# Patient Record
Sex: FEMALE | Race: BLACK OR AFRICAN AMERICAN | Employment: OTHER | URBAN - METROPOLITAN AREA
[De-identification: names, ages, dates, MRNs, and addresses within clinical notes are randomized per-mention and may not be internally consistent; named-entity substitution may affect disease eponyms.]

---

## 2017-07-13 PROBLEM — B00.9 HERPES SIMPLEX: Status: ACTIVE | Noted: 2017-07-13

## 2017-09-20 PROBLEM — M19.031 ARTHRITIS OF RIGHT WRIST: Status: ACTIVE | Noted: 2017-09-20

## 2018-03-21 ENCOUNTER — TELEPHONE (OUTPATIENT)
Dept: FAMILY MEDICINE CLINIC | Age: 68
End: 2018-03-21

## 2018-03-22 ENCOUNTER — HOSPITAL ENCOUNTER (OUTPATIENT)
Dept: OCCUPATIONAL THERAPY | Age: 68
Setting detail: THERAPIES SERIES
Discharge: HOME | End: 2018-03-22
Payer: MEDICARE

## 2018-03-22 PROCEDURE — 97165 OT EVAL LOW COMPLEX 30 MIN: CPT | Performed by: OCCUPATIONAL THERAPIST

## 2018-03-22 PROCEDURE — 97530 THERAPEUTIC ACTIVITIES: CPT | Performed by: OCCUPATIONAL THERAPIST

## 2018-03-22 PROCEDURE — G8984 CARRY CURRENT STATUS: HCPCS | Performed by: OCCUPATIONAL THERAPIST

## 2018-03-22 PROCEDURE — G8985 CARRY GOAL STATUS: HCPCS | Performed by: OCCUPATIONAL THERAPIST

## 2018-03-22 NOTE — PROGRESS NOTES
OCCUPATIONAL THERAPY INITIAL EVALUATION     Phone: [phone]  Fax: [phone]     Date:  3/22/2018  Initial Evaluation Date: 3/22/2018    Patient Name:  Ayla Gonzalez    :  1950    Restrictions/Precautions:  low fall risk  Diagnosis: Degenerative arthritis of R thumb (M19.042), M65.4 (ICD-10-CM) - De Quervain's tenosynovitis, Right (After review of physician's note, pt had R basal joint arthroplasty and De Quervain's decompression)  Treatment Diagnosis:  same  Insurance/Certification information:  [facility] medicare solutions  Referring Physician:  Dr. Leonides Leigh  Date of Surgery/Injury: 2016 pt had R thumb basal joint arthroplasty. Plan of care signed (Y/N):  No  Visit# / total visits:  if needed.     Past Medical History:   Past Medical History:   Diagnosis Date    Arthritis     Asthma     Cancer ([facility] [address]     breast     Cerebral artery occlusion with cerebral infarction ([facility] [address]     denies deficits    Cerebrovascular disease     no residual    CHF (congestive heart failure) (HCC) approx 3 years ago    Claustrophobia     Headache(784.0)     History of blood transfusion     with knee surgery    Hyperlipidemia     Hypertension     LBP radiating to both legs     Lymphedema of both lower extremities 2015    Neuromuscular disorder (HCC)     Obesity     Recurrent genital HSV (herpes simplex virus) infection     last outbreak 2017    S/P breast biopsy, right 2016    pt states breast cancer    Type II or unspecified type diabetes mellitus without mention of complication, not stated as uncontrolled     AA1c8.7 9/10     Past Surgical History:   Past Surgical History:   Procedure Laterality Date    ARTHROPLASTY Left 2016    thumb basil joint with dequervain's release    BREAST LUMPECTOMY  years ago    benign    BREAST LUMPECTOMY Right 2016    COLONOSCOPY  2005    COLONOSCOPY  1/8/15    Dr. Ramila Brunner - Minor Marylin FLOW  2012         there thumb to the tip of her fingers. She is able to touch the base of her SF as well. Comment: Hand Dominance is Right. Pt has a minimal amount of diffuse edema to her R basal joint. Well healed scar. Sensation: WFL. Pt is able to feel light touch to all aspects of her thumb including into forearm. Dynamometer (setting 2): Right: 4#   Left: 21#        Pinch Meter:   Lateral: Right: 7#, Left: 8#   Palmar 3 point: Right: 1#, Left: 6#    Intervention: Pt was created an HEP and reviewed so she can attempt at home. Used medium soft putty to complete gripping, 3 jaw milady pinching, pinch to pinch, and thumb abduction strengthening. She is to complete 10 min sessions per day. We will continue to add as able. Assessment: Decreased high-level IADLs; Increased pain; Increased Edema; Scar formation  Rehab Potential: Good   Recommendations: Outpatient OT  Goal Formulation: Patient  Requires OT Follow Up: Yes  Time In: 1100  Time Out: 1200  Timed Code Treatment Minutes: 60 Minutes    Plan   Plan: Plan of care initiated. Pt will be seen 1-2x a week for 6 weeks or 12 sessions if needed. At this time, pt will be seen 1 more time for a splint check and then will return to her MD. With approval from her surgeon, we will pick occupational therapy back up and work on the goals outlined below. Treatment to include: HEP, Therapeutic Functional Activities & Exercises, Strengthening, Stretching, AROM/AAROM/PROM, GM/FM Coordination Retraining, Desensitization, ADL/IADL re-training, Tendon Gliding, Manual Techniques, Pain Management with/without modalities PRN, Fluidotherapy, Ultra Sound, Electrical Stimulation, Massage, Edema management, Joint Protection/Training, Splinting, Ergonomics, Adaptive Equipment Assessment/Training, Energy Conservation/Work Simplification training, and Safety retraining all with patient education as required by individual diagnosis and goals.     GOALS(Long term same as Short term):  1) Patient will demonstrate good understanding of home program (exercises/activities/diagnosis/prognosis/goals) with good accuracy. 2) Patient to report decreased pain in their affected Right upper extremity from 5/10 to 2/10 or less with resistive functional use. 3) Patient to report 100% compliance with their splint wear, care, and precautions if needed. 4) Patient will report ability to play piano as prior to surgical intervention. 5) Patient will demonstrate increased /pinch strength of at least 10 / 5 pinch pounds of their Right hand to be measured when appropriate. 6) Patient will be knowledgeable of edema control techniques as evident with decreases from minimal to trace to her R hand. OT G-codes  Functional Assessment Tool Used: QuickDASH  Score: 16%  Functional Limitation: Carrying, moving and handling objects  Carrying, Moving and Handling Objects Current Status (): At least 1 percent but less than 20 percent impaired, limited or restricted  Carrying, Moving and Handling Objects Goal Status (): At least 1 percent but less than 20 percent impaired, limited or restricted      Please review Patient's OT evaluation and if you agree sign/date and fax back to us at our [address] [place] [address] fax # [phone].       Physician's Signature:____________________________ Date:__________________       Iron Daniels, [address] [place] [address], OTR/L [id number]

## 2018-03-22 NOTE — TELEPHONE ENCOUNTER
Would discuss with Dr Parth Shaffer next week but per my chart review her last ECHO showed preserved ejection fraction and I see no recent exacerbation. With careful monitoring of BP and clinically symptoms she should be okay to proceed with lymphedema treatment.

## 2018-03-29 ENCOUNTER — HOSPITAL ENCOUNTER (OUTPATIENT)
Dept: OCCUPATIONAL THERAPY | Age: 68
Setting detail: THERAPIES SERIES
Discharge: HOME | End: 2018-03-29
Payer: MEDICARE

## 2018-03-29 PROCEDURE — 97018 PARAFFIN BATH THERAPY: CPT

## 2018-03-29 PROCEDURE — 97110 THERAPEUTIC EXERCISES: CPT

## 2018-03-29 PROCEDURE — 97140 MANUAL THERAPY 1/> REGIONS: CPT

## 2018-03-29 NOTE — PROGRESS NOTES
Occupational Therapy    OCCUPATIONAL THERAPY PROGRESS NOTE  Date:  3/29/2018                                                 Initial Evaluation Date: 3/22/2018       Patient Name:  Ruth Gonzalez                       :  1950  Restrictions/Precautions:  low fall risk  Diagnosis: Degenerative arthritis of R thumb (M19.042), M65.4 (ICD-10-CM) - De Quervain's tenosynovitis, Right (After review of physician's note, pt had R basal joint arthroplasty and De Quervain's decompression)  Treatment Diagnosis:  same  Insurance/Certification information:  [facility] medicare solutions  Referring Physician:  Dr. Candie Love  Date of Surgery/Injury: 2017 pt had R thumb basal joint arthroplasty. Plan of care signed (Y/N):  No  Visit# / total visits: 2 / 12 if needed        Pain Level: 3 on scale of 1-10, aching    Subjective: Pt states, \" This thumb seems to be taking longer to heal up than the other one. \"   \" I've been using that putty once or twice a day. \"   \" My fingers are all sore today. \"     Objective:  Updated POC to be completed by 10th visit    INTERVENTION: COMPLETED: REPS/TIME: SPECIFICS/COMMENTS:   Modality:      Paraffin Bath x 10 mins With massage start of session         AROM:      Thumb/digits/hand/wrist forearm, all planes x 10 mins    Digital tendon gliding ex's x 5 mins    AAROM:     Fine Motor Manipulation tasks x 5 mins          PROM/Stretching:     Gentle thumb/digits/wrist  Forearm, all planes            Scar Mass/Edema Control:      Scar Massage/Desens x 5 mins    Edema Flushing Massage x 5 mins End of session   Strengthening:      Yellow Putty x 10 mins Gripping, added rolling, pulling, lat/ 3 Pt/ added tip to tip pinches. Issued pt yellow putty for home use. (Had mixed resistance but she feels its too hard to squeeze)   Gentle hands on resistive ex's forearm, wrist & thumb/digits x 10 mins    Other:                    Assessment: Pt is making Good progress toward stated plan of care.  Pt tolerated

## 2018-04-05 ENCOUNTER — HOSPITAL ENCOUNTER (OUTPATIENT)
Dept: OCCUPATIONAL THERAPY | Age: 68
Setting detail: THERAPIES SERIES
Discharge: HOME | End: 2018-04-05
Payer: MEDICARE

## 2018-04-05 PROCEDURE — 97022 WHIRLPOOL THERAPY: CPT

## 2018-04-05 PROCEDURE — 97110 THERAPEUTIC EXERCISES: CPT

## 2018-04-05 PROCEDURE — 97140 MANUAL THERAPY 1/> REGIONS: CPT

## 2018-04-13 ENCOUNTER — APPOINTMENT (OUTPATIENT)
Dept: OCCUPATIONAL THERAPY | Age: 68
End: 2018-04-13
Payer: MEDICARE

## 2018-04-13 ASSESSMENT — ENCOUNTER SYMPTOMS
NAUSEA: 0
BLOOD IN STOOL: 0
VOMITING: 0
ABDOMINAL PAIN: 0
ABDOMINAL DISTENTION: 0

## 2018-04-18 ENCOUNTER — OFFICE VISIT (OUTPATIENT)
Dept: BREAST CENTER | Age: 68
End: 2018-04-18
Payer: MEDICARE

## 2018-04-18 ENCOUNTER — HOSPITAL ENCOUNTER (OUTPATIENT)
Dept: GENERAL RADIOLOGY | Age: 68
Discharge: HOME | End: 2018-04-20
Payer: MEDICARE

## 2018-04-18 ENCOUNTER — HOSPITAL ENCOUNTER (OUTPATIENT)
Age: 68
Discharge: HOME | End: 2018-04-20
Payer: MEDICARE

## 2018-04-18 VITALS
TEMPERATURE: 98.6 F | SYSTOLIC BLOOD PRESSURE: 138 MMHG | DIASTOLIC BLOOD PRESSURE: 72 MMHG | RESPIRATION RATE: 16 BRPM | BODY MASS INDEX: 40.8 KG/M2 | HEIGHT: 65 IN | HEART RATE: 64 BPM | WEIGHT: 244.9 LBS

## 2018-04-18 DIAGNOSIS — Z78.0 POSTMENOPAUSAL: ICD-10-CM

## 2018-04-18 DIAGNOSIS — D05.11 DUCTAL CARCINOMA IN SITU (DCIS) OF RIGHT BREAST: ICD-10-CM

## 2018-04-18 DIAGNOSIS — Z78.0 POSTMENOPAUSAL: Primary | ICD-10-CM

## 2018-04-18 LAB
ALBUMIN SERPL-MCNC: 3.5 G/DL (ref 3.5–5.2)
ALP BLD-CCNC: 179 U/L (ref 35–104)
ALT SERPL-CCNC: 7 U/L (ref 0–32)
ANION GAP SERPL CALCULATED.3IONS-SCNC: 11 MMOL/L (ref 7–16)
AST SERPL-CCNC: 11 U/L (ref 0–31)
BASOPHILS ABSOLUTE: 0.04 E9/L (ref 0–0.2)
BASOPHILS RELATIVE PERCENT: 0.6 % (ref 0–2)
BILIRUB SERPL-MCNC: 0.3 MG/DL (ref 0–1.2)
BUN BLDV-MCNC: 8 MG/DL (ref 8–23)
CALCIUM SERPL-MCNC: 9.5 MG/DL (ref 8.6–10.2)
CHLORIDE BLD-SCNC: 100 MMOL/L (ref 98–107)
CO2: 30 MMOL/L (ref 22–29)
CREAT SERPL-MCNC: 0.8 MG/DL (ref 0.5–1)
EOSINOPHILS ABSOLUTE: 0 E9/L (ref 0.05–0.5)
EOSINOPHILS RELATIVE PERCENT: 0 % (ref 0–6)
GFR AFRICAN AMERICAN: >60
GFR NON-AFRICAN AMERICAN: >60 ML/MIN/1.73
GLUCOSE BLD-MCNC: 178 MG/DL (ref 74–109)
HCT VFR BLD CALC: 36.8 % (ref 34–48)
HEMOGLOBIN: 11.9 G/DL (ref 11.5–15.5)
IMMATURE GRANULOCYTES #: 0.04 E9/L
IMMATURE GRANULOCYTES %: 0.6 % (ref 0–5)
LYMPHOCYTES ABSOLUTE: 1.39 E9/L (ref 1.5–4)
LYMPHOCYTES RELATIVE PERCENT: 19.3 % (ref 20–42)
MCH RBC QN AUTO: 28 PG (ref 26–35)
MCHC RBC AUTO-ENTMCNC: 32.3 % (ref 32–34.5)
MCV RBC AUTO: 86.6 FL (ref 80–99.9)
MONOCYTES ABSOLUTE: 0.68 E9/L (ref 0.1–0.95)
MONOCYTES RELATIVE PERCENT: 9.4 % (ref 2–12)
NEUTROPHILS ABSOLUTE: 5.05 E9/L (ref 1.8–7.3)
NEUTROPHILS RELATIVE PERCENT: 70.1 % (ref 43–80)
PDW BLD-RTO: 13.6 FL (ref 11.5–15)
PLATELET # BLD: 399 E9/L (ref 130–450)
PMV BLD AUTO: 10.9 FL (ref 7–12)
POTASSIUM SERPL-SCNC: 4 MMOL/L (ref 3.5–5)
RBC # BLD: 4.25 E12/L (ref 3.5–5.5)
SODIUM BLD-SCNC: 141 MMOL/L (ref 132–146)
TOTAL PROTEIN: 6.9 G/DL (ref 6.4–8.3)
WBC # BLD: 7.2 E9/L (ref 4.5–11.5)

## 2018-04-18 PROCEDURE — G0279 TOMOSYNTHESIS, MAMMO: HCPCS

## 2018-04-18 PROCEDURE — G8599 NO ASA/ANTIPLAT THER USE RNG: HCPCS | Performed by: NURSE PRACTITIONER

## 2018-04-18 PROCEDURE — 85025 COMPLETE CBC W/AUTO DIFF WBC: CPT

## 2018-04-18 PROCEDURE — 4040F PNEUMOC VAC/ADMIN/RCVD: CPT | Performed by: NURSE PRACTITIONER

## 2018-04-18 PROCEDURE — 99213 OFFICE O/P EST LOW 20 MIN: CPT

## 2018-04-18 PROCEDURE — 36415 COLL VENOUS BLD VENIPUNCTURE: CPT

## 2018-04-18 PROCEDURE — G8399 PT W/DXA RESULTS DOCUMENT: HCPCS | Performed by: NURSE PRACTITIONER

## 2018-04-18 PROCEDURE — G8417 CALC BMI ABV UP PARAM F/U: HCPCS | Performed by: NURSE PRACTITIONER

## 2018-04-18 PROCEDURE — 3014F SCREEN MAMMO DOC REV: CPT | Performed by: NURSE PRACTITIONER

## 2018-04-18 PROCEDURE — 80053 COMPREHEN METABOLIC PANEL: CPT

## 2018-04-18 PROCEDURE — 1123F ACP DISCUSS/DSCN MKR DOCD: CPT | Performed by: NURSE PRACTITIONER

## 2018-04-18 PROCEDURE — 3017F COLORECTAL CA SCREEN DOC REV: CPT | Performed by: NURSE PRACTITIONER

## 2018-04-18 PROCEDURE — 76642 ULTRASOUND BREAST LIMITED: CPT

## 2018-04-18 PROCEDURE — 1090F PRES/ABSN URINE INCON ASSESS: CPT | Performed by: NURSE PRACTITIONER

## 2018-04-18 PROCEDURE — 99214 OFFICE O/P EST MOD 30 MIN: CPT | Performed by: NURSE PRACTITIONER

## 2018-04-18 PROCEDURE — G8427 DOCREV CUR MEDS BY ELIG CLIN: HCPCS | Performed by: NURSE PRACTITIONER

## 2018-04-18 PROCEDURE — 1036F TOBACCO NON-USER: CPT | Performed by: NURSE PRACTITIONER

## 2018-04-18 PROCEDURE — 77080 DXA BONE DENSITY AXIAL: CPT

## 2018-04-18 ASSESSMENT — ENCOUNTER SYMPTOMS
CHEST TIGHTNESS: 0
BACK PAIN: 0
TROUBLE SWALLOWING: 0
SORE THROAT: 0
EYE DISCHARGE: 0
SINUS PAIN: 0
EYE ITCHING: 0
RHINORRHEA: 0
COUGH: 0
SINUS PRESSURE: 0
CONSTIPATION: 0
CHOKING: 0
DIARRHEA: 0
SHORTNESS OF BREATH: 0
WHEEZING: 0
VOICE CHANGE: 0

## 2018-04-19 RX ORDER — INSULIN GLARGINE 100 [IU]/ML
INJECTION, SOLUTION SUBCUTANEOUS
Qty: 6 VIAL | Refills: 1 | Status: ON HOLD | OUTPATIENT
Start: 2018-04-19 | End: 2018-07-17 | Stop reason: HOSPADM

## 2018-04-30 ENCOUNTER — OFFICE VISIT (OUTPATIENT)
Dept: ORTHOPEDIC SURGERY | Age: 68
End: 2018-04-30
Payer: MEDICARE

## 2018-04-30 VITALS
RESPIRATION RATE: 20 BRPM | TEMPERATURE: 97.9 F | DIASTOLIC BLOOD PRESSURE: 79 MMHG | HEART RATE: 77 BPM | SYSTOLIC BLOOD PRESSURE: 170 MMHG

## 2018-04-30 DIAGNOSIS — M65.331 TRIGGER FINGER, RIGHT MIDDLE FINGER: ICD-10-CM

## 2018-04-30 DIAGNOSIS — M79.641 RIGHT HAND PAIN: Primary | ICD-10-CM

## 2018-04-30 DIAGNOSIS — M25.641 STIFFNESS OF FINGER JOINT OF RIGHT HAND: ICD-10-CM

## 2018-04-30 DIAGNOSIS — M65.341 TRIGGER FINGER, RIGHT RING FINGER: ICD-10-CM

## 2018-04-30 PROCEDURE — G8427 DOCREV CUR MEDS BY ELIG CLIN: HCPCS | Performed by: ORTHOPAEDIC SURGERY

## 2018-04-30 PROCEDURE — 1036F TOBACCO NON-USER: CPT | Performed by: ORTHOPAEDIC SURGERY

## 2018-04-30 PROCEDURE — 1090F PRES/ABSN URINE INCON ASSESS: CPT | Performed by: ORTHOPAEDIC SURGERY

## 2018-04-30 PROCEDURE — G8599 NO ASA/ANTIPLAT THER USE RNG: HCPCS | Performed by: ORTHOPAEDIC SURGERY

## 2018-04-30 PROCEDURE — 20550 NJX 1 TENDON SHEATH/LIGAMENT: CPT | Performed by: ORTHOPAEDIC SURGERY

## 2018-04-30 PROCEDURE — G8399 PT W/DXA RESULTS DOCUMENT: HCPCS | Performed by: ORTHOPAEDIC SURGERY

## 2018-04-30 PROCEDURE — 4040F PNEUMOC VAC/ADMIN/RCVD: CPT | Performed by: ORTHOPAEDIC SURGERY

## 2018-04-30 PROCEDURE — 1123F ACP DISCUSS/DSCN MKR DOCD: CPT | Performed by: ORTHOPAEDIC SURGERY

## 2018-04-30 PROCEDURE — G8417 CALC BMI ABV UP PARAM F/U: HCPCS | Performed by: ORTHOPAEDIC SURGERY

## 2018-04-30 PROCEDURE — 99213 OFFICE O/P EST LOW 20 MIN: CPT | Performed by: ORTHOPAEDIC SURGERY

## 2018-04-30 PROCEDURE — 3017F COLORECTAL CA SCREEN DOC REV: CPT | Performed by: ORTHOPAEDIC SURGERY

## 2018-04-30 RX ORDER — BETAMETHASONE SODIUM PHOSPHATE AND BETAMETHASONE ACETATE 3; 3 MG/ML; MG/ML
12 INJECTION, SUSPENSION INTRA-ARTICULAR; INTRALESIONAL; INTRAMUSCULAR; SOFT TISSUE ONCE
Status: COMPLETED | OUTPATIENT
Start: 2018-04-30 | End: 2018-04-30

## 2018-04-30 RX ADMIN — BETAMETHASONE SODIUM PHOSPHATE AND BETAMETHASONE ACETATE 12 MG: 3; 3 INJECTION, SUSPENSION INTRA-ARTICULAR; INTRALESIONAL; INTRAMUSCULAR; SOFT TISSUE at 13:11

## 2018-05-02 ENCOUNTER — HOSPITAL ENCOUNTER (OUTPATIENT)
Dept: OCCUPATIONAL THERAPY | Age: 68
Setting detail: THERAPIES SERIES
Discharge: HOME | End: 2018-05-02
Payer: MEDICARE

## 2018-05-04 ENCOUNTER — HOSPITAL ENCOUNTER (OUTPATIENT)
Dept: OCCUPATIONAL THERAPY | Age: 68
Setting detail: THERAPIES SERIES
Discharge: HOME | End: 2018-05-04
Payer: MEDICARE

## 2018-05-04 PROCEDURE — 97530 THERAPEUTIC ACTIVITIES: CPT | Performed by: OCCUPATIONAL THERAPIST

## 2018-05-05 ENCOUNTER — HOSPITAL ENCOUNTER (EMERGENCY)
Age: 68
Discharge: HOME | End: 2018-05-05
Payer: MEDICARE

## 2018-05-05 ENCOUNTER — APPOINTMENT (OUTPATIENT)
Dept: GENERAL RADIOLOGY | Age: 68
End: 2018-05-05
Payer: MEDICARE

## 2018-05-05 VITALS
WEIGHT: 242 LBS | DIASTOLIC BLOOD PRESSURE: 95 MMHG | TEMPERATURE: 97.4 F | OXYGEN SATURATION: 95 % | BODY MASS INDEX: 40.32 KG/M2 | RESPIRATION RATE: 17 BRPM | SYSTOLIC BLOOD PRESSURE: 191 MMHG | HEART RATE: 93 BPM | HEIGHT: 65 IN

## 2018-05-05 DIAGNOSIS — M25.561 ACUTE PAIN OF BOTH KNEES: Primary | ICD-10-CM

## 2018-05-05 DIAGNOSIS — S16.1XXA STRAIN OF NECK MUSCLE, INITIAL ENCOUNTER: ICD-10-CM

## 2018-05-05 DIAGNOSIS — M25.562 ACUTE PAIN OF BOTH KNEES: Primary | ICD-10-CM

## 2018-05-05 PROCEDURE — 73562 X-RAY EXAM OF KNEE 3: CPT

## 2018-05-05 PROCEDURE — 6370000000 HC RX 637 (ALT 250 FOR IP): Performed by: NURSE PRACTITIONER

## 2018-05-05 PROCEDURE — 99283 EMERGENCY DEPT VISIT LOW MDM: CPT

## 2018-05-05 PROCEDURE — 6360000002 HC RX W HCPCS: Performed by: NURSE PRACTITIONER

## 2018-05-05 PROCEDURE — 72050 X-RAY EXAM NECK SPINE 4/5VWS: CPT

## 2018-05-05 RX ORDER — HYDROCODONE BITARTRATE AND ACETAMINOPHEN 5; 325 MG/1; MG/1
1 TABLET ORAL EVERY 6 HOURS PRN
Qty: 8 TABLET | Refills: 0 | Status: SHIPPED | OUTPATIENT
Start: 2018-05-05 | End: 2018-05-07

## 2018-05-05 RX ORDER — DEXAMETHASONE SODIUM PHOSPHATE 10 MG/ML
10 INJECTION INTRAMUSCULAR; INTRAVENOUS ONCE
Status: COMPLETED | OUTPATIENT
Start: 2018-05-05 | End: 2018-05-05

## 2018-05-05 RX ORDER — HYDROCODONE BITARTRATE AND ACETAMINOPHEN 5; 325 MG/1; MG/1
1 TABLET ORAL ONCE
Status: COMPLETED | OUTPATIENT
Start: 2018-05-05 | End: 2018-05-05

## 2018-05-05 RX ADMIN — HYDROCODONE BITARTRATE AND ACETAMINOPHEN 1 TABLET: 5; 325 TABLET ORAL at 18:33

## 2018-05-05 RX ADMIN — DEXAMETHASONE SODIUM PHOSPHATE 10 MG: 10 INJECTION INTRAMUSCULAR; INTRAVENOUS at 19:42

## 2018-05-05 ASSESSMENT — PAIN SCALES - GENERAL: PAINLEVEL_OUTOF10: 10

## 2018-05-09 ENCOUNTER — HOSPITAL ENCOUNTER (OUTPATIENT)
Dept: OCCUPATIONAL THERAPY | Age: 68
Setting detail: THERAPIES SERIES
Discharge: HOME | End: 2018-05-09
Payer: MEDICARE

## 2018-05-18 ENCOUNTER — OFFICE VISIT (OUTPATIENT)
Dept: FAMILY MEDICINE CLINIC | Age: 68
End: 2018-05-18
Payer: MEDICARE

## 2018-05-18 VITALS
OXYGEN SATURATION: 97 % | BODY MASS INDEX: 40.65 KG/M2 | WEIGHT: 244 LBS | HEART RATE: 87 BPM | HEIGHT: 65 IN | SYSTOLIC BLOOD PRESSURE: 142 MMHG | RESPIRATION RATE: 16 BRPM | TEMPERATURE: 99 F | DIASTOLIC BLOOD PRESSURE: 70 MMHG

## 2018-05-18 DIAGNOSIS — S80.00XD CONTUSION OF KNEE, UNSPECIFIED LATERALITY, SUBSEQUENT ENCOUNTER: ICD-10-CM

## 2018-05-18 DIAGNOSIS — N39.46 MIXED STRESS AND URGE URINARY INCONTINENCE: ICD-10-CM
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
## 2018-05-18 DIAGNOSIS — S16.1XXD STRAIN OF NECK MUSCLE, SUBSEQUENT ENCOUNTER: ICD-10-CM

## 2018-05-18 PROCEDURE — 1036F TOBACCO NON-USER: CPT | Performed by: FAMILY MEDICINE

## 2018-05-18 PROCEDURE — G8417 CALC BMI ABV UP PARAM F/U: HCPCS | Performed by: FAMILY MEDICINE

## 2018-05-18 PROCEDURE — G8427 DOCREV CUR MEDS BY ELIG CLIN: HCPCS | Performed by: FAMILY MEDICINE

## 2018-05-18 PROCEDURE — 1090F PRES/ABSN URINE INCON ASSESS: CPT | Performed by: FAMILY MEDICINE

## 2018-05-18 PROCEDURE — G8399 PT W/DXA RESULTS DOCUMENT: HCPCS | Performed by: FAMILY MEDICINE

## 2018-05-18 PROCEDURE — 1123F ACP DISCUSS/DSCN MKR DOCD: CPT | Performed by: FAMILY MEDICINE

## 2018-05-18 PROCEDURE — 3017F COLORECTAL CA SCREEN DOC REV: CPT | Performed by: FAMILY MEDICINE

## 2018-05-18 PROCEDURE — 4040F PNEUMOC VAC/ADMIN/RCVD: CPT | Performed by: FAMILY MEDICINE

## 2018-05-18 PROCEDURE — 0509F URINE INCON PLAN DOCD: CPT | Performed by: FAMILY MEDICINE

## 2018-05-18 PROCEDURE — G8599 NO ASA/ANTIPLAT THER USE RNG: HCPCS | Performed by: FAMILY MEDICINE

## 2018-05-18 PROCEDURE — 99213 OFFICE O/P EST LOW 20 MIN: CPT | Performed by: FAMILY MEDICINE

## 2018-05-23 ENCOUNTER — HOSPITAL ENCOUNTER (OUTPATIENT)
Age: 68
Discharge: HOME | End: 2018-05-25
Payer: MEDICARE

## 2018-05-23 ENCOUNTER — NURSE ONLY (OUTPATIENT)
Dept: FAMILY MEDICINE CLINIC | Age: 68
End: 2018-05-23
Payer: MEDICARE

## 2018-05-23 DIAGNOSIS — E13.42 DIABETIC POLYNEUROPATHY ASSOCIATED WITH OTHER SPECIFIED DIABETES MELLITUS (HCC): ICD-10-CM

## 2018-05-23 DIAGNOSIS — E55.9 VITAMIN D DEFICIENCY: ICD-10-CM

## 2018-05-23 DIAGNOSIS — E53.8 VITAMIN B12 DEFICIENCY: ICD-10-CM

## 2018-05-23 LAB
ALBUMIN SERPL-MCNC: 3.7 G/DL (ref 3.5–5.2)
ALP BLD-CCNC: 148 U/L (ref 35–104)
ALT SERPL-CCNC: <5 U/L (ref 0–32)
ANION GAP SERPL CALCULATED.3IONS-SCNC: 12 MMOL/L (ref 7–16)
AST SERPL-CCNC: 11 U/L (ref 0–31)
BILIRUB SERPL-MCNC: 0.3 MG/DL (ref 0–1.2)
BUN BLDV-MCNC: 9 MG/DL (ref 8–23)
CALCIUM SERPL-MCNC: 9.5 MG/DL (ref 8.6–10.2)
CHLORIDE BLD-SCNC: 102 MMOL/L (ref 98–107)
CO2: 29 MMOL/L (ref 22–29)
CREAT SERPL-MCNC: 0.7 MG/DL (ref 0.5–1)
FOLATE: 9.7 NG/ML (ref 4.8–24.2)
GFR AFRICAN AMERICAN: >60
GFR NON-AFRICAN AMERICAN: >60 ML/MIN/1.73
GLUCOSE BLD-MCNC: 111 MG/DL (ref 74–109)
HBA1C MFR BLD: 10.2 % (ref 4.8–5.9)
POTASSIUM SERPL-SCNC: 4.4 MMOL/L (ref 3.5–5)
SODIUM BLD-SCNC: 143 MMOL/L (ref 132–146)
TOTAL PROTEIN: 7.1 G/DL (ref 6.4–8.3)
VITAMIN B-12: 202 PG/ML (ref 211–946)
VITAMIN D 25-HYDROXY: 22 NG/ML (ref 30–100)

## 2018-05-23 PROCEDURE — 82306 VITAMIN D 25 HYDROXY: CPT

## 2018-05-23 PROCEDURE — 82746 ASSAY OF FOLIC ACID SERUM: CPT

## 2018-05-23 PROCEDURE — 82607 VITAMIN B-12: CPT

## 2018-05-23 PROCEDURE — 80053 COMPREHEN METABOLIC PANEL: CPT

## 2018-05-23 PROCEDURE — 83036 HEMOGLOBIN GLYCOSYLATED A1C: CPT

## 2018-05-23 PROCEDURE — 36415 COLL VENOUS BLD VENIPUNCTURE: CPT | Performed by: FAMILY MEDICINE

## 2018-05-30 ENCOUNTER — APPOINTMENT (OUTPATIENT)
Dept: GENERAL RADIOLOGY | Age: 68
End: 2018-05-30
Payer: MEDICARE

## 2018-05-30 ENCOUNTER — HOSPITAL ENCOUNTER (EMERGENCY)
Age: 68
Discharge: HOME | End: 2018-05-30
Attending: EMERGENCY MEDICINE
Payer: MEDICARE

## 2018-05-30 VITALS
DIASTOLIC BLOOD PRESSURE: 70 MMHG | HEIGHT: 65 IN | WEIGHT: 240 LBS | BODY MASS INDEX: 39.99 KG/M2 | SYSTOLIC BLOOD PRESSURE: 166 MMHG | HEART RATE: 94 BPM | OXYGEN SATURATION: 97 % | RESPIRATION RATE: 18 BRPM | TEMPERATURE: 97.7 F

## 2018-05-30 DIAGNOSIS — J45.40 MODERATE PERSISTENT ASTHMA WITHOUT COMPLICATION: ICD-10-CM

## 2018-05-30 DIAGNOSIS — R05.9 COUGH: ICD-10-CM

## 2018-05-30 DIAGNOSIS — J02.9 ACUTE PHARYNGITIS, UNSPECIFIED ETIOLOGY: Primary | ICD-10-CM

## 2018-05-30 LAB
ALBUMIN SERPL-MCNC: 3.1 G/DL (ref 3.5–5.2)
ALP BLD-CCNC: 145 U/L (ref 35–104)
ALT SERPL-CCNC: 9 U/L (ref 0–32)
ANION GAP SERPL CALCULATED.3IONS-SCNC: 14 MMOL/L (ref 7–16)
AST SERPL-CCNC: 24 U/L (ref 0–31)
BASOPHILS ABSOLUTE: 0.05 E9/L (ref 0–0.2)
BASOPHILS RELATIVE PERCENT: 0.6 % (ref 0–2)
BILIRUB SERPL-MCNC: 0.2 MG/DL (ref 0–1.2)
BUN BLDV-MCNC: 7 MG/DL (ref 8–23)
CALCIUM SERPL-MCNC: 9.1 MG/DL (ref 8.6–10.2)
CHLORIDE BLD-SCNC: 98 MMOL/L (ref 98–107)
CO2: 26 MMOL/L (ref 22–29)
CREAT SERPL-MCNC: 0.6 MG/DL (ref 0.5–1)
EKG ATRIAL RATE: 79 BPM
EKG P AXIS: 48 DEGREES
EKG P-R INTERVAL: 136 MS
EKG Q-T INTERVAL: 420 MS
EKG QRS DURATION: 134 MS
EKG QTC CALCULATION (BAZETT): 481 MS
EKG R AXIS: -24 DEGREES
EKG T AXIS: 118 DEGREES
EKG VENTRICULAR RATE: 79 BPM
EOSINOPHILS ABSOLUTE: 0 E9/L (ref 0.05–0.5)
EOSINOPHILS RELATIVE PERCENT: 0 % (ref 0–6)
GFR AFRICAN AMERICAN: >60
GFR NON-AFRICAN AMERICAN: >60 ML/MIN/1.73
GLUCOSE BLD-MCNC: 234 MG/DL (ref 74–109)
HCT VFR BLD CALC: 34.7 % (ref 34–48)
HEMOGLOBIN: 11.2 G/DL (ref 11.5–15.5)
IMMATURE GRANULOCYTES #: 0.03 E9/L
IMMATURE GRANULOCYTES %: 0.3 % (ref 0–5)
LYMPHOCYTES ABSOLUTE: 1.71 E9/L (ref 1.5–4)
LYMPHOCYTES RELATIVE PERCENT: 19.7 % (ref 20–42)
MCH RBC QN AUTO: 27.8 PG (ref 26–35)
MCHC RBC AUTO-ENTMCNC: 32.3 % (ref 32–34.5)
MCV RBC AUTO: 86.1 FL (ref 80–99.9)
MONOCYTES ABSOLUTE: 1.02 E9/L (ref 0.1–0.95)
MONOCYTES RELATIVE PERCENT: 11.7 % (ref 2–12)
NEUTROPHILS ABSOLUTE: 5.88 E9/L (ref 1.8–7.3)
NEUTROPHILS RELATIVE PERCENT: 67.7 % (ref 43–80)
PDW BLD-RTO: 13.6 FL (ref 11.5–15)
PLATELET # BLD: 435 E9/L (ref 130–450)
PMV BLD AUTO: 10.3 FL (ref 7–12)
POTASSIUM SERPL-SCNC: 3.8 MMOL/L (ref 3.5–5)
POTASSIUM SERPL-SCNC: 5.7 MMOL/L (ref 3.5–5)
PRO-BNP: 105 PG/ML (ref 0–125)
RBC # BLD: 4.03 E12/L (ref 3.5–5.5)
SODIUM BLD-SCNC: 138 MMOL/L (ref 132–146)
TOTAL PROTEIN: 7.1 G/DL (ref 6.4–8.3)
TROPONIN: <0.01 NG/ML (ref 0–0.03)
WBC # BLD: 8.7 E9/L (ref 4.5–11.5)

## 2018-05-30 PROCEDURE — 83880 ASSAY OF NATRIURETIC PEPTIDE: CPT

## 2018-05-30 PROCEDURE — 85025 COMPLETE CBC W/AUTO DIFF WBC: CPT

## 2018-05-30 PROCEDURE — 96374 THER/PROPH/DIAG INJ IV PUSH: CPT

## 2018-05-30 PROCEDURE — 80053 COMPREHEN METABOLIC PANEL: CPT

## 2018-05-30 PROCEDURE — 93005 ELECTROCARDIOGRAM TRACING: CPT | Performed by: EMERGENCY MEDICINE

## 2018-05-30 PROCEDURE — 99285 EMERGENCY DEPT VISIT HI MDM: CPT

## 2018-05-30 PROCEDURE — 84132 ASSAY OF SERUM POTASSIUM: CPT

## 2018-05-30 PROCEDURE — 36415 COLL VENOUS BLD VENIPUNCTURE: CPT

## 2018-05-30 PROCEDURE — 6360000002 HC RX W HCPCS: Performed by: EMERGENCY MEDICINE

## 2018-05-30 PROCEDURE — 84484 ASSAY OF TROPONIN QUANT: CPT

## 2018-05-30 PROCEDURE — 94640 AIRWAY INHALATION TREATMENT: CPT

## 2018-05-30 PROCEDURE — 71045 X-RAY EXAM CHEST 1 VIEW: CPT

## 2018-05-30 PROCEDURE — 94664 DEMO&/EVAL PT USE INHALER: CPT

## 2018-05-30 PROCEDURE — 6370000000 HC RX 637 (ALT 250 FOR IP): Performed by: EMERGENCY MEDICINE

## 2018-05-30 RX ORDER — IPRATROPIUM BROMIDE AND ALBUTEROL SULFATE 2.5; .5 MG/3ML; MG/3ML
1 SOLUTION RESPIRATORY (INHALATION)
Status: COMPLETED | OUTPATIENT
Start: 2018-05-30 | End: 2018-05-30

## 2018-05-30 RX ORDER — BENZONATATE 100 MG/1
100 CAPSULE ORAL 3 TIMES DAILY PRN
Qty: 21 CAPSULE | Refills: 0 | Status: SHIPPED | OUTPATIENT
Start: 2018-05-30 | End: 2018-06-06

## 2018-05-30 RX ORDER — BENZONATATE 100 MG/1
100 CAPSULE ORAL ONCE
Status: COMPLETED | OUTPATIENT
Start: 2018-05-30 | End: 2018-05-30

## 2018-05-30 RX ORDER — DEXAMETHASONE SODIUM PHOSPHATE 10 MG/ML
10 INJECTION INTRAMUSCULAR; INTRAVENOUS ONCE
Status: COMPLETED | OUTPATIENT
Start: 2018-05-30 | End: 2018-05-30

## 2018-05-30 RX ORDER — PREDNISONE 10 MG/1
TABLET ORAL
Qty: 16 TABLET | Refills: 0 | Status: SHIPPED | OUTPATIENT
Start: 2018-05-30 | End: 2018-06-09

## 2018-05-30 RX ADMIN — BENZONATATE 100 MG: 100 CAPSULE ORAL at 09:04

## 2018-05-30 RX ADMIN — IPRATROPIUM BROMIDE AND ALBUTEROL SULFATE 1 AMPULE: 2.5; .5 SOLUTION RESPIRATORY (INHALATION) at 09:20

## 2018-05-30 RX ADMIN — DEXAMETHASONE SODIUM PHOSPHATE 10 MG: 10 INJECTION INTRAMUSCULAR; INTRAVENOUS at 09:41

## 2018-05-30 RX ADMIN — IPRATROPIUM BROMIDE AND ALBUTEROL SULFATE 1 AMPULE: 2.5; .5 SOLUTION RESPIRATORY (INHALATION) at 09:11

## 2018-05-30 RX ADMIN — IPRATROPIUM BROMIDE AND ALBUTEROL SULFATE 1 AMPULE: 2.5; .5 SOLUTION RESPIRATORY (INHALATION) at 09:08

## 2018-05-30 ASSESSMENT — ENCOUNTER SYMPTOMS
VOMITING: 0
SINUS PRESSURE: 0
EYE DISCHARGE: 0
SORE THROAT: 1
ABDOMINAL DISTENTION: 0
EYE REDNESS: 0
BACK PAIN: 0
EYE PAIN: 0
SHORTNESS OF BREATH: 1
DIARRHEA: 0
COUGH: 1
WHEEZING: 0
NAUSEA: 0

## 2018-06-01 ENCOUNTER — HOSPITAL ENCOUNTER (OUTPATIENT)
Dept: OCCUPATIONAL THERAPY | Age: 68
Setting detail: THERAPIES SERIES
Discharge: HOME | End: 2018-06-01
Payer: MEDICARE

## 2018-06-01 PROCEDURE — G8989 SELF CARE D/C STATUS: HCPCS | Performed by: OCCUPATIONAL THERAPIST

## 2018-06-01 PROCEDURE — G8987 SELF CARE CURRENT STATUS: HCPCS | Performed by: OCCUPATIONAL THERAPIST

## 2018-06-01 NOTE — PROGRESS NOTES
Occupational Therapy       OCCUPATIONAL THERAPY DEPARTMENT    Phone: [phone]             QNE: [phone]      To: Dr. Rhea Martinez     Date: 2018  Patient: Daniel Gonzalez       : 1950  Diagnosis: Lymphedema bilateral lower extremities           Treatment Diagnosis:  lymphedema    OccupationalTherapy Progress/Discharge Note    Evaluation Date:  2018 Total Visits to date:   4 Cancels/No-shows to date:  2    Plan of Care/Treatment to date:  Patient educated on lymphedema / precautions, skin care / management, manual lymphatic drainage, self mld          Significant Findings At Last Visit/Comments:    Patient with fall prior to last attended visit with increased edema and pain noted. Patient has not called to reschedule since last missed appointment. Goals Status:      Patient Goal: to control swelling in legs     STG: 3 weeks  1.  Patient will demonstrate knowledge and understanding for lymphedema precautions, skin care and self management to decrease progression of lymphedema and risk of infection. Goal partially met   2.  Patient will present with decreased limb volume in the involved extremity from mild to minimal for improved functional mobility. Goal partially met   3.  Patient will demonstrate compliance with compression therapy for independent management of lymphedema. Goal partially met        LT weeks  1.  Patient will be independent with self management of lymphedema including understanding garment wear schedule, self compression bandaging, HEP and self care. Goal not met  2.  Patient will be fitted for appropriate compression garments for long term management of lymphedema. Goal not met  3.   Patient will present with decreased limb volume in the involved extremity from minimal to trace  for improved functional mobility.    Goal not met    Frequency/Duration:  # Days per week:  2-3 # Weeks: 6     Rehab Potential: [] Excellent [] Good [x] Fair  [] Poor     Goal

## 2018-06-19 ENCOUNTER — TELEPHONE (OUTPATIENT)
Dept: BREAST CENTER | Age: 68
End: 2018-06-19

## 2018-07-02 ENCOUNTER — HOSPITAL ENCOUNTER (OUTPATIENT)
Dept: OCCUPATIONAL THERAPY | Age: 68
Setting detail: THERAPIES SERIES
Discharge: HOME | End: 2018-07-02
Payer: MEDICARE

## 2018-07-03 ENCOUNTER — TELEPHONE (OUTPATIENT)
Dept: FAMILY MEDICINE CLINIC | Age: 68
End: 2018-07-03

## 2018-07-03 ENCOUNTER — HOSPITAL ENCOUNTER (EMERGENCY)
Age: 68
Discharge: HOME | End: 2018-07-03
Attending: EMERGENCY MEDICINE
Payer: MEDICARE

## 2018-07-03 VITALS
RESPIRATION RATE: 16 BRPM | WEIGHT: 240 LBS | HEIGHT: 65 IN | HEART RATE: 83 BPM | OXYGEN SATURATION: 98 % | DIASTOLIC BLOOD PRESSURE: 70 MMHG | TEMPERATURE: 99.5 F | SYSTOLIC BLOOD PRESSURE: 158 MMHG | BODY MASS INDEX: 39.99 KG/M2

## 2018-07-03 DIAGNOSIS — L02.31 CELLULITIS AND ABSCESS OF BUTTOCK: Primary | ICD-10-CM

## 2018-07-03 DIAGNOSIS — L03.317 CELLULITIS AND ABSCESS OF BUTTOCK: Primary | ICD-10-CM

## 2018-07-03 PROCEDURE — 10060 I&D ABSCESS SIMPLE/SINGLE: CPT

## 2018-07-03 PROCEDURE — 2500000003 HC RX 250 WO HCPCS: Performed by: EMERGENCY MEDICINE

## 2018-07-03 PROCEDURE — 99282 EMERGENCY DEPT VISIT SF MDM: CPT

## 2018-07-03 RX ORDER — NAPROXEN 500 MG/1
500 TABLET ORAL 2 TIMES DAILY WITH MEALS
Qty: 14 TABLET | Refills: 0 | Status: SHIPPED | OUTPATIENT
Start: 2018-07-03 | End: 2018-07-20 | Stop reason: ALTCHOICE

## 2018-07-03 RX ORDER — SULFAMETHOXAZOLE AND TRIMETHOPRIM 800; 160 MG/1; MG/1
1 TABLET ORAL 2 TIMES DAILY
Qty: 20 TABLET | Refills: 0 | Status: SHIPPED | OUTPATIENT
Start: 2018-07-03 | End: 2018-07-13

## 2018-07-03 RX ORDER — LIDOCAINE HYDROCHLORIDE AND EPINEPHRINE 10; 10 MG/ML; UG/ML
20 INJECTION, SOLUTION INFILTRATION; PERINEURAL ONCE
Status: COMPLETED | OUTPATIENT
Start: 2018-07-03 | End: 2018-07-03

## 2018-07-03 RX ADMIN — LIDOCAINE HYDROCHLORIDE,EPINEPHRINE BITARTRATE 20 ML: 10; .01 INJECTION, SOLUTION INFILTRATION; PERINEURAL at 16:38

## 2018-07-03 ASSESSMENT — PAIN SCALES - GENERAL
PAINLEVEL_OUTOF10: 3
PAINLEVEL_OUTOF10: 3

## 2018-07-03 ASSESSMENT — PAIN DESCRIPTION - LOCATION: LOCATION: BUTTOCKS

## 2018-07-03 ASSESSMENT — PAIN DESCRIPTION - PAIN TYPE: TYPE: ACUTE PAIN

## 2018-07-03 ASSESSMENT — PAIN DESCRIPTION - ORIENTATION: ORIENTATION: LEFT

## 2018-07-03 ASSESSMENT — ENCOUNTER SYMPTOMS
NAUSEA: 0
VOMITING: 0

## 2018-07-03 NOTE — ED PROVIDER NOTES
Skin: Skin is warm and dry. No rash noted. She is not diaphoretic. There is erythema (Mild erythema surrounding the area of induration). No pallor. Induration of medial left buttock measuring about 2 cm x 2.5 cm; central elevation with some fluctuance; no surrounding erythema   Psychiatric: She has a normal mood and affect. Her behavior is normal. Judgment and thought content normal.               Procedures  Incision and Drainage Procedure Note    Indication: Abscess    Procedure: The patient was positioned appropriately and the skin over the incision site was prepped with betadine and draped in a sterile fashion. Local anesthesia was obtained by infiltration using 1% Lidocaine with epinephrine. An incision was then made over the apex of the lesion and approximately 1 cc of bloody material was expressed. Loculations were not present. The drainage cavity was then dressed with gauze. The patients tetanus status was up to date and did not require a booster dose. The patient tolerated the procedure well. Complications: None          MDM  Number of Diagnoses or Management Options  Cellulitis and abscess of trunk:   Diagnosis management comments: Area of induration was incised and only had minimal amount of blood expelled. It also appears cystic in nature. There was slight surrounding erythema. She was placed on Bactrim and given enteric-coated Naprosyn. She should follow up with her PCP. She was given the contact information for general surgeon if he chooses to go to see them.           --------------------------------------------- PAST HISTORY ---------------------------------------------  Past Medical History:  has a past medical history of Arthritis; Asthma; Cancer ([address] Cerebral artery occlusion with cerebral infarction ([address] Cerebrovascular disease; CHF (congestive heart failure) ([address] Claustrophobia; Headache(784.0); History of blood transfusion; Hyperlipidemia;  Hypertension; LBP radiating to both legs; Lymphedema of both lower extremities; Neuromuscular disorder ([facility] [address] Obesity; Recurrent genital HSV (herpes simplex virus) infection; S/P breast biopsy, right; and Type II or unspecified type diabetes mellitus without mention of complication, not stated as uncontrolled. Past Surgical History:  has a past surgical history that includes Total hip arthroplasty (Bilateral); vin and bso (cervix removed); ECHO Compl W Dop Color Flow (6/7/2012); Total knee arthroplasty (Bilateral, 2013); Colonoscopy (2005); Hysterectomy; Colonoscopy (1/8/15); Breast lumpectomy (years ago); arthroplasty (Left, 07/29/2016); Finger surgery (Left, 07/29/2016); joint replacement; Breast lumpectomy (Right, 09/06/2016); other surgical history (Right, 12/20/2017); and Hand surgery (12/2017). Social History:  reports that she quit smoking about 16 years ago. She has a 8.75 pack-year smoking history. She has never used smokeless tobacco. She reports that she uses drugs, including Marijuana. She reports that she does not drink alcohol. Family History: family history includes Breast Cancer in her sister and sister; Cancer (age of onset: 55) in her sister; Cancer (age of onset: 59) in her sister; Diabetes in her father and mother; High Blood Pressure in her father and mother; Sickle Cell Anemia in an other family member; Stomach Cancer in an other family member; Stroke in her sister. The patients home medications have been reviewed. Allergies: Patient has no known allergies. -------------------------------------------------- RESULTS -------------------------------------------------  Labs:  No results found for this visit on 07/03/18. Radiology:  No orders to display       ------------------------- NURSING NOTES AND VITALS REVIEWED ---------------------------  Date / Time Roomed:  7/3/2018  3:55 PM  ED Bed Assignment:  23/23    The nursing notes within the ED encounter and vital signs as below have been reviewed.    BP (!) 158/70 Comment: manual  Pulse 83   Temp 99.5 °F (37.5 °C) (Oral)   Resp 16   Ht 5' 5\" (1.651 m)   Wt 240 lb (108.9 kg)   SpO2 98%   BMI 39.94 kg/m²   Oxygen Saturation Interpretation: Normal      ------------------------------------------ PROGRESS NOTES ------------------------------------------  I have spoken with the patient and discussed todays results, in addition to providing specific details for the plan of care and counseling regarding the diagnosis and prognosis. Their questions are answered at this time and they are agreeable with the plan. I discussed at length with them reasons for immediate return here for re evaluation. They will followup with primary care by calling their office tomorrow. --------------------------------- ADDITIONAL PROVIDER NOTES ---------------------------------  At this time the patient is without objective evidence of an acute process requiring hospitalization or inpatient management. They have remained hemodynamically stable throughout their entire ED visit and are stable for discharge with outpatient follow-up. The plan has been discussed in detail and they are aware of the specific conditions for emergent return, as well as the importance of follow-up. Discharge Medication List as of 7/3/2018  5:12 PM      START taking these medications    Details   sulfamethoxazole-trimethoprim (BACTRIM DS) 800-160 MG per tablet Take 1 tablet by mouth 2 times daily for 10 days, Disp-20 tablet, R-0Print      naproxen (NAPROXEN) 500 MG EC tablet Take 1 tablet by mouth 2 times daily (with meals) for 7 days, Disp-14 tablet, R-0Print             Diagnosis:  1. Cellulitis and abscess of buttock        Disposition:  Patient's disposition: Discharge to home  Patient's condition is stable.          Manual DO Camryn  Resident  07/03/18 5879

## 2018-07-14 ENCOUNTER — HOSPITAL ENCOUNTER (INPATIENT)
Age: 68
LOS: 3 days | Discharge: HOME HEALTH | DRG: 638 | End: 2018-07-17
Attending: EMERGENCY MEDICINE | Admitting: INTERNAL MEDICINE
Payer: MEDICARE

## 2018-07-14 ENCOUNTER — APPOINTMENT (OUTPATIENT)
Dept: CT IMAGING | Age: 68
DRG: 638 | End: 2018-07-14
Payer: MEDICARE

## 2018-07-14 ENCOUNTER — APPOINTMENT (OUTPATIENT)
Dept: GENERAL RADIOLOGY | Age: 68
DRG: 638 | End: 2018-07-14
Payer: MEDICARE

## 2018-07-14 DIAGNOSIS — R55 SYNCOPE AND COLLAPSE: Primary | ICD-10-CM

## 2018-07-14 LAB
ANION GAP SERPL CALCULATED.3IONS-SCNC: 13 MMOL/L (ref 7–16)
BASOPHILS ABSOLUTE: 0.06 E9/L (ref 0–0.2)
BASOPHILS RELATIVE PERCENT: 0.4 % (ref 0–2)
BUN BLDV-MCNC: 10 MG/DL (ref 8–23)
CALCIUM SERPL-MCNC: 9.5 MG/DL (ref 8.6–10.2)
CHLORIDE BLD-SCNC: 99 MMOL/L (ref 98–107)
CO2: 28 MMOL/L (ref 22–29)
CREAT SERPL-MCNC: 0.7 MG/DL (ref 0.5–1)
EKG ATRIAL RATE: 70 BPM
EKG P AXIS: 31 DEGREES
EKG P-R INTERVAL: 136 MS
EKG Q-T INTERVAL: 468 MS
EKG QRS DURATION: 136 MS
EKG QTC CALCULATION (BAZETT): 505 MS
EKG R AXIS: -29 DEGREES
EKG T AXIS: 117 DEGREES
EKG VENTRICULAR RATE: 70 BPM
EOSINOPHILS ABSOLUTE: 0 E9/L (ref 0.05–0.5)
EOSINOPHILS RELATIVE PERCENT: 0 % (ref 0–6)
GFR AFRICAN AMERICAN: >60
GFR NON-AFRICAN AMERICAN: >60 ML/MIN/1.73
GLUCOSE BLD-MCNC: 152 MG/DL (ref 74–109)
HCT VFR BLD CALC: 37.5 % (ref 34–48)
HEMOGLOBIN: 12 G/DL (ref 11.5–15.5)
IMMATURE GRANULOCYTES #: 0.07 E9/L
IMMATURE GRANULOCYTES %: 0.5 % (ref 0–5)
LACTIC ACID: 1.6 MMOL/L (ref 0.5–2.2)
LYMPHOCYTES ABSOLUTE: 1.26 E9/L (ref 1.5–4)
LYMPHOCYTES RELATIVE PERCENT: 9.1 % (ref 20–42)
MCH RBC QN AUTO: 27.8 PG (ref 26–35)
MCHC RBC AUTO-ENTMCNC: 32 % (ref 32–34.5)
MCV RBC AUTO: 87 FL (ref 80–99.9)
METER GLUCOSE: 146 MG/DL (ref 70–110)
METER GLUCOSE: 217 MG/DL (ref 70–110)
MONOCYTES ABSOLUTE: 0.88 E9/L (ref 0.1–0.95)
MONOCYTES RELATIVE PERCENT: 6.3 % (ref 2–12)
NEUTROPHILS ABSOLUTE: 11.59 E9/L (ref 1.8–7.3)
NEUTROPHILS RELATIVE PERCENT: 83.7 % (ref 43–80)
PDW BLD-RTO: 14.1 FL (ref 11.5–15)
PLATELET # BLD: 435 E9/L (ref 130–450)
PMV BLD AUTO: 10 FL (ref 7–12)
POTASSIUM SERPL-SCNC: 3.8 MMOL/L (ref 3.5–5)
RBC # BLD: 4.31 E12/L (ref 3.5–5.5)
SODIUM BLD-SCNC: 140 MMOL/L (ref 132–146)
TROPONIN: <0.01 NG/ML (ref 0–0.03)
WBC # BLD: 13.9 E9/L (ref 4.5–11.5)

## 2018-07-14 PROCEDURE — 70490 CT SOFT TISSUE NECK W/O DYE: CPT

## 2018-07-14 PROCEDURE — 83605 ASSAY OF LACTIC ACID: CPT

## 2018-07-14 PROCEDURE — 80048 BASIC METABOLIC PNL TOTAL CA: CPT

## 2018-07-14 PROCEDURE — 1200000000 HC SEMI PRIVATE

## 2018-07-14 PROCEDURE — 96374 THER/PROPH/DIAG INJ IV PUSH: CPT

## 2018-07-14 PROCEDURE — 85025 COMPLETE CBC W/AUTO DIFF WBC: CPT

## 2018-07-14 PROCEDURE — 6360000002 HC RX W HCPCS: Performed by: STUDENT IN AN ORGANIZED HEALTH CARE EDUCATION/TRAINING PROGRAM

## 2018-07-14 PROCEDURE — 93005 ELECTROCARDIOGRAM TRACING: CPT | Performed by: STUDENT IN AN ORGANIZED HEALTH CARE EDUCATION/TRAINING PROGRAM

## 2018-07-14 PROCEDURE — 2580000003 HC RX 258: Performed by: INTERNAL MEDICINE

## 2018-07-14 PROCEDURE — 12011 RPR F/E/E/N/L/M 2.5 CM/<: CPT

## 2018-07-14 PROCEDURE — 84484 ASSAY OF TROPONIN QUANT: CPT

## 2018-07-14 PROCEDURE — 36415 COLL VENOUS BLD VENIPUNCTURE: CPT

## 2018-07-14 PROCEDURE — 6370000000 HC RX 637 (ALT 250 FOR IP): Performed by: INTERNAL MEDICINE

## 2018-07-14 PROCEDURE — 6370000000 HC RX 637 (ALT 250 FOR IP): Performed by: STUDENT IN AN ORGANIZED HEALTH CARE EDUCATION/TRAINING PROGRAM

## 2018-07-14 PROCEDURE — 82962 GLUCOSE BLOOD TEST: CPT

## 2018-07-14 PROCEDURE — 71045 X-RAY EXAM CHEST 1 VIEW: CPT

## 2018-07-14 PROCEDURE — 87040 BLOOD CULTURE FOR BACTERIA: CPT

## 2018-07-14 PROCEDURE — 70450 CT HEAD/BRAIN W/O DYE: CPT

## 2018-07-14 PROCEDURE — 6360000002 HC RX W HCPCS: Performed by: INTERNAL MEDICINE

## 2018-07-14 PROCEDURE — 99223 1ST HOSP IP/OBS HIGH 75: CPT | Performed by: INTERNAL MEDICINE

## 2018-07-14 PROCEDURE — 99285 EMERGENCY DEPT VISIT HI MDM: CPT

## 2018-07-14 RX ORDER — GABAPENTIN 300 MG/1
300 CAPSULE ORAL 3 TIMES DAILY
Status: DISCONTINUED | OUTPATIENT
Start: 2018-07-14 | End: 2018-07-17 | Stop reason: HOSPADM

## 2018-07-14 RX ORDER — SODIUM CHLORIDE 0.9 % (FLUSH) 0.9 %
10 SYRINGE (ML) INJECTION PRN
Status: DISCONTINUED | OUTPATIENT
Start: 2018-07-14 | End: 2018-07-17 | Stop reason: HOSPADM

## 2018-07-14 RX ORDER — NICOTINE POLACRILEX 4 MG
15 LOZENGE BUCCAL PRN
Status: DISCONTINUED | OUTPATIENT
Start: 2018-07-14 | End: 2018-07-17 | Stop reason: HOSPADM

## 2018-07-14 RX ORDER — ONDANSETRON 2 MG/ML
4 INJECTION INTRAMUSCULAR; INTRAVENOUS EVERY 6 HOURS PRN
Status: DISCONTINUED | OUTPATIENT
Start: 2018-07-14 | End: 2018-07-14 | Stop reason: SINTOL

## 2018-07-14 RX ORDER — OXYBUTYNIN CHLORIDE 5 MG/1
5 TABLET ORAL NIGHTLY
Status: DISCONTINUED | OUTPATIENT
Start: 2018-07-14 | End: 2018-07-17 | Stop reason: HOSPADM

## 2018-07-14 RX ORDER — LORAZEPAM 2 MG/ML
1 INJECTION INTRAMUSCULAR ONCE
Status: COMPLETED | OUTPATIENT
Start: 2018-07-14 | End: 2018-07-14

## 2018-07-14 RX ORDER — ANASTROZOLE 1 MG/1
1 TABLET ORAL DAILY
Status: DISCONTINUED | OUTPATIENT
Start: 2018-07-14 | End: 2018-07-17 | Stop reason: HOSPADM

## 2018-07-14 RX ORDER — ACYCLOVIR 200 MG/1
400 CAPSULE ORAL DAILY
Status: DISCONTINUED | OUTPATIENT
Start: 2018-07-14 | End: 2018-07-17 | Stop reason: HOSPADM

## 2018-07-14 RX ORDER — DILTIAZEM HYDROCHLORIDE 240 MG/1
240 CAPSULE, COATED, EXTENDED RELEASE ORAL DAILY
Status: DISCONTINUED | OUTPATIENT
Start: 2018-07-14 | End: 2018-07-17 | Stop reason: HOSPADM

## 2018-07-14 RX ORDER — MONTELUKAST SODIUM 10 MG/1
10 TABLET ORAL NIGHTLY
Status: DISCONTINUED | OUTPATIENT
Start: 2018-07-14 | End: 2018-07-17 | Stop reason: HOSPADM

## 2018-07-14 RX ORDER — IPRATROPIUM BROMIDE AND ALBUTEROL SULFATE 2.5; .5 MG/3ML; MG/3ML
1 SOLUTION RESPIRATORY (INHALATION) EVERY 4 HOURS PRN
Status: DISCONTINUED | OUTPATIENT
Start: 2018-07-14 | End: 2018-07-17 | Stop reason: HOSPADM

## 2018-07-14 RX ORDER — CLONIDINE HYDROCHLORIDE 0.2 MG/1
0.2 TABLET ORAL 2 TIMES DAILY
Status: DISCONTINUED | OUTPATIENT
Start: 2018-07-14 | End: 2018-07-17 | Stop reason: HOSPADM

## 2018-07-14 RX ORDER — SODIUM CHLORIDE 0.9 % (FLUSH) 0.9 %
10 SYRINGE (ML) INJECTION EVERY 12 HOURS SCHEDULED
Status: DISCONTINUED | OUTPATIENT
Start: 2018-07-14 | End: 2018-07-17 | Stop reason: HOSPADM

## 2018-07-14 RX ORDER — ACETAMINOPHEN 325 MG/1
650 TABLET ORAL ONCE
Status: COMPLETED | OUTPATIENT
Start: 2018-07-14 | End: 2018-07-14

## 2018-07-14 RX ORDER — SODIUM CHLORIDE 9 MG/ML
INJECTION, SOLUTION INTRAVENOUS CONTINUOUS
Status: DISCONTINUED | OUTPATIENT
Start: 2018-07-14 | End: 2018-07-17 | Stop reason: HOSPADM

## 2018-07-14 RX ORDER — DEXTROSE MONOHYDRATE 50 MG/ML
100 INJECTION, SOLUTION INTRAVENOUS PRN
Status: DISCONTINUED | OUTPATIENT
Start: 2018-07-14 | End: 2018-07-17 | Stop reason: HOSPADM

## 2018-07-14 RX ORDER — DEXTROSE MONOHYDRATE 25 G/50ML
12.5 INJECTION, SOLUTION INTRAVENOUS PRN
Status: DISCONTINUED | OUTPATIENT
Start: 2018-07-14 | End: 2018-07-17 | Stop reason: HOSPADM

## 2018-07-14 RX ADMIN — ANASTROZOLE 1 MG: 1 TABLET ORAL at 22:09

## 2018-07-14 RX ADMIN — LORAZEPAM 1 MG: 2 INJECTION INTRAMUSCULAR; INTRAVENOUS at 12:09

## 2018-07-14 RX ADMIN — METOPROLOL TARTRATE 25 MG: 25 TABLET ORAL at 22:14

## 2018-07-14 RX ADMIN — MONTELUKAST SODIUM 10 MG: 10 TABLET, FILM COATED ORAL at 22:08

## 2018-07-14 RX ADMIN — LORAZEPAM 1 MG: 2 INJECTION INTRAMUSCULAR; INTRAVENOUS at 18:06

## 2018-07-14 RX ADMIN — Medication 10 ML: at 22:07

## 2018-07-14 RX ADMIN — ENOXAPARIN SODIUM 40 MG: 40 INJECTION, SOLUTION INTRAVENOUS; SUBCUTANEOUS at 22:14

## 2018-07-14 RX ADMIN — DILTIAZEM HYDROCHLORIDE 240 MG: 240 CAPSULE, COATED, EXTENDED RELEASE ORAL at 22:09

## 2018-07-14 RX ADMIN — ACYCLOVIR 400 MG: 200 CAPSULE ORAL at 22:08

## 2018-07-14 RX ADMIN — CLONIDINE HYDROCHLORIDE 0.2 MG: 0.2 TABLET ORAL at 22:15

## 2018-07-14 RX ADMIN — OXYBUTYNIN CHLORIDE 5 MG: 5 TABLET ORAL at 22:08

## 2018-07-14 RX ADMIN — GABAPENTIN 300 MG: 300 CAPSULE ORAL at 22:14

## 2018-07-14 RX ADMIN — SODIUM CHLORIDE: 9 INJECTION, SOLUTION INTRAVENOUS at 22:06

## 2018-07-14 RX ADMIN — ACETAMINOPHEN 650 MG: 325 TABLET ORAL at 10:44

## 2018-07-14 RX ADMIN — INSULIN LISPRO 2 UNITS: 100 INJECTION, SOLUTION INTRAVENOUS; SUBCUTANEOUS at 22:28

## 2018-07-14 ASSESSMENT — PAIN DESCRIPTION - ORIENTATION
ORIENTATION: ANTERIOR
ORIENTATION: LEFT;ANTERIOR
ORIENTATION: MID

## 2018-07-14 ASSESSMENT — PAIN DESCRIPTION - LOCATION
LOCATION: HEAD
LOCATION: FACE;NOSE
LOCATION: HEAD

## 2018-07-14 ASSESSMENT — PAIN DESCRIPTION - FREQUENCY
FREQUENCY: CONTINUOUS

## 2018-07-14 ASSESSMENT — ENCOUNTER SYMPTOMS
TROUBLE SWALLOWING: 0
VOMITING: 0
DIARRHEA: 0
NAUSEA: 1
SORE THROAT: 0
SHORTNESS OF BREATH: 0
BACK PAIN: 0
RECTAL BLEEDING: 0
DIFFICULTY BREATHING: 0
EYE REDNESS: 0
VISUAL CHANGE: 0
COUGH: 0
ABDOMINAL PAIN: 0

## 2018-07-14 ASSESSMENT — PAIN SCALES - GENERAL
PAINLEVEL_OUTOF10: 0
PAINLEVEL_OUTOF10: 4
PAINLEVEL_OUTOF10: 10
PAINLEVEL_OUTOF10: 10
PAINLEVEL_OUTOF10: 0
PAINLEVEL_OUTOF10: 4

## 2018-07-14 ASSESSMENT — PAIN DESCRIPTION - DESCRIPTORS
DESCRIPTORS: THROBBING
DESCRIPTORS: DULL

## 2018-07-14 ASSESSMENT — PAIN DESCRIPTION - ONSET: ONSET: SUDDEN

## 2018-07-14 ASSESSMENT — PAIN DESCRIPTION - PROGRESSION: CLINICAL_PROGRESSION: NOT CHANGED

## 2018-07-14 ASSESSMENT — PAIN DESCRIPTION - PAIN TYPE
TYPE: ACUTE PAIN
TYPE: ACUTE PAIN

## 2018-07-14 NOTE — ED NOTES
Increased urination. On/off bedpan multiple times. Linens changed. Able to stand at bedside and sit in chart. Complain of dizziness and weakness. Headache improved. Neuro remains intact. Waiting further orders     [facility].  Malka Shepard RN  07/14/18 5727

## 2018-07-14 NOTE — ED PROVIDER NOTES
The patient is a 60-year-old female with a history of diabetes who presents to the emergency department via EMS complaining of syncope. Patient does report she had a similar incident several months ago, where she thought she fell out of bed and woke up on the floor, and she wasn't sure how she got there. Patient reports that she was sitting in a chair, and then fell out of the chair and woke up on the ground. She doesn't remember what happened. She called her neighbor to come over and call EMS. Patient did not have any prodromal symptoms. She denies any dizziness, lightheadedness, change in vision, blurry vision, chest pain, shortness of breath, palpitations, nausea, vomiting, recent illness, or change in medications. Patient reports that she did not take her medications this morning. EMS checked her blood sugar which was 71. She now complains of a frontal headache. The history is provided by the patient. Loss of Consciousness   Episode history:  Single  Most recent episode: Today  Duration:  5 minutes  Timing:  Unable to specify  Progression:  Resolved  Chronicity:  New  Context: normal activity and sitting down    Context: not blood draw, not bowel movement, not dehydration, not exertion, not inactivity, not medication change, not sight of blood, not standing up and not urination    Witnessed: no    Relieved by:  Nothing  Worsened by:  Nothing  Ineffective treatments:  None tried  Associated symptoms: headaches and nausea    Associated symptoms: no anxiety, no chest pain, no confusion, no diaphoresis, no difficulty breathing, no dizziness, no fever, no focal sensory loss, no focal weakness, no malaise/fatigue, no palpitations, no recent fall, no recent injury, no recent surgery, no rectal bleeding, no shortness of breath, no visual change, no vomiting and no weakness        Review of Systems   Constitutional: Negative for chills, diaphoresis, fatigue, fever and malaise/fatigue.    HENT: Negative for Repair  Date/Time: 7/14/2018 2:09 PM  Performed by: Lu Garcia  Authorized by: Hardy Albarado     Consent:     Consent obtained:  Verbal    Consent given by:  Patient    Risks discussed:  Pain, need for additional repair, poor cosmetic result, poor wound healing, vascular damage and infection    Alternatives discussed:  No treatment  Anesthesia (see MAR for exact dosages): Anesthesia method:  None  Laceration details:     Location:  Face    Face location:  L cheek    Length (cm):  2    Depth (mm):  1  Skin repair:     Repair method:  Tissue adhesive  Approximation:     Vermilion border: well-aligned    Post-procedure details:     Dressing:  Open (no dressing)    Patient tolerance of procedure: Tolerated well, no immediate complications        MDM     Patient is resting comfortably and in no acute distress. Discussed in depth with family whether patient feels safe at home. Due to patient's syncopal episode and fall today, it would be the best interests of the patient to stay in the hospital for observational status. Episodes may be due to seizure episodes, and discussed this with family. Also discussed the risk of driving recommended patient to avoid driving. I spoke with Jewel Cifuentes who agreed to admit the patient for observational status. She also added on orthostatics and CTA. Patient and patient's family agreed to admission. ED Course as of Jul 14 1044   Sat Jul 14, 2018   1041 ATTENDING PROVIDER ATTESTATION:     I have personally performed and/or participated in the history, exam, medical decision making, and procedures and agree with all pertinent clinical information unless otherwise noted. I have also reviewed and agree with the past medical, family and social history unless otherwise noted.     I have discussed this patient in detail with the resident and provided the instruction and education regarding the evidence-based evaluation and treatment of syncope  History: Patient she had fallen out of bed, but awoke on the floor and did not know how she got there. My findings: Nabeel Grove is a 76 y.o. female whom is in no distress. Physical exam reveals heart rate, lungs clear. Pupils equal reactive to light. Slight abrasion over the nasal septum. No other acute findings. My plan: Symptomatic and supportive care. CT, labs. EKG. Discussed possibilities with patient including that of seizure disorder versus other causes. She had actually no prodromal symptoms. Electronically signed by Lenora Matta DO on 7/14/18 at 10:41 AM        [TG]      ED Course User Index  [TG] Lenora Matta DO       --------------------------------------------- PAST HISTORY ---------------------------------------------  Past Medical History:  has a past medical history of Arthritis; Asthma; Cancer ([facility] [address] Cerebral artery occlusion with cerebral infarction ([facility] [address] Cerebrovascular disease; CHF (congestive heart failure) ([address] Claustrophobia; Headache(784.0); History of blood transfusion; Hyperlipidemia; Hypertension; LBP radiating to both legs; Lymphedema of both lower extremities; Neuromuscular disorder ([address] Obesity; Recurrent genital HSV (herpes simplex virus) infection; S/P breast biopsy, right; and Type II or unspecified type diabetes mellitus without mention of complication, not stated as uncontrolled. Past Surgical History:  has a past surgical history that includes Total hip arthroplasty (Bilateral); vin and bso (cervix removed); ECHO Compl W Dop Color Flow (6/7/2012); Total knee arthroplasty (Bilateral, 2013); Colonoscopy (2005); Hysterectomy; Colonoscopy (1/8/15); Breast lumpectomy (years ago); arthroplasty (Left, 07/29/2016); Finger surgery (Left, 07/29/2016); joint replacement; Breast lumpectomy (Right, 09/06/2016); other surgical history (Right, 12/20/2017); and Hand surgery (12/2017). Social History:  reports that she quit smoking about 16 years ago.  She has a 8.75 pack-year smoking with the patient and sister and discussed todays results, in addition to providing specific details for the plan of care and counseling regarding the diagnosis and prognosis. Their questions are answered at this time and they are agreeable with the plan of admission.    --------------------------------- ADDITIONAL PROVIDER NOTES ---------------------------------  Consultations:  Time: 1400. Spoke with Dr. Rosa Palma, hospitalist.  Discussed case. They will admit the patient. This patient's ED course included: a personal history and physicial examination, re-evaluation prior to disposition, multiple bedside re-evaluations, cardiac monitoring, continuous pulse oximetry and complex medical decision making and emergency management    This patient has remained hemodynamically stable during their ED course. Diagnosis:  1. Syncope and collapse        Disposition:  Patient's disposition: Admit to med/surg floor  Patient's condition is stable.          Hattie Yanes DO  Resident  07/14/18 5570

## 2018-07-15 LAB
ALBUMIN SERPL-MCNC: 3.3 G/DL (ref 3.5–5.2)
ALP BLD-CCNC: 129 U/L (ref 35–104)
ALT SERPL-CCNC: 9 U/L (ref 0–32)
ANION GAP SERPL CALCULATED.3IONS-SCNC: 11 MMOL/L (ref 7–16)
AST SERPL-CCNC: 15 U/L (ref 0–31)
BASOPHILS ABSOLUTE: 0.04 E9/L (ref 0–0.2)
BASOPHILS RELATIVE PERCENT: 0.4 % (ref 0–2)
BILIRUB SERPL-MCNC: 0.4 MG/DL (ref 0–1.2)
BUN BLDV-MCNC: 8 MG/DL (ref 8–23)
CALCIUM SERPL-MCNC: 9.2 MG/DL (ref 8.6–10.2)
CHLORIDE BLD-SCNC: 102 MMOL/L (ref 98–107)
CO2: 28 MMOL/L (ref 22–29)
CREAT SERPL-MCNC: 0.7 MG/DL (ref 0.5–1)
EOSINOPHILS ABSOLUTE: 0 E9/L (ref 0.05–0.5)
EOSINOPHILS RELATIVE PERCENT: 0 % (ref 0–6)
GFR AFRICAN AMERICAN: >60
GFR NON-AFRICAN AMERICAN: >60 ML/MIN/1.73
GLUCOSE BLD-MCNC: 135 MG/DL (ref 74–109)
HCT VFR BLD CALC: 34.6 % (ref 34–48)
HEMOGLOBIN: 11.1 G/DL (ref 11.5–15.5)
IMMATURE GRANULOCYTES #: 0.04 E9/L
IMMATURE GRANULOCYTES %: 0.4 % (ref 0–5)
LACTIC ACID: 1.2 MMOL/L (ref 0.5–2.2)
LYMPHOCYTES ABSOLUTE: 1.74 E9/L (ref 1.5–4)
LYMPHOCYTES RELATIVE PERCENT: 19.3 % (ref 20–42)
MAGNESIUM: 1.7 MG/DL (ref 1.6–2.6)
MCH RBC QN AUTO: 27.5 PG (ref 26–35)
MCHC RBC AUTO-ENTMCNC: 32.1 % (ref 32–34.5)
MCV RBC AUTO: 85.9 FL (ref 80–99.9)
METER GLUCOSE: 145 MG/DL (ref 70–110)
METER GLUCOSE: 194 MG/DL (ref 70–110)
METER GLUCOSE: 213 MG/DL (ref 70–110)
METER GLUCOSE: 252 MG/DL (ref 70–110)
MONOCYTES ABSOLUTE: 0.91 E9/L (ref 0.1–0.95)
MONOCYTES RELATIVE PERCENT: 10.1 % (ref 2–12)
NEUTROPHILS ABSOLUTE: 6.27 E9/L (ref 1.8–7.3)
NEUTROPHILS RELATIVE PERCENT: 69.8 % (ref 43–80)
PDW BLD-RTO: 14.1 FL (ref 11.5–15)
PLATELET # BLD: 406 E9/L (ref 130–450)
PMV BLD AUTO: 10.2 FL (ref 7–12)
POTASSIUM REFLEX MAGNESIUM: 3.7 MMOL/L (ref 3.5–5)
RBC # BLD: 4.03 E12/L (ref 3.5–5.5)
SODIUM BLD-SCNC: 141 MMOL/L (ref 132–146)
TOTAL PROTEIN: 6.3 G/DL (ref 6.4–8.3)
WBC # BLD: 9 E9/L (ref 4.5–11.5)

## 2018-07-15 PROCEDURE — 99221 1ST HOSP IP/OBS SF/LOW 40: CPT | Performed by: SURGERY

## 2018-07-15 PROCEDURE — 82962 GLUCOSE BLOOD TEST: CPT

## 2018-07-15 PROCEDURE — 6360000002 HC RX W HCPCS: Performed by: INTERNAL MEDICINE

## 2018-07-15 PROCEDURE — 83605 ASSAY OF LACTIC ACID: CPT

## 2018-07-15 PROCEDURE — 99232 SBSQ HOSP IP/OBS MODERATE 35: CPT | Performed by: INTERNAL MEDICINE

## 2018-07-15 PROCEDURE — 1200000000 HC SEMI PRIVATE

## 2018-07-15 PROCEDURE — 2580000003 HC RX 258: Performed by: INTERNAL MEDICINE

## 2018-07-15 PROCEDURE — 36415 COLL VENOUS BLD VENIPUNCTURE: CPT

## 2018-07-15 PROCEDURE — 6370000000 HC RX 637 (ALT 250 FOR IP): Performed by: INTERNAL MEDICINE

## 2018-07-15 PROCEDURE — 85025 COMPLETE CBC W/AUTO DIFF WBC: CPT

## 2018-07-15 PROCEDURE — 83735 ASSAY OF MAGNESIUM: CPT

## 2018-07-15 PROCEDURE — 80053 COMPREHEN METABOLIC PANEL: CPT

## 2018-07-15 RX ORDER — INSULIN GLARGINE 100 [IU]/ML
10 INJECTION, SOLUTION SUBCUTANEOUS NIGHTLY
Status: DISCONTINUED | OUTPATIENT
Start: 2018-07-15 | End: 2018-07-17 | Stop reason: HOSPADM

## 2018-07-15 RX ORDER — CYCLOBENZAPRINE HCL 10 MG
10 TABLET ORAL EVERY 8 HOURS PRN
Status: DISCONTINUED | OUTPATIENT
Start: 2018-07-15 | End: 2018-07-17 | Stop reason: HOSPADM

## 2018-07-15 RX ORDER — LORAZEPAM 2 MG/ML
1 INJECTION INTRAMUSCULAR SEE ADMIN INSTRUCTIONS
Status: DISCONTINUED | OUTPATIENT
Start: 2018-07-15 | End: 2018-07-17 | Stop reason: HOSPADM

## 2018-07-15 RX ADMIN — INSULIN LISPRO 1 UNITS: 100 INJECTION, SOLUTION INTRAVENOUS; SUBCUTANEOUS at 09:18

## 2018-07-15 RX ADMIN — DILTIAZEM HYDROCHLORIDE 240 MG: 240 CAPSULE, COATED, EXTENDED RELEASE ORAL at 09:54

## 2018-07-15 RX ADMIN — MONTELUKAST SODIUM 10 MG: 10 TABLET, FILM COATED ORAL at 23:05

## 2018-07-15 RX ADMIN — INSULIN GLARGINE 10 UNITS: 100 INJECTION, SOLUTION SUBCUTANEOUS at 21:48

## 2018-07-15 RX ADMIN — GABAPENTIN 300 MG: 300 CAPSULE ORAL at 09:25

## 2018-07-15 RX ADMIN — INSULIN LISPRO 2 UNITS: 100 INJECTION, SOLUTION INTRAVENOUS; SUBCUTANEOUS at 21:41

## 2018-07-15 RX ADMIN — ENOXAPARIN SODIUM 40 MG: 40 INJECTION, SOLUTION INTRAVENOUS; SUBCUTANEOUS at 09:55

## 2018-07-15 RX ADMIN — GABAPENTIN 300 MG: 300 CAPSULE ORAL at 21:40

## 2018-07-15 RX ADMIN — CYCLOBENZAPRINE 10 MG: 10 TABLET, FILM COATED ORAL at 17:09

## 2018-07-15 RX ADMIN — ACYCLOVIR 400 MG: 200 CAPSULE ORAL at 09:54

## 2018-07-15 RX ADMIN — SODIUM CHLORIDE: 9 INJECTION, SOLUTION INTRAVENOUS at 09:59

## 2018-07-15 RX ADMIN — GABAPENTIN 300 MG: 300 CAPSULE ORAL at 13:14

## 2018-07-15 RX ADMIN — CLONIDINE HYDROCHLORIDE 0.2 MG: 0.2 TABLET ORAL at 21:40

## 2018-07-15 RX ADMIN — INSULIN LISPRO 2 UNITS: 100 INJECTION, SOLUTION INTRAVENOUS; SUBCUTANEOUS at 18:39

## 2018-07-15 RX ADMIN — METOPROLOL TARTRATE 25 MG: 25 TABLET ORAL at 09:25

## 2018-07-15 RX ADMIN — METOPROLOL TARTRATE 25 MG: 25 TABLET ORAL at 21:41

## 2018-07-15 RX ADMIN — CLONIDINE HYDROCHLORIDE 0.2 MG: 0.2 TABLET ORAL at 09:25

## 2018-07-15 RX ADMIN — OXYBUTYNIN CHLORIDE 5 MG: 5 TABLET ORAL at 23:05

## 2018-07-15 RX ADMIN — SODIUM CHLORIDE: 9 INJECTION, SOLUTION INTRAVENOUS at 22:32

## 2018-07-15 RX ADMIN — INSULIN LISPRO 1 UNITS: 100 INJECTION, SOLUTION INTRAVENOUS; SUBCUTANEOUS at 13:12

## 2018-07-15 RX ADMIN — ANASTROZOLE 1 MG: 1 TABLET ORAL at 09:53

## 2018-07-15 ASSESSMENT — PAIN SCALES - GENERAL
PAINLEVEL_OUTOF10: 0

## 2018-07-15 NOTE — PROGRESS NOTES
Nevin Vogel Hospitalist   Progress Note    Admitting Date and Time: 7/14/2018  9:50 AM  Admit Dx: Syncope and collapse [R55]  Syncope and collapse [R55]  Syncope and collapse [R55]    Subjective:    Pt denies fever, chills, cp, sob, n/v. Pt notes some pain in head and neck.  No new symptoms     insulin glargine  10 Units Subcutaneous Nightly    LORazepam  1 mg Intravenous Once    acyclovir  400 mg Oral Daily    anastrozole  1 mg Oral Daily    cloNIDine  0.2 mg Oral BID    oxybutynin  5 mg Oral Nightly    montelukast  10 mg Oral Nightly    metoprolol tartrate  25 mg Oral BID    gabapentin  300 mg Oral TID    diltiazem  240 mg Oral Daily    sodium chloride flush  10 mL Intravenous 2 times per day    enoxaparin  40 mg Subcutaneous Daily    insulin lispro  0-6 Units Subcutaneous TID WC    insulin lispro  0-3 Units Subcutaneous Nightly       cyclobenzaprine 10 mg Q8H PRN   ipratropium-albuterol 1 ampule Q4H PRN   sodium chloride flush 10 mL PRN   magnesium hydroxide 30 mL Daily PRN   glucose 15 g PRN   dextrose 12.5 g PRN   glucagon (rDNA) 1 mg PRN   dextrose 100 mL/hr PRN        Objective:    /86   Pulse 68   Temp 98.5 °F (36.9 °C) (Oral)   Resp 16   Ht 5' 5\" (1.651 m)   Wt 247 lb (112 kg)   SpO2 95%   BMI 41.10 kg/m²   Skin: warm and dry, no rash or erythema  Pulmonary/Chest: clear to auscultation bilaterally- no wheezes, rales or rhonchi, normal air movement, no respiratory distress  Cardiovascular: rhythm reg at rate of 70  Abdomen: soft, non-tender, non-distended, normal bowel sounds, no masses or organomegaly  Extremities: no cyanosis, no clubbing and no edema      Recent Labs      07/14/18   1038  07/15/18   0400   NA  140  141   K  3.8  3.7   CL  99  102   CO2  28  28   BUN  10  8   CREATININE  0.7  0.7   GLUCOSE  152*  135*   CALCIUM  9.5  9.2       Recent Labs      07/14/18   1036  07/15/18   0400   WBC  13.9*  9.0   RBC  4.31  4.03   HGB  12.0  11.1*   HCT  37.5  34.6 id's input. 3.  leukocytosis improved. fluids given and appreciate ID input. Wbc's may be from dehydration most likely and could consider possibly seizure  4. Dm type 2 controlled monitor and tx with insulin. It should be noted that pt was hypoglycemic with bs at 71 with ems and may have factored in syncope. 5. htn pt's bp uncontrolled but will observe after restarting her bp meds and readjust as needed.          Electronically signed by Gadiel Macias DO on 7/15/2018 at 6:36 PM

## 2018-07-15 NOTE — H&P
years ago    benign    BREAST LUMPECTOMY Right 09/06/2016    COLONOSCOPY  2005    COLONOSCOPY  1/8/15    Dr. Rick Saini  6/7/2012         FINGER SURGERY Left 07/29/2016    thumb    HAND SURGERY  12/2017    HYSTERECTOMY      JOINT REPLACEMENT      OTHER SURGICAL HISTORY Right 12/20/2017    RIGHT THUMB TRAPEZIECTOMY WITH LIGAMENT RECONSRUCTION DEQUERVAINS RELEASE    TIM AND BSO      TOTAL HIP ARTHROPLASTY Bilateral     2000, 2013 dr Talon Ruffin, dr Jean Claude Russell Bilateral 2013    dr Steven Brownlee       Medications Prior to Admission:    Prior to Admission medications    Medication Sig Start Date End Date Taking? Authorizing Provider   naproxen (NAPROXEN) 500 MG EC tablet Take 1 tablet by mouth 2 times daily (with meals) for 7 days 7/3/18 7/10/18  Nabor Ibarra DO   PROAIR  (90 Base) MCG/ACT inhaler INHALE TWO (2) PUFFS BY MOUTH EVERY 6 HOURS AS NEEDED FOR WHEEZING AND FOR SHORTNESS OF BREATH 5/30/18   Dasha Hoff MD   LANTUS 100 UNIT/ML injection vial Take nightly as directed by physician. As of 2/22/18:  50 units HS.   Increase as directed 4/19/18   Kvng Ramon MD   montelukast (SINGULAIR) 10 MG tablet TAKE (1) TABLET BY MOUTH NIGHTLY 4/6/18   Kvng Ramon MD   Glucose Blood (BLOOD GLUCOSE TEST STRIPS) STRP Testing once daily 3/6/18   Kvng Ramon MD   glucose monitoring kit (FREESTYLE) monitoring kit 1 kit by Does not apply route daily 3/6/18   Kvng Ramon MD   INSULIN SYRINGE 1CC/29G 29G X 1/2\" 1 ML MISC 1 each by Does not apply route daily 2/22/18   Kvng Ramon MD   vitamin D (ERGOCALCIFEROL) 85465 units CAPS capsule Take 1 capsule by mouth once a week 2/22/18   Kvng Ramon MD   vitamin B-12 (CYANOCOBALAMIN) 1000 MCG tablet Take 1 tablet by mouth daily 2/22/18   Kvng Ramon MD   acyclovir (ZOVIRAX) 400 MG tablet Take 1 tablet by mouth daily 1/29/18 Kaitlynn Moreno MD   nortriptyline (PAMELOR) 10 MG capsule TAKE 1 CAPSULE BY MOUTH NIGHTLY 1/29/18   Kaitlynn Moreno MD   cloNIDine (CATAPRES) 0.2 MG tablet TAKE (1) TABLET BY MOUTH TWICE DAILY 1/29/18   Kaitlynn Moreno MD   losartan-hydrochlorothiazide (HYZAAR) 100-25 MG per tablet TAKE 1 TABLET BY MOUTH ONCE DAILY FOR HIGH BLOOD PRESSURE 1/29/18   Kaitlynn Moreno MD   diltiazem (CARDIZEM CD) 240 MG extended release capsule TAKE 1 CAPSULE BY MOUTH ONCE DAILY FOR HIGH BLOOD PRESSURE 1/29/18   Kaitlynn Moreno MD   oxybutynin (DITROPAN) 5 MG tablet Take 1 tablet by mouth nightly 1/29/18   Kaitlynn Moreno MD   pravastatin (PRAVACHOL) 80 MG tablet Take 1 tablet by mouth daily 1/29/18   Kaitlynn Moreno MD   gabapentin (NEURONTIN) 300 MG capsule Take 1 capsule by mouth 3 times daily for 181 days. 1/29/18 7/29/18  Kaitlynn Moreno MD   ONGLYZA 5 MG TABS tablet Take 1 tablet by mouth daily 1/29/18   Kaitlynn Moreno MD   metoprolol tartrate (LOPRESSOR) 25 MG tablet Take 1 tablet by mouth 2 times daily 1/29/18   Kaitlynn Moreno MD   metFORMIN (GLUCOPHAGE) 500 MG tablet Take 1 tablet by mouth 2 times daily (with meals) 1/29/18   Kaitlynn Moreno MD   ipratropium-albuterol (DUONEB) 0.5-2.5 (3) MG/3ML SOLN nebulizer solution USE 3 ML VIA NEBULIZER THREE TIMES DAILY AS NEEDED FOR SHORTNESS OF BREATH 1/11/18   Kaitlynn Moreno MD   Skin Protectants, Misc. (EUCERIN) cream Apply topically as needed. 12/16/17   Karin Smith, DO   hydrocortisone 2.5 % cream Apply topically 2 times daily.  12/16/17   Nimisha Dinero MD   Insulin Syringe-Needle U-100 (B-D INS SYRINGE 0.5CC/31GX5/16) 31G X 5/16\" 0.5 ML MISC Use as directed 11/21/17   Kaitlynn Moreno MD   Compression Stockings MISC by Does not apply route 20 mmHG - KNEE HIGH 10/23/17   DMITRY Garcia - CNP   lidocaine (XYLOCAINE) 5 % ointment Apply topically TID 9/22/17 Michelle San MD   Lancets MISC Testing once daily 17   Michelle San MD   calcium carbonate-vitamin D (CALCIUM 600 + D) 600-400 MG-UNIT TABS per tab Take 1 tablet by mouth 2 times daily  Patient taking differently: Take 1 tablet by mouth 2 times daily Ld 2017   Michelle San MD   anastrozole (ARIMIDEX) 1 MG tablet Take 1 tablet by mouth daily Indications: Estrogen Deficiency in Breast Cancer 17   Michelle San MD       Allergies:    Patient has no known allergies. Social History:    reports that she quit smoking about 16 years ago. She has a 8.75 pack-year smoking history. She has never used smokeless tobacco. She reports that she uses drugs, including Marijuana. She reports that she does not drink alcohol. Family History:   Family History   Problem Relation Age of Onset    Diabetes Mother     High Blood Pressure Mother     High Blood Pressure Father     Diabetes Father     Breast Cancer Sister    [employer] Stroke Sister         young age   [employer] Cancer Sister 55        breast    Sickle Cell Anemia Other         niece    Breast Cancer Sister             Cancer Sister 59        breast & ovarian    Stomach Cancer Other         aunt           PHYSICAL EXAM:    Vitals:  BP (!) 200/93   Pulse 73   Temp 97.8 °F (36.6 °C) (Oral)   Resp 16   Ht 5' 5\" (1.651 m)   Wt 241 lb 14.4 oz (109.7 kg)   SpO2 96%   BMI 40.25 kg/m²     General:  Appears comfortable. Answers questions appropriately and cooperative with exam  HEENT:  Mucous membranes moist. No erythema, rhinorrhea, or post-nasal drip noted. Neck:  No carotid bruits. Heart:  Rhythm regular at rate of 76  Lungs:  CTA. No wheeze, rales, or rhonchi  Abdomen:  Positive bowel sounds positive. Soft. Non-tender. No guarding, rebound or rigidity. Breast/Rectal/Genitourinary: not pertinent. Extremities:  Negative for lower extremity edema  Skin:  Warm and dry.  Area of buttock covered options; 1 . Automated exposure control, 2. Adjustment of MA and or KV according to patient's size or 3. Use of iterative reconstruction. Multiple CT sections were obtained with sagittal and coronal MPR reconstructions. The ventricles are prominent. The gyri and sulci appear  prominent. The white matter appears  prominent. There is no evidence for hemorrhage. There is no infarct identified. There is no mass effect identified. There is no mass identified. Diffuse atrophy likely age related Findings compatible with small vessel ischemic changes. Ct Soft Tissue Neck Wo Contrast    Result Date: 2018  Patient MRN:  [id number] : 1950 Age: 76 years Gender: Female Order Date:  2018 5:00 PM TECHNIQUE/NUMBER OF IMAGES/COMPARISON/CLINICAL HISTORY: CT soft tissues of the neck Axial with sagittal and coronal reconstructions 358 images Syncopal episodes. FINDINGS: There is some degree of soft tissue swelling in the left infra zygomatic region of the lateral aspect of the left side of the face where a small pockets of subcutaneous air are seen. This can represent penetrating injury. Is a questionable point of entrance just underneath of the more superficial pocket of subcutaneous air is observed. These findings require correlation with the clinical data. Minimal stranding of the subcutaneous fat plane is seen in that area. There are symmetric appearance for the parotid glands with fat replacement. Left submandibular gland is slightly more prominent than the right but each appears intrinsically unremarkable. There are unremarkable appearance for the soft tissues in the area for inguinal mucosal spaces and for the bilateral pharyngeal spaces. Calcifications are seen in the bulbar region of the right carotid artery and proximal segment of the right ICA. There are also calcified plaques in the distal left CCA/bulb region and proximal left ICA. No cervical adenopathy is seen. Thyroid gland appears unremarkable. few. Will give fluids and have ID to see  4. Dm type 2 controlled monitor and tx with insulin. It should be noted that pt was hypoglycemic with bs at 71 with ems and may have factored in syncope.          Electronically signed by Darius Snowden DO on 7/14/2018 at 8:08 PM

## 2018-07-15 NOTE — CONSULTS
Carol Srivastava MD   Insulin Syringe-Needle U-100 (B-D INS SYRINGE 0.5CC/31GX5/16) 31G X 5/16\" 0.5 ML MISC Use as directed 11/21/17   Jazmin Cross MD   Compression Stockings MISC by Does not apply route 20 mmHG - KNEE HIGH 10/23/17   Breann Ratel, APRN - CNP   lidocaine (XYLOCAINE) 5 % ointment Apply topically TID 9/22/17   Jazmin Cross MD   Lancets MISC Testing once daily 9/20/17   Jazmin Cross MD   calcium carbonate-vitamin D (CALCIUM 600 + D) 600-400 MG-UNIT TABS per tab Take 1 tablet by mouth 2 times daily  Patient taking differently: Take 1 tablet by mouth 2 times daily Ld 12/18/2017 7/13/17   Jazmin Cross MD   anastrozole (ARIMIDEX) 1 MG tablet Take 1 tablet by mouth daily Indications: Estrogen Deficiency in Breast Cancer 7/13/17   Jazmin Cross MD       Scheduled medications:   acyclovir  400 mg Oral Daily    anastrozole  1 mg Oral Daily    cloNIDine  0.2 mg Oral BID    oxybutynin  5 mg Oral Nightly    montelukast  10 mg Oral Nightly    metoprolol tartrate  25 mg Oral BID    gabapentin  300 mg Oral TID    diltiazem  240 mg Oral Daily    sodium chloride flush  10 mL Intravenous 2 times per day    enoxaparin  40 mg Subcutaneous Daily    insulin lispro  0-6 Units Subcutaneous TID WC    insulin lispro  0-3 Units Subcutaneous Nightly       PRN medications: ipratropium-albuterol, sodium chloride flush, magnesium hydroxide, glucose, dextrose, glucagon (rDNA), dextrose    Objective:    Physical Examination:  Vitals:    07/14/18 2300 07/15/18 0003 07/15/18 0512 07/15/18 0923   BP: (!) 167/82 (!) 161/68  (!) 192/84   Pulse: 68 67  71   Resp: 18 18  16   Temp: 98.3 °F (36.8 °C) 98.3 °F (36.8 °C)  98 °F (36.7 °C)   TempSrc: Oral Oral  Oral   SpO2: 97% 100%  95%   Weight:   247 lb (112 kg)    Height:           General - alert, well appearing, and in no distress  HEENT - Normocephalic  Neck - supple, trachea midline  Respiratory - Breathing Order Date: 7/14/2018 10:30 AM Exam: XR CHEST 1 VIEW Number of Views: 1 Indication:   Syncope. History of hypertension. Previous tobacco use. Evaluate for infection Comparison: Chest radiograph 9/18/2015 Findings: There is a normal cardiomediastinal silhouette with thoracic aortic vascular calcifications with underlying pulmonary edema and streaky opacifications in the lung bases. Moderately severe degenerative changes bilateral glenohumeral joints. . No pneumothorax. ALERT:  THIS IS AN ABNORMAL REPORT 1. Vascular calcifications thoracic aorta. 2. Underlying pulmonary edema. 3. Moderately severe bilateral glenohumeral osteoarthritis. 4. Streaky bibasilar opacifications, possibly reflective of bibasilar atelectasis versus developing infiltrate/pneumonia versus chronic interstitial changes, clinical correlation recommended. ASSESSMENT & PLAN:    I have examined the patient and performed key aspects of physical exam, reviewed the record (including all pertinent and new radiology images and laboratory findings), and discussed the case with the surgical team.  I agree with the assessment and plan with the following additions, corrections, and changes. This is a 76 y.o. female admitted 7/14/2018 with syncope. General surgery consulted for possible gluteal abscess. On exam, I do not appreciate any significant erythema, tenderness or fluctuance. There is minimal induration. It almost has the appearance of a stage II pressure almost. If it was an abscess or infected cyst, seems to been resolving. I do not appreciate any drainable fluid collection. Warm compresses and hydrocolloid dressing. Monitor. Juanita Welsh   7/15/2018   9:55 AM    NOTE: This report, in part or full, may have been transcribed using voice recognition software. Every effort was made to ensure accuracy; however, inadvertent computerized transcription errors may be present.  Please excuse any transcriptional grammatical or spelling errors that may have escaped my editorial review.

## 2018-07-15 NOTE — PROGRESS NOTES
Notified Dr. Padilla Wood that patient is complaining of pain in her head, neck, and back. New orders received.

## 2018-07-15 NOTE — CONSULTS
Consult to Infectious Disease  Consult performed by: Audelia Parker ordered by: Freeman Alves        [address] [place] [address] [facility]  [address]  Consultation Note     Admit Date: 7/14/2018  9:50 AM    Reason for Consult:   Cellulitis/abscess of buttock    Attending Physician:  Verna Quiroz MD    HISTORY OF PRESENT ILLNESS:             The history is obtained from extensive review of available past medical records. The patient is a 76 y.o. female who on 7/14/18 was sitting in chair applying makeup and woke up on the ground, face down and had some blood on her face. She does not recall what happened. She states had a previous episode a few months ago. She went to the hospital by EMS. She was noted to have elevated white count. She was also noted to have an area of redness over the buttock and was felt to have an abscess or cellulitis. She has been seen in consultation by surgery who did not think there was an infectious process but rather a decubitus ulcer.     Past Medical History:        Diagnosis Date    Arthritis     Asthma     Cancer ([facility] [address]     breast     Cerebral artery occlusion with cerebral infarction [facility])     denies deficits    Cerebrovascular disease 6/09    no residual    CHF (congestive heart failure) (HCC) approx 3 years ago    Claustrophobia     Headache(784.0)     History of blood transfusion     with knee surgery    Hyperlipidemia     Hypertension     LBP radiating to both legs     Lymphedema of both lower extremities 11/12/2015    Neuromuscular disorder (HCC)     Obesity     Recurrent genital HSV (herpes simplex virus) infection     last outbreak 11/2017    S/P breast biopsy, right 07/2016    pt states breast cancer    Type II or unspecified type diabetes mellitus without mention of complication, not stated as uncontrolled     AA1c8.7 9/10     Past Surgical History:        Procedure Laterality Date    ARTHROPLASTY Left 07/29/2016    thumb basil joint seizure  · Leukocytosis secondary to possible seizure  · No evidence of infectious process    Plan:    · No further recommendations    Thank you for having us see this patient in consultation. The case was discussed with Dr Padilla Wood.     [facility]  8:06 AM  7/15/2018

## 2018-07-16 ENCOUNTER — APPOINTMENT (OUTPATIENT)
Dept: MRI IMAGING | Age: 68
DRG: 638 | End: 2018-07-16
Payer: MEDICARE

## 2018-07-16 LAB
ANION GAP SERPL CALCULATED.3IONS-SCNC: 9 MMOL/L (ref 7–16)
BUN BLDV-MCNC: 10 MG/DL (ref 8–23)
CALCIUM SERPL-MCNC: 8.6 MG/DL (ref 8.6–10.2)
CHLORIDE BLD-SCNC: 105 MMOL/L (ref 98–107)
CO2: 27 MMOL/L (ref 22–29)
CREAT SERPL-MCNC: 0.7 MG/DL (ref 0.5–1)
GFR AFRICAN AMERICAN: >60
GFR NON-AFRICAN AMERICAN: >60 ML/MIN/1.73
GLUCOSE BLD-MCNC: 162 MG/DL (ref 74–109)
HCT VFR BLD CALC: 32.4 % (ref 34–48)
HEMOGLOBIN: 10.1 G/DL (ref 11.5–15.5)
MCH RBC QN AUTO: 27.4 PG (ref 26–35)
MCHC RBC AUTO-ENTMCNC: 31.2 % (ref 32–34.5)
MCV RBC AUTO: 88 FL (ref 80–99.9)
METER GLUCOSE: 144 MG/DL (ref 70–110)
METER GLUCOSE: 182 MG/DL (ref 70–110)
METER GLUCOSE: 250 MG/DL (ref 70–110)
METER GLUCOSE: 251 MG/DL (ref 70–110)
PDW BLD-RTO: 14.2 FL (ref 11.5–15)
PLATELET # BLD: 357 E9/L (ref 130–450)
PMV BLD AUTO: 10.5 FL (ref 7–12)
POTASSIUM SERPL-SCNC: 4 MMOL/L (ref 3.5–5)
RBC # BLD: 3.68 E12/L (ref 3.5–5.5)
SODIUM BLD-SCNC: 141 MMOL/L (ref 132–146)
WBC # BLD: 7.8 E9/L (ref 4.5–11.5)

## 2018-07-16 PROCEDURE — G8987 SELF CARE CURRENT STATUS: HCPCS

## 2018-07-16 PROCEDURE — 2580000003 HC RX 258: Performed by: INTERNAL MEDICINE

## 2018-07-16 PROCEDURE — 36415 COLL VENOUS BLD VENIPUNCTURE: CPT

## 2018-07-16 PROCEDURE — G8978 MOBILITY CURRENT STATUS: HCPCS

## 2018-07-16 PROCEDURE — 70553 MRI BRAIN STEM W/O & W/DYE: CPT

## 2018-07-16 PROCEDURE — G8979 MOBILITY GOAL STATUS: HCPCS

## 2018-07-16 PROCEDURE — 82962 GLUCOSE BLOOD TEST: CPT

## 2018-07-16 PROCEDURE — A9579 GAD-BASE MR CONTRAST NOS,1ML: HCPCS | Performed by: RADIOLOGY

## 2018-07-16 PROCEDURE — 99233 SBSQ HOSP IP/OBS HIGH 50: CPT | Performed by: INTERNAL MEDICINE

## 2018-07-16 PROCEDURE — 6370000000 HC RX 637 (ALT 250 FOR IP): Performed by: INTERNAL MEDICINE

## 2018-07-16 PROCEDURE — 6360000004 HC RX CONTRAST MEDICATION: Performed by: RADIOLOGY

## 2018-07-16 PROCEDURE — 6360000002 HC RX W HCPCS: Performed by: INTERNAL MEDICINE

## 2018-07-16 PROCEDURE — 94664 DEMO&/EVAL PT USE INHALER: CPT

## 2018-07-16 PROCEDURE — 85027 COMPLETE CBC AUTOMATED: CPT

## 2018-07-16 PROCEDURE — G8988 SELF CARE GOAL STATUS: HCPCS

## 2018-07-16 PROCEDURE — 97530 THERAPEUTIC ACTIVITIES: CPT

## 2018-07-16 PROCEDURE — 97161 PT EVAL LOW COMPLEX 20 MIN: CPT

## 2018-07-16 PROCEDURE — 80048 BASIC METABOLIC PNL TOTAL CA: CPT

## 2018-07-16 PROCEDURE — 1200000000 HC SEMI PRIVATE

## 2018-07-16 RX ADMIN — ACYCLOVIR 400 MG: 200 CAPSULE ORAL at 09:28

## 2018-07-16 RX ADMIN — SODIUM CHLORIDE: 9 INJECTION, SOLUTION INTRAVENOUS at 20:56

## 2018-07-16 RX ADMIN — INSULIN GLARGINE 10 UNITS: 100 INJECTION, SOLUTION SUBCUTANEOUS at 20:58

## 2018-07-16 RX ADMIN — INSULIN LISPRO 2 UNITS: 100 INJECTION, SOLUTION INTRAVENOUS; SUBCUTANEOUS at 20:58

## 2018-07-16 RX ADMIN — METOPROLOL TARTRATE 25 MG: 25 TABLET ORAL at 09:29

## 2018-07-16 RX ADMIN — ENOXAPARIN SODIUM 40 MG: 40 INJECTION, SOLUTION INTRAVENOUS; SUBCUTANEOUS at 09:28

## 2018-07-16 RX ADMIN — METOPROLOL TARTRATE 25 MG: 25 TABLET ORAL at 20:58

## 2018-07-16 RX ADMIN — GADOTERIDOL 20 ML: 279.3 INJECTION, SOLUTION INTRAVENOUS at 11:10

## 2018-07-16 RX ADMIN — GABAPENTIN 300 MG: 300 CAPSULE ORAL at 14:54

## 2018-07-16 RX ADMIN — INSULIN LISPRO 3 UNITS: 100 INJECTION, SOLUTION INTRAVENOUS; SUBCUTANEOUS at 18:38

## 2018-07-16 RX ADMIN — ANASTROZOLE 1 MG: 1 TABLET ORAL at 09:28

## 2018-07-16 RX ADMIN — GABAPENTIN 300 MG: 300 CAPSULE ORAL at 09:28

## 2018-07-16 RX ADMIN — CLONIDINE HYDROCHLORIDE 0.2 MG: 0.2 TABLET ORAL at 09:29

## 2018-07-16 RX ADMIN — CLONIDINE HYDROCHLORIDE 0.2 MG: 0.2 TABLET ORAL at 20:57

## 2018-07-16 RX ADMIN — IPRATROPIUM BROMIDE AND ALBUTEROL SULFATE 1 AMPULE: .5; 3 SOLUTION RESPIRATORY (INHALATION) at 20:15

## 2018-07-16 RX ADMIN — MONTELUKAST SODIUM 10 MG: 10 TABLET, FILM COATED ORAL at 20:57

## 2018-07-16 RX ADMIN — LORAZEPAM 1 MG: 2 INJECTION INTRAMUSCULAR; INTRAVENOUS at 10:20

## 2018-07-16 RX ADMIN — GABAPENTIN 300 MG: 300 CAPSULE ORAL at 20:57

## 2018-07-16 RX ADMIN — OXYBUTYNIN CHLORIDE 5 MG: 5 TABLET ORAL at 20:57

## 2018-07-16 RX ADMIN — DILTIAZEM HYDROCHLORIDE 240 MG: 240 CAPSULE, COATED, EXTENDED RELEASE ORAL at 09:28

## 2018-07-16 RX ADMIN — INSULIN LISPRO 1 UNITS: 100 INJECTION, SOLUTION INTRAVENOUS; SUBCUTANEOUS at 09:29

## 2018-07-16 ASSESSMENT — PAIN DESCRIPTION - LOCATION: LOCATION: SHOULDER

## 2018-07-16 ASSESSMENT — PAIN SCALES - GENERAL
PAINLEVEL_OUTOF10: 3
PAINLEVEL_OUTOF10: 0
PAINLEVEL_OUTOF10: 0

## 2018-07-16 ASSESSMENT — PAIN DESCRIPTION - ONSET: ONSET: ON-GOING

## 2018-07-16 ASSESSMENT — PAIN DESCRIPTION - FREQUENCY: FREQUENCY: INTERMITTENT

## 2018-07-16 ASSESSMENT — PAIN DESCRIPTION - PAIN TYPE: TYPE: ACUTE PAIN

## 2018-07-16 ASSESSMENT — PAIN DESCRIPTION - ORIENTATION: ORIENTATION: RIGHT;LEFT

## 2018-07-16 ASSESSMENT — PAIN DESCRIPTION - DESCRIPTORS: DESCRIPTORS: ACHING

## 2018-07-16 NOTE — PROGRESS NOTES
OCCUPATIONAL THERAPY INITIAL EVALUATION      Date:2018  Patient Name: Royer Gonzalez  MRN: [id number]  : 1950  Room: [address] [place] [address]     Evaluating OT: Oswaldo Park, OTR/L 4804    Recommended Adaptive Equipment: [address]     AM-PAC Inpatient Daily Activity Raw Score: 1724  G code: CK    Diagnosis: Syncope with fall  Surgery:   Past Surgical History:   Procedure Laterality Date    ARTHROPLASTY Left 2016    thumb basil joint with dequervain's release    BREAST LUMPECTOMY  years ago    benign    BREAST LUMPECTOMY Right 2016    COLONOSCOPY      COLONOSCOPY  1/8/15    Dr. Cynthia Chang  2012         FINGER SURGERY Left 2016    thumb    HAND SURGERY  2017    HYSTERECTOMY      JOINT REPLACEMENT      OTHER SURGICAL HISTORY Right 2017    RIGHT THUMB TRAPEZIECTOMY WITH LIGAMENT [address] RELEASE    TIM AND BSO      TOTAL HIP ARTHROPLASTY Bilateral     ,  dr Estelita Rice, dr Kelsi Guevara Bilateral     dr Elise Coyne      Past Medical History:   Past Medical History:   Diagnosis Date    Arthritis     Asthma     Cancer ([address]     breast     Cerebral artery occlusion with cerebral infarction ([address]     denies deficits    Cerebrovascular disease     no residual    CHF (congestive heart failure) ([address] approx 3 years ago    Claustrophobia     Headache(784.0)     History of blood transfusion     with knee surgery    Hyperlipidemia     Hypertension     LBP radiating to both legs     Lymphedema of both lower extremities 2015    Neuromuscular disorder ([address]     Obesity     Recurrent genital HSV (herpes simplex virus) infection     last outbreak 2017    S/P breast biopsy, right 2016    pt states breast cancer    Type II or unspecified type diabetes mellitus without mention of complication, not stated as uncontrolled     AA1c8.7 9/10     Precautions: falls, dizziness     Home Living: Pt

## 2018-07-16 NOTE — PROGRESS NOTES
rhythmic and regular. No murmurs appreciated. Abdomen: Positive bowel sounds to auscultation. Benign to palpation. No masses felt. Extremities: No clubbing, no cyanosis, no edema. Back: The left gluteal fold shows a 2 x 1 shallow ulcer surrounded by some minimal induration. No fluctuance. No surrounding erythema. Left hip surgical wound well-healed  Lines: peripheral    Laboratory and Tests Review:  Lab Results   Component Value Date    WBC 7.8 07/16/2018    WBC 9.0 07/15/2018    WBC 13.9 (H) 07/14/2018    HGB 10.1 (L) 07/16/2018    HCT 32.4 (L) 07/16/2018    MCV 88.0 07/16/2018     07/16/2018     Lab Results   Component Value Date    NEUTROABS 6.27 07/15/2018    NEUTROABS 11.59 (H) 07/14/2018    NEUTROABS 5.88 05/30/2018     Lab Results   Component Value Date    CRP 1.4 (H) 01/06/2011     No results found for: [facility]  Lab Results   Component Value Date    SEDRATE 47 (H) 01/06/2011     Lab Results   Component Value Date    ALT 9 07/15/2018    AST 15 07/15/2018    ALKPHOS 129 (H) 07/15/2018    BILITOT 0.4 07/15/2018     Lab Results   Component Value Date     07/16/2018    K 4.0 07/16/2018    K 3.7 07/15/2018     07/16/2018    CO2 27 07/16/2018    BUN 10 07/16/2018    CREATININE 0.7 07/16/2018    CREATININE 0.7 07/15/2018    CREATININE 0.7 07/14/2018    GFRAA >60 07/16/2018    LABGLOM >60 07/16/2018    GLUCOSE 162 07/16/2018    GLUCOSE 172 04/12/2012    PROT 6.3 07/15/2018    LABALBU 3.3 07/15/2018    LABALBU 4.4 07/28/2011    CALCIUM 8.6 07/16/2018    BILITOT 0.4 07/15/2018    ALKPHOS 129 07/15/2018    AST 15 07/15/2018    ALT 9 07/15/2018     Radiology:  MRI of the brain pending    Microbiology:   Blood cultures negative so far    ASSESSMENT:  · Left gluteal fold ulcer, not infected  · Syncope.  Probable seizure  · Leukocytosis associated to syncope, resolved  · No ongoing infection    PLAN:  · No need for antibiotics    Brittani Gaming  11:26 AM  7/16/2018

## 2018-07-16 NOTE — PROGRESS NOTES
cranial nerve deficit and speech normal      Recent Labs      07/14/18   1038  07/15/18   0400  07/16/18   0403   NA  140  141  141   K  3.8  3.7  4.0   CL  99  102  105   CO2  28  28  27   BUN  10  8  10   CREATININE  0.7  0.7  0.7   GLUCOSE  152*  135*  162*   CALCIUM  9.5  9.2  8.6       Recent Labs      07/14/18   1036  07/15/18   0400  07/16/18   0403   WBC  13.9*  9.0  7.8   RBC  4.31  4.03  3.68   HGB  12.0  11.1*  10.1*   HCT  37.5  34.6  32.4*   MCV  87.0  85.9  88.0   MCH  27.8  27.5  27.4   MCHC  32.0  32.1  31.2*   RDW  14.1  14.1  14.2   PLT  435  406  357   MPV  10.0  10.2  10.5       CMP:    Lab Results   Component Value Date     07/16/2018    K 4.0 07/16/2018    K 3.7 07/15/2018     07/16/2018    CO2 27 07/16/2018    BUN 10 07/16/2018    CREATININE 0.7 07/16/2018    GFRAA >60 07/16/2018    LABGLOM >60 07/16/2018    GLUCOSE 162 07/16/2018    GLUCOSE 172 04/12/2012    PROT 6.3 07/15/2018    LABALBU 3.3 07/15/2018    LABALBU 4.4 07/28/2011    CALCIUM 8.6 07/16/2018    BILITOT 0.4 07/15/2018    ALKPHOS 129 07/15/2018    AST 15 07/15/2018    ALT 9 07/15/2018     Magnesium:    Lab Results   Component Value Date    MG 1.7 07/15/2018        Radiology:   CT SOFT TISSUE NECK WO CONTRAST   Final Result   1. Findings to suggest a penetrating injury in the left side of the   face laterally, just below the level of the zygomatic arch. See above   comments. Correlation to clinical examination required. ALERT:  THIS IS AN ABNORMAL REPORT      CT Head WO Contrast   Final Result   Diffuse atrophy likely age related   Findings compatible with small vessel ischemic changes. XR CHEST 1 VW   Final Result   ALERT:  THIS IS AN ABNORMAL REPORT   1. Vascular calcifications thoracic aorta. 2. Underlying pulmonary edema. 3. Moderately severe bilateral glenohumeral osteoarthritis.    4. Streaky bibasilar opacifications, possibly reflective of bibasilar   atelectasis versus developing

## 2018-07-16 NOTE — PLAN OF CARE
Problem: Pain:  Goal: Pain level will decrease  Pain level will decrease   Outcome: Met This Shift      Problem: Falls - Risk of:  Goal: Will remain free from falls  Will remain free from falls   Outcome: Met This Shift      Problem: Skin Integrity:  Goal: Will show no infection signs and symptoms  Will show no infection signs and symptoms   Outcome: Met This Shift

## 2018-07-17 VITALS
RESPIRATION RATE: 16 BRPM | DIASTOLIC BLOOD PRESSURE: 69 MMHG | SYSTOLIC BLOOD PRESSURE: 146 MMHG | TEMPERATURE: 97.9 F | HEART RATE: 79 BPM | OXYGEN SATURATION: 96 % | BODY MASS INDEX: 41.52 KG/M2 | WEIGHT: 249.2 LBS | HEIGHT: 65 IN

## 2018-07-17 LAB
ANION GAP SERPL CALCULATED.3IONS-SCNC: 10 MMOL/L (ref 7–16)
BUN BLDV-MCNC: 11 MG/DL (ref 8–23)
CALCIUM SERPL-MCNC: 8.9 MG/DL (ref 8.6–10.2)
CHLORIDE BLD-SCNC: 103 MMOL/L (ref 98–107)
CO2: 26 MMOL/L (ref 22–29)
CREAT SERPL-MCNC: 0.6 MG/DL (ref 0.5–1)
GFR AFRICAN AMERICAN: >60
GFR NON-AFRICAN AMERICAN: >60 ML/MIN/1.73
GLUCOSE BLD-MCNC: 230 MG/DL (ref 74–109)
METER GLUCOSE: 204 MG/DL (ref 70–110)
METER GLUCOSE: 209 MG/DL (ref 70–110)
METER GLUCOSE: 230 MG/DL (ref 70–110)
POTASSIUM SERPL-SCNC: 4.2 MMOL/L (ref 3.5–5)
SODIUM BLD-SCNC: 139 MMOL/L (ref 132–146)

## 2018-07-17 PROCEDURE — 6360000002 HC RX W HCPCS: Performed by: INTERNAL MEDICINE

## 2018-07-17 PROCEDURE — 82962 GLUCOSE BLOOD TEST: CPT

## 2018-07-17 PROCEDURE — 36415 COLL VENOUS BLD VENIPUNCTURE: CPT

## 2018-07-17 PROCEDURE — 2580000003 HC RX 258: Performed by: INTERNAL MEDICINE

## 2018-07-17 PROCEDURE — 6370000000 HC RX 637 (ALT 250 FOR IP): Performed by: INTERNAL MEDICINE

## 2018-07-17 PROCEDURE — 97530 THERAPEUTIC ACTIVITIES: CPT

## 2018-07-17 PROCEDURE — APPSS45 APP SPLIT SHARED TIME 31-45 MINUTES: Performed by: NURSE PRACTITIONER

## 2018-07-17 PROCEDURE — 80048 BASIC METABOLIC PNL TOTAL CA: CPT

## 2018-07-17 PROCEDURE — 97535 SELF CARE MNGMENT TRAINING: CPT

## 2018-07-17 PROCEDURE — 99239 HOSP IP/OBS DSCHRG MGMT >30: CPT | Performed by: INTERNAL MEDICINE

## 2018-07-17 PROCEDURE — 2700000000 HC OXYGEN THERAPY PER DAY

## 2018-07-17 RX ORDER — INSULIN GLARGINE 100 [IU]/ML
10 INJECTION, SOLUTION SUBCUTANEOUS NIGHTLY
Qty: 1 VIAL | Refills: 3 | Status: SHIPPED | OUTPATIENT
Start: 2018-07-17 | End: 2018-07-20 | Stop reason: ALTCHOICE

## 2018-07-17 RX ADMIN — INSULIN LISPRO 2 UNITS: 100 INJECTION, SOLUTION INTRAVENOUS; SUBCUTANEOUS at 12:59

## 2018-07-17 RX ADMIN — DILTIAZEM HYDROCHLORIDE 240 MG: 240 CAPSULE, COATED, EXTENDED RELEASE ORAL at 09:43

## 2018-07-17 RX ADMIN — GABAPENTIN 300 MG: 300 CAPSULE ORAL at 09:43

## 2018-07-17 RX ADMIN — CYCLOBENZAPRINE 10 MG: 10 TABLET, FILM COATED ORAL at 11:15

## 2018-07-17 RX ADMIN — IPRATROPIUM BROMIDE AND ALBUTEROL SULFATE 1 AMPULE: .5; 3 SOLUTION RESPIRATORY (INHALATION) at 00:15

## 2018-07-17 RX ADMIN — METOPROLOL TARTRATE 25 MG: 25 TABLET ORAL at 09:53

## 2018-07-17 RX ADMIN — INSULIN LISPRO 2 UNITS: 100 INJECTION, SOLUTION INTRAVENOUS; SUBCUTANEOUS at 09:42

## 2018-07-17 RX ADMIN — GABAPENTIN 300 MG: 300 CAPSULE ORAL at 15:27

## 2018-07-17 RX ADMIN — ENOXAPARIN SODIUM 40 MG: 40 INJECTION, SOLUTION INTRAVENOUS; SUBCUTANEOUS at 09:53

## 2018-07-17 RX ADMIN — CLONIDINE HYDROCHLORIDE 0.2 MG: 0.2 TABLET ORAL at 09:43

## 2018-07-17 RX ADMIN — INSULIN LISPRO 2 UNITS: 100 INJECTION, SOLUTION INTRAVENOUS; SUBCUTANEOUS at 18:17

## 2018-07-17 RX ADMIN — ACYCLOVIR 400 MG: 200 CAPSULE ORAL at 09:43

## 2018-07-17 RX ADMIN — ANASTROZOLE 1 MG: 1 TABLET ORAL at 09:43

## 2018-07-17 RX ADMIN — SODIUM CHLORIDE: 9 INJECTION, SOLUTION INTRAVENOUS at 12:57

## 2018-07-17 ASSESSMENT — PAIN SCALES - GENERAL: PAINLEVEL_OUTOF10: 0

## 2018-07-17 NOTE — CARE COORDINATION
Social Work / discharge planning    7/17/2018 10:52 AM    Met with patient to discuss [address] 78. Offered choices under insurance. Referral made to [facility].      Electronically signed by TRUMAN Monsivais on 7/17/2018 at 10:53 AM

## 2018-07-17 NOTE — DISCHARGE SUMMARY
[facility] Physician Discharge Summary       Vninie Foster MD  [address] [facility]  [phone]    Schedule an appointment as soon as possible for a visit in 1 week  Make an appi ointment in 1 week to go over hospitalization, medications, and follow up. Juanita Welsh MD  [address] [facility] [address] [place] [address] [facility]  [phone]    Schedule an appointment as soon as possible for a visit in 2 weeks      Moira Mascorro [address] ( formerly [address])  [address], 401 [facility]. [address] 200 [facility]., MD  601 [facility] [address] [place] [phone]    Schedule an appointment as soon as possible for a visit in 2 weeks  Neurology      Activity level: as tolerated     Diet: DIET CARB CONTROL;    Dispo: HOME with Arlene Mckeon     Patient ID:  Jocelyne Hart  [id number]  76 y.o.  1950    Admit date: 7/14/2018    Discharge date and time:  7/17/2018  3:26 PM    Admission Diagnoses: Active Problems:    Syncope and collapse    Gluteal abscess    Leukocytosis    Controlled type 2 diabetes mellitus without complication, with long-term current use of insulin (HCC)  Resolved Problems:    * No resolved hospital problems. *      Discharge Diagnoses: Active Problems:    Syncope and collapse    Gluteal abscess    Leukocytosis    Controlled type 2 diabetes mellitus without complication, with long-term current use of insulin (HCC)  Resolved Problems:    * No resolved hospital problems. *      Consults:  IP CONSULT TO GENERAL SURGERY  IP CONSULT TO INFECTIOUS DISEASES  IP CONSULT TO HOME CARE NEEDS    Procedures: none    History of Present Illness:   \"Pt is a 76 y.o. female who presents due to syncope. Pt was sitting in chair at home. With some further questioning during interview, pt did note some sense of prodrome but difficult to adequately define. Pt fell and struck face.  Pt woke up on lateral aspect of the left side of the face where a small pockets of subcutaneous air are seen. This can represent penetrating injury. Is a questionable point of entrance just underneath of the more superficial pocket of subcutaneous air is observed. These findings require correlation with the clinical data. Minimal stranding of the subcutaneous fat plane is seen in that area. There are symmetric appearance for the parotid glands with fat replacement. Left submandibular gland is slightly more prominent than the right but each appears intrinsically unremarkable. There are unremarkable appearance for the soft tissues in the area for inguinal mucosal spaces and for the bilateral pharyngeal spaces. Calcifications are seen in the bulbar region of the right carotid artery and proximal segment of the right ICA. There are also calcified plaques in the distal left CCA/bulb region and proximal left ICA. No cervical adenopathy is seen. Thyroid gland appears unremarkable. Lung apices appear unremarkable. Calcifications are seen in the arch of the aorta. Degenerative changes are seen in the cervical spine with spondylosis of mild to moderate degree projecting posteriorly at C5-6 and C6-7. Degenerative changes are seen predominant in the right-sided facet joint of C3-4. There is no prevertebral soft tissue swelling. 1. Findings to suggest a penetrating injury in the left side of the face laterally, just below the level of the zygomatic arch. See above comments. Correlation to clinical examination required. ALERT:  THIS IS AN ABNORMAL REPORT    Xr Chest 1 Vw    Result Date: 2018  Patient MRN: [id number] : 1950 Age:  76 years Gender: Female Order Date: 2018 10:30 AM Exam: XR CHEST 1 VIEW Number of Views: 1 Indication:   Syncope. History of hypertension. Previous tobacco use. Evaluate for infection Comparison: Chest radiograph 2015 Findings:  There is a normal cardiomediastinal silhouette with thoracic aortic Comments: Please give whatever is covered by the patients insurance  Associated Diagnoses: Insulin dependent diabetes mellitus ([facility]      INSULIN SYRINGE 1CC/29G 29G X 1/2\" 1 ML MISC 1 each by Does not apply route daily  Qty: 100 each, Refills: 3    Associated Diagnoses: Insulin dependent diabetes mellitus (HCC)      vitamin D (ERGOCALCIFEROL) 28748 units CAPS capsule Take 1 capsule by mouth once a week  Qty: 4 capsule, Refills: 5    Associated Diagnoses: Vitamin D deficiency      vitamin B-12 (CYANOCOBALAMIN) 1000 MCG tablet Take 1 tablet by mouth daily  Qty: 30 tablet, Refills: 5    Associated Diagnoses: Vitamin B12 deficiency      acyclovir (ZOVIRAX) 400 MG tablet Take 1 tablet by mouth daily  Qty: 90 tablet, Refills: 1    Associated Diagnoses: Herpes simplex      nortriptyline (PAMELOR) 10 MG capsule TAKE 1 CAPSULE BY MOUTH NIGHTLY  Qty: 90 capsule, Refills: 1    Associated Diagnoses: Hypersomnia; Cervical radiculopathy, chronic;  Low back pain radiating to both legs      cloNIDine (CATAPRES) 0.2 MG tablet TAKE (1) TABLET BY MOUTH TWICE DAILY  Qty: 180 tablet, Refills: 1    Associated Diagnoses: Essential hypertension      losartan-hydrochlorothiazide (HYZAAR) 100-25 MG per tablet TAKE 1 TABLET BY MOUTH ONCE DAILY FOR HIGH BLOOD PRESSURE  Qty: 30 tablet, Refills: 5    Associated Diagnoses: Essential hypertension      diltiazem (CARDIZEM CD) 240 MG extended release capsule TAKE 1 CAPSULE BY MOUTH ONCE DAILY FOR HIGH BLOOD PRESSURE  Qty: 30 capsule, Refills: 5    Associated Diagnoses: Essential hypertension      oxybutynin (DITROPAN) 5 MG tablet Take 1 tablet by mouth nightly  Qty: 90 tablet, Refills: 1    Associated Diagnoses: Urge incontinence of urine      pravastatin (PRAVACHOL) 80 MG tablet Take 1 tablet by mouth daily  Qty: 90 tablet, Refills: 1    Associated Diagnoses: Hyperlipidemia with target LDL less than 70      gabapentin (NEURONTIN) 300 MG capsule Take 1 capsule by mouth 3 times daily for 181 days.  Vida Edelson: 270 capsule, Refills: 1    Associated Diagnoses: Cervical radiculopathy, chronic; Low back pain radiating to both legs      ONGLYZA 5 MG TABS tablet Take 1 tablet by mouth daily  Qty: 90 tablet, Refills: 1    Associated Diagnoses: Insulin dependent diabetes mellitus (HCC)      metoprolol tartrate (LOPRESSOR) 25 MG tablet Take 1 tablet by mouth 2 times daily  Qty: 180 tablet, Refills: 1    Associated Diagnoses: Essential hypertension      metFORMIN (GLUCOPHAGE) 500 MG tablet Take 1 tablet by mouth 2 times daily (with meals)  Qty: 180 tablet, Refills: 1    Associated Diagnoses: Insulin dependent diabetes mellitus (HCC)      ipratropium-albuterol (DUONEB) 0.5-2.5 (3) MG/3ML SOLN nebulizer solution USE 3 ML VIA NEBULIZER THREE TIMES DAILY AS NEEDED FOR SHORTNESS OF BREATH  Qty: 1080 mL, Refills: 0    Associated Diagnoses: Moderate persistent asthma without complication      Skin Protectants, Misc. (EUCERIN) cream Apply topically as needed. Qty: 1 Package, Refills: 0      hydrocortisone 2.5 % cream Apply topically 2 times daily. Qty: 45 g, Refills: 0      Insulin Syringe-Needle U-100 (B-D INS SYRINGE 0.5CC/31GX5/16) 31G X 5/16\" 0.5 ML MISC Use as directed  Qty: 100 each, Refills: 3    Comments: Any syringes covered under plan is acceptable for alternative. Compression Stockings MISC by Does not apply route 20 mmHG - KNEE HIGH  Qty: 2 each, Refills: 1    Associated Diagnoses: Essential hypertension;  Localized edema      lidocaine (XYLOCAINE) 5 % ointment Apply topically TID  Qty: 50 g, Refills: 5    Associated Diagnoses: Arthritis of right wrist      Lancets MISC Testing once daily  Qty: 100 each, Refills: 3    Associated Diagnoses: Insulin dependent diabetes mellitus (HCC)      calcium carbonate-vitamin D (CALCIUM 600 + D) 600-400 MG-UNIT TABS per tab Take 1 tablet by mouth 2 times daily  Qty: 60 tablet, Refills: 11    Associated Diagnoses: Ductal carcinoma in situ (DCIS) of breast      anastrozole

## 2018-07-17 NOTE — PROGRESS NOTES
[address] [place] [address] [facility]  Progress Note    SUBJECTIVE:  Chief Complaint   Patient presents with    Loss of Consciousness     states recalls sitting in chair and woke up on floor, denies symptoms prior to LOC, now c/o severe frontal and left side head pain, did strike head per EMS     Patient is tolerating medications. No reported adverse drug reactions. No nausea, vomiting, diarrhea. Review of systems:  As stated above in the chief complaint, otherwise negative. Medications:  Scheduled Meds:   insulin glargine  10 Units Subcutaneous Nightly    LORazepam  1 mg Intravenous See Admin Instructions    acyclovir  400 mg Oral Daily    anastrozole  1 mg Oral Daily    cloNIDine  0.2 mg Oral BID    oxybutynin  5 mg Oral Nightly    montelukast  10 mg Oral Nightly    metoprolol tartrate  25 mg Oral BID    gabapentin  300 mg Oral TID    diltiazem  240 mg Oral Daily    sodium chloride flush  10 mL Intravenous 2 times per day    enoxaparin  40 mg Subcutaneous Daily    insulin lispro  0-6 Units Subcutaneous TID WC    insulin lispro  0-3 Units Subcutaneous Nightly     Continuous Infusions:   sodium chloride 75 mL/hr at 18 1257    dextrose       PRN Meds:cyclobenzaprine, ipratropium-albuterol, sodium chloride flush, magnesium hydroxide, glucose, dextrose, glucagon (rDNA), dextrose    OBJECTIVE:  BP (!) 146/69   Pulse 79   Temp 97.9 °F (36.6 °C) (Oral)   Resp 16   Ht 5' 5\" (1.651 m)   Wt 249 lb 3.2 oz (113 kg)   SpO2 96%   BMI 41.47 kg/m²   Temp  Av.3 °F (36.8 °C)  Min: 97.9 °F (36.6 °C)  Max: 98.7 °F (37.1 °C)  Constitutional: The patient is awake, alert, and oriented. Obese. No distress. Skin: Warm and dry. No rashes were noted. HEENT: Eyes show round, and reactive pupils. No jaundice. Moist mucous membranes, no ulcerations, no thrush. Neck: Supple to movements. No lymphadenopathy. Chest: No wheezing, crackles, or rhonchi.    Cardiovascular: S1 and S2 are

## 2018-07-17 NOTE — PATIENT CARE CONFERENCE
P Quality Flow/Interdisciplinary Rounds Progress Note        Quality Flow Rounds held on July 17, 2018    Disciplines Attending:  Bedside Nurse, ,  and Nursing Unit [place] 15 was admitted on 7/14/2018  9:50 AM    Anticipated Discharge Date:  Expected Discharge Date: 07/15/18    Disposition:    Surendra Score:  Surendra Scale Score: 19    Readmission Risk              Risk of Unplanned Readmission:        20             Discussed patient goal for the day, patient clinical progression, and barriers to discharge.   The following Goal(s) of the Day/Commitment(s) have been identified:  monitor BS discharge home      Karla Childs  July 17, 2018

## 2018-07-17 NOTE — PROGRESS NOTES
[]Manual[]Template  []Copied  OCCUPATIONAL THERAPY RE EVALUATION       Date:  Patient Name: Jesus Gonzalez  MRN: [id number]  : 1950  Room: [address] [place] [address]     Evaluating OT: Cat Dawkins, OTR/L [id number]     Recommended Adaptive Equipment: FWW      AM-PAC Inpatient Daily Activity Raw Score: 20/24  G code: Austin Fentress     Diagnosis: Syncope with fall  Surgery:   Past Surgical History         Past Surgical History:   Procedure Laterality Date    ARTHROPLASTY Left 2016     thumb basil joint with dequervain's release    BREAST LUMPECTOMY   years ago     benign    BREAST LUMPECTOMY Right 2016    COLONOSCOPY   2005    COLONOSCOPY   1/8/15     Dr. Nina Rader [facility]    E[address] COMPL [address]   2012          FINGER SURGERY Left 2016     thumb    HAND SURGERY   2017    HYSTERECTOMY        JOINT REPLACEMENT        OTHER SURGICAL HISTORY Right 2017     RIGHT THUMB TRAPEZIECTOMY WITH LIGAMENT RECONSRUCTION DEQUERVAINS RELEASE    TIM AND BSO        TOTAL HIP ARTHROPLASTY Bilateral       ,  dr Arnol Carson, dr Audelia Nix Bilateral      dr Pari Posey         Past Medical History:   Past Medical History        Past Medical History:   Diagnosis Date    Arthritis      Asthma      Cancer ([facility]       breast     Cerebral artery occlusion with cerebral infarction [facility])       denies deficits    Cerebrovascular disease      no residual    CHF (congestive heart failure) (HCC) approx 3 years ago    Claustrophobia      Headache(784.0)      History of blood transfusion       with knee surgery    Hyperlipidemia      Hypertension      LBP radiating to both legs      Lymphedema of both lower extremities 2015    Neuromuscular disorder (HCC)      Obesity      Recurrent genital HSV (herpes simplex virus) infection       last outbreak 2017    S/P breast biopsy, right 2016     pt states breast cancer    Type II or unspecified type diabetes mellitus without mention of complication, not stated as uncontrolled       [id number] 9/10         Precautions: falls, dizziness     Home Living: Pt lives alone  in a 10th floor apt with elevator access. 3 steps to enter and rail as wel as a ramp rail(s); bed/bath on main  Bathroom setup: elevated toilet seat, tub/ shower unit  Equipment owned: tub bench     Prior Level of Function: I with ADLs; I with IADLs. I for ambulation.   Driving: yes  Occupation:       Pain Level: pt c/o pain in R shld and back this session      Cognition: oriented x 3  WFL      Vision/Perception  Hearing: WFL  Vision: WFL ; Glasses: yes [] no [] reading []  Perception: WFL        UE Assessment:  Hand Dominance: Right []  Left []       ROM Strength Additional Info:    RUE  AROM WFL 4/5 4/5  and WFL FMC/dexterity noted during ADL tasks      LUE AROM WFL 4/5 4/5  and WFL FMC/dexterity noted during ADL tasks      Sensation: WFL  Tone: WFL   Edema:BLE     Functional Assessment:    Initial Status 7-16-18 7-17-18   Feeding  I  I   Grooming  Min A standing at sink level SBA at sink with FWW    Upper Body Dressing Set up   Set up    Lower Body Dressing MIn   set up   Bathing DNT DNT    Toileting  Min A SBA with FWW, I personal hygiene    Bed Mobility  Supine to Sit: SBA  Sit to Supine:DNT     Functional Transfers Sit to stand Min A   SBA sit to stand from bedside chair and toilet with use of grab bar    Functional Mobility Min A with no AD    I with FWW within rest room and in tran 350 ft       Sit balance: Good  Stand balance: Fair  Endurance/Activity tolerance: Low                      Treatment: as noted in 7-17-`8 assessment    Assessment of current deficits   Functional mobility [x]                 ROM []   Strength [x]                     Cognition []  ADLs [x]                          IADLs [x] Safety Awareness []      Endurance [x]  Fine Motor Coordination []        Balance [x]          Vision/perception []       Sensation []

## 2018-07-17 NOTE — PLAN OF CARE
Problem: Pain:  Goal: Pain level will decrease  Pain level will decrease   Outcome: Met This Shift      Problem: Falls - Risk of:  Goal: Will remain free from falls  Will remain free from falls   Outcome: Met This Shift

## 2018-07-17 NOTE — PLAN OF CARE
Problem: Falls - Risk of:  Goal: Will remain free from falls  Will remain free from falls   Outcome: Ongoing      Problem: Skin Integrity:  Goal: Will show no infection signs and symptoms  Will show no infection signs and symptoms   Outcome: Ongoing

## 2018-07-18 ENCOUNTER — TELEPHONE (OUTPATIENT)
Dept: FAMILY MEDICINE CLINIC | Age: 68
End: 2018-07-18

## 2018-07-18 ENCOUNTER — CARE COORDINATION (OUTPATIENT)
Dept: CARE COORDINATION | Age: 68
End: 2018-07-18

## 2018-07-18 NOTE — CARE COORDINATION
Spoke with Matthew Murphy discussed patient discharged from the hospital on 7/17/18, primary diagnosis not on file. Please follow up with patient to schedule a Transitional Care visit within 7 days from date of discharge.

## 2018-07-18 NOTE — CARE COORDINATION
[facility]    Call within 2 business days of discharge: Yes    Patient: Leoncio Barrientos Fears Patient : 1950   MRN: [id number]  Reason for Admission: There are no discharge diagnoses documented for the most recent discharge. Discharge Date: 18 RARS: Readmission Risk Score: 20    Spoke with: Patient,  introduced myself, RN with [address] patient requested if she could call RN TCC back. RN TCC confirmed patient has contact phone number. Discussed with patient will wait for her return call. Patient verbalized understanding. Patient returned the call discussed with patient calling to see how patient is doing after being discharged from the hospital, review patient's allergies, medications patient is taking and see if there is anything patient needs. Patient verbalized understanding. Declined to review medications as noted below:   Patient  declined to review her medications as  \"Barbie from [facility] is coming to the home today, 18 \"should be here anytime, and she is going to go over my medications with me. She is going to go over the new medication Humalog, and the sliding scale, I never had to do that\". Patient states she did not receive any sliding scale instructions at time of discharge. Patient is under the impression Monroe Maharaj will have that information and review it with her when Monroe Maharaj comes to the house today. Patient states the Arlene Mckeon nurse will be coming out today to instruct her on wound care and dressing change to the buttocks. Facility: [facility]  Patient states she went to the ED as she had passed out and fell. Patient states when she came to she had bumps on her forehead (3 places), scrape on nose, left side of head had a cut and they glued it back together and a headache\". Patient states forehead continues to be tender to the touch, scrape on the nose is healing, left side of head cut area is bruised.   Patient states the cut

## 2018-07-18 NOTE — CARE COORDINATION
Spoke with Pharmacist, Chasidy Jones discussed with him patient prescribed new medication insulin lispor 100 Unit/ml injection (Humalog) 0-6 Units into the skin 3 times daily (with meals). He states when the medications was sent over electronically there were not any additional instructions regarding the sliding scale. He \"presumed the patient was given these instructions\".

## 2018-07-18 NOTE — CARE COORDINATION
Spoke with Camilla Bell discussed RN TCC had routed note to Dr. Martine Velazco patient stating she had not received sliding scale instructions with new insulin patient was discharged home with when discharged from the hospital. Camilla Bell states she will inform Dr. Mar DOYLE routed note.

## 2018-07-20 ENCOUNTER — OFFICE VISIT (OUTPATIENT)
Dept: FAMILY MEDICINE CLINIC | Age: 68
End: 2018-07-20
Payer: MEDICARE

## 2018-07-20 VITALS
HEIGHT: 65 IN | OXYGEN SATURATION: 99 % | TEMPERATURE: 98.4 F | HEART RATE: 77 BPM | DIASTOLIC BLOOD PRESSURE: 100 MMHG | BODY MASS INDEX: 41.15 KG/M2 | WEIGHT: 247 LBS | SYSTOLIC BLOOD PRESSURE: 144 MMHG

## 2018-07-20 DIAGNOSIS — B00.9 HERPES SIMPLEX: ICD-10-CM

## 2018-07-20 DIAGNOSIS — E53.8 VITAMIN B12 DEFICIENCY: ICD-10-CM

## 2018-07-20 DIAGNOSIS — M79.604 LOW BACK PAIN RADIATING TO BOTH LEGS: ICD-10-CM

## 2018-07-20 DIAGNOSIS — J45.40 MODERATE PERSISTENT ASTHMA WITHOUT COMPLICATION: ICD-10-CM

## 2018-07-20 DIAGNOSIS — M54.50 LOW BACK PAIN RADIATING TO BOTH LEGS: ICD-10-CM

## 2018-07-20 DIAGNOSIS — M54.12 CERVICAL RADICULOPATHY, CHRONIC: ICD-10-CM

## 2018-07-20 DIAGNOSIS — N39.41 URGE INCONTINENCE OF URINE: ICD-10-CM

## 2018-07-20 DIAGNOSIS — R55 SYNCOPE, UNSPECIFIED SYNCOPE TYPE: Primary | ICD-10-CM

## 2018-07-20 DIAGNOSIS — M79.605 LOW BACK PAIN RADIATING TO BOTH LEGS: ICD-10-CM

## 2018-07-20 DIAGNOSIS — E55.9 VITAMIN D DEFICIENCY: ICD-10-CM

## 2018-07-20 DIAGNOSIS — I10 ESSENTIAL HYPERTENSION: ICD-10-CM

## 2018-07-20 DIAGNOSIS — E78.5 HYPERLIPIDEMIA WITH TARGET LDL LESS THAN 70: ICD-10-CM

## 2018-07-20 LAB
BLOOD CULTURE, ROUTINE: NORMAL
CULTURE, BLOOD 2: NORMAL

## 2018-07-20 PROCEDURE — 99496 TRANSJ CARE MGMT HIGH F2F 7D: CPT | Performed by: FAMILY MEDICINE

## 2018-07-20 PROCEDURE — 1111F DSCHRG MED/CURRENT MED MERGE: CPT | Performed by: FAMILY MEDICINE

## 2018-07-20 RX ORDER — OXYBUTYNIN CHLORIDE 5 MG/1
5 TABLET ORAL 3 TIMES DAILY
Qty: 90 TABLET | Refills: 1
Start: 2018-07-20 | End: 2019-03-06 | Stop reason: SDUPTHER

## 2018-07-20 RX ORDER — ACYCLOVIR 400 MG/1
400 TABLET ORAL DAILY
Qty: 90 TABLET | Refills: 1 | Status: SHIPPED | OUTPATIENT
Start: 2018-07-20 | End: 2019-06-19 | Stop reason: SDUPTHER

## 2018-07-20 RX ORDER — LOSARTAN POTASSIUM AND HYDROCHLOROTHIAZIDE 25; 100 MG/1; MG/1
TABLET ORAL
Qty: 90 TABLET | Refills: 1 | Status: SHIPPED | OUTPATIENT
Start: 2018-07-20 | End: 2019-01-15 | Stop reason: SDUPTHER

## 2018-07-20 RX ORDER — IPRATROPIUM BROMIDE AND ALBUTEROL SULFATE 2.5; .5 MG/3ML; MG/3ML
SOLUTION RESPIRATORY (INHALATION)
Qty: 1080 ML | Refills: 0
Start: 2018-07-20 | End: 2018-12-24 | Stop reason: SDUPTHER

## 2018-07-20 RX ORDER — LANOLIN ALCOHOL/MO/W.PET/CERES
1000 CREAM (GRAM) TOPICAL DAILY
Qty: 90 TABLET | Refills: 1 | Status: SHIPPED | OUTPATIENT
Start: 2018-07-20

## 2018-07-20 RX ORDER — CLONIDINE HYDROCHLORIDE 0.2 MG/1
TABLET ORAL
Qty: 180 TABLET | Refills: 1 | Status: SHIPPED | OUTPATIENT
Start: 2018-07-20 | End: 2018-09-28 | Stop reason: SDUPTHER

## 2018-07-20 RX ORDER — PRAVASTATIN SODIUM 80 MG/1
80 TABLET ORAL DAILY
Qty: 90 TABLET | Refills: 1 | Status: SHIPPED | OUTPATIENT
Start: 2018-07-20 | End: 2019-01-15 | Stop reason: SDUPTHER

## 2018-07-20 RX ORDER — GABAPENTIN 300 MG/1
300 CAPSULE ORAL 3 TIMES DAILY
Qty: 270 CAPSULE | Refills: 1 | Status: SHIPPED | OUTPATIENT
Start: 2018-07-20 | End: 2019-01-15 | Stop reason: SDUPTHER

## 2018-07-20 RX ORDER — DILTIAZEM HYDROCHLORIDE 240 MG/1
CAPSULE, COATED, EXTENDED RELEASE ORAL
Qty: 90 CAPSULE | Refills: 1 | Status: SHIPPED | OUTPATIENT
Start: 2018-07-20 | End: 2019-03-06 | Stop reason: SDUPTHER

## 2018-07-20 RX ORDER — BLOOD SUGAR DIAGNOSTIC
STRIP MISCELLANEOUS
Qty: 100 EACH | Refills: 3 | Status: SHIPPED | OUTPATIENT
Start: 2018-07-20 | End: 2019-01-15

## 2018-07-20 RX ORDER — ERGOCALCIFEROL 1.25 MG/1
50000 CAPSULE ORAL WEEKLY
Qty: 12 CAPSULE | Refills: 1 | Status: SHIPPED | OUTPATIENT
Start: 2018-07-20 | End: 2019-01-29 | Stop reason: SDUPTHER

## 2018-07-20 RX ORDER — MONTELUKAST SODIUM 10 MG/1
TABLET ORAL
Qty: 90 TABLET | Refills: 1 | Status: SHIPPED | OUTPATIENT
Start: 2018-07-20 | End: 2018-11-29 | Stop reason: SDUPTHER

## 2018-07-20 RX ORDER — INSULIN GLARGINE 100 [IU]/ML
50 INJECTION, SOLUTION SUBCUTANEOUS NIGHTLY
Qty: 1 VIAL | Refills: 3 | Status: SHIPPED | OUTPATIENT
Start: 2018-07-20 | End: 2019-03-06 | Stop reason: SDUPTHER

## 2018-07-20 RX ORDER — SAXAGLIPTIN 5 MG/1
5 TABLET, FILM COATED ORAL DAILY
Qty: 90 TABLET | Refills: 1 | Status: SHIPPED | OUTPATIENT
Start: 2018-07-20 | End: 2019-01-15 | Stop reason: SDUPTHER

## 2018-07-20 RX ORDER — GLUCOSAMINE HCL/CHONDROITIN SU 500-400 MG
CAPSULE ORAL
Qty: 200 STRIP | Refills: 3 | Status: SHIPPED | OUTPATIENT
Start: 2018-07-20 | End: 2018-07-20 | Stop reason: SDUPTHER

## 2018-07-20 ASSESSMENT — ENCOUNTER SYMPTOMS
COUGH: 1
WHEEZING: 1
GASTROINTESTINAL NEGATIVE: 1
SHORTNESS OF BREATH: 1

## 2018-07-20 NOTE — PROGRESS NOTES
cloNIDine (CATAPRES) 0.2 MG tablet  TAKE (1) TABLET BY MOUTH TWICE DAILY             Compression Stockings MISC  by Does not apply route 20 mmHG - KNEE HIGH             diltiazem (CARDIZEM CD) 240 MG extended release capsule  TAKE 1 CAPSULE BY MOUTH ONCE DAILY FOR HIGH BLOOD PRESSURE             gabapentin (NEURONTIN) 300 MG capsule  Take 1 capsule by mouth 3 times daily for 181 days. Glucose Blood (BLOOD GLUCOSE TEST STRIPS) STRP  Testing once daily             glucose monitoring kit (FREESTYLE) monitoring kit  1 kit by Does not apply route daily             hydrocortisone 2.5 % cream  Apply topically 2 times daily.              insulin glargine (LANTUS) 100 UNIT/ML injection vial  Inject 10 Units into the skin nightly             insulin lispro (HUMALOG) 100 UNIT/ML injection vial  Inject 0-6 Units into the skin 3 times daily (with meals)             INSULIN SYRINGE 1CC/29G 29G X 1/2\" 1 ML MISC  1 each by Does not apply route daily             Insulin Syringe-Needle U-100 (B-D INS SYRINGE 0.5CC/31GX5/16) 31G X 5/16\" 0.5 ML MISC  Use as directed             ipratropium-albuterol (DUONEB) 0.5-2.5 (3) MG/3ML SOLN nebulizer solution  USE 3 ML VIA NEBULIZER THREE TIMES DAILY AS NEEDED FOR SHORTNESS OF BREATH             Lancets MISC  Testing once daily             lidocaine (XYLOCAINE) 5 % ointment  Apply topically TID             losartan-hydrochlorothiazide (HYZAAR) 100-25 MG per tablet  TAKE 1 TABLET BY MOUTH ONCE DAILY FOR HIGH BLOOD PRESSURE             metFORMIN (GLUCOPHAGE) 500 MG tablet  Take 1 tablet by mouth 2 times daily (with meals)             metoprolol tartrate (LOPRESSOR) 25 MG tablet  Take 1 tablet by mouth 2 times daily             montelukast (SINGULAIR) 10 MG tablet  TAKE (1) TABLET BY MOUTH NIGHTLY             naproxen (NAPROXEN) 500 MG EC tablet  Take 1 tablet by mouth 2 times daily (with meals) for 7 days             nortriptyline (PAMELOR) 10 MG capsule  TAKE 1 CAPSULE BY MOUTH NIGHTLY             ONGLYZA 5 MG TABS tablet  Take 1 tablet by mouth daily             oxybutynin (DITROPAN) 5 MG tablet  Take 1 tablet by mouth nightly             pravastatin (PRAVACHOL) 80 MG tablet  Take 1 tablet by mouth daily             PROAIR  (90 Base) MCG/ACT inhaler  INHALE TWO (2) PUFFS BY MOUTH EVERY 6 HOURS AS NEEDED FOR WHEEZING AND FOR SHORTNESS OF BREATH             Skin Protectants, Misc. (EUCERIN) cream  Apply topically as needed.              vitamin B-12 (CYANOCOBALAMIN) 1000 MCG tablet  Take 1 tablet by mouth daily             vitamin D (ERGOCALCIFEROL) 88705 units CAPS capsule  Take 1 capsule by mouth once a week                   Medications marked \"taking\" at this time  Outpatient Prescriptions Marked as Taking for the 7/20/18 encounter (Office Visit) with Paz Urbano MD   Medication Sig Dispense Refill    insulin lispro (HUMALOG) 100 UNIT/ML injection vial Inject 0-6 Units into the skin 3 times daily (with meals) 1 vial 3    insulin glargine (LANTUS) 100 UNIT/ML injection vial Inject 10 Units into the skin nightly 1 vial 3    PROAIR  (90 Base) MCG/ACT inhaler INHALE TWO (2) PUFFS BY MOUTH EVERY 6 HOURS AS NEEDED FOR WHEEZING AND FOR SHORTNESS OF BREATH 8.5 g 10    montelukast (SINGULAIR) 10 MG tablet TAKE (1) TABLET BY MOUTH NIGHTLY 90 tablet 1    Glucose Blood (BLOOD GLUCOSE TEST STRIPS) STRP Testing once daily 100 strip 5    glucose monitoring kit (FREESTYLE) monitoring kit 1 kit by Does not apply route daily 1 kit 0    INSULIN SYRINGE 1CC/29G 29G X 1/2\" 1 ML MISC 1 each by Does not apply route daily 100 each 3    vitamin D (ERGOCALCIFEROL) 97684 units CAPS capsule Take 1 capsule by mouth once a week 4 capsule 5    vitamin B-12 (CYANOCOBALAMIN) 1000 MCG tablet Take 1 tablet by mouth daily 30 tablet 5    acyclovir (ZOVIRAX) 400 MG tablet Take 1 tablet by mouth daily 90 tablet 1    nortriptyline (PAMELOR) 10 MG capsule TAKE 1 CAPSULE BY MOUTH NIGHTLY 90 capsule 1    cloNIDine (CATAPRES) 0.2 MG tablet TAKE (1) TABLET BY MOUTH TWICE DAILY 180 tablet 1    losartan-hydrochlorothiazide (HYZAAR) 100-25 MG per tablet TAKE 1 TABLET BY MOUTH ONCE DAILY FOR HIGH BLOOD PRESSURE 30 tablet 5    diltiazem (CARDIZEM CD) 240 MG extended release capsule TAKE 1 CAPSULE BY MOUTH ONCE DAILY FOR HIGH BLOOD PRESSURE 30 capsule 5    oxybutynin (DITROPAN) 5 MG tablet Take 1 tablet by mouth nightly 90 tablet 1    pravastatin (PRAVACHOL) 80 MG tablet Take 1 tablet by mouth daily 90 tablet 1    gabapentin (NEURONTIN) 300 MG capsule Take 1 capsule by mouth 3 times daily for 181 days. 270 capsule 1    ONGLYZA 5 MG TABS tablet Take 1 tablet by mouth daily 90 tablet 1    metoprolol tartrate (LOPRESSOR) 25 MG tablet Take 1 tablet by mouth 2 times daily 180 tablet 1    metFORMIN (GLUCOPHAGE) 500 MG tablet Take 1 tablet by mouth 2 times daily (with meals) 180 tablet 1    ipratropium-albuterol (DUONEB) 0.5-2.5 (3) MG/3ML SOLN nebulizer solution USE 3 ML VIA NEBULIZER THREE TIMES DAILY AS NEEDED FOR SHORTNESS OF BREATH 1080 mL 0    Skin Protectants, Misc. (EUCERIN) cream Apply topically as needed. 1 Package 0    hydrocortisone 2.5 % cream Apply topically 2 times daily.  45 g 0    Insulin Syringe-Needle U-100 (B-D INS SYRINGE 0.5CC/31GX5/16) 31G X 5/16\" 0.5 ML MISC Use as directed 100 each 3    Compression Stockings MISC by Does not apply route 20 mmHG - KNEE HIGH 2 each 1    lidocaine (XYLOCAINE) 5 % ointment Apply topically TID 50 g 5    Lancets MISC Testing once daily 100 each 3    calcium carbonate-vitamin D (CALCIUM 600 + D) 600-400 MG-UNIT TABS per tab Take 1 tablet by mouth 2 times daily (Patient taking differently: Take 1 tablet by mouth 2 times daily Ld 12/18/2017) 60 tablet 11    anastrozole (ARIMIDEX) 1 MG tablet Take 1 tablet by mouth daily Indications: Estrogen Deficiency in Breast Cancer 30 tablet 12        Medications patient taking as of now reconciled against her bp meds and readjust as needed. 6.  Home with home care, evaluated by PT and OT      Review of Systems   Constitutional: Negative for fatigue. HENT: Negative. Respiratory: Positive for cough, shortness of breath and wheezing. Cardiovascular: Negative. Gastrointestinal: Negative. Endocrine: Negative. Genitourinary:        Urinary incontinence   Musculoskeletal: Negative. Neurological: Positive for syncope. Psychiatric/Behavioral: Negative. All other systems reviewed and are negative. Non face to face  following discharge, date last encounter closed (first attempt may have been earlier): 7/18/2018 11:55 AM 7/18/2018 11:55 AM    Call initiated 2 business days of discharge: Yes     Interval history/Current status:   Overall feels the same. Her forehead is sore from when she fell and passed out. She had a similar episode of passing out and falling. She has been assessed to have seizures while in the hospital.  No urinary or bowel incontinence reported. Post episode she was having trouble breathing. Cardiac/pulmonary workup upon admission revealed no acute abnormalities. Recommendation prior to discharge was to decrease Lantus from 65 units/day to 10 units/day and Humalog insulin on a sliding scale was added. While prescription was sent to pharmacy, patient did not start the new insulin because of vague discharge instructions, no detailed sliding scale,  and no [facility] nurse to provide teaching,. Instead of using Humalog with decreased Lantus, patient increased the Lantus back to 50 units/day. Fasting BS today 111. She is not eating regularly. Part of her irregular eating comes from inability to afford healthy food or scheduling conflicts. Seizure workup with EEG was not recommended/indicated. Home health Care (PT/OT) was recommended upon discharge. She was admitted earlier this month for gluteal abscess; treatment initiated inpatient but no continued [facility] recommended. snacks: >40 minutes     Medical Decision Making: high complexity    Follow up:  Return for Schedule Medicare AWV.

## 2018-07-20 NOTE — PATIENT INSTRUCTIONS
Patient Education        Learning About Diabetes Food Guidelines  Your Care Instructions    Meal planning is important to manage diabetes. It helps keep your blood sugar at a target level (which you set with your doctor). You don't have to eat special foods. You can eat what your family eats, including sweets once in a while. But you do have to pay attention to how often you eat and how much you eat of certain foods. You may want to work with a dietitian or a certified diabetes educator (CDE) to help you plan meals and snacks. A dietitian or CDE can also help you lose weight if that is one of your goals. What should you know about eating carbs? Managing the amount of carbohydrate (carbs) you eat is an important part of healthy meals when you have diabetes. Carbohydrate is found in many foods. · Learn which foods have carbs. And learn the amounts of carbs in different foods. ¨ Bread, cereal, pasta, and rice have about 15 grams of carbs in a serving. A serving is 1 slice of bread (1 ounce), ½ cup of cooked cereal, or 1/3 cup of cooked pasta or rice. ¨ Fruits have 15 grams of carbs in a serving. A serving is 1 small fresh fruit, such as an apple or orange; ½ of a banana; ½ cup of cooked or canned fruit; ½ cup of fruit juice; 1 cup of melon or raspberries; or 2 tablespoons of dried fruit. ¨ Milk and no-sugar-added yogurt have 15 grams of carbs in a serving. A serving is 1 cup of milk or 2/3 cup of no-sugar-added yogurt. ¨ Starchy vegetables have 15 grams of carbs in a serving. A serving is ½ cup of mashed potatoes or sweet potato; 1 cup winter squash; ½ of a small baked potato; ½ cup of cooked beans; or ½ cup cooked corn or green peas. · Learn how much carbs to eat each day and at each meal. A dietitian or CDE can teach you how to keep track of the amount of carbs you eat. This is called carbohydrate counting. · If you are not sure how to count carbohydrate grams, use the Plate Method to plan meals.  It is a when cooking. · Don't skip meals. Your blood sugar may drop too low if you skip meals and take insulin or certain medicines for diabetes. · Check with your doctor before you drink alcohol. Alcohol can cause your blood sugar to drop too low. Alcohol can also cause a bad reaction if you take certain diabetes medicines. Follow-up care is a key part of your treatment and safety. Be sure to make and go to all appointments, and call your doctor if you are having problems. It's also a good idea to know your test results and keep a list of the medicines you take. Where can you learn more? Go to [email or web address] and sign in to your [employer] account. Enter B911 in the [employer] box to learn more about \"Learning About Diabetes Food Guidelines. \"     If you do not have an account, please click on the \"Sign Up Now\" link. Current as of: December 7, 2017  Content Version: 11.6  © 0165-0310 [employer]. Care instructions adapted under license by [facility] ([facility]). If you have questions about a medical condition or this instruction, always ask your healthcare professional. [address] any warranty or liability for your use of this information. Patient Education        Learning About Meal Planning for Diabetes  Why plan your meals? Meal planning can be a key part of managing diabetes. Planning meals and snacks with the right balance of carbohydrate, protein, and fat can help you keep your blood sugar at the target level you set with your doctor. You don't have to eat special foods. You can eat what your family eats, including sweets once in a while. But you do have to pay attention to how often you eat and how much you eat of certain foods. You may want to work with a dietitian or a certified diabetes educator. He or she can give you tips and meal ideas and can answer your questions about meal planning.  This health professional can also help you reach a healthy weight if that is one of your goals. What plan is right for you? Your dietitian or diabetes educator may suggest that you start with the plate format or carbohydrate counting. The plate format  The plate format is a simple way to help you manage how you eat. You plan meals by learning how much space each food should take on a plate. Using the plate format helps you spread carbohydrate throughout the day. It can make it easier to keep your blood sugar level within your target range. It also helps you see if you're eating healthy portion sizes. To use the plate format, you put non-starchy vegetables on half your plate. Add meat or meat substitutes on one-quarter of the plate. Put a grain or starchy vegetable (such as brown rice or a potato) on the final quarter of the plate. You can add a small piece of fruit and some low-fat or fat-free milk or yogurt, depending on your carbohydrate goal for each meal.  Here are some tips for using the plate format:  · Make sure that you are not using an oversized plate. A 9-inch plate is best. Many restaurants use larger plates. · Get used to using the plate format at home. Then you can use it when you eat out. · Write down your questions about using the plate format. Talk to your doctor, a dietitian, or a diabetes educator about your concerns. Carbohydrate counting  With carbohydrate counting, you plan meals based on the amount of carbohydrate in each food. Carbohydrate raises blood sugar higher and more quickly than any other nutrient. It is found in desserts, breads and cereals, and fruit. It's also found in starchy vegetables such as potatoes and corn, grains such as rice and pasta, and milk and yogurt. Spreading carbohydrate throughout the day helps keep your blood sugar levels within your target range.   Your daily amount depends on several things, including your weight, how active you are, which diabetes medicines you take, and what your goals are for your too.  · Use cookbooks or online recipes to plan several main meals. Plan some quick meals for busy nights. You also can double some recipes that freeze well. Then you can save half for other busy nights when you don't have time to cook. · Make sure you have the ingredients you need for your recipes. If you're running low on basic items, put these items on your shopping list too. · List foods that you use to make breakfasts, lunches, and snacks. List plenty of fruits and vegetables. · Post this list on the refrigerator. Add to it as you think of more things you need. · Take the list to the store to do your weekly shopping. Follow-up care is a key part of your treatment and safety. Be sure to make and go to all appointments, and call your doctor if you are having problems. It's also a good idea to know your test results and keep a list of the medicines you take. Where can you learn more? Go to https://[email or web address].[email or web address] org and sign in to your [employer] account. Enter L886 in the [employer] box to learn more about \"Learning About Meal Planning for Diabetes. \"     If you do not have an account, please click on the \"Sign Up Now\" link. Current as of: December 7, 2017  Content Version: 11.6  © 4442-8512 [email or web address]. Care instructions adapted under license by [employer] ([facility]). If you have questions about a medical condition or this instruction, always ask your healthcare professional. [address] any warranty or liability for your use of this information. Patient Education            Current as of: March 13, 2017  Content Version: 11.6  © 3907-5211 [email or web address]. Care instructions adapted under license by [employer] ([facility]). If you have questions about a medical condition or this instruction, always ask your healthcare professional. [address] any warranty or liability for your use of this information.

## 2018-08-01 ENCOUNTER — OFFICE VISIT (OUTPATIENT)
Dept: FAMILY MEDICINE CLINIC | Age: 68
End: 2018-08-01
Payer: MEDICARE

## 2018-08-01 VITALS
BODY MASS INDEX: 39.28 KG/M2 | RESPIRATION RATE: 16 BRPM | HEART RATE: 70 BPM | DIASTOLIC BLOOD PRESSURE: 70 MMHG | HEIGHT: 66 IN | OXYGEN SATURATION: 95 % | SYSTOLIC BLOOD PRESSURE: 124 MMHG | WEIGHT: 244.4 LBS | TEMPERATURE: 98.9 F

## 2018-08-01 DIAGNOSIS — Z71.89 ACP (ADVANCE CARE PLANNING): ICD-10-CM

## 2018-08-01 DIAGNOSIS — R55 SYNCOPE AND COLLAPSE: ICD-10-CM

## 2018-08-01 DIAGNOSIS — Z23 NEED FOR PROPHYLACTIC VACCINATION AGAINST STREPTOCOCCUS PNEUMONIAE (PNEUMOCOCCUS): ICD-10-CM

## 2018-08-01 DIAGNOSIS — Z23 NEED FOR PROPHYLACTIC VACCINATION AND INOCULATION AGAINST VARICELLA: ICD-10-CM

## 2018-08-01 DIAGNOSIS — Z00.00 ROUTINE GENERAL MEDICAL EXAMINATION AT A HEALTH CARE FACILITY: Primary | ICD-10-CM

## 2018-08-01 PROCEDURE — 4040F PNEUMOC VAC/ADMIN/RCVD: CPT | Performed by: FAMILY MEDICINE

## 2018-08-01 PROCEDURE — 99497 ADVNCD CARE PLAN 30 MIN: CPT | Performed by: FAMILY MEDICINE

## 2018-08-01 PROCEDURE — 90670 PCV13 VACCINE IM: CPT | Performed by: FAMILY MEDICINE

## 2018-08-01 PROCEDURE — G0438 PPPS, INITIAL VISIT: HCPCS | Performed by: FAMILY MEDICINE

## 2018-08-01 PROCEDURE — G8598 ASA/ANTIPLAT THER USED: HCPCS | Performed by: FAMILY MEDICINE

## 2018-08-01 PROCEDURE — G0009 ADMIN PNEUMOCOCCAL VACCINE: HCPCS | Performed by: FAMILY MEDICINE

## 2018-08-01 ASSESSMENT — LIFESTYLE VARIABLES
HOW MANY STANDARD DRINKS CONTAINING ALCOHOL DO YOU HAVE ON A TYPICAL DAY: 0
HOW OFTEN DURING THE LAST YEAR HAVE YOU HAD A FEELING OF GUILT OR REMORSE AFTER DRINKING: 0
HOW OFTEN DURING THE LAST YEAR HAVE YOU FOUND THAT YOU WERE NOT ABLE TO STOP DRINKING ONCE YOU HAD STARTED: 0
HOW OFTEN DURING THE LAST YEAR HAVE YOU FAILED TO DO WHAT WAS NORMALLY EXPECTED FROM YOU BECAUSE OF DRINKING: 0
HOW OFTEN DURING THE LAST YEAR HAVE YOU NEEDED AN ALCOHOLIC DRINK FIRST THING IN THE MORNING TO GET YOURSELF GOING AFTER A NIGHT OF HEAVY DRINKING: 0
HOW OFTEN DO YOU HAVE A DRINK CONTAINING ALCOHOL: 1
HAVE YOU OR SOMEONE ELSE BEEN INJURED AS A RESULT OF YOUR DRINKING: 0
HOW OFTEN DURING THE LAST YEAR HAVE YOU BEEN UNABLE TO REMEMBER WHAT HAPPENED THE NIGHT BEFORE BECAUSE YOU HAD BEEN DRINKING: 0
AUDIT TOTAL SCORE: 1
AUDIT-C TOTAL SCORE: 1
HOW OFTEN DO YOU HAVE SIX OR MORE DRINKS ON ONE OCCASION: 0
HAS A RELATIVE, FRIEND, DOCTOR, OR ANOTHER HEALTH PROFESSIONAL EXPRESSED CONCERN ABOUT YOUR DRINKING OR SUGGESTED YOU CUT DOWN: 0

## 2018-08-01 ASSESSMENT — PATIENT HEALTH QUESTIONNAIRE - PHQ9: SUM OF ALL RESPONSES TO PHQ QUESTIONS 1-9: 0

## 2018-08-01 ASSESSMENT — ANXIETY QUESTIONNAIRES: GAD7 TOTAL SCORE: 3

## 2018-08-01 NOTE — PROGRESS NOTES
Medicare Annual Wellness Visit  Name: Keshia Waters Date: 2018   MRN: [id number] Sex: Female   Age: 76 y.o. Ethnicity: Non-/Non    : 1950 Race: Anderson Gonzalez is here for Medicare AWV    Screenings for behavioral, psychosocial and functional/safety risks, and cognitive dysfunction are all negative except as indicated below. These results, as well as other patient data from the [address] form, are documented in Flowsheets linked to this Encounter. No Known Allergies    Prior to Visit Medications    Medication Sig Taking? Authorizing Provider   ONE TOUCH ULTRA TEST strip TEST TWICE DAILY Yes Dasha Hoff MD   insulin glargine (LANTUS) 100 UNIT/ML injection vial Inject 50 Units into the skin nightly Yes Kalina Lara MD   Insulin Syringe-Needle U-100 (B-D INS SYRINGE 0.5CC/31GX5/16) 31G X 5/16\" 0.5 ML MISC Use as directed Yes Kalina Lara MD   ipratropium-albuterol (DUONEB) 0.5-2.5 (3) MG/3ML SOLN nebulizer solution USE 3 ML VIA NEBULIZER THREE TIMES DAILY AS NEEDED FOR SHORTNESS OF BREATH Yes Dasha Hoff MD   metFORMIN (GLUCOPHAGE) 500 MG tablet Take 1 tablet by mouth 2 times daily (with meals) Yes Kalina Lara MD   metoprolol tartrate (LOPRESSOR) 25 MG tablet Take 1 tablet by mouth 2 times daily Yes Dasha Hoff MD   ONGLYZA 5 MG TABS tablet Take 1 tablet by mouth daily Yes Kalina Lara MD   gabapentin (NEURONTIN) 300 MG capsule Take 1 capsule by mouth 3 times daily for 181 days. Jhon Downey MD   pravastatin (PRAVACHOL) 80 MG tablet Take 1 tablet by mouth daily Yes Kalina Lara MD   oxybutynin (DITROPAN) 5 MG tablet Take 1 tablet by mouth 3 times daily Yes Kalina Lara MD   diltiazem (CARDIZEM CD) 240 MG extended release capsule TAKE 1 CAPSULE BY MOUTH ONCE DAILY FOR HIGH BLOOD PRESSURE Yes Kalina Lara MD losartan-hydrochlorothiazide (HYZAAR) 100-25 MG per tablet TAKE 1 TABLET BY MOUTH ONCE DAILY FOR HIGH BLOOD PRESSURE Yes Dasha Hoff MD   cloNIDine (CATAPRES) 0.2 MG tablet TAKE (1) TABLET BY MOUTH TWICE DAILY Yes Dasha Hoff MD   acyclovir (ZOVIRAX) 400 MG tablet Take 1 tablet by mouth daily Yes Milton Wise MD   vitamin B-12 (CYANOCOBALAMIN) 1000 MCG tablet Take 1 tablet by mouth daily Yes Milton Wise MD   vitamin D (ERGOCALCIFEROL) 41454 units CAPS capsule Take 1 capsule by mouth once a week Yes Dasha Hoff MD   montelukast (SINGULAIR) 10 MG tablet TAKE (1) TABLET BY MOUTH NIGHTLY Yes Dasha Hoff MD   PROAIR  (90 Base) MCG/ACT inhaler INHALE TWO (2) PUFFS BY MOUTH EVERY 6 HOURS AS NEEDED FOR WHEEZING AND FOR SHORTNESS OF BREATH Yes Dasha Hoff MD   glucose monitoring kit (FREESTYLE) monitoring kit 1 kit by Does not apply route daily Yes Milton Wise MD   Skin Protectants, Misc. (EUCERIN) cream Apply topically as needed. Yes Krysten Duffy,    hydrocortisone 2.5 % cream Apply topically 2 times daily.  Yes Lorenzo Middleton MD   Compression Stockings MISC by Does not apply route 20 mmHG - KNEE HIGH Yes Allen Kumar APRN - CNP   lidocaine (XYLOCAINE) 5 % ointment Apply topically TID Yes Milton Wise MD   Lancets MISC Testing once daily Yes Milton Wise MD   calcium carbonate-vitamin D (CALCIUM 600 + D) 600-400 MG-UNIT TABS per tab Take 1 tablet by mouth 2 times daily  Patient taking differently: Take 1 tablet by mouth 2 times daily Ld 12/18/2017 Yes Florencia Rubinstein Lemberger-Schor, MD   anastrozole (ARIMIDEX) 1 MG tablet Take 1 tablet by mouth daily Indications: Estrogen Deficiency in Breast Cancer Yes Milton Wise MD     Past Medical History:   Diagnosis Date    Arthritis     Asthma     Cancer ([facility] [address]     breast     Cerebral artery occlusion with cerebral infarction ([facility] [address] - General (Family Medicine)  Guadalupe Fargo DO Raheem as Surgeon (Urology)    Wt Readings from Last 3 Encounters:   08/01/18 244 lb 6.4 oz (110.9 kg)   07/20/18 247 lb (112 kg)   07/17/18 249 lb 3.2 oz (113 kg)     Vitals:    08/01/18 1007   BP: 124/70   Pulse: 70   Resp: 16   Temp: 98.9 °F (37.2 °C)   TempSrc: Oral   SpO2: 95%   Weight: 244 lb 6.4 oz (110.9 kg)   Height: 5' 5.5\" (1.664 m)         Patient's complete Health Risk Assessment and screening values have been reviewed and are found in Flowsheets. The following problems were reviewed today and where indicated follow up appointments were made and/or referrals ordered.     Positive Risk Factor Screenings with Interventions:     Fall Risk:  Timed Up and Go Test > 12 seconds?: no  2 or more falls in past year?: (!) yes  Fall with injury in past year?: (!) yes  Fall Risk Assessment[de-identified] (!) Positive Screening for Falls  Fall Risk Interventions:    · Home safety tips provided  · Neurology and Cardiology referral ordered for evaluation/treatment of syncope and collapse    Anxiety:  Anxiety Score: 3  Anxiety Screening: (!) Positive Screening for Anxiety  Anxiety Interventions:  · Patient declines any further evaluation/treatment for this issue    Substance Abuse:  Social History     Tobacco History     Smoking Status  Former Smoker Quit date  11/16/2001 Smoking Frequency  0.25 packs/day for 35 years (8.75 pk yrs)    Smokeless Tobacco Use  Never Used          Alcohol History     Alcohol Use Status  No Comment  (10/29/15):  <1 drink/week;occasional          Drug Use     Drug Use Status  Yes Types  Marijuana Comment  last use 03/2018          Sexual Activity     Sexually Active  No Comment  divorce               Audit Questionnaire: Screen for Alcohol Misuse  How often do you have a drink containing alcohol?: Monthly or less  How many standard drinks containing alcohol do you have on a typical day when drinking?: One or two (not daily)  How often do you have six or more drinks on one occasion?: Never  Audit-C Score: 1  During the past year, how often have you found that you were not able to stop drinking once you had started?: Never  During the past year, how often have you failed to do what was normally expected of you because of drinking?: Never  During the past year, how often have you needed a drink in the morning to get yourself going after a heavy drinking session?: Never  During the past year, how often have you had a feeling of guilt or remorse after drinking?: Never  During the past year, have you been unable to remember what happened the night before because you had been drinking?: Never  Have you or someone else been injured as a result of your drinking?: No  Has a relative or friend, doctor or health worker been concerned about your drinking or suggested you cut down?: No  Total Score: 1  Substance Abuse Interventions:  · None indicated    General Health:  General  In general, how would you say your health is?: Fair  In the past 7 days, have you experienced any of the following?: (!) Stress (son is ill. )  Do you get the social and emotional support that you need?: Yes  Do you have a Living Will?: (!) No  General Health Risk Interventions:  · Stress: patient declines any further evaluation/treatment for this issue  · No Living Will: additional information provided    Health Habits/Nutrition:  Health Habits/Nutrition  Do you exercise for at least 20 minutes 2-3 times per week?: (!) No  Have you lost any weight without trying in the past 3 months?: No  Do you eat fewer than 2 meals per day?: No  Have you seen a dentist within the past year?: (!) No  Body mass index is 40.05 kg/m².   Health Habits/Nutrition Interventions:  · Inadequate physical activity:  patient is not ready to increase his/her physical activity level at this time  · Dental exam overdue:  patient encouraged to make appointment with his/her dentist; she has dentures    Hearing/Vision:  Hearing/Vision  Do you or your family notice any trouble with your hearing?: (!) Yes (sometimes)  Do you have difficulty driving, watching TV, or doing any of your daily activities because of your eyesight?: (!) Yes  Have you had an eye exam within the past year?: (!) No  Hearing/Vision Interventions:  · Hearing concerns:  audiology referral provided  · Vision concerns:  ophthalmology/optometry referral provided    Safety:  Safety  Do you have working smoke detectors?: Yes  Have all throw rugs been removed or fastened?: (!) No  Do you have non-slip mats in all bathtubs?: Yes  Do all of your stairways have a railing or banister?: (!) No (n/a)  Are your doorways, halls and stairs free of clutter?: Yes  Do you always fasten your seatbelt when you are in a car?: (!) No  Safety Interventions:  · Home safety tips provided    Personalized Preventive Plan   Current Health Maintenance Status  Immunization History   Administered Date(s) Administered    DTaP 01/12/2010    Influenza Virus Vaccine 01/12/2010, 01/20/2012, 01/04/2013, 12/13/2013    Influenza, High Dose 11/18/2015, 10/17/2016, 09/20/2017    Pneumococcal Conjugate 7-valent 01/12/2010    Pneumococcal Polysaccharide ([id number]) 11/18/2015    Tdap (Boostrix, Adacel) 07/01/2017        Health Maintenance   Topic Date Due    Hepatitis C screen  1950    Shingles Vaccine (1 of 2 - 2 Dose Series) 06/22/2000    Pneumococcal low/med risk (2 of 2 - PCV13) 11/18/2016    Diabetic foot exam  03/15/2017    Diabetic retinal exam  07/22/2017    A1C test (Diabetic or Prediabetic)  08/23/2018    Flu vaccine (1) 09/01/2018    Lipid screen  01/31/2019    Diabetic microalbuminuria test  02/20/2019    Breast cancer screen  04/18/2019    Potassium monitoring  07/17/2019    Creatinine monitoring  07/17/2019    Colon cancer screen colonoscopy  01/08/2025    DTaP/Tdap/Td vaccine (3 - Td) 07/01/2027    DEXA (modify frequency per FRAX score)  Addressed     Recommendations for Preventive Services

## 2018-08-01 NOTE — PATIENT INSTRUCTIONS
Patient Education        Preventing Falls: Care Instructions  Your Care Instructions    Getting around your home safely can be a challenge if you have injuries or health problems that make it easy for you to fall. Loose rugs and furniture in walkways are among the dangers for many older people who have problems walking or who have poor eyesight. People who have conditions such as arthritis, osteoporosis, or dementia also have to be careful not to fall. You can make your home safer with a few simple measures. Follow-up care is a key part of your treatment and safety. Be sure to make and go to all appointments, and call your doctor if you are having problems. It's also a good idea to know your test results and keep a list of the medicines you take. How can you care for yourself at home? Taking care of yourself  · You may get dizzy if you do not drink enough water. To prevent dehydration, drink plenty of fluids, enough so that your urine is light yellow or clear like water. Choose water and other caffeine-free clear liquids. If you have kidney, heart, or liver disease and have to limit fluids, talk with your doctor before you increase the amount of fluids you drink. · Exercise regularly to improve your strength, muscle tone, and balance. Walk if you can. Swimming may be a good choice if you cannot walk easily. · Have your vision and hearing checked each year or any time you notice a change. If you have trouble seeing and hearing, you might not be able to avoid objects and could lose your balance. · Know the side effects of the medicines you take. Ask your doctor or pharmacist whether the medicines you take can affect your balance. Sleeping pills or sedatives can affect your balance. · Limit the amount of alcohol you drink. Alcohol can impair your balance and other senses. · Ask your doctor whether calluses or corns on your feet need to be removed.  If you wear loose-fitting shoes because of calluses or corns, you are in the hospital, your risk of falling may be higher than normal. This could be for many reasons. Your medicines may make you dizzy. Or you may be weak and confused from illness, surgery, or treatments. This could make it hard to get out of bed. And things like crutches, bandages, casts, or braces can affect how well you can walk. If you and your family know that you have a risk of falling, you can plan ahead. Talk to doctors and nurses about helping you avoid falls. Ask your doctor if working with a physical or occupational therapist would help you prevent a fall. Don't be afraid to ask for help, even with small things. If you or a family member or friend sees something that is not safe, tell the hospital staff. Follow-up care is a key part of your treatment and safety. Be sure to make and go to all appointments, and call your doctor if you are having problems. It's also a good idea to know your test results and keep a list of the medicines you take. How can you help prevent a fall? Things you can do  · When you go to the hospital, bring nonskid socks, slippers, or shoes that stay on your feet. If you do not have these, ask the nurse for a pair of nonskid socks. · If you use a walker or cane at home, bring it with you. Or ask the hospital to provide one during your stay. · Ask your doctor or nurse if your treatments or medicines will increase your risk of a fall. Some hospitals will check your risk when you are admitted to the hospital.  · Tell your doctor or nurse if medicines make you dizzy, weak, or lightheaded. If you feel this way, do not try to get up on your own. Call the nurse for help. · If you are on crutches or have other problems that make it hard to walk, call the nurse for help getting out of bed. Do not try to do it on your own until you feel sure you are able. · When you get out of bed, sit up first and count to 10 before you stand up.   · If you wear eyeglasses, put them on before you professionals who may not know you as well might have to make decisions for you. In some cases, a  makes the decisions. When you name a health care agent, it is very clear who has the power to make health decisions for you. How do you name a health care agent? You name your health care agent on a legal form. It is usually called a durable power of  for health care. Ask your hospital, state bar association, or office on aging where to find these forms. You must sign the form to make it legal. Some states require you to get the form notarized. This means that a person called a  watches you sign the form and then he or she signs the form. Some states also require that two or more witnesses sign the form. Be sure to tell your family members and doctors who your health care agent is. Keep your forms in a safe place. But make sure that your loved ones know where the forms are. This could be in your desk where you keep other important papers. Make sure your doctor has a copy of your forms. Where can you learn more? Go to [email or web address].[email or web address] org and sign in to your [employer] account. Enter [id number] in the [employer] box to learn more about \"Learning About Durable Power of  for Health Care. \"     If you do not have an account, please click on the \"Sign Up Now\" link. Current as of: October 6, 2017  Content Version: 11.6  © 4165-1277 [employer], [employer]. Care instructions adapted under license by [facility] [place] ([facility]). If you have questions about a medical condition or this instruction, always ask your healthcare professional. [address] any warranty or liability for your use of this information. Learning About Living Diann Reed  What is a living will? A living will is a legal form you use to write down the kind of care you want at the end of your life.  It is used by the health professionals who will treat you if you aren't able to will online so the doctors and nurses who need to treat you can find it right away. What should you think about when creating a living will? Talk about your end-of-life wishes with your family members and your doctor. Let them know what you want. That way the people making decisions for you won't be surprised by your choices. Think about these questions as you make your living will:  · Do you know enough about life support methods that might be used? If not, talk to your doctor so you know what might be done if you can't breathe on your own, your heart stops, or you're unable to swallow. · What things would you still want to be able to do after you receive life-support methods? Would you want to be able to walk? To speak? To eat on your own? To live without the help of machines? · If you have a choice, where do you want to be cared for? In your home? At a hospital or nursing home? · Do you want certain Worship practices performed if you become very ill? · If you have a choice at the end of your life, where would you prefer to die? At home? In a hospital or nursing home? Somewhere else? · Would you prefer to be buried or cremated? · Do you want your organs to be donated after you die? What should you do with your living will? · Make sure that your family members and your health care agent have copies of your living will. · Give your doctor a copy of your living will to keep in your medical record. If you have more than one doctor, make sure that each one has a copy. · You may want to put a copy of your living will where it can be easily found. Where can you learn more? Go to https://[email or web address] Calendarpeaddisoneweb.[email or web address] org and sign in to your [email or web address] account. Enter A433 in the [employer] box to learn more about \"Learning About Living Deidre Byers. \"     If you do not have an account, please click on the \"Sign Up Now\" link.   Current as of: October 6, 2017  Content Version: 11.6  © 3485-3061 Healthwise, sweating, drying off with a towel, or swimming. · Always wear a seat belt when traveling in a car. Always wear a helmet when riding a bicycle or motorcycle.   · Referral to cardiology and neurology placed for evaluation of multiple episodes of syncope and collapse  · STRONGLY RECOMMEND NO DRIVING UNTIL CONDITION HAS BEEN DIAGNOSED

## 2018-08-08 ENCOUNTER — OFFICE VISIT (OUTPATIENT)
Dept: CARDIOLOGY CLINIC | Age: 68
End: 2018-08-08
Payer: MEDICARE

## 2018-08-08 VITALS
HEIGHT: 66 IN | WEIGHT: 242.8 LBS | RESPIRATION RATE: 20 BRPM | HEART RATE: 78 BPM | BODY MASS INDEX: 39.02 KG/M2 | SYSTOLIC BLOOD PRESSURE: 144 MMHG | DIASTOLIC BLOOD PRESSURE: 82 MMHG

## 2018-08-08 DIAGNOSIS — R06.02 SOBOE (SHORTNESS OF BREATH ON EXERTION): ICD-10-CM

## 2018-08-08 DIAGNOSIS — Z86.73 HISTORY OF CVA (CEREBROVASCULAR ACCIDENT): ICD-10-CM

## 2018-08-08 DIAGNOSIS — I36.1 NON-RHEUMATIC TRICUSPID VALVE INSUFFICIENCY: ICD-10-CM

## 2018-08-08 DIAGNOSIS — M16.10 PRIMARY OSTEOARTHRITIS OF HIP, UNSPECIFIED LATERALITY: ICD-10-CM

## 2018-08-08 DIAGNOSIS — R55 SYNCOPE AND COLLAPSE: Primary | ICD-10-CM

## 2018-08-08 DIAGNOSIS — M79.605 LOW BACK PAIN RADIATING TO BOTH LEGS: ICD-10-CM

## 2018-08-08 DIAGNOSIS — I10 ESSENTIAL HYPERTENSION: ICD-10-CM

## 2018-08-08 DIAGNOSIS — R60.0 EDEMA OF BOTH FEET: ICD-10-CM

## 2018-08-08 DIAGNOSIS — M54.50 LOW BACK PAIN RADIATING TO BOTH LEGS: ICD-10-CM

## 2018-08-08 DIAGNOSIS — E66.9 NON MORBID OBESITY: ICD-10-CM

## 2018-08-08 DIAGNOSIS — M79.604 LOW BACK PAIN RADIATING TO BOTH LEGS: ICD-10-CM

## 2018-08-08 PROCEDURE — 1101F PT FALLS ASSESS-DOCD LE1/YR: CPT | Performed by: INTERNAL MEDICINE

## 2018-08-08 PROCEDURE — 4040F PNEUMOC VAC/ADMIN/RCVD: CPT | Performed by: INTERNAL MEDICINE

## 2018-08-08 PROCEDURE — 3017F COLORECTAL CA SCREEN DOC REV: CPT | Performed by: INTERNAL MEDICINE

## 2018-08-08 PROCEDURE — 1111F DSCHRG MED/CURRENT MED MERGE: CPT | Performed by: INTERNAL MEDICINE

## 2018-08-08 PROCEDURE — G8399 PT W/DXA RESULTS DOCUMENT: HCPCS | Performed by: INTERNAL MEDICINE

## 2018-08-08 PROCEDURE — 3046F HEMOGLOBIN A1C LEVEL >9.0%: CPT | Performed by: INTERNAL MEDICINE

## 2018-08-08 PROCEDURE — 2022F DILAT RTA XM EVC RTNOPTHY: CPT | Performed by: INTERNAL MEDICINE

## 2018-08-08 PROCEDURE — 93000 ELECTROCARDIOGRAM COMPLETE: CPT | Performed by: INTERNAL MEDICINE

## 2018-08-08 PROCEDURE — 1036F TOBACCO NON-USER: CPT | Performed by: INTERNAL MEDICINE

## 2018-08-08 PROCEDURE — G8427 DOCREV CUR MEDS BY ELIG CLIN: HCPCS | Performed by: INTERNAL MEDICINE

## 2018-08-08 PROCEDURE — 1090F PRES/ABSN URINE INCON ASSESS: CPT | Performed by: INTERNAL MEDICINE

## 2018-08-08 PROCEDURE — G8417 CALC BMI ABV UP PARAM F/U: HCPCS | Performed by: INTERNAL MEDICINE

## 2018-08-08 PROCEDURE — 1123F ACP DISCUSS/DSCN MKR DOCD: CPT | Performed by: INTERNAL MEDICINE

## 2018-08-08 PROCEDURE — G8598 ASA/ANTIPLAT THER USED: HCPCS | Performed by: INTERNAL MEDICINE

## 2018-08-08 PROCEDURE — 99204 OFFICE O/P NEW MOD 45 MIN: CPT | Performed by: INTERNAL MEDICINE

## 2018-08-08 NOTE — PROGRESS NOTES
syncope.    Active at home   No orthopnea   Try to watch diet   Compliant with all medications       Past Medical History:   Diagnosis Date    Arthritis     Asthma     Cancer ([facility] [address]     breast     Cerebral artery occlusion with cerebral infarction ([facility] [address]     denies deficits    Cerebrovascular disease 6/09    no residual    CHF (congestive heart failure) (HCC) approx 3 years ago    Claustrophobia     Headache(784.0)     History of blood transfusion     with knee surgery    Hyperlipidemia     Hypertension     LBP radiating to both legs     Lymphedema of both lower extremities 11/12/2015    Neuromuscular disorder (HCC)     Obesity     Recurrent genital HSV (herpes simplex virus) infection     last outbreak 11/2017    S/P breast biopsy, right 07/2016    pt states breast cancer    Type II or unspecified type diabetes mellitus without mention of complication, not stated as uncontrolled     AA1c8.7 9/10            Past Surgical History:   Procedure Laterality Date    ARTHROPLASTY Left 07/29/2016    thumb basil joint with dequervain's release    BREAST LUMPECTOMY  years ago    benign    BREAST LUMPECTOMY Right 09/06/2016    COLONOSCOPY  2005    COLONOSCOPY  1/8/15    Dr. Sajan Mcc[facility]lland - [facility]    ECHO COMPL W DOP COLOR FLOW  6/7/2012         FINGER SURGERY Left 07/29/2016    thumb    HAND SURGERY  12/2017    HYSTERECTOMY      JOINT REPLACEMENT      OTHER SURGICAL HISTORY Right 12/20/2017    RIGHT THUMB TRAPEZIECTOMY WITH LIGAMENT RECONSRUCTION DEQUERVAINS RELEASE    TIM AND BSO      TOTAL HIP ARTHROPLASTY Bilateral     2000, 2013 dr Savi Tran, dr Ayaka Andino Bilateral 2013    dr Yoselin Will          Family History   Problem Relation Age of Onset    Diabetes Mother     High Blood Pressure Mother     High Blood Pressure Father     Diabetes Father     Breast Cancer Sister     Stroke Sister         young age   [facility] Cancer Sister 55        breast    Sickle Cell Anemia Other         niece   [facility] Breast Cancer Sister             Cancer Sister 59        breast & ovarian    Stomach Cancer Other         aunt          Social History   Substance Use Topics    Smoking status: Former Smoker     Packs/day: 0.25     Years: 35.00     Quit date: 2001    Smokeless tobacco: Never Used    Alcohol use 0.0 oz/week      Comment: (10/29/15):  <1 drink/week;occasional/ decaf-coffee 2 cups daily         Review of Systems:  HEENT: negative for acute visual symptoms or auditory problems, no dysphagia  Constitutional: negative for fever and chills, or significant weight loss  Respiratory: negative for cough, wheezing, or hemoptysis  Cardiovascular: negative for chest pain, palpitations, and +ve dyspnea, mild edema,  Gastrointestinal: negative for abdominal pain, diarrhea, nausea and vomiting  Endocrine: Negative for polyuria and polydyspsia  Genitourinary:negative for dysuria and hematuria  Derm: negative for rash and skin lesion(s)  Neurological: negative for tingling, numbness, weakness, seizures and tremors  Endocrine: negative for polydipsia and polyuria  Musculoskeletal: +ve low back pain  Psychiatric: negative for anxiety, depression, or suicidal ideations         O:  COMPLETE PHYSICAL EXAM:       BP (!) 144/82   Pulse 78   Resp 20   Ht 5' 5.5\" (1.664 m)   Wt 242 lb 12.8 oz (110.1 kg)   BMI 39.79 kg/m²       General:   Patient alert, comfortable, no distress. Appears stated age. HEENT:    Pupils equal, no icterus, no nasal drainage, tongue moist.   Neck:              No masses, Thyroid not palpable. Chest:   Normal configuration, non tender. Lungs:   Clear to auscultation bilaterally, few scattered rhonchi. Cardiovascular:  Regular rhythm, 1/6 systolic murmur, No S3, no palpable thrills, No elevated JVD, No carotid bruit. Abdomen:  Soft, Non tender, Bowel sounds normal, no pulsatile abdominal aorta, no palpable masses. Extremities:  + edema. Distal pulses palpable.  No cyanosis, no clubbing. Skin:   Good turgor, warm and dry, no cyanosis. Musculoskeletal: No joint swelling or deformity. Neuro:   Cranial nerves grossly intact; No focal neurologic deficit. Psych:   Alert, good mood and effect. REVIEW OF DIAGNOSTIC TESTS:        Electrocardiogram: NSR, Normal QTc   2D Echo: 2012   Stress Test: 2014               A/P:   ASSESSMENT / PLAN:    Yuliet Singh was seen today for loss of consciousness. Diagnoses and all orders for this visit:    Syncope and collapse: 2 episodes in 1 month July 2018; -Possible orthostasis/hypoglycemia/Seizures  -     EKG 12 Lead  -     ECHO Complete 2D W Doppler W Color; Future  -     Stress test, lexiscan; Future  -     Cardiac event monitor    SOBOE (shortness of breath on exertion): NO acute CHF  -     ECHO Complete 2D W Doppler W Color; Future  -     Stress test, lexiscan; Future    Essential hypertension: Stable  -     ECHO Complete 2D W Doppler W Color; Future  -     Stress test, lexiscan; Future    History of CVA (cerebrovascular accident): 10 yrs ago    Non morbid obesity: Diet, exercise and weight loss discussed. Mild Asthma    Primary osteoarthritis of hip, unspecified laterality    Insulin dependent diabetes mellitus (HCC)    Low back pain radiating to both legs    Edema of both feet: elevate feet, decrease salt    Non-rheumatic tricuspid valve insufficiency: Echo 2012     S/p bilateral hip surgery and left knee surgery    Preventive Cardiology: Low cholesterol diet, regular exercise as tolerate, and gradual weight loss discussed. Monitor BP and heart rates. All questions answered about cardiac diagnoses and cardiac medications. Continue current medications. Compliance with medications and f/u with all physicians discussed. Risk factor modification based on risk profile discussed. Call if any exertional chest pain, short of breath, dizzy or palpitations   Follow up in 4 weeks or earlier if needed.          [facility]

## 2018-08-09 ENCOUNTER — TELEPHONE (OUTPATIENT)
Dept: CARDIOLOGY CLINIC | Age: 68
End: 2018-08-09

## 2018-08-09 DIAGNOSIS — R55 SYNCOPE AND COLLAPSE: Primary | ICD-10-CM

## 2018-08-09 NOTE — TELEPHONE ENCOUNTER
Called patient to set up 1 day holter 1 4 day event   Left voice mail to return my call. She called me back and will try to come into the office today.   She has a class for CPR and is not sure how long it will last today and will call us if she can not come into the office

## 2018-08-10 ENCOUNTER — NURSE ONLY (OUTPATIENT)
Dept: CARDIOLOGY CLINIC | Age: 68
End: 2018-08-10

## 2018-08-14 ENCOUNTER — APPOINTMENT (OUTPATIENT)
Dept: CT IMAGING | Age: 68
End: 2018-08-14
Payer: MEDICARE

## 2018-08-14 ENCOUNTER — APPOINTMENT (OUTPATIENT)
Dept: GENERAL RADIOLOGY | Age: 68
End: 2018-08-14
Payer: MEDICARE

## 2018-08-14 ENCOUNTER — HOSPITAL ENCOUNTER (EMERGENCY)
Age: 68
Discharge: HOME | End: 2018-08-14
Attending: EMERGENCY MEDICINE
Payer: MEDICARE

## 2018-08-14 VITALS
SYSTOLIC BLOOD PRESSURE: 155 MMHG | HEIGHT: 65 IN | BODY MASS INDEX: 40.32 KG/M2 | OXYGEN SATURATION: 97 % | TEMPERATURE: 99 F | HEART RATE: 77 BPM | WEIGHT: 242 LBS | RESPIRATION RATE: 18 BRPM | DIASTOLIC BLOOD PRESSURE: 60 MMHG

## 2018-08-14 DIAGNOSIS — M54.50 ACUTE BILATERAL LOW BACK PAIN WITHOUT SCIATICA: ICD-10-CM

## 2018-08-14 DIAGNOSIS — M62.838 SPASM OF MUSCLE: Primary | ICD-10-CM

## 2018-08-14 LAB
ALBUMIN SERPL-MCNC: 3.8 G/DL (ref 3.5–5.2)
ALP BLD-CCNC: 170 U/L (ref 35–104)
ALT SERPL-CCNC: 9 U/L (ref 0–32)
ANION GAP SERPL CALCULATED.3IONS-SCNC: 11 MMOL/L (ref 7–16)
AST SERPL-CCNC: 11 U/L (ref 0–31)
BASOPHILS ABSOLUTE: 0.04 E9/L (ref 0–0.2)
BASOPHILS RELATIVE PERCENT: 0.4 % (ref 0–2)
BILIRUB SERPL-MCNC: 0.3 MG/DL (ref 0–1.2)
BUN BLDV-MCNC: 8 MG/DL (ref 8–23)
CALCIUM SERPL-MCNC: 9.4 MG/DL (ref 8.6–10.2)
CHLORIDE BLD-SCNC: 98 MMOL/L (ref 98–107)
CO2: 30 MMOL/L (ref 22–29)
CREAT SERPL-MCNC: 0.7 MG/DL (ref 0.5–1)
EKG ATRIAL RATE: 77 BPM
EKG P AXIS: 25 DEGREES
EKG P-R INTERVAL: 132 MS
EKG Q-T INTERVAL: 428 MS
EKG QRS DURATION: 144 MS
EKG QTC CALCULATION (BAZETT): 484 MS
EKG R AXIS: -33 DEGREES
EKG T AXIS: 125 DEGREES
EKG VENTRICULAR RATE: 77 BPM
EOSINOPHILS ABSOLUTE: 0 E9/L (ref 0.05–0.5)
EOSINOPHILS RELATIVE PERCENT: 0 % (ref 0–6)
GFR AFRICAN AMERICAN: >60
GFR NON-AFRICAN AMERICAN: >60 ML/MIN/1.73
GLUCOSE BLD-MCNC: 209 MG/DL (ref 74–109)
HCT VFR BLD CALC: 36.7 % (ref 34–48)
HEMOGLOBIN: 11.6 G/DL (ref 11.5–15.5)
IMMATURE GRANULOCYTES #: 0.06 E9/L
IMMATURE GRANULOCYTES %: 0.6 % (ref 0–5)
LYMPHOCYTES ABSOLUTE: 1.66 E9/L (ref 1.5–4)
LYMPHOCYTES RELATIVE PERCENT: 16.5 % (ref 20–42)
MCH RBC QN AUTO: 27.3 PG (ref 26–35)
MCHC RBC AUTO-ENTMCNC: 31.6 % (ref 32–34.5)
MCV RBC AUTO: 86.4 FL (ref 80–99.9)
MONOCYTES ABSOLUTE: 1.21 E9/L (ref 0.1–0.95)
MONOCYTES RELATIVE PERCENT: 12 % (ref 2–12)
NEUTROPHILS ABSOLUTE: 7.11 E9/L (ref 1.8–7.3)
NEUTROPHILS RELATIVE PERCENT: 70.5 % (ref 43–80)
PDW BLD-RTO: 14.1 FL (ref 11.5–15)
PLATELET # BLD: 346 E9/L (ref 130–450)
PMV BLD AUTO: 11 FL (ref 7–12)
POTASSIUM SERPL-SCNC: 3.8 MMOL/L (ref 3.5–5)
RBC # BLD: 4.25 E12/L (ref 3.5–5.5)
SODIUM BLD-SCNC: 139 MMOL/L (ref 132–146)
TOTAL PROTEIN: 7.4 G/DL (ref 6.4–8.3)
TROPONIN: <0.01 NG/ML (ref 0–0.03)
WBC # BLD: 10.1 E9/L (ref 4.5–11.5)

## 2018-08-14 PROCEDURE — 71275 CT ANGIOGRAPHY CHEST: CPT

## 2018-08-14 PROCEDURE — 96374 THER/PROPH/DIAG INJ IV PUSH: CPT

## 2018-08-14 PROCEDURE — 80053 COMPREHEN METABOLIC PANEL: CPT

## 2018-08-14 PROCEDURE — 84484 ASSAY OF TROPONIN QUANT: CPT

## 2018-08-14 PROCEDURE — 99284 EMERGENCY DEPT VISIT MOD MDM: CPT

## 2018-08-14 PROCEDURE — 6360000002 HC RX W HCPCS

## 2018-08-14 PROCEDURE — 96375 TX/PRO/DX INJ NEW DRUG ADDON: CPT

## 2018-08-14 PROCEDURE — 6360000004 HC RX CONTRAST MEDICATION: Performed by: RADIOLOGY

## 2018-08-14 PROCEDURE — 93005 ELECTROCARDIOGRAM TRACING: CPT | Performed by: EMERGENCY MEDICINE

## 2018-08-14 PROCEDURE — 2580000003 HC RX 258: Performed by: EMERGENCY MEDICINE

## 2018-08-14 PROCEDURE — 6360000002 HC RX W HCPCS: Performed by: EMERGENCY MEDICINE

## 2018-08-14 PROCEDURE — 71045 X-RAY EXAM CHEST 1 VIEW: CPT

## 2018-08-14 PROCEDURE — 85025 COMPLETE CBC W/AUTO DIFF WBC: CPT

## 2018-08-14 RX ORDER — ONDANSETRON 2 MG/ML
4 INJECTION INTRAMUSCULAR; INTRAVENOUS
Status: COMPLETED | OUTPATIENT
Start: 2018-08-14 | End: 2018-08-14

## 2018-08-14 RX ORDER — SODIUM CHLORIDE 0.9 % (FLUSH) 0.9 %
10 SYRINGE (ML) INJECTION PRN
Status: DISCONTINUED | OUTPATIENT
Start: 2018-08-14 | End: 2018-08-14 | Stop reason: HOSPADM

## 2018-08-14 RX ORDER — MORPHINE SULFATE 4 MG/ML
INJECTION, SOLUTION INTRAMUSCULAR; INTRAVENOUS
Status: COMPLETED
Start: 2018-08-14 | End: 2018-08-14

## 2018-08-14 RX ORDER — IBUPROFEN 400 MG/1
400 TABLET ORAL EVERY 6 HOURS PRN
Qty: 60 TABLET | Refills: 0 | Status: SHIPPED | OUTPATIENT
Start: 2018-08-14 | End: 2018-08-14 | Stop reason: SDUPTHER

## 2018-08-14 RX ORDER — ACETAMINOPHEN 500 MG
500 TABLET ORAL EVERY 6 HOURS PRN
Qty: 30 TABLET | Refills: 3 | Status: SHIPPED | OUTPATIENT
Start: 2018-08-14

## 2018-08-14 RX ORDER — MORPHINE SULFATE 8 MG/ML
8 INJECTION, SOLUTION INTRAMUSCULAR; INTRAVENOUS ONCE
Status: DISCONTINUED | OUTPATIENT
Start: 2018-08-14 | End: 2018-08-14 | Stop reason: HOSPADM

## 2018-08-14 RX ADMIN — ONDANSETRON HYDROCHLORIDE 4 MG: 2 INJECTION, SOLUTION INTRAMUSCULAR; INTRAVENOUS at 01:23

## 2018-08-14 RX ADMIN — MORPHINE SULFATE 8 MG: 4 INJECTION, SOLUTION INTRAMUSCULAR; INTRAVENOUS at 01:23

## 2018-08-14 RX ADMIN — Medication 10 ML: at 01:23

## 2018-08-14 RX ADMIN — IOPAMIDOL 90 ML: 755 INJECTION, SOLUTION INTRAVENOUS at 02:02

## 2018-08-14 ASSESSMENT — ENCOUNTER SYMPTOMS
NAUSEA: 0
BACK PAIN: 1
SORE THROAT: 0
DIARRHEA: 0
RHINORRHEA: 0
COLOR CHANGE: 0
BLOOD IN STOOL: 0
VOMITING: 0
COUGH: 0
SHORTNESS OF BREATH: 1
BOWEL INCONTINENCE: 0
EYE PAIN: 0
ABDOMINAL SWELLING: 0
CHEST TIGHTNESS: 0
ABDOMINAL PAIN: 0

## 2018-08-14 ASSESSMENT — PAIN SCALES - GENERAL
PAINLEVEL_OUTOF10: 6
PAINLEVEL_OUTOF10: 10

## 2018-08-14 ASSESSMENT — PAIN DESCRIPTION - LOCATION: LOCATION: BACK

## 2018-08-14 ASSESSMENT — PAIN DESCRIPTION - DESCRIPTORS: DESCRIPTORS: PATIENT UNABLE TO DESCRIBE

## 2018-08-14 ASSESSMENT — PAIN DESCRIPTION - FREQUENCY: FREQUENCY: INTERMITTENT

## 2018-08-14 ASSESSMENT — PAIN DESCRIPTION - PAIN TYPE: TYPE: ACUTE PAIN

## 2018-08-14 NOTE — ED PROVIDER NOTES
Review of Systems   Constitutional: Negative for chills, fever and weight loss. HENT: Negative for ear pain, rhinorrhea and sore throat. Eyes: Negative for pain and visual disturbance. Respiratory: Positive for shortness of breath. Negative for cough and chest tightness. Cardiovascular: Negative for chest pain, palpitations and leg swelling. Gastrointestinal: Negative for abdominal pain, blood in stool, bowel incontinence, diarrhea, nausea and vomiting. Genitourinary: Negative for bladder incontinence, dysuria, flank pain, frequency, menstrual problem, pelvic pain, vaginal bleeding and vaginal discharge. Musculoskeletal: Positive for back pain. Negative for joint swelling, neck pain and neck stiffness. Skin: Negative for color change, rash and wound. Neurological: Negative for dizziness, tingling, syncope, weakness, light-headedness, numbness, headaches and paresthesias. Physical Exam   Constitutional: She is oriented to person, place, and time. She appears well-developed and well-nourished. No distress. HENT:   Head: Normocephalic and atraumatic. Mouth/Throat: Oropharynx is clear and moist.   Eyes: Pupils are equal, round, and reactive to light. EOM are normal. No scleral icterus. Neck: Normal range of motion. Neck supple. No JVD present. Cardiovascular: Normal rate, regular rhythm, S1 normal, S2 normal and normal heart sounds. No murmur heard. Pulses:       Radial pulses are 2+ on the right side, and 2+ on the left side. Dorsalis pedis pulses are 2+ on the right side, and 2+ on the left side. Pulmonary/Chest: Effort normal and breath sounds normal. No accessory muscle usage. No respiratory distress. She has no wheezes. She has no rhonchi. She has no rales. She exhibits no tenderness. Abdominal: Soft. Normal appearance and bowel sounds are normal. She exhibits no distension. There is no tenderness. Musculoskeletal: Normal range of motion.  She exhibits edema Troponin   Result Value Ref Range    Troponin <0.01 0.00 - 0.03 ng/mL   EKG 12 Lead   Result Value Ref Range    Ventricular Rate 77 BPM    Atrial Rate 77 BPM    P-R Interval 132 ms    QRS Duration 144 ms    Q-T Interval 428 ms    QTc Calculation (Bazett) 484 ms    P Axis 25 degrees    R Axis -33 degrees    T Axis 125 degrees       Radiology  CTA CHEST W CONTRAST   Final Result     No pulmonary embolism identified. This report has been electronically signed by Debrah Apley MD.      XR CHEST PORTABLE    (Results Pending)       EKG: This EKG is signed and interpreted by me. Rate: 77  Rhythm: Sinus  Interpretation: Sinus rhythm, left bundle branch block  Comparison: stable as compared to patient's most recent EKG    ------------------------- NURSING NOTES AND VITALS REVIEWED ---------------------------  Date / Time Roomed:  8/14/2018 12:14 AM  ED Bed Assignment:  04/04    The nursing notes within the ED encounter and vital signs as below have been reviewed. Patient Vitals for the past 24 hrs:   BP Temp Temp src Pulse Resp SpO2 Height Weight   08/14/18 0346 (!) 155/60 - - - - - - -   08/14/18 0052 (!) 187/71 99 °F (37.2 °C) Oral 77 18 97 % - -   08/14/18 0032 - - - - - - 5' 5\" (1.651 m) 242 lb (109.8 kg)       Oxygen Saturation Interpretation: Normal    ------------------------------------------ PROGRESS NOTES ------------------------------------------  Re-evaluation(s):  1:15 AM: Attending updated. Case and plan discussed. 1:58 AM: Reassessed, significant improvement in her pain following medication. 3:31 AM: Reassessed, reports complete resolution of symptoms, discussed results and plan for outpatient management, agrees with plan.    --------------------------------------- ED Clinical Course/MDM --------------------------------------    Patient is a 61-year-old female presenting with a one-day history of back pain associated shortness of breath secondary to limited inspiration from pain.  Patient has a

## 2018-08-14 NOTE — ED PROVIDER NOTES
HPI:  8/14/18,   Time: 12:35 AM         Mark Ortiz is a 76 y.o. female presenting to the ED for constipation, beginning 2 days ago. The complaint has been constant, moderate in severity, and worsened by nothing. Gradual onset of nonprogressive abdominal discomfort diffuse and also patient states she's not been able to urinate for the last few hours. Does have history of similar episodes. Denies vomiting and also states is being treated for a kidney stone at this time    ROS:   Pertinent positives and negatives are stated within HPI, all other systems reviewed and are negative.  --------------------------------------------- PAST HISTORY ---------------------------------------------  Past Medical History:  has a past medical history of Arthritis; Asthma; Cancer ([address] Cerebral artery occlusion with cerebral infarction ([address] Cerebrovascular disease; CHF (congestive heart failure) ([address] Claustrophobia; Headache(784.0); History of blood transfusion; Hyperlipidemia; Hypertension; LBP radiating to both legs; Lymphedema of both lower extremities; Neuromuscular disorder ([address] Obesity; Recurrent genital HSV (herpes simplex virus) infection; S/P breast biopsy, right; and Type II or unspecified type diabetes mellitus without mention of complication, not stated as uncontrolled. Past Surgical History:  has a past surgical history that includes Total hip arthroplasty (Bilateral); vin and bso (cervix removed); ECHO Compl W Dop Color Flow (6/7/2012); Total knee arthroplasty (Bilateral, 2013); Colonoscopy (2005); Hysterectomy; Colonoscopy (1/8/15); Breast lumpectomy (years ago); arthroplasty (Left, 07/29/2016); Finger surgery (Left, 07/29/2016); joint replacement; Breast lumpectomy (Right, 09/06/2016); other surgical history (Right, 12/20/2017); and Hand surgery (12/2017). Social History:  reports that she quit smoking about 16 years ago. She has a 8.75 pack-year smoking history.  She has never used smokeless tobacco. She reports that she drinks alcohol. She reports that she uses drugs, including Marijuana. Family History: family history includes Breast Cancer in her sister and sister; Cancer (age of onset: 55) in her sister; Cancer (age of onset: 59) in her sister; Diabetes in her father and mother; High Blood Pressure in her father and mother; Sickle Cell Anemia in an other family member; Stomach Cancer in an other family member; Stroke in her sister. The patients home medications have been reviewed. Allergies: Patient has no known allergies.     -------------------------------------------------- RESULTS -------------------------------------------------  All laboratory and radiology results have been personally reviewed by myself   LABS:  Results for orders placed or performed during the hospital encounter of 08/14/18   CBC Auto Differential   Result Value Ref Range    WBC 10.1 4.5 - 11.5 E9/L    RBC 4.25 3.50 - 5.50 E12/L    Hemoglobin 11.6 11.5 - 15.5 g/dL    Hematocrit 36.7 34.0 - 48.0 %    MCV 86.4 80.0 - 99.9 fL    MCH 27.3 26.0 - 35.0 pg    MCHC 31.6 (L) 32.0 - 34.5 %    RDW 14.1 11.5 - 15.0 fL    Platelets 742 893 - 804 E9/L    MPV 11.0 7.0 - 12.0 fL    Neutrophils % 70.5 43.0 - 80.0 %    Immature Granulocytes % 0.6 0.0 - 5.0 %    Lymphocytes % 16.5 (L) 20.0 - 42.0 %    Monocytes % 12.0 2.0 - 12.0 %    Eosinophils % 0.0 0.0 - 6.0 %    Basophils % 0.4 0.0 - 2.0 %    Neutrophils # 7.11 1.80 - 7.30 E9/L    Immature Granulocytes # 0.06 E9/L    Lymphocytes # 1.66 1.50 - 4.00 E9/L    Monocytes # 1.21 (H) 0.10 - 0.95 E9/L    Eosinophils # 0.00 (L) 0.05 - 0.50 E9/L    Basophils # 0.04 0.00 - 0.20 E9/L   Comprehensive Metabolic Panel   Result Value Ref Range    Sodium 139 132 - 146 mmol/L    Potassium 3.8 3.5 - 5.0 mmol/L    Chloride 98 98 - 107 mmol/L    CO2 30 (H) 22 - 29 mmol/L    Anion Gap 11 7 - 16 mmol/L    Glucose 209 (H) 74 - 109 mg/dL    BUN 8 8 - 23 mg/dL    CREATININE 0.7 0.5 - 1.0 mg/dL    GFR Non- American >60 >=60 mL/min/1.73    GFR African American >60     Calcium 9.4 8.6 - 10.2 mg/dL    Total Protein 7.4 6.4 - 8.3 g/dL    Alb 3.8 3.5 - 5.2 g/dL    Total Bilirubin 0.3 0.0 - 1.2 mg/dL    Alkaline Phosphatase 170 (H) 35 - 104 U/L    ALT 9 0 - 32 U/L    AST 11 0 - 31 U/L   Troponin   Result Value Ref Range    Troponin <0.01 0.00 - 0.03 ng/mL   EKG 12 Lead   Result Value Ref Range    Ventricular Rate 77 BPM    Atrial Rate 77 BPM    P-R Interval 132 ms    QRS Duration 144 ms    Q-T Interval 428 ms    QTc Calculation (Bazett) 484 ms    P Axis 25 degrees    R Axis -33 degrees    T Axis 125 degrees       RADIOLOGY:  Interpreted by Radiologist.  CTA CHEST W CONTRAST   Final Result     No pulmonary embolism identified. This report has been electronically signed by Spenser Bashir MD.      XR CHEST PORTABLE    (Results Pending)       ------------------------- NURSING NOTES AND VITALS REVIEWED ---------------------------   The nursing notes within the ED encounter and vital signs as below have been reviewed. BP (!) 155/60   Pulse 77   Temp 99 °F (37.2 °C) (Oral)   Resp 18   Ht 5' 5\" (1.651 m)   Wt 242 lb (109.8 kg)   SpO2 97%   Breastfeeding? No   BMI 40.27 kg/m²   Oxygen Saturation Interpretation: Normal      ---------------------------------------------------PHYSICAL EXAM--------------------------------------      Constitutional/General: Alert and oriented x3, well appearing, non toxic in mild distress secondary to pain  Head: NC/AT  Eyes: PERRL, EOMI  Mouth: Oropharynx clear, handling secretions, no trismus  Neck: Supple, full ROM, no meningeal signs  Pulmonary: Lungs clear to auscultation bilaterally, no wheezes, rales, or rhonchi. Not in respiratory distress  Cardiovascular:  Regular rate and rhythm, no murmurs, gallops, or rubs. 2+ distal pulses  Abdomen: Soft, non tender, non distended,   Extremities: Moves all extremities x 4.  Warm and well perfused  Skin: warm and dry without rash  Neurologic: GCS

## 2018-08-22 ENCOUNTER — HOSPITAL ENCOUNTER (OUTPATIENT)
Dept: CARDIOLOGY | Age: 68
Discharge: HOME | End: 2018-08-22
Payer: MEDICARE

## 2018-08-22 DIAGNOSIS — R06.02 SOBOE (SHORTNESS OF BREATH ON EXERTION): ICD-10-CM

## 2018-08-22 DIAGNOSIS — I10 ESSENTIAL HYPERTENSION: ICD-10-CM

## 2018-08-22 DIAGNOSIS — R55 SYNCOPE AND COLLAPSE: ICD-10-CM

## 2018-09-06 ENCOUNTER — OFFICE VISIT (OUTPATIENT)
Dept: CARDIOLOGY CLINIC | Age: 68
End: 2018-09-06
Payer: MEDICARE

## 2018-09-06 VITALS
HEIGHT: 66 IN | SYSTOLIC BLOOD PRESSURE: 176 MMHG | DIASTOLIC BLOOD PRESSURE: 88 MMHG | WEIGHT: 244.6 LBS | HEART RATE: 90 BPM | RESPIRATION RATE: 16 BRPM | BODY MASS INDEX: 39.31 KG/M2

## 2018-09-06 DIAGNOSIS — E66.01 OBESITY, MORBID, BMI 40.0-49.9 (HCC): ICD-10-CM

## 2018-09-06 DIAGNOSIS — E11.9 CONTROLLED TYPE 2 DIABETES MELLITUS WITHOUT COMPLICATION, WITH LONG-TERM CURRENT USE OF INSULIN (HCC): ICD-10-CM

## 2018-09-06 DIAGNOSIS — I47.1 PSVT (PAROXYSMAL SUPRAVENTRICULAR TACHYCARDIA) (HCC): ICD-10-CM

## 2018-09-06 DIAGNOSIS — I10 ESSENTIAL HYPERTENSION: ICD-10-CM

## 2018-09-06 DIAGNOSIS — Z87.898 HISTORY OF SYNCOPE: Primary | ICD-10-CM

## 2018-09-06 DIAGNOSIS — I44.7 LBBB (LEFT BUNDLE BRANCH BLOCK): ICD-10-CM

## 2018-09-06 DIAGNOSIS — R06.02 SOB (SHORTNESS OF BREATH): ICD-10-CM

## 2018-09-06 DIAGNOSIS — Z86.73 HISTORY OF CVA (CEREBROVASCULAR ACCIDENT): ICD-10-CM

## 2018-09-06 DIAGNOSIS — Z79.4 CONTROLLED TYPE 2 DIABETES MELLITUS WITHOUT COMPLICATION, WITH LONG-TERM CURRENT USE OF INSULIN (HCC): ICD-10-CM

## 2018-09-06 PROCEDURE — 4040F PNEUMOC VAC/ADMIN/RCVD: CPT | Performed by: INTERNAL MEDICINE

## 2018-09-06 PROCEDURE — 1036F TOBACCO NON-USER: CPT | Performed by: INTERNAL MEDICINE

## 2018-09-06 PROCEDURE — G8417 CALC BMI ABV UP PARAM F/U: HCPCS | Performed by: INTERNAL MEDICINE

## 2018-09-06 PROCEDURE — G8598 ASA/ANTIPLAT THER USED: HCPCS | Performed by: INTERNAL MEDICINE

## 2018-09-06 PROCEDURE — 1090F PRES/ABSN URINE INCON ASSESS: CPT | Performed by: INTERNAL MEDICINE

## 2018-09-06 PROCEDURE — 3017F COLORECTAL CA SCREEN DOC REV: CPT | Performed by: INTERNAL MEDICINE

## 2018-09-06 PROCEDURE — 3046F HEMOGLOBIN A1C LEVEL >9.0%: CPT | Performed by: INTERNAL MEDICINE

## 2018-09-06 PROCEDURE — 2022F DILAT RTA XM EVC RTNOPTHY: CPT | Performed by: INTERNAL MEDICINE

## 2018-09-06 PROCEDURE — 1123F ACP DISCUSS/DSCN MKR DOCD: CPT | Performed by: INTERNAL MEDICINE

## 2018-09-06 PROCEDURE — G8399 PT W/DXA RESULTS DOCUMENT: HCPCS | Performed by: INTERNAL MEDICINE

## 2018-09-06 PROCEDURE — G8427 DOCREV CUR MEDS BY ELIG CLIN: HCPCS | Performed by: INTERNAL MEDICINE

## 2018-09-06 PROCEDURE — 1101F PT FALLS ASSESS-DOCD LE1/YR: CPT | Performed by: INTERNAL MEDICINE

## 2018-09-06 PROCEDURE — 99214 OFFICE O/P EST MOD 30 MIN: CPT | Performed by: INTERNAL MEDICINE

## 2018-09-06 RX ORDER — NORTRIPTYLINE HYDROCHLORIDE 10 MG/1
10 CAPSULE ORAL NIGHTLY
COMMUNITY
End: 2018-09-28 | Stop reason: SDUPTHER

## 2018-09-06 NOTE — PATIENT INSTRUCTIONS
Patient Education        A Healthy Heart: Care Instructions  Your Care Instructions    Heart disease occurs when a substance called plaque builds up in the vessels that supply oxygen-rich blood to your heart. This can narrow the blood vessels and reduce blood flow. A heart attack happens when blood flow is completely blocked. A high-fat diet, smoking, and other factors increase the risk of heart disease. Your doctor has found that you have a chance of having heart disease. You can do lots of things to keep your heart healthy. It may not be easy, but you can change your diet, exercise more, and quit smoking. These steps really work to lower your chance of heart disease. Follow-up care is a key part of your treatment and safety. Be sure to make and go to all appointments, and call your doctor if you are having problems. It's also a good idea to know your test results and keep a list of the medicines you take. How can you care for yourself at home? Diet    · Use less salt when you cook and eat. This helps lower your blood pressure. Taste food before salting. Add only a little salt when you think you need it. With time, your taste buds will adjust to less salt.     · Eat fewer snack items, fast foods, canned soups, and other high-salt, high-fat, processed foods.     · Read food labels and try to avoid saturated and trans fats. They increase your risk of heart disease by raising cholesterol levels.     · Limit the amount of solid fat-butter, margarine, and shortening-you eat. Use olive, peanut, or canola oil when you cook. Bake, broil, and steam foods instead of frying them.     · Eating fish can lower your risk for heart disease. Eat at least 2 servings of fish a week. [place], mackerel, herring, sardines, and chunk light tuna are very good choices. These fish contain omega-3 fatty acids.     · Eat a variety of fruit and vegetables every day.  Dark green, deep orange, red, or yellow fruits and vegetables are especially good for you. Examples include spinach, carrots, peaches, and berries.     · Foods high in fiber can reduce your cholesterol and provide important vitamins and minerals. High-fiber foods include whole-grain cereals and breads, oatmeal, beans, brown rice, citrus fruits, and apples.     · Limit drinks and foods with added sugar. These include candy, desserts, and soda pop.    Lifestyle changes    · If your doctor recommends it, get more exercise. Walking is a good choice. Bit by bit, increase the amount you walk every day. Try for at least 30 minutes on most days of the week. You also may want to swim, bike, or do other activities.     · Do not smoke. If you need help quitting, talk to your doctor about stop-smoking programs and medicines. These can increase your chances of quitting for good. Quitting smoking may be the most important step you can take to protect your heart. It is never too late to quit. You will get health benefits right away.     · Limit alcohol to 2 drinks a day for men and 1 drink a day for women. Too much alcohol can cause health problems. Medicines    · Take your medicines exactly as prescribed. Call your doctor if you think you are having a problem with your medicine.     · If your doctor recommends aspirin, take the amount directed each day. Make sure you take aspirin and not another kind of pain reliever, such as acetaminophen (Tylenol). If you take ibuprofen (such as Advil or Motrin) for other problems, take aspirin at least 2 hours before taking ibuprofen. When should you call for help? Call 911 if you have symptoms of a heart attack.  These may include:    · Chest pain or pressure, or a strange feeling in the chest.     · Sweating.     · Shortness of breath.     · Pain, pressure, or a strange feeling in the back, neck, jaw, or upper belly or in one or both shoulders or arms.     · Lightheadedness or sudden weakness.     · A fast or irregular heartbeat.    After you call 911,

## 2018-09-06 NOTE — PROGRESS NOTES
90 tablet, Rfl: 1    montelukast (SINGULAIR) 10 MG tablet, TAKE (1) TABLET BY MOUTH NIGHTLY, Disp: 90 tablet, Rfl: 1    PROAIR  (90 Base) MCG/ACT inhaler, INHALE TWO (2) PUFFS BY MOUTH EVERY 6 HOURS AS NEEDED FOR WHEEZING AND FOR SHORTNESS OF BREATH, Disp: 8.5 g, Rfl: 10    calcium carbonate-vitamin D (CALCIUM 600 + D) 600-400 MG-UNIT TABS per tab, Take 1 tablet by mouth 2 times daily (Patient taking differently: Take 1 tablet by mouth daily Ld 12/18/2017), Disp: 60 tablet, Rfl: 11    anastrozole (ARIMIDEX) 1 MG tablet, Take 1 tablet by mouth daily Indications: Estrogen Deficiency in Breast Cancer, Disp: 30 tablet, Rfl: 12    ONE TOUCH ULTRA TEST strip, TEST TWICE DAILY, Disp: 300 strip, Rfl: 3    Insulin Syringe-Needle U-100 (B-D INS SYRINGE 0.5CC/31GX5/16) 31G X 5/16\" 0.5 ML MISC, Use as directed, Disp: 100 each, Rfl: 3    vitamin B-12 (CYANOCOBALAMIN) 1000 MCG tablet, Take 1 tablet by mouth daily, Disp: 90 tablet, Rfl: 1    vitamin D (ERGOCALCIFEROL) 81106 units CAPS capsule, Take 1 capsule by mouth once a week, Disp: 12 capsule, Rfl: 1    glucose monitoring kit (FREESTYLE) monitoring kit, 1 kit by Does not apply route daily, Disp: 1 kit, Rfl: 0    Compression Stockings MISC, by Does not apply route 20 mmHG - KNEE HIGH, Disp: 2 each, Rfl: 1    Lancets MISC, Testing once daily, Disp: 100 each, Rfl: 3      S: REASON FOR VISIT:       Chief Complaint   Patient presents with    Results     Ov to discuss results of 24 Hr/14 dasy event monitor done 8/14/18. Pt cancelled echo & stress; did not r/s. History of Present Illness:       Office Visit for follow up of tests and SOB   Had 2 week event monitor;  Echo/Stress not done   C/O mild GELLER   No hospitalizations or surgeries since last visit     Arturo any exertional chest pain   No palpitations, dizzy or syncope.    Active at home   No orthopnea   Try to watch diet   Compliant with all medications       Past Medical History:   Diagnosis Date  Arthritis     Asthma     Cancer ([facility] [address]     breast     Cerebral artery occlusion with cerebral infarction [facility])     denies deficits    Cerebrovascular disease     no residual    CHF (congestive heart failure) (HCC) approx 3 years ago    Claustrophobia     Headache(784.0)     History of blood transfusion     with knee surgery    Hyperlipidemia     Hypertension     LBP radiating to both legs     Lymphedema of both lower extremities 2015    Neuromuscular disorder ([facility] [address]     Obesity     Recurrent genital HSV (herpes simplex virus) infection     last outbreak 2017    S/P breast biopsy, right 2016    pt states breast cancer    Type II or unspecified type diabetes mellitus without mention of complication, not stated as uncontrolled     [id number] 9/10            Past Surgical History:   Procedure Laterality Date    ARTHROPLASTY Left 2016    thumb basil joint with dequervain's release    BREAST LUMPECTOMY  years ago    benign    BREAST LUMPECTOMY Right 2016    COLONOSCOPY  2005    COLONOSCOPY  1/8/15    Dr. [facility]    ECHO COMPL W DOP COLOR FLOW  2012         FINGER SURGERY Left 2016    thumb    HAND SURGERY  2017    HYSTERECTOMY      JOINT REPLACEMENT      OTHER SURGICAL HISTORY Right 2017    RIGHT THUMB TRAPEZIECTOMY WITH LIGAMENT RECONSRUCTION DEQUERVAINS RELEASE    TIM AND BSO      TOTAL HIP ARTHROPLASTY Bilateral     ,  dr Norah Garcia, dr Marisa Swan Bilateral     dr Valerio Martinez          Family History   Problem Relation Age of Onset    Diabetes Mother     High Blood Pressure Mother     Heart Attack Mother     High Blood Pressure Father     Diabetes Father     Heart Failure Father     Breast Cancer Sister     Stroke Sister         young age   [facility] Cancer Sister 55        breast    Sickle Cell Anemia Other         niece    Breast Cancer Sister             Cancer Sister 59        breast & ovarian   

## 2018-09-07 DIAGNOSIS — R55 SYNCOPE AND COLLAPSE: ICD-10-CM

## 2018-10-09 ASSESSMENT — ENCOUNTER SYMPTOMS
COUGH: 0
BLOOD IN STOOL: 0
SHORTNESS OF BREATH: 0
ABDOMINAL PAIN: 0
CONSTIPATION: 0
CHOKING: 0
EYE ITCHING: 0
WHEEZING: 0
NAUSEA: 0
TROUBLE SWALLOWING: 0
DIARRHEA: 0
VOMITING: 0
SORE THROAT: 0
VOICE CHANGE: 0
CHEST TIGHTNESS: 0
SINUS PAIN: 0
BACK PAIN: 0
EYE DISCHARGE: 0
ABDOMINAL DISTENTION: 0
SINUS PRESSURE: 0
RHINORRHEA: 0

## 2018-10-09 NOTE — PROGRESS NOTES
pain, blood in stool, constipation, diarrhea, nausea and vomiting. Endocrine: Negative for cold intolerance and heat intolerance. Genitourinary: Negative for difficulty urinating, dysuria, frequency and hematuria. Musculoskeletal: Positive for arthralgias and joint swelling. Negative for back pain, gait problem, myalgias, neck pain and neck stiffness. Skin: Negative for rash. Allergic/Immunologic: Negative for environmental allergies and food allergies. Neurological: Negative for dizziness, seizures, syncope, speech difficulty, weakness, light-headedness and headaches. Self reported episode of \"blacking out. \"  Extensive work-up in July 2018. Continues to follow with PCP. Hematological: Negative for adenopathy. Does not bruise/bleed easily. Psychiatric/Behavioral: Negative for agitation, confusion and decreased concentration. The patient is not nervous/anxious. The patient voices no complaints. With questioning, she denies significant pain, fever, chills, wound drainage or discharge. Objective:   Physical Exam   Constitutional: She is oriented to person, place, and time. She appears well-developed and well-nourished. No distress. HENT:   Head: Normocephalic and atraumatic. Mouth/Throat: Oropharynx is clear and moist. No oropharyngeal exudate. Eyes: Conjunctivae and EOM are normal. Right eye exhibits no discharge. Left eye exhibits no discharge. No scleral icterus. Neck: Normal range of motion. Neck supple. No JVD present. No tracheal deviation present. No thyromegaly present. Cardiovascular: Normal rate and regular rhythm. Exam reveals no gallop and no friction rub. No murmur heard. Pulmonary/Chest: Effort normal and breath sounds normal. No stridor. No respiratory distress. She has no wheezes. She has no rales. She exhibits no mass, no tenderness, no bony tenderness, no laceration, no edema, no deformity, no swelling and no retraction.  Right breast exhibits no inverted nipple, no mass, no nipple discharge, no skin change and no tenderness. Left breast exhibits tenderness. Left breast exhibits no inverted nipple, no mass, no nipple discharge and no skin change. Breasts are symmetrical.       Remainder of breast tissue supple bilaterally. No skin dimpling or puckering. No nipple discharge. No lumps nodules or masses appreciated. No axillary lymphadenopathy. Abdominal: Soft. She exhibits no distension. There is no tenderness. There is no rebound and no guarding. Musculoskeletal: Normal range of motion. She exhibits no edema, tenderness or deformity. Right shoulder: Normal.        Left shoulder: Normal.   Lymphadenopathy:     She has no cervical adenopathy. Right cervical: No superficial cervical, no deep cervical and no posterior cervical adenopathy present. Left cervical: No superficial cervical, no deep cervical and no posterior cervical adenopathy present. She has no axillary adenopathy. Right axillary: No pectoral and no lateral adenopathy present. Left axillary: No pectoral and no lateral adenopathy present. Right: No supraclavicular adenopathy present. Left: No supraclavicular adenopathy present. Neurological: She is alert and oriented to person, place, and time. Coordination normal.   Skin: Skin is warm and dry. No rash noted. She is not diaphoretic. No erythema. No pallor. Psychiatric: She has a normal mood and affect. Her behavior is normal. Judgment and thought content normal.   Nursing note and vitals reviewed. Assessment:      76 y.o. woman who underwent right breast needle localized lumpectomy on September 6, 2016. 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 Size of DCIS: At least 0.2 cm.  Margins uninvolved by DCIS Estrogen receptor: Positive, greater than 90% (intensity= strong). Progesterone receptor:

## 2018-10-10 ENCOUNTER — HOSPITAL ENCOUNTER (OUTPATIENT)
Age: 68
Discharge: HOME | End: 2018-10-12
Payer: MEDICARE

## 2018-10-10 ENCOUNTER — HOSPITAL ENCOUNTER (OUTPATIENT)
Dept: GENERAL RADIOLOGY | Age: 68
Discharge: HOME | End: 2018-10-12
Payer: MEDICARE

## 2018-10-10 ENCOUNTER — OFFICE VISIT (OUTPATIENT)
Dept: FAMILY MEDICINE CLINIC | Age: 68
End: 2018-10-10
Payer: MEDICARE

## 2018-10-10 ENCOUNTER — OFFICE VISIT (OUTPATIENT)
Dept: BREAST CENTER | Age: 68
End: 2018-10-10
Payer: MEDICARE

## 2018-10-10 ENCOUNTER — HOSPITAL ENCOUNTER (OUTPATIENT)
Dept: ULTRASOUND IMAGING | Age: 68
Discharge: HOME | End: 2018-10-12
Payer: MEDICARE

## 2018-10-10 VITALS
SYSTOLIC BLOOD PRESSURE: 132 MMHG | HEIGHT: 66 IN | RESPIRATION RATE: 16 BRPM | HEART RATE: 68 BPM | TEMPERATURE: 98.4 F | BODY MASS INDEX: 39.02 KG/M2 | DIASTOLIC BLOOD PRESSURE: 76 MMHG | OXYGEN SATURATION: 93 % | WEIGHT: 242.8 LBS

## 2018-10-10 VITALS
HEART RATE: 86 BPM | HEIGHT: 66 IN | SYSTOLIC BLOOD PRESSURE: 132 MMHG | DIASTOLIC BLOOD PRESSURE: 72 MMHG | OXYGEN SATURATION: 96 % | WEIGHT: 242 LBS | BODY MASS INDEX: 38.89 KG/M2 | TEMPERATURE: 98.2 F | RESPIRATION RATE: 16 BRPM

## 2018-10-10 DIAGNOSIS — Z85.3 HISTORY OF BREAST CANCER: Primary | ICD-10-CM

## 2018-10-10 DIAGNOSIS — M25.462 PAIN AND SWELLING OF LEFT KNEE: Primary | ICD-10-CM

## 2018-10-10 DIAGNOSIS — Z85.3 HISTORY OF BREAST CANCER: ICD-10-CM

## 2018-10-10 DIAGNOSIS — N64.4 BREAST PAIN: ICD-10-CM

## 2018-10-10 DIAGNOSIS — M25.562 PAIN AND SWELLING OF LEFT KNEE: Primary | ICD-10-CM

## 2018-10-10 DIAGNOSIS — Z78.0 POST-MENOPAUSAL: ICD-10-CM

## 2018-10-10 DIAGNOSIS — M25.562 PAIN AND SWELLING OF LEFT KNEE: ICD-10-CM

## 2018-10-10 DIAGNOSIS — M25.562 ACUTE PAIN OF LEFT KNEE: ICD-10-CM

## 2018-10-10 DIAGNOSIS — M79.89 SWELLING OF LEFT LOWER EXTREMITY: ICD-10-CM

## 2018-10-10 DIAGNOSIS — M25.462 PAIN AND SWELLING OF LEFT KNEE: ICD-10-CM

## 2018-10-10 LAB
ALBUMIN SERPL-MCNC: 3.9 G/DL (ref 3.5–5.2)
ALP BLD-CCNC: 161 U/L (ref 35–104)
ALT SERPL-CCNC: 6 U/L (ref 0–32)
ANION GAP SERPL CALCULATED.3IONS-SCNC: 12 MMOL/L (ref 7–16)
AST SERPL-CCNC: 10 U/L (ref 0–31)
BASOPHILS ABSOLUTE: 0.05 E9/L (ref 0–0.2)
BASOPHILS RELATIVE PERCENT: 0.7 % (ref 0–2)
BILIRUB SERPL-MCNC: 0.3 MG/DL (ref 0–1.2)
BUN BLDV-MCNC: 12 MG/DL (ref 8–23)
CALCIUM SERPL-MCNC: 9.4 MG/DL (ref 8.6–10.2)
CHLORIDE BLD-SCNC: 99 MMOL/L (ref 98–107)
CO2: 29 MMOL/L (ref 22–29)
CREAT SERPL-MCNC: 0.7 MG/DL (ref 0.5–1)
EOSINOPHILS ABSOLUTE: 0 E9/L (ref 0.05–0.5)
EOSINOPHILS RELATIVE PERCENT: 0 % (ref 0–6)
GFR AFRICAN AMERICAN: >60
GFR NON-AFRICAN AMERICAN: >60 ML/MIN/1.73
GLUCOSE BLD-MCNC: 177 MG/DL (ref 74–109)
HCT VFR BLD CALC: 37.1 % (ref 34–48)
HEMOGLOBIN: 11.5 G/DL (ref 11.5–15.5)
IMMATURE GRANULOCYTES #: 0.02 E9/L
IMMATURE GRANULOCYTES %: 0.3 % (ref 0–5)
LYMPHOCYTES ABSOLUTE: 1.79 E9/L (ref 1.5–4)
LYMPHOCYTES RELATIVE PERCENT: 26.6 % (ref 20–42)
MCH RBC QN AUTO: 26.7 PG (ref 26–35)
MCHC RBC AUTO-ENTMCNC: 31 % (ref 32–34.5)
MCV RBC AUTO: 86.3 FL (ref 80–99.9)
MONOCYTES ABSOLUTE: 0.57 E9/L (ref 0.1–0.95)
MONOCYTES RELATIVE PERCENT: 8.5 % (ref 2–12)
NEUTROPHILS ABSOLUTE: 4.29 E9/L (ref 1.8–7.3)
NEUTROPHILS RELATIVE PERCENT: 63.9 % (ref 43–80)
PDW BLD-RTO: 14.5 FL (ref 11.5–15)
PLATELET # BLD: 369 E9/L (ref 130–450)
PMV BLD AUTO: 11.3 FL (ref 7–12)
POTASSIUM SERPL-SCNC: 3.9 MMOL/L (ref 3.5–5)
RBC # BLD: 4.3 E12/L (ref 3.5–5.5)
SODIUM BLD-SCNC: 140 MMOL/L (ref 132–146)
TOTAL PROTEIN: 7.2 G/DL (ref 6.4–8.3)
WBC # BLD: 6.7 E9/L (ref 4.5–11.5)

## 2018-10-10 PROCEDURE — 36415 COLL VENOUS BLD VENIPUNCTURE: CPT | Performed by: NURSE PRACTITIONER

## 2018-10-10 PROCEDURE — G8427 DOCREV CUR MEDS BY ELIG CLIN: HCPCS | Performed by: PHYSICIAN ASSISTANT

## 2018-10-10 PROCEDURE — G8417 CALC BMI ABV UP PARAM F/U: HCPCS | Performed by: PHYSICIAN ASSISTANT

## 2018-10-10 PROCEDURE — 3017F COLORECTAL CA SCREEN DOC REV: CPT | Performed by: NURSE PRACTITIONER

## 2018-10-10 PROCEDURE — G8484 FLU IMMUNIZE NO ADMIN: HCPCS | Performed by: PHYSICIAN ASSISTANT

## 2018-10-10 PROCEDURE — 1123F ACP DISCUSS/DSCN MKR DOCD: CPT | Performed by: PHYSICIAN ASSISTANT

## 2018-10-10 PROCEDURE — G8427 DOCREV CUR MEDS BY ELIG CLIN: HCPCS | Performed by: NURSE PRACTITIONER

## 2018-10-10 PROCEDURE — 73564 X-RAY EXAM KNEE 4 OR MORE: CPT

## 2018-10-10 PROCEDURE — G8417 CALC BMI ABV UP PARAM F/U: HCPCS | Performed by: NURSE PRACTITIONER

## 2018-10-10 PROCEDURE — 36415 COLL VENOUS BLD VENIPUNCTURE: CPT

## 2018-10-10 PROCEDURE — 99213 OFFICE O/P EST LOW 20 MIN: CPT | Performed by: NURSE PRACTITIONER

## 2018-10-10 PROCEDURE — 1036F TOBACCO NON-USER: CPT | Performed by: NURSE PRACTITIONER

## 2018-10-10 PROCEDURE — G8598 ASA/ANTIPLAT THER USED: HCPCS | Performed by: NURSE PRACTITIONER

## 2018-10-10 PROCEDURE — G8399 PT W/DXA RESULTS DOCUMENT: HCPCS | Performed by: PHYSICIAN ASSISTANT

## 2018-10-10 PROCEDURE — 99214 OFFICE O/P EST MOD 30 MIN: CPT | Performed by: PHYSICIAN ASSISTANT

## 2018-10-10 PROCEDURE — 1123F ACP DISCUSS/DSCN MKR DOCD: CPT | Performed by: NURSE PRACTITIONER

## 2018-10-10 PROCEDURE — 1101F PT FALLS ASSESS-DOCD LE1/YR: CPT | Performed by: NURSE PRACTITIONER

## 2018-10-10 PROCEDURE — G8484 FLU IMMUNIZE NO ADMIN: HCPCS | Performed by: NURSE PRACTITIONER

## 2018-10-10 PROCEDURE — 1036F TOBACCO NON-USER: CPT | Performed by: PHYSICIAN ASSISTANT

## 2018-10-10 PROCEDURE — 3017F COLORECTAL CA SCREEN DOC REV: CPT | Performed by: PHYSICIAN ASSISTANT

## 2018-10-10 PROCEDURE — 85025 COMPLETE CBC W/AUTO DIFF WBC: CPT

## 2018-10-10 PROCEDURE — 1090F PRES/ABSN URINE INCON ASSESS: CPT | Performed by: PHYSICIAN ASSISTANT

## 2018-10-10 PROCEDURE — 1090F PRES/ABSN URINE INCON ASSESS: CPT | Performed by: NURSE PRACTITIONER

## 2018-10-10 PROCEDURE — G8598 ASA/ANTIPLAT THER USED: HCPCS | Performed by: PHYSICIAN ASSISTANT

## 2018-10-10 PROCEDURE — 4040F PNEUMOC VAC/ADMIN/RCVD: CPT | Performed by: PHYSICIAN ASSISTANT

## 2018-10-10 PROCEDURE — 4040F PNEUMOC VAC/ADMIN/RCVD: CPT | Performed by: NURSE PRACTITIONER

## 2018-10-10 PROCEDURE — 80053 COMPREHEN METABOLIC PANEL: CPT

## 2018-10-10 PROCEDURE — 1101F PT FALLS ASSESS-DOCD LE1/YR: CPT | Performed by: PHYSICIAN ASSISTANT

## 2018-10-10 PROCEDURE — 93971 EXTREMITY STUDY: CPT

## 2018-10-10 PROCEDURE — G8399 PT W/DXA RESULTS DOCUMENT: HCPCS | Performed by: NURSE PRACTITIONER

## 2018-10-10 RX ORDER — NAPROXEN 500 MG/1
500 TABLET ORAL 2 TIMES DAILY PRN
Qty: 14 TABLET | Refills: 0 | Status: SHIPPED | OUTPATIENT
Start: 2018-10-10 | End: 2019-01-15

## 2018-10-10 NOTE — PROGRESS NOTES
patient's clinical or mental status as evidenced by the medical record, this patients's positive and negative responses for Review of Systems, constitutional, psych, eyes, ENT, cardiovascular, respiratory, gastrointestinal, neurological, genitourinary, musculoskeletal, integument systems and systems related to the presenting problem are either stated in the preceding or were not pertinent or were negative for the symptoms and/or complaints related to the medical problem.comfort. Past Medical History:   Past Surgical History:   Procedure Laterality Date    ARTHROPLASTY Left 07/29/2016    thumb basil joint with dequervain's release    BREAST LUMPECTOMY  years ago    benign    BREAST LUMPECTOMY Right 09/06/2016    COLONOSCOPY  2005    COLONOSCOPY  1/8/15    Dr. Clotilde Lara  6/7/2012         FINGER SURGERY Left 07/29/2016    thumb    HAND SURGERY  12/2017    HYSTERECTOMY      JOINT REPLACEMENT      OTHER SURGICAL HISTORY Right 12/20/2017    RIGHT THUMB TRAPEZIECTOMY WITH LIGAMENT RECONSRUCTION DEQUERVAINS RELEASE    TIM AND BSO      TOTAL HIP ARTHROPLASTY Bilateral     2000, 2013 dr Filomena Hoffman, dr Yves Dolan Bilateral 2013    dr Jung Chavez History:  reports that she quit smoking about 16 years ago. She has a 8.75 pack-year smoking history. She has never used smokeless tobacco. She reports that she drinks alcohol. She reports that she uses drugs, including Marijuana. Family History: family history includes Breast Cancer in her sister and sister; Cancer (age of onset: 55) in her sister; Cancer (age of onset: 59) in her sister; Diabetes in her father and mother; Heart Attack in her mother; Heart Failure in her father; High Blood Pressure in her father and mother; Sickle Cell Anemia in an other family member; Stomach Cancer in an other family member; Stroke in her sister. Allergies: Patient has no known allergies.     Physical Exam         VS:  BP and swelling of left knee    2. Swelling of left lower extremity      Disposition:  Disposition: Discharge to home. Although symptoms are most likely musculoskeletal in nature, pt does have a moderate amount of risk factors for DVT. Orders given for STAT U/S of the LLE and an OP x-ray, will call with results once available and determine the need for additional management at that time. Script written for Naprosyn, side effects discussed. RICE protocol advised. Pt was provided with an ACE wrap in office. F/u with PCP in 7-10 days for recheck and further management. ED sooner if symptoms worsen or change. ED immediately with any calf pain/swelling, severe or worsening knee pain, worsening swelling, warmth over the knee, paresthesias, weakness, fever, etc. Pt is in agreement with this care plan. All questions answered.

## 2018-10-15 ENCOUNTER — HOSPITAL ENCOUNTER (OUTPATIENT)
Age: 68
Setting detail: OBSERVATION
Discharge: HOME | End: 2018-10-18
Attending: EMERGENCY MEDICINE | Admitting: INTERNAL MEDICINE
Payer: MEDICARE

## 2018-10-15 ENCOUNTER — APPOINTMENT (OUTPATIENT)
Dept: GENERAL RADIOLOGY | Age: 68
End: 2018-10-15
Payer: MEDICARE

## 2018-10-15 DIAGNOSIS — J18.9 PNEUMONIA DUE TO ORGANISM: ICD-10-CM

## 2018-10-15 DIAGNOSIS — J96.01 ACUTE RESPIRATORY FAILURE WITH HYPOXIA (HCC): Primary | ICD-10-CM

## 2018-10-15 DIAGNOSIS — J45.901 EXACERBATION OF ASTHMA, UNSPECIFIED ASTHMA SEVERITY, UNSPECIFIED WHETHER PERSISTENT: ICD-10-CM

## 2018-10-15 PROBLEM — I10 HYPERTENSION, UNCONTROLLED: Status: ACTIVE | Noted: 2018-10-15

## 2018-10-15 LAB
ALBUMIN SERPL-MCNC: 3.7 G/DL (ref 3.5–5.2)
ALP BLD-CCNC: 153 U/L (ref 35–104)
ALT SERPL-CCNC: 7 U/L (ref 0–32)
ANION GAP SERPL CALCULATED.3IONS-SCNC: 11 MMOL/L (ref 7–16)
AST SERPL-CCNC: 12 U/L (ref 0–31)
BASOPHILS ABSOLUTE: 0.04 E9/L (ref 0–0.2)
BASOPHILS RELATIVE PERCENT: 0.5 % (ref 0–2)
BILIRUB SERPL-MCNC: 0.2 MG/DL (ref 0–1.2)
BUN BLDV-MCNC: 11 MG/DL (ref 8–23)
CALCIUM SERPL-MCNC: 9.3 MG/DL (ref 8.6–10.2)
CHLORIDE BLD-SCNC: 101 MMOL/L (ref 98–107)
CO2: 27 MMOL/L (ref 22–29)
CREAT SERPL-MCNC: 0.7 MG/DL (ref 0.5–1)
EOSINOPHILS ABSOLUTE: 0 E9/L (ref 0.05–0.5)
EOSINOPHILS RELATIVE PERCENT: 0 % (ref 0–6)
GFR AFRICAN AMERICAN: >60
GFR NON-AFRICAN AMERICAN: >60 ML/MIN/1.73
GLUCOSE BLD-MCNC: 206 MG/DL (ref 74–109)
HCT VFR BLD CALC: 35.9 % (ref 34–48)
HEMOGLOBIN: 11.3 G/DL (ref 11.5–15.5)
IMMATURE GRANULOCYTES #: 0.03 E9/L
IMMATURE GRANULOCYTES %: 0.4 % (ref 0–5)
LACTIC ACID: 2.9 MMOL/L (ref 0.5–2.2)
LACTIC ACID: 5.4 MMOL/L (ref 0.5–2.2)
LACTIC ACID: 5.5 MMOL/L (ref 0.5–2.2)
LYMPHOCYTES ABSOLUTE: 1.79 E9/L (ref 1.5–4)
LYMPHOCYTES RELATIVE PERCENT: 24.6 % (ref 20–42)
MCH RBC QN AUTO: 26.9 PG (ref 26–35)
MCHC RBC AUTO-ENTMCNC: 31.5 % (ref 32–34.5)
MCV RBC AUTO: 85.5 FL (ref 80–99.9)
METER GLUCOSE: 323 MG/DL (ref 70–110)
METER GLUCOSE: 327 MG/DL (ref 70–110)
METER GLUCOSE: 381 MG/DL (ref 70–110)
MONOCYTES ABSOLUTE: 0.63 E9/L (ref 0.1–0.95)
MONOCYTES RELATIVE PERCENT: 8.7 % (ref 2–12)
NEUTROPHILS ABSOLUTE: 4.79 E9/L (ref 1.8–7.3)
NEUTROPHILS RELATIVE PERCENT: 65.8 % (ref 43–80)
PDW BLD-RTO: 14.4 FL (ref 11.5–15)
PLATELET # BLD: 353 E9/L (ref 130–450)
PMV BLD AUTO: 11 FL (ref 7–12)
POTASSIUM REFLEX MAGNESIUM: 3.6 MMOL/L (ref 3.5–5)
PRO-BNP: 299 PG/ML (ref 0–125)
PROCALCITONIN: 0.05 NG/ML (ref 0–0.08)
RBC # BLD: 4.2 E12/L (ref 3.5–5.5)
SODIUM BLD-SCNC: 139 MMOL/L (ref 132–146)
TOTAL PROTEIN: 6.7 G/DL (ref 6.4–8.3)
TROPONIN: <0.01 NG/ML (ref 0–0.03)
WBC # BLD: 7.3 E9/L (ref 4.5–11.5)

## 2018-10-15 PROCEDURE — G0378 HOSPITAL OBSERVATION PER HR: HCPCS

## 2018-10-15 PROCEDURE — 94640 AIRWAY INHALATION TREATMENT: CPT

## 2018-10-15 PROCEDURE — 96374 THER/PROPH/DIAG INJ IV PUSH: CPT

## 2018-10-15 PROCEDURE — 85025 COMPLETE CBC W/AUTO DIFF WBC: CPT

## 2018-10-15 PROCEDURE — 84145 PROCALCITONIN (PCT): CPT

## 2018-10-15 PROCEDURE — 6360000002 HC RX W HCPCS: Performed by: INTERNAL MEDICINE

## 2018-10-15 PROCEDURE — 6370000000 HC RX 637 (ALT 250 FOR IP): Performed by: EMERGENCY MEDICINE

## 2018-10-15 PROCEDURE — 84484 ASSAY OF TROPONIN QUANT: CPT

## 2018-10-15 PROCEDURE — 83880 ASSAY OF NATRIURETIC PEPTIDE: CPT

## 2018-10-15 PROCEDURE — 87040 BLOOD CULTURE FOR BACTERIA: CPT

## 2018-10-15 PROCEDURE — 82962 GLUCOSE BLOOD TEST: CPT

## 2018-10-15 PROCEDURE — 6370000000 HC RX 637 (ALT 250 FOR IP): Performed by: INTERNAL MEDICINE

## 2018-10-15 PROCEDURE — 83605 ASSAY OF LACTIC ACID: CPT

## 2018-10-15 PROCEDURE — 94664 DEMO&/EVAL PT USE INHALER: CPT

## 2018-10-15 PROCEDURE — 2580000003 HC RX 258: Performed by: INTERNAL MEDICINE

## 2018-10-15 PROCEDURE — 80053 COMPREHEN METABOLIC PANEL: CPT

## 2018-10-15 PROCEDURE — 99285 EMERGENCY DEPT VISIT HI MDM: CPT

## 2018-10-15 PROCEDURE — 36415 COLL VENOUS BLD VENIPUNCTURE: CPT

## 2018-10-15 PROCEDURE — 6360000002 HC RX W HCPCS: Performed by: EMERGENCY MEDICINE

## 2018-10-15 PROCEDURE — 71045 X-RAY EXAM CHEST 1 VIEW: CPT

## 2018-10-15 PROCEDURE — 2580000003 HC RX 258: Performed by: EMERGENCY MEDICINE

## 2018-10-15 RX ORDER — INSULIN GLARGINE 100 [IU]/ML
50 INJECTION, SOLUTION SUBCUTANEOUS NIGHTLY
Status: DISCONTINUED | OUTPATIENT
Start: 2018-10-15 | End: 2018-10-18 | Stop reason: HOSPADM

## 2018-10-15 RX ORDER — SODIUM CHLORIDE 0.9 % (FLUSH) 0.9 %
10 SYRINGE (ML) INJECTION EVERY 12 HOURS SCHEDULED
Status: DISCONTINUED | OUTPATIENT
Start: 2018-10-15 | End: 2018-10-18 | Stop reason: HOSPADM

## 2018-10-15 RX ORDER — SODIUM CHLORIDE 0.9 % (FLUSH) 0.9 %
10 SYRINGE (ML) INJECTION PRN
Status: DISCONTINUED | OUTPATIENT
Start: 2018-10-15 | End: 2018-10-18 | Stop reason: HOSPADM

## 2018-10-15 RX ORDER — ACETAMINOPHEN 500 MG
500 TABLET ORAL EVERY 6 HOURS PRN
Status: DISCONTINUED | OUTPATIENT
Start: 2018-10-15 | End: 2018-10-18 | Stop reason: HOSPADM

## 2018-10-15 RX ORDER — METHYLPREDNISOLONE SODIUM SUCCINATE 125 MG/2ML
125 INJECTION, POWDER, LYOPHILIZED, FOR SOLUTION INTRAMUSCULAR; INTRAVENOUS ONCE
Status: COMPLETED | OUTPATIENT
Start: 2018-10-15 | End: 2018-10-15

## 2018-10-15 RX ORDER — ERGOCALCIFEROL 1.25 MG/1
50000 CAPSULE ORAL
Status: DISCONTINUED | OUTPATIENT
Start: 2018-10-19 | End: 2018-10-18 | Stop reason: HOSPADM

## 2018-10-15 RX ORDER — PRAVASTATIN SODIUM 20 MG
80 TABLET ORAL DAILY
Status: DISCONTINUED | OUTPATIENT
Start: 2018-10-15 | End: 2018-10-18 | Stop reason: HOSPADM

## 2018-10-15 RX ORDER — IPRATROPIUM BROMIDE AND ALBUTEROL SULFATE 2.5; .5 MG/3ML; MG/3ML
1 SOLUTION RESPIRATORY (INHALATION) 4 TIMES DAILY
Status: DISCONTINUED | OUTPATIENT
Start: 2018-10-15 | End: 2018-10-18 | Stop reason: HOSPADM

## 2018-10-15 RX ORDER — 0.9 % SODIUM CHLORIDE 0.9 %
1000 INTRAVENOUS SOLUTION INTRAVENOUS ONCE
Status: COMPLETED | OUTPATIENT
Start: 2018-10-15 | End: 2018-10-15

## 2018-10-15 RX ORDER — ASPIRIN 81 MG/1
81 TABLET, CHEWABLE ORAL DAILY
Status: DISCONTINUED | OUTPATIENT
Start: 2018-10-15 | End: 2018-10-18 | Stop reason: HOSPADM

## 2018-10-15 RX ORDER — ANASTROZOLE 1 MG/1
1 TABLET ORAL DAILY
Status: DISCONTINUED | OUTPATIENT
Start: 2018-10-15 | End: 2018-10-18 | Stop reason: HOSPADM

## 2018-10-15 RX ORDER — OXYBUTYNIN CHLORIDE 5 MG/1
5 TABLET ORAL 3 TIMES DAILY
Status: DISCONTINUED | OUTPATIENT
Start: 2018-10-15 | End: 2018-10-18 | Stop reason: HOSPADM

## 2018-10-15 RX ORDER — METHYLPREDNISOLONE SODIUM SUCCINATE 40 MG/ML
40 INJECTION, POWDER, LYOPHILIZED, FOR SOLUTION INTRAMUSCULAR; INTRAVENOUS DAILY
Status: DISCONTINUED | OUTPATIENT
Start: 2018-10-16 | End: 2018-10-16

## 2018-10-15 RX ORDER — ACYCLOVIR 200 MG/1
400 CAPSULE ORAL DAILY
Status: DISCONTINUED | OUTPATIENT
Start: 2018-10-15 | End: 2018-10-18 | Stop reason: HOSPADM

## 2018-10-15 RX ORDER — HYDRALAZINE HYDROCHLORIDE 20 MG/ML
10 INJECTION INTRAMUSCULAR; INTRAVENOUS EVERY 6 HOURS PRN
Status: DISCONTINUED | OUTPATIENT
Start: 2018-10-15 | End: 2018-10-18 | Stop reason: HOSPADM

## 2018-10-15 RX ORDER — OYSTER SHELL CALCIUM WITH VITAMIN D 500; 200 MG/1; [IU]/1
1 TABLET, FILM COATED ORAL 2 TIMES DAILY
Status: DISCONTINUED | OUTPATIENT
Start: 2018-10-15 | End: 2018-10-18 | Stop reason: HOSPADM

## 2018-10-15 RX ORDER — MAGNESIUM SULFATE IN WATER 40 MG/ML
2 INJECTION, SOLUTION INTRAVENOUS ONCE
Status: COMPLETED | OUTPATIENT
Start: 2018-10-15 | End: 2018-10-15

## 2018-10-15 RX ORDER — IPRATROPIUM BROMIDE AND ALBUTEROL SULFATE 2.5; .5 MG/3ML; MG/3ML
1 SOLUTION RESPIRATORY (INHALATION)
Status: COMPLETED | OUTPATIENT
Start: 2018-10-15 | End: 2018-10-15

## 2018-10-15 RX ORDER — CLONIDINE HYDROCHLORIDE 0.1 MG/1
0.1 TABLET ORAL EVERY 6 HOURS PRN
Status: DISCONTINUED | OUTPATIENT
Start: 2018-10-15 | End: 2018-10-18 | Stop reason: HOSPADM

## 2018-10-15 RX ORDER — LANOLIN ALCOHOL/MO/W.PET/CERES
1000 CREAM (GRAM) TOPICAL DAILY
Status: DISCONTINUED | OUTPATIENT
Start: 2018-10-15 | End: 2018-10-18 | Stop reason: HOSPADM

## 2018-10-15 RX ORDER — GABAPENTIN 300 MG/1
300 CAPSULE ORAL 3 TIMES DAILY
Status: DISCONTINUED | OUTPATIENT
Start: 2018-10-15 | End: 2018-10-18 | Stop reason: HOSPADM

## 2018-10-15 RX ORDER — ONDANSETRON 2 MG/ML
4 INJECTION INTRAMUSCULAR; INTRAVENOUS EVERY 6 HOURS PRN
Status: DISCONTINUED | OUTPATIENT
Start: 2018-10-15 | End: 2018-10-17

## 2018-10-15 RX ADMIN — IPRATROPIUM BROMIDE AND ALBUTEROL SULFATE 1 AMPULE: .5; 3 SOLUTION RESPIRATORY (INHALATION) at 07:47

## 2018-10-15 RX ADMIN — CALCIUM CARBONATE-VITAMIN D TAB 500 MG-200 UNIT 1 TABLET: 500-200 TAB at 12:41

## 2018-10-15 RX ADMIN — CALCIUM CARBONATE-VITAMIN D TAB 500 MG-200 UNIT 1 TABLET: 500-200 TAB at 20:37

## 2018-10-15 RX ADMIN — Medication 10 ML: at 12:44

## 2018-10-15 RX ADMIN — ENOXAPARIN SODIUM 40 MG: 40 INJECTION SUBCUTANEOUS at 12:44

## 2018-10-15 RX ADMIN — GABAPENTIN 300 MG: 300 CAPSULE ORAL at 13:26

## 2018-10-15 RX ADMIN — IPRATROPIUM BROMIDE AND ALBUTEROL SULFATE 1 AMPULE: .5; 3 SOLUTION RESPIRATORY (INHALATION) at 07:20

## 2018-10-15 RX ADMIN — METOPROLOL TARTRATE 25 MG: 25 TABLET, FILM COATED ORAL at 12:42

## 2018-10-15 RX ADMIN — METHYLPREDNISOLONE SODIUM SUCCINATE 125 MG: 125 INJECTION, POWDER, FOR SOLUTION INTRAMUSCULAR; INTRAVENOUS at 07:35

## 2018-10-15 RX ADMIN — IPRATROPIUM BROMIDE AND ALBUTEROL SULFATE 1 AMPULE: .5; 3 SOLUTION RESPIRATORY (INHALATION) at 07:32

## 2018-10-15 RX ADMIN — CEFTRIAXONE SODIUM 1 G: 1 INJECTION, POWDER, FOR SOLUTION INTRAMUSCULAR; INTRAVENOUS at 09:10

## 2018-10-15 RX ADMIN — IPRATROPIUM BROMIDE AND ALBUTEROL SULFATE 1 AMPULE: .5; 3 SOLUTION RESPIRATORY (INHALATION) at 16:16

## 2018-10-15 RX ADMIN — INSULIN GLARGINE 50 UNITS: 100 INJECTION, SOLUTION SUBCUTANEOUS at 20:30

## 2018-10-15 RX ADMIN — HYDRALAZINE HYDROCHLORIDE 10 MG: 20 INJECTION INTRAMUSCULAR; INTRAVENOUS at 17:58

## 2018-10-15 RX ADMIN — ACYCLOVIR 400 MG: 200 CAPSULE ORAL at 12:42

## 2018-10-15 RX ADMIN — IPRATROPIUM BROMIDE AND ALBUTEROL SULFATE 1 AMPULE: .5; 3 SOLUTION RESPIRATORY (INHALATION) at 21:17

## 2018-10-15 RX ADMIN — ASPIRIN 81 MG CHEWABLE TABLET 81 MG: 81 TABLET CHEWABLE at 12:42

## 2018-10-15 RX ADMIN — SODIUM CHLORIDE 1000 ML: 9 INJECTION, SOLUTION INTRAVENOUS at 08:20

## 2018-10-15 RX ADMIN — Medication 1000 MCG: at 12:42

## 2018-10-15 RX ADMIN — METFORMIN HYDROCHLORIDE 500 MG: 500 TABLET ORAL at 12:42

## 2018-10-15 RX ADMIN — GABAPENTIN 300 MG: 300 CAPSULE ORAL at 20:29

## 2018-10-15 RX ADMIN — ANASTROZOLE 1 MG: 1 TABLET, COATED ORAL at 12:41

## 2018-10-15 RX ADMIN — PRAVASTATIN SODIUM 80 MG: 20 TABLET ORAL at 12:41

## 2018-10-15 RX ADMIN — METFORMIN HYDROCHLORIDE 500 MG: 500 TABLET ORAL at 17:24

## 2018-10-15 RX ADMIN — AZITHROMYCIN MONOHYDRATE 500 MG: 500 INJECTION, POWDER, LYOPHILIZED, FOR SOLUTION INTRAVENOUS at 09:55

## 2018-10-15 RX ADMIN — CLONIDINE HYDROCHLORIDE 0.1 MG: 0.1 TABLET ORAL at 15:58

## 2018-10-15 RX ADMIN — OXYBUTYNIN CHLORIDE 5 MG: 5 TABLET ORAL at 13:26

## 2018-10-15 RX ADMIN — METOPROLOL TARTRATE 25 MG: 25 TABLET, FILM COATED ORAL at 20:29

## 2018-10-15 RX ADMIN — MAGNESIUM SULFATE HEPTAHYDRATE 2 G: 40 INJECTION, SOLUTION INTRAVENOUS at 08:40

## 2018-10-15 RX ADMIN — INSULIN LISPRO 15 UNITS: 100 INJECTION, SOLUTION INTRAVENOUS; SUBCUTANEOUS at 18:01

## 2018-10-15 RX ADMIN — LINAGLIPTIN 5 MG: 5 TABLET, FILM COATED ORAL at 12:42

## 2018-10-15 RX ADMIN — INSULIN LISPRO 6 UNITS: 100 INJECTION, SOLUTION INTRAVENOUS; SUBCUTANEOUS at 20:30

## 2018-10-15 RX ADMIN — SODIUM CHLORIDE 1000 ML: 9 INJECTION, SOLUTION INTRAVENOUS at 17:57

## 2018-10-15 RX ADMIN — OXYBUTYNIN CHLORIDE 5 MG: 5 TABLET ORAL at 20:29

## 2018-10-15 ASSESSMENT — PAIN SCALES - GENERAL
PAINLEVEL_OUTOF10: 0

## 2018-10-15 NOTE — ED PROVIDER NOTES
Affect      -------------------------------------------------- RESULTS -------------------------------------------------  I have personally reviewed all laboratory and imaging results for this patient. Results are listed below.      LABS:  Results for orders placed or performed during the hospital encounter of 10/15/18   CBC auto differential   Result Value Ref Range    WBC 7.3 4.5 - 11.5 E9/L    RBC 4.20 3.50 - 5.50 E12/L    Hemoglobin 11.3 (L) 11.5 - 15.5 g/dL    Hematocrit 35.9 34.0 - 48.0 %    MCV 85.5 80.0 - 99.9 fL    MCH 26.9 26.0 - 35.0 pg    MCHC 31.5 (L) 32.0 - 34.5 %    RDW 14.4 11.5 - 15.0 fL    Platelets 037 744 - 449 E9/L    MPV 11.0 7.0 - 12.0 fL    Neutrophils % 65.8 43.0 - 80.0 %    Immature Granulocytes % 0.4 0.0 - 5.0 %    Lymphocytes % 24.6 20.0 - 42.0 %    Monocytes % 8.7 2.0 - 12.0 %    Eosinophils % 0.0 0.0 - 6.0 %    Basophils % 0.5 0.0 - 2.0 %    Neutrophils # 4.79 1.80 - 7.30 E9/L    Immature Granulocytes # 0.03 E9/L    Lymphocytes # 1.79 1.50 - 4.00 E9/L    Monocytes # 0.63 0.10 - 0.95 E9/L    Eosinophils # 0.00 (L) 0.05 - 0.50 E9/L    Basophils # 0.04 0.00 - 0.20 E9/L   Comprehensive Metabolic Panel w/ Reflex to MG   Result Value Ref Range    Sodium 139 132 - 146 mmol/L    Potassium reflex Magnesium 3.6 3.5 - 5.0 mmol/L    Chloride 101 98 - 107 mmol/L    CO2 27 22 - 29 mmol/L    Anion Gap 11 7 - 16 mmol/L    Glucose 206 (H) 74 - 109 mg/dL    BUN 11 8 - 23 mg/dL    CREATININE 0.7 0.5 - 1.0 mg/dL    GFR Non-African American >60 >=60 mL/min/1.73    GFR African American >60     Calcium 9.3 8.6 - 10.2 mg/dL    Total Protein 6.7 6.4 - 8.3 g/dL    Alb 3.7 3.5 - 5.2 g/dL    Total Bilirubin 0.2 0.0 - 1.2 mg/dL    Alkaline Phosphatase 153 (H) 35 - 104 U/L    ALT 7 0 - 32 U/L    AST 12 0 - 31 U/L   Troponin   Result Value Ref Range    Troponin <0.01 0.00 - 0.03 ng/mL   Brain natriuretic peptide   Result Value Ref Range    Pro- (H) 0 - 125 pg/mL   Lactic acid, plasma   Result Value Ref Range

## 2018-10-16 LAB
ANION GAP SERPL CALCULATED.3IONS-SCNC: 11 MMOL/L (ref 7–16)
BUN BLDV-MCNC: 13 MG/DL (ref 8–23)
CALCIUM SERPL-MCNC: 9.6 MG/DL (ref 8.6–10.2)
CHLORIDE BLD-SCNC: 101 MMOL/L (ref 98–107)
CO2: 27 MMOL/L (ref 22–29)
CREAT SERPL-MCNC: 0.7 MG/DL (ref 0.5–1)
GFR AFRICAN AMERICAN: >60
GFR NON-AFRICAN AMERICAN: >60 ML/MIN/1.73
GLUCOSE BLD-MCNC: 190 MG/DL (ref 74–109)
HCT VFR BLD CALC: 35.1 % (ref 34–48)
HEMOGLOBIN: 11.1 G/DL (ref 11.5–15.5)
INFLUENZA A BY PCR: NOT DETECTED
INFLUENZA B BY PCR: NOT DETECTED
MCH RBC QN AUTO: 26.7 PG (ref 26–35)
MCHC RBC AUTO-ENTMCNC: 31.6 % (ref 32–34.5)
MCV RBC AUTO: 84.4 FL (ref 80–99.9)
METER GLUCOSE: 162 MG/DL (ref 70–110)
METER GLUCOSE: 227 MG/DL (ref 70–110)
METER GLUCOSE: 261 MG/DL (ref 70–110)
METER GLUCOSE: 290 MG/DL (ref 70–110)
PDW BLD-RTO: 14.6 FL (ref 11.5–15)
PLATELET # BLD: 388 E9/L (ref 130–450)
PMV BLD AUTO: 11.1 FL (ref 7–12)
POTASSIUM REFLEX MAGNESIUM: 4.6 MMOL/L (ref 3.5–5)
PROCALCITONIN: 0.04 NG/ML (ref 0–0.08)
RBC # BLD: 4.16 E12/L (ref 3.5–5.5)
SODIUM BLD-SCNC: 139 MMOL/L (ref 132–146)
WBC # BLD: 12.3 E9/L (ref 4.5–11.5)

## 2018-10-16 PROCEDURE — 87502 INFLUENZA DNA AMP PROBE: CPT

## 2018-10-16 PROCEDURE — 6370000000 HC RX 637 (ALT 250 FOR IP): Performed by: INTERNAL MEDICINE

## 2018-10-16 PROCEDURE — G0378 HOSPITAL OBSERVATION PER HR: HCPCS

## 2018-10-16 PROCEDURE — 85027 COMPLETE CBC AUTOMATED: CPT

## 2018-10-16 PROCEDURE — 80048 BASIC METABOLIC PNL TOTAL CA: CPT

## 2018-10-16 PROCEDURE — 36415 COLL VENOUS BLD VENIPUNCTURE: CPT

## 2018-10-16 PROCEDURE — 2580000003 HC RX 258: Performed by: INTERNAL MEDICINE

## 2018-10-16 PROCEDURE — 6360000002 HC RX W HCPCS: Performed by: INTERNAL MEDICINE

## 2018-10-16 PROCEDURE — 84145 PROCALCITONIN (PCT): CPT

## 2018-10-16 PROCEDURE — 82962 GLUCOSE BLOOD TEST: CPT

## 2018-10-16 PROCEDURE — 94640 AIRWAY INHALATION TREATMENT: CPT

## 2018-10-16 RX ORDER — CLONIDINE HYDROCHLORIDE 0.2 MG/1
0.2 TABLET ORAL 2 TIMES DAILY
Status: DISCONTINUED | OUTPATIENT
Start: 2018-10-16 | End: 2018-10-18 | Stop reason: HOSPADM

## 2018-10-16 RX ORDER — DEXTROSE MONOHYDRATE 50 MG/ML
100 INJECTION, SOLUTION INTRAVENOUS PRN
Status: DISCONTINUED | OUTPATIENT
Start: 2018-10-16 | End: 2018-10-18 | Stop reason: HOSPADM

## 2018-10-16 RX ORDER — SODIUM CHLORIDE 9 MG/ML
INJECTION, SOLUTION INTRAVENOUS CONTINUOUS
Status: DISCONTINUED | OUTPATIENT
Start: 2018-10-16 | End: 2018-10-18 | Stop reason: HOSPADM

## 2018-10-16 RX ORDER — PREDNISONE 20 MG/1
40 TABLET ORAL DAILY
Status: DISCONTINUED | OUTPATIENT
Start: 2018-10-16 | End: 2018-10-18 | Stop reason: HOSPADM

## 2018-10-16 RX ORDER — DEXTROSE MONOHYDRATE 25 G/50ML
12.5 INJECTION, SOLUTION INTRAVENOUS PRN
Status: DISCONTINUED | OUTPATIENT
Start: 2018-10-16 | End: 2018-10-18 | Stop reason: HOSPADM

## 2018-10-16 RX ORDER — NICOTINE POLACRILEX 4 MG
15 LOZENGE BUCCAL PRN
Status: DISCONTINUED | OUTPATIENT
Start: 2018-10-16 | End: 2018-10-18 | Stop reason: HOSPADM

## 2018-10-16 RX ORDER — AMLODIPINE BESYLATE 10 MG/1
10 TABLET ORAL DAILY
Status: DISCONTINUED | OUTPATIENT
Start: 2018-10-16 | End: 2018-10-18 | Stop reason: HOSPADM

## 2018-10-16 RX ORDER — PREDNISONE 20 MG/1
40 TABLET ORAL DAILY
Qty: 8 TABLET | Refills: 0 | Status: CANCELLED | OUTPATIENT
Start: 2018-10-17 | End: 2018-10-21

## 2018-10-16 RX ORDER — SODIUM CHLORIDE 0.9 % (FLUSH) 0.9 %
5 SYRINGE (ML) INJECTION
Status: ACTIVE | OUTPATIENT
Start: 2018-10-16 | End: 2018-10-16

## 2018-10-16 RX ADMIN — OXYBUTYNIN CHLORIDE 5 MG: 5 TABLET ORAL at 22:06

## 2018-10-16 RX ADMIN — AMLODIPINE BESYLATE 10 MG: 10 TABLET ORAL at 13:18

## 2018-10-16 RX ADMIN — INSULIN LISPRO 5 UNITS: 100 INJECTION, SOLUTION INTRAVENOUS; SUBCUTANEOUS at 22:06

## 2018-10-16 RX ADMIN — Medication 1000 MCG: at 10:02

## 2018-10-16 RX ADMIN — METFORMIN HYDROCHLORIDE 500 MG: 500 TABLET ORAL at 17:09

## 2018-10-16 RX ADMIN — AZITHROMYCIN MONOHYDRATE 500 MG: 500 INJECTION, POWDER, LYOPHILIZED, FOR SOLUTION INTRAVENOUS at 10:19

## 2018-10-16 RX ADMIN — CLONIDINE HYDROCHLORIDE 0.2 MG: 0.2 TABLET ORAL at 22:06

## 2018-10-16 RX ADMIN — IPRATROPIUM BROMIDE AND ALBUTEROL SULFATE 1 AMPULE: .5; 3 SOLUTION RESPIRATORY (INHALATION) at 16:07

## 2018-10-16 RX ADMIN — CALCIUM CARBONATE-VITAMIN D TAB 500 MG-200 UNIT 1 TABLET: 500-200 TAB at 22:06

## 2018-10-16 RX ADMIN — METFORMIN HYDROCHLORIDE 500 MG: 500 TABLET ORAL at 10:03

## 2018-10-16 RX ADMIN — PRAVASTATIN SODIUM 80 MG: 20 TABLET ORAL at 10:03

## 2018-10-16 RX ADMIN — SODIUM CHLORIDE: 9 INJECTION, SOLUTION INTRAVENOUS at 13:17

## 2018-10-16 RX ADMIN — HYDRALAZINE HYDROCHLORIDE 10 MG: 20 INJECTION INTRAMUSCULAR; INTRAVENOUS at 10:18

## 2018-10-16 RX ADMIN — CALCIUM CARBONATE-VITAMIN D TAB 500 MG-200 UNIT 1 TABLET: 500-200 TAB at 10:03

## 2018-10-16 RX ADMIN — IPRATROPIUM BROMIDE AND ALBUTEROL SULFATE 1 AMPULE: .5; 3 SOLUTION RESPIRATORY (INHALATION) at 08:30

## 2018-10-16 RX ADMIN — INSULIN LISPRO 9 UNITS: 100 INJECTION, SOLUTION INTRAVENOUS; SUBCUTANEOUS at 17:13

## 2018-10-16 RX ADMIN — OXYBUTYNIN CHLORIDE 5 MG: 5 TABLET ORAL at 10:09

## 2018-10-16 RX ADMIN — GABAPENTIN 300 MG: 300 CAPSULE ORAL at 10:09

## 2018-10-16 RX ADMIN — SODIUM CHLORIDE: 9 INJECTION, SOLUTION INTRAVENOUS at 01:40

## 2018-10-16 RX ADMIN — INSULIN LISPRO 6 UNITS: 100 INJECTION, SOLUTION INTRAVENOUS; SUBCUTANEOUS at 13:26

## 2018-10-16 RX ADMIN — ACYCLOVIR 400 MG: 200 CAPSULE ORAL at 10:03

## 2018-10-16 RX ADMIN — INSULIN GLARGINE 50 UNITS: 100 INJECTION, SOLUTION SUBCUTANEOUS at 22:07

## 2018-10-16 RX ADMIN — ANASTROZOLE 1 MG: 1 TABLET, COATED ORAL at 10:03

## 2018-10-16 RX ADMIN — GABAPENTIN 300 MG: 300 CAPSULE ORAL at 22:06

## 2018-10-16 RX ADMIN — HYDRALAZINE HYDROCHLORIDE 10 MG: 20 INJECTION INTRAMUSCULAR; INTRAVENOUS at 03:29

## 2018-10-16 RX ADMIN — LINAGLIPTIN 5 MG: 5 TABLET, FILM COATED ORAL at 10:02

## 2018-10-16 RX ADMIN — IPRATROPIUM BROMIDE AND ALBUTEROL SULFATE 1 AMPULE: .5; 3 SOLUTION RESPIRATORY (INHALATION) at 20:15

## 2018-10-16 RX ADMIN — METHYLPREDNISOLONE SODIUM SUCCINATE 40 MG: 40 INJECTION, POWDER, FOR SOLUTION INTRAMUSCULAR; INTRAVENOUS at 10:03

## 2018-10-16 RX ADMIN — CLONIDINE HYDROCHLORIDE 0.2 MG: 0.2 TABLET ORAL at 13:19

## 2018-10-16 RX ADMIN — GABAPENTIN 300 MG: 300 CAPSULE ORAL at 13:24

## 2018-10-16 RX ADMIN — METOPROLOL TARTRATE 25 MG: 25 TABLET, FILM COATED ORAL at 10:04

## 2018-10-16 RX ADMIN — INSULIN LISPRO 3 UNITS: 100 INJECTION, SOLUTION INTRAVENOUS; SUBCUTANEOUS at 10:13

## 2018-10-16 RX ADMIN — OXYBUTYNIN CHLORIDE 5 MG: 5 TABLET ORAL at 13:24

## 2018-10-16 RX ADMIN — ASPIRIN 81 MG CHEWABLE TABLET 81 MG: 81 TABLET CHEWABLE at 10:07

## 2018-10-16 RX ADMIN — Medication 10 ML: at 10:01

## 2018-10-16 RX ADMIN — CEFTRIAXONE SODIUM 1 G: 1 INJECTION, POWDER, FOR SOLUTION INTRAMUSCULAR; INTRAVENOUS at 10:00

## 2018-10-16 RX ADMIN — ENOXAPARIN SODIUM 40 MG: 40 INJECTION SUBCUTANEOUS at 10:00

## 2018-10-16 ASSESSMENT — PAIN SCALES - GENERAL
PAINLEVEL_OUTOF10: 0

## 2018-10-16 NOTE — DISCHARGE SUMMARY
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 - Potential duplicate medications found. Please discuss with provider.         Activity: activity as tolerated  Diet: diabetic diet    Follow-up with PCP/ cardiology    Note that over 30 minutes was spent in preparing discharge papers, discussing discharge with patient, medication review, etc.    Signed:  Mihai Luo MD  10/16/2018  10:53 AM

## 2018-10-17 LAB
METER GLUCOSE: 131 MG/DL (ref 70–110)
METER GLUCOSE: 139 MG/DL (ref 70–110)
METER GLUCOSE: 383 MG/DL (ref 70–110)
METER GLUCOSE: 86 MG/DL (ref 70–110)

## 2018-10-17 PROCEDURE — G0378 HOSPITAL OBSERVATION PER HR: HCPCS

## 2018-10-17 PROCEDURE — 94640 AIRWAY INHALATION TREATMENT: CPT

## 2018-10-17 PROCEDURE — 2580000003 HC RX 258: Performed by: INTERNAL MEDICINE

## 2018-10-17 PROCEDURE — 6370000000 HC RX 637 (ALT 250 FOR IP): Performed by: INTERNAL MEDICINE

## 2018-10-17 PROCEDURE — 82962 GLUCOSE BLOOD TEST: CPT

## 2018-10-17 PROCEDURE — 6360000002 HC RX W HCPCS: Performed by: INTERNAL MEDICINE

## 2018-10-17 RX ADMIN — INSULIN GLARGINE 50 UNITS: 100 INJECTION, SOLUTION SUBCUTANEOUS at 21:44

## 2018-10-17 RX ADMIN — OXYBUTYNIN CHLORIDE 5 MG: 5 TABLET ORAL at 10:37

## 2018-10-17 RX ADMIN — ANASTROZOLE 1 MG: 1 TABLET, COATED ORAL at 10:45

## 2018-10-17 RX ADMIN — GABAPENTIN 300 MG: 300 CAPSULE ORAL at 21:40

## 2018-10-17 RX ADMIN — ASPIRIN 81 MG CHEWABLE TABLET 81 MG: 81 TABLET CHEWABLE at 10:39

## 2018-10-17 RX ADMIN — ACYCLOVIR 400 MG: 200 CAPSULE ORAL at 10:38

## 2018-10-17 RX ADMIN — LINAGLIPTIN 5 MG: 5 TABLET, FILM COATED ORAL at 10:38

## 2018-10-17 RX ADMIN — OXYBUTYNIN CHLORIDE 5 MG: 5 TABLET ORAL at 13:21

## 2018-10-17 RX ADMIN — METFORMIN HYDROCHLORIDE 500 MG: 500 TABLET ORAL at 17:44

## 2018-10-17 RX ADMIN — INSULIN LISPRO 7 UNITS: 100 INJECTION, SOLUTION INTRAVENOUS; SUBCUTANEOUS at 21:44

## 2018-10-17 RX ADMIN — PREDNISONE 40 MG: 20 TABLET ORAL at 10:38

## 2018-10-17 RX ADMIN — IPRATROPIUM BROMIDE AND ALBUTEROL SULFATE 1 AMPULE: .5; 3 SOLUTION RESPIRATORY (INHALATION) at 20:18

## 2018-10-17 RX ADMIN — CALCIUM CARBONATE-VITAMIN D TAB 500 MG-200 UNIT 1 TABLET: 500-200 TAB at 21:40

## 2018-10-17 RX ADMIN — AMLODIPINE BESYLATE 10 MG: 10 TABLET ORAL at 10:37

## 2018-10-17 RX ADMIN — GABAPENTIN 300 MG: 300 CAPSULE ORAL at 10:38

## 2018-10-17 RX ADMIN — CLONIDINE HYDROCHLORIDE 0.2 MG: 0.2 TABLET ORAL at 21:41

## 2018-10-17 RX ADMIN — CLONIDINE HYDROCHLORIDE 0.2 MG: 0.2 TABLET ORAL at 10:38

## 2018-10-17 RX ADMIN — OXYBUTYNIN CHLORIDE 5 MG: 5 TABLET ORAL at 21:40

## 2018-10-17 RX ADMIN — CEFTRIAXONE SODIUM 1 G: 1 INJECTION, POWDER, FOR SOLUTION INTRAMUSCULAR; INTRAVENOUS at 10:39

## 2018-10-17 RX ADMIN — Medication 10 ML: at 10:39

## 2018-10-17 RX ADMIN — AZITHROMYCIN MONOHYDRATE 500 MG: 500 INJECTION, POWDER, LYOPHILIZED, FOR SOLUTION INTRAVENOUS at 10:45

## 2018-10-17 RX ADMIN — MAGNESIUM HYDROXIDE 30 ML: 400 SUSPENSION ORAL at 13:21

## 2018-10-17 RX ADMIN — CALCIUM CARBONATE-VITAMIN D TAB 500 MG-200 UNIT 1 TABLET: 500-200 TAB at 10:38

## 2018-10-17 RX ADMIN — GABAPENTIN 300 MG: 300 CAPSULE ORAL at 13:21

## 2018-10-17 RX ADMIN — Medication 1000 MCG: at 10:39

## 2018-10-17 RX ADMIN — METOPROLOL TARTRATE 25 MG: 25 TABLET, FILM COATED ORAL at 21:40

## 2018-10-17 RX ADMIN — METFORMIN HYDROCHLORIDE 500 MG: 500 TABLET ORAL at 10:38

## 2018-10-17 RX ADMIN — PRAVASTATIN SODIUM 80 MG: 20 TABLET ORAL at 10:37

## 2018-10-17 RX ADMIN — METOPROLOL TARTRATE 25 MG: 25 TABLET, FILM COATED ORAL at 10:38

## 2018-10-17 RX ADMIN — IPRATROPIUM BROMIDE AND ALBUTEROL SULFATE 1 AMPULE: .5; 3 SOLUTION RESPIRATORY (INHALATION) at 14:55

## 2018-10-18 VITALS
RESPIRATION RATE: 16 BRPM | SYSTOLIC BLOOD PRESSURE: 140 MMHG | HEIGHT: 66 IN | OXYGEN SATURATION: 97 % | TEMPERATURE: 98.4 F | HEART RATE: 84 BPM | WEIGHT: 242 LBS | BODY MASS INDEX: 38.89 KG/M2 | DIASTOLIC BLOOD PRESSURE: 60 MMHG

## 2018-10-18 LAB
LV EF: 48 %
LVEF MODALITY: NORMAL
METER GLUCOSE: 210 MG/DL (ref 70–110)
METER GLUCOSE: 372 MG/DL (ref 70–110)
METER GLUCOSE: 70 MG/DL (ref 70–110)
METER GLUCOSE: 86 MG/DL (ref 70–110)

## 2018-10-18 PROCEDURE — G0378 HOSPITAL OBSERVATION PER HR: HCPCS

## 2018-10-18 PROCEDURE — 6370000000 HC RX 637 (ALT 250 FOR IP): Performed by: INTERNAL MEDICINE

## 2018-10-18 PROCEDURE — 6360000002 HC RX W HCPCS: Performed by: INTERNAL MEDICINE

## 2018-10-18 PROCEDURE — 82962 GLUCOSE BLOOD TEST: CPT

## 2018-10-18 PROCEDURE — 93306 TTE W/DOPPLER COMPLETE: CPT

## 2018-10-18 PROCEDURE — 94640 AIRWAY INHALATION TREATMENT: CPT

## 2018-10-18 RX ORDER — PREDNISONE 20 MG/1
40 TABLET ORAL DAILY
Qty: 15 TABLET | Refills: 0 | Status: SHIPPED | OUTPATIENT
Start: 2018-10-19 | End: 2018-10-29

## 2018-10-18 RX ORDER — ASPIRIN 81 MG/1
81 TABLET, CHEWABLE ORAL DAILY
Qty: 30 TABLET | Refills: 3 | Status: SHIPPED | OUTPATIENT
Start: 2018-10-18 | End: 2019-08-21 | Stop reason: SDUPTHER

## 2018-10-18 RX ADMIN — METFORMIN HYDROCHLORIDE 500 MG: 500 TABLET ORAL at 09:02

## 2018-10-18 RX ADMIN — OXYBUTYNIN CHLORIDE 5 MG: 5 TABLET ORAL at 09:03

## 2018-10-18 RX ADMIN — Medication 1000 MCG: at 09:03

## 2018-10-18 RX ADMIN — LINAGLIPTIN 5 MG: 5 TABLET, FILM COATED ORAL at 09:03

## 2018-10-18 RX ADMIN — CALCIUM CARBONATE-VITAMIN D TAB 500 MG-200 UNIT 1 TABLET: 500-200 TAB at 09:03

## 2018-10-18 RX ADMIN — OXYBUTYNIN CHLORIDE 5 MG: 5 TABLET ORAL at 13:43

## 2018-10-18 RX ADMIN — PRAVASTATIN SODIUM 80 MG: 20 TABLET ORAL at 09:02

## 2018-10-18 RX ADMIN — INSULIN LISPRO 6 UNITS: 100 INJECTION, SOLUTION INTRAVENOUS; SUBCUTANEOUS at 09:09

## 2018-10-18 RX ADMIN — METFORMIN HYDROCHLORIDE 500 MG: 500 TABLET ORAL at 17:14

## 2018-10-18 RX ADMIN — INSULIN LISPRO 15 UNITS: 100 INJECTION, SOLUTION INTRAVENOUS; SUBCUTANEOUS at 16:56

## 2018-10-18 RX ADMIN — GABAPENTIN 300 MG: 300 CAPSULE ORAL at 09:03

## 2018-10-18 RX ADMIN — PREDNISONE 40 MG: 20 TABLET ORAL at 09:02

## 2018-10-18 RX ADMIN — ANASTROZOLE 1 MG: 1 TABLET, COATED ORAL at 09:03

## 2018-10-18 RX ADMIN — ASPIRIN 81 MG CHEWABLE TABLET 81 MG: 81 TABLET CHEWABLE at 09:03

## 2018-10-18 RX ADMIN — ENOXAPARIN SODIUM 40 MG: 40 INJECTION SUBCUTANEOUS at 09:01

## 2018-10-18 RX ADMIN — CLONIDINE HYDROCHLORIDE 0.2 MG: 0.2 TABLET ORAL at 09:02

## 2018-10-18 RX ADMIN — METOPROLOL TARTRATE 25 MG: 25 TABLET, FILM COATED ORAL at 09:03

## 2018-10-18 RX ADMIN — IPRATROPIUM BROMIDE AND ALBUTEROL SULFATE 1 AMPULE: .5; 3 SOLUTION RESPIRATORY (INHALATION) at 14:06

## 2018-10-18 RX ADMIN — ACYCLOVIR 400 MG: 200 CAPSULE ORAL at 09:03

## 2018-10-18 RX ADMIN — AMLODIPINE BESYLATE 10 MG: 10 TABLET ORAL at 09:03

## 2018-10-18 RX ADMIN — GABAPENTIN 300 MG: 300 CAPSULE ORAL at 13:43

## 2018-10-18 ASSESSMENT — PAIN SCALES - GENERAL
PAINLEVEL_OUTOF10: 0
PAINLEVEL_OUTOF10: 0

## 2018-10-18 NOTE — PROGRESS NOTES
All discharge instructions reviewed with patient at bedside. Patient verbalizes understanding of all medications and importance of follow up appointments.

## 2018-10-20 LAB
BLOOD CULTURE, ROUTINE: NORMAL
CULTURE, BLOOD 2: NORMAL

## 2018-10-22 LAB
EKG ATRIAL RATE: 92 BPM
EKG P AXIS: 54 DEGREES
EKG P-R INTERVAL: 166 MS
EKG Q-T INTERVAL: 414 MS
EKG QRS DURATION: 138 MS
EKG QTC CALCULATION (BAZETT): 511 MS
EKG R AXIS: 18 DEGREES
EKG T AXIS: 101 DEGREES
EKG VENTRICULAR RATE: 92 BPM

## 2018-11-13 RX ORDER — ANASTROZOLE 1 MG/1
1 TABLET ORAL DAILY
Qty: 30 TABLET | Refills: 11 | Status: SHIPPED | OUTPATIENT
Start: 2018-11-13 | End: 2019-08-03 | Stop reason: SDUPTHER

## 2018-11-28 ENCOUNTER — TELEPHONE (OUTPATIENT)
Dept: CARDIOLOGY CLINIC | Age: 68
End: 2018-11-28

## 2018-11-28 DIAGNOSIS — R06.02 SOBOE (SHORTNESS OF BREATH ON EXERTION): ICD-10-CM

## 2018-11-28 DIAGNOSIS — I20.8 STABLE ANGINA PECTORIS (HCC): ICD-10-CM

## 2018-11-28 DIAGNOSIS — I10 ESSENTIAL HYPERTENSION: ICD-10-CM

## 2018-11-28 DIAGNOSIS — R55 SYNCOPE AND COLLAPSE: ICD-10-CM

## 2018-11-28 DIAGNOSIS — E11.65 UNCONTROLLED TYPE 2 DIABETES MELLITUS WITH HYPERGLYCEMIA (HCC): Primary | ICD-10-CM

## 2018-11-29 DIAGNOSIS — E11.65 UNCONTROLLED TYPE 2 DIABETES MELLITUS WITH HYPERGLYCEMIA (HCC): ICD-10-CM

## 2018-11-29 DIAGNOSIS — I10 ESSENTIAL HYPERTENSION: ICD-10-CM

## 2018-11-29 DIAGNOSIS — I20.8 ANGINA AT REST (HCC): Primary | ICD-10-CM

## 2018-11-29 DIAGNOSIS — J45.40 MODERATE PERSISTENT ASTHMA WITHOUT COMPLICATION: ICD-10-CM

## 2018-11-29 RX ORDER — SAXAGLIPTIN 5 MG/1
5 TABLET, FILM COATED ORAL DAILY
Qty: 90 TABLET | Refills: 3 | Status: SHIPPED | OUTPATIENT
Start: 2018-11-29 | End: 2019-03-06

## 2018-11-29 RX ORDER — MONTELUKAST SODIUM 10 MG/1
TABLET ORAL
Qty: 90 TABLET | Refills: 3 | Status: SHIPPED | OUTPATIENT
Start: 2018-11-29 | End: 2019-03-06

## 2018-12-10 ENCOUNTER — HOSPITAL ENCOUNTER (OUTPATIENT)
Dept: NON INVASIVE DIAGNOSTICS | Age: 68
Discharge: HOME | End: 2018-12-10
Payer: MEDICARE

## 2018-12-10 VITALS — BODY MASS INDEX: 40.32 KG/M2 | HEIGHT: 65 IN | WEIGHT: 242 LBS

## 2018-12-10 PROCEDURE — 93017 CV STRESS TEST TRACING ONLY: CPT

## 2018-12-10 PROCEDURE — 93351 STRESS TTE COMPLETE: CPT | Performed by: INTERNAL MEDICINE

## 2018-12-10 PROCEDURE — 6360000004 HC RX CONTRAST MEDICATION: Performed by: INTERNAL MEDICINE

## 2018-12-10 PROCEDURE — 93350 STRESS TTE ONLY: CPT

## 2018-12-10 PROCEDURE — 2580000003 HC RX 258: Performed by: INTERNAL MEDICINE

## 2018-12-10 PROCEDURE — 6360000002 HC RX W HCPCS: Performed by: INTERNAL MEDICINE

## 2018-12-10 RX ORDER — DOBUTAMINE HYDROCHLORIDE 200 MG/100ML
10 INJECTION INTRAVENOUS CONTINUOUS
Status: DISCONTINUED | OUTPATIENT
Start: 2018-12-10 | End: 2018-12-10 | Stop reason: SDUPTHER

## 2018-12-10 RX ADMIN — PERFLUTREN 1.65 MG: 6.52 INJECTION, SUSPENSION INTRAVENOUS at 10:54

## 2018-12-10 RX ADMIN — DOBUTAMINE 42.04 MG: 12.5 INJECTION, SOLUTION, CONCENTRATE INTRAVENOUS at 10:21

## 2018-12-27 ENCOUNTER — TELEPHONE (OUTPATIENT)
Dept: CARDIOLOGY CLINIC | Age: 68
End: 2018-12-27

## 2019-01-15 ENCOUNTER — HOSPITAL ENCOUNTER (INPATIENT)
Age: 69
LOS: 3 days | Discharge: HOME HEALTH | DRG: 292 | End: 2019-01-18
Attending: EMERGENCY MEDICINE | Admitting: INTERNAL MEDICINE
Payer: MEDICARE

## 2019-01-15 ENCOUNTER — APPOINTMENT (OUTPATIENT)
Dept: CT IMAGING | Age: 69
DRG: 292 | End: 2019-01-15
Payer: MEDICARE

## 2019-01-15 ENCOUNTER — APPOINTMENT (OUTPATIENT)
Dept: GENERAL RADIOLOGY | Age: 69
DRG: 292 | End: 2019-01-15
Payer: MEDICARE

## 2019-01-15 DIAGNOSIS — I50.9 ACUTE ON CHRONIC CONGESTIVE HEART FAILURE, UNSPECIFIED HEART FAILURE TYPE (HCC): ICD-10-CM

## 2019-01-15 DIAGNOSIS — J96.01 ACUTE RESPIRATORY FAILURE WITH HYPOXIA (HCC): Primary | ICD-10-CM

## 2019-01-15 PROBLEM — J96.00 ACUTE RESPIRATORY FAILURE (HCC): Status: ACTIVE | Noted: 2019-01-15

## 2019-01-15 PROBLEM — J96.00 ACUTE RESPIRATORY FAILURE (HCC): Status: RESOLVED | Noted: 2019-01-15 | Resolved: 2019-01-15

## 2019-01-15 PROBLEM — Z86.73 HISTORY OF CVA (CEREBROVASCULAR ACCIDENT): Chronic | Status: ACTIVE | Noted: 2019-01-15

## 2019-01-15 PROBLEM — I10 HYPERTENSION, UNCONTROLLED: Status: RESOLVED | Noted: 2018-10-15 | Resolved: 2019-01-15

## 2019-01-15 PROBLEM — I50.43 ACUTE ON CHRONIC COMBINED SYSTOLIC AND DIASTOLIC CONGESTIVE HEART FAILURE (HCC): Status: ACTIVE | Noted: 2019-01-15

## 2019-01-15 PROBLEM — M19.031 ARTHRITIS OF RIGHT WRIST: Status: RESOLVED | Noted: 2017-09-20 | Resolved: 2019-01-15

## 2019-01-15 PROBLEM — B00.9 HERPES SIMPLEX: Status: RESOLVED | Noted: 2017-07-13 | Resolved: 2019-01-15

## 2019-01-15 PROBLEM — J44.9 COPD (CHRONIC OBSTRUCTIVE PULMONARY DISEASE) (HCC): Chronic | Status: ACTIVE | Noted: 2019-01-15

## 2019-01-15 PROBLEM — J45.901 ACUTE ASTHMA EXACERBATION: Status: RESOLVED | Noted: 2018-10-15 | Resolved: 2019-01-15

## 2019-01-15 PROBLEM — I10 ESSENTIAL HYPERTENSION: Chronic | Status: ACTIVE | Noted: 2019-01-15

## 2019-01-15 PROBLEM — R55 SYNCOPE AND COLLAPSE: Status: RESOLVED | Noted: 2018-07-14 | Resolved: 2019-01-15

## 2019-01-15 PROBLEM — N39.46 MIXED STRESS AND URGE URINARY INCONTINENCE: Status: RESOLVED | Noted: 2018-05-18 | Resolved: 2019-01-15

## 2019-01-15 LAB
ANION GAP SERPL CALCULATED.3IONS-SCNC: 13 MMOL/L (ref 7–16)
BASOPHILS ABSOLUTE: 0.04 E9/L (ref 0–0.2)
BASOPHILS RELATIVE PERCENT: 0.4 % (ref 0–2)
BUN BLDV-MCNC: 8 MG/DL (ref 8–23)
CALCIUM SERPL-MCNC: 9 MG/DL (ref 8.6–10.2)
CHLORIDE BLD-SCNC: 97 MMOL/L (ref 98–107)
CO2: 25 MMOL/L (ref 22–29)
CREAT SERPL-MCNC: 0.6 MG/DL (ref 0.5–1)
EOSINOPHILS ABSOLUTE: 0 E9/L (ref 0.05–0.5)
EOSINOPHILS RELATIVE PERCENT: 0 % (ref 0–6)
GFR AFRICAN AMERICAN: >60
GFR NON-AFRICAN AMERICAN: >60 ML/MIN/1.73
GLUCOSE BLD-MCNC: 316 MG/DL (ref 74–99)
HBA1C MFR BLD: 10.1 % (ref 4–5.6)
HCT VFR BLD CALC: 35.4 % (ref 34–48)
HEMOGLOBIN: 11.4 G/DL (ref 11.5–15.5)
IMMATURE GRANULOCYTES #: 0.04 E9/L
IMMATURE GRANULOCYTES %: 0.4 % (ref 0–5)
LYMPHOCYTES ABSOLUTE: 1.1 E9/L (ref 1.5–4)
LYMPHOCYTES RELATIVE PERCENT: 10.2 % (ref 20–42)
MCH RBC QN AUTO: 27.3 PG (ref 26–35)
MCHC RBC AUTO-ENTMCNC: 32.2 % (ref 32–34.5)
MCV RBC AUTO: 84.7 FL (ref 80–99.9)
METER GLUCOSE: 272 MG/DL (ref 74–99)
METER GLUCOSE: 319 MG/DL (ref 74–99)
MONOCYTES ABSOLUTE: 0.83 E9/L (ref 0.1–0.95)
MONOCYTES RELATIVE PERCENT: 7.7 % (ref 2–12)
NEUTROPHILS ABSOLUTE: 8.81 E9/L (ref 1.8–7.3)
NEUTROPHILS RELATIVE PERCENT: 81.3 % (ref 43–80)
PDW BLD-RTO: 13.6 FL (ref 11.5–15)
PLATELET # BLD: 355 E9/L (ref 130–450)
PMV BLD AUTO: 11.6 FL (ref 7–12)
POTASSIUM SERPL-SCNC: 3.6 MMOL/L (ref 3.5–5)
PRO-BNP: 715 PG/ML (ref 0–125)
RBC # BLD: 4.18 E12/L (ref 3.5–5.5)
SODIUM BLD-SCNC: 135 MMOL/L (ref 132–146)
TROPONIN: <0.01 NG/ML (ref 0–0.03)
WBC # BLD: 10.8 E9/L (ref 4.5–11.5)

## 2019-01-15 PROCEDURE — 2580000003 HC RX 258: Performed by: INTERNAL MEDICINE

## 2019-01-15 PROCEDURE — 99285 EMERGENCY DEPT VISIT HI MDM: CPT

## 2019-01-15 PROCEDURE — 80048 BASIC METABOLIC PNL TOTAL CA: CPT

## 2019-01-15 PROCEDURE — 6370000000 HC RX 637 (ALT 250 FOR IP): Performed by: INTERNAL MEDICINE

## 2019-01-15 PROCEDURE — 2140000000 HC CCU INTERMEDIATE R&B

## 2019-01-15 PROCEDURE — 6370000000 HC RX 637 (ALT 250 FOR IP): Performed by: EMERGENCY MEDICINE

## 2019-01-15 PROCEDURE — 2580000003 HC RX 258: Performed by: RADIOLOGY

## 2019-01-15 PROCEDURE — 6360000002 HC RX W HCPCS: Performed by: INTERNAL MEDICINE

## 2019-01-15 PROCEDURE — 6360000002 HC RX W HCPCS: Performed by: EMERGENCY MEDICINE

## 2019-01-15 PROCEDURE — 83880 ASSAY OF NATRIURETIC PEPTIDE: CPT

## 2019-01-15 PROCEDURE — 2700000000 HC OXYGEN THERAPY PER DAY

## 2019-01-15 PROCEDURE — 93005 ELECTROCARDIOGRAM TRACING: CPT | Performed by: EMERGENCY MEDICINE

## 2019-01-15 PROCEDURE — 6360000004 HC RX CONTRAST MEDICATION: Performed by: RADIOLOGY

## 2019-01-15 PROCEDURE — 71275 CT ANGIOGRAPHY CHEST: CPT

## 2019-01-15 PROCEDURE — 71045 X-RAY EXAM CHEST 1 VIEW: CPT

## 2019-01-15 PROCEDURE — 94664 DEMO&/EVAL PT USE INHALER: CPT

## 2019-01-15 PROCEDURE — 84484 ASSAY OF TROPONIN QUANT: CPT

## 2019-01-15 PROCEDURE — 82962 GLUCOSE BLOOD TEST: CPT

## 2019-01-15 PROCEDURE — 36415 COLL VENOUS BLD VENIPUNCTURE: CPT

## 2019-01-15 PROCEDURE — 85025 COMPLETE CBC W/AUTO DIFF WBC: CPT

## 2019-01-15 PROCEDURE — 83036 HEMOGLOBIN GLYCOSYLATED A1C: CPT

## 2019-01-15 PROCEDURE — 94640 AIRWAY INHALATION TREATMENT: CPT

## 2019-01-15 RX ORDER — NITROGLYCERIN 0.4 MG/1
0.4 TABLET SUBLINGUAL EVERY 5 MIN PRN
Status: DISCONTINUED | OUTPATIENT
Start: 2019-01-15 | End: 2019-01-18 | Stop reason: HOSPADM

## 2019-01-15 RX ORDER — MONTELUKAST SODIUM 10 MG/1
10 TABLET ORAL NIGHTLY
Status: DISCONTINUED | OUTPATIENT
Start: 2019-01-15 | End: 2019-01-18 | Stop reason: HOSPADM

## 2019-01-15 RX ORDER — IPRATROPIUM BROMIDE AND ALBUTEROL SULFATE 2.5; .5 MG/3ML; MG/3ML
1 SOLUTION RESPIRATORY (INHALATION)
Status: DISCONTINUED | OUTPATIENT
Start: 2019-01-15 | End: 2019-01-18 | Stop reason: HOSPADM

## 2019-01-15 RX ORDER — OYSTER SHELL CALCIUM WITH VITAMIN D 500; 200 MG/1; [IU]/1
1 TABLET, FILM COATED ORAL DAILY
Status: DISCONTINUED | OUTPATIENT
Start: 2019-01-15 | End: 2019-01-18 | Stop reason: HOSPADM

## 2019-01-15 RX ORDER — SODIUM CHLORIDE 0.9 % (FLUSH) 0.9 %
10 SYRINGE (ML) INJECTION PRN
Status: DISCONTINUED | OUTPATIENT
Start: 2019-01-15 | End: 2019-01-18 | Stop reason: HOSPADM

## 2019-01-15 RX ORDER — DILTIAZEM HYDROCHLORIDE 240 MG/1
240 CAPSULE, COATED, EXTENDED RELEASE ORAL DAILY
Status: DISCONTINUED | OUTPATIENT
Start: 2019-01-15 | End: 2019-01-18 | Stop reason: HOSPADM

## 2019-01-15 RX ORDER — ACYCLOVIR 200 MG/1
400 CAPSULE ORAL DAILY
Status: DISCONTINUED | OUTPATIENT
Start: 2019-01-15 | End: 2019-01-18 | Stop reason: HOSPADM

## 2019-01-15 RX ORDER — LOSARTAN POTASSIUM AND HYDROCHLOROTHIAZIDE 25; 100 MG/1; MG/1
1 TABLET ORAL DAILY
Status: DISCONTINUED | OUTPATIENT
Start: 2019-01-15 | End: 2019-01-15 | Stop reason: CLARIF

## 2019-01-15 RX ORDER — FORMOTEROL FUMARATE 20 UG/2ML
20 SOLUTION RESPIRATORY (INHALATION) 2 TIMES DAILY
Status: DISCONTINUED | OUTPATIENT
Start: 2019-01-15 | End: 2019-01-18 | Stop reason: HOSPADM

## 2019-01-15 RX ORDER — ALBUTEROL SULFATE 1.25 MG/3ML
1.25 SOLUTION RESPIRATORY (INHALATION) EVERY 6 HOURS PRN
Status: DISCONTINUED | OUTPATIENT
Start: 2019-01-15 | End: 2019-01-18 | Stop reason: HOSPADM

## 2019-01-15 RX ORDER — LOSARTAN POTASSIUM 50 MG/1
100 TABLET ORAL DAILY
Status: DISCONTINUED | OUTPATIENT
Start: 2019-01-15 | End: 2019-01-18 | Stop reason: HOSPADM

## 2019-01-15 RX ORDER — CLONIDINE HYDROCHLORIDE 0.1 MG/1
0.1 TABLET ORAL 3 TIMES DAILY
Status: DISCONTINUED | OUTPATIENT
Start: 2019-01-15 | End: 2019-01-18 | Stop reason: HOSPADM

## 2019-01-15 RX ORDER — DEXTROSE MONOHYDRATE 25 G/50ML
12.5 INJECTION, SOLUTION INTRAVENOUS PRN
Status: DISCONTINUED | OUTPATIENT
Start: 2019-01-15 | End: 2019-01-18 | Stop reason: HOSPADM

## 2019-01-15 RX ORDER — OXYBUTYNIN CHLORIDE 5 MG/1
5 TABLET ORAL 3 TIMES DAILY
Status: DISCONTINUED | OUTPATIENT
Start: 2019-01-15 | End: 2019-01-18 | Stop reason: HOSPADM

## 2019-01-15 RX ORDER — NICOTINE POLACRILEX 4 MG
15 LOZENGE BUCCAL PRN
Status: DISCONTINUED | OUTPATIENT
Start: 2019-01-15 | End: 2019-01-18 | Stop reason: HOSPADM

## 2019-01-15 RX ORDER — ACETAMINOPHEN 500 MG
500 TABLET ORAL EVERY 6 HOURS PRN
Status: DISCONTINUED | OUTPATIENT
Start: 2019-01-15 | End: 2019-01-18 | Stop reason: HOSPADM

## 2019-01-15 RX ORDER — ASPIRIN 81 MG/1
81 TABLET, CHEWABLE ORAL DAILY
Status: DISCONTINUED | OUTPATIENT
Start: 2019-01-15 | End: 2019-01-18 | Stop reason: HOSPADM

## 2019-01-15 RX ORDER — GABAPENTIN 300 MG/1
300 CAPSULE ORAL 3 TIMES DAILY
Status: DISCONTINUED | OUTPATIENT
Start: 2019-01-15 | End: 2019-01-18 | Stop reason: HOSPADM

## 2019-01-15 RX ORDER — ANASTROZOLE 1 MG/1
1 TABLET ORAL DAILY
Status: DISCONTINUED | OUTPATIENT
Start: 2019-01-15 | End: 2019-01-18 | Stop reason: HOSPADM

## 2019-01-15 RX ORDER — FUROSEMIDE 10 MG/ML
40 INJECTION INTRAMUSCULAR; INTRAVENOUS 2 TIMES DAILY
Status: DISCONTINUED | OUTPATIENT
Start: 2019-01-15 | End: 2019-01-18

## 2019-01-15 RX ORDER — FUROSEMIDE 10 MG/ML
40 INJECTION INTRAMUSCULAR; INTRAVENOUS ONCE
Status: COMPLETED | OUTPATIENT
Start: 2019-01-15 | End: 2019-01-15

## 2019-01-15 RX ORDER — SODIUM CHLORIDE 0.9 % (FLUSH) 0.9 %
10 SYRINGE (ML) INJECTION EVERY 12 HOURS SCHEDULED
Status: DISCONTINUED | OUTPATIENT
Start: 2019-01-15 | End: 2019-01-18 | Stop reason: HOSPADM

## 2019-01-15 RX ORDER — PRAVASTATIN SODIUM 20 MG
80 TABLET ORAL NIGHTLY
Status: DISCONTINUED | OUTPATIENT
Start: 2019-01-15 | End: 2019-01-18 | Stop reason: HOSPADM

## 2019-01-15 RX ORDER — IPRATROPIUM BROMIDE AND ALBUTEROL SULFATE 2.5; .5 MG/3ML; MG/3ML
1 SOLUTION RESPIRATORY (INHALATION) ONCE
Status: COMPLETED | OUTPATIENT
Start: 2019-01-15 | End: 2019-01-15

## 2019-01-15 RX ORDER — HYDROCHLOROTHIAZIDE 25 MG/1
25 TABLET ORAL DAILY
Status: DISCONTINUED | OUTPATIENT
Start: 2019-01-15 | End: 2019-01-18 | Stop reason: HOSPADM

## 2019-01-15 RX ORDER — INSULIN GLARGINE 100 [IU]/ML
50 INJECTION, SOLUTION SUBCUTANEOUS NIGHTLY
Status: DISCONTINUED | OUTPATIENT
Start: 2019-01-15 | End: 2019-01-18 | Stop reason: HOSPADM

## 2019-01-15 RX ORDER — BUDESONIDE 0.5 MG/2ML
500 INHALANT ORAL 2 TIMES DAILY
Status: DISCONTINUED | OUTPATIENT
Start: 2019-01-15 | End: 2019-01-18 | Stop reason: HOSPADM

## 2019-01-15 RX ORDER — ONDANSETRON 2 MG/ML
4 INJECTION INTRAMUSCULAR; INTRAVENOUS EVERY 6 HOURS PRN
Status: DISCONTINUED | OUTPATIENT
Start: 2019-01-15 | End: 2019-01-18 | Stop reason: HOSPADM

## 2019-01-15 RX ORDER — DEXTROSE MONOHYDRATE 50 MG/ML
100 INJECTION, SOLUTION INTRAVENOUS PRN
Status: DISCONTINUED | OUTPATIENT
Start: 2019-01-15 | End: 2019-01-18 | Stop reason: HOSPADM

## 2019-01-15 RX ORDER — NORTRIPTYLINE HYDROCHLORIDE 10 MG/1
10 CAPSULE ORAL NIGHTLY
Status: DISCONTINUED | OUTPATIENT
Start: 2019-01-15 | End: 2019-01-18 | Stop reason: HOSPADM

## 2019-01-15 RX ORDER — SODIUM CHLORIDE 0.9 % (FLUSH) 0.9 %
10 SYRINGE (ML) INJECTION
Status: COMPLETED | OUTPATIENT
Start: 2019-01-15 | End: 2019-01-15

## 2019-01-15 RX ADMIN — MONTELUKAST SODIUM 10 MG: 10 TABLET, FILM COATED ORAL at 21:04

## 2019-01-15 RX ADMIN — FUROSEMIDE 40 MG: 10 INJECTION, SOLUTION INTRAMUSCULAR; INTRAVENOUS at 21:04

## 2019-01-15 RX ADMIN — INSULIN LISPRO 6 UNITS: 100 INJECTION, SOLUTION INTRAVENOUS; SUBCUTANEOUS at 16:53

## 2019-01-15 RX ADMIN — FORMOTEROL FUMARATE DIHYDRATE 20 MCG: 20 SOLUTION RESPIRATORY (INHALATION) at 21:15

## 2019-01-15 RX ADMIN — LINAGLIPTIN 5 MG: 5 TABLET, FILM COATED ORAL at 16:56

## 2019-01-15 RX ADMIN — ASPIRIN 81 MG 81 MG: 81 TABLET ORAL at 16:53

## 2019-01-15 RX ADMIN — IOPAMIDOL 60 ML: 755 INJECTION, SOLUTION INTRAVENOUS at 09:34

## 2019-01-15 RX ADMIN — INSULIN LISPRO 4 UNITS: 100 INJECTION, SOLUTION INTRAVENOUS; SUBCUTANEOUS at 21:05

## 2019-01-15 RX ADMIN — IPRATROPIUM BROMIDE AND ALBUTEROL SULFATE 1 AMPULE: .5; 3 SOLUTION RESPIRATORY (INHALATION) at 16:43

## 2019-01-15 RX ADMIN — GABAPENTIN 300 MG: 300 CAPSULE ORAL at 16:53

## 2019-01-15 RX ADMIN — Medication 10 ML: at 09:34

## 2019-01-15 RX ADMIN — BUDESONIDE 500 MCG: 0.5 INHALANT RESPIRATORY (INHALATION) at 21:15

## 2019-01-15 RX ADMIN — FUROSEMIDE 40 MG: 10 INJECTION, SOLUTION INTRAMUSCULAR; INTRAVENOUS at 11:26

## 2019-01-15 RX ADMIN — NITROGLYCERIN 0.5 INCH: 20 OINTMENT TOPICAL at 11:26

## 2019-01-15 RX ADMIN — INSULIN GLARGINE 50 UNITS: 100 INJECTION, SOLUTION SUBCUTANEOUS at 21:04

## 2019-01-15 RX ADMIN — CLONIDINE HYDROCHLORIDE 0.1 MG: 0.1 TABLET ORAL at 21:04

## 2019-01-15 RX ADMIN — ANASTROZOLE 1 MG: 1 TABLET, COATED ORAL at 16:57

## 2019-01-15 RX ADMIN — METOPROLOL TARTRATE 25 MG: 25 TABLET ORAL at 21:04

## 2019-01-15 RX ADMIN — GABAPENTIN 300 MG: 300 CAPSULE ORAL at 21:04

## 2019-01-15 RX ADMIN — CLONIDINE HYDROCHLORIDE 0.1 MG: 0.1 TABLET ORAL at 16:53

## 2019-01-15 RX ADMIN — PRAVASTATIN SODIUM 80 MG: 20 TABLET ORAL at 21:04

## 2019-01-15 RX ADMIN — Medication 10 ML: at 21:10

## 2019-01-15 RX ADMIN — ENOXAPARIN SODIUM 40 MG: 40 INJECTION SUBCUTANEOUS at 16:57

## 2019-01-15 RX ADMIN — IPRATROPIUM BROMIDE AND ALBUTEROL SULFATE 1 AMPULE: .5; 3 SOLUTION RESPIRATORY (INHALATION) at 21:15

## 2019-01-15 RX ADMIN — OXYBUTYNIN CHLORIDE 5 MG: 5 TABLET ORAL at 16:54

## 2019-01-15 RX ADMIN — ACYCLOVIR 400 MG: 200 CAPSULE ORAL at 16:57

## 2019-01-15 RX ADMIN — OXYBUTYNIN CHLORIDE 5 MG: 5 TABLET ORAL at 21:04

## 2019-01-15 RX ADMIN — IPRATROPIUM BROMIDE AND ALBUTEROL SULFATE 1 AMPULE: .5; 3 SOLUTION RESPIRATORY (INHALATION) at 07:44

## 2019-01-15 RX ADMIN — NORTRIPTYLINE HYDROCHLORIDE 10 MG: 10 CAPSULE ORAL at 21:11

## 2019-01-15 ASSESSMENT — ENCOUNTER SYMPTOMS
DIARRHEA: 0
SORE THROAT: 0
SHORTNESS OF BREATH: 1
PHOTOPHOBIA: 0
RHINORRHEA: 0
VOMITING: 0
NAUSEA: 0
SINUS PAIN: 0
WHEEZING: 0
ABDOMINAL PAIN: 0
CONSTIPATION: 0
SINUS PRESSURE: 0
COUGH: 0
BACK PAIN: 0

## 2019-01-15 ASSESSMENT — PAIN SCALES - GENERAL
PAINLEVEL_OUTOF10: 0
PAINLEVEL_OUTOF10: 0

## 2019-01-15 NOTE — ED PROVIDER NOTES
Patient is a 70-year-old female who presents with a chief complaint of shortness of breath. Patient states the past week she has been having shortness of breath that is worse with exertion. Patient states that she can only walk a minimal distance without becoming significantly short of breath. She does admit to history of asthma and congestive heart failure, she does not wear any oxygen at home. The patient has tried using her inhalers and breathing treatments but without relief. Denies any noticeable weight gain or leg swelling. Patient also admits to right-sided chest pain that started after her shortness of breath began early this morning. History of MI. No recent stress test. Patient denies any lightheadedness, dizziness, abdominal pain, nausea, vomiting, dysuria, hematuria. No recent illness. The history is provided by the patient. No  was used. Review of Systems   Constitutional: Negative for chills, fatigue and fever. HENT: Negative for congestion, rhinorrhea, sinus pain, sinus pressure, sneezing and sore throat. Eyes: Negative for photophobia and visual disturbance. Respiratory: Positive for shortness of breath. Negative for cough and wheezing. Cardiovascular: Positive for chest pain. Negative for palpitations. Gastrointestinal: Negative for abdominal pain, constipation, diarrhea, nausea and vomiting. Genitourinary: Negative for dysuria, frequency and hematuria. Musculoskeletal: Negative for back pain, neck pain and neck stiffness. Skin: Negative for rash and wound. Neurological: Negative for dizziness, light-headedness and headaches. Psychiatric/Behavioral: Negative for agitation, behavioral problems and confusion. Physical Exam   Constitutional: She is oriented to person, place, and time. She appears well-developed and well-nourished. No distress. HENT:   Head: Normocephalic and atraumatic.    Eyes: Pupils are equal, round, and reactive to light. Conjunctivae are normal. Right eye exhibits no discharge. Left eye exhibits no discharge. No scleral icterus. Neck: Normal range of motion. Neck supple. Cardiovascular: Normal rate and regular rhythm. No murmur heard. Pulmonary/Chest: Effort normal. She has decreased breath sounds. She has wheezes. Diminished breath sounds in bilateral lung bases. There is mild expiratory wheezes in all lung fields. Patient was initially hypoxic at 80% on room air, she was placed on 3L and saturations improved to 99%. Abdominal: Soft. Bowel sounds are normal. She exhibits no distension. There is no tenderness. Musculoskeletal: Normal range of motion. She exhibits edema. She exhibits no deformity. Trace pitting edema of the lower extremities. Neurological: She is alert and oriented to person, place, and time. No cranial nerve deficit. Coordination normal.   Skin: Skin is warm. Capillary refill takes less than 2 seconds. No rash noted. She is not diaphoretic. No erythema. Psychiatric: She has a normal mood and affect. Her behavior is normal.       Procedures    [facility]       --------------------------------------------- PAST HISTORY ---------------------------------------------  Past Medical History:  has a past medical history of Arthritis; Asthma; Cancer ([address] Cerebral artery occlusion with cerebral infarction ([address] Cerebrovascular disease; CHF (congestive heart failure) ([address] Claustrophobia; Headache(784.0); History of blood transfusion; Hyperlipidemia; Hypertension; LBP radiating to both legs; Lymphedema of both lower extremities; Neuromuscular disorder ([address] Obesity; Recurrent genital HSV (herpes simplex virus) infection; S/P breast biopsy, right; and Type II or unspecified type diabetes mellitus without mention of complication, not stated as uncontrolled.     Past Surgical History:  has a past surgical history that includes Total hip arthroplasty (Bilateral); vin and bso (cervix removed); ECHO Compl W Dop Color Flow (6/7/2012); Total knee arthroplasty (Bilateral, 2013); Colonoscopy (2005); Hysterectomy; Colonoscopy (1/8/15); Breast lumpectomy (years ago); arthroplasty (Left, 07/29/2016); Finger surgery (Left, 07/29/2016); joint replacement; Breast lumpectomy (Right, 09/06/2016); other surgical history (Right, 12/20/2017); and Hand surgery (12/2017). Social History:  reports that she quit smoking about 17 years ago. She has a 8.75 pack-year smoking history. She has never used smokeless tobacco. She reports that she drinks alcohol. She reports that she uses drugs, including Marijuana. Family History: family history includes Breast Cancer in her sister and sister; Cancer (age of onset: 55) in her sister; Cancer (age of onset: 59) in her sister; Diabetes in her father and mother; Heart Attack in her mother; Heart Failure in her father; High Blood Pressure in her father and mother; Sickle Cell Anemia in an other family member; Stomach Cancer in an other family member; Stroke in her sister. The patients home medications have been reviewed. Allergies: Patient has no known allergies.     -------------------------------------------------- RESULTS -------------------------------------------------    LABS:  Results for orders placed or performed during the hospital encounter of 01/15/19   CBC Auto Differential   Result Value Ref Range    WBC 10.8 4.5 - 11.5 E9/L    RBC 4.18 3.50 - 5.50 E12/L    Hemoglobin 11.4 (L) 11.5 - 15.5 g/dL    Hematocrit 35.4 34.0 - 48.0 %    MCV 84.7 80.0 - 99.9 fL    MCH 27.3 26.0 - 35.0 pg    MCHC 32.2 32.0 - 34.5 %    RDW 13.6 11.5 - 15.0 fL    Platelets 922 204 - 085 E9/L    MPV 11.6 7.0 - 12.0 fL    Neutrophils % 81.3 (H) 43.0 - 80.0 %    Immature Granulocytes % 0.4 0.0 - 5.0 %    Lymphocytes % 10.2 (L) 20.0 - 42.0 %    Monocytes % 7.7 2.0 - 12.0 %    Eosinophils % 0.0 0.0 - 6.0 %    Basophils % 0.4 0.0 - 2.0 %    Neutrophils # 8.81 (H) 1.80 - 7.30 E9/L    Immature Granulocytes # 0.04 E9/L    Lymphocytes # 1.10 (L) 1.50 - 4.00 E9/L    Monocytes # 0.83 0.10 - 0.95 E9/L    Eosinophils # 0.00 (L) 0.05 - 0.50 E9/L    Basophils # 0.04 0.00 - 0.20 S2/W   Basic Metabolic Panel   Result Value Ref Range    Sodium 135 132 - 146 mmol/L    Potassium 3.6 3.5 - 5.0 mmol/L    Chloride 97 (L) 98 - 107 mmol/L    CO2 25 22 - 29 mmol/L    Anion Gap 13 7 - 16 mmol/L    Glucose 316 (H) 74 - 99 mg/dL    BUN 8 8 - 23 mg/dL    CREATININE 0.6 0.5 - 1.0 mg/dL    GFR Non-African American >60 >=60 mL/min/1.73    GFR African American >60     Calcium 9.0 8.6 - 10.2 mg/dL   Troponin   Result Value Ref Range    Troponin <0.01 0.00 - 0.03 ng/mL   Brain Natriuretic Peptide   Result Value Ref Range    Pro- (H) 0 - 125 pg/mL   EKG 12 Lead   Result Value Ref Range    Ventricular Rate 104 BPM    Atrial Rate 104 BPM    P-R Interval 136 ms    QRS Duration 134 ms    Q-T Interval 394 ms    QTc Calculation (Bazett) 518 ms    P Axis 61 degrees    R Axis 5 degrees    T Axis 89 degrees   EKG 12 Lead   Result Value Ref Range    Ventricular Rate 84 BPM    Atrial Rate 84 BPM    P-R Interval 130 ms    QRS Duration 140 ms    Q-T Interval 432 ms    QTc Calculation (Bazett) 510 ms    P Axis 37 degrees    R Axis -28 degrees    T Axis 115 degrees       RADIOLOGY:  Xr Chest Portable    Result Date: 1/15/2019  Patient MRN:  [id number] : 1950 Age: 76 years Gender: Female Order Date:  1/15/2019 8:00 AM EXAM: XR CHEST PORTABLE NUMBER OF IMAGES:  1  INDICATION:  shortness of breath shortness of breath COMPARISON: October 15, 2018. FINDINGS: EKG leads overlie the chest. Poor inspiratory effort is noted. Projection is somewhat lordotic. Heart remains borderline enlarged and stable. Calcific atherosclerotic changes are noted. Mild pulmonary vascular congestive changes are seen.  Interstitial markings are somewhat exaggerated by technique of minor fibrotic changes are noted with some slight asymmetry noted, right slightly greater than left in density. Multiple is no evidence concurrent pleural effusion. IMPRESSION Stable borderline cardiomegaly. Limited study. There is evidence of mild pulmonary vascular congestion. Subtle increased interstitial pulmonic density is seen on the right, likely due to technique versus asymmetric distribution of early pulmonary edema patient with underlying chronic lung disease. Early pneumonia cannot be excluded. Laboratory correlation and chest x-ray follow-up suggested. Cta Chest W Contrast    Result Date: 1/15/2019  Patient MRN:  [id number] : 1950 Age: 76 years Gender: Female Order Date:  1/15/2019 9:00 AM EXAM: CTA CHEST W CONTRAST NUMBER OF IMAGES: 409 TECHNIQUE: Continuous axial images were obtained from the thoracic inlet to the upper abdomen following intravenous contrast using standard CT angiographic protocol. Sagittal and coronal 2-D images were reconstructed from the axial acquisition. Additional MIP reconstructions were presented to aid in interpretation of this exam. The study was performed on the CT scanner with low dose reduction technique. Low-dose CT  acquisition technique included one of following options; 1 . Automated exposure control, 2. Adjustment of MA and or KV according to patient's size or 3. Use of iterative reconstruction. INDICATION: Patient is a 68-year-old woman with history of hypertension, hyperlipidemia, diabetes mellitus, rule out pe  COMPARISON: CT chest 2011 FINDINGS:   Pulmonary arteries demonstrate normal opacification without filling defects to suggest pulmonary artery emboli. The heart appears mildly enlarged. No pericardial effusion. Mild coronary atherosclerotic calcifications. The thoracic aorta shows mild atherosclerotic calcifications, no evidence of aneurysmal dilatation or dissection. Lungs show mild bibasilar airspace opacities posteriorly representing atelectasis or infiltrates. No evidence of a pneumothorax.  Small bilateral pleural effusions are present. No edema. . No mediastinal or hilar lymphadenopathy is identified. Scattered small lymph nodes in the mediastinum most likely reactive. Visualized portions of the upper abdomen appear grossly unremarkable. Small hiatal hernia. Moderate to severe degenerative joint disease of the visualized right glenohumeral joint. Multilevel mild to moderate degenerative disc disease of the thoracic spine. 1. No evidence to suggest pulmonary arterial emboli. 2. Mild cardiomegaly and mild perihilar vascular congestion. 3. Small bilateral pleural effusions associated with mild airspace opacities of the lung bases may represent infiltrates. Atelectasis cannot be excluded. 4. Small hiatal hernia. EKG: This EKG is signed and interpreted by me. Rate: 84  Rhythm: Sinus  Interpretation: Sinus rhythm, no acute ischemic changes. Left bundle branch block present. Comparison: No acute ischemic changes from comparison EKG on 10/15/18.      ------------------------- NURSING NOTES AND VITALS REVIEWED ---------------------------  The nursing notes within the ED encounter and vital signs as below have been reviewed. Patient Vitals for the past 24 hrs:   BP Temp Temp src Pulse Resp SpO2   01/15/19 1416 (!) 187/85 98.4 °F (36.9 °C) - 81 17 96 %   01/15/19 1218 (!) 207/108 - - 84 16 100 %   01/15/19 1125 (!) 200/91 - - 80 20 100 %   01/15/19 0827 (!) 183/81 - - 92 18 99 %   01/15/19 0745 - - - - 23 99 %   01/15/19 0737 - 98.4 °F (36.9 °C) Temporal - - 100 %   01/15/19 0733 (!) 210/102 - - 107 29 (!) 82 %       Oxygen Saturation Interpretation: Abnormal and Improved after treatment    ------------------------------------------ PROGRESS NOTES ------------------------------------------  Re-evaluation(s):  Time: 08:43. Patient update on laboratory results thus far. We'll proceed with a CTA of the chest to rule out pulmonary embolism. Time: 10:42. Patient update on CTA results.  She is agreeable for admission. Counseling:  I have spoken with the patient and discussed todays results, in addition to providing specific details for the plan of care and counseling regarding the diagnosis and prognosis. Their questions are answered at this time and they are agreeable with the plan of admission.    --------------------------------- ADDITIONAL PROVIDER NOTES ---------------------------------  Consultations:  Time: 11:51. Spoke with Dr. Nicanor Paredes. Discussed case. They will admit the patient. This patient's ED course included: a personal history and physicial examination, re-evaluation prior to disposition, cardiac monitoring and continuous pulse oximetry    This patient has remained hemodynamically stable during their ED course. Diagnosis:  1. Acute respiratory failure with hypoxia ([address]    2. Acute on chronic congestive heart failure, unspecified heart failure type ([address]        Disposition:  Patient's disposition: Admit to telemetry  Patient's condition is stable.               Gemma Holcomb DO  Resident  01/15/19 0895

## 2019-01-15 NOTE — PLAN OF CARE
Problem: Falls - Risk of:  Goal: Will remain free from falls  Will remain free from falls   Outcome: Met This Shift      Plan of care discussed with patient/family. Patient/family incorporated into plan of care.

## 2019-01-16 LAB
ALBUMIN SERPL-MCNC: 3.8 G/DL (ref 3.5–5.2)
ALP BLD-CCNC: 141 U/L (ref 35–104)
ALT SERPL-CCNC: 7 U/L (ref 0–32)
ANION GAP SERPL CALCULATED.3IONS-SCNC: 15 MMOL/L (ref 7–16)
AST SERPL-CCNC: 11 U/L (ref 0–31)
BASOPHILS ABSOLUTE: 0.05 E9/L (ref 0–0.2)
BASOPHILS RELATIVE PERCENT: 0.7 % (ref 0–2)
BILIRUB SERPL-MCNC: 0.5 MG/DL (ref 0–1.2)
BUN BLDV-MCNC: 10 MG/DL (ref 8–23)
CALCIUM SERPL-MCNC: 9.3 MG/DL (ref 8.6–10.2)
CHLORIDE BLD-SCNC: 96 MMOL/L (ref 98–107)
CHOLESTEROL, TOTAL: 182 MG/DL (ref 0–199)
CO2: 29 MMOL/L (ref 22–29)
CREAT SERPL-MCNC: 0.8 MG/DL (ref 0.5–1)
EKG ATRIAL RATE: 104 BPM
EKG ATRIAL RATE: 84 BPM
EKG P AXIS: 37 DEGREES
EKG P AXIS: 61 DEGREES
EKG P-R INTERVAL: 130 MS
EKG P-R INTERVAL: 136 MS
EKG Q-T INTERVAL: 394 MS
EKG Q-T INTERVAL: 432 MS
EKG QRS DURATION: 134 MS
EKG QRS DURATION: 140 MS
EKG QTC CALCULATION (BAZETT): 510 MS
EKG QTC CALCULATION (BAZETT): 518 MS
EKG R AXIS: -28 DEGREES
EKG R AXIS: 5 DEGREES
EKG T AXIS: 115 DEGREES
EKG T AXIS: 89 DEGREES
EKG VENTRICULAR RATE: 104 BPM
EKG VENTRICULAR RATE: 84 BPM
EOSINOPHILS ABSOLUTE: 0 E9/L (ref 0.05–0.5)
EOSINOPHILS RELATIVE PERCENT: 0 % (ref 0–6)
GFR AFRICAN AMERICAN: >60
GFR NON-AFRICAN AMERICAN: >60 ML/MIN/1.73
GLUCOSE BLD-MCNC: 122 MG/DL (ref 74–99)
HCT VFR BLD CALC: 35.3 % (ref 34–48)
HDLC SERPL-MCNC: 70 MG/DL
HEMOGLOBIN: 11.3 G/DL (ref 11.5–15.5)
IMMATURE GRANULOCYTES #: 0.02 E9/L
IMMATURE GRANULOCYTES %: 0.3 % (ref 0–5)
LDL CHOLESTEROL CALCULATED: 94 MG/DL (ref 0–99)
LYMPHOCYTES ABSOLUTE: 1.84 E9/L (ref 1.5–4)
LYMPHOCYTES RELATIVE PERCENT: 24.2 % (ref 20–42)
MAGNESIUM: 1.7 MG/DL (ref 1.6–2.6)
MCH RBC QN AUTO: 27.2 PG (ref 26–35)
MCHC RBC AUTO-ENTMCNC: 32 % (ref 32–34.5)
MCV RBC AUTO: 85.1 FL (ref 80–99.9)
METER GLUCOSE: 171 MG/DL (ref 74–99)
METER GLUCOSE: 209 MG/DL (ref 74–99)
METER GLUCOSE: 266 MG/DL (ref 74–99)
MONOCYTES ABSOLUTE: 0.84 E9/L (ref 0.1–0.95)
MONOCYTES RELATIVE PERCENT: 11.1 % (ref 2–12)
NEUTROPHILS ABSOLUTE: 4.84 E9/L (ref 1.8–7.3)
NEUTROPHILS RELATIVE PERCENT: 63.7 % (ref 43–80)
PDW BLD-RTO: 13.9 FL (ref 11.5–15)
PLATELET # BLD: 352 E9/L (ref 130–450)
PMV BLD AUTO: 11.6 FL (ref 7–12)
POTASSIUM SERPL-SCNC: 3.6 MMOL/L (ref 3.5–5)
RBC # BLD: 4.15 E12/L (ref 3.5–5.5)
SODIUM BLD-SCNC: 140 MMOL/L (ref 132–146)
TOTAL PROTEIN: 6.9 G/DL (ref 6.4–8.3)
TRIGL SERPL-MCNC: 89 MG/DL (ref 0–149)
VLDLC SERPL CALC-MCNC: 18 MG/DL
WBC # BLD: 7.6 E9/L (ref 4.5–11.5)

## 2019-01-16 PROCEDURE — 94640 AIRWAY INHALATION TREATMENT: CPT

## 2019-01-16 PROCEDURE — 6370000000 HC RX 637 (ALT 250 FOR IP): Performed by: INTERNAL MEDICINE

## 2019-01-16 PROCEDURE — APPSS60 APP SPLIT SHARED TIME 46-60 MINUTES: Performed by: NURSE PRACTITIONER

## 2019-01-16 PROCEDURE — 85025 COMPLETE CBC W/AUTO DIFF WBC: CPT

## 2019-01-16 PROCEDURE — 2700000000 HC OXYGEN THERAPY PER DAY

## 2019-01-16 PROCEDURE — 6360000002 HC RX W HCPCS: Performed by: INTERNAL MEDICINE

## 2019-01-16 PROCEDURE — 83735 ASSAY OF MAGNESIUM: CPT

## 2019-01-16 PROCEDURE — 99222 1ST HOSP IP/OBS MODERATE 55: CPT | Performed by: INTERNAL MEDICINE

## 2019-01-16 PROCEDURE — 2140000000 HC CCU INTERMEDIATE R&B

## 2019-01-16 PROCEDURE — 2580000003 HC RX 258: Performed by: INTERNAL MEDICINE

## 2019-01-16 PROCEDURE — 80053 COMPREHEN METABOLIC PANEL: CPT

## 2019-01-16 PROCEDURE — 80061 LIPID PANEL: CPT

## 2019-01-16 PROCEDURE — 82962 GLUCOSE BLOOD TEST: CPT

## 2019-01-16 PROCEDURE — 36415 COLL VENOUS BLD VENIPUNCTURE: CPT

## 2019-01-16 RX ORDER — POTASSIUM CHLORIDE 20 MEQ/1
TABLET, EXTENDED RELEASE ORAL
Status: DISPENSED
Start: 2019-01-16 | End: 2019-01-17

## 2019-01-16 RX ORDER — PREDNISONE 20 MG/1
40 TABLET ORAL DAILY
Status: DISCONTINUED | OUTPATIENT
Start: 2019-01-16 | End: 2019-01-18 | Stop reason: HOSPADM

## 2019-01-16 RX ORDER — POTASSIUM CHLORIDE 20 MEQ/1
40 TABLET, EXTENDED RELEASE ORAL ONCE
Status: COMPLETED | OUTPATIENT
Start: 2019-01-16 | End: 2019-01-16

## 2019-01-16 RX ORDER — FLUTICASONE PROPIONATE 220 UG/1
2 AEROSOL, METERED RESPIRATORY (INHALATION) 2 TIMES DAILY
Qty: 1 INHALER | Refills: 0 | Status: SHIPPED | OUTPATIENT
Start: 2019-01-16 | End: 2019-04-25

## 2019-01-16 RX ORDER — METOPROLOL SUCCINATE 25 MG/1
25 TABLET, EXTENDED RELEASE ORAL 2 TIMES DAILY
Status: DISCONTINUED | OUTPATIENT
Start: 2019-01-16 | End: 2019-01-18 | Stop reason: HOSPADM

## 2019-01-16 RX ADMIN — ACYCLOVIR 400 MG: 200 CAPSULE ORAL at 09:00

## 2019-01-16 RX ADMIN — GABAPENTIN 300 MG: 300 CAPSULE ORAL at 14:50

## 2019-01-16 RX ADMIN — CLONIDINE HYDROCHLORIDE 0.1 MG: 0.1 TABLET ORAL at 14:50

## 2019-01-16 RX ADMIN — LINAGLIPTIN 5 MG: 5 TABLET, FILM COATED ORAL at 09:02

## 2019-01-16 RX ADMIN — BUDESONIDE 500 MCG: 0.5 INHALANT RESPIRATORY (INHALATION) at 13:51

## 2019-01-16 RX ADMIN — CLONIDINE HYDROCHLORIDE 0.1 MG: 0.1 TABLET ORAL at 09:01

## 2019-01-16 RX ADMIN — ASPIRIN 81 MG 81 MG: 81 TABLET ORAL at 09:02

## 2019-01-16 RX ADMIN — BUDESONIDE 500 MCG: 0.5 INHALANT RESPIRATORY (INHALATION) at 20:17

## 2019-01-16 RX ADMIN — INSULIN LISPRO 4 UNITS: 100 INJECTION, SOLUTION INTRAVENOUS; SUBCUTANEOUS at 12:21

## 2019-01-16 RX ADMIN — GABAPENTIN 300 MG: 300 CAPSULE ORAL at 09:01

## 2019-01-16 RX ADMIN — PRAVASTATIN SODIUM 80 MG: 20 TABLET ORAL at 20:10

## 2019-01-16 RX ADMIN — INSULIN LISPRO 2 UNITS: 100 INJECTION, SOLUTION INTRAVENOUS; SUBCUTANEOUS at 16:45

## 2019-01-16 RX ADMIN — METOPROLOL SUCCINATE 25 MG: 25 TABLET, FILM COATED, EXTENDED RELEASE ORAL at 20:21

## 2019-01-16 RX ADMIN — MONTELUKAST SODIUM 10 MG: 10 TABLET, FILM COATED ORAL at 20:11

## 2019-01-16 RX ADMIN — INSULIN LISPRO 3 UNITS: 100 INJECTION, SOLUTION INTRAVENOUS; SUBCUTANEOUS at 20:12

## 2019-01-16 RX ADMIN — ANASTROZOLE 1 MG: 1 TABLET, COATED ORAL at 09:05

## 2019-01-16 RX ADMIN — LOSARTAN POTASSIUM 100 MG: 50 TABLET, FILM COATED ORAL at 09:00

## 2019-01-16 RX ADMIN — OXYBUTYNIN CHLORIDE 5 MG: 5 TABLET ORAL at 14:50

## 2019-01-16 RX ADMIN — POTASSIUM CHLORIDE 40 MEQ: 20 TABLET, EXTENDED RELEASE ORAL at 14:51

## 2019-01-16 RX ADMIN — FORMOTEROL FUMARATE DIHYDRATE 20 MCG: 20 SOLUTION RESPIRATORY (INHALATION) at 20:17

## 2019-01-16 RX ADMIN — METOPROLOL TARTRATE 25 MG: 25 TABLET ORAL at 08:59

## 2019-01-16 RX ADMIN — IPRATROPIUM BROMIDE AND ALBUTEROL SULFATE 1 AMPULE: .5; 3 SOLUTION RESPIRATORY (INHALATION) at 13:52

## 2019-01-16 RX ADMIN — OXYBUTYNIN CHLORIDE 5 MG: 5 TABLET ORAL at 08:59

## 2019-01-16 RX ADMIN — HYDROCHLOROTHIAZIDE 25 MG: 25 TABLET ORAL at 09:00

## 2019-01-16 RX ADMIN — Medication 10 ML: at 09:02

## 2019-01-16 RX ADMIN — FORMOTEROL FUMARATE DIHYDRATE 20 MCG: 20 SOLUTION RESPIRATORY (INHALATION) at 13:51

## 2019-01-16 RX ADMIN — NORTRIPTYLINE HYDROCHLORIDE 10 MG: 10 CAPSULE ORAL at 20:15

## 2019-01-16 RX ADMIN — PREDNISONE 40 MG: 20 TABLET ORAL at 22:20

## 2019-01-16 RX ADMIN — OXYBUTYNIN CHLORIDE 5 MG: 5 TABLET ORAL at 20:11

## 2019-01-16 RX ADMIN — Medication 10 ML: at 20:11

## 2019-01-16 RX ADMIN — FUROSEMIDE 40 MG: 10 INJECTION, SOLUTION INTRAMUSCULAR; INTRAVENOUS at 20:11

## 2019-01-16 RX ADMIN — DILTIAZEM HYDROCHLORIDE 240 MG: 240 CAPSULE, COATED, EXTENDED RELEASE ORAL at 09:02

## 2019-01-16 RX ADMIN — CLONIDINE HYDROCHLORIDE 0.1 MG: 0.1 TABLET ORAL at 20:10

## 2019-01-16 RX ADMIN — FUROSEMIDE 40 MG: 10 INJECTION, SOLUTION INTRAMUSCULAR; INTRAVENOUS at 09:02

## 2019-01-16 RX ADMIN — CALCIUM CARBONATE-VITAMIN D TAB 500 MG-200 UNIT 1 TABLET: 500-200 TAB at 09:01

## 2019-01-16 RX ADMIN — GABAPENTIN 300 MG: 300 CAPSULE ORAL at 20:10

## 2019-01-16 RX ADMIN — IPRATROPIUM BROMIDE AND ALBUTEROL SULFATE 1 AMPULE: .5; 3 SOLUTION RESPIRATORY (INHALATION) at 16:41

## 2019-01-16 RX ADMIN — INSULIN GLARGINE 50 UNITS: 100 INJECTION, SOLUTION SUBCUTANEOUS at 20:12

## 2019-01-16 RX ADMIN — ENOXAPARIN SODIUM 40 MG: 40 INJECTION SUBCUTANEOUS at 09:03

## 2019-01-16 ASSESSMENT — PAIN SCALES - GENERAL
PAINLEVEL_OUTOF10: 0

## 2019-01-16 NOTE — PLAN OF CARE
Problem: Falls - Risk of:  Goal: Will remain free from falls  Will remain free from falls   Outcome: Met This Shift      Problem: OXYGENATION/RESPIRATORY FUNCTION  Goal: Patient will maintain patent airway  Outcome: Met This Shift  Pt.  Has no shortness of breath at this time but will walk in tran to check pulse ox    Problem: HEMODYNAMIC STATUS  Goal: Patient has stable vital signs and fluid balance  Outcome: Met This Shift

## 2019-01-16 NOTE — H&P
[address] [facility]  Attending History and Physical      CHIEF COMPLAINT:  Shortness of breath      HISTORY OF PRESENT ILLNESS:      The patient is a 76 y.o. female patient of dr Maria Luz farmer who presents with complains of shortness of breath. Patient has been feeling poorly for about 10 days. She had swelling in her lower extremities. She felt chest congestion and could hear herself wheezing. Going in and out of the cold air worsened the symptoms. She tried using her inhalers/nebulizers at home without improvement. She denied chest pain, abdominal pain, nausea, vomiting, fevers, chills and diaphoresis. She is feeling much better since presentation.           Past Medical History:    Past Medical History:   Diagnosis Date    Arthritis     Asthma     Cancer ([facility]     breast     Cerebral artery occlusion with cerebral infarction ([facility] [address] [facility]     denies deficits    Cerebrovascular disease 6/09    no residual    CH[facility]congestive [facility] failure) [facility] approx 3 years ago    Claustrophobia     Headache(784.0)     History of blood transfusion     with knee surgery    Hyperlipidemia     Hypertension     LBP radiating to both legs     Lymphedema of both lower extremities 11/12/2015    Neuromuscular disorder (HCC)     Obesity     Recurrent genital HSV (herpes simplex virus) infection     last outbreak 11/2017    S/P breast biopsy, right 07/2016    pt states breast cancer    Type II or unspecified type diabetes mellitus without mention of complication, not stated as uncontrolled     AA1c8.7 9/10       Past Surgical History:    Past Surgical History:   Procedure Laterality Date    ARTHROPLASTY Left 07/29/2016    thumb basil joint with dequervain's release    BREAST LUMPECTOMY  years ago    benign    BREAST LUMPECTOMY Right 09/06/2016    COLONOSCOPY  2005    COLONOSCOPY  1/8/15    Dr. Morgan Pagan  6/7/2012         FINGER SURGERY Left 07/29/2016 thumb    HAND SURGERY  12/2017    HYSTERECTOMY      JOINT REPLACEMENT      OTHER SURGICAL HISTORY Right 12/20/2017    RIGHT THUMB TRAPEZIECTOMY WITH LIGAMENT RECONSRUCTION DEQUERVAINS RELEASE    TIM AND BSO      TOTAL HIP ARTHROPLASTY Bilateral     2000, 2013 dr Mariely Koenig, dr Isabelle Phillips Bilateral 2013    dr Singh       Medications Prior to Admission:    Prescriptions Prior to Admission: losartan-hydrochlorothiazide (HYZAAR) 100-25 MG per tablet, TAKE 1 TABLET BY MOUTH ONCE DAILY FOR HIGH BLOOD PRESSURE  pravastatin (PRAVACHOL) 80 MG tablet, TAKE 1 TABLET BY MOUTH DAILY  gabapentin (NEURONTIN) 300 MG capsule, TAKE 1 CAPSULE BY MOUTH 3 TIMES DAILY  ipratropium-albuterol (DUONEB) 0.5-2.5 (3) MG/3ML SOLN nebulizer solution, INHALE 3ML VIAL VIA NEBULIZER THREE TIMES A DAY AS NEEDED FOR SHORTNESS OF BREATH  montelukast (SINGULAIR) 10 MG tablet, TAKE 1 TABLET BY MOUTH NIGHTLY  metoprolol tartrate (LOPRESSOR) 25 MG tablet, TAKE ONE (1) TABLET BY MOUTH TWICE DAILY  ONGLYZA 5 MG TABS tablet, TAKE 1 TABLET BY MOUTH DAILY  metFORMIN (GLUCOPHAGE) 500 MG tablet, TAKE 1 TABLET BY MOUTH 2 TIMES DAILY *WITH MEALS*  anastrozole (ARIMIDEX) 1 MG tablet, Take 1 tablet by mouth daily  aspirin 81 MG chewable tablet, Take 1 tablet by mouth daily  nortriptyline (PAMELOR) 10 MG capsule, TAKE 1 CAPSULE BY MOUTH DAILY IN THE EVENING  cloNIDine (CATAPRES) 0.2 MG tablet, TAKE ONE (1) TABLET BY MOUTH TWICE DAILY  acetaminophen (APAP EXTRA STRENGTH) 500 MG tablet, Take 1 tablet by mouth every 6 hours as needed for Pain  insulin glargine (LANTUS) 100 UNIT/ML injection vial, Inject 50 Units into the skin nightly  oxybutynin (DITROPAN) 5 MG tablet, Take 1 tablet by mouth 3 times daily  diltiazem (CARDIZEM CD) 240 MG extended release capsule, TAKE 1 CAPSULE BY MOUTH ONCE DAILY FOR HIGH BLOOD PRESSURE  acyclovir (ZOVIRAX) 400 MG tablet, Take 1 tablet by mouth daily  vitamin D (ERGOCALCIFEROL) 11969 units CAPS capsule, Take 1 capsule by mouth once a week  PROAIR  (90 Base) MCG/ACT inhaler, INHALE TWO (2) PUFFS BY MOUTH EVERY 6 HOURS AS NEEDED FOR WHEEZING AND FOR SHORTNESS OF BREATH  calcium carbonate-vitamin D (CALCIUM 600 + D) 600-400 MG-UNIT TABS per tab, Take 1 tablet by mouth 2 times daily (Patient taking differently: Take 1 tablet by mouth daily )  vitamin B-12 (CYANOCOBALAMIN) 1000 MCG tablet, Take 1 tablet by mouth daily    Allergies:    Patient has no known allergies. Social History:    reports that she quit smoking about 17 years ago. She has a 8.75 pack-year smoking history. She has never used smokeless tobacco. She reports that she drinks alcohol. She reports that she uses drugs, including Marijuana. Family History:   family history includes Breast Cancer in her sister and sister; Cancer (age of onset: 55) in her sister; Cancer (age of onset: 59) in her sister; Diabetes in her father and mother; Heart Attack in her mother; Heart Failure in her father; High Blood Pressure in her father and mother; Sickle Cell Anemia in an other family member; Stomach Cancer in an other family member; Stroke in her sister. REVIEW OF SYSTEMS:  As above in the HPI, otherwise negative    PHYSICAL EXAM:    Vitals:  /63   Pulse 69   Temp 98.6 °F (37 °C) (Oral)   Resp 18   Ht 5' 5\" (1.651 m)   Wt 241 lb 1.6 oz (109.4 kg)   SpO2 100%   BMI 40.12 kg/m²     General:  Awake, alert, oriented X 3. Well developed, well nourished, well groomed. No apparent distress. HEENT:  Normocephalic, atraumatic. Pupils equal, round, reactive to light. No scleral icterus. No conjunctival injection. Normal lips, teeth, and gums. No nasal discharge. Neck:  Supple  Heart:  RRR, no murmurs, gallops, rubs  Lungs:  CTA bilaterally, bilat symmetrical expansion, no wheeze, rales, or rhonchi  Abdomen:   Bowel sounds present, soft, nontender, no masses, no organomegaly, no peritoneal signs  Extremities:  No clubbing, cyanosis, or edema  Skin: Warm and dry, no open lesions or rash  Neuro:  Cranial nerves 2-12 intact, no focal deficits  Breast: deferred  Rectal: deferred  Genitalia:  deferred    LABS:    CBC with Differential:    Lab Results   Component Value Date    WBC 7.6 01/16/2019    RBC 4.15 01/16/2019    HGB 11.3 01/16/2019    HCT 35.3 01/16/2019     01/16/2019    MCV 85.1 01/16/2019    MCH 27.2 01/16/2019    MCHC 32.0 01/16/2019    RDW 13.9 01/16/2019    SEGSPCT 77 12/03/2013    LYMPHOPCT 24.2 01/16/2019    MONOPCT 11.1 01/16/2019    BASOPCT 0.7 01/16/2019    MONOSABS 0.84 01/16/2019    LYMPHSABS 1.84 01/16/2019    EOSABS 0.00 01/16/2019    BASOSABS 0.05 01/16/2019     CMP:    Lab Results   Component Value Date     01/16/2019    K 3.6 01/16/2019    K 4.6 10/16/2018    CL 96 01/16/2019    CO2 29 01/16/2019    BUN 10 01/16/2019    CREATININE 0.8 01/16/2019    GFRAA >60 01/16/2019    LABGLOM >60 01/16/2019    GLUCOSE 122 01/16/2019    GLUCOSE 172 04/12/2012    PROT 6.9 01/16/2019    LABALBU 3.8 01/16/2019    LABALBU 4.4 07/28/2011    CALCIUM 9.3 01/16/2019    BILITOT 0.5 01/16/2019    ALKPHOS 141 01/16/2019    AST 11 01/16/2019    ALT 7 01/16/2019     BMP:    Lab Results   Component Value Date     01/16/2019    K 3.6 01/16/2019    K 4.6 10/16/2018    CL 96 01/16/2019    CO2 29 01/16/2019    BUN 10 01/16/2019    LABALBU 3.8 01/16/2019    LABALBU 4.4 07/28/2011    CREATININE 0.8 01/16/2019    CALCIUM 9.3 01/16/2019    GFRAA >60 01/16/2019    LABGLOM >60 01/16/2019    GLUCOSE 122 01/16/2019    GLUCOSE 172 04/12/2012     Magnesium:    Lab Results   Component Value Date    MG 1.7 01/16/2019     Phosphorus:    Lab Results   Component Value Date    PHOS 4.0 01/01/2013     PT/INR:    Lab Results   Component Value Date    PROTIME 11.7 07/16/2013    PROTIME 11.6 11/01/2010    INR 1.1 07/16/2013     PTT:    Lab Results   Component Value Date    APTT 35.7 07/16/2013   [APTT}  Troponin:    Lab Results   Component Value Date    TROPONINI <0.01 therapy  Blood pressure ok, continue current medications  Continue breathing treatments and supplemental oxygen. Evaluate supplemental oxygen requirements. Continue Pt/Ot and risk factor modifications. Pt/Ot evaluations for discharge planning. Ok to discharge.       Keara Willard MD  7:28 AM  1/16/2019

## 2019-01-16 NOTE — PROGRESS NOTES
Pulse ox on room air 98%  At rest  Pulse ox on room air 92% with ambulation  100Ft.    Pt. Verbalizing shortness of breath after walking and wheezing

## 2019-01-16 NOTE — CARE COORDINATION
Transition of care: Met with patient in room. Pt lives alone in a 10 th floor apartment. Apartment building has 1 working elevator. Independent with ADLs and drives. . Has 2 children that live out of town. She has good friends/neighbors that assist her prn. She works as a . DME- rollator, FWW, cane, glucometer, and a BP cuff/monitor. She has a nebulizer that was given to her about 3 yes ago and stated she didn't think it is working properly. History of hhc , but unsure a agency name. History of david at [facility] and  outpatient PT/OT with [id number] [facility] and then at a  [employer] facility(unsure of name). We discussed hhc at discharge for CPA and med compliance(heart failure)  [facility] list given to pt and she requested [facility]. Referral made to Aline Jay at [facility] and requested info was faxed to her. Pt will have hhc rn check her nebulizer out when they come to the house and if it is not working properly, she will contact her PCP for a new nebulizer script. PCP is Dr. Haylie Jorge and pharmacy is [facility] (mail order) and [facility] on [address]. Sw/cm will follow.

## 2019-01-16 NOTE — CONSULTS
Inpatient Cardiology Consultation      Reason for Consult: CHF    Consulting Physician: Dr. Yahir Nascimento     Requesting Physician:  Dr. Claudia Pepper     Date of Consultation: 1/16/2019    HISTORY OF PRESENT ILLNESS:   Ms. Lindsay Castelan is a 76year old  female who is known to Dr. Eladia Thompson. Her medical history includes asthma, CVA, right sided breast CA s/p radiation, BLE lymphedema, obesity, and DM. Ms. Lindsay Castelan presented to [facility] ED on 01/15/2019 with complaints of dyspnea. She states that prior to presentation approximately 6 weeks earlier she was hospitalized and treated for pneumonia. She states \"my breathing never really got completely better\". She states that she completed a course of antibiotics and steroids 6 weeks earlier. She states that since that time she \"has been building up worse shortness of breath\" with accompanying orthopnea and PND and chills with questionable fever. She states that she was using a nebulizer at home with minimal relief. She states that at times in the past 6 weeks she has left shoulder \"numbness and tingling\" with rest lasting several seconds. She states that at times she also has right sided upper chest \"pinching\" that lasts several second in duration, unrelated to exertion. She states that her chronic lymphedema has also been worsening. Upon arrival to the ED her VS were 98.4-107-29-82% on RA with /102. EKG SR with LBBB. ProBNP 715. CXR with mild congestion and possible pulmonary edema vs pneumonia. CTA of the chest negative for pulmonary emboli, showing vascular congestion with bilateral pleural effusions. She receive dSL NTG, Duoneb, NTP 1/2\", and Lasix 40mg IV. She was admitted to a telemetry monitored unit. Cardiology was consulted by Dr. Claudia Pepper for evaluation of CHF. Please note: past medical records were reviewed per electronic medical record (EMR) - see detailed reports under Past Medical/ Surgical History.    Past Medical and Surgical History: 1. Echocardiogram 10/18/2018: Mild concentric LVH. No WMA. Stage II Diastolic Dysfunction. LA mildly dilated. Mild MR. Mild TR. Mild to moderate PHTN. EF 45-50%. 2. Stress echocardiogram 12/10/2018: No chest pain. MPHR: > 85%. LBBB at baseline. The maximal stress echocardiogram demonstrated no evidence of inducible ischemia. 3. CVA   4. HTN  5. HLD  6. Obesity  7. Asthma   8. Right breast CA s/p lumpectomy and radiation therapy  9. DM   10. Recurrent genital HSV infection   11. 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 12. Claustrophobia   13. Headaches   14. S/p Hysterectomy, bilateral knee replacement, left thumb trapeziectomy with ligament reconstruction dequervains release        Medications Prior to admit:  Prior to Admission medications    Medication Sig Start Date End Date Taking?  Authorizing Provider   fluticasone (FLOVENT HFA) 220 MCG/ACT inhaler Inhale 2 puffs into the lungs 2 times daily 1/16/19 1/16/20 Yes Nader Paredes MD   salmeterol (SEREVENT DISKUS) 50 MCG/DOSE diskus inhaler Inhale 1 puff into the lungs 2 times daily 1/16/19  Yes Nader Paredes MD   losartan-hydrochlorothiazide (HYZAAR) 100-25 MG per tablet TAKE 1 TABLET BY MOUTH ONCE DAILY FOR HIGH BLOOD PRESSURE 12/26/18  Yes Saleem Colunga MD   pravastatin (PRAVACHOL) 80 MG tablet TAKE 1 TABLET BY MOUTH DAILY 12/26/18  Yes Saleem Colunga MD   gabapentin (NEURONTIN) 300 MG capsule TAKE 1 CAPSULE BY MOUTH 3 TIMES DAILY 12/26/18 3/25/19 Yes Dasha Hoff MD   ipratropium-albuterol (DUONEB) 0.5-2.5 (3) MG/3ML SOLN nebulizer solution INHALE 3ML VIAL VIA NEBULIZER THREE TIMES A DAY AS NEEDED FOR SHORTNESS OF BREATH 12/26/18  Yes Dasha Hoff MD   montelukast (SINGULAIR) 10 MG tablet TAKE 1 TABLET BY MOUTH NIGHTLY 11/29/18  Yes Dasha Hoff MD   metoprolol tartrate (LOPRESSOR) 25 MG tablet TAKE ONE (1) TABLET BY MOUTH TWICE DAILY 11/29/18  Yes Dasha Hoff MD   ONGLYZA 5 MG TABS tablet TAKE 1 TABLET BY MOUTH DAILY 11/29/18  Yes Dasha Hoff MD   metFORMIN (GLUCOPHAGE) 500 MG tablet TAKE 1 TABLET BY MOUTH 2 TIMES DAILY *WITH MEALS* 11/29/18  Yes Cate Rayo MD   anastrozole (ARIMIDEX) 1 MG tablet Take 1 tablet by mouth daily 11/13/18  Yes Cate Rayo MD   aspirin 81 MG chewable tablet Take 1 tablet by mouth daily 10/18/18  Yes Melanie Brady MD   nortriptyline (PAMELOR) 10 MG capsule TAKE 1 CAPSULE BY MOUTH DAILY IN THE EVENING 9/28/18  Yes Dasha Hoff MD   cloNIDine (CATAPRES) 0.2 MG tablet TAKE ONE (1) TABLET BY MOUTH TWICE DAILY 9/28/18  Yes Cate Rayo MD   acetaminophen (APAP EXTRA STRENGTH) 500 MG tablet Take 1 tablet by mouth every 6 hours as needed for Pain 8/14/18  Yes Abdon Madrigal DO   insulin glargine (LANTUS) 100 UNIT/ML injection vial Inject 50 Units into the skin nightly 7/20/18  Yes Cate Rayo MD   oxybutynin (DITROPAN) 5 MG tablet Take 1 tablet by mouth 3 times daily 7/20/18  Yes Dasha Hoff MD   diltiazem (CARDIZEM CD) 240 MG extended release capsule TAKE 1 CAPSULE BY MOUTH ONCE DAILY FOR HIGH BLOOD PRESSURE 7/20/18  Yes Cate Rayo MD   acyclovir (ZOVIRAX) 400 MG tablet Take 1 tablet by mouth daily 7/20/18  Yes Cate Rayo MD   vitamin D (ERGOCALCIFEROL) 35526 units CAPS capsule Take 1 capsule by mouth once a week 7/20/18  Yes Dasha Hoff MD   PROAIR  (90 Base) MCG/ACT inhaler INHALE TWO (2) PUFFS BY MOUTH EVERY 6 HOURS AS NEEDED FOR WHEEZING AND FOR SHORTNESS OF BREATH 7/20/18  Yes Cate Rayo MD   calcium carbonate-vitamin D (CALCIUM 600 + D) 600-400 MG-UNIT TABS per tab Take 1 tablet by mouth 2 times daily  Patient taking differently: Take 1 tablet by mouth daily  7/13/17  Yes Cate Rayo MD   vitamin B-12 (CYANOCOBALAMIN) 1000 MCG tablet Take 1 tablet by mouth daily 7/20/18   Cate Rayo MD Current Medications:    Current Facility-Administered Medications: nitroGLYCERIN (NITROSTAT) SL tablet 0.4 mg, 0.4 mg, Sublingual, Q5 Min PRN  acetaminophen (TYLENOL) tablet 500 mg, 500 mg, Oral, Q6H PRN  acyclovir (ZOVIRAX) capsule 400 mg, 400 mg, Oral, Daily  anastrozole (ARIMIDEX) tablet 1 mg, 1 mg, Oral, Daily  aspirin chewable tablet 81 mg, 81 mg, Oral, Daily  calcium-vitamin D (OSCAL-500) 500-200 MG-UNIT per tablet 1 tablet, 1 tablet, Oral, Daily  cloNIDine (CATAPRES) tablet 0.1 mg, 0.1 mg, Oral, TID  diltiazem (CARDIZEM CD) extended release capsule 240 mg, 240 mg, Oral, Daily  gabapentin (NEURONTIN) capsule 300 mg, 300 mg, Oral, TID  insulin glargine (LANTUS) injection vial 50 Units, 50 Units, Subcutaneous, Nightly  metFORMIN (GLUCOPHAGE) tablet 500 mg, 500 mg, Oral, BID WC  metoprolol tartrate (LOPRESSOR) tablet 25 mg, 25 mg, Oral, BID  montelukast (SINGULAIR) tablet 10 mg, 10 mg, Oral, Nightly  nortriptyline (PAMELOR) capsule 10 mg, 10 mg, Oral, Nightly  linagliptin (TRADJENTA) tablet 5 mg, 5 mg, Oral, Daily  oxybutynin (DITROPAN) tablet 5 mg, 5 mg, Oral, TID  pravastatin (PRAVACHOL) tablet 80 mg, 80 mg, Oral, Nightly  sodium chloride flush 0.9 % injection 10 mL, 10 mL, Intravenous, 2 times per day  sodium chloride flush 0.9 % injection 10 mL, 10 mL, Intravenous, PRN  magnesium hydroxide (MILK OF MAGNESIA) 400 MG/5ML suspension 30 mL, 30 mL, Oral, Daily PRN  ondansetron (ZOFRAN) injection 4 mg, 4 mg, Intravenous, Q6H PRN  enoxaparin (LOVENOX) injection 40 mg, 40 mg, Subcutaneous, Daily  glucose (GLUTOSE) 40 % oral gel 15 g, 15 g, Oral, PRN  dextrose 50 % solution 12.5 g, 12.5 g, Intravenous, PRN  glucagon (rDNA) injection 1 mg, 1 mg, Intramuscular, PRN  dextrose 5 % solution, 100 mL/hr, Intravenous, PRN  furosemide (LASIX) injection 40 mg, 40 mg, Intravenous, BID  insulin lispro (HUMALOG) injection vial 0-12 Units, 0-12 Units, Subcutaneous, TID WC  insulin lispro (HUMALOG) injection vial 0-6 Units, 0-6 Units, Subcutaneous, Nightly  ipratropium-albuterol (DUONEB) nebulizer solution 1 ampule, 1 ampule, Inhalation, Q4H WA  budesonide (PULMICORT) nebulizer suspension 500 mcg, 500 mcg, Nebulization, BID  formoterol (PERFOROMIST) nebulizer solution 20 mcg, 20 mcg, Nebulization, BID  albuterol (ACCUNEB) nebulizer solution 1.25 mg, 1.25 mg, Nebulization, Q6H PRN  losartan (COZAAR) tablet 100 mg, 100 mg, Oral, Daily **AND** hydrochlorothiazide (HYDRODIURIL) tablet 25 mg, 25 mg, Oral, Daily    Allergies:  Patient has no known allergies. Social History:    Quit smoking in 2003. Prior to that smoked 1 pack a week for several years. Drinks a cocktail on occasion. Denies illicit drug use. Caffeine intake includes decaf coffee    Family History:   Please note this information was not obtained at this time as it is limited in nature due to the patient's advanced age. REVIEW OF SYSTEMS:     · Constitutional: Denies fevers, chills, night sweats, and fatigue  · HEENT: Denies headaches, nose bleeds, and blurred vision,oral pain, abscess or lesion. · Musculoskeletal: Denies falls, pain to BLE with ambulation and edema to BLE. · Neurological: Denies dizziness and lightheadedness, numbness and tingling  · Cardiovascular: Denies chest pain, palpitations, and feelings of heart racing. · Respiratory: Denies orthopnea and PND  · Gastrointestinal: Denies heartburn, nausea/vomiting, diarrhea and constipation, black/bloody, and tarry stools. · Genitourinary: Denies dysuria and hematuria  · Hematologic: Denies excessive bruising or bleeding  · Lymphatic: Denies lumps and bumps to neck, axilla, breast, and groin  · Endocrine: Denies excessive thirst. Denies intolerance to hot and cold  · GYN: Postmenopausal state; Denies vaginal bleeding. · Psychiatric: Denies anxiety and depression.     PHYSICAL EXAM:   /80   Pulse 70   Temp 98.4 °F (36.9 °C) (Temporal)   Resp 16   Ht 5' 5\" (1.651 m)   Wt 241 lb 1.6 oz (109.4 kg)   SpO2 94%   BMI 40.12 kg/m²   Physical Exam to follow as per Dr. Casie Jimenez:    ECG: SR LBBB  Tele strips: SR with PACs  Diagnostic:      Intake/Output Summary (Last 24 hours) at 01/16/19 1049  Last data filed at 01/16/19 0918   Gross per 24 hour   Intake              580 ml   Output             2150 ml   Net            -1570 ml       Labs:   CBC:   Recent Labs      01/15/19   0745  01/16/19   0455   WBC  10.8  7.6   HGB  11.4*  11.3*   HCT  35.4  35.3   PLT  355  352     BMP: Recent Labs      01/15/19   0745  01/16/19   0455   NA  135  140   K  3.6  3.6   CO2  25  29   BUN  8  10   CREATININE  0.6  0.8   LABGLOM  >60  >60   CALCIUM  9.0  9.3     Mag:   Recent Labs      01/16/19   0455   MG  1.7   HgA1c:   Lab Results   Component Value Date    LABA1C 10.1 (H) 01/15/2019   CARDIAC ENZYMES:  Recent Labs      01/15/19   0745  01/15/19   1545  01/15/19   1954   TROPONINI  <0.01  <0.01  <0.01     FASTING LIPID PANEL:  Lab Results   Component Value Date    CHOL 182 01/16/2019    HDL 70 01/16/2019    LDLCALC 94 01/16/2019    TRIG 89 01/16/2019     LIVER PROFILE:  Recent Labs      01/16/19   0455   AST  11   ALT  7   LABALBU  3.8     01/15/2019 CTA of Chest:   1. No evidence to suggest pulmonary arterial emboli. 2. Mild cardiomegaly and mild perihilar vascular congestion. 3. Small bilateral pleural effusions associated with mild airspace  opacities of the lung bases may represent infiltrates. Atelectasis  cannot be excluded. 4. Small hiatal hernia. 01/15/2019 CXR:   Stable borderline cardiomegaly. Limited study. There is evidence of  mild pulmonary vascular congestion. Subtle increased interstitial  pulmonic density is seen on the right, likely due to technique versus  asymmetric distribution of early pulmonary edema patient with  underlying chronic lung disease. Early pneumonia cannot be excluded. Laboratory correlation and chest x-ray follow-up suggested.       IMPRESSION/PLAN to follow as per  Angel Cain    Electronically signed by DMITRY Ceja CNP on 1/16/2019 at 10:49 AM      I have personally seen and evaluated the patient. I personally obtained the history and performed the physical exam.  I personally reviewed all of the above labs, history, review of systems, and data. All of the assessments and recommendations are from me. All of the above cardiac medical decisions are from me. Please see my additional contributions to the history, physical exam, assessment, and recommendations below. History of chief complaint:  She was treated 6 weeks ago for pneumonia. Since then she has continued to have increasing shortness of breath and orthopnea. She does have intermittent pinching sensations in her right arm and intermittent pinching sensations in her left chest and left arm. They're not associated with any physical activities. She states that her inhalers have not been helping. Review of systems:     Heart: as above   Lungs: as above   Eyes: denies changes in vision or discharge. Ears: denies changes in hearing or pain. Nose: denies epistaxis or masses   Throat: denies sore throat or trouble swallowing. Neuro: denies numbness, tingling, tremors. Skin: denies rashes or itching. : denies hematuria, dysuria   GI: denies vomiting, diarrhea   Psych: denies mood changed, anxiety, depression. Physical exam:  /74   Pulse 75   Temp 98.6 °F (37 °C) (Temporal)   Resp 16   Ht 5' 5\" (1.651 m)   Wt 241 lb 1.6 oz (109.4 kg)   SpO2 96%   BMI 40.12 kg/m²   Constitutional: A&O x3, communicates well, no acute distress. Eyes: extraocular muscles intact, PERRL. Normal lids & conjunctiva. No icterus. ENT: clear, no bleeding. No external masses. Lips normal formation. Neck: supple, full ROM, + JVD, no bruits, no lymphadenopathy. No masses. trachea midline. Heart: regular rate & rhythm, normal S1 & S2, I/VI (normal physiologic) systolic murmur, S4 gallop.   No eliezer.  Lungs: Bibasilar rales. No accessory muscles. Abd: soft, non-tender. Normal bowel sounds. Morbidly obese. Neuro: Full ROM X 4, EOMI, no tremors. EXT: Mild bilateral lower extremity edema  Skin: warm, dry, intact. Good turgor. Psych: A&O x 3, normal behavior, not anxious. Patient seen and examined. Chart, labs & data reviewed. A:  1. Nonischemic cardiomyopathy with ejection fraction of 4550%. 2. Stage II diastolic dysfunction. 3. Malignant hypertension on admission most likely contributing to the above. 4. Hypervolemia. Improving with IV Lasix. 5. Hypoxemia on admission. 6. Chronic left bundle-branch block. 7. Diabetes. 8. Hypertension. 9. Prior CVA. 10. Breast cancer      Rec:  1. Echo 10-18. No need to repeat. 2. IV diuresis. 3. Follow lites and creatinine. 4. Adjust cardiomyopathy medications once she is dry.     Electronically signed by Hailey Mohamud DO on 1/16/2019 at 1:32 PM

## 2019-01-17 LAB
ANION GAP SERPL CALCULATED.3IONS-SCNC: 18 MMOL/L (ref 7–16)
BASOPHILS ABSOLUTE: 0.02 E9/L (ref 0–0.2)
BASOPHILS RELATIVE PERCENT: 0.2 % (ref 0–2)
BUN BLDV-MCNC: 18 MG/DL (ref 8–23)
CALCIUM SERPL-MCNC: 9.8 MG/DL (ref 8.6–10.2)
CHLORIDE BLD-SCNC: 94 MMOL/L (ref 98–107)
CO2: 26 MMOL/L (ref 22–29)
CREAT SERPL-MCNC: 0.8 MG/DL (ref 0.5–1)
EOSINOPHILS ABSOLUTE: 0 E9/L (ref 0.05–0.5)
EOSINOPHILS RELATIVE PERCENT: 0 % (ref 0–6)
GFR AFRICAN AMERICAN: >60
GFR NON-AFRICAN AMERICAN: >60 ML/MIN/1.73
GLUCOSE BLD-MCNC: 291 MG/DL (ref 74–99)
HCT VFR BLD CALC: 38.4 % (ref 34–48)
HEMOGLOBIN: 12.1 G/DL (ref 11.5–15.5)
IMMATURE GRANULOCYTES #: 0.04 E9/L
IMMATURE GRANULOCYTES %: 0.4 % (ref 0–5)
LYMPHOCYTES ABSOLUTE: 0.69 E9/L (ref 1.5–4)
LYMPHOCYTES RELATIVE PERCENT: 7.2 % (ref 20–42)
MAGNESIUM: 1.8 MG/DL (ref 1.6–2.6)
MCH RBC QN AUTO: 27.1 PG (ref 26–35)
MCHC RBC AUTO-ENTMCNC: 31.5 % (ref 32–34.5)
MCV RBC AUTO: 86.1 FL (ref 80–99.9)
METER GLUCOSE: 289 MG/DL (ref 74–99)
METER GLUCOSE: 398 MG/DL (ref 74–99)
METER GLUCOSE: 414 MG/DL (ref 74–99)
METER GLUCOSE: 445 MG/DL (ref 74–99)
MONOCYTES ABSOLUTE: 0.2 E9/L (ref 0.1–0.95)
MONOCYTES RELATIVE PERCENT: 2.1 % (ref 2–12)
NEUTROPHILS ABSOLUTE: 8.58 E9/L (ref 1.8–7.3)
NEUTROPHILS RELATIVE PERCENT: 90.1 % (ref 43–80)
PDW BLD-RTO: 14.2 FL (ref 11.5–15)
PLATELET # BLD: 406 E9/L (ref 130–450)
PMV BLD AUTO: 11.6 FL (ref 7–12)
POTASSIUM SERPL-SCNC: 4.7 MMOL/L (ref 3.5–5)
PRO-BNP: 49 PG/ML (ref 0–125)
RBC # BLD: 4.46 E12/L (ref 3.5–5.5)
SODIUM BLD-SCNC: 138 MMOL/L (ref 132–146)
WBC # BLD: 9.5 E9/L (ref 4.5–11.5)

## 2019-01-17 PROCEDURE — 99232 SBSQ HOSP IP/OBS MODERATE 35: CPT | Performed by: INTERNAL MEDICINE

## 2019-01-17 PROCEDURE — 83880 ASSAY OF NATRIURETIC PEPTIDE: CPT

## 2019-01-17 PROCEDURE — 6360000002 HC RX W HCPCS: Performed by: INTERNAL MEDICINE

## 2019-01-17 PROCEDURE — 6370000000 HC RX 637 (ALT 250 FOR IP): Performed by: INTERNAL MEDICINE

## 2019-01-17 PROCEDURE — 2140000000 HC CCU INTERMEDIATE R&B

## 2019-01-17 PROCEDURE — 2580000003 HC RX 258: Performed by: INTERNAL MEDICINE

## 2019-01-17 PROCEDURE — 94640 AIRWAY INHALATION TREATMENT: CPT

## 2019-01-17 PROCEDURE — 36415 COLL VENOUS BLD VENIPUNCTURE: CPT

## 2019-01-17 PROCEDURE — 83735 ASSAY OF MAGNESIUM: CPT

## 2019-01-17 PROCEDURE — 80048 BASIC METABOLIC PNL TOTAL CA: CPT

## 2019-01-17 PROCEDURE — 82962 GLUCOSE BLOOD TEST: CPT

## 2019-01-17 PROCEDURE — 85025 COMPLETE CBC W/AUTO DIFF WBC: CPT

## 2019-01-17 RX ORDER — MAGNESIUM SULFATE IN WATER 40 MG/ML
2 INJECTION, SOLUTION INTRAVENOUS ONCE
Status: COMPLETED | OUTPATIENT
Start: 2019-01-17 | End: 2019-01-17

## 2019-01-17 RX ADMIN — ENOXAPARIN SODIUM 40 MG: 40 INJECTION SUBCUTANEOUS at 09:44

## 2019-01-17 RX ADMIN — Medication 10 ML: at 20:22

## 2019-01-17 RX ADMIN — GABAPENTIN 300 MG: 300 CAPSULE ORAL at 14:58

## 2019-01-17 RX ADMIN — GABAPENTIN 300 MG: 300 CAPSULE ORAL at 20:22

## 2019-01-17 RX ADMIN — HYDROCHLOROTHIAZIDE 25 MG: 25 TABLET ORAL at 09:47

## 2019-01-17 RX ADMIN — GABAPENTIN 300 MG: 300 CAPSULE ORAL at 09:47

## 2019-01-17 RX ADMIN — BUDESONIDE 500 MCG: 0.5 INHALANT RESPIRATORY (INHALATION) at 20:34

## 2019-01-17 RX ADMIN — CLONIDINE HYDROCHLORIDE 0.1 MG: 0.1 TABLET ORAL at 09:46

## 2019-01-17 RX ADMIN — PREDNISONE 40 MG: 20 TABLET ORAL at 09:47

## 2019-01-17 RX ADMIN — METFORMIN HYDROCHLORIDE 500 MG: 500 TABLET ORAL at 16:12

## 2019-01-17 RX ADMIN — OXYBUTYNIN CHLORIDE 5 MG: 5 TABLET ORAL at 20:23

## 2019-01-17 RX ADMIN — FUROSEMIDE 40 MG: 10 INJECTION, SOLUTION INTRAMUSCULAR; INTRAVENOUS at 20:23

## 2019-01-17 RX ADMIN — OXYBUTYNIN CHLORIDE 5 MG: 5 TABLET ORAL at 14:57

## 2019-01-17 RX ADMIN — FORMOTEROL FUMARATE DIHYDRATE 20 MCG: 20 SOLUTION RESPIRATORY (INHALATION) at 20:34

## 2019-01-17 RX ADMIN — OXYBUTYNIN CHLORIDE 5 MG: 5 TABLET ORAL at 09:45

## 2019-01-17 RX ADMIN — INSULIN LISPRO 12 UNITS: 100 INJECTION, SOLUTION INTRAVENOUS; SUBCUTANEOUS at 16:12

## 2019-01-17 RX ADMIN — CLONIDINE HYDROCHLORIDE 0.1 MG: 0.1 TABLET ORAL at 20:23

## 2019-01-17 RX ADMIN — MAGNESIUM SULFATE HEPTAHYDRATE 2 G: 40 INJECTION, SOLUTION INTRAVENOUS at 13:31

## 2019-01-17 RX ADMIN — ASPIRIN 81 MG 81 MG: 81 TABLET ORAL at 09:45

## 2019-01-17 RX ADMIN — MONTELUKAST SODIUM 10 MG: 10 TABLET, FILM COATED ORAL at 20:23

## 2019-01-17 RX ADMIN — INSULIN LISPRO 6 UNITS: 100 INJECTION, SOLUTION INTRAVENOUS; SUBCUTANEOUS at 08:49

## 2019-01-17 RX ADMIN — ACETAMINOPHEN 500 MG: 500 TABLET ORAL at 09:55

## 2019-01-17 RX ADMIN — CALCIUM CARBONATE-VITAMIN D TAB 500 MG-200 UNIT 1 TABLET: 500-200 TAB at 09:46

## 2019-01-17 RX ADMIN — ANASTROZOLE 1 MG: 1 TABLET, COATED ORAL at 09:45

## 2019-01-17 RX ADMIN — ACYCLOVIR 400 MG: 200 CAPSULE ORAL at 09:45

## 2019-01-17 RX ADMIN — LOSARTAN POTASSIUM 100 MG: 50 TABLET, FILM COATED ORAL at 09:48

## 2019-01-17 RX ADMIN — FORMOTEROL FUMARATE DIHYDRATE 20 MCG: 20 SOLUTION RESPIRATORY (INHALATION) at 06:47

## 2019-01-17 RX ADMIN — DILTIAZEM HYDROCHLORIDE 240 MG: 240 CAPSULE, COATED, EXTENDED RELEASE ORAL at 09:45

## 2019-01-17 RX ADMIN — IPRATROPIUM BROMIDE AND ALBUTEROL SULFATE 1 AMPULE: .5; 3 SOLUTION RESPIRATORY (INHALATION) at 13:02

## 2019-01-17 RX ADMIN — LINAGLIPTIN 5 MG: 5 TABLET, FILM COATED ORAL at 09:47

## 2019-01-17 RX ADMIN — METOPROLOL SUCCINATE 25 MG: 25 TABLET, FILM COATED, EXTENDED RELEASE ORAL at 09:45

## 2019-01-17 RX ADMIN — Medication 10 ML: at 09:48

## 2019-01-17 RX ADMIN — IPRATROPIUM BROMIDE AND ALBUTEROL SULFATE 1 AMPULE: .5; 3 SOLUTION RESPIRATORY (INHALATION) at 16:28

## 2019-01-17 RX ADMIN — CLONIDINE HYDROCHLORIDE 0.1 MG: 0.1 TABLET ORAL at 14:57

## 2019-01-17 RX ADMIN — BUDESONIDE 500 MCG: 0.5 INHALANT RESPIRATORY (INHALATION) at 06:46

## 2019-01-17 RX ADMIN — INSULIN LISPRO 10 UNITS: 100 INJECTION, SOLUTION INTRAVENOUS; SUBCUTANEOUS at 12:10

## 2019-01-17 RX ADMIN — FUROSEMIDE 40 MG: 10 INJECTION, SOLUTION INTRAMUSCULAR; INTRAVENOUS at 09:44

## 2019-01-17 RX ADMIN — INSULIN LISPRO 6 UNITS: 100 INJECTION, SOLUTION INTRAVENOUS; SUBCUTANEOUS at 20:23

## 2019-01-17 RX ADMIN — METOPROLOL SUCCINATE 25 MG: 25 TABLET, FILM COATED, EXTENDED RELEASE ORAL at 20:23

## 2019-01-17 RX ADMIN — NORTRIPTYLINE HYDROCHLORIDE 10 MG: 10 CAPSULE ORAL at 20:23

## 2019-01-17 RX ADMIN — INSULIN GLARGINE 50 UNITS: 100 INJECTION, SOLUTION SUBCUTANEOUS at 20:24

## 2019-01-17 RX ADMIN — PRAVASTATIN SODIUM 80 MG: 20 TABLET ORAL at 20:23

## 2019-01-17 ASSESSMENT — PAIN SCALES - GENERAL
PAINLEVEL_OUTOF10: 3
PAINLEVEL_OUTOF10: 0
PAINLEVEL_OUTOF10: 1
PAINLEVEL_OUTOF10: 0
PAINLEVEL_OUTOF10: 0
PAINLEVEL_OUTOF10: 2
PAINLEVEL_OUTOF10: 0

## 2019-01-17 ASSESSMENT — PAIN DESCRIPTION - DESCRIPTORS: DESCRIPTORS: DISCOMFORT

## 2019-01-17 ASSESSMENT — PAIN DESCRIPTION - LOCATION: LOCATION: HEAD

## 2019-01-17 ASSESSMENT — PAIN DESCRIPTION - PAIN TYPE: TYPE: ACUTE PAIN

## 2019-01-17 NOTE — PLAN OF CARE
Problem: Falls - Risk of:  Goal: Will remain free from falls  Will remain free from falls   Outcome: Met This Shift      Problem: OXYGENATION/RESPIRATORY FUNCTION  Goal: Patient will achieve/maintain normal respiratory rate/effort  Respiratory rate and effort will be within normal limits for the patient   Outcome: Met This Shift

## 2019-01-17 NOTE — PROGRESS NOTES
PROGRESS NOTE     CARDIOLOGY    Chief complaint: Seen today for follow up, management & recommendations for hypervolemia, shortness of breath    She denies chest pain. She states that her breathing is improving but she is still wheezing. Wt Readings from Last 3 Encounters:   01/17/19 239 lb (108.4 kg)   12/10/18 242 lb (109.8 kg)   10/15/18 242 lb (109.8 kg)     Temp Readings from Last 3 Encounters:   01/17/19 98.8 °F (37.1 °C) (Temporal)   10/18/18 98.4 °F (36.9 °C) (Temporal)   10/10/18 98.2 °F (36.8 °C) (Oral)     BP Readings from Last 3 Encounters:   01/17/19 128/80   10/18/18 (!) 140/60   10/10/18 132/72     Pulse Readings from Last 3 Encounters:   01/17/19 90   10/18/18 84   10/10/18 86         Intake/Output Summary (Last 24 hours) at 01/17/19 1154  Last data filed at 01/17/19 1123   Gross per 24 hour   Intake              490 ml   Output             1800 ml   Net            -1310 ml       Recent Labs      01/15/19   0745  01/16/19   0455  01/17/19   0548   WBC  10.8  7.6  9.5   HGB  11.4*  11.3*  12.1   HCT  35.4  35.3  38.4   MCV  84.7  85.1  86.1   PLT  355  352  406     Recent Labs      01/15/19   0745  01/16/19   0455  01/17/19   0548   NA  135  140  138   K  3.6  3.6  4.7   CL  97*  96*  94*   CO2  25  29  26   BUN  8  10  18   CREATININE  0.6  0.8  0.8   MG   --   1.7  1.8     No results for input(s): PROTIME, INR in the last 72 hours. Recent Labs      01/15/19   0745  01/15/19   1545  01/15/19   1954   TROPONINI  <0.01  <0.01  <0.01     No results for input(s): BNP in the last 72 hours.   Recent Labs      01/16/19   0455   CHOL  182   HDL  70   TRIG  89           metoprolol succinate (TOPROL XL) extended release tablet 25 mg BID   predniSONE (DELTASONE) tablet 40 mg Daily   nitroGLYCERIN (NITROSTAT) SL tablet 0.4 mg Q5 Min PRN   acetaminophen (TYLENOL) tablet 500 mg Q6H PRN   acyclovir (ZOVIRAX) capsule 400 mg Daily   anastrozole (ARIMIDEX) tablet 1 mg Daily   aspirin chewable tablet 81 mg Daily   calcium-vitamin D (OSCAL-500) 500-200 MG-UNIT per tablet 1 tablet Daily   cloNIDine (CATAPRES) tablet 0.1 mg TID   diltiazem (CARDIZEM CD) extended release capsule 240 mg Daily   gabapentin (NEURONTIN) capsule 300 mg TID   insulin glargine (LANTUS) injection vial 50 Units Nightly   metFORMIN (GLUCOPHAGE) tablet 500 mg BID WC   montelukast (SINGULAIR) tablet 10 mg Nightly   nortriptyline (PAMELOR) capsule 10 mg Nightly   linagliptin (TRADJENTA) tablet 5 mg Daily   oxybutynin (DITROPAN) tablet 5 mg TID   pravastatin (PRAVACHOL) tablet 80 mg Nightly   sodium chloride flush 0.9 % injection 10 mL 2 times per day   sodium chloride flush 0.9 % injection 10 mL PRN   magnesium hydroxide (MILK OF MAGNESIA) 400 MG/5ML suspension 30 mL Daily PRN   ondansetron (ZOFRAN) injection 4 mg Q6H PRN   enoxaparin (LOVENOX) injection 40 mg Daily   glucose (GLUTOSE) 40 % oral gel 15 g PRN   dextrose 50 % solution 12.5 g PRN   glucagon (rDNA) injection 1 mg PRN   dextrose 5 % solution PRN   furosemide (LASIX) injection 40 mg BID   insulin lispro (HUMALOG) injection vial 0-12 Units TID    insulin lispro (HUMALOG) injection vial 0-6 Units Nightly   ipratropium-albuterol (DUONEB) nebulizer solution 1 ampule Q4H WA   budesonide (PULMICORT) nebulizer suspension 500 mcg BID   formoterol (PERFOROMIST) nebulizer solution 20 mcg BID   albuterol (ACCUNEB) nebulizer solution 1.25 mg Q6H PRN   losartan (COZAAR) tablet 100 mg Daily   And    hydrochlorothiazide (HYDRODIURIL) tablet 25 mg Daily       Review of systems:     Heart: as above   Lungs: as above   Eyes: denies changes in vision or discharge. Ears: denies changes in hearing or pain. Nose: denies epistaxis or masses   Throat: denies sore throat or trouble swallowing. Neuro: denies numbness, tingling, tremors. Skin: denies rashes or itching. : denies hematuria, dysuria   GI: denies vomiting, diarrhea   Psych: denies mood changed, anxiety, depression.          Physical

## 2019-01-17 NOTE — PLAN OF CARE
Problem: OXYGENATION/RESPIRATORY FUNCTION  Goal: Patient will maintain patent airway  Outcome: Met This Shift  Remains on room air

## 2019-01-17 NOTE — PLAN OF CARE
1/17/19-Pt with HFpEF. I provided the pt with the Heart Failure book- \"Caring for Your Heart: Living Well with Heart Failure\", the Heart Failure Zones, the Sodium Content pamphlet, and the \"What Foods Should You Avoid? \" information sheet. We reviewed the introduction to HF, the HF zones, signs and symptoms to report on day 1 of onset, medications, medication compliance, daily weights, low sodium diet, and label reading. She quit smoking in 2001. She was not aware of what CHF was, we discussed it at length. She receives meals from [employer] meal per day. She receives her medications through [facility]. The patient's contributing risk factors for heart failure are identified as marijuana use, obesity, DM, HTN. I advised patient she can reduce the risk for heart failure exacerbations by modifying/controlling the risk factors she has. We discussed self-managed care which includes the following:  to take medications as prescribed- to call the doctor with any side effects do not just stop taking the medication, follow a cardiac heart healthy / low sodium diet, daily weights, exercise regularly- per doctor recommendation and not to smoke. I stressed the importance of informing the cardiologist the first day of onset of signs and symptoms in the \"Yellow Zone\" of the HF Zones. She verbalizes understanding. We discussed the CHF Clinic. She is having [facility] services, hopefully with [facility]. She will think about the CHF Clinic. I provided her with their phone number.      Echocardiogram / EF: 45-50%    BNP:  715 =1/15/19    History of:  HTN, DM, COPD, Hyperlipidemia, Lymphedema of legs, CVA    Medications:  Lasix, Cozaar, Toprol XL, Catapres, Cardizem, Klor-con    Code Status:  Full    The patient is ordered:  Diet: Sodium restricted -    Sodium/fluid restriction daily ordered (fluid restriction recommended if patient is hyponatremic and/or diuretic is initiated or increased):  [] Yes     [] Cough  [] Hypotension  [] Allergy/angioedema  [x] No left ventricular systolic dysfunction (LVSD)  [] Hyperkalemia  [] Moderate to severe aortic stenosis  [x] Other (Specify):     Angiotensin II receptor blockers (ARB) ordered:  [x] Yes  [] No (specify contraindication):  [] Renal Insufficiency  [] Hypotension  [] Allergy/angioedema  [] No LVSD  [] Hyperkalemia  [] Moderate to severe aortic stenosis  [] Other (Specify):      ARNI - Angiotensin Receptor Neprilysin Inhibitor ordered:  [] Yes  [x] No      ACC/AHA Guidelines Beta Blocker (Carvedilol, Metoprolol Succinate, or Bisoprolol) ordered:    [x] Yes  [] No (specify contraindication):  [] Bradycardia  [] Hypotension  [] LVD  [] 2nd or 3rd degree heart block  [] Bronchospastic airway disease  [] Decompensated CHF  [] Other (Specify):

## 2019-01-17 NOTE — CARE COORDINATION
SOCIAL WORK/CASEMANAGEMENT TRANSITION OF CARE PLANNING: pcp placed discharge order if ok with cardio. [address] setup per pt agreement with plan home. Will  Need [facility] orders.  Chema Roque  1/17/2019

## 2019-01-18 VITALS
RESPIRATION RATE: 16 BRPM | HEART RATE: 74 BPM | WEIGHT: 238.3 LBS | TEMPERATURE: 97.7 F | DIASTOLIC BLOOD PRESSURE: 71 MMHG | BODY MASS INDEX: 39.7 KG/M2 | OXYGEN SATURATION: 100 % | HEIGHT: 65 IN | SYSTOLIC BLOOD PRESSURE: 142 MMHG

## 2019-01-18 LAB
ANION GAP SERPL CALCULATED.3IONS-SCNC: 19 MMOL/L (ref 7–16)
BUN BLDV-MCNC: 30 MG/DL (ref 8–23)
CALCIUM SERPL-MCNC: 9.6 MG/DL (ref 8.6–10.2)
CHLORIDE BLD-SCNC: 94 MMOL/L (ref 98–107)
CO2: 27 MMOL/L (ref 22–29)
CREAT SERPL-MCNC: 1 MG/DL (ref 0.5–1)
GFR AFRICAN AMERICAN: >60
GFR NON-AFRICAN AMERICAN: >60 ML/MIN/1.73
GLUCOSE BLD-MCNC: 265 MG/DL (ref 74–99)
METER GLUCOSE: 236 MG/DL (ref 74–99)
METER GLUCOSE: 382 MG/DL (ref 74–99)
POTASSIUM SERPL-SCNC: 4.1 MMOL/L (ref 3.5–5)
SODIUM BLD-SCNC: 140 MMOL/L (ref 132–146)

## 2019-01-18 PROCEDURE — 2580000003 HC RX 258: Performed by: INTERNAL MEDICINE

## 2019-01-18 PROCEDURE — 82962 GLUCOSE BLOOD TEST: CPT

## 2019-01-18 PROCEDURE — 6360000002 HC RX W HCPCS: Performed by: INTERNAL MEDICINE

## 2019-01-18 PROCEDURE — 99232 SBSQ HOSP IP/OBS MODERATE 35: CPT | Performed by: INTERNAL MEDICINE

## 2019-01-18 PROCEDURE — 6370000000 HC RX 637 (ALT 250 FOR IP): Performed by: INTERNAL MEDICINE

## 2019-01-18 PROCEDURE — 36415 COLL VENOUS BLD VENIPUNCTURE: CPT

## 2019-01-18 PROCEDURE — 80048 BASIC METABOLIC PNL TOTAL CA: CPT

## 2019-01-18 PROCEDURE — 94640 AIRWAY INHALATION TREATMENT: CPT

## 2019-01-18 RX ADMIN — ACYCLOVIR 400 MG: 200 CAPSULE ORAL at 08:53

## 2019-01-18 RX ADMIN — OXYBUTYNIN CHLORIDE 5 MG: 5 TABLET ORAL at 08:54

## 2019-01-18 RX ADMIN — ANASTROZOLE 1 MG: 1 TABLET, COATED ORAL at 08:54

## 2019-01-18 RX ADMIN — FUROSEMIDE 40 MG: 10 INJECTION, SOLUTION INTRAMUSCULAR; INTRAVENOUS at 08:52

## 2019-01-18 RX ADMIN — GABAPENTIN 300 MG: 300 CAPSULE ORAL at 14:52

## 2019-01-18 RX ADMIN — IPRATROPIUM BROMIDE AND ALBUTEROL SULFATE 1 AMPULE: .5; 3 SOLUTION RESPIRATORY (INHALATION) at 12:29

## 2019-01-18 RX ADMIN — FORMOTEROL FUMARATE DIHYDRATE 20 MCG: 20 SOLUTION RESPIRATORY (INHALATION) at 06:49

## 2019-01-18 RX ADMIN — CALCIUM CARBONATE-VITAMIN D TAB 500 MG-200 UNIT 1 TABLET: 500-200 TAB at 08:53

## 2019-01-18 RX ADMIN — Medication 10 ML: at 08:54

## 2019-01-18 RX ADMIN — ENOXAPARIN SODIUM 40 MG: 40 INJECTION SUBCUTANEOUS at 08:51

## 2019-01-18 RX ADMIN — GABAPENTIN 300 MG: 300 CAPSULE ORAL at 08:53

## 2019-01-18 RX ADMIN — METFORMIN HYDROCHLORIDE 500 MG: 500 TABLET ORAL at 08:53

## 2019-01-18 RX ADMIN — BUDESONIDE 500 MCG: 0.5 INHALANT RESPIRATORY (INHALATION) at 06:45

## 2019-01-18 RX ADMIN — LOSARTAN POTASSIUM 100 MG: 50 TABLET, FILM COATED ORAL at 08:53

## 2019-01-18 RX ADMIN — CLONIDINE HYDROCHLORIDE 0.1 MG: 0.1 TABLET ORAL at 08:53

## 2019-01-18 RX ADMIN — LINAGLIPTIN 5 MG: 5 TABLET, FILM COATED ORAL at 08:53

## 2019-01-18 RX ADMIN — MAGNESIUM HYDROXIDE 30 ML: 400 SUSPENSION ORAL at 14:53

## 2019-01-18 RX ADMIN — OXYBUTYNIN CHLORIDE 5 MG: 5 TABLET ORAL at 14:52

## 2019-01-18 RX ADMIN — METOPROLOL SUCCINATE 25 MG: 25 TABLET, FILM COATED, EXTENDED RELEASE ORAL at 08:53

## 2019-01-18 RX ADMIN — ASPIRIN 81 MG 81 MG: 81 TABLET ORAL at 08:51

## 2019-01-18 RX ADMIN — HYDROCHLOROTHIAZIDE 25 MG: 25 TABLET ORAL at 08:54

## 2019-01-18 RX ADMIN — INSULIN LISPRO 4 UNITS: 100 INJECTION, SOLUTION INTRAVENOUS; SUBCUTANEOUS at 08:51

## 2019-01-18 RX ADMIN — CLONIDINE HYDROCHLORIDE 0.1 MG: 0.1 TABLET ORAL at 14:52

## 2019-01-18 RX ADMIN — DILTIAZEM HYDROCHLORIDE 240 MG: 240 CAPSULE, COATED, EXTENDED RELEASE ORAL at 08:54

## 2019-01-18 RX ADMIN — PREDNISONE 40 MG: 20 TABLET ORAL at 08:52

## 2019-01-18 RX ADMIN — INSULIN LISPRO 10 UNITS: 100 INJECTION, SOLUTION INTRAVENOUS; SUBCUTANEOUS at 12:12

## 2019-01-18 ASSESSMENT — PAIN SCALES - GENERAL
PAINLEVEL_OUTOF10: 0

## 2019-01-18 NOTE — PROGRESS NOTES
PROGRESS NOTE     CARDIOLOGY    Chief complaint: Seen today for follow up, management & recommendations for hypervolemia, shortness of breath    She denies chest pain. She states that her breathing is improving but she is still coughing and wheezing. Wt Readings from Last 3 Encounters:   01/18/19 238 lb 4.8 oz (108.1 kg)   12/10/18 242 lb (109.8 kg)   10/15/18 242 lb (109.8 kg)     Temp Readings from Last 3 Encounters:   01/18/19 98.7 °F (37.1 °C) (Oral)   10/18/18 98.4 °F (36.9 °C) (Temporal)   10/10/18 98.2 °F (36.8 °C) (Oral)     BP Readings from Last 3 Encounters:   01/18/19 128/72   10/18/18 (!) 140/60   10/10/18 132/72     Pulse Readings from Last 3 Encounters:   01/18/19 69   10/18/18 84   10/10/18 86         Intake/Output Summary (Last 24 hours) at 01/18/19 0947  Last data filed at 01/18/19 0649   Gross per 24 hour   Intake              770 ml   Output             2150 ml   Net            -1380 ml       Recent Labs      01/16/19   0455  01/17/19   0548   WBC  7.6  9.5   HGB  11.3*  12.1   HCT  35.3  38.4   MCV  85.1  86.1   PLT  352  406     Recent Labs      01/16/19   0455  01/17/19   0548   NA  140  138   K  3.6  4.7   CL  96*  94*   CO2  29  26   BUN  10  18   CREATININE  0.8  0.8   MG  1.7  1.8     No results for input(s): PROTIME, INR in the last 72 hours. Recent Labs      01/15/19   1545  01/15/19   1954   TROPONINI  <0.01  <0.01     No results for input(s): BNP in the last 72 hours.   Recent Labs      01/16/19   0455   CHOL  182   HDL  70   TRIG  89           metoprolol succinate (TOPROL XL) extended release tablet 25 mg BID   predniSONE (DELTASONE) tablet 40 mg Daily   nitroGLYCERIN (NITROSTAT) SL tablet 0.4 mg Q5 Min PRN   acetaminophen (TYLENOL) tablet 500 mg Q6H PRN   acyclovir (ZOVIRAX) capsule 400 mg Daily   anastrozole (ARIMIDEX) tablet 1 mg Daily   aspirin chewable tablet 81 mg Daily   calcium-vitamin D (OSCAL-500) 500-200 MG-UNIT per tablet 1 tablet Daily   cloNIDine (CATAPRES) PERRL. Normal lids & conjunctiva. No icterus. ENT: clear, no bleeding. No external masses. Lips normal formation. Neck: supple, full ROM, No JVD, no bruits, no lymphadenopathy. No masses. trachea midline. Heart: regular rate & rhythm, normal S1 & S2, I/VI (normal physiologic) systolic murmur, S4 gallop. No heave. Lungs: Poor air movement. Slight wheezing. No accessory muscles. Abd: soft, non-tender. Normal bowel sounds. Morbidly obese. Neuro: Full ROM X 4, EOMI, no tremors. EXT: No bilateral lower extremity edema  Skin: warm, dry, intact. Good turgor. Psych: A&O x 3, normal behavior, not anxious. Assessment/Recommendations  1. Hypervolemia improved  1. Nonischemic cardiomyopathy with ejection fraction of 45-50%. 2. Stage II diastolic dysfunction. 3. Malignant hypertension on admission most likely contributing to the above. 4. Hypervolemia. Improved with IV Lasix. Continue diuresis for now and I will check a BMP today. 5. Euvolemic on exam but difficult exam due to her morbid obesity. 6. Hypoxemia on admission. 7. Coughing and Wheezing. I'll defer to others. 8. Chronic left bundle-branch block. 9. Diabetes. 10. Hypertension. 11. Prior CVA.   12. Breast cancer

## 2019-01-18 NOTE — PROGRESS NOTES
Subjective: The patient is awake and alert. No problems overnight. Denies chest pain, angina, and dyspnea. Denies abdominal pain. Tolerating diet. No nausea or vomiting. Objective:    /72   Pulse 69   Temp 98.7 °F (37.1 °C) (Oral)   Resp 18   Ht 5' 5\" (1.651 m)   Wt 238 lb 4.8 oz (108.1 kg)   SpO2 100%   BMI 39.66 kg/m²     Current medications that patient is taking have been reviewed. Heart:  RRR, no murmurs, gallops, or rubs.   Lungs:  CTA bilaterally, no wheeze, rales or rhonchi  Abd: bowel sounds present, soft, nontender, nondistended, no masses  Extrem:  No cyanosis or edema    CBC with Differential:    Lab Results   Component Value Date    WBC 9.5 01/17/2019    RBC 4.46 01/17/2019    HGB 12.1 01/17/2019    HCT 38.4 01/17/2019     01/17/2019    MCV 86.1 01/17/2019    MCH 27.1 01/17/2019    MCHC 31.5 01/17/2019    RDW 14.2 01/17/2019    SEGSPCT 77 12/03/2013    LYMPHOPCT 7.2 01/17/2019    MONOPCT 2.1 01/17/2019    BASOPCT 0.2 01/17/2019    MONOSABS 0.20 01/17/2019    LYMPHSABS 0.69 01/17/2019    EOSABS 0.00 01/17/2019    BASOSABS 0.02 01/17/2019     CMP:    Lab Results   Component Value Date     01/17/2019    K 4.7 01/17/2019    K 4.6 10/16/2018    CL 94 01/17/2019    CO2 26 01/17/2019    BUN 18 01/17/2019    CREATININE 0.8 01/17/2019    GFRAA >60 01/17/2019    LABGLOM >60 01/17/2019    GLUCOSE 291 01/17/2019    GLUCOSE 172 04/12/2012    PROT 6.9 01/16/2019    LABALBU 3.8 01/16/2019    LABALBU 4.4 07/28/2011    CALCIUM 9.8 01/17/2019    BILITOT 0.5 01/16/2019    ALKPHOS 141 01/16/2019    AST 11 01/16/2019    ALT 7 01/16/2019     BMP:    Lab Results   Component Value Date     01/17/2019    K 4.7 01/17/2019    K 4.6 10/16/2018    CL 94 01/17/2019    CO2 26 01/17/2019    BUN 18 01/17/2019    LABALBU 3.8 01/16/2019    LABALBU 4.4 07/28/2011    CREATININE 0.8 01/17/2019    CALCIUM 9.8 01/17/2019    GFRAA >60 01/17/2019    LABGLOM >60 01/17/2019    GLUCOSE 291 01/17/2019 GLUCOSE 172 04/12/2012     Magnesium:    Lab Results   Component Value Date    MG 1.8 01/17/2019     Phosphorus:    Lab Results   Component Value Date    PHOS 4.0 01/01/2013     PT/INR:    Lab Results   Component Value Date    PROTIME 11.7 07/16/2013    PROTIME 11.6 11/01/2010    INR 1.1 07/16/2013     PTT:    Lab Results   Component Value Date    APTT 35.7 07/16/2013   [APTT}     Assessment:    Patient Active Problem List   Diagnosis    Obesity (BMI 30-39. 9)    Hyperlipidemia with target LDL less than 70    Ductal carcinoma in situ (DCIS) of breast    Acute respiratory failure with hypoxia (HCC)    Acute on chronic combined systolic and diastolic congestive heart failure (HCC)    Diabetes mellitus type 2, uncontrolled ([facility] [address]    Essential hypertension    COPD (chronic obstructive pulmonary disease) (HCC)    History of CVA (cerebrovascular accident)       Plan:  Continue to encourage weight loss. Continue aggressive lipid therapy  Stable. Secondary to COPD and CHF exacerbations. HD stable. Diuresed nicely. Blood glucose ok, continue to adjust basal/bolus insulin therapy  Blood pressure ok, continue current medications  Continue breathing treatments. Continue Pt/Ot and risk factor modifications. Discharge.     Jennifer Beltre MD  9:02 AM  1/18/2019

## 2019-01-18 NOTE — CARE COORDINATION
Iv lasix 40mg twice a day. Discharge plan remains home with [facility]. Denies any needs for home at present.  Sw/cm will follow

## 2019-01-21 ENCOUNTER — TELEPHONE (OUTPATIENT)
Dept: FAMILY MEDICINE CLINIC | Age: 69
End: 2019-01-21

## 2019-01-23 ENCOUNTER — OFFICE VISIT (OUTPATIENT)
Dept: FAMILY MEDICINE CLINIC | Age: 69
End: 2019-01-23
Payer: MEDICARE

## 2019-01-23 VITALS
BODY MASS INDEX: 38.09 KG/M2 | DIASTOLIC BLOOD PRESSURE: 70 MMHG | OXYGEN SATURATION: 98 % | HEIGHT: 66 IN | WEIGHT: 237 LBS | RESPIRATION RATE: 16 BRPM | SYSTOLIC BLOOD PRESSURE: 122 MMHG | TEMPERATURE: 98.4 F | HEART RATE: 84 BPM

## 2019-01-23 DIAGNOSIS — J44.1 CHRONIC OBSTRUCTIVE PULMONARY DISEASE WITH ACUTE EXACERBATION (HCC): Chronic | ICD-10-CM

## 2019-01-23 DIAGNOSIS — J44.9 CHRONIC OBSTRUCTIVE PULMONARY DISEASE, UNSPECIFIED COPD TYPE (HCC): Primary | Chronic | ICD-10-CM

## 2019-01-23 DIAGNOSIS — I50.43 ACUTE ON CHRONIC COMBINED SYSTOLIC AND DIASTOLIC CONGESTIVE HEART FAILURE (HCC): ICD-10-CM

## 2019-01-23 DIAGNOSIS — J96.01 ACUTE RESPIRATORY FAILURE WITH HYPOXIA (HCC): ICD-10-CM

## 2019-01-23 PROCEDURE — G8427 DOCREV CUR MEDS BY ELIG CLIN: HCPCS | Performed by: FAMILY MEDICINE

## 2019-01-23 PROCEDURE — G8484 FLU IMMUNIZE NO ADMIN: HCPCS | Performed by: FAMILY MEDICINE

## 2019-01-23 PROCEDURE — 1090F PRES/ABSN URINE INCON ASSESS: CPT | Performed by: FAMILY MEDICINE

## 2019-01-23 PROCEDURE — G8926 SPIRO NO PERF OR DOC: HCPCS | Performed by: FAMILY MEDICINE

## 2019-01-23 PROCEDURE — 1101F PT FALLS ASSESS-DOCD LE1/YR: CPT | Performed by: FAMILY MEDICINE

## 2019-01-23 PROCEDURE — 3023F SPIROM DOC REV: CPT | Performed by: FAMILY MEDICINE

## 2019-01-23 PROCEDURE — G8399 PT W/DXA RESULTS DOCUMENT: HCPCS | Performed by: FAMILY MEDICINE

## 2019-01-23 PROCEDURE — 1036F TOBACCO NON-USER: CPT | Performed by: FAMILY MEDICINE

## 2019-01-23 PROCEDURE — 1123F ACP DISCUSS/DSCN MKR DOCD: CPT | Performed by: FAMILY MEDICINE

## 2019-01-23 PROCEDURE — 4040F PNEUMOC VAC/ADMIN/RCVD: CPT | Performed by: FAMILY MEDICINE

## 2019-01-23 PROCEDURE — 99214 OFFICE O/P EST MOD 30 MIN: CPT | Performed by: FAMILY MEDICINE

## 2019-01-23 PROCEDURE — G8598 ASA/ANTIPLAT THER USED: HCPCS | Performed by: FAMILY MEDICINE

## 2019-01-23 PROCEDURE — 3017F COLORECTAL CA SCREEN DOC REV: CPT | Performed by: FAMILY MEDICINE

## 2019-01-23 PROCEDURE — 1111F DSCHRG MED/CURRENT MED MERGE: CPT | Performed by: FAMILY MEDICINE

## 2019-01-23 PROCEDURE — G8417 CALC BMI ABV UP PARAM F/U: HCPCS | Performed by: FAMILY MEDICINE

## 2019-01-23 RX ORDER — FLUTICASONE FUROATE AND VILANTEROL 100; 25 UG/1; UG/1
1 POWDER RESPIRATORY (INHALATION) DAILY
Qty: 28 EACH | Refills: 5 | Status: SHIPPED | OUTPATIENT
Start: 2019-01-23 | End: 2019-03-06

## 2019-01-23 ASSESSMENT — ENCOUNTER SYMPTOMS
BACK PAIN: 0
BLOOD IN STOOL: 0
SORE THROAT: 0
DIARRHEA: 0
COUGH: 0
ABDOMINAL PAIN: 0
VOMITING: 0
WHEEZING: 0
SHORTNESS OF BREATH: 0
NAUSEA: 0
CONSTIPATION: 0

## 2019-03-06 ENCOUNTER — OFFICE VISIT (OUTPATIENT)
Dept: FAMILY MEDICINE CLINIC | Age: 69
End: 2019-03-06
Payer: MEDICARE

## 2019-03-06 VITALS
WEIGHT: 241 LBS | BODY MASS INDEX: 40.15 KG/M2 | DIASTOLIC BLOOD PRESSURE: 68 MMHG | HEIGHT: 65 IN | SYSTOLIC BLOOD PRESSURE: 136 MMHG | TEMPERATURE: 98.8 F | OXYGEN SATURATION: 98 % | RESPIRATION RATE: 16 BRPM | HEART RATE: 75 BPM

## 2019-03-06 DIAGNOSIS — N39.41 URGE INCONTINENCE OF URINE: ICD-10-CM

## 2019-03-06 DIAGNOSIS — M54.50 LOW BACK PAIN RADIATING TO BOTH LEGS: ICD-10-CM

## 2019-03-06 DIAGNOSIS — J44.1 CHRONIC OBSTRUCTIVE PULMONARY DISEASE WITH ACUTE EXACERBATION (HCC): Chronic | ICD-10-CM

## 2019-03-06 DIAGNOSIS — J44.9 CHRONIC OBSTRUCTIVE PULMONARY DISEASE, UNSPECIFIED COPD TYPE (HCC): Chronic | ICD-10-CM

## 2019-03-06 DIAGNOSIS — M54.12 CERVICAL RADICULOPATHY, CHRONIC: ICD-10-CM

## 2019-03-06 DIAGNOSIS — M79.604 LOW BACK PAIN RADIATING TO BOTH LEGS: ICD-10-CM

## 2019-03-06 DIAGNOSIS — E55.9 VITAMIN D DEFICIENCY: ICD-10-CM

## 2019-03-06 DIAGNOSIS — E78.5 HYPERLIPIDEMIA WITH TARGET LDL LESS THAN 70: ICD-10-CM

## 2019-03-06 DIAGNOSIS — J45.40 MODERATE PERSISTENT ASTHMA WITHOUT COMPLICATION: ICD-10-CM

## 2019-03-06 DIAGNOSIS — I10 ESSENTIAL HYPERTENSION: ICD-10-CM

## 2019-03-06 DIAGNOSIS — M79.605 LOW BACK PAIN RADIATING TO BOTH LEGS: ICD-10-CM

## 2019-03-06 DIAGNOSIS — E11.65 UNCONTROLLED TYPE 2 DIABETES MELLITUS WITH HYPERGLYCEMIA (HCC): Primary | ICD-10-CM

## 2019-03-06 DIAGNOSIS — J96.01 ACUTE RESPIRATORY FAILURE WITH HYPOXIA (HCC): ICD-10-CM

## 2019-03-06 LAB
CREATININE URINE POCT: NORMAL
MICROALBUMIN/CREAT 24H UR: NORMAL MG/G{CREAT}
MICROALBUMIN/CREAT UR-RTO: NORMAL

## 2019-03-06 PROCEDURE — G8484 FLU IMMUNIZE NO ADMIN: HCPCS | Performed by: FAMILY MEDICINE

## 2019-03-06 PROCEDURE — 4040F PNEUMOC VAC/ADMIN/RCVD: CPT | Performed by: FAMILY MEDICINE

## 2019-03-06 PROCEDURE — G8417 CALC BMI ABV UP PARAM F/U: HCPCS | Performed by: FAMILY MEDICINE

## 2019-03-06 PROCEDURE — 1036F TOBACCO NON-USER: CPT | Performed by: FAMILY MEDICINE

## 2019-03-06 PROCEDURE — 3023F SPIROM DOC REV: CPT | Performed by: FAMILY MEDICINE

## 2019-03-06 PROCEDURE — 0509F URINE INCON PLAN DOCD: CPT | Performed by: FAMILY MEDICINE

## 2019-03-06 PROCEDURE — 3046F HEMOGLOBIN A1C LEVEL >9.0%: CPT | Performed by: FAMILY MEDICINE

## 2019-03-06 PROCEDURE — G8598 ASA/ANTIPLAT THER USED: HCPCS | Performed by: FAMILY MEDICINE

## 2019-03-06 PROCEDURE — 99215 OFFICE O/P EST HI 40 MIN: CPT | Performed by: FAMILY MEDICINE

## 2019-03-06 PROCEDURE — 1123F ACP DISCUSS/DSCN MKR DOCD: CPT | Performed by: FAMILY MEDICINE

## 2019-03-06 PROCEDURE — G8427 DOCREV CUR MEDS BY ELIG CLIN: HCPCS | Performed by: FAMILY MEDICINE

## 2019-03-06 PROCEDURE — 3017F COLORECTAL CA SCREEN DOC REV: CPT | Performed by: FAMILY MEDICINE

## 2019-03-06 PROCEDURE — 1090F PRES/ABSN URINE INCON ASSESS: CPT | Performed by: FAMILY MEDICINE

## 2019-03-06 PROCEDURE — G8399 PT W/DXA RESULTS DOCUMENT: HCPCS | Performed by: FAMILY MEDICINE

## 2019-03-06 PROCEDURE — 1101F PT FALLS ASSESS-DOCD LE1/YR: CPT | Performed by: FAMILY MEDICINE

## 2019-03-06 PROCEDURE — 2022F DILAT RTA XM EVC RTNOPTHY: CPT | Performed by: FAMILY MEDICINE

## 2019-03-06 PROCEDURE — G0444 DEPRESSION SCREEN ANNUAL: HCPCS | Performed by: FAMILY MEDICINE

## 2019-03-06 PROCEDURE — 82044 UR ALBUMIN SEMIQUANTITATIVE: CPT | Performed by: FAMILY MEDICINE

## 2019-03-06 PROCEDURE — G8926 SPIRO NO PERF OR DOC: HCPCS | Performed by: FAMILY MEDICINE

## 2019-03-06 RX ORDER — OXYBUTYNIN CHLORIDE 5 MG/1
5 TABLET ORAL 3 TIMES DAILY
Qty: 90 TABLET | Refills: 1 | Status: SHIPPED | OUTPATIENT
Start: 2019-03-06 | End: 2019-08-02 | Stop reason: SDUPTHER

## 2019-03-06 RX ORDER — NORTRIPTYLINE HYDROCHLORIDE 10 MG/1
CAPSULE ORAL
Qty: 90 CAPSULE | Refills: 3 | Status: SHIPPED | OUTPATIENT
Start: 2019-03-06 | End: 2019-07-31

## 2019-03-06 RX ORDER — INSULIN GLARGINE 100 [IU]/ML
INJECTION, SOLUTION SUBCUTANEOUS
Qty: 20 ML | Refills: 10 | Status: ON HOLD | OUTPATIENT
Start: 2019-03-06 | End: 2019-05-12

## 2019-03-06 RX ORDER — PRAVASTATIN SODIUM 80 MG/1
80 TABLET ORAL DAILY
Qty: 90 TABLET | Refills: 1 | Status: ON HOLD | OUTPATIENT
Start: 2019-03-06 | End: 2019-05-13

## 2019-03-06 RX ORDER — MONTELUKAST SODIUM 10 MG/1
TABLET ORAL
Qty: 90 TABLET | Refills: 3 | Status: SHIPPED | OUTPATIENT
Start: 2019-03-06 | End: 2019-06-19 | Stop reason: SDUPTHER

## 2019-03-06 RX ORDER — SAXAGLIPTIN 5 MG/1
5 TABLET, FILM COATED ORAL DAILY
Qty: 90 TABLET | Refills: 3 | Status: SHIPPED | OUTPATIENT
Start: 2019-03-06 | End: 2019-06-19 | Stop reason: SDUPTHER

## 2019-03-06 RX ORDER — GABAPENTIN 300 MG/1
CAPSULE ORAL
Qty: 270 CAPSULE | Refills: 1 | Status: SHIPPED | OUTPATIENT
Start: 2019-03-06 | End: 2019-06-19 | Stop reason: SDUPTHER

## 2019-03-06 RX ORDER — IPRATROPIUM BROMIDE AND ALBUTEROL SULFATE 2.5; .5 MG/3ML; MG/3ML
SOLUTION RESPIRATORY (INHALATION)
Qty: 1080 ML | Refills: 1 | Status: ON HOLD | OUTPATIENT
Start: 2019-03-06 | End: 2019-05-12

## 2019-03-06 RX ORDER — LOSARTAN POTASSIUM AND HYDROCHLOROTHIAZIDE 25; 100 MG/1; MG/1
TABLET ORAL
Qty: 90 TABLET | Refills: 1 | Status: SHIPPED | OUTPATIENT
Start: 2019-03-06 | End: 2019-07-31

## 2019-03-06 RX ORDER — INSULIN GLARGINE 100 [IU]/ML
50 INJECTION, SOLUTION SUBCUTANEOUS NIGHTLY
Qty: 1 VIAL | Refills: 3 | Status: SHIPPED | OUTPATIENT
Start: 2019-03-06 | End: 2019-07-05 | Stop reason: SDUPTHER

## 2019-03-06 RX ORDER — ERGOCALCIFEROL 1.25 MG/1
CAPSULE ORAL
Qty: 4 CAPSULE | Refills: 11 | Status: ON HOLD | OUTPATIENT
Start: 2019-03-06 | End: 2019-05-13

## 2019-03-06 RX ORDER — FLUTICASONE FUROATE AND VILANTEROL 100; 25 UG/1; UG/1
1 POWDER RESPIRATORY (INHALATION) DAILY
Qty: 28 EACH | Refills: 5 | Status: SHIPPED | OUTPATIENT
Start: 2019-03-06 | End: 2019-06-18 | Stop reason: SDUPTHER

## 2019-03-06 RX ORDER — CLONIDINE HYDROCHLORIDE 0.2 MG/1
TABLET ORAL
Qty: 180 TABLET | Refills: 3 | Status: SHIPPED | OUTPATIENT
Start: 2019-03-06 | End: 2019-06-19 | Stop reason: SDUPTHER

## 2019-03-06 RX ORDER — DILTIAZEM HYDROCHLORIDE 240 MG/1
CAPSULE, COATED, EXTENDED RELEASE ORAL
Qty: 90 CAPSULE | Refills: 1 | Status: SHIPPED | OUTPATIENT
Start: 2019-03-06 | End: 2019-07-05

## 2019-03-06 ASSESSMENT — PATIENT HEALTH QUESTIONNAIRE - PHQ9
5. POOR APPETITE OR OVEREATING: 0
SUM OF ALL RESPONSES TO PHQ QUESTIONS 1-9: 7
SUM OF ALL RESPONSES TO PHQ9 QUESTIONS 1 & 2: 1
4. FEELING TIRED OR HAVING LITTLE ENERGY: 2
SUM OF ALL RESPONSES TO PHQ QUESTIONS 1-9: 7
8. MOVING OR SPEAKING SO SLOWLY THAT OTHER PEOPLE COULD HAVE NOTICED. OR THE OPPOSITE, BEING SO FIGETY OR RESTLESS THAT YOU HAVE BEEN MOVING AROUND A LOT MORE THAN USUAL: 0
2. FEELING DOWN, DEPRESSED OR HOPELESS: 0
7. TROUBLE CONCENTRATING ON THINGS, SUCH AS READING THE NEWSPAPER OR WATCHING TELEVISION: 2
6. FEELING BAD ABOUT YOURSELF - OR THAT YOU ARE A FAILURE OR HAVE LET YOURSELF OR YOUR FAMILY DOWN: 0
1. LITTLE INTEREST OR PLEASURE IN DOING THINGS: 1
10. IF YOU CHECKED OFF ANY PROBLEMS, HOW DIFFICULT HAVE THESE PROBLEMS MADE IT FOR YOU TO DO YOUR WORK, TAKE CARE OF THINGS AT HOME, OR GET ALONG WITH OTHER PEOPLE: 0
3. TROUBLE FALLING OR STAYING ASLEEP: 2
9. THOUGHTS THAT YOU WOULD BE BETTER OFF DEAD, OR OF HURTING YOURSELF: 0

## 2019-03-06 ASSESSMENT — ENCOUNTER SYMPTOMS
HEARTBURN: 0
SHORTNESS OF BREATH: 0
BACK PAIN: 0
VOMITING: 0
CONSTIPATION: 0
ABDOMINAL PAIN: 0
DOUBLE VISION: 0
SPUTUM PRODUCTION: 0
DIARRHEA: 0
WHEEZING: 0
BLOOD IN STOOL: 0
SORE THROAT: 0
BLURRED VISION: 0
COUGH: 0
ORTHOPNEA: 0
NAUSEA: 0

## 2019-03-15 ENCOUNTER — TELEPHONE (OUTPATIENT)
Dept: ORTHOPEDIC SURGERY | Age: 69
End: 2019-03-15

## 2019-03-15 DIAGNOSIS — M79.641 RIGHT HAND PAIN: Primary | ICD-10-CM

## 2019-03-18 ENCOUNTER — OFFICE VISIT (OUTPATIENT)
Dept: ORTHOPEDIC SURGERY | Age: 69
End: 2019-03-18
Payer: MEDICARE

## 2019-03-18 VITALS — RESPIRATION RATE: 16 BRPM | HEART RATE: 76 BPM | SYSTOLIC BLOOD PRESSURE: 203 MMHG | DIASTOLIC BLOOD PRESSURE: 98 MMHG

## 2019-03-18 DIAGNOSIS — M79.641 RIGHT HAND PAIN: Primary | ICD-10-CM

## 2019-03-18 DIAGNOSIS — M65.331 TRIGGER FINGER, RIGHT MIDDLE FINGER: ICD-10-CM

## 2019-03-18 PROCEDURE — G8399 PT W/DXA RESULTS DOCUMENT: HCPCS | Performed by: ORTHOPAEDIC SURGERY

## 2019-03-18 PROCEDURE — 1036F TOBACCO NON-USER: CPT | Performed by: ORTHOPAEDIC SURGERY

## 2019-03-18 PROCEDURE — G8427 DOCREV CUR MEDS BY ELIG CLIN: HCPCS | Performed by: ORTHOPAEDIC SURGERY

## 2019-03-18 PROCEDURE — G8417 CALC BMI ABV UP PARAM F/U: HCPCS | Performed by: ORTHOPAEDIC SURGERY

## 2019-03-18 PROCEDURE — 99213 OFFICE O/P EST LOW 20 MIN: CPT | Performed by: ORTHOPAEDIC SURGERY

## 2019-03-18 PROCEDURE — 3017F COLORECTAL CA SCREEN DOC REV: CPT | Performed by: ORTHOPAEDIC SURGERY

## 2019-03-18 PROCEDURE — G8598 ASA/ANTIPLAT THER USED: HCPCS | Performed by: ORTHOPAEDIC SURGERY

## 2019-03-18 PROCEDURE — 1123F ACP DISCUSS/DSCN MKR DOCD: CPT | Performed by: ORTHOPAEDIC SURGERY

## 2019-03-18 PROCEDURE — G8484 FLU IMMUNIZE NO ADMIN: HCPCS | Performed by: ORTHOPAEDIC SURGERY

## 2019-03-18 PROCEDURE — 20550 NJX 1 TENDON SHEATH/LIGAMENT: CPT | Performed by: ORTHOPAEDIC SURGERY

## 2019-03-18 PROCEDURE — 1090F PRES/ABSN URINE INCON ASSESS: CPT | Performed by: ORTHOPAEDIC SURGERY

## 2019-03-18 PROCEDURE — 1101F PT FALLS ASSESS-DOCD LE1/YR: CPT | Performed by: ORTHOPAEDIC SURGERY

## 2019-03-18 PROCEDURE — 4040F PNEUMOC VAC/ADMIN/RCVD: CPT | Performed by: ORTHOPAEDIC SURGERY

## 2019-03-18 RX ORDER — DEXAMETHASONE SODIUM PHOSPHATE 4 MG/ML
4 INJECTION, SOLUTION INTRA-ARTICULAR; INTRALESIONAL; INTRAMUSCULAR; INTRAVENOUS; SOFT TISSUE ONCE
Status: COMPLETED | OUTPATIENT
Start: 2019-03-18 | End: 2019-03-18

## 2019-03-18 RX ADMIN — DEXAMETHASONE SODIUM PHOSPHATE 4 MG: 4 INJECTION, SOLUTION INTRA-ARTICULAR; INTRALESIONAL; INTRAMUSCULAR; INTRAVENOUS; SOFT TISSUE at 12:47

## 2019-04-18 ENCOUNTER — OFFICE VISIT (OUTPATIENT)
Dept: CARDIOLOGY CLINIC | Age: 69
End: 2019-04-18
Payer: MEDICARE

## 2019-04-18 VITALS
BODY MASS INDEX: 38.92 KG/M2 | SYSTOLIC BLOOD PRESSURE: 134 MMHG | WEIGHT: 242.2 LBS | HEART RATE: 68 BPM | DIASTOLIC BLOOD PRESSURE: 62 MMHG | HEIGHT: 66 IN

## 2019-04-18 DIAGNOSIS — Z87.898 HISTORY OF SYNCOPE: ICD-10-CM

## 2019-04-18 DIAGNOSIS — E66.01 OBESITY, MORBID, BMI 40.0-49.9 (HCC): ICD-10-CM

## 2019-04-18 DIAGNOSIS — I10 ESSENTIAL HYPERTENSION: ICD-10-CM

## 2019-04-18 DIAGNOSIS — E11.9 CONTROLLED TYPE 2 DIABETES MELLITUS WITHOUT COMPLICATION, WITH LONG-TERM CURRENT USE OF INSULIN (HCC): ICD-10-CM

## 2019-04-18 DIAGNOSIS — Z79.4 CONTROLLED TYPE 2 DIABETES MELLITUS WITHOUT COMPLICATION, WITH LONG-TERM CURRENT USE OF INSULIN (HCC): ICD-10-CM

## 2019-04-18 DIAGNOSIS — I44.7 LBBB (LEFT BUNDLE BRANCH BLOCK): ICD-10-CM

## 2019-04-18 DIAGNOSIS — R55 SYNCOPE AND COLLAPSE: ICD-10-CM

## 2019-04-18 DIAGNOSIS — Z86.73 HISTORY OF CVA (CEREBROVASCULAR ACCIDENT): ICD-10-CM

## 2019-04-18 DIAGNOSIS — I51.9 LV DYSFUNCTION: Primary | ICD-10-CM

## 2019-04-18 DIAGNOSIS — I38 VHD (VALVULAR HEART DISEASE): ICD-10-CM

## 2019-04-18 DIAGNOSIS — I47.1 PSVT (PAROXYSMAL SUPRAVENTRICULAR TACHYCARDIA) (HCC): ICD-10-CM

## 2019-04-18 PROCEDURE — 3046F HEMOGLOBIN A1C LEVEL >9.0%: CPT | Performed by: INTERNAL MEDICINE

## 2019-04-18 PROCEDURE — 1036F TOBACCO NON-USER: CPT | Performed by: INTERNAL MEDICINE

## 2019-04-18 PROCEDURE — 99214 OFFICE O/P EST MOD 30 MIN: CPT | Performed by: INTERNAL MEDICINE

## 2019-04-18 PROCEDURE — G8399 PT W/DXA RESULTS DOCUMENT: HCPCS | Performed by: INTERNAL MEDICINE

## 2019-04-18 PROCEDURE — 4040F PNEUMOC VAC/ADMIN/RCVD: CPT | Performed by: INTERNAL MEDICINE

## 2019-04-18 PROCEDURE — 1090F PRES/ABSN URINE INCON ASSESS: CPT | Performed by: INTERNAL MEDICINE

## 2019-04-18 PROCEDURE — G8417 CALC BMI ABV UP PARAM F/U: HCPCS | Performed by: INTERNAL MEDICINE

## 2019-04-18 PROCEDURE — 2022F DILAT RTA XM EVC RTNOPTHY: CPT | Performed by: INTERNAL MEDICINE

## 2019-04-18 PROCEDURE — 3017F COLORECTAL CA SCREEN DOC REV: CPT | Performed by: INTERNAL MEDICINE

## 2019-04-18 PROCEDURE — G8427 DOCREV CUR MEDS BY ELIG CLIN: HCPCS | Performed by: INTERNAL MEDICINE

## 2019-04-18 PROCEDURE — 93000 ELECTROCARDIOGRAM COMPLETE: CPT | Performed by: INTERNAL MEDICINE

## 2019-04-18 PROCEDURE — G8598 ASA/ANTIPLAT THER USED: HCPCS | Performed by: INTERNAL MEDICINE

## 2019-04-18 PROCEDURE — 1123F ACP DISCUSS/DSCN MKR DOCD: CPT | Performed by: INTERNAL MEDICINE

## 2019-04-18 NOTE — PROGRESS NOTES
OFFICE VISIT     PRIMARY CARE PHYSICIAN:      Aida Finnegan MD       ALLERGIES / SENSITIVITIES:      No Known Allergies       REVIEWED MEDICATIONS:        Current Outpatient Medications:     INSULIN SYRINGE 1CC/29G 29G X 1/2\" 1 ML MISC, USE EVERY DAY AS DIRECTED, Disp: 200 each, Rfl: 3    diltiazem (CARDIZEM CD) 240 MG extended release capsule, TAKE 1 CAPSULE BY MOUTH ONCE DAILY FOR HIGH BLOOD PRESSURE, Disp: 90 capsule, Rfl: 1    oxybutynin (DITROPAN) 5 MG tablet, Take 1 tablet by mouth 3 times daily (Patient taking differently: Take 10 mg by mouth 3 times daily ), Disp: 90 tablet, Rfl: 1    insulin glargine (LANTUS) 100 UNIT/ML injection vial, Inject 50 Units into the skin nightly, Disp: 1 vial, Rfl: 3    cloNIDine (CATAPRES) 0.2 MG tablet, TAKE ONE (1) TABLET BY MOUTH TWICE DAILY, Disp: 180 tablet, Rfl: 3    nortriptyline (PAMELOR) 10 MG capsule, TAKE 1 CAPSULE BY MOUTH DAILY IN THE EVENING, Disp: 90 capsule, Rfl: 3    metFORMIN (GLUCOPHAGE) 500 MG tablet, TAKE 1 TABLET BY MOUTH 2 TIMES DAILY *WITH MEALS*, Disp: 180 tablet, Rfl: 3    ONGLYZA 5 MG TABS tablet, Take 1 tablet by mouth daily Indications: Insulin-Resistant Diabetes, Disp: 90 tablet, Rfl: 3    metoprolol tartrate (LOPRESSOR) 25 MG tablet, TAKE ONE (1) TABLET BY MOUTH TWICE DAILY, Disp: 180 tablet, Rfl: 3    montelukast (SINGULAIR) 10 MG tablet, TAKE 1 TABLET BY MOUTH NIGHTLY, Disp: 90 tablet, Rfl: 3    ipratropium-albuterol (DUONEB) 0.5-2.5 (3) MG/3ML SOLN nebulizer solution, INHALE 3ML VIAL VIA NEBULIZER THREE TIMES A DAY AS NEEDED FOR SHORTNESS OF BREATH, Disp: 1080 mL, Rfl: 1    gabapentin (NEURONTIN) 300 MG capsule, TAKE 1 CAPSULE BY MOUTH 3 TIMES DAILY, Disp: 270 capsule, Rfl: 1    pravastatin (PRAVACHOL) 80 MG tablet, Take 1 tablet by mouth daily Indications: High Amount of Fats in the Blood, Disp: 90 tablet, Rfl: 1    losartan-hydrochlorothiazide (HYZAAR) 100-25 MG per tablet, TAKE 1 TABLET BY MOUTH ONCE DAILY FOR HIGH BLOOD PRESSURE, Disp: 90 tablet, Rfl: 1    fluticasone-vilanterol (BREO ELLIPTA) 100-25 MCG/INH AEPB inhaler, Inhale 1 puff into the lungs daily, Disp: 28 each, Rfl: 5    vitamin D (ERGOCALCIFEROL) 28350 units CAPS capsule, TAKE 1 CAPSULE BY MOUTH ONCE WEEKLY, Disp: 4 capsule, Rfl: 11    LANTUS 100 UNIT/ML injection vial, As of 3/6/19:  50 units HS. Increase as directed (2 units/day/week until  and evening BS <160 OR she develops S/S hypoglycemia., Disp: 20 mL, Rfl: 10    fluticasone (FLOVENT HFA) 220 MCG/ACT inhaler, Inhale 2 puffs into the lungs 2 times daily, Disp: 1 Inhaler, Rfl: 0    anastrozole (ARIMIDEX) 1 MG tablet, Take 1 tablet by mouth daily, Disp: 30 tablet, Rfl: 11    aspirin 81 MG chewable tablet, Take 1 tablet by mouth daily, Disp: 30 tablet, Rfl: 3    acetaminophen (APAP EXTRA STRENGTH) 500 MG tablet, Take 1 tablet by mouth every 6 hours as needed for Pain, Disp: 30 tablet, Rfl: 3    acyclovir (ZOVIRAX) 400 MG tablet, Take 1 tablet by mouth daily, Disp: 90 tablet, Rfl: 1    vitamin B-12 (CYANOCOBALAMIN) 1000 MCG tablet, Take 1 tablet by mouth daily, Disp: 90 tablet, Rfl: 1    calcium carbonate-vitamin D (CALCIUM 600 + D) 600-400 MG-UNIT TABS per tab, Take 1 tablet by mouth 2 times daily (Patient taking differently: Take 1 tablet by mouth daily ), Disp: 60 tablet, Rfl: 11      S: REASON FOR VISIT:       Chief Complaint   Patient presents with    Congestive Heart Failure     6 month. ED x2 01/19 CHF, 10/18 CHF. Labs 03/19. Stress test 12/10/18. Pt has no cardiac complaints today          History of Present Illness:       Office Visit for follow up of CMP, VHD, HTN      No hospitalizations or surgeries since last visit     Arturo any exertional chest pain or short of breath   No palpitations, dizzy or syncope.    Active at home   No orthopnea   Try to watch diet   Compliant with all medications       Past Medical History:   Diagnosis Date    Arthritis     Asthma     Cancer ([facility] [address] breast     Cerebral artery occlusion with cerebral infarction [facility])     denies deficits    Cerebrovascular disease     no residual    CHF (congestive heart failure) (HCC) approx 3 years ago    Claustrophobia     Headache(784.0)     History of blood transfusion     with knee surgery    Hyperlipidemia     Hypertension     LBP radiating to both legs     Lymphedema of both lower extremities 2015    Neuromuscular disorder ([address]     Obesity     Recurrent genital HSV (herpes simplex virus) infection     last outbreak 2017    S/P breast biopsy, right 2016    pt states breast cancer    Type II or unspecified type diabetes mellitus without mention of complication, not stated as uncontrolled     AA1c8.7 9/10            Past Surgical History:   Procedure Laterality Date    ARTHROPLASTY Left 2016    thumb basil joint with dequervain's release    BREAST LUMPECTOMY  years ago    benign    BREAST LUMPECTOMY Right 2016    COLONOSCOPY      COLONOSCOPY  1/8/15    [facility]    ECHO COMPL W DOP COLOR FLOW  2012         FINGER SURGERY Left 2016    thumb    HAND SURGERY  2017    HYSTERECTOMY      JOINT REPLACEMENT      OTHER SURGICAL HISTORY Right 2017    RIGHT THUMB TRAPEZIECTOMY WITH LIGAMENT RECONSRUCTION DEQUERVAINS RELEASE    TIM AND BSO      TOTAL HIP ARTHROPLASTY Bilateral     ,  dr Santo Garrison, dr Cohen Median Bilateral 2013    dr Juan J Cabral          Family History   Problem Relation Age of Onset    Diabetes Mother     High Blood Pressure Mother     Heart Attack Mother     High Blood Pressure Father     Diabetes Father     Heart Failure Father     Breast Cancer Sister     Stroke Sister         young age   [facility] Cancer Sister 55        breast    Sickle Cell Anemia Other         niece    Breast Cancer Sister             Cancer Sister 59        breast & ovarian    Stomach Cancer Other         aunt          Social History     Tobacco Use    Smoking status: Former Smoker     Packs/day: 0.25     Years: 35.00     Pack years: 8.75     Last attempt to quit: 2001     Years since quittin.4    Smokeless tobacco: Never Used   Substance Use Topics    Alcohol use: Yes     Alcohol/week: 0.0 - 0.6 oz         Review of Systems:  HEENT: negative for acute visual symptoms or auditory problems, no dysphagia  Constitutional: negative for fever and chills, or significant weight loss  Respiratory: negative for cough, wheezing, or hemoptysis  Cardiovascular: negative for chest pain, palpitations, and dyspnea  Gastrointestinal: negative for abdominal pain, diarrhea, nausea and vomiting  Endocrine: Negative for polyuria and polydyspsia  Genitourinary:negative for dysuria and hematuria  Derm: negative for rash and skin lesion(s)  Neurological: negative for tingling, numbness, weakness, seizures and tremors  Endocrine: negative for polydipsia and polyuria  Musculoskeletal: negative for pain or tenderness  Psychiatric: negative for anxiety, depression, or suicidal ideations         O:  COMPLETE PHYSICAL EXAM:       /62   Pulse 68   Ht 5' 5.5\" (1.664 m)   Wt 242 lb 3.2 oz (109.9 kg)   BMI 39.69 kg/m²       General:   Patient alert, comfortable, no distress. Appears stated age. HEENT:    Pupils equal, no icterus, no nasal drainage, tongue moist.   Neck:              No masses, Thyroid not palpable. Chest:   Normal configuration, non tender. Lungs:   Clear to auscultation bilaterally, few scattered rhonchi. Cardiovascular:  Regular rhythm, 1/6 systolic murmur, No S3, PMI at 5th ICS, no palpable thrills, No elevated JVD, No carotid bruit. Abdomen:  Soft, Non tender, Bowel sounds normal, no pulsatile abdominal aorta, no palpable masses. Extremities:  Tace edema. Distal pulses palpable. No cyanosis, no clubbing. Skin:   Good turgor, warm and dry, no cyanosis.     Musculoskeletal: No joint swelling or

## 2019-04-22 ASSESSMENT — ENCOUNTER SYMPTOMS
CHOKING: 0
COUGH: 0
VOICE CHANGE: 0
SORE THROAT: 0
BLOOD IN STOOL: 0
RHINORRHEA: 0
EYE DISCHARGE: 0
SINUS PRESSURE: 0
SINUS PAIN: 0
DIARRHEA: 0
WHEEZING: 0
NAUSEA: 0
ABDOMINAL PAIN: 0
EYE ITCHING: 0
ABDOMINAL DISTENTION: 0
CHEST TIGHTNESS: 0
BACK PAIN: 0
CONSTIPATION: 0
SHORTNESS OF BREATH: 0
VOMITING: 0
TROUBLE SWALLOWING: 0

## 2019-04-22 NOTE — PROGRESS NOTES
PRESSURE 90 tablet 1    fluticasone-vilanterol (BREO ELLIPTA) 100-25 MCG/INH AEPB inhaler Inhale 1 puff into the lungs daily 28 each 5    vitamin D (ERGOCALCIFEROL) 72029 units CAPS capsule TAKE 1 CAPSULE BY MOUTH ONCE WEEKLY 4 capsule 11    LANTUS 100 UNIT/ML injection vial As of 3/6/19:  50 units HS. Increase as directed (2 units/day/week until  and evening BS <160 OR she develops S/S hypoglycemia. 20 mL 10    fluticasone (FLOVENT HFA) 220 MCG/ACT inhaler Inhale 2 puffs into the lungs 2 times daily 1 Inhaler 0    anastrozole (ARIMIDEX) 1 MG tablet Take 1 tablet by mouth daily 30 tablet 11    aspirin 81 MG chewable tablet Take 1 tablet by mouth daily 30 tablet 3    acetaminophen (APAP EXTRA STRENGTH) 500 MG tablet Take 1 tablet by mouth every 6 hours as needed for Pain 30 tablet 3    acyclovir (ZOVIRAX) 400 MG tablet Take 1 tablet by mouth daily 90 tablet 1    vitamin B-12 (CYANOCOBALAMIN) 1000 MCG tablet Take 1 tablet by mouth daily 90 tablet 1    calcium carbonate-vitamin D (CALCIUM 600 + D) 600-400 MG-UNIT TABS per tab Take 1 tablet by mouth 2 times daily (Patient taking differently: Take 1 tablet by mouth daily ) 60 tablet 11     No current facility-administered medications on file prior to visit. No Known Allergies      Review of Systems   Constitutional: Negative for activity change, appetite change, chills, fatigue, fever and unexpected weight change. HENT: Negative for congestion, postnasal drip, rhinorrhea, sinus pressure, sinus pain, sore throat, trouble swallowing and voice change. Eyes: Negative for discharge, itching and visual disturbance. Respiratory: Negative for cough, choking, chest tightness, shortness of breath and wheezing. Cardiovascular: Negative for chest pain, palpitations and leg swelling. Gastrointestinal: Negative for abdominal distention, abdominal pain, blood in stool, constipation, diarrhea, nausea and vomiting.    Endocrine: Negative for cold intolerance and heat intolerance. Genitourinary: Negative for difficulty urinating, dysuria, frequency and hematuria. Musculoskeletal: Positive for arthralgias and joint swelling. Negative for back pain, gait problem, myalgias, neck pain and neck stiffness. Skin: Negative for rash. Allergic/Immunologic: Negative for environmental allergies and food allergies. Neurological: Negative for dizziness, seizures, syncope, speech difficulty, weakness, light-headedness and headaches. Self reported episode of \"blacking out. \"  Extensive work-up in July 2018. Continues to follow with PCP and reports she is now seeing a new doctor. Hematological: Negative for adenopathy. Does not bruise/bleed easily. Psychiatric/Behavioral: Negative for agitation, confusion and decreased concentration. The patient is not nervous/anxious. The patient voices no complaints. With questioning, she denies significant pain, fever, chills, wound drainage or discharge. Objective:   Physical Exam   Constitutional: She is oriented to person, place, and time. She appears well-developed and well-nourished. No distress. ECOG 1   HENT:   Head: Normocephalic and atraumatic. Mouth/Throat: Oropharynx is clear and moist. No oropharyngeal exudate. Eyes: Conjunctivae and EOM are normal. Right eye exhibits no discharge. Left eye exhibits no discharge. No scleral icterus. Neck: Normal range of motion. Neck supple. No JVD present. No tracheal deviation present. No thyromegaly present. Cardiovascular: Normal rate and regular rhythm. Exam reveals no gallop and no friction rub. No murmur heard. Pulmonary/Chest: Effort normal and breath sounds normal. No stridor. No respiratory distress. She has no wheezes. She has no rales. She exhibits no mass, no tenderness, no bony tenderness, no laceration, no edema, no deformity, no swelling and no retraction.  Right breast exhibits no inverted nipple, no mass, no nipple discharge, no skin change and no tenderness. Left breast exhibits tenderness (Tenderness to palpation of right breast.  Chronic and stable. ). Left breast exhibits no inverted nipple, no mass, no nipple discharge and no skin change. Breasts are symmetrical.   Right breast with skin thickening c/w post treatment changes. Mild and chronic tenderness to palpation. Remainder of breast tissue supple bilaterally. No skin dimpling or puckering. No nipple discharge. No clinically suspicious lumps nodules or masses appreciated. No axillary lymphadenopathy. Abdominal: Soft. She exhibits no distension. There is no tenderness. There is no rebound and no guarding. Musculoskeletal: Normal range of motion. She exhibits no edema, tenderness or deformity. Right shoulder: Normal.        Left shoulder: Normal.   Lymphadenopathy:     She has no cervical adenopathy. Right cervical: No superficial cervical, no deep cervical and no posterior cervical adenopathy present. Left cervical: No superficial cervical, no deep cervical and no posterior cervical adenopathy present. She has no axillary adenopathy. Right axillary: No pectoral and no lateral adenopathy present. Left axillary: No pectoral and no lateral adenopathy present. Right: No supraclavicular adenopathy present. Left: No supraclavicular adenopathy present. Neurological: She is alert and oriented to person, place, and time. Coordination normal.   Skin: Skin is warm and dry. No rash noted. She is not diaphoretic. No erythema. No pallor. Psychiatric: She has a normal mood and affect. Her behavior is normal. Judgment and thought content normal.   Nursing note and vitals reviewed. Assessment:      76 y.o. woman who underwent right breast needle localized lumpectomy on September 6, 2016. 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  Size of follow up with labs same day. During today's visit, face-to-face time 25 minutes, greater than 50% in counseling education and coordination of care. All questions were answered to her and her mothers apparent satisfaction, and she is agreeable to the plan as outlined above. Care in collaboration with Dr. Sara Dye. Deon Leroy, RN, MSN, APRN-CNP, AOCNP  Advanced Oncology Certified Nurse Practitioner  Department of Breast Surgery  [facility]/  Care in collaboration with Dr. Sara Dye.  Christine Oswald

## 2019-04-25 ENCOUNTER — OFFICE VISIT (OUTPATIENT)
Dept: BREAST CENTER | Age: 69
End: 2019-04-25
Payer: MEDICARE

## 2019-04-25 ENCOUNTER — HOSPITAL ENCOUNTER (OUTPATIENT)
Dept: GENERAL RADIOLOGY | Age: 69
Discharge: HOME | End: 2019-04-27
Payer: MEDICARE

## 2019-04-25 VITALS
BODY MASS INDEX: 39.08 KG/M2 | DIASTOLIC BLOOD PRESSURE: 62 MMHG | WEIGHT: 243.2 LBS | TEMPERATURE: 98.1 F | HEART RATE: 70 BPM | SYSTOLIC BLOOD PRESSURE: 132 MMHG | RESPIRATION RATE: 16 BRPM | HEIGHT: 66 IN | OXYGEN SATURATION: 98 %

## 2019-04-25 DIAGNOSIS — Z12.31 ENCOUNTER FOR SCREENING MAMMOGRAM FOR MALIGNANT NEOPLASM OF BREAST: ICD-10-CM

## 2019-04-25 DIAGNOSIS — Z85.3 PERSONAL HISTORY OF BREAST CANCER: ICD-10-CM

## 2019-04-25 DIAGNOSIS — Z85.3 HISTORY OF BREAST CANCER: ICD-10-CM

## 2019-04-25 DIAGNOSIS — Z78.0 POST-MENOPAUSAL: ICD-10-CM

## 2019-04-25 DIAGNOSIS — Z78.0 POSTMENOPAUSAL: Primary | ICD-10-CM

## 2019-04-25 LAB
ALBUMIN SERPL-MCNC: 3.7 G/DL (ref 3.5–5.2)
ALP BLD-CCNC: 160 U/L (ref 35–104)
ALT SERPL-CCNC: 6 U/L (ref 0–32)
ANION GAP SERPL CALCULATED.3IONS-SCNC: 8 MMOL/L (ref 7–16)
AST SERPL-CCNC: 9 U/L (ref 0–31)
BASOPHILS ABSOLUTE: 0.04 E9/L (ref 0–0.2)
BASOPHILS RELATIVE PERCENT: 0.6 % (ref 0–2)
BILIRUB SERPL-MCNC: 0.2 MG/DL (ref 0–1.2)
BUN BLDV-MCNC: 7 MG/DL (ref 8–23)
CALCIUM SERPL-MCNC: 9.4 MG/DL (ref 8.6–10.2)
CHLORIDE BLD-SCNC: 100 MMOL/L (ref 98–107)
CO2: 30 MMOL/L (ref 22–29)
CREAT SERPL-MCNC: 0.7 MG/DL (ref 0.5–1)
EOSINOPHILS ABSOLUTE: 0 E9/L (ref 0.05–0.5)
EOSINOPHILS RELATIVE PERCENT: 0 % (ref 0–6)
GFR AFRICAN AMERICAN: >60
GFR NON-AFRICAN AMERICAN: >60 ML/MIN/1.73
GLUCOSE BLD-MCNC: 173 MG/DL (ref 74–99)
HCT VFR BLD CALC: 35.3 % (ref 34–48)
HEMOGLOBIN: 11.3 G/DL (ref 11.5–15.5)
IMMATURE GRANULOCYTES #: 0.02 E9/L
IMMATURE GRANULOCYTES %: 0.3 % (ref 0–5)
LYMPHOCYTES ABSOLUTE: 1.54 E9/L (ref 1.5–4)
LYMPHOCYTES RELATIVE PERCENT: 22 % (ref 20–42)
MCH RBC QN AUTO: 27.4 PG (ref 26–35)
MCHC RBC AUTO-ENTMCNC: 32 % (ref 32–34.5)
MCV RBC AUTO: 85.5 FL (ref 80–99.9)
MONOCYTES ABSOLUTE: 0.66 E9/L (ref 0.1–0.95)
MONOCYTES RELATIVE PERCENT: 9.4 % (ref 2–12)
NEUTROPHILS ABSOLUTE: 4.75 E9/L (ref 1.8–7.3)
NEUTROPHILS RELATIVE PERCENT: 67.7 % (ref 43–80)
PDW BLD-RTO: 14.2 FL (ref 11.5–15)
PLATELET # BLD: 366 E9/L (ref 130–450)
PMV BLD AUTO: 10.5 FL (ref 7–12)
POTASSIUM SERPL-SCNC: 4 MMOL/L (ref 3.5–5)
RBC # BLD: 4.13 E12/L (ref 3.5–5.5)
SODIUM BLD-SCNC: 138 MMOL/L (ref 132–146)
TOTAL PROTEIN: 6.7 G/DL (ref 6.4–8.3)
WBC # BLD: 7 E9/L (ref 4.5–11.5)

## 2019-04-25 PROCEDURE — 1123F ACP DISCUSS/DSCN MKR DOCD: CPT | Performed by: NURSE PRACTITIONER

## 2019-04-25 PROCEDURE — 1036F TOBACCO NON-USER: CPT | Performed by: NURSE PRACTITIONER

## 2019-04-25 PROCEDURE — 99214 OFFICE O/P EST MOD 30 MIN: CPT | Performed by: NURSE PRACTITIONER

## 2019-04-25 PROCEDURE — 36415 COLL VENOUS BLD VENIPUNCTURE: CPT | Performed by: NURSE PRACTITIONER

## 2019-04-25 PROCEDURE — 77067 SCR MAMMO BI INCL CAD: CPT

## 2019-04-25 PROCEDURE — 3017F COLORECTAL CA SCREEN DOC REV: CPT | Performed by: NURSE PRACTITIONER

## 2019-04-25 PROCEDURE — 77080 DXA BONE DENSITY AXIAL: CPT

## 2019-04-25 PROCEDURE — 85025 COMPLETE CBC W/AUTO DIFF WBC: CPT

## 2019-04-25 PROCEDURE — 4040F PNEUMOC VAC/ADMIN/RCVD: CPT | Performed by: NURSE PRACTITIONER

## 2019-04-25 PROCEDURE — G8427 DOCREV CUR MEDS BY ELIG CLIN: HCPCS | Performed by: NURSE PRACTITIONER

## 2019-04-25 PROCEDURE — G8417 CALC BMI ABV UP PARAM F/U: HCPCS | Performed by: NURSE PRACTITIONER

## 2019-04-25 PROCEDURE — G8598 ASA/ANTIPLAT THER USED: HCPCS | Performed by: NURSE PRACTITIONER

## 2019-04-25 PROCEDURE — 1090F PRES/ABSN URINE INCON ASSESS: CPT | Performed by: NURSE PRACTITIONER

## 2019-04-25 PROCEDURE — G8399 PT W/DXA RESULTS DOCUMENT: HCPCS | Performed by: NURSE PRACTITIONER

## 2019-04-25 PROCEDURE — 99213 OFFICE O/P EST LOW 20 MIN: CPT | Performed by: NURSE PRACTITIONER

## 2019-04-25 PROCEDURE — 80053 COMPREHEN METABOLIC PANEL: CPT

## 2019-04-25 PROCEDURE — 36415 COLL VENOUS BLD VENIPUNCTURE: CPT

## 2019-05-12 ENCOUNTER — APPOINTMENT (OUTPATIENT)
Dept: CT IMAGING | Age: 69
End: 2019-05-12
Payer: MEDICARE

## 2019-05-12 ENCOUNTER — APPOINTMENT (OUTPATIENT)
Dept: GENERAL RADIOLOGY | Age: 69
End: 2019-05-12
Payer: MEDICARE

## 2019-05-12 ENCOUNTER — HOSPITAL ENCOUNTER (OUTPATIENT)
Age: 69
Setting detail: OBSERVATION
Discharge: HOME | End: 2019-05-13
Attending: EMERGENCY MEDICINE | Admitting: INTERNAL MEDICINE
Payer: MEDICARE

## 2019-05-12 DIAGNOSIS — R55 SYNCOPE AND COLLAPSE: Primary | ICD-10-CM

## 2019-05-12 DIAGNOSIS — R07.9 CHEST PAIN, UNSPECIFIED TYPE: ICD-10-CM

## 2019-05-12 LAB
AMORPHOUS: ABNORMAL
ANION GAP SERPL CALCULATED.3IONS-SCNC: 10 MMOL/L (ref 7–16)
BACTERIA: ABNORMAL /HPF
BASOPHILS ABSOLUTE: 0.05 E9/L (ref 0–0.2)
BASOPHILS RELATIVE PERCENT: 0.5 % (ref 0–2)
BILIRUBIN URINE: NEGATIVE
BLOOD, URINE: NEGATIVE
BUN BLDV-MCNC: 7 MG/DL (ref 8–23)
CALCIUM SERPL-MCNC: 9.6 MG/DL (ref 8.6–10.2)
CHLORIDE BLD-SCNC: 99 MMOL/L (ref 98–107)
CLARITY: CLEAR
CO2: 30 MMOL/L (ref 22–29)
COLOR: YELLOW
CREAT SERPL-MCNC: 0.7 MG/DL (ref 0.5–1)
EKG ATRIAL RATE: 58 BPM
EKG P AXIS: 37 DEGREES
EKG P-R INTERVAL: 154 MS
EKG Q-T INTERVAL: 492 MS
EKG QRS DURATION: 138 MS
EKG QTC CALCULATION (BAZETT): 482 MS
EKG R AXIS: -22 DEGREES
EKG T AXIS: 136 DEGREES
EKG VENTRICULAR RATE: 58 BPM
EOSINOPHILS ABSOLUTE: 0 E9/L (ref 0.05–0.5)
EOSINOPHILS RELATIVE PERCENT: 0 % (ref 0–6)
EPITHELIAL CELLS, UA: ABNORMAL /HPF
GFR AFRICAN AMERICAN: >60
GFR NON-AFRICAN AMERICAN: >60 ML/MIN/1.73
GLUCOSE BLD-MCNC: 197 MG/DL (ref 74–99)
GLUCOSE URINE: 100 MG/DL
HCT VFR BLD CALC: 37.4 % (ref 34–48)
HEMOGLOBIN: 11.7 G/DL (ref 11.5–15.5)
IMMATURE GRANULOCYTES #: 0.03 E9/L
IMMATURE GRANULOCYTES %: 0.3 % (ref 0–5)
KETONES, URINE: NEGATIVE MG/DL
LEUKOCYTE ESTERASE, URINE: ABNORMAL
LYMPHOCYTES ABSOLUTE: 2.09 E9/L (ref 1.5–4)
LYMPHOCYTES RELATIVE PERCENT: 22.5 % (ref 20–42)
MCH RBC QN AUTO: 27.4 PG (ref 26–35)
MCHC RBC AUTO-ENTMCNC: 31.3 % (ref 32–34.5)
MCV RBC AUTO: 87.6 FL (ref 80–99.9)
METER GLUCOSE: 253 MG/DL (ref 74–99)
MONOCYTES ABSOLUTE: 0.83 E9/L (ref 0.1–0.95)
MONOCYTES RELATIVE PERCENT: 8.9 % (ref 2–12)
NEUTROPHILS ABSOLUTE: 6.29 E9/L (ref 1.8–7.3)
NEUTROPHILS RELATIVE PERCENT: 67.8 % (ref 43–80)
NITRITE, URINE: NEGATIVE
PDW BLD-RTO: 14.4 FL (ref 11.5–15)
PH UA: 7 (ref 5–9)
PLATELET # BLD: 389 E9/L (ref 130–450)
PMV BLD AUTO: 10.7 FL (ref 7–12)
POTASSIUM SERPL-SCNC: 3.9 MMOL/L (ref 3.5–5)
PRO-BNP: 220 PG/ML (ref 0–125)
PROTEIN UA: NEGATIVE MG/DL
RBC # BLD: 4.27 E12/L (ref 3.5–5.5)
RBC UA: ABNORMAL /HPF (ref 0–2)
SODIUM BLD-SCNC: 139 MMOL/L (ref 132–146)
SPECIFIC GRAVITY UA: 1.01 (ref 1–1.03)
TROPONIN: <0.01 NG/ML (ref 0–0.03)
UROBILINOGEN, URINE: 0.2 E.U./DL
WBC # BLD: 9.3 E9/L (ref 4.5–11.5)
WBC UA: ABNORMAL /HPF (ref 0–5)

## 2019-05-12 PROCEDURE — 71275 CT ANGIOGRAPHY CHEST: CPT

## 2019-05-12 PROCEDURE — 6360000004 HC RX CONTRAST MEDICATION: Performed by: RADIOLOGY

## 2019-05-12 PROCEDURE — 99285 EMERGENCY DEPT VISIT HI MDM: CPT

## 2019-05-12 PROCEDURE — 83880 ASSAY OF NATRIURETIC PEPTIDE: CPT

## 2019-05-12 PROCEDURE — 81001 URINALYSIS AUTO W/SCOPE: CPT

## 2019-05-12 PROCEDURE — 6370000000 HC RX 637 (ALT 250 FOR IP): Performed by: INTERNAL MEDICINE

## 2019-05-12 PROCEDURE — 36415 COLL VENOUS BLD VENIPUNCTURE: CPT

## 2019-05-12 PROCEDURE — G0378 HOSPITAL OBSERVATION PER HR: HCPCS

## 2019-05-12 PROCEDURE — 96374 THER/PROPH/DIAG INJ IV PUSH: CPT

## 2019-05-12 PROCEDURE — 87088 URINE BACTERIA CULTURE: CPT

## 2019-05-12 PROCEDURE — 84484 ASSAY OF TROPONIN QUANT: CPT

## 2019-05-12 PROCEDURE — 80048 BASIC METABOLIC PNL TOTAL CA: CPT

## 2019-05-12 PROCEDURE — 93005 ELECTROCARDIOGRAM TRACING: CPT | Performed by: EMERGENCY MEDICINE

## 2019-05-12 PROCEDURE — 71045 X-RAY EXAM CHEST 1 VIEW: CPT

## 2019-05-12 PROCEDURE — 93010 ELECTROCARDIOGRAM REPORT: CPT | Performed by: INTERNAL MEDICINE

## 2019-05-12 PROCEDURE — 2580000003 HC RX 258: Performed by: INTERNAL MEDICINE

## 2019-05-12 PROCEDURE — 85025 COMPLETE CBC W/AUTO DIFF WBC: CPT

## 2019-05-12 PROCEDURE — 82962 GLUCOSE BLOOD TEST: CPT

## 2019-05-12 PROCEDURE — 6360000002 HC RX W HCPCS: Performed by: EMERGENCY MEDICINE

## 2019-05-12 PROCEDURE — 2580000003 HC RX 258: Performed by: RADIOLOGY

## 2019-05-12 PROCEDURE — 70450 CT HEAD/BRAIN W/O DYE: CPT

## 2019-05-12 PROCEDURE — 94664 DEMO&/EVAL PT USE INHALER: CPT

## 2019-05-12 PROCEDURE — 2580000003 HC RX 258: Performed by: EMERGENCY MEDICINE

## 2019-05-12 RX ORDER — ANASTROZOLE 1 MG/1
1 TABLET ORAL DAILY
Status: DISCONTINUED | OUTPATIENT
Start: 2019-05-12 | End: 2019-05-13 | Stop reason: HOSPADM

## 2019-05-12 RX ORDER — CLONIDINE HYDROCHLORIDE 0.2 MG/1
0.2 TABLET ORAL 2 TIMES DAILY
Status: DISCONTINUED | OUTPATIENT
Start: 2019-05-12 | End: 2019-05-13 | Stop reason: HOSPADM

## 2019-05-12 RX ORDER — SODIUM CHLORIDE 0.9 % (FLUSH) 0.9 %
10 SYRINGE (ML) INJECTION PRN
Status: DISCONTINUED | OUTPATIENT
Start: 2019-05-12 | End: 2019-05-13 | Stop reason: HOSPADM

## 2019-05-12 RX ORDER — ACETAMINOPHEN 325 MG/1
650 TABLET ORAL EVERY 4 HOURS PRN
Status: DISCONTINUED | OUTPATIENT
Start: 2019-05-12 | End: 2019-05-13 | Stop reason: HOSPADM

## 2019-05-12 RX ORDER — INSULIN GLARGINE 100 [IU]/ML
50 INJECTION, SOLUTION SUBCUTANEOUS NIGHTLY
Status: DISCONTINUED | OUTPATIENT
Start: 2019-05-12 | End: 2019-05-13 | Stop reason: HOSPADM

## 2019-05-12 RX ORDER — OXYBUTYNIN CHLORIDE 5 MG/1
10 TABLET ORAL 3 TIMES DAILY
Status: DISCONTINUED | OUTPATIENT
Start: 2019-05-12 | End: 2019-05-13 | Stop reason: HOSPADM

## 2019-05-12 RX ORDER — LOSARTAN POTASSIUM AND HYDROCHLOROTHIAZIDE 25; 100 MG/1; MG/1
1 TABLET ORAL DAILY
Status: DISCONTINUED | OUTPATIENT
Start: 2019-05-12 | End: 2019-05-12 | Stop reason: CLARIF

## 2019-05-12 RX ORDER — FLUTICASONE FUROATE AND VILANTEROL 100; 25 UG/1; UG/1
1 POWDER RESPIRATORY (INHALATION) DAILY
Status: DISCONTINUED | OUTPATIENT
Start: 2019-05-12 | End: 2019-05-12 | Stop reason: CLARIF

## 2019-05-12 RX ORDER — 0.9 % SODIUM CHLORIDE 0.9 %
1000 INTRAVENOUS SOLUTION INTRAVENOUS ONCE
Status: DISCONTINUED | OUTPATIENT
Start: 2019-05-12 | End: 2019-05-12

## 2019-05-12 RX ORDER — NORTRIPTYLINE HYDROCHLORIDE 10 MG/1
10 CAPSULE ORAL NIGHTLY
Status: DISCONTINUED | OUTPATIENT
Start: 2019-05-12 | End: 2019-05-13 | Stop reason: HOSPADM

## 2019-05-12 RX ORDER — MONTELUKAST SODIUM 10 MG/1
10 TABLET ORAL NIGHTLY
Status: DISCONTINUED | OUTPATIENT
Start: 2019-05-12 | End: 2019-05-13 | Stop reason: HOSPADM

## 2019-05-12 RX ORDER — HYDROCHLOROTHIAZIDE 25 MG/1
25 TABLET ORAL DAILY
Status: DISCONTINUED | OUTPATIENT
Start: 2019-05-13 | End: 2019-05-13 | Stop reason: HOSPADM

## 2019-05-12 RX ORDER — ACYCLOVIR 200 MG/1
400 CAPSULE ORAL DAILY
Status: DISCONTINUED | OUTPATIENT
Start: 2019-05-12 | End: 2019-05-13 | Stop reason: HOSPADM

## 2019-05-12 RX ORDER — LOSARTAN POTASSIUM 50 MG/1
100 TABLET ORAL DAILY
Status: DISCONTINUED | OUTPATIENT
Start: 2019-05-13 | End: 2019-05-13 | Stop reason: HOSPADM

## 2019-05-12 RX ORDER — ONDANSETRON 2 MG/ML
4 INJECTION INTRAMUSCULAR; INTRAVENOUS EVERY 6 HOURS PRN
Status: DISCONTINUED | OUTPATIENT
Start: 2019-05-12 | End: 2019-05-13 | Stop reason: HOSPADM

## 2019-05-12 RX ORDER — 0.9 % SODIUM CHLORIDE 0.9 %
500 INTRAVENOUS SOLUTION INTRAVENOUS ONCE
Status: DISCONTINUED | OUTPATIENT
Start: 2019-05-12 | End: 2019-05-12

## 2019-05-12 RX ORDER — PRAVASTATIN SODIUM 20 MG
80 TABLET ORAL DAILY
Status: DISCONTINUED | OUTPATIENT
Start: 2019-05-13 | End: 2019-05-13 | Stop reason: HOSPADM

## 2019-05-12 RX ORDER — SODIUM CHLORIDE 0.9 % (FLUSH) 0.9 %
10 SYRINGE (ML) INJECTION PRN
Status: DISCONTINUED | OUTPATIENT
Start: 2019-05-12 | End: 2019-05-12 | Stop reason: SDUPTHER

## 2019-05-12 RX ORDER — SODIUM CHLORIDE 0.9 % (FLUSH) 0.9 %
10 SYRINGE (ML) INJECTION EVERY 12 HOURS SCHEDULED
Status: DISCONTINUED | OUTPATIENT
Start: 2019-05-12 | End: 2019-05-13 | Stop reason: HOSPADM

## 2019-05-12 RX ORDER — IPRATROPIUM BROMIDE AND ALBUTEROL SULFATE 2.5; .5 MG/3ML; MG/3ML
1 SOLUTION RESPIRATORY (INHALATION) EVERY 4 HOURS PRN
Status: DISCONTINUED | OUTPATIENT
Start: 2019-05-12 | End: 2019-05-13 | Stop reason: HOSPADM

## 2019-05-12 RX ORDER — ASPIRIN 81 MG/1
81 TABLET, CHEWABLE ORAL DAILY
Status: DISCONTINUED | OUTPATIENT
Start: 2019-05-12 | End: 2019-05-13 | Stop reason: HOSPADM

## 2019-05-12 RX ORDER — GABAPENTIN 300 MG/1
300 CAPSULE ORAL 3 TIMES DAILY
Status: DISCONTINUED | OUTPATIENT
Start: 2019-05-12 | End: 2019-05-13 | Stop reason: HOSPADM

## 2019-05-12 RX ORDER — DILTIAZEM HYDROCHLORIDE 240 MG/1
240 CAPSULE, COATED, EXTENDED RELEASE ORAL DAILY
Status: DISCONTINUED | OUTPATIENT
Start: 2019-05-12 | End: 2019-05-13 | Stop reason: HOSPADM

## 2019-05-12 RX ADMIN — INSULIN LISPRO 3 UNITS: 100 INJECTION, SOLUTION INTRAVENOUS; SUBCUTANEOUS at 22:14

## 2019-05-12 RX ADMIN — IOPAMIDOL 80 ML: 755 INJECTION, SOLUTION INTRAVENOUS at 17:26

## 2019-05-12 RX ADMIN — CLONIDINE HYDROCHLORIDE 0.2 MG: 0.2 TABLET ORAL at 22:12

## 2019-05-12 RX ADMIN — MONTELUKAST SODIUM 10 MG: 10 TABLET, FILM COATED ORAL at 22:11

## 2019-05-12 RX ADMIN — GABAPENTIN 300 MG: 300 CAPSULE ORAL at 22:12

## 2019-05-12 RX ADMIN — INSULIN GLARGINE 50 UNITS: 100 INJECTION, SOLUTION SUBCUTANEOUS at 22:13

## 2019-05-12 RX ADMIN — MOMETASONE FUROATE AND FORMOTEROL FUMARATE DIHYDRATE 2 PUFF: 200; 5 AEROSOL RESPIRATORY (INHALATION) at 22:21

## 2019-05-12 RX ADMIN — METOPROLOL TARTRATE 25 MG: 25 TABLET, FILM COATED ORAL at 22:12

## 2019-05-12 RX ADMIN — CEFTRIAXONE 2 G: 2 INJECTION, POWDER, FOR SOLUTION INTRAMUSCULAR; INTRAVENOUS at 22:08

## 2019-05-12 RX ADMIN — Medication 10 ML: at 22:07

## 2019-05-12 RX ADMIN — Medication 10 ML: at 17:27

## 2019-05-12 ASSESSMENT — ENCOUNTER SYMPTOMS
BLOOD IN STOOL: 0
COUGH: 0
BACK PAIN: 0
NAUSEA: 0
VOMITING: 0
SHORTNESS OF BREATH: 0
ABDOMINAL PAIN: 0

## 2019-05-12 ASSESSMENT — PAIN SCALES - GENERAL: PAINLEVEL_OUTOF10: 0

## 2019-05-12 NOTE — ED PROVIDER NOTES
80-year-old female presents emergency Department with syncope. Patient was playing by mouth at [employer] this morning when she became mildly confused and then was asleep for a brief time. No seizure-like activity was noted. Patient states he ate breakfast this morning. She stated before this happened she had a mild headache which this point is resolved she states mild chest pain. She denies shortness of breath palpitations nausea vomiting diarrhea or chest pain. The history is provided by the patient. Loss of Consciousness   Episode history:  Single  Most recent episode: Today  Timing:  Intermittent  Progression:  Resolved  Chronicity:  New  Context: sitting down    Witnessed: yes    Relieved by:  Nothing  Worsened by:  Nothing  Ineffective treatments:  None tried  Associated symptoms: chest pain, confusion and headaches    Associated symptoms: no anxiety, no dizziness, no fever, no nausea, no recent fall, no recent injury, no recent surgery, no seizures, no shortness of breath, no vomiting and no weakness    Chest pain:     Quality: sharp      Severity:  Mild    Onset quality:  Gradual    Duration:  2 days    Timing:  Intermittent    Progression:  Waxing and waning    Chronicity:  New  Headaches:     Severity:  Mild    Onset quality:  Gradual    Duration:  1 hour    Progression:  Resolved    Chronicity:  New  Risk factors: no seizures        Review of Systems   Constitutional: Negative for chills and fever. Respiratory: Negative for cough and shortness of breath. Cardiovascular: Positive for chest pain and syncope. Gastrointestinal: Negative for abdominal pain, blood in stool, nausea and vomiting. Genitourinary: Negative for dysuria and frequency. Musculoskeletal: Negative for back pain. Skin: Negative for rash. Neurological: Positive for headaches. Negative for dizziness, seizures and weakness. Psychiatric/Behavioral: Positive for confusion.    All other systems reviewed and are negative. Physical Exam   Constitutional: She is oriented to person, place, and time. She appears well-developed and well-nourished. No distress. HENT:   Head: Normocephalic and atraumatic. Eyes: Pupils are equal, round, and reactive to light. EOM are normal.   Neck: Normal range of motion. Neck supple. Cardiovascular: Normal rate and regular rhythm. Pulmonary/Chest: Effort normal and breath sounds normal.   Abdominal: Soft. Bowel sounds are normal.   Musculoskeletal: Normal range of motion. She exhibits no edema. Neurological: She is alert and oriented to person, place, and time. NIH Stroke Scale/Score at time of initial evaluation:  1A: Level of Consciousness 0 - alert; keenly responsive   1B: Ask Month and Age 0 - answers both questions correctly   1C: Tell Patient To Open and Close Eyes, then Hand  Squeeze 0 - performs both tasks correctly   2: Test Horizontal Extraocular Movements 0 - normal   3: Test Visual Fields 0 - no visual loss   4: Test Facial Palsy 0 - normal symmetric movement   5A: Test Left Arm Motor Drift 0 - no drift, limb holds 90 (or 45) degrees for full 10 seconds   5B: Test Right Arm Motor Drift 0 - no drift, limb holds 90 (or 45) degrees for full 10 seconds   6A: Test Left Leg Motor Drift 0 - no drift; leg holds 30 degree position for full 5 seconds   6B: Test Right Leg Motor Drift 0 - no drift; leg holds 30 degree position for full 5 seconds   7: Test Limb Ataxia   (FNF/Heel-Shin) 0 - absent   8: Test Sensation 0 - normal; no sensory loss   9: Test Language/Aphasia 0 - no aphasia, normal   10: Test Dysarthria 0 - normal   11: Test Extinction/Inattention 0 - no abnormality   Total Score: 0   5/12/19 at 3:12 PM.      Procedures    [facility]    ED Course as of May 12 2679   Sun May 12, 2019   1513 Patient seen and examined patient has a history noted in chart of orthostatic hypotension causing this. She also is noted be hypoglycemic at the point of these.  Previous cerebral episodes. No seizure has been noted in her past history and the symptoms today did not indicate a seizure. Labs EKG and a CT of the head will be ordered. [CF]   1537 EKG shows sinus bradycardia with left bundle branch block this is stable compared to previous EKGs.    [CF]   0143 Patient workup was found to be unclear etiology. I do feel the patient should be admitted for chest pain as well as syncope. Antibiotics initiated for urinary tract infection. Patient was admitted under Dr. Andre Bolaños    [CF]      ED Course User Index  [CF] Lavone Sandifer, DO     --------------------------------------------- PAST HISTORY ---------------------------------------------  Past Medical History:  has a past medical history of Arthritis, Asthma, Cancer ([facility] [address] Cerebral artery occlusion with cerebral infarction [facility]), Cerebrovascular disease, CHF (congestive heart failure) ([facility] [address] Claustrophobia, Headache(784.0), History of blood transfusion, Hyperlipidemia, Hypertension, LBP radiating to both legs, Lymphedema of both lower extremities, Neuromuscular disorder ([facility] [address] Obesity, Recurrent genital HSV (herpes simplex virus) infection, S/P breast biopsy, right, and Type II or unspecified type diabetes mellitus without mention of complication, not stated as uncontrolled. Past Surgical History:  has a past surgical history that includes Total hip arthroplasty (Bilateral); vin and bso (cervix removed); ECHO Compl W Dop Color Flow (6/7/2012); Total knee arthroplasty (Bilateral, 2013); Colonoscopy (2005); Hysterectomy; Colonoscopy (1/8/15); Breast lumpectomy (years ago); arthroplasty (Left, 07/29/2016); Finger surgery (Left, 07/29/2016); joint replacement; Breast lumpectomy (Right, 09/06/2016); other surgical history (Right, 12/20/2017); and Hand surgery (12/2017). Social History:  reports that she quit smoking about 17 years ago. She has a 8.75 pack-year smoking history.  She has never used smokeless tobacco. She reports that she drinks alcohol. She reports that she has current or past drug history. Drug: Marijuana. Family History: family history includes Breast Cancer in her sister and sister; Cancer (age of onset: 55) in her sister; Cancer (age of onset: 59) in her sister; Diabetes in her father and mother; Heart Attack in her mother; Heart Failure in her father; High Blood Pressure in her father and mother; Sickle Cell Anemia in an other family member; Stomach Cancer in an other family member; Stroke in her sister. The patients home medications have been reviewed. Allergies: Patient has no known allergies.     -------------------------------------------------- RESULTS -------------------------------------------------    LABS:  Results for orders placed or performed during the hospital encounter of 05/12/19   CBC Auto Differential   Result Value Ref Range    WBC 9.3 4.5 - 11.5 E9/L    RBC 4.27 3.50 - 5.50 E12/L    Hemoglobin 11.7 11.5 - 15.5 g/dL    Hematocrit 37.4 34.0 - 48.0 %    MCV 87.6 80.0 - 99.9 fL    MCH 27.4 26.0 - 35.0 pg    MCHC 31.3 (L) 32.0 - 34.5 %    RDW 14.4 11.5 - 15.0 fL    Platelets 357 656 - 611 E9/L    MPV 10.7 7.0 - 12.0 fL    Neutrophils % 67.8 43.0 - 80.0 %    Immature Granulocytes % 0.3 0.0 - 5.0 %    Lymphocytes % 22.5 20.0 - 42.0 %    Monocytes % 8.9 2.0 - 12.0 %    Eosinophils % 0.0 0.0 - 6.0 %    Basophils % 0.5 0.0 - 2.0 %    Neutrophils # 6.29 1.80 - 7.30 E9/L    Immature Granulocytes # 0.03 E9/L    Lymphocytes # 2.09 1.50 - 4.00 E9/L    Monocytes # 0.83 0.10 - 0.95 E9/L    Eosinophils # 0.00 (L) 0.05 - 0.50 E9/L    Basophils # 0.05 0.00 - 0.20 K4/S   Basic Metabolic Panel   Result Value Ref Range    Sodium 139 132 - 146 mmol/L    Potassium 3.9 3.5 - 5.0 mmol/L    Chloride 99 98 - 107 mmol/L    CO2 30 (H) 22 - 29 mmol/L    Anion Gap 10 7 - 16 mmol/L    Glucose 197 (H) 74 - 99 mg/dL    BUN 7 (L) 8 - 23 mg/dL    CREATININE 0.7 0.5 - 1.0 mg/dL    GFR Non-African American >60 >=60 mL/min/1.73    GFR African American >60     Calcium 9.6 8.6 - 10.2 mg/dL   Troponin   Result Value Ref Range    Troponin <0.01 0.00 - 0.03 ng/mL   Urinalysis   Result Value Ref Range    Color, UA Yellow Straw/Yellow    Clarity, UA Clear Clear    Glucose, Ur 100 (A) Negative mg/dL    Bilirubin Urine Negative Negative    Ketones, Urine Negative Negative mg/dL    Specific Gravity, UA 1.010 1.005 - 1.030    Blood, Urine Negative Negative    pH, UA 7.0 5.0 - 9.0    Protein, UA Negative Negative mg/dL    Urobilinogen, Urine 0.2 <2.0 E.U./dL    Nitrite, Urine Negative Negative    Leukocyte Esterase, Urine MODERATE (A) Negative   Microscopic Urinalysis   Result Value Ref Range    WBC, UA 10-20 (A) 0 - 5 /HPF    RBC, UA NONE 0 - 2 /HPF    Epi Cells RARE /HPF    Bacteria, UA NONE /HPF    Amorphous, UA RARE    Brain Natriuretic Peptide   Result Value Ref Range    Pro- (H) 0 - 125 pg/mL   POCT Glucose   Result Value Ref Range    Meter Glucose 253 (H) 74 - 99 mg/dL   EKG 12 Lead   Result Value Ref Range    Ventricular Rate 58 BPM    Atrial Rate 58 BPM    P-R Interval 154 ms    QRS Duration 138 ms    Q-T Interval 492 ms    QTc Calculation (Bazett) 482 ms    P Axis 37 degrees    R Axis -22 degrees    T Axis 136 degrees       RADIOLOGY:  Ct Head Wo Contrast    Result Date: 2019  Patient MRN: [id number] : 1950 Age:  76 years Order Date: 2019 3:15 PM. Gender: Female Exam: CT HEAD WO CONTRAST Number of Images: 157 Indication:   Loss of consciousness, headache Contrast dosage: None Comparison: Brain MRI 2018. Head CT 2018. Findings: This examination was performed on a CT scanner with dose reduction. Technique: Low-dose CT  acquisition technique included one of following options; 1 . Automated exposure control, 2. Adjustment of MA and or KV according to patient's size or 3. Use of iterative reconstruction. No intraparenchymal hemorrhage. No extra-axial fluid collection or hematoma.  No subfalcine, transtentorial or tonsillar herniation or shift. Mild cerebral volume loss/atrophy. Hypodensities noted within the centrum semiovale and periventricular white matter. Hyperostosis frontalis interna. Paranasal sinuses and mastoid air cells are free from fluid or mass. Optic globes and orbital contents are unremarkable. Pituitary gland and sellar/suprasellar regions demonstrate a partially empty sella. No Chiari malformation. Vascular calcifications within the bilateral internal carotid artery cavernous segments and the vertebral arteries. .     1. No CT evidence of acute intracranial abnormality, as questioned. If there is clinical concern for potential underlying acute cerebrovascular infarction/accident or other potential abnormalities of underlying brain parenchyma, MRI of the brain would be recommended for more detailed evaluation. . 2. Mild cerebral volume loss/atrophy with white matter changes suggestive of sequelae small vessel ischemic disease. 3. Vascular calcifications within the bilateral internal carotid artery cavernous segments and the vertebral arteries. . 4. Partially empty sella. Dexa Bone Density Axial Skeleton    Result Date: 2019  Patient MRN:  [id number] : 1950 Age: 76 years Gender: Female Order Date:  2019 1:31 PM EXAM: DEXA BONE DENSITY AXIAL SKELETON NUMBER OF IMAGES:    2 INDICATION: Z78.0 Post-menopausal Breast cancer on AI COMPARISON: 2018 TECHNIQUE: Central and peripheral DEXA scan was performed using AP view of the lumbar spine and PA view of the left forearm. Bone mineral density was categorized according to the [address] classification. FINDINGS: Lumbar spine: Total bone mineral density of L1-4 on frontal imaging is 1.969 g/sq cm with a T score of 6.6 and a Z score of 6.4. This represents a 5.1% decrease in bone mineral density relative to the 2018 study.  Left forearm: Bone mineral density of the distal left radius is 0.947 g/sq cm with a T score of 0.8 and a Z score of 1.8. This represents a 1.1% increase in bone mineral density on today's study. 1. Normal bone mineral density values in the lumbar spine with no statistically significant change. 2. Normal bone mineral density values in the left forearm with no statistically significant change. Xr Chest Portable    Result Date: 2019  Patient MRN: [id number] : 1950 Age:  76 years Gender: Female Order Date: 2019 3:15 PM Exam: XR CHEST PORTABLE Number of Views: 1 Indication:   Syncope. Comparison: CT chest 2018. Chest x-ray 2018 Findings: There is a stable, enlarged cardiomediastinal silhouette with thoracic aortic vascular calcifications with nonspecific bibasilar airspace opacities. . No pneumothorax. .     1. Stable, enlarged cardiomediastinal silhouette with thoracic aortic vascular calcification. 2. Nonspecific bibasilar airspace disease, findings can be seen in infiltrate/pneumonia and/or atelectasis. Cta Pulmonary W Contrast    Result Date: 2019  Patient MRN:  [id number] : 1950 Age: 76 years Gender: Female Order Date:  2019 5:00 PM EXAM: CTA PULMONARY W CONTRAST NUMBER OF IMAGES:  200 INDICATION:  concern for pulmonary embolism , syncope COMPARISON: 2018 TECHNIQUE: Contiguous spiral images were obtained in the axial plane, following the administration of intravenous contrast using CT angiographic protocol. Sagittal and coronal images were reconstructed from the axial plane acquisition. Addition MIP reconstructions were presented to aid in the interpretation of this study. A total of 80 mL of Isovue-370 contrast was utilized. Low-dose CT  acquisition technique included one of following options; 1 . Automated exposure control, 2. Adjustment of MA and or KV according to patient's size or 3. Use of iterative reconstruction. FINDINGS: LUNGS: No focal filling defect is seen to suggest a PE. The lungs are clear. No focal consolidation is seen.  No pleural effusion or pneumothorax is seen. HEART: The heart is mildly enlarged. Mild pulmonary vascular congestion is present. Atherosclerotic calcifications are seen within the coronary arteries. AORTA: The aorta contains mild atherosclerotic calcifications. MEDIASTINUM: Unremarkable. UPPER ABDOMEN: A 0.7 cm stone is seen within the lower pole of the left kidney. OTHER: Moderate multilevel degenerative changes are seen throughout the thoracolumbar spine. 1. No evidence for a pulmonary embolism. 2. Mild cardiomegaly with pulmonary vascular congestion. 3. Coronary atherosclerotic disease. 4. Nonobstructing left nephrolithiasis. Nina Digital Screen W Cad Bilateral    Result Date: 4/25/2019  INDICATION: Screening. 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 The patient has the following family history of breast cancer:  sister, at age 55 and sister, at age 59. The patient has a history of right Lumpectomy in 2016 - malignant and right Excisional Biopsy in 1985 - benign. MAMMOGRAM VIEWS: The following mammographic views where obtained: bilateral craniocaudal; bilateral craniocaudal with tomosynthesis; bilateral mediolateral oblique; and bilateral mediolateral oblique with tomosynthesis  TOMOSYNTHESIS: Tomosynthesis (3 Dimensional Breast Imaging) was used on this examination to aid in evaluation. COMPARISON: The present examination has been compared to prior imaging studies performed at [facility] on 06/20/2017 and 04/18/2018. CAD: This exam was reviewed using the [employer] Computer Aided Detection (CAD)  TISSUE DENSITY: The breasts are heterogeneously dense (Type 3 density). MAMMOGRAM FINDINGS: Finding 1: No suspicious masses, areas of suspicious architectural distortion, suspicious calcifications, or additional suspicious findings are identified.   There are no significant changes from the prior study.  IMPRESSION: No mammographic evidence of malignancy. Screening mammogram in 1 year is recommended.  ======================================= BI-RADS Category 1:  Negative =======================================  RISK ASSESSMENT: - LIFETIME RISK - Patient has a Tyrer Cuzick score of N/A - BREAST TISSUE DENSITY - Heterogeneously dense  After a diagnosis of breast carcinoma, risk assessment models including Tyrer-Cuzick calculate a 100% lifetime risk, which does not apply toward risk for contralateral disease, an ipsilateral second primary, or risk for recurrence. The following recommendation for \"SURVEILLANCE/FOLLOW-UP\" imaging is based on the NCCN Guidelines Version 2.2016 for Invasive Breast Cancer:  --History and physical exam 1-4 times per year as clinically appropriate for 5 years, then annually. --Periodic screening for changes in family history and referral to genetic counseling as indicated --Mammography every 12 months¹. ¹Studies indicate that annual mammograms are the appropriate frequency for surveillance of breast cancer patients who have had breast-conserving surgery and radiation therapy with no clear advantage to shorter interval imaging. Patients should wait 6 to 12 months after the completion of radiation therapy to begin their annual mammogram surveillance. Suspicious findings on physical examination or surveillance imaging might warrant a shorter interval between mammograms. NOTE: Mammography is not 100% accurate in the detection of breast cancer. Accuracy decreases as mammographic density of the breast increases. A negative mammogram should not deter further evaluation (including biopsy) of suspicious physical findings. Recommendations are based on NCCN ([address]) and ACR [employer] of Radiology) guidelines. Thank you for sending your patient to this ACR and FDA approved facility.  If there are any physician concerns regarding this report, a Radiologist can be reached by calling the following number [phone]. Follow up Protocol for the [facility]: BI-RADS 1 or 2:  Center will send a reminder when next mammogram is due. BI-RADS 3:  Center will send a reminder for recommended short term follow up. BI-RADS 0, 4 or 5:  Center will contact patient to schedule all additional views and procedures. ------------------------- NURSING NOTES AND VITALS REVIEWED ---------------------------  Date / Time Roomed:  5/12/2019  2:21 PM  ED Bed Assignment:  [id number]    The nursing notes within the ED encounter and vital signs as below have been reviewed. Patient Vitals for the past 24 hrs:   BP Temp Temp src Pulse Resp SpO2 Height Weight   05/12/19 2210 (!) 164/78 98.2 °F (36.8 °C) Oral 65 18 100 % -- --   05/12/19 1915 138/80 98 °F (36.7 °C) Oral 72 16 99 % -- --   05/12/19 1817 (!) 142/86 97.9 °F (36.6 °C) Oral 63 16 98 % -- --   05/12/19 1635 -- -- -- -- 16 -- -- --   05/12/19 1427 (!) 164/62 97.7 °F (36.5 °C) Oral 70 16 100 % 5' 5.5\" (1.664 m) 242 lb (109.8 kg)       Oxygen Saturation Interpretation: Normal    ------------------------------------------ PROGRESS NOTES ------------------------------------------    Counseling:  I have spoken with the patient and discussed todays results, in addition to providing specific details for the plan of care and counseling regarding the diagnosis and prognosis. Their questions are answered at this time and they are agreeable with the plan of admission.    --------------------------------- ADDITIONAL PROVIDER NOTES ---------------------------------  This patient's ED course included: a personal history and physicial examination, re-evaluation prior to disposition, multiple bedside re-evaluations, IV medications, cardiac monitoring and continuous pulse oximetry    This patient has remained hemodynamically stable during their ED course. Diagnosis:  1. Syncope and collapse    2. Chest pain, unspecified type        Disposition:  Patient's disposition: Admit to telemetry  Patient's condition is stable. Hemant Guzman DO  Resident  05/12/19 6659  ATTENDING PROVIDER ATTESTATION:     Alysa Gonzalez presented to the emergency department for evaluation of Loss of Consciousness (states she was playing the piano at [employer] when she became confused and then passed out, states she has a hx of \"blacking out\", hx of CVA and TIA)   and was initially evaluated by the Medical Resident. See Original ED Note for H&P and ED course above. I have reviewed and discussed the case, including pertinent history (medical, surgical, family and social) and exam findings with the Medical Resident assigned to Kings Vega. I have personally performed and/or participated in the history, exam, medical decision making, and procedures and agree with all pertinent clinical information. I have reviewed my findings and recommendations with the assigned Medical Resident, Kings Vega and members of family present at the time of disposition. My findings/plan: The primary encounter diagnosis was Syncope and collapse. A diagnosis of Chest pain, unspecified type was also pertinent to this visit.   Discharge Medication List as of 5/13/2019  1:07 PM        Christa Interiano, [address] [place] [address], [address] [place] [address]  06/11/19 1744

## 2019-05-13 VITALS
HEIGHT: 66 IN | HEART RATE: 66 BPM | RESPIRATION RATE: 18 BRPM | BODY MASS INDEX: 39.02 KG/M2 | OXYGEN SATURATION: 95 % | TEMPERATURE: 98.6 F | WEIGHT: 242.8 LBS | DIASTOLIC BLOOD PRESSURE: 66 MMHG | SYSTOLIC BLOOD PRESSURE: 154 MMHG

## 2019-05-13 PROBLEM — I35.8 NON-RHEUMATIC AORTIC SCLEROSIS: Status: ACTIVE | Noted: 2019-05-13

## 2019-05-13 PROBLEM — I27.20 PULMONARY HYPERTENSION (HCC): Status: ACTIVE | Noted: 2019-05-13

## 2019-05-13 PROBLEM — N39.0 UTI (URINARY TRACT INFECTION): Status: ACTIVE | Noted: 2019-05-13

## 2019-05-13 LAB
AMPHETAMINE SCREEN, URINE: NOT DETECTED
BARBITURATE SCREEN URINE: NOT DETECTED
BENZODIAZEPINE SCREEN, URINE: NOT DETECTED
CANNABINOID SCREEN URINE: NOT DETECTED
COCAINE METABOLITE SCREEN URINE: NOT DETECTED
METER GLUCOSE: 156 MG/DL (ref 74–99)
METER GLUCOSE: 180 MG/DL (ref 74–99)
METHADONE SCREEN, URINE: NOT DETECTED
OPIATE SCREEN URINE: NOT DETECTED
PHENCYCLIDINE SCREEN URINE: NOT DETECTED
PROPOXYPHENE SCREEN: NOT DETECTED
T4 FREE: 1.22 NG/DL (ref 0.93–1.7)
TROPONIN: <0.01 NG/ML (ref 0–0.03)
TROPONIN: <0.01 NG/ML (ref 0–0.03)
TSH SERPL DL<=0.05 MIU/L-ACNC: 0.65 UIU/ML (ref 0.27–4.2)

## 2019-05-13 PROCEDURE — 84484 ASSAY OF TROPONIN QUANT: CPT

## 2019-05-13 PROCEDURE — APPSS60 APP SPLIT SHARED TIME 46-60 MINUTES: Performed by: NURSE PRACTITIONER

## 2019-05-13 PROCEDURE — 82962 GLUCOSE BLOOD TEST: CPT

## 2019-05-13 PROCEDURE — G0378 HOSPITAL OBSERVATION PER HR: HCPCS

## 2019-05-13 PROCEDURE — 2580000003 HC RX 258: Performed by: INTERNAL MEDICINE

## 2019-05-13 PROCEDURE — 84443 ASSAY THYROID STIM HORMONE: CPT

## 2019-05-13 PROCEDURE — 94640 AIRWAY INHALATION TREATMENT: CPT

## 2019-05-13 PROCEDURE — 80307 DRUG TEST PRSMV CHEM ANLYZR: CPT

## 2019-05-13 PROCEDURE — 99222 1ST HOSP IP/OBS MODERATE 55: CPT | Performed by: INTERNAL MEDICINE

## 2019-05-13 PROCEDURE — 36415 COLL VENOUS BLD VENIPUNCTURE: CPT

## 2019-05-13 PROCEDURE — 6370000000 HC RX 637 (ALT 250 FOR IP): Performed by: INTERNAL MEDICINE

## 2019-05-13 PROCEDURE — 84439 ASSAY OF FREE THYROXINE: CPT

## 2019-05-13 RX ORDER — ERGOCALCIFEROL 1.25 MG/1
50000 CAPSULE ORAL WEEKLY
COMMUNITY
End: 2019-08-02 | Stop reason: SDUPTHER

## 2019-05-13 RX ORDER — PRAVASTATIN SODIUM 80 MG/1
80 TABLET ORAL DAILY
COMMUNITY
End: 2019-06-19 | Stop reason: SDUPTHER

## 2019-05-13 RX ADMIN — CLONIDINE HYDROCHLORIDE 0.2 MG: 0.2 TABLET ORAL at 09:46

## 2019-05-13 RX ADMIN — METOPROLOL TARTRATE 25 MG: 25 TABLET, FILM COATED ORAL at 09:56

## 2019-05-13 RX ADMIN — ASPIRIN 81 MG 81 MG: 81 TABLET ORAL at 09:46

## 2019-05-13 RX ADMIN — OXYBUTYNIN CHLORIDE 10 MG: 5 TABLET ORAL at 14:13

## 2019-05-13 RX ADMIN — GABAPENTIN 300 MG: 300 CAPSULE ORAL at 09:46

## 2019-05-13 RX ADMIN — ACYCLOVIR 400 MG: 200 CAPSULE ORAL at 09:46

## 2019-05-13 RX ADMIN — LINAGLIPTIN 5 MG: 5 TABLET, FILM COATED ORAL at 13:10

## 2019-05-13 RX ADMIN — OXYBUTYNIN CHLORIDE 10 MG: 5 TABLET ORAL at 09:46

## 2019-05-13 RX ADMIN — INSULIN LISPRO 2 UNITS: 100 INJECTION, SOLUTION INTRAVENOUS; SUBCUTANEOUS at 11:47

## 2019-05-13 RX ADMIN — PRAVASTATIN SODIUM 80 MG: 20 TABLET ORAL at 09:45

## 2019-05-13 RX ADMIN — DILTIAZEM HYDROCHLORIDE 240 MG: 240 CAPSULE, COATED, EXTENDED RELEASE ORAL at 09:46

## 2019-05-13 RX ADMIN — ANASTROZOLE 1 MG: 1 TABLET ORAL at 09:46

## 2019-05-13 RX ADMIN — GABAPENTIN 300 MG: 300 CAPSULE ORAL at 14:13

## 2019-05-13 RX ADMIN — MOMETASONE FUROATE AND FORMOTEROL FUMARATE DIHYDRATE 2 PUFF: 200; 5 AEROSOL RESPIRATORY (INHALATION) at 08:49

## 2019-05-13 RX ADMIN — HYDROCHLOROTHIAZIDE 25 MG: 25 TABLET ORAL at 09:47

## 2019-05-13 RX ADMIN — LOSARTAN POTASSIUM 100 MG: 50 TABLET, FILM COATED ORAL at 09:47

## 2019-05-13 RX ADMIN — Medication 10 ML: at 09:47

## 2019-05-13 ASSESSMENT — PAIN SCALES - GENERAL: PAINLEVEL_OUTOF10: 0

## 2019-05-13 NOTE — PROGRESS NOTES
[facility] Quality Flow/Interdisciplinary Rounds Progress Note        Quality Flow Rounds held on May 13, 2019    Disciplines Attending:  Bedside Nurse, ,  and Nursing Unit [address] was admitted on 5/12/2019  2:21 PM    Anticipated Discharge Date:  Expected Discharge Date: 05/14/19    Disposition:    Surendra Score:  Surendra Scale Score: 20    Readmission Risk              Risk of Unplanned Readmission:        17           Discussed patient goal for the day, patient clinical progression, and barriers to discharge.   The following Goal(s) of the Day/Commitment(s) have been identified:  Await Cardiology consult and plan      Joseph Fly  May 13, 2019

## 2019-05-13 NOTE — PROGRESS NOTES
Nutrition Consult:  Consult for diff. chew/swallowing. Chart reviewed. Pt currently w/ no noted nutritional issues at this time. Will follow-up per policy.       Electronically signed by Melissa Fine RD, LD on 5/13/2019 at 1:07 PM

## 2019-05-13 NOTE — CONSULTS
file    Highest education level: Not on file   Occupational History    Not on file   Social Needs    Financial resource strain: Not on file    Food insecurity:     Worry: Not on file     Inability: Not on file    Transportation needs:     Medical: Not on file     Non-medical: Not on file   Tobacco Use    Smoking status: Former Smoker     Packs/day: 0.25     Years: 35.00     Pack years: 8.75     Last attempt to quit: 2001     Years since quittin.4    Smokeless tobacco: Never Used   Substance and Sexual Activity    Alcohol use: Yes     Alcohol/week: 0.0 - 0.6 oz    Drug use: Yes     Types: Marijuana     Comment: last used 2018    Sexual activity: Never     Comment: divorce   Lifestyle    Physical activity:     Days per week: Not on file     Minutes per session: Not on file    Stress: Not on file   Relationships    Social connections:     Talks on phone: Not on file     Gets together: Not on file     Attends [employer] service: Not on file     Active member of club or organization: Not on file     Attends meetings of clubs or organizations: Not on file     Relationship status: Not on file    Intimate partner violence:     Fear of current or ex partner: Not on file     Emotionally abused: Not on file     Physically abused: Not on file     Forced sexual activity: Not on file   Other Topics Concern    Not on file   Social History Narrative    Drinks 1 sweet tea daily; occ Coke. Drinks decaf coffee.          Family History   Problem Relation Age of Onset    Diabetes Mother     High Blood Pressure Mother     Heart Attack Mother     High Blood Pressure Father     Diabetes Father     Heart Failure Father     Breast Cancer Sister     Stroke Sister         young age   Vanessa Dang Cancer Sister 55        breast    Sickle Cell Anemia Other         niece    Breast Cancer Sister             Cancer Sister 59        breast & ovarian    Stomach Cancer Other         aunt       ROS:   General: No unusual weight gain, no change in exercise tolerance  Skin: No rash or itching  EENT: No vision changes or nosebleeds  Cardiovascular: No orthopnea or paroxysmal nocturnal dyspnea  Respiratory: No cough or hemoptysis  Gastrointestinal: No hematemesis or recent changes in bowel habits  Genitourinary: No hematuria, urgency or frequency  Musculoskeletal: No muscular weakness or joint swelling   Neurologic / Psychiatric: No incoordination or convulsions  Allergic / Immunologic/ Lymphatic / Endocrine: No anemia or bleeding tendency    Physical Exam:   Vitals:    05/12/19 1915 05/12/19 2210 05/13/19 0342 05/13/19 0811   BP: 138/80 (!) 164/78  (!) 154/66   Pulse: 72 65  66   Resp: 16 18  18   Temp: 98 °F (36.7 °C) 98.2 °F (36.8 °C)  98.6 °F (37 °C)   TempSrc: Oral Oral  Oral   SpO2: 99% 100%  95%   Weight:   242 lb 12.8 oz (110.1 kg)    Height:         HEAD & FACE: Normocephalic. Symmetric. EYES: No xanthelasma. Conjunctivae not injected. EARS, NOSE, MOUTH & THROAT: Good dentition. No oral pallor or cyanosis. NECK: No JVD at 30 degrees. No thyromegaly. RESPIRATORY: Clear to auscultation and percussion in all fields. No use of accessory muscle or intercostal retractions. CARDIOVASCULAR: Regular rate and rhythm. No lifts or thrills on palpitation. Auscultation with normal S1-S2 in intensity and splitting. No carotid bruits. Abdominal aorta not enlarged. Femoral arteries without bruits. Pedal pulses 1+. No edema. ABDOMEN: Soft without hepatic or splenic enlargement. No tenderness. MUSCULOSKELETAL: No kyphosis or scoliosis of the back. Good muscle strength and tone. No muscle atrophy. EXTREMITIES: No clubbing or cyanosis. SKIN: No Xanthomas or ulcerations. NEUROLOGIC: Oriented to time, place and person. Normal mood and affect. LYMPHATIC:  No palpable neck or supraclavicular nodes. No spleno        EKG: No acute changes    Impression:  1. Atypical chest pain   2.  Recent negative stress test   3. Recurrent syncope   4. Mild LV dysfunction on recent echo         Plan:  1. Continue same cardiac meds   2. Monitor for arrhythmias   3.  No advanced cardiac evaluation         Jacque Thompson

## 2019-05-13 NOTE — H&P
History and Physical      CHIEF COMPLAINT: Syncope and collapse      History of Present Illness: 60-year-old female patient of Dr. Maida Campa I am asked to admit and follow. Patient has extensive history. She states she was in [place], sitting and playing the piano, and singing. While doing that she did not fall off the piano bench but \"did have episodes of forgetfulness, sudden sleep\". She has had repeated episodes of the same, I'll once while sitting in her car at a traffic light. Upon presentation to the ED, she began experiencing atypical chest pains that would come for a few seconds then resolves and repeat and resolve etc. She's having no chest pains at this time. She states she was seen by neurology in [facility] 3 weeks ago with no significant findings. She states she has been evaluated for sleep apnea which was negative; she has undergone EEG which was negative; has not had tilt table testing done. She has been evaluated for narcolepsy with no findings. She does follow routinely with cardiology most recently 4/18/19 with no findings and no changes. She has also undergone recent stress test, 12/10/18 stress echo with negative findings. She was seen by cardiology today, no further testing. She underwent MRI of brain July 2018 with following results--       1. No evidence of restricted diffusion, acute cerebrovascular   infarction/accident, enhancing mass lesion or arteriovenous   malformation. 2. Moderate white matter changes consistent with sequelae small vessel   ischemic disease. 3. Moderate cerebral volume loss/atrophy. 4. Partially empty sella. 5. Dominant left vertebral artery. In the ED yesterday, CT of brain with following results  No intraparenchymal hemorrhage. No extra-axial fluid collection or  hematoma. No subfalcine, transtentorial or tonsillar herniation or  shift. Mild cerebral volume loss/atrophy.  Hypodensities noted within  the centrum semiovale and periventricular white matter. Hyperostosis  frontalis interna. Paranasal sinuses and mastoid air cells are free  from fluid or mass. Optic globes and orbital contents are  unremarkable. Pituitary gland and sellar/suprasellar regions  demonstrate a partially empty sella. No Chiari malformation. Vascular  calcifications within the bilateral internal carotid artery cavernous  segments and the vertebral arteries. .      Impression:       1. No CT evidence of acute intracranial abnormality, as questioned. If  there is clinical concern for potential underlying acute  cerebrovascular infarction/accident or other potential abnormalities  of underlying brain parenchyma, MRI of the brain would be recommended  for more detailed evaluation. .  2. Mild cerebral volume loss/atrophy with white matter changes  suggestive of sequelae small vessel ischemic disease. 3. Vascular calcifications within the bilateral internal carotid  artery cavernous segments and the vertebral arteries. .  4. Partially empty sella. CTA of the chest reveals no evidence of pulmonary embolus. Patient admitted to observation to exclude any arrhythmia, orthostatic blood pressure. Blood pressures have not been orthostatic during this admission.       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    Cerebrovascular disease 6/09    no residual    CHF (congestive heart failure) (HCC) approx 3 years ago    Claustrophobia     Headache(784.0)     History of blood transfusion     with knee surgery    Hyperlipidemia     Hypertension     LBP radiating to both legs     Lymphedema of both lower extremities 11/12/2015    Neuromuscular disorder ([address]     Non-rheumatic aortic sclerosis ([address] 10/2018    10/2018    Obesity     Pulmonary hypertension ([address] 10/2018    Recurrent genital HSV (herpes simplex virus) infection     last outbreak 11/2017    S/P breast biopsy, right 07/2016    pt states breast cancer    Type II or unspecified type diabetes mellitus without mention of complication, not stated as uncontrolled     AA1c8.7 9/10    UTI (urinary tract infection) 5/13/2019         Past Surgical History:   Procedure Laterality Date    ARTHROPLASTY Left 07/29/2016    thumb basil joint with dequervain's release    BREAST LUMPECTOMY  years ago    benign    BREAST LUMPECTOMY Right 09/06/2016    COLONOSCOPY  2005    COLONOSCOPY  1/8/15    Dr. Rick Bowels - [facility]    ECHO COMPL W DOP COLOR FLOW  6/7/2012         FINGER SURGERY Left 07/29/2016    thumb    HAND SURGERY  12/2017    HYSTERECTOMY      JOINT REPLACEMENT      OTHER SURGICAL HISTORY Right 12/20/2017    RIGHT THUMB TRAPEZIECTOMY WITH LIGAMENT RECONSRUCTION DEQUERVAINS RELEASE    TIM AND BSO      TOTAL HIP ARTHROPLASTY Bilateral     2000, 2013 dr Jing Ramírez, dr Medrano Medico Bilateral 2013    dr Shi Carvajal       Medications Prior to Admission:    Medications Prior to Admission: pravastatin (PRAVACHOL) 80 MG tablet, Take 80 mg by mouth daily  vitamin D (ERGOCALCIFEROL) 27167 units CAPS capsule, Take 50,000 Units by mouth once a week  diltiazem (CARDIZEM CD) 240 MG extended release capsule, TAKE 1 CAPSULE BY MOUTH ONCE DAILY FOR HIGH BLOOD PRESSURE  oxybutynin (DITROPAN) 5 MG tablet, Take 1 tablet by mouth 3 times daily (Patient taking differently: Take 10 mg by mouth 3 times daily )  insulin glargine (LANTUS) 100 UNIT/ML injection vial, Inject 50 Units into the skin nightly  cloNIDine (CATAPRES) 0.2 MG tablet, TAKE ONE (1) TABLET BY MOUTH TWICE DAILY  nortriptyline (PAMELOR) 10 MG capsule, TAKE 1 CAPSULE BY MOUTH DAILY IN THE EVENING  metFORMIN (GLUCOPHAGE) 500 MG tablet, TAKE 1 TABLET BY MOUTH 2 TIMES DAILY *WITH MEALS*  ONGLYZA 5 MG TABS tablet, Take 1 tablet by mouth daily Indications: Insulin-Resistant Diabetes  metoprolol tartrate (LOPRESSOR) 25 MG tablet, TAKE ONE (1) TABLET BY MOUTH TWICE DAILY  montelukast (SINGULAIR) 10 MG tablet, TAKE 1 TABLET BY MOUTH NIGHTLY  gabapentin (NEURONTIN) 300 MG capsule, TAKE 1 CAPSULE BY MOUTH 3 TIMES DAILY  losartan-hydrochlorothiazide (HYZAAR) 100-25 MG per tablet, TAKE 1 TABLET BY MOUTH ONCE DAILY FOR HIGH BLOOD PRESSURE  fluticasone-vilanterol (BREO ELLIPTA) 100-25 MCG/INH AEPB inhaler, Inhale 1 puff into the lungs daily  anastrozole (ARIMIDEX) 1 MG tablet, Take 1 tablet by mouth daily  aspirin 81 MG chewable tablet, Take 1 tablet by mouth daily  acetaminophen (APAP EXTRA STRENGTH) 500 MG tablet, Take 1 tablet by mouth every 6 hours as needed for Pain  acyclovir (ZOVIRAX) 400 MG tablet, Take 1 tablet by mouth daily  vitamin B-12 (CYANOCOBALAMIN) 1000 MCG tablet, Take 1 tablet by mouth daily  INSULIN SYRINGE 1CC/29G 29G X 1/2\" 1 ML MISC, USE EVERY DAY AS DIRECTED  [DISCONTINUED] ipratropium-albuterol (DUONEB) 0.5-2.5 (3) MG/3ML SOLN nebulizer solution, INHALE 3ML VIAL VIA NEBULIZER THREE TIMES A DAY AS NEEDED FOR SHORTNESS OF BREATH  [DISCONTINUED] pravastatin (PRAVACHOL) 80 MG tablet, Take 1 tablet by mouth daily Indications: High Amount of Fats in the Blood  [DISCONTINUED] vitamin D (ERGOCALCIFEROL) 59740 units CAPS capsule, TAKE 1 CAPSULE BY MOUTH ONCE WEEKLY  [DISCONTINUED] LANTUS 100 UNIT/ML injection vial, As of 3/6/19:  50 units HS. Increase as directed (2 units/day/week until  and evening BS <160 OR she develops S/S hypoglycemia. calcium carbonate-vitamin D (CALCIUM 600 + D) 600-400 MG-UNIT TABS per tab, Take 1 tablet by mouth 2 times daily (Patient taking differently: Take 1 tablet by mouth daily )    Allergies:    Patient has no known allergies. Social History:    reports that she quit smoking about 17 years ago. She has a 8.75 pack-year smoking history. She has never used smokeless tobacco. She reports that she drinks alcohol.  She reports that she has current or past drug history. Drug: Marijuana. Family History:   family history includes Breast Cancer in her sister and sister; Cancer (age of onset: 55) in her sister; Cancer (age of onset: 59) in her sister; Diabetes in her father and mother; Heart Attack in her mother; Heart Failure in her father; High Blood Pressure in her father and mother; Sickle Cell Anemia in an other family member; Stomach Cancer in an other family member; Stroke in her sister. REVIEW OF SYSTEMS:  As above in the HPI, otherwise negative    PHYSICAL EXAM:    VS: BP (!) 154/66   Pulse 66   Temp 98.6 °F (37 °C) (Oral)   Resp 18   Ht 5' 5.5\" (1.664 m)   Wt 242 lb 12.8 oz (110.1 kg)   SpO2 95%   BMI 39.79 kg/m²     General appearance: Alert, Awake, Oriented times 3, no distress  Skin: Warm and dry ; no rashes  Head: Normocephalic. No masses, lesions or tenderness noted  Eyes: Conjunctivae pink, sclera white. PERRL,EOM-I  Ears: External ears normal  Nose/Sinuses: Nares normal. Septum midline. Mucosa normal. No drainage  Oropharynx: Oropharynx clear with no exudate seen  Neck: Supple. No jugular venous distension, lymphadenopathy or thyromegaly Trachea midline  Lungs: Clear to auscultation bilaterally. No rhonchi, crackles or wheezes  Heart: S1 S2  Regular rate and rhythm. No rub, murmur or gallop  Abdomen: Soft, non-tender. BS normal. No masses, organomegaly; no rebound or guarding  Extremities: No edema, Peripheral pulses palpable  Musculoskeletal: Muscular strength appears intact. Neuro:  No focal motor defects ; II-XII grossly intact .  HORVATH equally  Breast: deferred  Rectal: deferred  Genitalia:  deferred    LABS:  CBC:   Lab Results   Component Value Date    WBC 9.3 05/12/2019    RBC 4.27 05/12/2019    HGB 11.7 05/12/2019    HCT 37.4 05/12/2019    MCV 87.6 05/12/2019    MCH 27.4 05/12/2019    MCHC 31.3 05/12/2019    RDW 14.4 05/12/2019     05/12/2019    MPV 10.7 05/12/2019     CBC with Differential:    Lab Results Component Value Date    WBC 9.3 05/12/2019    RBC 4.27 05/12/2019    HGB 11.7 05/12/2019    HCT 37.4 05/12/2019     05/12/2019    MCV 87.6 05/12/2019    MCH 27.4 05/12/2019    MCHC 31.3 05/12/2019    RDW 14.4 05/12/2019    SEGSPCT 77 12/03/2013    LYMPHOPCT 22.5 05/12/2019    MONOPCT 8.9 05/12/2019    BASOPCT 0.5 05/12/2019    MONOSABS 0.83 05/12/2019    LYMPHSABS 2.09 05/12/2019    EOSABS 0.00 05/12/2019    BASOSABS 0.05 05/12/2019     Hemoglobin/Hematocrit:    Lab Results   Component Value Date    HGB 11.7 05/12/2019    HCT 37.4 05/12/2019     CMP:    Lab Results   Component Value Date     05/12/2019    K 3.9 05/12/2019    K 4.6 10/16/2018    CL 99 05/12/2019    CO2 30 05/12/2019    BUN 7 05/12/2019    CREATININE 0.7 05/12/2019    GFRAA >60 05/12/2019    LABGLOM >60 05/12/2019    GLUCOSE 197 05/12/2019    GLUCOSE 172 04/12/2012    PROT 6.7 04/25/2019    LABALBU 3.7 04/25/2019    LABALBU 4.4 07/28/2011    CALCIUM 9.6 05/12/2019    BILITOT 0.2 04/25/2019    ALKPHOS 160 04/25/2019    AST 9 04/25/2019    ALT 6 04/25/2019     BMP:    Lab Results   Component Value Date     05/12/2019    K 3.9 05/12/2019    K 4.6 10/16/2018    CL 99 05/12/2019    CO2 30 05/12/2019    BUN 7 05/12/2019    LABALBU 3.7 04/25/2019    LABALBU 4.4 07/28/2011    CREATININE 0.7 05/12/2019    CALCIUM 9.6 05/12/2019    GFRAA >60 05/12/2019    LABGLOM >60 05/12/2019    GLUCOSE 197 05/12/2019    GLUCOSE 172 04/12/2012     Hepatic Function Panel:    Lab Results   Component Value Date    ALKPHOS 160 04/25/2019    ALT 6 04/25/2019    AST 9 04/25/2019    PROT 6.7 04/25/2019    BILITOT 0.2 04/25/2019    LABALBU 3.7 04/25/2019    LABALBU 4.4 07/28/2011     Magnesium:    Lab Results   Component Value Date    MG 1.8 01/17/2019     Phosphorus:    Lab Results   Component Value Date    PHOS 4.0 01/01/2013     Uric Acid:  No results found for: LABURIC, URICACID  PT/INR:    Lab Results   Component Value Date    PROTIME 11.7 07/16/2013 PROTIME 11.6 11/01/2010    INR 1.1 07/16/2013     Warfarin PT/INR:  No components found for: Oli Hurley  PTT:    Lab Results   Component Value Date    APTT 35.7 07/16/2013   [APTT}  Troponin:    Lab Results   Component Value Date    TROPONINI <0.01 05/13/2019     Last 3 Troponin:    Lab Results   Component Value Date    TROPONINI <0.01 05/13/2019    TROPONINI <0.01 05/13/2019    TROPONINI <0.01 05/12/2019     U/A:    Lab Results   Component Value Date    NITRITE neg 09/30/2014    COLORU Yellow 05/12/2019    PROTEINU Negative 05/12/2019    PHUR 7.0 05/12/2019    WBCUA 10-20 05/12/2019    RBCUA NONE 05/12/2019    RBCUA NONE 03/19/2014    BACTERIA NONE 05/12/2019    CLARITYU Clear 05/12/2019    SPECGRAV 1.010 05/12/2019    LEUKOCYTESUR MODERATE 05/12/2019    UROBILINOGEN 0.2 05/12/2019    BILIRUBINUR Negative 05/12/2019    BILIRUBINUR neg 09/30/2014    BLOODU Negative 05/12/2019    GLUCOSEU 100 05/12/2019    AMORPHOUS RARE 05/12/2019     HgBA1c:    Lab Results   Component Value Date    LABA1C 10.1 01/15/2019     FLP:    Lab Results   Component Value Date    TRIG 89 01/16/2019    HDL 70 01/16/2019    LDLCALC 94 01/16/2019    LABVLDL 18 01/16/2019     TSH:    Lab Results   Component Value Date    TSH 0.653 05/13/2019     VITAMIN B12: No components found for: B12  FOLATE:    Lab Results   Component Value Date    FOLATE 9.7 05/23/2018       RADIOLOGY:  CTA PULMONARY W CONTRAST   Final Result      1. No evidence for a pulmonary embolism. 2. Mild cardiomegaly with pulmonary vascular congestion. 3. Coronary atherosclerotic disease. 4. Nonobstructing left nephrolithiasis. CT Head WO Contrast   Final Result   1. No CT evidence of acute intracranial abnormality, as questioned.  If   there is clinical concern for potential underlying acute   cerebrovascular infarction/accident or other potential abnormalities   of underlying brain parenchyma, MRI of the brain would be recommended   for more detailed

## 2019-05-14 ENCOUNTER — TELEPHONE (OUTPATIENT)
Dept: FAMILY MEDICINE CLINIC | Age: 69
End: 2019-05-14

## 2019-05-14 LAB — URINE CULTURE, ROUTINE: NORMAL

## 2019-05-14 NOTE — CONSULTS
[address] [address]                                  CONSULTATION    PATIENT NAME: LISA BLANCHARD                      :        1950  MED REC NO:   [id number]                            ROOM:       [id number]  ACCOUNT NO:   [de-identified]                           ADMIT DATE: 2019  PROVIDER:     DMITRY Salinas     CONSULT DATE:  2019    CONSULTING PROVIDER:  Lance Chapin DO    REASON FOR CONSULTATION:  Chest pain. HISTORY OF PRESENT ILLNESS:  The patient is a 71-year-old [facility]  American female who is known to Dr. Anel Ortega, most recently seen in  the office with Dr. Itz You 2019. Her medical history  includes chronic left bundle-branch block, hypothyroidism on replacement  therapy, headaches, diabetes mellitus, hypertension, hyperlipidemia,  asthma, CVA without residual, right breast cancer status post radiation,  paroxysmal SVT per 24-hour Holter monitor in 2018, syncope x2 in  2018. The patient presented to [address] [place] [address] Emergency Department on  2019 with complaints of syncope. She states that prior to  presenting to the Emergency Department \"since July of last year,\" she  has been having intermittent spells \"I get so sleepy, I just can't stay  awake. It hits me all of a sudden and then I am out for a little while  and I just completely wake up. I am not sure what this is, but it is  not sleep. \"  She states that she has done this even with driving and  states that she has followed with Neurology, \"but I don't know what is  going on. \"  She states in the past she was tested for sleep apnea as  well as narcolepsy, and both were negative.   Of note, over the course of  the past three weeks, she states that she has been having \"the need to  clear my throat all the time\" with accompanying hoarse voice and states  at times \"I feel like I am choking on my food, everything get stuck, and  because of this I even quit eating chicken and I have lost weight. \"  She  denies accompanying chest pain or dyspnea and states that prior to  presentation on 05/12/2019 as she was sitting at the piano at [employer] \"I  just was unable to play, I could remember the song, I couldn't get it  together, I just became so sleepy, and I guess I passed out. \"  She is  uncertain as to how long she had lost consciousness and denies loss of  her bowels or bladder upon waking. She states that prior to this event,  she had \"a jabbing pain\" to her midsternal chest that lasted less than a  minute in duration and kept occurring intermittently. Upon arrival to  the Emergency Department, her vital signs were oral temperature 97.7,  heart rate 70 beats per minute, respiratory rate 16, blood pressure  164/62, oxygen saturation 100% on room air. A 12-lead EKG was obtained  that showed sinus rhythm with a left bundle-branch block. WBC is 9.3, H  and H 11.7 and 37.4. BUN/sCr 7/0.7. Troponin less than 0.01. ProBNP  220. CT of the head was unremarkable. Chest x-ray with questionable  infiltrate versus pneumonia versus atelectasis. CTA of the chest was  negative for pulmonary emboli and did show pulmonary vascular  congestion. UA was positive for moderate amount of leukocyte esterase  and negative nitrites. She did receive IV Rocephin. She was admitted  to a telemetry monitored unit for further evaluation and management, and  Cardiology was consulted by Dr. Douglas Russell for further evaluation of chest  pain. Currently, the patient is sitting up in bed. Denies chest pain  and dyspnea. Denies recurrence of syncope or presyncope. Her vital  signs are stable. She has had two additional troponin less than 0.01. Telemetry monitoring showed sinus rhythm, and she is in no acute  distress. PAST MEDICAL/SURGICAL HISTORY:  1.  Echocardiogram 06/2012, EF 65%. Mild asymmetric septal hypertrophy. Mild TR.   2. Tradjenta,  Cozaar 100 mg daily, hydrochlorothiazide 25 mg daily, metformin. REVIEW OF SYSTEMS:  CONSTITUTIONAL:  Denies fevers, chills, night sweats, weight loss, and  fatigue. HEENT:  Complains of intermittent headaches. Denies nose bleeds,  blurred vision, oral pain, abscess, or lesion. MUSCULOSKELETAL:  Denies falls. Denies pain to bilateral lower  extremities with ambulation. Denies edema to bilateral lower  extremities. NEUROLOGIC:  Denies dizziness, lightheadedness, numbness and tingling,  loss of consciousness, see HPI. CARDIAC:  Chest pain, see HPI. Complains of intermittent palpitations,  feelings of her heart racing. LUNGS:  Denies dyspnea on exertion, orthopnea, PND. GI:  Denies heartburn, abdominal pain, nausea, vomiting, diarrhea,  constipation, black, bloody or tarry stools. :  Denies dysuria or hematuria. HEME:  Denies excessive bleeding and bruising. LYMPHS:  Denies lumps and bumps in neck, axilla, breasts, and groin. ENDOCRINE:  Denies excessive thirst.  Denies intolerances to hot and  cold. PSYCHIATRIC:  Complains of anxiety and depression. GYNECOLOGIC:  Postmenopausal state. Denies vaginal bleeding. PHYSICAL EXAMINATION:  VITAL SIGNS:  Oral temperature 98.6, heart rate 66 beats per minute,  respiratory rate 18, blood pressure 154/66. Oxygen saturation 95% on  room air. Weight 242 pounds. BMI 39.9. Rest of the physical exam to follow as per Dr. Ria Lepe. LABS AND TESTS:  Thyroid function panel pending. Urine toxicology  pending. Urinalysis with moderate leukocyte esterase, negative  nitrites. Sodium 139, potassium 3.9, chloride 99, CO2 of 30, BUN 7,  creatinine 0.7, blood glucose 197, calcium 9.6, GFR greater than 60,  proBNP 220. Troponin less than 0.01 x3. WBC 9.3, hemoglobin 11.7,  hematocrit 37.4, platelets 355,161. Chest x-ray, stable enlarged cardiomediastinal silhouette with thoracic  aortic vascular calcification.   Nonspecific bibasilar airspace disease  findings can be seen in infiltrate pneumonia and/or atelectasis. CT of  the head, no CT evidence of acute intracranial abnormalities. Question  if there is clinical concern for potential underlying acute  cerebrovascular infarction/accident or other potential abnormalities of  underlying brain parenchyma. MRI of the brain would be recommended for  a more detailed evaluation. Mild cerebral volume loss atrophy with  white matter changes suggestive of sequelae, small vessel ischemic  disease. Vascular calcifications within the bilateral internal carotid  artery cavernous segments and the vertebral artery, partially empty  sella. CTA pulmonary, no evidence of pulmonary embolism. Mild  cardiomegaly with pulmonary vascular congestion, coronary  atherosclerotic disease, nonobstructing left nephrolithiasis. IMPRESSION AND PLAN:  To follow as per Dr. Edmund Rachel. DMITRY Byrd     D: 05/13/2019 11:47:31       T: 05/13/2019 15:05:16     GENTRY/[email or web address]  Job#: [id number]     Doc#: [id number]    CC:    Full Consult      HPI:  Patient seen in consultation following syncopal episode. Patient with a history of syncope. She relates that she is currently under evaluation by neurology. Yesterday, she notes that she had a spell similar to those she has had in the past. She becomes very sleepy and then \"passes out\". She denies any heart racing or fluttering prior to this event. She has had some epigastric discomfort over the past 2 days that has been constant in nature and mild in severity. She believes it is secondary to GERD. She denies any radiation of this discomfort. There is no associated diaphoresis or lightheadedness. She has no associated shortness of breath. She did have a stress echocardiogram performed in December 2018 that was negative for ischemia.   Cardiac troponins negative ×3.          Past Medical History  Past Medical History:  Diagnosis Date   Arthritis     Asthma     Cancer [facility])      breast  Cerebral artery occlusion with cerebral infarction [facility])      denies deficits   Cerebrovascular disease     no residual   CHF (congestive heart failure) ([facility]) approx 3 years ago   Claustrophobia     Headache(784.0)     History of blood transfusion      with knee surgery   Hyperlipidemia     Hypertension     LBP radiating to both legs     Lymphedema of both lower extremities 2015   Neuromuscular disorder (HCC)     Non-rheumatic aortic sclerosis ([facility] [address] 10/2018    10/2018   Obesity     Pulmonary hypertension ([facility] [address] 10/2018   Recurrent genital HSV (herpes simplex virus) infection      last outbreak 2017   S/P breast biopsy, right 2016    pt states breast cancer   Type II or unspecified type diabetes mellitus without mention of complication, not stated as uncontrolled      AA1c8.7 9/10   UTI (urinary tract infection) 2019            Social History         Socioeconomic History   Marital status:       Spouse name: Not on file   Number of children: Not on file   Years of education: Not on file   Highest education level: Not on file  Occupational History   Not on file  Social Needs   Financial resource strain: Not on file   Food insecurity:      Worry: Not on file      Inability: Not on file   Transportation needs:      Medical: Not on file      Non-medical: Not on file  Tobacco Use   Smoking status: Former Smoker      Packs/day: 0.25      Years: 35.00      Pack years: 8.75      Last attempt to quit: 2001      Years since quittin.4   Smokeless tobacco: Never Used  Substance and Sexual Activity   Alcohol use: Yes      Alcohol/week: 0.0 - 0.6 oz   Drug use:  Yes      Types: Marijuana      Comment: last used 2018   Sexual activity: Never      Comment: divorce  Lifestyle   Physical activity:      Days per week: Not on file      Minutes per session: Not on file   Stress: Not on file  Relationships   Social connections:      Talks on phone: Not on file      Gets together: Not on file      Attends Jew service: Not on file      Active member of club or organization: Not on file      Attends meetings of clubs or organizations: Not on file      Relationship status: Not on file   Intimate partner violence:      Fear of current or ex partner: Not on file      Emotionally abused: Not on file      Physically abused: Not on file      Forced sexual activity: Not on file  Other Topics Concern   Not on file  Social History Narrative    Drinks 1 sweet tea daily; occ Coke.   Drinks decaf coffee.             Family History  Family History  Problem Relation Age of Onset   Diabetes Mother     High Blood Pressure Mother     Heart Attack Mother     High Blood Pressure Father     Diabetes Father     Heart Failure Father     Breast Cancer Sister     Stroke Sister          young age  [facility] Cancer Sister 55        breast   Sickle Cell Anemia Other          niece   Breast Cancer Sister            [facility] Cancer Sister 59        breast & ovarian   Stomach Cancer Other          aunt          ROS:   General: No unusual weight gain, no change in exercise tolerance  Skin: No rash or itching  EENT: No vision changes or nosebleeds  Cardiovascular: No orthopnea or paroxysmal nocturnal dyspnea  Respiratory: No cough or hemoptysis  Gastrointestinal: No hematemesis or recent changes in bowel habits  Genitourinary: No hematuria, urgency or frequency  Musculoskeletal: No muscular weakness or joint swelling   Neurologic / Psychiatric: No incoordination or convulsions  Allergic / Immunologic/ Lymphatic / Endocrine: No anemia or bleeding tendency     Physical Exam:     Vitals  Vitals:    19 1915 19 2210 19 0342 19 0811  BP: 138/80 (!) 164/78   (!) 154/66  Pulse: 72 65   66  Resp: 16 18   18  Temp: 98 °F (36.7 °C) 98.2 °F (36.8 °C)   98.6 °F (37 °C)  TempSrc: Oral Oral   Oral  SpO2: 99% 100%   95%  Weight:     242 lb 12.8 oz (110.1 kg)    Height:               HEAD & FACE: Normocephalic. Symmetric. EYES: No xanthelasma. Conjunctivae not injected. EARS, NOSE, MOUTH & THROAT: Good dentition. No oral pallor or cyanosis. NECK: No JVD at 30 degrees. No thyromegaly. RESPIRATORY: Clear to auscultation and percussion in all fields. No use of accessory muscle or intercostal retractions. CARDIOVASCULAR: Regular rate and rhythm. No lifts or thrills on palpitation. Auscultation with normal S1-S2 in intensity and splitting. No carotid bruits. Abdominal aorta not enlarged. Femoral arteries without bruits. Pedal pulses 1+. No edema. ABDOMEN: Soft without hepatic or splenic enlargement. No tenderness. MUSCULOSKELETAL: No kyphosis or scoliosis of the back. Good muscle strength and tone. No muscle atrophy. EXTREMITIES: No clubbing or cyanosis. SKIN: No Xanthomas or ulcerations. NEUROLOGIC: Oriented to time, place and person. Normal mood and affect. LYMPHATIC:  No palpable neck or supraclavicular nodes. No spleno           EKG: No acute changes     Impression:  Atypical chest pain   Recent negative stress test   Recurrent syncope   Mild LV dysfunction on recent echo            Plan:  Continue same cardiac meds   Monitor for arrhythmias   No advanced cardiac evaluation            Amrita Barber        The above documentation has been prepared under my direction and personally reviewed by me in its entirety. I discussed patient case in detail with KATELYN and reviewed KATELYN's documentation including HPI, ROS, medical/surgical history, family history, social history, allergies, and medications. I confirm that HPI, Physical Exam, Assessment and Plan are performed in their entirety by me.    Amrita Barber MD

## 2019-05-14 NOTE — TELEPHONE ENCOUNTER
[place] [facility] Trans[facility] Initial Follow Up Call    Outreach made within 2 business days of discharge: Yes    Patient: Jeraldine Hodgkins Fears Patient : 1950   MRN: [id number]  Reason for Admission: There are no discharge diagnoses documented for the most recent discharge. Discharge Date: 19       Spoke with: Mati    Discharge department/facility: MAIN    [facility] Interactive Patient Contact:  Was patient able to fill all prescriptions: Yes  Was patient instructed to bring all medications to the follow-up visit: Yes  Is patient taking all medications as directed in the discharge summary?  Yes  Does patient understand their discharge instructions: Yes  Does patient have questions or concerns that need addressed prior to 7-14 day follow up office visit: no    Scheduled appointment with PCP within 7-14 days    Follow Up  Future Appointments   Date Time Provider Emeka Velásquezi   5/15/2019 12:30 PM DMITRY Byrne [place] [id number]   2019  9:30 AM SCHEDULE, [facility]   2019 11:00 AM Claudeen Provencal,  [address]   2019 10:30 AM Claudeen Provencal,  [address]   10/14/2019 10:30 AM Zhou Marti MD [facility]

## 2019-05-15 ENCOUNTER — OFFICE VISIT (OUTPATIENT)
Dept: FAMILY MEDICINE CLINIC | Age: 69
End: 2019-05-15
Payer: MEDICARE

## 2019-05-15 VITALS
OXYGEN SATURATION: 96 % | HEART RATE: 66 BPM | TEMPERATURE: 98.2 F | SYSTOLIC BLOOD PRESSURE: 120 MMHG | DIASTOLIC BLOOD PRESSURE: 62 MMHG | BODY MASS INDEX: 38.73 KG/M2 | WEIGHT: 241 LBS | HEIGHT: 66 IN

## 2019-05-15 DIAGNOSIS — K21.9 GASTROESOPHAGEAL REFLUX DISEASE WITHOUT ESOPHAGITIS: ICD-10-CM

## 2019-05-15 DIAGNOSIS — Z91.81 AT HIGH RISK FOR FALLS: Primary | ICD-10-CM

## 2019-05-15 PROCEDURE — 99495 TRANSJ CARE MGMT MOD F2F 14D: CPT | Performed by: NURSE PRACTITIONER

## 2019-05-15 RX ORDER — OMEPRAZOLE 40 MG/1
40 CAPSULE, DELAYED RELEASE ORAL DAILY
Qty: 90 CAPSULE | Refills: 3 | Status: SHIPPED | OUTPATIENT
Start: 2019-05-15 | End: 2019-08-21 | Stop reason: SDUPTHER

## 2019-05-15 RX ORDER — OMEPRAZOLE 40 MG/1
40 CAPSULE, DELAYED RELEASE ORAL DAILY
Qty: 30 CAPSULE | Refills: 3 | Status: SHIPPED | OUTPATIENT
Start: 2019-05-15 | End: 2019-05-15 | Stop reason: SDUPTHER

## 2019-05-15 ASSESSMENT — ENCOUNTER SYMPTOMS
DIARRHEA: 0
NAUSEA: 0
SINUS PAIN: 0
VOMITING: 1
WHEEZING: 1
TROUBLE SWALLOWING: 1
VOICE CHANGE: 1
COLOR CHANGE: 0
CHEST TIGHTNESS: 0
CONSTIPATION: 0
SHORTNESS OF BREATH: 0
COUGH: 1
EYE PAIN: 0
ABDOMINAL PAIN: 1

## 2019-05-15 NOTE — PROGRESS NOTES
Post-Discharge Transitional Care Management Services or Hospital Follow Up    Alysa Gonzalez   YOB: 1950    Date of Office Visit:  5/15/2019  Date of Hospital Admission: 5/12/19  Date of Hospital Discharge: 5/13/19  Readmission Risk Score(high >=14%. Medium >=10%):Readmission Risk Score: 0      Care management risk score Rising risk (score 2-5) and Complex Care (Scores >=6): 8     Non face to face  following discharge, date last encounter closed (first attempt may have been earlier): 5/14/2019 10:48 AM 5/14/2019 10:48 AM    Call initiated 2 business days of discharge: Yes     Diogo Araiza has not been monitoring her BS because hers does not work at home. She has not been skipping meals, she recalls years ago she bottomed out. She does not remember a time since that she has felt that way. She is not eating on schedule. She has not passed out since being in the hospital, she is usually sleeping when this happens. She is tired all the time. She gets these feelings when she sits. She does not monitor her BP at home, though she has one. She sees a cardiologist for these spells, her next appointment is not until the fall. She reports the hospital cardiologist indicates that heart was fine. She is getting things stuck in her throat, she notices her throat/voice keeps changing. She has pain in her epigastric area and it does not seem to go away. She denies history of GERD, ulcer or having an EGD. She does not smoke. She has gone to the eye doctor recently and she has cheaters to read. Patient Active Problem List   Diagnosis    Obesity (BMI 30-39. 9)    Hyperlipidemia with target LDL less than 70    Ductal carcinoma in situ (DCIS) of breast    Acute on chronic combined systolic and diastolic congestive heart failure (HCC)    Diabetes mellitus type 2, uncontrolled ([address]    Essential hypertension    COPD (chronic obstructive pulmonary disease) (HCC)    History of CVA (cerebrovascular accident)  Syncope and collapse    Recurrent genital HSV (herpes simplex virus) infection    Cancer ([facility] [address]    Asthma    Pulmonary hypertension (HCC)    Non-rheumatic aortic sclerosis (HCC)    UTI (urinary tract infection)    Cerebral artery occlusion with cerebral infarction ([facility] [address]    Chest pain    LBBB (left bundle branch block)       No Known Allergies    Medications listed as ordered at the time of discharge from hospital   Lisa Gonzalez   Home Medication Instructions PHILLIP:    Printed on:05/15/19 0398   Medication Information                      acetaminophen (APAP EXTRA STRENGTH) 500 MG tablet  Take 1 tablet by mouth every 6 hours as needed for Pain             acyclovir (ZOVIRAX) 400 MG tablet  Take 1 tablet by mouth daily             anastrozole (ARIMIDEX) 1 MG tablet  Take 1 tablet by mouth daily             aspirin 81 MG chewable tablet  Take 1 tablet by mouth daily             calcium carbonate-vitamin D (CALCIUM 600 + D) 600-400 MG-UNIT TABS per tab  Take 1 tablet by mouth 2 times daily             cloNIDine (CATAPRES) 0.2 MG tablet  TAKE ONE (1) TABLET BY MOUTH TWICE DAILY             diltiazem (CARDIZEM CD) 240 MG extended release capsule  TAKE 1 CAPSULE BY MOUTH ONCE DAILY FOR HIGH BLOOD PRESSURE             fluticasone-vilanterol (BREO ELLIPTA) 100-25 MCG/INH AEPB inhaler  Inhale 1 puff into the lungs daily             gabapentin (NEURONTIN) 300 MG capsule  TAKE 1 CAPSULE BY MOUTH 3 TIMES DAILY             insulin glargine (LANTUS) 100 UNIT/ML injection vial  Inject 50 Units into the skin nightly             INSULIN SYRINGE 1CC/29G 29G X 1/2\" 1 ML MISC  USE EVERY DAY AS DIRECTED             losartan-hydrochlorothiazide (HYZAAR) 100-25 MG per tablet  TAKE 1 TABLET BY MOUTH ONCE DAILY FOR HIGH BLOOD PRESSURE             metFORMIN (GLUCOPHAGE) 500 MG tablet  TAKE 1 TABLET BY MOUTH 2 TIMES DAILY *WITH MEALS*             metoprolol tartrate (LOPRESSOR) 25 MG tablet  TAKE ONE (1) TABLET BY MOUTH TWICE DAILY             montelukast (SINGULAIR) 10 MG tablet  TAKE 1 TABLET BY MOUTH NIGHTLY             nortriptyline (PAMELOR) 10 MG capsule  TAKE 1 CAPSULE BY MOUTH DAILY IN THE EVENING             omeprazole (PRILOSEC) 40 MG delayed release capsule  Take 1 capsule by mouth daily             ONGLYZA 5 MG TABS tablet  Take 1 tablet by mouth daily Indications: Insulin-Resistant Diabetes             oxybutynin (DITROPAN) 5 MG tablet  Take 1 tablet by mouth 3 times daily             pravastatin (PRAVACHOL) 80 MG tablet  Take 80 mg by mouth daily             vitamin B-12 (CYANOCOBALAMIN) 1000 MCG tablet  Take 1 tablet by mouth daily             vitamin D (ERGOCALCIFEROL) 94406 units CAPS capsule  Take 50,000 Units by mouth once a week                 Medications marked \"taking\" at this time  Outpatient Medications Marked as Taking for the 5/15/19 encounter (Office Visit) with DMITRY Reyna CNP   Medication Sig Dispense Refill    omeprazole (PRILOSEC) 40 MG delayed release capsule Take 1 capsule by mouth daily 30 capsule 3    pravastatin (PRAVACHOL) 80 MG tablet Take 80 mg by mouth daily      vitamin D (ERGOCALCIFEROL) 65460 units CAPS capsule Take 50,000 Units by mouth once a week      INSULIN SYRINGE 1CC/29G 29G X 1/2\" 1 ML MISC USE EVERY DAY AS DIRECTED 200 each 3    diltiazem (CARDIZEM CD) 240 MG extended release capsule TAKE 1 CAPSULE BY MOUTH ONCE DAILY FOR HIGH BLOOD PRESSURE 90 capsule 1    oxybutynin (DITROPAN) 5 MG tablet Take 1 tablet by mouth 3 times daily (Patient taking differently: Take 10 mg by mouth 3 times daily ) 90 tablet 1    insulin glargine (LANTUS) 100 UNIT/ML injection vial Inject 50 Units into the skin nightly 1 vial 3    cloNIDine (CATAPRES) 0.2 MG tablet TAKE ONE (1) TABLET BY MOUTH TWICE DAILY 180 tablet 3    nortriptyline (PAMELOR) 10 MG capsule TAKE 1 CAPSULE BY MOUTH DAILY IN THE EVENING 90 capsule 3    metFORMIN (GLUCOPHAGE) 500 MG tablet TAKE 1 TABLET BY MOUTH 2 TIMES Constitutional: Positive for appetite change (Not hungry, skipping meals. ). Negative for activity change, chills and fever. HENT: Positive for trouble swallowing and voice change. Negative for congestion, ear pain, sinus pain and sneezing. Eyes: Negative for pain and visual disturbance. Respiratory: Positive for cough and wheezing (Uses inhalers). Negative for chest tightness and shortness of breath. Cardiovascular: Negative for chest pain, palpitations and leg swelling. Gastrointestinal: Positive for abdominal pain (Epigastric pain, jabs) and vomiting (Vomits in my throat, ,does not come out). Negative for constipation, diarrhea and nausea. History of stroke, reports paralyzed diagphram she reports   Endocrine: Positive for heat intolerance. Negative for cold intolerance and polyuria. Genitourinary: Negative for difficulty urinating. Musculoskeletal: Negative for arthralgias and myalgias. Skin: Negative for color change and rash. Neurological: Positive for dizziness and light-headedness. Negative for headaches. Hematological: Negative for adenopathy. Does not bruise/bleed easily. Psychiatric/Behavioral: Negative for agitation, decreased concentration, sleep disturbance and suicidal ideas. The patient is not nervous/anxious. Vitals:    05/15/19 1303   BP: 120/62   Pulse: 66   Temp: 98.2 °F (36.8 °C)   SpO2: 96%   Weight: 241 lb (109.3 kg)   Height: 5' 5.5\" (1.664 m)     Body mass index is 39.49 kg/m². Wt Readings from Last 3 Encounters:   05/15/19 241 lb (109.3 kg)   05/13/19 242 lb 12.8 oz (110.1 kg)   04/25/19 243 lb 3.2 oz (110.3 kg)     BP Readings from Last 3 Encounters:   05/15/19 120/62   05/13/19 (!) 154/66   04/25/19 132/62       Physical Exam   Constitutional: She is oriented to person, place, and time. She appears well-developed and well-nourished. HENT:   Head: Normocephalic and atraumatic. Eyes: Pupils are equal, round, and reactive to light.  EOM are normal. Neck: Normal range of motion. Neck supple. No thyromegaly present. Cardiovascular: Normal rate, regular rhythm, normal heart sounds and intact distal pulses. Pulmonary/Chest: Effort normal and breath sounds normal.   Abdominal: Soft. Bowel sounds are normal.   Musculoskeletal: Normal range of motion. Lymphadenopathy:     She has no cervical adenopathy. Neurological: She is alert and oriented to person, place, and time. No cranial nerve deficit. Skin: Skin is warm and dry. Capillary refill takes less than 2 seconds. Psychiatric: She has a normal mood and affect. Judgment normal.   Nursing note and vitals reviewed. Assessment/Plan:  1. At high risk for falls   -Pt was requested to monitor her BP and her BS 2x a day to discuss trends with regards to low BP or BS.   _ Lea Santiago was asked to journal the BP and the BS and bring to her next appointment with Dr. Reyna Marrufo    -On the basis of positive falls risk screening, assessment and plan is as follows: in-office gait and balance testing performed using The Timed Up and Go Test was negative for increased falls risk- no further intervention is currently indicated. 2. Gastroesophageal reflux disease without esophagitis    - omeprazole (PRILOSEC) 40 MG delayed release capsule; Take 1 capsule by mouth daily  Dispense: 30 capsule;  Refill: [place] MD [facility], General Surgery, [place]      Medical Decision Making: moderate complexity

## 2019-05-17 ENCOUNTER — TELEPHONE (OUTPATIENT)
Dept: FAMILY MEDICINE CLINIC | Age: 69
End: 2019-05-17

## 2019-05-17 NOTE — TELEPHONE ENCOUNTER
Patients daughter from [address] [place] [address] called and is concerned because her mother has been blacking out and she is starting to get worried. She is to be traveling to [address] [place] [address] the first of June and daughter wants to make sure she is okay to travel and make sure she knows what exactly is going on. She would like for you to contact her at her name is Lacie Gonzalez [phone] at your earliest convenience.     Electronically signed by Amaury Marrero MA on 5/17/2019 at 11:55 AM

## 2019-06-10 ENCOUNTER — HOSPITAL ENCOUNTER (OUTPATIENT)
Age: 69
Discharge: HOME | End: 2019-06-12
Payer: MEDICARE

## 2019-06-10 DIAGNOSIS — E11.65 UNCONTROLLED TYPE 2 DIABETES MELLITUS WITH HYPERGLYCEMIA (HCC): ICD-10-CM

## 2019-06-10 DIAGNOSIS — I10 ESSENTIAL HYPERTENSION: ICD-10-CM

## 2019-06-10 DIAGNOSIS — E78.5 HYPERLIPIDEMIA WITH TARGET LDL LESS THAN 70: ICD-10-CM

## 2019-06-10 DIAGNOSIS — E55.9 VITAMIN D DEFICIENCY: ICD-10-CM

## 2019-06-10 DIAGNOSIS — Z85.3 PERSONAL HISTORY OF BREAST CANCER: ICD-10-CM

## 2019-06-10 PROBLEM — N39.0 UTI (URINARY TRACT INFECTION): Status: RESOLVED | Noted: 2019-05-13 | Resolved: 2019-06-10

## 2019-06-10 LAB
ALBUMIN SERPL-MCNC: 3.9 G/DL (ref 3.5–5.2)
ALP BLD-CCNC: 164 U/L (ref 35–104)
ALT SERPL-CCNC: 6 U/L (ref 0–32)
ANION GAP SERPL CALCULATED.3IONS-SCNC: 14 MMOL/L (ref 7–16)
AST SERPL-CCNC: 16 U/L (ref 0–31)
BASOPHILS ABSOLUTE: 0.04 E9/L (ref 0–0.2)
BASOPHILS RELATIVE PERCENT: 0.5 % (ref 0–2)
BILIRUB SERPL-MCNC: 0.3 MG/DL (ref 0–1.2)
BUN BLDV-MCNC: 10 MG/DL (ref 8–23)
CALCIUM SERPL-MCNC: 10 MG/DL (ref 8.6–10.2)
CHLORIDE BLD-SCNC: 101 MMOL/L (ref 98–107)
CHOLESTEROL, TOTAL: 152 MG/DL (ref 0–199)
CO2: 26 MMOL/L (ref 22–29)
CREAT SERPL-MCNC: 0.7 MG/DL (ref 0.5–1)
EOSINOPHILS ABSOLUTE: 0 E9/L (ref 0.05–0.5)
EOSINOPHILS RELATIVE PERCENT: 0 % (ref 0–6)
FOLATE: 13 NG/ML (ref 4.8–24.2)
GFR AFRICAN AMERICAN: >60
GFR NON-AFRICAN AMERICAN: >60 ML/MIN/1.73
GLUCOSE BLD-MCNC: 121 MG/DL (ref 74–99)
HBA1C MFR BLD: 11 % (ref 4–5.6)
HCT VFR BLD CALC: 38.5 % (ref 34–48)
HDLC SERPL-MCNC: 54 MG/DL
HEMOGLOBIN: 11.9 G/DL (ref 11.5–15.5)
IMMATURE GRANULOCYTES #: 0.03 E9/L
IMMATURE GRANULOCYTES %: 0.4 % (ref 0–5)
LDL CHOLESTEROL CALCULATED: 85 MG/DL (ref 0–99)
LYMPHOCYTES ABSOLUTE: 1.76 E9/L (ref 1.5–4)
LYMPHOCYTES RELATIVE PERCENT: 23.1 % (ref 20–42)
MAGNESIUM: 1.5 MG/DL (ref 1.6–2.6)
MCH RBC QN AUTO: 27.4 PG (ref 26–35)
MCHC RBC AUTO-ENTMCNC: 30.9 % (ref 32–34.5)
MCV RBC AUTO: 88.5 FL (ref 80–99.9)
MONOCYTES ABSOLUTE: 0.83 E9/L (ref 0.1–0.95)
MONOCYTES RELATIVE PERCENT: 10.9 % (ref 2–12)
NEUTROPHILS ABSOLUTE: 4.95 E9/L (ref 1.8–7.3)
NEUTROPHILS RELATIVE PERCENT: 65.1 % (ref 43–80)
PDW BLD-RTO: 14.6 FL (ref 11.5–15)
PLATELET # BLD: 375 E9/L (ref 130–450)
PMV BLD AUTO: 11.7 FL (ref 7–12)
POTASSIUM SERPL-SCNC: 4.4 MMOL/L (ref 3.5–5)
RBC # BLD: 4.35 E12/L (ref 3.5–5.5)
SODIUM BLD-SCNC: 141 MMOL/L (ref 132–146)
TOTAL PROTEIN: 7 G/DL (ref 6.4–8.3)
TRIGL SERPL-MCNC: 66 MG/DL (ref 0–149)
TSH SERPL DL<=0.05 MIU/L-ACNC: 0.59 UIU/ML (ref 0.27–4.2)
VITAMIN B-12: 232 PG/ML (ref 211–946)
VLDLC SERPL CALC-MCNC: 13 MG/DL
WBC # BLD: 7.6 E9/L (ref 4.5–11.5)

## 2019-06-10 PROCEDURE — 82607 VITAMIN B-12: CPT

## 2019-06-10 PROCEDURE — 80061 LIPID PANEL: CPT

## 2019-06-10 PROCEDURE — 82746 ASSAY OF FOLIC ACID SERUM: CPT

## 2019-06-10 PROCEDURE — 83735 ASSAY OF MAGNESIUM: CPT

## 2019-06-10 PROCEDURE — 80053 COMPREHEN METABOLIC PANEL: CPT

## 2019-06-10 PROCEDURE — 84443 ASSAY THYROID STIM HORMONE: CPT

## 2019-06-10 PROCEDURE — 83036 HEMOGLOBIN GLYCOSYLATED A1C: CPT

## 2019-06-10 PROCEDURE — 85025 COMPLETE CBC W/AUTO DIFF WBC: CPT

## 2019-06-10 PROCEDURE — 82306 VITAMIN D 25 HYDROXY: CPT

## 2019-06-10 ASSESSMENT — ENCOUNTER SYMPTOMS
VOMITING: 0
BACK PAIN: 0
ABDOMINAL PAIN: 0
BLOOD IN STOOL: 0
COUGH: 0
ORTHOPNEA: 0
SORE THROAT: 0
NAUSEA: 0
SHORTNESS OF BREATH: 0
WHEEZING: 0
SPUTUM PRODUCTION: 0
DIARRHEA: 0
HEARTBURN: 0
BLURRED VISION: 0
DOUBLE VISION: 0
CONSTIPATION: 0

## 2019-06-10 NOTE — PROGRESS NOTES
Josh Ruiz is a 76 y.o. female who presents today for     Chief Complaint   Patient presents with    3 Month Follow-Up     She is treated for IRDM, HTN, Hyperlipidemia    She has had recurrent symptoms of syncope and collapse requiring hospitalization. Despite work by Cardiology and Neurology, no etiology has become apparent    Hypertension/Hyperlipidemia/Diabetes Mellitus   Kajal Gonzalez is here for follow up of elevated cholesterol, elevated blood pressure, and diabetes. Compliance with treatment has been fair (fairly good with daily medications but not as much as about diet). Patient complains of muscle pain associated with her medications. She is exercising sporadically (walks 1 hour/day, 2 times/week at [facility]) and is fairly adherent to a low-salt diet. Blood pressure is well controlled at home. Cardiac symptoms: fatigue and lower extremity edema. Patient denies: chest pain, claudication, dyspnea, irregular heart beat, orthopnea, palpitations and paroxysmal nocturnal dyspnea. Cardiovascular risk factors: advanced age (older than 54 for men, 72 for women), diabetes mellitus, dyslipidemia, hypertension, obesity (BMI >= 30 kg/m2) and sedentary lifestyle. Use of agents associated with hypertension: none. History of target organ damage: left ventricular hypertrophy. Current diabetic symptoms include: none. Patient denies foot ulcerations, hyperglycemia, hypoglycemia , increase appetite, nausea, polydipsia, polyuria, visual disturbances, vomiting and weight loss. Patient notes numbness and tingling in her lower extremities. Evaluation to date has included: fasting blood sugar, fasting lipid panel and hemoglobin A1C. Home sugars: Patient has a new blood sugar meter but has not been monitoring recently because she doesn't know how to use her new meter.   Current treatments: Continued insulin which has been somewhat effective, Continued sulfonylurea which has been somewhat effective and Continued metformin review. Noncompliant with lifestyle and BG monitoring despite past attempts to educate. Lab Results   Component Value Date    LABA1C 11.0 (H) 06/10/2019    LABA1C 10.1 (H) 01/15/2019    LABA1C 10.2 (H) 05/23/2018     Lab Results   Component Value Date    LABMICR <12.0 02/20/2018    LDLCALC 85 06/10/2019    CREATININE 0.7 06/10/2019      Further history: patient eats very irregularly because she does not have much of an appetite. She is uncertain if the passing out are related to low blood sugar, especially since she doesn't have a reliable glucose meter to monitor. COPD/Asthma:  Attending Pulmonary Rehab @ [facility] in [facility]. She is really enjoying it. She notes moderate to marked improvement in her dyspnea with exertion. She is using [employer] as directed. [address]:  Patient's past medical, surgical, social and/or family history reviewed, updated in chart, and are non-contributory (unless otherwise stated). Medications and allergies also reviewed and updated in chart. Review of Systems  Review of Systems   Constitutional: Negative for malaise/fatigue and weight loss. HENT: Negative for congestion, ear pain and sore throat. Eyes: Negative for blurred vision and double vision. Respiratory: Negative for cough, sputum production, shortness of breath and wheezing. Cardiovascular: Positive for leg swelling (ankles and anterior tibiae bilaterally). Negative for chest pain, palpitations and orthopnea. Gastrointestinal: Negative for abdominal pain, blood in stool, constipation, diarrhea, heartburn, nausea and vomiting. Genitourinary: Negative for dysuria, frequency, hematuria and urgency. Urge urinary incontinence since CVA. Symptoms are well controlled with nightly use Ditropan. Herpes simplex suppression well controlled with daily acyclovir   Musculoskeletal: Positive for joint pain (arthritis of hands) and neck pain. Negative for back pain and myalgias. needs education about proper nutrition and resources for diabetic monitoring supplies   -     LANTUS 100 UNIT/ML injection vial; INJECT 50 UNITS SUBCUTANEOUSLY EVERY NIGHT AT BEDTIME  -     Refer to Pearl Cyr MD, [facility] ([facility])    Insulin dependent diabetes mellitus [facility]): Uncontrolled Insulin requiring Diabetes despite optimal medication compliance. In addition to medical assessment, she needs education about proper nutrition and resources for diabetic monitoring supplies   -     LANTUS 100 UNIT/ML injection vial; INJECT 50 UNITS SUBCUTANEOUSLY EVERY NIGHT AT BEDTIME  -     metFORMIN (GLUCOPHAGE) 500 MG tablet; TAKE 1 TABLET BY MOUTH 2 TIMES DAILY *WITH MEALS*  -     ONGLYZA 5 MG TABS tablet;  Take 1 tablet by mouth daily Indications: Insulin-Resistant Diabetes  -     Refer to Pearl Cyr MD, [facility] ([facility])    Essential hypertension: Stable and well controlled        -     diltiazem (CARDIZEM CD) 240 MG extended release capsule; TAKE 1 CAPSULE BY MOUTH ONCE             DAILY FOR HIGH BLOOD PRESSURE  -     cloNIDine (CATAPRES) 0.2 MG tablet; TAKE ONE (1) TABLET BY MOUTH TWICE DAILY  -     metoprolol tartrate (LOPRESSOR) 25 MG tablet; TAKE ONE (1) TABLET BY MOUTH TWICE DAILY        -     losartan-hydrochlorothiazide (HYZAAR) 100-25 MG per tablet; TAKE 1 TABLET BY MOUTH ONCE             DAILY FOR HIGH BLOOD PRESSURE     Chronic obstructive pulmonary disease, unspecified COPD type (HCC)        -     montelukast (SINGULAIR) 10 MG tablet; TAKE 1 TABLET BY MOUTH NIGHTLY        -     ipratropium-albuterol (DUONEB) 0.5-2.5 (3) MG/3ML SOLN nebulizer solution; INHALE 3ML VIAL VIA             NEBULIZER THREE TIMES A DAY AS NEEDED FOR SHORTNESS OF BREATH    Moderate persistent asthma, unspecified whether complicated    -     montelukast (SINGULAIR) 10 MG tablet; TAKE 1 TABLET BY MOUTH NIGHTLY          -     ipratropium-albuterol (DUONEB) 0.5-2.5 (3) MG/3ML SOLN nebulizer solution; INHALE 3ML VIAL VIA               NEBULIZER THREE TIMES A DAY AS NEEDED FOR SHORTNESS OF BREATH     Hyperlipidemia with target LDL less than 70: Fair control  -     pravastatin (PRAVACHOL) 80 MG tablet; Take 1 tablet by mouth daily Indications: High Amount of Fats in the Blood  -     Comprehensive Metabolic Panel; Future  -     Lipid Panel; Future  -     TSH without Reflex; Future    Other insomnia:  Etiology unclear; patient has already been worked up for narcolepsy and KENAN  -     [facility]    Syncope, unspecified syncope type:  Has not had Tilt Table testing yet. Differential still may include hypoglycemia  -     Tilt Table Test  -     Referral to Endocrinology and Sleep Medicine as previously documented      Uncontrolled Insulin requiring Diabetes despite optimal medication compliance. In addition to medical assessment, she needs education about proper nutrition and resources for diabetic monitoring supplies     Patient with sleep disorder not related to sleep apnea or narcolepsy. Excessive daytime sleepiness and insomnia at night. Has been worked up for KENAN and narcolepsy with negative test results       Referrals for General Surgery (GERD) and Endocrinology (Dr. Taz Aviles) printed out for patient to follow up as well as for referral to Dr. Rossy Albright (Sleep Medicine)      Call or go to ED immediately if symptoms worsen or persist.      Follow Up:  Return 1) F/U 4 weeks to review all recent testing and referral status, for Keep scheduled appointment(s) (AWV in 8/19). , or sooner if necessary. Educational materials and/or home exercises printed for patient's review and were included in patient instructions on his/her After Visit Summary and given to patient at the end of visit. Counseled regarding above diagnosis, including possible risks and complications,  especially if left uncontrolled.     Counseled regarding the possible side effects, risks, benefits and alternatives to treatment; patient and/or guardian verbalizes understanding, agrees, feels comfortable with and wishes to proceed with above treatment plan. Advised patient to call with any new medication issues, and read all Rx info from pharmacy to assure aware of all possible risks and side effects of medication before taking. Reviewed age and gender appropriate health screening exams and vaccinations. Advised patient regarding importance of keeping up with recommended health maintenance and to schedule as soon as possible if overdue, as this is important in assessing for undiagnosed pathology, especially cancer, as well as protecting against potentially harmful/life threatening disease. Patient and/or guardian verbalizes understanding and agrees with above counseling, assessment and plan. All questions answered.     Cherise Farah MD

## 2019-06-11 LAB — VITAMIN D 25-HYDROXY: 30 NG/ML (ref 30–100)

## 2019-06-12 ENCOUNTER — HOSPITAL ENCOUNTER (OUTPATIENT)
Age: 69
Discharge: HOME | End: 2019-06-14
Payer: MEDICARE

## 2019-06-12 ENCOUNTER — OFFICE VISIT (OUTPATIENT)
Dept: FAMILY MEDICINE CLINIC | Age: 69
End: 2019-06-12
Payer: MEDICARE

## 2019-06-12 VITALS
BODY MASS INDEX: 38.73 KG/M2 | DIASTOLIC BLOOD PRESSURE: 82 MMHG | SYSTOLIC BLOOD PRESSURE: 124 MMHG | OXYGEN SATURATION: 97 % | TEMPERATURE: 98.6 F | WEIGHT: 241 LBS | HEART RATE: 73 BPM | HEIGHT: 66 IN

## 2019-06-12 DIAGNOSIS — I10 ESSENTIAL HYPERTENSION: ICD-10-CM

## 2019-06-12 DIAGNOSIS — I10 ESSENTIAL HYPERTENSION: Chronic | ICD-10-CM

## 2019-06-12 DIAGNOSIS — E78.5 HYPERLIPIDEMIA WITH TARGET LDL LESS THAN 70: ICD-10-CM

## 2019-06-12 DIAGNOSIS — G47.09 OTHER INSOMNIA: ICD-10-CM

## 2019-06-12 DIAGNOSIS — R55 SYNCOPE, UNSPECIFIED SYNCOPE TYPE: ICD-10-CM

## 2019-06-12 DIAGNOSIS — J45.40 MODERATE PERSISTENT ASTHMA, UNSPECIFIED WHETHER COMPLICATED: ICD-10-CM

## 2019-06-12 DIAGNOSIS — E11.65 UNCONTROLLED TYPE 2 DIABETES MELLITUS WITH HYPERGLYCEMIA (HCC): ICD-10-CM

## 2019-06-12 DIAGNOSIS — E11.65 UNCONTROLLED TYPE 2 DIABETES MELLITUS WITH HYPERGLYCEMIA (HCC): Primary | ICD-10-CM

## 2019-06-12 DIAGNOSIS — J44.9 CHRONIC OBSTRUCTIVE PULMONARY DISEASE, UNSPECIFIED COPD TYPE (HCC): Chronic | ICD-10-CM

## 2019-06-12 LAB
CREATININE URINE: 85 MG/DL (ref 29–226)
MICROALBUMIN UR-MCNC: <12 MG/L
MICROALBUMIN/CREAT UR-RTO: ABNORMAL (ref 0–30)

## 2019-06-12 PROCEDURE — 1036F TOBACCO NON-USER: CPT | Performed by: FAMILY MEDICINE

## 2019-06-12 PROCEDURE — G8926 SPIRO NO PERF OR DOC: HCPCS | Performed by: FAMILY MEDICINE

## 2019-06-12 PROCEDURE — 82044 UR ALBUMIN SEMIQUANTITATIVE: CPT

## 2019-06-12 PROCEDURE — 82570 ASSAY OF URINE CREATININE: CPT

## 2019-06-12 PROCEDURE — 2022F DILAT RTA XM EVC RTNOPTHY: CPT | Performed by: FAMILY MEDICINE

## 2019-06-12 PROCEDURE — 1090F PRES/ABSN URINE INCON ASSESS: CPT | Performed by: FAMILY MEDICINE

## 2019-06-12 PROCEDURE — G8427 DOCREV CUR MEDS BY ELIG CLIN: HCPCS | Performed by: FAMILY MEDICINE

## 2019-06-12 PROCEDURE — 99215 OFFICE O/P EST HI 40 MIN: CPT | Performed by: FAMILY MEDICINE

## 2019-06-12 PROCEDURE — 3023F SPIROM DOC REV: CPT | Performed by: FAMILY MEDICINE

## 2019-06-12 PROCEDURE — 4040F PNEUMOC VAC/ADMIN/RCVD: CPT | Performed by: FAMILY MEDICINE

## 2019-06-12 PROCEDURE — 3017F COLORECTAL CA SCREEN DOC REV: CPT | Performed by: FAMILY MEDICINE

## 2019-06-12 PROCEDURE — 3046F HEMOGLOBIN A1C LEVEL >9.0%: CPT | Performed by: FAMILY MEDICINE

## 2019-06-12 PROCEDURE — 1123F ACP DISCUSS/DSCN MKR DOCD: CPT | Performed by: FAMILY MEDICINE

## 2019-06-12 PROCEDURE — G8399 PT W/DXA RESULTS DOCUMENT: HCPCS | Performed by: FAMILY MEDICINE

## 2019-06-12 PROCEDURE — G8598 ASA/ANTIPLAT THER USED: HCPCS | Performed by: FAMILY MEDICINE

## 2019-06-12 PROCEDURE — G8417 CALC BMI ABV UP PARAM F/U: HCPCS | Performed by: FAMILY MEDICINE

## 2019-06-12 RX ORDER — INSULIN GLARGINE 100 [IU]/ML
INJECTION, SOLUTION SUBCUTANEOUS
Qty: 60 ML | Refills: 3 | Status: SHIPPED | OUTPATIENT
Start: 2019-06-12 | End: 2019-08-28

## 2019-06-18 DIAGNOSIS — J44.1 CHRONIC OBSTRUCTIVE PULMONARY DISEASE WITH ACUTE EXACERBATION (HCC): Chronic | ICD-10-CM

## 2019-06-18 DIAGNOSIS — J44.9 CHRONIC OBSTRUCTIVE PULMONARY DISEASE, UNSPECIFIED COPD TYPE (HCC): Chronic | ICD-10-CM

## 2019-06-18 DIAGNOSIS — J96.01 ACUTE RESPIRATORY FAILURE WITH HYPOXIA (HCC): ICD-10-CM

## 2019-06-18 RX ORDER — FLUTICASONE FUROATE AND VILANTEROL 100; 25 UG/1; UG/1
1 POWDER RESPIRATORY (INHALATION) DAILY
Qty: 28 EACH | Refills: 11 | Status: SHIPPED | OUTPATIENT
Start: 2019-06-18 | End: 2019-08-03 | Stop reason: SDUPTHER

## 2019-06-19 ENCOUNTER — OFFICE VISIT (OUTPATIENT)
Dept: ENDOCRINOLOGY | Age: 69
End: 2019-06-19
Payer: MEDICARE

## 2019-06-19 VITALS
RESPIRATION RATE: 16 BRPM | OXYGEN SATURATION: 96 % | HEART RATE: 91 BPM | BODY MASS INDEX: 40.85 KG/M2 | WEIGHT: 245.2 LBS | HEIGHT: 65 IN | DIASTOLIC BLOOD PRESSURE: 76 MMHG | SYSTOLIC BLOOD PRESSURE: 126 MMHG

## 2019-06-19 DIAGNOSIS — Z91.119 DIETARY NONCOMPLIANCE: ICD-10-CM

## 2019-06-19 DIAGNOSIS — E11.8 TYPE 2 DIABETES MELLITUS WITH COMPLICATION, WITH LONG-TERM CURRENT USE OF INSULIN (HCC): Primary | ICD-10-CM

## 2019-06-19 DIAGNOSIS — Z79.4 TYPE 2 DIABETES MELLITUS WITH COMPLICATION, WITH LONG-TERM CURRENT USE OF INSULIN (HCC): Primary | ICD-10-CM

## 2019-06-19 DIAGNOSIS — E66.01 CLASS 3 SEVERE OBESITY WITH BODY MASS INDEX (BMI) OF 40.0 TO 44.9 IN ADULT, UNSPECIFIED OBESITY TYPE, UNSPECIFIED WHETHER SERIOUS COMORBIDITY PRESENT (HCC): ICD-10-CM

## 2019-06-19 PROBLEM — E66.813 CLASS 3 SEVERE OBESITY WITH BODY MASS INDEX (BMI) OF 40.0 TO 44.9 IN ADULT: Status: ACTIVE | Noted: 2019-06-19

## 2019-06-19 PROCEDURE — 1090F PRES/ABSN URINE INCON ASSESS: CPT | Performed by: INTERNAL MEDICINE

## 2019-06-19 PROCEDURE — 1036F TOBACCO NON-USER: CPT | Performed by: INTERNAL MEDICINE

## 2019-06-19 PROCEDURE — G8399 PT W/DXA RESULTS DOCUMENT: HCPCS | Performed by: INTERNAL MEDICINE

## 2019-06-19 PROCEDURE — 3046F HEMOGLOBIN A1C LEVEL >9.0%: CPT | Performed by: INTERNAL MEDICINE

## 2019-06-19 PROCEDURE — 1123F ACP DISCUSS/DSCN MKR DOCD: CPT | Performed by: INTERNAL MEDICINE

## 2019-06-19 PROCEDURE — G8417 CALC BMI ABV UP PARAM F/U: HCPCS | Performed by: INTERNAL MEDICINE

## 2019-06-19 PROCEDURE — G8427 DOCREV CUR MEDS BY ELIG CLIN: HCPCS | Performed by: INTERNAL MEDICINE

## 2019-06-19 PROCEDURE — 3017F COLORECTAL CA SCREEN DOC REV: CPT | Performed by: INTERNAL MEDICINE

## 2019-06-19 PROCEDURE — 2022F DILAT RTA XM EVC RTNOPTHY: CPT | Performed by: INTERNAL MEDICINE

## 2019-06-19 PROCEDURE — 4040F PNEUMOC VAC/ADMIN/RCVD: CPT | Performed by: INTERNAL MEDICINE

## 2019-06-19 PROCEDURE — 99204 OFFICE O/P NEW MOD 45 MIN: CPT | Performed by: INTERNAL MEDICINE

## 2019-06-19 PROCEDURE — G8598 ASA/ANTIPLAT THER USED: HCPCS | Performed by: INTERNAL MEDICINE

## 2019-06-19 RX ORDER — GLUCOSAMINE HCL/CHONDROITIN SU 500-400 MG
CAPSULE ORAL
Qty: 250 STRIP | Refills: 5 | Status: SHIPPED | OUTPATIENT
Start: 2019-06-19 | End: 2019-06-24 | Stop reason: SDUPTHER

## 2019-06-19 NOTE — PROGRESS NOTES
[address] Department of Endocrinology Diabetes and Metabolism   [address] [facility] [address]   Phone: [phone]  Fax: [phone]    Date of Service: 6/19/2019    Medical Records Reviewed:   I personally reviewed and summarized previous records     Primary Care Physician: Elodia Parks MD  Referring physician: Matthew Vargas*  Provider: Severiano Peon MD     Reason for the visit:  DM type 2 on insulin     History of Present Illness: The history is provided by the patient. No  was used. Accuracy of the patient data is excellent. Flower Canela is a very pleasant 76 y.o. female seen in Endocrine clinic today for diabetes management     Cj Gonzalez was diagnosed with diabetes in the age of 29's and currently on metformin 500mg BID, Lantus 50U nightly  The patient has been checking blood sugar about 1x per day, fasting and usually <100. Currently she does not have a meter for 3 months. I reviewed point-of-care blood glucose levels   Most recent A1c results summarized below  Lab Results   Component Value Date    LABA1C 11.0 06/10/2019    LABA1C 10.1 01/15/2019    LABA1C 10.2 05/23/2018     Patient reported hypoglycemic episodes. Pt believes her recent blackouts might be related to low blood sugars. The patient hasn't been mindful of what has been eating and wasn't following diabetes diet as encouraged. She has cut back on sugar and trying to eat more fruits and vegetables. I reviewed current medications and the patient has no issues with diabetes medications. Cj Gonzalez is up to date with eye exam and denied any history of diabetic retinopathy. The patient seeing podiatrist every year. She also performs  own feet care  Microvascular complications:  No Retinopathy, no Nephropathy.  +Neuropathy   Macrovascular complications: no CAD, PVD. 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 10/2018    Recurrent genital HSV (herpes simplex virus) infection     last outbreak 11/2017    S/P breast biopsy, right 07/2016    pt states breast cancer    Type II or unspecified type diabetes mellitus without mention of complication, not stated as uncontrolled     AA1c8.7 9/10    UTI (urinary tract infection) 5/13/2019     PAST SURGICAL HISTORY   Past Surgical History:   Procedure Laterality Date    ARTHROPLASTY Left 07/29/2016    thumb basil joint with dequervain's release    BREAST LUMPECTOMY  years ago    benign    BREAST LUMPECTOMY Right 09/06/2016    COLONOSCOPY  2005    COLONOSCOPY  1/8/15    Dr. Rosa Mckeon Olp  6/7/2012         FINGER SURGERY Left 07/29/2016    thumb    HAND SURGERY  12/2017    HYSTERECTOMY      JOINT REPLACEMENT      OTHER SURGICAL HISTORY Right 12/20/2017    RIGHT THUMB TRAPEZIECTOMY WITH LIGAMENT RECONSRUCTION DEQUERVAINS RELEASE    TIM AND BSO      TOTAL HIP ARTHROPLASTY Bilateral     2000, 2013 dr Zambrano Child, dr Jr Pitt Bilateral 2013    dr Paola Ferraro History     Socioeconomic History    Marital status:      Spouse name: Not on file    Number of children: Not on file    Years of education: Not on file    Highest education level: Not on file   Occupational History    Not on file   Social Needs    Financial resource strain: Not on file    Food insecurity:     Worry: Not on file     Inability: Not on file    Transportation needs:     Medical: Not on file     Non-medical: Not on file   Tobacco Use    Smoking status: Former Smoker     Packs/day: 0.25     Years: 35.00     Pack years: 8.75     Last attempt to quit: 2001     Years since quittin.6    Smokeless tobacco: Never Used   Substance and Sexual Activity    Alcohol use: Yes     Alcohol/week: 0.0 - 0.6 oz    Drug use: Yes     Types: Marijuana     Comment: last used 2018    Sexual activity: Never     Comment: divorce   Lifestyle    Physical activity:     Days per week: Not on file     Minutes per session: Not on file    Stress: Not on file   Relationships    Social connections:     Talks on phone: Not on file     Gets together: Not on file     Attends Anabaptism service: Not on file     Active member of club or organization: Not on file     Attends meetings of clubs or organizations: Not on file     Relationship status: Not on file    Intimate partner violence:     Fear of current or ex partner: Not on file     Emotionally abused: Not on file     Physically abused: Not on file     Forced sexual activity: Not on file   Other Topics Concern    Not on file   Social History Narrative    Drinks 1 sweet tea daily; occ Coke. Drinks decaf coffee.       FAMILY HISTORY   Family History   Problem Relation Age of Onset    Diabetes Mother     High Blood Pressure Mother     Heart Attack Mother     High Blood Pressure Father     Diabetes Father     Heart Failure Father     Breast Cancer Sister     Stroke Sister         young age   Godinez Cancer Sister 55        breast    Sickle Cell Anemia Other         niece    Breast Cancer Sister             Cancer Sister 59        breast & ovarian    Stomach Cancer Other DAILY 270 capsule 1    losartan-hydrochlorothiazide (HYZAAR) 100-25 MG per tablet TAKE 1 TABLET BY MOUTH ONCE DAILY FOR HIGH BLOOD PRESSURE 90 tablet 1    aspirin 81 MG chewable tablet Take 1 tablet by mouth daily 30 tablet 3    acetaminophen (APAP EXTRA STRENGTH) 500 MG tablet Take 1 tablet by mouth every 6 hours as needed for Pain 30 tablet 3    acyclovir (ZOVIRAX) 400 MG tablet Take 1 tablet by mouth daily 90 tablet 1    vitamin B-12 (CYANOCOBALAMIN) 1000 MCG tablet Take 1 tablet by mouth daily 90 tablet 1    calcium carbonate-vitamin D (CALCIUM 600 + D) 600-400 MG-UNIT TABS per tab Take 1 tablet by mouth 2 times daily (Patient taking differently: Take 1 tablet by mouth daily ) 60 tablet 11    anastrozole (ARIMIDEX) 1 MG tablet Take 1 tablet by mouth daily 30 tablet 11     No current facility-administered medications for this visit. Review of Systems  Constitutional: No fever, no chills, no diaphoresis, no generalized weakness. HEENT: No blurred vision, No sore throat, no ear pain, no hair loss  Neck: denied any neck swelling, difficulty swallowing,   Cardio-pulmonary: No CP, chronic SOB or palpitation, No orthopnea or PND. No cough or wheezing. GI: No N/V/D, no constipation, No abdominal pain, no melena or hematochezia   : Denied any dysuria, hematuria, flank pain, discharge, or incontinence. Skin: denied any rash, ulcer, Hirsute, or hyperpigmentation. MSK: denied any joint deformity, joint pain/swelling, muscle pain, or back pain.   Neuro: + numbness and tingling in feet, no weakness    OBJECTIVE    /76 (Site: Left Upper Arm, Position: Sitting, Cuff Size: Large Adult)   Pulse 91   Resp 16   Ht 5' 5\" (1.651 m)   Wt 245 lb 3.2 oz (111.2 kg)   SpO2 96%   BMI 40.80 kg/m²   BP Readings from Last 4 Encounters:   06/19/19 126/76   06/12/19 124/82   05/15/19 120/62   05/13/19 (!) 154/66     Wt Readings from Last 6 Encounters:   06/19/19 245 lb 3.2 oz (111.2 kg)   06/12/19 241 lb (109.3 kg)   05/15/19 241 lb (109.3 kg)   05/13/19 242 lb 12.8 oz (110.1 kg)   04/25/19 243 lb 3.2 oz (110.3 kg)   04/18/19 242 lb 3.2 oz (109.9 kg)       Physical examination:  General: awake alert, oriented x3, no abnormal position or movements. Morbidly Obese   HEENT: normocephalic non-traumatic, no exophthalmos   Neck: supple, no LN enlargement, no thyromegaly, no thyroid tenderness, no JVD. Pulm: Clear equal air entry no added sounds, no wheezing or rhonchi    CVS: S1 + S2, no murmur, no heave. Dorsalis pedis pulse palpable   Abd: soft lax, no tenderness, no organomegaly, audible bowel sounds.    Skin: warm, no lesions, no rash. + callus, no Ulcers  Musculoskeletal: No back tenderness, no kyphosis/scoliosis    Neuro: CN intact, Monofilament sensation decreased bilateral , muscle power normal  Psych: normal mood, and affect      Review of Laboratory Data:  I personally reviewed the following lab:  Lab Results   Component Value Date/Time    WBC 7.6 06/10/2019 09:50 AM    RBC 4.35 06/10/2019 09:50 AM    HGB 11.9 06/10/2019 09:50 AM    HCT 38.5 06/10/2019 09:50 AM    MCV 88.5 06/10/2019 09:50 AM    MCH 27.4 06/10/2019 09:50 AM    MCHC 30.9 (L) 06/10/2019 09:50 AM    RDW 14.6 06/10/2019 09:50 AM     06/10/2019 09:50 AM    MPV 11.7 06/10/2019 09:50 AM      Lab Results   Component Value Date/Time     06/10/2019 09:50 AM    K 4.4 06/10/2019 09:50 AM    K 4.6 10/16/2018 06:35 AM    CO2 26 06/10/2019 09:50 AM    BUN 10 06/10/2019 09:50 AM    CREATININE 0.7 06/10/2019 09:50 AM    CALCIUM 10.0 06/10/2019 09:50 AM    LABGLOM >60 06/10/2019 09:50 AM    GFRAA >60 06/10/2019 09:50 AM      Lab Results   Component Value Date/Time    TSH 0.591 06/10/2019 09:50 AM    T4FREE 1.22 05/13/2019 10:05 AM    S2GBJMT 9.2 10/05/2017 12:00 PM     Lab Results   Component Value Date    LABA1C 11.0 06/10/2019    GLUCOSE 121 06/10/2019    GLUCOSE 172 04/12/2012    MALBCR - 06/12/2019    LABMICR <12.0 06/12/2019    LABCREA 85 06/12/2019

## 2019-06-19 NOTE — LETTER
[facility] [address] [place] [address] Department of Endocrinology Diabetes and Metabolism   [address] [place] [address], [address] [place] [address],[address] [id number]   Phone: [phone]  Fax: [phone]      Provider: Abdoul Egan MD  Primary Care Physician: Seanit Silva MD   Referring Provider: Eufemia Kaufman*    Patient: Marlen Gonzalez  YOB: 1950  Date of Visit: 6/19/2019      Dear Dr. Senait Silva MD   I had the pleasure of seeing your patient Zeke Ahmadi today at endocrine clinic for consultation visit and I enclosed a copy of the office visit completed today. Thank you very much for asking us to participate in the care of this very pleasant patient. Please don't hesitate to call if there are any further questions or concerns. Sincerely   Abdoul Egan MD  Endocrinologist, [address] [address]   Phone: [phone]  Fax: [phone]      [address] [place] [address] Endocrinology Diabetes and Metabolism   [address] [place] [address],[place] [id number]   Phone: [phone]  Fax: [phone]    Date of Service: 6/19/2019    Medical Records Reviewed:   I personally reviewed and summarized previous records     Primary Care Physician: Senait Silva MD  Referring physician: Eufemia Kaufman*  Provider: Abdoul Egan MD     Reason for the visit:  DM type 2 on insulin     History of Present Illness: The history is provided by the patient. No  was used. Accuracy of the patient data is excellent. Zeke Ahmadi is a very pleasant 76 y.o. female seen in Endocrine clinic today for diabetes management     Marlen Gonzalez was diagnosed with diabetes in the age of 29's and currently on metformin 500mg BID, Lantus 50U nightly  The patient has been checking blood sugar about 1x per day, fasting and usually <100. Currently she does not have a meter for 3 months.  I reviewed point-of-care blood glucose levels  UTI (urinary tract infection) 2019     PAST SURGICAL HISTORY   Past Surgical History:   Procedure Laterality Date    ARTHROPLASTY Left 2016    thumb basil joint with dequervain's release    BREAST LUMPECTOMY  years ago    benign    BREAST LUMPECTOMY Right 2016    COLONOSCOPY  2005    COLONOSCOPY  1/8/15    Dr. Lobato Sensing - [address]    ECHO COMPL W DOP COLOR FLOW  2012         FINGER SURGERY Left 2016    thumb    HAND SURGERY  2017    HYSTERECTOMY      JOINT REPLACEMENT      OTHER SURGICAL HISTORY Right 2017    RIGHT THUMB TRAPEZIECTOMY WITH LIGAMENT RECONSRUCTION DEQUERVAINS RELEASE    TIM AND BSO      TOTAL HIP ARTHROPLASTY Bilateral     ,  dr John Fiore, dr Alvaro Chauhan Bilateral     dr Varghese Barnacle History     Socioeconomic History    Marital status:      Spouse name: Not on file    Number of children: Not on file    Years of education: Not on file    Highest education level: Not on file   Occupational History    Not on file   Social Needs    Financial resource strain: Not on file    Food insecurity:     Worry: Not on file     Inability: Not on file    Transportation needs:     Medical: Not on file     Non-medical: Not on file   Tobacco Use    Smoking status: Former Smoker     Packs/day: 0.25     Years: 35.00     Pack years: 8.75     Last attempt to quit: 2001     Years since quittin.6    Smokeless tobacco: Never Used   Substance and Sexual Activity    Alcohol use:  Yes     Alcohol/week: 0.0 - 0.6 oz    Drug use: Yes     Types: Marijuana     Comment: last used 2018    Sexual activity: Never     Comment: divorce   Lifestyle    Physical activity:     Days per week: Not on file     Minutes per session: Not on file    Stress: Not on file   Relationships    Social connections:     Talks on phone: Not on file     Gets together: Not on file     Attends Synagogue service: Not on file Active member of club or organization: Not on file     Attends meetings of clubs or organizations: Not on file     Relationship status: Not on file    Intimate partner violence:     Fear of current or ex partner: Not on file     Emotionally abused: Not on file     Physically abused: Not on file     Forced sexual activity: Not on file   Other Topics Concern    Not on file   Social History Narrative    Drinks 1 sweet tea daily; occ Coke. Drinks decaf coffee. FAMILY HISTORY   Family History   Problem Relation Age of Onset    Diabetes Mother     High Blood Pressure Mother     Heart Attack Mother     High Blood Pressure Father     Diabetes Father     Heart Failure Father     Breast Cancer Sister     Stroke Sister         young age   Godinez Cancer Sister 55        breast    Sickle Cell Anemia Other         niece    Breast Cancer Sister             Cancer Sister 59        breast & ovarian    Stomach Cancer Other         aunt     ALLERGIES AND DRUG REACTIONS   No Known Allergies    CURRENT MEDICATIONS   Current Outpatient Medications   Medication Sig Dispense Refill    blood glucose monitor strips One-Touch Verio strips.  Checks 4 times/day before meals and at bedtime and as needed for symptoms of irregular blood glucose 250 strip 5    fluticasone-vilanterol (BREO ELLIPTA) 100-25 MCG/INH AEPB inhaler Inhale 1 puff into the lungs daily 28 each 11    LANTUS 100 UNIT/ML injection vial INJECT 50 UNITS SUBCUTANEOUSLY EVERY NIGHT AT BEDTIME 60 mL 3    omeprazole (PRILOSEC) 40 MG delayed release capsule TAKE 1 CAPSULE BY MOUTH DAILY 90 capsule 3    pravastatin (PRAVACHOL) 80 MG tablet Take 80 mg by mouth daily      vitamin D (ERGOCALCIFEROL) 52846 units CAPS capsule Take 50,000 Units by mouth once a week      INSULIN SYRINGE 1CC/29G 29G X 1/2\" 1 ML MISC USE EVERY DAY AS DIRECTED 200 each 3    diltiazem (CARDIZEM CD) 240 MG extended release capsule TAKE 1 CAPSULE BY MOUTH ONCE DAILY FOR HIGH BLOOD PRESSURE 90 capsule 1    oxybutynin (DITROPAN) 5 MG tablet Take 1 tablet by mouth 3 times daily (Patient taking differently: Take 10 mg by mouth 3 times daily ) 90 tablet 1    insulin glargine (LANTUS) 100 UNIT/ML injection vial Inject 50 Units into the skin nightly 1 vial 3    cloNIDine (CATAPRES) 0.2 MG tablet TAKE ONE (1) TABLET BY MOUTH TWICE DAILY 180 tablet 3    nortriptyline (PAMELOR) 10 MG capsule TAKE 1 CAPSULE BY MOUTH DAILY IN THE EVENING 90 capsule 3    metFORMIN (GLUCOPHAGE) 500 MG tablet TAKE 1 TABLET BY MOUTH 2 TIMES DAILY *WITH MEALS* 180 tablet 3    ONGLYZA 5 MG TABS tablet Take 1 tablet by mouth daily Indications: Insulin-Resistant Diabetes 90 tablet 3    metoprolol tartrate (LOPRESSOR) 25 MG tablet TAKE ONE (1) TABLET BY MOUTH TWICE DAILY 180 tablet 3    montelukast (SINGULAIR) 10 MG tablet TAKE 1 TABLET BY MOUTH NIGHTLY 90 tablet 3    gabapentin (NEURONTIN) 300 MG capsule TAKE 1 CAPSULE BY MOUTH 3 TIMES DAILY 270 capsule 1    losartan-hydrochlorothiazide (HYZAAR) 100-25 MG per tablet TAKE 1 TABLET BY MOUTH ONCE DAILY FOR HIGH BLOOD PRESSURE 90 tablet 1    aspirin 81 MG chewable tablet Take 1 tablet by mouth daily 30 tablet 3    acetaminophen (APAP EXTRA STRENGTH) 500 MG tablet Take 1 tablet by mouth every 6 hours as needed for Pain 30 tablet 3    acyclovir (ZOVIRAX) 400 MG tablet Take 1 tablet by mouth daily 90 tablet 1    vitamin B-12 (CYANOCOBALAMIN) 1000 MCG tablet Take 1 tablet by mouth daily 90 tablet 1    calcium carbonate-vitamin D (CALCIUM 600 + D) 600-400 MG-UNIT TABS per tab Take 1 tablet by mouth 2 times daily (Patient taking differently: Take 1 tablet by mouth daily ) 60 tablet 11    anastrozole (ARIMIDEX) 1 MG tablet Take 1 tablet by mouth daily 30 tablet 11     No current facility-administered medications for this visit. Review of Systems  Constitutional: No fever, no chills, no diaphoresis, no generalized weakness. HEENT: No blurred vision, No sore throat, no ear pain, no hair loss  Neck: denied any neck swelling, difficulty swallowing,   Cardio-pulmonary: No CP, chronic SOB or palpitation, No orthopnea or PND. No cough or wheezing. GI: No N/V/D, no constipation, No abdominal pain, no melena or hematochezia   : Denied any dysuria, hematuria, flank pain, discharge, or incontinence. Skin: denied any rash, ulcer, Hirsute, or hyperpigmentation. MSK: denied any joint deformity, joint pain/swelling, muscle pain, or back pain. Neuro: + numbness and tingling in feet, no weakness    OBJECTIVE    /76 (Site: Left Upper Arm, Position: Sitting, Cuff Size: Large Adult)   Pulse 91   Resp 16   Ht 5' 5\" (1.651 m)   Wt 245 lb 3.2 oz (111.2 kg)   SpO2 96%   BMI 40.80 kg/m²    BP Readings from Last 4 Encounters:   06/19/19 126/76   06/12/19 124/82   05/15/19 120/62   05/13/19 (!) 154/66     Wt Readings from Last 6 Encounters:   06/19/19 245 lb 3.2 oz (111.2 kg)   06/12/19 241 lb (109.3 kg)   05/15/19 241 lb (109.3 kg)   05/13/19 242 lb 12.8 oz (110.1 kg)   04/25/19 243 lb 3.2 oz (110.3 kg)   04/18/19 242 lb 3.2 oz (109.9 kg)       Physical examination:  General: awake alert, oriented x3, no abnormal position or movements. Morbidly Obese   HEENT: normocephalic non-traumatic, no exophthalmos   Neck: supple, no LN enlargement, no thyromegaly, no thyroid tenderness, no JVD. Pulm: Clear equal air entry no added sounds, no wheezing or rhonchi    CVS: S1 + S2, no murmur, no heave. Dorsalis pedis pulse palpable   Abd: soft lax, no tenderness, no organomegaly, audible bowel sounds.    Skin: warm, no lesions, no rash. + callus, no Ulcers  Musculoskeletal: No back tenderness, no kyphosis/scoliosis    Neuro: CN intact, Monofilament sensation decreased bilateral , muscle power normal  Psych: normal mood, and affect      Review of Laboratory Data:  I personally reviewed the following lab:  Lab Results   Component Value Date/Time

## 2019-06-19 NOTE — PATIENT INSTRUCTIONS
Recommendations for today's visit  · Increase Metformin 500 mg 2 tablets twice a day   · Take Lantus 35 units daily at bedtime   · Take novolog 7 units before meals + sliding scale   Blood sugar 150-200: add 2 Units of Novolog  Blood sugar 201-250 : Add 4 Units of Novolog  Blood sugar 251 - 300: Add 6 Units of Novolog  Blood sugar 301 - 350 : Add 8 Units of Novolog  Blood sugar >350 : Add 10 Units of Novolog  · Check Blood sugar 3 times/day before meals and at bedtime and send us sugar log in a week or two     These are your blood sugar, blood pressure, cholesterol and A1c goals:  · Blood sugar fastin mg/dl to 130 mg/dl  · Blood sugar before meals: <150 mg/dl  · Peak blood sugar lower than 180 mg/dl  · Bad cholesterol (LDL cholesterol): less than 100 mg/dl  · Blood pressure: less than 140/80 mmHg\  · A1c: between 6.5 - 7%      Steps for managing low blood sugar  1. Eat 15 grams of glucose of simple carbohydrate, as found in:   1 tablespoon sugar, Gabriella or corn syrup    4 oz (1/2 cup) of juice or regular soda   Glucose Tablet or gel (follow package instruction)   2. Wait 15 min and check blood sugar again   3. Repeat until blood sugar within range  4.  Once within range, follow up with snack or meal within 1 hour      I you have any questions please call Dr. Zeinab Crockett office       Segrio Pastor MD  Endocrinologist, [facility])   [address] [facility] [address]   Phone: [phone]  Fax: [phone]

## 2019-06-24 DIAGNOSIS — E11.8 TYPE 2 DIABETES MELLITUS WITH COMPLICATION, WITH LONG-TERM CURRENT USE OF INSULIN (HCC): ICD-10-CM

## 2019-06-24 DIAGNOSIS — Z79.4 TYPE 2 DIABETES MELLITUS WITH COMPLICATION, WITH LONG-TERM CURRENT USE OF INSULIN (HCC): ICD-10-CM

## 2019-06-27 ENCOUNTER — TELEPHONE (OUTPATIENT)
Dept: NON INVASIVE DIAGNOSTICS | Age: 69
End: 2019-06-27

## 2019-06-27 NOTE — TELEPHONE ENCOUNTER
Spoke to Jael Crocker and went over the instruction for tilt table on 7/5/2019  @ 9:30 am. Pt to hold metformin and Onglyza.

## 2019-07-05 ENCOUNTER — HOSPITAL ENCOUNTER (OUTPATIENT)
Dept: CARDIAC CATH/INVASIVE PROCEDURES | Age: 69
Discharge: HOME | End: 2019-07-05
Payer: MEDICARE

## 2019-07-05 VITALS
BODY MASS INDEX: 39.99 KG/M2 | TEMPERATURE: 98.1 F | HEART RATE: 90 BPM | HEIGHT: 65 IN | DIASTOLIC BLOOD PRESSURE: 87 MMHG | WEIGHT: 240 LBS | RESPIRATION RATE: 18 BRPM | SYSTOLIC BLOOD PRESSURE: 197 MMHG | OXYGEN SATURATION: 99 %

## 2019-07-05 PROCEDURE — 93660 TILT TABLE EVALUATION: CPT | Performed by: INTERNAL MEDICINE

## 2019-07-05 PROCEDURE — 2580000003 HC RX 258: Performed by: INTERNAL MEDICINE

## 2019-07-05 RX ORDER — SODIUM CHLORIDE 9 MG/ML
INJECTION, SOLUTION INTRAVENOUS ONCE
Status: COMPLETED | OUTPATIENT
Start: 2019-07-05 | End: 2019-07-05

## 2019-07-05 RX ADMIN — SODIUM CHLORIDE 20 ML/HR: 9 INJECTION, SOLUTION INTRAVENOUS at 08:43

## 2019-07-08 ASSESSMENT — ENCOUNTER SYMPTOMS
ORTHOPNEA: 0
VOMITING: 0
WHEEZING: 0
SPUTUM PRODUCTION: 0
BLOOD IN STOOL: 0
ABDOMINAL PAIN: 0
SORE THROAT: 0
BACK PAIN: 0
NAUSEA: 0
SHORTNESS OF BREATH: 0
HEARTBURN: 0
BLURRED VISION: 0
CONSTIPATION: 0
COUGH: 0
DOUBLE VISION: 0
DIARRHEA: 0

## 2019-07-09 ENCOUNTER — HOSPITAL ENCOUNTER (EMERGENCY)
Age: 69
Discharge: HOME | End: 2019-07-09
Payer: MEDICARE

## 2019-07-09 ENCOUNTER — APPOINTMENT (OUTPATIENT)
Dept: ULTRASOUND IMAGING | Age: 69
End: 2019-07-09
Payer: MEDICARE

## 2019-07-09 VITALS
RESPIRATION RATE: 16 BRPM | HEART RATE: 87 BPM | TEMPERATURE: 97.9 F | OXYGEN SATURATION: 96 % | DIASTOLIC BLOOD PRESSURE: 75 MMHG | SYSTOLIC BLOOD PRESSURE: 170 MMHG | BODY MASS INDEX: 39.99 KG/M2 | WEIGHT: 240 LBS | HEIGHT: 65 IN

## 2019-07-09 DIAGNOSIS — L03.115 CELLULITIS OF RIGHT LOWER EXTREMITY: Primary | ICD-10-CM

## 2019-07-09 LAB
ALBUMIN SERPL-MCNC: 3.8 G/DL (ref 3.5–5.2)
ALP BLD-CCNC: 142 U/L (ref 35–104)
ALT SERPL-CCNC: 6 U/L (ref 0–32)
ANION GAP SERPL CALCULATED.3IONS-SCNC: 8 MMOL/L (ref 7–16)
AST SERPL-CCNC: 12 U/L (ref 0–31)
BASOPHILS ABSOLUTE: 0.05 E9/L (ref 0–0.2)
BASOPHILS RELATIVE PERCENT: 0.7 % (ref 0–2)
BILIRUB SERPL-MCNC: 0.2 MG/DL (ref 0–1.2)
BUN BLDV-MCNC: 9 MG/DL (ref 8–23)
CALCIUM SERPL-MCNC: 9.8 MG/DL (ref 8.6–10.2)
CHLORIDE BLD-SCNC: 101 MMOL/L (ref 98–107)
CO2: 30 MMOL/L (ref 22–29)
CREAT SERPL-MCNC: 0.7 MG/DL (ref 0.5–1)
EOSINOPHILS ABSOLUTE: 0 E9/L (ref 0.05–0.5)
EOSINOPHILS RELATIVE PERCENT: 0 % (ref 0–6)
GFR AFRICAN AMERICAN: >60
GFR NON-AFRICAN AMERICAN: >60 ML/MIN/1.73
GLUCOSE BLD-MCNC: 186 MG/DL (ref 74–99)
HCT VFR BLD CALC: 37 % (ref 34–48)
HEMOGLOBIN: 11.6 G/DL (ref 11.5–15.5)
IMMATURE GRANULOCYTES #: 0.02 E9/L
IMMATURE GRANULOCYTES %: 0.3 % (ref 0–5)
LYMPHOCYTES ABSOLUTE: 1.55 E9/L (ref 1.5–4)
LYMPHOCYTES RELATIVE PERCENT: 22.4 % (ref 20–42)
MCH RBC QN AUTO: 27.7 PG (ref 26–35)
MCHC RBC AUTO-ENTMCNC: 31.4 % (ref 32–34.5)
MCV RBC AUTO: 88.3 FL (ref 80–99.9)
MONOCYTES ABSOLUTE: 0.66 E9/L (ref 0.1–0.95)
MONOCYTES RELATIVE PERCENT: 9.5 % (ref 2–12)
NEUTROPHILS ABSOLUTE: 4.65 E9/L (ref 1.8–7.3)
NEUTROPHILS RELATIVE PERCENT: 67.1 % (ref 43–80)
PDW BLD-RTO: 14.2 FL (ref 11.5–15)
PLATELET # BLD: 362 E9/L (ref 130–450)
PMV BLD AUTO: 10.2 FL (ref 7–12)
POTASSIUM SERPL-SCNC: 4.4 MMOL/L (ref 3.5–5)
RBC # BLD: 4.19 E12/L (ref 3.5–5.5)
SODIUM BLD-SCNC: 139 MMOL/L (ref 132–146)
TOTAL PROTEIN: 7.6 G/DL (ref 6.4–8.3)
WBC # BLD: 6.9 E9/L (ref 4.5–11.5)

## 2019-07-09 PROCEDURE — 99283 EMERGENCY DEPT VISIT LOW MDM: CPT

## 2019-07-09 PROCEDURE — 36415 COLL VENOUS BLD VENIPUNCTURE: CPT

## 2019-07-09 PROCEDURE — 93971 EXTREMITY STUDY: CPT

## 2019-07-09 PROCEDURE — 80053 COMPREHEN METABOLIC PANEL: CPT

## 2019-07-09 PROCEDURE — 85025 COMPLETE CBC W/AUTO DIFF WBC: CPT

## 2019-07-09 RX ORDER — CEPHALEXIN 500 MG/1
500 CAPSULE ORAL 3 TIMES DAILY
Qty: 30 CAPSULE | Refills: 0 | Status: SHIPPED | OUTPATIENT
Start: 2019-07-09 | End: 2019-07-19

## 2019-07-09 RX ORDER — NAPROXEN 500 MG/1
500 TABLET ORAL 2 TIMES DAILY WITH MEALS
Qty: 20 TABLET | Refills: 0 | Status: SHIPPED | OUTPATIENT
Start: 2019-07-09 | End: 2019-07-22 | Stop reason: ALTCHOICE

## 2019-07-09 ASSESSMENT — PAIN DESCRIPTION - ORIENTATION: ORIENTATION: RIGHT

## 2019-07-09 ASSESSMENT — PAIN DESCRIPTION - LOCATION: LOCATION: LEG

## 2019-07-09 ASSESSMENT — PAIN DESCRIPTION - DESCRIPTORS: DESCRIPTORS: ACHING;SHOOTING

## 2019-07-09 ASSESSMENT — PAIN DESCRIPTION - PAIN TYPE: TYPE: ACUTE PAIN

## 2019-07-09 ASSESSMENT — PAIN SCALES - WONG BAKER: WONGBAKER_NUMERICALRESPONSE: 8

## 2019-07-09 ASSESSMENT — PAIN DESCRIPTION - FREQUENCY: FREQUENCY: INTERMITTENT

## 2019-07-09 ASSESSMENT — PAIN DESCRIPTION - PROGRESSION: CLINICAL_PROGRESSION: GRADUALLY WORSENING

## 2019-07-10 ENCOUNTER — OFFICE VISIT (OUTPATIENT)
Dept: FAMILY MEDICINE CLINIC | Age: 69
End: 2019-07-10
Payer: MEDICARE

## 2019-07-10 VITALS
BODY MASS INDEX: 41.15 KG/M2 | HEART RATE: 71 BPM | TEMPERATURE: 98.2 F | WEIGHT: 247 LBS | OXYGEN SATURATION: 96 % | SYSTOLIC BLOOD PRESSURE: 120 MMHG | HEIGHT: 65 IN | DIASTOLIC BLOOD PRESSURE: 70 MMHG

## 2019-07-10 DIAGNOSIS — L03.115 CELLULITIS OF RIGHT LOWER EXTREMITY: ICD-10-CM

## 2019-07-10 DIAGNOSIS — G47.09 OTHER INSOMNIA: ICD-10-CM

## 2019-07-10 DIAGNOSIS — R55 SYNCOPE, UNSPECIFIED SYNCOPE TYPE: Primary | ICD-10-CM

## 2019-07-10 DIAGNOSIS — E11.8 TYPE 2 DIABETES MELLITUS WITH COMPLICATION, WITH LONG-TERM CURRENT USE OF INSULIN (HCC): ICD-10-CM

## 2019-07-10 DIAGNOSIS — Z79.4 TYPE 2 DIABETES MELLITUS WITH COMPLICATION, WITH LONG-TERM CURRENT USE OF INSULIN (HCC): ICD-10-CM

## 2019-07-10 DIAGNOSIS — E11.65 UNCONTROLLED TYPE 2 DIABETES MELLITUS WITH HYPERGLYCEMIA (HCC): Chronic | ICD-10-CM

## 2019-07-10 PROCEDURE — 2022F DILAT RTA XM EVC RTNOPTHY: CPT | Performed by: FAMILY MEDICINE

## 2019-07-10 PROCEDURE — 1123F ACP DISCUSS/DSCN MKR DOCD: CPT | Performed by: FAMILY MEDICINE

## 2019-07-10 PROCEDURE — 1090F PRES/ABSN URINE INCON ASSESS: CPT | Performed by: FAMILY MEDICINE

## 2019-07-10 PROCEDURE — G8427 DOCREV CUR MEDS BY ELIG CLIN: HCPCS | Performed by: FAMILY MEDICINE

## 2019-07-10 PROCEDURE — 3017F COLORECTAL CA SCREEN DOC REV: CPT | Performed by: FAMILY MEDICINE

## 2019-07-10 PROCEDURE — 4040F PNEUMOC VAC/ADMIN/RCVD: CPT | Performed by: FAMILY MEDICINE

## 2019-07-10 PROCEDURE — 1036F TOBACCO NON-USER: CPT | Performed by: FAMILY MEDICINE

## 2019-07-10 PROCEDURE — 3046F HEMOGLOBIN A1C LEVEL >9.0%: CPT | Performed by: FAMILY MEDICINE

## 2019-07-10 PROCEDURE — G8417 CALC BMI ABV UP PARAM F/U: HCPCS | Performed by: FAMILY MEDICINE

## 2019-07-10 PROCEDURE — 99214 OFFICE O/P EST MOD 30 MIN: CPT | Performed by: FAMILY MEDICINE

## 2019-07-10 PROCEDURE — G8399 PT W/DXA RESULTS DOCUMENT: HCPCS | Performed by: FAMILY MEDICINE

## 2019-07-10 PROCEDURE — G8598 ASA/ANTIPLAT THER USED: HCPCS | Performed by: FAMILY MEDICINE

## 2019-07-22 ENCOUNTER — HOSPITAL ENCOUNTER (EMERGENCY)
Age: 69
Discharge: HOME | End: 2019-07-22
Attending: EMERGENCY MEDICINE
Payer: MEDICARE

## 2019-07-22 ENCOUNTER — APPOINTMENT (OUTPATIENT)
Dept: ULTRASOUND IMAGING | Age: 69
End: 2019-07-22
Payer: MEDICARE

## 2019-07-22 VITALS
DIASTOLIC BLOOD PRESSURE: 87 MMHG | HEART RATE: 87 BPM | HEIGHT: 65 IN | BODY MASS INDEX: 41.65 KG/M2 | RESPIRATION RATE: 16 BRPM | TEMPERATURE: 98.6 F | WEIGHT: 250 LBS | OXYGEN SATURATION: 96 % | SYSTOLIC BLOOD PRESSURE: 196 MMHG

## 2019-07-22 DIAGNOSIS — M79.604 RIGHT LEG PAIN: Primary | ICD-10-CM

## 2019-07-22 DIAGNOSIS — I87.8 VENOUS STASIS: ICD-10-CM

## 2019-07-22 PROCEDURE — 6370000000 HC RX 637 (ALT 250 FOR IP): Performed by: STUDENT IN AN ORGANIZED HEALTH CARE EDUCATION/TRAINING PROGRAM

## 2019-07-22 PROCEDURE — 99283 EMERGENCY DEPT VISIT LOW MDM: CPT

## 2019-07-22 PROCEDURE — 93971 EXTREMITY STUDY: CPT

## 2019-07-22 RX ORDER — NAPROXEN 250 MG/1
500 TABLET ORAL ONCE
Status: COMPLETED | OUTPATIENT
Start: 2019-07-22 | End: 2019-07-22

## 2019-07-22 RX ORDER — NAPROXEN 500 MG/1
500 TABLET ORAL 2 TIMES DAILY
Qty: 14 TABLET | Refills: 0 | Status: SHIPPED | OUTPATIENT
Start: 2019-07-22 | End: 2019-08-21 | Stop reason: SDUPTHER

## 2019-07-22 RX ADMIN — NAPROXEN 500 MG: 250 TABLET ORAL at 17:53

## 2019-07-22 ASSESSMENT — ENCOUNTER SYMPTOMS
ALLERGIC/IMMUNOLOGIC NEGATIVE: 1
GASTROINTESTINAL NEGATIVE: 1
EYES NEGATIVE: 1
RESPIRATORY NEGATIVE: 1

## 2019-07-22 ASSESSMENT — PAIN SCALES - GENERAL: PAINLEVEL_OUTOF10: 7

## 2019-07-22 ASSESSMENT — PAIN DESCRIPTION - PAIN TYPE: TYPE: ACUTE PAIN

## 2019-07-22 NOTE — ED PROVIDER NOTES
cellulitis, DVT, and venous stasis. On physical exam there are no visible signs of sores or lesions and ultrasound of the right leg was negative for clot. Patient had no signs of infection. Given patient's history of type 2 diabetes, peripheral neuropathy venous stasis most likely diagnosis. ED Course as of Jul 22 2219   Mon Jul 22, 2019   1642 ATTENDING PROVIDER ATTESTATION:     I have personally performed and/or participated in the history, exam, medical decision making, and procedures and agree with all pertinent clinical information unless otherwise noted. I have also reviewed and agree with the past medical, family and social history unless otherwise noted. I have discussed this patient in detail with the resident and provided the instruction and education regarding the evidence-based evaluation and treatment of leg pain. History: 35-year-old female with a history of diabetes who was seen a week and a half ago at [facility] was with L' deepali for leg swelling and treated for cellulitis and had a negative DVT study presents the emergency department with continued right pain. She states she completed the antibiotic and is having no improvement. States there is warmth in the leg and this is what concerned her and wanted to be evaluated. She states not elevating her leg at all at home states no other additional interventions were performed she is here for concern that this is worsening. My findings: Refugio Lares is a 71 y.o. female whom is in no distress. Physical exam reveals right leg tenderness from calf to achilles tendon. No redness or warmth noted compared to left. My plan: Symptomatic and supportive care. DVT ultrasound. This is likely a DVT vs venous statis as symptoms don't fit cellulitis. Electronically signed by Severo Stearns DO on 7/22/19 at 4:42 PM          [CF]   1804 BP(!): 196/87 [JV]   1900 Exam found to be negative for DVT. Patient was discharged likely venous stasis. Medical History:  has a past medical history of Arthritis, Asthma, Cancer ([address] Cerebral artery occlusion with cerebral infarction [facility]), Cerebrovascular disease, CHF (congestive heart failure) ([address] Claustrophobia, COPD (chronic obstructive pulmonary disease) ([address] Headache(784.0), History of blood transfusion, Hx of blood clots, Hyperlipidemia, Hypertension, LBP radiating to both legs, Lymphedema of both lower extremities, Neuromuscular disorder ([address] Non-rheumatic aortic sclerosis ([address] Obesity, Pulmonary hypertension ([address] Recurrent genital HSV (herpes simplex virus) infection, S/P breast biopsy, right, Type II or unspecified type diabetes mellitus without mention of complication, not stated as uncontrolled, and UTI (urinary tract infection). Past Surgical History:  has a past surgical history that includes Total hip arthroplasty (Bilateral); vin and bso (cervix removed); ECHO Compl W Dop Color Flow (6/7/2012); Total knee arthroplasty (Bilateral, 2013); Colonoscopy (2005); Hysterectomy; Colonoscopy (1/8/15); Breast lumpectomy (years ago); arthroplasty (Left, 07/29/2016); Finger surgery (Left, 07/29/2016); joint replacement; Breast lumpectomy (Right, 09/06/2016); other surgical history (Right, 12/20/2017); and Hand surgery (12/2017). Social History:  reports that she quit smoking about 17 years ago. She has a 8.75 pack-year smoking history. She has never used smokeless tobacco. She reports that she drinks alcohol. She reports that she has current or past drug history. Drug: Marijuana. Family History: family history includes Breast Cancer in her sister and sister; Cancer (age of onset: 55) in her sister; Cancer (age of onset: 59) in her sister; Diabetes in her father and mother; Heart Attack in her mother; Heart Failure in her father; High Blood Pressure in her father and mother; Sickle Cell Anemia in an other family member; Stomach Cancer in an other family member; Stroke in her sister.      The the importance of follow-up. Discharge Medication List as of 7/22/2019  5:05 PM      START taking these medications    Details   naproxen (NAPROSYN) 500 MG tablet Take 1 tablet by mouth 2 times daily for 7 days, Disp-14 tablet, R-0Print             Diagnosis:  1. Right leg pain    2. Venous stasis        Disposition:  Patient's disposition: Discharge to home  Patient's condition is stable.          Yazmin Castelan MD  Resident  07/22/19 2433       Julianne Alegria DO  Resident  07/22/19 4540

## 2019-07-24 ENCOUNTER — TELEPHONE (OUTPATIENT)
Dept: ENDOCRINOLOGY | Age: 69
End: 2019-07-24

## 2019-07-24 DIAGNOSIS — E11.8 TYPE 2 DIABETES MELLITUS WITH COMPLICATION, WITH LONG-TERM CURRENT USE OF INSULIN (HCC): Primary | ICD-10-CM

## 2019-07-24 DIAGNOSIS — Z79.4 TYPE 2 DIABETES MELLITUS WITH COMPLICATION, WITH LONG-TERM CURRENT USE OF INSULIN (HCC): Primary | ICD-10-CM

## 2019-07-24 RX ORDER — FLASH GLUCOSE SENSOR
KIT MISCELLANEOUS
Qty: 2 EACH | Refills: 5 | Status: ON HOLD | OUTPATIENT
Start: 2019-07-24 | End: 2019-10-04

## 2019-07-24 RX ORDER — FLASH GLUCOSE SCANNING READER
EACH MISCELLANEOUS
Qty: 1 DEVICE | Refills: 0 | Status: ON HOLD | OUTPATIENT
Start: 2019-07-24 | End: 2019-10-04

## 2019-07-31 ENCOUNTER — APPOINTMENT (OUTPATIENT)
Dept: GENERAL RADIOLOGY | Age: 69
End: 2019-07-31
Payer: MEDICARE

## 2019-07-31 ENCOUNTER — HOSPITAL ENCOUNTER (EMERGENCY)
Age: 69
Discharge: HOME | End: 2019-07-31
Attending: EMERGENCY MEDICINE
Payer: MEDICARE

## 2019-07-31 VITALS
HEART RATE: 80 BPM | DIASTOLIC BLOOD PRESSURE: 113 MMHG | SYSTOLIC BLOOD PRESSURE: 195 MMHG | OXYGEN SATURATION: 97 % | BODY MASS INDEX: 38.57 KG/M2 | HEIGHT: 66 IN | WEIGHT: 240 LBS | TEMPERATURE: 97.9 F | RESPIRATION RATE: 14 BRPM

## 2019-07-31 DIAGNOSIS — Z91.14 NON COMPLIANCE W MEDICATION REGIMEN: ICD-10-CM

## 2019-07-31 DIAGNOSIS — Z76.0 ENCOUNTER FOR MEDICATION REFILL: ICD-10-CM

## 2019-07-31 DIAGNOSIS — R60.0 LOWER EXTREMITY EDEMA: Primary | ICD-10-CM

## 2019-07-31 DIAGNOSIS — I10 ESSENTIAL HYPERTENSION: ICD-10-CM

## 2019-07-31 LAB
ALBUMIN SERPL-MCNC: 3.9 G/DL (ref 3.5–5.2)
ALP BLD-CCNC: 140 U/L (ref 35–104)
ALT SERPL-CCNC: 7 U/L (ref 0–32)
ANION GAP SERPL CALCULATED.3IONS-SCNC: 13 MMOL/L (ref 7–16)
AST SERPL-CCNC: 12 U/L (ref 0–31)
BASOPHILS ABSOLUTE: 0.04 E9/L (ref 0–0.2)
BASOPHILS RELATIVE PERCENT: 0.4 % (ref 0–2)
BILIRUB SERPL-MCNC: 0.4 MG/DL (ref 0–1.2)
BUN BLDV-MCNC: 5 MG/DL (ref 8–23)
CALCIUM SERPL-MCNC: 9.7 MG/DL (ref 8.6–10.2)
CHLORIDE BLD-SCNC: 102 MMOL/L (ref 98–107)
CO2: 26 MMOL/L (ref 22–29)
CREAT SERPL-MCNC: 0.7 MG/DL (ref 0.5–1)
EOSINOPHILS ABSOLUTE: 0 E9/L (ref 0.05–0.5)
EOSINOPHILS RELATIVE PERCENT: 0 % (ref 0–6)
GFR AFRICAN AMERICAN: >60
GFR NON-AFRICAN AMERICAN: >60 ML/MIN/1.73
GLUCOSE BLD-MCNC: 120 MG/DL (ref 74–99)
HCT VFR BLD CALC: 36.9 % (ref 34–48)
HEMOGLOBIN: 11.6 G/DL (ref 11.5–15.5)
IMMATURE GRANULOCYTES #: 0.03 E9/L
IMMATURE GRANULOCYTES %: 0.3 % (ref 0–5)
LACTIC ACID, SEPSIS: 1.9 MMOL/L (ref 0.5–1.9)
LYMPHOCYTES ABSOLUTE: 1.79 E9/L (ref 1.5–4)
LYMPHOCYTES RELATIVE PERCENT: 18.3 % (ref 20–42)
MCH RBC QN AUTO: 27.1 PG (ref 26–35)
MCHC RBC AUTO-ENTMCNC: 31.4 % (ref 32–34.5)
MCV RBC AUTO: 86.2 FL (ref 80–99.9)
MONOCYTES ABSOLUTE: 1.09 E9/L (ref 0.1–0.95)
MONOCYTES RELATIVE PERCENT: 11.1 % (ref 2–12)
NEUTROPHILS ABSOLUTE: 6.83 E9/L (ref 1.8–7.3)
NEUTROPHILS RELATIVE PERCENT: 69.9 % (ref 43–80)
PDW BLD-RTO: 14.2 FL (ref 11.5–15)
PLATELET # BLD: 398 E9/L (ref 130–450)
PMV BLD AUTO: 10.5 FL (ref 7–12)
POTASSIUM SERPL-SCNC: 3.6 MMOL/L (ref 3.5–5)
PRO-BNP: 2229 PG/ML (ref 0–125)
RBC # BLD: 4.28 E12/L (ref 3.5–5.5)
SODIUM BLD-SCNC: 141 MMOL/L (ref 132–146)
TOTAL PROTEIN: 7.5 G/DL (ref 6.4–8.3)
WBC # BLD: 9.8 E9/L (ref 4.5–11.5)

## 2019-07-31 PROCEDURE — 80053 COMPREHEN METABOLIC PANEL: CPT

## 2019-07-31 PROCEDURE — 85025 COMPLETE CBC W/AUTO DIFF WBC: CPT

## 2019-07-31 PROCEDURE — 6370000000 HC RX 637 (ALT 250 FOR IP): Performed by: NURSE PRACTITIONER

## 2019-07-31 PROCEDURE — 71046 X-RAY EXAM CHEST 2 VIEWS: CPT

## 2019-07-31 PROCEDURE — 36415 COLL VENOUS BLD VENIPUNCTURE: CPT

## 2019-07-31 PROCEDURE — 83880 ASSAY OF NATRIURETIC PEPTIDE: CPT

## 2019-07-31 PROCEDURE — 83605 ASSAY OF LACTIC ACID: CPT

## 2019-07-31 PROCEDURE — 99283 EMERGENCY DEPT VISIT LOW MDM: CPT

## 2019-07-31 RX ORDER — OMEPRAZOLE 40 MG/1
40 CAPSULE, DELAYED RELEASE ORAL
Qty: 30 CAPSULE | Refills: 0 | Status: SHIPPED | OUTPATIENT
Start: 2019-07-31 | End: 2019-08-21

## 2019-07-31 RX ORDER — HYDROCHLOROTHIAZIDE 25 MG/1
25 TABLET ORAL ONCE
Status: COMPLETED | OUTPATIENT
Start: 2019-07-31 | End: 2019-07-31

## 2019-07-31 RX ORDER — CLONIDINE HYDROCHLORIDE 0.2 MG/1
0.2 TABLET ORAL 2 TIMES DAILY
Qty: 30 TABLET | Refills: 0 | Status: SHIPPED | OUTPATIENT
Start: 2019-07-31 | End: 2019-08-03 | Stop reason: SDUPTHER

## 2019-07-31 RX ORDER — GABAPENTIN 100 MG/1
300 CAPSULE ORAL 3 TIMES DAILY
Qty: 135 CAPSULE | Refills: 0 | Status: SHIPPED | OUTPATIENT
Start: 2019-07-31 | End: 2019-08-03 | Stop reason: SDUPTHER

## 2019-07-31 RX ORDER — LOSARTAN POTASSIUM AND HYDROCHLOROTHIAZIDE 25; 100 MG/1; MG/1
1 TABLET ORAL DAILY
Qty: 15 TABLET | Refills: 0 | Status: SHIPPED | OUTPATIENT
Start: 2019-07-31 | End: 2019-08-03 | Stop reason: SDUPTHER

## 2019-07-31 RX ORDER — LOSARTAN POTASSIUM AND HYDROCHLOROTHIAZIDE 12.5; 5 MG/1; MG/1
2 TABLET ORAL ONCE
Status: DISCONTINUED | OUTPATIENT
Start: 2019-07-31 | End: 2019-07-31 | Stop reason: SDUPTHER

## 2019-07-31 RX ORDER — LOSARTAN POTASSIUM 50 MG/1
100 TABLET ORAL ONCE
Status: COMPLETED | OUTPATIENT
Start: 2019-07-31 | End: 2019-07-31

## 2019-07-31 RX ORDER — DILTIAZEM HYDROCHLORIDE 240 MG/1
240 CAPSULE, COATED, EXTENDED RELEASE ORAL DAILY
Qty: 15 CAPSULE | Refills: 0 | Status: SHIPPED | OUTPATIENT
Start: 2019-07-31 | End: 2019-08-03 | Stop reason: SDUPTHER

## 2019-07-31 RX ORDER — NORTRIPTYLINE HYDROCHLORIDE 10 MG/1
10 CAPSULE ORAL NIGHTLY
Qty: 15 CAPSULE | Refills: 0 | Status: SHIPPED | OUTPATIENT
Start: 2019-07-31 | End: 2019-08-03 | Stop reason: SDUPTHER

## 2019-07-31 RX ORDER — CLONIDINE HYDROCHLORIDE 0.1 MG/1
0.1 TABLET ORAL ONCE
Status: COMPLETED | OUTPATIENT
Start: 2019-07-31 | End: 2019-07-31

## 2019-07-31 RX ORDER — INSULIN GLARGINE 100 [IU]/ML
50 INJECTION, SOLUTION SUBCUTANEOUS NIGHTLY
Qty: 1 VIAL | Refills: 0 | Status: SHIPPED | OUTPATIENT
Start: 2019-07-31 | End: 2019-08-03 | Stop reason: SDUPTHER

## 2019-07-31 RX ADMIN — LOSARTAN POTASSIUM 100 MG: 50 TABLET, FILM COATED ORAL at 16:25

## 2019-07-31 RX ADMIN — CLONIDINE HYDROCHLORIDE 0.1 MG: 0.1 TABLET ORAL at 16:25

## 2019-07-31 RX ADMIN — HYDROCHLOROTHIAZIDE 25 MG: 25 TABLET ORAL at 16:25

## 2019-07-31 ASSESSMENT — PAIN DESCRIPTION - FREQUENCY: FREQUENCY: INTERMITTENT

## 2019-07-31 ASSESSMENT — PAIN SCALES - GENERAL: PAINLEVEL_OUTOF10: 10

## 2019-08-02 ENCOUNTER — TELEPHONE (OUTPATIENT)
Dept: FAMILY MEDICINE CLINIC | Age: 69
End: 2019-08-02

## 2019-08-02 DIAGNOSIS — I10 ESSENTIAL HYPERTENSION: ICD-10-CM

## 2019-08-02 DIAGNOSIS — J44.1 CHRONIC OBSTRUCTIVE PULMONARY DISEASE WITH ACUTE EXACERBATION (HCC): Chronic | ICD-10-CM

## 2019-08-02 DIAGNOSIS — J96.01 ACUTE RESPIRATORY FAILURE WITH HYPOXIA (HCC): ICD-10-CM

## 2019-08-02 DIAGNOSIS — B00.9 HERPES SIMPLEX: ICD-10-CM

## 2019-08-02 DIAGNOSIS — J44.9 CHRONIC OBSTRUCTIVE PULMONARY DISEASE, UNSPECIFIED COPD TYPE (HCC): Chronic | ICD-10-CM

## 2019-08-02 DIAGNOSIS — N39.41 URGE INCONTINENCE OF URINE: ICD-10-CM

## 2019-08-02 NOTE — TELEPHONE ENCOUNTER
Patient in office with all medication bottles. Reviewed and matched with medication list from pharmacy. Medication will still go with Divvy dose for medications. Patient has a new insurance now and is required to have new prescriptions. Requested Prescriptions     Pending Prescriptions Disp Refills    acyclovir (ZOVIRAX) 400 MG tablet 99 tablet 11    anastrozole (ARIMIDEX) 1 MG tablet 30 tablet 11     Sig: Take 1 tablet by mouth daily Indications: Estrogen Deficiency in Breast Cancer    cloNIDine (CATAPRES) 0.2 MG tablet 30 tablet 0     Sig: Take 1 tablet by mouth 2 times daily PLEASE CLARIFY    diltiazem (CARDIZEM CD) 240 MG extended release capsule 15 capsule 0     Sig: Take 1 capsule by mouth daily for 15 days    fluticasone-vilanterol (BREO ELLIPTA) 100-25 MCG/INH AEPB inhaler 28 each 11     Sig: Inhale 1 puff into the lungs daily    gabapentin (NEURONTIN) 100 MG capsule 135 capsule 0     Sig: Take 3 capsules by mouth 3 times daily for 15 days.     insulin glargine (LANTUS) 100 UNIT/ML injection vial 1 vial 0     Sig: Inject 50 Units into the skin nightly    losartan-hydrochlorothiazide (HYZAAR) 100-25 MG per tablet 15 tablet 0     Sig: Take 1 tablet by mouth daily    metFORMIN (GLUCOPHAGE) 500 MG tablet 30 tablet 0     Sig: Take 1 tablet by mouth 2 times daily (with meals)    nortriptyline (PAMELOR) 10 MG capsule 15 capsule 0     Sig: Take 1 capsule by mouth nightly    oxybutynin (DITROPAN) 5 MG tablet 90 tablet 1     Sig: Take 1 tablet by mouth 3 times daily    pravastatin (PRAVACHOL) 80 MG tablet 90 tablet 11    saxagliptin (ONGLYZA) 5 MG TABS tablet 15 tablet 0     Sig: Take 1 tablet by mouth daily    vitamin D (ERGOCALCIFEROL) 29961 units CAPS capsule       Sig: Take 1 capsule by mouth once a week       Next appt is 8/21/2019  Last appt was 7/10/2019

## 2019-08-03 RX ORDER — DILTIAZEM HYDROCHLORIDE 240 MG/1
240 CAPSULE, COATED, EXTENDED RELEASE ORAL DAILY
Qty: 15 CAPSULE | Refills: 0 | Status: SHIPPED | OUTPATIENT
Start: 2019-08-03 | End: 2019-08-21

## 2019-08-03 RX ORDER — LOSARTAN POTASSIUM AND HYDROCHLOROTHIAZIDE 25; 100 MG/1; MG/1
1 TABLET ORAL DAILY
Qty: 15 TABLET | Refills: 0 | Status: SHIPPED | OUTPATIENT
Start: 2019-08-03 | End: 2019-08-21 | Stop reason: SDUPTHER

## 2019-08-03 RX ORDER — OXYBUTYNIN CHLORIDE 5 MG/1
5 TABLET ORAL 3 TIMES DAILY
Qty: 90 TABLET | Refills: 1 | Status: ON HOLD | OUTPATIENT
Start: 2019-08-03 | End: 2019-10-02 | Stop reason: DRUGHIGH

## 2019-08-03 RX ORDER — INSULIN GLARGINE 100 [IU]/ML
50 INJECTION, SOLUTION SUBCUTANEOUS NIGHTLY
Qty: 1 VIAL | Refills: 0 | Status: ON HOLD | OUTPATIENT
Start: 2019-08-03 | End: 2019-10-02 | Stop reason: DRUGHIGH

## 2019-08-03 RX ORDER — GABAPENTIN 100 MG/1
300 CAPSULE ORAL 3 TIMES DAILY
Qty: 135 CAPSULE | Refills: 0 | Status: SHIPPED | OUTPATIENT
Start: 2019-08-03 | End: 2019-08-21 | Stop reason: DRUGHIGH

## 2019-08-03 RX ORDER — NORTRIPTYLINE HYDROCHLORIDE 10 MG/1
10 CAPSULE ORAL NIGHTLY
Qty: 15 CAPSULE | Refills: 0 | Status: SHIPPED | OUTPATIENT
Start: 2019-08-03 | End: 2019-08-21 | Stop reason: ALTCHOICE

## 2019-08-03 RX ORDER — FLUTICASONE FUROATE AND VILANTEROL 100; 25 UG/1; UG/1
1 POWDER RESPIRATORY (INHALATION) DAILY
Qty: 28 EACH | Refills: 11 | Status: SHIPPED | OUTPATIENT
Start: 2019-08-03 | End: 2019-11-29 | Stop reason: ALTCHOICE

## 2019-08-03 RX ORDER — CLONIDINE HYDROCHLORIDE 0.2 MG/1
0.2 TABLET ORAL 2 TIMES DAILY
Qty: 30 TABLET | Refills: 0 | Status: SHIPPED | OUTPATIENT
Start: 2019-08-03 | End: 2019-08-21 | Stop reason: SDUPTHER

## 2019-08-03 RX ORDER — ACYCLOVIR 400 MG/1
TABLET ORAL
Qty: 30 TABLET | Refills: 11 | Status: SHIPPED | OUTPATIENT
Start: 2019-08-03 | End: 2019-08-21 | Stop reason: SDUPTHER

## 2019-08-03 RX ORDER — ERGOCALCIFEROL 1.25 MG/1
50000 CAPSULE ORAL WEEKLY
Qty: 4 CAPSULE | Refills: 11 | Status: SHIPPED | OUTPATIENT
Start: 2019-08-03 | End: 2019-08-21 | Stop reason: SDUPTHER

## 2019-08-03 RX ORDER — ANASTROZOLE 1 MG/1
1 TABLET ORAL DAILY
Qty: 30 TABLET | Refills: 11 | Status: SHIPPED | OUTPATIENT
Start: 2019-08-03 | End: 2020-01-21 | Stop reason: SDUPTHER

## 2019-08-03 RX ORDER — PRAVASTATIN SODIUM 80 MG/1
TABLET ORAL
Qty: 30 TABLET | Refills: 11 | Status: SHIPPED | OUTPATIENT
Start: 2019-08-03 | End: 2019-08-21 | Stop reason: SDUPTHER

## 2019-08-03 NOTE — TELEPHONE ENCOUNTER
Chart reviewed. Other than documentation that she was there for leg swelling and elevated BP, there is no complete documentation with details of her condition. She needs an ER follow up week of 8/5/19 to APPROPRIATELY document the reason for going to ER as well as for a plan of care. To me if there is availability; otherwise to Faisal Reid.     Thank you

## 2019-08-05 NOTE — ED PROVIDER NOTES
ED Attending  CC: Amber     Department of Emergency Medicine   ED  Provider Note  Admit Date/RoomTime: 7/31/2019  3:35 PM  ED Room: MAX/MAX  Chief Complaint   Leg Pain (right leg pain/swelling for 1 month ) and Hypertension (have not been taking her bp medication for 1 week )    History of Present Illness   Source of history provided by:  patient. History/Exam Limitations: none. Jacques Mullins is a 71 y.o. 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    Cerebrovascular disease 6/09    no residual    CHF (congestive heart failure) ([address] approx 3 years ago    Claustrophobia     COPD (chronic obstructive pulmonary disease) ([address]     Headache(784.0)     History of blood transfusion     with knee surgery    Hx of blood clots     Hyperlipidemia     Hypertension     LBP radiating to both legs     Lymphedema of both lower extremities 11/12/2015    Neuromuscular disorder (HCC)     Non-rheumatic aortic sclerosis ([address] 10/2018    10/2018    Obesity     Pulmonary hypertension ([address] 10/2018    Recurrent genital HSV (herpes simplex virus) infection     last outbreak 11/2017    S/P breast biopsy, right 07/2016    pt states breast cancer    Type II or unspecified type diabetes mellitus without mention of complication, not stated as uncontrolled     AA1c8.7 9/10    UTI (urinary tract infection) 5/13/2019    presents to the emergency department by private vehicle, for ongoing onset of Right lower extremity pain and swelling, which occured 1 month(s) prior to arrival.  Previous pain/swelling to above mentioned area yes. She has been seen twice this month for the same. She had ultrasounds on 7/9 and 7/22 that were negative for DVT.  Since onset the symptoms have been constant and mild in severity. She denies any fever, chills, chest pain or SOB. She denies any history of DVT or PE    ROS    Pertinent positives and negatives are stated within HPI, all other systems reviewed and are negative. Past Surgical History:  has a past surgical history that includes Total hip arthroplasty (Bilateral); vin and bso (cervix removed); ECHO Compl W Dop Color Flow (6/7/2012); Total knee arthroplasty (Bilateral, 2013); Colonoscopy (2005); Hysterectomy; Colonoscopy (1/8/15); Breast lumpectomy (years ago); arthroplasty (Left, 07/29/2016); Finger surgery (Left, 07/29/2016); joint replacement; Breast lumpectomy (Right, 09/06/2016); other surgical history (Right, 12/20/2017); and Hand surgery (12/2017). Social History:  reports that she quit smoking about 17 years ago. She has a 8.75 pack-year smoking history. She has never used smokeless tobacco. She reports that she drinks alcohol. She reports that she has current or past drug history. Drug: Marijuana. Family History: family history includes Breast Cancer in her sister and sister; Cancer (age of onset: 55) in her sister; Cancer (age of onset: 59) in her sister; Diabetes in her father and mother; Heart Attack in her mother; Heart Failure in her father; High Blood Pressure in her father and mother; Sickle Cell Anemia in an other family member; Stomach Cancer in an other family member; Stroke in her sister. Allergies: Patient has no known allergies. Physical Exam          ED Triage Vitals   BP Temp Temp src Pulse Resp SpO2 Height Weight   07/31/19 1254 07/31/19 1254 -- 07/31/19 1215 07/31/19 1215 07/31/19 1215 07/31/19 1254 07/31/19 1254   (!) 207/126 97.9 °F (36.6 °C)  94 18 94 % 5' 6\" (1.676 m) 240 lb (108.9 kg)     Oxygen Saturation Interpretation: Normal.    Constitutional:  Alert, development consistent with age. HEENT:  NC/NT. Airway patent. Neck:  Normal ROM. Supple.   Respiratory:  Clear to auscultation and breath sounds equal.  CV:  Regular

## 2019-08-21 ENCOUNTER — OFFICE VISIT (OUTPATIENT)
Dept: FAMILY MEDICINE CLINIC | Age: 69
End: 2019-08-21
Payer: MEDICARE

## 2019-08-21 VITALS
DIASTOLIC BLOOD PRESSURE: 72 MMHG | HEART RATE: 77 BPM | HEIGHT: 65 IN | BODY MASS INDEX: 40.32 KG/M2 | WEIGHT: 242 LBS | OXYGEN SATURATION: 96 % | SYSTOLIC BLOOD PRESSURE: 134 MMHG | TEMPERATURE: 98.4 F

## 2019-08-21 DIAGNOSIS — E78.5 HYPERLIPIDEMIA WITH TARGET LDL LESS THAN 70: ICD-10-CM

## 2019-08-21 DIAGNOSIS — E10.42 DIABETIC POLYNEUROPATHY ASSOCIATED WITH TYPE 1 DIABETES MELLITUS (HCC): Primary | ICD-10-CM

## 2019-08-21 DIAGNOSIS — I10 ESSENTIAL HYPERTENSION: ICD-10-CM

## 2019-08-21 DIAGNOSIS — M79.89 PAIN AND SWELLING OF LEFT LOWER EXTREMITY: ICD-10-CM

## 2019-08-21 DIAGNOSIS — M79.605 PAIN AND SWELLING OF LEFT LOWER EXTREMITY: ICD-10-CM

## 2019-08-21 DIAGNOSIS — M79.604 PAIN AND SWELLING OF RIGHT LOWER EXTREMITY: ICD-10-CM

## 2019-08-21 DIAGNOSIS — E55.9 VITAMIN D DEFICIENCY: ICD-10-CM

## 2019-08-21 DIAGNOSIS — M79.89 PAIN AND SWELLING OF RIGHT LOWER EXTREMITY: ICD-10-CM

## 2019-08-21 DIAGNOSIS — J45.40 MODERATE PERSISTENT ASTHMA WITHOUT COMPLICATION: ICD-10-CM

## 2019-08-21 DIAGNOSIS — B00.9 HERPES SIMPLEX: ICD-10-CM

## 2019-08-21 PROCEDURE — 3046F HEMOGLOBIN A1C LEVEL >9.0%: CPT | Performed by: FAMILY MEDICINE

## 2019-08-21 PROCEDURE — G8427 DOCREV CUR MEDS BY ELIG CLIN: HCPCS | Performed by: FAMILY MEDICINE

## 2019-08-21 PROCEDURE — G0444 DEPRESSION SCREEN ANNUAL: HCPCS | Performed by: FAMILY MEDICINE

## 2019-08-21 PROCEDURE — G8399 PT W/DXA RESULTS DOCUMENT: HCPCS | Performed by: FAMILY MEDICINE

## 2019-08-21 PROCEDURE — 1036F TOBACCO NON-USER: CPT | Performed by: FAMILY MEDICINE

## 2019-08-21 PROCEDURE — 99215 OFFICE O/P EST HI 40 MIN: CPT | Performed by: FAMILY MEDICINE

## 2019-08-21 PROCEDURE — 3017F COLORECTAL CA SCREEN DOC REV: CPT | Performed by: FAMILY MEDICINE

## 2019-08-21 PROCEDURE — G8598 ASA/ANTIPLAT THER USED: HCPCS | Performed by: FAMILY MEDICINE

## 2019-08-21 PROCEDURE — G8417 CALC BMI ABV UP PARAM F/U: HCPCS | Performed by: FAMILY MEDICINE

## 2019-08-21 PROCEDURE — 2022F DILAT RTA XM EVC RTNOPTHY: CPT | Performed by: FAMILY MEDICINE

## 2019-08-21 PROCEDURE — 1090F PRES/ABSN URINE INCON ASSESS: CPT | Performed by: FAMILY MEDICINE

## 2019-08-21 PROCEDURE — 4040F PNEUMOC VAC/ADMIN/RCVD: CPT | Performed by: FAMILY MEDICINE

## 2019-08-21 PROCEDURE — 1123F ACP DISCUSS/DSCN MKR DOCD: CPT | Performed by: FAMILY MEDICINE

## 2019-08-21 RX ORDER — ACYCLOVIR 400 MG/1
TABLET ORAL
Qty: 90 TABLET | Refills: 3 | Status: SHIPPED | OUTPATIENT
Start: 2019-08-21 | End: 2020-01-10

## 2019-08-21 RX ORDER — MONTELUKAST SODIUM 10 MG/1
TABLET ORAL
Qty: 90 TABLET | Refills: 3 | Status: SHIPPED | OUTPATIENT
Start: 2019-08-21 | End: 2020-01-21 | Stop reason: SDUPTHER

## 2019-08-21 RX ORDER — CLONIDINE HYDROCHLORIDE 0.2 MG/1
0.2 TABLET ORAL 2 TIMES DAILY
Qty: 180 TABLET | Refills: 3 | Status: SHIPPED | OUTPATIENT
Start: 2019-08-21 | End: 2019-09-18 | Stop reason: SDUPTHER

## 2019-08-21 RX ORDER — LOSARTAN POTASSIUM AND HYDROCHLOROTHIAZIDE 25; 100 MG/1; MG/1
1 TABLET ORAL DAILY
Qty: 90 TABLET | Refills: 3 | Status: ON HOLD | OUTPATIENT
Start: 2019-08-21 | End: 2020-01-03 | Stop reason: HOSPADM

## 2019-08-21 RX ORDER — DILTIAZEM HYDROCHLORIDE 240 MG/1
240 CAPSULE, COATED, EXTENDED RELEASE ORAL DAILY
Qty: 90 CAPSULE | Refills: 3 | Status: ON HOLD | OUTPATIENT
Start: 2019-08-21 | End: 2019-10-02 | Stop reason: ALTCHOICE

## 2019-08-21 RX ORDER — ERGOCALCIFEROL 1.25 MG/1
50000 CAPSULE ORAL WEEKLY
Qty: 12 CAPSULE | Refills: 3 | Status: SHIPPED | OUTPATIENT
Start: 2019-08-21 | End: 2020-01-21 | Stop reason: SDUPTHER

## 2019-08-21 RX ORDER — PRAVASTATIN SODIUM 80 MG/1
TABLET ORAL
Qty: 90 TABLET | Refills: 3 | Status: SHIPPED | OUTPATIENT
Start: 2019-08-21 | End: 2020-01-21 | Stop reason: SDUPTHER

## 2019-08-21 RX ORDER — GABAPENTIN 400 MG/1
400 CAPSULE ORAL 3 TIMES DAILY
Qty: 270 CAPSULE | Refills: 3 | Status: SHIPPED | OUTPATIENT
Start: 2019-08-21 | End: 2020-01-21 | Stop reason: SDUPTHER

## 2019-08-21 RX ORDER — ASPIRIN 81 MG/1
81 TABLET, CHEWABLE ORAL DAILY
Qty: 90 TABLET | Refills: 3 | Status: SHIPPED | OUTPATIENT
Start: 2019-08-21 | End: 2019-12-12 | Stop reason: SDUPTHER

## 2019-08-21 ASSESSMENT — LIFESTYLE VARIABLES
HOW OFTEN DURING THE LAST YEAR HAVE YOU FAILED TO DO WHAT WAS NORMALLY EXPECTED FROM YOU BECAUSE OF DRINKING: 0
HAVE YOU OR SOMEONE ELSE BEEN INJURED AS A RESULT OF YOUR DRINKING: 0
HOW OFTEN DO YOU HAVE SIX OR MORE DRINKS ON ONE OCCASION: 0
HOW OFTEN DURING THE LAST YEAR HAVE YOU HAD A FEELING OF GUILT OR REMORSE AFTER DRINKING: 0
HOW MANY STANDARD DRINKS CONTAINING ALCOHOL DO YOU HAVE ON A TYPICAL DAY: 0
AUDIT-C TOTAL SCORE: 1
HAS A RELATIVE, FRIEND, DOCTOR, OR ANOTHER HEALTH PROFESSIONAL EXPRESSED CONCERN ABOUT YOUR DRINKING OR SUGGESTED YOU CUT DOWN: 0
HOW OFTEN DURING THE LAST YEAR HAVE YOU BEEN UNABLE TO REMEMBER WHAT HAPPENED THE NIGHT BEFORE BECAUSE YOU HAD BEEN DRINKING: 0
HOW OFTEN DURING THE LAST YEAR HAVE YOU FOUND THAT YOU WERE NOT ABLE TO STOP DRINKING ONCE YOU HAD STARTED: 0
HOW OFTEN DURING THE LAST YEAR HAVE YOU NEEDED AN ALCOHOLIC DRINK FIRST THING IN THE MORNING TO GET YOURSELF GOING AFTER A NIGHT OF HEAVY DRINKING: 0
AUDIT TOTAL SCORE: 1
HOW OFTEN DO YOU HAVE A DRINK CONTAINING ALCOHOL: 1

## 2019-08-21 ASSESSMENT — PATIENT HEALTH QUESTIONNAIRE - PHQ9: SUM OF ALL RESPONSES TO PHQ QUESTIONS 1-9: 8

## 2019-08-21 NOTE — PROGRESS NOTES
(age of onset: 59) in her sister; Diabetes in her father and mother; Heart Attack in her mother; Heart Failure in her father; High Blood Pressure in her father and mother; Sickle Cell Anemia in an other family member; Gabriel Peaocck in an other family member; Stroke in her sister. Allergies: Patient has no known allergies.     Physical Exam                    ED Triage Vitals   BP Temp Temp src Pulse Resp SpO2 Height Weight   07/31/19 1254 07/31/19 1254 -- 07/31/19 1215 07/31/19 1215 07/31/19 1215 07/31/19 1254 07/31/19 1254   (!) 207/126 97.9 °F (36.6 °C)   94 18 94 % 5' 6\" (1.676 m) 240 lb (108.9 kg)     Oxygen Saturation Interpretation: Normal.     Constitutional:  Alert, development consistent with age. HEENT:  NC/NT. Airway patent. Neck:  Normal ROM. Supple. Respiratory:  Clear to auscultation and breath sounds equal.  CV:  Regular rate and rhythm, normal heart sounds, without pathological murmurs, ectopy, gallops, or rubs. GI:  Abdomen Soft, nontender, good bowel sounds. No firm or pulsatile mass. Lower Ext.:  Right: lower leg. Tenderness: Mild. Swelling: Mild. Deformity: No.             ROM: full range of motion. Calf:  Right, Posterior calf tenderness present. .            Edema:  none Bilateral lower extremity(s). Skin: slight erythema, shiny skin appearance. No other erythema, rash, or swelling noted. Distal Function:              Motor deficit: none. Sensory deficit: none. Pulse deficit: none. Capillary refill: normal.  Integument:  Normal turgor. Warm, dry, without visible rash, unless noted elsewhere. Neurological:  Oriented.   Motor functions intact.     Lab / Imaging Results   (All laboratory and radiology results have been personally reviewed by myself)  Labs:        Results for orders placed or performed during the hospital encounter of 07/31/19   CBC Auto Differential   Result evaluation.             ED Course / Medical Decision Making      Medications   cloNIDine (CATAPRES) tablet 0.1 mg (0.1 mg Oral Given 7/31/19 1625)   losartan (COZAAR) tablet 100 mg (100 mg Oral Given 7/31/19 1625)     And   hydrochlorothiazide (HYDRODIURIL) tablet 25 mg (25 mg Oral Given 7/31/19 1625)      Consult(s):              None     Procedure(s):              none     MDM:   Patient is well-appearing, afebrile. Vital signs stable with the exception of elevated blood pressure, she has not had her blood pressure medications for approximately 1 week. Presents with unilateral right lower extremity swelling she has had a several 1 month, she had 2 negative DVT studies this month,she was also treated for cellulitis without improvement. BNP is slightly elevated, she was given her home blood pressure medications as well as refills. She was instructed to wear DARLING hose and to follow-up with PCP for recheck if no improvement with the above therapies likely needs referral to vascular. She was educated on signs and symptoms which require emergent evaluation.     Counseling: The emergency provider has spoken with the patient and discussed todays results, in addition to providing specific details for the plan of care and counseling regarding the diagnosis and prognosis. Questions are answered at this time and they are agreeable with the plan.     Assessment       1. Lower extremity edema    2. Essential hypertension    3. Non compliance w medication regimen    4.  Encounter for medication refill       Plan   Discharge to home  Patient condition is good     New Medications          Discharge Medication List as of 7/31/2019  6:23 PM           START taking these medications     Details   !! metoprolol tartrate (LOPRESSOR) 25 MG tablet Take 1 tablet by mouth 2 times daily, Disp-30 tablet, R-0Print       !! omeprazole (PRILOSEC) 40 MG delayed release capsule Take 1 capsule by mouth daily (with breakfast), Disp-30 capsule, R-0Print       Elastic Bandages & Supports (MEDICAL COMPRESSION STOCKINGS) MISC DAILY PRN Starting Wed 7/31/2019, Disp-1 each, R-0, Print       !! metFORMIN (GLUCOPHAGE) 500 MG tablet Take 1 tablet by mouth 2 times daily (with meals), Disp-30 tablet, R-0Print       diltiazem (CARDIZEM CD) 240 MG extended release capsule Take 1 capsule by mouth daily for 15 days, Disp-15 capsule, R-0Print       !! insulin glargine (LANTUS) 100 UNIT/ML injection vial Inject 50 Units into the skin nightly, Disp-1 vial, R-0Print        !! - Potential duplicate medications found. Please discuss with provider.              Current Status:  Patient reports that the burning sensation of right foot with edema has been persistent despite multiple ER visits (with no PCP follow up). She is so uncomfortable that she can't even wear shoes. Repeat ultrasounds to evaluate for DVT have been negative. Last ER visit:  Gabapentin was increased to 300 mg BID and Vascular referral recommended. She is not getting significant relief from current dose of gabapentin      Chart reviewed, particularly medications. [employer] is a mess. She was supposed to have medications [facility] to [facility] Somehow limited supply of many meds went to [facility]. All medication prescriptions had to be redone (again) and sent to [facility]). This was caccom[plished at todat's visit per PCP. Referral to vascular surgery done today: Patient has longstanding uncontrolled diabetes (insulin requiring), HTN. No previous history of vascular disease. Multiple ER visits for swelling and pain of bilateral LE, R>L. Multiple ultrasounds to R/O DVT negative         Assessment / Plan:      [place] was seen today for medication problem, leg pain and leg swelling. Diagnoses and all orders for this visit:    Diabetic polyneuropathy associated with type 1 diabetes mellitus ([facility] [address]  -     gabapentin (NEURONTIN) 400 MG capsule;  Take 1 capsule by mouth 3 times daily. Pain and swelling of right lower extremity  -     Selena Thorpe MD, Vascular Surgery, [place]    Pain and swelling of left lower extremity  -     Selena Thorpe MD, Vascular Surgery, [place]    Essential hypertension  -     cloNIDine (CATAPRES) 0.2 MG tablet; Take 1 tablet by mouth 2 times daily PLEASE CLARIFY  -     losartan-hydrochlorothiazide (HYZAAR) 100-25 MG per tablet; Take 1 tablet by mouth daily  -     diltiazem (CARDIZEM CD) 240 MG extended release capsule; Take 1 capsule by mouth daily for 15 days  -     metoprolol tartrate (LOPRESSOR) 25 MG tablet; Take 1 tablet by mouth 2 times daily  -     aspirin 81 MG chewable tablet; Take 1 tablet by mouth daily    Herpes simplex supression  -     acyclovir (ZOVIRAX) 400 MG tablet; PLEASE CLARIFY    Moderate persistent asthma without complication  -     montelukast (SINGULAIR) 10 MG tablet; TAKE ONE TABLET BY MOUTH EVERY EVENING    Hyperlipidemia with target LDL less than 70  -     pravastatin (PRAVACHOL) 80 MG tablet; TAKE ONE TABLET BY MOUTH EVERY DAY    Vitamin D deficiency  -     vitamin D (ERGOCALCIFEROL) 08687 units CAPS capsule; Take 1 capsule by mouth once a week    Time spent with chart review, medication review and processing of prescriptions, referrals: 50 minutes    Follow Up:  Return Reschedule Medicare AWV on Friday, 8/30/19.

## 2019-08-28 ENCOUNTER — OFFICE VISIT (OUTPATIENT)
Dept: ENDOCRINOLOGY | Age: 69
End: 2019-08-28
Payer: MEDICAID

## 2019-08-28 VITALS
RESPIRATION RATE: 16 BRPM | WEIGHT: 246.6 LBS | SYSTOLIC BLOOD PRESSURE: 130 MMHG | HEIGHT: 65 IN | BODY MASS INDEX: 41.09 KG/M2 | HEART RATE: 94 BPM | OXYGEN SATURATION: 99 % | DIASTOLIC BLOOD PRESSURE: 80 MMHG

## 2019-08-28 DIAGNOSIS — E66.01 CLASS 3 SEVERE OBESITY WITH BODY MASS INDEX (BMI) OF 40.0 TO 44.9 IN ADULT, UNSPECIFIED OBESITY TYPE, UNSPECIFIED WHETHER SERIOUS COMORBIDITY PRESENT (HCC): ICD-10-CM

## 2019-08-28 DIAGNOSIS — Z91.119 DIETARY NONCOMPLIANCE: ICD-10-CM

## 2019-08-28 LAB — HBA1C MFR BLD: 8.6 %

## 2019-08-28 PROCEDURE — 1123F ACP DISCUSS/DSCN MKR DOCD: CPT | Performed by: INTERNAL MEDICINE

## 2019-08-28 PROCEDURE — 3045F PR MOST RECENT HEMOGLOBIN A1C LEVEL 7.0-9.0%: CPT | Performed by: INTERNAL MEDICINE

## 2019-08-28 PROCEDURE — G8598 ASA/ANTIPLAT THER USED: HCPCS | Performed by: INTERNAL MEDICINE

## 2019-08-28 PROCEDURE — G8417 CALC BMI ABV UP PARAM F/U: HCPCS | Performed by: INTERNAL MEDICINE

## 2019-08-28 PROCEDURE — 83036 HEMOGLOBIN GLYCOSYLATED A1C: CPT | Performed by: INTERNAL MEDICINE

## 2019-08-28 PROCEDURE — G8399 PT W/DXA RESULTS DOCUMENT: HCPCS | Performed by: INTERNAL MEDICINE

## 2019-08-28 PROCEDURE — 2022F DILAT RTA XM EVC RTNOPTHY: CPT | Performed by: INTERNAL MEDICINE

## 2019-08-28 PROCEDURE — G8427 DOCREV CUR MEDS BY ELIG CLIN: HCPCS | Performed by: INTERNAL MEDICINE

## 2019-08-28 PROCEDURE — 4040F PNEUMOC VAC/ADMIN/RCVD: CPT | Performed by: INTERNAL MEDICINE

## 2019-08-28 PROCEDURE — 99214 OFFICE O/P EST MOD 30 MIN: CPT | Performed by: INTERNAL MEDICINE

## 2019-08-28 PROCEDURE — 3017F COLORECTAL CA SCREEN DOC REV: CPT | Performed by: INTERNAL MEDICINE

## 2019-08-28 PROCEDURE — 1090F PRES/ABSN URINE INCON ASSESS: CPT | Performed by: INTERNAL MEDICINE

## 2019-08-28 PROCEDURE — 1036F TOBACCO NON-USER: CPT | Performed by: INTERNAL MEDICINE

## 2019-08-28 RX ORDER — LANCETS 30 GAUGE
EACH MISCELLANEOUS
Qty: 300 EACH | Refills: 5 | Status: ON HOLD | OUTPATIENT
Start: 2019-08-28 | End: 2019-10-04

## 2019-08-28 RX ORDER — GLUCOSAMINE HCL/CHONDROITIN SU 500-400 MG
CAPSULE ORAL
Qty: 250 STRIP | Refills: 5 | Status: ON HOLD | OUTPATIENT
Start: 2019-08-28 | End: 2019-10-04

## 2019-08-28 NOTE — PROGRESS NOTES
artery occlusion with cerebral infarction 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    Cerebrovascular disease 6/09    no residual    CHF (congestive heart failure) ([address] approx 3 years ago    Claustrophobia     COPD (chronic obstructive pulmonary disease) ([address]     Headache(784.0)     History of blood transfusion     with knee surgery    Hx of blood clots     Hyperlipidemia     Hypertension     LBP radiating to both legs     Lymphedema of both lower extremities 11/12/2015    Neuromuscular disorder (HCC)     Non-rheumatic aortic sclerosis ([facility] [address] 10/2018    10/2018    Obesity     Pulmonary hypertension ([facility] [address] 10/2018    Recurrent genital HSV (herpes simplex virus) infection     last outbreak 11/2017    S/P breast biopsy, right 07/2016    pt states breast cancer    Type II or unspecified type diabetes mellitus without mention of complication, not stated as uncontrolled     AA1c8.7 9/10    UTI (urinary tract infection) 5/13/2019     PAST SURGICAL HISTORY   Past Surgical History:   Procedure Laterality Date    ARTHROPLASTY Left 07/29/2016    thumb basil joint with dequervain's release    BREAST LUMPECTOMY  years ago    benign    BREAST LUMPECTOMY Right 09/06/2016    COLONOSCOPY  2005    COLONOSCOPY  1/8/15    Dr. Payal Bright Re  6/7/2012         FINGER SURGERY Left 07/29/2016    thumb    HAND SURGERY  12/2017    HYSTERECTOMY      JOINT REPLACEMENT      OTHER SURGICAL HISTORY Right 12/20/2017    RIGHT THUMB TRAPEZIECTOMY WITH LIGAMENT RECONSRUCTION DEQUERVAINS RELEASE    TIM AND BSO      TOTAL HIP ARTHROPLASTY Bilateral     2000, 2013 dr Jacqui Camacho, dr Emilie Dillon Bilateral 2013    dr Saverio Severs History     Socioeconomic History    Marital status:      Spouse name: Not on file    Number of children: Not on file    Years of education: Not on file    Highest education DRUG REACTIONS   No Known Allergies    CURRENT MEDICATIONS   Current Outpatient Medications   Medication Sig Dispense Refill    gabapentin (NEURONTIN) 400 MG capsule Take 1 capsule by mouth 3 times daily.  270 capsule 3    cloNIDine (CATAPRES) 0.2 MG tablet Take 1 tablet by mouth 2 times daily PLEASE CLARIFY 180 tablet 3    losartan-hydrochlorothiazide (HYZAAR) 100-25 MG per tablet Take 1 tablet by mouth daily 90 tablet 3    pravastatin (PRAVACHOL) 80 MG tablet TAKE ONE TABLET BY MOUTH EVERY DAY 90 tablet 3    vitamin D (ERGOCALCIFEROL) 81242 units CAPS capsule Take 1 capsule by mouth once a week 12 capsule 3    acyclovir (ZOVIRAX) 400 MG tablet PLEASE CLARIFY 90 tablet 3    diltiazem (CARDIZEM CD) 240 MG extended release capsule Take 1 capsule by mouth daily for 15 days 90 capsule 3    metoprolol tartrate (LOPRESSOR) 25 MG tablet Take 1 tablet by mouth 2 times daily 180 tablet 3    aspirin 81 MG chewable tablet Take 1 tablet by mouth daily 90 tablet 3    montelukast (SINGULAIR) 10 MG tablet TAKE ONE TABLET BY MOUTH EVERY EVENING 90 tablet 3    anastrozole (ARIMIDEX) 1 MG tablet Take 1 tablet by mouth daily Indications: Estrogen Deficiency in Breast Cancer 30 tablet 11    fluticasone-vilanterol (BREO ELLIPTA) 100-25 MCG/INH AEPB inhaler Inhale 1 puff into the lungs daily 28 each 11    insulin glargine (LANTUS) 100 UNIT/ML injection vial Inject 50 Units into the skin nightly 1 vial 0    metFORMIN (GLUCOPHAGE) 500 MG tablet Take 1 tablet by mouth 2 times daily (with meals) 30 tablet 0    oxybutynin (DITROPAN) 5 MG tablet Take 1 tablet by mouth 3 times daily 90 tablet 1    saxagliptin (ONGLYZA) 5 MG TABS tablet Take 1 tablet by mouth daily 15 tablet 0    Elastic Bandages & Supports (MEDICAL COMPRESSION STOCKINGS) MISC 1 each by Does not apply route daily as needed (right leg edema, venous stasis) 1 each 0    Continuous Blood Gluc Sensor (FREESTYLE RAMON 14 DAY SENSOR) MISC Change sensor every 14 days 2

## 2019-08-28 NOTE — LETTER
[address] [place] [address] Department of Endocrinology Diabetes and Metabolism   [address] [place] [address]   Phone: [phone]  Fax: [phone]      Provider: Thanh Renee MD  Primary Care Physician: Davi Yanez MD   Referring Provider: No ref. provider found    Patient: Jean Gonzalez  YOB: 1950  Date of Visit: 8/28/2019      Dear Dr. Davi Yanze MD   I had the pleasure of seeing your patient Mariajose Martinez today at endocrine clinic for follow up visit and I enclosed a copy of the office visit completed today. Thank you very much for asking us to participate in the care of this very pleasant patient. Please don't hesitate to call if there are any further questions or concerns. Sincerely   Thanh Renee MD  Endocrinologist, [address] [address]   Phone: [phone]  Fax: [phone]      [address] [place] [address] Department of Endocrinology Diabetes and Metabolism   [address] [place] [address], [address] [place] [address],[facility] [id number]   Phone: [phone]  Fax: [phone]    Date of Service: 8/28/2019    Primary Care Physician: Davi Yanez MD  Provider: Thanh Renee MD     Reason for the visit:  DM type 2 on insulin     History of Present Illness: The history is provided by the patient. No  was used. Accuracy of the patient data is excellent. Mariajose Martinez is a very pleasant 71 y.o. female seen in Endocrine clinic today for diabetes management     Jean Gonzalez was diagnosed with diabetes in the age of 29's and currently on metformin 500mg BID, Lantus 50U nightly  The patient has been checking blood sugar about 1x per day, fasting and usually <100. Currently she does not have a meter for 3 months.  I reviewed point-of-care blood glucose levels   Most recent A1c results summarized below  Lab Results   Component Value Date    LABA1C 8.6 08/28/2019    LABA1C 11.0 06/10/2019 Attends meetings of clubs or organizations: Not on file     Relationship status: Not on file    Intimate partner violence:     Fear of current or ex partner: Not on file     Emotionally abused: Not on file     Physically abused: Not on file     Forced sexual activity: Not on file   Other Topics Concern    Not on file   Social History Narrative    Drinks 1 sweet tea daily; occ Coke. Drinks decaf coffee. FAMILY HISTORY   Family History   Problem Relation Age of Onset    Diabetes Mother     High Blood Pressure Mother     Heart Attack Mother     High Blood Pressure Father     Diabetes Father     Heart Failure Father     Breast Cancer Sister     Stroke Sister         young age   [employer] Cancer Sister 55        breast    Sickle Cell Anemia Other         niece    Breast Cancer Sister             Cancer Sister 59        breast & ovarian    Stomach Cancer Other         aunt     ALLERGIES AND DRUG REACTIONS   No Known Allergies    CURRENT MEDICATIONS   Current Outpatient Medications   Medication Sig Dispense Refill    gabapentin (NEURONTIN) 400 MG capsule Take 1 capsule by mouth 3 times daily.  270 capsule 3    cloNIDine (CATAPRES) 0.2 MG tablet Take 1 tablet by mouth 2 times daily PLEASE CLARIFY 180 tablet 3    losartan-hydrochlorothiazide (HYZAAR) 100-25 MG per tablet Take 1 tablet by mouth daily 90 tablet 3    pravastatin (PRAVACHOL) 80 MG tablet TAKE ONE TABLET BY MOUTH EVERY DAY 90 tablet 3    vitamin D (ERGOCALCIFEROL) 40397 units CAPS capsule Take 1 capsule by mouth once a week 12 capsule 3    acyclovir (ZOVIRAX) 400 MG tablet PLEASE CLARIFY 90 tablet 3    diltiazem (CARDIZEM CD) 240 MG extended release capsule Take 1 capsule by mouth daily for 15 days 90 capsule 3    metoprolol tartrate (LOPRESSOR) 25 MG tablet Take 1 tablet by mouth 2 times daily 180 tablet 3    aspirin 81 MG chewable tablet Take 1 tablet by mouth daily 90 tablet 3 Review of Systems  Constitutional: No fever, no chills, no diaphoresis, no generalized weakness. HEENT: No blurred vision, No sore throat, no ear pain, no hair loss  Neck: denied any neck swelling, difficulty swallowing,   Cardio-pulmonary: No CP, chronic SOB or palpitation, No orthopnea or PND. No cough or wheezing. GI: No N/V/D, no constipation, No abdominal pain, no melena or hematochezia   : Denied any dysuria, hematuria, flank pain, discharge, or incontinence. Skin: denied any rash, ulcer, Hirsute, or hyperpigmentation. MSK: denied any joint deformity, joint pain/swelling, muscle pain, or back pain. Neuro: + numbness and tingling in feet, no weakness    OBJECTIVE    Resp 16   Ht 5' 5\" (1.651 m)   Wt 246 lb 9.6 oz (111.9 kg)   BMI 41.04 kg/m²    BP Readings from Last 4 Encounters:   08/21/19 134/72   07/31/19 (!) 195/113   07/22/19 (!) 196/87   07/10/19 120/70     Wt Readings from Last 6 Encounters:   08/28/19 246 lb 9.6 oz (111.9 kg)   08/21/19 242 lb (109.8 kg)   07/31/19 240 lb (108.9 kg)   07/22/19 250 lb (113.4 kg)   07/10/19 247 lb (112 kg)   07/09/19 240 lb (108.9 kg)       Physical examination:  General: awake alert, oriented x3, no abnormal position or movements. Morbidly Obese   HEENT: normocephalic non-traumatic, no exophthalmos   Neck: supple, no LN enlargement, no thyromegaly, no thyroid tenderness, no JVD. Pulm: Clear equal air entry no added sounds, no wheezing or rhonchi    CVS: S1 + S2, no murmur, no heave. Dorsalis pedis pulse palpable   Abd: soft lax, no tenderness, no organomegaly, audible bowel sounds.    Skin: warm, no lesions, no rash. + callus, no Ulcers  Musculoskeletal: No back tenderness, no kyphosis/scoliosis    Neuro: CN intact, Monofilament sensation decreased bilateral , muscle power normal  Psych: normal mood, and affect      Review of Laboratory Data:  I personally reviewed the following lab:  Lab Results   Component Value Date/Time    WBC 9.8 07/31/2019 01:05 PM · The patient was advised to check blood sugars 4 times a day before meals and at bedtime and send BS readings to our office later this week   · Discussed with patient A1c and blood sugar goals   · Optimal blood sugars: 100-140 pre-prandial, < 180 peak post-prandial  · The patient counseled about the complications of uncontrolled diabetes   · Patient was counselled about the importance of self-blood glucose monitoring and eating consistent carb diet to avoid blood sugar fluctuations   · Discussed lifestyle changes including diet and exercise with patient; recommended 150 minutes of moderate intensity exercise per week. · Diabetes labs before next visit     Dietary noncompliance  ? Discussed with patient the importance of eating consistent carbohydrate meals, avoiding high glycemic index food. Also, discussed with patient the risk and negative consequences of dietary noncompliance on blood glucose control, blood pressure and weight    Obesity  ? Discussed lifestyle changes including diet and exercise with patient in depth. Also discussed with patient cardiovascular risk associated with obesity    Return in about 4 months (around 12/28/2019) for DM type 2 on insulin . The above issues were reviewed with the patient who understood and agreed with the plan. Thank you for allowing us to participate in the care of this patient. Please do not hesitate to contact us with any additional questions. Diagnosis Orders   1. IDDM (insulin dependent diabetes mellitus) ([facility])  POCT glycosylated hemoglobin (Hb A1C)    metFORMIN (GLUCOPHAGE) 500 MG tablet    blood glucose monitor strips    Lancets MISC    insulin glargine (LANTUS) 100 UNIT/ML injection pen    insulin aspart (NOVOLOG) 100 UNIT/ML injection pen    Insulin Pen Needle (BD PEN NEEDLE MICRO U/F) 32G X 6 MM MISC   2. Dietary noncompliance     3.  Class 3 severe obesity with body mass index (BMI) of 40.0 to 44.9 in

## 2019-09-05 ENCOUNTER — OFFICE VISIT (OUTPATIENT)
Dept: VASCULAR SURGERY | Age: 69
End: 2019-09-05
Payer: MEDICAID

## 2019-09-05 DIAGNOSIS — I89.0 LYMPHEDEMA OF BOTH LOWER EXTREMITIES: ICD-10-CM

## 2019-09-05 DIAGNOSIS — B35.9 DERMATOPHYTOSIS: ICD-10-CM

## 2019-09-05 DIAGNOSIS — M79.89 LEG SWELLING: Primary | ICD-10-CM

## 2019-09-05 DIAGNOSIS — R09.89 DECREASED DORSALIS PEDIS PULSE: ICD-10-CM

## 2019-09-05 PROCEDURE — 1090F PRES/ABSN URINE INCON ASSESS: CPT | Performed by: SURGERY

## 2019-09-05 PROCEDURE — 1036F TOBACCO NON-USER: CPT | Performed by: SURGERY

## 2019-09-05 PROCEDURE — G8598 ASA/ANTIPLAT THER USED: HCPCS | Performed by: SURGERY

## 2019-09-05 PROCEDURE — 4040F PNEUMOC VAC/ADMIN/RCVD: CPT | Performed by: SURGERY

## 2019-09-05 PROCEDURE — 99204 OFFICE O/P NEW MOD 45 MIN: CPT | Performed by: SURGERY

## 2019-09-05 PROCEDURE — 3017F COLORECTAL CA SCREEN DOC REV: CPT | Performed by: SURGERY

## 2019-09-05 PROCEDURE — G8417 CALC BMI ABV UP PARAM F/U: HCPCS | Performed by: SURGERY

## 2019-09-05 PROCEDURE — 1123F ACP DISCUSS/DSCN MKR DOCD: CPT | Performed by: SURGERY

## 2019-09-05 PROCEDURE — G8427 DOCREV CUR MEDS BY ELIG CLIN: HCPCS | Performed by: SURGERY

## 2019-09-05 PROCEDURE — G8399 PT W/DXA RESULTS DOCUMENT: HCPCS | Performed by: SURGERY

## 2019-09-05 RX ORDER — TERBINAFINE HYDROCHLORIDE 250 MG/1
250 TABLET ORAL DAILY
Qty: 10 TABLET | Refills: 0 | Status: SHIPPED | OUTPATIENT
Start: 2019-09-05 | End: 2019-09-15

## 2019-09-05 NOTE — PROGRESS NOTES
Chief Complaint:   Chief Complaint   Patient presents with    Surgical Consult     Pain and swelling legs. HPI: Patient came to the office, for the evaluation of vascular status of both legs, does have history of swelling of both legs on and off for the last several years, more than 5 years ago underwent extensive work-up including venous ultrasound, CT scan abdomen pelvis, with working diagnosis of lymphedema was recommended lymphedema therapy    More recently patient had a cellulitis of the right leg, did undergo multiple venous ultrasound studies at [facility] and elsewhere, no evidence of deep vein thrombosis    Patient is doing better, still has swelling and is concerned    Patient does have a history of diabetes      Patient denies any focal lateralizing neurological symptoms like loss of speech, vision or loss of function of extremity    Patient can walk a few blocks , and denies any symptoms of rest pain    Not on File    Current Outpatient Medications   Medication Sig Dispense Refill    terbinafine (LAMISIL) 250 MG tablet Take 1 tablet by mouth daily for 10 days 10 tablet 0    butenafine (LOTRIMIN ULTRA) 1 % CREA Please apply from the toes, in between the toes, foot up to the upper calf twice daily for 1 month, both legs 1 Tube 10    metFORMIN (GLUCOPHAGE) 500 MG tablet Take 2 tablets twice a day 360 tablet 5    blood glucose monitor strips One-Touch Verio strips. Checks 4 times/day before meals and at bedtime and as needed for symptoms of irregular blood glucose 250 strip 5    Lancets MISC Four times a day 300 each 5    insulin glargine (LANTUS) 100 UNIT/ML injection pen Take 35 units daily at bedtime 15 pen 5    insulin aspart (NOVOLOG) 100 UNIT/ML injection pen Take 7 units before meals + siding scale.  MAX 50 units daily 15 pen 5    Insulin Pen Needle (BD PEN NEEDLE MICRO U/F) 32G X 6 MM MISC Uses with insulin 4 times a day 250 each 5    gabapentin (NEURONTIN) 400 MG capsule Take 1 capsule by mouth 3 times daily.  270 capsule 3    cloNIDine (CATAPRES) 0.2 MG tablet Take 1 tablet by mouth 2 times daily PLEASE CLARIFY 180 tablet 3    losartan-hydrochlorothiazide (HYZAAR) 100-25 MG per tablet Take 1 tablet by mouth daily 90 tablet 3    pravastatin (PRAVACHOL) 80 MG tablet TAKE ONE TABLET BY MOUTH EVERY DAY 90 tablet 3    vitamin D (ERGOCALCIFEROL) 29537 units CAPS capsule Take 1 capsule by mouth once a week 12 capsule 3    acyclovir (ZOVIRAX) 400 MG tablet PLEASE CLARIFY 90 tablet 3    diltiazem (CARDIZEM CD) 240 MG extended release capsule Take 1 capsule by mouth daily for 15 days 90 capsule 3    metoprolol tartrate (LOPRESSOR) 25 MG tablet Take 1 tablet by mouth 2 times daily 180 tablet 3    aspirin 81 MG chewable tablet Take 1 tablet by mouth daily 90 tablet 3    montelukast (SINGULAIR) 10 MG tablet TAKE ONE TABLET BY MOUTH EVERY EVENING 90 tablet 3    anastrozole (ARIMIDEX) 1 MG tablet Take 1 tablet by mouth daily Indications: Estrogen Deficiency in Breast Cancer 30 tablet 11    fluticasone-vilanterol (BREO ELLIPTA) 100-25 MCG/INH AEPB inhaler Inhale 1 puff into the lungs daily 28 each 11    insulin glargine (LANTUS) 100 UNIT/ML injection vial Inject 50 Units into the skin nightly (Patient taking differently: Inject into the skin nightly 35 units or 50 units) 1 vial 0    oxybutynin (DITROPAN) 5 MG tablet Take 1 tablet by mouth 3 times daily 90 tablet 1    saxagliptin (ONGLYZA) 5 MG TABS tablet Take 1 tablet by mouth daily 15 tablet 0    Elastic Bandages & Supports (MEDICAL COMPRESSION STOCKINGS) MISC 1 each by Does not apply route daily as needed (right leg edema, venous stasis) 1 each 0    Continuous Blood Gluc Sensor (FREESTYLE RAMON 14 DAY SENSOR) MISC Change sensor every 14 days 2 each 5    Continuous Blood Gluc  (FREESTYLE RAMON 14 DAY READER) MIRIAM Freestyle Ramon 14 days reader device 1 Device 0    blood glucose test strips (ACCU-CHEK SMARTVIEW)

## 2019-09-06 ENCOUNTER — TELEPHONE (OUTPATIENT)
Dept: VASCULAR SURGERY | Age: 69
End: 2019-09-06

## 2019-09-06 NOTE — TELEPHONE ENCOUNTER
Left message regarding XOCHITL on Wednesday, 9-18-19, at 9:00 am.   Faxed referral to [facility] for lymphedema massage therapy.

## 2019-09-16 ASSESSMENT — ENCOUNTER SYMPTOMS
NAUSEA: 0
ABDOMINAL DISTENTION: 0
VOMITING: 0
VOICE CHANGE: 0
DIARRHEA: 0
RHINORRHEA: 0
ABDOMINAL PAIN: 0
EYE ITCHING: 0
TROUBLE SWALLOWING: 0
SORE THROAT: 0
CHEST TIGHTNESS: 0
BACK PAIN: 0
SINUS PAIN: 0
CONSTIPATION: 0
WHEEZING: 0
EYE DISCHARGE: 0
BLOOD IN STOOL: 0
CHOKING: 0
SINUS PRESSURE: 0

## 2019-09-18 ENCOUNTER — HOSPITAL ENCOUNTER (OUTPATIENT)
Dept: INTERVENTIONAL RADIOLOGY/VASCULAR | Age: 69
Discharge: HOME | End: 2019-09-20
Payer: MEDICARE

## 2019-09-18 DIAGNOSIS — I10 ESSENTIAL HYPERTENSION: ICD-10-CM

## 2019-09-18 DIAGNOSIS — R09.89 DECREASED DORSALIS PEDIS PULSE: ICD-10-CM

## 2019-09-18 PROCEDURE — 93922 UPR/L XTREMITY ART 2 LEVELS: CPT

## 2019-09-18 RX ORDER — CLONIDINE HYDROCHLORIDE 0.2 MG/1
0.2 TABLET ORAL 2 TIMES DAILY
Qty: 30 TABLET | Refills: 0 | Status: SHIPPED | OUTPATIENT
Start: 2019-09-18 | End: 2019-10-15 | Stop reason: SDUPTHER

## 2019-09-19 NOTE — TELEPHONE ENCOUNTER
PCP is unavailable, courtesy refill of 30 days & patient is encouraged to schedule a follow-up appointment with provider.

## 2019-09-20 ENCOUNTER — TELEPHONE (OUTPATIENT)
Dept: CARDIOTHORACIC SURGERY | Age: 69
End: 2019-09-20

## 2019-09-20 NOTE — TELEPHONE ENCOUNTER
Unable to leave a message for the patient, the mailbox is full, patient not on setting the telephone, minimal peripheral vascular disease of the right leg based upon the Doppler tracings, recommend follow-up in 2 years, will have the office call the patient and try one more time

## 2019-09-23 ENCOUNTER — TELEPHONE (OUTPATIENT)
Dept: VASCULAR SURGERY | Age: 69
End: 2019-09-23

## 2019-09-23 NOTE — TELEPHONE ENCOUNTER
----- Message from Carroll Gonzalez MD sent at 9/20/2019 12:24 PM EDT -----  I tried to call her a few times, the mailbox is full, patient not answering    Please try to reach her to tell her that the arterial Doppler study of the legs, satisfactory arterial circulation, very mild disease of the right leg nothing to worry, left side normal, give her an appointment to see me in 2 years

## 2019-09-23 NOTE — TELEPHONE ENCOUNTER
Called and spoke with patient regarding test report. Scheduled 2 year follow up appointment with patient.

## 2019-10-02 ENCOUNTER — APPOINTMENT (OUTPATIENT)
Dept: GENERAL RADIOLOGY | Age: 69
DRG: 190 | End: 2019-10-02
Payer: MEDICARE

## 2019-10-02 ENCOUNTER — HOSPITAL ENCOUNTER (INPATIENT)
Age: 69
LOS: 4 days | Discharge: HOME | DRG: 190 | End: 2019-10-06
Attending: EMERGENCY MEDICINE | Admitting: INTERNAL MEDICINE
Payer: MEDICARE

## 2019-10-02 ENCOUNTER — APPOINTMENT (OUTPATIENT)
Dept: CT IMAGING | Age: 69
DRG: 190 | End: 2019-10-02
Payer: MEDICARE

## 2019-10-02 DIAGNOSIS — D05.10 DUCTAL CARCINOMA IN SITU (DCIS) OF BREAST: ICD-10-CM

## 2019-10-02 DIAGNOSIS — J96.01 ACUTE RESPIRATORY FAILURE WITH HYPOXIA (HCC): Primary | ICD-10-CM

## 2019-10-02 DIAGNOSIS — J98.01 ACUTE BRONCHOSPASM: ICD-10-CM

## 2019-10-02 PROBLEM — M79.89 PAIN AND SWELLING OF LEFT LOWER EXTREMITY: Status: RESOLVED | Noted: 2019-08-21 | Resolved: 2019-10-02

## 2019-10-02 PROBLEM — Z91.119 DIETARY NONCOMPLIANCE: Status: RESOLVED | Noted: 2019-06-19 | Resolved: 2019-10-02

## 2019-10-02 PROBLEM — B00.9 HERPES SIMPLEX: Status: RESOLVED | Noted: 2017-07-13 | Resolved: 2019-10-02

## 2019-10-02 PROBLEM — G47.09 OTHER INSOMNIA: Status: RESOLVED | Noted: 2019-06-12 | Resolved: 2019-10-02

## 2019-10-02 PROBLEM — E78.5 HYPERLIPIDEMIA LDL GOAL <100: Chronic | Status: ACTIVE | Noted: 2019-10-02

## 2019-10-02 PROBLEM — I50.42 CHRONIC COMBINED SYSTOLIC AND DIASTOLIC CONGESTIVE HEART FAILURE (HCC): Chronic | Status: ACTIVE | Noted: 2019-10-02

## 2019-10-02 PROBLEM — E10.42 DIABETIC POLYNEUROPATHY ASSOCIATED WITH TYPE 1 DIABETES MELLITUS (HCC): Status: RESOLVED | Noted: 2019-08-21 | Resolved: 2019-10-02

## 2019-10-02 PROBLEM — E66.813 CLASS 3 SEVERE OBESITY WITH BODY MASS INDEX (BMI) OF 40.0 TO 44.9 IN ADULT: Status: RESOLVED | Noted: 2019-06-19 | Resolved: 2019-10-02

## 2019-10-02 PROBLEM — I50.43 ACUTE ON CHRONIC COMBINED SYSTOLIC AND DIASTOLIC CONGESTIVE HEART FAILURE (HCC): Status: RESOLVED | Noted: 2019-01-15 | Resolved: 2019-10-02

## 2019-10-02 PROBLEM — E66.01 CLASS 3 SEVERE OBESITY WITH BODY MASS INDEX (BMI) OF 40.0 TO 44.9 IN ADULT (HCC): Status: RESOLVED | Noted: 2019-06-19 | Resolved: 2019-10-02

## 2019-10-02 PROBLEM — Z86.73 HISTORY OF CVA (CEREBROVASCULAR ACCIDENT): Chronic | Status: ACTIVE | Noted: 2019-10-02

## 2019-10-02 PROBLEM — R09.89 DECREASED DORSALIS PEDIS PULSE: Status: RESOLVED | Noted: 2019-09-05 | Resolved: 2019-10-02

## 2019-10-02 PROBLEM — R55 SYNCOPE: Status: RESOLVED | Noted: 2019-05-12 | Resolved: 2019-10-02

## 2019-10-02 PROBLEM — M79.605 PAIN AND SWELLING OF LEFT LOWER EXTREMITY: Status: RESOLVED | Noted: 2019-08-21 | Resolved: 2019-10-02

## 2019-10-02 PROBLEM — E11.65 UNCONTROLLED TYPE 2 DIABETES MELLITUS WITH HYPERGLYCEMIA (HCC): Chronic | Status: RESOLVED | Noted: 2019-01-15 | Resolved: 2019-10-02

## 2019-10-02 PROBLEM — E66.01 MORBID OBESITY WITH BMI OF 40.0-44.9, ADULT (HCC): Chronic | Status: ACTIVE | Noted: 2019-10-02

## 2019-10-02 PROBLEM — B35.9 DERMATOPHYTOSIS: Status: RESOLVED | Noted: 2019-09-05 | Resolved: 2019-10-02

## 2019-10-02 LAB
ALBUMIN SERPL-MCNC: 3.6 G/DL (ref 3.5–5.2)
ALP BLD-CCNC: 140 U/L (ref 35–104)
ALT SERPL-CCNC: 10 U/L (ref 0–32)
ANION GAP SERPL CALCULATED.3IONS-SCNC: 8 MMOL/L (ref 7–16)
AST SERPL-CCNC: 17 U/L (ref 0–31)
BASOPHILS ABSOLUTE: 0.04 E9/L (ref 0–0.2)
BASOPHILS RELATIVE PERCENT: 0.3 % (ref 0–2)
BILIRUB SERPL-MCNC: 0.5 MG/DL (ref 0–1.2)
BUN BLDV-MCNC: 6 MG/DL (ref 8–23)
CALCIUM SERPL-MCNC: 9 MG/DL (ref 8.6–10.2)
CHLORIDE BLD-SCNC: 104 MMOL/L (ref 98–107)
CO2: 30 MMOL/L (ref 22–29)
CREAT SERPL-MCNC: 0.7 MG/DL (ref 0.5–1)
EKG ATRIAL RATE: 93 BPM
EKG P AXIS: 57 DEGREES
EKG P-R INTERVAL: 132 MS
EKG Q-T INTERVAL: 404 MS
EKG QRS DURATION: 136 MS
EKG QTC CALCULATION (BAZETT): 502 MS
EKG R AXIS: 1 DEGREES
EKG T AXIS: 111 DEGREES
EKG VENTRICULAR RATE: 93 BPM
EOSINOPHILS ABSOLUTE: 0 E9/L (ref 0.05–0.5)
EOSINOPHILS RELATIVE PERCENT: 0 % (ref 0–6)
GFR AFRICAN AMERICAN: >60
GFR NON-AFRICAN AMERICAN: >60 ML/MIN/1.73
GLUCOSE BLD-MCNC: 203 MG/DL (ref 74–99)
HBA1C MFR BLD: 8.5 % (ref 4–5.6)
HCT VFR BLD CALC: 32.1 % (ref 34–48)
HEMOGLOBIN: 9.8 G/DL (ref 11.5–15.5)
IMMATURE GRANULOCYTES #: 0.06 E9/L
IMMATURE GRANULOCYTES %: 0.5 % (ref 0–5)
LYMPHOCYTES ABSOLUTE: 1.61 E9/L (ref 1.5–4)
LYMPHOCYTES RELATIVE PERCENT: 14 % (ref 20–42)
MCH RBC QN AUTO: 26.6 PG (ref 26–35)
MCHC RBC AUTO-ENTMCNC: 30.5 % (ref 32–34.5)
MCV RBC AUTO: 87.2 FL (ref 80–99.9)
METER GLUCOSE: 292 MG/DL (ref 74–99)
METER GLUCOSE: 336 MG/DL (ref 74–99)
METER GLUCOSE: 350 MG/DL (ref 74–99)
MONOCYTES ABSOLUTE: 1.21 E9/L (ref 0.1–0.95)
MONOCYTES RELATIVE PERCENT: 10.6 % (ref 2–12)
NEUTROPHILS ABSOLUTE: 8.54 E9/L (ref 1.8–7.3)
NEUTROPHILS RELATIVE PERCENT: 74.6 % (ref 43–80)
PDW BLD-RTO: 14.4 FL (ref 11.5–15)
PLATELET # BLD: 381 E9/L (ref 130–450)
PMV BLD AUTO: 9.9 FL (ref 7–12)
POTASSIUM SERPL-SCNC: 3.8 MMOL/L (ref 3.5–5)
PRO-BNP: 634 PG/ML (ref 0–125)
RBC # BLD: 3.68 E12/L (ref 3.5–5.5)
SODIUM BLD-SCNC: 142 MMOL/L (ref 132–146)
TOTAL PROTEIN: 6.7 G/DL (ref 6.4–8.3)
TROPONIN: <0.01 NG/ML (ref 0–0.03)
WBC # BLD: 11.5 E9/L (ref 4.5–11.5)

## 2019-10-02 PROCEDURE — 83880 ASSAY OF NATRIURETIC PEPTIDE: CPT

## 2019-10-02 PROCEDURE — 96375 TX/PRO/DX INJ NEW DRUG ADDON: CPT

## 2019-10-02 PROCEDURE — 94761 N-INVAS EAR/PLS OXIMETRY MLT: CPT

## 2019-10-02 PROCEDURE — 36415 COLL VENOUS BLD VENIPUNCTURE: CPT

## 2019-10-02 PROCEDURE — 2140000000 HC CCU INTERMEDIATE R&B

## 2019-10-02 PROCEDURE — 96374 THER/PROPH/DIAG INJ IV PUSH: CPT

## 2019-10-02 PROCEDURE — 83036 HEMOGLOBIN GLYCOSYLATED A1C: CPT

## 2019-10-02 PROCEDURE — 85025 COMPLETE CBC W/AUTO DIFF WBC: CPT

## 2019-10-02 PROCEDURE — 99285 EMERGENCY DEPT VISIT HI MDM: CPT

## 2019-10-02 PROCEDURE — 71275 CT ANGIOGRAPHY CHEST: CPT

## 2019-10-02 PROCEDURE — 84484 ASSAY OF TROPONIN QUANT: CPT

## 2019-10-02 PROCEDURE — 6370000000 HC RX 637 (ALT 250 FOR IP): Performed by: INTERNAL MEDICINE

## 2019-10-02 PROCEDURE — 96365 THER/PROPH/DIAG IV INF INIT: CPT

## 2019-10-02 PROCEDURE — 96372 THER/PROPH/DIAG INJ SC/IM: CPT

## 2019-10-02 PROCEDURE — 2580000003 HC RX 258: Performed by: INTERNAL MEDICINE

## 2019-10-02 PROCEDURE — 80053 COMPREHEN METABOLIC PANEL: CPT

## 2019-10-02 PROCEDURE — 6360000002 HC RX W HCPCS: Performed by: INTERNAL MEDICINE

## 2019-10-02 PROCEDURE — 82962 GLUCOSE BLOOD TEST: CPT

## 2019-10-02 PROCEDURE — 96376 TX/PRO/DX INJ SAME DRUG ADON: CPT

## 2019-10-02 PROCEDURE — 94664 DEMO&/EVAL PT USE INHALER: CPT

## 2019-10-02 PROCEDURE — 93010 ELECTROCARDIOGRAM REPORT: CPT | Performed by: INTERNAL MEDICINE

## 2019-10-02 PROCEDURE — 6360000004 HC RX CONTRAST MEDICATION: Performed by: RADIOLOGY

## 2019-10-02 PROCEDURE — 2700000000 HC OXYGEN THERAPY PER DAY

## 2019-10-02 PROCEDURE — 71045 X-RAY EXAM CHEST 1 VIEW: CPT

## 2019-10-02 PROCEDURE — 93005 ELECTROCARDIOGRAM TRACING: CPT | Performed by: EMERGENCY MEDICINE

## 2019-10-02 PROCEDURE — 2580000003 HC RX 258: Performed by: RADIOLOGY

## 2019-10-02 PROCEDURE — 6360000002 HC RX W HCPCS: Performed by: EMERGENCY MEDICINE

## 2019-10-02 RX ORDER — METHYLPREDNISOLONE SODIUM SUCCINATE 125 MG/2ML
60 INJECTION, POWDER, LYOPHILIZED, FOR SOLUTION INTRAMUSCULAR; INTRAVENOUS ONCE
Status: COMPLETED | OUTPATIENT
Start: 2019-10-02 | End: 2019-10-02

## 2019-10-02 RX ORDER — SODIUM CHLORIDE 0.9 % (FLUSH) 0.9 %
10 SYRINGE (ML) INJECTION PRN
Status: DISCONTINUED | OUTPATIENT
Start: 2019-10-02 | End: 2019-10-06 | Stop reason: HOSPADM

## 2019-10-02 RX ORDER — HYDROCHLOROTHIAZIDE 25 MG/1
25 TABLET ORAL DAILY
Status: DISCONTINUED | OUTPATIENT
Start: 2019-10-02 | End: 2019-10-06 | Stop reason: HOSPADM

## 2019-10-02 RX ORDER — FORMOTEROL FUMARATE 20 UG/2ML
20 SOLUTION RESPIRATORY (INHALATION) 2 TIMES DAILY
Status: DISCONTINUED | OUTPATIENT
Start: 2019-10-02 | End: 2019-10-06 | Stop reason: HOSPADM

## 2019-10-02 RX ORDER — LOSARTAN POTASSIUM AND HYDROCHLOROTHIAZIDE 25; 100 MG/1; MG/1
1 TABLET ORAL DAILY
Status: DISCONTINUED | OUTPATIENT
Start: 2019-10-02 | End: 2019-10-02

## 2019-10-02 RX ORDER — METHYLPREDNISOLONE SODIUM SUCCINATE 40 MG/ML
40 INJECTION, POWDER, LYOPHILIZED, FOR SOLUTION INTRAMUSCULAR; INTRAVENOUS EVERY 6 HOURS
Status: DISCONTINUED | OUTPATIENT
Start: 2019-10-02 | End: 2019-10-03

## 2019-10-02 RX ORDER — ACETAMINOPHEN 325 MG/1
650 TABLET ORAL EVERY 4 HOURS PRN
Status: DISCONTINUED | OUTPATIENT
Start: 2019-10-02 | End: 2019-10-06 | Stop reason: HOSPADM

## 2019-10-02 RX ORDER — ASPIRIN 81 MG/1
81 TABLET, CHEWABLE ORAL DAILY
Status: DISCONTINUED | OUTPATIENT
Start: 2019-10-02 | End: 2019-10-06 | Stop reason: HOSPADM

## 2019-10-02 RX ORDER — PRAVASTATIN SODIUM 20 MG
80 TABLET ORAL NIGHTLY
Status: DISCONTINUED | OUTPATIENT
Start: 2019-10-02 | End: 2019-10-06 | Stop reason: HOSPADM

## 2019-10-02 RX ORDER — MONTELUKAST SODIUM 10 MG/1
10 TABLET ORAL NIGHTLY
Status: DISCONTINUED | OUTPATIENT
Start: 2019-10-02 | End: 2019-10-06 | Stop reason: HOSPADM

## 2019-10-02 RX ORDER — PREDNISONE 20 MG/1
40 TABLET ORAL DAILY
Status: DISCONTINUED | OUTPATIENT
Start: 2019-10-05 | End: 2019-10-03

## 2019-10-02 RX ORDER — SODIUM CHLORIDE 0.9 % (FLUSH) 0.9 %
10 SYRINGE (ML) INJECTION
Status: COMPLETED | OUTPATIENT
Start: 2019-10-02 | End: 2019-10-02

## 2019-10-02 RX ORDER — NICOTINE POLACRILEX 4 MG
15 LOZENGE BUCCAL PRN
Status: DISCONTINUED | OUTPATIENT
Start: 2019-10-02 | End: 2019-10-06 | Stop reason: HOSPADM

## 2019-10-02 RX ORDER — SODIUM CHLORIDE 0.9 % (FLUSH) 0.9 %
10 SYRINGE (ML) INJECTION ONCE
Status: COMPLETED | OUTPATIENT
Start: 2019-10-02 | End: 2019-10-02

## 2019-10-02 RX ORDER — BUDESONIDE 0.5 MG/2ML
500 INHALANT ORAL 2 TIMES DAILY
Status: DISCONTINUED | OUTPATIENT
Start: 2019-10-02 | End: 2019-10-06 | Stop reason: HOSPADM

## 2019-10-02 RX ORDER — DEXTROSE MONOHYDRATE 25 G/50ML
12.5 INJECTION, SOLUTION INTRAVENOUS PRN
Status: DISCONTINUED | OUTPATIENT
Start: 2019-10-02 | End: 2019-10-06 | Stop reason: HOSPADM

## 2019-10-02 RX ORDER — DEXTROSE MONOHYDRATE 50 MG/ML
100 INJECTION, SOLUTION INTRAVENOUS PRN
Status: DISCONTINUED | OUTPATIENT
Start: 2019-10-02 | End: 2019-10-06 | Stop reason: HOSPADM

## 2019-10-02 RX ORDER — DEXTROSE MONOHYDRATE 50 MG/ML
100 INJECTION, SOLUTION INTRAVENOUS PRN
Status: DISCONTINUED | OUTPATIENT
Start: 2019-10-02 | End: 2019-10-02 | Stop reason: SDUPTHER

## 2019-10-02 RX ORDER — OXYBUTYNIN CHLORIDE 5 MG/1
5 TABLET ORAL 3 TIMES DAILY
Status: DISCONTINUED | OUTPATIENT
Start: 2019-10-02 | End: 2019-10-06 | Stop reason: HOSPADM

## 2019-10-02 RX ORDER — INSULIN GLARGINE 100 [IU]/ML
50 INJECTION, SOLUTION SUBCUTANEOUS NIGHTLY
Status: DISCONTINUED | OUTPATIENT
Start: 2019-10-02 | End: 2019-10-06 | Stop reason: HOSPADM

## 2019-10-02 RX ORDER — LOSARTAN POTASSIUM 50 MG/1
100 TABLET ORAL DAILY
Status: DISCONTINUED | OUTPATIENT
Start: 2019-10-02 | End: 2019-10-06 | Stop reason: HOSPADM

## 2019-10-02 RX ORDER — OXYBUTYNIN CHLORIDE 5 MG/1
5 TABLET ORAL 3 TIMES DAILY
COMMUNITY
End: 2020-01-21 | Stop reason: SDUPTHER

## 2019-10-02 RX ORDER — CLONIDINE HYDROCHLORIDE 0.2 MG/1
0.2 TABLET ORAL 2 TIMES DAILY
Status: DISCONTINUED | OUTPATIENT
Start: 2019-10-02 | End: 2019-10-06 | Stop reason: HOSPADM

## 2019-10-02 RX ORDER — ONDANSETRON 2 MG/ML
4 INJECTION INTRAMUSCULAR; INTRAVENOUS EVERY 6 HOURS PRN
Status: DISCONTINUED | OUTPATIENT
Start: 2019-10-02 | End: 2019-10-06 | Stop reason: HOSPADM

## 2019-10-02 RX ORDER — GABAPENTIN 400 MG/1
400 CAPSULE ORAL 3 TIMES DAILY
Status: DISCONTINUED | OUTPATIENT
Start: 2019-10-02 | End: 2019-10-06 | Stop reason: HOSPADM

## 2019-10-02 RX ORDER — SODIUM CHLORIDE 0.9 % (FLUSH) 0.9 %
10 SYRINGE (ML) INJECTION EVERY 12 HOURS SCHEDULED
Status: DISCONTINUED | OUTPATIENT
Start: 2019-10-02 | End: 2019-10-06 | Stop reason: HOSPADM

## 2019-10-02 RX ORDER — ANASTROZOLE 1 MG/1
1 TABLET ORAL DAILY
Status: DISCONTINUED | OUTPATIENT
Start: 2019-10-02 | End: 2019-10-06 | Stop reason: HOSPADM

## 2019-10-02 RX ORDER — DEXTROSE MONOHYDRATE 25 G/50ML
12.5 INJECTION, SOLUTION INTRAVENOUS PRN
Status: DISCONTINUED | OUTPATIENT
Start: 2019-10-02 | End: 2019-10-02 | Stop reason: SDUPTHER

## 2019-10-02 RX ORDER — IPRATROPIUM BROMIDE AND ALBUTEROL SULFATE 2.5; .5 MG/3ML; MG/3ML
1 SOLUTION RESPIRATORY (INHALATION)
Status: DISCONTINUED | OUTPATIENT
Start: 2019-10-02 | End: 2019-10-06 | Stop reason: HOSPADM

## 2019-10-02 RX ORDER — FUROSEMIDE 10 MG/ML
40 INJECTION INTRAMUSCULAR; INTRAVENOUS ONCE
Status: DISCONTINUED | OUTPATIENT
Start: 2019-10-02 | End: 2019-10-02

## 2019-10-02 RX ORDER — IPRATROPIUM BROMIDE AND ALBUTEROL SULFATE 2.5; .5 MG/3ML; MG/3ML
3 SOLUTION RESPIRATORY (INHALATION) ONCE
Status: DISCONTINUED | OUTPATIENT
Start: 2019-10-02 | End: 2019-10-02

## 2019-10-02 RX ADMIN — AZITHROMYCIN 500 MG: 500 INJECTION, POWDER, LYOPHILIZED, FOR SOLUTION INTRAVENOUS at 18:22

## 2019-10-02 RX ADMIN — INSULIN LISPRO 4 UNITS: 100 INJECTION, SOLUTION INTRAVENOUS; SUBCUTANEOUS at 23:33

## 2019-10-02 RX ADMIN — LOSARTAN POTASSIUM 100 MG: 50 TABLET, FILM COATED ORAL at 16:28

## 2019-10-02 RX ADMIN — METHYLPREDNISOLONE SODIUM SUCCINATE 40 MG: 40 INJECTION, POWDER, FOR SOLUTION INTRAMUSCULAR; INTRAVENOUS at 21:40

## 2019-10-02 RX ADMIN — IPRATROPIUM BROMIDE AND ALBUTEROL SULFATE 1 AMPULE: .5; 3 SOLUTION RESPIRATORY (INHALATION) at 15:43

## 2019-10-02 RX ADMIN — Medication 10 ML: at 21:40

## 2019-10-02 RX ADMIN — CLONIDINE HYDROCHLORIDE 0.2 MG: 0.2 TABLET ORAL at 21:39

## 2019-10-02 RX ADMIN — METFORMIN HYDROCHLORIDE 500 MG: 500 TABLET ORAL at 16:28

## 2019-10-02 RX ADMIN — IOPAMIDOL 60 ML: 755 INJECTION, SOLUTION INTRAVENOUS at 11:11

## 2019-10-02 RX ADMIN — INSULIN GLARGINE 50 UNITS: 100 INJECTION, SOLUTION SUBCUTANEOUS at 21:40

## 2019-10-02 RX ADMIN — METHYLPREDNISOLONE SODIUM SUCCINATE 60 MG: 125 INJECTION, POWDER, FOR SOLUTION INTRAMUSCULAR; INTRAVENOUS at 09:36

## 2019-10-02 RX ADMIN — ANASTROZOLE 1 MG: 1 TABLET ORAL at 18:22

## 2019-10-02 RX ADMIN — GABAPENTIN 400 MG: 400 CAPSULE ORAL at 16:27

## 2019-10-02 RX ADMIN — ASPIRIN 81 MG CHEWABLE TABLET 81 MG: 81 TABLET CHEWABLE at 16:27

## 2019-10-02 RX ADMIN — METOPROLOL TARTRATE 25 MG: 25 TABLET, FILM COATED ORAL at 21:40

## 2019-10-02 RX ADMIN — OXYBUTYNIN CHLORIDE 5 MG: 5 TABLET ORAL at 16:28

## 2019-10-02 RX ADMIN — OXYBUTYNIN CHLORIDE 5 MG: 5 TABLET ORAL at 21:39

## 2019-10-02 RX ADMIN — FUROSEMIDE 40 MG: 10 INJECTION, SOLUTION INTRAMUSCULAR; INTRAVENOUS at 10:29

## 2019-10-02 RX ADMIN — METHYLPREDNISOLONE SODIUM SUCCINATE 40 MG: 40 INJECTION, POWDER, FOR SOLUTION INTRAMUSCULAR; INTRAVENOUS at 16:27

## 2019-10-02 RX ADMIN — METOPROLOL TARTRATE 25 MG: 25 TABLET, FILM COATED ORAL at 16:27

## 2019-10-02 RX ADMIN — MONTELUKAST SODIUM 10 MG: 10 TABLET ORAL at 21:39

## 2019-10-02 RX ADMIN — CLONIDINE HYDROCHLORIDE 0.2 MG: 0.2 TABLET ORAL at 16:28

## 2019-10-02 RX ADMIN — CEFTRIAXONE SODIUM 1 G: 1 INJECTION, POWDER, FOR SOLUTION INTRAMUSCULAR; INTRAVENOUS at 18:22

## 2019-10-02 RX ADMIN — HYDROCHLOROTHIAZIDE 25 MG: 25 TABLET ORAL at 16:28

## 2019-10-02 RX ADMIN — ENOXAPARIN SODIUM 40 MG: 40 INJECTION SUBCUTANEOUS at 16:29

## 2019-10-02 RX ADMIN — PRAVASTATIN SODIUM 80 MG: 20 TABLET ORAL at 21:39

## 2019-10-02 RX ADMIN — GABAPENTIN 400 MG: 400 CAPSULE ORAL at 21:40

## 2019-10-02 RX ADMIN — Medication 10 ML: at 11:11

## 2019-10-02 RX ADMIN — Medication 10 ML: at 16:32

## 2019-10-02 RX ADMIN — LINAGLIPTIN 5 MG: 5 TABLET, FILM COATED ORAL at 16:28

## 2019-10-02 ASSESSMENT — PAIN SCALES - GENERAL
PAINLEVEL_OUTOF10: 0

## 2019-10-03 LAB
ALBUMIN SERPL-MCNC: 3.9 G/DL (ref 3.5–5.2)
ALP BLD-CCNC: 149 U/L (ref 35–104)
ALT SERPL-CCNC: 12 U/L (ref 0–32)
ANION GAP SERPL CALCULATED.3IONS-SCNC: 12 MMOL/L (ref 7–16)
ANISOCYTOSIS: ABNORMAL
AST SERPL-CCNC: 14 U/L (ref 0–31)
BASOPHILS ABSOLUTE: 0 E9/L (ref 0–0.2)
BASOPHILS RELATIVE PERCENT: 0.1 % (ref 0–2)
BILIRUB SERPL-MCNC: 0.3 MG/DL (ref 0–1.2)
BUN BLDV-MCNC: 12 MG/DL (ref 8–23)
CALCIUM SERPL-MCNC: 9.4 MG/DL (ref 8.6–10.2)
CHLORIDE BLD-SCNC: 100 MMOL/L (ref 98–107)
CO2: 27 MMOL/L (ref 22–29)
CREAT SERPL-MCNC: 0.6 MG/DL (ref 0.5–1)
EOSINOPHILS ABSOLUTE: 0 E9/L (ref 0.05–0.5)
EOSINOPHILS RELATIVE PERCENT: 0 % (ref 0–6)
GFR AFRICAN AMERICAN: >60
GFR NON-AFRICAN AMERICAN: >60 ML/MIN/1.73
GLUCOSE BLD-MCNC: 315 MG/DL (ref 74–99)
HBA1C MFR BLD: 9.1 % (ref 4–5.6)
HCT VFR BLD CALC: 33.8 % (ref 34–48)
HEMOGLOBIN: 10.7 G/DL (ref 11.5–15.5)
LYMPHOCYTES ABSOLUTE: 0.42 E9/L (ref 1.5–4)
LYMPHOCYTES RELATIVE PERCENT: 4.3 % (ref 20–42)
MAGNESIUM: 2 MG/DL (ref 1.6–2.6)
MCH RBC QN AUTO: 27.2 PG (ref 26–35)
MCHC RBC AUTO-ENTMCNC: 31.7 % (ref 32–34.5)
MCV RBC AUTO: 85.8 FL (ref 80–99.9)
METER GLUCOSE: 288 MG/DL (ref 74–99)
METER GLUCOSE: 296 MG/DL (ref 74–99)
METER GLUCOSE: 309 MG/DL (ref 74–99)
METER GLUCOSE: 372 MG/DL (ref 74–99)
MONOCYTES ABSOLUTE: 0.42 E9/L (ref 0.1–0.95)
MONOCYTES RELATIVE PERCENT: 4.3 % (ref 2–12)
NEUTROPHILS ABSOLUTE: 9.46 E9/L (ref 1.8–7.3)
NEUTROPHILS RELATIVE PERCENT: 91.3 % (ref 43–80)
PDW BLD-RTO: 14.3 FL (ref 11.5–15)
PLATELET # BLD: 438 E9/L (ref 130–450)
PMV BLD AUTO: 10.5 FL (ref 7–12)
POLYCHROMASIA: ABNORMAL
POTASSIUM SERPL-SCNC: 4.2 MMOL/L (ref 3.5–5)
RBC # BLD: 3.94 E12/L (ref 3.5–5.5)
SODIUM BLD-SCNC: 139 MMOL/L (ref 132–146)
TOTAL PROTEIN: 7.2 G/DL (ref 6.4–8.3)
WBC # BLD: 10.4 E9/L (ref 4.5–11.5)

## 2019-10-03 PROCEDURE — 87798 DETECT AGENT NOS DNA AMP: CPT

## 2019-10-03 PROCEDURE — 97161 PT EVAL LOW COMPLEX 20 MIN: CPT

## 2019-10-03 PROCEDURE — 94640 AIRWAY INHALATION TREATMENT: CPT

## 2019-10-03 PROCEDURE — 2140000000 HC CCU INTERMEDIATE R&B

## 2019-10-03 PROCEDURE — 96372 THER/PROPH/DIAG INJ SC/IM: CPT

## 2019-10-03 PROCEDURE — 6360000002 HC RX W HCPCS: Performed by: INTERNAL MEDICINE

## 2019-10-03 PROCEDURE — 85025 COMPLETE CBC W/AUTO DIFF WBC: CPT

## 2019-10-03 PROCEDURE — 83735 ASSAY OF MAGNESIUM: CPT

## 2019-10-03 PROCEDURE — 97530 THERAPEUTIC ACTIVITIES: CPT

## 2019-10-03 PROCEDURE — 96366 THER/PROPH/DIAG IV INF ADDON: CPT

## 2019-10-03 PROCEDURE — 97165 OT EVAL LOW COMPLEX 30 MIN: CPT

## 2019-10-03 PROCEDURE — 36415 COLL VENOUS BLD VENIPUNCTURE: CPT

## 2019-10-03 PROCEDURE — 83036 HEMOGLOBIN GLYCOSYLATED A1C: CPT

## 2019-10-03 PROCEDURE — 2580000003 HC RX 258: Performed by: INTERNAL MEDICINE

## 2019-10-03 PROCEDURE — 6370000000 HC RX 637 (ALT 250 FOR IP): Performed by: INTERNAL MEDICINE

## 2019-10-03 PROCEDURE — 87486 CHLMYD PNEUM DNA AMP PROBE: CPT

## 2019-10-03 PROCEDURE — 96376 TX/PRO/DX INJ SAME DRUG ADON: CPT

## 2019-10-03 PROCEDURE — 87633 RESP VIRUS 12-25 TARGETS: CPT

## 2019-10-03 PROCEDURE — 80053 COMPREHEN METABOLIC PANEL: CPT

## 2019-10-03 PROCEDURE — 82962 GLUCOSE BLOOD TEST: CPT

## 2019-10-03 PROCEDURE — 87581 M.PNEUMON DNA AMP PROBE: CPT

## 2019-10-03 RX ORDER — FUROSEMIDE 10 MG/ML
20 INJECTION INTRAMUSCULAR; INTRAVENOUS 2 TIMES DAILY
Status: COMPLETED | OUTPATIENT
Start: 2019-10-03 | End: 2019-10-04

## 2019-10-03 RX ORDER — METHYLPREDNISOLONE SODIUM SUCCINATE 40 MG/ML
40 INJECTION, POWDER, LYOPHILIZED, FOR SOLUTION INTRAMUSCULAR; INTRAVENOUS EVERY 8 HOURS
Status: DISCONTINUED | OUTPATIENT
Start: 2019-10-04 | End: 2019-10-04

## 2019-10-03 RX ORDER — HYDRALAZINE HYDROCHLORIDE 20 MG/ML
10 INJECTION INTRAMUSCULAR; INTRAVENOUS EVERY 6 HOURS PRN
Status: DISCONTINUED | OUTPATIENT
Start: 2019-10-03 | End: 2019-10-06 | Stop reason: HOSPADM

## 2019-10-03 RX ADMIN — BUDESONIDE 500 MCG: 0.5 SUSPENSION RESPIRATORY (INHALATION) at 07:53

## 2019-10-03 RX ADMIN — IPRATROPIUM BROMIDE AND ALBUTEROL SULFATE 1 AMPULE: .5; 3 SOLUTION RESPIRATORY (INHALATION) at 07:53

## 2019-10-03 RX ADMIN — OXYBUTYNIN CHLORIDE 5 MG: 5 TABLET ORAL at 08:59

## 2019-10-03 RX ADMIN — IPRATROPIUM BROMIDE AND ALBUTEROL SULFATE 1 AMPULE: .5; 3 SOLUTION RESPIRATORY (INHALATION) at 19:55

## 2019-10-03 RX ADMIN — IPRATROPIUM BROMIDE AND ALBUTEROL SULFATE 1 AMPULE: .5; 3 SOLUTION RESPIRATORY (INHALATION) at 15:23

## 2019-10-03 RX ADMIN — ASPIRIN 81 MG CHEWABLE TABLET 81 MG: 81 TABLET CHEWABLE at 08:59

## 2019-10-03 RX ADMIN — LOSARTAN POTASSIUM 100 MG: 50 TABLET, FILM COATED ORAL at 08:59

## 2019-10-03 RX ADMIN — METHYLPREDNISOLONE SODIUM SUCCINATE 40 MG: 40 INJECTION, POWDER, FOR SOLUTION INTRAMUSCULAR; INTRAVENOUS at 17:35

## 2019-10-03 RX ADMIN — CLONIDINE HYDROCHLORIDE 0.2 MG: 0.2 TABLET ORAL at 08:59

## 2019-10-03 RX ADMIN — METHYLPREDNISOLONE SODIUM SUCCINATE 40 MG: 40 INJECTION, POWDER, FOR SOLUTION INTRAMUSCULAR; INTRAVENOUS at 03:55

## 2019-10-03 RX ADMIN — ENOXAPARIN SODIUM 40 MG: 40 INJECTION SUBCUTANEOUS at 09:00

## 2019-10-03 RX ADMIN — MONTELUKAST SODIUM 10 MG: 10 TABLET ORAL at 21:13

## 2019-10-03 RX ADMIN — BUDESONIDE 500 MCG: 0.5 SUSPENSION RESPIRATORY (INHALATION) at 19:55

## 2019-10-03 RX ADMIN — INSULIN LISPRO 6 UNITS: 100 INJECTION, SOLUTION INTRAVENOUS; SUBCUTANEOUS at 17:36

## 2019-10-03 RX ADMIN — INSULIN GLARGINE 50 UNITS: 100 INJECTION, SOLUTION SUBCUTANEOUS at 21:17

## 2019-10-03 RX ADMIN — FORMOTEROL FUMARATE DIHYDRATE 20 MCG: 20 SOLUTION RESPIRATORY (INHALATION) at 19:55

## 2019-10-03 RX ADMIN — INSULIN LISPRO 6 UNITS: 100 INJECTION, SOLUTION INTRAVENOUS; SUBCUTANEOUS at 06:47

## 2019-10-03 RX ADMIN — Medication 10 ML: at 18:23

## 2019-10-03 RX ADMIN — Medication 10 ML: at 18:29

## 2019-10-03 RX ADMIN — ANASTROZOLE 1 MG: 1 TABLET ORAL at 09:00

## 2019-10-03 RX ADMIN — PRAVASTATIN SODIUM 80 MG: 20 TABLET ORAL at 21:13

## 2019-10-03 RX ADMIN — IPRATROPIUM BROMIDE AND ALBUTEROL SULFATE 1 AMPULE: .5; 3 SOLUTION RESPIRATORY (INHALATION) at 11:59

## 2019-10-03 RX ADMIN — HYDROCHLOROTHIAZIDE 25 MG: 25 TABLET ORAL at 08:59

## 2019-10-03 RX ADMIN — FORMOTEROL FUMARATE DIHYDRATE 20 MCG: 20 SOLUTION RESPIRATORY (INHALATION) at 07:53

## 2019-10-03 RX ADMIN — METFORMIN HYDROCHLORIDE 500 MG: 500 TABLET ORAL at 08:59

## 2019-10-03 RX ADMIN — CLONIDINE HYDROCHLORIDE 0.2 MG: 0.2 TABLET ORAL at 21:13

## 2019-10-03 RX ADMIN — GABAPENTIN 400 MG: 400 CAPSULE ORAL at 13:21

## 2019-10-03 RX ADMIN — OXYBUTYNIN CHLORIDE 5 MG: 5 TABLET ORAL at 13:21

## 2019-10-03 RX ADMIN — Medication 10 ML: at 09:00

## 2019-10-03 RX ADMIN — CEFTRIAXONE SODIUM 1 G: 1 INJECTION, POWDER, FOR SOLUTION INTRAMUSCULAR; INTRAVENOUS at 18:21

## 2019-10-03 RX ADMIN — INSULIN LISPRO 6 UNITS: 100 INJECTION, SOLUTION INTRAVENOUS; SUBCUTANEOUS at 21:16

## 2019-10-03 RX ADMIN — GABAPENTIN 400 MG: 400 CAPSULE ORAL at 21:14

## 2019-10-03 RX ADMIN — METHYLPREDNISOLONE SODIUM SUCCINATE 40 MG: 40 INJECTION, POWDER, FOR SOLUTION INTRAMUSCULAR; INTRAVENOUS at 08:59

## 2019-10-03 RX ADMIN — Medication 10 ML: at 17:34

## 2019-10-03 RX ADMIN — LINAGLIPTIN 5 MG: 5 TABLET, FILM COATED ORAL at 08:59

## 2019-10-03 RX ADMIN — AZITHROMYCIN 500 MG: 500 INJECTION, POWDER, LYOPHILIZED, FOR SOLUTION INTRAVENOUS at 18:24

## 2019-10-03 RX ADMIN — OXYBUTYNIN CHLORIDE 5 MG: 5 TABLET ORAL at 21:13

## 2019-10-03 RX ADMIN — METOPROLOL TARTRATE 25 MG: 25 TABLET, FILM COATED ORAL at 21:13

## 2019-10-03 RX ADMIN — GABAPENTIN 400 MG: 400 CAPSULE ORAL at 08:59

## 2019-10-03 RX ADMIN — INSULIN LISPRO 10 UNITS: 100 INJECTION, SOLUTION INTRAVENOUS; SUBCUTANEOUS at 11:53

## 2019-10-03 RX ADMIN — FUROSEMIDE 20 MG: 10 INJECTION, SOLUTION INTRAMUSCULAR; INTRAVENOUS at 18:47

## 2019-10-03 RX ADMIN — METFORMIN HYDROCHLORIDE 500 MG: 500 TABLET ORAL at 17:34

## 2019-10-03 RX ADMIN — METOPROLOL TARTRATE 25 MG: 25 TABLET, FILM COATED ORAL at 08:59

## 2019-10-03 RX ADMIN — Medication 10 ML: at 03:55

## 2019-10-03 RX ADMIN — Medication 10 ML: at 21:14

## 2019-10-03 RX ADMIN — Medication 10 ML: at 18:47

## 2019-10-03 ASSESSMENT — PAIN SCALES - GENERAL
PAINLEVEL_OUTOF10: 0

## 2019-10-03 ASSESSMENT — PAIN DESCRIPTION - PAIN TYPE: TYPE: ACUTE PAIN

## 2019-10-04 LAB
ANION GAP SERPL CALCULATED.3IONS-SCNC: 14 MMOL/L (ref 7–16)
BASOPHILS ABSOLUTE: 0.01 E9/L (ref 0–0.2)
BASOPHILS RELATIVE PERCENT: 0.1 % (ref 0–2)
BUN BLDV-MCNC: 22 MG/DL (ref 8–23)
CALCIUM SERPL-MCNC: 9.5 MG/DL (ref 8.6–10.2)
CHLORIDE BLD-SCNC: 101 MMOL/L (ref 98–107)
CO2: 26 MMOL/L (ref 22–29)
CREAT SERPL-MCNC: 0.7 MG/DL (ref 0.5–1)
EOSINOPHILS ABSOLUTE: 0 E9/L (ref 0.05–0.5)
EOSINOPHILS RELATIVE PERCENT: 0 % (ref 0–6)
FILM ARRAY ADENOVIRUS: NORMAL
FILM ARRAY BORDETELLA PERTUSSIS: NORMAL
FILM ARRAY CHLAMYDOPHILIA PNEUMONIAE: NORMAL
FILM ARRAY CORONAVIRUS 229E: NORMAL
FILM ARRAY CORONAVIRUS HKU1: NORMAL
FILM ARRAY CORONAVIRUS NL63: NORMAL
FILM ARRAY CORONAVIRUS OC43: NORMAL
FILM ARRAY INFLUENZA A VIRUS 09H1: NORMAL
FILM ARRAY INFLUENZA A VIRUS H1: NORMAL
FILM ARRAY INFLUENZA A VIRUS H3: NORMAL
FILM ARRAY INFLUENZA A VIRUS: NORMAL
FILM ARRAY INFLUENZA B: NORMAL
FILM ARRAY METAPNEUMOVIRUS: NORMAL
FILM ARRAY MYCOPLASMA PNEUMONIAE: NORMAL
FILM ARRAY PARAINFLUENZA VIRUS 1: NORMAL
FILM ARRAY PARAINFLUENZA VIRUS 2: NORMAL
FILM ARRAY PARAINFLUENZA VIRUS 3: NORMAL
FILM ARRAY PARAINFLUENZA VIRUS 4: NORMAL
FILM ARRAY RESPIRATORY SYNCITIAL VIRUS: NORMAL
FILM ARRAY RHINOVIRUS/ENTEROVIRUS: NORMAL
GFR AFRICAN AMERICAN: >60
GFR NON-AFRICAN AMERICAN: >60 ML/MIN/1.73
GLUCOSE BLD-MCNC: 293 MG/DL (ref 74–99)
HCT VFR BLD CALC: 33.3 % (ref 34–48)
HEMOGLOBIN: 10.6 G/DL (ref 11.5–15.5)
IMMATURE GRANULOCYTES #: 0.07 E9/L
IMMATURE GRANULOCYTES %: 0.6 % (ref 0–5)
LYMPHOCYTES ABSOLUTE: 0.36 E9/L (ref 1.5–4)
LYMPHOCYTES RELATIVE PERCENT: 3.1 % (ref 20–42)
MCH RBC QN AUTO: 27.1 PG (ref 26–35)
MCHC RBC AUTO-ENTMCNC: 31.8 % (ref 32–34.5)
MCV RBC AUTO: 85.2 FL (ref 80–99.9)
METER GLUCOSE: 226 MG/DL (ref 74–99)
METER GLUCOSE: 230 MG/DL (ref 74–99)
METER GLUCOSE: 265 MG/DL (ref 74–99)
METER GLUCOSE: 281 MG/DL (ref 74–99)
MONOCYTES ABSOLUTE: 0.38 E9/L (ref 0.1–0.95)
MONOCYTES RELATIVE PERCENT: 3.3 % (ref 2–12)
NEUTROPHILS ABSOLUTE: 10.82 E9/L (ref 1.8–7.3)
NEUTROPHILS RELATIVE PERCENT: 92.9 % (ref 43–80)
PDW BLD-RTO: 14.5 FL (ref 11.5–15)
PLATELET # BLD: 471 E9/L (ref 130–450)
PMV BLD AUTO: 10.4 FL (ref 7–12)
POLYCHROMASIA: ABNORMAL
POTASSIUM SERPL-SCNC: 4.1 MMOL/L (ref 3.5–5)
RBC # BLD: 3.91 E12/L (ref 3.5–5.5)
SODIUM BLD-SCNC: 141 MMOL/L (ref 132–146)
WBC # BLD: 11.6 E9/L (ref 4.5–11.5)

## 2019-10-04 PROCEDURE — 96366 THER/PROPH/DIAG IV INF ADDON: CPT

## 2019-10-04 PROCEDURE — 2580000003 HC RX 258: Performed by: INTERNAL MEDICINE

## 2019-10-04 PROCEDURE — 82962 GLUCOSE BLOOD TEST: CPT

## 2019-10-04 PROCEDURE — 85025 COMPLETE CBC W/AUTO DIFF WBC: CPT

## 2019-10-04 PROCEDURE — 6370000000 HC RX 637 (ALT 250 FOR IP): Performed by: INTERNAL MEDICINE

## 2019-10-04 PROCEDURE — 36415 COLL VENOUS BLD VENIPUNCTURE: CPT

## 2019-10-04 PROCEDURE — 94640 AIRWAY INHALATION TREATMENT: CPT

## 2019-10-04 PROCEDURE — 96376 TX/PRO/DX INJ SAME DRUG ADON: CPT

## 2019-10-04 PROCEDURE — 2140000000 HC CCU INTERMEDIATE R&B

## 2019-10-04 PROCEDURE — 80048 BASIC METABOLIC PNL TOTAL CA: CPT

## 2019-10-04 PROCEDURE — 6360000002 HC RX W HCPCS: Performed by: INTERNAL MEDICINE

## 2019-10-04 PROCEDURE — 96375 TX/PRO/DX INJ NEW DRUG ADDON: CPT

## 2019-10-04 PROCEDURE — 96372 THER/PROPH/DIAG INJ SC/IM: CPT

## 2019-10-04 RX ORDER — METHYLPREDNISOLONE SODIUM SUCCINATE 40 MG/ML
40 INJECTION, POWDER, LYOPHILIZED, FOR SOLUTION INTRAMUSCULAR; INTRAVENOUS EVERY 12 HOURS
Status: DISCONTINUED | OUTPATIENT
Start: 2019-10-04 | End: 2019-10-06 | Stop reason: HOSPADM

## 2019-10-04 RX ADMIN — AZITHROMYCIN 500 MG: 500 INJECTION, POWDER, LYOPHILIZED, FOR SOLUTION INTRAVENOUS at 18:34

## 2019-10-04 RX ADMIN — OXYBUTYNIN CHLORIDE 5 MG: 5 TABLET ORAL at 14:42

## 2019-10-04 RX ADMIN — CLONIDINE HYDROCHLORIDE 0.2 MG: 0.2 TABLET ORAL at 09:19

## 2019-10-04 RX ADMIN — PRAVASTATIN SODIUM 80 MG: 20 TABLET ORAL at 21:18

## 2019-10-04 RX ADMIN — CLONIDINE HYDROCHLORIDE 0.2 MG: 0.2 TABLET ORAL at 21:17

## 2019-10-04 RX ADMIN — HYDRALAZINE HYDROCHLORIDE 10 MG: 20 INJECTION, SOLUTION INTRAMUSCULAR; INTRAVENOUS at 01:31

## 2019-10-04 RX ADMIN — Medication 10 ML: at 21:19

## 2019-10-04 RX ADMIN — INSULIN LISPRO 6 UNITS: 100 INJECTION, SOLUTION INTRAVENOUS; SUBCUTANEOUS at 17:11

## 2019-10-04 RX ADMIN — HYDRALAZINE HYDROCHLORIDE 10 MG: 20 INJECTION, SOLUTION INTRAMUSCULAR; INTRAVENOUS at 17:13

## 2019-10-04 RX ADMIN — GABAPENTIN 400 MG: 400 CAPSULE ORAL at 21:17

## 2019-10-04 RX ADMIN — METFORMIN HYDROCHLORIDE 500 MG: 500 TABLET ORAL at 17:10

## 2019-10-04 RX ADMIN — METHYLPREDNISOLONE SODIUM SUCCINATE 40 MG: 40 INJECTION, POWDER, FOR SOLUTION INTRAMUSCULAR; INTRAVENOUS at 09:20

## 2019-10-04 RX ADMIN — IPRATROPIUM BROMIDE AND ALBUTEROL SULFATE 1 AMPULE: .5; 3 SOLUTION RESPIRATORY (INHALATION) at 13:26

## 2019-10-04 RX ADMIN — IPRATROPIUM BROMIDE AND ALBUTEROL SULFATE 1 AMPULE: .5; 3 SOLUTION RESPIRATORY (INHALATION) at 20:46

## 2019-10-04 RX ADMIN — FUROSEMIDE 20 MG: 10 INJECTION, SOLUTION INTRAMUSCULAR; INTRAVENOUS at 18:35

## 2019-10-04 RX ADMIN — ANASTROZOLE 1 MG: 1 TABLET ORAL at 09:19

## 2019-10-04 RX ADMIN — MONTELUKAST SODIUM 10 MG: 10 TABLET ORAL at 21:18

## 2019-10-04 RX ADMIN — OXYBUTYNIN CHLORIDE 5 MG: 5 TABLET ORAL at 09:19

## 2019-10-04 RX ADMIN — HYDROCHLOROTHIAZIDE 25 MG: 25 TABLET ORAL at 09:19

## 2019-10-04 RX ADMIN — CEFTRIAXONE SODIUM 1 G: 1 INJECTION, POWDER, FOR SOLUTION INTRAMUSCULAR; INTRAVENOUS at 14:42

## 2019-10-04 RX ADMIN — Medication 10 ML: at 01:30

## 2019-10-04 RX ADMIN — GABAPENTIN 400 MG: 400 CAPSULE ORAL at 14:42

## 2019-10-04 RX ADMIN — FORMOTEROL FUMARATE DIHYDRATE 20 MCG: 20 SOLUTION RESPIRATORY (INHALATION) at 07:40

## 2019-10-04 RX ADMIN — ENOXAPARIN SODIUM 40 MG: 40 INJECTION SUBCUTANEOUS at 09:20

## 2019-10-04 RX ADMIN — GABAPENTIN 400 MG: 400 CAPSULE ORAL at 09:19

## 2019-10-04 RX ADMIN — INSULIN LISPRO 9 UNITS: 100 INJECTION, SOLUTION INTRAVENOUS; SUBCUTANEOUS at 06:53

## 2019-10-04 RX ADMIN — LINAGLIPTIN 5 MG: 5 TABLET, FILM COATED ORAL at 09:19

## 2019-10-04 RX ADMIN — IPRATROPIUM BROMIDE AND ALBUTEROL SULFATE 1 AMPULE: .5; 3 SOLUTION RESPIRATORY (INHALATION) at 07:40

## 2019-10-04 RX ADMIN — ASPIRIN 81 MG CHEWABLE TABLET 81 MG: 81 TABLET CHEWABLE at 09:19

## 2019-10-04 RX ADMIN — INSULIN LISPRO 9 UNITS: 100 INJECTION, SOLUTION INTRAVENOUS; SUBCUTANEOUS at 11:49

## 2019-10-04 RX ADMIN — ACETAMINOPHEN 650 MG: 325 TABLET, FILM COATED ORAL at 16:50

## 2019-10-04 RX ADMIN — Medication 10 ML: at 09:20

## 2019-10-04 RX ADMIN — METFORMIN HYDROCHLORIDE 500 MG: 500 TABLET ORAL at 06:55

## 2019-10-04 RX ADMIN — BUDESONIDE 500 MCG: 0.5 SUSPENSION RESPIRATORY (INHALATION) at 07:40

## 2019-10-04 RX ADMIN — BUDESONIDE 500 MCG: 0.5 SUSPENSION RESPIRATORY (INHALATION) at 20:46

## 2019-10-04 RX ADMIN — IPRATROPIUM BROMIDE AND ALBUTEROL SULFATE 1 AMPULE: .5; 3 SOLUTION RESPIRATORY (INHALATION) at 15:30

## 2019-10-04 RX ADMIN — METHYLPREDNISOLONE SODIUM SUCCINATE 40 MG: 40 INJECTION, POWDER, FOR SOLUTION INTRAMUSCULAR; INTRAVENOUS at 01:31

## 2019-10-04 RX ADMIN — OXYBUTYNIN CHLORIDE 5 MG: 5 TABLET ORAL at 21:19

## 2019-10-04 RX ADMIN — METHYLPREDNISOLONE SODIUM SUCCINATE 40 MG: 40 INJECTION, POWDER, FOR SOLUTION INTRAMUSCULAR; INTRAVENOUS at 21:25

## 2019-10-04 RX ADMIN — INSULIN GLARGINE 50 UNITS: 100 INJECTION, SOLUTION SUBCUTANEOUS at 21:15

## 2019-10-04 RX ADMIN — FUROSEMIDE 20 MG: 10 INJECTION, SOLUTION INTRAMUSCULAR; INTRAVENOUS at 09:20

## 2019-10-04 RX ADMIN — LOSARTAN POTASSIUM 100 MG: 50 TABLET, FILM COATED ORAL at 09:19

## 2019-10-04 RX ADMIN — METOPROLOL TARTRATE 25 MG: 25 TABLET, FILM COATED ORAL at 21:17

## 2019-10-04 RX ADMIN — INSULIN LISPRO 5 UNITS: 100 INJECTION, SOLUTION INTRAVENOUS; SUBCUTANEOUS at 21:14

## 2019-10-04 RX ADMIN — METOPROLOL TARTRATE 25 MG: 25 TABLET, FILM COATED ORAL at 09:19

## 2019-10-04 RX ADMIN — FORMOTEROL FUMARATE DIHYDRATE 20 MCG: 20 SOLUTION RESPIRATORY (INHALATION) at 20:46

## 2019-10-04 ASSESSMENT — PAIN SCALES - GENERAL
PAINLEVEL_OUTOF10: 3
PAINLEVEL_OUTOF10: 0

## 2019-10-05 ENCOUNTER — APPOINTMENT (OUTPATIENT)
Dept: GENERAL RADIOLOGY | Age: 69
DRG: 190 | End: 2019-10-05
Payer: MEDICARE

## 2019-10-05 LAB
METER GLUCOSE: 130 MG/DL (ref 74–99)
METER GLUCOSE: 146 MG/DL (ref 74–99)
METER GLUCOSE: 249 MG/DL (ref 74–99)
METER GLUCOSE: 309 MG/DL (ref 74–99)

## 2019-10-05 PROCEDURE — 6370000000 HC RX 637 (ALT 250 FOR IP): Performed by: INTERNAL MEDICINE

## 2019-10-05 PROCEDURE — 96376 TX/PRO/DX INJ SAME DRUG ADON: CPT

## 2019-10-05 PROCEDURE — 2580000003 HC RX 258: Performed by: INTERNAL MEDICINE

## 2019-10-05 PROCEDURE — 94640 AIRWAY INHALATION TREATMENT: CPT

## 2019-10-05 PROCEDURE — 96366 THER/PROPH/DIAG IV INF ADDON: CPT

## 2019-10-05 PROCEDURE — 6360000002 HC RX W HCPCS: Performed by: INTERNAL MEDICINE

## 2019-10-05 PROCEDURE — 96372 THER/PROPH/DIAG INJ SC/IM: CPT

## 2019-10-05 PROCEDURE — 2140000000 HC CCU INTERMEDIATE R&B

## 2019-10-05 PROCEDURE — 71045 X-RAY EXAM CHEST 1 VIEW: CPT

## 2019-10-05 PROCEDURE — 82962 GLUCOSE BLOOD TEST: CPT

## 2019-10-05 RX ADMIN — HYDROCHLOROTHIAZIDE 25 MG: 25 TABLET ORAL at 11:00

## 2019-10-05 RX ADMIN — METOPROLOL TARTRATE 25 MG: 25 TABLET, FILM COATED ORAL at 11:01

## 2019-10-05 RX ADMIN — Medication 10 ML: at 21:34

## 2019-10-05 RX ADMIN — LOSARTAN POTASSIUM 100 MG: 50 TABLET, FILM COATED ORAL at 11:00

## 2019-10-05 RX ADMIN — IPRATROPIUM BROMIDE AND ALBUTEROL SULFATE 1 AMPULE: .5; 3 SOLUTION RESPIRATORY (INHALATION) at 15:38

## 2019-10-05 RX ADMIN — INSULIN LISPRO 6 UNITS: 100 INJECTION, SOLUTION INTRAVENOUS; SUBCUTANEOUS at 21:30

## 2019-10-05 RX ADMIN — INSULIN GLARGINE 50 UNITS: 100 INJECTION, SOLUTION SUBCUTANEOUS at 21:31

## 2019-10-05 RX ADMIN — ENOXAPARIN SODIUM 40 MG: 40 INJECTION SUBCUTANEOUS at 11:02

## 2019-10-05 RX ADMIN — CLONIDINE HYDROCHLORIDE 0.2 MG: 0.2 TABLET ORAL at 11:00

## 2019-10-05 RX ADMIN — HYDRALAZINE HYDROCHLORIDE 10 MG: 20 INJECTION, SOLUTION INTRAMUSCULAR; INTRAVENOUS at 22:25

## 2019-10-05 RX ADMIN — METFORMIN HYDROCHLORIDE 500 MG: 500 TABLET ORAL at 16:41

## 2019-10-05 RX ADMIN — METFORMIN HYDROCHLORIDE 500 MG: 500 TABLET ORAL at 11:00

## 2019-10-05 RX ADMIN — INSULIN LISPRO 3 UNITS: 100 INJECTION, SOLUTION INTRAVENOUS; SUBCUTANEOUS at 06:37

## 2019-10-05 RX ADMIN — GABAPENTIN 400 MG: 400 CAPSULE ORAL at 13:42

## 2019-10-05 RX ADMIN — METHYLPREDNISOLONE SODIUM SUCCINATE 40 MG: 40 INJECTION, POWDER, FOR SOLUTION INTRAMUSCULAR; INTRAVENOUS at 21:30

## 2019-10-05 RX ADMIN — Medication 10 ML: at 22:27

## 2019-10-05 RX ADMIN — LINAGLIPTIN 5 MG: 5 TABLET, FILM COATED ORAL at 11:00

## 2019-10-05 RX ADMIN — AZITHROMYCIN 500 MG: 500 INJECTION, POWDER, LYOPHILIZED, FOR SOLUTION INTRAVENOUS at 18:04

## 2019-10-05 RX ADMIN — FORMOTEROL FUMARATE DIHYDRATE 20 MCG: 20 SOLUTION RESPIRATORY (INHALATION) at 08:06

## 2019-10-05 RX ADMIN — MONTELUKAST SODIUM 10 MG: 10 TABLET ORAL at 21:29

## 2019-10-05 RX ADMIN — ACETAMINOPHEN 650 MG: 325 TABLET, FILM COATED ORAL at 13:42

## 2019-10-05 RX ADMIN — PRAVASTATIN SODIUM 80 MG: 20 TABLET ORAL at 21:28

## 2019-10-05 RX ADMIN — CLONIDINE HYDROCHLORIDE 0.2 MG: 0.2 TABLET ORAL at 21:29

## 2019-10-05 RX ADMIN — IPRATROPIUM BROMIDE AND ALBUTEROL SULFATE 1 AMPULE: .5; 3 SOLUTION RESPIRATORY (INHALATION) at 12:59

## 2019-10-05 RX ADMIN — BUDESONIDE 500 MCG: 0.5 SUSPENSION RESPIRATORY (INHALATION) at 08:06

## 2019-10-05 RX ADMIN — OXYBUTYNIN CHLORIDE 5 MG: 5 TABLET ORAL at 11:00

## 2019-10-05 RX ADMIN — GABAPENTIN 400 MG: 400 CAPSULE ORAL at 11:01

## 2019-10-05 RX ADMIN — METHYLPREDNISOLONE SODIUM SUCCINATE 40 MG: 40 INJECTION, POWDER, FOR SOLUTION INTRAMUSCULAR; INTRAVENOUS at 11:02

## 2019-10-05 RX ADMIN — GABAPENTIN 400 MG: 400 CAPSULE ORAL at 21:28

## 2019-10-05 RX ADMIN — METOPROLOL TARTRATE 25 MG: 25 TABLET, FILM COATED ORAL at 21:29

## 2019-10-05 RX ADMIN — CEFTRIAXONE SODIUM 1 G: 1 INJECTION, POWDER, FOR SOLUTION INTRAMUSCULAR; INTRAVENOUS at 13:42

## 2019-10-05 RX ADMIN — BUDESONIDE 500 MCG: 0.5 SUSPENSION RESPIRATORY (INHALATION) at 19:16

## 2019-10-05 RX ADMIN — Medication 10 ML: at 11:03

## 2019-10-05 RX ADMIN — IPRATROPIUM BROMIDE AND ALBUTEROL SULFATE 1 AMPULE: .5; 3 SOLUTION RESPIRATORY (INHALATION) at 19:16

## 2019-10-05 RX ADMIN — FORMOTEROL FUMARATE DIHYDRATE 20 MCG: 20 SOLUTION RESPIRATORY (INHALATION) at 19:16

## 2019-10-05 RX ADMIN — OXYBUTYNIN CHLORIDE 5 MG: 5 TABLET ORAL at 16:41

## 2019-10-05 RX ADMIN — OXYBUTYNIN CHLORIDE 5 MG: 5 TABLET ORAL at 21:28

## 2019-10-05 RX ADMIN — ASPIRIN 81 MG CHEWABLE TABLET 81 MG: 81 TABLET CHEWABLE at 11:00

## 2019-10-05 RX ADMIN — IPRATROPIUM BROMIDE AND ALBUTEROL SULFATE 1 AMPULE: .5; 3 SOLUTION RESPIRATORY (INHALATION) at 08:06

## 2019-10-05 RX ADMIN — INSULIN LISPRO 6 UNITS: 100 INJECTION, SOLUTION INTRAVENOUS; SUBCUTANEOUS at 16:43

## 2019-10-05 RX ADMIN — ANASTROZOLE 1 MG: 1 TABLET ORAL at 11:02

## 2019-10-05 ASSESSMENT — PAIN SCALES - GENERAL
PAINLEVEL_OUTOF10: 2
PAINLEVEL_OUTOF10: 0
PAINLEVEL_OUTOF10: 1
PAINLEVEL_OUTOF10: 0

## 2019-10-06 VITALS
HEART RATE: 74 BPM | RESPIRATION RATE: 18 BRPM | SYSTOLIC BLOOD PRESSURE: 140 MMHG | OXYGEN SATURATION: 98 % | BODY MASS INDEX: 39.79 KG/M2 | HEIGHT: 66 IN | WEIGHT: 247.6 LBS | DIASTOLIC BLOOD PRESSURE: 62 MMHG | TEMPERATURE: 98.6 F

## 2019-10-06 LAB
METER GLUCOSE: 200 MG/DL (ref 74–99)
METER GLUCOSE: 314 MG/DL (ref 74–99)

## 2019-10-06 PROCEDURE — 94640 AIRWAY INHALATION TREATMENT: CPT

## 2019-10-06 PROCEDURE — 6360000002 HC RX W HCPCS: Performed by: INTERNAL MEDICINE

## 2019-10-06 PROCEDURE — 6370000000 HC RX 637 (ALT 250 FOR IP): Performed by: INTERNAL MEDICINE

## 2019-10-06 PROCEDURE — 96376 TX/PRO/DX INJ SAME DRUG ADON: CPT

## 2019-10-06 PROCEDURE — 96372 THER/PROPH/DIAG INJ SC/IM: CPT

## 2019-10-06 PROCEDURE — 82962 GLUCOSE BLOOD TEST: CPT

## 2019-10-06 PROCEDURE — 2580000003 HC RX 258: Performed by: INTERNAL MEDICINE

## 2019-10-06 RX ORDER — AMLODIPINE BESYLATE 10 MG/1
10 TABLET ORAL DAILY
Status: COMPLETED | OUTPATIENT
Start: 2019-10-06 | End: 2019-10-06

## 2019-10-06 RX ORDER — PREDNISONE 20 MG/1
20 TABLET ORAL DAILY
Qty: 7 TABLET | Refills: 0 | Status: SHIPPED | OUTPATIENT
Start: 2019-10-06 | End: 2019-10-13

## 2019-10-06 RX ORDER — DOXYCYCLINE HYCLATE 100 MG
100 TABLET ORAL 2 TIMES DAILY
Qty: 10 TABLET | Refills: 0 | Status: SHIPPED | OUTPATIENT
Start: 2019-10-06 | End: 2019-10-11

## 2019-10-06 RX ORDER — DOXYCYCLINE HYCLATE 100 MG
100 TABLET ORAL 2 TIMES DAILY
Qty: 10 TABLET | Refills: 0 | Status: SHIPPED | OUTPATIENT
Start: 2019-10-06 | End: 2019-10-06 | Stop reason: SDUPTHER

## 2019-10-06 RX ORDER — AMLODIPINE BESYLATE 5 MG/1
5 TABLET ORAL DAILY
Qty: 30 TABLET | Refills: 3 | Status: ON HOLD | OUTPATIENT
Start: 2019-10-06 | End: 2020-01-03 | Stop reason: HOSPADM

## 2019-10-06 RX ORDER — AMLODIPINE BESYLATE 5 MG/1
5 TABLET ORAL DAILY
Qty: 30 TABLET | Refills: 3 | Status: SHIPPED | OUTPATIENT
Start: 2019-10-06 | End: 2019-10-06 | Stop reason: SDUPTHER

## 2019-10-06 RX ORDER — FUROSEMIDE 20 MG/1
20 TABLET ORAL EVERY OTHER DAY
Qty: 15 TABLET | Refills: 0 | Status: SHIPPED | OUTPATIENT
Start: 2019-10-06 | End: 2019-10-06 | Stop reason: SDUPTHER

## 2019-10-06 RX ORDER — PREDNISONE 20 MG/1
20 TABLET ORAL DAILY
Qty: 7 TABLET | Refills: 0 | Status: SHIPPED | OUTPATIENT
Start: 2019-10-06 | End: 2019-10-06 | Stop reason: SDUPTHER

## 2019-10-06 RX ORDER — FUROSEMIDE 20 MG/1
20 TABLET ORAL EVERY OTHER DAY
Qty: 15 TABLET | Refills: 0 | Status: SHIPPED | OUTPATIENT
Start: 2019-10-06 | End: 2019-11-29 | Stop reason: ALTCHOICE

## 2019-10-06 RX ADMIN — IPRATROPIUM BROMIDE AND ALBUTEROL SULFATE 1 AMPULE: .5; 3 SOLUTION RESPIRATORY (INHALATION) at 13:34

## 2019-10-06 RX ADMIN — LINAGLIPTIN 5 MG: 5 TABLET, FILM COATED ORAL at 09:24

## 2019-10-06 RX ADMIN — HYDRALAZINE HYDROCHLORIDE 10 MG: 20 INJECTION, SOLUTION INTRAMUSCULAR; INTRAVENOUS at 11:04

## 2019-10-06 RX ADMIN — AMLODIPINE BESYLATE 10 MG: 10 TABLET ORAL at 15:48

## 2019-10-06 RX ADMIN — BUDESONIDE 500 MCG: 0.5 SUSPENSION RESPIRATORY (INHALATION) at 08:18

## 2019-10-06 RX ADMIN — METOPROLOL TARTRATE 25 MG: 25 TABLET, FILM COATED ORAL at 09:24

## 2019-10-06 RX ADMIN — Medication 10 ML: at 15:48

## 2019-10-06 RX ADMIN — INSULIN LISPRO 6 UNITS: 100 INJECTION, SOLUTION INTRAVENOUS; SUBCUTANEOUS at 11:14

## 2019-10-06 RX ADMIN — ANASTROZOLE 1 MG: 1 TABLET ORAL at 09:23

## 2019-10-06 RX ADMIN — GABAPENTIN 400 MG: 400 CAPSULE ORAL at 15:23

## 2019-10-06 RX ADMIN — LOSARTAN POTASSIUM 100 MG: 50 TABLET, FILM COATED ORAL at 09:24

## 2019-10-06 RX ADMIN — HYDROCHLOROTHIAZIDE 25 MG: 25 TABLET ORAL at 09:23

## 2019-10-06 RX ADMIN — OXYBUTYNIN CHLORIDE 5 MG: 5 TABLET ORAL at 15:23

## 2019-10-06 RX ADMIN — CEFTRIAXONE SODIUM 1 G: 1 INJECTION, POWDER, FOR SOLUTION INTRAMUSCULAR; INTRAVENOUS at 15:48

## 2019-10-06 RX ADMIN — METHYLPREDNISOLONE SODIUM SUCCINATE 40 MG: 40 INJECTION, POWDER, FOR SOLUTION INTRAMUSCULAR; INTRAVENOUS at 09:24

## 2019-10-06 RX ADMIN — ASPIRIN 81 MG CHEWABLE TABLET 81 MG: 81 TABLET CHEWABLE at 09:24

## 2019-10-06 RX ADMIN — GABAPENTIN 400 MG: 400 CAPSULE ORAL at 09:24

## 2019-10-06 RX ADMIN — INSULIN LISPRO 12 UNITS: 100 INJECTION, SOLUTION INTRAVENOUS; SUBCUTANEOUS at 06:31

## 2019-10-06 RX ADMIN — Medication 10 ML: at 11:04

## 2019-10-06 RX ADMIN — IPRATROPIUM BROMIDE AND ALBUTEROL SULFATE 1 AMPULE: .5; 3 SOLUTION RESPIRATORY (INHALATION) at 08:17

## 2019-10-06 RX ADMIN — ENOXAPARIN SODIUM 40 MG: 40 INJECTION SUBCUTANEOUS at 09:25

## 2019-10-06 RX ADMIN — OXYBUTYNIN CHLORIDE 5 MG: 5 TABLET ORAL at 09:24

## 2019-10-06 RX ADMIN — CLONIDINE HYDROCHLORIDE 0.2 MG: 0.2 TABLET ORAL at 09:23

## 2019-10-06 RX ADMIN — METFORMIN HYDROCHLORIDE 500 MG: 500 TABLET ORAL at 09:22

## 2019-10-06 RX ADMIN — Medication 10 ML: at 09:22

## 2019-10-06 RX ADMIN — FORMOTEROL FUMARATE DIHYDRATE 20 MCG: 20 SOLUTION RESPIRATORY (INHALATION) at 08:16

## 2019-10-06 ASSESSMENT — PAIN SCALES - GENERAL
PAINLEVEL_OUTOF10: 0
PAINLEVEL_OUTOF10: 0

## 2019-10-07 ENCOUNTER — TELEPHONE (OUTPATIENT)
Dept: FAMILY MEDICINE CLINIC | Age: 69
End: 2019-10-07

## 2019-10-07 ENCOUNTER — CARE COORDINATION (OUTPATIENT)
Dept: CARE COORDINATION | Age: 69
End: 2019-10-07

## 2019-10-07 DIAGNOSIS — I10 ESSENTIAL HYPERTENSION: ICD-10-CM

## 2019-10-07 RX ORDER — CLONIDINE HYDROCHLORIDE 0.2 MG/1
0.2 TABLET ORAL 2 TIMES DAILY
Qty: 180 TABLET | Refills: 3 | Status: SHIPPED | OUTPATIENT
Start: 2019-10-07 | End: 2020-01-21 | Stop reason: SDUPTHER

## 2019-10-07 RX ORDER — CLONIDINE HYDROCHLORIDE 0.2 MG/1
0.2 TABLET ORAL 2 TIMES DAILY
Qty: 30 TABLET | Refills: 0 | OUTPATIENT
Start: 2019-10-07

## 2019-10-08 ENCOUNTER — TELEPHONE (OUTPATIENT)
Dept: FAMILY MEDICINE CLINIC | Age: 69
End: 2019-10-08

## 2019-10-14 ENCOUNTER — OFFICE VISIT (OUTPATIENT)
Dept: BREAST CENTER | Age: 69
End: 2019-10-14
Payer: MEDICARE

## 2019-10-14 VITALS
TEMPERATURE: 98.6 F | OXYGEN SATURATION: 96 % | DIASTOLIC BLOOD PRESSURE: 70 MMHG | BODY MASS INDEX: 40.02 KG/M2 | HEART RATE: 72 BPM | HEIGHT: 66 IN | WEIGHT: 249 LBS | RESPIRATION RATE: 16 BRPM | SYSTOLIC BLOOD PRESSURE: 136 MMHG

## 2019-10-14 DIAGNOSIS — J43.8 OTHER EMPHYSEMA (HCC): Primary | Chronic | ICD-10-CM

## 2019-10-14 DIAGNOSIS — Z85.3 PERSONAL HISTORY OF BREAST CANCER: ICD-10-CM

## 2019-10-14 PROBLEM — J96.01 ACUTE RESPIRATORY FAILURE WITH HYPOXEMIA (HCC): Status: RESOLVED | Noted: 2019-10-02 | Resolved: 2019-10-14

## 2019-10-14 PROCEDURE — G8598 ASA/ANTIPLAT THER USED: HCPCS | Performed by: NURSE PRACTITIONER

## 2019-10-14 PROCEDURE — 4040F PNEUMOC VAC/ADMIN/RCVD: CPT | Performed by: NURSE PRACTITIONER

## 2019-10-14 PROCEDURE — G8427 DOCREV CUR MEDS BY ELIG CLIN: HCPCS | Performed by: NURSE PRACTITIONER

## 2019-10-14 PROCEDURE — 99214 OFFICE O/P EST MOD 30 MIN: CPT | Performed by: NURSE PRACTITIONER

## 2019-10-14 PROCEDURE — G8399 PT W/DXA RESULTS DOCUMENT: HCPCS | Performed by: NURSE PRACTITIONER

## 2019-10-14 PROCEDURE — 1090F PRES/ABSN URINE INCON ASSESS: CPT | Performed by: NURSE PRACTITIONER

## 2019-10-14 PROCEDURE — 1036F TOBACCO NON-USER: CPT | Performed by: NURSE PRACTITIONER

## 2019-10-14 PROCEDURE — G8484 FLU IMMUNIZE NO ADMIN: HCPCS | Performed by: NURSE PRACTITIONER

## 2019-10-14 PROCEDURE — G8417 CALC BMI ABV UP PARAM F/U: HCPCS | Performed by: NURSE PRACTITIONER

## 2019-10-14 PROCEDURE — 99213 OFFICE O/P EST LOW 20 MIN: CPT | Performed by: NURSE PRACTITIONER

## 2019-10-14 PROCEDURE — 1111F DSCHRG MED/CURRENT MED MERGE: CPT | Performed by: NURSE PRACTITIONER

## 2019-10-14 PROCEDURE — 3017F COLORECTAL CA SCREEN DOC REV: CPT | Performed by: NURSE PRACTITIONER

## 2019-10-14 PROCEDURE — 1123F ACP DISCUSS/DSCN MKR DOCD: CPT | Performed by: NURSE PRACTITIONER

## 2019-10-14 ASSESSMENT — ENCOUNTER SYMPTOMS
COUGH: 1
SHORTNESS OF BREATH: 1

## 2019-10-15 ENCOUNTER — OFFICE VISIT (OUTPATIENT)
Dept: FAMILY MEDICINE CLINIC | Age: 69
End: 2019-10-15
Payer: MEDICARE

## 2019-10-15 VITALS
BODY MASS INDEX: 40.02 KG/M2 | HEART RATE: 70 BPM | RESPIRATION RATE: 18 BRPM | WEIGHT: 249 LBS | HEIGHT: 66 IN | DIASTOLIC BLOOD PRESSURE: 72 MMHG | TEMPERATURE: 98.2 F | SYSTOLIC BLOOD PRESSURE: 136 MMHG

## 2019-10-15 DIAGNOSIS — J18.9 PNEUMONIA DUE TO INFECTIOUS ORGANISM, UNSPECIFIED LATERALITY, UNSPECIFIED PART OF LUNG: Primary | ICD-10-CM

## 2019-10-15 DIAGNOSIS — E11.65 UNCONTROLLED TYPE 2 DIABETES MELLITUS WITH HYPERGLYCEMIA (HCC): Chronic | ICD-10-CM

## 2019-10-15 DIAGNOSIS — J43.8 OTHER EMPHYSEMA (HCC): Chronic | ICD-10-CM

## 2019-10-15 DIAGNOSIS — I27.20 PULMONARY HYPERTENSION (HCC): ICD-10-CM

## 2019-10-15 DIAGNOSIS — I10 ESSENTIAL HYPERTENSION: Chronic | ICD-10-CM

## 2019-10-15 PROCEDURE — 3023F SPIROM DOC REV: CPT | Performed by: NURSE PRACTITIONER

## 2019-10-15 PROCEDURE — 3046F HEMOGLOBIN A1C LEVEL >9.0%: CPT | Performed by: NURSE PRACTITIONER

## 2019-10-15 PROCEDURE — 1123F ACP DISCUSS/DSCN MKR DOCD: CPT | Performed by: NURSE PRACTITIONER

## 2019-10-15 PROCEDURE — 99214 OFFICE O/P EST MOD 30 MIN: CPT | Performed by: NURSE PRACTITIONER

## 2019-10-15 PROCEDURE — 1090F PRES/ABSN URINE INCON ASSESS: CPT | Performed by: NURSE PRACTITIONER

## 2019-10-15 PROCEDURE — G8926 SPIRO NO PERF OR DOC: HCPCS | Performed by: NURSE PRACTITIONER

## 2019-10-15 PROCEDURE — G8427 DOCREV CUR MEDS BY ELIG CLIN: HCPCS | Performed by: NURSE PRACTITIONER

## 2019-10-15 PROCEDURE — G8482 FLU IMMUNIZE ORDER/ADMIN: HCPCS | Performed by: NURSE PRACTITIONER

## 2019-10-15 PROCEDURE — G0008 ADMIN INFLUENZA VIRUS VAC: HCPCS | Performed by: NURSE PRACTITIONER

## 2019-10-15 PROCEDURE — 1036F TOBACCO NON-USER: CPT | Performed by: NURSE PRACTITIONER

## 2019-10-15 PROCEDURE — 1111F DSCHRG MED/CURRENT MED MERGE: CPT | Performed by: NURSE PRACTITIONER

## 2019-10-15 PROCEDURE — 2022F DILAT RTA XM EVC RTNOPTHY: CPT | Performed by: NURSE PRACTITIONER

## 2019-10-15 PROCEDURE — G8598 ASA/ANTIPLAT THER USED: HCPCS | Performed by: NURSE PRACTITIONER

## 2019-10-15 PROCEDURE — G8417 CALC BMI ABV UP PARAM F/U: HCPCS | Performed by: NURSE PRACTITIONER

## 2019-10-15 PROCEDURE — 90653 IIV ADJUVANT VACCINE IM: CPT | Performed by: NURSE PRACTITIONER

## 2019-10-15 PROCEDURE — G8399 PT W/DXA RESULTS DOCUMENT: HCPCS | Performed by: NURSE PRACTITIONER

## 2019-10-15 PROCEDURE — 3017F COLORECTAL CA SCREEN DOC REV: CPT | Performed by: NURSE PRACTITIONER

## 2019-10-15 PROCEDURE — 4040F PNEUMOC VAC/ADMIN/RCVD: CPT | Performed by: NURSE PRACTITIONER

## 2019-10-15 ASSESSMENT — ENCOUNTER SYMPTOMS
PHOTOPHOBIA: 1
GASTROINTESTINAL NEGATIVE: 1
ALLERGIC/IMMUNOLOGIC NEGATIVE: 1
DIARRHEA: 1
WHEEZING: 1
COUGH: 1
TROUBLE SWALLOWING: 1

## 2019-10-28 ENCOUNTER — OFFICE VISIT (OUTPATIENT)
Dept: FAMILY MEDICINE CLINIC | Age: 69
End: 2019-10-28
Payer: MEDICARE

## 2019-10-28 VITALS
BODY MASS INDEX: 41.65 KG/M2 | SYSTOLIC BLOOD PRESSURE: 138 MMHG | HEIGHT: 65 IN | DIASTOLIC BLOOD PRESSURE: 70 MMHG | TEMPERATURE: 98.3 F | WEIGHT: 250 LBS | OXYGEN SATURATION: 93 % | HEART RATE: 81 BPM

## 2019-10-28 DIAGNOSIS — J44.1 COPD EXACERBATION (HCC): ICD-10-CM

## 2019-10-28 DIAGNOSIS — J43.8 OTHER EMPHYSEMA (HCC): Primary | ICD-10-CM

## 2019-10-28 DIAGNOSIS — Z09 HOSPITAL DISCHARGE FOLLOW-UP: ICD-10-CM

## 2019-10-28 PROCEDURE — 1111F DSCHRG MED/CURRENT MED MERGE: CPT | Performed by: NURSE PRACTITIONER

## 2019-10-28 PROCEDURE — G8482 FLU IMMUNIZE ORDER/ADMIN: HCPCS | Performed by: NURSE PRACTITIONER

## 2019-10-28 PROCEDURE — 3023F SPIROM DOC REV: CPT | Performed by: NURSE PRACTITIONER

## 2019-10-28 PROCEDURE — G8926 SPIRO NO PERF OR DOC: HCPCS | Performed by: NURSE PRACTITIONER

## 2019-10-28 PROCEDURE — G8598 ASA/ANTIPLAT THER USED: HCPCS | Performed by: NURSE PRACTITIONER

## 2019-10-28 PROCEDURE — G8427 DOCREV CUR MEDS BY ELIG CLIN: HCPCS | Performed by: NURSE PRACTITIONER

## 2019-10-28 PROCEDURE — G8417 CALC BMI ABV UP PARAM F/U: HCPCS | Performed by: NURSE PRACTITIONER

## 2019-10-28 PROCEDURE — 4040F PNEUMOC VAC/ADMIN/RCVD: CPT | Performed by: NURSE PRACTITIONER

## 2019-10-28 PROCEDURE — 3017F COLORECTAL CA SCREEN DOC REV: CPT | Performed by: NURSE PRACTITIONER

## 2019-10-28 PROCEDURE — 1123F ACP DISCUSS/DSCN MKR DOCD: CPT | Performed by: NURSE PRACTITIONER

## 2019-10-28 PROCEDURE — G8399 PT W/DXA RESULTS DOCUMENT: HCPCS | Performed by: NURSE PRACTITIONER

## 2019-10-28 PROCEDURE — 1036F TOBACCO NON-USER: CPT | Performed by: NURSE PRACTITIONER

## 2019-10-28 PROCEDURE — 1090F PRES/ABSN URINE INCON ASSESS: CPT | Performed by: NURSE PRACTITIONER

## 2019-10-28 PROCEDURE — 96372 THER/PROPH/DIAG INJ SC/IM: CPT | Performed by: NURSE PRACTITIONER

## 2019-10-28 PROCEDURE — 99214 OFFICE O/P EST MOD 30 MIN: CPT | Performed by: NURSE PRACTITIONER

## 2019-10-28 RX ORDER — TRIAMCINOLONE ACETONIDE 40 MG/ML
40 INJECTION, SUSPENSION INTRA-ARTICULAR; INTRAMUSCULAR ONCE
Status: COMPLETED | OUTPATIENT
Start: 2019-10-28 | End: 2019-10-31

## 2019-10-28 ASSESSMENT — ENCOUNTER SYMPTOMS
DIARRHEA: 0
SHORTNESS OF BREATH: 1
SORE THROAT: 0
SINUS PAIN: 0
COUGH: 1
COLOR CHANGE: 0
CONSTIPATION: 0
CHEST TIGHTNESS: 0
WHEEZING: 1
EYE PAIN: 0
NAUSEA: 0
VOMITING: 0

## 2019-10-31 RX ADMIN — TRIAMCINOLONE ACETONIDE 40 MG: 40 INJECTION, SUSPENSION INTRA-ARTICULAR; INTRAMUSCULAR at 08:02

## 2019-11-27 ENCOUNTER — TELEPHONE (OUTPATIENT)
Dept: FAMILY MEDICINE CLINIC | Age: 69
End: 2019-11-27

## 2019-11-29 ENCOUNTER — OFFICE VISIT (OUTPATIENT)
Dept: FAMILY MEDICINE CLINIC | Age: 69
End: 2019-11-29
Payer: MEDICARE

## 2019-11-29 VITALS
DIASTOLIC BLOOD PRESSURE: 76 MMHG | HEIGHT: 66 IN | TEMPERATURE: 98.2 F | WEIGHT: 245 LBS | BODY MASS INDEX: 39.37 KG/M2 | HEART RATE: 78 BPM | SYSTOLIC BLOOD PRESSURE: 138 MMHG | OXYGEN SATURATION: 96 %

## 2019-11-29 DIAGNOSIS — J44.1 COPD EXACERBATION (HCC): ICD-10-CM

## 2019-11-29 DIAGNOSIS — E78.5 HYPERLIPIDEMIA WITH TARGET LDL LESS THAN 70: Primary | ICD-10-CM

## 2019-11-29 DIAGNOSIS — G47.01 INSOMNIA DUE TO MEDICAL CONDITION: ICD-10-CM

## 2019-11-29 DIAGNOSIS — J18.9 PNEUMONIA DUE TO INFECTIOUS ORGANISM, UNSPECIFIED LATERALITY, UNSPECIFIED PART OF LUNG: ICD-10-CM

## 2019-11-29 DIAGNOSIS — E55.9 VITAMIN D DEFICIENCY: ICD-10-CM

## 2019-11-29 DIAGNOSIS — E11.65 UNCONTROLLED TYPE 2 DIABETES MELLITUS WITH HYPERGLYCEMIA (HCC): ICD-10-CM

## 2019-11-29 LAB
CHP ED QC CHECK: NORMAL
GLUCOSE BLD-MCNC: 91 MG/DL

## 2019-11-29 PROCEDURE — 1090F PRES/ABSN URINE INCON ASSESS: CPT | Performed by: NURSE PRACTITIONER

## 2019-11-29 PROCEDURE — G8598 ASA/ANTIPLAT THER USED: HCPCS | Performed by: NURSE PRACTITIONER

## 2019-11-29 PROCEDURE — 3046F HEMOGLOBIN A1C LEVEL >9.0%: CPT | Performed by: NURSE PRACTITIONER

## 2019-11-29 PROCEDURE — 1123F ACP DISCUSS/DSCN MKR DOCD: CPT | Performed by: NURSE PRACTITIONER

## 2019-11-29 PROCEDURE — G8427 DOCREV CUR MEDS BY ELIG CLIN: HCPCS | Performed by: NURSE PRACTITIONER

## 2019-11-29 PROCEDURE — 94010 BREATHING CAPACITY TEST: CPT | Performed by: NURSE PRACTITIONER

## 2019-11-29 PROCEDURE — G8399 PT W/DXA RESULTS DOCUMENT: HCPCS | Performed by: NURSE PRACTITIONER

## 2019-11-29 PROCEDURE — 99214 OFFICE O/P EST MOD 30 MIN: CPT | Performed by: NURSE PRACTITIONER

## 2019-11-29 PROCEDURE — 82962 GLUCOSE BLOOD TEST: CPT | Performed by: NURSE PRACTITIONER

## 2019-11-29 PROCEDURE — 3017F COLORECTAL CA SCREEN DOC REV: CPT | Performed by: NURSE PRACTITIONER

## 2019-11-29 PROCEDURE — G8926 SPIRO NO PERF OR DOC: HCPCS | Performed by: NURSE PRACTITIONER

## 2019-11-29 PROCEDURE — 2022F DILAT RTA XM EVC RTNOPTHY: CPT | Performed by: NURSE PRACTITIONER

## 2019-11-29 PROCEDURE — 4040F PNEUMOC VAC/ADMIN/RCVD: CPT | Performed by: NURSE PRACTITIONER

## 2019-11-29 PROCEDURE — G8417 CALC BMI ABV UP PARAM F/U: HCPCS | Performed by: NURSE PRACTITIONER

## 2019-11-29 PROCEDURE — 1036F TOBACCO NON-USER: CPT | Performed by: NURSE PRACTITIONER

## 2019-11-29 PROCEDURE — G8482 FLU IMMUNIZE ORDER/ADMIN: HCPCS | Performed by: NURSE PRACTITIONER

## 2019-11-29 PROCEDURE — 3023F SPIROM DOC REV: CPT | Performed by: NURSE PRACTITIONER

## 2019-11-29 RX ORDER — FLUTICASONE FUROATE AND VILANTEROL 200; 25 UG/1; UG/1
1 POWDER RESPIRATORY (INHALATION) DAILY
Qty: 1 EACH | Refills: 0 | Status: SHIPPED | OUTPATIENT
Start: 2019-11-29 | End: 2020-01-21 | Stop reason: SDUPTHER

## 2019-11-29 RX ORDER — LANOLIN ALCOHOL/MO/W.PET/CERES
CREAM (GRAM) TOPICAL
Qty: 60 TABLET | Refills: 0 | Status: SHIPPED | OUTPATIENT
Start: 2019-11-29 | End: 2020-07-20 | Stop reason: SDUPTHER

## 2019-11-29 ASSESSMENT — ENCOUNTER SYMPTOMS
SHORTNESS OF BREATH: 1
EYES NEGATIVE: 1
GASTROINTESTINAL NEGATIVE: 1
VOICE CHANGE: 1
WHEEZING: 1
ALLERGIC/IMMUNOLOGIC NEGATIVE: 1

## 2019-12-02 ENCOUNTER — APPOINTMENT (OUTPATIENT)
Dept: GENERAL RADIOLOGY | Age: 69
DRG: 189 | End: 2019-12-02
Payer: MEDICARE

## 2019-12-02 ENCOUNTER — HOSPITAL ENCOUNTER (INPATIENT)
Age: 69
LOS: 3 days | Discharge: HOME HEALTH | DRG: 189 | End: 2019-12-05
Attending: EMERGENCY MEDICINE | Admitting: INTERNAL MEDICINE
Payer: MEDICARE

## 2019-12-02 DIAGNOSIS — J44.1 COPD EXACERBATION (HCC): Primary | ICD-10-CM

## 2019-12-02 PROBLEM — J96.01 ACUTE RESPIRATORY FAILURE WITH HYPOXEMIA (HCC): Status: ACTIVE | Noted: 2019-12-02

## 2019-12-02 LAB
ALBUMIN SERPL-MCNC: 3.6 G/DL (ref 3.5–5.2)
ALP BLD-CCNC: 133 U/L (ref 35–104)
ALT SERPL-CCNC: 5 U/L (ref 0–32)
ANION GAP SERPL CALCULATED.3IONS-SCNC: 12 MMOL/L (ref 7–16)
AST SERPL-CCNC: 13 U/L (ref 0–31)
B.E.: -0.8 MMOL/L (ref -3–3)
BASOPHILS ABSOLUTE: 0.03 E9/L (ref 0–0.2)
BASOPHILS RELATIVE PERCENT: 0.3 % (ref 0–2)
BILIRUB SERPL-MCNC: 0.3 MG/DL (ref 0–1.2)
BUN BLDV-MCNC: 7 MG/DL (ref 8–23)
CALCIUM SERPL-MCNC: 9.5 MG/DL (ref 8.6–10.2)
CHLORIDE BLD-SCNC: 103 MMOL/L (ref 98–107)
CHP ED QC CHECK: NORMAL
CO2: 27 MMOL/L (ref 22–29)
COHB: 0.7 % (ref 0–1.5)
CREAT SERPL-MCNC: 0.7 MG/DL (ref 0.5–1)
CRITICAL: ABNORMAL
DATE ANALYZED: ABNORMAL
DATE OF COLLECTION: ABNORMAL
EKG ATRIAL RATE: 91 BPM
EKG P AXIS: 56 DEGREES
EKG P-R INTERVAL: 130 MS
EKG Q-T INTERVAL: 426 MS
EKG QRS DURATION: 132 MS
EKG QTC CALCULATION (BAZETT): 523 MS
EKG R AXIS: 56 DEGREES
EKG T AXIS: 76 DEGREES
EKG VENTRICULAR RATE: 91 BPM
EOSINOPHILS ABSOLUTE: 0 E9/L (ref 0.05–0.5)
EOSINOPHILS RELATIVE PERCENT: 0 % (ref 0–6)
GFR AFRICAN AMERICAN: >60
GFR NON-AFRICAN AMERICAN: >60 ML/MIN/1.73
GLUCOSE BLD-MCNC: 122 MG/DL (ref 74–99)
GLUCOSE BLD-MCNC: 128 MG/DL
HBA1C MFR BLD: 9.4 % (ref 4–5.6)
HCO3: 23.7 MMOL/L (ref 22–26)
HCT VFR BLD CALC: 35.1 % (ref 34–48)
HEMOGLOBIN: 10.5 G/DL (ref 11.5–15.5)
HHB: 1.6 % (ref 0–5)
IMMATURE GRANULOCYTES #: 0.05 E9/L
IMMATURE GRANULOCYTES %: 0.4 % (ref 0–5)
LAB: ABNORMAL
LYMPHOCYTES ABSOLUTE: 1.46 E9/L (ref 1.5–4)
LYMPHOCYTES RELATIVE PERCENT: 12.8 % (ref 20–42)
Lab: ABNORMAL
MCH RBC QN AUTO: 25.1 PG (ref 26–35)
MCHC RBC AUTO-ENTMCNC: 29.9 % (ref 32–34.5)
MCV RBC AUTO: 83.8 FL (ref 80–99.9)
METER GLUCOSE: 128 MG/DL (ref 74–99)
METER GLUCOSE: 155 MG/DL (ref 74–99)
METHB: 0.2 % (ref 0–1.5)
MODE: ABNORMAL
MONOCYTES ABSOLUTE: 0.59 E9/L (ref 0.1–0.95)
MONOCYTES RELATIVE PERCENT: 5.2 % (ref 2–12)
NEUTROPHILS ABSOLUTE: 9.29 E9/L (ref 1.8–7.3)
NEUTROPHILS RELATIVE PERCENT: 81.3 % (ref 43–80)
O2 CONTENT: 16.3 ML/DL
O2 SATURATION: 98.4 % (ref 92–98.5)
O2HB: 97.5 % (ref 94–97)
OPERATOR ID: 359
PATIENT TEMP: 37 C
PCO2: 38.6 MMHG (ref 35–45)
PDW BLD-RTO: 14.6 FL (ref 11.5–15)
PH BLOOD GAS: 7.41 (ref 7.35–7.45)
PLATELET # BLD: 439 E9/L (ref 130–450)
PMV BLD AUTO: 10.6 FL (ref 7–12)
PO2: 135.4 MMHG (ref 60–100)
POTASSIUM SERPL-SCNC: 3.9 MMOL/L (ref 3.5–5)
PRO-BNP: 551 PG/ML (ref 0–125)
PROCALCITONIN: 0.04 NG/ML (ref 0–0.08)
RBC # BLD: 4.19 E12/L (ref 3.5–5.5)
SODIUM BLD-SCNC: 142 MMOL/L (ref 132–146)
SOURCE, BLOOD GAS: ABNORMAL
THB: 11.7 G/DL (ref 11.5–16.5)
TIME ANALYZED: 1300
TOTAL PROTEIN: 6.9 G/DL (ref 6.4–8.3)
TROPONIN: <0.01 NG/ML (ref 0–0.03)
WBC # BLD: 11.4 E9/L (ref 4.5–11.5)

## 2019-12-02 PROCEDURE — 84145 PROCALCITONIN (PCT): CPT

## 2019-12-02 PROCEDURE — 93005 ELECTROCARDIOGRAM TRACING: CPT | Performed by: PHYSICIAN ASSISTANT

## 2019-12-02 PROCEDURE — 93010 ELECTROCARDIOGRAM REPORT: CPT | Performed by: INTERNAL MEDICINE

## 2019-12-02 PROCEDURE — 99285 EMERGENCY DEPT VISIT HI MDM: CPT

## 2019-12-02 PROCEDURE — 6360000002 HC RX W HCPCS: Performed by: INTERNAL MEDICINE

## 2019-12-02 PROCEDURE — 2580000003 HC RX 258: Performed by: INTERNAL MEDICINE

## 2019-12-02 PROCEDURE — 96374 THER/PROPH/DIAG INJ IV PUSH: CPT

## 2019-12-02 PROCEDURE — 83036 HEMOGLOBIN GLYCOSYLATED A1C: CPT

## 2019-12-02 PROCEDURE — 80053 COMPREHEN METABOLIC PANEL: CPT

## 2019-12-02 PROCEDURE — 71045 X-RAY EXAM CHEST 1 VIEW: CPT

## 2019-12-02 PROCEDURE — 84484 ASSAY OF TROPONIN QUANT: CPT

## 2019-12-02 PROCEDURE — 2140000000 HC CCU INTERMEDIATE R&B

## 2019-12-02 PROCEDURE — 82805 BLOOD GASES W/O2 SATURATION: CPT

## 2019-12-02 PROCEDURE — 94640 AIRWAY INHALATION TREATMENT: CPT

## 2019-12-02 PROCEDURE — 6370000000 HC RX 637 (ALT 250 FOR IP): Performed by: PHYSICIAN ASSISTANT

## 2019-12-02 PROCEDURE — 6370000000 HC RX 637 (ALT 250 FOR IP): Performed by: INTERNAL MEDICINE

## 2019-12-02 PROCEDURE — 82962 GLUCOSE BLOOD TEST: CPT

## 2019-12-02 PROCEDURE — 96372 THER/PROPH/DIAG INJ SC/IM: CPT

## 2019-12-02 PROCEDURE — 85025 COMPLETE CBC W/AUTO DIFF WBC: CPT

## 2019-12-02 PROCEDURE — 94760 N-INVAS EAR/PLS OXIMETRY 1: CPT

## 2019-12-02 PROCEDURE — 83880 ASSAY OF NATRIURETIC PEPTIDE: CPT

## 2019-12-02 PROCEDURE — 36415 COLL VENOUS BLD VENIPUNCTURE: CPT

## 2019-12-02 PROCEDURE — 94664 DEMO&/EVAL PT USE INHALER: CPT

## 2019-12-02 RX ORDER — ASPIRIN 81 MG/1
81 TABLET, CHEWABLE ORAL DAILY
Status: DISCONTINUED | OUTPATIENT
Start: 2019-12-03 | End: 2019-12-05 | Stop reason: HOSPADM

## 2019-12-02 RX ORDER — MONTELUKAST SODIUM 10 MG/1
10 TABLET ORAL EVERY EVENING
Status: DISCONTINUED | OUTPATIENT
Start: 2019-12-02 | End: 2019-12-05 | Stop reason: HOSPADM

## 2019-12-02 RX ORDER — DEXTROSE MONOHYDRATE 50 MG/ML
100 INJECTION, SOLUTION INTRAVENOUS PRN
Status: DISCONTINUED | OUTPATIENT
Start: 2019-12-02 | End: 2019-12-05 | Stop reason: HOSPADM

## 2019-12-02 RX ORDER — PREDNISONE 20 MG/1
20 TABLET ORAL 2 TIMES DAILY
Status: DISCONTINUED | OUTPATIENT
Start: 2019-12-04 | End: 2019-12-05 | Stop reason: HOSPADM

## 2019-12-02 RX ORDER — IPRATROPIUM BROMIDE AND ALBUTEROL SULFATE 2.5; .5 MG/3ML; MG/3ML
1 SOLUTION RESPIRATORY (INHALATION)
Status: DISCONTINUED | OUTPATIENT
Start: 2019-12-03 | End: 2019-12-05 | Stop reason: HOSPADM

## 2019-12-02 RX ORDER — METHYLPREDNISOLONE SODIUM SUCCINATE 125 MG/2ML
40 INJECTION, POWDER, LYOPHILIZED, FOR SOLUTION INTRAMUSCULAR; INTRAVENOUS EVERY 12 HOURS
Status: COMPLETED | OUTPATIENT
Start: 2019-12-02 | End: 2019-12-04

## 2019-12-02 RX ORDER — CLONIDINE HYDROCHLORIDE 0.2 MG/1
0.2 TABLET ORAL 2 TIMES DAILY
Status: DISCONTINUED | OUTPATIENT
Start: 2019-12-02 | End: 2019-12-05 | Stop reason: HOSPADM

## 2019-12-02 RX ORDER — LOSARTAN POTASSIUM AND HYDROCHLOROTHIAZIDE 25; 100 MG/1; MG/1
1 TABLET ORAL DAILY
Status: DISCONTINUED | OUTPATIENT
Start: 2019-12-02 | End: 2019-12-02 | Stop reason: CLARIF

## 2019-12-02 RX ORDER — ACETAMINOPHEN 325 MG/1
650 TABLET ORAL EVERY 4 HOURS PRN
Status: DISCONTINUED | OUTPATIENT
Start: 2019-12-02 | End: 2019-12-05 | Stop reason: HOSPADM

## 2019-12-02 RX ORDER — ANASTROZOLE 1 MG/1
1 TABLET ORAL DAILY
Status: DISCONTINUED | OUTPATIENT
Start: 2019-12-03 | End: 2019-12-05 | Stop reason: HOSPADM

## 2019-12-02 RX ORDER — HYDRALAZINE HYDROCHLORIDE 20 MG/ML
5 INJECTION INTRAMUSCULAR; INTRAVENOUS EVERY 4 HOURS PRN
Status: DISCONTINUED | OUTPATIENT
Start: 2019-12-02 | End: 2019-12-05 | Stop reason: HOSPADM

## 2019-12-02 RX ORDER — OXYBUTYNIN CHLORIDE 5 MG/1
5 TABLET ORAL 3 TIMES DAILY
Status: DISCONTINUED | OUTPATIENT
Start: 2019-12-02 | End: 2019-12-05 | Stop reason: HOSPADM

## 2019-12-02 RX ORDER — GABAPENTIN 400 MG/1
400 CAPSULE ORAL 3 TIMES DAILY
Status: DISCONTINUED | OUTPATIENT
Start: 2019-12-02 | End: 2019-12-05 | Stop reason: HOSPADM

## 2019-12-02 RX ORDER — PRAVASTATIN SODIUM 20 MG
80 TABLET ORAL DAILY
Status: DISCONTINUED | OUTPATIENT
Start: 2019-12-02 | End: 2019-12-05 | Stop reason: HOSPADM

## 2019-12-02 RX ORDER — ONDANSETRON 2 MG/ML
4 INJECTION INTRAMUSCULAR; INTRAVENOUS EVERY 6 HOURS PRN
Status: DISCONTINUED | OUTPATIENT
Start: 2019-12-02 | End: 2019-12-05 | Stop reason: HOSPADM

## 2019-12-02 RX ORDER — INSULIN GLARGINE 100 [IU]/ML
35 INJECTION, SOLUTION SUBCUTANEOUS NIGHTLY
Status: DISCONTINUED | OUTPATIENT
Start: 2019-12-02 | End: 2019-12-05 | Stop reason: HOSPADM

## 2019-12-02 RX ORDER — IPRATROPIUM BROMIDE AND ALBUTEROL SULFATE 2.5; .5 MG/3ML; MG/3ML
1 SOLUTION RESPIRATORY (INHALATION)
Status: COMPLETED | OUTPATIENT
Start: 2019-12-02 | End: 2019-12-02

## 2019-12-02 RX ORDER — SODIUM CHLORIDE 0.9 % (FLUSH) 0.9 %
10 SYRINGE (ML) INJECTION EVERY 12 HOURS SCHEDULED
Status: DISCONTINUED | OUTPATIENT
Start: 2019-12-02 | End: 2019-12-05 | Stop reason: HOSPADM

## 2019-12-02 RX ORDER — DEXTROSE MONOHYDRATE 25 G/50ML
12.5 INJECTION, SOLUTION INTRAVENOUS PRN
Status: DISCONTINUED | OUTPATIENT
Start: 2019-12-02 | End: 2019-12-05 | Stop reason: HOSPADM

## 2019-12-02 RX ORDER — AZITHROMYCIN 250 MG/1
250 TABLET, FILM COATED ORAL DAILY
Status: DISCONTINUED | OUTPATIENT
Start: 2019-12-03 | End: 2019-12-05 | Stop reason: HOSPADM

## 2019-12-02 RX ORDER — AMLODIPINE BESYLATE 5 MG/1
5 TABLET ORAL DAILY
Status: DISCONTINUED | OUTPATIENT
Start: 2019-12-02 | End: 2019-12-05 | Stop reason: HOSPADM

## 2019-12-02 RX ORDER — NICOTINE POLACRILEX 4 MG
15 LOZENGE BUCCAL PRN
Status: DISCONTINUED | OUTPATIENT
Start: 2019-12-02 | End: 2019-12-05 | Stop reason: HOSPADM

## 2019-12-02 RX ORDER — LOSARTAN POTASSIUM 50 MG/1
100 TABLET ORAL DAILY
Status: DISCONTINUED | OUTPATIENT
Start: 2019-12-02 | End: 2019-12-05 | Stop reason: HOSPADM

## 2019-12-02 RX ORDER — HYDROCHLOROTHIAZIDE 25 MG/1
25 TABLET ORAL DAILY
Status: DISCONTINUED | OUTPATIENT
Start: 2019-12-02 | End: 2019-12-05 | Stop reason: HOSPADM

## 2019-12-02 RX ORDER — AZITHROMYCIN 250 MG/1
500 TABLET, FILM COATED ORAL DAILY
Status: COMPLETED | OUTPATIENT
Start: 2019-12-02 | End: 2019-12-02

## 2019-12-02 RX ORDER — LANOLIN ALCOHOL/MO/W.PET/CERES
1000 CREAM (GRAM) TOPICAL DAILY
Status: DISCONTINUED | OUTPATIENT
Start: 2019-12-03 | End: 2019-12-05 | Stop reason: HOSPADM

## 2019-12-02 RX ORDER — LANOLIN ALCOHOL/MO/W.PET/CERES
3 CREAM (GRAM) TOPICAL NIGHTLY PRN
Status: DISCONTINUED | OUTPATIENT
Start: 2019-12-02 | End: 2019-12-05 | Stop reason: HOSPADM

## 2019-12-02 RX ORDER — SODIUM CHLORIDE 0.9 % (FLUSH) 0.9 %
10 SYRINGE (ML) INJECTION PRN
Status: DISCONTINUED | OUTPATIENT
Start: 2019-12-02 | End: 2019-12-05 | Stop reason: HOSPADM

## 2019-12-02 RX ORDER — OYSTER SHELL CALCIUM WITH VITAMIN D 500; 200 MG/1; [IU]/1
1 TABLET, FILM COATED ORAL 2 TIMES DAILY
Status: DISCONTINUED | OUTPATIENT
Start: 2019-12-02 | End: 2019-12-05 | Stop reason: HOSPADM

## 2019-12-02 RX ADMIN — OXYBUTYNIN CHLORIDE 5 MG: 5 TABLET ORAL at 23:19

## 2019-12-02 RX ADMIN — IPRATROPIUM BROMIDE AND ALBUTEROL SULFATE 1 AMPULE: .5; 3 SOLUTION RESPIRATORY (INHALATION) at 12:24

## 2019-12-02 RX ADMIN — CALCIUM CARBONATE-VITAMIN D TAB 500 MG-200 UNIT 1 TABLET: 500-200 TAB at 23:18

## 2019-12-02 RX ADMIN — AZITHROMYCIN 500 MG: 250 TABLET, FILM COATED ORAL at 23:18

## 2019-12-02 RX ADMIN — INSULIN GLARGINE 35 UNITS: 100 INJECTION, SOLUTION SUBCUTANEOUS at 23:26

## 2019-12-02 RX ADMIN — ENOXAPARIN SODIUM 40 MG: 40 INJECTION SUBCUTANEOUS at 23:20

## 2019-12-02 RX ADMIN — METHYLPREDNISOLONE SODIUM SUCCINATE 40 MG: 125 INJECTION, POWDER, FOR SOLUTION INTRAMUSCULAR; INTRAVENOUS at 23:19

## 2019-12-02 RX ADMIN — AMLODIPINE BESYLATE 5 MG: 5 TABLET ORAL at 23:19

## 2019-12-02 RX ADMIN — MONTELUKAST SODIUM 10 MG: 10 TABLET ORAL at 23:19

## 2019-12-02 RX ADMIN — SODIUM CHLORIDE, PRESERVATIVE FREE 10 ML: 5 INJECTION INTRAVENOUS at 23:22

## 2019-12-02 RX ADMIN — METOPROLOL TARTRATE 25 MG: 25 TABLET ORAL at 23:20

## 2019-12-02 RX ADMIN — GABAPENTIN 400 MG: 400 CAPSULE ORAL at 23:20

## 2019-12-02 RX ADMIN — METFORMIN HYDROCHLORIDE 500 MG: 500 TABLET ORAL at 23:18

## 2019-12-02 RX ADMIN — IPRATROPIUM BROMIDE AND ALBUTEROL SULFATE 1 AMPULE: .5; 3 SOLUTION RESPIRATORY (INHALATION) at 12:22

## 2019-12-02 RX ADMIN — MELATONIN 3 MG ORAL TABLET 3 MG: 3 TABLET ORAL at 23:20

## 2019-12-02 RX ADMIN — IPRATROPIUM BROMIDE AND ALBUTEROL SULFATE 1 AMPULE: .5; 3 SOLUTION RESPIRATORY (INHALATION) at 12:19

## 2019-12-02 ASSESSMENT — PAIN SCALES - GENERAL: PAINLEVEL_OUTOF10: 0

## 2019-12-03 PROBLEM — J44.1 COPD EXACERBATION (HCC): Status: RESOLVED | Noted: 2019-12-02 | Resolved: 2019-12-03

## 2019-12-03 LAB
ANION GAP SERPL CALCULATED.3IONS-SCNC: 8 MMOL/L (ref 7–16)
BUN BLDV-MCNC: 8 MG/DL (ref 8–23)
CALCIUM SERPL-MCNC: 9.9 MG/DL (ref 8.6–10.2)
CHLORIDE BLD-SCNC: 102 MMOL/L (ref 98–107)
CO2: 30 MMOL/L (ref 22–29)
CREAT SERPL-MCNC: 0.6 MG/DL (ref 0.5–1)
GFR AFRICAN AMERICAN: >60
GFR NON-AFRICAN AMERICAN: >60 ML/MIN/1.73
GLUCOSE BLD-MCNC: 178 MG/DL (ref 74–99)
HCT VFR BLD CALC: 37.5 % (ref 34–48)
HEMOGLOBIN: 11.2 G/DL (ref 11.5–15.5)
MCH RBC QN AUTO: 25.3 PG (ref 26–35)
MCHC RBC AUTO-ENTMCNC: 29.9 % (ref 32–34.5)
MCV RBC AUTO: 84.8 FL (ref 80–99.9)
METER GLUCOSE: 164 MG/DL (ref 74–99)
METER GLUCOSE: 254 MG/DL (ref 74–99)
METER GLUCOSE: 264 MG/DL (ref 74–99)
METER GLUCOSE: 290 MG/DL (ref 74–99)
PDW BLD-RTO: 14.7 FL (ref 11.5–15)
PLATELET # BLD: 457 E9/L (ref 130–450)
PMV BLD AUTO: 10.9 FL (ref 7–12)
POTASSIUM REFLEX MAGNESIUM: 4.9 MMOL/L (ref 3.5–5)
PROCALCITONIN: 0.03 NG/ML (ref 0–0.08)
RBC # BLD: 4.42 E12/L (ref 3.5–5.5)
SODIUM BLD-SCNC: 140 MMOL/L (ref 132–146)
WBC # BLD: 10.4 E9/L (ref 4.5–11.5)

## 2019-12-03 PROCEDURE — 2580000003 HC RX 258: Performed by: INTERNAL MEDICINE

## 2019-12-03 PROCEDURE — 97165 OT EVAL LOW COMPLEX 30 MIN: CPT

## 2019-12-03 PROCEDURE — 80048 BASIC METABOLIC PNL TOTAL CA: CPT

## 2019-12-03 PROCEDURE — 97161 PT EVAL LOW COMPLEX 20 MIN: CPT

## 2019-12-03 PROCEDURE — 82962 GLUCOSE BLOOD TEST: CPT

## 2019-12-03 PROCEDURE — 96372 THER/PROPH/DIAG INJ SC/IM: CPT

## 2019-12-03 PROCEDURE — 97535 SELF CARE MNGMENT TRAINING: CPT

## 2019-12-03 PROCEDURE — 84145 PROCALCITONIN (PCT): CPT

## 2019-12-03 PROCEDURE — 96376 TX/PRO/DX INJ SAME DRUG ADON: CPT

## 2019-12-03 PROCEDURE — 85027 COMPLETE CBC AUTOMATED: CPT

## 2019-12-03 PROCEDURE — 36415 COLL VENOUS BLD VENIPUNCTURE: CPT

## 2019-12-03 PROCEDURE — 6360000002 HC RX W HCPCS: Performed by: INTERNAL MEDICINE

## 2019-12-03 PROCEDURE — 6370000000 HC RX 637 (ALT 250 FOR IP): Performed by: INTERNAL MEDICINE

## 2019-12-03 PROCEDURE — 94640 AIRWAY INHALATION TREATMENT: CPT

## 2019-12-03 PROCEDURE — 97530 THERAPEUTIC ACTIVITIES: CPT

## 2019-12-03 PROCEDURE — 2700000000 HC OXYGEN THERAPY PER DAY

## 2019-12-03 PROCEDURE — 2140000000 HC CCU INTERMEDIATE R&B

## 2019-12-03 RX ORDER — AZITHROMYCIN 250 MG/1
250 TABLET, FILM COATED ORAL DAILY
Qty: 3 TABLET | Refills: 0 | Status: SHIPPED | OUTPATIENT
Start: 2019-12-03 | End: 2019-12-06

## 2019-12-03 RX ORDER — PREDNISONE 10 MG/1
TABLET ORAL
Qty: 20 TABLET | Refills: 0 | Status: SHIPPED | OUTPATIENT
Start: 2019-12-03 | End: 2019-12-24

## 2019-12-03 RX ORDER — BUDESONIDE 0.5 MG/2ML
500 INHALANT ORAL 2 TIMES DAILY
Status: DISCONTINUED | OUTPATIENT
Start: 2019-12-03 | End: 2019-12-05 | Stop reason: HOSPADM

## 2019-12-03 RX ORDER — FORMOTEROL FUMARATE 20 UG/2ML
20 SOLUTION RESPIRATORY (INHALATION) 2 TIMES DAILY
Status: DISCONTINUED | OUTPATIENT
Start: 2019-12-03 | End: 2019-12-05 | Stop reason: HOSPADM

## 2019-12-03 RX ADMIN — METOPROLOL TARTRATE 25 MG: 25 TABLET ORAL at 10:07

## 2019-12-03 RX ADMIN — FORMOTEROL FUMARATE DIHYDRATE 20 MCG: 20 SOLUTION RESPIRATORY (INHALATION) at 21:31

## 2019-12-03 RX ADMIN — IPRATROPIUM BROMIDE AND ALBUTEROL SULFATE 1 AMPULE: .5; 3 SOLUTION RESPIRATORY (INHALATION) at 21:31

## 2019-12-03 RX ADMIN — IPRATROPIUM BROMIDE AND ALBUTEROL SULFATE 1 AMPULE: .5; 3 SOLUTION RESPIRATORY (INHALATION) at 09:47

## 2019-12-03 RX ADMIN — GABAPENTIN 400 MG: 400 CAPSULE ORAL at 21:50

## 2019-12-03 RX ADMIN — CALCIUM CARBONATE-VITAMIN D TAB 500 MG-200 UNIT 1 TABLET: 500-200 TAB at 21:50

## 2019-12-03 RX ADMIN — FORMOTEROL FUMARATE DIHYDRATE 20 MCG: 20 SOLUTION RESPIRATORY (INHALATION) at 10:18

## 2019-12-03 RX ADMIN — LOSARTAN POTASSIUM 100 MG: 50 TABLET, FILM COATED ORAL at 10:06

## 2019-12-03 RX ADMIN — IPRATROPIUM BROMIDE AND ALBUTEROL SULFATE 1 AMPULE: .5; 3 SOLUTION RESPIRATORY (INHALATION) at 12:38

## 2019-12-03 RX ADMIN — OXYBUTYNIN CHLORIDE 5 MG: 5 TABLET ORAL at 10:06

## 2019-12-03 RX ADMIN — METHYLPREDNISOLONE SODIUM SUCCINATE 40 MG: 125 INJECTION, POWDER, FOR SOLUTION INTRAMUSCULAR; INTRAVENOUS at 10:08

## 2019-12-03 RX ADMIN — CALCIUM CARBONATE-VITAMIN D TAB 500 MG-200 UNIT 1 TABLET: 500-200 TAB at 10:07

## 2019-12-03 RX ADMIN — GABAPENTIN 400 MG: 400 CAPSULE ORAL at 10:07

## 2019-12-03 RX ADMIN — BUDESONIDE 500 MCG: 0.5 SUSPENSION RESPIRATORY (INHALATION) at 21:31

## 2019-12-03 RX ADMIN — SODIUM CHLORIDE, PRESERVATIVE FREE 10 ML: 5 INJECTION INTRAVENOUS at 21:53

## 2019-12-03 RX ADMIN — INSULIN LISPRO 6 UNITS: 100 INJECTION, SOLUTION INTRAVENOUS; SUBCUTANEOUS at 12:30

## 2019-12-03 RX ADMIN — INSULIN LISPRO 2 UNITS: 100 INJECTION, SOLUTION INTRAVENOUS; SUBCUTANEOUS at 10:09

## 2019-12-03 RX ADMIN — BUDESONIDE 500 MCG: 0.5 SUSPENSION RESPIRATORY (INHALATION) at 10:18

## 2019-12-03 RX ADMIN — SODIUM CHLORIDE, PRESERVATIVE FREE 10 ML: 5 INJECTION INTRAVENOUS at 10:08

## 2019-12-03 RX ADMIN — Medication 1000 MCG: at 10:06

## 2019-12-03 RX ADMIN — IPRATROPIUM BROMIDE AND ALBUTEROL SULFATE 1 AMPULE: .5; 3 SOLUTION RESPIRATORY (INHALATION) at 16:15

## 2019-12-03 RX ADMIN — AZITHROMYCIN 250 MG: 250 TABLET, FILM COATED ORAL at 21:50

## 2019-12-03 RX ADMIN — ANASTROZOLE 1 MG: 1 TABLET, COATED ORAL at 10:07

## 2019-12-03 RX ADMIN — INSULIN LISPRO 3 UNITS: 100 INJECTION, SOLUTION INTRAVENOUS; SUBCUTANEOUS at 21:51

## 2019-12-03 RX ADMIN — MONTELUKAST SODIUM 10 MG: 10 TABLET ORAL at 21:50

## 2019-12-03 RX ADMIN — PRAVASTATIN SODIUM 80 MG: 20 TABLET ORAL at 00:06

## 2019-12-03 RX ADMIN — PRAVASTATIN SODIUM 80 MG: 20 TABLET ORAL at 21:51

## 2019-12-03 RX ADMIN — CLONIDINE HYDROCHLORIDE 0.2 MG: 0.2 TABLET ORAL at 21:50

## 2019-12-03 RX ADMIN — ENOXAPARIN SODIUM 40 MG: 40 INJECTION SUBCUTANEOUS at 10:07

## 2019-12-03 RX ADMIN — ASPIRIN 81 MG CHEWABLE TABLET 81 MG: 81 TABLET CHEWABLE at 10:07

## 2019-12-03 RX ADMIN — METHYLPREDNISOLONE SODIUM SUCCINATE 40 MG: 125 INJECTION, POWDER, FOR SOLUTION INTRAMUSCULAR; INTRAVENOUS at 21:51

## 2019-12-03 RX ADMIN — METFORMIN HYDROCHLORIDE 500 MG: 500 TABLET ORAL at 19:05

## 2019-12-03 RX ADMIN — METOPROLOL TARTRATE 25 MG: 25 TABLET ORAL at 21:50

## 2019-12-03 RX ADMIN — LINAGLIPTIN 5 MG: 5 TABLET, FILM COATED ORAL at 10:06

## 2019-12-03 RX ADMIN — INSULIN GLARGINE 35 UNITS: 100 INJECTION, SOLUTION SUBCUTANEOUS at 21:52

## 2019-12-03 RX ADMIN — AMLODIPINE BESYLATE 5 MG: 5 TABLET ORAL at 10:05

## 2019-12-03 RX ADMIN — CLONIDINE HYDROCHLORIDE 0.2 MG: 0.2 TABLET ORAL at 10:06

## 2019-12-03 RX ADMIN — HYDROCHLOROTHIAZIDE 25 MG: 25 TABLET ORAL at 10:06

## 2019-12-03 RX ADMIN — OXYBUTYNIN CHLORIDE 5 MG: 5 TABLET ORAL at 21:50

## 2019-12-03 RX ADMIN — CLONIDINE HYDROCHLORIDE 0.2 MG: 0.2 TABLET ORAL at 00:07

## 2019-12-03 RX ADMIN — METFORMIN HYDROCHLORIDE 500 MG: 500 TABLET ORAL at 10:06

## 2019-12-03 ASSESSMENT — PAIN SCALES - GENERAL
PAINLEVEL_OUTOF10: 0

## 2019-12-04 LAB
METER GLUCOSE: 170 MG/DL (ref 74–99)
METER GLUCOSE: 191 MG/DL (ref 74–99)
METER GLUCOSE: 205 MG/DL (ref 74–99)
METER GLUCOSE: 288 MG/DL (ref 74–99)

## 2019-12-04 PROCEDURE — 6360000002 HC RX W HCPCS: Performed by: INTERNAL MEDICINE

## 2019-12-04 PROCEDURE — 2580000003 HC RX 258: Performed by: INTERNAL MEDICINE

## 2019-12-04 PROCEDURE — 82962 GLUCOSE BLOOD TEST: CPT

## 2019-12-04 PROCEDURE — 6370000000 HC RX 637 (ALT 250 FOR IP): Performed by: INTERNAL MEDICINE

## 2019-12-04 PROCEDURE — 96372 THER/PROPH/DIAG INJ SC/IM: CPT

## 2019-12-04 PROCEDURE — 94640 AIRWAY INHALATION TREATMENT: CPT

## 2019-12-04 PROCEDURE — 2140000000 HC CCU INTERMEDIATE R&B

## 2019-12-04 PROCEDURE — 6360000002 HC RX W HCPCS: Performed by: NURSE PRACTITIONER

## 2019-12-04 PROCEDURE — 2700000000 HC OXYGEN THERAPY PER DAY

## 2019-12-04 PROCEDURE — 96375 TX/PRO/DX INJ NEW DRUG ADDON: CPT

## 2019-12-04 PROCEDURE — 96376 TX/PRO/DX INJ SAME DRUG ADON: CPT

## 2019-12-04 RX ADMIN — METFORMIN HYDROCHLORIDE 500 MG: 500 TABLET ORAL at 08:47

## 2019-12-04 RX ADMIN — GABAPENTIN 400 MG: 400 CAPSULE ORAL at 08:49

## 2019-12-04 RX ADMIN — BUDESONIDE 500 MCG: 0.5 SUSPENSION RESPIRATORY (INHALATION) at 20:10

## 2019-12-04 RX ADMIN — METOPROLOL TARTRATE 25 MG: 25 TABLET ORAL at 08:48

## 2019-12-04 RX ADMIN — CLONIDINE HYDROCHLORIDE 0.2 MG: 0.2 TABLET ORAL at 08:47

## 2019-12-04 RX ADMIN — IPRATROPIUM BROMIDE AND ALBUTEROL SULFATE 1 AMPULE: .5; 3 SOLUTION RESPIRATORY (INHALATION) at 13:59

## 2019-12-04 RX ADMIN — INSULIN LISPRO 2 UNITS: 100 INJECTION, SOLUTION INTRAVENOUS; SUBCUTANEOUS at 17:15

## 2019-12-04 RX ADMIN — LINAGLIPTIN 5 MG: 5 TABLET, FILM COATED ORAL at 08:47

## 2019-12-04 RX ADMIN — PREDNISONE 20 MG: 20 TABLET ORAL at 20:46

## 2019-12-04 RX ADMIN — HYDROCHLOROTHIAZIDE 25 MG: 25 TABLET ORAL at 08:47

## 2019-12-04 RX ADMIN — OXYBUTYNIN CHLORIDE 5 MG: 5 TABLET ORAL at 20:46

## 2019-12-04 RX ADMIN — METOPROLOL TARTRATE 25 MG: 25 TABLET ORAL at 20:45

## 2019-12-04 RX ADMIN — Medication 1000 MCG: at 08:47

## 2019-12-04 RX ADMIN — AZITHROMYCIN 250 MG: 250 TABLET, FILM COATED ORAL at 20:45

## 2019-12-04 RX ADMIN — IPRATROPIUM BROMIDE AND ALBUTEROL SULFATE 1 AMPULE: .5; 3 SOLUTION RESPIRATORY (INHALATION) at 20:10

## 2019-12-04 RX ADMIN — INSULIN LISPRO 4 UNITS: 100 INJECTION, SOLUTION INTRAVENOUS; SUBCUTANEOUS at 11:54

## 2019-12-04 RX ADMIN — IPRATROPIUM BROMIDE AND ALBUTEROL SULFATE 1 AMPULE: .5; 3 SOLUTION RESPIRATORY (INHALATION) at 10:10

## 2019-12-04 RX ADMIN — MONTELUKAST SODIUM 10 MG: 10 TABLET ORAL at 17:13

## 2019-12-04 RX ADMIN — INSULIN LISPRO 2 UNITS: 100 INJECTION, SOLUTION INTRAVENOUS; SUBCUTANEOUS at 08:55

## 2019-12-04 RX ADMIN — GABAPENTIN 400 MG: 400 CAPSULE ORAL at 14:17

## 2019-12-04 RX ADMIN — METHYLPREDNISOLONE SODIUM SUCCINATE 40 MG: 125 INJECTION, POWDER, FOR SOLUTION INTRAMUSCULAR; INTRAVENOUS at 08:53

## 2019-12-04 RX ADMIN — SODIUM CHLORIDE, PRESERVATIVE FREE 10 ML: 5 INJECTION INTRAVENOUS at 08:49

## 2019-12-04 RX ADMIN — PRAVASTATIN SODIUM 80 MG: 20 TABLET ORAL at 20:46

## 2019-12-04 RX ADMIN — LOSARTAN POTASSIUM 100 MG: 50 TABLET, FILM COATED ORAL at 08:47

## 2019-12-04 RX ADMIN — INSULIN GLARGINE 35 UNITS: 100 INJECTION, SOLUTION SUBCUTANEOUS at 20:52

## 2019-12-04 RX ADMIN — CLONIDINE HYDROCHLORIDE 0.2 MG: 0.2 TABLET ORAL at 20:46

## 2019-12-04 RX ADMIN — SODIUM CHLORIDE, PRESERVATIVE FREE 10 ML: 5 INJECTION INTRAVENOUS at 20:46

## 2019-12-04 RX ADMIN — CALCIUM CARBONATE-VITAMIN D TAB 500 MG-200 UNIT 1 TABLET: 500-200 TAB at 20:45

## 2019-12-04 RX ADMIN — ASPIRIN 81 MG CHEWABLE TABLET 81 MG: 81 TABLET CHEWABLE at 08:48

## 2019-12-04 RX ADMIN — FORMOTEROL FUMARATE DIHYDRATE 20 MCG: 20 SOLUTION RESPIRATORY (INHALATION) at 20:10

## 2019-12-04 RX ADMIN — OXYBUTYNIN CHLORIDE 5 MG: 5 TABLET ORAL at 08:48

## 2019-12-04 RX ADMIN — ENOXAPARIN SODIUM 40 MG: 40 INJECTION SUBCUTANEOUS at 08:49

## 2019-12-04 RX ADMIN — AMLODIPINE BESYLATE 5 MG: 5 TABLET ORAL at 08:47

## 2019-12-04 RX ADMIN — HYDRALAZINE HYDROCHLORIDE 5 MG: 20 INJECTION INTRAMUSCULAR; INTRAVENOUS at 10:20

## 2019-12-04 RX ADMIN — INSULIN LISPRO 3 UNITS: 100 INJECTION, SOLUTION INTRAVENOUS; SUBCUTANEOUS at 20:52

## 2019-12-04 RX ADMIN — CALCIUM CARBONATE-VITAMIN D TAB 500 MG-200 UNIT 1 TABLET: 500-200 TAB at 08:48

## 2019-12-04 RX ADMIN — ANASTROZOLE 1 MG: 1 TABLET, COATED ORAL at 08:49

## 2019-12-04 RX ADMIN — BUDESONIDE 500 MCG: 0.5 SUSPENSION RESPIRATORY (INHALATION) at 10:12

## 2019-12-04 RX ADMIN — OXYBUTYNIN CHLORIDE 5 MG: 5 TABLET ORAL at 14:17

## 2019-12-04 RX ADMIN — GABAPENTIN 400 MG: 400 CAPSULE ORAL at 20:46

## 2019-12-04 RX ADMIN — METFORMIN HYDROCHLORIDE 500 MG: 500 TABLET ORAL at 17:13

## 2019-12-04 RX ADMIN — FORMOTEROL FUMARATE DIHYDRATE 20 MCG: 20 SOLUTION RESPIRATORY (INHALATION) at 10:11

## 2019-12-04 RX ADMIN — HYDRALAZINE HYDROCHLORIDE 5 MG: 20 INJECTION INTRAMUSCULAR; INTRAVENOUS at 00:26

## 2019-12-04 ASSESSMENT — PAIN SCALES - GENERAL
PAINLEVEL_OUTOF10: 0

## 2019-12-05 VITALS
HEART RATE: 66 BPM | BODY MASS INDEX: 39.86 KG/M2 | HEIGHT: 66 IN | DIASTOLIC BLOOD PRESSURE: 90 MMHG | RESPIRATION RATE: 16 BRPM | SYSTOLIC BLOOD PRESSURE: 170 MMHG | OXYGEN SATURATION: 99 % | TEMPERATURE: 98.4 F | WEIGHT: 248 LBS

## 2019-12-05 LAB
METER GLUCOSE: 109 MG/DL (ref 74–99)
METER GLUCOSE: 258 MG/DL (ref 74–99)

## 2019-12-05 PROCEDURE — 6360000002 HC RX W HCPCS: Performed by: INTERNAL MEDICINE

## 2019-12-05 PROCEDURE — 2700000000 HC OXYGEN THERAPY PER DAY

## 2019-12-05 PROCEDURE — 96372 THER/PROPH/DIAG INJ SC/IM: CPT

## 2019-12-05 PROCEDURE — 82962 GLUCOSE BLOOD TEST: CPT

## 2019-12-05 PROCEDURE — 6370000000 HC RX 637 (ALT 250 FOR IP): Performed by: INTERNAL MEDICINE

## 2019-12-05 PROCEDURE — 94640 AIRWAY INHALATION TREATMENT: CPT

## 2019-12-05 PROCEDURE — 2580000003 HC RX 258: Performed by: INTERNAL MEDICINE

## 2019-12-05 RX ADMIN — LINAGLIPTIN 5 MG: 5 TABLET, FILM COATED ORAL at 08:39

## 2019-12-05 RX ADMIN — OXYBUTYNIN CHLORIDE 5 MG: 5 TABLET ORAL at 14:40

## 2019-12-05 RX ADMIN — INSULIN LISPRO 6 UNITS: 100 INJECTION, SOLUTION INTRAVENOUS; SUBCUTANEOUS at 08:40

## 2019-12-05 RX ADMIN — SODIUM CHLORIDE, PRESERVATIVE FREE 10 ML: 5 INJECTION INTRAVENOUS at 08:41

## 2019-12-05 RX ADMIN — OXYBUTYNIN CHLORIDE 5 MG: 5 TABLET ORAL at 08:39

## 2019-12-05 RX ADMIN — LOSARTAN POTASSIUM 100 MG: 50 TABLET, FILM COATED ORAL at 08:52

## 2019-12-05 RX ADMIN — METFORMIN HYDROCHLORIDE 500 MG: 500 TABLET ORAL at 08:39

## 2019-12-05 RX ADMIN — ASPIRIN 81 MG CHEWABLE TABLET 81 MG: 81 TABLET CHEWABLE at 08:38

## 2019-12-05 RX ADMIN — METOPROLOL TARTRATE 25 MG: 25 TABLET ORAL at 08:38

## 2019-12-05 RX ADMIN — BUDESONIDE 500 MCG: 0.5 SUSPENSION RESPIRATORY (INHALATION) at 14:22

## 2019-12-05 RX ADMIN — IPRATROPIUM BROMIDE AND ALBUTEROL SULFATE 1 AMPULE: .5; 3 SOLUTION RESPIRATORY (INHALATION) at 14:22

## 2019-12-05 RX ADMIN — GABAPENTIN 400 MG: 400 CAPSULE ORAL at 14:40

## 2019-12-05 RX ADMIN — ANASTROZOLE 1 MG: 1 TABLET, COATED ORAL at 08:38

## 2019-12-05 RX ADMIN — HYDROCHLOROTHIAZIDE 25 MG: 25 TABLET ORAL at 08:38

## 2019-12-05 RX ADMIN — CALCIUM CARBONATE-VITAMIN D TAB 500 MG-200 UNIT 1 TABLET: 500-200 TAB at 08:39

## 2019-12-05 RX ADMIN — CLONIDINE HYDROCHLORIDE 0.2 MG: 0.2 TABLET ORAL at 08:38

## 2019-12-05 RX ADMIN — Medication 1000 MCG: at 08:39

## 2019-12-05 RX ADMIN — PREDNISONE 20 MG: 20 TABLET ORAL at 08:37

## 2019-12-05 RX ADMIN — AMLODIPINE BESYLATE 5 MG: 5 TABLET ORAL at 08:38

## 2019-12-05 RX ADMIN — GABAPENTIN 400 MG: 400 CAPSULE ORAL at 08:38

## 2019-12-05 RX ADMIN — FORMOTEROL FUMARATE DIHYDRATE 20 MCG: 20 SOLUTION RESPIRATORY (INHALATION) at 14:21

## 2019-12-05 RX ADMIN — ENOXAPARIN SODIUM 40 MG: 40 INJECTION SUBCUTANEOUS at 08:37

## 2019-12-05 ASSESSMENT — PAIN SCALES - GENERAL
PAINLEVEL_OUTOF10: 0
PAINLEVEL_OUTOF10: 0

## 2019-12-06 ENCOUNTER — CARE COORDINATION (OUTPATIENT)
Dept: CARE COORDINATION | Age: 69
End: 2019-12-06

## 2019-12-09 ENCOUNTER — OFFICE VISIT (OUTPATIENT)
Dept: FAMILY MEDICINE CLINIC | Age: 69
End: 2019-12-09
Payer: MEDICARE

## 2019-12-09 VITALS
BODY MASS INDEX: 40.86 KG/M2 | TEMPERATURE: 98.9 F | OXYGEN SATURATION: 98 % | RESPIRATION RATE: 16 BRPM | HEART RATE: 82 BPM | SYSTOLIC BLOOD PRESSURE: 130 MMHG | HEIGHT: 65 IN | DIASTOLIC BLOOD PRESSURE: 74 MMHG | WEIGHT: 245.25 LBS

## 2019-12-09 DIAGNOSIS — Z91.14 MEDICATION NON-COMPLIANCE DUE TO EXCESSIVE PILL BURDEN: ICD-10-CM

## 2019-12-09 DIAGNOSIS — Z09 HOSPITAL DISCHARGE FOLLOW-UP: Primary | ICD-10-CM

## 2019-12-09 PROCEDURE — 1111F DSCHRG MED/CURRENT MED MERGE: CPT | Performed by: NURSE PRACTITIONER

## 2019-12-09 PROCEDURE — 99495 TRANSJ CARE MGMT MOD F2F 14D: CPT | Performed by: NURSE PRACTITIONER

## 2019-12-09 ASSESSMENT — ENCOUNTER SYMPTOMS
DIARRHEA: 0
VOMITING: 0
SHORTNESS OF BREATH: 1
COUGH: 0
BACK PAIN: 1
NAUSEA: 0
SINUS PAIN: 0
WHEEZING: 0
EYE PAIN: 0
COLOR CHANGE: 0
CHEST TIGHTNESS: 0
SORE THROAT: 0
CONSTIPATION: 0

## 2019-12-10 ENCOUNTER — TELEPHONE (OUTPATIENT)
Dept: FAMILY MEDICINE CLINIC | Age: 69
End: 2019-12-10

## 2019-12-11 DIAGNOSIS — I10 ESSENTIAL HYPERTENSION: ICD-10-CM

## 2019-12-12 RX ORDER — ASPIRIN 81 MG/1
81 TABLET, CHEWABLE ORAL DAILY
Qty: 90 TABLET | Refills: 3 | Status: SHIPPED
Start: 2019-12-12 | End: 2020-02-05 | Stop reason: ALTCHOICE

## 2019-12-24 ENCOUNTER — HOSPITAL ENCOUNTER (EMERGENCY)
Age: 69
Discharge: HOME | End: 2019-12-24
Attending: EMERGENCY MEDICINE
Payer: MEDICARE

## 2019-12-24 ENCOUNTER — APPOINTMENT (OUTPATIENT)
Dept: GENERAL RADIOLOGY | Age: 69
End: 2019-12-24
Payer: MEDICARE

## 2019-12-24 VITALS
RESPIRATION RATE: 22 BRPM | SYSTOLIC BLOOD PRESSURE: 202 MMHG | DIASTOLIC BLOOD PRESSURE: 83 MMHG | OXYGEN SATURATION: 97 % | HEART RATE: 98 BPM | HEIGHT: 65 IN | TEMPERATURE: 98.8 F | BODY MASS INDEX: 40.32 KG/M2 | WEIGHT: 242 LBS

## 2019-12-24 DIAGNOSIS — J44.1 COPD EXACERBATION (HCC): Primary | ICD-10-CM

## 2019-12-24 LAB
ANION GAP SERPL CALCULATED.3IONS-SCNC: 12 MMOL/L (ref 7–16)
B.E.: 2.6 MMOL/L (ref -3–3)
BUN BLDV-MCNC: 7 MG/DL (ref 8–23)
CALCIUM SERPL-MCNC: 9 MG/DL (ref 8.6–10.2)
CHLORIDE BLD-SCNC: 107 MMOL/L (ref 98–107)
CO2: 25 MMOL/L (ref 22–29)
CREAT SERPL-MCNC: 0.7 MG/DL (ref 0.5–1)
DELIVERY SYSTEMS: ABNORMAL
DEVICE: ABNORMAL
GFR AFRICAN AMERICAN: >60
GFR NON-AFRICAN AMERICAN: >60 ML/MIN/1.73
GLUCOSE BLD-MCNC: 85 MG/DL (ref 74–99)
HCO3 ARTERIAL: 24.8 MMOL/L (ref 22–26)
HCT VFR BLD CALC: 33.5 % (ref 34–48)
HEMOGLOBIN: 10.4 G/DL (ref 11.5–15.5)
MAGNESIUM: 1.6 MG/DL (ref 1.6–2.6)
MCH RBC QN AUTO: 25.4 PG (ref 26–35)
MCHC RBC AUTO-ENTMCNC: 31 % (ref 32–34.5)
MCV RBC AUTO: 81.7 FL (ref 80–99.9)
METER GLUCOSE: 77 MG/DL (ref 74–99)
O2 SATURATION: 98.1 % (ref 92–98.5)
OPERATOR ID: 797
PCO2 ARTERIAL: 29.7 MMHG (ref 35–45)
PDW BLD-RTO: 14.9 FL (ref 11.5–15)
PH BLOOD GAS: 7.53 (ref 7.35–7.45)
PLATELET # BLD: 422 E9/L (ref 130–450)
PMV BLD AUTO: 9.7 FL (ref 7–12)
PO2 ARTERIAL: 92.1 MMHG (ref 60–80)
POTASSIUM SERPL-SCNC: 3.8 MMOL/L (ref 3.5–5)
PRO-BNP: 718 PG/ML (ref 0–125)
RBC # BLD: 4.1 E12/L (ref 3.5–5.5)
SODIUM BLD-SCNC: 144 MMOL/L (ref 132–146)
SOURCE, BLOOD GAS: ABNORMAL
TROPONIN: <0.01 NG/ML (ref 0–0.03)
WBC # BLD: 11.6 E9/L (ref 4.5–11.5)

## 2019-12-24 PROCEDURE — 82962 GLUCOSE BLOOD TEST: CPT

## 2019-12-24 PROCEDURE — 84484 ASSAY OF TROPONIN QUANT: CPT

## 2019-12-24 PROCEDURE — 71046 X-RAY EXAM CHEST 2 VIEWS: CPT

## 2019-12-24 PROCEDURE — 80048 BASIC METABOLIC PNL TOTAL CA: CPT

## 2019-12-24 PROCEDURE — 85027 COMPLETE CBC AUTOMATED: CPT

## 2019-12-24 PROCEDURE — 83735 ASSAY OF MAGNESIUM: CPT

## 2019-12-24 PROCEDURE — 6370000000 HC RX 637 (ALT 250 FOR IP): Performed by: EMERGENCY MEDICINE

## 2019-12-24 PROCEDURE — 93005 ELECTROCARDIOGRAM TRACING: CPT | Performed by: EMERGENCY MEDICINE

## 2019-12-24 PROCEDURE — 82803 BLOOD GASES ANY COMBINATION: CPT

## 2019-12-24 PROCEDURE — 83880 ASSAY OF NATRIURETIC PEPTIDE: CPT

## 2019-12-24 PROCEDURE — 6360000002 HC RX W HCPCS: Performed by: EMERGENCY MEDICINE

## 2019-12-24 PROCEDURE — 96374 THER/PROPH/DIAG INJ IV PUSH: CPT

## 2019-12-24 PROCEDURE — 99285 EMERGENCY DEPT VISIT HI MDM: CPT

## 2019-12-24 PROCEDURE — 36600 WITHDRAWAL OF ARTERIAL BLOOD: CPT

## 2019-12-24 PROCEDURE — 94644 CONT INHLJ TX 1ST HOUR: CPT

## 2019-12-24 PROCEDURE — 36415 COLL VENOUS BLD VENIPUNCTURE: CPT

## 2019-12-24 RX ORDER — PREDNISONE 20 MG/1
TABLET ORAL
Qty: 10 TABLET | Refills: 0 | Status: ON HOLD | OUTPATIENT
Start: 2019-12-24 | End: 2020-01-03 | Stop reason: HOSPADM

## 2019-12-24 RX ORDER — IPRATROPIUM BROMIDE AND ALBUTEROL SULFATE 2.5; .5 MG/3ML; MG/3ML
3 SOLUTION RESPIRATORY (INHALATION) ONCE
Status: COMPLETED | OUTPATIENT
Start: 2019-12-24 | End: 2019-12-24

## 2019-12-24 RX ORDER — ALBUTEROL SULFATE 90 UG/1
2 AEROSOL, METERED RESPIRATORY (INHALATION) EVERY 6 HOURS PRN
Qty: 1 INHALER | Refills: 0 | Status: SHIPPED | OUTPATIENT
Start: 2019-12-24 | End: 2020-02-05 | Stop reason: ALTCHOICE

## 2019-12-24 RX ORDER — AZITHROMYCIN 250 MG/1
TABLET, FILM COATED ORAL
Qty: 1 PACKET | Refills: 0 | Status: SHIPPED | OUTPATIENT
Start: 2019-12-24 | End: 2019-12-28

## 2019-12-24 RX ORDER — METHYLPREDNISOLONE SODIUM SUCCINATE 125 MG/2ML
125 INJECTION, POWDER, LYOPHILIZED, FOR SOLUTION INTRAMUSCULAR; INTRAVENOUS ONCE
Status: COMPLETED | OUTPATIENT
Start: 2019-12-24 | End: 2019-12-24

## 2019-12-24 RX ORDER — AZITHROMYCIN 250 MG/1
500 TABLET, FILM COATED ORAL ONCE
Status: COMPLETED | OUTPATIENT
Start: 2019-12-24 | End: 2019-12-24

## 2019-12-24 RX ADMIN — METHYLPREDNISOLONE SODIUM SUCCINATE 125 MG: 125 INJECTION, POWDER, FOR SOLUTION INTRAMUSCULAR; INTRAVENOUS at 20:23

## 2019-12-24 RX ADMIN — IPRATROPIUM BROMIDE AND ALBUTEROL SULFATE 3 AMPULE: .5; 3 SOLUTION RESPIRATORY (INHALATION) at 20:37

## 2019-12-24 RX ADMIN — AZITHROMYCIN MONOHYDRATE 500 MG: 250 TABLET ORAL at 21:50

## 2019-12-24 ASSESSMENT — ENCOUNTER SYMPTOMS
EYE DISCHARGE: 0
SINUS PRESSURE: 0
EYE REDNESS: 0
DIARRHEA: 0
EYE PAIN: 0
BACK PAIN: 0
NAUSEA: 0
SORE THROAT: 0
ABDOMINAL PAIN: 0
SPUTUM PRODUCTION: 0
SWOLLEN GLANDS: 0
COUGH: 1
ABDOMINAL DISTENTION: 0
SHORTNESS OF BREATH: 1
HEMOPTYSIS: 0
VOMITING: 0
WHEEZING: 0

## 2019-12-26 ENCOUNTER — TELEPHONE (OUTPATIENT)
Dept: OTHER | Facility: CLINIC | Age: 69
End: 2019-12-26

## 2019-12-26 LAB
EKG ATRIAL RATE: 84 BPM
EKG P AXIS: 50 DEGREES
EKG P-R INTERVAL: 124 MS
EKG Q-T INTERVAL: 412 MS
EKG QRS DURATION: 130 MS
EKG QTC CALCULATION (BAZETT): 486 MS
EKG R AXIS: -15 DEGREES
EKG T AXIS: 111 DEGREES
EKG VENTRICULAR RATE: 84 BPM

## 2019-12-29 ENCOUNTER — APPOINTMENT (OUTPATIENT)
Dept: GENERAL RADIOLOGY | Age: 69
DRG: 189 | End: 2019-12-29
Payer: MEDICARE

## 2019-12-29 ENCOUNTER — HOSPITAL ENCOUNTER (INPATIENT)
Age: 69
LOS: 1 days | Discharge: NURSING, REHAB OR LONG-TERM CARE | DRG: 189 | End: 2020-01-03
Attending: EMERGENCY MEDICINE | Admitting: INTERNAL MEDICINE
Payer: MEDICARE

## 2019-12-29 PROBLEM — J96.01 ACUTE RESPIRATORY FAILURE WITH HYPOXIA (HCC): Status: ACTIVE | Noted: 2019-12-29

## 2019-12-29 LAB
AADO2: ABNORMAL MMHG
ALBUMIN SERPL-MCNC: 3.9 G/DL (ref 3.5–5.2)
ALP BLD-CCNC: 130 U/L (ref 35–104)
ALT SERPL-CCNC: 12 U/L (ref 0–32)
ANION GAP SERPL CALCULATED.3IONS-SCNC: 14 MMOL/L (ref 7–16)
AST SERPL-CCNC: 11 U/L (ref 0–31)
B.E.: 0.1 MMOL/L (ref -3–3)
BASOPHILS ABSOLUTE: 0.06 E9/L (ref 0–0.2)
BASOPHILS RELATIVE PERCENT: 0.5 % (ref 0–2)
BILIRUB SERPL-MCNC: 0.4 MG/DL (ref 0–1.2)
BUN BLDV-MCNC: 9 MG/DL (ref 8–23)
CALCIUM SERPL-MCNC: 9.2 MG/DL (ref 8.6–10.2)
CHLORIDE BLD-SCNC: 101 MMOL/L (ref 98–107)
CO2: 27 MMOL/L (ref 22–29)
COHB: 0.3 % (ref 0–1.5)
CREAT SERPL-MCNC: 0.7 MG/DL (ref 0.5–1)
CRITICAL: ABNORMAL
DATE ANALYZED: ABNORMAL
DATE OF COLLECTION: ABNORMAL
EKG ATRIAL RATE: 89 BPM
EKG P AXIS: 64 DEGREES
EKG P-R INTERVAL: 134 MS
EKG Q-T INTERVAL: 406 MS
EKG QRS DURATION: 132 MS
EKG QTC CALCULATION (BAZETT): 493 MS
EKG R AXIS: 37 DEGREES
EKG T AXIS: 106 DEGREES
EKG VENTRICULAR RATE: 89 BPM
EOSINOPHILS ABSOLUTE: 0 E9/L (ref 0.05–0.5)
EOSINOPHILS RELATIVE PERCENT: 0 % (ref 0–6)
FIO2: 40 %
GFR AFRICAN AMERICAN: >60
GFR NON-AFRICAN AMERICAN: >60 ML/MIN/1.73
GLUCOSE BLD-MCNC: 142 MG/DL (ref 74–99)
HBA1C MFR BLD: 8.6 % (ref 4–5.6)
HCO3: 24.4 MMOL/L (ref 22–26)
HCT VFR BLD CALC: 35 % (ref 34–48)
HEMOGLOBIN: 10.2 G/DL (ref 11.5–15.5)
HHB: 0.7 % (ref 0–5)
IMMATURE GRANULOCYTES #: 0.06 E9/L
IMMATURE GRANULOCYTES %: 0.5 % (ref 0–5)
INFLUENZA A BY PCR: NOT DETECTED
INFLUENZA B BY PCR: NOT DETECTED
LAB: ABNORMAL
LACTIC ACID, SEPSIS: 2.4 MMOL/L (ref 0.5–1.9)
LYMPHOCYTES ABSOLUTE: 3.32 E9/L (ref 1.5–4)
LYMPHOCYTES RELATIVE PERCENT: 25.2 % (ref 20–42)
Lab: ABNORMAL
MCH RBC QN AUTO: 24.3 PG (ref 26–35)
MCHC RBC AUTO-ENTMCNC: 29.1 % (ref 32–34.5)
MCV RBC AUTO: 83.5 FL (ref 80–99.9)
METER GLUCOSE: 211 MG/DL (ref 74–99)
METER GLUCOSE: 262 MG/DL (ref 74–99)
METER GLUCOSE: 263 MG/DL (ref 74–99)
METHB: 0.2 % (ref 0–1.5)
MODE: ABNORMAL
MONOCYTES ABSOLUTE: 1.05 E9/L (ref 0.1–0.95)
MONOCYTES RELATIVE PERCENT: 8 % (ref 2–12)
NEUTROPHILS ABSOLUTE: 8.66 E9/L (ref 1.8–7.3)
NEUTROPHILS RELATIVE PERCENT: 65.8 % (ref 43–80)
O2 CONTENT: 16.1 ML/DL
O2 SATURATION: 99.3 % (ref 92–98.5)
O2HB: 98.8 % (ref 94–97)
OPERATOR ID: 5721
PATIENT TEMP: 37 C
PCO2: 38.4 MMHG (ref 35–45)
PDW BLD-RTO: 15.1 FL (ref 11.5–15)
PEEP/CPAP: 5 CMH2O
PFO2: 6.96 MMHG/%
PH BLOOD GAS: 7.42 (ref 7.35–7.45)
PIP: 15 CMH2O
PLATELET # BLD: 507 E9/L (ref 130–450)
PMV BLD AUTO: 10.1 FL (ref 7–12)
PO2: 278.3 MMHG (ref 60–100)
POTASSIUM SERPL-SCNC: 3.4 MMOL/L (ref 3.5–5)
PRO-BNP: 928 PG/ML (ref 0–125)
RBC # BLD: 4.19 E12/L (ref 3.5–5.5)
SODIUM BLD-SCNC: 142 MMOL/L (ref 132–146)
SOURCE, BLOOD GAS: ABNORMAL
THB: 11.1 G/DL (ref 11.5–16.5)
TIME ANALYZED: 911
TOTAL PROTEIN: 7.1 G/DL (ref 6.4–8.3)
TROPONIN: <0.01 NG/ML (ref 0–0.03)
WBC # BLD: 13.2 E9/L (ref 4.5–11.5)

## 2019-12-29 PROCEDURE — 96366 THER/PROPH/DIAG IV INF ADDON: CPT

## 2019-12-29 PROCEDURE — 87502 INFLUENZA DNA AMP PROBE: CPT

## 2019-12-29 PROCEDURE — 94640 AIRWAY INHALATION TREATMENT: CPT

## 2019-12-29 PROCEDURE — G0378 HOSPITAL OBSERVATION PER HR: HCPCS

## 2019-12-29 PROCEDURE — 99285 EMERGENCY DEPT VISIT HI MDM: CPT

## 2019-12-29 PROCEDURE — 96372 THER/PROPH/DIAG INJ SC/IM: CPT

## 2019-12-29 PROCEDURE — 6360000002 HC RX W HCPCS: Performed by: NURSE PRACTITIONER

## 2019-12-29 PROCEDURE — 6370000000 HC RX 637 (ALT 250 FOR IP): Performed by: EMERGENCY MEDICINE

## 2019-12-29 PROCEDURE — 93005 ELECTROCARDIOGRAM TRACING: CPT | Performed by: EMERGENCY MEDICINE

## 2019-12-29 PROCEDURE — 96365 THER/PROPH/DIAG IV INF INIT: CPT

## 2019-12-29 PROCEDURE — 82805 BLOOD GASES W/O2 SATURATION: CPT

## 2019-12-29 PROCEDURE — 96375 TX/PRO/DX INJ NEW DRUG ADDON: CPT

## 2019-12-29 PROCEDURE — 96376 TX/PRO/DX INJ SAME DRUG ADON: CPT

## 2019-12-29 PROCEDURE — 36415 COLL VENOUS BLD VENIPUNCTURE: CPT

## 2019-12-29 PROCEDURE — 2580000003 HC RX 258: Performed by: EMERGENCY MEDICINE

## 2019-12-29 PROCEDURE — 2500000003 HC RX 250 WO HCPCS: Performed by: EMERGENCY MEDICINE

## 2019-12-29 PROCEDURE — 85025 COMPLETE CBC W/AUTO DIFF WBC: CPT

## 2019-12-29 PROCEDURE — 84484 ASSAY OF TROPONIN QUANT: CPT

## 2019-12-29 PROCEDURE — 96367 TX/PROPH/DG ADDL SEQ IV INF: CPT

## 2019-12-29 PROCEDURE — 83036 HEMOGLOBIN GLYCOSYLATED A1C: CPT

## 2019-12-29 PROCEDURE — 6370000000 HC RX 637 (ALT 250 FOR IP): Performed by: NURSE PRACTITIONER

## 2019-12-29 PROCEDURE — 6360000002 HC RX W HCPCS: Performed by: EMERGENCY MEDICINE

## 2019-12-29 PROCEDURE — 2700000000 HC OXYGEN THERAPY PER DAY

## 2019-12-29 PROCEDURE — 71045 X-RAY EXAM CHEST 1 VIEW: CPT

## 2019-12-29 PROCEDURE — 83605 ASSAY OF LACTIC ACID: CPT

## 2019-12-29 PROCEDURE — 93010 ELECTROCARDIOGRAM REPORT: CPT | Performed by: INTERNAL MEDICINE

## 2019-12-29 PROCEDURE — 80053 COMPREHEN METABOLIC PANEL: CPT

## 2019-12-29 PROCEDURE — 2580000003 HC RX 258: Performed by: NURSE PRACTITIONER

## 2019-12-29 PROCEDURE — 82962 GLUCOSE BLOOD TEST: CPT

## 2019-12-29 PROCEDURE — 83880 ASSAY OF NATRIURETIC PEPTIDE: CPT

## 2019-12-29 RX ORDER — ONDANSETRON 2 MG/ML
4 INJECTION INTRAMUSCULAR; INTRAVENOUS EVERY 6 HOURS PRN
Status: DISCONTINUED | OUTPATIENT
Start: 2019-12-29 | End: 2020-01-03 | Stop reason: HOSPADM

## 2019-12-29 RX ORDER — NITROGLYCERIN 0.4 MG/1
0.4 TABLET SUBLINGUAL EVERY 5 MIN PRN
Status: DISCONTINUED | OUTPATIENT
Start: 2019-12-29 | End: 2019-12-29

## 2019-12-29 RX ORDER — SODIUM CHLORIDE 0.9 % (FLUSH) 0.9 %
10 SYRINGE (ML) INJECTION PRN
Status: DISCONTINUED | OUTPATIENT
Start: 2019-12-29 | End: 2020-01-03 | Stop reason: HOSPADM

## 2019-12-29 RX ORDER — SODIUM CHLORIDE 0.9 % (FLUSH) 0.9 %
10 SYRINGE (ML) INJECTION EVERY 12 HOURS SCHEDULED
Status: DISCONTINUED | OUTPATIENT
Start: 2019-12-29 | End: 2020-01-03 | Stop reason: HOSPADM

## 2019-12-29 RX ORDER — LOSARTAN POTASSIUM AND HYDROCHLOROTHIAZIDE 25; 100 MG/1; MG/1
1 TABLET ORAL DAILY
Status: DISCONTINUED | OUTPATIENT
Start: 2019-12-29 | End: 2019-12-29 | Stop reason: CLARIF

## 2019-12-29 RX ORDER — LOSARTAN POTASSIUM 50 MG/1
100 TABLET ORAL DAILY
Status: DISCONTINUED | OUTPATIENT
Start: 2019-12-29 | End: 2020-01-03 | Stop reason: HOSPADM

## 2019-12-29 RX ORDER — LANOLIN ALCOHOL/MO/W.PET/CERES
3 CREAM (GRAM) TOPICAL NIGHTLY PRN
Status: DISCONTINUED | OUTPATIENT
Start: 2019-12-29 | End: 2020-01-03 | Stop reason: HOSPADM

## 2019-12-29 RX ORDER — CLONIDINE HYDROCHLORIDE 0.2 MG/1
0.2 TABLET ORAL 3 TIMES DAILY
Status: DISCONTINUED | OUTPATIENT
Start: 2019-12-29 | End: 2020-01-02

## 2019-12-29 RX ORDER — IPRATROPIUM BROMIDE AND ALBUTEROL SULFATE 2.5; .5 MG/3ML; MG/3ML
3 SOLUTION RESPIRATORY (INHALATION) ONCE
Status: COMPLETED | OUTPATIENT
Start: 2019-12-29 | End: 2019-12-29

## 2019-12-29 RX ORDER — NICOTINE POLACRILEX 4 MG
15 LOZENGE BUCCAL PRN
Status: DISCONTINUED | OUTPATIENT
Start: 2019-12-29 | End: 2020-01-03 | Stop reason: HOSPADM

## 2019-12-29 RX ORDER — INSULIN GLARGINE 100 [IU]/ML
0.25 INJECTION, SOLUTION SUBCUTANEOUS NIGHTLY
Status: DISCONTINUED | OUTPATIENT
Start: 2019-12-29 | End: 2019-12-31

## 2019-12-29 RX ORDER — FUROSEMIDE 10 MG/ML
20 INJECTION INTRAMUSCULAR; INTRAVENOUS ONCE
Status: COMPLETED | OUTPATIENT
Start: 2019-12-29 | End: 2019-12-29

## 2019-12-29 RX ORDER — IPRATROPIUM BROMIDE AND ALBUTEROL SULFATE 2.5; .5 MG/3ML; MG/3ML
1 SOLUTION RESPIRATORY (INHALATION)
Status: DISCONTINUED | OUTPATIENT
Start: 2019-12-29 | End: 2020-01-03 | Stop reason: HOSPADM

## 2019-12-29 RX ORDER — DEXTROSE MONOHYDRATE 50 MG/ML
100 INJECTION, SOLUTION INTRAVENOUS PRN
Status: DISCONTINUED | OUTPATIENT
Start: 2019-12-29 | End: 2020-01-03 | Stop reason: HOSPADM

## 2019-12-29 RX ORDER — LABETALOL HYDROCHLORIDE 5 MG/ML
20 INJECTION, SOLUTION INTRAVENOUS ONCE
Status: COMPLETED | OUTPATIENT
Start: 2019-12-29 | End: 2019-12-29

## 2019-12-29 RX ORDER — NITROGLYCERIN 20 MG/100ML
20 INJECTION INTRAVENOUS CONTINUOUS
Status: DISCONTINUED | OUTPATIENT
Start: 2019-12-29 | End: 2019-12-29

## 2019-12-29 RX ORDER — ANASTROZOLE 1 MG/1
1 TABLET ORAL DAILY
Status: DISCONTINUED | OUTPATIENT
Start: 2019-12-29 | End: 2020-01-03 | Stop reason: HOSPADM

## 2019-12-29 RX ORDER — AMLODIPINE BESYLATE 5 MG/1
5 TABLET ORAL DAILY
Status: DISCONTINUED | OUTPATIENT
Start: 2019-12-29 | End: 2019-12-31

## 2019-12-29 RX ORDER — METHYLPREDNISOLONE SODIUM SUCCINATE 40 MG/ML
40 INJECTION, POWDER, LYOPHILIZED, FOR SOLUTION INTRAMUSCULAR; INTRAVENOUS EVERY 6 HOURS
Status: COMPLETED | OUTPATIENT
Start: 2019-12-29 | End: 2019-12-31

## 2019-12-29 RX ORDER — PREDNISONE 20 MG/1
40 TABLET ORAL DAILY
Status: DISCONTINUED | OUTPATIENT
Start: 2019-12-31 | End: 2020-01-03

## 2019-12-29 RX ORDER — HYDROCHLOROTHIAZIDE 25 MG/1
25 TABLET ORAL DAILY
Status: DISCONTINUED | OUTPATIENT
Start: 2019-12-29 | End: 2019-12-31

## 2019-12-29 RX ORDER — ASPIRIN 81 MG/1
81 TABLET, CHEWABLE ORAL DAILY
Status: DISCONTINUED | OUTPATIENT
Start: 2019-12-29 | End: 2020-01-03 | Stop reason: HOSPADM

## 2019-12-29 RX ORDER — GABAPENTIN 400 MG/1
400 CAPSULE ORAL 3 TIMES DAILY
Status: DISCONTINUED | OUTPATIENT
Start: 2019-12-29 | End: 2020-01-03 | Stop reason: HOSPADM

## 2019-12-29 RX ORDER — HYDRALAZINE HYDROCHLORIDE 20 MG/ML
5 INJECTION INTRAMUSCULAR; INTRAVENOUS EVERY 6 HOURS PRN
Status: COMPLETED | OUTPATIENT
Start: 2019-12-29 | End: 2020-01-01

## 2019-12-29 RX ORDER — BUDESONIDE 0.25 MG/2ML
250 INHALANT ORAL 2 TIMES DAILY
Status: DISCONTINUED | OUTPATIENT
Start: 2019-12-29 | End: 2020-01-03 | Stop reason: HOSPADM

## 2019-12-29 RX ORDER — PRAVASTATIN SODIUM 20 MG
80 TABLET ORAL DAILY
Status: DISCONTINUED | OUTPATIENT
Start: 2019-12-29 | End: 2020-01-03 | Stop reason: HOSPADM

## 2019-12-29 RX ORDER — ACETAMINOPHEN 325 MG/1
650 TABLET ORAL EVERY 4 HOURS PRN
Status: DISCONTINUED | OUTPATIENT
Start: 2019-12-29 | End: 2020-01-03 | Stop reason: HOSPADM

## 2019-12-29 RX ORDER — NITROGLYCERIN 0.4 MG/1
0.4 TABLET SUBLINGUAL ONCE
Status: COMPLETED | OUTPATIENT
Start: 2019-12-29 | End: 2019-12-29

## 2019-12-29 RX ORDER — FORMOTEROL FUMARATE 20 UG/2ML
20 SOLUTION RESPIRATORY (INHALATION) 2 TIMES DAILY
Status: DISCONTINUED | OUTPATIENT
Start: 2019-12-29 | End: 2020-01-03 | Stop reason: HOSPADM

## 2019-12-29 RX ORDER — DEXTROSE MONOHYDRATE 25 G/50ML
12.5 INJECTION, SOLUTION INTRAVENOUS PRN
Status: DISCONTINUED | OUTPATIENT
Start: 2019-12-29 | End: 2020-01-03 | Stop reason: HOSPADM

## 2019-12-29 RX ORDER — OXYBUTYNIN CHLORIDE 5 MG/1
5 TABLET ORAL 3 TIMES DAILY
Status: DISCONTINUED | OUTPATIENT
Start: 2019-12-29 | End: 2020-01-03 | Stop reason: HOSPADM

## 2019-12-29 RX ADMIN — ANASTROZOLE 1 MG: 1 TABLET, COATED ORAL at 16:47

## 2019-12-29 RX ADMIN — AMLODIPINE BESYLATE 5 MG: 5 TABLET ORAL at 13:27

## 2019-12-29 RX ADMIN — CEFTRIAXONE SODIUM 1 G: 1 INJECTION, POWDER, FOR SOLUTION INTRAMUSCULAR; INTRAVENOUS at 11:04

## 2019-12-29 RX ADMIN — HYDRALAZINE HYDROCHLORIDE 5 MG: 20 INJECTION INTRAMUSCULAR; INTRAVENOUS at 21:33

## 2019-12-29 RX ADMIN — NITROGLYCERIN 0.4 MG: 0.4 TABLET, ORALLY DISINTEGRATING SUBLINGUAL at 10:15

## 2019-12-29 RX ADMIN — FUROSEMIDE 20 MG: 10 INJECTION, SOLUTION INTRAMUSCULAR; INTRAVENOUS at 13:26

## 2019-12-29 RX ADMIN — OXYBUTYNIN CHLORIDE 5 MG: 5 TABLET ORAL at 15:52

## 2019-12-29 RX ADMIN — INSULIN LISPRO 6 UNITS: 100 INJECTION, SOLUTION INTRAVENOUS; SUBCUTANEOUS at 14:27

## 2019-12-29 RX ADMIN — INSULIN LISPRO 9 UNITS: 100 INJECTION, SOLUTION INTRAVENOUS; SUBCUTANEOUS at 18:13

## 2019-12-29 RX ADMIN — LABETALOL HYDROCHLORIDE 20 MG: 5 INJECTION INTRAVENOUS at 10:15

## 2019-12-29 RX ADMIN — CLONIDINE HYDROCHLORIDE 0.2 MG: 0.2 TABLET ORAL at 13:27

## 2019-12-29 RX ADMIN — ASPIRIN 81 MG 81 MG: 81 TABLET ORAL at 13:27

## 2019-12-29 RX ADMIN — HYDROCHLOROTHIAZIDE 25 MG: 25 TABLET ORAL at 15:52

## 2019-12-29 RX ADMIN — LOSARTAN POTASSIUM 100 MG: 50 TABLET, FILM COATED ORAL at 15:52

## 2019-12-29 RX ADMIN — SODIUM CHLORIDE, PRESERVATIVE FREE 10 ML: 5 INJECTION INTRAVENOUS at 15:51

## 2019-12-29 RX ADMIN — BUDESONIDE 250 MCG: 0.25 SUSPENSION RESPIRATORY (INHALATION) at 19:20

## 2019-12-29 RX ADMIN — GABAPENTIN 400 MG: 400 CAPSULE ORAL at 20:29

## 2019-12-29 RX ADMIN — METHYLPREDNISOLONE SODIUM SUCCINATE 40 MG: 40 INJECTION, POWDER, FOR SOLUTION INTRAMUSCULAR; INTRAVENOUS at 20:34

## 2019-12-29 RX ADMIN — AZITHROMYCIN MONOHYDRATE 500 MG: 500 INJECTION, POWDER, LYOPHILIZED, FOR SOLUTION INTRAVENOUS at 16:08

## 2019-12-29 RX ADMIN — IPRATROPIUM BROMIDE AND ALBUTEROL SULFATE 3 AMPULE: .5; 3 SOLUTION RESPIRATORY (INHALATION) at 09:08

## 2019-12-29 RX ADMIN — FORMOTEROL FUMARATE DIHYDRATE 20 MCG: 20 SOLUTION RESPIRATORY (INHALATION) at 19:21

## 2019-12-29 RX ADMIN — IPRATROPIUM BROMIDE AND ALBUTEROL SULFATE 1 AMPULE: 2.5; .5 SOLUTION RESPIRATORY (INHALATION) at 19:20

## 2019-12-29 RX ADMIN — Medication 10 ML: at 20:30

## 2019-12-29 RX ADMIN — METOPROLOL TARTRATE 25 MG: 25 TABLET ORAL at 13:27

## 2019-12-29 RX ADMIN — METHYLPREDNISOLONE SODIUM SUCCINATE 40 MG: 40 INJECTION, POWDER, FOR SOLUTION INTRAMUSCULAR; INTRAVENOUS at 15:50

## 2019-12-29 RX ADMIN — OXYBUTYNIN CHLORIDE 5 MG: 5 TABLET ORAL at 20:29

## 2019-12-29 RX ADMIN — IPRATROPIUM BROMIDE AND ALBUTEROL SULFATE 1 AMPULE: 2.5; .5 SOLUTION RESPIRATORY (INHALATION) at 16:30

## 2019-12-29 RX ADMIN — GABAPENTIN 400 MG: 400 CAPSULE ORAL at 13:26

## 2019-12-29 RX ADMIN — METFORMIN HYDROCHLORIDE 500 MG: 500 TABLET ORAL at 16:47

## 2019-12-29 RX ADMIN — CLONIDINE HYDROCHLORIDE 0.2 MG: 0.2 TABLET ORAL at 21:32

## 2019-12-29 RX ADMIN — DOXYCYCLINE 100 MG: 100 INJECTION, POWDER, LYOPHILIZED, FOR SOLUTION INTRAVENOUS at 11:05

## 2019-12-29 RX ADMIN — PRAVASTATIN SODIUM 80 MG: 20 TABLET ORAL at 20:29

## 2019-12-29 RX ADMIN — ENOXAPARIN SODIUM 40 MG: 40 INJECTION SUBCUTANEOUS at 13:27

## 2019-12-29 RX ADMIN — INSULIN LISPRO 5 UNITS: 100 INJECTION, SOLUTION INTRAVENOUS; SUBCUTANEOUS at 20:34

## 2019-12-29 RX ADMIN — METOPROLOL TARTRATE 25 MG: 25 TABLET ORAL at 20:29

## 2019-12-29 RX ADMIN — INSULIN GLARGINE 29 UNITS: 100 INJECTION, SOLUTION SUBCUTANEOUS at 20:34

## 2019-12-29 RX ADMIN — SODIUM CHLORIDE, PRESERVATIVE FREE 10 ML: 5 INJECTION INTRAVENOUS at 13:26

## 2019-12-29 ASSESSMENT — ENCOUNTER SYMPTOMS
BACK PAIN: 0
VOMITING: 0
SORE THROAT: 0
EYE REDNESS: 0
WHEEZING: 0
ABDOMINAL DISTENTION: 0
DIARRHEA: 0
COUGH: 0
EYE PAIN: 0
ABDOMINAL PAIN: 0
NAUSEA: 0
EYE DISCHARGE: 0
SINUS PRESSURE: 0
SHORTNESS OF BREATH: 1

## 2019-12-29 NOTE — ED PROVIDER NOTES
is soft. Tenderness: There is no tenderness. There is no guarding or rebound. Skin:     General: Skin is warm and dry. Neurological:      Mental Status: She is alert and oriented to person, place, and time. Cranial Nerves: No cranial nerve deficit. Coordination: Coordination normal.          Procedures     MDM       --------------------------------------------- PAST HISTORY ---------------------------------------------  Past Medical History:  has a past medical history of Arthritis, Asthma, Cancer ([facility] [address] Cerebral artery occlusion with cerebral infarction [facility]), Cerebrovascular disease, CHF (congestive heart failure) ([facility] [address] Claustrophobia, COPD (chronic obstructive pulmonary disease) ([facility] [address] Decreased dorsalis pedis pulse, Dermatophytosis, Headache(784.0), History of blood transfusion, Hx of blood clots, Hyperlipidemia, Hypertension, LBP radiating to both legs, Lymphedema of both lower extremities, Neuromuscular disorder ([facility] [address] Non-rheumatic aortic sclerosis ([facility] [address] Obesity, Pulmonary hypertension ([facility] [address] Recurrent genital HSV (herpes simplex virus) infection, S/P breast biopsy, right, Type II or unspecified type diabetes mellitus without mention of complication, not stated as uncontrolled, and UTI (urinary tract infection). Past Surgical History:  has a past surgical history that includes Total hip arthroplasty (Bilateral); vin and bso (cervix removed); ECHO Compl W Dop Color Flow (6/7/2012); Total knee arthroplasty (Bilateral, 2013); Colonoscopy (2005); Hysterectomy; Colonoscopy (1/8/15); Breast lumpectomy (years ago); arthroplasty (Left, 07/29/2016); Finger surgery (Left, 07/29/2016); joint replacement; Breast lumpectomy (Right, 09/06/2016); other surgical history (Right, 12/20/2017); and Hand surgery (12/2017). Social History:  reports that she quit smoking about 18 years ago. Her smoking use included cigarettes. She has a 8.75 pack-year smoking history.  She has never used smokeless tobacco.

## 2019-12-30 LAB
ADENOVIRUS BY PCR: NOT DETECTED
ANION GAP SERPL CALCULATED.3IONS-SCNC: 16 MMOL/L (ref 7–16)
BORDETELLA PARAPERTUSSIS BY PCR: NOT DETECTED
BORDETELLA PERTUSSIS BY PCR: NOT DETECTED
BUN BLDV-MCNC: 13 MG/DL (ref 8–23)
CALCIUM SERPL-MCNC: 9.2 MG/DL (ref 8.6–10.2)
CHLAMYDOPHILIA PNEUMONIAE BY PCR: NOT DETECTED
CHLORIDE BLD-SCNC: 99 MMOL/L (ref 98–107)
CO2: 23 MMOL/L (ref 22–29)
CORONAVIRUS 229E BY PCR: NOT DETECTED
CORONAVIRUS HKU1 BY PCR: NOT DETECTED
CORONAVIRUS NL63 BY PCR: NOT DETECTED
CORONAVIRUS OC43 BY PCR: NOT DETECTED
CREAT SERPL-MCNC: 0.6 MG/DL (ref 0.5–1)
GFR AFRICAN AMERICAN: >60
GFR NON-AFRICAN AMERICAN: >60 ML/MIN/1.73
GLUCOSE BLD-MCNC: 197 MG/DL (ref 74–99)
HCT VFR BLD CALC: 33.9 % (ref 34–48)
HEMOGLOBIN: 10 G/DL (ref 11.5–15.5)
HUMAN METAPNEUMOVIRUS BY PCR: NOT DETECTED
HUMAN RHINOVIRUS/ENTEROVIRUS BY PCR: NOT DETECTED
INFLUENZA A BY PCR: NOT DETECTED
INFLUENZA B BY PCR: NOT DETECTED
MAGNESIUM: 1.8 MG/DL (ref 1.6–2.6)
MCH RBC QN AUTO: 24.7 PG (ref 26–35)
MCHC RBC AUTO-ENTMCNC: 29.5 % (ref 32–34.5)
MCV RBC AUTO: 83.7 FL (ref 80–99.9)
METER GLUCOSE: 205 MG/DL (ref 74–99)
METER GLUCOSE: 221 MG/DL (ref 74–99)
METER GLUCOSE: 276 MG/DL (ref 74–99)
METER GLUCOSE: 334 MG/DL (ref 74–99)
MYCOPLASMA PNEUMONIAE BY PCR: NOT DETECTED
PARAINFLUENZA VIRUS 1 BY PCR: NOT DETECTED
PARAINFLUENZA VIRUS 2 BY PCR: NOT DETECTED
PARAINFLUENZA VIRUS 3 BY PCR: NOT DETECTED
PARAINFLUENZA VIRUS 4 BY PCR: NOT DETECTED
PDW BLD-RTO: 15.1 FL (ref 11.5–15)
PLATELET # BLD: 489 E9/L (ref 130–450)
PMV BLD AUTO: 10.6 FL (ref 7–12)
POTASSIUM REFLEX MAGNESIUM: 4.2 MMOL/L (ref 3.5–5)
PROCALCITONIN: <0.02 NG/ML (ref 0–0.08)
PROCALCITONIN: <0.02 NG/ML (ref 0–0.08)
RBC # BLD: 4.05 E12/L (ref 3.5–5.5)
RESPIRATORY SYNCYTIAL VIRUS BY PCR: NOT DETECTED
SODIUM BLD-SCNC: 138 MMOL/L (ref 132–146)
WBC # BLD: 12.6 E9/L (ref 4.5–11.5)

## 2019-12-30 PROCEDURE — 94640 AIRWAY INHALATION TREATMENT: CPT

## 2019-12-30 PROCEDURE — 6370000000 HC RX 637 (ALT 250 FOR IP): Performed by: NURSE PRACTITIONER

## 2019-12-30 PROCEDURE — G0378 HOSPITAL OBSERVATION PER HR: HCPCS

## 2019-12-30 PROCEDURE — 6360000002 HC RX W HCPCS: Performed by: NURSE PRACTITIONER

## 2019-12-30 PROCEDURE — 96376 TX/PRO/DX INJ SAME DRUG ADON: CPT

## 2019-12-30 PROCEDURE — 83735 ASSAY OF MAGNESIUM: CPT

## 2019-12-30 PROCEDURE — 94660 CPAP INITIATION&MGMT: CPT

## 2019-12-30 PROCEDURE — 82962 GLUCOSE BLOOD TEST: CPT

## 2019-12-30 PROCEDURE — 36415 COLL VENOUS BLD VENIPUNCTURE: CPT

## 2019-12-30 PROCEDURE — 84145 PROCALCITONIN (PCT): CPT

## 2019-12-30 PROCEDURE — 0100U HC RESPIRPTHGN MULT REV TRANS & AMP PRB TECH 21 TRGT: CPT

## 2019-12-30 PROCEDURE — 2700000000 HC OXYGEN THERAPY PER DAY

## 2019-12-30 PROCEDURE — 96366 THER/PROPH/DIAG IV INF ADDON: CPT

## 2019-12-30 PROCEDURE — 80048 BASIC METABOLIC PNL TOTAL CA: CPT

## 2019-12-30 PROCEDURE — 2580000003 HC RX 258: Performed by: NURSE PRACTITIONER

## 2019-12-30 PROCEDURE — 96372 THER/PROPH/DIAG INJ SC/IM: CPT

## 2019-12-30 PROCEDURE — 85027 COMPLETE CBC AUTOMATED: CPT

## 2019-12-30 PROCEDURE — 87450 HC DIRECT STREP B ANTIGEN: CPT

## 2019-12-30 RX ADMIN — INSULIN GLARGINE 29 UNITS: 100 INJECTION, SOLUTION SUBCUTANEOUS at 19:56

## 2019-12-30 RX ADMIN — SODIUM CHLORIDE, PRESERVATIVE FREE 10 ML: 5 INJECTION INTRAVENOUS at 16:51

## 2019-12-30 RX ADMIN — IPRATROPIUM BROMIDE AND ALBUTEROL SULFATE 1 AMPULE: 2.5; .5 SOLUTION RESPIRATORY (INHALATION) at 03:42

## 2019-12-30 RX ADMIN — FORMOTEROL FUMARATE DIHYDRATE 20 MCG: 20 SOLUTION RESPIRATORY (INHALATION) at 03:42

## 2019-12-30 RX ADMIN — LOSARTAN POTASSIUM 100 MG: 50 TABLET, FILM COATED ORAL at 08:41

## 2019-12-30 RX ADMIN — ENOXAPARIN SODIUM 40 MG: 40 INJECTION SUBCUTANEOUS at 08:41

## 2019-12-30 RX ADMIN — METFORMIN HYDROCHLORIDE 500 MG: 500 TABLET ORAL at 16:51

## 2019-12-30 RX ADMIN — OXYBUTYNIN CHLORIDE 5 MG: 5 TABLET ORAL at 20:02

## 2019-12-30 RX ADMIN — IPRATROPIUM BROMIDE AND ALBUTEROL SULFATE 1 AMPULE: 2.5; .5 SOLUTION RESPIRATORY (INHALATION) at 13:08

## 2019-12-30 RX ADMIN — OXYBUTYNIN CHLORIDE 5 MG: 5 TABLET ORAL at 08:41

## 2019-12-30 RX ADMIN — METOPROLOL TARTRATE 25 MG: 25 TABLET ORAL at 19:56

## 2019-12-30 RX ADMIN — FORMOTEROL FUMARATE DIHYDRATE 20 MCG: 20 SOLUTION RESPIRATORY (INHALATION) at 19:39

## 2019-12-30 RX ADMIN — AMLODIPINE BESYLATE 5 MG: 5 TABLET ORAL at 08:41

## 2019-12-30 RX ADMIN — IPRATROPIUM BROMIDE AND ALBUTEROL SULFATE 1 AMPULE: 2.5; .5 SOLUTION RESPIRATORY (INHALATION) at 19:39

## 2019-12-30 RX ADMIN — METHYLPREDNISOLONE SODIUM SUCCINATE 40 MG: 40 INJECTION, POWDER, FOR SOLUTION INTRAMUSCULAR; INTRAVENOUS at 00:34

## 2019-12-30 RX ADMIN — INSULIN LISPRO 5 UNITS: 100 INJECTION, SOLUTION INTRAVENOUS; SUBCUTANEOUS at 19:57

## 2019-12-30 RX ADMIN — PRAVASTATIN SODIUM 80 MG: 20 TABLET ORAL at 19:55

## 2019-12-30 RX ADMIN — INSULIN LISPRO 6 UNITS: 100 INJECTION, SOLUTION INTRAVENOUS; SUBCUTANEOUS at 08:40

## 2019-12-30 RX ADMIN — ANASTROZOLE 1 MG: 1 TABLET, COATED ORAL at 08:42

## 2019-12-30 RX ADMIN — METHYLPREDNISOLONE SODIUM SUCCINATE 40 MG: 40 INJECTION, POWDER, FOR SOLUTION INTRAMUSCULAR; INTRAVENOUS at 14:39

## 2019-12-30 RX ADMIN — Medication 10 ML: at 08:41

## 2019-12-30 RX ADMIN — Medication 10 ML: at 19:57

## 2019-12-30 RX ADMIN — CEFTRIAXONE SODIUM 1 G: 1 INJECTION, POWDER, FOR SOLUTION INTRAMUSCULAR; INTRAVENOUS at 11:58

## 2019-12-30 RX ADMIN — ASPIRIN 81 MG 81 MG: 81 TABLET ORAL at 08:42

## 2019-12-30 RX ADMIN — CLONIDINE HYDROCHLORIDE 0.2 MG: 0.2 TABLET ORAL at 14:38

## 2019-12-30 RX ADMIN — SODIUM CHLORIDE, PRESERVATIVE FREE 10 ML: 5 INJECTION INTRAVENOUS at 00:34

## 2019-12-30 RX ADMIN — OXYBUTYNIN CHLORIDE 5 MG: 5 TABLET ORAL at 14:38

## 2019-12-30 RX ADMIN — METHYLPREDNISOLONE SODIUM SUCCINATE 40 MG: 40 INJECTION, POWDER, FOR SOLUTION INTRAMUSCULAR; INTRAVENOUS at 08:41

## 2019-12-30 RX ADMIN — BUDESONIDE 250 MCG: 0.25 SUSPENSION RESPIRATORY (INHALATION) at 19:40

## 2019-12-30 RX ADMIN — GABAPENTIN 400 MG: 400 CAPSULE ORAL at 14:39

## 2019-12-30 RX ADMIN — METHYLPREDNISOLONE SODIUM SUCCINATE 40 MG: 40 INJECTION, POWDER, FOR SOLUTION INTRAMUSCULAR; INTRAVENOUS at 19:55

## 2019-12-30 RX ADMIN — GABAPENTIN 400 MG: 400 CAPSULE ORAL at 08:42

## 2019-12-30 RX ADMIN — INSULIN LISPRO 6 UNITS: 100 INJECTION, SOLUTION INTRAVENOUS; SUBCUTANEOUS at 12:12

## 2019-12-30 RX ADMIN — IPRATROPIUM BROMIDE AND ALBUTEROL SULFATE 1 AMPULE: 2.5; .5 SOLUTION RESPIRATORY (INHALATION) at 15:27

## 2019-12-30 RX ADMIN — METFORMIN HYDROCHLORIDE 500 MG: 500 TABLET ORAL at 08:41

## 2019-12-30 RX ADMIN — IPRATROPIUM BROMIDE AND ALBUTEROL SULFATE 1 AMPULE: 2.5; .5 SOLUTION RESPIRATORY (INHALATION) at 09:24

## 2019-12-30 RX ADMIN — AZITHROMYCIN MONOHYDRATE 500 MG: 500 INJECTION, POWDER, LYOPHILIZED, FOR SOLUTION INTRAVENOUS at 14:38

## 2019-12-30 RX ADMIN — INSULIN LISPRO 12 UNITS: 100 INJECTION, SOLUTION INTRAVENOUS; SUBCUTANEOUS at 16:51

## 2019-12-30 RX ADMIN — HYDROCHLOROTHIAZIDE 25 MG: 25 TABLET ORAL at 08:41

## 2019-12-30 RX ADMIN — SODIUM CHLORIDE, PRESERVATIVE FREE 10 ML: 5 INJECTION INTRAVENOUS at 14:38

## 2019-12-30 RX ADMIN — CLONIDINE HYDROCHLORIDE 0.2 MG: 0.2 TABLET ORAL at 20:01

## 2019-12-30 RX ADMIN — CLONIDINE HYDROCHLORIDE 0.2 MG: 0.2 TABLET ORAL at 05:47

## 2019-12-30 RX ADMIN — METOPROLOL TARTRATE 25 MG: 25 TABLET ORAL at 08:42

## 2019-12-30 RX ADMIN — GABAPENTIN 400 MG: 400 CAPSULE ORAL at 19:55

## 2019-12-30 RX ADMIN — BUDESONIDE 250 MCG: 0.25 SUSPENSION RESPIRATORY (INHALATION) at 03:43

## 2019-12-30 RX ADMIN — SODIUM CHLORIDE, PRESERVATIVE FREE 10 ML: 5 INJECTION INTRAVENOUS at 11:58

## 2019-12-30 ASSESSMENT — PAIN SCALES - GENERAL
PAINLEVEL_OUTOF10: 0

## 2019-12-30 NOTE — CONSULTS
Duy Hernandez M.D.,[facility]  Melony Rader D.O., F.A.C.O.I., Maria Luz Nicolas M.D. Adelia Renee M.D., Carmelina Bauman M.D. Patient:  Brigitte Gonzalez 71 y.o. female MRN: [id number]     Date of Service: 12/30/2019      PULMONARY CONSULTATION    Reason for Consultation: COPD  Referring Physician: Dr. Michelle Wyman MD    Communication with the referring physician will be sent via the electronic medical record. Chief Complaint: shortness of breath on exertion    CODE STATUS: Full    SUBJECTIVE:  HPI:  Elian Hernandez is a 71 y.o.  female who is known to our service from a recent hospital admission. We are asked to see her in consultation today for evaluation of COPD. December 1, 2019. She has a past medical history significant for probable asthma reactive airway disease, arthritis, cerebral artery occlusion with infarction approx 12 years ago with deficits in speech, swallowing, and paralyzed  diaphragm, congestive heart failure, question of COPD, remote history of tobacco abuse 8-pack-year smoker, secondhand smoke exposure-  smoked 1 ppd, history of blood clots in leg and lungs over 30 years ago, nonrheumatic aortic sclerosis, pulmonary hypertension, syncope and collapse at home 2018, diabetes mellitus type 2, right breast ductal carcinoma In Situ, Stage 0 disease, [id number] disease treated with radiation therapy and Arimidex,  ER positive at 90% CO positive at 30%. , headache, hyperlipidemia, genital herpes, dermatophytosis,  and hypertension. She had previous ongoing complaints of daytime hypersomnolence and difficulties with sleeping she had undergone multiple sleep latency testing, full in lab sleep study in 2016. All sleep testing was normal. No evidence of KENAN or narcolepsy. AHI 2.   She states that she has had multiple hospital admissions for similar symptoms with shortness of breath  previously diagnosed with pneumonia and heart failure -over the past 6 no residual    CHF (congestive heart failure) (HCC) approx 3 years ago    Claustrophobia     COPD (chronic obstructive pulmonary disease) ([address]     Decreased dorsalis pedis pulse 9/5/2019    Dermatophytosis 9/5/2019    Headache(784.0)     History of blood transfusion     with knee surgery    Hx of blood clots     Hyperlipidemia     Hypertension     LBP radiating to both legs     Lymphedema of both lower extremities 11/12/2015    Neuromuscular disorder ([address]     Non-rheumatic aortic sclerosis ([address] 10/2018    10/2018    Obesity     Pulmonary hypertension ([address] 10/2018    Recurrent genital HSV (herpes simplex virus) infection     last outbreak 11/2017    S/P breast biopsy, right 07/2016    pt states breast cancer    Type II or unspecified type diabetes mellitus without mention of complication, not stated as uncontrolled     AA1c8.7 9/10    UTI (urinary tract infection) 5/13/2019       Past Surgical History:   Procedure Laterality Date    ARTHROPLASTY Left 07/29/2016    thumb basil joint with dequervain's release    BREAST LUMPECTOMY  years ago    benign    BREAST LUMPECTOMY Right 09/06/2016    COLONOSCOPY  2005    COLONOSCOPY  1/8/15    Dr. Ru Wills - [address]    ECHO COMPL [address]   6/7/2012         FINGER SURGERY Left 07/29/2016    thumb    HAND SURGERY  12/2017    HYSTERECTOMY      JOINT REPLACEMENT      OTHER SURGICAL HISTORY Right 12/20/2017    RIGHT THUMB TRAPEZIECTOMY WITH LIGAMENT RECONSRUCTION DEQUERVAINS RELEASE    TIM AND BSO      TOTAL HIP ARTHROPLASTY Bilateral     2000, 2013 dr Belia Jara, dr Zak Willis Bilateral 2013    dr Wood Ear       Family History   Problem Relation Age of Onset    Diabetes Mother     High Blood Pressure Mother     Heart Attack Mother     High Blood Pressure Father     Diabetes Father     Heart Failure Father     Breast Cancer Sister     Stroke Sister         young age   [de-identified] Cancer Sister 55        breast    Sickle Cell   Packs/day: 0.25       Years: 35.00       Pack years: 8.75       Types: Cigarettes       Last attempt to quit: 2001       Years since quittin.0    Smokeless tobacco: Never Used        ETOH:   reports current alcohol use. 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     Exposures: There  is not history of TB or TB exposure. There is not asbestos or silica dust exposure. The patient reports does not have coal, foundry, quarry or [employer] exposure. Recent travel history none. There is not  history of recreational or IV drug use. There is not hot tub exposure. The patient does not have any exotic pets, turtles or exotic birds.      Vaccines:    Influenza: Up to date  Pneumococcal Polysaccharide: Up to date  Immunization History   Administered Date(s) Administered    DTaP 2010    Influenza 2012, 2013    Influenza Virus Vaccine 2010, 2013    Influenza, High Dose (Fluzone 65 yrs and older) 2015, 10/17/2016, 2017    Influenza, Triv, inactivated, subunit, adjuvanted, IM (Fluad 65 yrs and older) 10/15/2019    Pneumococcal Conjugate 13-valent ([id number]) 2018    Pneumococcal Conjugate 7-valent (Prevnar7) 2010    Pneumococcal Polysaccharide ([id number]) 2015    Tdap (Boostrix, Adacel) 2017    Zoster Recombinant (Shingrix) 2018        Home Meds: Medications Prior to Admission: predniSONE (DELTASONE) 20 MG tablet, Two tablets daily for 5 days  albuterol sulfate HFA (PROAIR HFA) 108 (90 Base) MCG/ACT inhaler, Inhale 2 puffs into the lungs every 6 hours as needed for Wheezing  aspirin 81 MG chewable tablet, Take 1 tablet by mouth daily  albuterol-ipratropium (COMBIVENT RESPIMAT)  MCG/ACT AERS inhaler, Inhale 1 puff into the lungs every 6 hours (Patient not taking: Reported on 2019)  Fluticasone furoate-vilanterol Daily    cloNIDine  0.2 mg Oral TID    gabapentin  400 mg Oral TID    metFORMIN  500 mg Oral BID     metoprolol tartrate  25 mg Oral BID    oxybutynin  5 mg Oral TID    pravastatin  80 mg Oral Daily    insulin glargine  0.25 Units/kg Subcutaneous Nightly    insulin lispro  0-18 Units Subcutaneous TID     insulin lispro  0-9 Units Subcutaneous Nightly    sodium chloride flush  10 mL Intravenous 2 times per day    azithromycin  500 mg Intravenous Q24H    And    cefTRIAXone (ROCEPHIN) IV  1 g Intravenous Q24H    enoxaparin  40 mg Subcutaneous Daily    methylPREDNISolone  40 mg Intravenous Q6H    Followed by   Lala Noel ON 12/31/2019] predniSONE  40 mg Oral Daily    ipratropium-albuterol  1 ampule Inhalation Q4H WA    formoterol  20 mcg Nebulization BID    budesonide  250 mcg Nebulization BID    losartan  100 mg Oral Daily    And    hydrochlorothiazide  25 mg Oral Daily       Continuous Infusions:   dextrose         No Known Allergies   REVIEW OF SYSTEMS:  Constitutional: Denies fever, weight loss, night sweats, and fatigue  Skin: Denies pigmentation, dark lesions, and rashes   HEENT: Denies hearing loss, tinnitus, ear drainage, epistaxis, sore throat, and hoarseness. Cardiovascular: Denies palpitations, chest pain, and chest pressure.   Respiratory: Dyspnea worse with exertion  Gastrointestinal: Denies nausea, vomiting, poor appetite, diarrhea, heartburn or reflux  Genitourinary: Denies dysuria, frequency, urgency or hematuria  Musculoskeletal: Denies myalgias, muscle weakness, and bone pain  Neurological: Denies dizziness, vertigo, headache, and focal weakness  Psychological: Denies anxiety and depression  Endocrine: Denies heat intolerance and cold intolerance  Hematopoietic/Lymphatic: Denies bleeding problems and blood transfusions    OBJECTIVE:   BP (!) 197/84   Pulse 75   Temp 98 °F (36.7 °C) (Temporal)   Resp 18   Ht 5' 5\" (1.651 m)   Wt 250 lb 11.2 oz (113.7 kg)   SpO2 98%   BMI 41.72 kg/m²   SpO2 Readings from Last 1 Encounters:   12/30/19 98%        I/O:    Intake/Output Summary (Last 24 hours) at 12/30/2019 1458  Last data filed at 12/30/2019 1440  Gross per 24 hour   Intake 1020 ml   Output 850 ml   Net 170 ml     Vent Information  Skin Assessment: Clean, dry, & intact  FiO2 : 30 %  I Time/ I Time %: 0.9 s       IPAP: 15 cmH20  CPAP/EPAP: 5 cmH2O     Physical Exam:  General: The patient is lying in bed comfortably without any distress. Breathing is not labored  HEENT: Pupils are equal round and reactive to light, there are no oral lesions and no post-nasal drip   Neck: supple without adenopathy  Cardiovascular: regular rate and rhythm without murmur or gallop  Respiratory: Diminished bilaterally with few basilar crackles to auscultation bilaterally without wheezing. Air entry is symmetric  Abdomen: soft, obese non-tender, non-distended, normal bowel sounds  Extremities: warm, trace peripheral bilateral lower extremity edema, no clubbing  Skin: no rash or lesion  Neurologic: CN II-XII grossly intact, no focal deficits     Pulmonary Function Testing none on file       Chest x-ray 12/29/2019     FINDINGS:   Stable cardiomediastinal silhouette. There is mild pulmonary vascular   congestion. Bibasilar opacities noted. No pneumothorax seen. There are   degenerative changes in the shoulders and spine.           Impression       1. Bibasilar opacities. Differential includes atelectasis, infection,   and/or layering pleural effusions.       2. Mild pulmonary vascular congestion.                   10/2/2019 CTA chest  Bilateral airspace disease likely atelectasis, possibly pneumonia   Small bilateral pleural effusions   Mediastinal adenopathy most significant at the aortopulmonary window   There is atherosclerotic disease identified.    Nonobstructive left renal calculus                     Labs:  Lab Results   Component Value Date    WBC 12.6 12/30/2019    HGB 10.0 12/30/2019    HCT 33.9 perforomist and budesonide BID, duonebs every 4 hrs w/a can resume Breo at discharge  5. Add incentive spirometer, EZPAP with treatements   6. Taper corticosteroids to oral prednisone  7. Consider repeat Echo. Consult cardiology for HFrEF, uncontrolled HTN  8. Repeat outpatient PSG has been 3 yrs since previous study  9. Await final cultures, consider stopping antibiotics, imaging representative of CHF  10. Will recommend outpatient PFTs when recovered, further work up for asthma planned as outpatient    11. Consider thrombophilia panel hx stillborn, blood clots, and CVA. 12. appt January 10 already scheduled in office with Dr. Vazquez Mail      Thank you for allowing me to participate in the care of [address] [place] [address]. Please feel free to call with questions. This plan of care was reviewed in collaboration with Dr. Vazquez Mail    Electronically signed by DMITRY Benton CNP on 12/30/2019 at 2:58 PM        I personally saw, examined, and cared for the patient. Labs, medications, radiographs reviewed. I agree with history exam and plans detailed in NP note. Patient with dyspnea with exertion. Uncontrolled HTN. Elevated pro-BNP. Low procal. Imaging reviewed and appears to have pulm edema.     Acute hypoxic resp failure  CHF - pulm edema  Doubt pneumonia    -D/C abx and monitor off  -Increase diuresis  -patient noncompliant with diet  -continue supplemental O2 for SpO2 > 90%, check ambulatory pulse ox, may need home O2  -Outpatient sleep study    Electronically signed by Haydee Patel DO on 12/30/2019 at 9:26 PM

## 2019-12-30 NOTE — PROGRESS NOTES
Latex allergy reported by patient.    Health Maintenance Summary     Topic Due On Due Status Completed On Postpone Until Reason    Medicare Wellness Visit Apr 21, 2019 Not Due Apr 21, 2018      IMMUNIZATION - DTaP/Tdap/Td Jun 28, 2026 Not Due Jun 28, 2016      Immunization-Influenza Sep 1, 2018 Not Due Sep 29, 2017      Depression Screening Aug 27, 2019 Not Due Aug 27, 2018      Pneumococcal Vaccine 65+ Low/Medium Risk Jun 28, 2017 Postponed Jun 28, 2016 Nov 27, 2018 Patient Refused          Patient is up to date, no discussion needed .             Message left for Dr. Darby Keep to notify him of patient having 8 beats of vtach

## 2019-12-30 NOTE — H&P
[address] [facility]  History and Physical      CHIEF COMPLAINT:  SOB      HISTORY OF PRESENT ILLNESS:      The patient is a 71 y.o. female patient of Dr Constance Freedman who presents with harsh breath. Patient with history of CHF, COPD and asthma presents the ED with worsening shortness of breath. Patient states she has been short of breath for the last 3 months. Patient notes wheezing. Denies swelling. Patient reports poorly controlled blood pressure. Patient states she has been compliant with her medications. Shortness breath is exacerbated with exertion. No fevers chills or productive cough.     Past Medical History:    Past Medical History:   Diagnosis Date    Arthritis     Asthma     Cancer ([address]     breast     Cerebral artery occlusion with cerebral infarction [facility]) 2010    Speech difficulties results; claims 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    Cerebrovascular disease 6/09    no residual    CHF (congestive heart failure) (HCC) approx 3 years ago    Claustrophobia     COPD (chronic obstructive pulmonary disease) ([address]     Decreased dorsalis pedis pulse 9/5/2019    Dermatophytosis 9/5/2019    Headache(784.0)     History of blood transfusion     with knee surgery    Hx of blood clots     Hyperlipidemia     Hypertension     LBP radiating to both legs     Lymphedema of both lower extremities 11/12/2015    Neuromuscular disorder (HCC)     Non-rheumatic aortic sclerosis ([address] 10/2018    10/2018    Obesity     Pulmonary hypertension ([address] 10/2018    Recurrent genital HSV (herpes simplex virus) infection     last outbreak 11/2017    S/P breast biopsy, right 07/2016    pt states breast cancer    Type II or unspecified type diabetes mellitus without mention of complication, not stated as uncontrolled     AA1c8.7 9/10    UTI (urinary tract infection) 5/13/2019       Past Surgical History:    Past Surgical History:   Procedure Laterality Date    ARTHROPLASTY Left 07/29/2016 thumb basil joint with dequervain's release    BREAST LUMPECTOMY  years ago    benign    BREAST LUMPECTOMY Right 09/06/2016    COLONOSCOPY  2005    COLONOSCOPY  1/8/15    Dr. Deja Galarza - [facility]    ECHO COMPL W DOP COLOR FLOW  6/7/2012         FINGER SURGERY Left 07/29/2016    thumb    HAND SURGERY  12/2017    HYSTERECTOMY      JOINT REPLACEMENT      OTHER SURGICAL HISTORY Right 12/20/2017    RIGHT THUMB TRAPEZIECTOMY WITH LIGAMENT RECONSRUCTION DEQUERVAINS RELEASE    TIM AND BSO      TOTAL HIP ARTHROPLASTY Bilateral     2000, 2013 dr Robbi Dangelo, dr Monge Shark Bilateral 2013    dr Ashley Connell       Medications Prior to Admission:    Medications Prior to Admission: predniSONE (DELTASONE) 20 MG tablet, Two tablets daily for 5 days  albuterol sulfate HFA (PROAIR HFA) 108 (90 Base) MCG/ACT inhaler, Inhale 2 puffs into the lungs every 6 hours as needed for Wheezing  aspirin 81 MG chewable tablet, Take 1 tablet by mouth daily  albuterol-ipratropium (COMBIVENT RESPIMAT)  MCG/ACT AERS inhaler, Inhale 1 puff into the lungs every 6 hours (Patient not taking: Reported on 12/6/2019)  Fluticasone furoate-vilanterol (BREO ELLIPTA) 200-25 MCG/INH AEPB inhaler, Inhale 1 puff into the lungs daily  melatonin (RA MELATONIN) 3 MG TABS tablet, Take one 3 mg hs  cloNIDine (CATAPRES) 0.2 MG tablet, Take 1 tablet by mouth 2 times daily  calcium carbonate-vitamin D (CALCIUM 600 + D) 600-400 MG-UNIT TABS per tab, Take 1 tablet by mouth 2 times daily  amLODIPine (NORVASC) 5 MG tablet, Take 1 tablet by mouth daily  oxybutynin (DITROPAN) 5 MG tablet, 5 mg 3 times daily   butenafine (LOTRIMIN ULTRA) 1 % CREA, Please apply from the toes, in between the toes, foot up to the upper calf twice daily for 1 month, both legs  metFORMIN (GLUCOPHAGE) 500 MG tablet, Take 2 tablets twice a day  insulin glargine (LANTUS) 100 UNIT/ML injection pen, Take 35 units daily at bedtime  insulin aspart (NOVOLOG) 100 UNIT/ML injection pen, °C) (Temporal)   Resp 18   Ht 5' 5\" (1.651 m)   Wt 250 lb 11.2 oz (113.7 kg)   SpO2 98%   BMI 41.72 kg/m²     General:  Awake, alert, oriented X 3. Well developed, well nourished, well groomed. No apparent distress. HEENT:  Normocephalic, atraumatic. Pupils equal, round, reactive to light. No scleral icterus. No conjunctival injection. Normal lips, teeth, and gums. No nasal discharge. Neck:  Supple  Heart:  RRR, no murmurs, gallops, rubs  Lungs: Scattered expiratory wheezes bilat symmetrical expansion,  wheeze, rales, or rhonchi  Abdomen:   Bowel sounds present, soft, nontender, no masses, no organomegaly, no peritoneal signs  Extremities:  No clubbing, cyanosis, or edema  Skin:  Warm and dry, no open lesions or rash  Neuro:  Cranial nerves 2-12 intact, no focal deficits  Breast: deferred  Rectal: deferred  Genitalia:  deferred    LABS:    CBC with Differential:    Lab Results   Component Value Date    WBC 12.6 12/30/2019    RBC 4.05 12/30/2019    HGB 10.0 12/30/2019    HCT 33.9 12/30/2019     12/30/2019    MCV 83.7 12/30/2019    MCH 24.7 12/30/2019    MCHC 29.5 12/30/2019    RDW 15.1 12/30/2019    SEGSPCT 77 12/03/2013    LYMPHOPCT 25.2 12/29/2019    MONOPCT 8.0 12/29/2019    BASOPCT 0.5 12/29/2019    MONOSABS 1.05 12/29/2019    LYMPHSABS 3.32 12/29/2019    EOSABS 0.00 12/29/2019    BASOSABS 0.06 12/29/2019     CMP:    Lab Results   Component Value Date     12/30/2019    K 4.2 12/30/2019    CL 99 12/30/2019    CO2 23 12/30/2019    BUN 13 12/30/2019    CREATININE 0.6 12/30/2019    GFRAA >60 12/30/2019    LABGLOM >60 12/30/2019    GLUCOSE 197 12/30/2019    GLUCOSE 172 04/12/2012    PROT 7.1 12/29/2019    LABALBU 3.9 12/29/2019    LABALBU 4.4 07/28/2011    CALCIUM 9.2 12/30/2019    BILITOT 0.4 12/29/2019    ALKPHOS 130 12/29/2019    AST 11 12/29/2019    ALT 12 12/29/2019     Magnesium:    Lab Results   Component Value Date    MG 1.6 12/24/2019     Phosphorus:    Lab Results   Component Value ([facility]    Hyperlipidemia LDL goal <100    Morbid obesity with BMI of 40.0-44.9, adult ([facility] [address]    History of CVA (cerebrovascular accident)    Chronic combined systolic and diastolic congestive heart failure (HCC)    Acute respiratory failure with hypoxemia (HCC)    Acute respiratory failure with hypoxia (HCC)  Resolved Problems:    * No resolved hospital problems.  *      PLAN:    Admit  IV steroids  Nebulizers  IV ABX  proCalcitonin   Pulmonary consult   Cardiology consult  Medications for other co morbidities cont as appropriate w dosage adjustments as necessary  PT/OT  DVT PPx  DC planning            Electronically signed by Delbert Reyna MD on 12/30/2019 at 11:47 AM

## 2019-12-31 PROBLEM — I10 UNCONTROLLED HYPERTENSION: Status: ACTIVE | Noted: 2019-12-31

## 2019-12-31 LAB
L. PNEUMOPHILA SEROGP 1 UR AG: NORMAL
METER GLUCOSE: 180 MG/DL (ref 74–99)
METER GLUCOSE: 245 MG/DL (ref 74–99)
METER GLUCOSE: 283 MG/DL (ref 74–99)
METER GLUCOSE: 355 MG/DL (ref 74–99)
STREP PNEUMONIAE ANTIGEN, URINE: NORMAL

## 2019-12-31 PROCEDURE — 2700000000 HC OXYGEN THERAPY PER DAY

## 2019-12-31 PROCEDURE — 82962 GLUCOSE BLOOD TEST: CPT

## 2019-12-31 PROCEDURE — 6370000000 HC RX 637 (ALT 250 FOR IP): Performed by: INTERNAL MEDICINE

## 2019-12-31 PROCEDURE — APPSS60 APP SPLIT SHARED TIME 46-60 MINUTES: Performed by: PHYSICIAN ASSISTANT

## 2019-12-31 PROCEDURE — 6360000002 HC RX W HCPCS: Performed by: NURSE PRACTITIONER

## 2019-12-31 PROCEDURE — 6370000000 HC RX 637 (ALT 250 FOR IP): Performed by: NURSE PRACTITIONER

## 2019-12-31 PROCEDURE — 93308 TTE F-UP OR LMTD: CPT

## 2019-12-31 PROCEDURE — G0378 HOSPITAL OBSERVATION PER HR: HCPCS

## 2019-12-31 PROCEDURE — 96372 THER/PROPH/DIAG INJ SC/IM: CPT

## 2019-12-31 PROCEDURE — 94660 CPAP INITIATION&MGMT: CPT

## 2019-12-31 PROCEDURE — 94640 AIRWAY INHALATION TREATMENT: CPT

## 2019-12-31 PROCEDURE — 96366 THER/PROPH/DIAG IV INF ADDON: CPT

## 2019-12-31 PROCEDURE — 2580000003 HC RX 258: Performed by: NURSE PRACTITIONER

## 2019-12-31 PROCEDURE — 99215 OFFICE O/P EST HI 40 MIN: CPT | Performed by: INTERNAL MEDICINE

## 2019-12-31 PROCEDURE — 96376 TX/PRO/DX INJ SAME DRUG ADON: CPT

## 2019-12-31 RX ORDER — AMLODIPINE BESYLATE 10 MG/1
10 TABLET ORAL DAILY
Status: DISCONTINUED | OUTPATIENT
Start: 2020-01-01 | End: 2020-01-03 | Stop reason: HOSPADM

## 2019-12-31 RX ORDER — INSULIN GLARGINE 100 [IU]/ML
35 INJECTION, SOLUTION SUBCUTANEOUS NIGHTLY
Status: DISCONTINUED | OUTPATIENT
Start: 2019-12-31 | End: 2020-01-03 | Stop reason: HOSPADM

## 2019-12-31 RX ORDER — TORSEMIDE 20 MG/1
20 TABLET ORAL DAILY
Status: DISCONTINUED | OUTPATIENT
Start: 2019-12-31 | End: 2020-01-03 | Stop reason: HOSPADM

## 2019-12-31 RX ADMIN — INSULIN LISPRO 15 UNITS: 100 INJECTION, SOLUTION INTRAVENOUS; SUBCUTANEOUS at 16:16

## 2019-12-31 RX ADMIN — INSULIN LISPRO 2 UNITS: 100 INJECTION, SOLUTION INTRAVENOUS; SUBCUTANEOUS at 20:11

## 2019-12-31 RX ADMIN — INSULIN LISPRO 9 UNITS: 100 INJECTION, SOLUTION INTRAVENOUS; SUBCUTANEOUS at 11:33

## 2019-12-31 RX ADMIN — BUDESONIDE 250 MCG: 0.25 SUSPENSION RESPIRATORY (INHALATION) at 08:00

## 2019-12-31 RX ADMIN — IPRATROPIUM BROMIDE AND ALBUTEROL SULFATE 1 AMPULE: 2.5; .5 SOLUTION RESPIRATORY (INHALATION) at 08:00

## 2019-12-31 RX ADMIN — METFORMIN HYDROCHLORIDE 500 MG: 500 TABLET ORAL at 08:40

## 2019-12-31 RX ADMIN — Medication 10 ML: at 20:11

## 2019-12-31 RX ADMIN — METHYLPREDNISOLONE SODIUM SUCCINATE 40 MG: 40 INJECTION, POWDER, FOR SOLUTION INTRAMUSCULAR; INTRAVENOUS at 02:33

## 2019-12-31 RX ADMIN — OXYBUTYNIN CHLORIDE 5 MG: 5 TABLET ORAL at 08:40

## 2019-12-31 RX ADMIN — Medication 10 ML: at 08:41

## 2019-12-31 RX ADMIN — TORSEMIDE 20 MG: 20 TABLET ORAL at 14:54

## 2019-12-31 RX ADMIN — HYDROCHLOROTHIAZIDE 25 MG: 25 TABLET ORAL at 08:40

## 2019-12-31 RX ADMIN — METOPROLOL TARTRATE 25 MG: 25 TABLET ORAL at 08:40

## 2019-12-31 RX ADMIN — OXYBUTYNIN CHLORIDE 5 MG: 5 TABLET ORAL at 20:10

## 2019-12-31 RX ADMIN — ENOXAPARIN SODIUM 40 MG: 40 INJECTION SUBCUTANEOUS at 08:40

## 2019-12-31 RX ADMIN — CLONIDINE HYDROCHLORIDE 0.2 MG: 0.2 TABLET ORAL at 06:38

## 2019-12-31 RX ADMIN — SODIUM CHLORIDE, PRESERVATIVE FREE 10 ML: 5 INJECTION INTRAVENOUS at 02:33

## 2019-12-31 RX ADMIN — SODIUM CHLORIDE, PRESERVATIVE FREE 10 ML: 5 INJECTION INTRAVENOUS at 19:20

## 2019-12-31 RX ADMIN — PRAVASTATIN SODIUM 80 MG: 20 TABLET ORAL at 20:10

## 2019-12-31 RX ADMIN — IPRATROPIUM BROMIDE AND ALBUTEROL SULFATE 1 AMPULE: 2.5; .5 SOLUTION RESPIRATORY (INHALATION) at 19:55

## 2019-12-31 RX ADMIN — BUDESONIDE 250 MCG: 0.25 SUSPENSION RESPIRATORY (INHALATION) at 19:57

## 2019-12-31 RX ADMIN — ASPIRIN 81 MG 81 MG: 81 TABLET ORAL at 08:40

## 2019-12-31 RX ADMIN — METFORMIN HYDROCHLORIDE 500 MG: 500 TABLET ORAL at 16:17

## 2019-12-31 RX ADMIN — AMLODIPINE BESYLATE 5 MG: 5 TABLET ORAL at 08:40

## 2019-12-31 RX ADMIN — CEFTRIAXONE SODIUM 1 G: 1 INJECTION, POWDER, FOR SOLUTION INTRAMUSCULAR; INTRAVENOUS at 11:03

## 2019-12-31 RX ADMIN — FORMOTEROL FUMARATE DIHYDRATE 20 MCG: 20 SOLUTION RESPIRATORY (INHALATION) at 08:00

## 2019-12-31 RX ADMIN — METHYLPREDNISOLONE SODIUM SUCCINATE 40 MG: 40 INJECTION, POWDER, FOR SOLUTION INTRAMUSCULAR; INTRAVENOUS at 08:41

## 2019-12-31 RX ADMIN — PREDNISONE 40 MG: 20 TABLET ORAL at 13:32

## 2019-12-31 RX ADMIN — CLONIDINE HYDROCHLORIDE 0.2 MG: 0.2 TABLET ORAL at 13:32

## 2019-12-31 RX ADMIN — FORMOTEROL FUMARATE DIHYDRATE 20 MCG: 20 SOLUTION RESPIRATORY (INHALATION) at 19:56

## 2019-12-31 RX ADMIN — INSULIN GLARGINE 35 UNITS: 100 INJECTION, SOLUTION SUBCUTANEOUS at 20:11

## 2019-12-31 RX ADMIN — ANASTROZOLE 1 MG: 1 TABLET, COATED ORAL at 08:41

## 2019-12-31 RX ADMIN — CLONIDINE HYDROCHLORIDE 0.2 MG: 0.2 TABLET ORAL at 20:10

## 2019-12-31 RX ADMIN — GABAPENTIN 400 MG: 400 CAPSULE ORAL at 08:41

## 2019-12-31 RX ADMIN — GABAPENTIN 400 MG: 400 CAPSULE ORAL at 13:32

## 2019-12-31 RX ADMIN — METOPROLOL TARTRATE 25 MG: 25 TABLET ORAL at 20:10

## 2019-12-31 RX ADMIN — GABAPENTIN 400 MG: 400 CAPSULE ORAL at 20:10

## 2019-12-31 RX ADMIN — LOSARTAN POTASSIUM 100 MG: 50 TABLET, FILM COATED ORAL at 08:40

## 2019-12-31 RX ADMIN — OXYBUTYNIN CHLORIDE 5 MG: 5 TABLET ORAL at 13:32

## 2019-12-31 RX ADMIN — INSULIN LISPRO 6 UNITS: 100 INJECTION, SOLUTION INTRAVENOUS; SUBCUTANEOUS at 08:41

## 2019-12-31 RX ADMIN — IPRATROPIUM BROMIDE AND ALBUTEROL SULFATE 1 AMPULE: 2.5; .5 SOLUTION RESPIRATORY (INHALATION) at 15:43

## 2019-12-31 RX ADMIN — IPRATROPIUM BROMIDE AND ALBUTEROL SULFATE 1 AMPULE: 2.5; .5 SOLUTION RESPIRATORY (INHALATION) at 11:22

## 2019-12-31 RX ADMIN — HYDRALAZINE HYDROCHLORIDE 5 MG: 20 INJECTION INTRAMUSCULAR; INTRAVENOUS at 19:20

## 2019-12-31 RX ADMIN — AZITHROMYCIN MONOHYDRATE 500 MG: 500 INJECTION, POWDER, LYOPHILIZED, FOR SOLUTION INTRAVENOUS at 13:32

## 2019-12-31 ASSESSMENT — PAIN SCALES - GENERAL
PAINLEVEL_OUTOF10: 0

## 2019-12-31 NOTE — CARE COORDINATION
Spoke with patient, she tells me she has been to hospital numerous times recently and is unsure if she will be strong enough to return home. [facility] following, back up would be [facility] if she needs JA. Will follow up after therapy evals. Patient is unsure of other JA options if [facility] cannot accept. The Plan for Transition of Care is related to the following treatment goals: Discharge plans when medically stable. The Patient and/or patient representative Nhung Ly was provided with a choice of provider and agrees   with the discharge plan. [x] Yes [] No    Freedom of choice list was provided with basic dialogue that supports the patient's individualized plan of care/goals, treatment preferences and shares the quality data associated with the providers.  [x] Yes [] No

## 2019-12-31 NOTE — PROGRESS NOTES
hypoxia  2. Uncontrolled HTN  3. Recent bronchitis treated with Azithromycin and prednisone taper  4. Dyspnea on exertion- multifactorial  5. Asthma,  likely cardiogenic   6. Exacerbation of CHF HFrEF stage II DD   7. Hx syncope and collapse   8. Mild to moderate pulmonary hypertension   9. Morbid obesity   10. HTN  11. HLD  12. DM II  13. Hx Right Breast Ductal Carcinoma 2016 treated with radiation therapy and Arimidex   14. Hx PE, DVT over 30 yrs ago  13. Hx of miscarriage   16. Hx CVA with deficits in speech and swallowing approx 12 yrs ago  16. Slight chronic elevation of right hemidiaphragm   18. Basilar atelectasis   19. Seasonal allergies       Plan:   1. Continue to wean oxygen, currently on room air  2. Will need ambulatory pulse ox before discharge  3. Bipap support qHS and PRN  4. Continue IS and flutter valve  5. Continue bronchodilator therapy,may resume Breo at discharge  6. PO Prednisone taper start today, will taper by 10mg every 3 days  7. Repeat Echo per cardiology  8. Follow OP for sleep, may benefit from titration study  9. Will need OP PFTs  10. Consider thrombophilia panel: history  11. Antibiotics ordered per cardiology, WBC elevated, procalcitonin negative, imaging reflective of pulmonary edema, doubt pneumonia  12. Weight loss is strongly recommended    This plan of care was reviewed in collaboration with Dr. Daylin Ma  Electronically signed by DMITRY Clark CNP on 12/31/2019 at 12:01 PM      I personally saw, examined, and cared for the patient. Labs, medications, radiographs reviewed. I agree with history exam and plans detailed in NP note.         Electronically signed by Rosana Cesar DO on 12/31/2019 at 5:23 PM

## 2019-12-31 NOTE — CONSULTS
Tramaine Gilliam's release. 22.  Medical non-compliance.       FAMILY HISTORY:  Father had congestive heart failure. Mother had coronary artery disease, passing away from a MI in her early [de-identified]. SOCIAL HISTORY:  Denies current tobacco, illicit drug use, and caffeine intake. Former tobacco smoker, quit in 2001. Smoked 1/4 ppd for 35+ years. History of marijuana use. Rare alcohol use. HOME MEDICATIONS:  Prior to Admission medications    Medication Sig Start Date End Date Taking?  Authorizing Provider   predniSONE (DELTASONE) 20 MG tablet Two tablets daily for 5 days 12/24/19 1/3/20  Gerardo Patel,    albuterol sulfate HFA (PROAIR HFA) 108 (90 Base) MCG/ACT inhaler Inhale 2 puffs into the lungs every 6 hours as needed for Wheezing 12/24/19 12/31/19  Gerardo Patel DO   aspirin 81 MG chewable tablet Take 1 tablet by mouth daily 12/12/19   Heather Gordon MD   albuterol-ipratropium (COMBIVENT RESPIMAT)  MCG/ACT AERS inhaler Inhale 1 puff into the lungs every 6 hours  Patient not taking: Reported on 12/6/2019 12/3/19   Deny Rocha MD   Fluticasone furoate-vilanterol (BREO ELLIPTA) 200-25 MCG/INH AEPB inhaler Inhale 1 puff into the lungs daily 11/29/19   DMITRY Teixeira CNP   melatonin (RA MELATONIN) 3 MG TABS tablet Take one 3 mg hs 11/29/19   DMITRY Teixeira CNP   cloNIDine (CATAPRES) 0.2 MG tablet Take 1 tablet by mouth 2 times daily 10/7/19   Heather Gordon MD   calcium carbonate-vitamin D (CALCIUM 600 + D) 600-400 MG-UNIT TABS per tab Take 1 tablet by mouth 2 times daily 10/6/19   Mello Tomas MD   amLODIPine (NORVASC) 5 MG tablet Take 1 tablet by mouth daily 10/6/19   Mello Tomas MD   oxybutynin (DITROPAN) 5 MG tablet 5 mg 3 times daily     Historical Provider, MD   butenafine (LOTRIMIN ULTRA) 1 % CREA Please apply from the toes, in between the toes, foot up to the upper calf twice daily for 1 month, both legs 9/5/19   Bree Multani MD   metFORMIN (GLUCOPHAGE) 500 MG tablet Take 2 tablets twice a day 8/28/19   Yanet Bosch MD   insulin glargine (LANTUS) 100 UNIT/ML injection pen Take 35 units daily at bedtime 8/28/19   Yanet Bosch MD   insulin aspart (NOVOLOG) 100 UNIT/ML injection pen Take 7 units before meals + siding scale. MAX 50 units daily 8/28/19   Yanet Bosch MD   gabapentin (NEURONTIN) 400 MG capsule Take 1 capsule by mouth 3 times daily.  8/21/19 8/20/21  Paddy Wise MD   losartan-hydrochlorothiazide (HYZAAR) 100-25 MG per tablet Take 1 tablet by mouth daily 8/21/19   Paddy Wise MD   pravastatin (PRAVACHOL) 80 MG tablet TAKE ONE TABLET BY MOUTH EVERY DAY 8/21/19   Paddy Wise MD   vitamin D (ERGOCALCIFEROL) 26708 units CAPS capsule Take 1 capsule by mouth once a week 8/21/19   Paddy Wise MD   acyclovir (ZOVIRAX) 400 MG tablet PLEASE CLARIFY  Patient not taking: Reported on 12/9/2019 8/21/19   Paddy Wise MD   metoprolol tartrate (LOPRESSOR) 25 MG tablet Take 1 tablet by mouth 2 times daily 8/21/19   Paddy Wise MD   montelukast (SINGULAIR) 10 MG tablet TAKE ONE TABLET BY MOUTH EVERY EVENING 8/21/19   Paddy Wise MD   anastrozole (ARIMIDEX) 1 MG tablet Take 1 tablet by mouth daily Indications: Estrogen Deficiency in Breast Cancer 8/3/19   Paddy Wise MD   saxagliptin (ONGLYZA) 5 MG TABS tablet Take 1 tablet by mouth daily 8/3/19   Paddy Wise MD   acetaminophen (APAP EXTRA STRENGTH) 500 MG tablet Take 1 tablet by mouth every 6 hours as needed for Pain 8/14/18   Clive Stoner DO   vitamin B-12 (CYANOCOBALAMIN) 1000 MCG tablet Take 1 tablet by mouth daily 7/20/18   Paddy Wise MD       CURRENT MEDICATIONS:      Current Facility-Administered Medications:     amLODIPine (NORVASC) tablet 5 mg, 5 mg, Oral, Daily, Jenniferpeggy Redd, APRN - CNP, 5 mg at 12/31/19 0840    anastrozole (ARIMIDEX) under my direction and personally reviewed by me in its entirety. I confirm that the note above accurately reflects all work, treatment, procedures, and medical decision making performed by me. The patient's history was independently obtained. The patient was independently examined. Electrocardiogram, prior and present cardiovascular assessment, and laboratory studies were reviewed. The patient is a 70-year-old black female known to [facility] with primary cardiovascular care provided by [facility]. She has a known history of paroxysmal supraventricular tachycardia as well as that of chronic diastolic heart failure in the face of mild left ventricular systolic dysfunction with additional issues of hypertension with chronically suboptimal control, diabetes, hyperlipidemia, chronic reactive airway disease and morbid obesity without reported previously diagnosed obstructive sleep apnea. Her most recent objective assessment included that of a dobutamine stress echocardiogram in December, 2018 in the face of her left bundle branch block conduction pattern with no evidence of reported ischemia and echocardiogram of October, 2018 demonstrating mild concentric left ventricular hypertrophy with mild left ventricular systolic dysfunction estimated ejection fraction of 45 to 50% with stage II diastolic dysfunction and mild left atrial enlargement and no significant valvular pathology with mild to moderate pulmonary hypertension and right ventricular systolic pressures of approximately 50 mmHg. She has noted chronic dyspnea recently exacerbated with evaluation in the emergency room at [address] [place] [address] and treatment for exacerbation of chronic obstructive lung disease and an upper respiratory tract infection.   She noted transient improvement followed by recurrent symptomatology leading to presentation to the emergency room at [facility].  At time of initial evaluation, her electrocardiogram demonstrated evidence of a chronic left bundle branch block conduction pattern a chest x-ray demonstrating evidence of borderline cardiomegaly with mild interstitial infiltrates. Additional laboratory evidence of a mild leukocytosis was present with normal cardiac biomarkers and a proBNP level of 930 pg/mL with a trend of slight elevation over the past 3 months but significantly decreased from that of 2230 pg/mL noted 6 months earlier. Throughout hospitalization in spite of her existing medical regimen, persistent systolic hypertension has been noted in the absence of a fever. On examination, she presently appears in no discomfort nor distress and somewhat chronically ill in appearance. As mentioned persistent systolic hypertension is noted with blood pressures as documented and vital signs as noted. Jugular venous pressure appears normal no carotid bruits are present. Lung fields are presently clear to auscultation. Cardiac examination is notable for a regular rate and rhythm with the presence of a fourth heart sound no cardiac murmur. Abdominal obesity is present with a benign abdominal examination no significant component of lower extremity edema. Diagnostic Assessment and Plan: On a clinical basis, the patient presents with progressive dyspnea likely of a multifactorial nature with potential components related to mild decompensation of chronic diastolic heart failure exacerbated by suboptimal blood pressure control with additional potential questions of attic compliance with her antihypertensive medical regimen. Presently further management of her blood pressure will be deferred to primary care with potential benefits of nephrology involvement.   Additional reassessment of left ventricular systolic and diastolic function will additionally be advisable with plans of a repeat limited echocardiogram.  Further management of her reactive airway disease will be deferred to the pulmonary service. An extensive discussion was held regarding the needs of lifestyle modification to achieve weight reduction to benefit diastolic cardiac performance and assist in reducing risk and special management of obstructive sleep apnea with reassessment of the presence or absence of obstructive sleep apnea advisable following hospital discharge to assist diastolic cardiac performance as well as potential benefits to blood pressure control. On the basis of her blood pressure and potential diastolic heart failure, conversion of her thiazide diuretic to that of a loop diuretic will be beneficial with need of monitoring of renal function and electrolytes. Continued aggressive risk factor modification of blood pressure, diabetes and serum lipids will be essential to reducing risk of future atherosclerotic development. Thank you for allowing me to participate in your patient's care. Please feel free to contact me if you have any questions or concerns. Lulu Gramajo.  Russell Olivo, [address] [facility]

## 2020-01-01 PROBLEM — I50.33 ACUTE ON CHRONIC DIASTOLIC (CONGESTIVE) HEART FAILURE (HCC): Status: ACTIVE | Noted: 2019-01-15

## 2020-01-01 PROBLEM — E78.2 MIXED HYPERLIPIDEMIA: Chronic | Status: ACTIVE | Noted: 2019-10-02

## 2020-01-01 LAB
METER GLUCOSE: 164 MG/DL (ref 74–99)
METER GLUCOSE: 189 MG/DL (ref 74–99)
METER GLUCOSE: 224 MG/DL (ref 74–99)
METER GLUCOSE: 248 MG/DL (ref 74–99)
METER GLUCOSE: 318 MG/DL (ref 74–99)

## 2020-01-01 PROCEDURE — 6370000000 HC RX 637 (ALT 250 FOR IP): Performed by: PHYSICIAN ASSISTANT

## 2020-01-01 PROCEDURE — 99214 OFFICE O/P EST MOD 30 MIN: CPT | Performed by: INTERNAL MEDICINE

## 2020-01-01 PROCEDURE — 96372 THER/PROPH/DIAG INJ SC/IM: CPT

## 2020-01-01 PROCEDURE — G0378 HOSPITAL OBSERVATION PER HR: HCPCS

## 2020-01-01 PROCEDURE — 6370000000 HC RX 637 (ALT 250 FOR IP): Performed by: NURSE PRACTITIONER

## 2020-01-01 PROCEDURE — 6370000000 HC RX 637 (ALT 250 FOR IP): Performed by: INTERNAL MEDICINE

## 2020-01-01 PROCEDURE — 96376 TX/PRO/DX INJ SAME DRUG ADON: CPT

## 2020-01-01 PROCEDURE — 82962 GLUCOSE BLOOD TEST: CPT

## 2020-01-01 PROCEDURE — 94660 CPAP INITIATION&MGMT: CPT

## 2020-01-01 PROCEDURE — 94640 AIRWAY INHALATION TREATMENT: CPT

## 2020-01-01 PROCEDURE — 2580000003 HC RX 258: Performed by: NURSE PRACTITIONER

## 2020-01-01 PROCEDURE — 6360000002 HC RX W HCPCS: Performed by: NURSE PRACTITIONER

## 2020-01-01 RX ORDER — CARVEDILOL 6.25 MG/1
6.25 TABLET ORAL 2 TIMES DAILY WITH MEALS
Status: DISCONTINUED | OUTPATIENT
Start: 2020-01-01 | End: 2020-01-02

## 2020-01-01 RX ADMIN — MAGNESIUM HYDROXIDE 30 ML: 400 SUSPENSION ORAL at 17:36

## 2020-01-01 RX ADMIN — TORSEMIDE 20 MG: 20 TABLET ORAL at 07:48

## 2020-01-01 RX ADMIN — CLONIDINE HYDROCHLORIDE 0.2 MG: 0.2 TABLET ORAL at 12:54

## 2020-01-01 RX ADMIN — ACETAMINOPHEN 650 MG: 325 TABLET, FILM COATED ORAL at 17:36

## 2020-01-01 RX ADMIN — ANASTROZOLE 1 MG: 1 TABLET, COATED ORAL at 07:48

## 2020-01-01 RX ADMIN — IPRATROPIUM BROMIDE AND ALBUTEROL SULFATE 1 AMPULE: 2.5; .5 SOLUTION RESPIRATORY (INHALATION) at 09:02

## 2020-01-01 RX ADMIN — OXYBUTYNIN CHLORIDE 5 MG: 5 TABLET ORAL at 20:59

## 2020-01-01 RX ADMIN — INSULIN GLARGINE 35 UNITS: 100 INJECTION, SOLUTION SUBCUTANEOUS at 21:02

## 2020-01-01 RX ADMIN — GABAPENTIN 400 MG: 400 CAPSULE ORAL at 07:48

## 2020-01-01 RX ADMIN — ASPIRIN 81 MG 81 MG: 81 TABLET ORAL at 07:48

## 2020-01-01 RX ADMIN — GABAPENTIN 400 MG: 400 CAPSULE ORAL at 12:54

## 2020-01-01 RX ADMIN — CLONIDINE HYDROCHLORIDE 0.2 MG: 0.2 TABLET ORAL at 06:37

## 2020-01-01 RX ADMIN — IPRATROPIUM BROMIDE AND ALBUTEROL SULFATE 1 AMPULE: 2.5; .5 SOLUTION RESPIRATORY (INHALATION) at 16:15

## 2020-01-01 RX ADMIN — INSULIN LISPRO 2 UNITS: 100 INJECTION, SOLUTION INTRAVENOUS; SUBCUTANEOUS at 21:02

## 2020-01-01 RX ADMIN — GABAPENTIN 400 MG: 400 CAPSULE ORAL at 21:00

## 2020-01-01 RX ADMIN — BUDESONIDE 250 MCG: 0.25 SUSPENSION RESPIRATORY (INHALATION) at 09:03

## 2020-01-01 RX ADMIN — METFORMIN HYDROCHLORIDE 500 MG: 500 TABLET ORAL at 07:48

## 2020-01-01 RX ADMIN — OXYBUTYNIN CHLORIDE 5 MG: 5 TABLET ORAL at 07:49

## 2020-01-01 RX ADMIN — PRAVASTATIN SODIUM 80 MG: 20 TABLET ORAL at 21:00

## 2020-01-01 RX ADMIN — LOSARTAN POTASSIUM 100 MG: 50 TABLET, FILM COATED ORAL at 07:48

## 2020-01-01 RX ADMIN — FORMOTEROL FUMARATE DIHYDRATE 20 MCG: 20 SOLUTION RESPIRATORY (INHALATION) at 09:03

## 2020-01-01 RX ADMIN — PREDNISONE 40 MG: 20 TABLET ORAL at 07:48

## 2020-01-01 RX ADMIN — Medication 10 ML: at 08:44

## 2020-01-01 RX ADMIN — OXYBUTYNIN CHLORIDE 5 MG: 5 TABLET ORAL at 12:54

## 2020-01-01 RX ADMIN — INSULIN LISPRO 12 UNITS: 100 INJECTION, SOLUTION INTRAVENOUS; SUBCUTANEOUS at 16:10

## 2020-01-01 RX ADMIN — MELATONIN 3 MG ORAL TABLET 3 MG: 3 TABLET ORAL at 02:19

## 2020-01-01 RX ADMIN — HYDRALAZINE HYDROCHLORIDE 5 MG: 20 INJECTION INTRAMUSCULAR; INTRAVENOUS at 17:50

## 2020-01-01 RX ADMIN — ENOXAPARIN SODIUM 40 MG: 40 INJECTION SUBCUTANEOUS at 07:48

## 2020-01-01 RX ADMIN — Medication 10 ML: at 21:00

## 2020-01-01 RX ADMIN — INSULIN LISPRO 3 UNITS: 100 INJECTION, SOLUTION INTRAVENOUS; SUBCUTANEOUS at 11:18

## 2020-01-01 RX ADMIN — MELATONIN 3 MG ORAL TABLET 3 MG: 3 TABLET ORAL at 21:08

## 2020-01-01 RX ADMIN — AMLODIPINE BESYLATE 10 MG: 10 TABLET ORAL at 07:48

## 2020-01-01 RX ADMIN — FORMOTEROL FUMARATE DIHYDRATE 20 MCG: 20 SOLUTION RESPIRATORY (INHALATION) at 21:47

## 2020-01-01 RX ADMIN — CARVEDILOL 6.25 MG: 6.25 TABLET, FILM COATED ORAL at 08:05

## 2020-01-01 RX ADMIN — BUDESONIDE 250 MCG: 0.25 SUSPENSION RESPIRATORY (INHALATION) at 21:47

## 2020-01-01 RX ADMIN — IPRATROPIUM BROMIDE AND ALBUTEROL SULFATE 1 AMPULE: 2.5; .5 SOLUTION RESPIRATORY (INHALATION) at 12:15

## 2020-01-01 RX ADMIN — METFORMIN HYDROCHLORIDE 500 MG: 500 TABLET ORAL at 16:12

## 2020-01-01 RX ADMIN — CARVEDILOL 6.25 MG: 6.25 TABLET, FILM COATED ORAL at 16:12

## 2020-01-01 RX ADMIN — INSULIN LISPRO 6 UNITS: 100 INJECTION, SOLUTION INTRAVENOUS; SUBCUTANEOUS at 07:49

## 2020-01-01 RX ADMIN — IPRATROPIUM BROMIDE AND ALBUTEROL SULFATE 1 AMPULE: 2.5; .5 SOLUTION RESPIRATORY (INHALATION) at 21:47

## 2020-01-01 RX ADMIN — CLONIDINE HYDROCHLORIDE 0.2 MG: 0.2 TABLET ORAL at 21:15

## 2020-01-01 ASSESSMENT — PAIN SCALES - GENERAL
PAINLEVEL_OUTOF10: 0
PAINLEVEL_OUTOF10: 0
PAINLEVEL_OUTOF10: 5
PAINLEVEL_OUTOF10: 8
PAINLEVEL_OUTOF10: 8

## 2020-01-01 ASSESSMENT — PAIN DESCRIPTION - DESCRIPTORS
DESCRIPTORS: ACHING;DISCOMFORT;HEADACHE
DESCRIPTORS: ACHING;DISCOMFORT;HEADACHE

## 2020-01-01 ASSESSMENT — PAIN DESCRIPTION - PAIN TYPE
TYPE: ACUTE PAIN
TYPE: ACUTE PAIN

## 2020-01-01 ASSESSMENT — PAIN DESCRIPTION - FREQUENCY
FREQUENCY: INTERMITTENT
FREQUENCY: INTERMITTENT

## 2020-01-01 ASSESSMENT — PAIN DESCRIPTION - PROGRESSION
CLINICAL_PROGRESSION: GRADUALLY IMPROVING
CLINICAL_PROGRESSION: GRADUALLY WORSENING

## 2020-01-01 ASSESSMENT — PAIN DESCRIPTION - ONSET: ONSET: PROGRESSIVE

## 2020-01-01 ASSESSMENT — PAIN DESCRIPTION - ORIENTATION: ORIENTATION: RIGHT

## 2020-01-01 ASSESSMENT — PAIN - FUNCTIONAL ASSESSMENT
PAIN_FUNCTIONAL_ASSESSMENT: ACTIVITIES ARE NOT PREVENTED
PAIN_FUNCTIONAL_ASSESSMENT: ACTIVITIES ARE NOT PREVENTED

## 2020-01-01 ASSESSMENT — PAIN DESCRIPTION - LOCATION: LOCATION: HEAD;LEG

## 2020-01-01 NOTE — PROGRESS NOTES
soft.      Tenderness: There is no tenderness. Musculoskeletal:         General: Swelling present. Lymphadenopathy:      Cervical: No cervical adenopathy. Skin:     General: Skin is warm and dry. Findings: No rash. Neurological:      General: No focal deficit present. Mental Status: She is alert. Psychiatric:         Behavior: Behavior normal.         Pertinent/ New Labs and Imaging Studies     Pulmonary Function Testing personally reviewed and interpreted. PERTINENT LAB RESULTS: Labs reviewed. DIAGNOSTICS: Pertinent imaging reviewed. Assessment:      1. Acute respiratory failure with hypoxia  2. Uncontrolled HTN  3. Recent bronchitis treated with Azithromycin and prednisone taper  4. Dyspnea on exertion- multifactorial  5. Asthma,  likely cardiogenic   6. Exacerbation of CHF HFrEF stage II DD   7. Hx syncope and collapse   8. Mild to moderate pulmonary hypertension   9. Morbid obesity   10. HTN  11. HLD  12. DM II  13. Hx Right Breast Ductal Carcinoma 2016 treated with radiation therapy and Arimidex   14. Hx PE, DVT over 30 yrs ago  13. Hx of miscarriage   16. Hx CVA with deficits in speech and swallowing approx 12 yrs ago  16. Slight chronic elevation of right hemidiaphragm   18. Basilar atelectasis   19. Seasonal allergies       Plan:     1. Continue to wean oxygen, currently on room air  2. Will need ambulatory pulse ox before discharge  3. Bipap support qHS and PRN  4. Continue IS and flutter valve  5. Continue bronchodilator therapy,may resume Breo at discharge  6. PO Prednisone taper start today, will taper by 10mg every 3 days  7. Follow OP for sleep, may benefit from titration study  8. Will need OP PFTs  9. Consider thrombophilia panel: history  10. Antibiotics ordered per cardiology, WBC elevated, procalcitonin negative, imaging reflective of pulmonary edema, doubt pneumonia  11.  Weight loss is strongly recommended    Doing well from pulmonary standpoint and can be D/C'd      Thank you for allowing me to participate in the care of [address]. Please feel free to call with questions.      Electronically signed by Roseline Casillas DO on 1/1/2020 at 1:10 PM

## 2020-01-01 NOTE — PROGRESS NOTES
Allergies    Current Medications:  Current Facility-Administered Medications   Medication Dose Route Frequency Provider Last Rate Last Dose    carvedilol (COREG) tablet 6.25 mg  6.25 mg Oral BID  Donata Goodpasture, MD        amLODIPine (NORVASC) tablet 10 mg  10 mg Oral Daily [place]an Polynemerson, PA-JAD        torsemide [facility]) tablet 20 mg  20 mg Oral Daily Donata Goodpasture, MD   20 mg at 12/31/19 1454    perflutren lipid microspheres (DEFINITY) injection 1.65 mg  1.5 mL Intravenous ONCE PRN Donata Goodpasture, MD        insulin glargine (LANTUS) injection vial 35 Units  35 Units Subcutaneous Nightly Jarad Rodriguez MD   35 Units at 12/31/19 2011    anastrozole (ARIMIDEX) tablet 1 mg  1 mg Oral Daily Andreea Redd, APRN - CNP   1 mg at 12/31/19 3064    aspirin chewable tablet 81 mg  81 mg Oral Daily Jeannie Redd APRN - CNP   81 mg at 12/31/19 0840    cloNIDine (CATAPRES) tablet 0.2 mg  0.2 mg Oral TID Bertha Lancaster APRN - CNP   0.2 mg at 12/31/19 2010    gabapentin (NEURONTIN) capsule 400 mg  400 mg Oral TID Bertha Lancaster APRN - CNP   400 mg at 12/31/19 2010    melatonin tablet 3 mg  3 mg Oral Nightly PRN Bertha Lancaster APRN - CNP   3 mg at 01/01/20 0219    metFORMIN (GLUCOPHAGE) tablet 500 mg  500 mg Oral BID  Andreea Redd, APRN - CNP   500 mg at 12/31/19 1617    oxybutynin (DITROPAN) tablet 5 mg  5 mg Oral TID Bertha Lancaster, APRN - CNP   5 mg at 12/31/19 2010    pravastatin (PRAVACHOL) tablet 80 mg  80 mg Oral Daily Andreea Redd, APRN - CNP   80 mg at 12/31/19 2010    glucose (GLUTOSE) 40 % oral gel 15 g  15 g Oral PRN Jeannie Redd, APRN - CNP        dextrose 50 % IV solution  12.5 g Intravenous PRN Jeannie Redd, APRN - CNP        glucagon (rDNA) injection 1 mg  1 mg Intramuscular PRN Jeannie Redd, DMITRY - CNP        dextrose 5 % solution  100 mL/hr Intravenous PRN Jeannie Redd, DMITRY - CNP        insulin lispro (HUMALOG) injection vial 0-18 Units  0-18 Units Subcutaneous TID WC Orlene Passer [place], APRN - CNP   15 Units at 12/31/19 1616    insulin lispro (HUMALOG) injection vial 0-9 Units  0-9 Units Subcutaneous Nightly Orlene Passer [place], APRN - CNP   2 Units at 12/31/19 2011    sodium chloride flush 0.9 % injection 10 mL  10 mL Intravenous 2 times per day Cherri Roll, APRN - CNP   10 mL at 12/31/19 2011    sodium chloride flush 0.9 % injection 10 mL  10 mL Intravenous PRN Orlene Passer [place], APRN - CNP   10 mL at 12/31/19 1920    magnesium hydroxide (MILK OF MAGNESIA) 400 MG/5ML suspension 30 mL  30 mL Oral Daily PRN Orlene Passer [place], APRN - CNP        acetaminophen (TYLENOL) tablet 650 mg  650 mg Oral Q4H PRN Jeannie N [place], APRN - CNP        ondansetron (ZOFRAN) injection 4 mg  4 mg Intravenous Q6H PRN Jeannie N [place], APRN - CNP        enoxaparin (LOVENOX) injection 40 mg  40 mg Subcutaneous Daily Orlene Passer [place], APRN - CNP   40 mg at 12/31/19 0840    predniSONE (DELTASONE) tablet 40 mg  40 mg Oral Daily Jeannie N [place], APRN - CNP   40 mg at 12/31/19 1332    ipratropium-albuterol (DUONEB) nebulizer solution 1 ampule  1 ampule Inhalation Q4H WA Jeannie N [place], APRN - CNP   1 ampule at 12/31/19 1955    formoterol (PERFOROMIST) nebulizer solution 20 mcg  20 mcg Nebulization BID [facility] Pass[facility] [place], APRN - CNP   20 mcg at 12/31/19 1956    budesonide (PULMICORT) nebulizer suspension 250 mcg  250 mcg Nebulization BID Cherri Roll, APRN - CNP   250 mcg at 12/31/19 1957    losartan (COZAAR) tablet 100 mg  100 mg Oral Daily Orlene Passer [place], APRN - CNP   100 mg at 12/31/19 0840    hydrALAZINE (APRESOLINE) injection 5 mg  5 mg Intravenous Q6H PRN Cherri Moreno, APRN - CNP   5 mg at 12/31/19 1920      dextrose         Physical Exam:  BP (!) 156/69   Pulse 80   Temp 97.5 °F (36.4 °C) (Temporal)   Resp 18   Ht 5' 5\" (1.651 m)   Wt 250 lb aggressive blood pressure management will be essential to diastolic cardiac performance and with normalization of left ventricular systolic function using her risk of diastolically mediated heart failure. This additional benefit a reduced risk and special management of obstructive sleep apnea with proposed reassessment following hospital discharge. Appropriate lifestyle modification to achieve weight reduction will be further beneficial in this regard. Aggressive risk factor modification of blood pressure, diabetes and serum lipids will be essential to reducing risk of future atherosclerotic development. We will further evaluate her during hospitalization should additional cardiovascular difficulties or concerns arise with arrangements for follow-up with her primary cardiologist, Ayaan Garay made in approximately 2 to 3 weeks. Note: This report was completed utilizing computer voice recognition software. Every effort has been made to ensure accuracy, however; inadvertent computerized transcription errors may be present. Lulu Gramajo.  Russell Olivo, [facility]

## 2020-01-01 NOTE — PROGRESS NOTES
failure [facility])    Essential hypertension    COPD (chronic obstructive pulmonary disease) (HCC)    Pulmonary hypertension (HCC)    Non-rheumatic aortic sclerosis (HCC)    LBBB (left bundle branch block)    Diabetes mellitus type 2, uncontrolled ([facility]    Mixed hyperlipidemia    Morbid obesity with BMI of 40.0-44.9, adult ([facility]    History of CVA (cerebrovascular accident)    Chronic combined systolic and diastolic congestive heart failure ([facility]    Uncontrolled hypertension    [facility]    Morbid obesity ([facility]  Resolved Problems:    * No resolved hospital problems. *      Plan:    Admit  IV steroids transition to p.o. Nebulizers  IV ABX stopped  proCalcitonin negative  Pulmonary Consult appreciated   Cardiology Consult appreciated   Medications for other co morbidities cont as appropriate w dosage adjustments as necessary  PT/OT  DVT PPx  DC planning--patient is stable  for discharge from my perspective as well as per cardiology and pulmonology. this was discussed with the patient she became upset, stating she is not leaving until she is ready. I agreed to let the patient stay for evaluation by PT and OT tomorrow with finalization of discharge planning.   Patient told me \"You are not in charge of Me!\"      Electronically signed by Julio Carlin MD on 1/1/2020 at 3:28 PM

## 2020-01-02 LAB
METER GLUCOSE: 195 MG/DL (ref 74–99)
METER GLUCOSE: 256 MG/DL (ref 74–99)
METER GLUCOSE: 424 MG/DL (ref 74–99)
METER GLUCOSE: 99 MG/DL (ref 74–99)

## 2020-01-02 PROCEDURE — 2060000000 HC ICU INTERMEDIATE R&B

## 2020-01-02 PROCEDURE — 6370000000 HC RX 637 (ALT 250 FOR IP): Performed by: PHYSICIAN ASSISTANT

## 2020-01-02 PROCEDURE — 6370000000 HC RX 637 (ALT 250 FOR IP): Performed by: INTERNAL MEDICINE

## 2020-01-02 PROCEDURE — 94640 AIRWAY INHALATION TREATMENT: CPT

## 2020-01-02 PROCEDURE — 97161 PT EVAL LOW COMPLEX 20 MIN: CPT

## 2020-01-02 PROCEDURE — 2580000003 HC RX 258: Performed by: NURSE PRACTITIONER

## 2020-01-02 PROCEDURE — 6360000002 HC RX W HCPCS: Performed by: NURSE PRACTITIONER

## 2020-01-02 PROCEDURE — 94660 CPAP INITIATION&MGMT: CPT

## 2020-01-02 PROCEDURE — 97165 OT EVAL LOW COMPLEX 30 MIN: CPT

## 2020-01-02 PROCEDURE — 9900000074 HC THERAPEUTIC ACTIVITIES PER 15 MIN (SELF-PAY)

## 2020-01-02 PROCEDURE — 97530 THERAPEUTIC ACTIVITIES: CPT

## 2020-01-02 PROCEDURE — 82962 GLUCOSE BLOOD TEST: CPT

## 2020-01-02 PROCEDURE — 6370000000 HC RX 637 (ALT 250 FOR IP): Performed by: NURSE PRACTITIONER

## 2020-01-02 PROCEDURE — 96372 THER/PROPH/DIAG INJ SC/IM: CPT

## 2020-01-02 PROCEDURE — 97535 SELF CARE MNGMENT TRAINING: CPT

## 2020-01-02 RX ORDER — CARVEDILOL 6.25 MG/1
12.5 TABLET ORAL 2 TIMES DAILY WITH MEALS
Status: DISCONTINUED | OUTPATIENT
Start: 2020-01-02 | End: 2020-01-03 | Stop reason: HOSPADM

## 2020-01-02 RX ORDER — CLONIDINE HYDROCHLORIDE 0.1 MG/1
0.1 TABLET ORAL 3 TIMES DAILY
Status: DISCONTINUED | OUTPATIENT
Start: 2020-01-02 | End: 2020-01-03 | Stop reason: HOSPADM

## 2020-01-02 RX ADMIN — AMLODIPINE BESYLATE 10 MG: 10 TABLET ORAL at 09:15

## 2020-01-02 RX ADMIN — FORMOTEROL FUMARATE DIHYDRATE 20 MCG: 20 SOLUTION RESPIRATORY (INHALATION) at 08:53

## 2020-01-02 RX ADMIN — GABAPENTIN 400 MG: 400 CAPSULE ORAL at 20:18

## 2020-01-02 RX ADMIN — INSULIN LISPRO 3 UNITS: 100 INJECTION, SOLUTION INTRAVENOUS; SUBCUTANEOUS at 17:33

## 2020-01-02 RX ADMIN — Medication 10 ML: at 20:17

## 2020-01-02 RX ADMIN — OXYBUTYNIN CHLORIDE 5 MG: 5 TABLET ORAL at 09:15

## 2020-01-02 RX ADMIN — METFORMIN HYDROCHLORIDE 500 MG: 500 TABLET ORAL at 17:33

## 2020-01-02 RX ADMIN — BUDESONIDE 250 MCG: 0.25 SUSPENSION RESPIRATORY (INHALATION) at 08:53

## 2020-01-02 RX ADMIN — IPRATROPIUM BROMIDE AND ALBUTEROL SULFATE 1 AMPULE: 2.5; .5 SOLUTION RESPIRATORY (INHALATION) at 13:31

## 2020-01-02 RX ADMIN — TORSEMIDE 20 MG: 20 TABLET ORAL at 09:15

## 2020-01-02 RX ADMIN — CARVEDILOL 12.5 MG: 6.25 TABLET, FILM COATED ORAL at 09:15

## 2020-01-02 RX ADMIN — ANASTROZOLE 1 MG: 1 TABLET, COATED ORAL at 09:14

## 2020-01-02 RX ADMIN — OXYBUTYNIN CHLORIDE 5 MG: 5 TABLET ORAL at 20:18

## 2020-01-02 RX ADMIN — CLONIDINE HYDROCHLORIDE 0.2 MG: 0.2 TABLET ORAL at 05:12

## 2020-01-02 RX ADMIN — GABAPENTIN 400 MG: 400 CAPSULE ORAL at 09:15

## 2020-01-02 RX ADMIN — OXYBUTYNIN CHLORIDE 5 MG: 5 TABLET ORAL at 14:04

## 2020-01-02 RX ADMIN — CLONIDINE HYDROCHLORIDE 0.1 MG: 0.2 TABLET ORAL at 20:18

## 2020-01-02 RX ADMIN — LOSARTAN POTASSIUM 100 MG: 50 TABLET, FILM COATED ORAL at 09:15

## 2020-01-02 RX ADMIN — IPRATROPIUM BROMIDE AND ALBUTEROL SULFATE 1 AMPULE: 2.5; .5 SOLUTION RESPIRATORY (INHALATION) at 08:53

## 2020-01-02 RX ADMIN — METFORMIN HYDROCHLORIDE 500 MG: 500 TABLET ORAL at 09:15

## 2020-01-02 RX ADMIN — PREDNISONE 40 MG: 20 TABLET ORAL at 09:15

## 2020-01-02 RX ADMIN — INSULIN LISPRO 9 UNITS: 100 INJECTION, SOLUTION INTRAVENOUS; SUBCUTANEOUS at 11:51

## 2020-01-02 RX ADMIN — FORMOTEROL FUMARATE DIHYDRATE 20 MCG: 20 SOLUTION RESPIRATORY (INHALATION) at 20:30

## 2020-01-02 RX ADMIN — CLONIDINE HYDROCHLORIDE 0.1 MG: 0.2 TABLET ORAL at 14:04

## 2020-01-02 RX ADMIN — BENZOCAINE AND MENTHOL 1 LOZENGE: 15; 3.6 LOZENGE ORAL at 14:04

## 2020-01-02 RX ADMIN — BUDESONIDE 250 MCG: 0.25 SUSPENSION RESPIRATORY (INHALATION) at 20:30

## 2020-01-02 RX ADMIN — IPRATROPIUM BROMIDE AND ALBUTEROL SULFATE 1 AMPULE: 2.5; .5 SOLUTION RESPIRATORY (INHALATION) at 20:30

## 2020-01-02 RX ADMIN — ENOXAPARIN SODIUM 40 MG: 40 INJECTION SUBCUTANEOUS at 09:14

## 2020-01-02 RX ADMIN — INSULIN GLARGINE 35 UNITS: 100 INJECTION, SOLUTION SUBCUTANEOUS at 20:20

## 2020-01-02 RX ADMIN — GABAPENTIN 400 MG: 400 CAPSULE ORAL at 15:47

## 2020-01-02 RX ADMIN — MELATONIN 3 MG ORAL TABLET 3 MG: 3 TABLET ORAL at 22:21

## 2020-01-02 RX ADMIN — CARVEDILOL 12.5 MG: 6.25 TABLET, FILM COATED ORAL at 17:33

## 2020-01-02 RX ADMIN — PRAVASTATIN SODIUM 80 MG: 20 TABLET ORAL at 20:17

## 2020-01-02 RX ADMIN — INSULIN LISPRO 9 UNITS: 100 INJECTION, SOLUTION INTRAVENOUS; SUBCUTANEOUS at 20:19

## 2020-01-02 RX ADMIN — IPRATROPIUM BROMIDE AND ALBUTEROL SULFATE 1 AMPULE: 2.5; .5 SOLUTION RESPIRATORY (INHALATION) at 16:31

## 2020-01-02 RX ADMIN — Medication 10 ML: at 09:15

## 2020-01-02 RX ADMIN — ASPIRIN 81 MG 81 MG: 81 TABLET ORAL at 09:15

## 2020-01-02 ASSESSMENT — PAIN SCALES - GENERAL
PAINLEVEL_OUTOF10: 0

## 2020-01-02 NOTE — PROGRESS NOTES
Transfer Safety; Benefits of use of DME/AD/Adaptive equip/techs to increase safety/IND with Functional Ax - Sock Aid, use of Tub Bench; Techs to increase Safety/Safety Awareness w/ Functional Ax; Energy Conservation Techs/Pursed-Lip Breathing;  Benefits of Cont'd Participation in OT services at D/C      Pt and/or Family verbalized/demonstrated a Good understanding of education provided. Will Review PRN. Provided Skilled SUP/Assist w/ Pt safety, Proper Positioning, ADLs, Transfers and Functional Mobility as noted above, as well as set up and clean up for session. Skilled monitoring of Vitals and pts response to treatment. Consulted RN     [] Malnutrition indicators have been identified and nursing has been notified to ensure a dietitian consult is ordered. Comments/Treatment:  Upon arrival, pt was found seated in b/s chair. Pt was agreeable to participate in assessment ax. No family members present during assessment. Received permission from RN prior to engaging pt in assessment ax. At the end of the session, patient was properly positioned in b/s chair with call light and phone within reach, all lines and tubes intact. Oriented pt to call bell. Made all appropriate Environmental Modifications to facilitate pt's level of IND and safety. All needs met.          Pt would benefit from continued skilled OT services to increase safety and independence with completion of ADL/IADL tasks for functional independence and quality of life    Eval Complexity:  Low    Assessment of current deficits   Functional mobility [x]  ADLs [x] Strength [x]  Cognition []  Functional transfers  [x] IADLs [x] Safety Awareness [x]  Endurance [x]  Fine Motor Coordination [] Balance [x] Vision/perception [] Sensation []   Gross Motor Coordination [] ROM [] Delirium []                  Motor Control []    Plan of Care:   ADL retraining [x]   Equipment needs [x]   Neuromuscular re-education [x] Energy Conservation Techniques [x]  Functional Transfer training [x] Patient and/or Family Education [x]  Functional Mobility training [x]  Environmental Modifications [x]  Cognitive re-training [x]   Compensatory techniques for ADLs [x]  Splinting Needs []   Positioning to improve overall function [x]   Therapeutic Activity [x]   Therapeutic Exercise  [x]  Visual/Perceptual: [x]    Delirium prevention/treatment  []  Other:  [x]    Rehab Potential:  Good for established goals    Patient / Family Goal:  Participate in Post-Acute Therapy program prior to D/C home     Patient and/or Family were instructed on Functional Diagnosis, Prognosis/Goals and OT Plan of Care. Demonstrated Good understanding. Evaluation Time includes thorough review of current medical information, gathering information on past medical history/social history and prior level of function, completion of standardized testing/informal observation of tasks, assessment of data and education on plan of care and goals.         Low Evaluation + 20 timed treatment minutes  Tx Time in:  1146  Tx Time out:  [address] [place], [place], OTR/L #[id number]

## 2020-01-02 NOTE — PROGRESS NOTES
[facility]  Physical Therapy Initial Evaluation    Name: Valeriano Gonzalez  : 1950  MRN: [id number]    Date of Service: 2020    Evaluating Therapist: Dale Banegas PT, DPT  [id number]    Room #: 7561/0802-Z  DIAGNOSIS: Acute Respiratory Failure with Hypoxia  PRECAUTIONS: Falls  PMH: Asthma, Breast Ca, Arthritis, CVA, CHF, COPD, DM, HLD, HTN, Lymphedema     Social:  Pt lives alone in a 10th floor apartment with ramped entry or 3 step(s) and 1 rail(s) to enter. Elevator access once inside. Pt reports having decreased tolerance for entrance into building and amb to apartment recently. Prior to admission pt walked with no device but has SPC or rollator that she uses PRN. Initial Evaluation  Date:  Treatment  Short Term/ Long Term   Goals   Was pt agreeable to Eval/treatment? Yes     Does pt have pain? No     Bed Mobility  Rolling: Mod Independent   Supine to sit: Supervision with HOB elevated and use of bed rail  Sit to supine: NT  Scooting: Supervision     Transfers Sit to stand: Supervision  Stand to sit: Supervision  Stand pivot: Supervision<>SBA  Mod Independent    Ambulation   25 feet using no AD with Min A  100 feet using HHA with Min A  >200 feet using AAD with Mod Independent    Stair negotiation:  NT due to limited tolerance for mobility   4 steps using 1 rail with Mod Independent    ROM RLE: WFL  LLE: WFL     Strength BLE: 4- to 4/5     Balance   Sitting: Independent   Standing: Min A using no AD     AM-PAC Basic Mobility Inpatient Short Form Raw Score:  17/24       Patient is A&Ox4. Sensation: no numbness/tingling   Coordination: NT  Edema: none noted     ASSESSMENT  Pt displays functional ability as noted in the objective portion of this evaluation. Comments/Treatment:  Pt was received supine with HOB elevated and was agreeable to PT evaluation. Pt requested to use restroom and sera pants prior to amb.   Pt assisted to bathroom and was unsteady requiring assist.  Pt c/o dizziness Clement Barrios, DPT  License [id number]

## 2020-01-02 NOTE — PROGRESS NOTES
Subjective:  Feeling better No CP or SOB  No fever or chills   No uncontrolled pain  No vomiting or diarrhea   Off oxygen no problems per nursing  Objective:    BP (!) 164/70   Pulse 67   Temp 96.8 °F (36 °C) (Oral)   Resp 18   Ht 5' 5\" (1.651 m)   Wt 250 lb 11.2 oz (113.7 kg)   SpO2 97%   BMI 41.72 kg/m²     24HR INTAKE/OUTPUT:      Intake/Output Summary (Last 24 hours) at 1/2/2020 1148  Last data filed at 1/1/2020 2259  Gross per 24 hour   Intake 360 ml   Output 0 ml   Net 360 ml     nad  Heart:  RRR, no murmurs, gallops, or rubs. Lungs:  CTA bilaterally, no wheeze, rales or rhonchi  Abd: bowel sounds present, nontender, nondistended, no masses  Extrem:  No clubbing, cyanosis, or edema    Most Recent Labs  Lab Results   Component Value Date    WBC 12.6 (H) 12/30/2019    HGB 10.0 (L) 12/30/2019    HCT 33.9 (L) 12/30/2019     (H) 12/30/2019     12/30/2019    K 4.2 12/30/2019    CL 99 12/30/2019    CREATININE 0.6 12/30/2019    BUN 13 12/30/2019    CO2 23 12/30/2019    GLUCOSE 197 (H) 12/30/2019    ALT 12 12/29/2019    AST 11 12/29/2019    INR 1.1 07/16/2013    TSH 0.591 06/10/2019    LABA1C 8.6 (H) 12/29/2019    LABMICR <12.0 06/12/2019     No results for input(s): MG in the last 72 hours. Lab Results   Component Value Date    CALCIUM 9.2 12/30/2019    PHOS 4.0 01/01/2013        XR CHEST PORTABLE   Final Result      1. Bibasilar opacities. Differential includes atelectasis, infection,   and/or layering pleural effusions. 2. Mild pulmonary vascular congestion.                                                                    Assessment    Principal Problem:    Acute respiratory failure with hypoxemia (HCC)  Active Problems:    Lymphedema of both lower extremities    Ductal carcinoma in situ (DCIS) of breast    Type 2 diabetes mellitus without complication, with long-term current use of insulin (HCC)    Acute on chronic diastolic (congestive) heart failure (HCC)    Essential hypertension

## 2020-01-02 NOTE — PROGRESS NOTES
Kristy Alas M.D.,[facility]  Ben Montenegro D.O., LOIDAOYASSINE., Daryl Rollins M.D. Jay Arriola M.D., Zaira Padron M.D. Sarika Thornton D.O. Daily Pulmonary Progress Note    Patient:  Zuhair Gonzalez 71 y.o. female MRN: [id number]     Date of Service: 1/2/2020        Subjective      Patient was seen and examined. Feels somewhat better today. Less dyspnea. Oxygen has been weaned off. BP remains elevated. Complains of occasional non productive cough. Will check ambulatory O2 testing prior to dc. Objective   Vitals: BP (!) 178/74   Pulse 64   Temp 97.7 °F (36.5 °C) (Temporal)   Resp 18   Ht 5' 5\" (1.651 m)   Wt 250 lb 11.2 oz (113.7 kg)   SpO2 96%   BMI 41.72 kg/m²     I/O:    Intake/Output Summary (Last 24 hours) at 1/2/2020 1519  Last data filed at 1/2/2020 1415  Gross per 24 hour   Intake 840 ml   Output 0 ml   Net 840 ml       CURRENT MEDS :  Scheduled Meds:   carvedilol  12.5 mg Oral BID WC    cloNIDine  0.1 mg Oral TID    amLODIPine  10 mg Oral Daily    torsemide  20 mg Oral Daily    insulin glargine  35 Units Subcutaneous Nightly    anastrozole  1 mg Oral Daily    aspirin  81 mg Oral Daily    gabapentin  400 mg Oral TID    metFORMIN  500 mg Oral BID WC    oxybutynin  5 mg Oral TID    pravastatin  80 mg Oral Daily    insulin lispro  0-18 Units Subcutaneous TID WC    insulin lispro  0-9 Units Subcutaneous Nightly    sodium chloride flush  10 mL Intravenous 2 times per day    enoxaparin  40 mg Subcutaneous Daily    predniSONE  40 mg Oral Daily    ipratropium-albuterol  1 ampule Inhalation Q4H WA    formoterol  20 mcg Nebulization BID    budesonide  250 mcg Nebulization BID    losartan  100 mg Oral Daily       Physical Exam:  Physical Exam  HENT:      Head: Normocephalic and atraumatic. Eyes:      Conjunctiva/sclera: Conjunctivae normal.   Neck:      Musculoskeletal: Neck supple. Trachea: No tracheal deviation.    Cardiovascular:      Rate and consult for uncontrolled HTN  11. Weight loss is strongly recommended  12. Ok to dc from a pulmonary perspective-agree with placement  13. Appt scheduled with Dr. Taran Leroy 10    Thank you for allowing me to participate in the care of [facility]. Please feel free to call with questions. Electronically signed by DMITRY Thomas - CNP on 1/2/2020 at 3:18 PM     I personally saw, examined, and cared for the patient. Labs, medications, radiographs reviewed. I agree with history exam and plans detailed in NP note.       Electronically signed by Emile Lucas DO on 1/2/2020 at 5:08 PM

## 2020-01-03 VITALS
SYSTOLIC BLOOD PRESSURE: 131 MMHG | TEMPERATURE: 97.8 F | RESPIRATION RATE: 18 BRPM | HEART RATE: 73 BPM | HEIGHT: 65 IN | WEIGHT: 250.7 LBS | BODY MASS INDEX: 41.77 KG/M2 | DIASTOLIC BLOOD PRESSURE: 58 MMHG | OXYGEN SATURATION: 96 %

## 2020-01-03 LAB
ANION GAP SERPL CALCULATED.3IONS-SCNC: 12 MMOL/L (ref 7–16)
BUN BLDV-MCNC: 25 MG/DL (ref 8–23)
CALCIUM SERPL-MCNC: 9.3 MG/DL (ref 8.6–10.2)
CHLORIDE BLD-SCNC: 97 MMOL/L (ref 98–107)
CO2: 32 MMOL/L (ref 22–29)
CREAT SERPL-MCNC: 0.8 MG/DL (ref 0.5–1)
GFR AFRICAN AMERICAN: >60
GFR NON-AFRICAN AMERICAN: >60 ML/MIN/1.73
GLUCOSE BLD-MCNC: 215 MG/DL (ref 74–99)
HCT VFR BLD CALC: 38.6 % (ref 34–48)
HEMOGLOBIN: 11.3 G/DL (ref 11.5–15.5)
MCH RBC QN AUTO: 24.4 PG (ref 26–35)
MCHC RBC AUTO-ENTMCNC: 29.3 % (ref 32–34.5)
MCV RBC AUTO: 83.2 FL (ref 80–99.9)
METER GLUCOSE: 140 MG/DL (ref 74–99)
METER GLUCOSE: 179 MG/DL (ref 74–99)
METER GLUCOSE: 345 MG/DL (ref 74–99)
PDW BLD-RTO: 15.7 FL (ref 11.5–15)
PLATELET # BLD: 534 E9/L (ref 130–450)
PMV BLD AUTO: 10.3 FL (ref 7–12)
POTASSIUM SERPL-SCNC: 4.1 MMOL/L (ref 3.5–5)
RBC # BLD: 4.64 E12/L (ref 3.5–5.5)
SODIUM BLD-SCNC: 141 MMOL/L (ref 132–146)
WBC # BLD: 13.8 E9/L (ref 4.5–11.5)

## 2020-01-03 PROCEDURE — 6370000000 HC RX 637 (ALT 250 FOR IP): Performed by: NURSE PRACTITIONER

## 2020-01-03 PROCEDURE — 97112 NEUROMUSCULAR REEDUCATION: CPT

## 2020-01-03 PROCEDURE — 82962 GLUCOSE BLOOD TEST: CPT

## 2020-01-03 PROCEDURE — 94640 AIRWAY INHALATION TREATMENT: CPT

## 2020-01-03 PROCEDURE — 85027 COMPLETE CBC AUTOMATED: CPT

## 2020-01-03 PROCEDURE — 97535 SELF CARE MNGMENT TRAINING: CPT

## 2020-01-03 PROCEDURE — 80048 BASIC METABOLIC PNL TOTAL CA: CPT

## 2020-01-03 PROCEDURE — 2580000003 HC RX 258: Performed by: NURSE PRACTITIONER

## 2020-01-03 PROCEDURE — 36415 COLL VENOUS BLD VENIPUNCTURE: CPT

## 2020-01-03 PROCEDURE — 2700000000 HC OXYGEN THERAPY PER DAY

## 2020-01-03 PROCEDURE — 6370000000 HC RX 637 (ALT 250 FOR IP): Performed by: PHYSICIAN ASSISTANT

## 2020-01-03 PROCEDURE — 6360000002 HC RX W HCPCS: Performed by: NURSE PRACTITIONER

## 2020-01-03 PROCEDURE — 94660 CPAP INITIATION&MGMT: CPT

## 2020-01-03 PROCEDURE — 6370000000 HC RX 637 (ALT 250 FOR IP): Performed by: INTERNAL MEDICINE

## 2020-01-03 RX ORDER — PREDNISONE 10 MG/1
TABLET ORAL
Qty: 30 TABLET | Refills: 0 | DISCHARGE
Start: 2020-01-03 | End: 2020-02-04

## 2020-01-03 RX ORDER — CHLORTHALIDONE 25 MG/1
25 TABLET ORAL DAILY
Qty: 30 TABLET | Refills: 3 | DISCHARGE
Start: 2020-01-04 | End: 2020-02-05 | Stop reason: ALTCHOICE

## 2020-01-03 RX ORDER — CHLORTHALIDONE 25 MG/1
25 TABLET ORAL DAILY
Status: DISCONTINUED | OUTPATIENT
Start: 2020-01-03 | End: 2020-01-03 | Stop reason: HOSPADM

## 2020-01-03 RX ORDER — PREDNISONE 20 MG/1
20 TABLET ORAL DAILY
Status: DISCONTINUED | OUTPATIENT
Start: 2020-01-04 | End: 2020-01-03 | Stop reason: HOSPADM

## 2020-01-03 RX ORDER — TORSEMIDE 20 MG/1
20 TABLET ORAL DAILY
Qty: 30 TABLET | Refills: 3 | DISCHARGE
Start: 2020-01-04 | End: 2020-02-05 | Stop reason: ALTCHOICE

## 2020-01-03 RX ORDER — LOSARTAN POTASSIUM 100 MG/1
100 TABLET ORAL DAILY
Qty: 30 TABLET | Refills: 3 | DISCHARGE
Start: 2020-01-04 | End: 2020-01-21 | Stop reason: SDUPTHER

## 2020-01-03 RX ORDER — IPRATROPIUM BROMIDE AND ALBUTEROL SULFATE 2.5; .5 MG/3ML; MG/3ML
3 SOLUTION RESPIRATORY (INHALATION) EVERY 4 HOURS PRN
Qty: 360 ML | DISCHARGE
Start: 2020-01-03 | End: 2020-02-05 | Stop reason: ALTCHOICE

## 2020-01-03 RX ORDER — AMLODIPINE BESYLATE 10 MG/1
10 TABLET ORAL DAILY
Qty: 30 TABLET | Refills: 3 | DISCHARGE
Start: 2020-01-04 | End: 2020-02-05 | Stop reason: ALTCHOICE

## 2020-01-03 RX ORDER — CARVEDILOL 12.5 MG/1
12.5 TABLET ORAL 2 TIMES DAILY WITH MEALS
Qty: 60 TABLET | Refills: 3 | DISCHARGE
Start: 2020-01-03 | End: 2020-01-21 | Stop reason: SDUPTHER

## 2020-01-03 RX ORDER — CLONIDINE HYDROCHLORIDE 0.1 MG/1
0.1 TABLET ORAL 3 TIMES DAILY
Qty: 60 TABLET | Refills: 3 | DISCHARGE
Start: 2020-01-03 | End: 2020-01-10

## 2020-01-03 RX ADMIN — OXYBUTYNIN CHLORIDE 5 MG: 5 TABLET ORAL at 13:54

## 2020-01-03 RX ADMIN — ENOXAPARIN SODIUM 40 MG: 40 INJECTION SUBCUTANEOUS at 08:54

## 2020-01-03 RX ADMIN — INSULIN LISPRO 3 UNITS: 100 INJECTION, SOLUTION INTRAVENOUS; SUBCUTANEOUS at 08:55

## 2020-01-03 RX ADMIN — GABAPENTIN 400 MG: 400 CAPSULE ORAL at 08:53

## 2020-01-03 RX ADMIN — METFORMIN HYDROCHLORIDE 500 MG: 500 TABLET ORAL at 16:44

## 2020-01-03 RX ADMIN — LOSARTAN POTASSIUM 100 MG: 50 TABLET, FILM COATED ORAL at 08:53

## 2020-01-03 RX ADMIN — CARVEDILOL 12.5 MG: 6.25 TABLET, FILM COATED ORAL at 08:54

## 2020-01-03 RX ADMIN — ANASTROZOLE 1 MG: 1 TABLET, COATED ORAL at 08:53

## 2020-01-03 RX ADMIN — ASPIRIN 81 MG 81 MG: 81 TABLET ORAL at 08:53

## 2020-01-03 RX ADMIN — FORMOTEROL FUMARATE DIHYDRATE 20 MCG: 20 SOLUTION RESPIRATORY (INHALATION) at 09:23

## 2020-01-03 RX ADMIN — Medication 10 ML: at 08:55

## 2020-01-03 RX ADMIN — INSULIN LISPRO 3 UNITS: 100 INJECTION, SOLUTION INTRAVENOUS; SUBCUTANEOUS at 11:36

## 2020-01-03 RX ADMIN — IPRATROPIUM BROMIDE AND ALBUTEROL SULFATE 1 AMPULE: 2.5; .5 SOLUTION RESPIRATORY (INHALATION) at 13:50

## 2020-01-03 RX ADMIN — BUDESONIDE 250 MCG: 0.25 SUSPENSION RESPIRATORY (INHALATION) at 09:23

## 2020-01-03 RX ADMIN — INSULIN LISPRO 12 UNITS: 100 INJECTION, SOLUTION INTRAVENOUS; SUBCUTANEOUS at 16:44

## 2020-01-03 RX ADMIN — CLONIDINE HYDROCHLORIDE 0.1 MG: 0.2 TABLET ORAL at 13:54

## 2020-01-03 RX ADMIN — AMLODIPINE BESYLATE 10 MG: 10 TABLET ORAL at 08:53

## 2020-01-03 RX ADMIN — TORSEMIDE 20 MG: 20 TABLET ORAL at 10:29

## 2020-01-03 RX ADMIN — OXYBUTYNIN CHLORIDE 5 MG: 5 TABLET ORAL at 08:54

## 2020-01-03 RX ADMIN — CARVEDILOL 12.5 MG: 6.25 TABLET, FILM COATED ORAL at 16:44

## 2020-01-03 RX ADMIN — GABAPENTIN 400 MG: 400 CAPSULE ORAL at 13:54

## 2020-01-03 RX ADMIN — IPRATROPIUM BROMIDE AND ALBUTEROL SULFATE 1 AMPULE: 2.5; .5 SOLUTION RESPIRATORY (INHALATION) at 09:22

## 2020-01-03 RX ADMIN — METFORMIN HYDROCHLORIDE 500 MG: 500 TABLET ORAL at 08:54

## 2020-01-03 RX ADMIN — PREDNISONE 40 MG: 20 TABLET ORAL at 08:53

## 2020-01-03 RX ADMIN — CHLORTHALIDONE 25 MG: 25 TABLET ORAL at 11:36

## 2020-01-03 RX ADMIN — CLONIDINE HYDROCHLORIDE 0.1 MG: 0.2 TABLET ORAL at 06:06

## 2020-01-03 ASSESSMENT — PAIN SCALES - GENERAL
PAINLEVEL_OUTOF10: 0

## 2020-01-03 NOTE — PROGRESS NOTES
consult for uncontrolled HTN  11. Weight loss is strongly recommended  12. Ok to dc from a pulmonary perspective-agree with placement  13. Will follow up in office as outpatient message routed    Thank you for allowing me to participate in the care of [address] Please feel free to call with questions. Electronically signed by DMITRY Campoverde - CNP on 1/3/2020 at 9:03 AM     I personally saw, examined, and cared for the patient. Labs, medications, radiographs reviewed. I agree with history exam and plans detailed in NP note.       Electronically signed by Mariana Ocampo DO on 1/3/2020 at 8:56 PM

## 2020-01-03 NOTE — DISCHARGE SUMMARY
Physician Discharge Summary     Patient ID:  Colonel Arana  [id number]  71 y.o.  1950    Admit date: 12/29/2019    Discharge date and time:  1/3/2020    Discharge Diagnoses: Principal Problem:    Acute respiratory failure with hypoxemia ([facility]  Active Problems:    Lymphedema of both lower extremities    Ductal carcinoma in situ (DCIS) of breast    Type 2 diabetes mellitus without complication, with long-term current use of insulin (HCC)    Acute on chronic diastolic (congestive) heart failure (HCC)    Essential hypertension    COPD (chronic obstructive pulmonary disease) (HCC)    Pulmonary hypertension (HCC)    Non-rheumatic aortic sclerosis (HCC)    LBBB (left bundle branch block)    Diabetes mellitus type 2, uncontrolled ([facility]    Mixed hyperlipidemia    Morbid obesity with BMI of 40.0-44.9, adult ([facility]    History of CVA (cerebrovascular accident)    Chronic combined systolic and diastolic congestive heart failure (HCC)    Acute respiratory failure with hypoxia (HCC)    Uncontrolled hypertension    Supraventricular tachycardia (HCC)    Morbid obesity ([facility]  Resolved Problems:    * No resolved hospital problems. *      Consults: IP CONSULT TO HOSPITALIST  IP CONSULT TO SOCIAL WORK  IP CONSULT TO PULMONOLOGY  IP CONSULT TO CARDIOLOGY  IP CONSULT TO NEPHROLOGY    Procedures: See chart    Hospital Course: Admit--patient's hypertension improved though still elevated   nephrology Consult appreciated --discussed with Dr. Carroll Hdz  IV steroids transition to p.o.   Nebulizers  IV ABX stopped  proCalcitonin negative  Pulmonary Consult appreciated   Cardiology Consult appreciated   Nephrology consult  Medications for other co morbidities cont as appropriate w dosage adjustments as necessary  PT/OT  DVT PPx  DC planning--SNF    Discharge Exam:  See progress note from today    Condition:  Stable    Disposition: home    Patient Instructions:   Current Discharge Medication List      START taking these medications    Details MG TABS tablet Take one 3 mg hs  Qty: 60 tablet, Refills: 0      !! cloNIDine (CATAPRES) 0.2 MG tablet Take 1 tablet by mouth 2 times daily  Qty: 180 tablet, Refills: 3    Comments: 10/7/19: Disregard previous prescriptions and refills; honor this prescription and refills  Associated Diagnoses: Essential hypertension      calcium carbonate-vitamin D (CALCIUM 600 + D) 600-400 MG-UNIT TABS per tab Take 1 tablet by mouth 2 times daily  Qty: 60 tablet, Refills: 11    Associated Diagnoses: Ductal carcinoma in situ (DCIS) of breast      oxybutynin (DITROPAN) 5 MG tablet 5 mg 3 times daily       butenafine (LOTRIMIN ULTRA) 1 % CREA Please apply from the toes, in between the toes, foot up to the upper calf twice daily for 1 month, both legs  Qty: 1 Tube, Refills: 10      metFORMIN (GLUCOPHAGE) 500 MG tablet Take 2 tablets twice a day  Qty: 360 tablet, Refills: 5    Associated Diagnoses: IDDM (insulin dependent diabetes mellitus) ([facility])      insulin glargine (LANTUS) 100 UNIT/ML injection pen Take 35 units daily at bedtime  Qty: 15 pen, Refills: 5    Associated Diagnoses: IDDM (insulin dependent diabetes mellitus) ([facility])      insulin aspart (NOVOLOG) 100 UNIT/ML injection pen Take 7 units before meals + siding scale. MAX 50 units daily  Qty: 15 pen, Refills: 5    Associated Diagnoses: IDDM (insulin dependent diabetes mellitus) ([facility])      gabapentin (NEURONTIN) 400 MG capsule Take 1 capsule by mouth 3 times daily.   Qty: 270 capsule, Refills: 3    Comments: 8/21/19: Disregard previous prescriptions and refills; honor this prescription and refills  Associated Diagnoses: Diabetic polyneuropathy associated with type 1 diabetes mellitus ([facility])      pravastatin (PRAVACHOL) 80 MG tablet TAKE ONE TABLET BY MOUTH EVERY DAY  Qty: 90 tablet, Refills: 3    Comments: 8/21/19: Disregard previous prescriptions and refills; honor this prescription and refills  Associated Diagnoses: Hyperlipidemia with target LDL less than 70      vitamin D

## 2020-01-03 NOTE — DISCHARGE INSTR - COC
 DTaP 01/12/2010    Influenza 01/20/2012, 01/04/2013    Influenza Virus Vaccine 01/12/2010, 12/13/2013    Influenza, High Dose (Fluzone 65 yrs and older) 11/18/2015, 10/17/2016, 09/20/2017    Influenza, Triv, inactivated, subunit, adjuvanted, IM (Fluad 65 yrs and older) 10/15/2019    Pneumococcal Conjugate 13-valent ([id number]) 08/01/2018    Pneumococcal Conjugate 7-valent (Prevnar7) 01/12/2010    Pneumococcal Polysaccharide ([id number]) 11/18/2015    Tdap (Boostrix, Adacel) 07/01/2017    Zoster Recombinant (Shingrix) 08/01/2018       Active Problems:  Patient Active Problem List   Diagnosis Code    Lymphedema of both lower extremities I89.0    Ductal carcinoma in situ (DCIS) of breast D05.10    Type 2 diabetes mellitus without complication, with long-term current use of insulin ([facility]) E11.9, Z79.4    Acute on chronic diastolic (congestive) heart failure ([facility]) I50.33    Essential hypertension I10    COPD (chronic obstructive pulmonary disease) ([facility]) J44.9    Pulmonary hypertension ([facility]) I27.20    Non-rheumatic aortic sclerosis ([facility]) I70.0    LBBB (left bundle branch block) I44.7    Diabetes mellitus type 2, uncontrolled ([facility]) E11.65    Mixed hyperlipidemia E78.2    Morbid obesity with BMI of 40.0-44.9, adult ([facility]) E66.01, Z68.41    History of CVA (cerebrovascular accident) Z80.78    Chronic combined systolic and diastolic congestive heart failure ([facility]) I50.42    Acute respiratory failure with hypoxemia ([facility]) J96.01    Acute respiratory failure with hypoxia ([facility]) J96.01    Uncontrolled hypertension I10    Supraventricular tachycardia ([facility]) I47.1    Morbid obesity ([facility]) E66.01       Isolation/Infection:   Isolation          No Isolation        Patient Infection Status     None to display          Nurse Assessment:  Last Vital Signs: BP (!) 131/58   Pulse 73   Temp 97.8 °F (36.6 °C) (Temporal)   Resp 18   Ht 5' 5\" (1.651 m)   Wt 250 lb 11.2 oz (113.7 kg)   SpO2 96%   BMI 41.72 kg/m²     Last documented pain score (0-10 scale): Pain Level: 0  Last Weight:   Wt Readings from Last 1 Encounters:   19 250 lb 11.2 oz (113.7 kg)     Mental Status:  oriented and alert    IV Access:  - None    Nursing Mobility/ADLs:  Walking   Assisted  Transfer  Independent  Bathing  Assisted  Dressing  Independent  [address]  Independent  Med Delivery   whole    Wound Care Documentation and Therapy:        Elimination:  Continence:   · Bowel: Yes  · Bladder: Yes  Urinary Catheter: None   Colostomy/Ileostomy/Ileal Conduit: No        Date of Last BM: 2020    Intake/Output Summary (Last 24 hours) at 1/3/2020 1546  Last data filed at 1/3/2020 1417  Gross per 24 hour   Intake 970 ml   Output 2 ml   Net 968 ml     I/O last 3 completed shifts: In: 1 [P.O.:960; I.V.:10]  Out: 2 [Urine:2]    Safety Concerns: At Risk for Falls    Impairments/Disabilities:      None    Nutrition Therapy:  Current Nutrition Therapy:   - Oral Diet:  Carb Control 4 carbs/meal (1800kcals/day)    Routes of Feeding: Oral  Liquids: No Restrictions  Daily Fluid Restriction: no  Last Modified Barium Swallow with Video (Video Swallowing Test): not done    Treatments at the Time of Hospital Discharge:   Respiratory Treatments: ***  Oxygen Therapy:  is not on home oxygen therapy.   Ventilator:    { CC Vent SHR}    Rehab Therapies: {THERAPEUTIC INTERVENTION:6967589065}  Weight Bearing Status/Restrictions: [facility] Weight Bearin}  Other Medical Equipment (for information only, NOT a DME order):  {EQUIPMENT:825394208}  Other Treatments: ***    Patient's personal belongings (please select all that are sent with patient):  {[facility] DME Belongings:349873738}    RN SIGNATURE:  Electronically signed by Lc Figueroa RN on 1/3/20 at 4:26 PM    CASE MANAGEMENT/SOCIAL WORK SECTION    Inpatient Status Date: ***    Readmission Risk Assessment Score:  Readmission Risk Risk of Unplanned Readmission:        35           Discharging to Facility/ Agency   · Name:   · Address:  · Phone:  · Fax:    Dialysis Facility (if applicable)   · Name:  · Address:  · Dialysis Schedule:  · Phone:  · Fax:    / signature: {Esignature:[id number]}    PHYSICIAN SECTION    Prognosis: {Prognosis:8131913179}    Condition at Discharge: Jethro8 Amara Freeman Patient Condition:302578542}    Rehab Potential (if transferring to Rehab): {Prognosis:3456052800}    Recommended Labs or Other Treatments After Discharge: ***    Physician Certification: I certify the above information and transfer of Adeline Vega  is necessary for the continuing treatment of the diagnosis listed and that she requires {Admit to Appropriate Level of Care:25274} for {GREATER/LESS:959557165} 30 days.      Update Admission H&P: {CHP DME Changes in TZGTO:385211980}    PHYSICIAN SIGNATURE:  Electronically signed by Daiana Welch MD on 1/3/20 at 3:46 PM

## 2020-01-03 NOTE — PROGRESS NOTES
Occupational Therapy  OT BEDSIDE TREATMENT NOTE      Date:1/3/2020  Patient Name: Maryruth Eisenmenger Fears  MRN: [id number]  : 1950  Room: [address] [place] [address]A     Evaluating OT:  JEFFRY Stout, OTR/L #[id number]     AM-PAC Daily Activity Raw Score:    Recommended Adaptive Equipment:  TBD as pt progresses - Sock Aid     Reason for Admission:  Pt was admitted w/ SOB, Hypoxia     Diagnosis:  Acute Respiratory Failure w/ Hypoxia/Acute Pulmonary Edema     Procedures this admission:  None      Pertinent Medical History:  Breast CA, CVA, CHF, COPD, Mike THR, Mike TKR, Lymphedema        Precautions:  Falls  Carb Control Diet     Home Living: Pt lives alone in a single-level apartment with 3 steps vs R[address] Entry. Elevator access to 10th floor apt. 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    Additional Complaints:  Limited endurance, SOB after ~ 7 mins of ax     Vitals/Lab Values:  O2 sats remained > 92% for duration of session on Room Air - Questionable accuracy of reading d/t artificial nails     Cognition: A & O x 4   Able to Follow Multi-Step Commands INDly              Memory:  good               Sequencing:  good               Problem solving:  good               Judgement/safety:  good   Additional Comments:  Pt was pleasant, cooperative, Hesitant to consider use of adaptive equipment for purposes of Energy Conservation    Functional Assessment:    Initial Eval Status  Date: 1-2-20 Treatment Status  Date: 1/3/20 Short Term Goals  Treatment frequency: PRN 2-4 x/week   Feeding IND        Indep     Grooming SUP/Set up     Able to perform ax while seated in chair after set up, unable to tolerate ax completion standing at sink d/t SOB/Fatigue after Functional Mobility  Sup; Pt able to perform washing face, combing hair with a pick and brushing teeth. SUP  Standing At The Sink   UB Dressing Mod A/Set up     Required Mod A to don bathrobe while seated in chair, unable to tolerate item retrieval for task  Min A;    To sera/doff gown with increased time. Min A   LB Dressing Max A/Set up     Required Max A to don/doff socks, Mod A to thread Mike LEs into pants, Min A to pull pants over hips in standing, Mild SOB w/ Ax, unable to use cross-legged tech, pt ed Re: use of adaptive equip/techs   Min A;    Pt required maximum time to complete sera/doff of socks while sitting up in the chair due to SOB and light headedness. Attempted to have pt use reacher and sock aide however pt declined. Min A   Bathing NT        Min A;    Pt able to simulate bathing tasks with additional time required. Min A   Toileting Mod A     Simulated Min A; To improve safety and proper transfer.     Min A   Bed Mobility  Rolling:  NT  Repositioning:  NT   Supine to Sit:  NT    Sit to Supine:  NT   Pt seated in chair prior to/during/after session  Sup to sit:  SBA;    Pt able to transfer from supine to sitting position with HOB elevated.     IND   Functional Transfers Sit to stand:  Min A  Stand to sit:  Min A       Required Min A to stand from Chair 2x  Pt ed re: safety/hand placement  Sit > Stand: Min A;    With education on improving balance and to reduce risk of dizziness.     SUP   Functional Mobility Min A w/ hand-held assist     Declined use of FWW, slightly unsteady, no major LOB, Mod SOB after ~ 5 mins  Min A;    Pt able to ambulate in room and in hallway with HHA of Min A and Min verbal for safety. Pt declined to use w/w. SUP   Balance Sitting:  IND     Static:  IND    Dynamic:  IND in chair w/ ax     Standing:  Min A w/o AD     Static:  Close SUP    Dynamic:  Min A w/ Ax w/o AD  Sitting:      Static:  IND    Dynamic:  IND     Standing:     Static:  Close SUP    Dynamic:  Min A w/ Ax w/o AD     Activity Tolerance Tolerated Sitting: In chair extended time prior to and after session  Tolerated Standin-7 mins w/ ax  Tolerated Sitting: In chair after session  Tolerated Standin-8 mins w/ ax       Visual/  Perceptual WFL  Glasses: Yes   Yes     Hearing WFL  Hearing Aids  No          Hand dominance: Right     UE ROM:        RUE:     WFL                                          LUE:     WFL     Strength:        RUE:    grossly 4+/5                               LUE:    grossly 4+/5      Strength:  WFL Mike UEs     Fine Motor Coordination:  WFL Mike UEs     Sensation:  Denies numbness or tingling Mike UEs  Tone:  WFL Mike UEs  Edema:  None Noted                          Comments/Treatment:  Upon arrival, pt was found in supine. Pt was agreeable to participate in assessment ax. No family members present during assessment. Received permission from RN prior to engaging pt in assessment ax. At the end of the session, patient was properly positioned in b/s chair with call light and phone within reach, all lines and tubes intact. Oriented pt to call bell. Made all appropriate Environmental Modifications to facilitate pt's level of IND and safety. All needs met.          Pt would benefit from continued skilled OT services to increase safety and independence with completion of ADL/IADL tasks for functional independence and quality of life    · Pt has made fair progress towards set goals.    · Continue with current plan of care    Time in: 0950  Time out: 1015  Total Tx Time: 25 mins    Karin Allen [address], [address]

## 2020-01-03 NOTE — PROGRESS NOTES
Subjective:  Feeling better No CP or SOB  No fever or chills   No uncontrolled pain  No vomiting or diarrhea   Off oxygen no problems per nursing  Objective:    BP (!) 175/72   Pulse 69   Temp 97.6 °F (36.4 °C) (Temporal)   Resp 18   Ht 5' 5\" (1.651 m)   Wt 250 lb 11.2 oz (113.7 kg)   SpO2 93%   BMI 41.72 kg/m²     24HR INTAKE/OUTPUT:      Intake/Output Summary (Last 24 hours) at 1/3/2020 1207  Last data filed at 1/3/2020 0900  Gross per 24 hour   Intake 850 ml   Output 2 ml   Net 848 ml     nad  Heart:  RRR, no murmurs, gallops, or rubs. Lungs:  CTA bilaterally, no wheeze, rales or rhonchi  Abd: bowel sounds present, nontender, nondistended, no masses  Extrem:  No clubbing, cyanosis, or edema    Most Recent Labs  Lab Results   Component Value Date    WBC 13.8 (H) 01/03/2020    HGB 11.3 (L) 01/03/2020    HCT 38.6 01/03/2020     (H) 01/03/2020     01/03/2020    K 4.1 01/03/2020    CL 97 (L) 01/03/2020    CREATININE 0.8 01/03/2020    BUN 25 (H) 01/03/2020    CO2 32 (H) 01/03/2020    GLUCOSE 215 (H) 01/03/2020    ALT 12 12/29/2019    AST 11 12/29/2019    INR 1.1 07/16/2013    TSH 0.591 06/10/2019    LABA1C 8.6 (H) 12/29/2019    LABMICR <12.0 06/12/2019     No results for input(s): MG in the last 72 hours. Lab Results   Component Value Date    CALCIUM 9.3 01/03/2020    PHOS 4.0 01/01/2013        XR CHEST PORTABLE   Final Result      1. Bibasilar opacities. Differential includes atelectasis, infection,   and/or layering pleural effusions. 2. Mild pulmonary vascular congestion.                                                                    Assessment    Principal Problem:    Acute respiratory failure with hypoxemia (HCC)  Active Problems:    Lymphedema of both lower extremities    Ductal carcinoma in situ (DCIS) of breast    Type 2 diabetes mellitus without complication, with long-term current use of insulin (HCC)    Acute on chronic diastolic (congestive) heart failure ([address]    Essential

## 2020-01-03 NOTE — CARE COORDINATION
Precert obtained for [employer], good through Sunday. PASRR and ambulette on soft chart. New nephro consult for elevated BP. Will arrange discharge when stable.

## 2020-01-03 NOTE — PROGRESS NOTES
Units Subcutaneous Nightly    anastrozole  1 mg Oral Daily    aspirin  81 mg Oral Daily    gabapentin  400 mg Oral TID    metFORMIN  500 mg Oral BID     oxybutynin  5 mg Oral TID    pravastatin  80 mg Oral Daily    insulin lispro  0-18 Units Subcutaneous TID     insulin lispro  0-9 Units Subcutaneous Nightly    sodium chloride flush  10 mL Intravenous 2 times per day    enoxaparin  40 mg Subcutaneous Daily    predniSONE  40 mg Oral Daily    ipratropium-albuterol  1 ampule Inhalation Q4H WA    formoterol  20 mcg Nebulization BID    budesonide  250 mcg Nebulization BID    losartan  100 mg Oral Daily        Current Meds:  Current Facility-Administered Medications   Medication Dose Route Frequency Provider Last Rate Last Dose    chlorthalidone (HYGROTON) tablet 25 mg  25 mg Oral Daily Davian Garcia MD   25 mg at 01/03/20 1136    carvedilol (COREG) tablet 12.5 mg  12.5 mg Oral BID  Edwin Garcia MD   12.5 mg at 01/03/20 0854    cloNIDine (CATAPRES) tablet 0.1 mg  0.1 mg Oral TID Edwin Garcia MD   0.1 mg at 01/03/20 0606    benzocaine-menthol (CEPACOL SORE THROAT) lozenge 1 lozenge  1 lozenge Buccal PRN [facility] Celestino Brewer DO   1 lozenge at 01/02/20 1404    amLODIPine (NORVASC) tablet 10 mg  10 mg Oral Daily [facility], PA-C   10 mg at 01/03/20 0853    torsemide (DEMADEX) tablet 20 mg  20 mg Oral Daily Edwin Garcia MD   20 mg at 01/03/20 1029    perflutren lipid microspheres (DEFINITY) injection 1.65 mg  1.5 mL Intravenous ONCE PRN Edwin Garcia MD        insulin glargine (LANTUS) injection vial 35 Units  35 Units Subcutaneous Nightly Berto Young MD   35 Units at 01/02/20 2020    anastrozole (ARIMIDEX) tablet 1 mg  1 mg Oral Daily DMITRY Bell - CNP   1 mg at 01/03/20 6524    aspirin chewable tablet 81 mg  81 mg Oral Daily DMITRY Rodriguez - CNP   81 mg at 01/03/20 0853    gabapentin (NEURONTIN) capsule 400 mg  400 mg Oral TID Torrey Lim Ivania, APRN - CNP   400 mg at 01/03/20 0853    melatonin tablet 3 mg  3 mg Oral Nightly PRN Sebas Mullins, APRN - CNP   3 mg at 01/02/20 2221    metFORMIN (GLUCOPHAGE) tablet 500 mg  500 mg Oral BID  Taylor Albaradoflisabelle, APRN - CNP   500 mg at 01/03/20 0854    oxybutynin (DITROPAN) tablet 5 mg  5 mg Oral TID Sebas Mullins, APRN - CNP   5 mg at 01/03/20 0854    pravastatin (PRAVACHOL) tablet 80 mg  80 mg Oral Daily Taylor HendricksSwift County Benson [facility] Serviceseebritney, APRN - CNP   80 mg at 01/02/20 2017    glucose (GLUTOSE) 40 % oral gel 15 g  15 g Oral PRN Jeannie Nguyent, APRN - CNP        dextrose 50 % IV solution  12.5 g Intravenous PRN Jeannie Nguyent, APRN - CNP        glucagon (rDNA) injection 1 mg  1 mg Intramuscular PRN Jeannie Redd, APRN - CNP        dextrose 5 % solution  100 mL/hr Intravenous PRN Jeannie Nguyent, APRN - CNP        insulin lispro (HUMALOG) injection vial 0-18 Units  0-18 Units Subcutaneous TID  Jeannie Redd, APRN - CNP   3 Units at 01/03/20 1136    insulin lispro (HUMALOG) injection vial 0-9 Units  0-9 Units Subcutaneous Nightly Tayloramari Turc[facility], APRN - CNP   9 Units at 01/02/20 2019    sodium chloride flush 0.9 % injection 10 mL  10 mL Intravenous 2 times per day Sebas Mullins, APRN - CNP   10 mL at 01/03/20 0855    sodium chloride flush 0.9 % injection 10 mL  10 mL Intravenous PRN Taylor HendricksSwift [facility] Services[facility]et, APRN - CNP   10 mL at 12/31/19 1920    magnesium hydroxide (MILK OF MAGNESIA) 400 MG/5ML suspension 30 mL  30 mL Oral Daily PRN Taylor HendricksSwift [facility] Services[facility], APRN - CNP   30 mL at 01/01/20 1736    acetaminophen (TYLENOL) tablet 650 mg  650 mg Oral Q4H PRN Taylor HendricksSwift [facility] Services[facility]et, APRN - CNP   650 mg at 01/01/20 1736    ondansetron (ZOFRAN) injection 4 mg  4 mg Intravenous Q6H PRN DMITRY Rodriguez CNP        enoxaparin (LOVENOX) injection 40 mg  40 mg Subcutaneous Daily DMITRY Yanes CNP   40 mg at 01/03/20 0854    predniSONE last 72 hours. Assessment/Plans  1. Hypertensive urgency  - above goal, cardiology changed coreg and clonidine doses today.   - follow BP with recent medication changes   -Will add chlorthalidone     2. HFpEF  - 12/31 ECHO EF 55-60%, stage II DD, mild pulm HTN  - Cardiology following     OK to discharge, with close monitoring of BP at [facility]      Ameya Baldwin MD

## 2020-01-06 ENCOUNTER — TELEPHONE (OUTPATIENT)
Dept: FAMILY MEDICINE CLINIC | Age: 70
End: 2020-01-06

## 2020-01-06 ENCOUNTER — TELEPHONE (OUTPATIENT)
Dept: ENDOCRINOLOGY | Age: 70
End: 2020-01-06

## 2020-01-08 LAB
BASOPHILS ABSOLUTE: NORMAL
BASOPHILS RELATIVE PERCENT: 0.2 %
BUN BLDV-MCNC: 32 MG/DL
CALCIUM SERPL-MCNC: 10.2 MG/DL
CHLORIDE BLD-SCNC: 90 MMOL/L
CO2: 35 MMOL/L
CREAT SERPL-MCNC: 1 MG/DL
EOSINOPHILS ABSOLUTE: NORMAL
EOSINOPHILS RELATIVE PERCENT: 0 %
GFR CALCULATED: 55
GLUCOSE BLD-MCNC: 328 MG/DL
HCT VFR BLD CALC: 39.3 % (ref 36–46)
HEMOGLOBIN: 12.4 G/DL (ref 12–16)
LYMPHOCYTES ABSOLUTE: 2 /ΜL
LYMPHOCYTES RELATIVE PERCENT: 14.5 %
MCH RBC QN AUTO: 25.6 PG
MCHC RBC AUTO-ENTMCNC: 31.5 G/DL
MCV RBC AUTO: 81.1 FL
MONOCYTES ABSOLUTE: 1.3 /ΜL
MONOCYTES RELATIVE PERCENT: 9.8 %
NEUTROPHILS ABSOLUTE: 10.2 /ΜL
NEUTROPHILS RELATIVE PERCENT: 75.5 %
PDW BLD-RTO: 16.6 %
PLATELET # BLD: 453 K/ΜL
PMV BLD AUTO: 9.4 FL
POTASSIUM SERPL-SCNC: 4.6 MMOL/L
RBC # BLD: 4.84 10^6/ΜL
SODIUM BLD-SCNC: 135 MMOL/L
WBC # BLD: 13.5 10^3/ML

## 2020-01-13 ENCOUNTER — TELEPHONE (OUTPATIENT)
Dept: FAMILY MEDICINE CLINIC | Age: 70
End: 2020-01-13
Payer: MEDICARE

## 2020-01-13 PROCEDURE — G0179 MD RECERTIFICATION HHA PT: HCPCS | Performed by: FAMILY MEDICINE

## 2020-01-15 LAB
BASOPHILS ABSOLUTE: ABNORMAL
BASOPHILS RELATIVE PERCENT: 0.4 %
BUN BLDV-MCNC: 26 MG/DL
CALCIUM SERPL-MCNC: 9.7 MG/DL
CHLORIDE BLD-SCNC: 96 MMOL/L
CO2: 35 MMOL/L
CREAT SERPL-MCNC: 1 MG/DL
EOSINOPHILS ABSOLUTE: ABNORMAL
EOSINOPHILS RELATIVE PERCENT: 0 %
GFR CALCULATED: 67
GLUCOSE BLD-MCNC: 144 MG/DL
HCT VFR BLD CALC: 34.2 % (ref 36–46)
HEMOGLOBIN: 11 G/DL (ref 12–16)
LYMPHOCYTES ABSOLUTE: 2.2 /ΜL
LYMPHOCYTES RELATIVE PERCENT: 24.9 %
MCH RBC QN AUTO: 25.8 PG
MCHC RBC AUTO-ENTMCNC: 32.1 G/DL
MCV RBC AUTO: 80.3 FL
MONOCYTES ABSOLUTE: 1 /ΜL
MONOCYTES RELATIVE PERCENT: 12.1 %
NEUTROPHILS ABSOLUTE: 5.4 /ΜL
NEUTROPHILS RELATIVE PERCENT: 62.6 %
PDW BLD-RTO: 16.4 %
PLATELET # BLD: 233 K/ΜL
PMV BLD AUTO: 9.9 FL
POTASSIUM SERPL-SCNC: 3.8 MMOL/L
RBC # BLD: 4.26 10^6/ΜL
SODIUM BLD-SCNC: 140 MMOL/L
WBC # BLD: 8.6 10^3/ML

## 2020-01-21 ENCOUNTER — OFFICE VISIT (OUTPATIENT)
Dept: FAMILY MEDICINE CLINIC | Age: 70
End: 2020-01-21
Payer: MEDICARE

## 2020-01-21 ENCOUNTER — HOSPITAL ENCOUNTER (OUTPATIENT)
Age: 70
Discharge: HOME | End: 2020-01-23
Payer: MEDICARE

## 2020-01-21 VITALS
DIASTOLIC BLOOD PRESSURE: 78 MMHG | HEIGHT: 65 IN | OXYGEN SATURATION: 98 % | HEART RATE: 100 BPM | SYSTOLIC BLOOD PRESSURE: 138 MMHG | RESPIRATION RATE: 18 BRPM | WEIGHT: 244.5 LBS | BODY MASS INDEX: 40.73 KG/M2 | TEMPERATURE: 98.3 F

## 2020-01-21 PROBLEM — J45.40 MODERATE PERSISTENT ASTHMA WITHOUT COMPLICATION: Status: ACTIVE | Noted: 2020-01-21

## 2020-01-21 PROBLEM — Z76.0 ENCOUNTER FOR MEDICATION REFILL: Status: ACTIVE | Noted: 2020-01-21

## 2020-01-21 LAB
ALBUMIN SERPL-MCNC: 3.8 G/DL (ref 3.5–5.2)
ALP BLD-CCNC: 102 U/L (ref 35–104)
ALT SERPL-CCNC: 6 U/L (ref 0–32)
ANION GAP SERPL CALCULATED.3IONS-SCNC: 16 MMOL/L (ref 7–16)
AST SERPL-CCNC: 13 U/L (ref 0–31)
BASOPHILS ABSOLUTE: 0 E9/L (ref 0–0.2)
BASOPHILS RELATIVE PERCENT: 0.3 % (ref 0–2)
BILIRUB SERPL-MCNC: <0.2 MG/DL (ref 0–1.2)
BUN BLDV-MCNC: 10 MG/DL (ref 8–23)
BURR CELLS: ABNORMAL
CALCIUM SERPL-MCNC: 9.6 MG/DL (ref 8.6–10.2)
CHLORIDE BLD-SCNC: 101 MMOL/L (ref 98–107)
CO2: 24 MMOL/L (ref 22–29)
CREAT SERPL-MCNC: 0.8 MG/DL (ref 0.5–1)
EOSINOPHILS ABSOLUTE: 0 E9/L (ref 0.05–0.5)
EOSINOPHILS RELATIVE PERCENT: 0 % (ref 0–6)
GFR AFRICAN AMERICAN: >60
GFR NON-AFRICAN AMERICAN: >60 ML/MIN/1.73
GLUCOSE BLD-MCNC: 104 MG/DL (ref 74–99)
HCT VFR BLD CALC: 37.1 % (ref 34–48)
HEMOGLOBIN: 10.6 G/DL (ref 11.5–15.5)
LYMPHOCYTES ABSOLUTE: 1.42 E9/L (ref 1.5–4)
LYMPHOCYTES RELATIVE PERCENT: 18.3 % (ref 20–42)
MCH RBC QN AUTO: 24.5 PG (ref 26–35)
MCHC RBC AUTO-ENTMCNC: 28.6 % (ref 32–34.5)
MCV RBC AUTO: 85.9 FL (ref 80–99.9)
MONOCYTES ABSOLUTE: 0.71 E9/L (ref 0.1–0.95)
MONOCYTES RELATIVE PERCENT: 8.7 % (ref 2–12)
NEUTROPHILS ABSOLUTE: 5.77 E9/L (ref 1.8–7.3)
NEUTROPHILS RELATIVE PERCENT: 73 % (ref 43–80)
OVALOCYTES: ABNORMAL
PDW BLD-RTO: 17 FL (ref 11.5–15)
PLATELET # BLD: 253 E9/L (ref 130–450)
PMV BLD AUTO: 11.6 FL (ref 7–12)
POIKILOCYTES: ABNORMAL
POLYCHROMASIA: ABNORMAL
POTASSIUM SERPL-SCNC: 4.3 MMOL/L (ref 3.5–5)
RBC # BLD: 4.32 E12/L (ref 3.5–5.5)
SODIUM BLD-SCNC: 141 MMOL/L (ref 132–146)
TOTAL PROTEIN: 6.6 G/DL (ref 6.4–8.3)
TOXIC GRANULATION: ABNORMAL
VACUOLATED NEUTROPHILS: ABNORMAL
WBC # BLD: 7.9 E9/L (ref 4.5–11.5)

## 2020-01-21 PROCEDURE — 99496 TRANSJ CARE MGMT HIGH F2F 7D: CPT | Performed by: FAMILY MEDICINE

## 2020-01-21 PROCEDURE — 1111F DSCHRG MED/CURRENT MED MERGE: CPT | Performed by: FAMILY MEDICINE

## 2020-01-21 PROCEDURE — 85025 COMPLETE CBC W/AUTO DIFF WBC: CPT

## 2020-01-21 PROCEDURE — 80053 COMPREHEN METABOLIC PANEL: CPT

## 2020-01-21 RX ORDER — METOPROLOL SUCCINATE 25 MG/1
25 TABLET, EXTENDED RELEASE ORAL DAILY
COMMUNITY
End: 2020-01-21 | Stop reason: SDUPTHER

## 2020-01-21 RX ORDER — LOSARTAN POTASSIUM AND HYDROCHLOROTHIAZIDE 25; 100 MG/1; MG/1
1 TABLET ORAL DAILY
Qty: 90 TABLET | Refills: 1 | Status: SHIPPED
Start: 2020-01-21 | End: 2020-06-26 | Stop reason: SDUPTHER

## 2020-01-21 RX ORDER — OXYBUTYNIN CHLORIDE 5 MG/1
5 TABLET ORAL 3 TIMES DAILY
Qty: 270 TABLET | Refills: 1 | Status: SHIPPED
Start: 2020-01-21 | End: 2020-02-05 | Stop reason: ALTCHOICE

## 2020-01-21 RX ORDER — GABAPENTIN 400 MG/1
400 CAPSULE ORAL 3 TIMES DAILY
Qty: 270 CAPSULE | Refills: 1 | Status: SHIPPED
Start: 2020-01-21 | End: 2020-06-26 | Stop reason: SDUPTHER

## 2020-01-21 RX ORDER — ANASTROZOLE 1 MG/1
1 TABLET ORAL DAILY
Qty: 90 TABLET | Refills: 1 | Status: SHIPPED
Start: 2020-01-21 | End: 2020-06-26 | Stop reason: SDUPTHER

## 2020-01-21 RX ORDER — CARVEDILOL 12.5 MG/1
12.5 TABLET ORAL 2 TIMES DAILY WITH MEALS
Qty: 180 TABLET | Refills: 1 | Status: ON HOLD
Start: 2020-01-21 | End: 2020-02-11 | Stop reason: HOSPADM

## 2020-01-21 RX ORDER — ERGOCALCIFEROL 1.25 MG/1
50000 CAPSULE ORAL WEEKLY
Qty: 12 CAPSULE | Refills: 1 | Status: SHIPPED
Start: 2020-01-21 | End: 2020-06-26 | Stop reason: SDUPTHER

## 2020-01-21 RX ORDER — ASPIRIN 81 MG/1
81 TABLET, CHEWABLE ORAL DAILY
Qty: 90 TABLET | Refills: 1 | Status: SHIPPED
Start: 2020-01-21 | End: 2020-06-26 | Stop reason: SDUPTHER

## 2020-01-21 RX ORDER — FLUTICASONE FUROATE AND VILANTEROL 200; 25 UG/1; UG/1
1 POWDER RESPIRATORY (INHALATION) DAILY
Qty: 3 EACH | Refills: 1 | Status: SHIPPED
Start: 2020-01-21 | End: 2020-06-26 | Stop reason: SDUPTHER

## 2020-01-21 RX ORDER — ACYCLOVIR 400 MG/1
TABLET ORAL
Qty: 90 TABLET | Refills: 1 | Status: SHIPPED
Start: 2020-01-21 | End: 2020-02-05 | Stop reason: ALTCHOICE

## 2020-01-21 RX ORDER — PRAVASTATIN SODIUM 80 MG/1
TABLET ORAL
Qty: 90 TABLET | Refills: 1 | Status: SHIPPED
Start: 2020-01-21 | End: 2020-02-05 | Stop reason: ALTCHOICE

## 2020-01-21 RX ORDER — CLONIDINE HYDROCHLORIDE 0.2 MG/1
0.2 TABLET ORAL 2 TIMES DAILY
Qty: 180 TABLET | Refills: 1 | Status: ON HOLD
Start: 2020-01-21 | End: 2020-02-11 | Stop reason: HOSPADM

## 2020-01-21 RX ORDER — FUROSEMIDE 20 MG/1
20 TABLET ORAL 2 TIMES DAILY
COMMUNITY
End: 2020-02-05 | Stop reason: ALTCHOICE

## 2020-01-21 RX ORDER — MONTELUKAST SODIUM 10 MG/1
TABLET ORAL
Qty: 90 TABLET | Refills: 1 | Status: SHIPPED
Start: 2020-01-21 | End: 2020-02-05 | Stop reason: ALTCHOICE

## 2020-01-21 ASSESSMENT — ENCOUNTER SYMPTOMS
WHEEZING: 0
NAUSEA: 0
ABDOMINAL PAIN: 0
BACK PAIN: 0
CONSTIPATION: 0
SORE THROAT: 0
BLOOD IN STOOL: 0
DIARRHEA: 0
VOMITING: 0
COUGH: 0
SHORTNESS OF BREATH: 0

## 2020-01-21 ASSESSMENT — PATIENT HEALTH QUESTIONNAIRE - PHQ9
SUM OF ALL RESPONSES TO PHQ QUESTIONS 1-9: 0
1. LITTLE INTEREST OR PLEASURE IN DOING THINGS: 0
2. FEELING DOWN, DEPRESSED OR HOPELESS: 0
SUM OF ALL RESPONSES TO PHQ QUESTIONS 1-9: 0
SUM OF ALL RESPONSES TO PHQ9 QUESTIONS 1 & 2: 0

## 2020-01-21 NOTE — PROGRESS NOTES
Post-Discharge Transitional Care Management Services or Hospital Follow Up      Brigitte Gonzalez   YOB: 1950    Date of Office Visit:  1/21/2020  Date of Hospital Admission: 12/29/19  Date of Hospital Discharge: 1/3/20  Readmission Risk Score(high >=14%.  Medium >=10%):Readmission Risk Score: 36      Care management risk score Rising risk (score 2-5) and Complex Care (Scores >=6): 8     Non face to face  following discharge, date last encounter closed (first attempt may have been earlier): *No documented post hospital discharge outreach found in the last 14 days *No documented post hospital discharge outreach found in the last 14 days     Followed up within 24-48 hours post Subacute Rehab Discharge    Call initiated 2 business days of discharge: *No response recorded in the last 14 days     Patient Active Problem List   Diagnosis    Lymphedema of both lower extremities    Ductal carcinoma in situ (DCIS) of breast    Type 2 diabetes mellitus without complication, with long-term current use of insulin ([address]    Acute on chronic diastolic (congestive) heart failure ([address]    Essential hypertension    COPD (chronic obstructive pulmonary disease) ([address]    Pulmonary hypertension ([address]    Non-rheumatic aortic sclerosis ([address]    LBBB (left bundle branch block)    Diabetes mellitus type 2, uncontrolled ([address]    Mixed hyperlipidemia    Morbid obesity with BMI of 40.0-44.9, adult ([address]    History of CVA (cerebrovascular accident)    Chronic combined systolic and diastolic congestive heart failure ([address]    Acute respiratory failure with hypoxemia ([address]    Acute respiratory failure with hypoxia ([address]    Uncontrolled hypertension    Supraventricular tachycardia ([address]    Morbid obesity ([address]     Allergies  No Known Allergies    Medications listed as ordered at the time of discharge from hospital   [place] Medication Instructions PHILLIP:    Printed on:01/21/20 0937   Medication Information                      acetaminophen (APAP EXTRA STRENGTH) 500 MG tablet  Take 1 tablet by mouth every 6 hours as needed for Pain             albuterol sulfate HFA (PROAIR HFA) 108 (90 Base) MCG/ACT inhaler  Inhale 2 puffs into the lungs every 6 hours as needed for Wheezing             albuterol-ipratropium (COMBIVENT RESPIMAT)  MCG/ACT AERS inhaler  Inhale 1 puff into the lungs every 6 hours             albuterol-ipratropium (COMBIVENT RESPIMAT)  MCG/ACT AERS inhaler  Inhale 1 puff into the lungs every 6 hours as needed for Wheezing             amLODIPine (NORVASC) 10 MG tablet  Take 1 tablet by mouth daily             anastrozole (ARIMIDEX) 1 MG tablet  Take 1 tablet by mouth daily Indications: Estrogen Deficiency in Breast Cancer             aspirin 81 MG chewable tablet  Take 1 tablet by mouth daily             benzocaine-menthol (CEPACOL SORE THROAT) 15-3.6 MG lozenge  Place 1 lozenge inside cheek as needed for Sore Throat             butenafine (LOTRIMIN ULTRA) 1 % CREA  Please apply from the toes, in between the toes, foot up to the upper calf twice daily for 1 month, both legs             calcium carbonate-vitamin D (CALCIUM 600 + D) 600-400 MG-UNIT TABS per tab  Take 1 tablet by mouth 2 times daily             carvedilol (COREG) 12.5 MG tablet  Take 1 tablet by mouth 2 times daily (with meals)             chlorthalidone (HYGROTON) 25 MG tablet  Take 1 tablet by mouth daily             cloNIDine (CATAPRES) 0.2 MG tablet  Take 1 tablet by mouth 2 times daily             Fluticasone furoate-vilanterol (BREO ELLIPTA) 200-25 MCG/INH AEPB inhaler  Inhale 1 puff into the lungs daily             furosemide (LASIX) 20 MG tablet  Take 20 mg by mouth 2 times daily             gabapentin (NEURONTIN) 400 MG capsule  Take 1 capsule by mouth 3 times daily. insulin aspart (NOVOLOG) 100 UNIT/ML injection pen  Take 7 units before meals + siding scale.  MAX 50 units daily             insulin glargine (LANTUS) 100 UNIT/ML injection pen  Take 35 units daily at bedtime             ipratropium-albuterol (DUONEB) 0.5-2.5 (3) MG/3ML SOLN nebulizer solution  Inhale 3 mLs into the lungs every 4 hours as needed for Shortness of Breath             losartan (COZAAR) 100 MG tablet  Take 1 tablet by mouth daily             melatonin (RA MELATONIN) 3 MG TABS tablet  Take one 3 mg hs             metFORMIN (GLUCOPHAGE) 500 MG tablet  Take 2 tablets twice a day             metoprolol succinate (TOPROL XL) 25 MG extended release tablet  Take 25 mg by mouth daily             montelukast (SINGULAIR) 10 MG tablet  TAKE ONE TABLET BY MOUTH EVERY EVENING             oxybutynin (DITROPAN) 5 MG tablet  5 mg 3 times daily              pravastatin (PRAVACHOL) 80 MG tablet  TAKE ONE TABLET BY MOUTH EVERY DAY             predniSONE (DELTASONE) 10 MG tablet  4 POx3d, 3poX3d, [id number], 2wef1suvm             saxagliptin (ONGLYZA) 5 MG TABS tablet  Take 1 tablet by mouth daily             torsemide (DEMADEX) 20 MG tablet  Take 1 tablet by mouth daily             vitamin B-12 (CYANOCOBALAMIN) 1000 MCG tablet  Take 1 tablet by mouth daily             vitamin D (ERGOCALCIFEROL) 97556 units CAPS capsule  Take 1 capsule by mouth once a week                   Medications marked \"taking\" at this time  Outpatient Medications Marked as Taking for the 1/21/20 encounter (Office Visit) with Zeeshan Vaughan MD   Medication Sig Dispense Refill    metoprolol succinate (TOPROL XL) 25 MG extended release tablet Take 25 mg by mouth daily      furosemide (LASIX) 20 MG tablet Take 20 mg by mouth 2 times daily      amLODIPine (NORVASC) 10 MG tablet Take 1 tablet by mouth daily 30 tablet 3    ipratropium-albuterol (DUONEB) 0.5-2.5 (3) MG/3ML SOLN nebulizer solution Inhale 3 mLs into the lungs every 4 hours as needed for Shortness of Breath 360 mL     losartan (COZAAR) 100 MG tablet Take 1 tablet by mouth daily 30 tablet 3    aspirin 81 MG chewable tablet Take 1 tablet by mouth daily 90 tablet 3    albuterol-ipratropium (COMBIVENT RESPIMAT)  MCG/ACT AERS inhaler Inhale 1 puff into the lungs every 6 hours 1 Inhaler 0    Fluticasone furoate-vilanterol (BREO ELLIPTA) 200-25 MCG/INH AEPB inhaler Inhale 1 puff into the lungs daily 1 each 0    melatonin (RA MELATONIN) 3 MG TABS tablet Take one 3 mg hs 60 tablet 0    cloNIDine (CATAPRES) 0.2 MG tablet Take 1 tablet by mouth 2 times daily 180 tablet 3    calcium carbonate-vitamin D (CALCIUM 600 + D) 600-400 MG-UNIT TABS per tab Take 1 tablet by mouth 2 times daily 60 tablet 11    oxybutynin (DITROPAN) 5 MG tablet 5 mg 3 times daily       butenafine (LOTRIMIN ULTRA) 1 % CREA Please apply from the toes, in between the toes, foot up to the upper calf twice daily for 1 month, both legs 1 Tube 10    metFORMIN (GLUCOPHAGE) 500 MG tablet Take 2 tablets twice a day 360 tablet 5    insulin glargine (LANTUS) 100 UNIT/ML injection pen Take 35 units daily at bedtime 15 pen 5    insulin aspart (NOVOLOG) 100 UNIT/ML injection pen Take 7 units before meals + siding scale. MAX 50 units daily 15 pen 5    gabapentin (NEURONTIN) 400 MG capsule Take 1 capsule by mouth 3 times daily.  270 capsule 3    pravastatin (PRAVACHOL) 80 MG tablet TAKE ONE TABLET BY MOUTH EVERY DAY 90 tablet 3    vitamin D (ERGOCALCIFEROL) 01000 units CAPS capsule Take 1 capsule by mouth once a week 12 capsule 3    montelukast (SINGULAIR) 10 MG tablet TAKE ONE TABLET BY MOUTH EVERY EVENING 90 tablet 3    anastrozole (ARIMIDEX) 1 MG tablet Take 1 tablet by mouth daily Indications: Estrogen Deficiency in Breast Cancer 30 tablet 11    saxagliptin (ONGLYZA) 5 MG TABS tablet Take 1 tablet by mouth daily 15 tablet 0    acetaminophen (APAP EXTRA STRENGTH) 500 MG tablet Take 1 tablet by mouth every 6 hours as needed for Pain 30 tablet 3    vitamin B-12 (CYANOCOBALAMIN) 1000 MCG tablet Take 1 tablet by mouth daily 90 tablet 1 (INACTIVE)  -     HI DISCHARGE MEDS RECONCILED W/ CURRENT OUTPATIENT MED LIST    Essential hypertension  -     losartan-hydrochlorothiazide (HYZAAR) 100-25 MG per tablet; Take 1 tablet by mouth daily  -     aspirin 81 MG chewable tablet; Take 1 tablet by mouth daily  -     carvedilol (COREG) 12.5 MG tablet; Take 1 tablet by mouth 2 times daily (with meals)  -     cloNIDine (CATAPRES) 0.2 MG tablet; Take 1 tablet by mouth 2 times daily  -     CBC Auto Differential; Future  -     Comprehensive Metabolic Panel; Future  -     HI DISCHARGE MEDS RECONCILED W/ CURRENT OUTPATIENT MED LIST    Herpes simplex supression  -     acyclovir (ZOVIRAX) 400 MG tablet; PLEASE CLARIFY  -     HI DISCHARGE MEDS RECONCILED W/ CURRENT OUTPATIENT MED LIST    Moderate persistent asthma without complication  -     montelukast (SINGULAIR) 10 MG tablet; TAKE ONE TABLET BY MOUTH EVERY EVENING  -     Fluticasone furoate-vilanterol (BREO ELLIPTA) 200-25 MCG/INH AEPB inhaler; Inhale 1 puff into the lungs daily  -     albuterol-ipratropium (COMBIVENT RESPIMAT)  MCG/ACT AERS inhaler; Inhale 1 puff into the lungs every 6 hours  -     HI DISCHARGE MEDS RECONCILED W/ CURRENT OUTPATIENT MED LIST    Vitamin D deficiency  -     vitamin D (ERGOCALCIFEROL) 1.25 MG (59475 UT) CAPS capsule; Take 1 capsule by mouth once a week  -     HI DISCHARGE MEDS RECONCILED W/ CURRENT OUTPATIENT MED LIST    Hyperlipidemia with target LDL less than 70  -     pravastatin (PRAVACHOL) 80 MG tablet; TAKE ONE TABLET BY MOUTH EVERY DAY  -     HI DISCHARGE MEDS RECONCILED W/ CURRENT OUTPATIENT MED LIST    Diabetic polyneuropathy associated with type 1 diabetes mellitus (HCC)  -     gabapentin (NEURONTIN) 400 MG capsule; Take 1 capsule by mouth 3 times daily. -     insulin aspart (NOVOLOG) 100 UNIT/ML injection pen; Take 7 units before meals + siding scale. MAX 50 units daily  -     insulin glargine (LANTUS) 100 UNIT/ML injection pen;  Take 35 units daily at bedtime  - saxagliptin (ONGLYZA) 5 MG TABS tablet; Take 1 tablet by mouth daily  -     MT DISCHARGE MEDS RECONCILED W/ CURRENT OUTPATIENT MED LIST    IDDM (insulin dependent diabetes mellitus) (HCC)  -     carvedilol (COREG) 12.5 MG tablet; Take 1 tablet by mouth 2 times daily (with meals)  -     insulin aspart (NOVOLOG) 100 UNIT/ML injection pen; Take 7 units before meals + siding scale. MAX 50 units daily  -     insulin glargine (LANTUS) 100 UNIT/ML injection pen; Take 35 units daily at bedtime  -     metFORMIN (GLUCOPHAGE) 500 MG tablet; Take 2 tablets twice a day  -     saxagliptin (ONGLYZA) 5 MG TABS tablet; Take 1 tablet by mouth daily  -     CBC Auto Differential; Future  -     Comprehensive Metabolic Panel; Future  -     MT DISCHARGE MEDS RECONCILED W/ CURRENT OUTPATIENT MED LIST    Acute respiratory failure with hypoxia (HCC)  -     MT DISCHARGE MEDS RECONCILED W/ CURRENT OUTPATIENT MED LIST    Chronic combined systolic and diastolic congestive heart failure (HCC)  -     carvedilol (COREG) 12.5 MG tablet; Take 1 tablet by mouth 2 times daily (with meals)  -     CBC Auto Differential; Future  -     Comprehensive Metabolic Panel; Future  -     MT DISCHARGE MEDS RECONCILED W/ CURRENT OUTPATIENT MED LIST    Ductal carcinoma in situ (DCIS) of right breast  -     anastrozole (ARIMIDEX) 1 MG tablet; Take 1 tablet by mouth daily Indications: ESTROGEN DEFICIENCY IN BREAST CANCER (INACTIVE)  -     MT DISCHARGE MEDS RECONCILED W/ CURRENT OUTPATIENT MED LIST      Medical Decision Making:   high complexity       Follow up:  Return in 1 week (on 1/28/2020) for Medicare AW.     Dyan Franco MD

## 2020-01-23 ENCOUNTER — OFFICE VISIT (OUTPATIENT)
Dept: CARDIOLOGY CLINIC | Age: 70
End: 2020-01-23
Payer: MEDICARE

## 2020-01-23 VITALS
WEIGHT: 248.2 LBS | BODY MASS INDEX: 39.89 KG/M2 | OXYGEN SATURATION: 97 % | DIASTOLIC BLOOD PRESSURE: 60 MMHG | HEIGHT: 66 IN | SYSTOLIC BLOOD PRESSURE: 112 MMHG | RESPIRATION RATE: 20 BRPM | HEART RATE: 77 BPM

## 2020-01-23 PROCEDURE — 1123F ACP DISCUSS/DSCN MKR DOCD: CPT | Performed by: INTERNAL MEDICINE

## 2020-01-23 PROCEDURE — 4040F PNEUMOC VAC/ADMIN/RCVD: CPT | Performed by: INTERNAL MEDICINE

## 2020-01-23 PROCEDURE — 1111F DSCHRG MED/CURRENT MED MERGE: CPT | Performed by: INTERNAL MEDICINE

## 2020-01-23 PROCEDURE — G8417 CALC BMI ABV UP PARAM F/U: HCPCS | Performed by: INTERNAL MEDICINE

## 2020-01-23 PROCEDURE — 99214 OFFICE O/P EST MOD 30 MIN: CPT | Performed by: INTERNAL MEDICINE

## 2020-01-23 PROCEDURE — G8482 FLU IMMUNIZE ORDER/ADMIN: HCPCS | Performed by: INTERNAL MEDICINE

## 2020-01-23 PROCEDURE — 93000 ELECTROCARDIOGRAM COMPLETE: CPT | Performed by: INTERNAL MEDICINE

## 2020-01-23 PROCEDURE — G8427 DOCREV CUR MEDS BY ELIG CLIN: HCPCS | Performed by: INTERNAL MEDICINE

## 2020-01-23 PROCEDURE — 3046F HEMOGLOBIN A1C LEVEL >9.0%: CPT | Performed by: INTERNAL MEDICINE

## 2020-01-23 PROCEDURE — G8399 PT W/DXA RESULTS DOCUMENT: HCPCS | Performed by: INTERNAL MEDICINE

## 2020-01-23 PROCEDURE — 3017F COLORECTAL CA SCREEN DOC REV: CPT | Performed by: INTERNAL MEDICINE

## 2020-01-23 PROCEDURE — 1036F TOBACCO NON-USER: CPT | Performed by: INTERNAL MEDICINE

## 2020-01-23 PROCEDURE — 2022F DILAT RTA XM EVC RTNOPTHY: CPT | Performed by: INTERNAL MEDICINE

## 2020-01-23 PROCEDURE — 1090F PRES/ABSN URINE INCON ASSESS: CPT | Performed by: INTERNAL MEDICINE

## 2020-01-23 NOTE — PROGRESS NOTES
OFFICE VISIT     PRIMARY CARE PHYSICIAN:      Daija Cleaning MD       ALLERGIES / SENSITIVITIES:      No Known Allergies       REVIEWED MEDICATIONS:        Current Outpatient Medications:     losartan-hydrochlorothiazide (HYZAAR) 100-25 MG per tablet, Take 1 tablet by mouth daily, Disp: 90 tablet, Rfl: 1    acyclovir (ZOVIRAX) 400 MG tablet, PLEASE CLARIFY, Disp: 90 tablet, Rfl: 1    montelukast (SINGULAIR) 10 MG tablet, TAKE ONE TABLET BY MOUTH EVERY EVENING, Disp: 90 tablet, Rfl: 1    vitamin D (ERGOCALCIFEROL) 1.25 MG (75722 UT) CAPS capsule, Take 1 capsule by mouth once a week, Disp: 12 capsule, Rfl: 1    pravastatin (PRAVACHOL) 80 MG tablet, TAKE ONE TABLET BY MOUTH EVERY DAY, Disp: 90 tablet, Rfl: 1    gabapentin (NEURONTIN) 400 MG capsule, Take 1 capsule by mouth 3 times daily. , Disp: 270 capsule, Rfl: 1    insulin aspart (NOVOLOG) 100 UNIT/ML injection pen, Take 7 units before meals + siding scale.  MAX 50 units daily, Disp: 15 pen, Rfl: 5    insulin glargine (LANTUS) 100 UNIT/ML injection pen, Take 35 units daily at bedtime, Disp: 15 pen, Rfl: 5    metFORMIN (GLUCOPHAGE) 500 MG tablet, Take 2 tablets twice a day, Disp: 360 tablet, Rfl: 1    Fluticasone furoate-vilanterol (BREO ELLIPTA) 200-25 MCG/INH AEPB inhaler, Inhale 1 puff into the lungs daily, Disp: 3 each, Rfl: 1    albuterol-ipratropium (COMBIVENT RESPIMAT)  MCG/ACT AERS inhaler, Inhale 1 puff into the lungs every 6 hours, Disp: 3 Inhaler, Rfl: 1    oxybutynin (DITROPAN) 5 MG tablet, Take 1 tablet by mouth 3 times daily, Disp: 270 tablet, Rfl: 1    cloNIDine (CATAPRES) 0.2 MG tablet, Take 1 tablet by mouth 2 times daily, Disp: 180 tablet, Rfl: 1    anastrozole (ARIMIDEX) 1 MG tablet, Take 1 tablet by mouth daily Indications: ESTROGEN DEFICIENCY IN BREAST CANCER (INACTIVE), Disp: 90 tablet, Rfl: 1    amLODIPine (NORVASC) 10 MG tablet, Take 1 tablet by mouth daily, Disp: 30 tablet, Rfl: 3    ipratropium-albuterol complication, not stated as uncontrolled     AA1c8.7 9/10    UTI (urinary tract infection) 2019            Past Surgical History:   Procedure Laterality Date    ARTHROPLASTY Left 2016    thumb basil joint with dequervain's release    BREAST LUMPECTOMY  years ago    benign    BREAST LUMPECTOMY Right 2016    COLONOSCOPY  2005    COLONOSCOPY  1/8/15    Dr. [facility]    ECHO COMPL W DOP COLOR FLOW  2012         FINGER SURGERY Left 2016    thumb    HAND SURGERY  2017    HYSTERECTOMY      JOINT REPLACEMENT      OTHER SURGICAL HISTORY Right 2017    RIGHT THUMB TRAPEZIECTOMY WITH LIGAMENT RECONSRUCTION DEQUERVAINS RELEASE    TIM AND BSO      TOTAL HIP ARTHROPLASTY Bilateral     ,  dr Batsheva Castellon, dr Donal Hunt Bilateral     dr Kimmy Yoon          Family History   Problem Relation Age of Onset    Diabetes Mother     High Blood Pressure Mother     Heart Attack Mother     High Blood Pressure Father     Diabetes Father     Heart Failure Father     Breast Cancer Sister     Stroke Sister         young age   Godinez Cancer Sister 55        breast    Sickle Cell Anemia Other         niece    Cancer Other 36        niece - breast    Breast Cancer Sister             Cancer Sister 59        breast & ovarian    Stomach Cancer Other         aunt          Social History     Tobacco Use    Smoking status: Former Smoker     Packs/day: 0.25     Years: 35.00     Pack years: 8.75     Types: Cigarettes     Last attempt to quit: 2001     Years since quittin.1    Smokeless tobacco: Never Used   Substance Use Topics    Alcohol use:  Yes     Alcohol/week: 0.0 - 1.0 standard drinks     Comment: rare         Review of Systems:  Constitutional: negative for fever and chills, or significant weight loss  HEENT: negative for acute visual symptoms or auditory problems, no dysphagia  Respiratory: negative for cough, wheezing, or hemoptysis  Cardiovascular: negative for chest pain, palpitations, and dyspnea  Gastrointestinal: negative for abdominal pain, diarrhea, nausea and vomiting  Endocrine: Negative for polyuria and polydyspsia  Genitourinary:negative for dysuria and hematuria  Derm: negative for rash and skin lesion(s)  Neurological: negative for tingling, numbness, weakness, seizures and tremors  Endocrine: negative for polydipsia and polyuria  Musculoskeletal: negative for pain or tenderness  Psychiatric: negative for anxiety, depression, or suicidal ideations         O:  COMPLETE PHYSICAL EXAM:       /60   Pulse 77   Resp 20   Ht 5' 5.5\" (1.664 m)   Wt 248 lb 3.2 oz (112.6 kg)   SpO2 97%   BMI 40.67 kg/m²       General:   Patient alert, comfortable, no distress. Appears stated age. HEENT:    Pupils equal, no icterus, no nasal drainage, tongue moist.   Neck:              No masses, Thyroid not palpable. +elevated JVD, No carotid bruit. Chest:   Normal configuration, non tender. Lungs:   Clear to auscultation bilaterally, few scattered rhonchi. Cardiovascular:  Regular rhythm, 1/6 systolic murmur, No S3,, no palpable thrills,    Abdomen:  Soft, Non tender, Bowel sounds normal, no pulsatile abdominal aorta, no palpable masses. Extremities:  No edema. Distal pulses palpable. No cyanosis, no clubbing. Skin:   Good turgor, warm and dry, no cyanosis. Musculoskeletal: No joint swelling or deformity. Neuro:   Cranial nerves grossly intact; No focal neurologic deficit. Psych:   Alert, good mood and effect. REVIEW OF DIAGNOSTIC TESTS:        Electrocardiogram: NSR, LBBB   2D Echo: Noted EF 55-60%, Mild Pul HTN             A/P:   ASSESSMENT / PLAN:    Leanne Presbakari was seen today for congestive heart failure.     Diagnoses and all orders for this visit:    Acute on chronic diastolic (congestive) heart failure (HCC) - Continue Curretn meds - If worse CHF clinic referral  -     EKG 12 Lead    VHD (valvular heart disease) - Stable    Mild Pulmonary HTN  -RVSP 45mmHg    Essential hypertension - Controlled    History of syncope    PSVT (paroxysmal supraventricular tachycardia) ([facility]) - Stable    LBBB (left bundle branch block) - Old    History of CVA (cerebrovascular accident)    Obesity, morbid, BMI 40.0-49.9 ([facility]    Controlled type 2 diabetes mellitus without complication, with long-term current use of insulin [facility]     Preventive Cardiology: Low cholesterol diet, regular exercise as tolerate, and gradual weight loss discussed. Monitor BP and heart rates. All questions answered about cardiac diagnoses and cardiac medications. Continue current medications. Compliance with medications and f/u with all physicians discussed. Risk factor modification based on risk profile discussed. Call if any exertional chest pain, short of breath, dizzy or palpitations   Follow up in 6 months or earlier if needed.          [facility]  [address], [place], [address] [facility]  [phone]

## 2020-01-29 ENCOUNTER — OFFICE VISIT (OUTPATIENT)
Dept: FAMILY MEDICINE CLINIC | Age: 70
End: 2020-01-29
Payer: MEDICARE

## 2020-01-29 VITALS
OXYGEN SATURATION: 97 % | DIASTOLIC BLOOD PRESSURE: 60 MMHG | HEART RATE: 77 BPM | HEIGHT: 66 IN | BODY MASS INDEX: 40.82 KG/M2 | RESPIRATION RATE: 15 BRPM | WEIGHT: 254 LBS | TEMPERATURE: 98.6 F | SYSTOLIC BLOOD PRESSURE: 138 MMHG

## 2020-01-29 PROCEDURE — G0439 PPPS, SUBSEQ VISIT: HCPCS | Performed by: FAMILY MEDICINE

## 2020-01-29 PROCEDURE — G8482 FLU IMMUNIZE ORDER/ADMIN: HCPCS | Performed by: FAMILY MEDICINE

## 2020-01-29 PROCEDURE — 3017F COLORECTAL CA SCREEN DOC REV: CPT | Performed by: FAMILY MEDICINE

## 2020-01-29 PROCEDURE — 1123F ACP DISCUSS/DSCN MKR DOCD: CPT | Performed by: FAMILY MEDICINE

## 2020-01-29 PROCEDURE — G0447 BEHAVIOR COUNSEL OBESITY 15M: HCPCS | Performed by: FAMILY MEDICINE

## 2020-01-29 PROCEDURE — 4040F PNEUMOC VAC/ADMIN/RCVD: CPT | Performed by: FAMILY MEDICINE

## 2020-01-29 ASSESSMENT — LIFESTYLE VARIABLES
HOW OFTEN DO YOU HAVE SIX OR MORE DRINKS ON ONE OCCASION: 0
HOW OFTEN DURING THE LAST YEAR HAVE YOU NEEDED AN ALCOHOLIC DRINK FIRST THING IN THE MORNING TO GET YOURSELF GOING AFTER A NIGHT OF HEAVY DRINKING: 0
HOW OFTEN DO YOU HAVE A DRINK CONTAINING ALCOHOL: 1
HOW OFTEN DURING THE LAST YEAR HAVE YOU HAD A FEELING OF GUILT OR REMORSE AFTER DRINKING: 0
HAS A RELATIVE, FRIEND, DOCTOR, OR ANOTHER HEALTH PROFESSIONAL EXPRESSED CONCERN ABOUT YOUR DRINKING OR SUGGESTED YOU CUT DOWN: 0
AUDIT-C TOTAL SCORE: 1
HOW OFTEN DURING THE LAST YEAR HAVE YOU FAILED TO DO WHAT WAS NORMALLY EXPECTED FROM YOU BECAUSE OF DRINKING: 0
HOW MANY STANDARD DRINKS CONTAINING ALCOHOL DO YOU HAVE ON A TYPICAL DAY: 0
HOW OFTEN DURING THE LAST YEAR HAVE YOU BEEN UNABLE TO REMEMBER WHAT HAPPENED THE NIGHT BEFORE BECAUSE YOU HAD BEEN DRINKING: 0
HAVE YOU OR SOMEONE ELSE BEEN INJURED AS A RESULT OF YOUR DRINKING: 0
HOW OFTEN DURING THE LAST YEAR HAVE YOU FOUND THAT YOU WERE NOT ABLE TO STOP DRINKING ONCE YOU HAD STARTED: 0
AUDIT TOTAL SCORE: 1

## 2020-01-29 ASSESSMENT — PATIENT HEALTH QUESTIONNAIRE - PHQ9
SUM OF ALL RESPONSES TO PHQ QUESTIONS 1-9: 0
SUM OF ALL RESPONSES TO PHQ QUESTIONS 1-9: 0

## 2020-01-29 NOTE — PATIENT INSTRUCTIONS
appetizer instead of chips and dip. ? Sprinkle sunflower seeds or chopped almonds over salads. Or try adding chopped walnuts or almonds to cooked vegetables. ? Try some vegetarian meals using beans and peas. Add garbanzo or kidney beans to salads. Make burritos and tacos with mashed vinson beans or black beans. Where can you learn more? Go to https://K[employer] Eventspeaddisonewheike.hike. org and sign in to your YouTab account. Enter F040 in the [employer] box to learn more about \"DASH Diet: Care Instructions. \"     If you do not have an account, please click on the \"Sign Up Now\" link. Current as of: April 9, 2019  Content Version: 12.3  © 7811-8891 [employer]. Care instructions adapted under license by [facility] ([facility]). If you have questions about a medical condition or this instruction, always ask your healthcare professional. [place] [address] any warranty or liability for your use of this information. Learning About Cutting Calories  How do calories affect your weight? Food gives your body energy. Energy from the food you eat is measured in calories. This energy keeps your heart beating, your brain active, and your muscles working. Your body needs a certain number of calories each day. After your body uses the calories it needs, it stores extra calories as fat. To lose weight safely, you have to eat fewer calories while eating in a healthy way. How many calories do you need each day? The more active you are, the more calories you need. When you are less active, you need fewer calories. How many calories you need each day also depends on several things, including your age and whether you are male or female. Here are some general guidelines for adults:  · Less active women and older adults need 1,600 to 2,000 calories each day. · Active women and less active men need 2,000 to 2,400 calories each day. · Active men need 2,400 to 3,000 calories each day.   How can you cut calories and eat healthy meals? Whole grains, vegetables and fruits, and dried beans are good lower-calorie foods. They give you lots of nutrients and fiber. And they fill you up. Sweets, energy drinks, and soda pop are high in calories. They give you few nutrients and no fiber. Try to limit soda pop, fruit juice, and energy drinks. Drink water instead. Some fats can be part of a healthy diet. But cutting back on fats from highly processed foods like fast foods and many snack foods is a good way to lower the calories in your diet. Also, use smaller amounts of fats like butter, margarine, salad dressing, and mayonnaise. Add fresh garlic, lemon, or herbs to your meals to add flavor without adding fat. Meats and dairy products can be a big source of hidden fats. Try to choose lean or low-fat versions of these products. Fat-free cookies, candies, chips, and frozen treats can still be high in sugar and calories. Some fat-free foods have more calories than regular ones. Eat fat-free treats in moderation, as you would other foods. If your favorite foods are high in fat, salt, sugar, or calories, limit how often you eat them. Eat smaller servings, or look for healthy substitutes. Fill up on fruits, vegetables, and whole grains. Eating at home  · Use meat as a side dish instead of as the main part of your meal.  · Try main dishes that use whole wheat pasta, brown rice, dried beans, or vegetables. · Find ways to cook with little or no fat, such as broiling, steaming, or grilling. · Use cooking spray instead of oil. If you use oil, use a monounsaturated oil, such as canola or olive oil. · Trim fat from meats before you cook them. · Drain off fat after you brown the meat or while you roast it. · Chill soups and stews after you cook them. Then skim the fat off the top after it hardens. Eating out  · Order foods that are broiled or poached rather than fried or breaded.   · Cut back on the amount of butter or changing your diet for good. Healthy eating means eating a variety of foods so that you get all the nutrients you need. Your body needs protein, carbohydrate, and fats for energy. They keep your heart beating, your brain active, and your muscles working. On most days, try to eat from each food group. This means eating a variety of:  · Whole grains, such as whole wheat breads and pastas. · Fruits and vegetables. · Dairy products, such as low-fat milk, yogurt, and cheese. · Lean proteins, such as all types of fish, chicken without the skin, and beans. Don't have too much or too little of one thing. All foods, if eaten in moderation, can be part of healthy eating. Even sweets can be okay. If your favorite foods are high in fat, salt, sugar, or calories, limit how often you eat them. Eat smaller servings, or look for healthy substitutes. Do watch what you eat  Many people eat more than their bodies need. Part of staying at a healthy weight means learning how much food you really need from day to day and not eating more than that. Even with healthy foods, eating too much can make you gain weight. Having a well-balanced diet means that you eat enough, but not too much, and that your food gives you the nutrients you need to stay healthy. So listen to your body. Eat when you're hungry. Stop when you feel satisfied. It's a good idea to have healthy snacks ready for when you get hungry. Keep healthy snacks with you at work, in your car, and at home. If you have a healthy snack easily available, you'll be less likely to pick a candy bar or bag of chips from a vending machine instead. Some healthy snacks you might want to keep on hand are fruit, low-fat yogurt, string cheese, low-fat microwave popcorn, raisins and other dried fruit, nuts, whole wheat crackers, pretzels, carrots, celery sticks, and broccoli. Do some physical activity  A big part of reaching and staying at a healthy weight is being active.   When keep a list of the medicines you take. How can you care for yourself at home? Look at what you eat  · Keep a food diary for a week or two and record everything you eat or drink. Track the number of servings you eat from each food group. · For a balanced diet every day, eat a variety of:  ? 6 or more ounce-equivalents of grains, such as cereals, breads, crackers, rice, or pasta, every day. An ounce-equivalent is 1 slice of bread, 1 cup of ready-to-eat cereal, or ½ cup of cooked rice, cooked pasta, or cooked cereal.  ? 2½ cups of vegetables, especially:  § Dark-green vegetables such as broccoli and spinach. § Orange vegetables such as carrots and sweet potatoes. § Dry beans (such as vinson and kidney beans) and peas (such as lentils). ? 2 cups of fresh, frozen, or canned fruit. A small apple or 1 banana or orange equals 1 cup. ? 3 cups of nonfat or low-fat milk, yogurt, or other milk products. ? 5½ ounces of meat and beans, such as chicken, fish, lean meat, beans, nuts, and seeds. One egg, 1 tablespoon of peanut butter, ½ ounce nuts or seeds, or ¼ cup of cooked beans equals 1 ounce of meat. · Learn how to read food labels for serving sizes and ingredients. Fast-food and convenience-food meals often contain few or no fruits or vegetables. Make sure you eat some fruits and vegetables to make the meal more nutritious. · Look at your food diary. For each food group, add up what you have eaten and then divide the total by the number of days. This will give you an idea of how much you are eating from each food group. See if you can find some ways to change your diet to make it more healthy. Start small  · Do not try to make dramatic changes to your diet all at once. You might feel that you are missing out on your favorite foods and then be more likely to fail. · Start slowly, and gradually change your habits. Try some of the following:  ? Use whole wheat bread instead of white bread. ?  Use nonfat or low-fat milk 2019  Content Version: 12.3  © 9706-6841 [employer]. Care instructions adapted under license by [facility] [place] ([facility]). If you have questions about a medical condition or this instruction, always ask your healthcare professional. [address] any warranty or liability for your use of this information. Personalized Preventive Plan for Sue Gonzalez - 1/29/2020  Medicare offers a range of preventive health benefits. Some of the tests and screenings are paid in full while other may be subject to a deductible, co-insurance, and/or copay. Some of these benefits include a comprehensive review of your medical history including lifestyle, illnesses that may run in your family, and various assessments and screenings as appropriate. After reviewing your medical record and screening and assessments performed today your provider may have ordered immunizations, labs, imaging, and/or referrals for you. A list of these orders (if applicable) as well as your Preventive Care list are included within your After Visit Summary for your review. Other Preventive Recommendations:    · A preventive eye exam performed by an eye specialist is recommended every 1-2 years to screen for glaucoma; cataracts, macular degeneration, and other eye disorders. · A preventive dental visit is recommended every 6 months. · Try to get at least 150 minutes of exercise per week or 10,000 steps per day on a pedometer . · Order or download the FREE \"Exercise & Physical Activity: Your Everyday Guide\" from [employer]. Call 3-480.534.6822 or search [employer] online. · You need 4012-6715 mg of calcium and 5746-1080 IU of vitamin D per day.  It is possible to meet your calcium requirement with diet alone, but a vitamin D supplement is usually necessary to meet this goal.  · When exposed to the sun, use a sunscreen that protects against both UVA and UVB radiation with an SPF of

## 2020-01-29 NOTE — PROGRESS NOTES
Medicare Annual Wellness Visit  Name: Suma Robles Date: 2020   MRN: [id number] Sex: Female   Age: 71 y.o. Ethnicity: Non-/Non    : 1950 Race: Earl Gnozalez is here for Medicare AWV    Screenings for behavioral, psychosocial and functional/safety risks, and cognitive dysfunction are all negative except as indicated below. These results, as well as other patient data from the [address] [facility] form, are documented in Flowsheets linked to this Encounter. No Known Allergies    Prior to Visit Medications    Medication Sig Taking? Authorizing Provider   furosemide (LASIX) 20 MG tablet Take 20 mg by mouth 2 times daily Yes Historical Provider, MD   losartan-hydrochlorothiazide (HYZAAR) 100-25 MG per tablet Take 1 tablet by mouth daily Yes Judge Pedro MD   acyclovir (ZOVIRAX) 400 MG tablet PLEASE CLARIFY Yes Judge Pedro MD   aspirin 81 MG chewable tablet Take 1 tablet by mouth daily Yes Judge Pedro MD   carvedilol (COREG) 12.5 MG tablet Take 1 tablet by mouth 2 times daily (with meals) Yes Dasha Hoff MD   montelukast (SINGULAIR) 10 MG tablet TAKE ONE TABLET BY MOUTH EVERY EVENING Yes Judge Pedro MD   vitamin D (ERGOCALCIFEROL) 1.25 MG (81054 UT) CAPS capsule Take 1 capsule by mouth once a week Yes Judge Pedro MD   pravastatin (PRAVACHOL) 80 MG tablet TAKE ONE TABLET BY MOUTH EVERY DAY Yes Dasha Hoff MD   gabapentin (NEURONTIN) 400 MG capsule Take 1 capsule by mouth 3 times daily. Yes Judge Pedro MD   insulin aspart (NOVOLOG) 100 UNIT/ML injection pen Take 7 units before meals + siding scale.  MAX 50 units daily Yes Judge Pedro MD   insulin glargine (LANTUS) 100 UNIT/ML injection pen Take 35 units daily at bedtime Yes Judge Pedro MD   metFORMIN (GLUCOPHAGE) 500 MG tablet Take 2 tablets twice a day Yes Judge Pedro MD   Fluticasone Right 2016    COLONOSCOPY  2005    COLONOSCOPY  1/8/15    Dr. Jose [facility]    ECHO COMPL W DOP COLOR FLOW  2012         FINGER SURGERY Left 2016    thumb    HAND SURGERY  2017    HYSTERECTOMY      JOINT REPLACEMENT      OTHER SURGICAL HISTORY Right 2017    RIGHT THUMB TRAPEZIECTOMY WITH LIGAMENT RECONSRUCTION DEQUERVAINS RELEASE    TIM AND BSO      TOTAL HIP ARTHROPLASTY Bilateral     ,  dr Jackie Terry, dr Bere Yang Bilateral     dr Radha Avila       Family History   Problem Relation Age of Onset    Diabetes Mother     High Blood Pressure Mother     Heart Attack Mother     High Blood Pressure Father     Diabetes Father     Heart Failure Father     Breast Cancer Sister    [facility] Stroke Sister         young age   [facility] Cancer Sister 55        breast    Sickle Cell Anemia Other         niece    Cancer Other 36        niece - breast    Breast Cancer Sister             Cancer Sister 59        breast & ovarian    Stomach Cancer Other         aunt       CareTeam (Including outside providers/suppliers regularly involved in providing care):   Patient Care Team:  Low Langston MD as PCP - General (Family Medicine)  Low Langston MD as PCP - [facility] Empaneled Provider  Octavio Maciel DO as Surgeon (Urology)  Matthew Taylor MD as Consulting Physician (Cardiology)    Wt Readings from Last 3 Encounters:   20 254 lb (115.2 kg)   20 248 lb 3.2 oz (112.6 kg)   20 244 lb 8 oz (110.9 kg)     Vitals:    20 0916   BP: 138/60   Pulse: 77   Resp: 15   Temp: 98.6 °F (37 °C)   SpO2: 97%   Weight: 254 lb (115.2 kg)   Height: 5' 5.5\" (1.664 m)     Body mass index is 41.62 kg/m². Cognitive Function:  Based upon direct observation of the patient, evaluation of cognition reveals recent and remote memory intact.     General Appearance: alert and oriented to person, place and time, well developed and well- nourished, in no acute subsequent  -     [facility] Behavior  obesity 15m []        -     Obesity Counseling: Assessed behavioral health risks and factors affecting choice of behavior. Suggested weight control approaches, including dietary changes behavioral modification and follow up plan. Provided educational and support documentation. Time spent (minutes): 15    Body mass index (BMI) 40.0-44.9, adult ([facility]   -     [facility] Behavior  obesity 15m []        -     Obesity Counseling: Assessed behavioral health risks and factors affecting choice of behavior. Suggested weight control approaches, including dietary changes behavioral modification and follow up plan. Provided educational and support documentation. Time spent (minutes): 15         Follow Up:  Return for 1)  3 months to check weight; 2) Medicare Annual Wellness Visit in 1 year (1/30/2021).           Bibi Cleaning MD

## 2020-02-04 ENCOUNTER — TELEPHONE (OUTPATIENT)
Dept: FAMILY MEDICINE CLINIC | Age: 70
End: 2020-02-04

## 2020-02-04 ENCOUNTER — APPOINTMENT (OUTPATIENT)
Dept: GENERAL RADIOLOGY | Age: 70
DRG: 193 | End: 2020-02-04
Payer: MEDICARE

## 2020-02-04 ENCOUNTER — OFFICE VISIT (OUTPATIENT)
Dept: FAMILY MEDICINE CLINIC | Age: 70
End: 2020-02-04
Payer: MEDICARE

## 2020-02-04 ENCOUNTER — HOSPITAL ENCOUNTER (INPATIENT)
Age: 70
LOS: 7 days | Discharge: HOME HEALTH | DRG: 193 | End: 2020-02-11
Attending: EMERGENCY MEDICINE | Admitting: INTERNAL MEDICINE
Payer: MEDICARE

## 2020-02-04 VITALS
HEART RATE: 113 BPM | HEIGHT: 65 IN | RESPIRATION RATE: 14 BRPM | OXYGEN SATURATION: 93 % | BODY MASS INDEX: 41.65 KG/M2 | SYSTOLIC BLOOD PRESSURE: 146 MMHG | DIASTOLIC BLOOD PRESSURE: 74 MMHG | WEIGHT: 250 LBS | TEMPERATURE: 99.8 F

## 2020-02-04 PROBLEM — J44.1 COPD EXACERBATION (HCC): Status: ACTIVE | Noted: 2020-02-04

## 2020-02-04 PROBLEM — J96.01 ACUTE HYPOXEMIC RESPIRATORY FAILURE (HCC): Status: ACTIVE | Noted: 2020-02-04

## 2020-02-04 LAB
ALBUMIN SERPL-MCNC: 3.8 G/DL (ref 3.5–5.2)
ALP BLD-CCNC: 122 U/L (ref 35–104)
ALT SERPL-CCNC: 8 U/L (ref 0–32)
ANION GAP SERPL CALCULATED.3IONS-SCNC: 12 MMOL/L (ref 7–16)
AST SERPL-CCNC: 21 U/L (ref 0–31)
BASOPHILS ABSOLUTE: 0.04 E9/L (ref 0–0.2)
BASOPHILS RELATIVE PERCENT: 0.5 % (ref 0–2)
BILIRUB SERPL-MCNC: 0.2 MG/DL (ref 0–1.2)
BUN BLDV-MCNC: 8 MG/DL (ref 8–23)
CALCIUM SERPL-MCNC: 9.4 MG/DL (ref 8.6–10.2)
CHLORIDE BLD-SCNC: 102 MMOL/L (ref 98–107)
CO2: 29 MMOL/L (ref 22–29)
CREAT SERPL-MCNC: 0.8 MG/DL (ref 0.5–1)
EOSINOPHILS ABSOLUTE: 0 E9/L (ref 0.05–0.5)
EOSINOPHILS RELATIVE PERCENT: 0 % (ref 0–6)
GFR AFRICAN AMERICAN: >60
GFR NON-AFRICAN AMERICAN: >60 ML/MIN/1.73
GLUCOSE BLD-MCNC: 131 MG/DL (ref 74–99)
HCT VFR BLD CALC: 35.6 % (ref 34–48)
HEMOGLOBIN: 10.5 G/DL (ref 11.5–15.5)
IMMATURE GRANULOCYTES #: 0.06 E9/L
IMMATURE GRANULOCYTES %: 0.7 % (ref 0–5)
INFLUENZA A BY PCR: DETECTED
INFLUENZA B BY PCR: NOT DETECTED
LYMPHOCYTES ABSOLUTE: 0.93 E9/L (ref 1.5–4)
LYMPHOCYTES RELATIVE PERCENT: 10.7 % (ref 20–42)
MCH RBC QN AUTO: 24.4 PG (ref 26–35)
MCHC RBC AUTO-ENTMCNC: 29.5 % (ref 32–34.5)
MCV RBC AUTO: 82.8 FL (ref 80–99.9)
MONOCYTES ABSOLUTE: 0.68 E9/L (ref 0.1–0.95)
MONOCYTES RELATIVE PERCENT: 7.8 % (ref 2–12)
NEUTROPHILS ABSOLUTE: 6.96 E9/L (ref 1.8–7.3)
NEUTROPHILS RELATIVE PERCENT: 80.3 % (ref 43–80)
PDW BLD-RTO: 17 FL (ref 11.5–15)
PLATELET # BLD: 438 E9/L (ref 130–450)
PMV BLD AUTO: 10.6 FL (ref 7–12)
POTASSIUM SERPL-SCNC: 4.1 MMOL/L (ref 3.5–5)
PRO-BNP: 1142 PG/ML (ref 0–125)
RBC # BLD: 4.3 E12/L (ref 3.5–5.5)
SODIUM BLD-SCNC: 143 MMOL/L (ref 132–146)
TOTAL PROTEIN: 7 G/DL (ref 6.4–8.3)
TROPONIN: <0.01 NG/ML (ref 0–0.03)
WBC # BLD: 8.7 E9/L (ref 4.5–11.5)

## 2020-02-04 PROCEDURE — 83880 ASSAY OF NATRIURETIC PEPTIDE: CPT

## 2020-02-04 PROCEDURE — 80053 COMPREHEN METABOLIC PANEL: CPT

## 2020-02-04 PROCEDURE — 6370000000 HC RX 637 (ALT 250 FOR IP): Performed by: EMERGENCY MEDICINE

## 2020-02-04 PROCEDURE — 96374 THER/PROPH/DIAG INJ IV PUSH: CPT

## 2020-02-04 PROCEDURE — 6360000002 HC RX W HCPCS: Performed by: EMERGENCY MEDICINE

## 2020-02-04 PROCEDURE — 71045 X-RAY EXAM CHEST 1 VIEW: CPT

## 2020-02-04 PROCEDURE — 94640 AIRWAY INHALATION TREATMENT: CPT

## 2020-02-04 PROCEDURE — 1036F TOBACCO NON-USER: CPT | Performed by: PHYSICIAN ASSISTANT

## 2020-02-04 PROCEDURE — 84484 ASSAY OF TROPONIN QUANT: CPT

## 2020-02-04 PROCEDURE — G8926 SPIRO NO PERF OR DOC: HCPCS | Performed by: PHYSICIAN ASSISTANT

## 2020-02-04 PROCEDURE — G8482 FLU IMMUNIZE ORDER/ADMIN: HCPCS | Performed by: PHYSICIAN ASSISTANT

## 2020-02-04 PROCEDURE — 93005 ELECTROCARDIOGRAM TRACING: CPT | Performed by: EMERGENCY MEDICINE

## 2020-02-04 PROCEDURE — G8428 CUR MEDS NOT DOCUMENT: HCPCS | Performed by: PHYSICIAN ASSISTANT

## 2020-02-04 PROCEDURE — 3023F SPIROM DOC REV: CPT | Performed by: PHYSICIAN ASSISTANT

## 2020-02-04 PROCEDURE — 4040F PNEUMOC VAC/ADMIN/RCVD: CPT | Performed by: PHYSICIAN ASSISTANT

## 2020-02-04 PROCEDURE — 85025 COMPLETE CBC W/AUTO DIFF WBC: CPT

## 2020-02-04 PROCEDURE — 3017F COLORECTAL CA SCREEN DOC REV: CPT | Performed by: PHYSICIAN ASSISTANT

## 2020-02-04 PROCEDURE — G8399 PT W/DXA RESULTS DOCUMENT: HCPCS | Performed by: PHYSICIAN ASSISTANT

## 2020-02-04 PROCEDURE — 36415 COLL VENOUS BLD VENIPUNCTURE: CPT

## 2020-02-04 PROCEDURE — 1200000000 HC SEMI PRIVATE

## 2020-02-04 PROCEDURE — 2580000003 HC RX 258: Performed by: INTERNAL MEDICINE

## 2020-02-04 PROCEDURE — 99214 OFFICE O/P EST MOD 30 MIN: CPT | Performed by: PHYSICIAN ASSISTANT

## 2020-02-04 PROCEDURE — 87502 INFLUENZA DNA AMP PROBE: CPT

## 2020-02-04 PROCEDURE — 99284 EMERGENCY DEPT VISIT MOD MDM: CPT

## 2020-02-04 PROCEDURE — G8417 CALC BMI ABV UP PARAM F/U: HCPCS | Performed by: PHYSICIAN ASSISTANT

## 2020-02-04 PROCEDURE — 1123F ACP DISCUSS/DSCN MKR DOCD: CPT | Performed by: PHYSICIAN ASSISTANT

## 2020-02-04 PROCEDURE — 94664 DEMO&/EVAL PT USE INHALER: CPT

## 2020-02-04 PROCEDURE — 1090F PRES/ABSN URINE INCON ASSESS: CPT | Performed by: PHYSICIAN ASSISTANT

## 2020-02-04 RX ORDER — CARVEDILOL 6.25 MG/1
12.5 TABLET ORAL ONCE
Status: COMPLETED | OUTPATIENT
Start: 2020-02-04 | End: 2020-02-04

## 2020-02-04 RX ORDER — OSELTAMIVIR PHOSPHATE 75 MG/1
75 CAPSULE ORAL 2 TIMES DAILY
Qty: 10 CAPSULE | Refills: 0 | Status: SHIPPED | OUTPATIENT
Start: 2020-02-04 | End: 2020-02-11 | Stop reason: HOSPADM

## 2020-02-04 RX ORDER — FUROSEMIDE 20 MG/1
20 TABLET ORAL ONCE
Status: COMPLETED | OUTPATIENT
Start: 2020-02-04 | End: 2020-02-04

## 2020-02-04 RX ORDER — SODIUM CHLORIDE 0.9 % (FLUSH) 0.9 %
10 SYRINGE (ML) INJECTION EVERY 12 HOURS SCHEDULED
Status: DISCONTINUED | OUTPATIENT
Start: 2020-02-04 | End: 2020-02-05 | Stop reason: SDUPTHER

## 2020-02-04 RX ORDER — IPRATROPIUM BROMIDE AND ALBUTEROL SULFATE 2.5; .5 MG/3ML; MG/3ML
1 SOLUTION RESPIRATORY (INHALATION)
Status: COMPLETED | OUTPATIENT
Start: 2020-02-04 | End: 2020-02-04

## 2020-02-04 RX ORDER — IPRATROPIUM BROMIDE AND ALBUTEROL SULFATE 2.5; .5 MG/3ML; MG/3ML
1 SOLUTION RESPIRATORY (INHALATION) ONCE
Status: COMPLETED | OUTPATIENT
Start: 2020-02-04 | End: 2020-02-04

## 2020-02-04 RX ORDER — PREDNISONE 20 MG/1
40 TABLET ORAL DAILY
Qty: 10 TABLET | Refills: 0 | Status: SHIPPED | OUTPATIENT
Start: 2020-02-04 | End: 2020-02-11 | Stop reason: SDUPTHER

## 2020-02-04 RX ORDER — METHYLPREDNISOLONE SODIUM SUCCINATE 125 MG/2ML
125 INJECTION, POWDER, LYOPHILIZED, FOR SOLUTION INTRAMUSCULAR; INTRAVENOUS ONCE
Status: COMPLETED | OUTPATIENT
Start: 2020-02-04 | End: 2020-02-04

## 2020-02-04 RX ORDER — SODIUM CHLORIDE 0.9 % (FLUSH) 0.9 %
10 SYRINGE (ML) INJECTION PRN
Status: DISCONTINUED | OUTPATIENT
Start: 2020-02-04 | End: 2020-02-05 | Stop reason: SDUPTHER

## 2020-02-04 RX ORDER — OSELTAMIVIR PHOSPHATE 75 MG/1
75 CAPSULE ORAL ONCE
Status: COMPLETED | OUTPATIENT
Start: 2020-02-04 | End: 2020-02-04

## 2020-02-04 RX ORDER — BENZONATATE 100 MG/1
100 CAPSULE ORAL 3 TIMES DAILY PRN
Qty: 20 CAPSULE | Refills: 0 | Status: SHIPPED | OUTPATIENT
Start: 2020-02-04 | End: 2020-06-26 | Stop reason: ALTCHOICE

## 2020-02-04 RX ORDER — GUAIFENESIN 1200 MG/1
1 TABLET, EXTENDED RELEASE ORAL 2 TIMES DAILY PRN
Qty: 14 TABLET | Refills: 0 | Status: SHIPPED | OUTPATIENT
Start: 2020-02-04 | End: 2020-02-11

## 2020-02-04 RX ORDER — CLONIDINE HYDROCHLORIDE 0.1 MG/1
0.2 TABLET ORAL ONCE
Status: COMPLETED | OUTPATIENT
Start: 2020-02-04 | End: 2020-02-04

## 2020-02-04 RX ORDER — ACETAMINOPHEN 325 MG/1
650 TABLET ORAL EVERY 4 HOURS PRN
Status: DISCONTINUED | OUTPATIENT
Start: 2020-02-04 | End: 2020-02-11 | Stop reason: HOSPADM

## 2020-02-04 RX ORDER — OXYBUTYNIN CHLORIDE 5 MG/1
5 TABLET ORAL 3 TIMES DAILY
Status: DISCONTINUED | OUTPATIENT
Start: 2020-02-04 | End: 2020-02-05 | Stop reason: SDUPTHER

## 2020-02-04 RX ADMIN — IPRATROPIUM BROMIDE AND ALBUTEROL SULFATE 1 AMPULE: 2.5; .5 SOLUTION RESPIRATORY (INHALATION) at 19:16

## 2020-02-04 RX ADMIN — IPRATROPIUM BROMIDE AND ALBUTEROL SULFATE 1 AMPULE: 2.5; .5 SOLUTION RESPIRATORY (INHALATION) at 19:15

## 2020-02-04 RX ADMIN — IPRATROPIUM BROMIDE AND ALBUTEROL SULFATE 1 AMPULE: 2.5; .5 SOLUTION RESPIRATORY (INHALATION) at 19:13

## 2020-02-04 RX ADMIN — CARVEDILOL 12.5 MG: 6.25 TABLET, FILM COATED ORAL at 22:30

## 2020-02-04 RX ADMIN — IPRATROPIUM BROMIDE AND ALBUTEROL SULFATE 1 AMPULE: 2.5; .5 SOLUTION RESPIRATORY (INHALATION) at 22:15

## 2020-02-04 RX ADMIN — SODIUM CHLORIDE, PRESERVATIVE FREE 10 ML: 5 INJECTION INTRAVENOUS at 22:33

## 2020-02-04 RX ADMIN — FUROSEMIDE 20 MG: 20 TABLET ORAL at 22:30

## 2020-02-04 RX ADMIN — CLONIDINE HYDROCHLORIDE 0.2 MG: 0.1 TABLET ORAL at 22:30

## 2020-02-04 RX ADMIN — OXYBUTYNIN CHLORIDE 5 MG: 5 TABLET ORAL at 22:30

## 2020-02-04 RX ADMIN — METHYLPREDNISOLONE SODIUM SUCCINATE 125 MG: 125 INJECTION, POWDER, FOR SOLUTION INTRAMUSCULAR; INTRAVENOUS at 19:20

## 2020-02-04 RX ADMIN — OSELTAMIVIR PHOSPHATE 75 MG: 75 CAPSULE ORAL at 20:27

## 2020-02-04 NOTE — TELEPHONE ENCOUNTER
Returned patients call for to come into [facility] but answering machine came on couldn't leave message voice box is full. But did leave number on sms?     Last Appointment:  1/29/2020  Future Appointments   Date Time Provider Emeka Mary   2/28/2020 10:00 AM Julia Torres MD [facility]   4/28/2020 10:00 AM DMITRY John - [facility]   5/1/2020  9:00 AM Judge Pedro MD [facility]   9/23/2021 10:00 AM Dotty Neal MD [facility]

## 2020-02-04 NOTE — ED PROVIDER NOTES
Department of Emergency Medicine   ED  Provider Note  Admit Date/RoomTime: 2/4/2020  6:05 PM  ED Room: 10/10  Chief Complaint:   URI (sore throat/ productive cough X 2 days)    History of Present Illness   Source of history provided by:  patient. History/Exam Limitations: none. Romero Bingham is a 71 y.o. 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    Cerebrovascular disease 6/09    no residual    CHF (congestive heart failure) ([address] approx 3 years ago    Claustrophobia     COPD (chronic obstructive pulmonary disease) ([address]     Decreased dorsalis pedis pulse 9/5/2019    Dermatophytosis 9/5/2019    Headache(784.0)     History of blood transfusion     with knee surgery    Hx of blood clots     Hyperlipidemia     Hypertension     LBP radiating to both legs     Lymphedema of both lower extremities 11/12/2015    Neuromuscular disorder (HCC)     Non-rheumatic aortic sclerosis ([address] 10/2018    10/2018    Obesity     Pulmonary hypertension ([address] 10/2018    Recurrent genital HSV (herpes simplex virus) infection     last outbreak 11/2017    S/P breast biopsy, right 07/2016    pt states breast cancer    Type II or unspecified type diabetes mellitus without mention of complication, not stated as uncontrolled     AA1c8.7 9/10    UTI (urinary tract infection) 5/13/2019     Patient with history of COPD not on home oxygen presents for a 2-day history of cough productive of white sputum as well as nasal congestion and sore throat. She states she has been wheezing despite using her breathing treatments. She did receive her flu shot this year. She denies fever but states she has been chilled and has body aches.   She denies any chest pain, abdominal pain, vomiting, diarrhea Glucose 131 (H) 74 - 99 mg/dL    BUN 8 8 - 23 mg/dL    CREATININE 0.8 0.5 - 1.0 mg/dL    GFR Non-African American >60 >=60 mL/min/1.73    GFR African American >60     Calcium 9.4 8.6 - 10.2 mg/dL    Total Protein 7.0 6.4 - 8.3 g/dL    Alb 3.8 3.5 - 5.2 g/dL    Total Bilirubin 0.2 0.0 - 1.2 mg/dL    Alkaline Phosphatase 122 (H) 35 - 104 U/L    ALT 8 0 - 32 U/L    AST 21 0 - 31 U/L   Troponin   Result Value Ref Range    Troponin <0.01 0.00 - 0.03 ng/mL   Brain Natriuretic Peptide   Result Value Ref Range    Pro-BNP 1,142 (H) 0 - 125 pg/mL   Hemoglobin A1c   Result Value Ref Range    Hemoglobin A1C 9.4 (H) 4.0 - 5.6 %   Basic Metabolic Panel w/ Reflex to MG   Result Value Ref Range    Sodium 137 132 - 146 mmol/L    Potassium reflex Magnesium 4.2 3.5 - 5.0 mmol/L    Chloride 98 98 - 107 mmol/L    CO2 29 22 - 29 mmol/L    Anion Gap 10 7 - 16 mmol/L    Glucose 331 (H) 74 - 99 mg/dL    BUN 12 8 - 23 mg/dL    CREATININE 0.7 0.5 - 1.0 mg/dL    GFR Non-African American >60 >=60 mL/min/1.73    GFR African American >60     Calcium 9.0 8.6 - 10.2 mg/dL   CBC auto differential   Result Value Ref Range    WBC 7.3 4.5 - 11.5 E9/L    RBC 4.21 3.50 - 5.50 E12/L    Hemoglobin 10.4 (L) 11.5 - 15.5 g/dL    Hematocrit 34.7 34.0 - 48.0 %    MCV 82.4 80.0 - 99.9 fL    MCH 24.7 (L) 26.0 - 35.0 pg    MCHC 30.0 (L) 32.0 - 34.5 %    RDW 16.7 (H) 11.5 - 15.0 fL    Platelets 260 607 - 104 E9/L    MPV 10.0 7.0 - 12.0 fL    Neutrophils % 84.4 (H) 43.0 - 80.0 %    Immature Granulocytes % 0.7 0.0 - 5.0 %    Lymphocytes % 12.1 (L) 20.0 - 42.0 %    Monocytes % 2.5 2.0 - 12.0 %    Eosinophils % 0.0 0.0 - 6.0 %    Basophils % 0.3 0.0 - 2.0 %    Neutrophils Absolute 6.13 1.80 - 7.30 E9/L    Immature Granulocytes # 0.05 E9/L    Lymphocytes Absolute 0.88 (L) 1.50 - 4.00 E9/L    Monocytes Absolute 0.18 0.10 - 0.95 E9/L    Eosinophils Absolute 0.00 (L) 0.05 - 0.50 E9/L    Basophils Absolute 0.02 0.00 - 0.20 E9/L   POCT Glucose   Result Value Ref Range (has no administration in time range)   insulin glargine (LANTUS) injection vial 25 Units (has no administration in time range)   insulin lispro (HUMALOG) injection vial 0-12 Units (has no administration in time range)   insulin lispro (HUMALOG) injection vial 0-6 Units (has no administration in time range)   predniSONE (DELTASONE) tablet 40 mg (40 mg Oral Given 2/5/20 0854)   ipratropium-albuterol (DUONEB) nebulizer solution 1 ampule (1 ampule Inhalation Given 2/5/20 0643)   oseltamivir (TAMIFLU) capsule 75 mg (75 mg Oral Given 2/5/20 0853)   doxycycline hyclate (VIBRAMYCIN) capsule 100 mg (100 mg Oral Given 2/5/20 0254)   sodium chloride flush 0.9 % injection 10 mL (has no administration in time range)   sodium chloride flush 0.9 % injection 10 mL (has no administration in time range)   magnesium hydroxide (MILK OF MAGNESIA) 400 MG/5ML suspension 30 mL (has no administration in time range)   ondansetron (ZOFRAN) injection 4 mg (has no administration in time range)   oxybutynin (DITROPAN) tablet 5 mg (5 mg Oral Given 2/4/20 2230)   metFORMIN (GLUCOPHAGE) tablet 500 mg (has no administration in time range)   ipratropium-albuterol (DUONEB) nebulizer solution 1 ampule (1 ampule Inhalation Given 2/4/20 1916)   methylPREDNISolone sodium (SOLU-MEDROL) injection 125 mg (125 mg Intravenous Given 2/4/20 1920)   oseltamivir (TAMIFLU) capsule 75 mg (75 mg Oral Given 2/4/20 2027)   ipratropium-albuterol (DUONEB) nebulizer solution 1 ampule (1 ampule Inhalation Given 2/4/20 2215)   furosemide (LASIX) tablet 20 mg (20 mg Oral Given 2/4/20 2230)   cloNIDine (CATAPRES) tablet 0.2 mg (0.2 mg Oral Given 2/4/20 2230)   carvedilol (COREG) tablet 12.5 mg (12.5 mg Oral Given 2/4/20 2230)        Re-examination:  2/4/20        Patients symptoms are improving. Consults:   IP CONSULT TO INTERNAL MEDICINE    Procedures:   none    Medical Decision Making:     Patient with history of COPD presents for sore throat and URI symptoms.

## 2020-02-04 NOTE — PROGRESS NOTES
Hyzaar 100-25mg daily. Denies any missed doses. Pt denies any CP, vomiting, abdominal pain, neck stiffness, or lethargy. At home treatment has been unsuccessful. ROS    Unless otherwise stated in this report or unable to obtain because of the patient's clinical or mental status as evidenced by the medical record, this patients's positive and negative responses for Review of Systems, constitutional, psych, eyes, ENT, cardiovascular, respiratory, gastrointestinal, neurological, genitourinary, musculoskeletal, integument systems and systems related to the presenting problem are either stated in the preceding or were not pertinent or were negative for the symptoms and/or complaints related to the medical problem. Past Medical History:   Past Surgical History:   Procedure Laterality Date    ARTHROPLASTY Left 07/29/2016    thumb basil joint with dequervain's release    BREAST LUMPECTOMY  years ago    benign    BREAST LUMPECTOMY Right 09/06/2016    COLONOSCOPY  2005    COLONOSCOPY  1/8/15    Dr. Bethel King Mar  6/7/2012         FINGER SURGERY Left 07/29/2016    thumb    HAND SURGERY  12/2017    HYSTERECTOMY      JOINT REPLACEMENT      OTHER SURGICAL HISTORY Right 12/20/2017    RIGHT THUMB TRAPEZIECTOMY WITH LIGAMENT RECONSRUCTION DEQUERVAINS RELEASE    TIM AND BSO      TOTAL HIP ARTHROPLASTY Bilateral     2000, 2013 dr Savannah Marr, dr Huber Acuna Bilateral 2013    dr Rena Vann History:  reports that she quit smoking about 18 years ago. Her smoking use included cigarettes. She has a 8.75 pack-year smoking history. She has never used smokeless tobacco. She reports current alcohol use. She reports previous drug use. Drug: Marijuana.   Family History: family history includes Breast Cancer in her sister and sister; Cancer (age of onset: 36) in an other family member; Cancer (age of onset: 55) in her sister; Cancer (age of onset: 59) in her sister; Diabetes in her father and mother; Heart Attack in her mother; Heart Failure in her father; High Blood Pressure in her father and mother; Sickle Cell Anemia in an other family member; Sharpe Manner in an other family member; Stroke in her sister. Allergies: Patient has no known allergies. Physical Exam         VS:  BP (!) 146/74   Pulse 113   Temp 99.8 °F (37.7 °C) (Oral)   Resp 14   Ht 5' 5\" (1.651 m)   Wt 250 lb (113.4 kg)   SpO2 93%   BMI 41.60 kg/m²    Oxygen Saturation Interpretation: Below baseline    General Appearance/Constitutional:  Alert. Lungs: Deep, harsh, barking cough. Decreased breath sounds bilaterally. Difficulty talking in full sentences. Heart:  Tachycardic. Regular rhythm, normal heart sounds, without pathological murmurs, ectopy, gallops, or rubs. +bilateral edema noted. Skin:  Normal turgor. Warm, dry, without visible rash. Extremities:  No clubbing, cyanosis, or edema bilaterally. Neurological:  Oriented x3. Motor functions intact. Lab / Imaging Results   (All laboratory and radiology results have been personally reviewed by myself)  Labs:  No results found for this visit on 02/04/20. Imaging: All Radiology results interpreted by Radiologist unless otherwise noted. Assessment / Plan     Impression(s):  1. SOB (shortness of breath)    2. Hypoxia    3. Chronic obstructive pulmonary disease, unspecified COPD type ([facility] [address]    4. Tachycardia      Disposition:  Disposition: To ED    Patient is hypoxic on arrival- 86-89% with exertion. O2% improved somewhat with rest to 92%. Patient is having difficulty forming full sentences. Pt advised that she needs a comprehensive workup that is unable to be performed in an urgent care setting. EMS left office in stable condition with EMS. Further disposition to follow. All questions answered.

## 2020-02-05 LAB
ANION GAP SERPL CALCULATED.3IONS-SCNC: 10 MMOL/L (ref 7–16)
BASOPHILS ABSOLUTE: 0.02 E9/L (ref 0–0.2)
BASOPHILS RELATIVE PERCENT: 0.3 % (ref 0–2)
BUN BLDV-MCNC: 12 MG/DL (ref 8–23)
CALCIUM SERPL-MCNC: 9 MG/DL (ref 8.6–10.2)
CHLORIDE BLD-SCNC: 98 MMOL/L (ref 98–107)
CO2: 29 MMOL/L (ref 22–29)
CREAT SERPL-MCNC: 0.7 MG/DL (ref 0.5–1)
EKG ATRIAL RATE: 91 BPM
EKG P AXIS: 60 DEGREES
EKG P-R INTERVAL: 136 MS
EKG Q-T INTERVAL: 412 MS
EKG QRS DURATION: 132 MS
EKG QTC CALCULATION (BAZETT): 506 MS
EKG R AXIS: -50 DEGREES
EKG T AXIS: 92 DEGREES
EKG VENTRICULAR RATE: 91 BPM
EOSINOPHILS ABSOLUTE: 0 E9/L (ref 0.05–0.5)
EOSINOPHILS RELATIVE PERCENT: 0 % (ref 0–6)
GFR AFRICAN AMERICAN: >60
GFR NON-AFRICAN AMERICAN: >60 ML/MIN/1.73
GLUCOSE BLD-MCNC: 331 MG/DL (ref 74–99)
HBA1C MFR BLD: 9.4 % (ref 4–5.6)
HCT VFR BLD CALC: 34.7 % (ref 34–48)
HEMOGLOBIN: 10.4 G/DL (ref 11.5–15.5)
IMMATURE GRANULOCYTES #: 0.05 E9/L
IMMATURE GRANULOCYTES %: 0.7 % (ref 0–5)
LYMPHOCYTES ABSOLUTE: 0.88 E9/L (ref 1.5–4)
LYMPHOCYTES RELATIVE PERCENT: 12.1 % (ref 20–42)
MCH RBC QN AUTO: 24.7 PG (ref 26–35)
MCHC RBC AUTO-ENTMCNC: 30 % (ref 32–34.5)
MCV RBC AUTO: 82.4 FL (ref 80–99.9)
METER GLUCOSE: 245 MG/DL (ref 74–99)
METER GLUCOSE: 259 MG/DL (ref 74–99)
METER GLUCOSE: 283 MG/DL (ref 74–99)
METER GLUCOSE: 309 MG/DL (ref 74–99)
MONOCYTES ABSOLUTE: 0.18 E9/L (ref 0.1–0.95)
MONOCYTES RELATIVE PERCENT: 2.5 % (ref 2–12)
NEUTROPHILS ABSOLUTE: 6.13 E9/L (ref 1.8–7.3)
NEUTROPHILS RELATIVE PERCENT: 84.4 % (ref 43–80)
PDW BLD-RTO: 16.7 FL (ref 11.5–15)
PLATELET # BLD: 398 E9/L (ref 130–450)
PMV BLD AUTO: 10 FL (ref 7–12)
POTASSIUM REFLEX MAGNESIUM: 4.2 MMOL/L (ref 3.5–5)
PROCALCITONIN: 0.07 NG/ML (ref 0–0.08)
RBC # BLD: 4.21 E12/L (ref 3.5–5.5)
SODIUM BLD-SCNC: 137 MMOL/L (ref 132–146)
WBC # BLD: 7.3 E9/L (ref 4.5–11.5)

## 2020-02-05 PROCEDURE — 82962 GLUCOSE BLOOD TEST: CPT

## 2020-02-05 PROCEDURE — 6370000000 HC RX 637 (ALT 250 FOR IP): Performed by: INTERNAL MEDICINE

## 2020-02-05 PROCEDURE — 85025 COMPLETE CBC W/AUTO DIFF WBC: CPT

## 2020-02-05 PROCEDURE — 36415 COLL VENOUS BLD VENIPUNCTURE: CPT

## 2020-02-05 PROCEDURE — 94640 AIRWAY INHALATION TREATMENT: CPT

## 2020-02-05 PROCEDURE — 6360000002 HC RX W HCPCS: Performed by: INTERNAL MEDICINE

## 2020-02-05 PROCEDURE — 83036 HEMOGLOBIN GLYCOSYLATED A1C: CPT

## 2020-02-05 PROCEDURE — 2500000003 HC RX 250 WO HCPCS: Performed by: INTERNAL MEDICINE

## 2020-02-05 PROCEDURE — 2580000003 HC RX 258: Performed by: INTERNAL MEDICINE

## 2020-02-05 PROCEDURE — 84145 PROCALCITONIN (PCT): CPT

## 2020-02-05 PROCEDURE — 80048 BASIC METABOLIC PNL TOTAL CA: CPT

## 2020-02-05 PROCEDURE — 2140000000 HC CCU INTERMEDIATE R&B

## 2020-02-05 PROCEDURE — 93010 ELECTROCARDIOGRAM REPORT: CPT | Performed by: INTERNAL MEDICINE

## 2020-02-05 RX ORDER — NICOTINE POLACRILEX 4 MG
15 LOZENGE BUCCAL PRN
Status: DISCONTINUED | OUTPATIENT
Start: 2020-02-05 | End: 2020-02-11 | Stop reason: HOSPADM

## 2020-02-05 RX ORDER — HYDROCHLOROTHIAZIDE 25 MG/1
25 TABLET ORAL DAILY
Status: DISCONTINUED | OUTPATIENT
Start: 2020-02-05 | End: 2020-02-11 | Stop reason: HOSPADM

## 2020-02-05 RX ORDER — LOSARTAN POTASSIUM AND HYDROCHLOROTHIAZIDE 25; 100 MG/1; MG/1
1 TABLET ORAL DAILY
Status: DISCONTINUED | OUTPATIENT
Start: 2020-02-05 | End: 2020-02-05 | Stop reason: SDUPTHER

## 2020-02-05 RX ORDER — OXYBUTYNIN CHLORIDE 5 MG/1
5 TABLET ORAL 3 TIMES DAILY
Status: DISCONTINUED | OUTPATIENT
Start: 2020-02-05 | End: 2020-02-11 | Stop reason: HOSPADM

## 2020-02-05 RX ORDER — SODIUM CHLORIDE 0.9 % (FLUSH) 0.9 %
10 SYRINGE (ML) INJECTION PRN
Status: DISCONTINUED | OUTPATIENT
Start: 2020-02-05 | End: 2020-02-11 | Stop reason: HOSPADM

## 2020-02-05 RX ORDER — PRAVASTATIN SODIUM 20 MG
80 TABLET ORAL DAILY
Status: DISCONTINUED | OUTPATIENT
Start: 2020-02-05 | End: 2020-02-11 | Stop reason: HOSPADM

## 2020-02-05 RX ORDER — DEXTROSE MONOHYDRATE 50 MG/ML
100 INJECTION, SOLUTION INTRAVENOUS PRN
Status: DISCONTINUED | OUTPATIENT
Start: 2020-02-05 | End: 2020-02-11 | Stop reason: HOSPADM

## 2020-02-05 RX ORDER — AMLODIPINE BESYLATE 10 MG/1
10 TABLET ORAL DAILY
Status: DISCONTINUED | OUTPATIENT
Start: 2020-02-05 | End: 2020-02-11 | Stop reason: HOSPADM

## 2020-02-05 RX ORDER — ACYCLOVIR 400 MG/1
400 TABLET ORAL DAILY
Status: ON HOLD | COMMUNITY
End: 2020-02-11 | Stop reason: HOSPADM

## 2020-02-05 RX ORDER — IPRATROPIUM BROMIDE AND ALBUTEROL SULFATE 2.5; .5 MG/3ML; MG/3ML
1 SOLUTION RESPIRATORY (INHALATION)
Status: DISCONTINUED | OUTPATIENT
Start: 2020-02-05 | End: 2020-02-11 | Stop reason: HOSPADM

## 2020-02-05 RX ORDER — PRAVASTATIN SODIUM 80 MG/1
80 TABLET ORAL DAILY
COMMUNITY
End: 2020-06-26 | Stop reason: SDUPTHER

## 2020-02-05 RX ORDER — INSULIN GLARGINE 100 [IU]/ML
50 INJECTION, SOLUTION SUBCUTANEOUS NIGHTLY
COMMUNITY
End: 2021-04-01

## 2020-02-05 RX ORDER — LABETALOL HYDROCHLORIDE 5 MG/ML
10 INJECTION, SOLUTION INTRAVENOUS EVERY 4 HOURS PRN
Status: DISCONTINUED | OUTPATIENT
Start: 2020-02-05 | End: 2020-02-11 | Stop reason: HOSPADM

## 2020-02-05 RX ORDER — METHYLPREDNISOLONE SODIUM SUCCINATE 40 MG/ML
40 INJECTION, POWDER, LYOPHILIZED, FOR SOLUTION INTRAMUSCULAR; INTRAVENOUS EVERY 8 HOURS
Status: DISCONTINUED | OUTPATIENT
Start: 2020-02-05 | End: 2020-02-07

## 2020-02-05 RX ORDER — LANOLIN ALCOHOL/MO/W.PET/CERES
3 CREAM (GRAM) TOPICAL EVERY EVENING
Status: DISCONTINUED | OUTPATIENT
Start: 2020-02-05 | End: 2020-02-05 | Stop reason: CLARIF

## 2020-02-05 RX ORDER — INSULIN GLARGINE 100 [IU]/ML
25 INJECTION, SOLUTION SUBCUTANEOUS NIGHTLY
Status: DISCONTINUED | OUTPATIENT
Start: 2020-02-05 | End: 2020-02-09

## 2020-02-05 RX ORDER — CLONIDINE HYDROCHLORIDE 0.1 MG/1
0.2 TABLET ORAL 2 TIMES DAILY
Status: DISCONTINUED | OUTPATIENT
Start: 2020-02-05 | End: 2020-02-10

## 2020-02-05 RX ORDER — FUROSEMIDE 20 MG/1
20 TABLET ORAL 2 TIMES DAILY
Status: DISCONTINUED | OUTPATIENT
Start: 2020-02-05 | End: 2020-02-11 | Stop reason: HOSPADM

## 2020-02-05 RX ORDER — PREDNISONE 20 MG/1
40 TABLET ORAL
Status: DISCONTINUED | OUTPATIENT
Start: 2020-02-05 | End: 2020-02-05

## 2020-02-05 RX ORDER — OXYBUTYNIN CHLORIDE 5 MG/1
10 TABLET ORAL 3 TIMES DAILY
COMMUNITY
End: 2020-06-26 | Stop reason: SDUPTHER

## 2020-02-05 RX ORDER — LANOLIN ALCOHOL/MO/W.PET/CERES
3 CREAM (GRAM) TOPICAL EVERY EVENING
Status: DISCONTINUED | OUTPATIENT
Start: 2020-02-05 | End: 2020-02-11 | Stop reason: HOSPADM

## 2020-02-05 RX ORDER — ASPIRIN 81 MG/1
81 TABLET, CHEWABLE ORAL DAILY
Status: DISCONTINUED | OUTPATIENT
Start: 2020-02-05 | End: 2020-02-11 | Stop reason: HOSPADM

## 2020-02-05 RX ORDER — OSELTAMIVIR PHOSPHATE 75 MG/1
75 CAPSULE ORAL 2 TIMES DAILY
Status: DISPENSED | OUTPATIENT
Start: 2020-02-05 | End: 2020-02-09

## 2020-02-05 RX ORDER — ACETAMINOPHEN 500 MG
500 TABLET ORAL EVERY 6 HOURS PRN
Status: DISCONTINUED | OUTPATIENT
Start: 2020-02-05 | End: 2020-02-11 | Stop reason: HOSPADM

## 2020-02-05 RX ORDER — CARVEDILOL 6.25 MG/1
12.5 TABLET ORAL 2 TIMES DAILY WITH MEALS
Status: DISCONTINUED | OUTPATIENT
Start: 2020-02-05 | End: 2020-02-06

## 2020-02-05 RX ORDER — LOSARTAN POTASSIUM 50 MG/1
100 TABLET ORAL DAILY
Status: DISCONTINUED | OUTPATIENT
Start: 2020-02-05 | End: 2020-02-11 | Stop reason: HOSPADM

## 2020-02-05 RX ORDER — ONDANSETRON 2 MG/ML
4 INJECTION INTRAMUSCULAR; INTRAVENOUS EVERY 6 HOURS PRN
Status: DISCONTINUED | OUTPATIENT
Start: 2020-02-05 | End: 2020-02-11 | Stop reason: HOSPADM

## 2020-02-05 RX ORDER — OMEPRAZOLE 40 MG/1
40 CAPSULE, DELAYED RELEASE ORAL DAILY
COMMUNITY
End: 2020-06-26 | Stop reason: SDUPTHER

## 2020-02-05 RX ORDER — DEXTROSE MONOHYDRATE 25 G/50ML
12.5 INJECTION, SOLUTION INTRAVENOUS PRN
Status: DISCONTINUED | OUTPATIENT
Start: 2020-02-05 | End: 2020-02-11 | Stop reason: HOSPADM

## 2020-02-05 RX ORDER — MONTELUKAST SODIUM 10 MG/1
10 TABLET ORAL NIGHTLY
COMMUNITY
End: 2020-06-26 | Stop reason: SDUPTHER

## 2020-02-05 RX ORDER — SODIUM CHLORIDE 0.9 % (FLUSH) 0.9 %
10 SYRINGE (ML) INJECTION EVERY 12 HOURS SCHEDULED
Status: DISCONTINUED | OUTPATIENT
Start: 2020-02-05 | End: 2020-02-11 | Stop reason: HOSPADM

## 2020-02-05 RX ORDER — GUAIFENESIN/DEXTROMETHORPHAN 100-10MG/5
5 SYRUP ORAL EVERY 4 HOURS PRN
Status: DISCONTINUED | OUTPATIENT
Start: 2020-02-05 | End: 2020-02-11 | Stop reason: HOSPADM

## 2020-02-05 RX ORDER — MONTELUKAST SODIUM 10 MG/1
10 TABLET ORAL EVERY EVENING
Status: DISCONTINUED | OUTPATIENT
Start: 2020-02-05 | End: 2020-02-11 | Stop reason: HOSPADM

## 2020-02-05 RX ORDER — DOXYCYCLINE HYCLATE 100 MG/1
100 CAPSULE ORAL EVERY 12 HOURS SCHEDULED
Status: DISCONTINUED | OUTPATIENT
Start: 2020-02-05 | End: 2020-02-05

## 2020-02-05 RX ADMIN — INSULIN LISPRO 6 UNITS: 100 INJECTION, SOLUTION INTRAVENOUS; SUBCUTANEOUS at 17:53

## 2020-02-05 RX ADMIN — FUROSEMIDE 20 MG: 20 TABLET ORAL at 08:53

## 2020-02-05 RX ADMIN — CARVEDILOL 12.5 MG: 6.25 TABLET, FILM COATED ORAL at 08:51

## 2020-02-05 RX ADMIN — NYSTATIN 500000 UNITS: 100000 SUSPENSION ORAL at 16:14

## 2020-02-05 RX ADMIN — METFORMIN HYDROCHLORIDE 1000 MG: 1000 TABLET ORAL at 14:13

## 2020-02-05 RX ADMIN — IPRATROPIUM BROMIDE AND ALBUTEROL SULFATE 1 AMPULE: 2.5; .5 SOLUTION RESPIRATORY (INHALATION) at 21:46

## 2020-02-05 RX ADMIN — METHYLPREDNISOLONE SODIUM SUCCINATE 40 MG: 40 INJECTION, POWDER, FOR SOLUTION INTRAMUSCULAR; INTRAVENOUS at 16:18

## 2020-02-05 RX ADMIN — OXYBUTYNIN CHLORIDE 5 MG: 5 TABLET ORAL at 14:12

## 2020-02-05 RX ADMIN — Medication 10 ML: at 23:54

## 2020-02-05 RX ADMIN — CLONIDINE HYDROCHLORIDE 0.2 MG: 0.1 TABLET ORAL at 14:13

## 2020-02-05 RX ADMIN — MELATONIN 3 MG ORAL TABLET 3 MG: 3 TABLET ORAL at 21:06

## 2020-02-05 RX ADMIN — GABAPENTIN 400 MG: 300 CAPSULE ORAL at 14:14

## 2020-02-05 RX ADMIN — CLONIDINE HYDROCHLORIDE 0.2 MG: 0.1 TABLET ORAL at 02:54

## 2020-02-05 RX ADMIN — GABAPENTIN 400 MG: 300 CAPSULE ORAL at 21:06

## 2020-02-05 RX ADMIN — INSULIN LISPRO 8 UNITS: 100 INJECTION, SOLUTION INTRAVENOUS; SUBCUTANEOUS at 14:19

## 2020-02-05 RX ADMIN — FUROSEMIDE 20 MG: 20 TABLET ORAL at 16:14

## 2020-02-05 RX ADMIN — SODIUM CHLORIDE, PRESERVATIVE FREE 10 ML: 5 INJECTION INTRAVENOUS at 16:19

## 2020-02-05 RX ADMIN — LOSARTAN POTASSIUM 100 MG: 50 TABLET ORAL at 14:13

## 2020-02-05 RX ADMIN — METFORMIN HYDROCHLORIDE 1000 MG: 1000 TABLET ORAL at 16:15

## 2020-02-05 RX ADMIN — IPRATROPIUM BROMIDE AND ALBUTEROL SULFATE 1 AMPULE: 2.5; .5 SOLUTION RESPIRATORY (INHALATION) at 14:06

## 2020-02-05 RX ADMIN — IPRATROPIUM BROMIDE AND ALBUTEROL SULFATE 1 AMPULE: 2.5; .5 SOLUTION RESPIRATORY (INHALATION) at 17:33

## 2020-02-05 RX ADMIN — DOXYCYCLINE HYCLATE 100 MG: 100 CAPSULE ORAL at 02:54

## 2020-02-05 RX ADMIN — INSULIN LISPRO 2 UNITS: 100 INJECTION, SOLUTION INTRAVENOUS; SUBCUTANEOUS at 21:12

## 2020-02-05 RX ADMIN — GABAPENTIN 400 MG: 300 CAPSULE ORAL at 08:53

## 2020-02-05 RX ADMIN — OSELTAMIVIR PHOSPHATE 75 MG: 75 CAPSULE ORAL at 21:06

## 2020-02-05 RX ADMIN — HYDROCHLOROTHIAZIDE 25 MG: 25 TABLET ORAL at 14:13

## 2020-02-05 RX ADMIN — IPRATROPIUM BROMIDE AND ALBUTEROL SULFATE 1 AMPULE: 2.5; .5 SOLUTION RESPIRATORY (INHALATION) at 06:43

## 2020-02-05 RX ADMIN — NYSTATIN 500000 UNITS: 100000 SUSPENSION ORAL at 21:07

## 2020-02-05 RX ADMIN — OXYBUTYNIN CHLORIDE 5 MG: 5 TABLET ORAL at 21:06

## 2020-02-05 RX ADMIN — Medication 10 ML: at 14:15

## 2020-02-05 RX ADMIN — CLONIDINE HYDROCHLORIDE 0.2 MG: 0.1 TABLET ORAL at 21:12

## 2020-02-05 RX ADMIN — OSELTAMIVIR PHOSPHATE 75 MG: 75 CAPSULE ORAL at 08:53

## 2020-02-05 RX ADMIN — MONTELUKAST SODIUM 10 MG: 10 TABLET ORAL at 17:52

## 2020-02-05 RX ADMIN — LABETALOL HYDROCHLORIDE 10 MG: 5 INJECTION INTRAVENOUS at 17:51

## 2020-02-05 RX ADMIN — PRAVASTATIN SODIUM 80 MG: 20 TABLET ORAL at 14:12

## 2020-02-05 RX ADMIN — PREDNISONE 40 MG: 20 TABLET ORAL at 08:54

## 2020-02-05 RX ADMIN — CARVEDILOL 12.5 MG: 6.25 TABLET, FILM COATED ORAL at 16:13

## 2020-02-05 RX ADMIN — ASPIRIN 81 MG 81 MG: 81 TABLET ORAL at 08:52

## 2020-02-05 RX ADMIN — INSULIN GLARGINE 25 UNITS: 100 INJECTION, SOLUTION SUBCUTANEOUS at 21:05

## 2020-02-05 RX ADMIN — ENOXAPARIN SODIUM 40 MG: 40 INJECTION SUBCUTANEOUS at 08:55

## 2020-02-05 RX ADMIN — AMLODIPINE BESYLATE 10 MG: 10 TABLET ORAL at 02:55

## 2020-02-05 RX ADMIN — Medication 10 ML: at 21:46

## 2020-02-05 RX ADMIN — METHYLPREDNISOLONE SODIUM SUCCINATE 40 MG: 40 INJECTION, POWDER, FOR SOLUTION INTRAMUSCULAR; INTRAVENOUS at 23:54

## 2020-02-05 ASSESSMENT — PAIN SCALES - GENERAL: PAINLEVEL_OUTOF10: 0

## 2020-02-05 NOTE — CONSULTS
Non-rheumatic aortic sclerosis ([facility] [address] 10/2018    10/2018    Obesity     Pulmonary hypertension ([facility] [address] 10/2018    Recurrent genital HSV (herpes simplex virus) infection     last outbreak 2017    S/P breast biopsy, right 2016    pt states breast cancer    Type II or unspecified type diabetes mellitus without mention of complication, not stated as uncontrolled     AA1c8.7 9/10    UTI (urinary tract infection) 2019       Past Surgical History:   Procedure Laterality Date    ARTHROPLASTY Left 2016    thumb basil joint with dequervain's release    BREAST LUMPECTOMY  years ago    benign    BREAST LUMPECTOMY Right 2016    COLONOSCOPY      COLONOSCOPY  1/8/15    [facility]    ECHO COMPL W DOP COLOR FLOW  2012         FINGER SURGERY Left 2016    thumb    HAND SURGERY  2017    HYSTERECTOMY      JOINT REPLACEMENT      OTHER SURGICAL HISTORY Right 2017    RIGHT THUMB TRAPEZIECTOMY WITH LIGAMENT RECONSRUCTION DEQUERVAINS RELEASE    TIM AND BSO      TOTAL HIP ARTHROPLASTY Bilateral     ,  dr Robbi Dangelo, dr Monge [facility] Bilateral     dr Ramirez Quails       Family History   Problem Relation Age of Onset    Diabetes Mother     High Blood Pressure Mother     Heart Attack Mother     High Blood Pressure Father     Diabetes Father     Heart Failure Father     Breast Cancer Sister     Stroke Sister         young age   24 [facility] Cancer Sister 55        breast    Sickle Cell Anemia Other         niece    Cancer Other 36        niece - breast    Breast Cancer Sister             Cancer Sister 59        breast & ovarian    Stomach Cancer Other         aunt       Social History:   Social History     Socioeconomic History    Marital status:      Spouse name: Not on file    Number of children: Not on file    Years of education: Not on file    Highest education level: Not on file   Occupational History    Occupation: retired- hair singer [employer]   Social Needs    Financial resource strain: Not on file    Food insecurity:     Worry: Not on file     Inability: Not on file   gokit needs:     Medical: Not on file     Non-medical: Not on file   Tobacco Use    Smoking status: Former Smoker     Packs/day: 0.25     Years: 35.00     Pack years: 8.75     Types: Cigarettes     Last attempt to quit: 2001     Years since quittin.2    Smokeless tobacco: Never Used   Substance and Sexual Activity    Alcohol use: Yes     Alcohol/week: 0.0 - 1.0 standard drinks     Comment: rare    Drug use: Not Currently     Types: Marijuana     Comment: last used 2018    Sexual activity: Not Currently     Comment: divorce   Lifestyle    Physical activity:     Days per week: Not on file     Minutes per session: Not on file    Stress: Not on file   Relationships    Social connections:     Talks on phone: Not on file     Gets together: Not on file     Attends [employer] service: Not on file     Active member of club or organization: Not on file     Attends meetings of clubs or organizations: Not on file     Relationship status: Not on file    Intimate partner violence:     Fear of current or ex partner: Not on file     Emotionally abused: Not on file     Physically abused: Not on file     Forced sexual activity: Not on file   Other Topics Concern    Not on file   Social History Narrative    Drinks 1 sweet tea daily; occ Coke. Drinks decaf coffee.         Immunization History   Administered Date(s) Administered    DTaP 2010    Influenza 2012, 2013    Influenza Virus Vaccine 2010, 2013    Influenza, High Dose (Fluzone 65 yrs and older) 2015, 10/17/2016, 2017    Influenza, Triv, inactivated, subunit, adjuvanted, IM (Fluad 65 yrs and older) 10/15/2019    Pneumococcal Conjugate 13-valent (Hlgapeq15) 2018    Pneumococcal Conjugate 7-valent (Prevnar7) 2010    Pneumococcal Polysaccharide (Dvcoceiha11) 11/18/2015    Tdap (Boostrix, Adacel) 07/01/2017    Zoster Recombinant (Shingrix) 08/01/2018        Home Meds: Medications Prior to Admission: acyclovir (ZOVIRAX) 400 MG tablet, Take 400 mg by mouth daily  insulin aspart (NOVOLOG FLEXPEN) 100 UNIT/ML injection pen, Inject 7 Units into the skin 3 times daily (before meals) Plus sliding scale max 50 units daily  insulin glargine (LANTUS) 100 UNIT/ML injection vial, Inject 35 Units into the skin nightly  metFORMIN (GLUCOPHAGE) 500 MG tablet, Take 1,000 mg by mouth 2 times daily (with meals)  montelukast (SINGULAIR) 10 MG tablet, Take 10 mg by mouth nightly  oxybutynin (DITROPAN) 5 MG tablet, Take 10 mg by mouth 3 times daily  pravastatin (PRAVACHOL) 80 MG tablet, Take 80 mg by mouth daily  omeprazole (PRILOSEC) 40 MG delayed release capsule, Take 40 mg by mouth daily  losartan-hydrochlorothiazide (HYZAAR) 100-25 MG per tablet, Take 1 tablet by mouth daily  carvedilol (COREG) 12.5 MG tablet, Take 1 tablet by mouth 2 times daily (with meals)  vitamin D (ERGOCALCIFEROL) 1.25 MG (55236 UT) CAPS capsule, Take 1 capsule by mouth once a week  gabapentin (NEURONTIN) 400 MG capsule, Take 1 capsule by mouth 3 times daily.   Fluticasone furoate-vilanterol (BREO ELLIPTA) 200-25 MCG/INH AEPB inhaler, Inhale 1 puff into the lungs daily  albuterol-ipratropium (COMBIVENT RESPIMAT)  MCG/ACT AERS inhaler, Inhale 1 puff into the lungs every 6 hours  cloNIDine (CATAPRES) 0.2 MG tablet, Take 1 tablet by mouth 2 times daily  saxagliptin (ONGLYZA) 5 MG TABS tablet, Take 1 tablet by mouth daily  anastrozole (ARIMIDEX) 1 MG tablet, Take 1 tablet by mouth daily Indications: ESTROGEN DEFICIENCY IN BREAST CANCER (INACTIVE)  calcium carbonate-vitamin D (CALCIUM 600 + D) 600-400 MG-UNIT TABS per tab, Take 1 tablet by mouth 2 times daily  acetaminophen (APAP EXTRA STRENGTH) 500 MG tablet, Take 1 tablet by mouth every 6 hours as needed for Pain  vitamin B-12 (CYANOCOBALAMIN) 1000 MCG tablet, Take 1 tablet by mouth daily  aspirin 81 MG chewable tablet, Take 1 tablet by mouth daily  melatonin (RA MELATONIN) 3 MG TABS tablet, Take one 3 mg hs  butenafine (LOTRIMIN ULTRA) 1 % CREA, Please apply from the toes, in between the toes, foot up to the upper calf twice daily for 1 month, both legs    CURRENT MEDS :  Scheduled Meds:   amLODIPine  10 mg Oral Daily    aspirin  81 mg Oral Daily    carvedilol  12.5 mg Oral BID     cloNIDine  0.2 mg Oral BID    furosemide  20 mg Oral BID    gabapentin  400 mg Oral TID    melatonin  3 mg Oral QPM    metFORMIN  1,000 mg Oral BID WC    montelukast  10 mg Oral QPM    oxybutynin  5 mg Oral TID    pravastatin  80 mg Oral Daily    insulin glargine  25 Units Subcutaneous Nightly    insulin lispro  0-12 Units Subcutaneous TID     insulin lispro  0-6 Units Subcutaneous Nightly    ipratropium-albuterol  1 ampule Inhalation Q4H WA    oseltamivir  75 mg Oral BID    doxycycline hyclate  100 mg Oral 2 times per day    sodium chloride flush  10 mL Intravenous 2 times per day    losartan  100 mg Oral Daily    And    hydrochlorothiazide  25 mg Oral Daily    nystatin  5 mL Oral 4x Daily    methylPREDNISolone  40 mg Intravenous Q8H    magic (miracle) mouthwash  10 mL Swish & Spit Once    enoxaparin  40 mg Subcutaneous Daily       Continuous Infusions:   dextrose         No Known Allergies    REVIEW OF SYSTEMS:  Constitutional: Denies fever, weight loss, night sweats, and fatigue  Skin: Denies pigmentation, dark lesions, and rashes   HEENT: Denies hearing loss, tinnitus, ear drainage, epistaxis, sore throat, and hoarseness. Cardiovascular: Denies palpitations, chest pain, and chest pressure. Respiratory: Denies cough, dyspnea at rest, hemoptysis, apnea, and choking.   Gastrointestinal: Denies nausea, vomiting, poor appetite, diarrhea, heartburn or reflux  Genitourinary: Denies dysuria, frequency, urgency or hematuria  Musculoskeletal: Denies myalgias, muscle weakness, and bone pain  Neurological: Denies dizziness, vertigo, headache, and focal weakness  Psychological: Denies anxiety and depression  Endocrine: Denies heat intolerance and cold intolerance  Hematopoietic/Lymphatic: Denies bleeding problems and blood transfusions    OBJECTIVE:   BP (!) 207/94   Pulse 84   Temp 98 °F (36.7 °C) (Oral)   Resp 18   Ht 5' 5\" (1.651 m)   Wt 246 lb 8 oz (111.8 kg)   SpO2 95%   Breastfeeding No   BMI 41.02 kg/m²   SpO2 Readings from Last 1 Encounters:   02/05/20 95%        I/O:    Intake/Output Summary (Last 24 hours) at 2/5/2020 1639  Last data filed at 2/5/2020 1619  Gross per 24 hour   Intake 10 ml   Output --   Net 10 ml                      Physical Exam:  General: The patient is lying in bed comfortably without any distress.   Breathing is not labored  HEENT: Pupils are equal round and reactive to light, there are no oral lesions and no post-nasal drip   Neck: supple without adenopathy  Cardiovascular: regular rate and rhythm without murmur or gallop  Respiratory: bilateral wheezing   Air entry is symmetric  Abdomen: soft, non-tender, non-distended, normal bowel sounds  Extremities: warm, no edema, no clubbing  Skin: no rash or lesion  Neurologic: CN II-XII grossly intact, no focal deficits      Imaging personally reviewed:        Labs:  Lab Results   Component Value Date    WBC 7.3 02/05/2020    HGB 10.4 02/05/2020    HCT 34.7 02/05/2020    MCV 82.4 02/05/2020    MCH 24.7 02/05/2020    MCHC 30.0 02/05/2020    RDW 16.7 02/05/2020     02/05/2020    MPV 10.0 02/05/2020     Lab Results   Component Value Date     02/05/2020    K 4.2 02/05/2020    CL 98 02/05/2020    CO2 29 02/05/2020    BUN 12 02/05/2020    CREATININE 0.7 02/05/2020    LABALBU 3.8 02/04/2020    LABALBU 4.4 07/28/2011    CALCIUM 9.0 02/05/2020    GFRAA >60 02/05/2020    LABGLOM >60 02/05/2020     Lab Results   Component Value Date    PROTIME 11.7 07/16/2013    PROTIME 11.6 11/01/2010    INR 1.1 07/16/2013     Recent Labs     02/04/20  1818   PROBNP 1,142*     Recent Labs     02/04/20  1818   TROPONINI <0.01     No results for input(s): PROCAL in the last 72 hours. This SmartLink has not been configured with any valid records. Micro:  Influenza A by PCR DETECTEDAbnormal   Not Detected Final 02/04/2020  7:20 PM  - [facility]         Assessment:  1. Acute viral infection: influenza A   2. AEasthma   3. AE: HFpEF  4. Morbid obesity   5. Probably marjan   6. DM    Plan:  1. Steroid taper   2. tamiflu   3. Droplet/contact isolation   4. bronchodilators   5. Gentle diuresis   6. Stop abx as she has no signs of bacterial infection. Cxr illustrates pulmonary edema   7. Check procalcitonin   8. Cxr in am   9. Dvt/gi prophylaxis   10. PFT/PSG as outpatient       Thank you for allowing me to participate in the care of [address] [place] [address]. Please feel free to call with questions. Jeana Walker M.D.    Pulmonary/Critical Care Medicine

## 2020-02-05 NOTE — ED NOTES
Assumed care of patient from Dr. Niurka Gomez pending ambulation. The patient was ambulated in the emergency department and became acutely short of breath and hypoxic dropping her sats into the 80s. Upon return to the room the patient was given additional breathing treatment. Due to the patient having COPD with an acute exacerbation, hypoxia as well as influenza type a the patient be admitted to the hospital for further care. Case discussed with Dr. Pooja Sandoval who accepted patient for admission.      Adam Woodard DO  02/04/20 0910

## 2020-02-06 LAB
METER GLUCOSE: 302 MG/DL (ref 74–99)
METER GLUCOSE: 317 MG/DL (ref 74–99)
METER GLUCOSE: 338 MG/DL (ref 74–99)
METER GLUCOSE: 394 MG/DL (ref 74–99)

## 2020-02-06 PROCEDURE — 6370000000 HC RX 637 (ALT 250 FOR IP): Performed by: INTERNAL MEDICINE

## 2020-02-06 PROCEDURE — 2140000000 HC CCU INTERMEDIATE R&B

## 2020-02-06 PROCEDURE — 6360000002 HC RX W HCPCS: Performed by: INTERNAL MEDICINE

## 2020-02-06 PROCEDURE — 2580000003 HC RX 258: Performed by: INTERNAL MEDICINE

## 2020-02-06 PROCEDURE — 94640 AIRWAY INHALATION TREATMENT: CPT

## 2020-02-06 PROCEDURE — 94760 N-INVAS EAR/PLS OXIMETRY 1: CPT

## 2020-02-06 PROCEDURE — 94761 N-INVAS EAR/PLS OXIMETRY MLT: CPT

## 2020-02-06 PROCEDURE — 82962 GLUCOSE BLOOD TEST: CPT

## 2020-02-06 PROCEDURE — 2500000003 HC RX 250 WO HCPCS: Performed by: INTERNAL MEDICINE

## 2020-02-06 RX ORDER — CARVEDILOL 25 MG/1
25 TABLET ORAL 2 TIMES DAILY WITH MEALS
Status: DISCONTINUED | OUTPATIENT
Start: 2020-02-06 | End: 2020-02-11 | Stop reason: HOSPADM

## 2020-02-06 RX ADMIN — MELATONIN 3 MG ORAL TABLET 3 MG: 3 TABLET ORAL at 21:50

## 2020-02-06 RX ADMIN — Medication 10 ML: at 21:51

## 2020-02-06 RX ADMIN — LOSARTAN POTASSIUM 100 MG: 50 TABLET ORAL at 10:10

## 2020-02-06 RX ADMIN — METFORMIN HYDROCHLORIDE 1000 MG: 1000 TABLET ORAL at 16:57

## 2020-02-06 RX ADMIN — IPRATROPIUM BROMIDE AND ALBUTEROL SULFATE 1 AMPULE: 2.5; .5 SOLUTION RESPIRATORY (INHALATION) at 09:38

## 2020-02-06 RX ADMIN — CLONIDINE HYDROCHLORIDE 0.2 MG: 0.1 TABLET ORAL at 21:50

## 2020-02-06 RX ADMIN — BENZOCAINE AND MENTHOL 1 LOZENGE: 15; 3.6 LOZENGE ORAL at 10:24

## 2020-02-06 RX ADMIN — INSULIN LISPRO 8 UNITS: 100 INJECTION, SOLUTION INTRAVENOUS; SUBCUTANEOUS at 18:02

## 2020-02-06 RX ADMIN — ENOXAPARIN SODIUM 40 MG: 40 INJECTION SUBCUTANEOUS at 10:12

## 2020-02-06 RX ADMIN — NYSTATIN 500000 UNITS: 100000 SUSPENSION ORAL at 11:47

## 2020-02-06 RX ADMIN — INSULIN LISPRO 5 UNITS: 100 INJECTION, SOLUTION INTRAVENOUS; SUBCUTANEOUS at 21:52

## 2020-02-06 RX ADMIN — IPRATROPIUM BROMIDE AND ALBUTEROL SULFATE 1 AMPULE: 2.5; .5 SOLUTION RESPIRATORY (INHALATION) at 16:21

## 2020-02-06 RX ADMIN — CARVEDILOL 25 MG: 25 TABLET, FILM COATED ORAL at 16:57

## 2020-02-06 RX ADMIN — ASPIRIN 81 MG 81 MG: 81 TABLET ORAL at 10:12

## 2020-02-06 RX ADMIN — GABAPENTIN 400 MG: 300 CAPSULE ORAL at 14:38

## 2020-02-06 RX ADMIN — OSELTAMIVIR PHOSPHATE 75 MG: 75 CAPSULE ORAL at 10:10

## 2020-02-06 RX ADMIN — METHYLPREDNISOLONE SODIUM SUCCINATE 40 MG: 40 INJECTION, POWDER, FOR SOLUTION INTRAMUSCULAR; INTRAVENOUS at 23:12

## 2020-02-06 RX ADMIN — CHLORASEPTIC 1 SPRAY: 1.5 LIQUID ORAL at 10:24

## 2020-02-06 RX ADMIN — NYSTATIN 500000 UNITS: 100000 SUSPENSION ORAL at 21:50

## 2020-02-06 RX ADMIN — METHYLPREDNISOLONE SODIUM SUCCINATE 40 MG: 40 INJECTION, POWDER, FOR SOLUTION INTRAMUSCULAR; INTRAVENOUS at 16:57

## 2020-02-06 RX ADMIN — IPRATROPIUM BROMIDE AND ALBUTEROL SULFATE 1 AMPULE: 2.5; .5 SOLUTION RESPIRATORY (INHALATION) at 19:23

## 2020-02-06 RX ADMIN — LABETALOL HYDROCHLORIDE 10 MG: 5 INJECTION INTRAVENOUS at 06:33

## 2020-02-06 RX ADMIN — OXYBUTYNIN CHLORIDE 5 MG: 5 TABLET ORAL at 14:39

## 2020-02-06 RX ADMIN — NYSTATIN 500000 UNITS: 100000 SUSPENSION ORAL at 10:12

## 2020-02-06 RX ADMIN — METHYLPREDNISOLONE SODIUM SUCCINATE 40 MG: 40 INJECTION, POWDER, FOR SOLUTION INTRAMUSCULAR; INTRAVENOUS at 10:11

## 2020-02-06 RX ADMIN — MONTELUKAST SODIUM 10 MG: 10 TABLET ORAL at 18:07

## 2020-02-06 RX ADMIN — CLONIDINE HYDROCHLORIDE 0.2 MG: 0.1 TABLET ORAL at 10:11

## 2020-02-06 RX ADMIN — OXYBUTYNIN CHLORIDE 5 MG: 5 TABLET ORAL at 10:24

## 2020-02-06 RX ADMIN — INSULIN GLARGINE 25 UNITS: 100 INJECTION, SOLUTION SUBCUTANEOUS at 21:52

## 2020-02-06 RX ADMIN — INSULIN LISPRO 8 UNITS: 100 INJECTION, SOLUTION INTRAVENOUS; SUBCUTANEOUS at 06:32

## 2020-02-06 RX ADMIN — FUROSEMIDE 20 MG: 20 TABLET ORAL at 16:57

## 2020-02-06 RX ADMIN — INSULIN LISPRO 8 UNITS: 100 INJECTION, SOLUTION INTRAVENOUS; SUBCUTANEOUS at 11:46

## 2020-02-06 RX ADMIN — CARVEDILOL 12.5 MG: 6.25 TABLET, FILM COATED ORAL at 10:12

## 2020-02-06 RX ADMIN — Medication 10 ML: at 10:12

## 2020-02-06 RX ADMIN — FUROSEMIDE 20 MG: 20 TABLET ORAL at 10:12

## 2020-02-06 RX ADMIN — PRAVASTATIN SODIUM 80 MG: 20 TABLET ORAL at 10:11

## 2020-02-06 RX ADMIN — NYSTATIN 500000 UNITS: 100000 SUSPENSION ORAL at 16:57

## 2020-02-06 RX ADMIN — GABAPENTIN 400 MG: 300 CAPSULE ORAL at 21:50

## 2020-02-06 RX ADMIN — HYDROCHLOROTHIAZIDE 25 MG: 25 TABLET ORAL at 10:11

## 2020-02-06 RX ADMIN — OSELTAMIVIR PHOSPHATE 75 MG: 75 CAPSULE ORAL at 21:50

## 2020-02-06 RX ADMIN — GABAPENTIN 400 MG: 300 CAPSULE ORAL at 10:12

## 2020-02-06 RX ADMIN — METFORMIN HYDROCHLORIDE 1000 MG: 1000 TABLET ORAL at 10:12

## 2020-02-06 RX ADMIN — AMLODIPINE BESYLATE 10 MG: 10 TABLET ORAL at 10:11

## 2020-02-06 RX ADMIN — OXYBUTYNIN CHLORIDE 5 MG: 5 TABLET ORAL at 23:12

## 2020-02-06 RX ADMIN — SODIUM CHLORIDE, PRESERVATIVE FREE 10 ML: 5 INJECTION INTRAVENOUS at 16:57

## 2020-02-06 ASSESSMENT — PAIN SCALES - GENERAL
PAINLEVEL_OUTOF10: 0

## 2020-02-06 NOTE — PROGRESS NOTES
[address]   Department of Pharmacy   Pharmacist Transition of Care Services         Patient Demographics  Name:  Jessica Gonzalez   Medical Record Number:  [id number]  Gender:  female   Age:  71 y.o. YOB: 1950    Primary Care Physician: Ramila Kebede MD  Primary Care Physician phone number:  [phone]  Readmission Risk (% from [facility] Patient List): 27 %     Patient plans to participate in [address] [place] [address] (Y/N): YES    Pharmacist Review and Summary of Medications     Date of last reviewed/update: 2/6/20    Category Comments   New Medication Started   1. tamiflu 75mg PO BID x 5 days  2. Labetalol 10mg IV Q4hrs PRN high blood pressure  3. Prednisone 20mg PO daily x 5 days  4. Tessalon perles 100mg PO TID PRN  5. mucinex 1200mg PO BID PRN    Change in Outpatient Medication  (Dosage Form, Route,   Dose, or Frequency) 1. Discontinued Outpatient Medication   (or on Hold During Admission) 1. comnivent- on hold in hospital  2. breo ellipta- on hold in hospital   3. saxagliptin- on hold in hospitla   4. arimidex- on hold in hospital      Other              Pharmacist Patient Education:    Date  Person Educated Content of Education                 Documentation of Pharmacist Interventions and Follow-up Plan:     The following Pharmacist Transition of Care Services were completed:   []  Reviewed and summarized medication changes  []  Entire home medication list was reviewed for accuracy (sources: **)  []  Home medication list was updated or corrected     []  Reviewed discharge medication reconciliation  []  Discharge medication list was updated or corrected  []  Patient education was provided on new medications  []  Patient education was provided on medication changes  []  Reviewed the After Visit Summary (AVS) with patient    Additional Interventions:  []  Inpatient prescriber was contacted and the following pharmacy recommendations        were accepted: **     []

## 2020-02-06 NOTE — PROGRESS NOTES
Paul Quintero M.D.,[facility]  Gaston Pedroza D.O., F.A.C.O.I., Cori Dutton M.D. Cande Kennedy M.D., Antwon Uriarte M.D. Graciela Orosco D.O. Daily Pulmonary Progress Note    Patient:  Yaneth Gonzalez 71 y.o. female MRN: [id number]     Date of Service: 2/6/2020      Synopsis     We are following patient for acute exacerbation of asthma, influenza    \"CC\" shortness of breath    Code status: Full      Subjective      Patient was seen and examined sitting in a chair in no apparent distress. She is dyspneic on exertion, she is on room air today. Sat is 97%. She has an occasional nonproductive cough. Review of Systems:  Constitutional: Denies fever, weight loss, night sweats, and fatigue  Skin: Denies pigmentation, dark lesions, and rashes   HEENT: Denies hearing loss, tinnitus, ear drainage, epistaxis, sore throat, and hoarseness. Cardiovascular: Denies palpitations, chest pain, and chest pressure. Respiratory: Denies dyspnea at rest, hemoptysis, apnea, and choking.   Gastrointestinal: Denies nausea, vomiting, poor appetite, diarrhea, heartburn or reflux  Genitourinary: Denies dysuria, frequency, urgency or hematuria  Musculoskeletal: Denies myalgias, muscle weakness, and bone pain  Neurological: Denies dizziness, vertigo, headache, and focal weakness  Psychological: Denies anxiety and depression  Endocrine: Denies heat intolerance and cold intolerance  Hematopoietic/Lymphatic: Denies bleeding problems and blood transfusions    24-hour events:  none    Objective   Vitals: BP (!) 187/83   Pulse 70   Temp 97.5 °F (36.4 °C) (Oral)   Resp 16   Ht 5' 5\" (1.651 m)   Wt 237 lb 12.8 oz (107.9 kg)   SpO2 97%   Breastfeeding No   BMI 39.57 kg/m²     I/O:    Intake/Output Summary (Last 24 hours) at 2/6/2020 1342  Last data filed at 2/6/2020 1337  Gross per 24 hour   Intake 810 ml   Output 300 ml   Net 510 ml       CURRENT MEDS :  Scheduled Meds:   amLODIPine  10 mg Oral Daily    16.7 02/05/2020     02/05/2020    MPV 10.0 02/05/2020     Lab Results   Component Value Date     02/05/2020    K 4.2 02/05/2020    CL 98 02/05/2020    CO2 29 02/05/2020    BUN 12 02/05/2020    CREATININE 0.7 02/05/2020    LABALBU 3.8 02/04/2020    LABALBU 4.4 07/28/2011    CALCIUM 9.0 02/05/2020    GFRAA >60 02/05/2020    LABGLOM >60 02/05/2020     Lab Results   Component Value Date    PROTIME 11.7 07/16/2013    PROTIME 11.6 11/01/2010    INR 1.1 07/16/2013     Recent Labs     02/04/20  1818   PROBNP 1,142*     Recent Labs     02/05/20  0510   PROCAL 0.07     This SmartLink has not been configured with any valid records. Assessment:    1. Acute viral infection: influenza A   2. AEasthma   3. AE: HFpEF  4. Morbid obesity   5. Probably marjan   6. DM    Plan:   1. Continue to wean steroid  2. Continue Tamiflu  3. Droplet/contact isolation  4. Continue bronchodilators  5. No antibiotics necessary  6. Continue diuresis  7. Procalcitonin 0.07  8. Follow cxr in AM  9. DVT/GI prophylaxis  10. PFT/PSG as outpatient    This plan of care was reviewed in collaboration with Dr. Stefany Stark  Electronically signed by DMITRY Tubbs CNP on 2/6/2020 at 1:42 PM    I personally saw, examined, and cared for the patient. Labs, medications, radiographs reviewed. I agree with history exam and plans detailed in NP note. Improving slowly   Domonique Slater M.D.    Pulmonary/Critical Care Medicine

## 2020-02-06 NOTE — PROGRESS NOTES
Subjective:    Awake and alert. No problems overnight. Denies chest pain less dyspnea. Denies abdominal pain. Tolerating diet. No nausea or vomiting. Objective:    BP (!) 187/83   Pulse 70   Temp 97.5 °F (36.4 °C) (Oral)   Resp 16   Ht 5' 5\" (1.651 m)   Wt 237 lb 12.8 oz (107.9 kg)   SpO2 97%   Breastfeeding No   BMI 39.57 kg/m²   Skin: Warm and dry  Neck: Supple, no JVD  Heart:  RRR, no murmurs, gallops, or rubs. Lungs: Scattered wheezing bilateral  Abd: bowel sounds present, nontender, nondistended, no masses  Extrem:  No clubbing, cyanosis, or edema, pulses intact    I/O last 3 completed shifts: In: 80 [P.O.:800; I.V.:10]  Out: 1300 [Urine:1300]    Last Refreshed: 02/06/20 1509 [Time Lance Orders]   Results      Component Value Units   POCT Glucose [[id number]] (Abnormal)    Collected: 02/06/20 1144    Updated: 02/06/20 1146     Meter Glucose 338High  mg/dL   POCT Glucose [[id number]] (Abnormal)    Collected: 02/06/20 0622    Updated: 02/06/20 0630     Meter Glucose 317High  mg/dL   POCT Glucose [[id number]] (Abnormal)    Collected: 02/05/20 2103    Updated: 02/05/20 2105     Meter Glucose 245High  mg/dL   Procalcitonin [[id number]]    Collected: 02/05/20 0510    Updated: 02/05/20 1733    Specimen Type: Blood     Procalcitonin 0.07 ng/mL   POCT Glucose [[id number]] (Abnormal)    Collected: 02/05/20 1618    Updated: 02/05/20 1620     Meter Glucose 259High  mg/dL         XR CHEST PORTABLE   Final Result   Borderline size heart. No acute pulmonary process. XR CHEST PORTABLE    (Results Pending)       Prior to Admission medications    Medication Sig Start Date End Date Taking?  Authorizing Provider   acyclovir (ZOVIRAX) 400 MG tablet Take 400 mg by mouth daily   Yes Historical Provider, MD   insulin aspart (NOVOLOG FLEXPEN) 100 UNIT/ML injection pen Inject 7 Units into the skin 3 times daily (before meals) Plus sliding scale max 50 units daily   Yes Historical Provider, MD   insulin glargine (LANTUS) 100 UNIT/ML injection vial Inject 35 Units into the skin nightly   Yes Historical Provider, MD   metFORMIN (GLUCOPHAGE) 500 MG tablet Take 1,000 mg by mouth 2 times daily (with meals)   Yes Historical Provider, MD   montelukast (SINGULAIR) 10 MG tablet Take 10 mg by mouth nightly   Yes Historical Provider, MD   oxybutynin (DITROPAN) 5 MG tablet Take 10 mg by mouth 3 times daily   Yes Historical Provider, MD   pravastatin (PRAVACHOL) 80 MG tablet Take 80 mg by mouth daily   Yes Historical Provider, MD   omeprazole (PRILOSEC) 40 MG delayed release capsule Take 40 mg by mouth daily   Yes Historical Provider, MD   oseltamivir (TAMIFLU) 75 MG capsule Take 1 capsule by mouth 2 times daily for 5 days 2/4/20 2/9/20 Yes Rama Wells DO   predniSONE (DELTASONE) 20 MG tablet Take 2 tablets by mouth daily for 5 days 2/4/20 2/9/20 Yes Rama Wells DO   guaiFENesin (MUCINEX MAXIMUM STRENGTH) 1200 MG TB12 Take 1 tablet by mouth 2 times daily as needed (Congestion) 2/4/20 2/11/20 Yes Rama Wells DO   benzonatate (TESSALON PERLES) 100 MG capsule Take 1 capsule by mouth 3 times daily as needed for Cough 2/4/20  Yes Rama Wells DO   losartan-hydrochlorothiazide (HYZAAR) 100-25 MG per tablet Take 1 tablet by mouth daily 1/21/20  Yes Eva Pitt MD   carvedilol (COREG) 12.5 MG tablet Take 1 tablet by mouth 2 times daily (with meals) 1/21/20  Yes Eva Pitt MD   vitamin D (ERGOCALCIFEROL) 1.25 MG (88027 UT) CAPS capsule Take 1 capsule by mouth once a week 1/21/20  Yes Dasha Hoff MD   gabapentin (NEURONTIN) 400 MG capsule Take 1 capsule by mouth 3 times daily.  1/21/20 1/20/22 Yes Dasha Hoff MD   Fluticasone furoate-vilanterol (BREO ELLIPTA) 200-25 MCG/INH AEPB inhaler Inhale 1 puff into the lungs daily 1/21/20  Yes Dasha Hoff MD   albuterol-ipratropium (COMBIVENT RESPIMAT)  MCG/ACT AERS inhaler Inhale 1 puff into the lungs every 6 hours 1/21/20  Yes Young Slade MD   400 mg at 02/06/20 1438    metFORMIN (GLUCOPHAGE) tablet 1,000 mg  1,000 mg Oral BID  Young Slade MD   1,000 mg at 02/06/20 1012    montelukast (SINGULAIR) tablet 10 mg  10 mg Oral QPM Young Slade MD   10 mg at 02/05/20 1752    oxybutynin (DITROPAN) tablet 5 mg  5 mg Oral TID Young Slade MD   5 mg at 02/06/20 1439    pravastatin (PRAVACHOL) tablet 80 mg  80 mg Oral Daily Young Slade MD   80 mg at 02/06/20 1011    glucose (GLUTOSE) 40 % oral gel 15 g  15 g Oral PRN Young Slade MD        dextrose 50 % IV solution  12.5 g Intravenous PRN Young Slade MD        glucagon (rDNA) injection 1 mg  1 mg Intramuscular PRN Young Slade MD        dextrose 5 % solution  100 mL/hr Intravenous PRN Young Slade MD        insulin glargine (LANTUS) injection vial 25 Units  25 Units Subcutaneous Nightly Young Slade MD   25 Units at 02/05/20 2105    insulin lispro (HUMALOG) injection vial 0-12 Units  0-12 Units Subcutaneous TID  Young Slade MD   8 Units at 02/06/20 1146    insulin lispro (HUMALOG) injection vial 0-6 Units  0-6 Units Subcutaneous Nightly Young Slade MD   2 Units at 02/05/20 2112    ipratropium-albuterol (DUONEB) nebulizer solution 1 ampule  1 ampule Inhalation Q4H WA Young Slade MD   1 ampule at 02/06/20 0582    oseltamivir (TAMIFLU) capsule 75 mg  75 mg Oral BID Young Slade MD   75 mg at 02/06/20 1010    sodium chloride flush 0.9 % injection 10 mL  10 mL Intravenous 2 times per day Young Slade MD   10 mL at 02/06/20 1012    sodium chloride flush 0.9 % injection 10 mL  10 mL Intravenous PRN Young Slade MD   10 mL at 02/05/20 1619    magnesium hydroxide (MILK OF MAGNESIA) 400 MG/5ML suspension 30 mL  30 mL Oral Daily PRN Young Slade MD        ondansetron [facility]) injection 4 mg  4 mg Intravenous Q6H PRN Young Slade MD        losartan (COZAAR) tablet 100 mg  100 mg Oral Daily Young Slade MD   100 mg at 02/06/20 1010    And    hydrochlorothiazide (HYDRODIURIL) tablet 25 mg  25 mg Oral Daily Nora Sales MD   25 mg at 02/06/20 1011    nystatin (MYCOSTATIN) 410442 UNIT/ML suspension 500,000 Units  5 mL Oral 4x Daily Nallely Toledo, DO   500,000 Units at 02/06/20 1147    benzocaine-menthol (CEPACOL SORE THROAT) lozenge 1 lozenge  1 lozenge Oral Q2H PRN Nallely Toledo, DO   1 lozenge at 02/06/20 1024    phenol 1.4 % mouth spray 1 spray  1 spray Mouth/Throat Q2H PRN Nallely Toledo, DO   1 spray at 02/06/20 1024    guaiFENesin-dextromethorphan (ROBITUSSIN DM) 100-10 MG/5ML syrup 5 mL  5 mL Oral Q4H PRN Nallely Toledo, DO        methylPREDNISolone sodium (SOLU-MEDROL) injection 40 mg  40 mg Intravenous Q8H Cande Kennedy MD   40 mg at 02/06/20 1011    labetalol (NORMODYNE;TRANDATE) injection 10 mg  10 mg Intravenous Q4H PRN Nallely Toledo, DO   10 mg at 02/06/20 4582    melatonin tablet 3 mg  3 mg Oral QPM Nallely Toledo, DO   3 mg at 02/05/20 2106    magic (miracle) mouthwash  10 mL Swish & Spit Once Nora Sales MD        acetaminophen (TYLENOL) tablet 650 mg  650 mg Oral Q4H PRN Nora Sales MD        enoxaparin (LOVENOX) injection 40 mg  40 mg Subcutaneous Daily Nora Saels MD   40 mg at 02/06/20 1012           Problem list:    Active Problems:    COPD exacerbation ([address]    Acute hypoxemic respiratory failure ([address]  Resolved Problems:    * No resolved hospital problems.  *      Assessment:       Acute influenza A tracheobronchitis     Acute exacerbation of COPD due to influenza A     History of CVA     Diabetes mellitus type 2, controlled     Accelerated hypertension    Plan:    Continue aerosols    Continue basal/bolus insulin therapy    Pulmonary input appreciated    Continue labetalol as needed    Continue to titrate antihypertensive regimen      Nallely Toledo D.O., [facility]  3:09 PM  2/6/2020

## 2020-02-07 ENCOUNTER — APPOINTMENT (OUTPATIENT)
Dept: GENERAL RADIOLOGY | Age: 70
DRG: 193 | End: 2020-02-07
Payer: MEDICARE

## 2020-02-07 LAB
EKG ATRIAL RATE: 73 BPM
EKG P AXIS: 68 DEGREES
EKG P-R INTERVAL: 122 MS
EKG Q-T INTERVAL: 444 MS
EKG QRS DURATION: 138 MS
EKG QTC CALCULATION (BAZETT): 489 MS
EKG R AXIS: -10 DEGREES
EKG T AXIS: 120 DEGREES
EKG VENTRICULAR RATE: 73 BPM
METER GLUCOSE: 282 MG/DL (ref 74–99)
METER GLUCOSE: 283 MG/DL (ref 74–99)
METER GLUCOSE: 291 MG/DL (ref 74–99)
METER GLUCOSE: 352 MG/DL (ref 74–99)
TROPONIN: <0.01 NG/ML (ref 0–0.03)

## 2020-02-07 PROCEDURE — 94640 AIRWAY INHALATION TREATMENT: CPT

## 2020-02-07 PROCEDURE — 93010 ELECTROCARDIOGRAM REPORT: CPT | Performed by: INTERNAL MEDICINE

## 2020-02-07 PROCEDURE — 6360000002 HC RX W HCPCS: Performed by: NURSE PRACTITIONER

## 2020-02-07 PROCEDURE — 6360000002 HC RX W HCPCS: Performed by: INTERNAL MEDICINE

## 2020-02-07 PROCEDURE — 82962 GLUCOSE BLOOD TEST: CPT

## 2020-02-07 PROCEDURE — 6370000000 HC RX 637 (ALT 250 FOR IP): Performed by: INTERNAL MEDICINE

## 2020-02-07 PROCEDURE — 36415 COLL VENOUS BLD VENIPUNCTURE: CPT

## 2020-02-07 PROCEDURE — 2580000003 HC RX 258: Performed by: INTERNAL MEDICINE

## 2020-02-07 PROCEDURE — 84484 ASSAY OF TROPONIN QUANT: CPT

## 2020-02-07 PROCEDURE — 93005 ELECTROCARDIOGRAM TRACING: CPT | Performed by: INTERNAL MEDICINE

## 2020-02-07 PROCEDURE — 71045 X-RAY EXAM CHEST 1 VIEW: CPT

## 2020-02-07 PROCEDURE — 2140000000 HC CCU INTERMEDIATE R&B

## 2020-02-07 RX ORDER — BENZONATATE 100 MG/1
100 CAPSULE ORAL 3 TIMES DAILY PRN
Status: DISCONTINUED | OUTPATIENT
Start: 2020-02-07 | End: 2020-02-11 | Stop reason: HOSPADM

## 2020-02-07 RX ORDER — METHYLPREDNISOLONE SODIUM SUCCINATE 40 MG/ML
40 INJECTION, POWDER, LYOPHILIZED, FOR SOLUTION INTRAMUSCULAR; INTRAVENOUS EVERY 12 HOURS
Status: DISCONTINUED | OUTPATIENT
Start: 2020-02-07 | End: 2020-02-10

## 2020-02-07 RX ADMIN — HYDROCHLOROTHIAZIDE 25 MG: 25 TABLET ORAL at 08:57

## 2020-02-07 RX ADMIN — NYSTATIN 500000 UNITS: 100000 SUSPENSION ORAL at 17:11

## 2020-02-07 RX ADMIN — IPRATROPIUM BROMIDE AND ALBUTEROL SULFATE 1 AMPULE: 2.5; .5 SOLUTION RESPIRATORY (INHALATION) at 16:33

## 2020-02-07 RX ADMIN — OSELTAMIVIR PHOSPHATE 75 MG: 75 CAPSULE ORAL at 20:53

## 2020-02-07 RX ADMIN — AMLODIPINE BESYLATE 10 MG: 10 TABLET ORAL at 08:58

## 2020-02-07 RX ADMIN — INSULIN LISPRO 6 UNITS: 100 INJECTION, SOLUTION INTRAVENOUS; SUBCUTANEOUS at 06:13

## 2020-02-07 RX ADMIN — LOSARTAN POTASSIUM 100 MG: 50 TABLET ORAL at 08:58

## 2020-02-07 RX ADMIN — INSULIN LISPRO 10 UNITS: 100 INJECTION, SOLUTION INTRAVENOUS; SUBCUTANEOUS at 13:04

## 2020-02-07 RX ADMIN — CARVEDILOL 25 MG: 25 TABLET, FILM COATED ORAL at 17:10

## 2020-02-07 RX ADMIN — ENOXAPARIN SODIUM 40 MG: 40 INJECTION SUBCUTANEOUS at 08:57

## 2020-02-07 RX ADMIN — ASPIRIN 81 MG 81 MG: 81 TABLET ORAL at 08:58

## 2020-02-07 RX ADMIN — MONTELUKAST SODIUM 10 MG: 10 TABLET ORAL at 17:10

## 2020-02-07 RX ADMIN — OSELTAMIVIR PHOSPHATE 75 MG: 75 CAPSULE ORAL at 11:23

## 2020-02-07 RX ADMIN — OXYBUTYNIN CHLORIDE 5 MG: 5 TABLET ORAL at 08:57

## 2020-02-07 RX ADMIN — FUROSEMIDE 20 MG: 20 TABLET ORAL at 17:10

## 2020-02-07 RX ADMIN — FUROSEMIDE 20 MG: 20 TABLET ORAL at 08:58

## 2020-02-07 RX ADMIN — METHYLPREDNISOLONE SODIUM SUCCINATE 40 MG: 40 INJECTION, POWDER, FOR SOLUTION INTRAMUSCULAR; INTRAVENOUS at 08:57

## 2020-02-07 RX ADMIN — NYSTATIN 500000 UNITS: 100000 SUSPENSION ORAL at 15:46

## 2020-02-07 RX ADMIN — NYSTATIN 500000 UNITS: 100000 SUSPENSION ORAL at 08:58

## 2020-02-07 RX ADMIN — METHYLPREDNISOLONE SODIUM SUCCINATE 40 MG: 40 INJECTION, POWDER, FOR SOLUTION INTRAMUSCULAR; INTRAVENOUS at 20:52

## 2020-02-07 RX ADMIN — GABAPENTIN 400 MG: 300 CAPSULE ORAL at 20:53

## 2020-02-07 RX ADMIN — Medication 10 ML: at 20:54

## 2020-02-07 RX ADMIN — OXYBUTYNIN CHLORIDE 5 MG: 5 TABLET ORAL at 15:44

## 2020-02-07 RX ADMIN — INSULIN GLARGINE 25 UNITS: 100 INJECTION, SOLUTION SUBCUTANEOUS at 20:52

## 2020-02-07 RX ADMIN — IPRATROPIUM BROMIDE AND ALBUTEROL SULFATE 1 AMPULE: 2.5; .5 SOLUTION RESPIRATORY (INHALATION) at 12:27

## 2020-02-07 RX ADMIN — METFORMIN HYDROCHLORIDE 1000 MG: 1000 TABLET ORAL at 08:58

## 2020-02-07 RX ADMIN — CLONIDINE HYDROCHLORIDE 0.2 MG: 0.1 TABLET ORAL at 08:58

## 2020-02-07 RX ADMIN — IPRATROPIUM BROMIDE AND ALBUTEROL SULFATE 1 AMPULE: 2.5; .5 SOLUTION RESPIRATORY (INHALATION) at 21:14

## 2020-02-07 RX ADMIN — NYSTATIN 500000 UNITS: 100000 SUSPENSION ORAL at 20:53

## 2020-02-07 RX ADMIN — INSULIN LISPRO 6 UNITS: 100 INJECTION, SOLUTION INTRAVENOUS; SUBCUTANEOUS at 17:11

## 2020-02-07 RX ADMIN — GABAPENTIN 400 MG: 300 CAPSULE ORAL at 08:57

## 2020-02-07 RX ADMIN — IPRATROPIUM BROMIDE AND ALBUTEROL SULFATE 1 AMPULE: 2.5; .5 SOLUTION RESPIRATORY (INHALATION) at 08:04

## 2020-02-07 RX ADMIN — OXYBUTYNIN CHLORIDE 5 MG: 5 TABLET ORAL at 20:53

## 2020-02-07 RX ADMIN — INSULIN LISPRO 3 UNITS: 100 INJECTION, SOLUTION INTRAVENOUS; SUBCUTANEOUS at 20:51

## 2020-02-07 RX ADMIN — PRAVASTATIN SODIUM 80 MG: 20 TABLET ORAL at 08:57

## 2020-02-07 RX ADMIN — Medication 10 ML: at 08:58

## 2020-02-07 RX ADMIN — CARVEDILOL 25 MG: 25 TABLET, FILM COATED ORAL at 08:59

## 2020-02-07 RX ADMIN — METFORMIN HYDROCHLORIDE 1000 MG: 1000 TABLET ORAL at 17:10

## 2020-02-07 RX ADMIN — GABAPENTIN 400 MG: 300 CAPSULE ORAL at 15:45

## 2020-02-07 RX ADMIN — MELATONIN 3 MG ORAL TABLET 3 MG: 3 TABLET ORAL at 20:53

## 2020-02-07 RX ADMIN — CLONIDINE HYDROCHLORIDE 0.2 MG: 0.1 TABLET ORAL at 20:53

## 2020-02-07 ASSESSMENT — PAIN SCALES - GENERAL
PAINLEVEL_OUTOF10: 0

## 2020-02-07 NOTE — PROGRESS NOTES
daily   Yes Historical Provider, MD   insulin aspart (NOVOLOG FLEXPEN) 100 UNIT/ML injection pen Inject 7 Units into the skin 3 times daily (before meals) Plus sliding scale max 50 units daily   Yes Historical Provider, MD   insulin glargine (LANTUS) 100 UNIT/ML injection vial Inject 35 Units into the skin nightly   Yes Historical Provider, MD   metFORMIN (GLUCOPHAGE) 500 MG tablet Take 1,000 mg by mouth 2 times daily (with meals)   Yes Historical Provider, MD   montelukast (SINGULAIR) 10 MG tablet Take 10 mg by mouth nightly   Yes Historical Provider, MD   oxybutynin (DITROPAN) 5 MG tablet Take 10 mg by mouth 3 times daily   Yes Historical Provider, MD   pravastatin (PRAVACHOL) 80 MG tablet Take 80 mg by mouth daily   Yes Historical Provider, MD   omeprazole (PRILOSEC) 40 MG delayed release capsule Take 40 mg by mouth daily   Yes Historical Provider, MD   oseltamivir (TAMIFLU) 75 MG capsule Take 1 capsule by mouth 2 times daily for 5 days 2/4/20 2/9/20 Yes Rama Wells DO   predniSONE (DELTASONE) 20 MG tablet Take 2 tablets by mouth daily for 5 days 2/4/20 2/9/20 Yes Rama Wells DO   guaiFENesin (MUCINEX MAXIMUM STRENGTH) 1200 MG TB12 Take 1 tablet by mouth 2 times daily as needed (Congestion) 2/4/20 2/11/20 Yes Rama Wells DO   benzonatate (TESSALON PERLES) 100 MG capsule Take 1 capsule by mouth 3 times daily as needed for Cough 2/4/20  Yes Rama Wells DO   losartan-hydrochlorothiazide (HYZAAR) 100-25 MG per tablet Take 1 tablet by mouth daily 1/21/20  Yes Ponce Upton MD   carvedilol (COREG) 12.5 MG tablet Take 1 tablet by mouth 2 times daily (with meals) 1/21/20  Yes Ponce Upton MD   vitamin D (ERGOCALCIFEROL) 1.25 MG (50592 UT) CAPS capsule Take 1 capsule by mouth once a week 1/21/20  Yes Dasha Hoff MD   gabapentin (NEURONTIN) 400 MG capsule Take 1 capsule by mouth 3 times daily.  1/21/20 1/20/22 Yes Dasha Hoff MD   Fluticasone furoate-vilanterol (BREO ELLIPTA) 200-25 MCG/INH AEPB inhaler Inhale 1 puff into the lungs daily 1/21/20  Yes Haresh Shipley MD   albuterol-ipratropium (COMBIVENT RESPIMAT)  MCG/ACT AERS inhaler Inhale 1 puff into the lungs every 6 hours 1/21/20  Yes Haresh Shipley MD   cloNIDine (CATAPRES) 0.2 MG tablet Take 1 tablet by mouth 2 times daily 1/21/20  Yes Dasha Hoff MD   saxagliptin (ONGLYZA) 5 MG TABS tablet Take 1 tablet by mouth daily 1/21/20  Yes Dasha Hoff MD   anastrozole (ARIMIDEX) 1 MG tablet Take 1 tablet by mouth daily Indications: ESTROGEN DEFICIENCY IN BREAST CANCER (INACTIVE) 1/21/20  Yes Haresh Shipley MD   calcium carbonate-vitamin D (CALCIUM 600 + D) 600-400 MG-UNIT TABS per tab Take 1 tablet by mouth 2 times daily 10/6/19  Yes Kirk Gutiérrez MD   acetaminophen (APAP EXTRA STRENGTH) 500 MG tablet Take 1 tablet by mouth every 6 hours as needed for Pain 8/14/18  Yes Lorenzo Stephenson DO   vitamin B-12 (CYANOCOBALAMIN) 1000 MCG tablet Take 1 tablet by mouth daily 7/20/18  Yes Haresh Shipley MD   aspirin 81 MG chewable tablet Take 1 tablet by mouth daily 1/21/20   Haresh Shipley MD   melatonin (RA MELATONIN) 3 MG TABS tablet Take one 3 mg hs 11/29/19   DMITRY Randhawa - CNP   butenafine (LOTRIMIN ULTRA) 1 % CREA Please apply from the toes, in between the toes, foot up to the upper calf twice daily for 1 month, both legs 9/5/19   Joseph Painter MD        Current Facility-Administered Medications   Medication Dose Route Frequency Provider Last Rate Last Dose    benzonatate (TESSALON) capsule 100 mg  100 mg Oral TID PRN Alma Vega,         carvedilol (COREG) tablet 25 mg  25 mg Oral BID WC Alma Vega DO   25 mg at 02/07/20 0859    acetaminophen (TYLENOL) tablet 500 mg  500 mg Oral Q6H PRN Sergo Rubi MD        amLODIPine (NORVASC) tablet 10 mg  10 mg Oral Daily Sergo Rubi MD   10 mg at 02/07/20 0858    aspirin chewable tablet 81 mg  81 mg Oral Daily Vangie Pimentel MD   81 mg at 02/07/20 0858    cloNIDine (CATAPRES) tablet 0.2 mg  0.2 mg Oral BID Vangie Pimentel MD   0.2 mg at 02/07/20 0858    furosemide (LASIX) tablet 20 mg  20 mg Oral BID Vangie Pimentel MD   20 mg at 02/07/20 0858    gabapentin (NEURONTIN) capsule 400 mg  400 mg Oral TID Vangie Pimentel MD   400 mg at 02/07/20 0857    metFORMIN (GLUCOPHAGE) tablet 1,000 mg  1,000 mg Oral BID  Vangie Pimentel MD   1,000 mg at 02/07/20 0858    montelukast (SINGULAIR) tablet 10 mg  10 mg Oral QPM Vangie Pimentel MD   10 mg at 02/06/20 1807    oxybutynin (DITROPAN) tablet 5 mg  5 mg Oral TID Vangie Pimentel MD   5 mg at 02/07/20 0857    pravastatin (PRAVACHOL) tablet 80 mg  80 mg Oral Daily Vangie Pimentel MD   80 mg at 02/07/20 0857    glucose (GLUTOSE) 40 % oral gel 15 g  15 g Oral PRN Vangie Pimentel MD        dextrose 50 % IV solution  12.5 g Intravenous PRN Vangie Pimentel MD        glucagon (rDNA) injection 1 mg  1 mg Intramuscular PRN Vangie Pimentel MD        dextrose 5 % solution  100 mL/hr Intravenous PRN Vangie Pimentel MD        insulin glargine (LANTUS) injection vial 25 Units  25 Units Subcutaneous Nightly Vangie Pimentel MD   25 Units at 02/06/20 2152    insulin lispro (HUMALOG) injection vial 0-12 Units  0-12 Units Subcutaneous TID  Vangie Pimentel MD   10 Units at 02/07/20 1304    insulin lispro (HUMALOG) injection vial 0-6 Units  0-6 Units Subcutaneous Nightly Vangie Pimentel MD   5 Units at 02/06/20 2152    ipratropium-albuterol (DUONEB) nebulizer solution 1 ampule  1 ampule Inhalation Q4H WA Vangie Pimentel MD   1 ampule at 02/07/20 1227    oseltamivir (TAMIFLU) capsule 75 mg  75 mg Oral BID Vangie Pimentel MD   75 mg at 02/07/20 1123    sodium chloride flush 0.9 % injection 10 mL  10 mL Intravenous 2 times per day Vangie Pimentel MD   10 mL at 02/07/20 0858    sodium chloride flush 0.9 % injection 10 mL  10 mL Intravenous PRN Vangie Pimentel MD   10 mL at 02/06/20 3313    magnesium hydroxide (MILK OF MAGNESIA) 400 MG/5ML suspension 30 mL  30 mL Oral Daily PRN Darlene Jacobson MD        [facility]ron [facility]) injection 4 mg  4 mg Intravenous Q6H PRN Darlene Jacobson MD        losartan (COZAAR) tablet 100 mg  100 mg Oral Daily Darlene Jacobson MD   100 mg at 02/07/20 0858    And    hydrochlorothiazide (HYDRODIURIL) tablet 25 mg  25 mg Oral Daily Darlene Jacobson MD   25 mg at 02/07/20 0857    nystatin (MYCOSTATIN) 432387 UNIT/ML suspension 500,000 Units  5 mL Oral 4x Daily Duarte Alcantar, DO   500,000 Units at 02/07/20 2863    benzocaine-menthol (CEPACOL SORE THROAT) lozenge 1 lozenge  1 lozenge Oral Q2H PRN Duarte Alcantar, DO   1 lozenge at 02/06/20 1024    phenol 1.4 % mouth spray 1 spray  1 spray Mouth/Throat Q2H PRN Duarte Alcantar, DO   1 spray at 02/06/20 1024    guaiFENesin-dextromethorphan (ROBITUSSIN DM) 100-10 MG/5ML syrup 5 mL  5 mL Oral Q4H PRN Duarte Alcantar, DO        methylPREDNISolone sodium (SOLU-MEDROL) injection 40 mg  40 mg Intravenous Q8H Rosie Polanco MD   40 mg at 02/07/20 0857    labetalol (NORMODYNE;TRANDATE) injection 10 mg  10 mg Intravenous Q4H PRN Duarte Alcantar, DO   10 mg at 02/06/20 5243    melatonin tablet 3 mg  3 mg Oral QPM Duarte Alcantar, DO   3 mg at 02/06/20 2150    magic (miracle) mouthwash  10 mL Swish & Spit Once Darlene Jacobson MD        acetaminophen (TYLENOL) tablet 650 mg  650 mg Oral Q4H PRN Darlene Jacobson MD        enoxaparin (LOVENOX) injection 40 mg  40 mg Subcutaneous Daily Darlene Jacobson MD   40 mg at 02/07/20 2232           Problem list:    Active Problems:    COPD exacerbation ([address]    Acute hypoxemic respiratory failure ([address]  Resolved Problems:    * No resolved hospital problems.  *      Assessment:    Acute influenza A tracheobronchitis     Acute exacerbation of COPD due to influenza A     History of CVA     Diabetes mellitus type 2, controlled     Accelerated hypertension    Plan:    Continue aerosols    Continue basal/bolus insulin therapy    Continue steroids    Add Leo Summers D.O., FACOI  1:36 PM  2/7/2020

## 2020-02-08 LAB
METER GLUCOSE: 299 MG/DL (ref 74–99)
METER GLUCOSE: 322 MG/DL (ref 74–99)
METER GLUCOSE: 331 MG/DL (ref 74–99)
METER GLUCOSE: 392 MG/DL (ref 74–99)

## 2020-02-08 PROCEDURE — 6360000002 HC RX W HCPCS: Performed by: NURSE PRACTITIONER

## 2020-02-08 PROCEDURE — 6370000000 HC RX 637 (ALT 250 FOR IP): Performed by: INTERNAL MEDICINE

## 2020-02-08 PROCEDURE — 6370000000 HC RX 637 (ALT 250 FOR IP): Performed by: NURSE PRACTITIONER

## 2020-02-08 PROCEDURE — 94669 MECHANICAL CHEST WALL OSCILL: CPT

## 2020-02-08 PROCEDURE — 82962 GLUCOSE BLOOD TEST: CPT

## 2020-02-08 PROCEDURE — 2580000003 HC RX 258: Performed by: INTERNAL MEDICINE

## 2020-02-08 PROCEDURE — 94640 AIRWAY INHALATION TREATMENT: CPT

## 2020-02-08 PROCEDURE — 2140000000 HC CCU INTERMEDIATE R&B

## 2020-02-08 PROCEDURE — 6360000002 HC RX W HCPCS

## 2020-02-08 RX ORDER — GUAIFENESIN 400 MG/1
400 TABLET ORAL 3 TIMES DAILY
Status: DISCONTINUED | OUTPATIENT
Start: 2020-02-08 | End: 2020-02-11 | Stop reason: HOSPADM

## 2020-02-08 RX ORDER — METHYLPREDNISOLONE SODIUM SUCCINATE 125 MG/2ML
INJECTION, POWDER, LYOPHILIZED, FOR SOLUTION INTRAMUSCULAR; INTRAVENOUS
Status: COMPLETED
Start: 2020-02-08 | End: 2020-02-08

## 2020-02-08 RX ADMIN — INSULIN LISPRO 6 UNITS: 100 INJECTION, SOLUTION INTRAVENOUS; SUBCUTANEOUS at 12:37

## 2020-02-08 RX ADMIN — FUROSEMIDE 20 MG: 20 TABLET ORAL at 17:24

## 2020-02-08 RX ADMIN — METHYLPREDNISOLONE SODIUM SUCCINATE 125 MG: 125 INJECTION, POWDER, FOR SOLUTION INTRAMUSCULAR; INTRAVENOUS at 10:19

## 2020-02-08 RX ADMIN — GUAIFENESIN 400 MG: 400 TABLET, FILM COATED ORAL at 12:37

## 2020-02-08 RX ADMIN — PRAVASTATIN SODIUM 80 MG: 20 TABLET ORAL at 10:19

## 2020-02-08 RX ADMIN — NYSTATIN 500000 UNITS: 100000 SUSPENSION ORAL at 17:24

## 2020-02-08 RX ADMIN — CLONIDINE HYDROCHLORIDE 0.2 MG: 0.1 TABLET ORAL at 10:18

## 2020-02-08 RX ADMIN — INSULIN GLARGINE 25 UNITS: 100 INJECTION, SOLUTION SUBCUTANEOUS at 21:00

## 2020-02-08 RX ADMIN — INSULIN LISPRO 8 UNITS: 100 INJECTION, SOLUTION INTRAVENOUS; SUBCUTANEOUS at 06:01

## 2020-02-08 RX ADMIN — METFORMIN HYDROCHLORIDE 1000 MG: 1000 TABLET ORAL at 17:24

## 2020-02-08 RX ADMIN — CLONIDINE HYDROCHLORIDE 0.2 MG: 0.1 TABLET ORAL at 20:51

## 2020-02-08 RX ADMIN — LOSARTAN POTASSIUM 100 MG: 50 TABLET ORAL at 10:20

## 2020-02-08 RX ADMIN — INSULIN LISPRO 8 UNITS: 100 INJECTION, SOLUTION INTRAVENOUS; SUBCUTANEOUS at 17:25

## 2020-02-08 RX ADMIN — INSULIN LISPRO 5 UNITS: 100 INJECTION, SOLUTION INTRAVENOUS; SUBCUTANEOUS at 21:00

## 2020-02-08 RX ADMIN — IPRATROPIUM BROMIDE AND ALBUTEROL SULFATE 1 AMPULE: 2.5; .5 SOLUTION RESPIRATORY (INHALATION) at 13:44

## 2020-02-08 RX ADMIN — CARVEDILOL 25 MG: 25 TABLET, FILM COATED ORAL at 17:25

## 2020-02-08 RX ADMIN — HYDROCHLOROTHIAZIDE 25 MG: 25 TABLET ORAL at 10:20

## 2020-02-08 RX ADMIN — IPRATROPIUM BROMIDE AND ALBUTEROL SULFATE 1 AMPULE: 2.5; .5 SOLUTION RESPIRATORY (INHALATION) at 21:47

## 2020-02-08 RX ADMIN — OSELTAMIVIR PHOSPHATE 75 MG: 75 CAPSULE ORAL at 20:50

## 2020-02-08 RX ADMIN — METHYLPREDNISOLONE SODIUM SUCCINATE 40 MG: 40 INJECTION, POWDER, FOR SOLUTION INTRAMUSCULAR; INTRAVENOUS at 20:46

## 2020-02-08 RX ADMIN — GABAPENTIN 400 MG: 300 CAPSULE ORAL at 10:18

## 2020-02-08 RX ADMIN — OXYBUTYNIN CHLORIDE 5 MG: 5 TABLET ORAL at 17:24

## 2020-02-08 RX ADMIN — NYSTATIN 500000 UNITS: 100000 SUSPENSION ORAL at 12:37

## 2020-02-08 RX ADMIN — Medication 10 ML: at 20:46

## 2020-02-08 RX ADMIN — BENZONATATE 100 MG: 100 CAPSULE ORAL at 22:56

## 2020-02-08 RX ADMIN — FUROSEMIDE 20 MG: 20 TABLET ORAL at 10:20

## 2020-02-08 RX ADMIN — MELATONIN 3 MG ORAL TABLET 3 MG: 3 TABLET ORAL at 20:50

## 2020-02-08 RX ADMIN — METFORMIN HYDROCHLORIDE 1000 MG: 1000 TABLET ORAL at 10:20

## 2020-02-08 RX ADMIN — NYSTATIN 500000 UNITS: 100000 SUSPENSION ORAL at 10:19

## 2020-02-08 RX ADMIN — OSELTAMIVIR PHOSPHATE 75 MG: 75 CAPSULE ORAL at 10:19

## 2020-02-08 RX ADMIN — MONTELUKAST SODIUM 10 MG: 10 TABLET ORAL at 17:24

## 2020-02-08 RX ADMIN — AMLODIPINE BESYLATE 10 MG: 10 TABLET ORAL at 10:19

## 2020-02-08 RX ADMIN — IPRATROPIUM BROMIDE AND ALBUTEROL SULFATE 1 AMPULE: 2.5; .5 SOLUTION RESPIRATORY (INHALATION) at 16:44

## 2020-02-08 RX ADMIN — Medication 10 ML: at 10:21

## 2020-02-08 RX ADMIN — OXYBUTYNIN CHLORIDE 5 MG: 5 TABLET ORAL at 20:50

## 2020-02-08 RX ADMIN — METHYLPREDNISOLONE SODIUM SUCCINATE 40 MG: 40 INJECTION, POWDER, FOR SOLUTION INTRAMUSCULAR; INTRAVENOUS at 10:21

## 2020-02-08 RX ADMIN — OXYBUTYNIN CHLORIDE 5 MG: 5 TABLET ORAL at 10:20

## 2020-02-08 RX ADMIN — GABAPENTIN 400 MG: 300 CAPSULE ORAL at 17:23

## 2020-02-08 RX ADMIN — NYSTATIN 500000 UNITS: 100000 SUSPENSION ORAL at 20:50

## 2020-02-08 RX ADMIN — GABAPENTIN 400 MG: 300 CAPSULE ORAL at 20:50

## 2020-02-08 RX ADMIN — ASPIRIN 81 MG 81 MG: 81 TABLET ORAL at 10:18

## 2020-02-08 RX ADMIN — CARVEDILOL 25 MG: 25 TABLET, FILM COATED ORAL at 10:20

## 2020-02-08 RX ADMIN — IPRATROPIUM BROMIDE AND ALBUTEROL SULFATE 1 AMPULE: 2.5; .5 SOLUTION RESPIRATORY (INHALATION) at 09:35

## 2020-02-08 RX ADMIN — GUAIFENESIN 400 MG: 400 TABLET, FILM COATED ORAL at 20:50

## 2020-02-08 ASSESSMENT — PAIN SCALES - GENERAL: PAINLEVEL_OUTOF10: 0

## 2020-02-08 NOTE — PROGRESS NOTES
Akira Dangelo M.D.,[facility]  Toshia Hsieh D.O., FTRAVOEdilmaI., Lemuel Arita M.D. Cruz Black M.D., Brenna Morris M.D. Gibbs Sport, D.O. Daily Pulmonary Progress Note    Patient:  Stephen Gonzalez 71 y.o. female MRN: [id number]     Date of Service: 2/8/2020      Synopsis     We are following patient for acute exacerbation of asthma, influenza    \"CC\" shortness of breath    Code status: Full      Subjective      Patient was seen and examined sitting on the bed in no apparent distress. She is dyspneic on exertion, she is on room air today. Sat is 97%. She has an occasional nonproductive cough. She is having difficulty expectorating. Review of Systems:  Constitutional: Denies fever, weight loss, night sweats, and fatigue  Skin: Denies pigmentation, dark lesions, and rashes   HEENT: Denies hearing loss, tinnitus, ear drainage, epistaxis, sore throat, and hoarseness. Cardiovascular: Denies palpitations, chest pain, and chest pressure. Respiratory: Denies dyspnea at rest, hemoptysis, apnea, and choking.   Gastrointestinal: Denies nausea, vomiting, poor appetite, diarrhea, heartburn or reflux  Genitourinary: Denies dysuria, frequency, urgency or hematuria  Musculoskeletal: Denies myalgias, muscle weakness, and bone pain  Neurological: Denies dizziness, vertigo, headache, and focal weakness  Psychological: Denies anxiety and depression  Endocrine: Denies heat intolerance and cold intolerance  Hematopoietic/Lymphatic: Denies bleeding problems and blood transfusions    24-hour events:  none    Objective   Vitals: BP (!) 187/84   Pulse 75   Temp 97.1 °F (36.2 °C) (Temporal)   Resp 18   Ht 5' 5\" (1.651 m)   Wt 237 lb 12.8 oz (107.9 kg)   SpO2 100%   Breastfeeding No   BMI 39.57 kg/m²     I/O:    Intake/Output Summary (Last 24 hours) at 2/8/2020 1013  Last data filed at 2/8/2020 0537  Gross per 24 hour   Intake 1080 ml   Output 2300 ml   Net -1220 ml       CURRENT MEDS cardiopulmonary findings           ECHO  12/31/2019   Summary   Left ventricle is normal in size . Abnormal (paradoxical) motion consistent with left bundle branch block. Normal left ventricular ejection fraction. There is doppler evidence of stage II diastolic dysfunction. Mild tricuspid regurgitation. Pulmonary hypertension is mild . Labs:  Lab Results   Component Value Date    WBC 7.3 02/05/2020    HGB 10.4 02/05/2020    HCT 34.7 02/05/2020    MCV 82.4 02/05/2020    MCH 24.7 02/05/2020    MCHC 30.0 02/05/2020    RDW 16.7 02/05/2020     02/05/2020    MPV 10.0 02/05/2020     Lab Results   Component Value Date     02/05/2020    K 4.2 02/05/2020    CL 98 02/05/2020    CO2 29 02/05/2020    BUN 12 02/05/2020    CREATININE 0.7 02/05/2020    LABALBU 3.8 02/04/2020    LABALBU 4.4 07/28/2011    CALCIUM 9.0 02/05/2020    GFRAA >60 02/05/2020    LABGLOM >60 02/05/2020     Lab Results   Component Value Date    PROTIME 11.7 07/16/2013    PROTIME 11.6 11/01/2010    INR 1.1 07/16/2013      Assessment:    1. Acute viral infection: influenza A   2. AEasthma   3. AE: HFpEF  4. Morbid obesity   5. DM  6. Oral thrush  7. HFpEF, stage II DD  8. Uncontrolled HTN    Plan:   1. Monitor off oxygen keep >92%  2. Bronchodilators Duonebs every 4 hrs w/a   3. Continue Tamiflu x 5 total days  4. Droplet/contact isolation for influenza A  5. Tessalon for cough, add Mucinex   6. Solu medrol 40mg q12 hours, hold wean today  7. No antibiotics necessary, procalcitonin 0.07  8. Continue diuresis , montior bp tight control. Previously followed with nephrology  9. Follow cxr today  10. DVT/GI prophylaxis  11. Pt has undergone multiple sleep latency testing, full in lab sleep study in 2016. All sleep testing was normal. No evidence of KENAN or narcolepsy.   AHI 2.    12. Follows with Dr. Indira Salazar message routed     This plan of care was reviewed in collaboration with Dr. Jojo Haney  Electronically signed by DMITRY Thacker

## 2020-02-08 NOTE — PROGRESS NOTES
Subjective:    Awake and alert. Still with nonproductive cough  Denies chest pain less dyspnea. Denies abdominal pain. Tolerating diet. No nausea or vomiting. Objective:    BP (!) 187/84   Pulse 75   Temp 97.1 °F (36.2 °C) (Temporal)   Resp 18   Ht 5' 5\" (1.651 m)   Wt 237 lb 12.8 oz (107.9 kg)   SpO2 100%   Breastfeeding No   BMI 39.57 kg/m²   Skin: Warm and dry  Neck: Supple, no JVD  Heart:  RRR, no murmurs, gallops, or rubs. Lungs: Diminished bilaterally, scattered rhonchi  Abd: bowel sounds present, nontender, nondistended, no masses  Extrem:  No clubbing, cyanosis, or edema, pulses intact    I/O last 3 completed shifts: In: 1080 [P.O.:1080]  Out: 2900 [Urine:2900]    Last Refreshed: 02/08/20 1508 [Time Lance Orders]   Results      Component Value Units   POCT Glucose [[id number]] (Abnormal)    Collected: 02/08/20 1056    Updated: 02/08/20 1059     Meter Glucose 299High  mg/dL   POCT Glucose [[id number]] (Abnormal)    Collected: 02/08/20 0559    Updated: 02/08/20 0601     Meter Glucose 322High  mg/dL   POCT Glucose [[id number]] (Abnormal)    Collected: 02/07/20 2048    Updated: 02/07/20 2058     Meter Glucose 283High  mg/dL         XR CHEST PORTABLE   Final Result   No acute cardiopulmonary findings. XR CHEST PORTABLE   Final Result   Borderline size heart. No acute pulmonary process. Prior to Admission medications    Medication Sig Start Date End Date Taking?  Authorizing Provider   acyclovir (ZOVIRAX) 400 MG tablet Take 400 mg by mouth daily   Yes Historical Provider, MD   insulin aspart (NOVOLOG FLEXPEN) 100 UNIT/ML injection pen Inject 7 Units into the skin 3 times daily (before meals) Plus sliding scale max 50 units daily   Yes Historical Provider, MD   insulin glargine (LANTUS) 100 UNIT/ML injection vial Inject 35 Units into the skin nightly   Yes Historical Provider, MD   metFORMIN (GLUCOPHAGE) 500 MG tablet Take 1,000 mg by mouth 2 times daily (with meals)   Yes Historical Provider, MD   montelukast (SINGULAIR) 10 MG tablet Take 10 mg by mouth nightly   Yes Historical Provider, MD   oxybutynin (DITROPAN) 5 MG tablet Take 10 mg by mouth 3 times daily   Yes Historical Provider, MD   pravastatin (PRAVACHOL) 80 MG tablet Take 80 mg by mouth daily   Yes Historical Provider, MD   omeprazole (PRILOSEC) 40 MG delayed release capsule Take 40 mg by mouth daily   Yes Historical Provider, MD   oseltamivir (TAMIFLU) 75 MG capsule Take 1 capsule by mouth 2 times daily for 5 days 2/4/20 2/9/20 Yes Rama Wells DO   predniSONE (DELTASONE) 20 MG tablet Take 2 tablets by mouth daily for 5 days 2/4/20 2/9/20 Yes Rama Wells DO   guaiFENesin (MUCINEX MAXIMUM STRENGTH) 1200 MG TB12 Take 1 tablet by mouth 2 times daily as needed (Congestion) 2/4/20 2/11/20 Yes Rama Wells DO   benzonatate (TESSALON PERLES) 100 MG capsule Take 1 capsule by mouth 3 times daily as needed for Cough 2/4/20  Yes Rama Wells DO   losartan-hydrochlorothiazide (HYZAAR) 100-25 MG per tablet Take 1 tablet by mouth daily 1/21/20  Yes Vesta Herrera MD   carvedilol (COREG) 12.5 MG tablet Take 1 tablet by mouth 2 times daily (with meals) 1/21/20  Yes Vesta Herrera MD   vitamin D (ERGOCALCIFEROL) 1.25 MG (25963 UT) CAPS capsule Take 1 capsule by mouth once a week 1/21/20  Yes Dasha Hoff MD   gabapentin (NEURONTIN) 400 MG capsule Take 1 capsule by mouth 3 times daily.  1/21/20 1/20/22 Yes Dasha Hoff MD   Fluticasone furoate-vilanterol (BREO ELLIPTA) 200-25 MCG/INH AEPB inhaler Inhale 1 puff into the lungs daily 1/21/20  Yes Dasha Hoff MD   albuterol-ipratropium (COMBIVENT RESPIMAT)  MCG/ACT AERS inhaler Inhale 1 puff into the lungs every 6 hours 1/21/20  Yes Vesta Herrera MD   cloNIDine (CATAPRES) 0.2 MG tablet Take 1 tablet by mouth 2 times daily 1/21/20  Yes Dasha Hoff MD   saxagliptin (ONGLYZA) 5 MG TABS tablet Take 1 tablet mg  20 mg Oral BID Jamila Chavez MD   20 mg at 02/08/20 1020    gabapentin (NEURONTIN) capsule 400 mg  400 mg Oral TID Jamila Chavez MD   400 mg at 02/08/20 1018    metFORMIN (GLUCOPHAGE) tablet 1,000 mg  1,000 mg Oral BID WC Jamila Chavez MD   1,000 mg at 02/08/20 1020    montelukast (SINGULAIR) tablet 10 mg  10 mg Oral QPM Jamila Chavez MD   10 mg at 02/07/20 1710    oxybutynin (DITROPAN) tablet 5 mg  5 mg Oral TID Jamila Chavez MD   5 mg at 02/08/20 1020    pravastatin (PRAVACHOL) tablet 80 mg  80 mg Oral Daily Jamila Chavez MD   80 mg at 02/08/20 1019    glucose (GLUTOSE) 40 % oral gel 15 g  15 g Oral PRN Jamila Chavez MD        dextrose 50 % IV solution  12.5 g Intravenous PRN Jamila Chavez MD        glucagon (rDNA) injection 1 mg  1 mg Intramuscular PRN Jamila Chavez MD        dextrose 5 % solution  100 mL/hr Intravenous PRN Jamila Chavez MD        insulin glargine (LANTUS) injection vial 25 Units  25 Units Subcutaneous Nightly Jamila Chavez MD   25 Units at 02/07/20 2052    insulin lispro (HUMALOG) injection vial 0-12 Units  0-12 Units Subcutaneous TID WC Jamila Chavez MD   6 Units at 02/08/20 1237    insulin lispro (HUMALOG) injection vial 0-6 Units  0-6 Units Subcutaneous Nightly Jamila Chavez MD   3 Units at 02/07/20 2051    ipratropium-albuterol (DUONEB) nebulizer solution 1 ampule  1 ampule Inhalation Q4H WA Jamila Chavez MD   1 ampule at 02/08/20 1344    oseltamivir (TAMIFLU) capsule 75 mg  75 mg Oral BID Jamila Chavez MD   75 mg at 02/08/20 1019    sodium chloride flush 0.9 % injection 10 mL  10 mL Intravenous 2 times per day Jamila Chavez MD   10 mL at 02/08/20 1021    sodium chloride flush 0.9 % injection 10 mL  10 mL Intravenous PRN Jamila Chavez MD   10 mL at 02/06/20 1657    magnesium hydroxide (MILK OF MAGNESIA) 400 MG/5ML suspension 30 mL  30 mL Oral Daily PRN Jamila Chavez MD        ondansetron [facility]) injection 4 mg  4 mg Intravenous Q6H PRN Jamila Chavez MD        losartan (COZAAR) tablet 100 mg  100 mg Oral Daily Jamila Chavez MD   100 mg at 02/08/20 1020    And    hydrochlorothiazide (HYDRODIURIL) tablet 25 mg  25 mg Oral Daily Jamila Chavez MD   25 mg at 02/08/20 1020    nystatin (MYCOSTATIN) 208006 UNIT/ML suspension 500,000 Units  5 mL Oral 4x Daily [place] Actis, DO   500,000 Units at 02/08/20 1237    benzocaine-menthol (CEPACOL SORE THROAT) lozenge 1 lozenge  1 lozenge Oral Q2H PRN Ranjit Actis, DO   1 lozenge at 02/06/20 1024    phenol 1.4 % mouth spray 1 spray  1 spray Mouth/Throat Q2H PRN [place] Actis, DO   1 spray at 02/06/20 1024    guaiFENesin-dextromethorphan (ROBITUSSIN DM) 100-10 MG/5ML syrup 5 mL  5 mL Oral Q4H PRN [place] Actis, DO        labetalol (NORMODYNE;TRANDATE) injection 10 mg  10 mg Intravenous Q4H PRN [place] Actis, DO   10 mg at 02/06/20 1751    melatonin tablet 3 mg  3 mg Oral QPM Ranjit Actis, DO   3 mg at 02/07/20 2053    magic (miracle) mouthwash  10 mL Swish & Spit Once Jamila Chavez MD        acetaminophen (TYLENOL) tablet 650 mg  650 mg Oral Q4H PRN Jamila Chavez MD        enoxaparin (LOVENOX) injection 40 mg  40 mg Subcutaneous Daily Jamila Chavez MD   40 mg at 02/07/20 7842           Problem list:    Active Problems:    COPD exacerbation ([facility] [address]    Acute hypoxemic respiratory failure ([facility] [address]  Resolved Problems:    * No resolved hospital problems.  *      Assessment:    Acute influenza A tracheobronchitis     Acute exacerbation of COPD due to influenza A     History of CVA     Diabetes mellitus type 2, controlled     Accelerated hypertension       Plan:    Pleat Tamiflu    Continue Solu-Medrol    Continue aerosols    Wean oxygen    Mucinex added per pulmonary      Carlos Karimi D.O.  3:07 PM  2/8/2020

## 2020-02-09 LAB
METER GLUCOSE: 281 MG/DL (ref 74–99)
METER GLUCOSE: 319 MG/DL (ref 74–99)
METER GLUCOSE: 337 MG/DL (ref 74–99)
METER GLUCOSE: 383 MG/DL (ref 74–99)

## 2020-02-09 PROCEDURE — 6370000000 HC RX 637 (ALT 250 FOR IP): Performed by: INTERNAL MEDICINE

## 2020-02-09 PROCEDURE — 6370000000 HC RX 637 (ALT 250 FOR IP): Performed by: NURSE PRACTITIONER

## 2020-02-09 PROCEDURE — 82962 GLUCOSE BLOOD TEST: CPT

## 2020-02-09 PROCEDURE — 6360000002 HC RX W HCPCS: Performed by: NURSE PRACTITIONER

## 2020-02-09 PROCEDURE — 2140000000 HC CCU INTERMEDIATE R&B

## 2020-02-09 PROCEDURE — 6360000002 HC RX W HCPCS: Performed by: INTERNAL MEDICINE

## 2020-02-09 PROCEDURE — 94640 AIRWAY INHALATION TREATMENT: CPT

## 2020-02-09 PROCEDURE — 2580000003 HC RX 258: Performed by: INTERNAL MEDICINE

## 2020-02-09 RX ORDER — ACYCLOVIR 800 MG/1
400 TABLET ORAL DAILY
Status: DISCONTINUED | OUTPATIENT
Start: 2020-02-09 | End: 2020-02-10

## 2020-02-09 RX ORDER — PRAVASTATIN SODIUM 20 MG
80 TABLET ORAL DAILY
Status: DISCONTINUED | OUTPATIENT
Start: 2020-02-09 | End: 2020-02-09

## 2020-02-09 RX ORDER — PANTOPRAZOLE SODIUM 40 MG/1
40 TABLET, DELAYED RELEASE ORAL
Status: DISCONTINUED | OUTPATIENT
Start: 2020-02-10 | End: 2020-02-11 | Stop reason: HOSPADM

## 2020-02-09 RX ORDER — INSULIN GLARGINE 100 [IU]/ML
35 INJECTION, SOLUTION SUBCUTANEOUS NIGHTLY
Status: DISCONTINUED | OUTPATIENT
Start: 2020-02-09 | End: 2020-02-11 | Stop reason: HOSPADM

## 2020-02-09 RX ORDER — OXYBUTYNIN CHLORIDE 5 MG/1
5 TABLET ORAL 3 TIMES DAILY
Status: DISCONTINUED | OUTPATIENT
Start: 2020-02-09 | End: 2020-02-09

## 2020-02-09 RX ORDER — MONTELUKAST SODIUM 10 MG/1
10 TABLET ORAL NIGHTLY
Status: DISCONTINUED | OUTPATIENT
Start: 2020-02-09 | End: 2020-02-09

## 2020-02-09 RX ADMIN — GABAPENTIN 400 MG: 300 CAPSULE ORAL at 20:41

## 2020-02-09 RX ADMIN — OXYBUTYNIN CHLORIDE 5 MG: 5 TABLET ORAL at 10:05

## 2020-02-09 RX ADMIN — CARVEDILOL 25 MG: 25 TABLET, FILM COATED ORAL at 16:29

## 2020-02-09 RX ADMIN — METHYLPREDNISOLONE SODIUM SUCCINATE 40 MG: 40 INJECTION, POWDER, FOR SOLUTION INTRAMUSCULAR; INTRAVENOUS at 10:07

## 2020-02-09 RX ADMIN — NYSTATIN 500000 UNITS: 100000 SUSPENSION ORAL at 10:07

## 2020-02-09 RX ADMIN — ASPIRIN 81 MG 81 MG: 81 TABLET ORAL at 10:05

## 2020-02-09 RX ADMIN — Medication 10 ML: at 10:08

## 2020-02-09 RX ADMIN — METHYLPREDNISOLONE SODIUM SUCCINATE 40 MG: 40 INJECTION, POWDER, FOR SOLUTION INTRAMUSCULAR; INTRAVENOUS at 20:42

## 2020-02-09 RX ADMIN — NYSTATIN 500000 UNITS: 100000 SUSPENSION ORAL at 16:29

## 2020-02-09 RX ADMIN — OXYBUTYNIN CHLORIDE 5 MG: 5 TABLET ORAL at 15:57

## 2020-02-09 RX ADMIN — FUROSEMIDE 20 MG: 20 TABLET ORAL at 10:06

## 2020-02-09 RX ADMIN — INSULIN LISPRO 8 UNITS: 100 INJECTION, SOLUTION INTRAVENOUS; SUBCUTANEOUS at 06:30

## 2020-02-09 RX ADMIN — FUROSEMIDE 20 MG: 20 TABLET ORAL at 16:30

## 2020-02-09 RX ADMIN — CLONIDINE HYDROCHLORIDE 0.2 MG: 0.1 TABLET ORAL at 20:42

## 2020-02-09 RX ADMIN — CLONIDINE HYDROCHLORIDE 0.2 MG: 0.1 TABLET ORAL at 10:06

## 2020-02-09 RX ADMIN — INSULIN LISPRO 6 UNITS: 100 INJECTION, SOLUTION INTRAVENOUS; SUBCUTANEOUS at 16:30

## 2020-02-09 RX ADMIN — GUAIFENESIN 400 MG: 400 TABLET, FILM COATED ORAL at 20:42

## 2020-02-09 RX ADMIN — INSULIN LISPRO 4 UNITS: 100 INJECTION, SOLUTION INTRAVENOUS; SUBCUTANEOUS at 20:43

## 2020-02-09 RX ADMIN — INSULIN GLARGINE 35 UNITS: 100 INJECTION, SOLUTION SUBCUTANEOUS at 20:43

## 2020-02-09 RX ADMIN — GABAPENTIN 400 MG: 300 CAPSULE ORAL at 10:07

## 2020-02-09 RX ADMIN — IPRATROPIUM BROMIDE AND ALBUTEROL SULFATE 1 AMPULE: 2.5; .5 SOLUTION RESPIRATORY (INHALATION) at 14:15

## 2020-02-09 RX ADMIN — AMLODIPINE BESYLATE 10 MG: 10 TABLET ORAL at 10:06

## 2020-02-09 RX ADMIN — IPRATROPIUM BROMIDE AND ALBUTEROL SULFATE 1 AMPULE: 2.5; .5 SOLUTION RESPIRATORY (INHALATION) at 16:51

## 2020-02-09 RX ADMIN — BENZONATATE 100 MG: 100 CAPSULE ORAL at 20:41

## 2020-02-09 RX ADMIN — PRAVASTATIN SODIUM 80 MG: 20 TABLET ORAL at 10:06

## 2020-02-09 RX ADMIN — CARVEDILOL 25 MG: 25 TABLET, FILM COATED ORAL at 10:06

## 2020-02-09 RX ADMIN — LOSARTAN POTASSIUM 100 MG: 50 TABLET ORAL at 10:06

## 2020-02-09 RX ADMIN — HYDROCHLOROTHIAZIDE 25 MG: 25 TABLET ORAL at 10:06

## 2020-02-09 RX ADMIN — NYSTATIN 500000 UNITS: 100000 SUSPENSION ORAL at 20:42

## 2020-02-09 RX ADMIN — METFORMIN HYDROCHLORIDE 1000 MG: 1000 TABLET ORAL at 16:29

## 2020-02-09 RX ADMIN — IPRATROPIUM BROMIDE AND ALBUTEROL SULFATE 1 AMPULE: 2.5; .5 SOLUTION RESPIRATORY (INHALATION) at 20:51

## 2020-02-09 RX ADMIN — GUAIFENESIN 400 MG: 400 TABLET, FILM COATED ORAL at 10:06

## 2020-02-09 RX ADMIN — ENOXAPARIN SODIUM 40 MG: 40 INJECTION SUBCUTANEOUS at 10:07

## 2020-02-09 RX ADMIN — METFORMIN HYDROCHLORIDE 1000 MG: 1000 TABLET ORAL at 10:05

## 2020-02-09 RX ADMIN — IPRATROPIUM BROMIDE AND ALBUTEROL SULFATE 1 AMPULE: 2.5; .5 SOLUTION RESPIRATORY (INHALATION) at 10:11

## 2020-02-09 RX ADMIN — OSELTAMIVIR PHOSPHATE 75 MG: 75 CAPSULE ORAL at 10:05

## 2020-02-09 RX ADMIN — GABAPENTIN 400 MG: 300 CAPSULE ORAL at 15:58

## 2020-02-09 RX ADMIN — INSULIN LISPRO 7 UNITS: 100 INJECTION, SOLUTION INTRAVENOUS; SUBCUTANEOUS at 18:24

## 2020-02-09 RX ADMIN — OXYBUTYNIN CHLORIDE 5 MG: 5 TABLET ORAL at 20:42

## 2020-02-09 RX ADMIN — ACYCLOVIR 400 MG: 800 TABLET ORAL at 20:47

## 2020-02-09 RX ADMIN — MELATONIN 3 MG ORAL TABLET 3 MG: 3 TABLET ORAL at 20:41

## 2020-02-09 RX ADMIN — MONTELUKAST SODIUM 10 MG: 10 TABLET ORAL at 16:29

## 2020-02-09 RX ADMIN — Medication 10 ML: at 20:42

## 2020-02-09 RX ADMIN — INSULIN LISPRO 10 UNITS: 100 INJECTION, SOLUTION INTRAVENOUS; SUBCUTANEOUS at 12:49

## 2020-02-09 ASSESSMENT — PAIN SCALES - GENERAL
PAINLEVEL_OUTOF10: 0

## 2020-02-09 NOTE — PROGRESS NOTES
Take 1 tablet by mouth 2 times daily 1/21/20  Yes Ted iRng MD   saxagliptin (ONGLYZA) 5 MG TABS tablet Take 1 tablet by mouth daily 1/21/20  Yes Ted Ring MD   anastrozole (ARIMIDEX) 1 MG tablet Take 1 tablet by mouth daily Indications: ESTROGEN DEFICIENCY IN BREAST CANCER (INACTIVE) 1/21/20  Yes Ted Ring MD   calcium carbonate-vitamin D (CALCIUM 600 + D) 600-400 MG-UNIT TABS per tab Take 1 tablet by mouth 2 times daily 10/6/19  Yes Faith Dubose MD   acetaminophen (APAP EXTRA STRENGTH) 500 MG tablet Take 1 tablet by mouth every 6 hours as needed for Pain 8/14/18  Yes Jennifer Wagner DO   vitamin B-12 (CYANOCOBALAMIN) 1000 MCG tablet Take 1 tablet by mouth daily 7/20/18  Yes Ted Ring MD   aspirin 81 MG chewable tablet Take 1 tablet by mouth daily 1/21/20   Ted Ring MD   melatonin (RA MELATONIN) 3 MG TABS tablet Take one 3 mg hs 11/29/19   DMITRY Simmons - CNP   butenafine (LOTRIMIN ULTRA) 1 % CREA Please apply from the toes, in between the toes, foot up to the upper calf twice daily for 1 month, both legs 9/5/19   Jael Allison MD        Current Facility-Administered Medications   Medication Dose Route Frequency Provider Last Rate Last Dose    guaiFENesin tablet 400 mg  400 mg Oral TID DMITRY Mcadams - CNP   400 mg at 02/09/20 1006    benzonatate (TESSALON) capsule 100 mg  100 mg Oral TID PRN Kerrick Actis, DO   100 mg at 02/08/20 2256    methylPREDNISolone sodium (SOLU-MEDROL) injection 40 mg  40 mg Intravenous Q12H DMITRY Montiel - CNP   40 mg at 02/09/20 1007    carvedilol (COREG) tablet 25 mg  25 mg Oral BID  Ranjit Actis, DO   25 mg at 02/09/20 1629    acetaminophen (TYLENOL) tablet 500 mg  500 mg Oral Q6H PRN Jamila Chavez MD        amLODIPine (NORVASC) tablet 10 mg  10 mg Oral Daily Jamila Chavez MD   10 mg at 02/09/20 1006    aspirin chewable tablet 81 mg  81 mg Oral Daily Jamila Chavez MD 81 mg at 02/09/20 1005    cloNIDine (CATAPRES) tablet 0.2 mg  0.2 mg Oral BID Darnell Koo MD   0.2 mg at 02/09/20 1006    furosemide (LASIX) tablet 20 mg  20 mg Oral BID Darnell Koo MD   20 mg at 02/09/20 1630    gabapentin (NEURONTIN) capsule 400 mg  400 mg Oral TID Darnell Koo MD   400 mg at 02/09/20 1558    metFORMIN (GLUCOPHAGE) tablet 1,000 mg  1,000 mg Oral BID  Darnell Koo MD   1,000 mg at 02/09/20 1629    montelukast (SINGULAIR) tablet 10 mg  10 mg Oral QPM Darnell Koo MD   10 mg at 02/09/20 1629    oxybutynin (DITROPAN) tablet 5 mg  5 mg Oral TID Darnell Koo MD   5 mg at 02/09/20 1557    pravastatin (PRAVACHOL) tablet 80 mg  80 mg Oral Daily Darnell Koo MD   80 mg at 02/09/20 1006    glucose (GLUTOSE) 40 % oral gel 15 g  15 g Oral PRN Darnell Koo MD        dextrose 50 % IV solution  12.5 g Intravenous PRN Darnell Koo MD        glucagon (rDNA) injection 1 mg  1 mg Intramuscular PRN Darnell Koo MD        dextrose 5 % solution  100 mL/hr Intravenous PRN Darnell Koo MD        insulin glargine (LANTUS) injection vial 25 Units  25 Units Subcutaneous Nightly Darnell Koo MD   25 Units at 02/08/20 2100    insulin lispro (HUMALOG) injection vial 0-12 Units  0-12 Units Subcutaneous TID  Darnell Koo MD   6 Units at 02/09/20 1630    insulin lispro (HUMALOG) injection vial 0-6 Units  0-6 Units Subcutaneous Nightly Darnell Koo MD   5 Units at 02/08/20 2100    ipratropium-albuterol (DUONEB) nebulizer solution 1 ampule  1 ampule Inhalation Q4H WA Darnell Koo MD   1 ampule at 02/09/20 1651    oseltamivir (TAMIFLU) capsule 75 mg  75 mg Oral BID Darnell Koo MD   75 mg at 02/09/20 1005    sodium chloride flush 0.9 % injection 10 mL  10 mL Intravenous 2 times per day Darnell Koo MD   10 mL at 02/09/20 1008    sodium chloride flush 0.9 % injection 10 mL  10 mL Intravenous PRN Darnell Koo MD   10 mL at 02/06/20 1657    magnesium hydroxide (MILK OF MAGNESIA) 400 MG/5ML suspension 30 mL  30 mL Oral Daily PRN Vangie Pimentel MD        ondansetron [facility]) injection 4 mg  4 mg Intravenous Q6H PRN Vangie Pimentel MD        losartan (COZAAR) tablet 100 mg  100 mg Oral Daily Vangie Pimentel MD   100 mg at 02/09/20 1006    And    hydrochlorothiazide (HYDRODIURIL) tablet 25 mg  25 mg Oral Daily Vangie Pimentel MD   25 mg at 02/09/20 1006    nystatin (MYCOSTATIN) 036751 UNIT/ML suspension 500,000 Units  5 mL Oral 4x Daily Janak Kelch, DO   500,000 Units at 02/09/20 1629    benzocaine-menthol (CEPACOL SORE THROAT) lozenge 1 lozenge  1 lozenge Oral Q2H PRN Janak Kelch, DO   1 lozenge at 02/06/20 1024    phenol 1.4 % mouth spray 1 spray  1 spray Mouth/Throat Q2H PRN Janak Kelstephanie, DO   1 spray at 02/06/20 1024    guaiFENesin-dextromethorphan (ROBITUSSIN DM) 100-10 MG/5ML syrup 5 mL  5 mL Oral Q4H PRN Janak Kelch, DO        labetalol (NORMODYNE;TRANDATE) injection 10 mg  10 mg Intravenous Q4H PRN Janak Kelch, DO   10 mg at 02/06/20 2404    melatonin tablet 3 mg  3 mg Oral QPM Janka Kelstephanie, DO   3 mg at 02/08/20 2050    magic (miracle) mouthwash  10 mL Swish & Spit Once Vangie Pimentel MD        acetaminophen (TYLENOL) tablet 650 mg  650 mg Oral Q4H PRN Vangie Pimentel MD        enoxaparin (LOVENOX) injection 40 mg  40 mg Subcutaneous Daily Vangie Pimentel MD   40 mg at 02/09/20 1007           Problem list:    Active Problems:    COPD exacerbation ([address]    Acute hypoxemic respiratory failure ([address]  Resolved Problems:    * No resolved hospital problems.  *      Assessment:       Acute influenza A tracheobronchitis     Acute exacerbation of COPD due to influenza A     History of CVA     Diabetes mellitus type 2, uncontrolled     Accelerated hypertension       Plan:    Taper steroids    Continue sliding-scale    Adjust diabetic regimen    Consult nephrology regarding accelerated hypertension      Ritesh Valadez D.O.  5:42 PM  2/9/2020

## 2020-02-10 ENCOUNTER — TELEPHONE (OUTPATIENT)
Dept: FAMILY MEDICINE CLINIC | Age: 70
End: 2020-02-10
Payer: MEDICARE

## 2020-02-10 LAB
METER GLUCOSE: 249 MG/DL (ref 74–99)
METER GLUCOSE: 297 MG/DL (ref 74–99)
METER GLUCOSE: 334 MG/DL (ref 74–99)
METER GLUCOSE: 339 MG/DL (ref 74–99)

## 2020-02-10 PROCEDURE — 6370000000 HC RX 637 (ALT 250 FOR IP): Performed by: INTERNAL MEDICINE

## 2020-02-10 PROCEDURE — 82962 GLUCOSE BLOOD TEST: CPT

## 2020-02-10 PROCEDURE — 6360000002 HC RX W HCPCS: Performed by: NURSE PRACTITIONER

## 2020-02-10 PROCEDURE — 6370000000 HC RX 637 (ALT 250 FOR IP): Performed by: NURSE PRACTITIONER

## 2020-02-10 PROCEDURE — G0179 MD RECERTIFICATION HHA PT: HCPCS | Performed by: FAMILY MEDICINE

## 2020-02-10 PROCEDURE — 6360000002 HC RX W HCPCS: Performed by: INTERNAL MEDICINE

## 2020-02-10 PROCEDURE — 2580000003 HC RX 258: Performed by: INTERNAL MEDICINE

## 2020-02-10 PROCEDURE — 2140000000 HC CCU INTERMEDIATE R&B

## 2020-02-10 PROCEDURE — 94640 AIRWAY INHALATION TREATMENT: CPT

## 2020-02-10 RX ORDER — PREDNISONE 20 MG/1
20 TABLET ORAL DAILY
Status: DISCONTINUED | OUTPATIENT
Start: 2020-02-11 | End: 2020-02-11 | Stop reason: HOSPADM

## 2020-02-10 RX ORDER — CLONIDINE HYDROCHLORIDE 0.2 MG/1
0.2 TABLET ORAL 3 TIMES DAILY
Status: DISCONTINUED | OUTPATIENT
Start: 2020-02-10 | End: 2020-02-11 | Stop reason: HOSPADM

## 2020-02-10 RX ADMIN — CLONIDINE HYDROCHLORIDE 0.2 MG: 0.2 TABLET ORAL at 23:30

## 2020-02-10 RX ADMIN — NYSTATIN 500000 UNITS: 100000 SUSPENSION ORAL at 22:09

## 2020-02-10 RX ADMIN — NYSTATIN 500000 UNITS: 100000 SUSPENSION ORAL at 09:35

## 2020-02-10 RX ADMIN — Medication 10 ML: at 22:06

## 2020-02-10 RX ADMIN — IPRATROPIUM BROMIDE AND ALBUTEROL SULFATE 1 AMPULE: 2.5; .5 SOLUTION RESPIRATORY (INHALATION) at 13:00

## 2020-02-10 RX ADMIN — CLONIDINE HYDROCHLORIDE 0.2 MG: 0.2 TABLET ORAL at 17:48

## 2020-02-10 RX ADMIN — IPRATROPIUM BROMIDE AND ALBUTEROL SULFATE 1 AMPULE: 2.5; .5 SOLUTION RESPIRATORY (INHALATION) at 21:50

## 2020-02-10 RX ADMIN — MELATONIN 3 MG ORAL TABLET 3 MG: 3 TABLET ORAL at 22:46

## 2020-02-10 RX ADMIN — INSULIN LISPRO 8 UNITS: 100 INJECTION, SOLUTION INTRAVENOUS; SUBCUTANEOUS at 06:44

## 2020-02-10 RX ADMIN — LOSARTAN POTASSIUM 100 MG: 50 TABLET ORAL at 09:36

## 2020-02-10 RX ADMIN — ENOXAPARIN SODIUM 40 MG: 40 INJECTION SUBCUTANEOUS at 09:37

## 2020-02-10 RX ADMIN — IPRATROPIUM BROMIDE AND ALBUTEROL SULFATE 1 AMPULE: 2.5; .5 SOLUTION RESPIRATORY (INHALATION) at 16:00

## 2020-02-10 RX ADMIN — GUAIFENESIN 400 MG: 400 TABLET, FILM COATED ORAL at 14:20

## 2020-02-10 RX ADMIN — INSULIN LISPRO 8 UNITS: 100 INJECTION, SOLUTION INTRAVENOUS; SUBCUTANEOUS at 11:54

## 2020-02-10 RX ADMIN — ACETAMINOPHEN 650 MG: 325 TABLET, FILM COATED ORAL at 23:29

## 2020-02-10 RX ADMIN — GABAPENTIN 400 MG: 300 CAPSULE ORAL at 14:20

## 2020-02-10 RX ADMIN — IPRATROPIUM BROMIDE AND ALBUTEROL SULFATE 1 AMPULE: 2.5; .5 SOLUTION RESPIRATORY (INHALATION) at 09:45

## 2020-02-10 RX ADMIN — GABAPENTIN 400 MG: 300 CAPSULE ORAL at 22:05

## 2020-02-10 RX ADMIN — GABAPENTIN 400 MG: 300 CAPSULE ORAL at 09:36

## 2020-02-10 RX ADMIN — SODIUM CHLORIDE, PRESERVATIVE FREE 10 ML: 5 INJECTION INTRAVENOUS at 14:20

## 2020-02-10 RX ADMIN — PANTOPRAZOLE SODIUM 40 MG: 40 TABLET, DELAYED RELEASE ORAL at 17:54

## 2020-02-10 RX ADMIN — INSULIN GLARGINE 35 UNITS: 100 INJECTION, SOLUTION SUBCUTANEOUS at 22:49

## 2020-02-10 RX ADMIN — OXYBUTYNIN CHLORIDE 5 MG: 5 TABLET ORAL at 14:20

## 2020-02-10 RX ADMIN — Medication 10 ML: at 09:37

## 2020-02-10 RX ADMIN — PRAVASTATIN SODIUM 80 MG: 20 TABLET ORAL at 09:30

## 2020-02-10 RX ADMIN — GUAIFENESIN 400 MG: 400 TABLET, FILM COATED ORAL at 22:05

## 2020-02-10 RX ADMIN — FUROSEMIDE 20 MG: 20 TABLET ORAL at 09:36

## 2020-02-10 RX ADMIN — INSULIN LISPRO 7 UNITS: 100 INJECTION, SOLUTION INTRAVENOUS; SUBCUTANEOUS at 11:59

## 2020-02-10 RX ADMIN — INSULIN LISPRO 3 UNITS: 100 INJECTION, SOLUTION INTRAVENOUS; SUBCUTANEOUS at 22:47

## 2020-02-10 RX ADMIN — CARVEDILOL 25 MG: 25 TABLET, FILM COATED ORAL at 09:35

## 2020-02-10 RX ADMIN — GUAIFENESIN 400 MG: 400 TABLET, FILM COATED ORAL at 09:35

## 2020-02-10 RX ADMIN — METFORMIN HYDROCHLORIDE 1000 MG: 1000 TABLET ORAL at 17:46

## 2020-02-10 RX ADMIN — OXYBUTYNIN CHLORIDE 5 MG: 5 TABLET ORAL at 09:36

## 2020-02-10 RX ADMIN — METHYLPREDNISOLONE SODIUM SUCCINATE 40 MG: 40 INJECTION, POWDER, FOR SOLUTION INTRAMUSCULAR; INTRAVENOUS at 09:35

## 2020-02-10 RX ADMIN — METFORMIN HYDROCHLORIDE 1000 MG: 1000 TABLET ORAL at 09:36

## 2020-02-10 RX ADMIN — NYSTATIN 500000 UNITS: 100000 SUSPENSION ORAL at 14:20

## 2020-02-10 RX ADMIN — HYDROCHLOROTHIAZIDE 25 MG: 25 TABLET ORAL at 09:36

## 2020-02-10 RX ADMIN — CARVEDILOL 25 MG: 25 TABLET, FILM COATED ORAL at 17:46

## 2020-02-10 RX ADMIN — INSULIN LISPRO 4 UNITS: 100 INJECTION, SOLUTION INTRAVENOUS; SUBCUTANEOUS at 16:57

## 2020-02-10 RX ADMIN — AMLODIPINE BESYLATE 10 MG: 10 TABLET ORAL at 09:36

## 2020-02-10 RX ADMIN — MONTELUKAST SODIUM 10 MG: 10 TABLET ORAL at 17:46

## 2020-02-10 RX ADMIN — ACYCLOVIR 400 MG: 800 TABLET ORAL at 09:37

## 2020-02-10 RX ADMIN — ASPIRIN 81 MG 81 MG: 81 TABLET ORAL at 09:36

## 2020-02-10 RX ADMIN — INSULIN LISPRO 7 UNITS: 100 INJECTION, SOLUTION INTRAVENOUS; SUBCUTANEOUS at 06:42

## 2020-02-10 RX ADMIN — NYSTATIN 500000 UNITS: 100000 SUSPENSION ORAL at 17:46

## 2020-02-10 RX ADMIN — CLONIDINE HYDROCHLORIDE 0.2 MG: 0.1 TABLET ORAL at 09:36

## 2020-02-10 RX ADMIN — OXYBUTYNIN CHLORIDE 5 MG: 5 TABLET ORAL at 22:06

## 2020-02-10 RX ADMIN — INSULIN LISPRO 7 UNITS: 100 INJECTION, SOLUTION INTRAVENOUS; SUBCUTANEOUS at 16:58

## 2020-02-10 ASSESSMENT — PAIN SCALES - GENERAL
PAINLEVEL_OUTOF10: 0
PAINLEVEL_OUTOF10: 4
PAINLEVEL_OUTOF10: 6
PAINLEVEL_OUTOF10: 0

## 2020-02-10 NOTE — PROGRESS NOTES
Subjective:    Awake and alert. No problems overnight. Denies chest pain or dyspnea. Denies abdominal pain. Tolerating diet. No nausea or vomiting. Objective:    BP (!) 170/100   Pulse 76   Temp 97.7 °F (36.5 °C) (Temporal)   Resp 18   Ht 5' 5\" (1.651 m)   Wt 237 lb 12.8 oz (107.9 kg)   SpO2 97%   Breastfeeding No   BMI 39.57 kg/m²   Skin: Warm and dry  Neck: Supple, no JVD  Heart:  RRR, no murmurs, gallops, or rubs. Lungs:  CTA bilaterally, no wheeze, rales or rhonchi  Abd: bowel sounds present, nontender, nondistended, no masses  Extrem:  No clubbing, cyanosis, or edema, pulses intact    I/O last 3 completed shifts: In: 1250 [P.O.:1250]  Out: 1400 [Urine:1400]    Last Refreshed: 02/10/20 1527 [Time Lance Orders]   Results      Component Value Units   POCT Glucose [[id number]] (Abnormal)    Collected: 02/10/20 1103    Updated: 02/10/20 1108     Meter Glucose 339High  mg/dL   POCT Glucose [[id number]] (Abnormal)    Collected: 02/10/20 0634    Updated: 02/10/20 0638     Meter Glucose 334High  mg/dL   POCT Glucose [[id number]] (Abnormal)    Collected: 02/09/20 2041    Updated: 02/09/20 2045     Meter Glucose 337High  mg/dL   POCT Glucose [[id number]] (Abnormal)    Collected: 02/09/20 1538    Updated: 02/09/20 1540     Meter Glucose 281High  mg/dL         XR CHEST PORTABLE   Final Result   No acute cardiopulmonary findings. XR CHEST PORTABLE   Final Result   Borderline size heart. No acute pulmonary process. Prior to Admission medications    Medication Sig Start Date End Date Taking?  Authorizing Provider   acyclovir (ZOVIRAX) 400 MG tablet Take 400 mg by mouth daily   Yes Historical Provider, MD   insulin aspart (NOVOLOG FLEXPEN) 100 UNIT/ML injection pen Inject 7 Units into the skin 3 times daily (before meals) Plus sliding scale max 50 units daily   Yes Historical Provider, MD   insulin glargine (LANTUS) 100 UNIT/ML injection vial Inject 35 Units into the skin nightly   Yes Historical Provider, MD   metFORMIN (GLUCOPHAGE) 500 MG tablet Take 1,000 mg by mouth 2 times daily (with meals)   Yes Historical Provider, MD   montelukast (SINGULAIR) 10 MG tablet Take 10 mg by mouth nightly   Yes Historical Provider, MD   oxybutynin (DITROPAN) 5 MG tablet Take 10 mg by mouth 3 times daily   Yes Historical Provider, MD   pravastatin (PRAVACHOL) 80 MG tablet Take 80 mg by mouth daily   Yes Historical Provider, MD   omeprazole (PRILOSEC) 40 MG delayed release capsule Take 40 mg by mouth daily   Yes Historical Provider, MD   guaiFENesin (MUCINEX MAXIMUM STRENGTH) 1200 MG TB12 Take 1 tablet by mouth 2 times daily as needed (Congestion) 2/4/20 2/11/20 Yes Rama Wells DO   benzonatate (TESSALON PERLES) 100 MG capsule Take 1 capsule by mouth 3 times daily as needed for Cough 2/4/20  Yes Rama Wells DO   losartan-hydrochlorothiazide (HYZAAR) 100-25 MG per tablet Take 1 tablet by mouth daily 1/21/20  Yes Oswaldo Springer MD   carvedilol (COREG) 12.5 MG tablet Take 1 tablet by mouth 2 times daily (with meals) 1/21/20  Yes Oswaldo Springer MD   vitamin D (ERGOCALCIFEROL) 1.25 MG (96843 UT) CAPS capsule Take 1 capsule by mouth once a week 1/21/20  Yes Dasha Hoff MD   gabapentin (NEURONTIN) 400 MG capsule Take 1 capsule by mouth 3 times daily.  1/21/20 1/20/22 Yes Dasha Hoff MD   Fluticasone furoate-vilanterol (BREO ELLIPTA) 200-25 MCG/INH AEPB inhaler Inhale 1 puff into the lungs daily 1/21/20  Yes Dasha Hoff MD   albuterol-ipratropium (COMBIVENT RESPIMAT)  MCG/ACT AERS inhaler Inhale 1 puff into the lungs every 6 hours 1/21/20  Yes Oswaldo Springer MD   cloNIDine (CATAPRES) 0.2 MG tablet Take 1 tablet by mouth 2 times daily 1/21/20  Yes Oswaldo Springer MD   saxagliptin (ONGLYZA) 5 MG TABS tablet Take 1 tablet by mouth daily 1/21/20  Yes Dasha Hoff MD   anastrozole (ARIMIDEX) 1 MG tablet Take 1 tablet by mouth tablet 10 mg  10 mg Oral Daily Jamila Chavez MD   10 mg at 02/10/20 5073    aspirin chewable tablet 81 mg  81 mg Oral Daily Jamila Chavez MD   81 mg at 02/10/20 6238    furosemide (LASIX) tablet 20 mg  20 mg Oral BID Jamila Chavez MD   20 mg at 02/10/20 4780    gabapentin (NEURONTIN) capsule 400 mg  400 mg Oral TID Jamila Chavez MD   400 mg at 02/10/20 1420    metFORMIN (GLUCOPHAGE) tablet 1,000 mg  1,000 mg Oral BID  Jamila Chavez MD   1,000 mg at 02/10/20 0936    montelukast (SINGULAIR) tablet 10 mg  10 mg Oral QPM Jamila Chavez MD   10 mg at 02/09/20 1629    oxybutynin (DITROPAN) tablet 5 mg  5 mg Oral TID Jamila Chavez MD   5 mg at 02/10/20 1420    pravastatin (PRAVACHOL) tablet 80 mg  80 mg Oral Daily Jamila Chavez MD   80 mg at 02/10/20 0930    glucose (GLUTOSE) 40 % oral gel 15 g  15 g Oral PRN Jamila Chavez MD        dextrose 50 % IV solution  12.5 g Intravenous PRN Jamila Chavez MD        glucagon (rDNA) injection 1 mg  1 mg Intramuscular PRN Jamila Chavez MD        dextrose 5 % solution  100 mL/hr Intravenous PRN Jamila Chavez MD        insulin lispro (HUMALOG) injection vial 0-12 Units  0-12 Units Subcutaneous TID  Jamila Chavez MD   8 Units at 02/10/20 1154    insulin lispro (HUMALOG) injection vial 0-6 Units  0-6 Units Subcutaneous Nightly Jamila Chavez MD   4 Units at 02/09/20 2043    ipratropium-albuterol (DUONEB) nebulizer solution 1 ampule  1 ampule Inhalation Q4H WA Jamila Chavez MD   1 ampule at 02/10/20 1300    sodium chloride flush 0.9 % injection 10 mL  10 mL Intravenous 2 times per day Jamila Chavez MD   10 mL at 02/10/20 0937    sodium chloride flush 0.9 % injection 10 mL  10 mL Intravenous PRN Jamila Chavez MD   10 mL at 02/10/20 1420    magnesium hydroxide (MILK OF MAGNESIA) 400 MG/5ML suspension 30 mL  30 mL Oral Daily PRN Jamila Chavez MD        ondansetron [facility]) injection 4 mg  4 mg Intravenous Q6H PRN Jamila Chavez MD        losartan (COZAAR) tablet 100 mg  100 mg Oral Daily Francesco Jackson MD   100 mg at 02/10/20 3491    And    hydrochlorothiazide (HYDRODIURIL) tablet 25 mg  25 mg Oral Daily Francesco Jackson MD   25 mg at 02/10/20 0936    nystatin (MYCOSTATIN) 915655 UNIT/ML suspension 500,000 Units  5 mL Oral 4x Daily Elda Romp, DO   500,000 Units at 02/10/20 1420    benzocaine-menthol (CEPACOL SORE THROAT) lozenge 1 lozenge  1 lozenge Oral Q2H PRN Elda Romp, DO   1 lozenge at 02/06/20 1024    phenol 1.4 % mouth spray 1 spray  1 spray Mouth/Throat Q2H PRN Elda Romp, DO   1 spray at 02/06/20 1024    guaiFENesin-dextromethorphan (ROBITUSSIN DM) 100-10 MG/5ML syrup 5 mL  5 mL Oral Q4H PRN Elda Romp, DO        labetalol (NORMODYNE;TRANDATE) injection 10 mg  10 mg Intravenous Q4H PRN Elda Romp, DO   10 mg at 02/06/20 3952    melatonin tablet 3 mg  3 mg Oral QPM Elda Romp, DO   3 mg at 02/09/20 2041    magic (miracle) mouthwash  10 mL Swish & Spit Once Francesco Jackson MD        acetaminophen (TYLENOL) tablet 650 mg  650 mg Oral Q4H PRN Francesco Jackson MD        enoxaparin (LOVENOX) injection 40 mg  40 mg Subcutaneous Daily Francesco Jackson MD   40 mg at 02/10/20 8395           Problem list:    Active Problems:    COPD exacerbation ([facility] [address]    Acute hypoxemic respiratory failure ([facility] [address]  Resolved Problems:    * No resolved hospital problems.  *      Assessment:       Acute influenza A tracheobronchitis     Acute exacerbation of COPD due to influenza A     History of CVA     Diabetes mellitus type 2, uncontrolled     Accelerated hypertension       Plan:    Stop IV steroids    Start prednisone tomorrow    Nephrology for hypertension evaluation      Elda Richmond D.O., Blank Son  3:27 PM  2/10/2020

## 2020-02-10 NOTE — PROGRESS NOTES
narcolepsy. AHI 2.    12. Follows with Dr. Jun Khan message routed     This plan of care was reviewed in collaboration with [facility]  Electronically signed by DMITRY Garvey CNP on 2/10/2020 at 12:53 PM     I personally saw, examined, and cared for the patient. Labs, medications, radiographs reviewed. I agree with history exam and plans detailed in NP note. [facility] for [facility]re[facility] Bello M.D.    Pulmonary/Critical Care Medicine

## 2020-02-10 NOTE — PROGRESS NOTES
states breast cancer    Type II or unspecified type diabetes mellitus without mention of complication, not stated as uncontrolled     AA1c8.7 9/10    UTI (urinary tract infection) 2019       Past Surgical History:   Procedure Laterality Date    ARTHROPLASTY Left 2016    thumb basil joint with dequervain's release    BREAST LUMPECTOMY  years ago    benign    BREAST LUMPECTOMY Right 2016    COLONOSCOPY  2005    COLONOSCOPY  1/8/15    Dr. Torres [address]  2012         FINGER SURGERY Left 2016    thumb    HAND SURGERY  2017    HYSTERECTOMY      JOINT REPLACEMENT      OTHER SURGICAL HISTORY Right 2017    RIGHT THUMB TRAPEZIECTOMY WITH LIGAMENT RECONSRUCTION DEQUERVAINS RELEASE    TIM AND BSO      TOTAL HIP ARTHROPLASTY Bilateral     ,  dr Kaushik Doe, dr Kassie Knapp Bilateral     dr Crispin Cheadle       Family History   Problem Relation Age of Onset    Diabetes Mother     High Blood Pressure Mother     Heart Attack Mother     High Blood Pressure Father     Diabetes Father     Heart Failure Father     Breast Cancer Sister     Stroke Sister         young age   Bedelia Fruits Cancer Sister 55        breast    Sickle Cell Anemia Other         niece    Cancer Other 36        niece - breast    Breast Cancer Sister             Cancer Sister 59        breast & ovarian    Stomach Cancer Other         aunt        reports that she quit smoking about 18 years ago. Her smoking use included cigarettes. She has a 8.75 pack-year smoking history. She has never used smokeless tobacco. She reports current alcohol use. She reports previous drug use. Drug: Marijuana. Allergies:  Patient has no known allergies.     Current Medications:    insulin glargine (LANTUS) injection vial 35 Units, Nightly  pantoprazole (PROTONIX) tablet 40 mg, QAM AC  insulin lispro (HUMALOG) injection vial 7 Units, TID WC  acyclovir (ZOVIRAX) tablet 400 mg, Daily  guaiFENesin tablet 400 mg, TID  benzonatate (TESSALON) capsule 100 mg, TID PRN  methylPREDNISolone sodium (SOLU-MEDROL) injection 40 mg, Q12H  carvedilol (COREG) tablet 25 mg, BID WC  acetaminophen (TYLENOL) tablet 500 mg, Q6H PRN  amLODIPine (NORVASC) tablet 10 mg, Daily  aspirin chewable tablet 81 mg, Daily  cloNIDine (CATAPRES) tablet 0.2 mg, BID  furosemide (LASIX) tablet 20 mg, BID  gabapentin (NEURONTIN) capsule 400 mg, TID  metFORMIN (GLUCOPHAGE) tablet 1,000 mg, BID WC  montelukast (SINGULAIR) tablet 10 mg, QPM  oxybutynin (DITROPAN) tablet 5 mg, TID  pravastatin (PRAVACHOL) tablet 80 mg, Daily  glucose (GLUTOSE) 40 % oral gel 15 g, PRN  dextrose 50 % IV solution, PRN  glucagon (rDNA) injection 1 mg, PRN  dextrose 5 % solution, PRN  insulin lispro (HUMALOG) injection vial 0-12 Units, TID WC  insulin lispro (HUMALOG) injection vial 0-6 Units, Nightly  ipratropium-albuterol (DUONEB) nebulizer solution 1 ampule, Q4H WA  sodium chloride flush 0.9 % injection 10 mL, 2 times per day  sodium chloride flush 0.9 % injection 10 mL, PRN  magnesium hydroxide (MILK OF MAGNESIA) 400 MG/5ML suspension 30 mL, Daily PRN  ondansetron (ZOFRAN) injection 4 mg, Q6H PRN  losartan (COZAAR) tablet 100 mg, Daily    And  hydrochlorothiazide (HYDRODIURIL) tablet 25 mg, Daily  nystatin (MYCOSTATIN) 634277 UNIT/ML suspension 500,000 Units, 4x Daily  benzocaine-menthol (CEPACOL SORE THROAT) lozenge 1 lozenge, Q2H PRN  phenol 1.4 % mouth spray 1 spray, Q2H PRN  guaiFENesin-dextromethorphan (ROBITUSSIN DM) 100-10 MG/5ML syrup 5 mL, Q4H PRN  labetalol (NORMODYNE;TRANDATE) injection 10 mg, Q4H PRN  melatonin tablet 3 mg, QPM  magic (miracle) mouthwash, Once  acetaminophen (TYLENOL) tablet 650 mg, Q4H PRN  enoxaparin (LOVENOX) injection 40 mg, Daily        Review of Systems:   Pertinent items are noted in HPI.     Physical exam:   Constitutional:    Vitals: VITALS:  BP (!) 170/100   Pulse 76   Temp 97.7 °F (36.5 °C) (Temporal)   Resp Results   Component Value Date    FOLATE 13.0 06/10/2019     IRON:    Lab Results   Component Value Date    IRON 39 11/28/2011     Iron Saturation:  No components found for: PERCENTFE  TIBC:    Lab Results   Component Value Date    TIBC 369 11/28/2011     FERRITIN:    Lab Results   Component Value Date    FERRITIN 22.4 11/28/2011        Imaging:      TTE procedure:Echo Limited Study. Procedure Date  Date: 12/31/2019 Start: 02:15 PM     Study Location: Echo Lab  Technical Quality: Adequate visualization     Indications:Congestive heart failure, diastolic dysfunction.     Patient Status: ASAP     Height: 65 inches Weight: 250 pounds BSA: 2.17 m^2 BMI: 41.6 kg/m^2     Rhythm: Within normal limits HR: 81 bpm BP: 190/84 mmHg      Findings      Left Ventricle   Left ventricle is normal in size . Abnormal (paradoxical) motion consistent with left bundle branch block. Normal left ventricular ejection fraction. Ejection fraction is visually estimated at 55-60%(calculated 58%). There is doppler evidence of stage II diastolic dysfunction. Right Ventricle   Normal right ventricular size and function. Left Atrium   Normal sized left atrium. Interatrial septum appears intact. Right Atrium   Normal right atrium size. Mitral Valve   Normal mitral valve structure and function. Tricuspid Valve   The tricuspid valve appears structurally normal.   Mild tricuspid regurgitation. RVSP is 45 mmHg. Pulmonary hypertension is mild . Aortic Valve   The aortic valve leaflets were not well visualized. Structurally normal aortic valve. Pulmonic Valve   The pulmonic valve was not well visualized. Pericardial Effusion   No evidence of pericardial effusion. Aorta   Aortic root dimension within normal limits. Conclusions      Summary   Left ventricle is normal in size . Abnormal (paradoxical) motion consistent with left bundle branch block.    Normal left ventricular ejection

## 2020-02-10 NOTE — PLAN OF CARE
Problem: Falls - Risk of:  Goal: Will remain free from falls  Description  Will remain free from falls  2/9/2020 2238 by Elías Mccormick RN  Outcome: Met This Shift  2/9/2020 1058 by Godfrey Nichols RN  Outcome: Met This Shift  Goal: Absence of physical injury  Description  Absence of physical injury  2/9/2020 2238 by Elías Mccormick RN  Outcome: Met This Shift  2/9/2020 1058 by Godfrey Nichols RN  Outcome: Met This Shift     Problem: Airway Clearance - Ineffective:  Goal: Ability to maintain a clear airway will improve  Description  Ability to maintain a clear airway will improve  2/9/2020 2238 by Elías Mccormick RN  Outcome: Met This Shift  2/9/2020 1058 by Godfrey Nichols RN  Outcome: Met This Shift     Problem: Gas Exchange - Impaired:  Goal: Levels of oxygenation will improve  Description  Levels of oxygenation will improve  2/9/2020 2238 by Elías Mccormick RN  Outcome: Met This Shift  2/9/2020 1058 by Godfrey Nichols RN  Outcome: Met This Shift

## 2020-02-11 VITALS
DIASTOLIC BLOOD PRESSURE: 70 MMHG | OXYGEN SATURATION: 99 % | WEIGHT: 237.8 LBS | SYSTOLIC BLOOD PRESSURE: 150 MMHG | HEART RATE: 78 BPM | HEIGHT: 65 IN | BODY MASS INDEX: 39.62 KG/M2 | RESPIRATION RATE: 18 BRPM | TEMPERATURE: 97.3 F

## 2020-02-11 LAB
ANION GAP SERPL CALCULATED.3IONS-SCNC: 16 MMOL/L (ref 7–16)
BUN BLDV-MCNC: 32 MG/DL (ref 8–23)
CALCIUM SERPL-MCNC: 9.3 MG/DL (ref 8.6–10.2)
CHLORIDE BLD-SCNC: 98 MMOL/L (ref 98–107)
CO2: 28 MMOL/L (ref 22–29)
CREAT SERPL-MCNC: 1 MG/DL (ref 0.5–1)
GFR AFRICAN AMERICAN: >60
GFR NON-AFRICAN AMERICAN: >60 ML/MIN/1.73
GLUCOSE BLD-MCNC: 146 MG/DL (ref 74–99)
HCT VFR BLD CALC: 37.6 % (ref 34–48)
HEMOGLOBIN: 11.2 G/DL (ref 11.5–15.5)
MAGNESIUM: 2.1 MG/DL (ref 1.6–2.6)
MCH RBC QN AUTO: 24.5 PG (ref 26–35)
MCHC RBC AUTO-ENTMCNC: 29.8 % (ref 32–34.5)
MCV RBC AUTO: 82.1 FL (ref 80–99.9)
METER GLUCOSE: 137 MG/DL (ref 74–99)
METER GLUCOSE: 141 MG/DL (ref 74–99)
PDW BLD-RTO: 16.6 FL (ref 11.5–15)
PHOSPHORUS: 3.8 MG/DL (ref 2.5–4.5)
PLATELET # BLD: 537 E9/L (ref 130–450)
PMV BLD AUTO: 10.3 FL (ref 7–12)
POTASSIUM SERPL-SCNC: 3.8 MMOL/L (ref 3.5–5)
RBC # BLD: 4.58 E12/L (ref 3.5–5.5)
SODIUM BLD-SCNC: 142 MMOL/L (ref 132–146)
WBC # BLD: 18.1 E9/L (ref 4.5–11.5)

## 2020-02-11 PROCEDURE — 6370000000 HC RX 637 (ALT 250 FOR IP): Performed by: INTERNAL MEDICINE

## 2020-02-11 PROCEDURE — 6370000000 HC RX 637 (ALT 250 FOR IP): Performed by: NURSE PRACTITIONER

## 2020-02-11 PROCEDURE — 6360000002 HC RX W HCPCS: Performed by: INTERNAL MEDICINE

## 2020-02-11 PROCEDURE — 94640 AIRWAY INHALATION TREATMENT: CPT

## 2020-02-11 PROCEDURE — 80048 BASIC METABOLIC PNL TOTAL CA: CPT

## 2020-02-11 PROCEDURE — 85027 COMPLETE CBC AUTOMATED: CPT

## 2020-02-11 PROCEDURE — 84100 ASSAY OF PHOSPHORUS: CPT

## 2020-02-11 PROCEDURE — 36415 COLL VENOUS BLD VENIPUNCTURE: CPT

## 2020-02-11 PROCEDURE — 2580000003 HC RX 258: Performed by: INTERNAL MEDICINE

## 2020-02-11 PROCEDURE — 83735 ASSAY OF MAGNESIUM: CPT

## 2020-02-11 PROCEDURE — 82962 GLUCOSE BLOOD TEST: CPT

## 2020-02-11 RX ORDER — CLONIDINE HYDROCHLORIDE 0.2 MG/1
0.2 TABLET ORAL 3 TIMES DAILY
Qty: 60 TABLET | Refills: 3 | Status: SHIPPED | OUTPATIENT
Start: 2020-02-11

## 2020-02-11 RX ORDER — FUROSEMIDE 20 MG/1
20 TABLET ORAL 2 TIMES DAILY
Qty: 60 TABLET | Refills: 0 | Status: SHIPPED | OUTPATIENT
Start: 2020-02-11 | End: 2020-03-26 | Stop reason: SDUPTHER

## 2020-02-11 RX ORDER — AMLODIPINE BESYLATE 10 MG/1
10 TABLET ORAL DAILY
Qty: 30 TABLET | Refills: 3 | Status: SHIPPED | OUTPATIENT
Start: 2020-02-11 | End: 2020-06-26 | Stop reason: ALTCHOICE

## 2020-02-11 RX ORDER — PREDNISONE 20 MG/1
40 TABLET ORAL DAILY
Qty: 10 TABLET | Refills: 0 | Status: SHIPPED | OUTPATIENT
Start: 2020-02-11 | End: 2020-02-16

## 2020-02-11 RX ORDER — CARVEDILOL 25 MG/1
25 TABLET ORAL 2 TIMES DAILY WITH MEALS
Qty: 60 TABLET | Refills: 3 | Status: SHIPPED | OUTPATIENT
Start: 2020-02-11 | End: 2020-06-26 | Stop reason: ALTCHOICE

## 2020-02-11 RX ADMIN — INSULIN LISPRO 2 UNITS: 100 INJECTION, SOLUTION INTRAVENOUS; SUBCUTANEOUS at 09:14

## 2020-02-11 RX ADMIN — GUAIFENESIN 400 MG: 400 TABLET, FILM COATED ORAL at 09:10

## 2020-02-11 RX ADMIN — PRAVASTATIN SODIUM 80 MG: 20 TABLET ORAL at 09:09

## 2020-02-11 RX ADMIN — IPRATROPIUM BROMIDE AND ALBUTEROL SULFATE 1 AMPULE: 2.5; .5 SOLUTION RESPIRATORY (INHALATION) at 11:34

## 2020-02-11 RX ADMIN — HYDROCHLOROTHIAZIDE 25 MG: 25 TABLET ORAL at 09:10

## 2020-02-11 RX ADMIN — INSULIN LISPRO 7 UNITS: 100 INJECTION, SOLUTION INTRAVENOUS; SUBCUTANEOUS at 09:17

## 2020-02-11 RX ADMIN — Medication 10 ML: at 09:11

## 2020-02-11 RX ADMIN — NYSTATIN 500000 UNITS: 100000 SUSPENSION ORAL at 12:24

## 2020-02-11 RX ADMIN — FUROSEMIDE 20 MG: 20 TABLET ORAL at 09:10

## 2020-02-11 RX ADMIN — AMLODIPINE BESYLATE 10 MG: 10 TABLET ORAL at 09:10

## 2020-02-11 RX ADMIN — PREDNISONE 20 MG: 20 TABLET ORAL at 09:10

## 2020-02-11 RX ADMIN — CARVEDILOL 25 MG: 25 TABLET, FILM COATED ORAL at 09:10

## 2020-02-11 RX ADMIN — ENOXAPARIN SODIUM 40 MG: 40 INJECTION SUBCUTANEOUS at 09:11

## 2020-02-11 RX ADMIN — NYSTATIN 500000 UNITS: 100000 SUSPENSION ORAL at 09:09

## 2020-02-11 RX ADMIN — PANTOPRAZOLE SODIUM 40 MG: 40 TABLET, DELAYED RELEASE ORAL at 06:05

## 2020-02-11 RX ADMIN — OXYBUTYNIN CHLORIDE 5 MG: 5 TABLET ORAL at 09:10

## 2020-02-11 RX ADMIN — METFORMIN HYDROCHLORIDE 1000 MG: 1000 TABLET ORAL at 09:10

## 2020-02-11 RX ADMIN — CLONIDINE HYDROCHLORIDE 0.2 MG: 0.2 TABLET ORAL at 09:10

## 2020-02-11 RX ADMIN — LOSARTAN POTASSIUM 100 MG: 50 TABLET ORAL at 09:10

## 2020-02-11 RX ADMIN — ASPIRIN 81 MG 81 MG: 81 TABLET ORAL at 09:11

## 2020-02-11 RX ADMIN — GABAPENTIN 400 MG: 300 CAPSULE ORAL at 09:10

## 2020-02-11 ASSESSMENT — PAIN SCALES - GENERAL
PAINLEVEL_OUTOF10: 0
PAINLEVEL_OUTOF10: 1

## 2020-02-11 NOTE — PROGRESS NOTES
Nephrology Progress Note  Patient's Name: Donny Gonzalez  12:31 PM  2/11/2020      History of Present Ilness:    Will Knott is a 71 y.o. female with PMH of CHF, COPD, HTN, DM, CVA, nicotine use and asthma. She was last seen for SOB on 12/30/19. She was noted to have poorly controlleed blood pressure upon admission. She was on  clonidine 0.1mg BID, amlodipine 5mg daily, losartan 100mg daily and HCTZ 25mg daily PTA at that time. She states that she has had high blood pressure for as long as she can remember. She now presents for fever and cough on 2/4/2020. She  has been being treated  For COPD exacerbation due to Influenza A. She has been on steroids for this and her BP has been above goal, hence renal consultation. 2/11/2020- BP better, breathing better.  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    Cerebrovascular disease 6/09    no residual    CHF (congestive heart failure) ([address] approx 3 years ago    Claustrophobia     COPD (chronic obstructive pulmonary disease) ([address]     Decreased dorsalis pedis pulse 9/5/2019    Dermatophytosis 9/5/2019    Headache(784.0)     History of blood transfusion     with knee surgery    Hx of blood clots     Hyperlipidemia     Hypertension     LBP radiating to both legs     Lymphedema of both lower extremities 11/12/2015    Neuromuscular disorder (HCC)     Non-rheumatic aortic sclerosis ([address] 10/2018    10/2018    Obesity     Pulmonary hypertension ([address] 10/2018    Recurrent genital HSV (herpes simplex virus) infection     last outbreak 11/2017    S/P breast biopsy, right 07/2016    pt states breast cancer    Type II or unspecified type diabetes mellitus without mention of complication, not stated as uncontrolled     AA1c8.7 9/10    UTI (urinary tract infection) 2019       Past Surgical History:   Procedure Laterality Date    ARTHROPLASTY Left 2016    thumb basil joint with dequervain's release    BREAST LUMPECTOMY  years ago    benign    BREAST LUMPECTOMY Right 2016    COLONOSCOPY  2005    COLONOSCOPY  1/8/15    Dr. Kieran Ahmadi - [facility]  2012         FINGER SURGERY Left 2016    thumb    HAND SURGERY  2017    HYSTERECTOMY      JOINT REPLACEMENT      OTHER SURGICAL HISTORY Right 2017    RIGHT THUMB TRAPEZIECTOMY WITH LIGAMENT RECONSRUCTION DEQUERVAINS RELEASE    TIM AND BSO      TOTAL HIP ARTHROPLASTY Bilateral     ,  dr Yue Zambrano, dr Mireille Jaime Bilateral 2013    dr Filomena Hubbard       Family History   Problem Relation Age of Onset    Diabetes Mother     High Blood Pressure Mother     Heart Attack Mother     High Blood Pressure Father     Diabetes Father     Heart Failure Father     Breast Cancer Sister     Stroke Sister         young age   [employer] Cancer Sister 55        breast    Sickle Cell Anemia Other         niece    Cancer Other 36        niece - breast    Breast Cancer Sister             Cancer Sister 59        breast & ovarian    Stomach Cancer Other         aunt        reports that she quit smoking about 18 years ago. Her smoking use included cigarettes. She has a 8.75 pack-year smoking history. She has never used smokeless tobacco. She reports current alcohol use. She reports previous drug use. Drug: Marijuana. Allergies:  Patient has no known allergies.     Current Medications:    predniSONE (DELTASONE) tablet 20 mg, Daily  cloNIDine (CATAPRES) tablet 0.2 mg, TID  insulin glargine (LANTUS) injection vial 35 Units, Nightly  pantoprazole (PROTONIX) tablet 40 mg, QAM AC  insulin lispro (HUMALOG) injection vial 7 Units, TID WC  guaiFENesin tablet 400 mg, TID  benzonatate (TESSALON) capsule 100 mg, TID PRN  carvedilol (COREG) tablet 25 mg, BID WC  acetaminophen (TYLENOL) tablet 500 mg, Q6H PRN  amLODIPine (NORVASC) tablet 10 mg, Daily  aspirin chewable tablet 81 mg, Daily  furosemide (LASIX) tablet 20 mg, BID  gabapentin (NEURONTIN) capsule 400 mg, TID  metFORMIN (GLUCOPHAGE) tablet 1,000 mg, BID WC  montelukast (SINGULAIR) tablet 10 mg, QPM  oxybutynin (DITROPAN) tablet 5 mg, TID  pravastatin (PRAVACHOL) tablet 80 mg, Daily  glucose (GLUTOSE) 40 % oral gel 15 g, PRN  dextrose 50 % IV solution, PRN  glucagon (rDNA) injection 1 mg, PRN  dextrose 5 % solution, PRN  insulin lispro (HUMALOG) injection vial 0-12 Units, TID WC  insulin lispro (HUMALOG) injection vial 0-6 Units, Nightly  ipratropium-albuterol (DUONEB) nebulizer solution 1 ampule, Q4H WA  sodium chloride flush 0.9 % injection 10 mL, 2 times per day  sodium chloride flush 0.9 % injection 10 mL, PRN  magnesium hydroxide (MILK OF MAGNESIA) 400 MG/5ML suspension 30 mL, Daily PRN  ondansetron (ZOFRAN) injection 4 mg, Q6H PRN  losartan (COZAAR) tablet 100 mg, Daily    And  hydrochlorothiazide (HYDRODIURIL) tablet 25 mg, Daily  nystatin (MYCOSTATIN) 837136 UNIT/ML suspension 500,000 Units, 4x Daily  benzocaine-menthol (CEPACOL SORE THROAT) lozenge 1 lozenge, Q2H PRN  phenol 1.4 % mouth spray 1 spray, Q2H PRN  guaiFENesin-dextromethorphan (ROBITUSSIN DM) 100-10 MG/5ML syrup 5 mL, Q4H PRN  labetalol (NORMODYNE;TRANDATE) injection 10 mg, Q4H PRN  melatonin tablet 3 mg, QPM  magic (miracle) mouthwash, Once  acetaminophen (TYLENOL) tablet 650 mg, Q4H PRN  enoxaparin (LOVENOX) injection 40 mg, Daily            Physical exam:   Constitutional:    Vitals: VITALS:  BP (!) 150/70   Pulse 78   Temp 97.3 °F (36.3 °C) (Temporal)   Resp 18   Ht 5' 5\" (1.651 m)   Wt 237 lb 12.8 oz (107.9 kg)   SpO2 99%   Breastfeeding No   BMI 39.57 kg/m²   24HR INTAKE/OUTPUT:      Intake/Output Summary (Last 24 hours) at 2/11/2020 1231  Last data filed at 2/11/2020 0879  Gross per 24 hour   Intake 1120 ml   Output 2500 ml   Net -1380 ml     URINARY CATHETER OUTPUT (Alas):     Skin: no rash, turgor wnl  Heent:  eomi, mmm  Neck: no bruits or jvd noted  Cardiovascular:  S1, S2 no S3 or rub  Respiratory: diminished   Abdomen:  +bs, soft, nt, nd  Ext: neg lower extremity edema  Psychiatric: mood and affect appropriate  Musculoskeletal:  Rom, muscular strength intact    Data:   Labs:  CBC:   Lab Results   Component Value Date    WBC 18.1 02/11/2020    RBC 4.58 02/11/2020    HGB 11.2 02/11/2020    HCT 37.6 02/11/2020    MCV 82.1 02/11/2020    MCH 24.5 02/11/2020    MCHC 29.8 02/11/2020    RDW 16.6 02/11/2020     02/11/2020    MPV 10.3 02/11/2020     BMP:    Lab Results   Component Value Date     02/11/2020    K 3.8 02/11/2020    K 4.2 02/05/2020    CL 98 02/11/2020    CO2 28 02/11/2020    BUN 32 02/11/2020    LABALBU 3.8 02/04/2020    LABALBU 4.4 07/28/2011    CREATININE 1.0 02/11/2020    CALCIUM 9.3 02/11/2020    GFRAA >60 02/11/2020    LABGLOM >60 02/11/2020    GLUCOSE 146 02/11/2020    GLUCOSE 172 04/12/2012     Hepatic Function Panel:    Lab Results   Component Value Date    ALKPHOS 122 02/04/2020    ALT 8 02/04/2020    AST 21 02/04/2020    PROT 7.0 02/04/2020    BILITOT 0.2 02/04/2020    LABALBU 3.8 02/04/2020    LABALBU 4.4 07/28/2011     Albumin:    Lab Results   Component Value Date    LABALBU 3.8 02/04/2020    LABALBU 4.4 07/28/2011     Ionized Calcium:  No results found for: IONCA  Magnesium:    Lab Results   Component Value Date    MG 2.1 02/11/2020     Phosphorus:    Lab Results   Component Value Date    PHOS 3.8 02/11/2020     TSH:    Lab Results   Component Value Date    TSH 0.591 06/10/2019     VITAMIN B12: No components found for: B12  FOLATE:    Lab Results   Component Value Date    FOLATE 13.0 06/10/2019     IRON:    Lab Results   Component Value Date    IRON 39 11/28/2011     Iron Saturation:  No components found for: PERCENTFE  TIBC:    Lab Results   Component Value Date    TIBC 369 11/28/2011     FERRITIN:    Lab Results   Component Value Date    FERRITIN 22.4 11/28/2011        Imaging:      TTE procedure:Echo Limited Study. Procedure Date  Date: 12/31/2019 Start: 02:15 PM     Study Location: Echo Lab  Technical Quality: Adequate visualization     Indications:Congestive heart failure, diastolic dysfunction.     Patient Status: ASAP     Height: 65 inches Weight: 250 pounds BSA: 2.17 m^2 BMI: 41.6 kg/m^2     Rhythm: Within normal limits HR: 81 bpm BP: 190/84 mmHg      Findings      Left Ventricle   Left ventricle is normal in size . Abnormal (paradoxical) motion consistent with left bundle branch block. Normal left ventricular ejection fraction. Ejection fraction is visually estimated at 55-60%(calculated 58%). There is doppler evidence of stage II diastolic dysfunction. Right Ventricle   Normal right ventricular size and function. Left Atrium   Normal sized left atrium. Interatrial septum appears intact. Right Atrium   Normal right atrium size. Mitral Valve   Normal mitral valve structure and function. Tricuspid Valve   The tricuspid valve appears structurally normal.   Mild tricuspid regurgitation. RVSP is 45 mmHg. Pulmonary hypertension is mild . Aortic Valve   The aortic valve leaflets were not well visualized. Structurally normal aortic valve. Pulmonic Valve   The pulmonic valve was not well visualized. Pericardial Effusion   No evidence of pericardial effusion. Aorta   Aortic root dimension within normal limits. Conclusions      Summary   Left ventricle is normal in size . Abnormal (paradoxical) motion consistent with left bundle branch block. Normal left ventricular ejection fraction. There is doppler evidence of stage II diastolic dysfunction. Mild tricuspid regurgitation. Pulmonary hypertension is mild . Assessment and Plan  1.  Hypertensive urgency-   BP's 140-170's systolic  Improved with increased clonidine to 0.2 tid from bid  Maintain on same at DC, follow home BP's.       2. COPD with exacerbation due to influenza A-  Steroid taper, follow       DMITRY Olivo - CNP     Patient seen and examined all key components of the physical performed independently , case discussed with NP, all pertinent labs and radiologic tests personally reviewed agree with above.       Mahalia Lennox, MD

## 2020-02-11 NOTE — PROGRESS NOTES
insulin glargine  35 Units Subcutaneous Nightly    pantoprazole  40 mg Oral QAM AC    insulin lispro  7 Units Subcutaneous TID     guaiFENesin  400 mg Oral TID    carvedilol  25 mg Oral BID     amLODIPine  10 mg Oral Daily    aspirin  81 mg Oral Daily    furosemide  20 mg Oral BID    gabapentin  400 mg Oral TID    metFORMIN  1,000 mg Oral BID     montelukast  10 mg Oral QPM    oxybutynin  5 mg Oral TID    pravastatin  80 mg Oral Daily    insulin lispro  0-12 Units Subcutaneous TID     insulin lispro  0-6 Units Subcutaneous Nightly    ipratropium-albuterol  1 ampule Inhalation Q4H WA    sodium chloride flush  10 mL Intravenous 2 times per day    losartan  100 mg Oral Daily    And    hydrochlorothiazide  25 mg Oral Daily    nystatin  5 mL Oral 4x Daily    melatonin  3 mg Oral QPM    magic (miracle) mouthwash  10 mL Swish & Spit Once    enoxaparin  40 mg Subcutaneous Daily       Physical Exam:  General Appearance: appears comfortable in no acute distress. HEENT: Normocephalic atraumatic without obvious abnormality   Neck: Lips, mucosa, and tongue normal.  Supple, symmetrical, trachea midline, no adenopathy;thyroid:  no enlargement/tenderness/nodules or JVD. Lung: Breath sounds improved wheezing bilaterally . Respirations   unlabored. Symmetrical expansion. Heart: RRR, normal S1, S2. No MRG  Abdomen: Soft, NT, ND. BS present x 4 quadrants. No bruit or organomegaly. Extremities: Pedal pulses 2+ symmetric b/l. Extremities normal, no cyanosis, clubbing, or edema. Musculokeletal: No joint swelling, no muscle tenderness. ROM normal in all joints of extremities. Neurologic: Mental status: Alert and Oriented X3 . Pertinent/ New Labs and Imaging Studies     Imaging Personally Reviewed:  2/7/2020      FINDINGS:       Heart and pulmonary vascularity normal. Lungs clear. Costophrenic   angles sharp.  Normal aorta.               Impression   No acute cardiopulmonary findings ECHO  12/31/2019   Summary   Left ventricle is normal in size . Abnormal (paradoxical) motion consistent with left bundle branch block. Normal left ventricular ejection fraction. There is doppler evidence of stage II diastolic dysfunction. Mild tricuspid regurgitation. Pulmonary hypertension is mild . Labs:  Lab Results   Component Value Date    WBC 18.1 02/11/2020    HGB 11.2 02/11/2020    HCT 37.6 02/11/2020    MCV 82.1 02/11/2020    MCH 24.5 02/11/2020    MCHC 29.8 02/11/2020    RDW 16.6 02/11/2020     02/11/2020    MPV 10.3 02/11/2020     Lab Results   Component Value Date     02/11/2020    K 3.8 02/11/2020    K 4.2 02/05/2020    CL 98 02/11/2020    CO2 28 02/11/2020    BUN 32 02/11/2020    CREATININE 1.0 02/11/2020    LABALBU 3.8 02/04/2020    LABALBU 4.4 07/28/2011    CALCIUM 9.3 02/11/2020    GFRAA >60 02/11/2020    LABGLOM >60 02/11/2020     Lab Results   Component Value Date    PROTIME 11.7 07/16/2013    PROTIME 11.6 11/01/2010    INR 1.1 07/16/2013      Assessment:    1. Acute viral infection: influenza A   2. AEasthma   3. AE: HFpEF  4. Morbid obesity   5. DM  6. Oral thrush  7. HFpEF, stage II DD  8. Uncontrolled HTN    Plan:   1. Monitor off oxygen keep >92%  2. Bronchodilators Duonebs every 4 hrs w/a   3. Completed Tamiflu x 5 total days  4. Droplet/contact isolation for influenza A  5. Tessalon for cough,  Mucinex  TID, flutter valve  6. Prednisone 20 mg po daily taper per pcp  7. No antibiotics necessary, procalcitonin 0.07  8. Continue diuresis , montior bp tight control nephrology following  9. Follow cxr 2/7/2020  10. DVT/GI prophylaxis  11. Pt has undergone multiple sleep latency testing, full in lab sleep study in 2016. All sleep testing was normal. No evidence of KENAN or narcolepsy. AHI 2.    12. OK to DC today from pulmonary.  Follows with Dr. Marvel Kramer message routed     This plan of care was reviewed in collaboration with [facility]  Electronically

## 2020-02-11 NOTE — HOME CARE
[address] [facility] referral received. Spoke with patient, demographics verified. Plan to see after discharge. Cruz Coleman LPN [address] [facility].

## 2020-02-11 NOTE — DISCHARGE SUMMARY
Physician Discharge Summary     Patient ID:  Tracy Wu  [id number]  71 y.o.  1950    Admit date: 2020    Discharge date and time: 2020  2:17 PM     Admission Diagnoses: COPD exacerbation ([facility] [J44.1]  Acute hypoxemic respiratory failure ([facility] [facility] [J96.01]  COPD exacerbation ([facility] [J44.1]    Discharge Diagnoses:        Acute influenza A tracheobronchitis     Acute exacerbation of COPD due to influenza A     History of CVA     Diabetes mellitus type 2, uncontrolled     Accelerated hypertension    Consults: pulmonary/intensive care and nephrology    Procedures: none    Laboratory:   Last 3 BMP  Recent Labs     20  05      K 3.8   CL 98   CO2 28   BUN 32*   CREATININE 1.0   GLUCOSE 146*   CALCIUM 9.3       Last 3 CBC:  Recent Labs     20   WBC 18.1*   RBC 4.58   HGB 11.2*   HCT 37.6   MCV 82.1   MCH 24.5*   MCHC 29.8*   RDW 16.6*   *   MPV 10.3           Hospital Course: The patient is a 71 y.o. female patient of Dr Karin Valdes who presents with fever and cough from home. She was well until 2 days prior to admission when she developed a cough nasal congestion and sore throat. The cough was productive of green thick phlegm. She was at a  on Monday and believes she may have been exposed to someone with the flu at that time. She does have a history of chronic obstructive lung disease. She has a nebulizer at home. She denies any chest pain abdominal pain diarrhea or vomiting. In the emergency room pharyngeal exam revealed thrush and she was given nystatin swish and swallow. No exudates were noted. She did have wheezing on exam.  She was found to be positive for influenza A. She was ambulated and became hypoxic. She was given intravenous steroids admitted for further evaluation.   She continues to complain of a sore throat.   Improved with aerosols and steroids but blood pressure elevated; seen by nephrology and medications adjusted; eventually discharged home in Take 1 tablet by mouth daily  Qty: 90 tablet, Refills: 1    Comments: 1/21/20: Disregard previous prescriptions and refills; honor this prescription and refills  Associated Diagnoses: Encounter for medication refill; Diabetic polyneuropathy associated with type 1 diabetes mellitus ([facility] IDDM (insulin dependent diabetes mellitus) (HCC)      anastrozole (ARIMIDEX) 1 MG tablet Take 1 tablet by mouth daily Indications: ESTROGEN DEFICIENCY IN BREAST CANCER (INACTIVE)  Qty: 90 tablet, Refills: 1    Comments: 1/21/20: Disregard previous prescriptions and refills; honor this prescription and refills  Associated Diagnoses: Encounter for medication refill; Ductal carcinoma in situ (DCIS) of right breast      calcium carbonate-vitamin D (CALCIUM 600 + D) 600-400 MG-UNIT TABS per tab Take 1 tablet by mouth 2 times daily  Qty: 60 tablet, Refills: 11    Associated Diagnoses: Ductal carcinoma in situ (DCIS) of breast      acetaminophen (APAP EXTRA STRENGTH) 500 MG tablet Take 1 tablet by mouth every 6 hours as needed for Pain  Qty: 30 tablet, Refills: 3      vitamin B-12 (CYANOCOBALAMIN) 1000 MCG tablet Take 1 tablet by mouth daily  Qty: 90 tablet, Refills: 1    Associated Diagnoses: Vitamin B12 deficiency      aspirin 81 MG chewable tablet Take 1 tablet by mouth daily  Qty: 90 tablet, Refills: 1    Comments: 1/21/2020: Disregard previous prescriptions and refills; honor this prescription and refills  Associated Diagnoses: Essential hypertension; Encounter for medication refill      melatonin (RA MELATONIN) 3 MG TABS tablet Take one 3 mg hs  Qty: 60 tablet, Refills: 0      butenafine (LOTRIMIN ULTRA) 1 % CREA Please apply from the toes, in between the toes, foot up to the upper calf twice daily for 1 month, both legs  Qty: 1 Tube, Refills: 10         STOP taking these medications       oseltamivir (TAMIFLU) 75 MG capsule Comments:   Reason for Stopping:         acyclovir (ZOVIRAX) 400 MG tablet Comments:   Reason for Stopping:

## 2020-02-13 ENCOUNTER — TELEPHONE (OUTPATIENT)
Dept: FAMILY MEDICINE CLINIC | Age: 70
End: 2020-02-13

## 2020-02-14 ENCOUNTER — TELEPHONE (OUTPATIENT)
Dept: FAMILY MEDICINE CLINIC | Age: 70
End: 2020-02-14

## 2020-02-14 NOTE — TELEPHONE ENCOUNTER
Aman Renee from [facility] requesting orders for PT/OT and if you would order a  as well / please advise    Aman Renee [phone]

## 2020-02-15 NOTE — PROGRESS NOTES
Post-Discharge Transitional Care Management Services or Hospital Follow Up      Margareth Gonzalez   YOB: 1950    Date of Office Visit:  2/18/2020  Date of Hospital Admission: 2/4/2020  Date of Hospital Discharge: 2/11/2020  Readmission Risk Score(high >=14%.  Medium >=10%):Readmission Risk Score: 39      Care management risk score Rising risk (score 2-5) and Complex Care (Scores >=6): 8     Non face to face  following discharge, date last encounter closed (first attempt may have been earlier): 2/13/2020  2:19 PM 2/13/2020  2:19 PM    Call initiated 2 business days of discharge: Yes     Patient Active Problem List   Diagnosis    IDDM (insulin dependent diabetes mellitus) ([address]    Hyperlipidemia with target LDL less than 70    Lymphedema of both lower extremities    Vitamin D deficiency    Ductal carcinoma in situ (DCIS) of breast    Herpes simplex supression    Type 2 diabetes mellitus without complication, with long-term current use of insulin (HCC)    Acute on chronic diastolic (congestive) heart failure ([address]    Essential hypertension    COPD (chronic obstructive pulmonary disease) ([address]    Pulmonary hypertension ([address]    Non-rheumatic aortic sclerosis ([address]    LBBB (left bundle branch block)    Diabetic polyneuropathy associated with type 1 diabetes mellitus ([address]    Diabetes mellitus type 2, uncontrolled ([address]    Mixed hyperlipidemia    Morbid obesity with BMI of 40.0-44.9, adult ([address]    History of CVA (cerebrovascular accident)    Chronic combined systolic and diastolic congestive heart failure (HCC)    Acute respiratory failure with hypoxemia (HCC)    Acute respiratory failure with hypoxia (HCC)    Uncontrolled hypertension    Supraventricular tachycardia (HCC)    Morbid obesity ([address]    Moderate persistent asthma without complication    Encounter for medication refill    COPD exacerbation ([address]    Acute hypoxemic respiratory failure (HCC)       No Known Allergies    Medications listed as ordered at the time of discharge from hospital   Enid Gonzalez Medication Instructions PHILLIP:    Printed on:02/18/20 [id number]   Medication Information                      acetaminophen (APAP EXTRA STRENGTH) 500 MG tablet  Take 1 tablet by mouth every 6 hours as needed for Pain             albuterol-ipratropium (COMBIVENT RESPIMAT)  MCG/ACT AERS inhaler  Inhale 1 puff into the lungs every 6 hours             amLODIPine (NORVASC) 10 MG tablet  Take 1 tablet by mouth daily             anastrozole (ARIMIDEX) 1 MG tablet  Take 1 tablet by mouth daily Indications: ESTROGEN DEFICIENCY IN BREAST CANCER (INACTIVE)             aspirin 81 MG chewable tablet  Take 1 tablet by mouth daily             benzonatate (TESSALON PERLES) 100 MG capsule  Take 1 capsule by mouth 3 times daily as needed for Cough             butenafine (LOTRIMIN ULTRA) 1 % CREA  Please apply from the toes, in between the toes, foot up to the upper calf twice daily for 1 month, both legs             calcium carbonate-vitamin D (CALCIUM 600 + D) 600-400 MG-UNIT TABS per tab  Take 1 tablet by mouth 2 times daily             carvedilol (COREG) 25 MG tablet  Take 1 tablet by mouth 2 times daily (with meals)             cloNIDine (CATAPRES) 0.2 MG tablet  Take 1 tablet by mouth 3 times daily             Fluticasone furoate-vilanterol (BREO ELLIPTA) 200-25 MCG/INH AEPB inhaler  Inhale 1 puff into the lungs daily             formoterol (PERFOROMIST) 20 MCG/2ML nebulizer solution  20 mcg             furosemide (LASIX) 20 MG tablet  Take 1 tablet by mouth 2 times daily             gabapentin (NEURONTIN) 400 MG capsule  Take 1 capsule by mouth 3 times daily.              insulin aspart (NOVOLOG FLEXPEN) 100 UNIT/ML injection pen  Inject 7 Units into the skin 3 times daily (before meals) Plus sliding scale max 50 units daily             insulin glargine (LANTUS) 100 UNIT/ML injection vial  Inject 35 Units into the skin ordered at time of hospital discharge: Yes    Chief Complaint   Patient presents with   Melum 64 from [address] / doing ok, but still weak        Physician Discharge Summary     Patient ID:  Douglas Darling  [id number]  71 y.o.  1950     Admit date: 2020     Discharge date and time: 2020  2:17 PM      Admission Diagnoses: COPD exacerbation ([address] (J44.1)  Acute hypoxemic respiratory failure (HCC) (J96.01)  COPD exacerbation ([facility] (J44.1)     Discharge Diagnoses:         Acute influenza A tracheobronchitis     Acute exacerbation of COPD due to influenza A     History of CVA     Diabetes mellitus type 2, uncontrolled     Accelerated hypertension     Consults: pulmonary/intensive care and nephrology        Hospital Course:      The patient is G 57 y.o. female patient of Dr Omer Malin presents with fever and cough from home.  She was well until 2 days prior to admission when she developed a cough nasal congestion and sore throat.  The cough was productive of green thick phlegm.  She was at a  on Monday and believes she may have been exposed to someone with the flu at that time. Sonya Biggs does have a history of chronic obstructive lung disease.  She has a nebulizer at home. Sonya Biggs denies any chest pain abdominal pain diarrhea or vomiting.  In the emergency room pharyngeal exam revealed thrush and she was given nystatin swish and swallow.  No exudates were noted. Sonya Biggs did have wheezing on exam.  She was found to be positive for influenza A.  She was ambulated and became hypoxic.  She was given intravenous steroids admitted for further evaluation.  She continues to complain of a sore throat.   Improved with aerosols and steroids but blood pressure elevated; seen by nephrology and medications adjusted; eventually discharged home in stable condition      Disposition: home     Patient Instructions:        Current Discharge Medication List  START taking these medications    Details  !! insulin lispro (HUMALOG) 100 UNIT/ML injection vial Inject 0-12 Units into the skin 3 times daily (with meals)  Qty: 1 vial, Refills: 3     !! insulin lispro (HUMALOG) 100 UNIT/ML injection vial Inject 0-6 Units into the skin nightly  Qty: 1 vial, Refills: 3     nystatin (MYCOSTATIN) 314517 UNIT/ML suspension Take 5 mLs by mouth 4 times daily  Qty: 100 mL, Refills: 0     guaiFENesin (MUCINEX MAXIMUM STRENGTH) 1200 MG TB12 Take 1 tablet by mouth 2 times daily as needed (Congestion)  Qty: 14 tablet, Refills: 0     benzonatate (TESSALON PERLES) 100 MG capsule Take 1 capsule by mouth 3 times daily as needed for Cough  Qty: 20 capsule, Refills: 0     STOP taking these medications       oseltamivir (TAMIFLU) 75 MG capsule Comments:   Reason for Stopping:          acyclovir (ZOVIRAX) 400 MG tablet Comments:   Reason for Stopping:               Inpatient course: Discharge summary reviewed- see chart. Interval history/Current status: All symptoms are improved. She is under the care of multiple specialists (Pulmonary, Cardiology, Endocrinology). Chart reviewed; specialists seem to feel that medical conditions are stable and well controlled    Review of Systems   HENT: Negative for congestion, ear pain and sore throat. Respiratory: Positive for cough, shortness of breath and wheezing. Cardiovascular: Positive for chest pain and leg swelling. Negative for palpitations. Gastrointestinal: Negative for abdominal pain, blood in stool, constipation, diarrhea, nausea and vomiting. Genitourinary: Negative for dysuria, frequency, hematuria and urgency. Musculoskeletal: Negative for back pain, myalgias and neck pain. Skin: Negative for rash. Neurological: Negative for dizziness, weakness and headaches. Psychiatric/Behavioral: The patient is not nervous/anxious. All other systems reviewed and are negative.       Vitals:    02/18/20 1142   BP: (!) 142/80   Pulse: 74   Temp: 98.4 °F (36.9 °C)   TempSrc: Oral   SpO2: 98%   Weight: 249 lb (112.9 kg)   Height: 5' 5.5\" (1.664 m)     Body mass index is 40.81 kg/m². Wt Readings from Last 3 Encounters:   02/18/20 249 lb (112.9 kg)   02/06/20 237 lb 12.8 oz (107.9 kg)   02/04/20 250 lb (113.4 kg)     BP Readings from Last 3 Encounters:   02/18/20 (!) 142/80   02/17/20 (!) 150/84   02/11/20 (!) 150/70       Physical Exam  Constitutional:       General: She is not in acute distress. Appearance: She is well-developed. She is not diaphoretic. HENT:      Head: Normocephalic and atraumatic. Right Ear: External ear normal.      Left Ear: External ear normal.      Mouth/Throat:      Pharynx: No oropharyngeal exudate. Eyes:      General: No scleral icterus. Right eye: No discharge. Conjunctiva/sclera: Conjunctivae normal.      Pupils: Pupils are equal, round, and reactive to light. Neck:      Musculoskeletal: Normal range of motion and neck supple. Thyroid: No thyromegaly. Cardiovascular:      Rate and Rhythm: Normal rate and regular rhythm. Heart sounds: Normal heart sounds. No murmur. Pulmonary:      Effort: Pulmonary effort is normal. No respiratory distress. Breath sounds: No stridor. No wheezing or rales. Chest:      Chest wall: No tenderness. Abdominal:      General: Bowel sounds are normal. There is no distension. Palpations: Abdomen is soft. There is no mass. Tenderness: There is no abdominal tenderness. There is no guarding. Musculoskeletal: Normal range of motion. General: No tenderness. Lymphadenopathy:      Cervical: No cervical adenopathy. Skin:     General: Skin is warm and dry. Coloration: Skin is not pale. Findings: No erythema or rash. Neurological:      Mental Status: She is alert and oriented to person, place, and time. Psychiatric:         Behavior: Behavior normal.         Thought Content:  Thought content normal.         Assessment / Plan:      Bharat Orellana

## 2020-02-17 ENCOUNTER — TELEPHONE (OUTPATIENT)
Dept: FAMILY MEDICINE CLINIC | Age: 70
End: 2020-02-17

## 2020-02-17 ENCOUNTER — APPOINTMENT (OUTPATIENT)
Dept: GENERAL RADIOLOGY | Age: 70
End: 2020-02-17
Payer: MEDICARE

## 2020-02-17 ENCOUNTER — TELEPHONE (OUTPATIENT)
Dept: OTHER | Facility: CLINIC | Age: 70
End: 2020-02-17

## 2020-02-17 ENCOUNTER — HOSPITAL ENCOUNTER (EMERGENCY)
Age: 70
Discharge: HOME | End: 2020-02-17
Attending: EMERGENCY MEDICINE
Payer: MEDICARE

## 2020-02-17 VITALS
SYSTOLIC BLOOD PRESSURE: 150 MMHG | DIASTOLIC BLOOD PRESSURE: 84 MMHG | TEMPERATURE: 98.6 F | OXYGEN SATURATION: 99 % | HEART RATE: 70 BPM | RESPIRATION RATE: 16 BRPM

## 2020-02-17 LAB
ALBUMIN SERPL-MCNC: 3.5 G/DL (ref 3.5–5.2)
ALP BLD-CCNC: 113 U/L (ref 35–104)
ALT SERPL-CCNC: 7 U/L (ref 0–32)
ANION GAP SERPL CALCULATED.3IONS-SCNC: 10 MMOL/L (ref 7–16)
AST SERPL-CCNC: 8 U/L (ref 0–31)
BILIRUB SERPL-MCNC: <0.2 MG/DL (ref 0–1.2)
BUN BLDV-MCNC: 10 MG/DL (ref 8–23)
CALCIUM SERPL-MCNC: 9.5 MG/DL (ref 8.6–10.2)
CHLORIDE BLD-SCNC: 104 MMOL/L (ref 98–107)
CO2: 27 MMOL/L (ref 22–29)
CREAT SERPL-MCNC: 0.7 MG/DL (ref 0.5–1)
GFR AFRICAN AMERICAN: >60
GFR NON-AFRICAN AMERICAN: >60 ML/MIN/1.73
GLUCOSE BLD-MCNC: 201 MG/DL (ref 74–99)
HCT VFR BLD CALC: 34.5 % (ref 34–48)
HEMOGLOBIN: 10.3 G/DL (ref 11.5–15.5)
MCH RBC QN AUTO: 24.5 PG (ref 26–35)
MCHC RBC AUTO-ENTMCNC: 29.9 % (ref 32–34.5)
MCV RBC AUTO: 81.9 FL (ref 80–99.9)
PDW BLD-RTO: 17.7 FL (ref 11.5–15)
PLATELET # BLD: 410 E9/L (ref 130–450)
PMV BLD AUTO: 11.2 FL (ref 7–12)
POTASSIUM SERPL-SCNC: 4.5 MMOL/L (ref 3.5–5)
PRO-BNP: 484 PG/ML (ref 0–125)
RBC # BLD: 4.21 E12/L (ref 3.5–5.5)
SODIUM BLD-SCNC: 141 MMOL/L (ref 132–146)
TOTAL PROTEIN: 6.4 G/DL (ref 6.4–8.3)
TROPONIN: <0.01 NG/ML (ref 0–0.03)
WBC # BLD: 18.4 E9/L (ref 4.5–11.5)

## 2020-02-17 PROCEDURE — 93005 ELECTROCARDIOGRAM TRACING: CPT | Performed by: NURSE PRACTITIONER

## 2020-02-17 PROCEDURE — 83880 ASSAY OF NATRIURETIC PEPTIDE: CPT

## 2020-02-17 PROCEDURE — 99283 EMERGENCY DEPT VISIT LOW MDM: CPT

## 2020-02-17 PROCEDURE — 85027 COMPLETE CBC AUTOMATED: CPT

## 2020-02-17 PROCEDURE — 84484 ASSAY OF TROPONIN QUANT: CPT

## 2020-02-17 PROCEDURE — 71046 X-RAY EXAM CHEST 2 VIEWS: CPT

## 2020-02-17 PROCEDURE — 80053 COMPREHEN METABOLIC PANEL: CPT

## 2020-02-17 PROCEDURE — 36415 COLL VENOUS BLD VENIPUNCTURE: CPT

## 2020-02-17 NOTE — TELEPHONE ENCOUNTER
Carlos Nicholas from  [address] called RE: high blood pressure readings today. 220/102 and after recheck  232/110     Carlos Nicholas stressed that she needs to go to ER, but patient states that she is fine. She took her bp med this am.  She HAS NO ha's, dizziness and also denies chest pain.       Please advise

## 2020-02-17 NOTE — ED PROVIDER NOTES
Department of Emergency Medicine   ED  Provider Note  Admit Date/RoomTime: 2/17/2020 12:23 PM  ED Room: 20/20 2/17/20  1:21 PM    History of Present Illness:   Ilda Hernandez is a 71 y.o. female presenting to the ED for elevated blood pressure. Patient refers home nurse send to the ED due to elevated blood pressure refers blood pressure at home was around 230/90 she had just taken her medications in the morning 20 minutes prior measuring. Patient refers she had mild occipital headache that resolved while at the ED. Denies any other symptoms at this time. Patient recently discharged from the hospital for COPD exacerbation currently continuing with steroid taper. Review of Systems:   Pertinent positives and negatives are stated within HPI, all other systems reviewed and are negative.    --------------------------------------------- PAST HISTORY ---------------------------------------------  Past Medical History:  has a past medical history of Arthritis, Asthma, Cancer ([facility] Cerebral artery occlusion with cerebral infarction [facility]), Cerebrovascular disease, CHF (congestive heart failure) ([address] Claustrophobia, COPD (chronic obstructive pulmonary disease) ([address] Decreased dorsalis pedis pulse, Dermatophytosis, Headache(784.0), History of blood transfusion, Hx of blood clots, Hyperlipidemia, Hypertension, LBP radiating to both legs, Lymphedema of both lower extremities, Neuromuscular disorder ([address] Non-rheumatic aortic sclerosis ([address] Obesity, Pulmonary hypertension ([address] Recurrent genital HSV (herpes simplex virus) infection, S/P breast biopsy, right, Type II or unspecified type diabetes mellitus without mention of complication, not stated as uncontrolled, and UTI (urinary tract infection). Past Surgical History:  has a past surgical history that includes Total hip arthroplasty (Bilateral); vin and bso (cervix removed); ECHO Compl W Dop Color Flow (6/7/2012);  Total knee arthroplasty (Bilateral, 2013); Colonoscopy (2005); Hysterectomy; Colonoscopy (1/8/15); Breast lumpectomy (years ago); arthroplasty (Left, 07/29/2016); Finger surgery (Left, 07/29/2016); joint replacement; Breast lumpectomy (Right, 09/06/2016); other surgical history (Right, 12/20/2017); and Hand surgery (12/2017). Social History:  reports that she quit smoking about 18 years ago. Her smoking use included cigarettes. She has a 8.75 pack-year smoking history. She has never used smokeless tobacco. She reports current alcohol use. She reports previous drug use. Drug: Marijuana. Family History: family history includes Breast Cancer in her sister and sister; Cancer (age of onset: 36) in an other family member; Cancer (age of onset: 55) in her sister; Cancer (age of onset: 59) in her sister; Diabetes in her father and mother; Heart Attack in her mother; Heart Failure in her father; High Blood Pressure in her father and mother; Sickle Cell Anemia in an other family member; Stomach Cancer in an other family member; Stroke in her sister. The patients home medications have been reviewed. Allergies: Patient has no known allergies.     -------------------------------------------------- RESULTS -------------------------------------------------  All laboratory and radiology results have been personally reviewed by myself   LABS:  Results for orders placed or performed during the hospital encounter of 02/17/20   CBC   Result Value Ref Range    WBC 18.4 (H) 4.5 - 11.5 E9/L    RBC 4.21 3.50 - 5.50 E12/L    Hemoglobin 10.3 (L) 11.5 - 15.5 g/dL    Hematocrit 34.5 34.0 - 48.0 %    MCV 81.9 80.0 - 99.9 fL    MCH 24.5 (L) 26.0 - 35.0 pg    MCHC 29.9 (L) 32.0 - 34.5 %    RDW 17.7 (H) 11.5 - 15.0 fL    Platelets 322 874 - 098 E9/L    MPV 11.2 7.0 - 12.0 fL   Comprehensive Metabolic Panel   Result Value Ref Range    Sodium 141 132 - 146 mmol/L    Potassium 4.5 3.5 - 5.0 mmol/L    Chloride 104 98 - 107 mmol/L    CO2 27 22 - 29 mmol/L    Anion Gap 10 7 - 16 mmol/L    Glucose 201 (H) 74 - 99 mg/dL    BUN 10 8 - 23 mg/dL    CREATININE 0.7 0.5 - 1.0 mg/dL    GFR Non-African American >60 >=60 mL/min/1.73    GFR African American >60     Calcium 9.5 8.6 - 10.2 mg/dL    Total Protein 6.4 6.4 - 8.3 g/dL    Alb 3.5 3.5 - 5.2 g/dL    Total Bilirubin <0.2 0.0 - 1.2 mg/dL    Alkaline Phosphatase 113 (H) 35 - 104 U/L    ALT 7 0 - 32 U/L    AST 8 0 - 31 U/L   Troponin   Result Value Ref Range    Troponin <0.01 0.00 - 0.03 ng/mL   Brain Natriuretic Peptide   Result Value Ref Range    Pro- (H) 0 - 125 pg/mL   EKG 12 Lead   Result Value Ref Range    Ventricular Rate 72 BPM    Atrial Rate 72 BPM    P-R Interval 124 ms    QRS Duration 130 ms    Q-T Interval 440 ms    QTc Calculation (Bazett) 481 ms    P Axis 61 degrees    R Axis -12 degrees    T Axis 98 degrees       RADIOLOGY:  Interpreted by Radiologist.  XR CHEST STANDARD (2 VW)   Final Result   No acute disease. EKG:  This EKG is signed and interpreted by me. Rate: 72bmp  Rhythm: Sinus  Interpretation: left bundle branch block  Comparison: stable as compared to patient's most recent EKG    ------------------------- NURSING NOTES AND VITALS REVIEWED ---------------------------   The nursing notes within the ED encounter and vital signs as below have been reviewed.    BP (!) 150/84   Pulse 70   Temp 98.6 °F (37 °C) (Temporal)   Resp 16   SpO2 99%   Oxygen Saturation Interpretation: Normal    ---------------------------------------------------PHYSICAL EXAM--------------------------------------    Constitutional/General: Alert and oriented x3, well appearing, non toxic in NAD  Head: Normocephalic and atraumatic  Eyes: PERRL, EOMI, conjunctiva normal, sclera non icteric  Mouth: Oropharynx clear, handling secretions, no trismus, no asymmetry of the posterior oropharynx or uvular edema  Neck: Supple, full ROM, non tender to palpation in the midline, no stridor, no crepitus, no meningeal signs  Respiratory: Lungs clear to auscultation bilaterally, no wheezes, rales, or rhonchi. Not in respiratory distress  Cardiovascular:  Regular rate. Regular rhythm. No murmurs, gallops, or rubs. 2+ distal pulses  Chest: No chest wall tenderness  GI:  Abdomen Soft, Non tender, Non distended. +BS. No organomegaly, no palpable masses,  No rebound, guarding, or rigidity. Musculoskeletal: Moves all extremities x 4. Warm and well perfused, no clubbing, cyanosis, or edema. Capillary refill <3 seconds  Integument: skin warm and dry. No rashes. Lymphatic: no lymphadenopathy noted  Neurologic: GCS 15, no focal deficits, symmetric strength 5/5 in the upper and lower extremities bilaterally  Psychiatric: Normal Affect    ------------------------------ ED COURSE/MEDICAL DECISION MAKING----------------------  Medications - No data to display    Medical Decision Making:   Patient at 82 elevated blood pressure at home waiting 230/90, Patient elevated blood pressure at the /93 and repeat 150/84 patient currently asymptomatic denies dizziness lightheadedness, headache, chest pain, palpitations, shortness of breath, heartburn, nausea, vomiting. Lab work performed CBC with elevated white count stable as from discharge from the hospital patient currently on steroid taper. CMP unremarkable except for elevated blood glucose 201, troponin negative , improved from previous. Chest x-ray no infiltrates, effusions. Patient refers she is feeling fine blood pressure is improved she will need to continue her blood pressure monitoring continue blood pressure medications, patient refers she has an appointment with PCP tomorrow. Patient days discussed with attending physician, with agrees with plan and assessment. Patient for discharge. Counseling:    The emergency provider has spoken with the patient and discussed todays results, in addition to providing specific details for the plan of care and counseling regarding the diagnosis and

## 2020-02-17 NOTE — TELEPHONE ENCOUNTER
Writer contacted Dr. Lord Trevino,   ED provider to inform of 30 day readmission risk. ED provider informed writer of intention to discharge and follow up recommended with PCP 02/20/20 days.

## 2020-02-17 NOTE — TELEPHONE ENCOUNTER
If she declines ER assessment, she can either come to walk in care or keep her scheduled appointment with me tomorrow.

## 2020-02-18 ENCOUNTER — OFFICE VISIT (OUTPATIENT)
Dept: FAMILY MEDICINE CLINIC | Age: 70
End: 2020-02-18
Payer: MEDICARE

## 2020-02-18 VITALS
TEMPERATURE: 98.4 F | SYSTOLIC BLOOD PRESSURE: 142 MMHG | OXYGEN SATURATION: 98 % | BODY MASS INDEX: 40.02 KG/M2 | HEART RATE: 74 BPM | WEIGHT: 249 LBS | DIASTOLIC BLOOD PRESSURE: 80 MMHG | HEIGHT: 66 IN

## 2020-02-18 PROCEDURE — 1111F DSCHRG MED/CURRENT MED MERGE: CPT | Performed by: FAMILY MEDICINE

## 2020-02-18 PROCEDURE — 99215 OFFICE O/P EST HI 40 MIN: CPT | Performed by: FAMILY MEDICINE

## 2020-02-18 RX ORDER — FORMOTEROL FUMARATE 20 UG/2ML
20 SOLUTION RESPIRATORY (INHALATION)
Status: ON HOLD | COMMUNITY
Start: 2019-12-03 | End: 2021-12-17 | Stop reason: HOSPADM

## 2020-02-18 ASSESSMENT — ENCOUNTER SYMPTOMS
COUGH: 1
WHEEZING: 1
ABDOMINAL PAIN: 0
DIARRHEA: 0
SORE THROAT: 0
NAUSEA: 0
BLOOD IN STOOL: 0
CONSTIPATION: 0
VOMITING: 0
BACK PAIN: 0
SHORTNESS OF BREATH: 1

## 2020-02-20 LAB
EKG ATRIAL RATE: 72 BPM
EKG P AXIS: 61 DEGREES
EKG P-R INTERVAL: 124 MS
EKG Q-T INTERVAL: 440 MS
EKG QRS DURATION: 130 MS
EKG QTC CALCULATION (BAZETT): 481 MS
EKG R AXIS: -12 DEGREES
EKG T AXIS: 98 DEGREES
EKG VENTRICULAR RATE: 72 BPM

## 2020-02-25 RX ORDER — FUROSEMIDE 20 MG/1
20 TABLET ORAL 2 TIMES DAILY
Qty: 60 TABLET | Refills: 0 | OUTPATIENT
Start: 2020-02-25

## 2020-03-10 ENCOUNTER — TELEPHONE (OUTPATIENT)
Dept: FAMILY MEDICINE CLINIC | Age: 70
End: 2020-03-10
Payer: MEDICARE

## 2020-03-10 PROCEDURE — G0179 MD RECERTIFICATION HHA PT: HCPCS | Performed by: FAMILY MEDICINE

## 2020-03-19 ENCOUNTER — TELEPHONE (OUTPATIENT)
Dept: FAMILY MEDICINE CLINIC | Age: 70
End: 2020-03-19

## 2020-03-19 NOTE — TELEPHONE ENCOUNTER
Home health nurse called stating pt gained 2 pounds overnight and she is in the \"yellow zone\"   If there are orders please call her back at [phone]

## 2020-03-20 NOTE — TELEPHONE ENCOUNTER
There are no orders, patient & home health have tools to manage her symptoms. She is encouraged to take her health seriously & take all measures to be accountable.

## 2020-03-25 ENCOUNTER — TELEPHONE (OUTPATIENT)
Dept: FAMILY MEDICINE CLINIC | Age: 70
End: 2020-03-25

## 2020-03-25 NOTE — TELEPHONE ENCOUNTER
Home care called and said patient has gained 4 pounds in a few days. She is taking 20 mg of Lasix 2 times a day.   She has no shortness of breath and b/p 130/80 and pulseox was 98%

## 2020-03-26 RX ORDER — FUROSEMIDE 20 MG/1
20 TABLET ORAL 2 TIMES DAILY
Qty: 180 TABLET | Refills: 1 | Status: ON HOLD
Start: 2020-03-26 | End: 2021-12-23 | Stop reason: HOSPADM

## 2020-03-30 ENCOUNTER — TELEPHONE (OUTPATIENT)
Dept: FAMILY MEDICINE CLINIC | Age: 70
End: 2020-03-30

## 2020-04-02 ENCOUNTER — TELEPHONE (OUTPATIENT)
Dept: FAMILY MEDICINE CLINIC | Age: 70
End: 2020-04-02

## 2020-04-07 ENCOUNTER — TELEPHONE (OUTPATIENT)
Dept: BREAST CENTER | Age: 70
End: 2020-04-07

## 2020-04-07 NOTE — TELEPHONE ENCOUNTER
Called patient regarding her upcoming appointment at [facility] (office of Dr. Sandra Paniagua, Dr. Josias Durand, and Lisandra Gonzalez CNP). Discussed concerns in light of the current Pandemic with COVID 19. We discussed that while there continue to be daily developments and information regarding this virus, we recognize the need to take all measures to ensure we protect the health and welfare of the patients we serve. She was given a future follow up appointment of July 20, 2020 at 1030 and our phone number with instructions to continue BSE and to contact us in the interim if she should notice any change.

## 2020-04-20 ENCOUNTER — TELEPHONE (OUTPATIENT)
Dept: FAMILY MEDICINE CLINIC | Age: 70
End: 2020-04-20

## 2020-04-20 NOTE — TELEPHONE ENCOUNTER
Home health would like to know if you can fill her Clonidine.   She take 0.2 mg 3 x a day and they believe she was prescribed it in the hospital

## 2020-04-21 RX ORDER — CLONIDINE HYDROCHLORIDE 0.2 MG/1
0.2 TABLET ORAL 3 TIMES DAILY
Qty: 270 TABLET | Refills: 1 | Status: SHIPPED
Start: 2020-04-21 | End: 2020-06-26 | Stop reason: SDUPTHER

## 2020-04-21 NOTE — TELEPHONE ENCOUNTER
Yes.    See if [facility] cam simply transfer the prescription to [facility] rather than having to redo the entire prescription

## 2020-04-27 ENCOUNTER — TELEPHONE (OUTPATIENT)
Dept: FAMILY MEDICINE CLINIC | Age: 70
End: 2020-04-27
Payer: MEDICARE

## 2020-04-27 PROCEDURE — G0179 MD RECERTIFICATION HHA PT: HCPCS | Performed by: FAMILY MEDICINE

## 2020-05-11 RX ORDER — ERGOCALCIFEROL 1.25 MG/1
50000 CAPSULE ORAL WEEKLY
Qty: 12 CAPSULE | Refills: 1 | OUTPATIENT
Start: 2020-05-11

## 2020-05-15 RX ORDER — ERGOCALCIFEROL 1.25 MG/1
50000 CAPSULE ORAL WEEKLY
Qty: 12 CAPSULE | Refills: 1 | OUTPATIENT
Start: 2020-05-15

## 2020-06-02 RX ORDER — ERGOCALCIFEROL 1.25 MG/1
50000 CAPSULE ORAL WEEKLY
Qty: 12 CAPSULE | Refills: 1 | OUTPATIENT
Start: 2020-06-02

## 2020-06-02 RX ORDER — OMEPRAZOLE 40 MG/1
40 CAPSULE, DELAYED RELEASE ORAL DAILY
OUTPATIENT
Start: 2020-06-02

## 2020-06-25 ASSESSMENT — ENCOUNTER SYMPTOMS
SORE THROAT: 0
BACK PAIN: 0
COUGH: 0
DIARRHEA: 0
ABDOMINAL PAIN: 0
NAUSEA: 0
WHEEZING: 0
VOMITING: 0
CONSTIPATION: 0
SHORTNESS OF BREATH: 0
BLOOD IN STOOL: 0

## 2020-06-25 NOTE — PROGRESS NOTES
Negative for dysuria, frequency, hematuria and urgency. Musculoskeletal: Negative for back pain, myalgias and neck pain. Skin: Negative for rash. Neurological: Negative for dizziness, weakness and headaches. Psychiatric/Behavioral: The patient is not nervous/anxious. All other systems reviewed and are negative.       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    Cerebrovascular disease 6/09    no residual    CHF (congestive heart failure) ([address] approx 3 years ago    Claustrophobia     COPD (chronic obstructive pulmonary disease) ([address]     Decreased dorsalis pedis pulse 9/5/2019    Dermatophytosis 9/5/2019    Headache(784.0)     History of blood transfusion     with knee surgery    Hx of blood clots     Hyperlipidemia     Hypertension     LBP radiating to both legs     Lymphedema of both lower extremities 11/12/2015    Neuromuscular disorder (HCC)     Non-rheumatic aortic sclerosis ([address] 10/2018    10/2018    Obesity     Pulmonary hypertension ([address] 10/2018    Recurrent genital HSV (herpes simplex virus) infection     last outbreak 11/2017    S/P breast biopsy, right 07/2016    pt states breast cancer    Type II or unspecified type diabetes mellitus without mention of complication, not stated as uncontrolled     AA1c8.7 9/10    UTI (urinary tract infection) 5/13/2019   ,   Past Surgical History:   Procedure Laterality Date    ARTHROPLASTY Left 07/29/2016    thumb basil joint with dequervain's release    BREAST LUMPECTOMY  years ago    benign    BREAST LUMPECTOMY Right 09/06/2016    COLONOSCOPY  2005    COLONOSCOPY  1/8/15    Dr. Merary Chinchilla Payor  6/7/2012         FINGER SURGERY Left 07/29/2016    thumb    HAND SURGERY  12/2017     Zoster Recombinant (Shingrix) 08/01/2018   ,   Health Maintenance   Topic Date Due    Diabetic foot exam  03/15/2017    Diabetic retinal exam  07/22/2017    Breast cancer screen  04/25/2020    A1C test (Diabetic or Prediabetic)  05/05/2020    Diabetic microalbuminuria test  06/12/2020    Lipid screen  06/10/2020    Shingles Vaccine (2 of 2) 11/29/2020 (Originally 9/26/2018)    Annual Wellness Visit (AWV)  01/29/2021    Potassium monitoring  02/17/2021    Creatinine monitoring  02/17/2021    Colon cancer screen colonoscopy  01/08/2025    DTaP/Tdap/Td vaccine (3 - Td) 07/01/2027    Flu vaccine  Completed    Pneumococcal 65+ years Vaccine  Completed    DEXA (modify frequency per FRAX score)  Addressed    Hepatitis A vaccine  Aged Out    Hib vaccine  Aged Out    Meningococcal (ACWY) vaccine  Aged Out    Hepatitis C screen  Discontinued       PHYSICAL EXAMINATION:    There were no vitals filed for this visit. Physical Exam  Constitutional:       General: She is not in acute distress. Appearance: Normal appearance. She is well-developed and normal weight. HENT:      Head: Normocephalic and atraumatic. Right Ear: External ear normal.      Left Ear: External ear normal.      Nose: Nose normal.      Mouth/Throat:      Mouth: Mucous membranes are moist.   Eyes:      Extraocular Movements: Extraocular movements intact. Conjunctiva/sclera: Conjunctivae normal.   Neck:      Musculoskeletal: Normal range of motion. Pulmonary:      Effort: Pulmonary effort is normal. No respiratory distress. Neurological:      Mental Status: She is alert and oriented to person, place, and time. Psychiatric:         Attention and Perception: Attention and perception normal.         Mood and Affect: Mood and affect normal.         Speech: Speech normal.         Behavior: Behavior normal. Behavior is cooperative. Thought Content:  Thought content normal.         Cognition and Memory: Cognition DEFICIENCY IN BREAST CANCER (INACTIVE)    Encounter for medication refill  -     vitamin D (ERGOCALCIFEROL) 1.25 MG (14902 UT) CAPS capsule; Take 1 capsule by mouth once a week  -     losartan-hydrochlorothiazide (HYZAAR) 100-25 MG per tablet; Take 1 tablet by mouth daily  -     omeprazole (PRILOSEC) 40 MG delayed release capsule; Take 1 capsule by mouth daily  -     saxagliptin (ONGLYZA) 5 MG TABS tablet; Take 1 tablet by mouth daily  -     Fluticasone furoate-vilanterol (BREO ELLIPTA) 200-25 MCG/INH AEPB inhaler; Inhale 1 puff into the lungs daily  -     anastrozole (ARIMIDEX) 1 MG tablet; Take 1 tablet by mouth daily Indications: ESTROGEN DEFICIENCY IN BREAST CANCER (INACTIVE)  -     gabapentin (NEURONTIN) 400 MG capsule; Take 1 capsule by mouth 3 times daily. -     aspirin 81 MG chewable tablet; Take 1 tablet by mouth daily  -     pravastatin (PRAVACHOL) 80 MG tablet; Take 1 tablet by mouth nightly  -     oxybutynin (DITROPAN) 5 MG tablet; Take 1 tablet by mouth 3 times daily  -     montelukast (SINGULAIR) 10 MG tablet; Take 1 tablet by mouth nightly  -     metFORMIN (GLUCOPHAGE) 1000 MG tablet; Take 1 tablet by mouth 2 times daily (with meals)  -     cloNIDine (CATAPRES) 0.2 MG tablet; Take 1 tablet by mouth 3 times daily    Gastrointestinal disorder  -     omeprazole (PRILOSEC) 40 MG delayed release capsule; Take 1 capsule by mouth daily        Follow Up:  Return for Schedule Medicare AWV after 1/29/2021. or sooner if necessary. Call or go to ED immediately if symptoms worsen or persist.    Educational materials and/or home exercises printed for patient's review and were included in patient instructions on his/her AfterVisit Summary and given to patient at the end of visit. Counseled regarding above diagnosis,including possible risks and complications,  especially if left uncontrolled.     Counseled regarding the possible side effects, risks, benefits and alternatives to treatment; patient and/or guardian verbalizes understanding, agrees, feels comfortable with and wishes to proceed with above treatment plan. Advised patient tocall with any new medication issues, and read all Rx info from pharmacy to assureaware of all possible risks and side effects of medication before taking. Reviewed age and gender appropriate health screening exams and vaccinations. Advisedpatient regarding importance of keeping up with recommended health maintenance andto schedule as soon as possible if overdue, as this is important in assessing forundiagnosed pathology, especially cancer, as well as protecting against potentially harmful/life threatening disease. Patient and/or guardian verbalizes understandingand agrees with above counseling, assessment and plan. All questions answered. Total time spent for this encounter: Not billed by time      --Ashley Morrison MD on 6/26/2020 at 10:56 AM    An electronic signature was used to authenticate this note.

## 2020-06-26 ENCOUNTER — VIRTUAL VISIT (OUTPATIENT)
Dept: FAMILY MEDICINE CLINIC | Age: 70
End: 2020-06-26
Payer: MEDICARE

## 2020-06-26 PROCEDURE — G8427 DOCREV CUR MEDS BY ELIG CLIN: HCPCS | Performed by: FAMILY MEDICINE

## 2020-06-26 PROCEDURE — 3017F COLORECTAL CA SCREEN DOC REV: CPT | Performed by: FAMILY MEDICINE

## 2020-06-26 PROCEDURE — 4040F PNEUMOC VAC/ADMIN/RCVD: CPT | Performed by: FAMILY MEDICINE

## 2020-06-26 PROCEDURE — G8399 PT W/DXA RESULTS DOCUMENT: HCPCS | Performed by: FAMILY MEDICINE

## 2020-06-26 PROCEDURE — 2022F DILAT RTA XM EVC RTNOPTHY: CPT | Performed by: FAMILY MEDICINE

## 2020-06-26 PROCEDURE — 1090F PRES/ABSN URINE INCON ASSESS: CPT | Performed by: FAMILY MEDICINE

## 2020-06-26 PROCEDURE — 1123F ACP DISCUSS/DSCN MKR DOCD: CPT | Performed by: FAMILY MEDICINE

## 2020-06-26 PROCEDURE — 99214 OFFICE O/P EST MOD 30 MIN: CPT | Performed by: FAMILY MEDICINE

## 2020-06-26 PROCEDURE — 3046F HEMOGLOBIN A1C LEVEL >9.0%: CPT | Performed by: FAMILY MEDICINE

## 2020-06-26 RX ORDER — ERGOCALCIFEROL 1.25 MG/1
50000 CAPSULE ORAL WEEKLY
Qty: 4 CAPSULE | Refills: 11 | Status: SHIPPED | OUTPATIENT
Start: 2020-06-26

## 2020-06-26 RX ORDER — OMEPRAZOLE 40 MG/1
40 CAPSULE, DELAYED RELEASE ORAL DAILY
Qty: 30 CAPSULE | Refills: 11 | Status: SHIPPED | OUTPATIENT
Start: 2020-06-26

## 2020-06-26 RX ORDER — OXYBUTYNIN CHLORIDE 5 MG/1
5 TABLET ORAL 3 TIMES DAILY
Qty: 90 TABLET | Refills: 11 | Status: SHIPPED | OUTPATIENT
Start: 2020-06-26

## 2020-06-26 RX ORDER — CLONIDINE HYDROCHLORIDE 0.2 MG/1
0.2 TABLET ORAL 3 TIMES DAILY
Qty: 90 TABLET | Refills: 11 | Status: SHIPPED
Start: 2020-06-26 | End: 2021-10-06 | Stop reason: ALTCHOICE

## 2020-06-26 RX ORDER — ANASTROZOLE 1 MG/1
1 TABLET ORAL DAILY
Qty: 30 TABLET | Refills: 11 | Status: SHIPPED
Start: 2020-06-26 | End: 2022-07-28 | Stop reason: ALTCHOICE

## 2020-06-26 RX ORDER — PRAVASTATIN SODIUM 80 MG/1
80 TABLET ORAL NIGHTLY
Qty: 90 TABLET | Refills: 3 | Status: SHIPPED | OUTPATIENT
Start: 2020-06-26

## 2020-06-26 RX ORDER — MONTELUKAST SODIUM 10 MG/1
10 TABLET ORAL NIGHTLY
Qty: 30 TABLET | Refills: 11 | Status: SHIPPED | OUTPATIENT
Start: 2020-06-26

## 2020-06-26 RX ORDER — LOSARTAN POTASSIUM AND HYDROCHLOROTHIAZIDE 25; 100 MG/1; MG/1
1 TABLET ORAL DAILY
Qty: 30 TABLET | Refills: 11 | Status: SHIPPED | OUTPATIENT
Start: 2020-06-26

## 2020-06-26 RX ORDER — ASPIRIN 81 MG/1
81 TABLET, CHEWABLE ORAL DAILY
Qty: 30 TABLET | Refills: 11 | Status: SHIPPED | OUTPATIENT
Start: 2020-06-26

## 2020-06-26 RX ORDER — GABAPENTIN 400 MG/1
400 CAPSULE ORAL 3 TIMES DAILY
Qty: 90 CAPSULE | Refills: 11 | Status: SHIPPED | OUTPATIENT
Start: 2020-06-26 | End: 2022-09-02

## 2020-06-26 ASSESSMENT — PATIENT HEALTH QUESTIONNAIRE - PHQ9
SUM OF ALL RESPONSES TO PHQ QUESTIONS 1-9: 0
1. LITTLE INTEREST OR PLEASURE IN DOING THINGS: 0
SUM OF ALL RESPONSES TO PHQ9 QUESTIONS 1 & 2: 0
2. FEELING DOWN, DEPRESSED OR HOPELESS: 0
SUM OF ALL RESPONSES TO PHQ QUESTIONS 1-9: 0

## 2020-07-14 ASSESSMENT — ENCOUNTER SYMPTOMS
EYE ITCHING: 0
ABDOMINAL PAIN: 0
RHINORRHEA: 0
VOICE CHANGE: 0
SHORTNESS OF BREATH: 1
COUGH: 1
NAUSEA: 0
SINUS PAIN: 0
WHEEZING: 0
CONSTIPATION: 0
EYE DISCHARGE: 0
ABDOMINAL DISTENTION: 0
SINUS PRESSURE: 0
TROUBLE SWALLOWING: 0
VOMITING: 0
CHOKING: 0
SORE THROAT: 0
BLOOD IN STOOL: 0
CHEST TIGHTNESS: 0
DIARRHEA: 0
BACK PAIN: 0

## 2020-07-14 NOTE — PROGRESS NOTES
Subjective: Right breast ductal carcinoma in situ, stage 0 disease: ER/KS positive  (ER 90%; KS 30%) . On Arimidex. This note was copied forward from the last encounter. Essential components for this patient record were reviewed and verified on this visit including:  recent hospitalizations, recent imaging, PMH, PSH, FH, SOC HX, Allergies, and Medications were reviewed and updated as appropriate. In addition, the assessment and plan were copied from prior office note and updated accordingly. Patient ID: Roxana Song is a 79 y.o. female. HPI    Patient is here for a follow up. She reports her niece at age 37 just had a double mastectomy. She underwent right breast needle localized lumpectomy on September 6, 2016. 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 Size of DCIS: At least 0.2 cm. Margins uninvolved by DCIS Estrogen receptor: Positive, greater than 90% (intensity= strong). Progesterone receptor: Positive, 30% (intensity = intermediate). Stage 0    RT:  Completed 11/17/2016. Endocrine therapy:  Arimidex, tolerating well. 4/25/19 - Screening mammogram - [facility]:     TISSUE DENSITY:   The breasts are heterogeneously dense (Type 3 density).       MAMMOGRAM FINDINGS:   Finding 1:   No suspicious masses, areas of suspicious architectural distortion, suspicious calcifications, or additional suspicious findings are identified.  There are no significant changes from the prior study.       IMPRESSION:   No mammographic evidence of malignancy.       Screening mammogram in 1 year is recommended.       =======================================   BI-RADS Category 1:  Negative          4/25/19 - Dexa Bone Density Scan:     FINDINGS:       Lumbar spine: Total bone mineral density of L1-4 on frontal imaging is 1.969 g/sq cm   with a T score of 6.6 and a Z score of 6.4.  This represents a 5.1%   decrease in bone mineral density relative to the 2018 study.       Left forearm:   Bone mineral density of the distal left radius is 0.947 g/sq cm with a   T score of    0.8 and a Z score of 1.8. This represents a 1.1% increase in bone   mineral density on today's study.           Impression   1. Normal bone mineral density values in the lumbar spine with no   statistically significant change.       2. Normal bone mineral density values in the left forearm with no   statistically significant change.        Past Medical History:   Diagnosis Date    Arthritis     Asthma     [facility] 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    Cerebrovascular disease 6/09    no residual    CH[facility]congest[facility] failure[facility] approx 3 years ago    Claustrophobia     COPD (chronic obstructive pulmonary disease) ([address]     Decreased dorsalis pedis pulse 9/5/2019    Dermatophytosis 9/5/2019    Headache(784.0)     History of blood transfusion     with knee surgery    Hx of blood clots     Hyperlipidemia     Hypertension     LBP radiating to both legs     Lymphedema of both lower extremities 11/12/2015    Neuromuscular disorder (HCC)     Non-rheumatic aortic sclerosis ([address] 10/2018    10/2018    Obesity     Pulmonary hypertension ([address] 10/2018    Recurrent genital HSV (herpes simplex virus) infection     last outbreak 11/2017    S/P breast biopsy, right 07/2016    pt states breast cancer    Type II or unspecified type diabetes mellitus without mention of complication, not stated as uncontrolled     AA1c8.7 9/10    UTI (urinary tract infection) 5/13/2019     Past Surgical History:   Procedure Laterality Date    ARTHROPLASTY Left 07/29/2016    thumb basil joint with dequervain's release    BREAST LUMPECTOMY  years ago    benign    BREAST LUMPECTOMY Right 09/06/2016    COLONOSCOPY  2005    COLONOSCOPY  1/8/15     nebulizer solution 20 mcg      cloNIDine (CATAPRES) 0.2 MG tablet Take 1 tablet by mouth 3 times daily 60 tablet 3    nystatin (MYCOSTATIN) 233422 UNIT/ML suspension Take 5 mLs by mouth 4 times daily 100 mL 0    insulin aspart (NOVOLOG FLEXPEN) 100 UNIT/ML injection pen Inject 7 Units into the skin 3 times daily (before meals) Plus sliding scale max 50 units daily      insulin glargine (LANTUS) 100 UNIT/ML injection vial Inject 35 Units into the skin nightly      melatonin (RA MELATONIN) 3 MG TABS tablet Take one 3 mg hs 60 tablet 0    calcium carbonate-vitamin D (CALCIUM 600 + D) 600-400 MG-UNIT TABS per tab Take 1 tablet by mouth 2 times daily 60 tablet 11    butenafine (LOTRIMIN ULTRA) 1 % CREA Please apply from the toes, in between the toes, foot up to the upper calf twice daily for 1 month, both legs 1 Tube 10    acetaminophen (APAP EXTRA STRENGTH) 500 MG tablet Take 1 tablet by mouth every 6 hours as needed for Pain 30 tablet 3    vitamin B-12 (CYANOCOBALAMIN) 1000 MCG tablet Take 1 tablet by mouth daily 90 tablet 1     No current facility-administered medications on file prior to visit. No Known Allergies      Review of Systems   Constitutional: Negative for activity change, appetite change, chills, fatigue, fever and unexpected weight change. Feels well overall. Recent inpatient admission for pneumonia. HENT: Negative for congestion, postnasal drip, rhinorrhea, sinus pressure, sinus pain, sore throat, trouble swallowing and voice change. Eyes: Negative for discharge, itching and visual disturbance. Respiratory: Positive for cough and shortness of breath (with exertion. Feels Breo may not be controllilng her symptoms as well as she expected. ). Negative for choking, chest tightness and wheezing. Cardiovascular: Negative for chest pain, palpitations and leg swelling.    Gastrointestinal: Negative for abdominal distention, abdominal pain, blood in stool, constipation, diarrhea, nausea and vomiting. Endocrine: Negative for cold intolerance and heat intolerance. Genitourinary: Negative for difficulty urinating, dysuria, frequency and hematuria. Musculoskeletal: Positive for arthralgias (Stable) and joint swelling. Negative for back pain, gait problem, myalgias, neck pain and neck stiffness. Skin: Negative for rash. Allergic/Immunologic: Negative for environmental allergies and food allergies. Neurological: Negative for dizziness, seizures, syncope, speech difficulty, weakness, light-headedness and headaches. Hematological: Negative for adenopathy. Does not bruise/bleed easily. Psychiatric/Behavioral: Negative for agitation, confusion and decreased concentration. The patient is not nervous/anxious. The patient voices no complaints. With questioning, she denies significant pain, fever, chills, wound drainage or discharge. Objective:   Physical Exam  Vitals signs and nursing note reviewed. Constitutional:       General: She is not in acute distress. Appearance: She is well-developed. She is obese. She is not diaphoretic. Comments: ECOG remains stable at 1   HENT:      Head: Normocephalic and atraumatic. Mouth/Throat:      Pharynx: No oropharyngeal exudate. Eyes:      General: No scleral icterus. Right eye: No discharge. Left eye: No discharge. Conjunctiva/sclera: Conjunctivae normal.   Neck:      Musculoskeletal: Normal range of motion and neck supple. Thyroid: No thyromegaly. Vascular: No JVD. Trachea: No tracheal deviation. Cardiovascular:      Rate and Rhythm: Normal rate and regular rhythm. Heart sounds: No murmur. No friction rub. No gallop. Pulmonary:      Effort: Pulmonary effort is normal. No respiratory distress or retractions. Breath sounds: Normal breath sounds. No stridor. No wheezing or rales. Chest:      Chest wall: No mass, lacerations, deformity, swelling, tenderness or edema. Breasts: Breasts are symmetrical.         Right: Tenderness present. No inverted nipple, mass, nipple discharge or skin change. Left: No inverted nipple, mass, nipple discharge, skin change or tenderness. Abdominal:      General: There is no distension. Palpations: Abdomen is soft. Tenderness: There is no abdominal tenderness. There is no guarding or rebound. Musculoskeletal: Normal range of motion. General: No tenderness or deformity. Right shoulder: Normal.      Left shoulder: Normal.      Right elbow: She exhibits no swelling. Right wrist: She exhibits no swelling. Lymphadenopathy:      Cervical: No cervical adenopathy. Right cervical: No superficial, deep or posterior cervical adenopathy. Left cervical: No superficial, deep or posterior cervical adenopathy. Upper Body:      Right upper body: No supraclavicular or pectoral adenopathy. Left upper body: No supraclavicular or pectoral adenopathy. Skin:     General: Skin is warm and dry. Coloration: Skin is not pale. Findings: No erythema or rash. Neurological:      Mental Status: She is alert and oriented to person, place, and time. Coordination: Coordination normal.   Psychiatric:         Behavior: Behavior normal.         Thought Content: Thought content normal.         Judgment: Judgment normal.         Assessment:      79 y.o. extremely pleasant woman who underwent right breast needle localized lumpectomy on September 6, 2016. 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 Size of DCIS: At least 0.2 cm. Margins uninvolved by DCIS Estrogen receptor: Positive, greater than 90% (intensity= strong). Progesterone receptor: Positive, 30% (intensity = intermediate)    -RT:  Completed 11/17/2016.  -Endocrine therapy:  Arimidex 1 mg daily was started on 11/15/2016. Continues to tolerate well. She has chronic pain in her left knee (history of knee replacement). She previously saw Dr. Michelle Sanchez [place] who  Has has left the area. -DEXA bone density: 04/18/2018:  Normal bone marrow density involving lumbar spine; normal bone marrow density involving the 33% radius bone. On Ca+/Vit D daily.  -April 18, 2018 presented with tenderness to palpation left breast 3:00 position and clinically thickened area at the 3:00 position. She has persistent tenderness, but not progressive.  -Bilateral digital diagnostic mammogram 4/18/2018 with left breast ultrasound:  Negative. -Bilateral screening mammogram 04/25/2019:  Negative. -DEXA bone density 04/25/2019:  Normal LS/Normal left forearm.      -Her last visit with Medical Oncology was June 20, 2017. We discussed need for medical oncology follow up and she was agreeable. Appointment made for follow up with Dr. Viktor Schmidt on 10/19/2018 but she did not keep that appointment. Dr. Roda Olszewski is prescribing her AI. -Call to her pharmacy notes there was a short lapse in AI compliance from May to August, but she is now on track. Reviewed importance of taking daily.    -Admitted 10/04/2019 for acute hypoxic resp. Failure:  Continues on oral Steroids. Has intermittent c/o air hunger. Pulse ox normal on room air and clinical exam negative. Advised to discuss Pulmonary referral with Dr. Roda Olszewski. Clinically, she is doing well and is without evidence of recurrence.       7/20/2020 - Bilateral screening mammogram - [facility]:    MAMMOGRAM FINDINGS:    Finding 1:    No suspicious masses, areas of suspicious architectural distortion, suspicious calcifications, or additional suspicious findings are identified.  There are no significant changes from the prior study.         IMPRESSION:    No mammographic evidence of malignancy.         Screening mammogram in 1 year is recommended.         =======================================    BI-RADS Category 1:  Negative    =======================================      Imaging reviewed with patient. No evidence of recurrent disease. 7/20/2020 - Dexa Bone Density Scan - [facility]:         FINDINGS:         Lumbar spine: Total bone mineral density of L1-4 on frontal imaging is 1.897 g/sq cm    with a T score of 6.0 and a Z score of 5.8. This represents a 3.7%    decrease in bone mineral density relative to the 2019 study.         Left forearm:    Bone mineral density of the distal left radius is 0.946 g/sq cm with a    T score of    0.8 and a Z score of 1.9. This represents a 0.1% decrease in bone    mineral density compared to the prior exam.         The 10 year probability of major osteoporotic fracture and major hip    fracture were not reported.              Impression    Normal bone mineral density values in the lumbar spine and left    forearm with no statistically significant change.                Patient without complaints. No evidence of recurrent disease on imaging or mammogram.  Follow-up with us again in 6 months, with imaging in 1 year. Questions answered to patient satisfaction. Plan:       1. Continue monthly breast self examination. Bring any changes to your physician's attention. 1. Continue healthy diet and exercise routinely as tolerated. 2. Maintaining ideal body weight (20-25 BMI) may lead to optimal breast cancer outcomes. 3. Avoid alcohol or limit alcohol intake to < 3 drinks per week. 4. Continue Arimidex 1 mg daily. 5. Ca+/VitD while on Arimidex. 6. Repeat DEXA: July 2021  7. Repeat mammogram July 2021  8. Return to see in 6 months for follow up        During today's visit, face-to-face time 25 minutes, greater than 50% in counseling education and coordination of care. All questions were answered to her apparent satisfaction, and she is agreeable to the plan as outlined above. Radha Gaspar MD [facility]  July 20, 2020

## 2020-07-20 ENCOUNTER — HOSPITAL ENCOUNTER (OUTPATIENT)
Dept: GENERAL RADIOLOGY | Age: 70
Discharge: HOME | End: 2020-07-22
Payer: MEDICARE

## 2020-07-20 ENCOUNTER — OFFICE VISIT (OUTPATIENT)
Dept: BREAST CENTER | Age: 70
End: 2020-07-20
Payer: MEDICARE

## 2020-07-20 VITALS
OXYGEN SATURATION: 98 % | BODY MASS INDEX: 43.49 KG/M2 | SYSTOLIC BLOOD PRESSURE: 126 MMHG | DIASTOLIC BLOOD PRESSURE: 72 MMHG | WEIGHT: 261 LBS | HEART RATE: 71 BPM | HEIGHT: 65 IN | TEMPERATURE: 98.4 F | RESPIRATION RATE: 18 BRPM

## 2020-07-20 PROCEDURE — 77080 DXA BONE DENSITY AXIAL: CPT

## 2020-07-20 PROCEDURE — G8417 CALC BMI ABV UP PARAM F/U: HCPCS | Performed by: SURGERY

## 2020-07-20 PROCEDURE — 1090F PRES/ABSN URINE INCON ASSESS: CPT | Performed by: SURGERY

## 2020-07-20 PROCEDURE — G8399 PT W/DXA RESULTS DOCUMENT: HCPCS | Performed by: SURGERY

## 2020-07-20 PROCEDURE — 1123F ACP DISCUSS/DSCN MKR DOCD: CPT | Performed by: SURGERY

## 2020-07-20 PROCEDURE — 1036F TOBACCO NON-USER: CPT | Performed by: SURGERY

## 2020-07-20 PROCEDURE — 4040F PNEUMOC VAC/ADMIN/RCVD: CPT | Performed by: SURGERY

## 2020-07-20 PROCEDURE — 3017F COLORECTAL CA SCREEN DOC REV: CPT | Performed by: SURGERY

## 2020-07-20 PROCEDURE — 77063 BREAST TOMOSYNTHESIS BI: CPT

## 2020-07-20 PROCEDURE — 99213 OFFICE O/P EST LOW 20 MIN: CPT | Performed by: SURGERY

## 2020-07-20 PROCEDURE — G8427 DOCREV CUR MEDS BY ELIG CLIN: HCPCS | Performed by: SURGERY

## 2020-07-20 RX ORDER — LANOLIN ALCOHOL/MO/W.PET/CERES
CREAM (GRAM) TOPICAL
Qty: 90 TABLET | Refills: 3 | Status: SHIPPED | OUTPATIENT
Start: 2020-07-20

## 2020-08-18 ENCOUNTER — TELEPHONE (OUTPATIENT)
Dept: ADMINISTRATIVE | Age: 70
End: 2020-08-18

## 2020-08-18 ENCOUNTER — OFFICE VISIT (OUTPATIENT)
Dept: PRIMARY CARE CLINIC | Age: 70
End: 2020-08-18
Payer: MEDICARE

## 2020-08-18 ENCOUNTER — HOSPITAL ENCOUNTER (OUTPATIENT)
Age: 70
Discharge: HOME | End: 2020-08-20
Payer: MEDICARE

## 2020-08-18 VITALS
WEIGHT: 242 LBS | HEART RATE: 83 BPM | OXYGEN SATURATION: 99 % | TEMPERATURE: 98.7 F | SYSTOLIC BLOOD PRESSURE: 138 MMHG | BODY MASS INDEX: 40.32 KG/M2 | DIASTOLIC BLOOD PRESSURE: 74 MMHG | HEIGHT: 65 IN

## 2020-08-18 PROCEDURE — 3023F SPIROM DOC REV: CPT | Performed by: PHYSICIAN ASSISTANT

## 2020-08-18 PROCEDURE — U0003 INFECTIOUS AGENT DETECTION BY NUCLEIC ACID (DNA OR RNA); SEVERE ACUTE RESPIRATORY SYNDROME CORONAVIRUS 2 (SARS-COV-2) (CORONAVIRUS DISEASE [COVID-19]), AMPLIFIED PROBE TECHNIQUE, MAKING USE OF HIGH THROUGHPUT TECHNOLOGIES AS DESCRIBED BY CMS-2020-01-R: HCPCS

## 2020-08-18 PROCEDURE — G8926 SPIRO NO PERF OR DOC: HCPCS | Performed by: PHYSICIAN ASSISTANT

## 2020-08-18 PROCEDURE — G8399 PT W/DXA RESULTS DOCUMENT: HCPCS | Performed by: PHYSICIAN ASSISTANT

## 2020-08-18 PROCEDURE — 99213 OFFICE O/P EST LOW 20 MIN: CPT | Performed by: PHYSICIAN ASSISTANT

## 2020-08-18 PROCEDURE — 1036F TOBACCO NON-USER: CPT | Performed by: PHYSICIAN ASSISTANT

## 2020-08-18 PROCEDURE — G8427 DOCREV CUR MEDS BY ELIG CLIN: HCPCS | Performed by: PHYSICIAN ASSISTANT

## 2020-08-18 PROCEDURE — 1123F ACP DISCUSS/DSCN MKR DOCD: CPT | Performed by: PHYSICIAN ASSISTANT

## 2020-08-18 PROCEDURE — 3017F COLORECTAL CA SCREEN DOC REV: CPT | Performed by: PHYSICIAN ASSISTANT

## 2020-08-18 PROCEDURE — 4040F PNEUMOC VAC/ADMIN/RCVD: CPT | Performed by: PHYSICIAN ASSISTANT

## 2020-08-18 PROCEDURE — G8417 CALC BMI ABV UP PARAM F/U: HCPCS | Performed by: PHYSICIAN ASSISTANT

## 2020-08-18 PROCEDURE — 1090F PRES/ABSN URINE INCON ASSESS: CPT | Performed by: PHYSICIAN ASSISTANT

## 2020-08-18 RX ORDER — DOXYCYCLINE HYCLATE 100 MG
100 TABLET ORAL 2 TIMES DAILY
Qty: 20 TABLET | Refills: 0 | Status: SHIPPED | OUTPATIENT
Start: 2020-08-18 | End: 2020-08-28

## 2020-08-18 RX ORDER — BENZONATATE 200 MG/1
200 CAPSULE ORAL 3 TIMES DAILY PRN
Qty: 15 CAPSULE | Refills: 0 | Status: SHIPPED
Start: 2020-08-18 | End: 2022-05-04 | Stop reason: ALTCHOICE

## 2020-08-18 RX ORDER — PREDNISONE 20 MG/1
20 TABLET ORAL 2 TIMES DAILY
Qty: 10 TABLET | Refills: 0 | Status: SHIPPED | OUTPATIENT
Start: 2020-08-18 | End: 2020-08-23

## 2020-08-18 NOTE — TELEPHONE ENCOUNTER
Pt states she has a cough and congestion, wasn't sure if she had fever or not. I suggested a VV for tomorrow and she stated she couldn't do a VV, wanted to actually be seen, not on camera. Please advise. Thank you.

## 2020-08-18 NOTE — PROGRESS NOTES
dyspnea, LE edema, abdominal pain, vomiting, rash, or lethargy. Has not been taking anything OTC for symptomatic relief. Denies any history of international travel in the past 14 days. Denies any contact with any individuals with known COVID-19 infection or under investigation for COVID-19 infection. Relevant PMH: DM, COPD, asthma, CHF. Smoking history:  She reports that she quit smoking about 18 years ago. Her smoking use included cigarettes. She started smoking about 54 years ago. She has a 8.75 pack-year smoking history. She has never used smokeless tobacco.     She has had no known ill contacts. Treatment to date: none. Travel screen completed:  Yes        ROS   Pertinent positives and negatives are stated within HPI, all other systems reviewed and are negative. Past Surgical History:   Procedure Laterality Date    ARTHROPLASTY Left 07/29/2016    thumb basil joint with dequervain's release    BREAST LUMPECTOMY  years ago    benign    BREAST LUMPECTOMY Right 09/06/2016    COLONOSCOPY  2005    COLONOSCOPY  1/8/15    Dr. Lionel Harper [place]  6/7/2012         FINGER SURGERY Left 07/29/2016    thumb    HAND SURGERY  12/2017    HYSTERECTOMY      JOINT REPLACEMENT      OTHER SURGICAL HISTORY Right 12/20/2017    RIGHT THUMB TRAPEZIECTOMY WITH LIGAMENT RECONSRUCTION DEQUERVAINS RELEASE    TIM AND BSO      TOTAL HIP ARTHROPLASTY Bilateral     2000, 2013 dr Allen Gleason, dr Esthela Ma Bilateral 2013    dr Anell Lesch History:  reports that she quit smoking about 18 years ago. Her smoking use included cigarettes. She started smoking about 54 years ago. She has a 8.75 pack-year smoking history. She has never used smokeless tobacco. She reports current alcohol use. She reports previous drug use. Drug: Marijuana.   Family History: family history includes Breast Cancer in her sister and sister; Cancer (age of onset: 36) in an other family member; Cancer

## 2020-08-20 LAB
SARS-COV-2: NOT DETECTED
SOURCE: NORMAL

## 2020-09-14 ENCOUNTER — TELEPHONE (OUTPATIENT)
Dept: FAMILY MEDICINE CLINIC | Age: 70
End: 2020-09-14

## 2020-11-03 ENCOUNTER — TELEPHONE (OUTPATIENT)
Dept: FAMILY MEDICINE CLINIC | Age: 70
End: 2020-11-03

## 2020-11-03 PROBLEM — I10 ESSENTIAL HYPERTENSION: Status: RESOLVED | Noted: 2019-01-15 | Resolved: 2020-11-03

## 2020-11-03 NOTE — TELEPHONE ENCOUNTER
I believe insulin may have last been prescribed by Endocrinology. Please go to Medication tab and locate the insulin.   Double click on the medication and the electronic prescription confirmation should tell you who the prescriber is

## 2020-11-03 NOTE — TELEPHONE ENCOUNTER
We have a prior auth that come over for patient insulin aspart flex pen. But I was not sure if this was prescribed by you. After checking in the medication history it does not have who prescribed it. Can you check to see if this is something that was prescribed by you or by the endocrinologist?  Thanks.

## 2020-11-11 ENCOUNTER — TELEPHONE (OUTPATIENT)
Dept: FAMILY MEDICINE CLINIC | Age: 70
End: 2020-11-11

## 2020-11-11 NOTE — TELEPHONE ENCOUNTER
Roberto/[facility] called to inform that Insulin Aspart is not covered by insurance and he offered alternatives   Humalog Kwikpen  Insulin Lispro    [phone]

## 2020-11-12 NOTE — TELEPHONE ENCOUNTER
Did some checking in patient medication list and does look that it was prescribed by you previously,  Can you change it to [phone]? Thanks. Did try to contact patient and voicemail full.

## 2021-01-06 ENCOUNTER — HOSPITAL ENCOUNTER (OUTPATIENT)
Dept: GENERAL RADIOLOGY | Age: 71
Discharge: HOME | End: 2021-01-08
Payer: MEDICARE

## 2021-01-06 ENCOUNTER — HOSPITAL ENCOUNTER (OUTPATIENT)
Age: 71
Discharge: HOME | End: 2021-01-08
Payer: MEDICARE

## 2021-01-06 DIAGNOSIS — R52 PAIN: ICD-10-CM

## 2021-01-06 PROCEDURE — 72100 X-RAY EXAM L-S SPINE 2/3 VWS: CPT

## 2021-01-12 ASSESSMENT — ENCOUNTER SYMPTOMS
RHINORRHEA: 0
CONSTIPATION: 0
TROUBLE SWALLOWING: 0
NAUSEA: 0
DIARRHEA: 0
VOICE CHANGE: 0
WHEEZING: 0
SORE THROAT: 0
EYE ITCHING: 0
CHOKING: 0
EYE DISCHARGE: 0
ABDOMINAL DISTENTION: 0
VOMITING: 0
SINUS PAIN: 0
CHEST TIGHTNESS: 0
SINUS PRESSURE: 0
BLOOD IN STOOL: 0
ABDOMINAL PAIN: 0

## 2021-01-12 NOTE — PROGRESS NOTES
Subjective: Right breast ductal carcinoma in situ, stage 0 disease: [place] positive  ([place] 90%; [place] 30%) . On Arimidex since 11/15/2016  No longer follows with Medical Oncology. This note was copied forward from the last encounter. Essential components for this patient record were reviewed and verified on this visit including:  recent hospitalizations, recent imaging, PMH, PSH, FH, SOC HX, Allergies, and Medications were reviewed and updated as appropriate. In addition, the assessment and plan were copied from prior office note and updated accordingly. Patient ID: Carmita Banegas is a 79 y.o. female. HPI  Patient is here for a follow up. She reports her niece at age 37 just had a double mastectomy. She underwent right breast needle localized lumpectomy on September 6, 2016. 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 Size of DCIS: At least 0.2 cm. Margins uninvolved by DCIS Estrogen receptor: Positive, greater than 90% (intensity= strong). Progesterone receptor: Positive, 30% (intensity = intermediate). Stage 0    RT:  Completed 11/17/2016. Endocrine therapy:  Arimidex, which she continues to tolerate fairly well. She has chronic back pain and scattered arthralgias; stable from baseline.   .    4/25/19 - Screening mammogram - [facility]:     TISSUE DENSITY:   The breasts are heterogeneously dense (Type 3 density).       MAMMOGRAM FINDINGS:   Finding 1:   No suspicious masses, areas of suspicious architectural distortion, suspicious calcifications, or additional suspicious findings are identified.  There are no significant changes from the prior study.       IMPRESSION:   No mammographic evidence of malignancy.       Screening mammogram in 1 year is recommended.       =======================================   BI-RADS Category 1:  Negative          4/25/19 - Dexa Bone Density Scan:     FINDINGS:       Lumbar spine:   Total bone mineral density of L1-4 on frontal imaging is 1.969 g/sq cm   with a T score of 6.6 and a Z score of 6.4. This represents a 5.1%   decrease in bone mineral density relative to the 2018 study.       Left forearm:   Bone mineral density of the distal left radius is 0.947 g/sq cm with a   T score of    0.8 and a Z score of 1.8. This represents a 1.1% increase in bone   mineral density on today's study.           Impression   1. Normal bone mineral density values in the lumbar spine with no   statistically significant change.       2. Normal bone mineral density values in the left forearm with no   statistically significant change.        Past Medical History:   Diagnosis Date    Arthritis     Asthma     Cancer ([address]     breast     Cerebral artery occlusion with cerebral infarction 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    Cerebrovascular disease 6/09    no residual    CHF [facility]congestive heart failure[facility] ([facility] approx 3 years ago    Claustrophobia     COPD (chronic obstructive pulmonary disease) ([address]     Decreased dorsalis pedis pulse 9/5/2019    Dermatophytosis 9/5/2019    Headache(784.0)     History of blood transfusion     with knee surgery    Hx of blood clots     Hyperlipidemia     Hypertension     LBP radiating to both legs     Lymphedema of both lower extremities 11/12/2015    Neuromuscular disorder (HCC)     Non-rheumatic aortic sclerosis ([address] 10/2018    10/2018    Obesity     Pulmonary hypertension ([address] 10/2018    Recurrent genital HSV (herpes simplex virus) infection     last outbreak 11/2017    S/P breast biopsy, right 07/2016    pt states breast cancer    Type II or unspecified type diabetes mellitus without mention of complication, not stated as uncontrolled     AA1c8.7 9/10    UTI (urinary tract infection) 5/13/2019     Past Surgical History:   Procedure Laterality Date    ARTHROPLASTY Left 07/29/2016    thumb basil joint with dequervain's release    BREAST LUMPECTOMY  years ago    benign    BREAST LUMPECTOMY Right 09/06/2016    COLONOSCOPY  2005    COLONOSCOPY  1/8/15    Dr. Bharath Hurst - [facility]ors  6/7/2012         FINGER SURGERY Left 07/29/2016    thumb    HAND SURGERY  12/2017    HYSTERECTOMY      JOINT REPLACEMENT      OTHER SURGICAL HISTORY Right 12/20/2017    RIGHT THUMB TRAPEZIECTOMY WITH LIGAMENT [address] RELEASE    TIM AND BSO      TOTAL HIP ARTHROPLASTY Bilateral     2000, 2013 dr Javad Delgado, dr Rincon Orn Bilateral 2013    dr Jose Elias Rubio     Current Outpatient Medications on File Prior to Visit   Medication Sig Dispense Refill    benzonatate (TESSALON) 200 MG capsule Take 1 capsule by mouth 3 times daily as needed for Cough 15 capsule 0    melatonin (RA MELATONIN) 3 MG TABS tablet Take one 3 mg hs 90 tablet 3    vitamin D (ERGOCALCIFEROL) 1.25 MG (68967 UT) CAPS capsule Take 1 capsule by mouth once a week 4 capsule 11    losartan-hydrochlorothiazide (HYZAAR) 100-25 MG per tablet Take 1 tablet by mouth daily 30 tablet 11    omeprazole (PRILOSEC) 40 MG delayed release capsule Take 1 capsule by mouth daily (Patient not taking: Reported on 7/20/2020) 30 capsule 11    saxagliptin (ONGLYZA) 5 MG TABS tablet Take 1 tablet by mouth daily 30 tablet 11    Fluticasone furoate-vilanterol (BREO ELLIPTA) 200-25 MCG/INH AEPB inhaler Inhale 1 puff into the lungs daily 1 each 11    anastrozole (ARIMIDEX) 1 MG tablet Take 1 tablet by mouth daily Indications: ESTROGEN DEFICIENCY IN BREAST CANCER (INACTIVE) 30 tablet 11    gabapentin (NEURONTIN) 400 MG capsule Take 1 capsule by mouth 3 times daily.  90 capsule 11    aspirin 81 MG chewable tablet Take 1 tablet by mouth daily 30 tablet 11    pravastatin (PRAVACHOL) 80 MG tablet Take 1 tablet by mouth nightly 90 tablet 3    oxybutynin (DITROPAN) 5 MG tablet Take 1 tablet by mouth 3 times daily 90 tablet 11    montelukast (SINGULAIR) 10 MG tablet Take 1 tablet by mouth nightly 30 tablet 11    metFORMIN (GLUCOPHAGE) 1000 MG tablet Take 1 tablet by mouth 2 times daily (with meals) 60 tablet 11    cloNIDine (CATAPRES) 0.2 MG tablet Take 1 tablet by mouth 3 times daily 90 tablet 11    furosemide (LASIX) 20 MG tablet Take 1 tablet by mouth 2 times daily 180 tablet 1    formoterol (PERFOROMIST) 20 MCG/2ML nebulizer solution 20 mcg      cloNIDine (CATAPRES) 0.2 MG tablet Take 1 tablet by mouth 3 times daily 60 tablet 3    nystatin (MYCOSTATIN) 930203 UNIT/ML suspension Take 5 mLs by mouth 4 times daily 100 mL 0    insulin aspart (NOVOLOG FLEXPEN) 100 UNIT/ML injection pen Inject 7 Units into the skin 3 times daily (before meals) Plus sliding scale max 50 units daily      insulin glargine (LANTUS) 100 UNIT/ML injection vial Inject 50 Units into the skin nightly       calcium carbonate-vitamin D (CALCIUM 600 + D) 600-400 MG-UNIT TABS per tab Take 1 tablet by mouth 2 times daily 60 tablet 11    butenafine (LOTRIMIN ULTRA) 1 % CREA Please apply from the toes, in between the toes, foot up to the upper calf twice daily for 1 month, both legs 1 Tube 10    acetaminophen (APAP EXTRA STRENGTH) 500 MG tablet Take 1 tablet by mouth every 6 hours as needed for Pain 30 tablet 3    vitamin B-12 (CYANOCOBALAMIN) 1000 MCG tablet Take 1 tablet by mouth daily 90 tablet 1     No current facility-administered medications on file prior to visit. No Known Allergies      Review of Systems   Constitutional: Negative for activity change, appetite change, chills, fatigue, fever and unexpected weight change. Feels well overall. Recent inpatient admission for pneumonia. HENT: Negative for congestion, postnasal drip, rhinorrhea, sinus pressure, sinus pain, sore throat, trouble swallowing and voice change. Eyes: Negative for discharge, itching and visual disturbance.    Respiratory: Positive for shortness of breath (Chronic shortness of breath with exertion; overall stable. ). Negative for choking, chest tightness and wheezing. Cardiovascular: Negative for chest pain, palpitations and leg swelling. Gastrointestinal: Negative for abdominal distention, abdominal pain, blood in stool, constipation, diarrhea, nausea and vomiting. Endocrine: Negative for cold intolerance and heat intolerance. Genitourinary: Negative for difficulty urinating, dysuria, frequency and hematuria. Musculoskeletal: Positive for arthralgias (Stable), back pain (Acute on chronic; on Medrol Dosepak.) and joint swelling. Negative for gait problem, myalgias, neck pain and neck stiffness. Skin: Negative for rash. Allergic/Immunologic: Negative for environmental allergies and food allergies. Neurological: Negative for dizziness, seizures, syncope, speech difficulty, weakness, light-headedness and headaches. Hematological: Negative for adenopathy. Does not bruise/bleed easily. Psychiatric/Behavioral: Negative for agitation, confusion and decreased concentration. The patient is not nervous/anxious. The patient voices no complaints. With questioning, she denies significant pain, fever, chills, wound drainage or discharge. Objective:   Physical Exam  Vitals signs and nursing note reviewed. Constitutional:       General: She is not in acute distress. Appearance: She is well-developed. She is obese. She is not diaphoretic. Comments: ECOG remains stable at 1 due to comorbidities. HENT:      Head: Normocephalic and atraumatic. Mouth/Throat:      Pharynx: No oropharyngeal exudate. Eyes:      General: No scleral icterus. Right eye: No discharge. Left eye: No discharge. Conjunctiva/sclera: Conjunctivae normal.   Neck:      Musculoskeletal: Normal range of motion and neck supple. Thyroid: No thyromegaly. Vascular: No JVD. Trachea: No tracheal deviation.    Cardiovascular:      Rate and Rhythm: Normal rate and regular rhythm. Heart sounds: No murmur. No friction rub. No gallop. Pulmonary:      Effort: Pulmonary effort is normal. No respiratory distress or retractions. Breath sounds: Normal breath sounds. No stridor. No wheezing or rales. Chest:      Chest wall: No mass, lacerations, deformity, swelling, tenderness or edema. Breasts: Breasts are symmetrical.         Right: Tenderness present. No inverted nipple, mass, nipple discharge or skin change. Left: No inverted nipple, mass, nipple discharge, skin change or tenderness. Comments: Breasts are supple bilaterally. No skin dimpling or puckering. No nipple discharge. No clinically suspicious lumps nodules or masses appreciated. No axillary lymphadenopathy. Abdominal:      General: There is no distension. Palpations: Abdomen is soft. Tenderness: There is no abdominal tenderness. There is no guarding or rebound. Musculoskeletal: Normal range of motion. General: No tenderness or deformity. Right shoulder: Normal.      Left shoulder: Normal.      Right elbow: She exhibits no swelling. Right wrist: She exhibits no swelling. Lymphadenopathy:      Cervical: No cervical adenopathy. Right cervical: No superficial, deep or posterior cervical adenopathy. Left cervical: No superficial, deep or posterior cervical adenopathy. Upper Body:      Right upper body: No supraclavicular or pectoral adenopathy. Left upper body: No supraclavicular or pectoral adenopathy. Skin:     General: Skin is warm and dry. Coloration: Skin is not pale. Findings: No erythema or rash. Neurological:      Mental Status: She is alert and oriented to person, place, and time. Coordination: Coordination normal.   Psychiatric:         Behavior: Behavior normal.         Thought Content:  Thought content normal.         Judgment: Judgment normal.         Assessment:      79 y.o. extremely pleasant woman who underwent right breast needle localized lumpectomy on September 6, 2016. 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 Size of DCIS: At least 0.2 cm. Margins uninvolved by DCIS Estrogen receptor: Positive, greater than 90% (intensity= strong). Progesterone receptor: Positive, 30% (intensity = intermediate)    -RT:  Completed 11/17/2016.  -Endocrine therapy:  Arimidex 1 mg daily was started on 11/15/2016. Continues to tolerate well. She has chronic pain in her left knee (history of knee replacement). She previously saw Kary Herman who  Has has left the area. -DEXA bone density: 04/18/2018:  Normal bone marrow density involving lumbar spine; normal bone marrow density involving the 33% radius bone. On Ca+/Vit D daily.  -April 18, 2018 presented with tenderness to palpation left breast 3:00 position and clinically thickened area at the 3:00 position. She has persistent tenderness, but not progressive.  -Bilateral digital diagnostic mammogram 4/18/2018 with left breast ultrasound:  Negative. -Bilateral screening mammogram 04/25/2019:  Negative. -DEXA bone density 04/25/2019:  Normal LS/Normal left forearm.      -Her last visit with Medical Oncology was June 20, 2017. We discussed need for medical oncology follow up and she was agreeable. Appointment made for follow up with Dr. Dayton Richard on 10/19/2018 but she did not keep that appointment. Dr. Juan Pablo Kebede continues to prescribe her AI. -Call to her pharmacy notes there was a short lapse in AI compliance from May to August, but she is now on track. Reviewed importance of taking daily.    -History of acute hypoxic respiratory failure ; Pulse OX 98% on room air and she is clinically and subjectively stable on today's visit.            7/20/2020 - Bilateral screening mammogram - [facility]:  Negative, BI-RADS 1  7/20/2020 - Dexa Bone Density Scan - [facility]: Normal bone density of LS and Left forearm with no statistically significant change. Clinically, she continues to do well and is without evidence of recurrent disease. As mentioned above, her aromatase inhibitor is being prescribed by her primary care physician. She will complete 5 years of therapy in November 2021. Plan:       1. Continue monthly breast self examination. Bring any changes to your physician's attention. 1. Continue healthy diet and exercise routinely as tolerated. 2. Maintaining ideal body weight (20-25 BMI) may lead to optimal breast cancer outcomes. 3. Avoid alcohol or limit alcohol intake to < 3 drinks per week. 4. Continue Arimidex 1 mg daily. 5. Ca+/VitD while on Arimidex. 6. Repeat DEXA: July 2021  7. Repeat mammogram July 2021  8. Return to see in 6 months with mammogram and DEXA same day. During today's visit, face-to-face time 25  minutes, greater than 50% in counseling education and coordination of care. All questions were answered to her apparent satisfaction, and she is agreeable to the plan as outlined above. Daisy Belle, RN, MSN, APRN-CNP, [id number]  Advanced [facility]cology Certified Nurse Practitioner  Department of Breast Surgery  [facility] [place]/  [facility] in collaboration with Dr. Lin Ha.  Jazmin/Dr. Oseas Singh APRN-CNP

## 2021-01-20 ENCOUNTER — OFFICE VISIT (OUTPATIENT)
Dept: BREAST CENTER | Age: 71
End: 2021-01-20
Payer: MEDICARE

## 2021-01-20 VITALS
OXYGEN SATURATION: 98 % | RESPIRATION RATE: 18 BRPM | SYSTOLIC BLOOD PRESSURE: 138 MMHG | WEIGHT: 243 LBS | BODY MASS INDEX: 40.48 KG/M2 | TEMPERATURE: 97.5 F | HEIGHT: 65 IN | DIASTOLIC BLOOD PRESSURE: 74 MMHG | HEART RATE: 78 BPM

## 2021-01-20 DIAGNOSIS — Z85.3 HISTORY OF BREAST CANCER: ICD-10-CM

## 2021-01-20 DIAGNOSIS — Z12.31 SCREENING MAMMOGRAM FOR HIGH-RISK PATIENT: Primary | ICD-10-CM

## 2021-01-20 DIAGNOSIS — Z79.811 USE OF AROMATASE INHIBITORS: Primary | ICD-10-CM

## 2021-01-20 DIAGNOSIS — Z78.0 POST-MENOPAUSAL: ICD-10-CM

## 2021-01-20 LAB
ALBUMIN SERPL-MCNC: 3.7 G/DL (ref 3.5–5.2)
ALP BLD-CCNC: 133 U/L (ref 35–104)
ALT SERPL-CCNC: 7 U/L (ref 0–32)
ANION GAP SERPL CALCULATED.3IONS-SCNC: 15 MMOL/L (ref 7–16)
AST SERPL-CCNC: 12 U/L (ref 0–31)
BASOPHILS ABSOLUTE: 0.02 E9/L (ref 0–0.2)
BASOPHILS RELATIVE PERCENT: 0.2 % (ref 0–2)
BILIRUB SERPL-MCNC: 0.4 MG/DL (ref 0–1.2)
BUN BLDV-MCNC: 10 MG/DL (ref 8–23)
CALCIUM SERPL-MCNC: 9.5 MG/DL (ref 8.6–10.2)
CHLORIDE BLD-SCNC: 100 MMOL/L (ref 98–107)
CO2: 25 MMOL/L (ref 22–29)
CREAT SERPL-MCNC: 0.7 MG/DL (ref 0.5–1)
EOSINOPHILS ABSOLUTE: 0 E9/L (ref 0.05–0.5)
EOSINOPHILS RELATIVE PERCENT: 0 % (ref 0–6)
GFR AFRICAN AMERICAN: >60
GFR NON-AFRICAN AMERICAN: >60 ML/MIN/1.73
GLUCOSE BLD-MCNC: 167 MG/DL (ref 74–99)
HCT VFR BLD CALC: 35.8 % (ref 34–48)
HEMOGLOBIN: 11 G/DL (ref 11.5–15.5)
IMMATURE GRANULOCYTES #: 0.05 E9/L
IMMATURE GRANULOCYTES %: 0.4 % (ref 0–5)
LYMPHOCYTES ABSOLUTE: 1.6 E9/L (ref 1.5–4)
LYMPHOCYTES RELATIVE PERCENT: 12.8 % (ref 20–42)
MCH RBC QN AUTO: 24.2 PG (ref 26–35)
MCHC RBC AUTO-ENTMCNC: 30.7 % (ref 32–34.5)
MCV RBC AUTO: 78.7 FL (ref 80–99.9)
MONOCYTES ABSOLUTE: 0.99 E9/L (ref 0.1–0.95)
MONOCYTES RELATIVE PERCENT: 7.9 % (ref 2–12)
NEUTROPHILS ABSOLUTE: 9.85 E9/L (ref 1.8–7.3)
NEUTROPHILS RELATIVE PERCENT: 78.7 % (ref 43–80)
PDW BLD-RTO: 16.9 FL (ref 11.5–15)
PLATELET # BLD: 468 E9/L (ref 130–450)
PMV BLD AUTO: 10.7 FL (ref 7–12)
POTASSIUM SERPL-SCNC: 4.1 MMOL/L (ref 3.5–5)
RBC # BLD: 4.55 E12/L (ref 3.5–5.5)
SODIUM BLD-SCNC: 140 MMOL/L (ref 132–146)
TOTAL PROTEIN: 7.2 G/DL (ref 6.4–8.3)
WBC # BLD: 12.5 E9/L (ref 4.5–11.5)

## 2021-01-20 PROCEDURE — 3017F COLORECTAL CA SCREEN DOC REV: CPT | Performed by: NURSE PRACTITIONER

## 2021-01-20 PROCEDURE — 1036F TOBACCO NON-USER: CPT | Performed by: NURSE PRACTITIONER

## 2021-01-20 PROCEDURE — G8484 FLU IMMUNIZE NO ADMIN: HCPCS | Performed by: NURSE PRACTITIONER

## 2021-01-20 PROCEDURE — 99213 OFFICE O/P EST LOW 20 MIN: CPT | Performed by: NURSE PRACTITIONER

## 2021-01-20 PROCEDURE — 4040F PNEUMOC VAC/ADMIN/RCVD: CPT | Performed by: NURSE PRACTITIONER

## 2021-01-20 PROCEDURE — G8427 DOCREV CUR MEDS BY ELIG CLIN: HCPCS | Performed by: NURSE PRACTITIONER

## 2021-01-20 PROCEDURE — 1090F PRES/ABSN URINE INCON ASSESS: CPT | Performed by: NURSE PRACTITIONER

## 2021-01-20 PROCEDURE — 1123F ACP DISCUSS/DSCN MKR DOCD: CPT | Performed by: NURSE PRACTITIONER

## 2021-01-20 PROCEDURE — 36415 COLL VENOUS BLD VENIPUNCTURE: CPT | Performed by: NURSE PRACTITIONER

## 2021-01-20 PROCEDURE — G8417 CALC BMI ABV UP PARAM F/U: HCPCS | Performed by: NURSE PRACTITIONER

## 2021-01-20 PROCEDURE — G8399 PT W/DXA RESULTS DOCUMENT: HCPCS | Performed by: NURSE PRACTITIONER

## 2021-01-20 ASSESSMENT — ENCOUNTER SYMPTOMS
SHORTNESS OF BREATH: 1
BACK PAIN: 1

## 2021-01-20 NOTE — PATIENT INSTRUCTIONS
RELEASE OF RESULTS AND RECORDS  As of October 1, 2020, all results (x-ray reports, labwork, pathology reports) will be released through [address] in real time per federal law. Once your test results are final, they will be automatically released to your electronic medical record [email or web address] where you will be able to see those results and any clinical notes associated with that result. In some cases, you may see those results and notes before your provider or the staff have had a chance to review. We will make every effort to contact you, especially about any abnormal or confusing results. If you view a result before we have contacted you, please wait up to one business day for us to reach you before calling with questions. If anything in your notes seems inaccurate, please message us through [employer] with potential corrections. If you see a term or language in your clinical note that doesn't make sense, please use the online health library reference tool in your MyChart (under the Health tab)  to help you interpret the note.

## 2021-02-05 RX ORDER — INSULIN GLARGINE 100 [IU]/ML
INJECTION, SOLUTION SUBCUTANEOUS
Qty: 15 ML | Refills: 1 | OUTPATIENT
Start: 2021-02-05

## 2021-02-05 RX ORDER — INSULIN ASPART 100 [IU]/ML
INJECTION, SOLUTION INTRAVENOUS; SUBCUTANEOUS
Qty: 45 ML | Refills: 0 | OUTPATIENT
Start: 2021-02-05

## 2021-02-05 NOTE — TELEPHONE ENCOUNTER
Patient has apparently established with a new PCP, so refill requests need to be forwarded to that PCP

## 2021-04-01 ENCOUNTER — OFFICE VISIT (OUTPATIENT)
Dept: ENDOCRINOLOGY | Age: 71
End: 2021-04-01
Payer: MEDICARE

## 2021-04-01 VITALS
HEART RATE: 83 BPM | DIASTOLIC BLOOD PRESSURE: 80 MMHG | WEIGHT: 244.2 LBS | TEMPERATURE: 97.1 F | BODY MASS INDEX: 40.69 KG/M2 | SYSTOLIC BLOOD PRESSURE: 156 MMHG | HEIGHT: 65 IN

## 2021-04-01 DIAGNOSIS — E55.9 VITAMIN D DEFICIENCY: ICD-10-CM

## 2021-04-01 DIAGNOSIS — E10.42 DIABETIC POLYNEUROPATHY ASSOCIATED WITH TYPE 1 DIABETES MELLITUS (HCC): ICD-10-CM

## 2021-04-01 DIAGNOSIS — Z79.4 TYPE 2 DIABETES MELLITUS WITHOUT COMPLICATION, WITH LONG-TERM CURRENT USE OF INSULIN (HCC): Primary | ICD-10-CM

## 2021-04-01 DIAGNOSIS — E11.65 TYPE 2 DIABETES MELLITUS WITH HYPERGLYCEMIA, WITH LONG-TERM CURRENT USE OF INSULIN (HCC): ICD-10-CM

## 2021-04-01 DIAGNOSIS — E11.9 TYPE 2 DIABETES MELLITUS WITHOUT COMPLICATION, WITH LONG-TERM CURRENT USE OF INSULIN (HCC): Primary | ICD-10-CM

## 2021-04-01 DIAGNOSIS — Z76.0 ENCOUNTER FOR MEDICATION REFILL: ICD-10-CM

## 2021-04-01 DIAGNOSIS — Z79.4 TYPE 2 DIABETES MELLITUS WITH HYPERGLYCEMIA, WITH LONG-TERM CURRENT USE OF INSULIN (HCC): ICD-10-CM

## 2021-04-01 LAB — HBA1C MFR BLD: 10.2 %

## 2021-04-01 PROCEDURE — G8417 CALC BMI ABV UP PARAM F/U: HCPCS | Performed by: INTERNAL MEDICINE

## 2021-04-01 PROCEDURE — 1123F ACP DISCUSS/DSCN MKR DOCD: CPT | Performed by: INTERNAL MEDICINE

## 2021-04-01 PROCEDURE — 3017F COLORECTAL CA SCREEN DOC REV: CPT | Performed by: INTERNAL MEDICINE

## 2021-04-01 PROCEDURE — 4040F PNEUMOC VAC/ADMIN/RCVD: CPT | Performed by: INTERNAL MEDICINE

## 2021-04-01 PROCEDURE — 1036F TOBACCO NON-USER: CPT | Performed by: INTERNAL MEDICINE

## 2021-04-01 PROCEDURE — 83036 HEMOGLOBIN GLYCOSYLATED A1C: CPT | Performed by: INTERNAL MEDICINE

## 2021-04-01 PROCEDURE — 99214 OFFICE O/P EST MOD 30 MIN: CPT | Performed by: INTERNAL MEDICINE

## 2021-04-01 PROCEDURE — 3046F HEMOGLOBIN A1C LEVEL >9.0%: CPT | Performed by: INTERNAL MEDICINE

## 2021-04-01 PROCEDURE — G8428 CUR MEDS NOT DOCUMENT: HCPCS | Performed by: INTERNAL MEDICINE

## 2021-04-01 PROCEDURE — G8399 PT W/DXA RESULTS DOCUMENT: HCPCS | Performed by: INTERNAL MEDICINE

## 2021-04-01 PROCEDURE — 1090F PRES/ABSN URINE INCON ASSESS: CPT | Performed by: INTERNAL MEDICINE

## 2021-04-01 PROCEDURE — 2022F DILAT RTA XM EVC RTNOPTHY: CPT | Performed by: INTERNAL MEDICINE

## 2021-04-01 RX ORDER — INSULIN LISPRO 100 [IU]/ML
INJECTION, SOLUTION INTRAVENOUS; SUBCUTANEOUS
Qty: 25 PEN | Refills: 5 | Status: SHIPPED | OUTPATIENT
Start: 2021-04-01

## 2021-04-01 RX ORDER — PEN NEEDLE, DIABETIC 30 GX5/16"
1 NEEDLE, DISPOSABLE MISCELLANEOUS
Qty: 300 EACH | Refills: 3 | Status: SHIPPED
Start: 2021-04-01 | End: 2021-12-20

## 2021-04-01 RX ORDER — INSULIN GLARGINE 100 [IU]/ML
40 INJECTION, SOLUTION SUBCUTANEOUS NIGHTLY
Qty: 25 PEN | Refills: 5 | Status: SHIPPED | OUTPATIENT
Start: 2021-04-01

## 2021-04-01 NOTE — PROGRESS NOTES
[address] [place] [address] Department of Endocrinology Diabetes and Metabolism   [address] [facility] [address]   Phone: [phone]  Fax: [phone]    Date of Service: 4/1/2021  Primary Care Physician: Brook Marrero  Provider: Raffaele Armendariz MD     Reason for the visit:  DM type 2 on insulin     History of Present Illness: The history is provided by the patient. No  was used. Accuracy of the patient data is excellent. Natalia Bartholomew is a very pleasant 79 y.o. female seen in Endocrine clinic today for diabetes management     Sabas Gonzalez was diagnosed with diabetes in the age of 29's and currently on metformin 500mg BID, Lantus 50U nightly  The patient has been checking blood sugar about 1-3x per day, readings are highly variable   Most recent A1c results summarized below  Lab Results   Component Value Date    LABA1C 10.2 04/01/2021    LABA1C 9.4 02/05/2020    LABA1C 8.6 12/29/2019     The patient hasn't been mindful of what has been eating and wasn't following diabetes diet as encouraged. She has cut back on sugar and trying to eat more fruits and vegetables. I reviewed current medications and the patient has no issues with diabetes medications. Sabas Gonzalez is up to date with eye exam and denied any history of diabetic retinopathy. The patient seeing podiatrist every year. She also performs  own feet care  Microvascular complications:  No Retinopathy, no Nephropathy.  +Neuropathy   Macrovascular complications: no CAD, PVD. 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    Cerebrovascular disease 6/09    no residual    CHF (congestive heart failure) (HCC) approx 3 History   Problem Relation Age of Onset    Diabetes Mother     High Blood Pressure Mother     Heart Attack Mother     High Blood Pressure Father     Diabetes Father     Heart Failure Father     Breast Cancer Sister     Stroke Sister         young age   [facility] Cancer Sister 55        breast    Sickle Cell Anemia Other         niece    Cancer Other 36        niece - breast    Breast Cancer Sister             Cancer Sister 59        breast & ovarian    Stomach Cancer Other         aunt     ALLERGIES AND DRUG REACTIONS   No Known Allergies    CURRENT MEDICATIONS   Current Outpatient Medications   Medication Sig Dispense Refill    metFORMIN (GLUCOPHAGE) 1000 MG tablet Take 1 tablet by mouth 2 times daily (with meals) 180 tablet 3    insulin glargine (LANTUS SOLOSTAR) 100 UNIT/ML injection pen Inject 40 Units into the skin nightly 25 pen 5    insulin lispro, 1 Unit Dial, (HUMALOG KWIKPEN) 100 UNIT/ML SOPN Inject 10 units with meals + sliding scale.  MAX 75 units/day 25 pen 5    Insulin Pen Needle (PEN NEEDLES 3/16\") 31G X 5 MM MISC 1 each by Does not apply route 4 times daily (after meals and at bedtime) 300 each 3    benzonatate (TESSALON) 200 MG capsule Take 1 capsule by mouth 3 times daily as needed for Cough 15 capsule 0    melatonin (RA MELATONIN) 3 MG TABS tablet Take one 3 mg hs 90 tablet 3    vitamin D (ERGOCALCIFEROL) 1.25 MG (40497 UT) CAPS capsule Take 1 capsule by mouth once a week 4 capsule 11    losartan-hydrochlorothiazide (HYZAAR) 100-25 MG per tablet Take 1 tablet by mouth daily 30 tablet 11    omeprazole (PRILOSEC) 40 MG delayed release capsule Take 1 capsule by mouth daily 30 capsule 11    saxagliptin (ONGLYZA) 5 MG TABS tablet Take 1 tablet by mouth daily 30 tablet 11    Fluticasone furoate-vilanterol (BREO ELLIPTA) 200-25 MCG/INH AEPB inhaler Inhale 1 puff into the lungs daily 1 each 11    anastrozole (ARIMIDEX) 1 MG tablet Take 1 tablet by mouth daily Indications: ESTROGEN DEFICIENCY IN BREAST CANCER (INACTIVE) 30 tablet 11    gabapentin (NEURONTIN) 400 MG capsule Take 1 capsule by mouth 3 times daily. 90 capsule 11    aspirin 81 MG chewable tablet Take 1 tablet by mouth daily 30 tablet 11    pravastatin (PRAVACHOL) 80 MG tablet Take 1 tablet by mouth nightly 90 tablet 3    oxybutynin (DITROPAN) 5 MG tablet Take 1 tablet by mouth 3 times daily 90 tablet 11    montelukast (SINGULAIR) 10 MG tablet Take 1 tablet by mouth nightly 30 tablet 11    cloNIDine (CATAPRES) 0.2 MG tablet Take 1 tablet by mouth 3 times daily 90 tablet 11    furosemide (LASIX) 20 MG tablet Take 1 tablet by mouth 2 times daily 180 tablet 1    formoterol (PERFOROMIST) 20 MCG/2ML nebulizer solution 20 mcg      cloNIDine (CATAPRES) 0.2 MG tablet Take 1 tablet by mouth 3 times daily (Patient not taking: Reported on 1/20/2021) 60 tablet 3    nystatin (MYCOSTATIN) 850698 UNIT/ML suspension Take 5 mLs by mouth 4 times daily 100 mL 0    calcium carbonate-vitamin D (CALCIUM 600 + D) 600-400 MG-UNIT TABS per tab Take 1 tablet by mouth 2 times daily 60 tablet 11    butenafine (LOTRIMIN ULTRA) 1 % CREA Please apply from the toes, in between the toes, foot up to the upper calf twice daily for 1 month, both legs 1 Tube 10    acetaminophen (APAP EXTRA STRENGTH) 500 MG tablet Take 1 tablet by mouth every 6 hours as needed for Pain 30 tablet 3    vitamin B-12 (CYANOCOBALAMIN) 1000 MCG tablet Take 1 tablet by mouth daily 90 tablet 1     No current facility-administered medications for this visit. Review of Systems  Constitutional: No fever, no chills, no diaphoresis, no generalized weakness. HEENT: No blurred vision, No sore throat, no ear pain, no hair loss  Neck: denied any neck swelling, difficulty swallowing,   Cardio-pulmonary: No CP, chronic SOB or palpitation, No orthopnea or PND. No cough or wheezing.   GI: No N/V/D, no constipation, No abdominal pain, no melena or hematochezia   : Denied any dysuria, hematuria, flank pain, discharge, or incontinence. Skin: denied any rash, ulcer, Hirsute, or hyperpigmentation. MSK: denied any joint deformity, joint pain/swelling, muscle pain, or back pain. Neuro: + numbness and tingling in feet, no weakness    OBJECTIVE    BP (!) 156/80   Pulse 83   Temp 97.1 °F (36.2 °C) (Temporal)   Ht 5' 5\" (1.651 m)   Wt 244 lb 3.2 oz (110.8 kg)   BMI 40.64 kg/m²   BP Readings from Last 4 Encounters:   04/01/21 (!) 156/80   01/20/21 138/74   08/18/20 138/74   07/20/20 126/72     Wt Readings from Last 6 Encounters:   04/01/21 244 lb 3.2 oz (110.8 kg)   01/20/21 243 lb (110.2 kg)   08/18/20 242 lb (109.8 kg)   07/20/20 261 lb (118.4 kg)   03/09/20 249 lb 12.8 oz (113.3 kg)   02/18/20 249 lb (112.9 kg)       Physical examination:  General: awake alert, oriented x3, no abnormal position or movements. Morbidly Obese   HEENT: normocephalic non-traumatic, no exophthalmos   Neck: supple, no LN enlargement, no thyromegaly, no thyroid tenderness, no JVD. Pulm: Clear equal air entry no added sounds, no wheezing or rhonchi    CVS: S1 + S2, no murmur, no heave. Dorsalis pedis pulse palpable   Abd: soft lax, no tenderness, no organomegaly, audible bowel sounds.    Skin: warm, no lesions, no rash. + callus, no Ulcers  Musculoskeletal: No back tenderness, no kyphosis/scoliosis    Neuro: CN intact, Monofilament sensation decreased bilateral , muscle power normal  Psych: normal mood, and affect      Review of Laboratory Data:  I personally reviewed the following lab:  Lab Results   Component Value Date/Time    WBC 12.5 (H) 01/20/2021 11:00 AM    RBC 4.55 01/20/2021 11:00 AM    HGB 11.0 (L) 01/20/2021 11:00 AM    HCT 35.8 01/20/2021 11:00 AM    MCV 78.7 (L) 01/20/2021 11:00 AM    MCH 24.2 (L) 01/20/2021 11:00 AM    MCHC 30.7 (L) 01/20/2021 11:00 AM    RDW 16.9 (H) 01/20/2021 11:00 AM     (H) 01/20/2021 11:00 AM    MPV 10.7 01/20/2021 11:00 AM      Lab Results   Component Value Date/Time    NA 140 01/20/2021 11:00 AM    K 4.1 01/20/2021 11:00 AM    K 4.2 02/05/2020 05:10 AM    CO2 25 01/20/2021 11:00 AM    BUN 10 01/20/2021 11:00 AM    CREATININE 0.7 01/20/2021 11:00 AM    CALCIUM 9.5 01/20/2021 11:00 AM    LABGLOM >60 01/20/2021 11:00 AM    GFRAA >60 01/20/2021 11:00 AM      Lab Results   Component Value Date/Time    TSH 0.591 06/10/2019 09:50 AM    T4FREE 1.22 05/13/2019 10:05 AM    O9EYLDC 9.2 10/05/2017 12:00 PM     Lab Results   Component Value Date    LABA1C 10.2 04/01/2021    GLUCOSE 167 01/20/2021    GLUCOSE 172 04/12/2012    MALBCR - 06/12/2019    LABMICR <12.0 06/12/2019    LABCREA 85 06/12/2019     Lab Results   Component Value Date    LABA1C 10.2 04/01/2021    LABA1C 9.4 02/05/2020    LABA1C 8.6 12/29/2019     Lab Results   Component Value Date    CHOL 152 06/10/2019    CHOL 182 01/16/2019    TRIG 66 06/10/2019    TRIG 89 01/16/2019    HDL 54 06/10/2019    HDL 70 01/16/2019     Lab Results   Component Value Date    VITD25 30 06/10/2019    VITD25 22 05/23/2018       Medical Records/Labs/Images review:   I personally reviewed and summarized previous records   All labs and imaging studies were independently reviewed     ASSESSMENT & RECOMMENDATIONS   Dale Gonzalez, a 79 y.o.-old female seen in for the following issues     Diabetes Mellitus Type 2     · Diabetes control still uncontrolled   · Change DM regimen to Metformin 1000 mg BID, Lantus 40 units nightly, Humalog 10 units with meals + ss 3:50>150   · Will likely add GLP-1 agonist at next OV   · The patient was advised to check blood sugars 4 times a day before meals and at bedtime and send BS readings to our office later this week   · Discussed with patient A1c and blood sugar goals   · Discussed lifestyle changes including diet and exercise with patient; recommended 150 minutes of moderate intensity exercise per week.    · Diabetes labs before next visit     Dietary noncompliance   Discussed with patient the importance of eating consistent carbohydrate meals, avoiding high glycemic index food. Also, discussed with patient the risk and negative consequences of dietary noncompliance on blood glucose control, blood pressure and weight    Obesity   Discussed lifestyle changes including diet and exercise with patient in depth. Also discussed with patient cardiovascular risk associated with obesity    I personally reviewed external notes from PCP and other patient's care team providers, and personally interpreted labs associated with the above diagnosis. I also ordered labs to further assess and manage the above addressed medical conditions    Return in about 3 months (around 7/1/2021) for DM type 2. The above issues were reviewed with the patient who understood and agreed with the plan. Thank you for allowing us to participate in the care of this patient. Please do not hesitate to contact us with any additional questions. Diagnosis Orders   1. Type 2 diabetes mellitus without complication, with long-term current use of insulin (HCC)  POCT glycosylated hemoglobin (Hb A1C)    metFORMIN (GLUCOPHAGE) 1000 MG tablet    insulin glargine (LANTUS SOLOSTAR) 100 UNIT/ML injection pen    insulin lispro, 1 Unit Dial, (HUMALOG KWIKPEN) 100 UNIT/ML SOPN   2. Diabetic polyneuropathy associated with type 1 diabetes mellitus (HCC)  metFORMIN (GLUCOPHAGE) 1000 MG tablet   3. Type 2 diabetes mellitus with hyperglycemia, with long-term current use of insulin (HCC)  metFORMIN (GLUCOPHAGE) 1000 MG tablet    Insulin Pen Needle (PEN NEEDLES 3/16\") 31G X 5 MM MISC    Basic Metabolic Panel    Hemoglobin A1C    Microalbumin / Creatinine Urine Ratio   4. Encounter for medication refill  metFORMIN (GLUCOPHAGE) 1000 MG tablet   5.  Vitamin D deficiency  Basic Metabolic Panel    Vitamin D 25 Hydroxy       Kiara Bill MD  Endocrinologist, [facility] Diabetes Care and Endocrinology   [address] [facility] [address]   Phone: [phone]  Fax: [phone] --------------------------------------------  An electronic signature was used to authenticate this note.  Mahamed Muller MD on 4/3/2021 at 5:59 PM

## 2021-04-20 ENCOUNTER — HOSPITAL ENCOUNTER (OUTPATIENT)
Dept: INTERVENTIONAL RADIOLOGY/VASCULAR | Age: 71
Discharge: HOME | End: 2021-04-22
Payer: MEDICARE

## 2021-04-20 DIAGNOSIS — M16.12 ARTHRITIS OF LEFT HIP: ICD-10-CM

## 2021-04-20 LAB — SARS-COV-2, NAAT: NOT DETECTED

## 2021-04-20 PROCEDURE — 87635 SARS-COV-2 COVID-19 AMP PRB: CPT

## 2021-04-21 ENCOUNTER — ANESTHESIA EVENT (OUTPATIENT)
Dept: MRI IMAGING | Age: 71
End: 2021-04-21

## 2021-04-21 ENCOUNTER — ANESTHESIA (OUTPATIENT)
Dept: MRI IMAGING | Age: 71
End: 2021-04-21

## 2021-04-21 ENCOUNTER — HOSPITAL ENCOUNTER (OUTPATIENT)
Dept: MRI IMAGING | Age: 71
Discharge: HOME | End: 2021-04-23
Payer: MEDICARE

## 2021-04-21 VITALS
RESPIRATION RATE: 18 BRPM | SYSTOLIC BLOOD PRESSURE: 170 MMHG | OXYGEN SATURATION: 98 % | DIASTOLIC BLOOD PRESSURE: 80 MMHG | HEART RATE: 74 BPM

## 2021-04-21 VITALS
DIASTOLIC BLOOD PRESSURE: 44 MMHG | RESPIRATION RATE: 15 BRPM | SYSTOLIC BLOOD PRESSURE: 111 MMHG | OXYGEN SATURATION: 100 %

## 2021-04-21 DIAGNOSIS — M51.17 INTERVERTEBRAL DISC DISORDER WITH RADICULOPATHY OF LUMBOSACRAL REGION: ICD-10-CM

## 2021-04-21 DIAGNOSIS — M51.16 NEURITIS OR RADICULITIS DUE TO RUPTURE OF LUMBAR INTERVERTEBRAL DISC: ICD-10-CM

## 2021-04-21 PROCEDURE — 6360000002 HC RX W HCPCS: Performed by: NURSE ANESTHETIST, CERTIFIED REGISTERED

## 2021-04-21 PROCEDURE — 2580000003 HC RX 258: Performed by: NURSE ANESTHETIST, CERTIFIED REGISTERED

## 2021-04-21 PROCEDURE — 72148 MRI LUMBAR SPINE W/O DYE: CPT

## 2021-04-21 PROCEDURE — 3700000001 HC ADD 15 MINUTES (ANESTHESIA)

## 2021-04-21 PROCEDURE — 3700000000 HC ANESTHESIA ATTENDED CARE

## 2021-04-21 PROCEDURE — 7100000011 HC PHASE II RECOVERY - ADDTL 15 MIN

## 2021-04-21 PROCEDURE — 7100000010 HC PHASE II RECOVERY - FIRST 15 MIN

## 2021-04-21 RX ORDER — NALOXONE HYDROCHLORIDE 0.4 MG/ML
0.4 INJECTION, SOLUTION INTRAMUSCULAR; INTRAVENOUS; SUBCUTANEOUS PRN
Status: DISCONTINUED | OUTPATIENT
Start: 2021-04-21 | End: 2021-04-21

## 2021-04-21 RX ORDER — MORPHINE SULFATE/0.9% NACL/PF 1 MG/ML
SYRINGE (ML) INJECTION CONTINUOUS
Status: DISCONTINUED | OUTPATIENT
Start: 2021-04-21 | End: 2021-04-21

## 2021-04-21 RX ORDER — MIDAZOLAM HYDROCHLORIDE 1 MG/ML
INJECTION INTRAMUSCULAR; INTRAVENOUS PRN
Status: DISCONTINUED | OUTPATIENT
Start: 2021-04-21 | End: 2021-04-21 | Stop reason: SDUPTHER

## 2021-04-21 RX ORDER — SODIUM CHLORIDE 9 MG/ML
INJECTION, SOLUTION INTRAVENOUS CONTINUOUS PRN
Status: DISCONTINUED | OUTPATIENT
Start: 2021-04-21 | End: 2021-04-21 | Stop reason: SDUPTHER

## 2021-04-21 RX ADMIN — MIDAZOLAM 2 MG: 1 INJECTION INTRAMUSCULAR; INTRAVENOUS at 08:35

## 2021-04-21 RX ADMIN — SODIUM CHLORIDE: 9 INJECTION, SOLUTION INTRAVENOUS at 08:32

## 2021-04-21 ASSESSMENT — ENCOUNTER SYMPTOMS: SHORTNESS OF BREATH: 1

## 2021-04-21 ASSESSMENT — PAIN - FUNCTIONAL ASSESSMENT: PAIN_FUNCTIONAL_ASSESSMENT: 0-10

## 2021-04-21 NOTE — ANESTHESIA POSTPROCEDURE EVALUATION
Department of Anesthesiology  Postprocedure Note    Patient: Abdullahi Gonzalez  MRN: [id number]  YOB: 1950  Date of evaluation: 4/21/2021  Time:  11:00 AM     Procedure Summary     Date: 04/21/21 Room / Location: [address] [facility] MRI; [address] [place] [address] Procedures; [address] [facility] Radiology    Anesthesia Start: 5217 Anesthesia Stop:     Procedure: MRI LUMBAR SPINE WO CONTRAST Diagnosis:       Neuritis or radiculitis due to rupture of lumbar intervertebral disc      Intervertebral disc disorder with radiculopathy of lumbosacral region    Scheduled Providers: DMITRY Shaikh - CRNA Responsible Provider: Sarbjit Sanchez MD    Anesthesia Type: MAC ASA Status: 3          Anesthesia Type: MAC    Karely Phase I:      Karely Phase II:      Last vitals: Reviewed and per EMR flowsheets.        Anesthesia Post Evaluation    Patient location during evaluation: PACU  Patient participation: complete - patient participated  Level of consciousness: awake  Pain score: 3  Airway patency: patent  Nausea & Vomiting: no nausea and no vomiting  Complications: no  Cardiovascular status: blood pressure returned to baseline  Respiratory status: acceptable  Hydration status: euvolemic

## 2021-04-21 NOTE — PROGRESS NOTES
18 - Patient arrived via ambulation with cane with cousin to Radiology department for MRI with anesthesia. Allergies, home medications, H&P and fasting instructions reviewed with patient. Vital signs taken. IV placed, IV flushed and prn adapter attached. Procedural instructions given, questions answered, understanding expressed. 5412 - Patient returned from procedure. Patient stable. No s/s of complications noted or reported. Vitals will be checked q 15min, see flow sheets. 8041 - Patient eating and drinking well with no s/s of complications noted or reported. 1003 - Patient discharged, discharge papers reviewed with patient, questions answered, discharge paper signed. Patient taken to door via ambulation. Patient in stable condition, no s/s of complications noted or reported.

## 2021-04-21 NOTE — ANESTHESIA PRE PROCEDURE
IN BREAST CANCER (INACTIVE) 6/26/20   Petrona Olivas MD   gabapentin (NEURONTIN) 400 MG capsule Take 1 capsule by mouth 3 times daily.  6/26/20 6/26/22  Petrona Olivas MD   aspirin 81 MG chewable tablet Take 1 tablet by mouth daily 6/26/20   Petrona Olivas MD   pravastatin (PRAVACHOL) 80 MG tablet Take 1 tablet by mouth nightly 6/26/20   Petrona Olivas MD   oxybutynin (DITROPAN) 5 MG tablet Take 1 tablet by mouth 3 times daily 6/26/20   Petrona Olivas MD   montelukast (SINGULAIR) 10 MG tablet Take 1 tablet by mouth nightly 6/26/20   Petrona Olivas MD   cloNIDine (CATAPRES) 0.2 MG tablet Take 1 tablet by mouth 3 times daily 6/26/20   Petrona Olivas MD   furosemide (LASIX) 20 MG tablet Take 1 tablet by mouth 2 times daily 3/26/20   Petrona Olivas MD   formoterol (PERFOROMIST) 20 MCG/2ML nebulizer solution 20 mcg 12/3/19   Jayson Mitchell MD   cloNIDine (CATAPRES) 0.2 MG tablet Take 1 tablet by mouth 3 times daily  Patient not taking: Reported on 1/20/2021 2/11/20   Jhoana Victoria DO   nystatin (MYCOSTATIN) 742485 UNIT/ML suspension Take 5 mLs by mouth 4 times daily 2/11/20   Jhoana Victoria DO   calcium carbonate-vitamin D (CALCIUM 600 + D) 600-400 MG-UNIT TABS per tab Take 1 tablet by mouth 2 times daily 10/6/19   Christopher Arce MD   butenafine (LOTRIMIN ULTRA) 1 % CREA Please apply from the toes, in between the toes, foot up to the upper calf twice daily for 1 month, both legs 9/5/19   Haydee Vasquez MD   acetaminophen (APAP EXTRA STRENGTH) 500 MG tablet Take 1 tablet by mouth every 6 hours as needed for Pain 8/14/18   Thanh Dial DO   vitamin B-12 (CYANOCOBALAMIN) 1000 MCG tablet Take 1 tablet by mouth daily 7/20/18   Petrona Olivas MD       Current medications:    Current Outpatient Medications   Medication Sig Dispense Refill    metFORMIN (GLUCOPHAGE) 1000 MG tablet Take 1 tablet by mouth 2 times daily (with meals) 180 tablet 3    insulin glargine (LANTUS SOLOSTAR) 100 UNIT/ML injection pen Inject 40 Units into the skin nightly 25 pen 5    insulin lispro, 1 Unit Dial, (HUMALOG KWIKPEN) 100 UNIT/ML SOPN Inject 10 units with meals + sliding scale. MAX 75 units/day 25 pen 5    Insulin Pen Needle (PEN NEEDLES 3/16\") 31G X 5 MM MISC 1 each by Does not apply route 4 times daily (after meals and at bedtime) 300 each 3    benzonatate (TESSALON) 200 MG capsule Take 1 capsule by mouth 3 times daily as needed for Cough 15 capsule 0    melatonin (RA MELATONIN) 3 MG TABS tablet Take one 3 mg hs 90 tablet 3    vitamin D (ERGOCALCIFEROL) 1.25 MG (65801 UT) CAPS capsule Take 1 capsule by mouth once a week 4 capsule 11    losartan-hydrochlorothiazide (HYZAAR) 100-25 MG per tablet Take 1 tablet by mouth daily 30 tablet 11    omeprazole (PRILOSEC) 40 MG delayed release capsule Take 1 capsule by mouth daily 30 capsule 11    saxagliptin (ONGLYZA) 5 MG TABS tablet Take 1 tablet by mouth daily 30 tablet 11    Fluticasone furoate-vilanterol (BREO ELLIPTA) 200-25 MCG/INH AEPB inhaler Inhale 1 puff into the lungs daily 1 each 11    anastrozole (ARIMIDEX) 1 MG tablet Take 1 tablet by mouth daily Indications: ESTROGEN DEFICIENCY IN BREAST CANCER (INACTIVE) 30 tablet 11    gabapentin (NEURONTIN) 400 MG capsule Take 1 capsule by mouth 3 times daily.  90 capsule 11    aspirin 81 MG chewable tablet Take 1 tablet by mouth daily 30 tablet 11    pravastatin (PRAVACHOL) 80 MG tablet Take 1 tablet by mouth nightly 90 tablet 3    oxybutynin (DITROPAN) 5 MG tablet Take 1 tablet by mouth 3 times daily 90 tablet 11    montelukast (SINGULAIR) 10 MG tablet Take 1 tablet by mouth nightly 30 tablet 11    cloNIDine (CATAPRES) 0.2 MG tablet Take 1 tablet by mouth 3 times daily 90 tablet 11    furosemide (LASIX) 20 MG tablet Take 1 tablet by mouth 2 times daily 180 tablet 1    formoterol (PERFOROMIST) 20 MCG/2ML nebulizer solution 20 mcg      cloNIDine (CATAPRES) 0.2 MG tablet Take 1 tablet by mouth 3 times daily (Patient not taking: Reported on 1/20/2021) 60 tablet 3    nystatin (MYCOSTATIN) 883411 UNIT/ML suspension Take 5 mLs by mouth 4 times daily 100 mL 0    calcium carbonate-vitamin D (CALCIUM 600 + D) 600-400 MG-UNIT TABS per tab Take 1 tablet by mouth 2 times daily 60 tablet 11    butenafine (LOTRIMIN ULTRA) 1 % CREA Please apply from the toes, in between the toes, foot up to the upper calf twice daily for 1 month, both legs 1 Tube 10    acetaminophen (APAP EXTRA STRENGTH) 500 MG tablet Take 1 tablet by mouth every 6 hours as needed for Pain 30 tablet 3    vitamin B-12 (CYANOCOBALAMIN) 1000 MCG tablet Take 1 tablet by mouth daily 90 tablet 1     No current facility-administered medications for this encounter.         Allergies:  No Known Allergies    Problem List:    Patient Active Problem List   Diagnosis Code    IDDM (insulin dependent diabetes mellitus) [id number]    Hyperlipidemia with target LDL less than 70 E78.5    Lymphedema of both lower extremities I89.0    Vitamin D deficiency E55.9    Ductal carcinoma in situ (DCIS) of breast D05.10    Herpes simplex supression B00.9    Type 2 diabetes mellitus without complication, with long-term current use of insulin ([facility]) E11.9, Z79.4    Acute on chronic diastolic (congestive) heart failure ([facility]) I50.33    COPD (chronic obstructive pulmonary disease) ([facility]) J44.9    Pulmonary hypertension ([facility]) I27.20    Non-rheumatic aortic sclerosis I35.8    LBBB (left bundle branch block) I44.7    Diabetic polyneuropathy associated with type 1 diabetes mellitus ([facility]) E10.42    Diabetes mellitus type 2, uncontrolled ([facility]) E11.65    Mixed hyperlipidemia E78.2    Morbid obesity with BMI of 40.0-44.9, adult ([facility]) E66.01, Z68.41    History of CVA (cerebrovascular accident) Z80.78    Chronic combined systolic and diastolic congestive heart failure ([facility]) I50.42    Acute respiratory failure with hypoxemia ([facility] J96.01    Acute respiratory failure with hypoxia ([facility]) J96.01    Uncontrolled hypertension I10    Supraventricular tachycardia ([facility]) I47.1    Morbid obesity ([facility]) E66.01    Moderate persistent asthma without complication Y19.78    Encounter for medication refill Z76.0    COPD exacerbation ([facility] [address] J44.1    Acute hypoxemic respiratory failure ([facility]) J96.01       Past Medical History:        Diagnosis Date    Arthritis     Asthma     Cancer ([facility] [address]     breast     Cerebral artery occlusion with cerebral infarction [facility]) 2010    Speech difficulties results; claims 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    Cerebrovascular disease 6/09    no residual    CHF (congestive heart failure) ([facility]) approx 3 years ago    Claustrophobia     COPD (chronic obstructive pulmonary disease) ([facility])     Decreased dorsalis pedis pulse 9/5/2019    Dermatophytosis 9/5/2019    Headache(784.0)     History of blood transfusion     with knee surgery    Hx of blood clots     Hyperlipidemia     Hypertension     LBP radiating to both legs     Lymphedema of both lower extremities 11/12/2015    Neuromuscular disorder ([facility])     Non-rheumatic aortic sclerosis 10/2018    10/2018    Obesity     Pulmonary hypertension ([facility] [address] 10/2018    Recurrent genital HSV (herpes simplex virus) infection     last outbreak 11/2017    S/P breast biopsy, right 07/2016    pt states breast cancer    Type II or unspecified type diabetes mellitus without mention of complication, not stated as uncontrolled     AA1c8.7 9/10    UTI (urinary tract infection) 5/13/2019       Past Surgical History:        Procedure Laterality Date    ARTHROPLASTY Left 07/29/2016    thumb basil joint with dequervain's release    BREAST LUMPECTOMY  years ago    benign    BREAST LUMPECTOMY Right 09/06/2016    COLONOSCOPY  2005    COLONOSCOPY  1/8/15    Dr. Morin Adjutant - [place]  6/7/2012         FINGER SURGERY Left 07/29/2016    thumb    HAND SURGERY  2017    HYSTERECTOMY      JOINT REPLACEMENT      OTHER SURGICAL HISTORY Right 2017    RIGHT THUMB TRAPEZIECTOMY WITH LIGAMENT RECONSRUCTION DEQUERVAINS RELEASE    TIM AND BSO      TOTAL HIP ARTHROPLASTY Bilateral     ,  dr Marcelino Gonzalez, dr Melanie Worthington Bilateral     dr Marilin Vincent       Social History:    Social History     Tobacco Use    Smoking status: Former Smoker     Packs/day: 0.25     Years: 35.00     Pack years: 8.75     Types: Cigarettes     Start date:      Quit date: 2001     Years since quittin.4    Smokeless tobacco: Never Used   Substance Use Topics    Alcohol use: Yes     Alcohol/week: 0.0 - 1.0 standard drinks     Comment: rare                                Counseling given: Not Answered      Vital Signs (Current): There were no vitals filed for this visit.                                            BP Readings from Last 3 Encounters:   21 (!) 156/80   21 138/74   20 138/74       NPO Status:  more then 8 hrs                                                                               BMI:   Wt Readings from Last 3 Encounters:   21 244 lb 3.2 oz (110.8 kg)   21 243 lb (110.2 kg)   20 242 lb (109.8 kg)     There is no height or weight on file to calculate BMI.    CBC:   Lab Results   Component Value Date    WBC 12.5 2021    RBC 4.55 2021    HGB 11.0 2021    HCT 35.8 2021    MCV 78.7 2021    RDW 16.9 2021     2021       CMP:   Lab Results   Component Value Date     2021    K 4.1 2021    K 4.2 2020     2021    CO2 25 2021    BUN 10 2021    CREATININE 0.7 2021    GFRAA >60 2021    LABGLOM >60 2021    GLUCOSE 167 2021    GLUCOSE 172 2012    PROT 7.2 2021    CALCIUM 9.5 2021    BILITOT 0.4 2021    ALKPHOS 133 2021    AST 12 2021    ALT 7 2021 POC Tests: No results for input(s): POCGLU, POCNA, POCK, POCCL, POCBUN, POCHEMO, POCHCT in the last 72 hours. Coags:   Lab Results   Component Value Date    PROTIME 11.7 07/16/2013    PROTIME 11.6 11/01/2010    INR 1.1 07/16/2013    APTT 35.7 07/16/2013       HCG (If Applicable): No results found for: PREGTESTUR, PREGSERUM, HCG, HCGQUANT     ABGs:   Lab Results   Component Value Date    PO2ART 92.1 12/24/2019    YWU3FVI 29.7 12/24/2019    JYX0SBZ 24.8 12/24/2019        Type & Screen (If Applicable):  No results found for: LABABO, LABRH    Drug/Infectious Status (If Applicable):  No results found for: HIV, HEPCAB    COVID-19 Screening (If Applicable):   Lab Results   Component Value Date    COVID19 Not Detected 04/20/2021    COVID19 Not Detected 08/18/2020           Anesthesia Evaluation    Airway: Mallampati: III        Dental:    (+) upper dentures and lower dentures      Pulmonary:   (+) COPD:  shortness of breath: no interval change and chronic,  asthma:                            Cardiovascular:    (+) hypertension:, CHF:,                   Neuro/Psych:   (+) CVA:, neuromuscular disease:, headaches:,             GI/Hepatic/Renal:             Endo/Other:    (+) Diabetes, . Abdominal:           Vascular:                                        Anesthesia Plan      MAC     ASA 3             Anesthetic plan and risks discussed with patient. DMITRY Carrera - CRNA   4/21/2021      Pt seen, examined, chart reviewed, plan discussed.   Gilford Ivans Matteucci  4/21/2021  8:35 AM

## 2021-05-03 ENCOUNTER — APPOINTMENT (OUTPATIENT)
Dept: CT IMAGING | Age: 71
End: 2021-05-03
Payer: MEDICARE

## 2021-05-03 ENCOUNTER — HOSPITAL ENCOUNTER (EMERGENCY)
Age: 71
Discharge: HOME | End: 2021-05-03
Attending: EMERGENCY MEDICINE
Payer: MEDICARE

## 2021-05-03 VITALS
DIASTOLIC BLOOD PRESSURE: 50 MMHG | BODY MASS INDEX: 38.57 KG/M2 | SYSTOLIC BLOOD PRESSURE: 114 MMHG | TEMPERATURE: 98.3 F | HEIGHT: 66 IN | WEIGHT: 240 LBS | HEART RATE: 82 BPM | OXYGEN SATURATION: 100 % | RESPIRATION RATE: 14 BRPM

## 2021-05-03 DIAGNOSIS — J44.1 COPD EXACERBATION (HCC): Primary | ICD-10-CM

## 2021-05-03 DIAGNOSIS — L50.9 URTICARIA: ICD-10-CM

## 2021-05-03 LAB
ANION GAP SERPL CALCULATED.3IONS-SCNC: 8 MMOL/L (ref 7–16)
BASOPHILS ABSOLUTE: 0.01 E9/L (ref 0–0.2)
BASOPHILS RELATIVE PERCENT: 0.1 % (ref 0–2)
BUN BLDV-MCNC: 7 MG/DL (ref 6–23)
CALCIUM SERPL-MCNC: 9.3 MG/DL (ref 8.6–10.2)
CHLORIDE BLD-SCNC: 102 MMOL/L (ref 98–107)
CO2: 30 MMOL/L (ref 22–29)
CREAT SERPL-MCNC: 0.7 MG/DL (ref 0.5–1)
EKG ATRIAL RATE: 80 BPM
EKG P AXIS: 31 DEGREES
EKG P-R INTERVAL: 132 MS
EKG Q-T INTERVAL: 420 MS
EKG QRS DURATION: 136 MS
EKG QTC CALCULATION (BAZETT): 484 MS
EKG R AXIS: -37 DEGREES
EKG T AXIS: 122 DEGREES
EKG VENTRICULAR RATE: 80 BPM
EOSINOPHILS ABSOLUTE: 0 E9/L (ref 0.05–0.5)
EOSINOPHILS RELATIVE PERCENT: 0 % (ref 0–6)
GFR AFRICAN AMERICAN: >60
GFR NON-AFRICAN AMERICAN: >60 ML/MIN/1.73
GLUCOSE BLD-MCNC: 175 MG/DL (ref 74–99)
HCT VFR BLD CALC: 35.1 % (ref 34–48)
HEMOGLOBIN: 10.7 G/DL (ref 11.5–15.5)
IMMATURE GRANULOCYTES #: 0.04 E9/L
IMMATURE GRANULOCYTES %: 0.4 % (ref 0–5)
LYMPHOCYTES ABSOLUTE: 1.21 E9/L (ref 1.5–4)
LYMPHOCYTES RELATIVE PERCENT: 13.4 % (ref 20–42)
MCH RBC QN AUTO: 25.7 PG (ref 26–35)
MCHC RBC AUTO-ENTMCNC: 30.5 % (ref 32–34.5)
MCV RBC AUTO: 84.2 FL (ref 80–99.9)
MONOCYTES ABSOLUTE: 0.64 E9/L (ref 0.1–0.95)
MONOCYTES RELATIVE PERCENT: 7.1 % (ref 2–12)
NEUTROPHILS ABSOLUTE: 7.16 E9/L (ref 1.8–7.3)
NEUTROPHILS RELATIVE PERCENT: 79 % (ref 43–80)
PDW BLD-RTO: 15.9 FL (ref 11.5–15)
PLATELET # BLD: 435 E9/L (ref 130–450)
PMV BLD AUTO: 10.3 FL (ref 7–12)
POTASSIUM REFLEX MAGNESIUM: 3.9 MMOL/L (ref 3.5–5)
PRO-BNP: 78 PG/ML (ref 0–125)
RBC # BLD: 4.17 E12/L (ref 3.5–5.5)
SARS-COV-2, NAAT: NOT DETECTED
SODIUM BLD-SCNC: 140 MMOL/L (ref 132–146)
TROPONIN: <0.01 NG/ML (ref 0–0.03)
WBC # BLD: 9.1 E9/L (ref 4.5–11.5)

## 2021-05-03 PROCEDURE — 83880 ASSAY OF NATRIURETIC PEPTIDE: CPT

## 2021-05-03 PROCEDURE — 6360000002 HC RX W HCPCS: Performed by: STUDENT IN AN ORGANIZED HEALTH CARE EDUCATION/TRAINING PROGRAM

## 2021-05-03 PROCEDURE — 6370000000 HC RX 637 (ALT 250 FOR IP): Performed by: STUDENT IN AN ORGANIZED HEALTH CARE EDUCATION/TRAINING PROGRAM

## 2021-05-03 PROCEDURE — 85025 COMPLETE CBC W/AUTO DIFF WBC: CPT

## 2021-05-03 PROCEDURE — 84484 ASSAY OF TROPONIN QUANT: CPT

## 2021-05-03 PROCEDURE — 93005 ELECTROCARDIOGRAM TRACING: CPT | Performed by: STUDENT IN AN ORGANIZED HEALTH CARE EDUCATION/TRAINING PROGRAM

## 2021-05-03 PROCEDURE — 80048 BASIC METABOLIC PNL TOTAL CA: CPT

## 2021-05-03 PROCEDURE — 99283 EMERGENCY DEPT VISIT LOW MDM: CPT

## 2021-05-03 PROCEDURE — 71275 CT ANGIOGRAPHY CHEST: CPT

## 2021-05-03 PROCEDURE — 96374 THER/PROPH/DIAG INJ IV PUSH: CPT

## 2021-05-03 PROCEDURE — 94664 DEMO&/EVAL PT USE INHALER: CPT

## 2021-05-03 PROCEDURE — 94640 AIRWAY INHALATION TREATMENT: CPT

## 2021-05-03 PROCEDURE — 87635 SARS-COV-2 COVID-19 AMP PRB: CPT

## 2021-05-03 PROCEDURE — 96375 TX/PRO/DX INJ NEW DRUG ADDON: CPT

## 2021-05-03 PROCEDURE — 93010 ELECTROCARDIOGRAM REPORT: CPT | Performed by: INTERNAL MEDICINE

## 2021-05-03 PROCEDURE — 6360000004 HC RX CONTRAST MEDICATION: Performed by: RADIOLOGY

## 2021-05-03 RX ORDER — IPRATROPIUM BROMIDE AND ALBUTEROL SULFATE 2.5; .5 MG/3ML; MG/3ML
3 SOLUTION RESPIRATORY (INHALATION) ONCE
Status: COMPLETED | OUTPATIENT
Start: 2021-05-03 | End: 2021-05-03

## 2021-05-03 RX ORDER — IPRATROPIUM BROMIDE AND ALBUTEROL SULFATE 2.5; .5 MG/3ML; MG/3ML
1 SOLUTION RESPIRATORY (INHALATION)
Status: DISCONTINUED | OUTPATIENT
Start: 2021-05-03 | End: 2021-05-03

## 2021-05-03 RX ORDER — DEXAMETHASONE SODIUM PHOSPHATE 10 MG/ML
10 INJECTION, SOLUTION INTRAMUSCULAR; INTRAVENOUS ONCE
Status: COMPLETED | OUTPATIENT
Start: 2021-05-03 | End: 2021-05-03

## 2021-05-03 RX ORDER — DEXAMETHASONE 4 MG/1
10 TABLET ORAL 2 TIMES DAILY WITH MEALS
Qty: 50 TABLET | Refills: 0 | Status: SHIPPED | OUTPATIENT
Start: 2021-05-03 | End: 2021-05-13

## 2021-05-03 RX ORDER — DOXYCYCLINE HYCLATE 100 MG
100 TABLET ORAL 2 TIMES DAILY
Qty: 14 TABLET | Refills: 0 | Status: SHIPPED | OUTPATIENT
Start: 2021-05-03 | End: 2021-05-10

## 2021-05-03 RX ORDER — DIPHENHYDRAMINE HCL 25 MG
25 CAPSULE ORAL EVERY 6 HOURS PRN
Qty: 40 CAPSULE | Refills: 0 | Status: SHIPPED | OUTPATIENT
Start: 2021-05-03 | End: 2021-05-13

## 2021-05-03 RX ORDER — DIPHENHYDRAMINE HYDROCHLORIDE 50 MG/ML
25 INJECTION INTRAMUSCULAR; INTRAVENOUS ONCE
Status: COMPLETED | OUTPATIENT
Start: 2021-05-03 | End: 2021-05-03

## 2021-05-03 RX ADMIN — IOPAMIDOL 75 ML: 755 INJECTION, SOLUTION INTRAVENOUS at 13:28

## 2021-05-03 RX ADMIN — DEXAMETHASONE SODIUM PHOSPHATE 10 MG: 10 INJECTION, SOLUTION INTRAMUSCULAR; INTRAVENOUS at 12:52

## 2021-05-03 RX ADMIN — DIPHENHYDRAMINE HYDROCHLORIDE 25 MG: 50 INJECTION, SOLUTION INTRAMUSCULAR; INTRAVENOUS at 12:52

## 2021-05-03 RX ADMIN — IPRATROPIUM BROMIDE AND ALBUTEROL SULFATE 3 AMPULE: .5; 3 SOLUTION RESPIRATORY (INHALATION) at 12:17

## 2021-05-03 ASSESSMENT — ENCOUNTER SYMPTOMS
NAUSEA: 0
COUGH: 1
DIARRHEA: 1
SORE THROAT: 1
ABDOMINAL PAIN: 0
VOMITING: 0
SHORTNESS OF BREATH: 1

## 2021-05-03 NOTE — ED PROVIDER NOTES
70-year-old female with a history of COPD, CHF, diabetes, and breast cancer currently on anastrozole presenting for evaluation of cough, shortness of breath, and rash. Cough began approximately 1 week ago and is productive of green sputum. She also has nasal congestion as well patient complains of shortness of breath has been using her rescue inhaler but it has not been helping much. She has had some right-sided chest discomfort that she describes as feeling like gas, last episode was last night and it self resolved. Last night she did develop a sore throat and she had an episode of watery nonbloody diarrhea this morning. She has had a rash for the past 3 weeks that she describes as itchy bubbles. It began on her bilateral arms, but moved to her legs and back. She was seen by her PCP and has been using anti-itch creams with no relief. Denies any new skin products, detergents, or meds. Her sister just recently tested positive for Covid and patient did have close contact with her 2 weeks ago. She has not had a fever at home. She has had decreased appetite. Review of Systems   Constitutional: Positive for appetite change. Negative for chills and fever. HENT: Positive for congestion and sore throat. Respiratory: Positive for cough and shortness of breath. Cardiovascular: Positive for chest pain. Gastrointestinal: Positive for diarrhea. Negative for abdominal pain, nausea and vomiting. Skin: Positive for rash. Neurological: Negative for dizziness, light-headedness and headaches. Physical Exam  Constitutional:       General: She is not in acute distress. Appearance: She is well-developed. She is not ill-appearing or toxic-appearing. HENT:      Head: Normocephalic. Neck:      Musculoskeletal: Normal range of motion. Cardiovascular:      Rate and Rhythm: Normal rate and regular rhythm.    Pulmonary:      Effort: Pulmonary effort is normal. No accessory muscle usage or respiratory distress. Breath sounds: Wheezing and rhonchi present. No decreased breath sounds. Comments: Breath sounds somewhat diminished in all lung fields  Diffuse faint wheezes and rhonchi in all fields  Abdominal:      Palpations: Abdomen is soft. Tenderness: There is no abdominal tenderness. Musculoskeletal:      Right lower leg: No edema. Left lower leg: No edema. Skin:     General: Skin is warm and dry. Comments: Multiple urticaria present on posterior and lateral thighs bilaterally   Neurological:      General: No focal deficit present. Mental Status: She is alert. Procedures     MDM  Number of Diagnoses or Management Options  COPD exacerbation ([address]  Urticaria  Diagnosis management comments: 40-year-old female with a history of COPD, CHF, diabetes, and breast cancer currently on anastrozole presenting for evaluation of cough, shortness of breath, and rash. No acute changes on EKG. Labs wnl. CTA was obtained due to patient's current malignancy and was negative for PE or any obvious pneumonia. Rapid COVID negative. Patient was given duonebs with improvement of breathing. She was also given decadron and benadryl with relief of itching. She was not hypoxic at any time and VS remained stable. At this time, patient is felt stable for discharge. She does have a nebulizer at home, but does not have any solution for it. She was thus prescribed proventil, as well as course of steroids and benadryl for COPD exacerbation and urticaria. She was also prescribed doxycycline for cover for atypical pneumonia given her productive cough. She was discharged in stable condition. Amount and/or Complexity of Data Reviewed  Clinical lab tests: reviewed         ED Course as of May 03 1241   Mon May 03, 2021   1238 Pt has had breathing treatment. Aeration improved in all fields. Wheezes improved. Mild end-expiratory wheezes in all fields.  No rhonchi.    [AP]      ED Course User Index  [AP] Santo Vargas MD            --------------------------------------------- PAST HISTORY ---------------------------------------------  Past Medical History:  has a past medical history of Arthritis, Asthma, Cancer ([facility] [address] Cerebral artery occlusion with cerebral infarction [facility]), Cerebrovascular disease, CHF (congestive heart failure) ([facility] [address] Claustrophobia, COPD (chronic obstructive pulmonary disease) ([facility] [address] Decreased dorsalis pedis pulse, Dermatophytosis, Headache(784.0), History of blood transfusion, Hx of blood clots, Hyperlipidemia, Hypertension, LBP radiating to both legs, Lymphedema of both lower extremities, Neuromuscular disorder ([facility] [address] Non-rheumatic aortic sclerosis, Obesity, Pulmonary hypertension ([facility] [address] Recurrent genital HSV (herpes simplex virus) infection, S/P breast biopsy, right, Type II or unspecified type diabetes mellitus without mention of complication, not stated as uncontrolled, and UTI (urinary tract infection). Past Surgical History:  has a past surgical history that includes Total hip arthroplasty (Bilateral); vin and bso (cervix removed); ECHO Compl W Dop Color Flow (6/7/2012); Total knee arthroplasty (Bilateral, 2013); Colonoscopy (2005); Hysterectomy; Colonoscopy (1/8/15); Breast lumpectomy (years ago); arthroplasty (Left, 07/29/2016); Finger surgery (Left, 07/29/2016); joint replacement; Breast lumpectomy (Right, 09/06/2016); other surgical history (Right, 12/20/2017); and Hand surgery (12/2017). Social History:  reports that she quit smoking about 19 years ago. Her smoking use included cigarettes. She started smoking about 55 years ago. She has a 8.75 pack-year smoking history. She has never used smokeless tobacco. She reports current alcohol use. She reports previous drug use. Drug: Marijuana.     Family History: family history includes Breast Cancer in her sister and sister; Cancer (age of onset: 36) in an other family member; Cancer (age of onset: 55) in her sister; Cancer (age of onset: 59) in her sister; Diabetes in her father and mother; Heart Attack in her mother; Heart Failure in her father; High Blood Pressure in her father and mother; Sickle Cell Anemia in an other family member; [place] in an other family member; Stroke in her sister. The patients home medications have been reviewed. Allergies: Patient has no known allergies.     -------------------------------------------------- RESULTS -------------------------------------------------  Labs:  Results for orders placed or performed during the hospital encounter of 05/03/21   COVID-19, Rapid    Specimen: Nasopharyngeal Swab   Result Value Ref Range    SARS-CoV-2, NAAT Not Detected Not Detected   CBC Auto Differential   Result Value Ref Range    WBC 9.1 4.5 - 11.5 E9/L    RBC 4.17 3.50 - 5.50 E12/L    Hemoglobin 10.7 (L) 11.5 - 15.5 g/dL    Hematocrit 35.1 34.0 - 48.0 %    MCV 84.2 80.0 - 99.9 fL    MCH 25.7 (L) 26.0 - 35.0 pg    MCHC 30.5 (L) 32.0 - 34.5 %    RDW 15.9 (H) 11.5 - 15.0 fL    Platelets 671 612 - 170 E9/L    MPV 10.3 7.0 - 12.0 fL    Neutrophils % 79.0 43.0 - 80.0 %    Immature Granulocytes % 0.4 0.0 - 5.0 %    Lymphocytes % 13.4 (L) 20.0 - 42.0 %    Monocytes % 7.1 2.0 - 12.0 %    Eosinophils % 0.0 0.0 - 6.0 %    Basophils % 0.1 0.0 - 2.0 %    Neutrophils Absolute 7.16 1.80 - 7.30 E9/L    Immature Granulocytes # 0.04 E9/L    Lymphocytes Absolute 1.21 (L) 1.50 - 4.00 E9/L    Monocytes Absolute 0.64 0.10 - 0.95 E9/L    Eosinophils Absolute 0.00 (L) 0.05 - 0.50 E9/L    Basophils Absolute 0.01 0.00 - 0.20 B8/Y   Basic Metabolic Panel w/ Reflex to MG   Result Value Ref Range    Sodium 140 132 - 146 mmol/L    Potassium reflex Magnesium 3.9 3.5 - 5.0 mmol/L    Chloride 102 98 - 107 mmol/L    CO2 30 (H) 22 - 29 mmol/L    Anion Gap 8 7 - 16 mmol/L    Glucose 175 (H) 74 - 99 mg/dL    BUN 7 6 - 23 mg/dL    CREATININE 0.7 0.5 - 1.0 mg/dL    GFR Non-African American >60 >=60 mL/min/1.73    GFR African American >60     Calcium 9.3 8.6 - 10.2 mg/dL   Troponin   Result Value Ref Range    Troponin <0.01 0.00 - 0.03 ng/mL   Brain Natriuretic Peptide   Result Value Ref Range    Pro-BNP 78 0 - 125 pg/mL   EKG 12 Lead   Result Value Ref Range    Ventricular Rate 80 BPM    Atrial Rate 80 BPM    P-R Interval 132 ms    QRS Duration 136 ms    Q-T Interval 420 ms    QTc Calculation (Bazett) 484 ms    P Axis 31 degrees    R Axis -37 degrees    T Axis 122 degrees       Radiology:  CTA PULMONARY W CONTRAST   Final Result   No evidence of pulmonary embolism or acute pulmonary abnormality. 6 mm nonobstructing left renal calculus             ------------------------- NURSING NOTES AND VITALS REVIEWED ---------------------------  Date / Time Roomed:  5/3/2021 11:09 AM  ED Bed Assignment:  08/08    The nursing notes within the ED encounter and vital signs as below have been reviewed. BP (!) 114/50   Pulse 82   Temp 98.3 °F (36.8 °C) (Oral)   Resp 14   Ht 5' 5.5\" (1.664 m)   Wt 240 lb (108.9 kg)   SpO2 100%   BMI 39.33 kg/m²   Oxygen Saturation Interpretation: Normal    EKG: interpreted by me  Rate: 80   Axis: Left  Rhythm: sinus w/ LBBB  Interpretation: LBBB, no acute ischemic changes  Comparison: 2/17/2020- stable  ------------------------------------------ PROGRESS NOTES ------------------------------------------  11:49 PM EDT  I have spoken with the patient and discussed todays results, in addition to providing specific details for the plan of care and counseling regarding the diagnosis and prognosis. Their questions are answered at this time and they are agreeable with the plan. I discussed at length with them reasons for immediate return here for re evaluation. They will followup with their primary care physician by calling their office tomorrow.       --------------------------------- ADDITIONAL PROVIDER NOTES ---------------------------------  At this time the patient is without objective evidence of an acute process requiring hospitalization or inpatient management. They have remained hemodynamically stable throughout their entire ED visit and are stable for discharge with outpatient follow-up. The plan has been discussed in detail and they are aware of the specific conditions for emergent return, as well as the importance of follow-up. Discharge Medication List as of 5/3/2021  3:01 PM      START taking these medications    Details   dexamethasone (DECADRON) 4 MG tablet Take 2.5 tablets by mouth 2 times daily (with meals) for 10 days, Disp-50 tablet, R-0Print      doxycycline hyclate (VIBRA-TABS) 100 MG tablet Take 1 tablet by mouth 2 times daily for 7 days, Disp-14 tablet, R-0Print      albuterol (PROVENTIL) (5 MG/ML) 0.5% nebulizer solution Take 0.5 mLs by nebulization every 6 hours as needed for Wheezing, Disp-120 each, R-0Print      diphenhydrAMINE (BENADRYL ALLERGY) 25 MG capsule Take 1 capsule by mouth every 6 hours as needed for Itching, Disp-40 capsule, R-0Print             Diagnosis:  1. COPD exacerbation ([address]    2. Urticaria        Disposition:  Patient's disposition: Discharge to home  Patient's condition is stable.        Emilie De Jesus MD  Resident  05/03/21 [id number]       Emilie De Jesus MD  Resident  05/03/21 [id number]

## 2021-06-07 ENCOUNTER — TELEPHONE (OUTPATIENT)
Dept: ENDOCRINOLOGY | Age: 71
End: 2021-06-07

## 2021-06-07 NOTE — TELEPHONE ENCOUNTER
Unable to leave a  message for Marlen Roche   Patient sent in blood sugar log   Unable leave a message   Dr Faby Hernandez will discuss chnages on her meds at [facility]

## 2021-07-22 ASSESSMENT — ENCOUNTER SYMPTOMS
CHEST TIGHTNESS: 0
EYE DISCHARGE: 0
BACK PAIN: 1
ABDOMINAL PAIN: 0
CONSTIPATION: 0
DIARRHEA: 0
SORE THROAT: 0
CHOKING: 0
ABDOMINAL DISTENTION: 0
NAUSEA: 0
SINUS PRESSURE: 0
SINUS PAIN: 0
WHEEZING: 0
VOMITING: 0
SHORTNESS OF BREATH: 1
RHINORRHEA: 0
VOICE CHANGE: 0
BLOOD IN STOOL: 0
EYE ITCHING: 0
TROUBLE SWALLOWING: 0

## 2021-07-22 NOTE — PROGRESS NOTES
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 On Arimidex since 11/15/2016  No longer follows with Medical Oncology. This note was copied forward from the last encounter. Essential components for this patient record were reviewed and verified on this visit including:  recent hospitalizations, recent imaging, PMH, PSH, FH, SOC HX, Allergies, and Medications were reviewed and updated as appropriate. In addition, the assessment and plan were copied from prior office note and updated accordingly. Patient ID: Yoav Bray is a 70 y.o. female. July 27, 2021  HPI  Patient is here for a follow up. She reports her niece at age 37 just had a double mastectomy. She underwent right breast needle localized lumpectomy on September 6, 2016. 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 Size of DCIS: At least 0.2 cm. Margins uninvolved by DCIS Estrogen receptor: Positive, greater than 90% (intensity= strong). Progesterone receptor: Positive, 30% (intensity = intermediate). Stage 0    RT:  Completed 11/17/2016. Endocrine therapy:  Arimidex, which she continues to tolerate fairly well. She has chronic back pain and scattered arthralgias; stable from baseline.   .    4/25/19 - Screening mammogram - [facility]:     TISSUE DENSITY:   The breasts are heterogeneously dense (Type 3 density).       MAMMOGRAM FINDINGS:   Finding 1:   No suspicious masses, areas of suspicious architectural distortion, suspicious calcifications, or additional suspicious findings are identified.  There are no significant changes from the prior study.       IMPRESSION:   No mammographic evidence of malignancy.       Screening mammogram in 1 year is recommended.       =======================================   BI-RADS Category 1:  Negative          4/25/19 - Dexa Bone Density Scan:     FINDINGS: basil joint with dequervain's release    BREAST LUMPECTOMY  years ago    benign    BREAST LUMPECTOMY Right 09/06/2016    COLONOSCOPY  2005    COLONOSCOPY  1/8/15    Dr. Chrissy Nunes - Evon Cull  6/7/2012         FINGER SURGERY Left 07/29/2016    thumb    HAND SURGERY  12/2017    HYSTERECTOMY      JOINT REPLACEMENT      OTHER SURGICAL HISTORY Right 12/20/2017    RIGHT THUMB TRAPEZIECTOMY WITH LIGAMENT RECONSRUCTION DEQUERVAINS RELEASE    TIM AND BSO      TOTAL HIP ARTHROPLASTY Bilateral     2000, 2013 dr Mac Roger, dr Halima Conrad Bilateral 2013    dr Demetra Pringle     Current Outpatient Medications on File Prior to Visit   Medication Sig Dispense Refill    albuterol (PROVENTIL) (5 MG/ML) 0.5% nebulizer solution Take 0.5 mLs by nebulization every 6 hours as needed for Wheezing 120 each 0    metFORMIN (GLUCOPHAGE) 1000 MG tablet Take 1 tablet by mouth 2 times daily (with meals) 180 tablet 3    insulin glargine (LANTUS SOLOSTAR) 100 UNIT/ML injection pen Inject 40 Units into the skin nightly 25 pen 5    insulin lispro, 1 Unit Dial, (HUMALOG KWIKPEN) 100 UNIT/ML SOPN Inject 10 units with meals + sliding scale.  MAX 75 units/day 25 pen 5    Insulin Pen Needle (PEN NEEDLES 3/16\") 31G X 5 MM MISC 1 each by Does not apply route 4 times daily (after meals and at bedtime) 300 each 3    benzonatate (TESSALON) 200 MG capsule Take 1 capsule by mouth 3 times daily as needed for Cough 15 capsule 0    melatonin (RA MELATONIN) 3 MG TABS tablet Take one 3 mg hs 90 tablet 3    vitamin D (ERGOCALCIFEROL) 1.25 MG (09586 UT) CAPS capsule Take 1 capsule by mouth once a week 4 capsule 11    losartan-hydrochlorothiazide (HYZAAR) 100-25 MG per tablet Take 1 tablet by mouth daily 30 tablet 11    omeprazole (PRILOSEC) 40 MG delayed release capsule Take 1 capsule by mouth daily 30 capsule 11    saxagliptin (ONGLYZA) 5 MG TABS tablet Take 1 tablet by mouth daily 30 tablet 11    Fluticasone furoate-vilanterol (BREO ELLIPTA) 200-25 MCG/INH AEPB inhaler Inhale 1 puff into the lungs daily 1 each 11    anastrozole (ARIMIDEX) 1 MG tablet Take 1 tablet by mouth daily Indications: ESTROGEN DEFICIENCY IN BREAST CANCER (INACTIVE) 30 tablet 11    gabapentin (NEURONTIN) 400 MG capsule Take 1 capsule by mouth 3 times daily. 90 capsule 11    aspirin 81 MG chewable tablet Take 1 tablet by mouth daily 30 tablet 11    pravastatin (PRAVACHOL) 80 MG tablet Take 1 tablet by mouth nightly 90 tablet 3    oxybutynin (DITROPAN) 5 MG tablet Take 1 tablet by mouth 3 times daily 90 tablet 11    montelukast (SINGULAIR) 10 MG tablet Take 1 tablet by mouth nightly 30 tablet 11    cloNIDine (CATAPRES) 0.2 MG tablet Take 1 tablet by mouth 3 times daily 90 tablet 11    furosemide (LASIX) 20 MG tablet Take 1 tablet by mouth 2 times daily 180 tablet 1    formoterol (PERFOROMIST) 20 MCG/2ML nebulizer solution 20 mcg      cloNIDine (CATAPRES) 0.2 MG tablet Take 1 tablet by mouth 3 times daily 60 tablet 3    nystatin (MYCOSTATIN) 529321 UNIT/ML suspension Take 5 mLs by mouth 4 times daily 100 mL 0    calcium carbonate-vitamin D (CALCIUM 600 + D) 600-400 MG-UNIT TABS per tab Take 1 tablet by mouth 2 times daily 60 tablet 11    butenafine (LOTRIMIN ULTRA) 1 % CREA Please apply from the toes, in between the toes, foot up to the upper calf twice daily for 1 month, both legs 1 Tube 10    acetaminophen (APAP EXTRA STRENGTH) 500 MG tablet Take 1 tablet by mouth every 6 hours as needed for Pain 30 tablet 3    vitamin B-12 (CYANOCOBALAMIN) 1000 MCG tablet Take 1 tablet by mouth daily 90 tablet 1     No current facility-administered medications on file prior to visit. No Known Allergies      Review of Systems   Constitutional: Negative for activity change, appetite change, chills, fatigue, fever and unexpected weight change. Feels well overall. Recent inpatient admission for pneumonia.      HENT: Negative for congestion, postnasal drip, rhinorrhea, sinus pressure, sinus pain, sore throat, trouble swallowing and voice change. Eyes: Negative for discharge, itching and visual disturbance. Respiratory: Positive for shortness of breath (Chronic shortness of breath with exertion; overall stable. ). Negative for choking, chest tightness and wheezing. Cardiovascular: Negative for chest pain, palpitations and leg swelling. Gastrointestinal: Negative for abdominal distention, abdominal pain, blood in stool, constipation, diarrhea, nausea and vomiting. Endocrine: Negative for cold intolerance and heat intolerance. Genitourinary: Negative for difficulty urinating, dysuria, frequency and hematuria. Musculoskeletal: Positive for arthralgias (Stable), back pain (Acute on chronic; on Medrol Dosepak.) and joint swelling. Negative for gait problem, myalgias, neck pain and neck stiffness. Skin: Negative for rash. Allergic/Immunologic: Negative for environmental allergies and food allergies. Neurological: Negative for dizziness, seizures, syncope, speech difficulty, weakness, light-headedness and headaches. Hematological: Negative for adenopathy. Does not bruise/bleed easily. Psychiatric/Behavioral: Negative for agitation, confusion and decreased concentration. The patient is not nervous/anxious. The patient voices no complaints. With questioning, she denies significant pain, fever, chills, wound drainage or discharge. Objective:   Physical Exam  Vitals and nursing note reviewed. Constitutional:       General: She is not in acute distress. Appearance: She is well-developed. She is obese. She is not diaphoretic. Comments: ECOG remains stable at 1 due to comorbidities. HENT:      Head: Normocephalic and atraumatic. Mouth/Throat:      Pharynx: No oropharyngeal exudate. Eyes:      General: No scleral icterus. Right eye: No discharge. Left eye: No discharge.       Conjunctiva/sclera: Conjunctivae normal.   Neck:      Thyroid: No thyromegaly. Vascular: No JVD. Trachea: No tracheal deviation. Cardiovascular:      Rate and Rhythm: Normal rate and regular rhythm. Heart sounds: No murmur heard. No friction rub. No gallop. Pulmonary:      Effort: Pulmonary effort is normal. No respiratory distress or retractions. Breath sounds: Normal breath sounds. No stridor. No wheezing or rales. Chest:      Chest wall: No mass, lacerations, deformity, swelling, tenderness or edema. Breasts: Breasts are symmetrical.         Right: Tenderness present. No inverted nipple, mass, nipple discharge or skin change. Left: No inverted nipple, mass, nipple discharge, skin change or tenderness. Comments: Breasts are supple bilaterally. Firm scar tissue and slight retraction in area of lumpectomy upper outer quadrant on right. Slight breast asymmetry. No skin dimpling or puckering. No nipple discharge. No clinically suspicious lumps nodules or masses appreciated. No axillary lymphadenopathy. Abdominal:      General: There is no distension. Palpations: Abdomen is soft. Tenderness: There is no abdominal tenderness. There is no guarding or rebound. Musculoskeletal:         General: No tenderness or deformity. Normal range of motion. Right shoulder: Normal. Normal range of motion. Left shoulder: Normal. Normal range of motion. Right elbow: No swelling. Right wrist: No swelling. Cervical back: Normal range of motion and neck supple. Lymphadenopathy:      Cervical: No cervical adenopathy. Right cervical: No superficial, deep or posterior cervical adenopathy. Left cervical: No superficial, deep or posterior cervical adenopathy. Upper Body:      Right upper body: No supraclavicular or pectoral adenopathy. Left upper body: No supraclavicular or pectoral adenopathy. Skin:     General: Skin is warm and dry. Coloration: Skin is not pale. Findings: No erythema or rash. Neurological:      Mental Status: She is alert and oriented to person, place, and time. Coordination: Coordination normal.   Psychiatric:         Behavior: Behavior normal.         Thought Content: Thought content normal.         Judgment: Judgment normal.         Assessment:      70 y.o. extremely pleasant woman who underwent right breast needle localized lumpectomy on September 6, 2016. 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 Size of DCIS: At least 0.2 cm. Margins uninvolved by DCIS Estrogen receptor: Positive, greater than 90% (intensity= strong). Progesterone receptor: Positive, 30% (intensity = intermediate)    -RT:  Completed 11/17/2016.  -Endocrine therapy:  Arimidex 1 mg daily was started on 11/15/2016. Continues to tolerate well. She has chronic pain in her left knee (history of knee replacement). She previously saw Dr. Renato Song, Naun Carrillo who  Has has left the area. -DEXA bone density: 04/18/2018:  Normal bone marrow density involving lumbar spine; normal bone marrow density involving the 33% radius bone. On Ca+/Vit D daily.  -April 18, 2018 presented with tenderness to palpation left breast 3:00 position and clinically thickened area at the 3:00 position. She has persistent tenderness, but not progressive.  -Bilateral digital diagnostic mammogram 4/18/2018 with left breast ultrasound:  Negative. -Bilateral screening mammogram 04/25/2019:  Negative. -DEXA bone density 04/25/2019:  Normal LS/Normal left forearm.      -Her last visit with Medical Oncology was June 20, 2017. We discussed need for medical oncology follow up and she was agreeable. Appointment made for follow up with Dr. Janice Arzola on 10/19/2018 but she did not keep that appointment. Dr. Soy Roche continues to prescribe her AI.       -Call to her pharmacy notes there was a short lapse in AI compliance from May to August, but she is now on track. Reviewed importance of taking daily.    -History of acute hypoxic respiratory failure ; Pulse OX 98% on room air and she is clinically and subjectively stable on today's visit. 7/20/2020 - Bilateral screening mammogram - [facility]:  Negative, BI-RADS 1  7/20/2020 - Dexa Bone Density Scan - [facility]: Normal bone density of LS and Left forearm with no statistically significant change. 7/27/2021_ BILATERAL SCREENING MAMMOGRAM: [facility]  FINDINGS:   there are some postoperative changes about the right breast.  There are   scattered bilateral clusters of dystrophic calcification more on the right. No suspicious appearing mass or calcification is noted.           Impression   No mammographic evidence of malignancy.       BIRADS:   BIRADS - CATEGORY 1       Negative Mammogram.  Normal interval follow-up is recommended in 12 months.       OVERALL ASSESSMENT - NEGATIVE     Imaging reviewed with patient. No evidence of recurrent disease. Clinically, she continues to do well and is without evidence of recurrent disease. As mentioned above, her aromatase inhibitor is being prescribed by her primary care physician. She will complete 5 years of therapy in November 2021. She has an appointment with oncology in September. She will follow-up with us in 1 year and as needed. Imaging with her next follow-up visit. Plan:       1. Continue monthly breast self examination. Bring any changes to your physician's attention. 1. Continue healthy diet and exercise routinely as tolerated. 2. Maintaining ideal body weight (20-25 BMI) may lead to optimal breast cancer outcomes. 3. Avoid alcohol or limit alcohol intake to < 3 drinks per week. 4. Continue Arimidex 1 mg daily. 5. Ca+/VitD while on Arimidex. 6. Repeat DEXA1-2 years. Repeat mammogram July 2022 after the 27th  7.  Return to see in 12 months with mammogram same day.        During today's visit, face-to-face time 25  minutes, greater than 50% in counseling education and coordination of care. All questions were answered to her apparent satisfaction, and she is agreeable to the plan as outlined above. Dr. Marian Najjar.  Flory Montero MDProvidence Centralia Hospital  July 27, 2021

## 2021-07-27 ENCOUNTER — HOSPITAL ENCOUNTER (OUTPATIENT)
Dept: GENERAL RADIOLOGY | Age: 71
Discharge: HOME | End: 2021-07-29
Payer: MEDICARE

## 2021-07-27 ENCOUNTER — OFFICE VISIT (OUTPATIENT)
Dept: BREAST CENTER | Age: 71
End: 2021-07-27
Payer: MEDICARE

## 2021-07-27 VITALS
HEART RATE: 70 BPM | SYSTOLIC BLOOD PRESSURE: 136 MMHG | RESPIRATION RATE: 20 BRPM | TEMPERATURE: 97.6 F | WEIGHT: 242 LBS | BODY MASS INDEX: 38.89 KG/M2 | HEIGHT: 66 IN | DIASTOLIC BLOOD PRESSURE: 84 MMHG | OXYGEN SATURATION: 99 %

## 2021-07-27 DIAGNOSIS — Z85.3 HISTORY OF BREAST CANCER: ICD-10-CM

## 2021-07-27 DIAGNOSIS — Z79.811 USE OF AROMATASE INHIBITORS: ICD-10-CM

## 2021-07-27 DIAGNOSIS — Z12.31 SCREENING MAMMOGRAM FOR HIGH-RISK PATIENT: ICD-10-CM

## 2021-07-27 DIAGNOSIS — Z78.0 POST-MENOPAUSAL: ICD-10-CM

## 2021-07-27 PROCEDURE — G8417 CALC BMI ABV UP PARAM F/U: HCPCS | Performed by: SURGERY

## 2021-07-27 PROCEDURE — 3017F COLORECTAL CA SCREEN DOC REV: CPT | Performed by: SURGERY

## 2021-07-27 PROCEDURE — G8427 DOCREV CUR MEDS BY ELIG CLIN: HCPCS | Performed by: SURGERY

## 2021-07-27 PROCEDURE — 4040F PNEUMOC VAC/ADMIN/RCVD: CPT | Performed by: SURGERY

## 2021-07-27 PROCEDURE — 1123F ACP DISCUSS/DSCN MKR DOCD: CPT | Performed by: SURGERY

## 2021-07-27 PROCEDURE — 99213 OFFICE O/P EST LOW 20 MIN: CPT | Performed by: SURGERY

## 2021-07-27 PROCEDURE — 77063 BREAST TOMOSYNTHESIS BI: CPT

## 2021-07-27 PROCEDURE — 1090F PRES/ABSN URINE INCON ASSESS: CPT | Performed by: SURGERY

## 2021-07-27 PROCEDURE — G8399 PT W/DXA RESULTS DOCUMENT: HCPCS | Performed by: SURGERY

## 2021-07-27 PROCEDURE — 77080 DXA BONE DENSITY AXIAL: CPT

## 2021-07-27 PROCEDURE — 1036F TOBACCO NON-USER: CPT | Performed by: SURGERY

## 2021-07-27 NOTE — PATIENT INSTRUCTIONS

## 2021-07-29 ENCOUNTER — OFFICE VISIT (OUTPATIENT)
Dept: ENDOCRINOLOGY | Age: 71
End: 2021-07-29
Payer: MEDICARE

## 2021-07-29 VITALS
SYSTOLIC BLOOD PRESSURE: 121 MMHG | HEART RATE: 89 BPM | WEIGHT: 244 LBS | HEIGHT: 66 IN | BODY MASS INDEX: 39.21 KG/M2 | DIASTOLIC BLOOD PRESSURE: 68 MMHG

## 2021-07-29 DIAGNOSIS — Z79.4 TYPE 2 DIABETES MELLITUS WITH HYPERGLYCEMIA, WITH LONG-TERM CURRENT USE OF INSULIN (HCC): Primary | ICD-10-CM

## 2021-07-29 DIAGNOSIS — E55.9 VITAMIN D DEFICIENCY: ICD-10-CM

## 2021-07-29 DIAGNOSIS — E11.42 TYPE 2 DIABETES MELLITUS WITH POLYNEUROPATHY (HCC): ICD-10-CM

## 2021-07-29 DIAGNOSIS — I10 ESSENTIAL HYPERTENSION: ICD-10-CM

## 2021-07-29 DIAGNOSIS — Z91.119 DIETARY NONCOMPLIANCE: ICD-10-CM

## 2021-07-29 DIAGNOSIS — E78.5 DYSLIPIDEMIA: ICD-10-CM

## 2021-07-29 DIAGNOSIS — E66.09 CLASS 2 OBESITY DUE TO EXCESS CALORIES WITHOUT SERIOUS COMORBIDITY WITH BODY MASS INDEX (BMI) OF 39.0 TO 39.9 IN ADULT: ICD-10-CM

## 2021-07-29 DIAGNOSIS — E11.65 TYPE 2 DIABETES MELLITUS WITH HYPERGLYCEMIA, WITH LONG-TERM CURRENT USE OF INSULIN (HCC): Primary | ICD-10-CM

## 2021-07-29 LAB — HBA1C MFR BLD: 11.5 %

## 2021-07-29 PROCEDURE — 83036 HEMOGLOBIN GLYCOSYLATED A1C: CPT | Performed by: CLINICAL NURSE SPECIALIST

## 2021-07-29 PROCEDURE — 3046F HEMOGLOBIN A1C LEVEL >9.0%: CPT | Performed by: CLINICAL NURSE SPECIALIST

## 2021-07-29 PROCEDURE — 3017F COLORECTAL CA SCREEN DOC REV: CPT | Performed by: CLINICAL NURSE SPECIALIST

## 2021-07-29 PROCEDURE — 1036F TOBACCO NON-USER: CPT | Performed by: CLINICAL NURSE SPECIALIST

## 2021-07-29 PROCEDURE — G8399 PT W/DXA RESULTS DOCUMENT: HCPCS | Performed by: CLINICAL NURSE SPECIALIST

## 2021-07-29 PROCEDURE — 99214 OFFICE O/P EST MOD 30 MIN: CPT | Performed by: CLINICAL NURSE SPECIALIST

## 2021-07-29 PROCEDURE — 2022F DILAT RTA XM EVC RTNOPTHY: CPT | Performed by: CLINICAL NURSE SPECIALIST

## 2021-07-29 PROCEDURE — G8417 CALC BMI ABV UP PARAM F/U: HCPCS | Performed by: CLINICAL NURSE SPECIALIST

## 2021-07-29 PROCEDURE — 4040F PNEUMOC VAC/ADMIN/RCVD: CPT | Performed by: CLINICAL NURSE SPECIALIST

## 2021-07-29 PROCEDURE — 1090F PRES/ABSN URINE INCON ASSESS: CPT | Performed by: CLINICAL NURSE SPECIALIST

## 2021-07-29 PROCEDURE — G8427 DOCREV CUR MEDS BY ELIG CLIN: HCPCS | Performed by: CLINICAL NURSE SPECIALIST

## 2021-07-29 PROCEDURE — 1123F ACP DISCUSS/DSCN MKR DOCD: CPT | Performed by: CLINICAL NURSE SPECIALIST

## 2021-07-29 RX ORDER — BLOOD SUGAR DIAGNOSTIC
1 STRIP MISCELLANEOUS 4 TIMES DAILY
Qty: 150 EACH | Refills: 11 | Status: SHIPPED
Start: 2021-07-29 | End: 2021-12-20

## 2021-07-29 RX ORDER — DULAGLUTIDE 0.75 MG/.5ML
INJECTION, SOLUTION SUBCUTANEOUS
Qty: 4 PEN | Refills: 5 | Status: SHIPPED
Start: 2021-07-29 | End: 2021-10-06 | Stop reason: ALTCHOICE

## 2021-07-29 RX ORDER — LANCETS 33 GAUGE
EACH MISCELLANEOUS
Qty: 200 EACH | Refills: 11 | Status: SHIPPED
Start: 2021-07-29 | End: 2021-12-20

## 2021-07-29 NOTE — PROGRESS NOTES
[address] [facility] Department of Endocrinology Diabetes and Metabolism   [address] [facility] [address]   Phone: [phone]  Fax: [phone]    Date of Service: 7/29/2021    Primary Care Physician: DMITRY Coelho CNP  Referring physician: No ref. provider found  Provider: Sameera Peters     Reason for the visit:  DM type 2 on insulin      History of Present Illness: The history is provided by the patient. No  was used. Accuracy of the patient data is excellent. Bonnie Micheal is a very pleasant 70 y.o. female seen in Endocrine clinic today for diabetes management      Kolby Gonzalez was diagnosed with diabetes in the age of 29's and currently on metformin 1000mg BID, Lantus 50U nightly, Humalog pen 10 units TID with meals plus ISS   The patient has been checking blood sugar 3 times per day, none for last 1 week as she ran out of test strips   Pt had hives and was given medrol dose pack and last dose was yesterday   She received  another medrol dose pack 1 month ago as well       Most recent A1c results summarized below  Lab Results   Component Value Date    LABA1C 10.2 04/01/2021    LABA1C 9.4 02/05/2020    LABA1C 8.6 12/29/2019     The patient hasn't been mindful of what has been eating and wasn't following diabetes diet as encouraged. She has cut back on sugar and trying to eat more fruits and vegetables. I reviewed current medications and the patient has no issues with diabetes medications. Last eye exam > 1 year , no retinopathy , [facility]   The patient seeing podiatrist every year. She also performs  own feet care  Microvascular complications:  No Retinopathy, no Nephropathy.  +Neuropathy   Macrovascular complications: no CAD, PVD. 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    Cerebrovascular disease 6/09    no residual    CHF (congestive heart failure) (HCC) approx 3 years ago    Claustrophobia     COPD (chronic obstructive pulmonary disease) ([facility] [facility]     Decreased dorsalis pedis pulse 9/5/2019    Dermatophytosis 9/5/2019    Headache(784.0)     History of blood transfusion     with knee surgery    Hx of blood clots     Hyperlipidemia     Hypertension     LBP radiating to both legs     Lymphedema of both lower extremities 11/12/2015    Neuromuscular disorder ([facility] [address] [facility]     Non-rheumatic aortic sclerosis 10/2018    10/2018    Obesity     Pulmonary hypertension ([facility] [address] 10/2018    Recurrent genital HSV (herpes simplex virus) infection     last outbreak 11/2017    S/P breast biopsy, right 07/2016    pt states breast cancer    Type II or unspecified type diabetes mellitus without mention of complication, not stated as uncontrolled     AA1c8.7 9/10    UTI (urinary tract infection) 5/13/2019       PAST SURGICAL HISTORY   Past Surgical History:   Procedure Laterality Date    ARTHROPLASTY Left 07/29/2016    thumb basil joint with dequervain's release    BREAST BIOPSY      BREAST LUMPECTOMY  years ago    benign    BREAST LUMPECTOMY Right 09/06/2016    COLONOSCOPY  2005    COLONOSCOPY  1/8/15    Dr. Gal Parekh  6/7/2012         FINGER SURGERY Left 07/29/2016    thumb    HAND SURGERY  12/2017    HYSTERECTOMY      JOINT REPLACEMENT      OTHER SURGICAL HISTORY Right 12/20/2017    RIGHT THUMB TRAPEZIECTOMY WITH LIGAMENT RECONSRUCTION DEQUERVAINS RELEASE    TIM AND BSO      TOTAL HIP ARTHROPLASTY Bilateral     2000, 2013 dr Sammy Avila, dr Anabel Hernandez Bilateral 2013    dr Vinnie Back   Tobacco:   reports that she quit smoking about 19 years ago. Her smoking use included cigarettes. She started smoking about 55 years ago. She has a 8.75 pack-year smoking history. She has never used smokeless tobacco.  Alcohol:   reports current alcohol use. Drugs:   reports previous drug use. Drug: Marijuana. FAMILY HISTORY   Family History   Problem Relation Age of Onset    Diabetes Mother     High Blood Pressure Mother     Heart Attack Mother     High Blood Pressure Father     Diabetes Father     Heart Failure Father     Breast Cancer Sister     Stroke Sister         young age   [employer] Cancer Sister 55        breast    Sickle Cell Anemia Other         niece    Cancer Other 36        niece - breast    Breast Cancer Sister             Cancer Sister 59        breast & ovarian    Stomach Cancer Other         aunt       ALLERGIES AND DRUG REACTIONS   No Known Allergies    CURRENT MEDICATIONS   Current Outpatient Medications   Medication Sig Dispense Refill    albuterol (PROVENTIL) (5 MG/ML) 0.5% nebulizer solution Take 0.5 mLs by nebulization every 6 hours as needed for Wheezing 120 each 0    metFORMIN (GLUCOPHAGE) 1000 MG tablet Take 1 tablet by mouth 2 times daily (with meals) 180 tablet 3    insulin glargine (LANTUS SOLOSTAR) 100 UNIT/ML injection pen Inject 40 Units into the skin nightly (Patient taking differently: Inject 50 Units into the skin nightly ) 25 pen 5    insulin lispro, 1 Unit Dial, (HUMALOG KWIKPEN) 100 UNIT/ML SOPN Inject 10 units with meals + sliding scale.  MAX 75 units/day 25 pen 5    Insulin Pen Needle (PEN NEEDLES 3/16\") 31G X 5 MM MISC 1 each by Does not apply route 4 times daily (after meals and at bedtime) 300 each 3    benzonatate (TESSALON) 200 MG capsule Take 1 capsule by mouth 3 times daily as needed for Cough 15 capsule 0    melatonin (RA MELATONIN) 3 MG TABS tablet Take one 3 mg hs 90 tablet 3    vitamin D (ERGOCALCIFEROL) 1.25 MG (66656 UT) CAPS capsule Take 1 capsule by mouth once a week 4 capsule 11    losartan-hydrochlorothiazide (HYZAAR) 100-25 MG per tablet Take 1 tablet by mouth daily 30 tablet 11    omeprazole (PRILOSEC) 40 MG delayed release capsule Take 1 capsule by mouth daily 30 capsule 11    saxagliptin (ONGLYZA) 5 MG TABS tablet Take 1 tablet by mouth daily 30 tablet 11    Fluticasone furoate-vilanterol (BREO ELLIPTA) 200-25 MCG/INH AEPB inhaler Inhale 1 puff into the lungs daily 1 each 11    anastrozole (ARIMIDEX) 1 MG tablet Take 1 tablet by mouth daily Indications: ESTROGEN DEFICIENCY IN BREAST CANCER (INACTIVE) 30 tablet 11    gabapentin (NEURONTIN) 400 MG capsule Take 1 capsule by mouth 3 times daily.  90 capsule 11    aspirin 81 MG chewable tablet Take 1 tablet by mouth daily 30 tablet 11    pravastatin (PRAVACHOL) 80 MG tablet Take 1 tablet by mouth nightly 90 tablet 3    oxybutynin (DITROPAN) 5 MG tablet Take 1 tablet by mouth 3 times daily 90 tablet 11    montelukast (SINGULAIR) 10 MG tablet Take 1 tablet by mouth nightly 30 tablet 11    cloNIDine (CATAPRES) 0.2 MG tablet Take 1 tablet by mouth 3 times daily 90 tablet 11    furosemide (LASIX) 20 MG tablet Take 1 tablet by mouth 2 times daily 180 tablet 1    formoterol (PERFOROMIST) 20 MCG/2ML nebulizer solution 20 mcg      cloNIDine (CATAPRES) 0.2 MG tablet Take 1 tablet by mouth 3 times daily 60 tablet 3    nystatin (MYCOSTATIN) 004975 UNIT/ML suspension Take 5 mLs by mouth 4 times daily 100 mL 0    calcium carbonate-vitamin D (CALCIUM 600 + D) 600-400 MG-UNIT TABS per tab Take 1 tablet by mouth 2 times daily 60 tablet 11    butenafine (LOTRIMIN ULTRA) 1 % CREA Please apply from the toes, in between the toes, foot up to the upper calf twice daily for 1 month, both legs 1 Tube 10    acetaminophen (APAP EXTRA STRENGTH) 500 MG tablet Take 1 tablet by mouth every 6 hours as needed for Pain 30 tablet 3    vitamin B-12 (CYANOCOBALAMIN) 1000 MCG tablet Take 1 tablet by mouth daily 90 tablet 1     No current facility-administered medications for this visit. Review of Systems  Constitutional: No fever, no chills, no diaphoresis, no generalized weakness. HEENT: No blurred vision, No sore throat, no ear pain, no hair loss  Neck: denied any neck swelling, difficulty swallowing,   Cardio-pulmonary: No CP, SOB or palpitation, No orthopnea or PND. No cough or wheezing. GI: No N/V/D, no constipation, No abdominal pain, no melena or hematochezia   : Denied any dysuria, hematuria, flank pain, discharge, or incontinence. Skin: denied any rash, ulcer, Hirsute, or hyperpigmentation. MSK: denied any joint deformity, joint pain/swelling, muscle pain, or back pain. Neuro: no numbness, no tingling, no weakness, _    OBJECTIVE    There were no vitals taken for this visit. BP Readings from Last 4 Encounters:   07/27/21 136/84   05/03/21 (!) 114/50   04/21/21 (!) 170/80   04/21/21 (!) 111/44     Wt Readings from Last 6 Encounters:   07/27/21 242 lb (109.8 kg)   05/03/21 240 lb (108.9 kg)   04/01/21 244 lb 3.2 oz (110.8 kg)   01/20/21 243 lb (110.2 kg)   08/18/20 242 lb (109.8 kg)   07/20/20 261 lb (118.4 kg)       Physical examination:  General: awake alert, oriented x3, no abnormal position or movements. HEENT: normocephalic non-traumatic, no exophthalmos   Neck: supple, no LN enlargement, no thyromegaly, no thyroid tenderness, no JVD. Pulm: Clear equal air entry no added sounds, no wheezing or rhonchi    CVS: S1 + S2, no murmur, no heave. Dorsalis pedis pulse palpable   Abd: soft lax, no tenderness, no organomegaly, audible bowel sounds. Obese  Skin: warm, no lesions, no rash.  No callus, no Ulcers, No acanthosis nigricans  Musculoskeletal: No back tenderness, no kyphosis/scoliosis    Neuro: CN intact, Monofilament sensation decreased bilateral , muscle power normal  Psych: normal mood, and affect      Review of Laboratory Data:  I personally reviewed the following lab:  Lab Results   Component Value Date/Time    WBC 9.1 05/03/2021 11:52 AM    RBC 4.17 05/03/2021 11:52 AM    HGB 10.7 (L) 05/03/2021 11:52 AM    HCT 35.1 05/03/2021 11:52 AM    MCV 84.2 05/03/2021 11:52 AM    MCH 25.7 (L) 05/03/2021 11:52 AM    MCHC 30.5 (L) 05/03/2021 11:52 AM    RDW 15.9 (H) 05/03/2021 11:52 AM     05/03/2021 11:52 AM    MPV 10.3 05/03/2021 11:52 AM      Lab Results   Component Value Date/Time     05/03/2021 11:52 AM    K 3.9 05/03/2021 11:52 AM    CO2 30 (H) 05/03/2021 11:52 AM    BUN 7 05/03/2021 11:52 AM    CREATININE 0.7 05/03/2021 11:52 AM    CALCIUM 9.3 05/03/2021 11:52 AM    LABGLOM >60 05/03/2021 11:52 AM    GFRAA >60 05/03/2021 11:52 AM      Lab Results   Component Value Date/Time    TSH 0.591 06/10/2019 09:50 AM    T4FREE 1.22 05/13/2019 10:05 AM    M1KXQVE 9.2 10/05/2017 12:00 PM     Lab Results   Component Value Date    LABA1C 10.2 04/01/2021    GLUCOSE 175 05/03/2021    GLUCOSE 172 04/12/2012    MALBCR - 06/12/2019    LABMICR <12.0 06/12/2019    LABCREA 85 06/12/2019     Lab Results   Component Value Date    LABA1C 10.2 04/01/2021    LABA1C 9.4 02/05/2020    LABA1C 8.6 12/29/2019     Lab Results   Component Value Date    TRIG 66 06/10/2019    HDL 54 06/10/2019    LDLCALC 85 06/10/2019    CHOL 152 06/10/2019     Lab Results   Component Value Date    VITD25 30 06/10/2019    VITD25 22 05/23/2018       ASSESSMENT & RECOMMENDATIONS   Phan Gonzalez, a 70 y.o.-old female seen in for the following issues      Diagnosis Orders   1. Type 2 diabetes mellitus with hyperglycemia, with long-term current use of insulin (HCC)     2. Class 2 obesity due to excess calories without serious comorbidity with body mass index (BMI) of 39.0 to 39.9 in adult     3. Dietary noncompliance     4. Type 2 diabetes mellitus with polyneuropathy (HCC)     5. Vitamin D deficiency     6. Dyslipidemia     7. Essential hypertension         Diabetes Mellitus Type     · Patient's diabetes is not well controlled aggravated by recent steroid use.   Hemoglobin A1c 11.5%  · Stop Onglyza. Start Trulicity 3.64 mg once weekly. No contraindications. Counseled on side effects. Continue Metformin, Lantus, and Humalog as above. · Patient has not checked blood sugars recently as she ran out of test strips. New meter was given at today's visit. Test atrips sent to pharmacy. · The patient was advised to check blood sugars 4 times a day before meals and at bedtime and send BS readings to our office in a week. · Advised to call if blood glucose less than 70 or greater than 250 3 consecutive times. · Discussed with patient A1c and blood sugar goals   · Optimal blood sugars: 100-140 pre-prandial, < 180 peak post-prandial  · The patient counseled about the complications of uncontrolled diabetes   · Patient was counselled about the importance of self-blood glucose monitoring and eating consistent carb diet to avoid blood sugar fluctuations   · Patient will need routine diabetes maintenance and prevention  · Discussed lifestyle changes including diet and exercise with patient; recommended 150 minutes of moderate intensity exercise per week. · Advised optimal foot care  · Diabetes labs ordered  · Advised follow-up with ophthalmology  · Further recommendations will be made after blood glucose log received    Dietary noncompliance   Discussed with patient the importance of eating consistent carbohydrate meals, avoiding high glycemic index food. Also, discussed with patient the risk and negative consequences of dietary noncompliance on blood glucose control, blood pressure and weight      Obesity   Discussed lifestyle changes including diet and exercise with patient in depth. Also discussed with patient cardiovascular risk associated with obesity    Type 2 diabetes mellitus with polyneuropathy  Counseled on foot care    Vitamin D deficiency  -On vitamin D 50,000 international units once weekly. Will reassess vitamin D    Dyslipidemia  -Continue pravastatin.   Encourage diet and

## 2021-09-07 ENCOUNTER — HOSPITAL ENCOUNTER (EMERGENCY)
Age: 71
Discharge: HOME | End: 2021-09-07
Payer: MEDICARE

## 2021-09-07 VITALS
HEIGHT: 67 IN | TEMPERATURE: 98.6 F | BODY MASS INDEX: 39.24 KG/M2 | RESPIRATION RATE: 16 BRPM | WEIGHT: 250 LBS | DIASTOLIC BLOOD PRESSURE: 79 MMHG | HEART RATE: 96 BPM | SYSTOLIC BLOOD PRESSURE: 166 MMHG | OXYGEN SATURATION: 98 %

## 2021-09-07 DIAGNOSIS — L50.9 URTICARIA: Primary | ICD-10-CM

## 2021-09-07 PROCEDURE — 99283 EMERGENCY DEPT VISIT LOW MDM: CPT

## 2021-09-07 PROCEDURE — 6370000000 HC RX 637 (ALT 250 FOR IP): Performed by: PHYSICIAN ASSISTANT

## 2021-09-07 RX ORDER — PREDNISONE 20 MG/1
60 TABLET ORAL ONCE
Status: COMPLETED | OUTPATIENT
Start: 2021-09-07 | End: 2021-09-07

## 2021-09-07 RX ORDER — HYDROXYZINE PAMOATE 25 MG/1
25 CAPSULE ORAL 3 TIMES DAILY PRN
Qty: 42 CAPSULE | Refills: 0 | Status: SHIPPED | OUTPATIENT
Start: 2021-09-07 | End: 2021-09-21

## 2021-09-07 RX ORDER — METHYLPREDNISOLONE 4 MG/1
TABLET ORAL
Qty: 21 TABLET | Refills: 0 | Status: SHIPPED | OUTPATIENT
Start: 2021-09-07 | End: 2021-09-13

## 2021-09-07 RX ORDER — DIPHENHYDRAMINE HCL 25 MG
25 TABLET ORAL ONCE
Status: COMPLETED | OUTPATIENT
Start: 2021-09-07 | End: 2021-09-07

## 2021-09-07 RX ADMIN — DIPHENHYDRAMINE HCL 25 MG: 25 TABLET ORAL at 19:11

## 2021-09-07 RX ADMIN — PREDNISONE 60 MG: 20 TABLET ORAL at 19:11

## 2021-09-07 ASSESSMENT — PAIN DESCRIPTION - PAIN TYPE: TYPE: ACUTE PAIN

## 2021-09-07 ASSESSMENT — PAIN SCALES - GENERAL: PAINLEVEL_OUTOF10: 5

## 2021-09-07 ASSESSMENT — PAIN DESCRIPTION - LOCATION: LOCATION: CHEST

## 2021-09-07 NOTE — ED PROVIDER NOTES
[address] [place] [address]  Department of Emergency Medicine   ED  Encounter Note  Admit Date/RoomTime: 2021  6:20 PM  ED Room:   NAME: Pb Gonzalez  : 1950  MRN: [id number]     Chief Complaint:  Facial Swelling (lip swelling, - tongue swelling, sob, pt unsure what she came into contact with, took benadryl this afternoon, on and off for the past few months), Shortness of Breath, and Urticaria    HISTORY OF PRESENT ILLNESS        Stoney Alfonso is a 70 y.o. female who presents to the ED by private vehicle for hives coming and going, beginning a couple months ago. The complaint has been intermittent and waxing and waning and are moderate in severity. Hives are itchy and scattered mostly on her buttocks and thighs and back. She reports sometimes her upper lip is swollen which she reports it was when she got here but has already gone down. Says been going on and off and her primary care doctor has her on Benadryl and a couple weeks ago she was on prednisone. Reports when she has the prednisone she has no problems once the prednisone is gone they start up again. Patient has not followed up with an allergist or endocrinologist, or rheumatology. Patient does take losartan with hydrochlorothiazide and baby aspirin. No medication changes have been instituted by her family doctor. Patient denies any changes to her laundry soap or lotions. PT denies tongue or mouth swelling, throat swelling, difficulty breathing or swallowing. ROS   Pertinent positives and negatives are stated within HPI, all other systems reviewed and are negative.     Past Medical History:  has a past medical history of Arthritis, Asthma, BRCA1 negative, BRCA2 negative, Breast cancer ([facility] [address] Cancer ([facility] [address] Cerebral artery occlusion with cerebral infarction [facility]), Cerebrovascular disease, CHF (congestive heart failure) ([facility] [address] Claustrophobia, COPD (chronic obstructive pulmonary disease) ([facility] [address] Decreased dorsalis pedis pulse, Dermatophytosis, Headache(784.0), History of blood transfusion, Hx of blood clots, Hyperlipidemia, Hypertension, LBP radiating to both legs, Lymphedema of both lower extremities, Neuromuscular disorder ([address] Non-rheumatic aortic sclerosis, Obesity, Pulmonary hypertension ([address] Recurrent genital HSV (herpes simplex virus) infection, S/P breast biopsy, right, Type II or unspecified type diabetes mellitus without mention of complication, not stated as uncontrolled, and UTI (urinary tract infection). Surgical History:  has a past surgical history that includes Total hip arthroplasty (Bilateral); vin and bso (cervix removed); ECHO Compl W Dop Color Flow (6/7/2012); Total knee arthroplasty (Bilateral, 2013); Colonoscopy (2005); Hysterectomy; Colonoscopy (1/8/15); Breast lumpectomy (years ago); arthroplasty (Left, 07/29/2016); Finger surgery (Left, 07/29/2016); joint replacement; Breast lumpectomy (Right, 09/06/2016); other surgical history (Right, 12/20/2017); Hand surgery (12/2017); and Breast biopsy. Social History:  reports that she quit smoking about 19 years ago. Her smoking use included cigarettes. She started smoking about 55 years ago. She has a 8.75 pack-year smoking history. She has never used smokeless tobacco. She reports current alcohol use. She reports previous drug use. Drug: Marijuana. Family History: family history includes Breast Cancer in her sister and sister; Cancer (age of onset: 36) in an other family member; Cancer (age of onset: 55) in her sister; Cancer (age of onset: 59) in her sister; Diabetes in her father and mother; Heart Attack in her mother; Heart Failure in her father; High Blood Pressure in her father and mother; Sickle Cell Anemia in an other family member; Stomach Cancer in an other family member; Stroke in her sister. Allergies: Patient has no known allergies. PHYSICAL EXAM   Oxygen Saturation Interpretation: Normal on room air analysis. ED Triage Vitals [09/07/21 1540]   BP Temp Temp src Pulse Resp SpO2 Height Weight   (!) 166/79 98.6 °F (37 °C) -- 96 16 98 % 5' 7\" (1.702 m) 250 lb (113.4 kg)         Physical Exam  Constitutional/General: Alert and oriented x3, well appearing, non toxic  HEENT:  NC/NT. PERRLA,  Airway patent. No tongue swelling. Perhaps a slight subtle swelling of the upper lip. Tympanic membranes normal in appearance bilaterally. Neck: Supple, full ROM, non tender to palpation in the midline, no stridor, no crepitus, no meningeal signs  Respiratory: Lungs clear to auscultation bilaterally, no wheezes, rales, or rhonchi. Not in respiratory distress  CV:  Regular rate. Regular rhythm. No murmurs, gallops, or rubs. 2+ distal pulses  Musculoskeletal: Moves all extremities x 4. Warm and well perfused, no clubbing, cyanosis, or edema. Capillary refill <3 seconds  Integument: skin warm and dry. A few scattered urticarial wheals on upper back and buttocks. Lymphatic: no lymphadenopathy noted  Neurologic: GCS 15, no focal deficits, symmetric strength 5/5 in the upper and lower extremities bilaterally  Psychiatric: Normal Affect    Lab / Imaging Results   (All laboratory and radiology results have been personally reviewed by myself)  Labs:  No results found for this visit on 09/07/21. Imaging: All Radiology results interpreted by Radiologist unless otherwise noted. No orders to display       ED Course / Medical Decision Making     Medications   diphenhydrAMINE (BENADRYL) tablet 25 mg (has no administration in time range)   predniSONE (DELTASONE) tablet 60 mg (has no administration in time range)        Re-examination:  9/7/21       Time: *I obtained food for patient as she has not eaten all day and is a diabetic. Consult(s):   None    Procedure(s):   none    MDM:   Patient presents to emergency with intermittent hives.   When she is on prednisone she reports they are subsided when the prednisone wears off she starts getting them again. Her primary care doctor has not made any medication changes. Advised her to discuss these with primary care as she is on losartan and that may be a cause she is also on a another lots of other medications which could possibly be culprits. Patient is advised to ask primary care for referral to allergist as well. Patient will be sent home with Medrol pack which is what she was on before. She is aware that it can make her sugars elevated and to keep her eye on these. In addition to that hydroxyzine for itching. Advised patient to return to emergency if her tongue becomes swollen or she has difficulty breathing. Plan of Care/Counseling:  Selena Covarrubias PA-C reviewed today's visit with the patient in addition to providing specific details for the plan of care and counseling regarding the diagnosis and prognosis. Questions are answered at this time and are agreeable with the plan. ASSESSMENT     1. Urticaria      PLAN   Discharged home. Patient condition is good    New Medications     New Prescriptions    HYDROXYZINE (VISTARIL) 25 MG CAPSULE    Take 1 capsule by mouth 3 times daily as needed for Itching    METHYLPREDNISOLONE (MEDROL, KOSTA,) 4 MG TABLET    Take as directed on package insert days 1-6     Electronically signed by Selena Covarrubias PA-C   DD: 9/7/21  **This report was transcribed using voice recognition software. Every effort was made to ensure accuracy; however, inadvertent computerized transcription errors may be present.   END OF ED PROVIDER NOTE       Selena Covarrubias PA-C  09/07/21 9420

## 2021-09-07 NOTE — ED PROVIDER NOTES
FIRST PROVIDER CONTACT ASSESSMENT NOTE                                                                                                Department of Emergency Medicine                                                      First Provider Note  21  3:53 PM EDT  NAME: Ho Gonzalez  : 1950  MRN: [id number]    Chief Complaint: Facial Swelling (lip swelling, - tongue swelling, sob, pt unsure what she came into contact with, took benadryl this afternoon, on and off for the past few months), Shortness of Breath, and Urticaria      History of Present Illness:   Camila Houser is a 70 y.o. female who presents to the ED for swelling in lips and tongue. SOB. Intermittent for few months. States they keep giving her Benadryl and steroids and they do help but when meds run out symptoms return. Reports no new meds. No one able to tell her why the symptoms keep returning. Focused Physical Exam:  VS:    ED Triage Vitals [21 1540]   BP Temp Temp src Pulse Resp SpO2 Height Weight   (!) 166/79 98.6 °F (37 °C) -- 96 16 98 % 5' 7\" (1.702 m) 250 lb (113.4 kg)        General: Alert and in no apparent distress.     Medical History:  has a past medical history of Arthritis, Asthma, BRCA1 negative, BRCA2 negative, Breast cancer ([address] Cancer ([address] Cerebral artery occlusion with cerebral infarction [facility]), Cerebrovascular disease, CHF (congestive heart failure) ([address] Claustrophobia, COPD (chronic obstructive pulmonary disease) ([address] Decreased dorsalis pedis pulse, Dermatophytosis, Headache(784.0), History of blood transfusion, Hx of blood clots, Hyperlipidemia, Hypertension, LBP radiating to both legs, Lymphedema of both lower extremities, Neuromuscular disorder ([address] Non-rheumatic aortic sclerosis, Obesity, Pulmonary hypertension ([address] Recurrent genital HSV (herpes simplex virus) infection, S/P breast biopsy, right, Type II or unspecified type diabetes mellitus without mention of complication, not stated as uncontrolled, and UTI (urinary tract infection). Surgical History:  has a past surgical history that includes Total hip arthroplasty (Bilateral); vin and bso (cervix removed); ECHO Compl W Dop Color Flow (6/7/2012); Total knee arthroplasty (Bilateral, 2013); Colonoscopy (2005); Hysterectomy; Colonoscopy (1/8/15); Breast lumpectomy (years ago); arthroplasty (Left, 07/29/2016); Finger surgery (Left, 07/29/2016); joint replacement; Breast lumpectomy (Right, 09/06/2016); other surgical history (Right, 12/20/2017); Hand surgery (12/2017); and Breast biopsy. Social History:  reports that she quit smoking about 19 years ago. Her smoking use included cigarettes. She started smoking about 55 years ago. She has a 8.75 pack-year smoking history. She has never used smokeless tobacco. She reports current alcohol use. She reports previous drug use. Drug: Marijuana. Family History: family history includes Breast Cancer in her sister and sister; Cancer (age of onset: 36) in an other family member; Cancer (age of onset: 55) in her sister; Cancer (age of onset: 59) in her sister; Diabetes in her father and mother; Heart Attack in her mother; Heart Failure in her father; High Blood Pressure in her father and mother; Sickle Cell Anemia in an other family member; Stomach Cancer in an other family member; Stroke in her sister. Allergies: Patient has no known allergies.      Initial Plan of Care:  Initiate Treatment-Testing, Proceed toTreatment Area When Bed Available for ED Attending/MLP to Continue Care    -------------------------------------------------END OF FIRST PROVIDER CONTACT ASSESSMENT NOTE--------------------------------------------------------  Electronically signed by Eunice Bang PA-C   DD: 9/7/21        Eunice Bang PA-C  09/07/21 1554

## 2021-09-23 ENCOUNTER — TELEPHONE (OUTPATIENT)
Dept: VASCULAR SURGERY | Age: 71
End: 2021-09-23

## 2021-09-23 ENCOUNTER — OFFICE VISIT (OUTPATIENT)
Dept: VASCULAR SURGERY | Age: 71
End: 2021-09-23
Payer: MEDICARE

## 2021-09-23 DIAGNOSIS — I73.9 PVD (PERIPHERAL VASCULAR DISEASE) WITH CLAUDICATION (HCC): ICD-10-CM

## 2021-09-23 DIAGNOSIS — I89.0 LYMPHEDEMA OF BOTH LOWER EXTREMITIES: Primary | ICD-10-CM

## 2021-09-23 DIAGNOSIS — R09.89 DECREASED DORSALIS PEDIS PULSE: ICD-10-CM

## 2021-09-23 PROBLEM — I10 UNCONTROLLED HYPERTENSION: Status: RESOLVED | Noted: 2019-12-31 | Resolved: 2021-09-23

## 2021-09-23 PROBLEM — J96.01 ACUTE RESPIRATORY FAILURE WITH HYPOXEMIA (HCC): Status: RESOLVED | Noted: 2019-12-02 | Resolved: 2021-09-23

## 2021-09-23 PROBLEM — I50.33 ACUTE ON CHRONIC DIASTOLIC (CONGESTIVE) HEART FAILURE (HCC): Status: RESOLVED | Noted: 2019-01-15 | Resolved: 2021-09-23

## 2021-09-23 PROBLEM — J96.01 ACUTE HYPOXEMIC RESPIRATORY FAILURE (HCC): Status: RESOLVED | Noted: 2020-02-04 | Resolved: 2021-09-23

## 2021-09-23 PROBLEM — J44.1 COPD EXACERBATION (HCC): Status: RESOLVED | Noted: 2020-02-04 | Resolved: 2021-09-23

## 2021-09-23 PROBLEM — Z76.0 ENCOUNTER FOR MEDICATION REFILL: Status: RESOLVED | Noted: 2020-01-21 | Resolved: 2021-09-23

## 2021-09-23 PROBLEM — J96.01 ACUTE RESPIRATORY FAILURE WITH HYPOXIA (HCC): Status: RESOLVED | Noted: 2019-12-29 | Resolved: 2021-09-23

## 2021-09-23 PROCEDURE — 1123F ACP DISCUSS/DSCN MKR DOCD: CPT | Performed by: SURGERY

## 2021-09-23 PROCEDURE — G8427 DOCREV CUR MEDS BY ELIG CLIN: HCPCS | Performed by: SURGERY

## 2021-09-23 PROCEDURE — G8417 CALC BMI ABV UP PARAM F/U: HCPCS | Performed by: SURGERY

## 2021-09-23 PROCEDURE — 1090F PRES/ABSN URINE INCON ASSESS: CPT | Performed by: SURGERY

## 2021-09-23 PROCEDURE — 99214 OFFICE O/P EST MOD 30 MIN: CPT | Performed by: SURGERY

## 2021-09-23 PROCEDURE — 3017F COLORECTAL CA SCREEN DOC REV: CPT | Performed by: SURGERY

## 2021-09-23 PROCEDURE — G8399 PT W/DXA RESULTS DOCUMENT: HCPCS | Performed by: SURGERY

## 2021-09-23 PROCEDURE — 4040F PNEUMOC VAC/ADMIN/RCVD: CPT | Performed by: SURGERY

## 2021-09-23 PROCEDURE — 1036F TOBACCO NON-USER: CPT | Performed by: SURGERY

## 2021-09-23 NOTE — TELEPHONE ENCOUNTER
Notified patient of lower extremity arterial doppler study at [facility], [facility] on Friday, 11-5-21 at 2:30 pm. [place] at 2:00 pm.

## 2021-09-23 NOTE — PROGRESS NOTES
Chief Complaint:   Chief Complaint   Patient presents with    Circulatory Problem     Follow up edema right leg. HPI: Patient came to the office, for evaluation of past history of lymphedema that has gotten better, wears stockings only infrequently not on regular basis    Patient also known to have peripheral vascular disease, based upon Doppler tracings done 2 years ago, came to the office for follow-up evaluation    Patient recently has been hospital due to mainly respiratory problems with difficulty breathing, doing better    Diabetes mellitus is stable      Patient denies any focal lateralizing neurological symptoms like loss of speech, vision or loss of function of extremity    Patient can walk a few blocks , and denies any symptoms of rest pain    No Known Allergies    Current Outpatient Medications   Medication Sig Dispense Refill    blood glucose test strips (ONETOUCH VERIO) strip 1 each by In Vitro route 4 times daily As needed. 150 each 11    Lancets (ONETOUCH DELICA PLUS [id number]) MISC To test 4x/day 200 each 11    albuterol (PROVENTIL) (5 MG/ML) 0.5% nebulizer solution Take 0.5 mLs by nebulization every 6 hours as needed for Wheezing 120 each 0    metFORMIN (GLUCOPHAGE) 1000 MG tablet Take 1 tablet by mouth 2 times daily (with meals) 180 tablet 3    insulin glargine (LANTUS SOLOSTAR) 100 UNIT/ML injection pen Inject 40 Units into the skin nightly (Patient taking differently: Inject 50 Units into the skin nightly ) 25 pen 5    insulin lispro, 1 Unit Dial, (HUMALOG KWIKPEN) 100 UNIT/ML SOPN Inject 10 units with meals + sliding scale.  MAX 75 units/day 25 pen 5    Insulin Pen Needle (PEN NEEDLES 3/16\") 31G X 5 MM MISC 1 each by Does not apply route 4 times daily (after meals and at bedtime) 300 each 3    benzonatate (TESSALON) 200 MG capsule Take 1 capsule by mouth 3 times daily as needed for Cough 15 capsule 0    melatonin (RA MELATONIN) 3 MG TABS tablet Take one 3 mg hs 90 tablet 3    30 capsule 11     No current facility-administered medications for this visit.        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    Cerebrovascular disease 6/09    no residual    CHF (congestive heart failure) (HCC) approx 3 years ago    Claustrophobia     COPD (chronic obstructive pulmonary disease) ([address]     Decreased dorsalis pedis pulse 9/5/2019    Dermatophytosis 9/5/2019    Headache(784.0)     History of blood transfusion     with knee surgery    Hives     Hx of blood clots     Hyperlipidemia     Hypertension     LBP radiating to both legs     Lymphedema of both lower extremities 11/12/2015    Neuromuscular disorder ([facility]     Non-rheumatic aortic sclerosis 10/2018    10/2018    Obesity     Pulmonary hypertension ([facility] 10/2018    PVD (peripheral vascular disease) with claudication ([facility] 9/23/2021    Recurrent genital HSV (herpes simplex virus) infection     last outbreak 11/2017    S/P breast biopsy, right 07/2016    pt states breast cancer    Type II or unspecified type diabetes mellitus without mention of complication, not stated as uncontrolled     AA1c8.7 9/10    UTI (urinary tract infection) 5/13/2019       Past Surgical History:   Procedure Laterality Date    ARTHROPLASTY Left 07/29/2016    thumb basil joint with dequervain's release    BREAST BIOPSY      BREAST LUMPECTOMY  years ago    benign    BREAST LUMPECTOMY Right 09/06/2016    COLONOSCOPY  2005    COLONOSCOPY  1/8/15    Dr. Chele Foote  6/7/2012         FINGER SURGERY Left 07/29/2016    thumb    HAND SURGERY  12/2017    HYSTERECTOMY      JOINT REPLACEMENT      OTHER SURGICAL HISTORY Right 12/20/2017    RIGHT THUMB TRAPEZIECTOMY WITH LIGAMENT RECONSRUCTION DEQUERVAINS RELEASE    TIM AND BSO      TOTAL HIP ARTHROPLASTY Bilateral     ,  dr Giovany Triana, dr Mijares Hemp Bilateral     dr Tomlinson Keepers       Family History   Problem Relation Age of Onset    Diabetes Mother     High Blood Pressure Mother     Heart Attack Mother     High Blood Pressure Father     Diabetes Father     Heart Failure Father     Breast Cancer Sister     Stroke Sister         young age   [place] Cancer Sister 55        breast    Sickle Cell Anemia Other         niece    Cancer Other 36        niece - breast    Breast Cancer Sister             Cancer Sister 59        breast & ovarian    Stomach Cancer Other         aunt       Social History     Socioeconomic History    Marital status:      Spouse name: Not on file    Number of children: Not on file    Years of education: Not on file    Highest education level: Not on file   Occupational History    Occupation: retired- , [employer]   Tobacco Use    Smoking status: Former Smoker     Packs/day: 0.25     Years: 35.00     Pack years: 8.75     Types: Cigarettes     Start date:      Quit date: 2001     Years since quittin.8    Smokeless tobacco: Never Used   Vaping Use    Vaping Use: Never used   Substance and Sexual Activity    Alcohol use: Yes     Alcohol/week: 0.0 - 1.0 standard drinks     Comment: rare    Drug use: Not Currently     Types: Marijuana     Comment: last used 2018    Sexual activity: Not Currently     Partners: Male     Comment: divorce   Other Topics Concern    Not on file   Social History Narrative    Drinks 1 sweet tea daily; occ Coke. Drinks decaf coffee.       Social Determinants of Health     Financial Resource Strain:     Difficulty of Paying Living Expenses:    Food Insecurity:     Worried About Running Out of Food in the Last Year:     [address] in the Last Year:    Transportation Needs:     Lack of Transportation (Medical):  Lack of Transportation (Non-Medical):    Physical Activity:     Days of Exercise per Week:     Minutes of Exercise per Session:    Stress:     Feeling of Stress :    Social Connections:     Frequency of Communication with Friends and Family:     Frequency of Social Gatherings with Friends and Family:     Attends Hinduism Services:     Active Member of Clubs or Organizations:     Attends Club or Organization Meetings:     Marital Status:    Intimate Partner Violence:     Fear of Current or Ex-Partner:     Emotionally Abused:     Physically Abused:     Sexually Abused:        Review of Systems:    Eyes:  No blurring, diplopia or vision loss. Respiratory:  No cough, pleuritic chest pain, dyspnea, or wheezing. Chronic obstructive lung disease with history of respiratory failure in the past  Cardiovascular: No angina, palpitations . Hypertension, hyperlipidemia, history of congestive heart failure  Musculoskeletal:  No arthritis or weakness. Neurologic:  No paralysis, paresis, paresthesia, seizures or headache. History of CVA, recommended medical therapy  Endocrinology: Diabetes mellitus          Physical Exam:  General appearance:  Alert, awake, oriented x 3. No distress. Eyes:  Conjunctivae appear normal; PERRL  Neck:  No jugular venous distention, lymphadenopathy or thyromegaly. No evidence of carotid bruit  Lungs:  Clear to ausculation bilaterally. No rhonchi, crackles, wheezes  Heart:  Regular rate and rhythm. No rub or murmur  Abdomen:  Soft, non-tender. No masses, organomegaly. Musculoskeletal : No joint effusions, tenderness swelling    Neuro: Speech is intact. Moving all extremities. No focal motor or sensory deficits      Extremities:  Both feet are warm to touch.  The color of both feet is normal.    Only mild ankle edema noted    Pulses Right  Left    Brachial 3 3    Radial    3=normal   Femoral 2 2  2=diminished   Popliteal    1=barely palpable Dorsalis pedis 1 1  0=absent   Posterior tibial    4=aneurysmal             Other pertinent information:1. The past medical records were reviewed. Assessment:    1. Lymphedema of both lower extremities    2. PVD (peripheral vascular disease) with claudication ([facility]    3. Decreased dorsalis pedis pulse              Plan:       Discussed the patient, options risks benefits were explained, patient was recommended work-up as outlined below, call me as needed if any increasing symptoms, explained              Patient was instructed to continue walking program and to call if any worsening of symptoms and to call if any focal lateralizing neurological symptoms like loss of speech, vision or loss of function of extremity. All the questions were answered. Orders Placed This Encounter   Procedures    VL LOWER EXTREMITY ARTERIAL SEGMENTAL PRESSURES W PPG             Indicated follow-up: Return in about 2 years (around 9/23/2023), or if symptoms worsen or fail to improve.

## 2021-09-29 ENCOUNTER — TELEPHONE (OUTPATIENT)
Dept: CARDIOLOGY CLINIC | Age: 71
End: 2021-09-29

## 2021-10-06 ENCOUNTER — OFFICE VISIT (OUTPATIENT)
Dept: CARDIOLOGY CLINIC | Age: 71
End: 2021-10-06
Payer: MEDICAID

## 2021-10-06 VITALS
RESPIRATION RATE: 16 BRPM | HEIGHT: 67 IN | DIASTOLIC BLOOD PRESSURE: 78 MMHG | OXYGEN SATURATION: 98 % | HEART RATE: 78 BPM | WEIGHT: 244.8 LBS | BODY MASS INDEX: 38.42 KG/M2 | SYSTOLIC BLOOD PRESSURE: 118 MMHG

## 2021-10-06 DIAGNOSIS — R07.9 CHEST PAIN, UNSPECIFIED TYPE: Primary | ICD-10-CM

## 2021-10-06 PROCEDURE — 3017F COLORECTAL CA SCREEN DOC REV: CPT | Performed by: NURSE PRACTITIONER

## 2021-10-06 PROCEDURE — 1090F PRES/ABSN URINE INCON ASSESS: CPT | Performed by: NURSE PRACTITIONER

## 2021-10-06 PROCEDURE — 1036F TOBACCO NON-USER: CPT | Performed by: NURSE PRACTITIONER

## 2021-10-06 PROCEDURE — G8399 PT W/DXA RESULTS DOCUMENT: HCPCS | Performed by: NURSE PRACTITIONER

## 2021-10-06 PROCEDURE — 1123F ACP DISCUSS/DSCN MKR DOCD: CPT | Performed by: NURSE PRACTITIONER

## 2021-10-06 PROCEDURE — G8427 DOCREV CUR MEDS BY ELIG CLIN: HCPCS | Performed by: NURSE PRACTITIONER

## 2021-10-06 PROCEDURE — 93000 ELECTROCARDIOGRAM COMPLETE: CPT | Performed by: INTERNAL MEDICINE

## 2021-10-06 PROCEDURE — 4040F PNEUMOC VAC/ADMIN/RCVD: CPT | Performed by: NURSE PRACTITIONER

## 2021-10-06 PROCEDURE — G8417 CALC BMI ABV UP PARAM F/U: HCPCS | Performed by: NURSE PRACTITIONER

## 2021-10-06 PROCEDURE — G8484 FLU IMMUNIZE NO ADMIN: HCPCS | Performed by: NURSE PRACTITIONER

## 2021-10-06 PROCEDURE — 99214 OFFICE O/P EST MOD 30 MIN: CPT | Performed by: NURSE PRACTITIONER

## 2021-10-06 NOTE — PROGRESS NOTES
[facility]  Office Visit         Reason for Visit: Heart Failure    Primary Cardiologist: Dr. Myah Burr         History of Present Illness:     Ms. Annalise Trinh is a 70year old female with a PMHx chronic HFpEF, HTN, PSVT, cLBBB, syncope, CVA, T2DM, COPD, morbid obesity. She presents today as add on appointment for complaints of chest pain. She has noted for the past several months intermittent chest discomfort. Denies any association to activity. Her symptoms will start with a squeezing/tightness pain that feels like a belt squeezing around her body under her breast. The discomfort will radiate up into her neck and bilateral ears. Symptoms will last approximately 10/15 minutes before self subsiding. She denies any recent worsening shortness of breath, orthopnea, PND, lower extremity edema, abdominal bloating, early satiety. She denies any palpitations, dizziness, lightheadedness, near-syncope, syncope or strokelike symptoms.       Patient Active Problem List    Diagnosis Date Noted    PVD (peripheral vascular disease) with claudication ([address] 09/23/2021    Moderate persistent asthma without complication 88/41/6162    Supraventricular tachycardia ([address]     Diabetes mellitus type 2, uncontrolled ([address] 10/02/2019    Mixed hyperlipidemia 10/02/2019    Morbid obesity with BMI of 40.0-44.9, adult ([address] 10/02/2019    History of CVA (cerebrovascular accident) 10/02/2019    Chronic combined systolic and diastolic congestive heart failure ([address] 10/02/2019    Decreased dorsalis pedis pulse 09/05/2019    Diabetic polyneuropathy associated with type 1 diabetes mellitus ([address] 08/21/2019    Pulmonary hypertension ([address] 05/13/2019     10/2018      Non-rheumatic aortic sclerosis 05/13/2019     10/2018      LBBB (left bundle branch block)     COPD (chronic obstructive pulmonary disease) ([address] 01/15/2019    Type 2 diabetes mellitus without complication, with long-term current use of insulin (HCC)     Herpes simplex supression 07/13/2017    Ductal carcinoma in situ (DCIS) of breast 07/13/2016    Vitamin D deficiency 06/16/2016    Lymphedema of both lower extremities 11/12/2015    Hyperlipidemia with target LDL less than 70 09/23/2011     replace inactive diagnosis      IDDM (insulin dependent diabetes mellitus) 11/19/2010     Past Medical and Surgical History:    1. Echocardiogram 10/18/2018: Mild concentric LVH. No WMA. Stage II Diastolic Dysfunction. LA mildly dilated. Mild MR. Mild TR. Mild to moderate PHTN. EF 45-50%. 2. Stress echocardiogram 12/10/2018: No chest pain. MPHR: > 85%. LBBB at baseline. The maximal stress echocardiogram demonstrated no evidence of inducible ischemia. 3. CVA   4. HTN  5. HLD  6. Obesity  7. Asthma   8. Right breast CA s/p lumpectomy and radiation therapy  9. DM   10. Recurrent genital HSV infection   11. 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 12. Claustrophobia   13. Headaches   14.  S/p Hysterectomy, bilateral knee replacement, left thumb trapeziectomy with ligament reconstruction dequervains release      Past Medical History:   Diagnosis Date    Arthritis     Asthma     BRCA1 negative     BRCA2 negative     Breast cancer (HCC)     Cancer ([address]     breast     Cerebral artery occlusion with cerebral infarction ([address] 2010    Speech difficulties results; claims 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    Cerebrovascular disease 6/09    no residual    CHF (congestive heart failure) (HCC) approx 3 years ago    Claustrophobia     COPD (chronic obstructive pulmonary disease) ([address]     Decreased dorsalis pedis pulse 9/5/2019    Dermatophytosis 9/5/2019    Headache(784.0)     History of blood transfusion     with knee surgery    Hives     Hx of blood clots     Hyperlipidemia     Hypertension     LBP radiating to both legs     Lymphedema of both lower extremities 11/12/2015    Neuromuscular disorder ([address]     Non-rheumatic aortic sclerosis 10/2018    10/2018    Obesity     Pulmonary hypertension ([facility] 10/2018    PVD (peripheral vascular disease) with claudication ([facility] 9/23/2021    Recurrent genital HSV (herpes simplex virus) infection     last outbreak 11/2017    S/P breast biopsy, right 07/2016    pt states breast cancer    Type II or unspecified type diabetes mellitus without mention of complication, not stated as uncontrolled     AA1c8.7 9/10    UTI (urinary tract infection) 5/13/2019         Past Surgical History:   Procedure Laterality Date    ARTHROPLASTY Left 07/29/2016    thumb basil joint with dequervain's release    BREAST BIOPSY      BREAST LUMPECTOMY  years ago    benign    BREAST LUMPECTOMY Right 09/06/2016    COLONOSCOPY  2005    COLONOSCOPY  1/8/15    Dr. Matias Marrero  6/7/2012         FINGER SURGERY Left 07/29/2016    thumb    HAND SURGERY  12/2017    HYSTERECTOMY      JOINT REPLACEMENT      OTHER SURGICAL HISTORY Right 12/20/2017    RIGHT THUMB TRAPEZIECTOMY WITH LIGAMENT Gabriela Blakes RELEASE    TIM AND BSO      TOTAL HIP ARTHROPLASTY Bilateral     2000, 2013 dr Roxane Rodriguez, dr Man Goes Bilateral 2013    dr Leta Fournier           No Known Allergies      Outpatient Medications Marked as Taking for the 10/6/21 encounter (Office Visit) with DMITRY Longoria CNP   Medication Sig Dispense Refill    blood glucose test strips (ONETOUCH VERIO) strip 1 each by In Vitro route 4 times daily As needed.  150 each 11    Lancets (ONETOUCH DELICA PLUS [id number]) MISC To test 4x/day 200 each 11    albuterol (PROVENTIL) (5 MG/ML) 0.5% nebulizer solution Take 0.5 mLs by nebulization every 6 hours as needed for Wheezing 120 each 0    metFORMIN (GLUCOPHAGE) 1000 MG tablet Take 1 tablet by mouth 2 times daily (with meals) 180 tablet 3    insulin glargine (LANTUS SOLOSTAR) 100 UNIT/ML injection pen Inject 40 Units into the skin nightly (Patient taking differently: Inject 50 Units into the skin nightly ) 25 pen 5    insulin lispro, 1 Unit Dial, (HUMALOG KWIKPEN) 100 UNIT/ML SOPN Inject 10 units with meals + sliding scale. MAX 75 units/day 25 pen 5    Insulin Pen Needle (PEN NEEDLES 3/16\") 31G X 5 MM MISC 1 each by Does not apply route 4 times daily (after meals and at bedtime) 300 each 3    benzonatate (TESSALON) 200 MG capsule Take 1 capsule by mouth 3 times daily as needed for Cough 15 capsule 0    melatonin (RA MELATONIN) 3 MG TABS tablet Take one 3 mg hs 90 tablet 3    vitamin D (ERGOCALCIFEROL) 1.25 MG (38417 UT) CAPS capsule Take 1 capsule by mouth once a week 4 capsule 11    losartan-hydrochlorothiazide (HYZAAR) 100-25 MG per tablet Take 1 tablet by mouth daily 30 tablet 11    omeprazole (PRILOSEC) 40 MG delayed release capsule Take 1 capsule by mouth daily 30 capsule 11    Fluticasone furoate-vilanterol (BREO ELLIPTA) 200-25 MCG/INH AEPB inhaler Inhale 1 puff into the lungs daily 1 each 11    anastrozole (ARIMIDEX) 1 MG tablet Take 1 tablet by mouth daily Indications: ESTROGEN DEFICIENCY IN BREAST CANCER (INACTIVE) 30 tablet 11    gabapentin (NEURONTIN) 400 MG capsule Take 1 capsule by mouth 3 times daily.  90 capsule 11    aspirin 81 MG chewable tablet Take 1 tablet by mouth daily 30 tablet 11    pravastatin (PRAVACHOL) 80 MG tablet Take 1 tablet by mouth nightly 90 tablet 3    oxybutynin (DITROPAN) 5 MG tablet Take 1 tablet by mouth 3 times daily 90 tablet 11    montelukast (SINGULAIR) 10 MG tablet Take 1 tablet by mouth nightly 30 tablet 11    furosemide (LASIX) 20 MG tablet Take 1 tablet by mouth 2 times daily 180 tablet 1    formoterol (PERFOROMIST) 20 MCG/2ML nebulizer solution 20 mcg      cloNIDine (CATAPRES) 0.2 MG tablet Take 1 tablet by mouth 3 times daily 60 tablet 3    calcium carbonate-vitamin D (CALCIUM 600 + D) 600-400 MG-UNIT TABS per tab Take 1 tablet by mouth 2 times daily 60 tablet 11    butenafine (LOTRIMIN ULTRA) 1 % CREA Please apply from the toes, in between the toes, foot up to the upper calf twice daily for 1 month, both legs 1 Tube 10    acetaminophen (APAP EXTRA STRENGTH) 500 MG tablet Take 1 tablet by mouth every 6 hours as needed for Pain 30 tablet 3    vitamin B-12 (CYANOCOBALAMIN) 1000 MCG tablet Take 1 tablet by mouth daily 90 tablet 1       Review of Systems:   Cardiac: As per HPI  General: No fever, chills, rigors  Pulmonary: As per HPI  HEENT: No visual disturbances, difficult swallowing  GI: No nausea, vomiting, abdominal pain  : No dysuria or hematuria  Endocrine: No thyroid disease or diabetes  Musculoskeletal: HORVATH x 4, no focal motor deficits  Skin: Intact, no rashes  Neuro/Psych: No headache or seizures      Weights: Wt Readings from Last 3 Encounters:   10/06/21 244 lb 12.8 oz (111 kg)   09/07/21 250 lb (113.4 kg)   07/29/21 244 lb (110.7 kg)         Physical Examination:     /78 (Site: Right Upper Arm, Position: Sitting, Cuff Size: Large Adult)   Pulse 78   Resp 16   Ht 5' 7\" (1.702 m)   Wt 244 lb 12.8 oz (111 kg)   SpO2 98%   BMI 38.34 kg/m²     CONSTITUTIONAL: Alert and oriented times 3, no acute distress and cooperative to examination with proper mood and affect. SKIN: Skin color, texture, turgor normal. No rashes or lesions. LYMPH: no cervical nodes, no inguinal nodes  HEENT: Head is normocephalic, atraumatic. EOMI, PERRLA. NECK: Supple, symmetrical, trachea midline, no adenopathy, thyroid symmetric, not enlarged and no tenderness, skin normal.  CHEST/LUNGS: chest symmetric with normal A/P diameter, normal respiratory rate and rhythm, lungs clear to auscultation without wheezes, rales or rhonchi. No accessory muscle use. Scars None   CARDIOVASCULAR: Heart sounds are normal.  Regular rate and rhythm without murmur, gallop or rub. Normal S1 and S2. . Carotid and femoral pulses 2+/4 and equal bilaterally. ABDOMEN: Obesity. No and Laparoscopic scar(s) present. Normal bowel sounds. No bruits. soft, nondistended, no masses or organomegaly.  no evidence of hernia. Percussion: Normal without hepatosplenomegally. Tenderness: absent. RECTAL: deferred, not clinically indicated  NEUROLOGIC: There are no focalizing motor or sensory deficits. CN II-XII are grossly intact. EXTREMITIES: no cyanosis, no clubbing and no edema. Warm and well perfused. All the following diagnostics were personally reviewed and interpreted by me. LAB DATA:     5/3/2021 11:52   Sodium 140   Potassium 3.9   Chloride 102   CO2 30 (H)   BUN 7   Creatinine 0.7   Anion Gap 8   GFR Non- >60   GFR African American >60   Glucose 175 (H)   Calcium 9.3   Pro-BNP 78   Troponin <0.01   WBC 9.1   RBC 4.17   Hemoglobin Quant 10.7 (L)   Hematocrit 35.1   MCV 84.2   MCH 25.7 (L)   MCHC 30.5 (L)   MPV 10.3   RDW 15.9 (H)   Platelet Count 152       IMAGING:    CTA Pulmonary (5/3/2021)  Impression  No evidence of pulmonary embolism or acute pulmonary abnormality. 6 mm nonobstructing left renal calculus      CARDIAC TESTING:     Dobutamine stress echocardiogram (12/10/2018)   1. No chest pain   2. MPHR: > 85%   3. LBBB at baseline   4. The maximal stress echocardiogram demonstrated no evidence of inducible ischemia. Limited TTE (12/31/2019)   Summary   Left ventricle is normal in size . Abnormal (paradoxical) motion consistent with left bundle branch block. Normal left ventricular ejection fraction. EF 55-60%   There is doppler evidence of stage II diastolic dysfunction. Mild tricuspid regurgitation. Pulmonary hypertension is mild . EKG  Sinus rhythm   Incomplete left bundle branch block. Poor R-wave progression  Nonspecific ST/T Changes      ASSESSMENT:  1. Atypical chest pain  2. Chronic HFpEF  3. ACC stage C / NYHA class III  4. Euvolemic  5. HTN  6. PSVT  7. cLBBB  8. Hx of syncope   9. Hx of CVA  10. T2DM  11. COPD with former tobacco abuse  12. Morbid obesity  13. Pt has undergone multiple sleep latency testing, full in lab sleep study in 2016.  All sleep testing was normal. No evidence of KENAN or narcolepsy.  AHI 2.          PLAN:  1. Coronary CTA    2. Continue current cardiac medications    3.  Follow up with Dr. Leona Moore in 3 months      [address] [facility]

## 2021-10-08 DIAGNOSIS — R07.9 CHEST PAIN, UNSPECIFIED TYPE: ICD-10-CM

## 2021-10-08 DIAGNOSIS — Z01.818 PREOP TESTING: Primary | ICD-10-CM

## 2021-10-08 NOTE — PROGRESS NOTES
Tanisha Pineda Agapitos was mailed lab scripts bun/cr and letter from Natalia Mendes CNP regarding ordered Coronary CTA. CTA clinicals and order faxed to [facility] to Echo and schedule.     Nina Antonio RN

## 2021-10-08 NOTE — LETTER
[facility]  [address]  Phone: [phone]  Fax: [phone]    DMITRY Howell CNP        October 8, 2021    [facility] [address]      Dear Margi Like:    A Coronary CTA has been ordered, this test will take a few weeks for radiology department to review this test request, review your medical records, prior auth the test and then call you to schedule. If you have not heard from Radiology by Oct 25,2021 please call the scheduling department at [phone]. They will update you where they are in the determination process. Enclosed are lab scripts for BUN and Creatinine. Please have these done as soon as possible. (these are needed as part of pre-testing evaluation for the Coronary CTA. If you have any questions or concerns, please don't hesitate to call.     Sincerely,        DMITRY Howell CNP

## 2021-11-05 ENCOUNTER — HOSPITAL ENCOUNTER (OUTPATIENT)
Dept: INTERVENTIONAL RADIOLOGY/VASCULAR | Age: 71
Discharge: HOME | End: 2021-11-07
Payer: MEDICARE

## 2021-11-05 DIAGNOSIS — I73.9 PVD (PERIPHERAL VASCULAR DISEASE) WITH CLAUDICATION (HCC): ICD-10-CM

## 2021-11-05 DIAGNOSIS — R09.89 DECREASED DORSALIS PEDIS PULSE: ICD-10-CM

## 2021-11-05 PROCEDURE — 93923 UPR/LXTR ART STDY 3+ LVLS: CPT

## 2021-11-19 ENCOUNTER — TELEPHONE (OUTPATIENT)
Dept: VASCULAR SURGERY | Age: 71
End: 2021-11-19

## 2021-11-19 NOTE — TELEPHONE ENCOUNTER
Called to inform patient that arterial circulation is satisfactory, not to worry, keep 2 year appointment, call as needed if symptoms worsen.

## 2021-12-02 ENCOUNTER — HOSPITAL ENCOUNTER (OUTPATIENT)
Dept: CT IMAGING | Age: 71
Discharge: HOME | End: 2021-12-04
Payer: MEDICARE

## 2021-12-02 VITALS
HEIGHT: 65 IN | TEMPERATURE: 98.2 F | SYSTOLIC BLOOD PRESSURE: 147 MMHG | WEIGHT: 242 LBS | DIASTOLIC BLOOD PRESSURE: 66 MMHG | RESPIRATION RATE: 16 BRPM | HEART RATE: 55 BPM | OXYGEN SATURATION: 98 % | BODY MASS INDEX: 40.32 KG/M2

## 2021-12-02 DIAGNOSIS — R07.9 CHEST PAIN, UNSPECIFIED TYPE: ICD-10-CM

## 2021-12-02 LAB
BUN BLDV-MCNC: 10 MG/DL (ref 6–23)
CREAT SERPL-MCNC: 0.7 MG/DL (ref 0.5–1)
GFR AFRICAN AMERICAN: >60
GFR NON-AFRICAN AMERICAN: >60 ML/MIN/1.73

## 2021-12-02 PROCEDURE — 2580000003 HC RX 258: Performed by: INTERNAL MEDICINE

## 2021-12-02 PROCEDURE — 75571 CT HRT W/O DYE W/CA TEST: CPT

## 2021-12-02 PROCEDURE — 36415 COLL VENOUS BLD VENIPUNCTURE: CPT

## 2021-12-02 PROCEDURE — 82565 ASSAY OF CREATININE: CPT

## 2021-12-02 PROCEDURE — 6370000000 HC RX 637 (ALT 250 FOR IP): Performed by: INTERNAL MEDICINE

## 2021-12-02 PROCEDURE — 6360000002 HC RX W HCPCS: Performed by: INTERNAL MEDICINE

## 2021-12-02 PROCEDURE — 84520 ASSAY OF UREA NITROGEN: CPT

## 2021-12-02 PROCEDURE — 75571 CT HRT W/O DYE W/CA TEST: CPT | Performed by: INTERNAL MEDICINE

## 2021-12-02 RX ORDER — HYDRALAZINE HYDROCHLORIDE 20 MG/ML
10 INJECTION INTRAMUSCULAR; INTRAVENOUS ONCE
Status: COMPLETED | OUTPATIENT
Start: 2021-12-02 | End: 2021-12-02

## 2021-12-02 RX ORDER — NITROGLYCERIN 0.4 MG/1
0.8 TABLET SUBLINGUAL ONCE
Status: DISCONTINUED | OUTPATIENT
Start: 2021-12-02 | End: 2021-12-05 | Stop reason: HOSPADM

## 2021-12-02 RX ORDER — 0.9 % SODIUM CHLORIDE 0.9 %
1000 INTRAVENOUS SOLUTION INTRAVENOUS ONCE
Status: COMPLETED | OUTPATIENT
Start: 2021-12-02 | End: 2021-12-02

## 2021-12-02 RX ORDER — HYDRALAZINE HYDROCHLORIDE 20 MG/ML
20 INJECTION INTRAMUSCULAR; INTRAVENOUS ONCE
Status: COMPLETED | OUTPATIENT
Start: 2021-12-02 | End: 2021-12-02

## 2021-12-02 RX ORDER — METOPROLOL TARTRATE 50 MG/1
50 TABLET, FILM COATED ORAL ONCE
Status: COMPLETED | OUTPATIENT
Start: 2021-12-02 | End: 2021-12-02

## 2021-12-02 RX ADMIN — HYDRALAZINE HYDROCHLORIDE 10 MG: 20 INJECTION INTRAMUSCULAR; INTRAVENOUS at 10:47

## 2021-12-02 RX ADMIN — HYDRALAZINE HYDROCHLORIDE 10 MG: 20 INJECTION INTRAMUSCULAR; INTRAVENOUS at 11:28

## 2021-12-02 RX ADMIN — SODIUM CHLORIDE 1000 ML: 9 INJECTION, SOLUTION INTRAVENOUS at 10:05

## 2021-12-02 RX ADMIN — HYDRALAZINE HYDROCHLORIDE 20 MG: 20 INJECTION INTRAMUSCULAR; INTRAVENOUS at 13:27

## 2021-12-02 RX ADMIN — METOPROLOL TARTRATE 50 MG: 50 TABLET, FILM COATED ORAL at 10:05

## 2021-12-02 NOTE — PROGRESS NOTES
HR 58-60 BPM and /71. Dr. John Morrissey notified and stated ok for patient to continue with coronary CTA. 0.8mg nitrogylcerine per Dr. John Morrissey for procedure.

## 2021-12-02 NOTE — PROGRESS NOTES
1315 Pt's BP is elevated again. CT area is not ready for patient's procedure. Dr Lani Zelaya ordered an additional dose of hydralazine IV for pt.       1405Pt was transported to CT area for CTA. Initial scan discovered presence of calcium. Dr Lani Zelaya was  notified, and procedure was cancelled. Pt discharged.

## 2021-12-03 NOTE — RESULT ENCOUNTER NOTE
CT results reviewed with Dr. Jacobo Portillo  Evidence for high calcium burden with high risk of CAD event  I called patient however had to leave a voice message     If patient agreeable please set patient up for [facility]    Thank you

## 2021-12-14 ENCOUNTER — TELEPHONE (OUTPATIENT)
Dept: CARDIAC CATH/INVASIVE PROCEDURES | Age: 71
End: 2021-12-14

## 2021-12-14 DIAGNOSIS — Z01.818 PREOP TESTING: Primary | ICD-10-CM

## 2021-12-14 DIAGNOSIS — I47.1 SUPRAVENTRICULAR TACHYCARDIA (HCC): ICD-10-CM

## 2021-12-14 DIAGNOSIS — R07.9 CHEST PAIN, UNSPECIFIED TYPE: ICD-10-CM

## 2021-12-14 DIAGNOSIS — I50.42 CHRONIC COMBINED SYSTOLIC AND DIASTOLIC CONGESTIVE HEART FAILURE (HCC): ICD-10-CM

## 2021-12-14 DIAGNOSIS — I27.20 PULMONARY HYPERTENSION (HCC): ICD-10-CM

## 2021-12-15 ENCOUNTER — APPOINTMENT (OUTPATIENT)
Dept: GENERAL RADIOLOGY | Age: 71
DRG: 247 | End: 2021-12-15
Attending: EMERGENCY MEDICINE
Payer: MEDICARE

## 2021-12-15 ENCOUNTER — HOSPITAL ENCOUNTER (INPATIENT)
Dept: CARDIAC CATH/INVASIVE PROCEDURES | Age: 71
LOS: 2 days | Discharge: HOME | DRG: 247 | End: 2021-12-17
Attending: EMERGENCY MEDICINE | Admitting: INTERNAL MEDICINE
Payer: MEDICARE

## 2021-12-15 DIAGNOSIS — I25.10 CAD IN NATIVE ARTERY: ICD-10-CM

## 2021-12-15 PROBLEM — Z03.89 CORONARY ARTERY DISEASE (CAD) EXCLUDED: Status: ACTIVE | Noted: 2021-12-15

## 2021-12-15 LAB
ABO/RH: NORMAL
ANION GAP SERPL CALCULATED.3IONS-SCNC: 9 MMOL/L (ref 7–16)
ANTIBODY SCREEN: NORMAL
BASOPHILS ABSOLUTE: 0.02 E9/L (ref 0–0.2)
BASOPHILS RELATIVE PERCENT: 0.3 % (ref 0–2)
BUN BLDV-MCNC: 10 MG/DL (ref 6–23)
CALCIUM SERPL-MCNC: 9.7 MG/DL (ref 8.6–10.2)
CHLORIDE BLD-SCNC: 104 MMOL/L (ref 98–107)
CO2: 29 MMOL/L (ref 22–29)
CREAT SERPL-MCNC: 0.7 MG/DL (ref 0.5–1)
EOSINOPHILS ABSOLUTE: 0 E9/L (ref 0.05–0.5)
EOSINOPHILS RELATIVE PERCENT: 0 % (ref 0–6)
GFR AFRICAN AMERICAN: >60
GFR NON-AFRICAN AMERICAN: >60 ML/MIN/1.73
GLUCOSE BLD-MCNC: 149 MG/DL (ref 74–99)
HCT VFR BLD CALC: 36.4 % (ref 34–48)
HEMOGLOBIN: 11.3 G/DL (ref 11.5–15.5)
IMMATURE GRANULOCYTES #: 0.03 E9/L
IMMATURE GRANULOCYTES %: 0.4 % (ref 0–5)
LYMPHOCYTES ABSOLUTE: 1.55 E9/L (ref 1.5–4)
LYMPHOCYTES RELATIVE PERCENT: 19.8 % (ref 20–42)
MCH RBC QN AUTO: 26.5 PG (ref 26–35)
MCHC RBC AUTO-ENTMCNC: 31 % (ref 32–34.5)
MCV RBC AUTO: 85.2 FL (ref 80–99.9)
METER GLUCOSE: 118 MG/DL (ref 74–99)
METER GLUCOSE: 144 MG/DL (ref 74–99)
METER GLUCOSE: 166 MG/DL (ref 74–99)
MONOCYTES ABSOLUTE: 0.87 E9/L (ref 0.1–0.95)
MONOCYTES RELATIVE PERCENT: 11.1 % (ref 2–12)
NEUTROPHILS ABSOLUTE: 5.35 E9/L (ref 1.8–7.3)
NEUTROPHILS RELATIVE PERCENT: 68.4 % (ref 43–80)
PDW BLD-RTO: 14.7 FL (ref 11.5–15)
PLATELET # BLD: 409 E9/L (ref 130–450)
PMV BLD AUTO: 10.7 FL (ref 7–12)
POC ACT LR: 244 SECONDS
POTASSIUM SERPL-SCNC: 4.1 MMOL/L (ref 3.5–5)
RBC # BLD: 4.27 E12/L (ref 3.5–5.5)
SODIUM BLD-SCNC: 142 MMOL/L (ref 132–146)
WBC # BLD: 7.8 E9/L (ref 4.5–11.5)

## 2021-12-15 PROCEDURE — C1894 INTRO/SHEATH, NON-LASER: HCPCS

## 2021-12-15 PROCEDURE — C1769 GUIDE WIRE: HCPCS

## 2021-12-15 PROCEDURE — 6360000002 HC RX W HCPCS: Performed by: EMERGENCY MEDICINE

## 2021-12-15 PROCEDURE — 94640 AIRWAY INHALATION TREATMENT: CPT

## 2021-12-15 PROCEDURE — 85347 COAGULATION TIME ACTIVATED: CPT

## 2021-12-15 PROCEDURE — 73110 X-RAY EXAM OF WRIST: CPT

## 2021-12-15 PROCEDURE — B2111ZZ FLUOROSCOPY OF MULTIPLE CORONARY ARTERIES USING LOW OSMOLAR CONTRAST: ICD-10-PCS | Performed by: INTERNAL MEDICINE

## 2021-12-15 PROCEDURE — 93458 L HRT ARTERY/VENTRICLE ANGIO: CPT | Performed by: INTERNAL MEDICINE

## 2021-12-15 PROCEDURE — 71045 X-RAY EXAM CHEST 1 VIEW: CPT

## 2021-12-15 PROCEDURE — 027035Z DILATION OF CORONARY ARTERY, ONE ARTERY WITH TWO DRUG-ELUTING INTRALUMINAL DEVICES, PERCUTANEOUS APPROACH: ICD-10-PCS | Performed by: INTERNAL MEDICINE

## 2021-12-15 PROCEDURE — 2709999900 HC NON-CHARGEABLE SUPPLY

## 2021-12-15 PROCEDURE — 85025 COMPLETE CBC W/AUTO DIFF WBC: CPT

## 2021-12-15 PROCEDURE — 6370000000 HC RX 637 (ALT 250 FOR IP)

## 2021-12-15 PROCEDURE — 86850 RBC ANTIBODY SCREEN: CPT

## 2021-12-15 PROCEDURE — C1874 STENT, COATED/COV W/DEL SYS: HCPCS

## 2021-12-15 PROCEDURE — 93571 IV DOP VEL&/PRESS C FLO 1ST: CPT | Performed by: INTERNAL MEDICINE

## 2021-12-15 PROCEDURE — 82962 GLUCOSE BLOOD TEST: CPT

## 2021-12-15 PROCEDURE — 86901 BLOOD TYPING SEROLOGIC RH(D): CPT

## 2021-12-15 PROCEDURE — C1887 CATHETER, GUIDING: HCPCS

## 2021-12-15 PROCEDURE — 73060 X-RAY EXAM OF HUMERUS: CPT

## 2021-12-15 PROCEDURE — 92928 PRQ TCAT PLMT NTRAC ST 1 LES: CPT | Performed by: INTERNAL MEDICINE

## 2021-12-15 PROCEDURE — 2500000003 HC RX 250 WO HCPCS

## 2021-12-15 PROCEDURE — 93571 IV DOP VEL&/PRESS C FLO 1ST: CPT

## 2021-12-15 PROCEDURE — 93458 L HRT ARTERY/VENTRICLE ANGIO: CPT

## 2021-12-15 PROCEDURE — 93005 ELECTROCARDIOGRAM TRACING: CPT | Performed by: INTERNAL MEDICINE

## 2021-12-15 PROCEDURE — C9600 PERC DRUG-EL COR STENT SING: HCPCS

## 2021-12-15 PROCEDURE — 36415 COLL VENOUS BLD VENIPUNCTURE: CPT

## 2021-12-15 PROCEDURE — 6360000002 HC RX W HCPCS: Performed by: INTERNAL MEDICINE

## 2021-12-15 PROCEDURE — 99223 1ST HOSP IP/OBS HIGH 75: CPT | Performed by: INTERNAL MEDICINE

## 2021-12-15 PROCEDURE — 6360000002 HC RX W HCPCS

## 2021-12-15 PROCEDURE — 2580000003 HC RX 258: Performed by: INTERNAL MEDICINE

## 2021-12-15 PROCEDURE — 86900 BLOOD TYPING SEROLOGIC ABO: CPT

## 2021-12-15 PROCEDURE — 80048 BASIC METABOLIC PNL TOTAL CA: CPT

## 2021-12-15 PROCEDURE — 2140000000 HC CCU INTERMEDIATE R&B

## 2021-12-15 PROCEDURE — 6370000000 HC RX 637 (ALT 250 FOR IP): Performed by: INTERNAL MEDICINE

## 2021-12-15 PROCEDURE — C1725 CATH, TRANSLUMIN NON-LASER: HCPCS

## 2021-12-15 PROCEDURE — B2151ZZ FLUOROSCOPY OF LEFT HEART USING LOW OSMOLAR CONTRAST: ICD-10-PCS | Performed by: INTERNAL MEDICINE

## 2021-12-15 PROCEDURE — 4A023N7 MEASUREMENT OF CARDIAC SAMPLING AND PRESSURE, LEFT HEART, PERCUTANEOUS APPROACH: ICD-10-PCS | Performed by: INTERNAL MEDICINE

## 2021-12-15 RX ORDER — PANTOPRAZOLE SODIUM 40 MG/1
40 TABLET, DELAYED RELEASE ORAL
Status: DISCONTINUED | OUTPATIENT
Start: 2021-12-15 | End: 2021-12-15

## 2021-12-15 RX ORDER — ONDANSETRON 2 MG/ML
4 INJECTION INTRAMUSCULAR; INTRAVENOUS EVERY 6 HOURS PRN
Status: DISCONTINUED | OUTPATIENT
Start: 2021-12-15 | End: 2021-12-18 | Stop reason: HOSPADM

## 2021-12-15 RX ORDER — ASPIRIN 81 MG/1
81 TABLET, CHEWABLE ORAL DAILY
Status: DISCONTINUED | OUTPATIENT
Start: 2021-12-15 | End: 2021-12-18 | Stop reason: HOSPADM

## 2021-12-15 RX ORDER — SODIUM CHLORIDE 9 MG/ML
25 INJECTION, SOLUTION INTRAVENOUS PRN
Status: DISCONTINUED | OUTPATIENT
Start: 2021-12-15 | End: 2021-12-18 | Stop reason: HOSPADM

## 2021-12-15 RX ORDER — SODIUM CHLORIDE 0.9 % (FLUSH) 0.9 %
5-40 SYRINGE (ML) INJECTION PRN
Status: DISCONTINUED | OUTPATIENT
Start: 2021-12-15 | End: 2021-12-18 | Stop reason: HOSPADM

## 2021-12-15 RX ORDER — ALBUTEROL SULFATE 2.5 MG/3ML
2.5 SOLUTION RESPIRATORY (INHALATION) EVERY 6 HOURS PRN
Status: DISCONTINUED | OUTPATIENT
Start: 2021-12-15 | End: 2021-12-18 | Stop reason: HOSPADM

## 2021-12-15 RX ORDER — PRAVASTATIN SODIUM 20 MG
80 TABLET ORAL NIGHTLY
Status: DISCONTINUED | OUTPATIENT
Start: 2021-12-15 | End: 2021-12-18 | Stop reason: HOSPADM

## 2021-12-15 RX ORDER — ALBUTEROL SULFATE 2.5 MG/3ML
2.5 SOLUTION RESPIRATORY (INHALATION) EVERY 6 HOURS PRN
COMMUNITY

## 2021-12-15 RX ORDER — ACETAMINOPHEN 325 MG/1
650 TABLET ORAL EVERY 6 HOURS PRN
Status: DISCONTINUED | OUTPATIENT
Start: 2021-12-15 | End: 2021-12-18 | Stop reason: HOSPADM

## 2021-12-15 RX ORDER — POLYETHYLENE GLYCOL 3350 17 G/17G
17 POWDER, FOR SOLUTION ORAL DAILY PRN
Status: DISCONTINUED | OUTPATIENT
Start: 2021-12-15 | End: 2021-12-18 | Stop reason: HOSPADM

## 2021-12-15 RX ORDER — ARFORMOTEROL TARTRATE 15 UG/2ML
15 SOLUTION RESPIRATORY (INHALATION) 2 TIMES DAILY
Status: DISCONTINUED | OUTPATIENT
Start: 2021-12-15 | End: 2021-12-18 | Stop reason: HOSPADM

## 2021-12-15 RX ORDER — SODIUM CHLORIDE 0.9 % (FLUSH) 0.9 %
5-40 SYRINGE (ML) INJECTION EVERY 12 HOURS SCHEDULED
Status: DISCONTINUED | OUTPATIENT
Start: 2021-12-15 | End: 2021-12-18 | Stop reason: HOSPADM

## 2021-12-15 RX ORDER — NICOTINE POLACRILEX 4 MG
15 LOZENGE BUCCAL PRN
Status: DISCONTINUED | OUTPATIENT
Start: 2021-12-15 | End: 2021-12-18 | Stop reason: HOSPADM

## 2021-12-15 RX ORDER — LOSARTAN POTASSIUM AND HYDROCHLOROTHIAZIDE 25; 100 MG/1; MG/1
1 TABLET ORAL DAILY
Status: DISCONTINUED | OUTPATIENT
Start: 2021-12-15 | End: 2021-12-15 | Stop reason: CLARIF

## 2021-12-15 RX ORDER — HYDROCHLOROTHIAZIDE 25 MG/1
25 TABLET ORAL DAILY
Status: DISCONTINUED | OUTPATIENT
Start: 2021-12-15 | End: 2021-12-18 | Stop reason: HOSPADM

## 2021-12-15 RX ORDER — OXYBUTYNIN CHLORIDE 5 MG/1
5 TABLET ORAL 3 TIMES DAILY
Status: DISCONTINUED | OUTPATIENT
Start: 2021-12-15 | End: 2021-12-18 | Stop reason: HOSPADM

## 2021-12-15 RX ORDER — NITROGLYCERIN 0.4 MG/1
0.4 TABLET SUBLINGUAL EVERY 5 MIN PRN
Status: DISCONTINUED | OUTPATIENT
Start: 2021-12-15 | End: 2021-12-18 | Stop reason: HOSPADM

## 2021-12-15 RX ORDER — FLUTICASONE FUROATE AND VILANTEROL 200; 25 UG/1; UG/1
1 POWDER RESPIRATORY (INHALATION) DAILY
Status: DISCONTINUED | OUTPATIENT
Start: 2021-12-15 | End: 2021-12-15 | Stop reason: CLARIF

## 2021-12-15 RX ORDER — METOPROLOL SUCCINATE 25 MG/1
25 TABLET, EXTENDED RELEASE ORAL DAILY
Status: DISCONTINUED | OUTPATIENT
Start: 2021-12-15 | End: 2021-12-18 | Stop reason: HOSPADM

## 2021-12-15 RX ORDER — SODIUM CHLORIDE 9 MG/ML
INJECTION, SOLUTION INTRAVENOUS ONCE
Status: COMPLETED | OUTPATIENT
Start: 2021-12-15 | End: 2021-12-15

## 2021-12-15 RX ORDER — CLONIDINE HYDROCHLORIDE 0.2 MG/1
0.2 TABLET ORAL 3 TIMES DAILY
Status: DISCONTINUED | OUTPATIENT
Start: 2021-12-15 | End: 2021-12-18 | Stop reason: HOSPADM

## 2021-12-15 RX ORDER — INSULIN GLARGINE 100 [IU]/ML
50 INJECTION, SOLUTION SUBCUTANEOUS NIGHTLY
Status: DISCONTINUED | OUTPATIENT
Start: 2021-12-15 | End: 2021-12-18 | Stop reason: HOSPADM

## 2021-12-15 RX ORDER — DEXTROSE MONOHYDRATE 50 MG/ML
100 INJECTION, SOLUTION INTRAVENOUS PRN
Status: DISCONTINUED | OUTPATIENT
Start: 2021-12-15 | End: 2021-12-18 | Stop reason: HOSPADM

## 2021-12-15 RX ORDER — METHYLPREDNISOLONE SODIUM SUCCINATE 125 MG/2ML
125 INJECTION, POWDER, LYOPHILIZED, FOR SOLUTION INTRAMUSCULAR; INTRAVENOUS ONCE
Status: COMPLETED | OUTPATIENT
Start: 2021-12-15 | End: 2021-12-15

## 2021-12-15 RX ORDER — CLOPIDOGREL BISULFATE 75 MG/1
75 TABLET ORAL DAILY
Status: DISCONTINUED | OUTPATIENT
Start: 2021-12-16 | End: 2021-12-18 | Stop reason: HOSPADM

## 2021-12-15 RX ORDER — FAMOTIDINE 20 MG/1
20 TABLET, FILM COATED ORAL 2 TIMES DAILY
Status: DISCONTINUED | OUTPATIENT
Start: 2021-12-15 | End: 2021-12-18 | Stop reason: HOSPADM

## 2021-12-15 RX ORDER — OMEPRAZOLE 40 MG/1
40 CAPSULE, DELAYED RELEASE ORAL DAILY
Status: DISCONTINUED | OUTPATIENT
Start: 2021-12-15 | End: 2021-12-15 | Stop reason: CLARIF

## 2021-12-15 RX ORDER — LOSARTAN POTASSIUM 50 MG/1
100 TABLET ORAL DAILY
Status: DISCONTINUED | OUTPATIENT
Start: 2021-12-15 | End: 2021-12-18 | Stop reason: HOSPADM

## 2021-12-15 RX ORDER — BENZONATATE 100 MG/1
200 CAPSULE ORAL 3 TIMES DAILY PRN
Status: DISCONTINUED | OUTPATIENT
Start: 2021-12-15 | End: 2021-12-18 | Stop reason: HOSPADM

## 2021-12-15 RX ORDER — ACETAMINOPHEN 650 MG/1
650 SUPPOSITORY RECTAL EVERY 6 HOURS PRN
Status: DISCONTINUED | OUTPATIENT
Start: 2021-12-15 | End: 2021-12-18 | Stop reason: HOSPADM

## 2021-12-15 RX ORDER — BUDESONIDE 0.5 MG/2ML
0.5 INHALANT ORAL 2 TIMES DAILY
Status: DISCONTINUED | OUTPATIENT
Start: 2021-12-15 | End: 2021-12-18 | Stop reason: HOSPADM

## 2021-12-15 RX ORDER — LANOLIN ALCOHOL/MO/W.PET/CERES
3 CREAM (GRAM) TOPICAL NIGHTLY PRN
Status: DISCONTINUED | OUTPATIENT
Start: 2021-12-15 | End: 2021-12-18 | Stop reason: HOSPADM

## 2021-12-15 RX ORDER — FORMOTEROL FUMARATE 20 UG/2ML
20 SOLUTION RESPIRATORY (INHALATION) EVERY 12 HOURS SCHEDULED
Status: DISCONTINUED | OUTPATIENT
Start: 2021-12-15 | End: 2021-12-15 | Stop reason: ALTCHOICE

## 2021-12-15 RX ORDER — ONDANSETRON 4 MG/1
4 TABLET, ORALLY DISINTEGRATING ORAL EVERY 8 HOURS PRN
Status: DISCONTINUED | OUTPATIENT
Start: 2021-12-15 | End: 2021-12-18 | Stop reason: HOSPADM

## 2021-12-15 RX ORDER — ANASTROZOLE 1 MG/1
1 TABLET ORAL DAILY
Status: DISCONTINUED | OUTPATIENT
Start: 2021-12-15 | End: 2021-12-18 | Stop reason: HOSPADM

## 2021-12-15 RX ORDER — SODIUM CHLORIDE 9 MG/ML
INJECTION, SOLUTION INTRAVENOUS ONCE
Status: DISCONTINUED | OUTPATIENT
Start: 2021-12-15 | End: 2021-12-18 | Stop reason: HOSPADM

## 2021-12-15 RX ORDER — SODIUM CHLORIDE 9 MG/ML
INJECTION, SOLUTION INTRAVENOUS CONTINUOUS
Status: DISCONTINUED | OUTPATIENT
Start: 2021-12-15 | End: 2021-12-18 | Stop reason: HOSPADM

## 2021-12-15 RX ORDER — ERGOCALCIFEROL 1.25 MG/1
50000 CAPSULE ORAL WEEKLY
Status: DISCONTINUED | OUTPATIENT
Start: 2021-12-15 | End: 2021-12-18 | Stop reason: HOSPADM

## 2021-12-15 RX ORDER — DEXTROSE MONOHYDRATE 25 G/50ML
12.5 INJECTION, SOLUTION INTRAVENOUS PRN
Status: DISCONTINUED | OUTPATIENT
Start: 2021-12-15 | End: 2021-12-18 | Stop reason: HOSPADM

## 2021-12-15 RX ORDER — GABAPENTIN 400 MG/1
400 CAPSULE ORAL 3 TIMES DAILY
Status: DISCONTINUED | OUTPATIENT
Start: 2021-12-15 | End: 2021-12-18 | Stop reason: HOSPADM

## 2021-12-15 RX ORDER — ISOSORBIDE MONONITRATE 30 MG/1
30 TABLET, EXTENDED RELEASE ORAL DAILY
Status: DISCONTINUED | OUTPATIENT
Start: 2021-12-15 | End: 2021-12-16

## 2021-12-15 RX ORDER — LANOLIN ALCOHOL/MO/W.PET/CERES
1000 CREAM (GRAM) TOPICAL DAILY
Status: DISCONTINUED | OUTPATIENT
Start: 2021-12-15 | End: 2021-12-18 | Stop reason: HOSPADM

## 2021-12-15 RX ORDER — MONTELUKAST SODIUM 10 MG/1
10 TABLET ORAL NIGHTLY
Status: DISCONTINUED | OUTPATIENT
Start: 2021-12-15 | End: 2021-12-18 | Stop reason: HOSPADM

## 2021-12-15 RX ORDER — ACETAMINOPHEN 500 MG
500 TABLET ORAL EVERY 6 HOURS PRN
Status: DISCONTINUED | OUTPATIENT
Start: 2021-12-15 | End: 2021-12-18 | Stop reason: HOSPADM

## 2021-12-15 RX ORDER — PEN NEEDLE, DIABETIC 30 GX5/16"
1 NEEDLE, DISPOSABLE MISCELLANEOUS
Status: DISCONTINUED | OUTPATIENT
Start: 2021-12-15 | End: 2021-12-15

## 2021-12-15 RX ADMIN — Medication 1000 MCG: at 12:13

## 2021-12-15 RX ADMIN — SODIUM CHLORIDE: 9 INJECTION, SOLUTION INTRAVENOUS at 07:51

## 2021-12-15 RX ADMIN — SODIUM CHLORIDE, PRESERVATIVE FREE 10 ML: 5 INJECTION INTRAVENOUS at 12:14

## 2021-12-15 RX ADMIN — METHYLPREDNISOLONE SODIUM SUCCINATE 125 MG: 125 INJECTION, POWDER, FOR SOLUTION INTRAMUSCULAR; INTRAVENOUS at 18:18

## 2021-12-15 RX ADMIN — INSULIN LISPRO 10 UNITS: 100 INJECTION, SOLUTION INTRAVENOUS; SUBCUTANEOUS at 12:21

## 2021-12-15 RX ADMIN — CLONIDINE HYDROCHLORIDE 0.2 MG: 0.2 TABLET ORAL at 12:14

## 2021-12-15 RX ADMIN — Medication 3 MG: at 22:40

## 2021-12-15 RX ADMIN — BUDESONIDE 500 MCG: 0.5 SUSPENSION RESPIRATORY (INHALATION) at 11:58

## 2021-12-15 RX ADMIN — ANASTROZOLE 1 MG: 1 TABLET ORAL at 12:14

## 2021-12-15 RX ADMIN — CALCIUM CARBONATE-VITAMIN D TAB 500 MG-200 UNIT 1 TABLET: 500-200 TAB at 20:19

## 2021-12-15 RX ADMIN — ARFORMOTEROL TARTRATE 15 MCG: 15 SOLUTION RESPIRATORY (INHALATION) at 11:58

## 2021-12-15 RX ADMIN — PANTOPRAZOLE SODIUM 40 MG: 40 TABLET, DELAYED RELEASE ORAL at 12:14

## 2021-12-15 RX ADMIN — GABAPENTIN 400 MG: 400 CAPSULE ORAL at 20:19

## 2021-12-15 RX ADMIN — IPRATROPIUM BROMIDE 0.5 MG: 0.5 SOLUTION RESPIRATORY (INHALATION) at 15:48

## 2021-12-15 RX ADMIN — METOPROLOL SUCCINATE 25 MG: 25 TABLET, EXTENDED RELEASE ORAL at 12:14

## 2021-12-15 RX ADMIN — BUDESONIDE 500 MCG: 0.5 SUSPENSION RESPIRATORY (INHALATION) at 20:31

## 2021-12-15 RX ADMIN — LOSARTAN POTASSIUM 100 MG: 50 TABLET, FILM COATED ORAL at 12:14

## 2021-12-15 RX ADMIN — INSULIN LISPRO 10 UNITS: 100 INJECTION, SOLUTION INTRAVENOUS; SUBCUTANEOUS at 18:19

## 2021-12-15 RX ADMIN — SODIUM CHLORIDE: 9 INJECTION, SOLUTION INTRAVENOUS at 10:20

## 2021-12-15 RX ADMIN — ARFORMOTEROL TARTRATE 15 MCG: 15 SOLUTION RESPIRATORY (INHALATION) at 20:31

## 2021-12-15 RX ADMIN — CALCIUM CARBONATE-VITAMIN D TAB 500 MG-200 UNIT 1 TABLET: 500-200 TAB at 12:14

## 2021-12-15 RX ADMIN — IPRATROPIUM BROMIDE 0.5 MG: 0.5 SOLUTION RESPIRATORY (INHALATION) at 20:31

## 2021-12-15 RX ADMIN — INSULIN GLARGINE 50 UNITS: 100 INJECTION, SOLUTION SUBCUTANEOUS at 20:28

## 2021-12-15 RX ADMIN — OXYBUTYNIN CHLORIDE 5 MG: 5 TABLET ORAL at 20:19

## 2021-12-15 RX ADMIN — OXYBUTYNIN CHLORIDE 5 MG: 5 TABLET ORAL at 12:13

## 2021-12-15 RX ADMIN — ERGOCALCIFEROL 50000 UNITS: 1.25 CAPSULE ORAL at 12:13

## 2021-12-15 RX ADMIN — PRAVASTATIN SODIUM 80 MG: 20 TABLET ORAL at 20:19

## 2021-12-15 RX ADMIN — GABAPENTIN 400 MG: 400 CAPSULE ORAL at 12:13

## 2021-12-15 RX ADMIN — MONTELUKAST SODIUM 10 MG: 10 TABLET ORAL at 20:19

## 2021-12-15 RX ADMIN — ISOSORBIDE MONONITRATE 30 MG: 30 TABLET, EXTENDED RELEASE ORAL at 12:14

## 2021-12-15 RX ADMIN — FAMOTIDINE 20 MG: 20 TABLET, FILM COATED ORAL at 20:19

## 2021-12-15 RX ADMIN — CLONIDINE HYDROCHLORIDE 0.2 MG: 0.2 TABLET ORAL at 20:19

## 2021-12-15 RX ADMIN — HYDROCHLOROTHIAZIDE 25 MG: 25 TABLET ORAL at 12:13

## 2021-12-15 NOTE — H&P
Hospital Medicine History & Physical      PCP: Clifford Mcintosh MD    Date of Admission: 12/15/2021    Date of Service: December 15,2021    Chief Complaint:  Chest pain      History Of Present Illness:  Pt was admitted by cardiology after having an elective cardiac stent today. Pt says her pcp ordered and out patient cardiac CT of her heart and a 99% blockage one of her arteries was seen and she was scheduled to have an elective cardiac stent which was done today. Her procedure went well overall but pt developed wheezing after the procedure and was admitted to med/surg. Pt says she does have asthma. Pt also mentioned to me she fell last night at home and injured her right wrist and is concerned she may have a fracture. She denies any chest pain at time of exam.      Past Medical History:          Diagnosis Date    Arthritis     Asthma     BRCA1 negative     BRCA2 negative     Breast cancer ([address]     CAD in native artery 12/15/2021    12-15-21 cate 2.5x38 hima rca. 12-15-21 cate 3.0x12 hmia prox rca.     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    Cerebrovascular disease 6/09    no residual    CHF (congestive heart failure) (HCC) approx 3 years ago    Claustrophobia     COPD (chronic obstructive pulmonary disease) ([address]     Decreased dorsalis pedis pulse 9/5/2019    Dermatophytosis 9/5/2019    Headache(784.0)     History of blood transfusion     with knee surgery    Hives     Hx of blood clots     Hyperlipidemia     Hypertension     LBP radiating to both legs     Lymphedema of both lower extremities 11/12/2015    Neuromuscular disorder ([address]     Non-rheumatic aortic sclerosis 10/2018    10/2018    Obesity     Pulmonary hypertension ([address] 10/2018    PVD (peripheral vascular disease) with claudication ([address] 9/23/2021    Recurrent genital HSV (herpes simplex virus) infection     last outbreak 11/2017    S/P breast biopsy, right 07/2016    pt states breast cancer    Type II or unspecified type diabetes mellitus without mention of complication, not stated as uncontrolled     AA1c8.7 9/10    UTI (urinary tract infection) 5/13/2019       Past Surgical History:          Procedure Laterality Date    ARTHROPLASTY Left 07/29/2016    thumb basil joint with dequervain's release    BREAST BIOPSY      BREAST LUMPECTOMY  years ago    benign    BREAST LUMPECTOMY Right 09/06/2016    COLONOSCOPY  2005    COLONOSCOPY  1/8/15    Dr. Joe Flower - [phone] ECHO COMPL W DOP COLOR FLOW  6/7/2012         FINGER SURGERY Left 07/29/2016    thumb    HAND SURGERY  12/2017    HYSTERECTOMY      JOINT REPLACEMENT      OTHER SURGICAL HISTORY Right 12/20/2017    RIGHT THUMB TRAPEZIECTOMY WITH LIGAMENT RECONSRUCTION DEQUERVAINS RELEASE    TIM AND BSO      TOTAL HIP ARTHROPLASTY Bilateral     2000, 2013 dr Redell Goodpasture, dr Richar Harley Bilateral 2013    dr Lorena Kenney       Medications Prior to Admission:      Prior to Admission medications    Medication Sig Start Date End Date Taking? Authorizing Provider   albuterol (PROVENTIL) (2.5 MG/3ML) 0.083% nebulizer solution Take 2.5 mg by nebulization every 6 hours as needed for Wheezing   Yes Historical Provider, MD   blood glucose test strips (ONETOUCH VERIO) strip 1 each by In Vitro route 4 times daily As needed.  7/29/21  Yes Gabriela Rising, APRN - CNS   Lancets ([address]) MISC To test 4x/day 7/29/21  Yes Gabriela Rising, APRN - CNS   metFORMIN (GLUCOPHAGE) 1000 MG tablet Take 1 tablet by mouth 2 times daily (with meals) 4/1/21  Yes Gumaro Moss MD   insulin glargine (LANTUS SOLOSTAR) 100 UNIT/ML injection pen Inject 40 Units into the skin nightly  Patient taking differently: Inject 50 Units into the skin nightly  4/1/21  Yes Gumaro Moss MD   insulin lispro, 1 Unit Dial, (HUMALOG KWIKPEN) 100 UNIT/ML SOPN Inject 10 units with meals + sliding scale. MAX 75 units/day 4/1/21  Yes Ever Wise MD   Insulin Pen Needle (PEN NEEDLES 3/16\") 31G X 5 MM MISC 1 each by Does not apply route 4 times daily (after meals and at bedtime) 4/1/21  Yes Ever Wise MD   melatonin (RA MELATONIN) 3 MG TABS tablet Take one 3 mg hs 7/20/20  Yes Berkley Ward MD   losartan-hydrochlorothiazide (HYZAAR) 100-25 MG per tablet Take 1 tablet by mouth daily 6/26/20  Yes Berkley Ward MD   omeprazole (PRILOSEC) 40 MG delayed release capsule Take 1 capsule by mouth daily 6/26/20  Yes Berkley Ward MD   Fluticasone furoate-vilanterol (BREO ELLIPTA) 200-25 MCG/INH AEPB inhaler Inhale 1 puff into the lungs daily 6/26/20  Yes Dasha Hoff MD   anastrozole (ARIMIDEX) 1 MG tablet Take 1 tablet by mouth daily Indications: ESTROGEN DEFICIENCY IN BREAST CANCER (INACTIVE) 6/26/20  Yes Berkley Ward MD   gabapentin (NEURONTIN) 400 MG capsule Take 1 capsule by mouth 3 times daily.  6/26/20 6/26/22 Yes Berkley Ward MD   aspirin 81 MG chewable tablet Take 1 tablet by mouth daily 6/26/20  Yes Berkley Ward MD   pravastatin (PRAVACHOL) 80 MG tablet Take 1 tablet by mouth nightly 6/26/20  Yes Berkley Ward MD   oxybutynin (DITROPAN) 5 MG tablet Take 1 tablet by mouth 3 times daily 6/26/20  Yes Berkley Ward MD   montelukast (SINGULAIR) 10 MG tablet Take 1 tablet by mouth nightly 6/26/20  Yes Berkley Ward MD   cloNIDine (CATAPRES) 0.2 MG tablet Take 1 tablet by mouth 3 times daily 2/11/20  Yes Hubert Burton DO   butenafine (LOTRIMIN ULTRA) 1 % CREA Please apply from the toes, in between the toes, foot up to the upper calf twice daily for 1 month, both legs 9/5/19  Yes Nilay Paniagua MD   vitamin B-12 (CYANOCOBALAMIN) 1000 MCG tablet Take 1 tablet by mouth daily 7/20/18  Yes Berkley Ward MD   benzonatate (TESSALON) 200 MG capsule Take 1 capsule by mouth 3 times daily as needed for Cough 20   Sabine Meza PA-C   vitamin D (ERGOCALCIFEROL) 1.25 MG (43889 UT) CAPS capsule Take 1 capsule by mouth once a week 20   Laya Olivas MD   furosemide (LASIX) 20 MG tablet Take 1 tablet by mouth 2 times daily 3/26/20   Laya Olivas MD   formoterol (PERFOROMIST) 20 MCG/2ML nebulizer solution 20 mcg 12/3/19   Historical Provider, MD   calcium carbonate-vitamin D (CALCIUM 600 + D) 600-400 MG-UNIT TABS per tab Take 1 tablet by mouth 2 times daily 10/6/19   Oli Torres MD   acetaminophen (APAP EXTRA STRENGTH) 500 MG tablet Take 1 tablet by mouth every 6 hours as needed for Pain 18   Veronika Mtz DO       Allergies:  Patient has no known allergies. Social History:      The patient currently lives    TOBACCO:   reports that she quit smoking about 20 years ago. Her smoking use included cigarettes. She started smoking about 55 years ago. She has a 8.75 pack-year smoking history. She has never used smokeless tobacco.  ETOH:   reports current alcohol use. E-Cigarettes/Vaping Use     Questions Responses    E-Cigarette/Vaping Use Never User    Start Date     Passive Exposure     Quit Date     Counseling Given     Comments             Family History:       Reviewed in detail and negative for DM, CAD, Cancer, CVA. Positive as follows:        Problem Relation Age of Onset    Diabetes Mother     High Blood Pressure Mother     Heart Attack Mother     High Blood Pressure Father     Diabetes Father     Heart Failure Father     Breast Cancer Sister     Stroke Sister         young age   Ronna Girard Cancer Sister 55        breast    Sickle Cell Anemia Other         niece    Cancer Other 36        niece - breast    Breast Cancer Sister             Cancer Sister 59        breast & ovarian    Stomach Cancer Other         aunt       REVIEW OF SYSTEMS COMPLETED:   Pertinent positives as noted in the HPI.  All other systems reviewed and negative. PHYSICAL EXAM PERFORMED:    BP (!) 188/82   Pulse 72   Temp 98.2 °F (36.8 °C) (Temporal)   Resp 18   Ht 5' 5\" (1.651 m)   Wt 242 lb (109.8 kg)   SpO2 99%   BMI 40.27 kg/m²     General appearance:  No apparent distress, appears stated age and cooperative. HEENT:  Normal cephalic, atraumatic without obvious deformity. Pupils equal, round, and reactive to light. Extra ocular muscles intact. Conjunctivae/corneas clear. Neck: Supple, with full range of motion. No jugular venous distention. Trachea midline. Respiratory:  Normal respiratory effort. Clear to auscultation, bilaterally without Rales/Wheezes/Rhonchi. Cardiovascular:  Regular rate and rhythm with normal S1/S2 without murmurs, rubs or gallops. Abdomen: Soft, non-tender, non-distended with normal bowel sounds. Musculoskeletal:  Right wrist swelling and tender  Skin: Skin color, texture, turgor normal.  No rashes or lesions. Neurologic:  Neurovascularly intact without any focal sensory/motor deficits. Cranial nerves: II-XII intact, grossly non-focal.  Psychiatric:  Alert and oriented, thought content appropriate, normal insight         Labs:     Recent Labs     12/15/21  0730   WBC 7.8   HGB 11.3*   HCT 36.4        Recent Labs     12/15/21  0730      K 4.1      CO2 29   BUN 10   CREATININE 0.7   CALCIUM 9.7     No results for input(s): AST, ALT, BILIDIR, BILITOT, ALKPHOS in the last 72 hours. No results for input(s): INR in the last 72 hours. No results for input(s): Kathyrn Belch in the last 72 hours.     Urinalysis:      Lab Results   Component Value Date    NITRU Negative 05/12/2019    WBCUA 10-20 05/12/2019    BACTERIA NONE 05/12/2019    RBCUA NONE 05/12/2019    RBCUA NONE 03/19/2014    BLOODU Negative 05/12/2019    SPECGRAV 1.010 05/12/2019    GLUCOSEU 100 05/12/2019       Radiology:     CXR: I have reviewed the CXR with the following interpretation:   EKG:  I have reviewed the EKG with the following interpretation:     XR WRIST RIGHT (MIN 3 VIEWS)   Final Result   1. No acute fracture or dislocation. 2. Status post resection of the trapezium. 3. Incomplete evaluation of scattered osteoarthritis about the right hand. 4. Negative ulnar variance. XR HUMERUS RIGHT (MIN 2 VIEWS)   Final Result   1. No acute fracture or dislocation. 2. Degenerative change at the glenohumeral and acromioclavicular joints. Active Hospital Problems    Diagnosis Date Noted    CAD in native artery [I25.10] 12/15/2021    Coronary artery disease (CAD) excluded [Z03.89] 12/15/2021     ASSESSMENT: -CAD                             -right wrist sprain and contusion                             -asthmatic bronchospasm                                                             PLAN:     -admit to med/surg  -splint for right wrist   -ice and elevation to right wrist  -duonebs and solumerol for asthma symptoms    DVT Prophylaxis: lovenox ordered  Diet: ADULT DIET; Regular; 3 carb choices (45 gm/meal)  Code Status: Full Code    PT/OT Eval Status:    Dispo - admission (observation)       Eliana Denson DO    Thank you Elisa Mclaughlin MD for the opportunity to be involved in this patient's care. If you have any questions or concerns please feel free to contact me at [phone].

## 2021-12-15 NOTE — PROCEDURES
[address]                  [address] [id number],  [facility]                            CARDIAC CATHETERIZATION    PATIENT NAME: LISA BLANCHARD                      :        1950  MED REC NO:   [id number]                            ROOM:       [id number]  ACCOUNT NO:   [de-identified]                           ADMIT DATE: 12/15/2021  PROVIDER:     Faith Jensen MD    DATE OF PROCEDURE:  12/15/2021    CARDIAC CATHETERIZATION AND ANGIOPLASTY REPORT    PROCEDURES:  Left heart catheterization, selective coronary angiography,  left ventriculography, balloon angioplasty with deployment of two  overlapping drug-eluting coronary stents to the mid and proximal RCA  with dilatation of the stents post deployment with a high-pressure  noncompliant balloon. The procedure was done through right radial approach using ultrasound  guidance. The patient received intravenous Versed and intravenous fentanyl for  sedation. INDICATION:  Chest pain. Very high coronary calcium score on the CT  scan in a patient with multiple risk factors for coronary artery  disease. PRESSURES:  Aorta 153/95 with a mean of 150. Left ventricular systolic pressure 402. Left ventricular end-diastolic  pressure 15. There was no significant gradient across the aortic valve. CORONARY ANGIOGRAPHY:  LEFT MAIN:  The left main artery did not appear to have any significant  angiographic disease. LAD:  The left anterior descending artery appeared heavily calcified in  its proximal segment. After the second diagonal branch shortly after  the origin of the vessel, the LAD has diffuse up to 50% disease over a  long segment. The distal vessel did not appear to have any significant  disease. The third diagonal branch, which has around 90% ostial  narrowing. LCX:  The left circumflex is heavily calcified in its proximal segment.    The large first marginal branch has an eccentric 60%-70% narrowing in  its proximal segment followed by around 50%-60% disease shortly  thereafter. Very distally, where the vessel becomes a small caliber  vessel, there is around 70% luminal narrowing. The second lateral  branch has around 50% disease in its proximal segment. The rest of the  left circumflex terminates into a trivial lateral branch and has no  significant disease. RCA:  The right coronary artery is a large dominant vessel. It is  heavily calcified along its course. It has a diffusely diseased segment  extending from the proximal to the mid vessel with around 90% stenosis  in the mid RCA. The large posterior descending artery branch has an  eccentric 70% luminal narrowing. Pressure wire evaluation of the RCA disease yielded an iFR of 0.89 and  0.87 consistent with the disease being hemodynamically significant. LEFT VENTRICULOGRAPHY:  The left ventricle is normal in size. There was  subtle hypokinesis of the mid anterolateral wall. The ejection fraction  was estimated at 45%-50%. There was no mitral regurgitation. Concentric left ventricular hypertrophy was noted. INTERVENTIONAL PROCEDURE:  EQUIPMENT:  1.  6-[place] JR4 Launcher guide catheter. 2.  GuideLiner catheter. 3.  0.014/300 ChoICE Extra Support J-exchange wire. 4.  2.5 mm x 20 mm noncompliant Emerge balloon. 5.  2.5 mm x 30 mm Trek noncompliant balloon. 6.  2.5 mm x 38 mm [place] Resolute drug-eluting coronary stent. 7.  3.0 mm x 12 mm Roger Resolute drug-eluting coronary stent. 8.  3.0 mm x 20 mm Quantum [place] monorail noncompliant balloon. TECHNIQUE:  The procedure was done through right radial approach. Of  note, the right radial artery appeared very heavily calcified and was  very snug with difficulty advancing the catheters through the right  radial artery requiring multiple doses of intra-radial nitroglycerin and  intra-radial verapamil and the patient having received IV Versed and  fentanyl.     Intravenous labetalol and intravenous hydralazine were given for  elevated blood pressure. 300 mg of Plavix was given orally at the end of the procedure. The patient received heparin IV boluses to maintain the ACT at around  300 during the procedure. The right coronary artery was selectively engaged with a 6-[place] JR4  guide catheter. The pressure wire, which was used to evaluate the RCA  disease, was exchanged through a balloon catheter to an Extra Support  exchange wire. Prior to the exchange, a GuideLiner catheter was  advanced over the pressure wire into the right coronary artery for more  support. The RCA was then dilated with the 2.5 mm x 20 mm balloon followed by the  2.5 mm x 30 mm balloon (both of which were noncompliant balloons) with  inflations performed from the mid to the proximal vessel to a maximum of  18 atmospheres. This was followed by advancing a 2.5 mm x 38 mm Roger  Resolute drug-eluting coronary stent, deployed to cover the disease in  the mid vessel and this stent was deployed at 18 atmospheres. Next, a  3.0 mm x 12 mm [place] Resolute drug-eluting coronary stent was advanced to  cover the proximal vessel disease with its distal edge overlapping with  the proximal edge of the stent that was deployed already in the mid  vessel and this stent was deployed at 14 atmospheres. Finally, the  stents were postdilated with a 3.0 mm x 20 mm noncompliant balloon,  inflated to 18 atmospheres. Contrast injection at the end of the procedure revealed very good  results without any significant residual stenosis with no evidence of  dissections or flaps and with IRIS-3 flow in the vessel. The right radial arterial sheath was removed at the end of the procedure  and a TR band was applied with adequate hemostasis and with preservation  of pulse. The patient tolerated the procedure well and left the cardiac  catheterization laboratory in stable condition. CONCLUSIONS:  1.   Coronary artery disease. a. Left main. No significant angiographic disease. b.  LAD. Heavily calcified proximal vessel with long and diffuse  diseased segment in the mid vessel with up to 50% angiographic luminal  narrowing in the mid vessel with no significant distal vessel disease. 90% stenosis of the ostium of a small third diagonal branch.  c.  LCX. Heavily calcified proximal vessel with eccentric 60%-70%  proximal stenosis over a large marginal branch followed by 50%-60%  stenosis shortly thereafter and 70% disease very distally in this first  marginal branch. 50% disease of the midcircumflex/second marginal  branch/lateral branch. d.  RCA. Dominant vessel, which is heavily calcified from its proximal  to mid segment with diffuse proximal to mid vessel disease with up to  80%-90% stenosis. 70% eccentric stenosis of the posterolateral branch. IFR 0.89/0.87.  2.  Normal ventricular size with subtle hypokinesis of the mid  anterolateral wall with an estimated ejection fraction of 50%. 3.  Systemic hypertension. 4.  Mildly elevated left ventricular end-diastolic pressure. 5.  Successful balloon angioplasty with deployment of two overlapping  drug-eluting coronary stents to the mid/proximal RCA with dilatation of  the stents, post deployment with a high-pressure noncompliant balloon  with very good results.         Phani Elils MD    D: 12/15/2021 10:21:47       T: 12/15/2021 10:27:28     [email or web address]  Job#: [id number]     Doc#: [id number]    CC:

## 2021-12-15 NOTE — H&P
History & Physical     CHIEF COMPLAINT: Here for cath    ADMITTING PHYSICIAN: Harris Herrera M.D.    DATE OF SERVICE: 12/15/2021     HISTORY OF PRESENT ILLNESS:    Patient of Dr. Amita Quan. Atypical CP for several months. Cardiac CT coronary calcium score 2956 (   Left main 45, , LCx 1085 and RCA 1018 ). Past Medical and Surgical History:    1. Echocardiogram 10/18/2018: Mild concentric LVH. No WMA. Stage II Diastolic Dysfunction. LA mildly dilated. Mild MR. Mild TR. Mild to moderate PHTN. EF 45-50%. 2. Stress echocardiogram 12/10/2018: No chest pain. MPHR: > 85%. LBBB at baseline. The maximal stress echocardiogram demonstrated no evidence of inducible ischemia. 3. CVA   4. HTN  5. HLD  6. Obesity  7. Asthma   8. Right breast CA s/p lumpectomy and radiation therapy  9. DM   10. Recurrent genital HSV infection   11. 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 12. Claustrophobia   13. Headaches   14.  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    Cerebrovascular disease 6/09    no residual    CHF (congestive heart failure) (HCC) approx 3 years ago    Claustrophobia     COPD (chronic obstructive pulmonary disease) ([address]     Decreased dorsalis pedis pulse 9/5/2019    Dermatophytosis 9/5/2019    Headache(784.0)     History of blood transfusion     with knee surgery    Hives     Hx of blood clots     Hyperlipidemia     Hypertension     LBP radiating to both legs     Lymphedema of both lower extremities 11/12/2015    Neuromuscular disorder ([address]     Non-rheumatic aortic sclerosis 10/2018    10/2018    Obesity     Pulmonary hypertension ([facility] 10/2018    PVD (peripheral vascular disease) with claudication ([facility] 9/23/2021    Recurrent genital HSV (herpes simplex virus) infection     last outbreak 11/2017    S/P breast biopsy, right 07/2016    pt states breast cancer    Type II or unspecified type diabetes mellitus without mention of complication, not stated as uncontrolled     AA1c8.7 9/10    UTI (urinary tract infection) 5/13/2019       Past Surgical History:   Past Surgical History:   Procedure Laterality Date    ARTHROPLASTY Left 07/29/2016    thumb basil joint with dequervain's release    BREAST BIOPSY      BREAST LUMPECTOMY  years ago    benign    BREAST LUMPECTOMY Right 09/06/2016    COLONOSCOPY  2005    COLONOSCOPY  1/8/15    Dr. Anushka Taylor - [address] ECHO COMPL [id number]  6/7/2012         FINGER SURGERY Left 07/29/2016    thumb    HAND SURGERY  12/2017    HYSTERECTOMY      JOINT REPLACEMENT      OTHER SURGICAL HISTORY Right 12/20/2017    RIGHT THUMB TRAPEZIECTOMY WITH LIGAMENT RECONSRUCTION DEQUERVAINS RELEASE    TIM AND BSO      TOTAL HIP ARTHROPLASTY Bilateral     2000, 2013 dr Jessika Kern, dr Bonnie Flores Bilateral 2013    dr Viktoria Pérez Medications:    Prior to Admission medications    Medication Sig Start Date End Date Taking? Authorizing Provider   blood glucose test strips (ONETOUCH VERIO) strip 1 each by In Vitro route 4 times daily As needed.  7/29/21  Yes DMITRY Miller   Lancets ([address]) [address] [facility] To test 4x/day 7/29/21  Yes DMITRY Miller   albuterol (PROVENTIL) (5 MG/ML) 0.5% nebulizer solution Take 0.5 mLs by nebulization every 6 hours as needed for Wheezing 5/3/21  Yes Bethany Tomas MD   metFORMIN (GLUCOPHAGE) 1000 MG tablet Take 1 tablet by mouth 2 times daily (with meals) 4/1/21  Yes Kathleen Ruiz MD   insulin glargine (LANTUS SOLOSTAR) 100 UNIT/ML injection pen Inject 40 Units into the skin nightly  Patient taking differently: Inject 50 Units into the skin nightly  4/1/21  Yes Lizet Rowe MD   insulin lispro, 1 Unit Dial, (HUMALOG KWIKPEN) 100 UNIT/ML SOPN Inject 10 units with meals + sliding scale. MAX 75 units/day 4/1/21  Yes Lizet Rowe MD   Insulin Pen Needle (PEN NEEDLES 3/16\") 31G X 5 MM MISC 1 each by Does not apply route 4 times daily (after meals and at bedtime) 4/1/21  Yes Lizet Rowe MD   melatonin (RA MELATONIN) 3 MG TABS tablet Take one 3 mg hs 7/20/20  Yes Ann Maldonado MD   losartan-hydrochlorothiazide (HYZAAR) 100-25 MG per tablet Take 1 tablet by mouth daily 6/26/20  Yes Ann Maldonado MD   omeprazole (PRILOSEC) 40 MG delayed release capsule Take 1 capsule by mouth daily 6/26/20  Yes Ann Maldonado MD   Fluticasone furoate-vilanterol (BREO ELLIPTA) 200-25 MCG/INH AEPB inhaler Inhale 1 puff into the lungs daily 6/26/20  Yes Dasha Hoff MD   anastrozole (ARIMIDEX) 1 MG tablet Take 1 tablet by mouth daily Indications: ESTROGEN DEFICIENCY IN BREAST CANCER (INACTIVE) 6/26/20  Yes Ann Maldonado MD   gabapentin (NEURONTIN) 400 MG capsule Take 1 capsule by mouth 3 times daily.  6/26/20 6/26/22 Yes Ann Maldonado MD   aspirin 81 MG chewable tablet Take 1 tablet by mouth daily 6/26/20  Yes Ann Maldonado MD   pravastatin (PRAVACHOL) 80 MG tablet Take 1 tablet by mouth nightly 6/26/20  Yes Ann Maldonado MD   oxybutynin (DITROPAN) 5 MG tablet Take 1 tablet by mouth 3 times daily 6/26/20  Yes Ann Maldonado MD   montelukast (SINGULAIR) 10 MG tablet Take 1 tablet by mouth nightly 6/26/20  Yes Ann Maldonado MD   cloNIDine (CATAPRES) 0.2 MG tablet Take 1 tablet by mouth 3 times daily 2/11/20  Yes Michele Route, DO   butenafine (LOTRIMIN ULTRA) 1 % CREA Please apply from the toes, in between the toes, foot up to the upper calf twice daily for 1 month, both legs 9/5/19  Yes Yudith Goodwin, MD vitamin B-12 (CYANOCOBALAMIN) 1000 MCG tablet Take 1 tablet by mouth daily 7/20/18  Yes Severiano Gallardo MD   benzonatate (TESSALON) 200 MG capsule Take 1 capsule by mouth 3 times daily as needed for Cough 8/18/20   Sabine Meza PA-C   vitamin D (ERGOCALCIFEROL) 1.25 MG (04063 UT) CAPS capsule Take 1 capsule by mouth once a week 6/26/20   Severiano Gallardo MD   furosemide (LASIX) 20 MG tablet Take 1 tablet by mouth 2 times daily 3/26/20   Severiano Gallardo MD   formoterol (PERFOROMIST) 20 MCG/2ML nebulizer solution 20 mcg 12/3/19   Historical Provider, MD   calcium carbonate-vitamin D (CALCIUM 600 + D) 600-400 MG-UNIT TABS per tab Take 1 tablet by mouth 2 times daily 10/6/19   Marigene Kehr, MD   acetaminophen (APAP EXTRA STRENGTH) 500 MG tablet Take 1 tablet by mouth every 6 hours as needed for Pain 8/14/18   [facility] Medications:    Current Outpatient Medications:     blood glucose test strips (ONETOUCH VERIO) strip, 1 each by In Vitro route 4 times daily As needed. , Disp: 150 each, Rfl: 11    Lancets ([address]) MISC, To test 4x/day, Disp: 200 each, Rfl: 11    albuterol (PROVENTIL) (5 MG/ML) 0.5% nebulizer solution, Take 0.5 mLs by nebulization every 6 hours as needed for Wheezing, Disp: 120 each, Rfl: 0    metFORMIN (GLUCOPHAGE) 1000 MG tablet, Take 1 tablet by mouth 2 times daily (with meals), Disp: 180 tablet, Rfl: 3    insulin glargine (LANTUS SOLOSTAR) 100 UNIT/ML injection pen, Inject 40 Units into the skin nightly (Patient taking differently: Inject 50 Units into the skin nightly ), Disp: 25 pen, Rfl: 5    insulin lispro, 1 Unit Dial, (HUMALOG KWIKPEN) 100 UNIT/ML SOPN, Inject 10 units with meals + sliding scale.  MAX 75 units/day, Disp: 25 pen, Rfl: 5    Insulin Pen Needle (PEN NEEDLES 3/16\") 31G X 5 MM MISC, 1 each by Does not apply route 4 times daily (after meals and at bedtime), Disp: 300 each, Rfl: 3    melatonin (RA MELATONIN) 3 MG TABS tablet, Take one 3 mg hs, Disp: 90 tablet, Rfl: 3    losartan-hydrochlorothiazide (HYZAAR) 100-25 MG per tablet, Take 1 tablet by mouth daily, Disp: 30 tablet, Rfl: 11    omeprazole (PRILOSEC) 40 MG delayed release capsule, Take 1 capsule by mouth daily, Disp: 30 capsule, Rfl: 11    Fluticasone furoate-vilanterol (BREO ELLIPTA) 200-25 MCG/INH AEPB inhaler, Inhale 1 puff into the lungs daily, Disp: 1 each, Rfl: 11    anastrozole (ARIMIDEX) 1 MG tablet, Take 1 tablet by mouth daily Indications: ESTROGEN DEFICIENCY IN BREAST CANCER (INACTIVE), Disp: 30 tablet, Rfl: 11    gabapentin (NEURONTIN) 400 MG capsule, Take 1 capsule by mouth 3 times daily. , Disp: 90 capsule, Rfl: 11    aspirin 81 MG chewable tablet, Take 1 tablet by mouth daily, Disp: 30 tablet, Rfl: 11    pravastatin (PRAVACHOL) 80 MG tablet, Take 1 tablet by mouth nightly, Disp: 90 tablet, Rfl: 3    oxybutynin (DITROPAN) 5 MG tablet, Take 1 tablet by mouth 3 times daily, Disp: 90 tablet, Rfl: 11    montelukast (SINGULAIR) 10 MG tablet, Take 1 tablet by mouth nightly, Disp: 30 tablet, Rfl: 11    cloNIDine (CATAPRES) 0.2 MG tablet, Take 1 tablet by mouth 3 times daily, Disp: 60 tablet, Rfl: 3    butenafine (LOTRIMIN ULTRA) 1 % CREA, Please apply from the toes, in between the toes, foot up to the upper calf twice daily for 1 month, both legs, Disp: 1 Tube, Rfl: 10    vitamin B-12 (CYANOCOBALAMIN) 1000 MCG tablet, Take 1 tablet by mouth daily, Disp: 90 tablet, Rfl: 1    benzonatate (TESSALON) 200 MG capsule, Take 1 capsule by mouth 3 times daily as needed for Cough, Disp: 15 capsule, Rfl: 0    vitamin D (ERGOCALCIFEROL) 1.25 MG (20892 UT) CAPS capsule, Take 1 capsule by mouth once a week, Disp: 4 capsule, Rfl: 11    furosemide (LASIX) 20 MG tablet, Take 1 tablet by mouth 2 times daily, Disp: 180 tablet, Rfl: 1    formoterol (PERFOROMIST) 20 MCG/2ML nebulizer solution, 20 mcg, Disp: , Rfl:     calcium carbonate-vitamin D (CALCIUM 600 + D) 600-400 MG-UNIT TABS per tab, Take 1 tablet by mouth 2 times daily, Disp: 60 tablet, Rfl: 11    acetaminophen (APAP EXTRA STRENGTH) 500 MG tablet, Take 1 tablet by mouth every 6 hours as needed for Pain, Disp: 30 tablet, Rfl: 3    Current Facility-Administered Medications:     0.9 % sodium chloride infusion, , IntraVENous, Once, Elly Olson MD    0.9 % sodium chloride infusion, , IntraVENous, Once, Elly Olson MD    Allergies:  Patient has no known allergies. Social History:    Social History     Socioeconomic History    Marital status:      Spouse name: Not on file    Number of children: Not on file    Years of education: Not on file    Highest education level: Not on file   Occupational History    Occupation: retired- , singer   Tobacco Use    Smoking status: Former Smoker     Packs/day: 0.25     Years: 35.00     Pack years: 8.75     Types: Cigarettes     Start date:      Quit date: 2001     Years since quittin.0    Smokeless tobacco: Never Used   Vaping Use    Vaping Use: Never used   Substance and Sexual Activity    Alcohol use: Yes     Alcohol/week: 0.0 - 1.0 standard drinks     Comment: rare    Drug use: Not Currently     Types: Marijuana Migel Mosqueda)     Comment: last used 2018    Sexual activity: Not Currently     Partners: Male     Comment: divorce   Other Topics Concern    Not on file   Social History Narrative    Drinks 1 sweet tea daily; occ Coke. Drinks decaf coffee. Social Determinants of Health     Financial Resource Strain:     Difficulty of Paying Living Expenses: Not on file   Food Insecurity:     Worried About Running Out of Food in the Last Year: Not on file    Radha of Food in the Last Year: Not on file   Transportation Needs:     Lack of Transportation (Medical): Not on file    Lack of Transportation (Non-Medical):  Not on file   Physical Activity:     Days of Exercise per Week: Not on file    Minutes of Exercise per Session: Not on file   Stress:     Feeling of Stress : Not on file   Social Connections:     Frequency of Communication with Friends and Family: Not on file    Frequency of Social Gatherings with Friends and Family: Not on file    Attends Evangelical Services: Not on file    Active Member of Clubs or Organizations: Not on file    Attends Club or Organization Meetings: Not on file    Marital Status: Not on file   Intimate Partner Violence:     Fear of Current or Ex-Partner: Not on file    Emotionally Abused: Not on file    Physically Abused: Not on file    Sexually Abused: Not on file   Housing Stability:     Unable to Pay for Housing in the Last Year: Not on file    Number of [place] in the Last Year: Not on file    Unstable Housing in the Last Year: Not on file        Family History:   Family History   Problem Relation Age of Onset    Diabetes Mother     High Blood Pressure Mother     Heart Attack Mother     High Blood Pressure Father     Diabetes Father     Heart Failure Father     Breast Cancer Sister     Stroke Sister         young age   [place] Cancer Sister 55        breast    Sickle Cell Anemia Other         niece    Cancer Other 36        niece - breast    Breast Cancer Sister             Cancer Sister 59        breast & ovarian    Stomach Cancer Other         aunt       REVIEW OF SYSTEMS:    · Constitutional: No fatigue, fevers, chills or night sweats  · Eyes: No visual changes or drainage  · ENT: No headaches or hearing loss. No mouth sores or sore throat. · Cardiovascular: No palpitations or lower extremity swelling. · Respiratory: No cough, orthopnea or PND    · Gastrointestinal: No abdominal pain, nausea, emesis or decreased appetite  · Genitourinary: No urgency, dysuria or hematuria. · Musculoskeletal: No gait disturbance, weakness or joint complaints  · Integumentary: No rash, hives or pruritis   · Neurological: No dizziness, headaches or seizures.  No numbness or tingling  · Psychiatric: No anxiety or depression. · Endocrine: No temperature intolerance. No recent weight changes. · Hematologic/Lymphatic: No abnormal bruising or bleeding. No swollen lymph node    PHYSICAL EXAM:    BP (!) 141/80   Pulse 78   Temp 98.7 °F (37.1 °C)   Resp 20   Ht 5' 5\" (1.651 m)   Wt 242 lb (109.8 kg)   BMI 40.27 kg/m²    CONST: In general, this is a well developed, well nourished morbidly obese AA female who appears of stated age. Awake, alert, cooperative, no apparent distress  HEENT:   Head- normocephalic, atraumatic   Eyes- conjunctivae pink  Throat - Oral mucosa pink and moist  Neck-  no stridor, no jugular venous distention   CHEST: Chest symmetrical and non-tender to palpation. No noted accessory muscle use or intercostal retractions. RESPIRATORY:  Lung auscultation - clear to all fields   CARDIOVASCULAR:     No carotid bruit noted. Heart Inspection shows no noted pulsations  Heart Palpation - no heaves or thrills - PMI is non-displaced   Heart Ausculation -Regular rate and rhythm, no murmur. No s3, s4  or rub   PV: No lower extremity edema. No varicosities. Pedal pulses palpable - no clubbing or cyanosis   ABDOMEN: Soft, non-tender to light palpation. Bowel sounds present. No palpable masses no organomegaly; no abdominal bruit  MS: Good muscle strength and tone. Not atrophy or abnormal movements. : Deferred  SKIN: Warm and dry no statis dermatitis or ulcers   NEURO / PSYCH: Oriented to person, place and time. Speech clear and appropriate. Follows all commands. Pleasant affect     DATA:    Diagnostics:     Coronary CT calcium score 12/2/21:  1. Total Calcium score of 2956 with a severe plaque burden.  This places the   patient at the 80 percentile for age, sex and race.       2. A high calcium score may be consistent with a significant risk of having a   cardiovascular event in the next 5 years.       3.  Coronary CT angiogram was not performed due to significant blooming   artifacts from extensive coronary artery calcification. TELEMETRY: S    Labs:   CBC: No results for input(s): WBC, HGB, HCT, PLT in the last 72 hours. BMP: No results for input(s): NA, K, CO2, BUN, CREATININE, LABGLOM, GLUCOSE in the last 72 hours. BNP: No results for input(s): BNP in the last 72 hours. PT/INR: No results for input(s): PROTIME, INR in the last 72 hours. APTT:No results for input(s): APTT in the last 72 hours. CARDIAC ENZYMES:No results for input(s): CKTOTAL, CKMB, CKMBINDEX, TROPONINI in the last 72 hours. FASTING LIPID PANEL:  Lab Results   Component Value Date    HDL 54 06/10/2019    LDLCALC 85 06/10/2019    TRIG 66 06/10/2019     LIVER PROFILE:No results for input(s): AST, ALT, LABALBU in the last 72 hours. IMPRESSION:   1. CP with Hi coronary calcium score1. Total Calcium score of 2956 with a severe plaque burden.  This places the  patient at the 80 percentile for age, sex and race. 2. Multiple other medical problems as stated above      PLAN:   1. For cath ( AUC: 7-14 ) +/- PCI. Risks, benefits and alternative therapies to the procedure explained. She understood and consented to proceed  2.  Further recommendations to follow    Electronically signed by Jozef Renner MD on 12/15/2021 at 7:51 AM.

## 2021-12-16 ENCOUNTER — APPOINTMENT (OUTPATIENT)
Dept: GENERAL RADIOLOGY | Age: 71
DRG: 247 | End: 2021-12-16
Attending: EMERGENCY MEDICINE
Payer: MEDICARE

## 2021-12-16 LAB
BACTERIA: ABNORMAL /HPF
BASOPHILS ABSOLUTE: 0.01 E9/L (ref 0–0.2)
BASOPHILS RELATIVE PERCENT: 0.1 % (ref 0–2)
BILIRUBIN URINE: NEGATIVE
BLOOD, URINE: ABNORMAL
CLARITY: CLEAR
COLOR: YELLOW
EOSINOPHILS ABSOLUTE: 0 E9/L (ref 0.05–0.5)
EOSINOPHILS RELATIVE PERCENT: 0 % (ref 0–6)
GLUCOSE URINE: 250 MG/DL
HCT VFR BLD CALC: 34.8 % (ref 34–48)
HEMOGLOBIN: 10.7 G/DL (ref 11.5–15.5)
IMMATURE GRANULOCYTES #: 0.03 E9/L
IMMATURE GRANULOCYTES %: 0.3 % (ref 0–5)
KETONES, URINE: NEGATIVE MG/DL
LEUKOCYTE ESTERASE, URINE: ABNORMAL
LV EF: 55 %
LVEF MODALITY: NORMAL
LYMPHOCYTES ABSOLUTE: 0.63 E9/L (ref 1.5–4)
LYMPHOCYTES RELATIVE PERCENT: 7.3 % (ref 20–42)
MCH RBC QN AUTO: 26.4 PG (ref 26–35)
MCHC RBC AUTO-ENTMCNC: 30.7 % (ref 32–34.5)
MCV RBC AUTO: 85.7 FL (ref 80–99.9)
METER GLUCOSE: 203 MG/DL (ref 74–99)
METER GLUCOSE: 305 MG/DL (ref 74–99)
MONOCYTES ABSOLUTE: 0.12 E9/L (ref 0.1–0.95)
MONOCYTES RELATIVE PERCENT: 1.4 % (ref 2–12)
NEUTROPHILS ABSOLUTE: 7.8 E9/L (ref 1.8–7.3)
NEUTROPHILS RELATIVE PERCENT: 90.9 % (ref 43–80)
NITRITE, URINE: NEGATIVE
PDW BLD-RTO: 15 FL (ref 11.5–15)
PH UA: 6.5 (ref 5–9)
PLATELET # BLD: 390 E9/L (ref 130–450)
PMV BLD AUTO: 11 FL (ref 7–12)
PROTEIN UA: NEGATIVE MG/DL
RBC # BLD: 4.06 E12/L (ref 3.5–5.5)
RBC UA: >20 /HPF (ref 0–2)
SARS-COV-2, NAAT: NOT DETECTED
SPECIFIC GRAVITY UA: 1.02 (ref 1–1.03)
UROBILINOGEN, URINE: 0.2 E.U./DL
WBC # BLD: 8.6 E9/L (ref 4.5–11.5)
WBC UA: ABNORMAL /HPF (ref 0–5)

## 2021-12-16 PROCEDURE — 6360000002 HC RX W HCPCS: Performed by: INTERNAL MEDICINE

## 2021-12-16 PROCEDURE — 85025 COMPLETE CBC W/AUTO DIFF WBC: CPT

## 2021-12-16 PROCEDURE — 6360000002 HC RX W HCPCS: Performed by: EMERGENCY MEDICINE

## 2021-12-16 PROCEDURE — 93005 ELECTROCARDIOGRAM TRACING: CPT | Performed by: INTERNAL MEDICINE

## 2021-12-16 PROCEDURE — 81001 URINALYSIS AUTO W/SCOPE: CPT

## 2021-12-16 PROCEDURE — 87635 SARS-COV-2 COVID-19 AMP PRB: CPT

## 2021-12-16 PROCEDURE — 99233 SBSQ HOSP IP/OBS HIGH 50: CPT | Performed by: INTERNAL MEDICINE

## 2021-12-16 PROCEDURE — 2140000000 HC CCU INTERMEDIATE R&B

## 2021-12-16 PROCEDURE — 2580000003 HC RX 258: Performed by: INTERNAL MEDICINE

## 2021-12-16 PROCEDURE — 6370000000 HC RX 637 (ALT 250 FOR IP): Performed by: EMERGENCY MEDICINE

## 2021-12-16 PROCEDURE — 93306 TTE W/DOPPLER COMPLETE: CPT

## 2021-12-16 PROCEDURE — 74022 RADEX COMPL AQT ABD SERIES: CPT

## 2021-12-16 PROCEDURE — 6370000000 HC RX 637 (ALT 250 FOR IP): Performed by: INTERNAL MEDICINE

## 2021-12-16 PROCEDURE — 82962 GLUCOSE BLOOD TEST: CPT

## 2021-12-16 PROCEDURE — 2580000003 HC RX 258: Performed by: EMERGENCY MEDICINE

## 2021-12-16 PROCEDURE — 36415 COLL VENOUS BLD VENIPUNCTURE: CPT

## 2021-12-16 PROCEDURE — 94640 AIRWAY INHALATION TREATMENT: CPT

## 2021-12-16 RX ORDER — BISACODYL 10 MG
10 SUPPOSITORY, RECTAL RECTAL DAILY PRN
Status: DISCONTINUED | OUTPATIENT
Start: 2021-12-16 | End: 2021-12-18 | Stop reason: HOSPADM

## 2021-12-16 RX ORDER — ISOSORBIDE MONONITRATE 30 MG/1
30 TABLET, EXTENDED RELEASE ORAL ONCE
Status: COMPLETED | OUTPATIENT
Start: 2021-12-16 | End: 2021-12-16

## 2021-12-16 RX ORDER — HYDRALAZINE HYDROCHLORIDE 20 MG/ML
10 INJECTION INTRAMUSCULAR; INTRAVENOUS EVERY 6 HOURS PRN
Status: DISCONTINUED | OUTPATIENT
Start: 2021-12-16 | End: 2021-12-18 | Stop reason: HOSPADM

## 2021-12-16 RX ORDER — ISOSORBIDE MONONITRATE 60 MG/1
60 TABLET, EXTENDED RELEASE ORAL DAILY
Status: DISCONTINUED | OUTPATIENT
Start: 2021-12-17 | End: 2021-12-18 | Stop reason: HOSPADM

## 2021-12-16 RX ORDER — PROCHLORPERAZINE EDISYLATE 5 MG/ML
5 INJECTION INTRAMUSCULAR; INTRAVENOUS EVERY 6 HOURS PRN
Status: DISCONTINUED | OUTPATIENT
Start: 2021-12-16 | End: 2021-12-18 | Stop reason: HOSPADM

## 2021-12-16 RX ORDER — AMLODIPINE BESYLATE 10 MG/1
10 TABLET ORAL DAILY
Status: DISCONTINUED | OUTPATIENT
Start: 2021-12-16 | End: 2021-12-18 | Stop reason: HOSPADM

## 2021-12-16 RX ORDER — HYDRALAZINE HYDROCHLORIDE 20 MG/ML
10 INJECTION INTRAMUSCULAR; INTRAVENOUS ONCE
Status: COMPLETED | OUTPATIENT
Start: 2021-12-16 | End: 2021-12-16

## 2021-12-16 RX ORDER — HYDRALAZINE HYDROCHLORIDE 25 MG/1
25 TABLET, FILM COATED ORAL EVERY 8 HOURS SCHEDULED
Status: DISCONTINUED | OUTPATIENT
Start: 2021-12-16 | End: 2021-12-18 | Stop reason: HOSPADM

## 2021-12-16 RX ORDER — POLYETHYLENE GLYCOL 3350 17 G/17G
17 POWDER, FOR SOLUTION ORAL 2 TIMES DAILY
Status: DISCONTINUED | OUTPATIENT
Start: 2021-12-16 | End: 2021-12-18 | Stop reason: HOSPADM

## 2021-12-16 RX ADMIN — ISOSORBIDE MONONITRATE 30 MG: 30 TABLET ORAL at 13:54

## 2021-12-16 RX ADMIN — HYDRALAZINE HYDROCHLORIDE 25 MG: 25 TABLET, FILM COATED ORAL at 21:09

## 2021-12-16 RX ADMIN — IPRATROPIUM BROMIDE 0.5 MG: 0.5 SOLUTION RESPIRATORY (INHALATION) at 19:16

## 2021-12-16 RX ADMIN — FAMOTIDINE 20 MG: 20 TABLET, FILM COATED ORAL at 21:09

## 2021-12-16 RX ADMIN — ACETAMINOPHEN 650 MG: 325 TABLET ORAL at 04:04

## 2021-12-16 RX ADMIN — CLONIDINE HYDROCHLORIDE 0.2 MG: 0.2 TABLET ORAL at 13:54

## 2021-12-16 RX ADMIN — CLONIDINE HYDROCHLORIDE 0.2 MG: 0.2 TABLET ORAL at 21:08

## 2021-12-16 RX ADMIN — BUDESONIDE 500 MCG: 0.5 SUSPENSION RESPIRATORY (INHALATION) at 19:16

## 2021-12-16 RX ADMIN — SODIUM CHLORIDE, PRESERVATIVE FREE 10 ML: 5 INJECTION INTRAVENOUS at 21:13

## 2021-12-16 RX ADMIN — ASPIRIN 81 MG CHEWABLE TABLET 81 MG: 81 TABLET CHEWABLE at 09:06

## 2021-12-16 RX ADMIN — HYDRALAZINE HYDROCHLORIDE 10 MG: 20 INJECTION INTRAMUSCULAR; INTRAVENOUS at 09:06

## 2021-12-16 RX ADMIN — FAMOTIDINE 20 MG: 20 TABLET, FILM COATED ORAL at 09:06

## 2021-12-16 RX ADMIN — CALCIUM CARBONATE-VITAMIN D TAB 500 MG-200 UNIT 1 TABLET: 500-200 TAB at 21:09

## 2021-12-16 RX ADMIN — PROCHLORPERAZINE EDISYLATE 5 MG: 5 INJECTION INTRAMUSCULAR; INTRAVENOUS at 13:06

## 2021-12-16 RX ADMIN — SODIUM CHLORIDE, PRESERVATIVE FREE 10 ML: 5 INJECTION INTRAVENOUS at 09:09

## 2021-12-16 RX ADMIN — CLONIDINE HYDROCHLORIDE 0.2 MG: 0.2 TABLET ORAL at 09:06

## 2021-12-16 RX ADMIN — INSULIN LISPRO 10 UNITS: 100 INJECTION, SOLUTION INTRAVENOUS; SUBCUTANEOUS at 09:16

## 2021-12-16 RX ADMIN — LOSARTAN POTASSIUM 100 MG: 50 TABLET, FILM COATED ORAL at 09:06

## 2021-12-16 RX ADMIN — ENOXAPARIN SODIUM 40 MG: 100 INJECTION SUBCUTANEOUS at 09:07

## 2021-12-16 RX ADMIN — Medication 1000 MCG: at 09:06

## 2021-12-16 RX ADMIN — AMLODIPINE BESYLATE 10 MG: 10 TABLET ORAL at 09:06

## 2021-12-16 RX ADMIN — PRAVASTATIN SODIUM 80 MG: 20 TABLET ORAL at 21:08

## 2021-12-16 RX ADMIN — ARFORMOTEROL TARTRATE 15 MCG: 15 SOLUTION RESPIRATORY (INHALATION) at 19:16

## 2021-12-16 RX ADMIN — BISACODYL 10 MG: 10 SUPPOSITORY RECTAL at 13:06

## 2021-12-16 RX ADMIN — ANASTROZOLE 1 MG: 1 TABLET ORAL at 09:10

## 2021-12-16 RX ADMIN — CLOPIDOGREL BISULFATE 75 MG: 75 TABLET ORAL at 09:06

## 2021-12-16 RX ADMIN — OXYBUTYNIN CHLORIDE 5 MG: 5 TABLET ORAL at 13:54

## 2021-12-16 RX ADMIN — GABAPENTIN 400 MG: 400 CAPSULE ORAL at 13:55

## 2021-12-16 RX ADMIN — ONDANSETRON 4 MG: 2 INJECTION INTRAMUSCULAR; INTRAVENOUS at 09:14

## 2021-12-16 RX ADMIN — HYDROCHLOROTHIAZIDE 25 MG: 25 TABLET ORAL at 09:06

## 2021-12-16 RX ADMIN — ISOSORBIDE MONONITRATE 30 MG: 30 TABLET, EXTENDED RELEASE ORAL at 09:06

## 2021-12-16 RX ADMIN — INSULIN LISPRO 10 UNITS: 100 INJECTION, SOLUTION INTRAVENOUS; SUBCUTANEOUS at 13:08

## 2021-12-16 RX ADMIN — METOPROLOL SUCCINATE 25 MG: 25 TABLET, EXTENDED RELEASE ORAL at 09:06

## 2021-12-16 RX ADMIN — SODIUM CHLORIDE, PRESERVATIVE FREE 10 ML: 5 INJECTION INTRAVENOUS at 21:12

## 2021-12-16 RX ADMIN — POLYETHYLENE GLYCOL 3350 17 G: 17 POWDER, FOR SOLUTION ORAL at 13:06

## 2021-12-16 RX ADMIN — MONTELUKAST SODIUM 10 MG: 10 TABLET ORAL at 21:08

## 2021-12-16 RX ADMIN — GABAPENTIN 400 MG: 400 CAPSULE ORAL at 09:06

## 2021-12-16 RX ADMIN — GABAPENTIN 400 MG: 400 CAPSULE ORAL at 21:08

## 2021-12-16 ASSESSMENT — PAIN SCALES - GENERAL: PAINLEVEL_OUTOF10: 5

## 2021-12-16 NOTE — CARE COORDINATION
Patient presented to hospital for scheduled Cardiac Cath with stent after outpatient cardiac CT showed 99% blockage of an artery Echo completed. . After procedure patient developed wheezing and admitted to hospital .../109 and IV Apresoline 10 mg given and patient started on norvasc 10 mg daily. Met with patient at bedside to discuss transition of care. Patient lives alone in an apartment that has an elevator. She does have a nebulizer at home but hasn't had to use recently. Her PCP is dr. Vanessa Kay at [facility] and she uses [facility] on [address]. She anticipates no discharge needs and her friend will transport home when medically cleared. CM/SW will continue to follow.

## 2021-12-16 NOTE — PLAN OF CARE
Problem: Infection - Surgical Site:  Goal: Will show no infection signs and symptoms  Description: Will show no infection signs and symptoms  Outcome: Ongoing     Problem: Pain - Acute:  Goal: Recognizes and communicates pain  Description: Recognizes and communicates pain  Outcome: Ongoing

## 2021-12-16 NOTE — PROGRESS NOTES
Inpatient Cardiology Progress note     PATIENT IS BEING FOLLOWED FOR: CAD s/p [facility] with PCI-RCA 12/15/2021     Kellie Gonzalez is a 70 y.o. female who follows with Dr Charo Cohen: denies CP or SOB. Complains of left lower abdominal discomfort. Has not had a BM  OBJECTIVE: No apparent distress     ROS:  Consist: Denies fevers, chills or night sweats  Heart: Denies chest pain, palpitations, lightheadedness, dizziness or syncope  Lungs: Denies SOB, cough, wheezing, orthopnea or PND  GI:  abdominal pain, + dry heaves. + constipation     PHYSICAL EXAM:   BP (!) 199/93   Pulse 78   Temp 98.5 °F (36.9 °C) (Temporal)   Resp 18   Ht 5' 5\" (1.651 m)   Wt 242 lb (109.8 kg)   SpO2 96%   BMI 40.27 kg/m²    CONST: Well developed, well nourished, morbidly obese AA female who appears stated age. Awake, alert and cooperative. No apparent distress  HEENT:   Head- Normocephalic, atraumatic   Eyes- Conjunctivae pink, anicteric  Throat- Oral mucosa pink and moist  Neck-  No stridor, trachea midline, no jugular venous distention. No carotid bruit  CHEST: Chest symmetrical and non-tender to palpation. No accessory muscle use or intercostal retractions  RESPIRATORY:  Lung sounds - clear throughout fields   CARDIOVASCULAR:     Heart Inspection- shows no noted pulsations  Heart Palpation- no heaves or thrills; PMI is non-displaced   Heart Ausculation- Regular rate and rhythm, no murmur. No s3, s4 or rub   PV: No lower extremity edema. No varicosities. Pedal pulses palpable, no clubbing or cyanosis. Right radial access site without hematoma and with preserved pulse   ABDOMEN: Soft, non-tender to light palpation. Bowel sounds present. No palpable masses no organomegaly; no abdominal bruit  MS: Good muscle strength and tone. No atrophy or abnormal movements. : Deferred  SKIN: Warm and dry no statis dermatitis or ulcers   NEURO / PSYCH: Oriented to person, place and time. Speech clear and appropriate.  Follows all commands.  Pleasant affect       Intake/Output Summary (Last 24 hours) at 12/16/2021 1206  Last data filed at 12/16/2021 0834  Gross per 24 hour   Intake 60 ml   Output 1250 ml   Net -1190 ml       Weight:   Wt Readings from Last 3 Encounters:   12/15/21 242 lb (109.8 kg)   12/02/21 242 lb (109.8 kg)   10/06/21 244 lb 12.8 oz (111 kg)     Current Inpatient Medications:   amLODIPine  10 mg Oral Daily    anastrozole  1 mg Oral Daily    aspirin  81 mg Oral Daily    calcium-cholecalciferol  1 tablet Oral BID    cloNIDine  0.2 mg Oral TID    gabapentin  400 mg Oral TID    insulin glargine  50 Units SubCUTAneous Nightly    insulin lispro  10 Units SubCUTAneous TID WC    [Held by provider] metFORMIN  1,000 mg Oral BID WC    montelukast  10 mg Oral Nightly    oxybutynin  5 mg Oral TID    pravastatin  80 mg Oral Nightly    vitamin B-12  1,000 mcg Oral Daily    vitamin D  50,000 Units Oral Weekly    sodium chloride flush  5-40 mL IntraVENous 2 times per day    metoprolol succinate  25 mg Oral Daily    clopidogrel  75 mg Oral Daily    isosorbide mononitrate  30 mg Oral Daily    losartan  100 mg Oral Daily    And    hydroCHLOROthiazide  25 mg Oral Daily    Arformoterol Tartrate  15 mcg Nebulization BID    And    budesonide  0.5 mg Nebulization BID    famotidine  20 mg Oral BID    sodium chloride flush  5-40 mL IntraVENous 2 times per day    enoxaparin  40 mg SubCUTAneous Daily    ipratropium  0.5 mg Nebulization 4x daily       IV Infusions (if any):   sodium chloride      sodium chloride      sodium chloride 75 mL/hr at 12/15/21 1020    dextrose      sodium chloride         DIAGNOSTIC/ LABORATORY DATA:  Labs:   CBC:   Recent Labs     12/15/21  0730 12/16/21  0537   WBC 7.8 8.6   HGB 11.3* 10.7*   HCT 36.4 34.8    390     BMP:   Recent Labs     12/15/21  0730      K 4.1   CO2 29   BUN 10   CREATININE 0.7   LABGLOM >60   CALCIUM 9.7     TFT:   Lab Results   Component Value Date    TSH 0.591 06/10/2019    G7TDLSD 9.2 10/05/2017    T4FREE 1.22 05/13/2019      HgA1c:   Lab Results   Component Value Date    LABA1C 11.5 07/29/2021     FASTING LIPID PANEL:  Lab Results   Component Value Date    CHOL 152 06/10/2019    HDL 54 06/10/2019    LDLCALC 85 06/10/2019    TRIG 66 06/10/2019       CXR 12/15/21: No acute process. TTE 12/31/2019 Rose [facility]):    Left ventricle is normal in size . EF 55-60%    Abnormal (paradoxical) motion consistent with left bundle branch block. Normal left ventricular ejection fraction. There is doppler evidence of stage II diastolic dysfunction. Mild tricuspid regurgitation. Pulmonary hypertension is mild     CT CArdiac Calcium score 12/2/2021   1. Total Calcium score of 2956 with a severe plaque burden.  This places the patient at the 80 percentile for age, sex and race.     2. A high calcium score may be consistent with a significant risk of having a  cardiovascular event in the next 5 years.      3. Coronary CT angiogram was not performed due to significant blooming artifacts from extensive coronary artery calcification. [facility] 12/15/2021 :   CONCLUSIONS:  1. Coronary artery disease. a. Left main. No significant angiographic disease. b.  LAD. Heavily calcified proximal vessel with long and diffuse  diseased segment in the mid vessel with up to 50% angiographic luminal narrowing in the mid vessel with no significant distal vessel disease. 90% stenosis of the ostium of a small third diagonal  branch.  c.  LCX. Heavily calcified proximal vessel with eccentric 60%-70%  proximal stenosis over a large marginal branch followed by 50%-60% stenosis shortly thereafter and 70% disease very distally in this first marginal branch. 50% disease of the midcircumflex/second marginal branch/lateral branch. d.  RCA. Dominant vessel, which is heavily calcified from its proximal to mid segment with diffuse proximal to mid vessel disease with up to 80%-90% stenosis.   70% eccentric stenosis of the posterolateral branch. IFR 0.89/0.87.  2.  Normal ventricular size with subtle hypokinesis of the mid  anterolateral wall with an estimated ejection fraction of 50%. 3.  Systemic hypertension. 4.  Mildly elevated left ventricular end-diastolic pressure. 5.  Successful balloon angioplasty with deployment of two overlappingdrug-eluting coronary stents to the mid/proximal  RCA with dilatation of the stents, post deployment with a high-pressure noncompliant balloon with very good results. ECG 12/16/21: SR. LBBB    Telemetry: SR. BBB    ASSESSMENT:   1. Coronary artery disease with high calcium score ( CT 12/2/2021  2956)  s/p PCI/CHUCK - mid/proximal RCA 12/15/2021   2. Hypertension, poorly controlled   3. Hyperlipidemia   4. Type II Diabetes mellitus   5. Mild pulmonary hypertension on TTE 2019 (with EF 55-60% and Stage II DD)   6. H/o CVA  7. H/o right breast cancer s/p lumpectomy and radiation therapy   8. Chronic lymphedema to BLE   9. Obesity   10. Lower abdominal discomfort. Constipation      PLAN:  1. Increase Imdur  2. Add hydralazine  3. Rest of cardiac medications same  4. Abdominal discomfort / constipation per the primary service  5. Tentative discharge from cardiology stand point later today / tomorrow if BP controlled   6. Follow up with Dr Júnior Rivera in the office   7. Will follow       Electronically signed by Maribel Mckeon MD on 12/16/2021 at 12:06 PM    Addendum:     Acute abdomen x-ray series: No acute pulmonary cardiac abnormalities. Retained colonic stool involving the ascending colon.     Patient feeling much better after having a bowel movement with resolution of the abdominal discomfort    Tentative discharge in am    Electronically signed by Maribel Mckeon MD on 12/16/2021 at 6:01 PM

## 2021-12-16 NOTE — CONSULTS
Met with patient and discussed that their physician has ordered a referral to our outpatient Phase II Cardiac Rehabilitation program. Reviewed the benefits of cardiac rehabilitation based on their diagnosis and personal risk factors. Patient demonstrates moderate interest in Cardiac Rehabilitation at this time. Cardiac Rehabilitation brochure provided to patient/family. The Cardiac Rehabilitation Program has been provided the patient's referral information and pertinent patient details and history. The patient may call [facility]ler at [phone] for additional information or questions. Contact information for [facility] Sadler and other choices close to the patient's residence have been provided in the discharge instructions so that the patient may call and schedule an appointment when cleared by their physician.  Thank you for the referral.

## 2021-12-16 NOTE — PROGRESS NOTES
Hospitalist Progress Note      Synopsis: Patient admitted on 12/15/2021 Pt was admitted by cardiology after having an elective cardiac stent today. Pt says her pcp ordered and out patient cardiac CT of her heart and a 99% blockage one of her arteries was seen and she was scheduled to have an elective cardiac stent which was done today. Her procedure went well overall but pt developed wheezing after the procedure and was admitted to intermediate care. Pt says she does have asthma. Pt also mentioned to me she fell last night at home and injured her right wrist and is concerned she may have a fracture. she is admitted for further evaluation treatment post procedure-- CHUCK to RCA 12/15/2021  Subjective    Patient notes nausea and lower abdominal pain. She notes no bowel movement for greater than 3 days. Pain is left lower quadrant and crampy. Stable overnight. No other overnight issues reported. No CP, SOB, palpitations, blurred vision, HA, lightheadedness, LOC or focal neurological deficits    Exam:  BP (!) 238/109   Pulse 77   Temp 98.9 °F (37.2 °C) (Temporal)   Resp 18   Ht 5' 5\" (1.651 m)   Wt 242 lb (109.8 kg)   SpO2 97%   BMI 40.27 kg/m²   General appearance: No apparent distress, appears stated age and cooperative. HEENT: Pupils equal, round, and reactive to light. Conjunctivae/corneas clear. Neck: Supple. No jugular venous distention. Trachea midline. Respiratory:  Normal respiratory effort. Clear to auscultation, bilaterally without Rales/Wheezes/Rhonchi. Cardiovascular: Regular rate and rhythm with normal S1/S2 without murmurs, rubs or gallops. Abdomen: Soft, non-tender, non-distended with normal bowel sounds. Musculoskeletal: No clubbing, cyanosis or edema bilaterally. Brisk capillary refill. 2+ lower extremity pulses (dorsalis pedis).    Skin:  No rashes    Neurologic: awake, alert and following commands     Medications:  Reviewed    Infusion Medications    sodium chloride      sodium chloride      sodium chloride 75 mL/hr at 12/15/21 1020    dextrose      sodium chloride       Scheduled Medications    amLODIPine  10 mg Oral Daily    anastrozole  1 mg Oral Daily    aspirin  81 mg Oral Daily    calcium-cholecalciferol  1 tablet Oral BID    cloNIDine  0.2 mg Oral TID    gabapentin  400 mg Oral TID    insulin glargine  50 Units SubCUTAneous Nightly    insulin lispro  10 Units SubCUTAneous TID WC    [Held by provider] metFORMIN  1,000 mg Oral BID WC    montelukast  10 mg Oral Nightly    oxybutynin  5 mg Oral TID    pravastatin  80 mg Oral Nightly    vitamin B-12  1,000 mcg Oral Daily    vitamin D  50,000 Units Oral Weekly    sodium chloride flush  5-40 mL IntraVENous 2 times per day    metoprolol succinate  25 mg Oral Daily    clopidogrel  75 mg Oral Daily    isosorbide mononitrate  30 mg Oral Daily    losartan  100 mg Oral Daily    And    hydroCHLOROthiazide  25 mg Oral Daily    Arformoterol Tartrate  15 mcg Nebulization BID    And    budesonide  0.5 mg Nebulization BID    famotidine  20 mg Oral BID    sodium chloride flush  5-40 mL IntraVENous 2 times per day    enoxaparin  40 mg SubCUTAneous Daily    ipratropium  0.5 mg Nebulization 4x daily     PRN Meds: acetaminophen, albuterol, benzonatate, melatonin, sodium chloride flush, sodium chloride, nitroGLYCERIN, perflutren lipid microspheres, glucose, dextrose, glucagon (rDNA), dextrose, sodium chloride flush, sodium chloride, ondansetron **OR** ondansetron, polyethylene glycol, acetaminophen **OR** acetaminophen    I/O    Intake/Output Summary (Last 24 hours) at 12/16/2021 1046  Last data filed at 12/16/2021 0834  Gross per 24 hour   Intake 60 ml   Output 1250 ml   Net -1190 ml       Labs:   Recent Labs     12/15/21  0730 12/16/21  0537   WBC 7.8 8.6   HGB 11.3* 10.7*   HCT 36.4 34.8    390       Recent Labs     12/15/21  0730      K 4.1      CO2 29   BUN 10   CREATININE 0.7   CALCIUM 9.7       No results for input(s): PROT, ALB, ALKPHOS, ALT, AST, BILITOT, AMYLASE, LIPASE in the last 72 hours. No results for input(s): INR in the last 72 hours. No results for input(s): Nidhi Comber in the last 72 hours. Chronic labs:  Lab Results   Component Value Date    CHOL 152 06/10/2019    TRIG 66 06/10/2019    HDL 54 06/10/2019    LDLCALC 85 06/10/2019    TSH 0.591 06/10/2019    INR 1.1 07/16/2013    LABA1C 11.5 07/29/2021       Radiology:  Imaging studies reviewed today. ASSESSMENT:    Active Problems:    COPD (chronic obstructive pulmonary disease) ([facility])    Diabetes mellitus type 2, uncontrolled (HCC)    Mixed hyperlipidemia    Morbid obesity with BMI of 40.0-44.9, adult (HCC)    CAD in native artery    Coronary artery disease (CAD) excluded  Resolved Problems:    * No resolved hospital problems. *       PLAN:  CAD status post CHUCK to RCA 12/15/2021  DAPT  High intensity statin    Right wrist pain post fall  X-ray negative  -splint for right wrist   -ice and elevation to right wrist    Asthma with exacerbation  -duonebs, Brovana, Pulmicort nebulizers  Solumerol IV x1 yesterday    Chronic combined systolic/diastolic CHF  Appears euvolemic  Monitor volume status  Chest x-ray negative  Check proBNP    Hypertension uncontrolled/hypertensive urgency  Clonidine, amlodipine, Losartan, hydrochlorothiazide, metoprolol  prn added  Monitor    DM2 uncontrolled Lantus, lispro, MBS, A1c  Hold Metformin    Constipation, nausea, abdominal pain  Check abdominal series  Check UA  Start bowel regimen  Antiemetics adjusted    Diet: ADULT DIET; Regular; 3 carb choices (45 gm/meal)  Code Status: Full Code  PT/OT Eval Status:     DVT Prophylaxis:     Recommended disposition at discharge:      +++++++++++++++++++++++++++++++++++++++++++++++++  Gonzalo Kaur MD   [facility].  +++++++++++++++++++++++++++++++++++++++++++++++++  NOTE: This report was transcribed using voice recognition software.  Every effort was made to ensure accuracy; however, inadvertent computerized transcription errors may be present.

## 2021-12-17 VITALS
SYSTOLIC BLOOD PRESSURE: 110 MMHG | HEART RATE: 73 BPM | TEMPERATURE: 96.6 F | OXYGEN SATURATION: 97 % | HEIGHT: 65 IN | DIASTOLIC BLOOD PRESSURE: 55 MMHG | BODY MASS INDEX: 40.32 KG/M2 | WEIGHT: 242 LBS | RESPIRATION RATE: 18 BRPM

## 2021-12-17 LAB
EKG ATRIAL RATE: 66 BPM
EKG ATRIAL RATE: 66 BPM
EKG P AXIS: 27 DEGREES
EKG P AXIS: 52 DEGREES
EKG P-R INTERVAL: 138 MS
EKG P-R INTERVAL: 140 MS
EKG Q-T INTERVAL: 460 MS
EKG Q-T INTERVAL: 468 MS
EKG QRS DURATION: 136 MS
EKG QRS DURATION: 142 MS
EKG QTC CALCULATION (BAZETT): 482 MS
EKG QTC CALCULATION (BAZETT): 490 MS
EKG R AXIS: -24 DEGREES
EKG R AXIS: -26 DEGREES
EKG T AXIS: 131 DEGREES
EKG T AXIS: 141 DEGREES
EKG VENTRICULAR RATE: 66 BPM
EKG VENTRICULAR RATE: 66 BPM
METER GLUCOSE: 204 MG/DL (ref 74–99)
METER GLUCOSE: 324 MG/DL (ref 74–99)
PRO-BNP: 1428 PG/ML (ref 0–125)

## 2021-12-17 PROCEDURE — 82962 GLUCOSE BLOOD TEST: CPT

## 2021-12-17 PROCEDURE — 6370000000 HC RX 637 (ALT 250 FOR IP): Performed by: INTERNAL MEDICINE

## 2021-12-17 PROCEDURE — 94640 AIRWAY INHALATION TREATMENT: CPT

## 2021-12-17 PROCEDURE — 93010 ELECTROCARDIOGRAM REPORT: CPT | Performed by: INTERNAL MEDICINE

## 2021-12-17 PROCEDURE — 36415 COLL VENOUS BLD VENIPUNCTURE: CPT

## 2021-12-17 PROCEDURE — 99232 SBSQ HOSP IP/OBS MODERATE 35: CPT | Performed by: INTERNAL MEDICINE

## 2021-12-17 PROCEDURE — 6360000002 HC RX W HCPCS: Performed by: EMERGENCY MEDICINE

## 2021-12-17 PROCEDURE — 83880 ASSAY OF NATRIURETIC PEPTIDE: CPT

## 2021-12-17 PROCEDURE — 6360000002 HC RX W HCPCS: Performed by: INTERNAL MEDICINE

## 2021-12-17 RX ORDER — HYDRALAZINE HYDROCHLORIDE 25 MG/1
25 TABLET, FILM COATED ORAL EVERY 8 HOURS SCHEDULED
Qty: 90 TABLET | Refills: 3 | Status: SHIPPED | OUTPATIENT
Start: 2021-12-17

## 2021-12-17 RX ORDER — METOPROLOL SUCCINATE 25 MG/1
25 TABLET, EXTENDED RELEASE ORAL DAILY
Qty: 30 TABLET | Refills: 3 | Status: ON HOLD | OUTPATIENT
Start: 2021-12-17 | End: 2021-12-23 | Stop reason: HOSPADM

## 2021-12-17 RX ORDER — CLOPIDOGREL BISULFATE 75 MG/1
75 TABLET ORAL DAILY
Qty: 30 TABLET | Refills: 3 | Status: SHIPPED | OUTPATIENT
Start: 2021-12-17

## 2021-12-17 RX ORDER — NITROGLYCERIN 0.4 MG/1
TABLET SUBLINGUAL
Qty: 25 TABLET | Refills: 3 | Status: SHIPPED | OUTPATIENT
Start: 2021-12-17

## 2021-12-17 RX ORDER — ISOSORBIDE MONONITRATE 60 MG/1
60 TABLET, EXTENDED RELEASE ORAL DAILY
Qty: 30 TABLET | Refills: 3 | Status: SHIPPED | OUTPATIENT
Start: 2021-12-17

## 2021-12-17 RX ORDER — SENNA AND DOCUSATE SODIUM 50; 8.6 MG/1; MG/1
2 TABLET, FILM COATED ORAL NIGHTLY PRN
Qty: 60 TABLET | Refills: 0 | Status: SHIPPED | OUTPATIENT
Start: 2021-12-17

## 2021-12-17 RX ORDER — AMLODIPINE BESYLATE 10 MG/1
10 TABLET ORAL DAILY
Qty: 30 TABLET | Refills: 3 | Status: ON HOLD | OUTPATIENT
Start: 2021-12-17 | End: 2021-12-23 | Stop reason: HOSPADM

## 2021-12-17 RX ADMIN — HYDRALAZINE HYDROCHLORIDE 25 MG: 25 TABLET, FILM COATED ORAL at 14:44

## 2021-12-17 RX ADMIN — IPRATROPIUM BROMIDE 0.5 MG: 0.5 SOLUTION RESPIRATORY (INHALATION) at 13:12

## 2021-12-17 RX ADMIN — HYDRALAZINE HYDROCHLORIDE 25 MG: 25 TABLET, FILM COATED ORAL at 06:18

## 2021-12-17 RX ADMIN — ASPIRIN 81 MG CHEWABLE TABLET 81 MG: 81 TABLET CHEWABLE at 10:04

## 2021-12-17 RX ADMIN — IPRATROPIUM BROMIDE 0.5 MG: 0.5 SOLUTION RESPIRATORY (INHALATION) at 08:23

## 2021-12-17 RX ADMIN — CLONIDINE HYDROCHLORIDE 0.2 MG: 0.2 TABLET ORAL at 10:05

## 2021-12-17 RX ADMIN — BUDESONIDE 500 MCG: 0.5 SUSPENSION RESPIRATORY (INHALATION) at 08:22

## 2021-12-17 RX ADMIN — OXYBUTYNIN CHLORIDE 5 MG: 5 TABLET ORAL at 14:43

## 2021-12-17 RX ADMIN — ISOSORBIDE MONONITRATE 60 MG: 60 TABLET ORAL at 10:06

## 2021-12-17 RX ADMIN — Medication 1000 MCG: at 10:06

## 2021-12-17 RX ADMIN — HYDROCHLOROTHIAZIDE 25 MG: 25 TABLET ORAL at 10:06

## 2021-12-17 RX ADMIN — ARFORMOTEROL TARTRATE 15 MCG: 15 SOLUTION RESPIRATORY (INHALATION) at 08:22

## 2021-12-17 RX ADMIN — INSULIN LISPRO 10 UNITS: 100 INJECTION, SOLUTION INTRAVENOUS; SUBCUTANEOUS at 12:44

## 2021-12-17 RX ADMIN — GABAPENTIN 400 MG: 400 CAPSULE ORAL at 10:05

## 2021-12-17 RX ADMIN — CLONIDINE HYDROCHLORIDE 0.2 MG: 0.2 TABLET ORAL at 14:45

## 2021-12-17 RX ADMIN — FAMOTIDINE 20 MG: 20 TABLET, FILM COATED ORAL at 10:04

## 2021-12-17 RX ADMIN — CALCIUM CARBONATE-VITAMIN D TAB 500 MG-200 UNIT 1 TABLET: 500-200 TAB at 10:08

## 2021-12-17 RX ADMIN — GABAPENTIN 400 MG: 400 CAPSULE ORAL at 14:44

## 2021-12-17 RX ADMIN — METOPROLOL SUCCINATE 25 MG: 25 TABLET, EXTENDED RELEASE ORAL at 10:05

## 2021-12-17 RX ADMIN — CLOPIDOGREL BISULFATE 75 MG: 75 TABLET ORAL at 10:05

## 2021-12-17 RX ADMIN — LOSARTAN POTASSIUM 100 MG: 50 TABLET, FILM COATED ORAL at 10:04

## 2021-12-17 RX ADMIN — AMLODIPINE BESYLATE 10 MG: 10 TABLET ORAL at 10:08

## 2021-12-17 RX ADMIN — OXYBUTYNIN CHLORIDE 5 MG: 5 TABLET ORAL at 10:04

## 2021-12-17 RX ADMIN — ANASTROZOLE 1 MG: 1 TABLET ORAL at 10:06

## 2021-12-17 RX ADMIN — POLYETHYLENE GLYCOL 3350 17 G: 17 POWDER, FOR SOLUTION ORAL at 10:05

## 2021-12-17 RX ADMIN — MAGNESIUM SULFATE: 1 CRYSTAL ORAL; TOPICAL at 17:49

## 2021-12-17 ASSESSMENT — PAIN SCALES - GENERAL: PAINLEVEL_OUTOF10: 0

## 2021-12-17 NOTE — PROGRESS NOTES
Oral Daily    isosorbide mononitrate  60 mg Oral Daily    polyethylene glycol  17 g Oral BID    hydrALAZINE  25 mg Oral 3 times per day    anastrozole  1 mg Oral Daily    aspirin  81 mg Oral Daily    calcium-cholecalciferol  1 tablet Oral BID    cloNIDine  0.2 mg Oral TID    gabapentin  400 mg Oral TID    insulin glargine  50 Units SubCUTAneous Nightly    insulin lispro  10 Units SubCUTAneous TID WC    [Held by provider] metFORMIN  1,000 mg Oral BID WC    montelukast  10 mg Oral Nightly    oxybutynin  5 mg Oral TID    pravastatin  80 mg Oral Nightly    vitamin B-12  1,000 mcg Oral Daily    vitamin D  50,000 Units Oral Weekly    sodium chloride flush  5-40 mL IntraVENous 2 times per day    metoprolol succinate  25 mg Oral Daily    clopidogrel  75 mg Oral Daily    losartan  100 mg Oral Daily    And    hydroCHLOROthiazide  25 mg Oral Daily    Arformoterol Tartrate  15 mcg Nebulization BID    And    budesonide  0.5 mg Nebulization BID    famotidine  20 mg Oral BID    sodium chloride flush  5-40 mL IntraVENous 2 times per day    enoxaparin  40 mg SubCUTAneous Daily    ipratropium  0.5 mg Nebulization 4x daily     PRN Meds: hydrALAZINE, bisacodyl, prochlorperazine, acetaminophen, albuterol, benzonatate, melatonin, sodium chloride flush, sodium chloride, nitroGLYCERIN, perflutren lipid microspheres, glucose, dextrose, glucagon (rDNA), dextrose, sodium chloride flush, sodium chloride, ondansetron **OR** ondansetron, polyethylene glycol, acetaminophen **OR** acetaminophen    I/O    Intake/Output Summary (Last 24 hours) at 12/17/2021 0849  Last data filed at 12/17/2021 0844  Gross per 24 hour   Intake 240 ml   Output    Net 240 ml       Labs:   Recent Labs     12/15/21  0730 12/16/21  0537   WBC 7.8 8.6   HGB 11.3* 10.7*   HCT 36.4 34.8    390       Recent Labs     12/15/21  0730      K 4.1      CO2 29   BUN 10   CREATININE 0.7   CALCIUM 9.7       No results for input(s): PROT, ALB, ALKPHOS, ALT, AST, BILITOT, AMYLASE, LIPASE in the last 72 hours. No results for input(s): INR in the last 72 hours. No results for input(s): Mary Ann Maya in the last 72 hours. Chronic labs:  Lab Results   Component Value Date    CHOL 152 06/10/2019    TRIG 66 06/10/2019    HDL 54 06/10/2019    LDLCALC 85 06/10/2019    TSH 0.591 06/10/2019    INR 1.1 07/16/2013    LABA1C 11.5 07/29/2021       Radiology:  Imaging studies reviewed today. ASSESSMENT:    Active Problems:    COPD (chronic obstructive pulmonary disease) ([facility])    Diabetes mellitus type 2, uncontrolled ([facility])    Mixed hyperlipidemia    Morbid obesity with BMI of 40.0-44.9, adult ([facility])    CAD in native artery    Coronary artery disease (CAD) excluded  Resolved Problems:    * No resolved hospital problems. *       PLAN:  CAD status post CHUCK to RCA 12/15/2021  DAPT  High intensity statin    Right wrist pain post fall  X-ray negative  -splint for right wrist   -ice and elevation to right wrist    Asthma with exacerbation  -duonebs, Brovana, Pulmicort nebulizers  Solumerol IV x1 yesterday    Chronic combined systolic/diastolic CHF  Appears euvolemic  Monitor volume status  Chest x-ray negative  Check proBNP    Hypertension uncontrolled/hypertensive urgency  Clonidine, amlodipine, Losartan, hydrochlorothiazide, metoprolol  prn added  Monitor    Bacteriuria/pyuriaasymptomatic  Patient denies urinary symptoms    DM2 uncontrolled Lantus, lispro, MBS, A1c  Hold Metformin    Constipation, nausea, abdominal pain-- improved after passsing gas, no BM  Reviewed abdominal series + increased colonic stool  Check UA  Start bowel regimen  Antiemetics adjusted  Enema ordered per patient request prior to discharge. Diet: ADULT DIET;  Regular; 3 carb choices (45 gm/meal)  Code Status: Full Code  PT/OT Eval Status:     DVT Prophylaxis:     Recommended disposition at discharge:   DC home

## 2021-12-17 NOTE — PROGRESS NOTES
Inpatient Cardiology Progress note     PATIENT IS BEING FOLLOWED FOR: CAD s/p [facility] with PCI-RCA 12/15/2021     Tanisha Gonzalez is a 70 y.o. female who follows with Dr Selena Magallanes: denies CP, SOB or abdominal discomfort. OBJECTIVE: No apparent distress     ROS:  Consist: Denies fevers, chills or night sweats  Heart: Denies chest pain, palpitations, lightheadedness, dizziness or syncope  Lungs: Denies SOB, cough, wheezing, orthopnea or PND  GI:  Denies abdominal pain, nausea, vomiting or diarrhea    PHYSICAL EXAM:   BP (!) 124/58   Pulse 81   Temp 98.4 °F (36.9 °C) (Temporal)   Resp 20   Ht 5' 5\" (1.651 m)   Wt 242 lb (109.8 kg)   SpO2 99%   BMI 40.27 kg/m²    CONST: Well developed, well nourished, morbidly obese AA female who appears stated age. Awake, alert and cooperative. No apparent distress  HEENT:   Head- Normocephalic, atraumatic   Eyes- Conjunctivae pink, anicteric  Throat- Oral mucosa pink and moist  Neck-  No stridor, trachea midline, no jugular venous distention. No carotid bruit  CHEST: Chest symmetrical and non-tender to palpation. No accessory muscle use or intercostal retractions  RESPIRATORY:  Lung sounds - clear throughout fields   CARDIOVASCULAR:     Heart Inspection- shows no noted pulsations  Heart Palpation- no heaves or thrills; PMI is non-displaced   Heart Ausculation- Regular rate and rhythm, no murmur. No s3, s4 or rub   PV: No lower extremity edema. No varicosities. Pedal pulses palpable, no clubbing or cyanosis. Right radial access site without hematoma and with preserved pulse   ABDOMEN: Soft, non-tender to light palpation. Bowel sounds present. No palpable masses no organomegaly; no abdominal bruit  MS: Good muscle strength and tone. No atrophy or abnormal movements. : Deferred  SKIN: Warm and dry no statis dermatitis or ulcers   NEURO / PSYCH: Oriented to person, place and time. Speech clear and appropriate. Follows all commands.  Pleasant affect Intake/Output Summary (Last 24 hours) at 12/17/2021 1244  Last data filed at 12/17/2021 0844  Gross per 24 hour   Intake 240 ml   Output    Net 240 ml       Weight:   Wt Readings from Last 3 Encounters:   12/15/21 242 lb (109.8 kg)   12/02/21 242 lb (109.8 kg)   10/06/21 244 lb 12.8 oz (111 kg)     Current Inpatient Medications:   amLODIPine  10 mg Oral Daily    isosorbide mononitrate  60 mg Oral Daily    polyethylene glycol  17 g Oral BID    hydrALAZINE  25 mg Oral 3 times per day    anastrozole  1 mg Oral Daily    aspirin  81 mg Oral Daily    calcium-cholecalciferol  1 tablet Oral BID    cloNIDine  0.2 mg Oral TID    gabapentin  400 mg Oral TID    insulin glargine  50 Units SubCUTAneous Nightly    insulin lispro  10 Units SubCUTAneous TID WC    [Held by provider] metFORMIN  1,000 mg Oral BID WC    montelukast  10 mg Oral Nightly    oxybutynin  5 mg Oral TID    pravastatin  80 mg Oral Nightly    vitamin B-12  1,000 mcg Oral Daily    vitamin D  50,000 Units Oral Weekly    sodium chloride flush  5-40 mL IntraVENous 2 times per day    metoprolol succinate  25 mg Oral Daily    clopidogrel  75 mg Oral Daily    losartan  100 mg Oral Daily    And    hydroCHLOROthiazide  25 mg Oral Daily    Arformoterol Tartrate  15 mcg Nebulization BID    And    budesonide  0.5 mg Nebulization BID    famotidine  20 mg Oral BID    sodium chloride flush  5-40 mL IntraVENous 2 times per day    enoxaparin  40 mg SubCUTAneous Daily    ipratropium  0.5 mg Nebulization 4x daily       IV Infusions (if any):   sodium chloride      sodium chloride      sodium chloride 75 mL/hr at 12/15/21 1020    dextrose      sodium chloride         DIAGNOSTIC/ LABORATORY DATA:  Labs:   CBC:   Recent Labs     12/15/21  0730 12/16/21  0537   WBC 7.8 8.6   HGB 11.3* 10.7*   HCT 36.4 34.8    390     BMP:   Recent Labs     12/15/21  0730      K 4.1   CO2 29   BUN 10   CREATININE 0.7   LABGLOM >60   CALCIUM 9.7 TFT:   Lab Results   Component Value Date    TSH 0.591 06/10/2019    I9HNCRI 9.2 10/05/2017    T4FREE 1.22 05/13/2019      HgA1c:   Lab Results   Component Value Date    LABA1C 11.5 07/29/2021     FASTING LIPID PANEL:  Lab Results   Component Value Date    CHOL 152 06/10/2019    HDL 54 06/10/2019    LDLCALC 85 06/10/2019    TRIG 66 06/10/2019       CXR 12/15/21: No acute process. TTE 12/31/2019 Natalycelso Sequeira):    Left ventricle is normal in size . EF 55-60%    Abnormal (paradoxical) motion consistent with left bundle branch block. Normal left ventricular ejection fraction. There is doppler evidence of stage II diastolic dysfunction. Mild tricuspid regurgitation. Pulmonary hypertension is mild     CT CArdiac Calcium score 12/2/2021   1. Total Calcium score of 2956 with a severe plaque burden.  This places the patient at the 80 percentile for age, sex and race.     2. A high calcium score may be consistent with a significant risk of having a  cardiovascular event in the next 5 years.      3. Coronary CT angiogram was not performed due to significant blooming artifacts from extensive coronary artery calcification. [facility] 12/15/2021 :   CONCLUSIONS:  1. Coronary artery disease. a. Left main. No significant angiographic disease. b.  LAD. Heavily calcified proximal vessel with long and diffuse  diseased segment in the mid vessel with up to 50% angiographic luminal narrowing in the mid vessel with no significant distal vessel disease. 90% stenosis of the ostium of a small third diagonal  branch.  c.  LCX. Heavily calcified proximal vessel with eccentric 60%-70%  proximal stenosis over a large marginal branch followed by 50%-60% stenosis shortly thereafter and 70% disease very distally in this first marginal branch. 50% disease of the midcircumflex/second marginal branch/lateral branch. d.  RCA.   Dominant vessel, which is heavily calcified from its proximal to mid segment with diffuse proximal to mid vessel disease with up to 80%-90% stenosis. 70% eccentric stenosis of the posterolateral branch. IFR 0.89/0.87.  2.  Normal ventricular size with subtle hypokinesis of the mid  anterolateral wall with an estimated ejection fraction of 50%. 3.  Systemic hypertension. 4.  Mildly elevated left ventricular end-diastolic pressure. 5.  Successful balloon angioplasty with deployment of two overlappingdrug-eluting coronary stents to the mid/proximal  RCA with dilatation of the stents, post deployment with a high-pressure noncompliant balloon with very good results. ECG 12/16/21: SR. LBBB    Telemetry: SR. BBB    ASSESSMENT:   1. Coronary artery disease with high calcium score ( CT 12/2/2021  2956)  s/p PCI/CHUCK - mid/proximal RCA 12/15/2021   2. Hypertension, much better control  3. Hyperlipidemia   4. Type II Diabetes mellitus   5. Mild pulmonary hypertension on TTE 2019 (with EF 55-60% and Stage II DD)   6. H/o CVA  7. H/o right breast cancer s/p lumpectomy and radiation therapy   8. Chronic lymphedema to BLE   9. Obesity   10. Lower abdominal discomfort / Constipation 12/16/21, resolved      PLAN:  1. Continue current cardiac medications  2. OK for discharge from cardiology stand point  3. Follow up with Dr Barb Coffey in the office post discharge  4. Cardiology will sign off.  Please call if needed      Electronically signed by Mary Luque MD on 12/17/2021 at 12:44 PM

## 2021-12-17 NOTE — DISCHARGE SUMMARY
Physician Discharge Summary     Patient ID:  Laisha Records  [id number]  88 y.o.  1950    Admit date: 12/15/2021    Discharge date and time:  12/17/2021    Discharge Diagnoses: Active Problems:    COPD (chronic obstructive pulmonary disease) ([facility])    Diabetes mellitus type 2, uncontrolled ([facility])    Mixed hyperlipidemia    Morbid obesity with BMI of 40.0-44.9, adult ([facility])    CAD in native artery    Coronary artery disease (CAD) excluded  Resolved Problems:    * No resolved hospital problems. *      Consults: IP CONSULT TO CARDIAC REHAB    Procedures: See below    Hospital Course: Pt was admitted by cardiology after having an elective cardiac stent today. Pt says her pcp ordered and out patient cardiac CT of her heart and a 99% blockage one of her arteries was seen and she was scheduled to have an elective cardiac stent which was done today. Her procedure went well overall but pt developed wheezing after the procedure and was admitted to intermediate care. Pt says she does have asthma. Pt also mentioned to me she fell last night at home and injured her right wrist and is concerned she may have a fracture. she is admitted for further evaluation treatment post procedure-- CHUCK to RCA 12/15/2021    CAD status post CHUCK to RCA 12/15/2021  DAPT  High intensity statin     Right wrist pain post fall  X-ray negative  -splint for right wrist   -ice and elevation to right wrist     Asthma with exacerbation  -duonebs, Brovana, Pulmicort nebulizers  Solumerol IV x1 yesterday     Chronic combined systolic/diastolic CHF  Appears euvolemic  Monitor volume status  Chest x-ray negative  Check proBNP     Hypertension uncontrolled/hypertensive urgency  Clonidine, amlodipine, Losartan, hydrochlorothiazide, metoprolol  prn added  Monitor     Bacteriuria/pyuriaasymptomatic  Patient denies urinary symptoms     DM2 uncontrolled Lantus, lispro, MBS, A1c  Hold Metformin     Constipation, nausea, abdominal pain improved after bowel movement  1-2-3 enema given today  Reviewed abdominal series + increased colonic stool  Check UA  Start bowel regimen  Antiemetics adjusted       Discharge Exam:  See progress note from today    Condition:  Medically stable for discharge    Disposition: home    Patient Instructions:   Current Discharge Medication List      START taking these medications    Details   isosorbide mononitrate (IMDUR) 60 MG extended release tablet Take 1 tablet by mouth daily  Qty: 30 tablet, Refills: 3      nitroGLYCERIN (NITROSTAT) 0.4 MG SL tablet up to max of 3 total doses. If no relief after 1 dose, call 911. Qty: 25 tablet, Refills: 3      hydrALAZINE (APRESOLINE) 25 MG tablet Take 1 tablet by mouth every 8 hours  Qty: 90 tablet, Refills: 3      metoprolol succinate (TOPROL XL) 25 MG extended release tablet Take 1 tablet by mouth daily  Qty: 30 tablet, Refills: 3      amLODIPine (NORVASC) 10 MG tablet Take 1 tablet by mouth daily  Qty: 30 tablet, Refills: 3      clopidogrel (PLAVIX) 75 MG tablet Take 1 tablet by mouth daily  Qty: 30 tablet, Refills: 3         CONTINUE these medications which have NOT CHANGED    Details   albuterol (PROVENTIL) (2.5 MG/3ML) 0.083% nebulizer solution Take 2.5 mg by nebulization every 6 hours as needed for Wheezing      blood glucose test strips (ONETOUCH VERIO) strip 1 each by In Vitro route 4 times daily As needed.   Qty: 150 each, Refills: 11    Associated Diagnoses: Type 2 diabetes mellitus with hyperglycemia, with long-term current use of insulin ([facility])      Lancets (ONETOUCH DELICA PLUS [id number]) MISC To test 4x/day  Qty: 200 each, Refills: 11    Associated Diagnoses: Type 2 diabetes mellitus with hyperglycemia, with long-term current use of insulin ([facility])      metFORMIN (GLUCOPHAGE) 1000 MG tablet Take 1 tablet by mouth 2 times daily (with meals)  Qty: 180 tablet, Refills: 3    Comments: 6/26/2020: Disregard previous prescriptions and refills; honor this prescription and refills  Associated Diagnoses: Diabetic polyneuropathy associated with type 1 diabetes mellitus ([facility] [address] [address] Type 2 diabetes mellitus with hyperglycemia, with long-term current use of insulin ([facility] Encounter for medication refill; Type 2 diabetes mellitus without complication, with long-term current use of insulin ([facility])      insulin glargine (LANTUS SOLOSTAR) 100 UNIT/ML injection pen Inject 40 Units into the skin nightly  Qty: 25 pen, Refills: 5    Associated Diagnoses: Type 2 diabetes mellitus without complication, with long-term current use of insulin ([facility])      insulin lispro, 1 Unit Dial, (HUMALOG KWIKPEN) 100 UNIT/ML SOPN Inject 10 units with meals + sliding scale.  MAX 75 units/day  Qty: 25 pen, Refills: 5    Associated Diagnoses: Type 2 diabetes mellitus without complication, with long-term current use of insulin ([facility])      Insulin Pen Needle (PEN NEEDLES 3/16\") 31G X 5 MM MISC 1 each by Does not apply route 4 times daily (after meals and at bedtime)  Qty: 300 each, Refills: 3    Associated Diagnoses: Type 2 diabetes mellitus with hyperglycemia, with long-term current use of insulin ([facility])      melatonin (RA MELATONIN) 3 MG TABS tablet Take one 3 mg hs  Qty: 90 tablet, Refills: 3      losartan-hydrochlorothiazide (HYZAAR) 100-25 MG per tablet Take 1 tablet by mouth daily  Qty: 30 tablet, Refills: 11    Comments: 6/26/2020: Disregard previous prescriptions and refills; honor this prescription and refills  Associated Diagnoses: Essential hypertension; Encounter for medication refill      omeprazole (PRILOSEC) 40 MG delayed release capsule Take 1 capsule by mouth daily  Qty: 30 capsule, Refills: 11    Comments: 6/26/2020: Disregard previous prescriptions and refills; honor this prescription and refills  Associated Diagnoses: Encounter for medication refill; Gastrointestinal disorder      Fluticasone furoate-vilanterol (BREO ELLIPTA) 200-25 MCG/INH AEPB inhaler Inhale 1 puff into the lungs daily  Qty: 1 each, Refills: 11    Comments: 6/26/2020: Disregard previous prescriptions and refills; honor this prescription and refills  Associated Diagnoses: Encounter for medication refill; Moderate persistent asthma without complication      anastrozole (ARIMIDEX) 1 MG tablet Take 1 tablet by mouth daily Indications: ESTROGEN DEFICIENCY IN BREAST CANCER (INACTIVE)  Qty: 30 tablet, Refills: 11    Comments: 6/26/2020: Disregard previous prescriptions and refills; honor this prescription and refills  Associated Diagnoses: Encounter for medication refill; Ductal carcinoma in situ (DCIS) of right breast      gabapentin (NEURONTIN) 400 MG capsule Take 1 capsule by mouth 3 times daily. Qty: 90 capsule, Refills: 11    Comments: 6/26/2020: Disregard previous prescriptions and refills; honor this prescription and refills  Associated Diagnoses: Diabetic polyneuropathy associated with type 1 diabetes mellitus ([facility]  Encounter for medication refill      aspirin 81 MG chewable tablet Take 1 tablet by mouth daily  Qty: 30 tablet, Refills: 11    Comments: 6/26/2020: Disregard previous prescriptions and refills; honor this prescription and refills  Associated Diagnoses: Essential hypertension; Encounter for medication refill      pravastatin (PRAVACHOL) 80 MG tablet Take 1 tablet by mouth nightly  Qty: 90 tablet, Refills: 3    Associated Diagnoses: Encounter for medication refill      oxybutynin (DITROPAN) 5 MG tablet Take 1 tablet by mouth 3 times daily  Qty: 90 tablet, Refills: 11    Comments: 6/26/2020: Disregard previous prescriptions and refills; honor this prescription and refills  Associated Diagnoses: Encounter for medication refill      montelukast (SINGULAIR) 10 MG tablet Take 1 tablet by mouth nightly  Qty: 30 tablet, Refills: 11    Comments: 6/26/2020: Disregard previous prescriptions and refills; honor this prescription and refills  Associated Diagnoses: Encounter for medication refill      cloNIDine (CATAPRES) 0.2 MG tablet Take 1 tablet by mouth 3 times daily  Qty: 60 tablet, Refills: 3      butenafine (LOTRIMIN ULTRA) 1 % CREA Please apply from the toes, in between the toes, foot up to the upper calf twice daily for 1 month, both legs  Qty: 1 Tube, Refills: 10      vitamin B-12 (CYANOCOBALAMIN) 1000 MCG tablet Take 1 tablet by mouth daily  Qty: 90 tablet, Refills: 1    Associated Diagnoses: Vitamin B12 deficiency      benzonatate (TESSALON) 200 MG capsule Take 1 capsule by mouth 3 times daily as needed for Cough  Qty: 15 capsule, Refills: 0      vitamin D (ERGOCALCIFEROL) 1.25 MG (48071 UT) CAPS capsule Take 1 capsule by mouth once a week  Qty: 4 capsule, Refills: 11    Comments: 6/26/2020: Disregard previous prescriptions and refills; honor this prescription and refills  Associated Diagnoses: Vitamin D deficiency;  Encounter for medication refill      furosemide (LASIX) 20 MG tablet Take 1 tablet by mouth 2 times daily  Qty: 180 tablet, Refills: 1    Comments: 3/26/20: Disregard previous prescriptions and refills; honor this prescription and refills  Associated Diagnoses: Chronic combined systolic and diastolic congestive heart failure (HCC)      calcium carbonate-vitamin D (CALCIUM 600 + D) 600-400 MG-UNIT TABS per tab Take 1 tablet by mouth 2 times daily  Qty: 60 tablet, Refills: 11    Associated Diagnoses: Ductal carcinoma in situ (DCIS) of breast      acetaminophen (APAP EXTRA STRENGTH) 500 MG tablet Take 1 tablet by mouth every 6 hours as needed for Pain  Qty: 30 tablet, Refills: 3         STOP taking these medications       albuterol (PROVENTIL) (5 MG/ML) 0.5% nebulizer solution Comments:   Reason for Stopping:         formoterol (PERFOROMIST) 20 MCG/2ML nebulizer solution Comments:   Reason for Stopping:             Activity: activity as tolerated  Diet: regular dietdiabetic/cardiac    Follow-up with 1wk PCP, 1 mth cardiology    Note that over 30 minutes was spent in preparing discharge papers, discussing discharge with patient, staff, consultants, medication review, arranging follow up, etc.    Signed:  Joel Monge MD  12/17/2021  8:57 AM

## 2021-12-17 NOTE — PROGRESS NOTES
CLINICAL PHARMACY NOTE: MEDS TO BEDS    Total # of Prescriptions Filled: 6   The following medications were delivered to the patient:  · Hydralazine 25 mg  · Nitroglycerin 0.4 mg  · Isosorbide mononitrate 60 mg  · Metoprolol succinate 25 mg  · Amlodipine besylate 10 mg  · Clopidogrel bisulfate 75 mg    Additional Documentation:

## 2021-12-17 NOTE — PROGRESS NOTES
Pt stated she could not leave until she had BM despite discharge orders. Enema given for patient and explained to pt that she is okay to go home. Pt initially refused to go home at time of discharge. Educate pt on discharge instructions. Pt awaiting ride.

## 2021-12-17 NOTE — PROGRESS NOTES
Per note by training Monique Marinelli for vitals taken at 21 , she stated that she notified me of the BP reading of 217/98. I was never notified of this reading by such [facility] Ms. Cristiana Dang.

## 2021-12-18 ENCOUNTER — APPOINTMENT (OUTPATIENT)
Dept: GENERAL RADIOLOGY | Age: 71
DRG: 690 | End: 2021-12-18
Payer: MEDICARE

## 2021-12-18 LAB
ALBUMIN SERPL-MCNC: 4.1 G/DL (ref 3.5–5.2)
ALP BLD-CCNC: 162 U/L (ref 35–104)
ALT SERPL-CCNC: 7 U/L (ref 0–32)
ANION GAP SERPL CALCULATED.3IONS-SCNC: 18 MMOL/L (ref 7–16)
ANISOCYTOSIS: ABNORMAL
AST SERPL-CCNC: 14 U/L (ref 0–31)
BACTERIA: ABNORMAL /HPF
BASOPHILS ABSOLUTE: 0 E9/L (ref 0–0.2)
BASOPHILS RELATIVE PERCENT: 0.2 % (ref 0–2)
BILIRUB SERPL-MCNC: 0.7 MG/DL (ref 0–1.2)
BILIRUBIN URINE: NEGATIVE
BLOOD, URINE: ABNORMAL
BUN BLDV-MCNC: 19 MG/DL (ref 6–23)
CALCIUM SERPL-MCNC: 10.2 MG/DL (ref 8.6–10.2)
CHLORIDE BLD-SCNC: 95 MMOL/L (ref 98–107)
CLARITY: CLEAR
CO2: 22 MMOL/L (ref 22–29)
COLOR: YELLOW
CREAT SERPL-MCNC: 1.1 MG/DL (ref 0.5–1)
EOSINOPHILS ABSOLUTE: 0 E9/L (ref 0.05–0.5)
EOSINOPHILS RELATIVE PERCENT: 0 % (ref 0–6)
EPITHELIAL CELLS, UA: ABNORMAL /HPF
GFR AFRICAN AMERICAN: 59
GFR NON-AFRICAN AMERICAN: 59 ML/MIN/1.73
GLUCOSE BLD-MCNC: 311 MG/DL (ref 74–99)
GLUCOSE URINE: >=1000 MG/DL
HCT VFR BLD CALC: 38.1 % (ref 34–48)
HEMOGLOBIN: 11.9 G/DL (ref 11.5–15.5)
KETONES, URINE: ABNORMAL MG/DL
LEUKOCYTE ESTERASE, URINE: NEGATIVE
LIPASE: 10 U/L (ref 13–60)
LYMPHOCYTES ABSOLUTE: 0.74 E9/L (ref 1.5–4)
LYMPHOCYTES RELATIVE PERCENT: 2.6 % (ref 20–42)
MCH RBC QN AUTO: 26.2 PG (ref 26–35)
MCHC RBC AUTO-ENTMCNC: 31.2 % (ref 32–34.5)
MCV RBC AUTO: 83.9 FL (ref 80–99.9)
MONOCYTES ABSOLUTE: 0.49 E9/L (ref 0.1–0.95)
MONOCYTES RELATIVE PERCENT: 1.7 % (ref 2–12)
NEUTROPHILS ABSOLUTE: 23.62 E9/L (ref 1.8–7.3)
NEUTROPHILS RELATIVE PERCENT: 95.7 % (ref 43–80)
NITRITE, URINE: NEGATIVE
PDW BLD-RTO: 14.8 FL (ref 11.5–15)
PH UA: 7.5 (ref 5–9)
PLATELET # BLD: 452 E9/L (ref 130–450)
PMV BLD AUTO: 11 FL (ref 7–12)
POLYCHROMASIA: ABNORMAL
POTASSIUM SERPL-SCNC: 4.1 MMOL/L (ref 3.5–5)
PROTEIN UA: NEGATIVE MG/DL
RBC # BLD: 4.54 E12/L (ref 3.5–5.5)
RBC UA: ABNORMAL /HPF (ref 0–2)
ROULEAUX: ABNORMAL
SODIUM BLD-SCNC: 135 MMOL/L (ref 132–146)
SPECIFIC GRAVITY UA: 1.01 (ref 1–1.03)
TOTAL PROTEIN: 7.9 G/DL (ref 6.4–8.3)
TROPONIN, HIGH SENSITIVITY: 80 NG/L (ref 0–9)
UROBILINOGEN, URINE: 0.2 E.U./DL
WBC # BLD: 24.6 E9/L (ref 4.5–11.5)
WBC UA: ABNORMAL /HPF (ref 0–5)

## 2021-12-18 PROCEDURE — 84484 ASSAY OF TROPONIN QUANT: CPT

## 2021-12-18 PROCEDURE — 83690 ASSAY OF LIPASE: CPT

## 2021-12-18 PROCEDURE — 81001 URINALYSIS AUTO W/SCOPE: CPT

## 2021-12-18 PROCEDURE — 93005 ELECTROCARDIOGRAM TRACING: CPT | Performed by: NURSE PRACTITIONER

## 2021-12-18 PROCEDURE — 80053 COMPREHEN METABOLIC PANEL: CPT

## 2021-12-18 PROCEDURE — 85025 COMPLETE CBC W/AUTO DIFF WBC: CPT

## 2021-12-18 PROCEDURE — 99284 EMERGENCY DEPT VISIT MOD MDM: CPT

## 2021-12-18 PROCEDURE — 74022 RADEX COMPL AQT ABD SERIES: CPT

## 2021-12-19 ENCOUNTER — HOSPITAL ENCOUNTER (INPATIENT)
Age: 71
LOS: 4 days | Discharge: HOME | DRG: 690 | End: 2021-12-23
Attending: STUDENT IN AN ORGANIZED HEALTH CARE EDUCATION/TRAINING PROGRAM | Admitting: INTERNAL MEDICINE
Payer: MEDICARE

## 2021-12-19 ENCOUNTER — APPOINTMENT (OUTPATIENT)
Dept: CT IMAGING | Age: 71
DRG: 690 | End: 2021-12-19
Payer: MEDICARE

## 2021-12-19 DIAGNOSIS — R10.9 ABDOMINAL PAIN, UNSPECIFIED ABDOMINAL LOCATION: Primary | ICD-10-CM

## 2021-12-19 DIAGNOSIS — R77.8 ELEVATED TROPONIN: ICD-10-CM

## 2021-12-19 PROBLEM — N13.2 HYDRONEPHROSIS CONCURRENT WITH AND DUE TO CALCULI OF KIDNEY AND URETER: Status: ACTIVE | Noted: 2021-12-19

## 2021-12-19 PROBLEM — R79.89 ELEVATED TROPONIN: Status: ACTIVE | Noted: 2021-12-19

## 2021-12-19 LAB
AMPHETAMINE SCREEN, URINE: NOT DETECTED
BARBITURATE SCREEN URINE: NOT DETECTED
BENZODIAZEPINE SCREEN, URINE: NOT DETECTED
CANNABINOID SCREEN URINE: POSITIVE
COCAINE METABOLITE SCREEN URINE: NOT DETECTED
D DIMER: 878 NG/ML DDU
EKG ATRIAL RATE: 102 BPM
EKG ATRIAL RATE: 86 BPM
EKG P AXIS: 54 DEGREES
EKG P AXIS: 65 DEGREES
EKG P-R INTERVAL: 124 MS
EKG P-R INTERVAL: 132 MS
EKG Q-T INTERVAL: 390 MS
EKG Q-T INTERVAL: 416 MS
EKG QRS DURATION: 134 MS
EKG QRS DURATION: 142 MS
EKG QTC CALCULATION (BAZETT): 497 MS
EKG QTC CALCULATION (BAZETT): 508 MS
EKG R AXIS: -22 DEGREES
EKG R AXIS: 70 DEGREES
EKG T AXIS: -50 DEGREES
EKG T AXIS: 116 DEGREES
EKG VENTRICULAR RATE: 102 BPM
EKG VENTRICULAR RATE: 86 BPM
FENTANYL SCREEN, URINE: NOT DETECTED
LACTIC ACID: 2 MMOL/L (ref 0.5–2.2)
LACTIC ACID: 2.4 MMOL/L (ref 0.5–2.2)
LACTIC ACID: 2.7 MMOL/L (ref 0.5–2.2)
Lab: ABNORMAL
METER GLUCOSE: 181 MG/DL (ref 74–99)
METER GLUCOSE: 212 MG/DL (ref 74–99)
METHADONE SCREEN, URINE: NOT DETECTED
OPIATE SCREEN URINE: NOT DETECTED
OXYCODONE URINE: NOT DETECTED
PHENCYCLIDINE SCREEN URINE: NOT DETECTED
TROPONIN, HIGH SENSITIVITY: 137 NG/L (ref 0–9)
TROPONIN, HIGH SENSITIVITY: 158 NG/L (ref 0–9)
TROPONIN, HIGH SENSITIVITY: 96 NG/L (ref 0–9)

## 2021-12-19 PROCEDURE — 2580000003 HC RX 258: Performed by: INTERNAL MEDICINE

## 2021-12-19 PROCEDURE — 96366 THER/PROPH/DIAG IV INF ADDON: CPT

## 2021-12-19 PROCEDURE — 87040 BLOOD CULTURE FOR BACTERIA: CPT

## 2021-12-19 PROCEDURE — G0378 HOSPITAL OBSERVATION PER HR: HCPCS

## 2021-12-19 PROCEDURE — 84484 ASSAY OF TROPONIN QUANT: CPT

## 2021-12-19 PROCEDURE — 74176 CT ABD & PELVIS W/O CONTRAST: CPT

## 2021-12-19 PROCEDURE — 1200000000 HC SEMI PRIVATE

## 2021-12-19 PROCEDURE — 99222 1ST HOSP IP/OBS MODERATE 55: CPT | Performed by: INTERNAL MEDICINE

## 2021-12-19 PROCEDURE — 6370000000 HC RX 637 (ALT 250 FOR IP): Performed by: NURSE PRACTITIONER

## 2021-12-19 PROCEDURE — 85378 FIBRIN DEGRADE SEMIQUANT: CPT

## 2021-12-19 PROCEDURE — 83605 ASSAY OF LACTIC ACID: CPT

## 2021-12-19 PROCEDURE — 80307 DRUG TEST PRSMV CHEM ANLYZR: CPT

## 2021-12-19 PROCEDURE — 6360000002 HC RX W HCPCS: Performed by: INTERNAL MEDICINE

## 2021-12-19 PROCEDURE — 71275 CT ANGIOGRAPHY CHEST: CPT

## 2021-12-19 PROCEDURE — 82962 GLUCOSE BLOOD TEST: CPT

## 2021-12-19 PROCEDURE — 93005 ELECTROCARDIOGRAM TRACING: CPT | Performed by: STUDENT IN AN ORGANIZED HEALTH CARE EDUCATION/TRAINING PROGRAM

## 2021-12-19 PROCEDURE — 6360000004 HC RX CONTRAST MEDICATION: Performed by: RADIOLOGY

## 2021-12-19 PROCEDURE — 96365 THER/PROPH/DIAG IV INF INIT: CPT

## 2021-12-19 PROCEDURE — 36415 COLL VENOUS BLD VENIPUNCTURE: CPT

## 2021-12-19 PROCEDURE — 6370000000 HC RX 637 (ALT 250 FOR IP): Performed by: STUDENT IN AN ORGANIZED HEALTH CARE EDUCATION/TRAINING PROGRAM

## 2021-12-19 PROCEDURE — 6370000000 HC RX 637 (ALT 250 FOR IP): Performed by: INTERNAL MEDICINE

## 2021-12-19 RX ORDER — FAMOTIDINE 20 MG/1
20 TABLET, FILM COATED ORAL DAILY
Status: DISCONTINUED | OUTPATIENT
Start: 2021-12-19 | End: 2021-12-20

## 2021-12-19 RX ORDER — METOPROLOL SUCCINATE 25 MG/1
25 TABLET, EXTENDED RELEASE ORAL DAILY
Status: DISCONTINUED | OUTPATIENT
Start: 2021-12-19 | End: 2021-12-19 | Stop reason: SDUPTHER

## 2021-12-19 RX ORDER — ONDANSETRON 4 MG/1
4 TABLET, ORALLY DISINTEGRATING ORAL EVERY 8 HOURS PRN
Status: DISCONTINUED | OUTPATIENT
Start: 2021-12-19 | End: 2021-12-23 | Stop reason: HOSPADM

## 2021-12-19 RX ORDER — OXYBUTYNIN CHLORIDE 5 MG/1
5 TABLET ORAL 3 TIMES DAILY
Status: DISCONTINUED | OUTPATIENT
Start: 2021-12-19 | End: 2021-12-20

## 2021-12-19 RX ORDER — ACETAMINOPHEN 650 MG/1
650 SUPPOSITORY RECTAL EVERY 6 HOURS PRN
Status: DISCONTINUED | OUTPATIENT
Start: 2021-12-19 | End: 2021-12-22

## 2021-12-19 RX ORDER — HYDROCHLOROTHIAZIDE 25 MG/1
25 TABLET ORAL DAILY
Status: DISCONTINUED | OUTPATIENT
Start: 2021-12-19 | End: 2021-12-23 | Stop reason: HOSPADM

## 2021-12-19 RX ORDER — LOSARTAN POTASSIUM 50 MG/1
100 TABLET ORAL DAILY
Status: DISCONTINUED | OUTPATIENT
Start: 2021-12-19 | End: 2021-12-23 | Stop reason: HOSPADM

## 2021-12-19 RX ORDER — CLONIDINE HYDROCHLORIDE 0.1 MG/1
0.2 TABLET ORAL 3 TIMES DAILY
Status: DISCONTINUED | OUTPATIENT
Start: 2021-12-19 | End: 2021-12-23 | Stop reason: HOSPADM

## 2021-12-19 RX ORDER — ASPIRIN 81 MG/1
81 TABLET, CHEWABLE ORAL DAILY
Status: DISCONTINUED | OUTPATIENT
Start: 2021-12-19 | End: 2021-12-19 | Stop reason: SDUPTHER

## 2021-12-19 RX ORDER — SODIUM CHLORIDE 9 MG/ML
25 INJECTION, SOLUTION INTRAVENOUS PRN
Status: DISCONTINUED | OUTPATIENT
Start: 2021-12-19 | End: 2021-12-23 | Stop reason: HOSPADM

## 2021-12-19 RX ORDER — ONDANSETRON 2 MG/ML
4 INJECTION INTRAMUSCULAR; INTRAVENOUS EVERY 6 HOURS PRN
Status: DISCONTINUED | OUTPATIENT
Start: 2021-12-19 | End: 2021-12-23 | Stop reason: HOSPADM

## 2021-12-19 RX ORDER — METOPROLOL SUCCINATE 25 MG/1
25 TABLET, EXTENDED RELEASE ORAL DAILY
Status: DISCONTINUED | OUTPATIENT
Start: 2021-12-19 | End: 2021-12-20

## 2021-12-19 RX ORDER — HYDRALAZINE HYDROCHLORIDE 25 MG/1
25 TABLET, FILM COATED ORAL EVERY 8 HOURS SCHEDULED
Status: DISCONTINUED | OUTPATIENT
Start: 2021-12-19 | End: 2021-12-23 | Stop reason: HOSPADM

## 2021-12-19 RX ORDER — ASPIRIN 81 MG/1
324 TABLET, CHEWABLE ORAL ONCE
Status: COMPLETED | OUTPATIENT
Start: 2021-12-19 | End: 2021-12-19

## 2021-12-19 RX ORDER — HYDRALAZINE HYDROCHLORIDE 25 MG/1
25 TABLET, FILM COATED ORAL EVERY 8 HOURS SCHEDULED
Status: DISCONTINUED | OUTPATIENT
Start: 2021-12-19 | End: 2021-12-19 | Stop reason: SDUPTHER

## 2021-12-19 RX ORDER — SODIUM CHLORIDE 0.9 % (FLUSH) 0.9 %
5-40 SYRINGE (ML) INJECTION EVERY 12 HOURS SCHEDULED
Status: DISCONTINUED | OUTPATIENT
Start: 2021-12-19 | End: 2021-12-23 | Stop reason: HOSPADM

## 2021-12-19 RX ORDER — AMLODIPINE BESYLATE 5 MG/1
10 TABLET ORAL DAILY
Status: DISCONTINUED | OUTPATIENT
Start: 2021-12-19 | End: 2021-12-19 | Stop reason: SDUPTHER

## 2021-12-19 RX ORDER — CLOPIDOGREL BISULFATE 75 MG/1
75 TABLET ORAL DAILY
Status: DISCONTINUED | OUTPATIENT
Start: 2021-12-19 | End: 2021-12-23 | Stop reason: HOSPADM

## 2021-12-19 RX ORDER — ACETAMINOPHEN 325 MG/1
650 TABLET ORAL EVERY 6 HOURS PRN
Status: DISCONTINUED | OUTPATIENT
Start: 2021-12-19 | End: 2021-12-22

## 2021-12-19 RX ORDER — OMEPRAZOLE 40 MG/1
40 CAPSULE, DELAYED RELEASE ORAL DAILY
Status: DISCONTINUED | OUTPATIENT
Start: 2021-12-19 | End: 2021-12-19 | Stop reason: CLARIF

## 2021-12-19 RX ORDER — ISOSORBIDE MONONITRATE 30 MG/1
60 TABLET, EXTENDED RELEASE ORAL DAILY
Status: DISCONTINUED | OUTPATIENT
Start: 2021-12-19 | End: 2021-12-19 | Stop reason: SDUPTHER

## 2021-12-19 RX ORDER — INSULIN GLARGINE 100 [IU]/ML
40 INJECTION, SOLUTION SUBCUTANEOUS NIGHTLY
Status: DISCONTINUED | OUTPATIENT
Start: 2021-12-19 | End: 2021-12-20

## 2021-12-19 RX ORDER — MORPHINE SULFATE 2 MG/ML
1 INJECTION, SOLUTION INTRAMUSCULAR; INTRAVENOUS EVERY 4 HOURS PRN
Status: DISCONTINUED | OUTPATIENT
Start: 2021-12-19 | End: 2021-12-23 | Stop reason: HOSPADM

## 2021-12-19 RX ORDER — 0.9 % SODIUM CHLORIDE 0.9 %
30 INTRAVENOUS SOLUTION INTRAVENOUS ONCE
Status: COMPLETED | OUTPATIENT
Start: 2021-12-19 | End: 2021-12-19

## 2021-12-19 RX ORDER — PRAVASTATIN SODIUM 20 MG
80 TABLET ORAL NIGHTLY
Status: DISCONTINUED | OUTPATIENT
Start: 2021-12-19 | End: 2021-12-20

## 2021-12-19 RX ORDER — CLOPIDOGREL BISULFATE 75 MG/1
75 TABLET ORAL DAILY
Status: DISCONTINUED | OUTPATIENT
Start: 2021-12-19 | End: 2021-12-19 | Stop reason: SDUPTHER

## 2021-12-19 RX ORDER — ISOSORBIDE MONONITRATE 60 MG/1
60 TABLET, EXTENDED RELEASE ORAL DAILY
Status: DISCONTINUED | OUTPATIENT
Start: 2021-12-19 | End: 2021-12-23 | Stop reason: HOSPADM

## 2021-12-19 RX ORDER — SODIUM CHLORIDE 0.9 % (FLUSH) 0.9 %
5-40 SYRINGE (ML) INJECTION PRN
Status: DISCONTINUED | OUTPATIENT
Start: 2021-12-19 | End: 2021-12-23 | Stop reason: HOSPADM

## 2021-12-19 RX ORDER — SODIUM CHLORIDE 9 MG/ML
INJECTION, SOLUTION INTRAVENOUS CONTINUOUS
Status: DISCONTINUED | OUTPATIENT
Start: 2021-12-19 | End: 2021-12-20

## 2021-12-19 RX ORDER — AMLODIPINE BESYLATE 5 MG/1
10 TABLET ORAL DAILY
Status: DISCONTINUED | OUTPATIENT
Start: 2021-12-19 | End: 2021-12-22

## 2021-12-19 RX ORDER — PANTOPRAZOLE SODIUM 40 MG/1
40 TABLET, DELAYED RELEASE ORAL
Status: DISCONTINUED | OUTPATIENT
Start: 2021-12-20 | End: 2021-12-23 | Stop reason: HOSPADM

## 2021-12-19 RX ORDER — ASPIRIN 81 MG/1
81 TABLET ORAL DAILY
Status: DISCONTINUED | OUTPATIENT
Start: 2021-12-20 | End: 2021-12-23 | Stop reason: HOSPADM

## 2021-12-19 RX ORDER — POLYETHYLENE GLYCOL 3350 17 G/17G
17 POWDER, FOR SOLUTION ORAL DAILY PRN
Status: DISCONTINUED | OUTPATIENT
Start: 2021-12-19 | End: 2021-12-20

## 2021-12-19 RX ORDER — MONTELUKAST SODIUM 10 MG/1
10 TABLET ORAL NIGHTLY
Status: DISCONTINUED | OUTPATIENT
Start: 2021-12-19 | End: 2021-12-23 | Stop reason: HOSPADM

## 2021-12-19 RX ADMIN — IOPAMIDOL 60 ML: 755 INJECTION, SOLUTION INTRAVENOUS at 15:51

## 2021-12-19 RX ADMIN — ASPIRIN 81 MG CHEWABLE TABLET 324 MG: 81 TABLET CHEWABLE at 12:52

## 2021-12-19 RX ADMIN — GABAPENTIN 400 MG: 100 CAPSULE ORAL at 21:05

## 2021-12-19 RX ADMIN — CLONIDINE HYDROCHLORIDE 0.2 MG: 0.1 TABLET ORAL at 21:05

## 2021-12-19 RX ADMIN — ISOSORBIDE MONONITRATE 60 MG: 30 TABLET ORAL at 12:52

## 2021-12-19 RX ADMIN — METOPROLOL SUCCINATE 25 MG: 25 TABLET, FILM COATED, EXTENDED RELEASE ORAL at 13:02

## 2021-12-19 RX ADMIN — CLOPIDOGREL 75 MG: 75 TABLET, FILM COATED ORAL at 12:51

## 2021-12-19 RX ADMIN — SODIUM CHLORIDE 3294 ML: 9 INJECTION, SOLUTION INTRAVENOUS at 14:58

## 2021-12-19 RX ADMIN — SODIUM CHLORIDE 3294 ML: 9 INJECTION, SOLUTION INTRAVENOUS at 15:30

## 2021-12-19 RX ADMIN — INSULIN GLARGINE 40 UNITS: 100 INJECTION, SOLUTION SUBCUTANEOUS at 21:04

## 2021-12-19 RX ADMIN — LOSARTAN POTASSIUM 100 MG: 50 TABLET, FILM COATED ORAL at 12:52

## 2021-12-19 RX ADMIN — WATER 1000 MG: 1 INJECTION INTRAMUSCULAR; INTRAVENOUS; SUBCUTANEOUS at 16:31

## 2021-12-19 RX ADMIN — HYDRALAZINE HYDROCHLORIDE 25 MG: 25 TABLET, FILM COATED ORAL at 21:05

## 2021-12-19 RX ADMIN — AMLODIPINE BESYLATE 10 MG: 5 TABLET ORAL at 12:51

## 2021-12-19 RX ADMIN — SODIUM CHLORIDE: 9 INJECTION, SOLUTION INTRAVENOUS at 20:19

## 2021-12-19 ASSESSMENT — ENCOUNTER SYMPTOMS
SINUS PRESSURE: 0
VOMITING: 0
ABDOMINAL PAIN: 1
ABDOMINAL DISTENTION: 0
COUGH: 0
SORE THROAT: 0
SHORTNESS OF BREATH: 0
DIARRHEA: 0
NAUSEA: 0
BACK PAIN: 0
COLOR CHANGE: 0
CONSTIPATION: 0
WHEEZING: 0

## 2021-12-19 NOTE — CONSULTS
I have personally seen and evaluated the patient. I personally obtained the history and performed the physical exam.  I personally reviewed all of the above labs, history, review of systems, and data. All of the assessments and recommendations are from me. All of the above cardiac medical decisions are from me. Please see my additional contributions to the history, physical exam, assessment, and recommendations below. History of chief complaint:  She was having constipation and abdominal pain prior to her recent discharge. She states that she has not had a bowel movement in over a week. She continues to have left lower quadrant discomfort and unable to eat. She denies any chest discomfort. She has not been compliant with any of her hypertension medications after her discharge. Review of systems:     Heart: as above   Lungs: as above   Eyes: denies changes in vision or discharge. Ears: denies changes in hearing or pain. Nose: denies epistaxis or masses   Throat: denies sore throat or trouble swallowing. Neuro: denies numbness, tingling, tremors. Skin: denies rashes or itching. : denies hematuria, dysuria   GI: denies vomiting, diarrhea   Psych: denies mood changed, anxiety, depression. Physical exam:  BP (!) 165/93   Pulse 92   Temp 98.7 °F (37.1 °C) (Oral)   Resp 18   Ht 5' 5\" (1.651 m)   Wt 242 lb (109.8 kg)   SpO2 97%   BMI 40.27 kg/m²   Constitutional: A&O x3, communicates well, no acute distress. Eyes: extraocular muscles intact, PERRL. Normal lids & conjunctiva. No icterus. ENT: clear, no bleeding. No external masses. Lips normal formation. Neck: supple, full ROM, no JVD, no bruits, no lymphadenopathy. No masses. trachea midline. Heart: Distant. Regular rate & rhythm, normal S1 & S2. No heave. Lungs: CTA. No accessory muscles. Abd: Morbidly obese. Mild left lower quadrant tenderness. Neuro: Full ROM X 4, EOMI, no tremors.   EXT: No bilateral lower extremity edema  Skin: warm, dry, intact. Good turgor. Psych: A&O x 3, normal behavior, not anxious. Patient seen and examined. Chart, labs & data reviewed. A:  1. Constipation. 2. Lower abdominal discomfort due to the above. 3. Uncontrolled hypertension due to noncompliance. 4. Urinary tract infection prior to her recent discharge. Few bacteria now on her urinalysis. 5. Chronic left bundle branch block. 6. Coronary artery disease. Recent stenting of the RCA. No cardiac symptoms. 7. Pulmonary hypertension. 8. CVA. 9. Diabetes      Rec:  1. We will defer to others for establishing assistance for her to take her medication after discharge. 2. Resume home medications. 3. No active cardiac issues. Cardiology will sign off. Electronically signed by Hugo Heller DO on 12/19/2021 at 11:23 AM    Note: This report was completed using computerized voice recognition software. Every effort has been made to ensure accuracy, however; and invert and computerized transcription errors may be present.

## 2021-12-19 NOTE — Clinical Note
Patient Class: Observation [104]   REQUIRED: Diagnosis: Elevated troponin [044962]   Estimated Length of Stay: Estimated stay of less than 2 midnights   Telemetry/Cardiac Monitoring Required?: Yes

## 2021-12-19 NOTE — ED NOTES
Charge nurse made aware of troponin elevation as well as elevated white blood cell count. Currently still awaiting a bed assignment.      Miguelito Funes, APRN - CNP  12/19/21 [id number]

## 2021-12-19 NOTE — H&P
Hospitalist History & Physical      PCP: No primary care provider on file. Date of Admission: 12/19/2021    Date of Service: Pt seen/examined on 12/19/2021 and is admitted to Inpatient with expected LOS greater than two midnights due to medical therapy. Chief Complaint:  had concerns including Abdominal Pain (10/10 LLQ abd pain onset wednseday. NO BM since sunday. ). History Of Present Illness:    Ms. Elvis Jamison, a 70y.o. year old female  who  has a past medical history of Arthritis, Asthma, BRCA1 negative, BRCA2 negative, Breast cancer ([address] CAD in native artery, Cancer ([address] Cerebral artery occlusion with cerebral infarction [facility]), Cerebrovascular disease, CHF (congestive heart failure) ([address] Claustrophobia, COPD (chronic obstructive pulmonary disease) ([address] Decreased dorsalis pedis pulse, Dermatophytosis, Headache(784.0), History of blood transfusion, Hives, Hx of blood clots, Hyperlipidemia, Hypertension, LBP radiating to both legs, Lymphedema of both lower extremities, Neuromuscular disorder ([address] Non-rheumatic aortic sclerosis, Obesity, Pulmonary hypertension ([address] PVD (peripheral vascular disease) with claudication ([address] Recurrent genital HSV (herpes simplex virus) infection, S/P breast biopsy, right, Type II or unspecified type diabetes mellitus without mention of complication, not stated as uncontrolled, and UTI (urinary tract infection). This is a 70-year-old black female who reported to the emergency room with epigastric abdominal pain. She describes the pain as epigastric, abdominal, nonradiating. She also had chills last night but this has resolved. She denies shortness of breath, no subjective fever, no hematuria dysuria hematemesis or hematochezia. She states that she has been constipated. At the time of examination patient is in bed, her symptoms resolved. She was evaluated by cardiology. She had 2 stents placed 12/15/2021.       Past Medical History: Diagnosis Date    Arthritis     Asthma     BRCA1 negative     BRCA2 negative     [facility]     CAD in native artery 12/15/2021    12-15-21 cate 2.5x38 hima rca. 12-15-21 cate 3.0x12 hima prox rca.     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) approx 3 years ago    Claustrophobia     COPD (chronic obstructive pulmonary disease) ([address]     Decreased dorsalis pedis pulse 9/5/2019    Dermatophytosis 9/5/2019    Headache(784.0)     History of blood transfusion     with knee surgery    Hives     Hx of blood clots     Hyperlipidemia     Hypertension     LBP radiating to both legs     Lymphedema of both lower extremities 11/12/2015    Neuromuscular disorder ([facility]     Non-rheumatic aortic sclerosis 10/2018    10/2018    Obesity     Pulmonary hypertension ([facility] 10/2018    PVD (peripheral vascular disease) with claudication ([facility] 9/23/2021    Recurrent genital HSV (herpes simplex virus) infection     last outbreak 11/2017    S/P breast biopsy, right 07/2016    pt states breast cancer    Type II or unspecified type diabetes mellitus without mention of complication, not stated as uncontrolled     AA1c8.7 9/10    UTI (urinary tract infection) 5/13/2019       Past Surgical History:        Procedure Laterality Date    ARTHROPLASTY Left 07/29/2016    thumb basil joint with dequervain's release    BREAST BIOPSY      BREAST LUMPECTOMY  years ago    benign    BREAST LUMPECTOMY Right 09/06/2016    COLONOSCOPY  2005    COLONOSCOPY  1/8/15    Dr. Jonathan Garcia  6/7/2012         FINGER SURGERY Left 07/29/2016    thumb    HAND SURGERY  12/2017    HYSTERECTOMY      JOINT REPLACEMENT      OTHER SURGICAL HISTORY Right 12/20/2017    RIGHT THUMB TRAPEZIECTOMY WITH LIGAMENT RECONSRUCTION DEQUERVAINS RELEASE    TIM AND BSO      TOTAL HIP ARTHROPLASTY Bilateral     2000, 2013 dr Pennie Jeffrey, dr Mccord Second Bilateral 2013    dr Khadijah Burks       Medications Prior to Admission:      Prior to Admission medications    Medication Sig Start Date End Date Taking? Authorizing Provider   isosorbide mononitrate (IMDUR) 60 MG extended release tablet Take 1 tablet by mouth daily 12/17/21   Tea Redmond MD   nitroGLYCERIN (NITROSTAT) 0.4 MG SL tablet up to max of 3 total doses. If no relief after 1 dose, call 911. 12/17/21   Tea Redmond MD   hydrALAZINE (APRESOLINE) 25 MG tablet Take 1 tablet by mouth every 8 hours 12/17/21   Tea Redmond MD   metoprolol succinate (TOPROL XL) 25 MG extended release tablet Take 1 tablet by mouth daily 12/17/21   Tea Redmond MD   amLODIPine (NORVASC) 10 MG tablet Take 1 tablet by mouth daily 12/17/21   Tea Redmond MD   clopidogrel (PLAVIX) 75 MG tablet Take 1 tablet by mouth daily 12/17/21   Tea Redmond MD   sennosides-docusate sodium (SENOKOT-S) 8.6-50 MG tablet Take 2 tablets by mouth nightly as needed for Constipation 12/17/21   Tea Redmond MD   albuterol (PROVENTIL) (2.5 MG/3ML) 0.083% nebulizer solution Take 2.5 mg by nebulization every 6 hours as needed for Wheezing    Historical Provider, MD   blood glucose test strips (ONETOUCH VERIO) strip 1 each by In Vitro route 4 times daily As needed.  7/29/21   DMITRY Jean   Lancets ([address]) [facility] [place] test 4x/day 7/29/21   DMITRY Jean   metFORMIN (GLUCOPHAGE) 1000 MG tablet Take 1 tablet by mouth 2 times daily (with meals) 4/1/21   Paul Pérez MD   insulin glargine (LANTUS SOLOSTAR) 100 UNIT/ML injection pen Inject 40 Units into the skin nightly  Patient taking differently: Inject 50 Units into the skin nightly  4/1/21   Paul Pérez MD   insulin lispro, 1 Unit Dial, (HUMALOG KWIKPEN) 100 UNIT/ML SOPN Inject 10 units with meals + sliding scale. MAX 75 units/day 4/1/21   Ramila Avendano MD   Insulin Pen Needle (PEN NEEDLES 3/16\") 31G X 5 MM MISC 1 each by Does not apply route 4 times daily (after meals and at bedtime) 4/1/21   Ramila Avendano MD   benzonatate (TESSALON) 200 MG capsule Take 1 capsule by mouth 3 times daily as needed for Cough 8/18/20   Sabine Meza PA-C   melatonin (RA MELATONIN) 3 MG TABS tablet Take one 3 mg hs 7/20/20   Jenny Chowdhury MD   vitamin D (ERGOCALCIFEROL) 1.25 MG (98861 UT) CAPS capsule Take 1 capsule by mouth once a week 6/26/20   Jenny Chowdhury MD   losartan-hydrochlorothiazide [facility]) 100-25 MG per tablet Take 1 tablet by mouth daily 6/26/20   Jenny Chowdhury MD   omeprazole (PRILOSEC) 40 MG delayed release capsule Take 1 capsule by mouth daily 6/26/20   Jenny Chowdhury MD   Fluticasone furoate-vilanterol (BREO ELLIPTA) 200-25 MCG/INH AEPB inhaler Inhale 1 puff into the lungs daily 6/26/20   Jenny Chowdhury MD   anastrozole (ARIMIDEX) 1 MG tablet Take 1 tablet by mouth daily Indications: ESTROGEN DEFICIENCY IN BREAST CANCER (INACTIVE) 6/26/20   Jenny Chowdhury MD   gabapentin (NEURONTIN) 400 MG capsule Take 1 capsule by mouth 3 times daily.  6/26/20 6/26/22  Jenny Chowdhury MD   aspirin 81 MG chewable tablet Take 1 tablet by mouth daily 6/26/20   Jenny Chowdhury MD   pravastatin (PRAVACHOL) 80 MG tablet Take 1 tablet by mouth nightly 6/26/20   Jenny Chowdhury MD   oxybutynin (DITROPAN) 5 MG tablet Take 1 tablet by mouth 3 times daily 6/26/20   Jenny Chowdhury MD   montelukast (SINGULAIR) 10 MG tablet Take 1 tablet by mouth nightly 6/26/20   Jenny Chowdhury MD   furosemide (LASIX) 20 MG tablet Take 1 tablet by mouth 2 times daily 3/26/20   Jenny Chowdhury MD   cloNIDine (CATAPRES) 0.2 MG tablet Take 1 tablet by mouth 3 times daily 20   Caroline Hammer, DO   calcium carbonate-vitamin D (CALCIUM 600 + D) 600-400 MG-UNIT TABS per tab Take 1 tablet by mouth 2 times daily 10/6/19   Yahir Castro MD   butenafine (LOTRIMIN ULTRA) 1 % CREA Please apply from the toes, in between the toes, foot up to the upper calf twice daily for 1 month, both legs 19   Duy Curran MD   acetaminophen (APAP EXTRA STRENGTH) 500 MG tablet Take 1 tablet by mouth every 6 hours as needed for Pain 18   Joyce Babb,    vitamin B-12 (CYANOCOBALAMIN) 1000 MCG tablet Take 1 tablet by mouth daily 18   Rhoda Faira MD       Allergies:  Patient has no known allergies. Social History:    TOBACCO:   reports that she quit smoking about 20 years ago. Her smoking use included cigarettes. She started smoking about 56 years ago. She has a 8.75 pack-year smoking history. She has never used smokeless tobacco.  ETOH:   reports current alcohol use. Family History:    Reviewed in detail and negative for DM, CAD, Cancer, CVA. Positive as follows\"      Problem Relation Age of Onset    Diabetes Mother     High Blood Pressure Mother     Heart Attack Mother     High Blood Pressure Father     Diabetes Father     Heart Failure Father     Breast Cancer Sister     Stroke Sister         young age   [employer] Cancer Sister 55        breast    Sickle Cell Anemia Other         niece    Cancer Other 36        niece - breast    Breast Cancer Sister             Cancer Sister 59        breast & ovarian    Stomach Cancer Other         aunt       REVIEW OF SYSTEMS:   Pertinent positives as noted in the HPI. All other systems reviewed and negative. No nausea or vomiting. No dizziness  PHYSICAL EXAM:  BP (!) 165/93   Pulse 92   Temp 98.7 °F (37.1 °C) (Oral)   Resp 18   Ht 5' 5\" (1.651 m)   Wt 242 lb (109.8 kg)   SpO2 97%   BMI 40.27 kg/m²   General appearance: No apparent distress, appears stated age and cooperative.   HEENT: Normal cephalic, atraumatic without obvious deformity. Pupils equal, round, and reactive to light. Extra ocular muscles intact. Conjunctivae/corneas clear. Neck: Supple, with full range of motion. No jugular venous distention. Trachea midline. Respiratory: Clear to auscultation, bilateral, no wheezing  Cardiovascular: S1-S2 normal, no rubs gallops or murmurs  Abdomen: Soft nontender nondistended plus bowel sounds  Musculoskeletal: No clubbing, cyanosis, or edema  Skin: Normal skin color. No rashes or lesions. Neurologic:  Neurovascularly intact without any focal sensory/motor deficits. Cranial nerves: II-XII intact, grossly non-focal.  Psychiatric: Alert and oriented, thought content appropriate, normal insight    Reviewed EKG and CXR personally    CBC:   Recent Labs     12/18/21 2141   WBC 24.6*   RBC 4.54   HGB 11.9   HCT 38.1   MCV 83.9   RDW 14.8   *     BMP:   Recent Labs     12/18/21 2141      K 4.1   CL 95*   CO2 22   BUN 19   CREATININE 1.1*     LFT:  Recent Labs     12/18/21 2141   PROT 7.9   ALKPHOS 162*   ALT 7   AST 14   BILITOT 0.7   LIPASE 10*     CE:  No results for input(s): Gosia Figueroa in the last 72 hours. PT/INR: No results for input(s): INR, APTT in the last 72 hours. BNP: No results for input(s): BNP in the last 72 hours.   ESR:   Lab Results   Component Value Date    SEDRATE 47 (H) 01/06/2011     CRP:   Lab Results   Component Value Date    CRP 1.4 (H) 01/06/2011     D Dimer:   Lab Results   Component Value Date    DDIMER 376 09/08/2015      Folate and B12:   Lab Results   Component Value Date    JCJRCNBO48 581 06/10/2019   ,   Lab Results   Component Value Date    FOLATE 13.0 06/10/2019     Lactic Acid:   Lab Results   Component Value Date    LACTA 2.4 (H) 12/19/2021     Thyroid Studies:   Lab Results   Component Value Date    TSH 0.591 06/10/2019    P6AFKHW 9.2 10/05/2017       Oupatient labs:  Lab Results   Component Value Date    CHOL 152 06/10/2019    TRIG 66 06/10/2019    HDL 54 06/10/2019    LDLCALC 85 06/10/2019    TSH 0.591 06/10/2019    INR 1.1 07/16/2013    LABA1C 11.5 07/29/2021       Urinalysis:    Lab Results   Component Value Date    NITRU Negative 12/18/2021    WBCUA 0-1 12/18/2021    BACTERIA FEW 12/18/2021    RBCUA 1-3 12/18/2021    RBCUA NONE 03/19/2014    BLOODU SMALL 12/18/2021    SPECGRAV 1.015 12/18/2021    GLUCOSEU >=1000 12/18/2021       Imaging:  Echo Complete    Result Date: 12/16/2021  Transthoracic Echocardiography Report (TTE)  Demographics   Patient Name    FEARS Ever Linh  Gender            Female                  L   Medical Record  [id number]     Room Number       4409  Number   Account #       [de-identified]    Procedure Date    12/16/2021   Corporate ID                 Ordering          Jeremy Blanco MD                               Physician   Accession       [id number]   Referring         Emanuel Vo MD  Number                       Physician   Date of Birth   1950   Sonographer       [facility]   Age             70 year(s)   Interpreting      [address]                               Physician         Physician Cardiology                                                 Jeremy Blanco MD                                Any Other  Procedure Type of Study   TTE procedure:Echo Complete W/Doppler & Color Flow. Procedure Date Date: 12/16/2021 Start: 08:56 AM Study Location: Portable Technical Quality: Adequate visualization Indications:Coronary artery disease. Patient Status: Routine Height: 65 inches Weight: 242 pounds BSA: 2.15 m^2 BMI: 40.27 kg/m^2 Rhythm: Within normal limits BP: 181/76 mmHg Allergies   - No known allergies. Findings   Left Ventricle  Left ventricle is normal in size . Borderline concentric left ventricular hypertrophy. Mild proximal septal thickening/hypertrophy. No gross regional wall motion abnormality. Ejection fraction is visually estimated at 55+/-5%. There is doppler evidence of stage II diastolic dysfunction. Right Ventricle  Normal right ventricular size and function. Left Atrium  The left atrium is mildly dilated. Interatrial septum appears intact. Right Atrium  Normal right atrium size. Mitral Valve  Focal calcification mitral valve leaflet. Mild mitral regurgitation. Tricuspid Valve  Normal tricuspid valve structure and function. Trace tricuspid regurgitation. Moderate pulmonary hypertension. Estimated RVSP is 55 mmHg. Aortic Valve  Normal aortic valve structure and function. Pulmonic Valve  Normal pulmonic valve structure and function. Physiologic and/or trace pulmonic regurgitation present. Pericardial Effusion  No evidence of pericardial effusion. Aorta  Aortic root dimension within normal limits. Aortic root is sclerotic and calcified. Conclusions   Summary  Left ventricle is normal in size . Borderline concentric left ventricular hypertrophy. Mild proximal septal thickening/hypertrophy. No gross regional wall motion abnormality. Ejection fraction is visually estimated at 55+/-5%. There is doppler evidence of stage II diastolic dysfunction. Normal right ventricular size and function. The left atrium is mildly dilated. Focal calcification mitral valve leaflet. Mild mitral regurgitation. Aortic root is sclerotic and calcified. Moderate pulmonary hypertension.    Signature   ----------------------------------------------------------------  Electronically signed by Maria R Simmons MD(Interpreting  physician) on 12/16/2021 05:25 PM  ----------------------------------------------------------------  M-Mode/2D Measurements & Calculations   LV Diastolic    LV Systolic Dimension: 3 cm  AV Cusp Separation: 2 cmLA  Dimension: 3.8  LV Volume Diastolic: 72.5 ml Dimension: 4.3 cmAO Root  cm              LV Volume Systolic: 34 ml    Dimension: 3.2 cm  LV FS:21.1 %    LV EDV/LV EDV Index: 63.3  LV PW           ml/29 ml/m^2LV ESV/LV ESV  Diastolic: 1.2  Index: 34 NK/48DX/ m^2  cm              EF Calculated: 46.3 %        RV Diastolic Dimension: 3.5  Septum          LV Mass Index: 80 l/min*m^2  cm  Diastolic: 1.4  LV Length: 8.7 cm  cm                                           Ascending Aorta: 3.2 cm                  LVOT: 2 cm                   LA volume/Index: 83.5 ml  LV Mass: 173.05                              /38.85ml/m^2  g                                            RA Area: 19.1 cm^2  Doppler Measurements & Calculations   MV Peak E-Wave: 1.3  AV Peak Velocity: 1.25 LVOT Peak Velocity: 1 m/s  m/s                  m/s                    LVOT Mean Velocity: 0.73 m/s  MV Peak A-Wave: 1.13 AV Peak Gradient: 6.22 LVOT Peak Gradient: 4 mmHgLVOT  m/s                  mmHg                   Mean Gradient: 2.3 mmHg  MV E/A Ratio: 1.15   AV Mean Velocity: 0.87 Estimated RVSP: 55.2 mmHg  MV Peak Gradient:    m/s                    Estimated RAP:3 mmHg  6.4 mmHg             AV Mean Gradient: 3.3  MV Mean Gradient: 3  mmHg  mmHg                 AV VTI: 26.7 cm        TR Velocity:3.61 m/s  MV Mean Velocity:    AV Area                TR Gradient:52.16 mmHg  0.82 m/s             (Continuity):2.66 cm^2 PV Peak Velocity: 0.82 m/s  MV Deceleration                             PV Peak Gradient: 2.68 mmHg  Time: 170.2 msec     LVOT VTI: 22.6 cm      PV Mean Velocity: 0.59 m/s  MV P1/2t: 39.9 msec  IVRT: 13.8 msec        PV Mean Gradient: 1.5 mmHg  MVA by PHT:5.51 cm^2 Estimated PASP: 55.16  MV Area              mmHg                   MA ED Velocity: 1.15 m/s  (continuity): 2.6  cm^2  MV E' Septal  Velocity: 0.06 m/s  MV E' Lateral  Velocity: 9 m/s  [email or web address] [id number]    CT ABDOMEN PELVIS WO CONTRAST Additional Contrast? None    Result Date: 12/19/2021  EXAMINATION: CT OF THE ABDOMEN AND PELVIS WITHOUT CONTRAST 12/19/2021 11:36 am TECHNIQUE: CT of the abdomen and pelvis was performed without the administration of intravenous contrast. Multiplanar reformatted images are provided for review. Dose modulation, iterative reconstruction, and/or weight based adjustment of the mA/kV was utilized to reduce the radiation dose to as low as reasonably achievable. COMPARISON: None. HISTORY: ORDERING SYSTEM PROVIDED HISTORY: abdominal pain kidney stone, possible infection TECHNOLOGIST PROVIDED HISTORY: Reason for exam:->abdominal pain kidney stone, possible infection Additional Contrast?->None Decision Support Exception - unselect if not a suspected or confirmed emergency medical condition->Emergency Medical Condition (MA) What reading provider will be dictating this exam?->CRC FINDINGS: Lower Chest: Bilateral lung bases are clear. Liver: No hepatic lesions identified. Bile ducts: Gallbladder is unremarkable. No evidence of intrahepatic or extrahepatic biliary dilatation. Pancreas: The pancreatic duct is not dilated. Adrenal glands: Normal in appearance. Kidneys: No evidence of right hydronephrosis. Moderate left-sided hydronephrosis with perinephric fat stranding. Focal 3 mm calculus identified in the left ureteral vesicular junction. There is additional 3 mm nonobstructive calculus in the left lower pole. Spleen: Normal in appearance. Bowel: No evidence of bowel obstruction. Diverticulosis seen in the sigmoid colon without evidence for diverticulitis. The appendix is normal in appearance. Pelvis: Evaluation limited due to streak artifact from the bilateral hip arthroplasties. Bladder is decompressed. No abnormal pelvic lymphadenopathy. Peritoneum/Retroperitoneum: No abnormal lymphadenopathy. Bones/Soft Tissues: No aggressive osseous lesions. Moderate left hydronephrosis secondary to 3 mm obstructive calculus in the left ureteral vesicular junction.  Uncomplicated diverticulosis RECOMMENDATIONS: Unavailable     XR HUMERUS RIGHT (MIN 2 VIEWS)    Result Date: 12/15/2021  EXAMINATION: TWO XRAY VIEWS OF THE RIGHT HUMERUS 12/15/2021 2:11 pm COMPARISON: None. HISTORY: ORDERING SYSTEM PROVIDED HISTORY: fall pain TECHNOLOGIST PROVIDED HISTORY: Right wrist,radial,ulnar Reason for exam:->fall pain What reading provider will be dictating this exam?->CRC FINDINGS: AP and lateral views of the right humerus were obtained. There is no acute fracture or dislocation. There is degenerative change at the glenohumeral and acromioclavicular joints. The visualized soft tissue structures are unremarkable. 1. No acute fracture or dislocation. 2. Degenerative change at the glenohumeral and acromioclavicular joints. XR WRIST RIGHT (MIN 3 VIEWS)    Result Date: 12/15/2021  EXAMINATION: 3 XRAY VIEWS OF THE RIGHT WRIST 12/15/2021 2:11 pm COMPARISON: None. HISTORY: ORDERING SYSTEM PROVIDED HISTORY: fall TECHNOLOGIST PROVIDED HISTORY: Reason for exam:->fall What reading provider will be dictating this exam?->CRC FINDINGS: PA, lateral, and oblique views of the right wrist were obtained. The bones are demineralized. This decreases the sensitivity of detecting nondisplaced fractures. There is negative ulnar variance. There has been prior section of the trapezium. There is no acute fracture or dislocation. There is osteophyte formation at the base of the 1st metacarpal.  There is partial visualization of scattered osteoarthritis about the right hand. There is arteriosclerosis. 1. No acute fracture or dislocation. 2. Status post resection of the trapezium. 3. Incomplete evaluation of scattered osteoarthritis about the right hand. 4. Negative ulnar variance. XR ACUTE ABD SERIES CHEST 1 VW    Result Date: 12/18/2021  EXAMINATION: TWO XRAY VIEWS OF THE ABDOMEN AND SINGLE  XRAY VIEW OF THE CHEST 12/18/2021 10:07 pm COMPARISON: None. HISTORY: ORDERING SYSTEM PROVIDED HISTORY: abd pain TECHNOLOGIST PROVIDED HISTORY: Reason for exam:->abd pain What reading provider will be dictating this exam?->CRC FINDINGS: Chest: Lungs appear clear.   The contour of the mediastinum heart size are within the normal range. There are no signs of pneumothorax, pleural effusion or congestion. There are advanced degenerative changes of the shoulders worse on the right than left. Abdomen: There is scattered gas within nondistended loops of large small bowel. There is a large volume of retained fecal material within the colon with marked distension of the ascending and transverse colon. There is a 2 mm opacity projecting over the inferior pole the left kidney that could represent a nonobstructing calculus. There are signs of bilateral hip prostheses. Images of the lumbar spine reveals evidence of multilevel disc disease. There is disc height loss and vacuum phenomena at multiple levels of the lumbar spine extending from L3 through the lumbosacral junction. 1. Chest: No evidence of an acute cardiopulmonary process 2. Abdomen: Large volume of retained fecal material within the colon concerning for constipation 3. There is a 2 mm calculus projecting over the left renal silhouette that may represent a nonobstructing stone     XR ACUTE ABD SERIES CHEST 1 VW    Result Date: 12/16/2021  EXAMINATION: TWO XRAY VIEWS OF THE ABDOMEN AND SINGLE  XRAY VIEW OF THE CHEST 12/16/2021 3:11 pm COMPARISON: None. HISTORY: ORDERING SYSTEM PROVIDED HISTORY: abd pain constipation TECHNOLOGIST PROVIDED HISTORY: Reason for exam:->abd pain constipation What reading provider will be dictating this exam?->CRC FINDINGS: The lungs are without acute focal process. There is no effusion or pneumothorax. The cardiomediastinal silhouette is without acute process. The osseous structures are without acute process. Nonobstructive bowel gas pattern. Colonic fecal retention involving the ascending colon. Bilateral hip arthroplasties. Degenerative changes lumbar spine. No acute pulmonary cardiac abnormalities. Retained colonic stool involving the ascending colon.      XR CHEST PORTABLE    Result Date: 12/15/2021  EXAMINATION: ONE XRAY VIEW OF THE CHEST 12/15/2021 3:48 pm COMPARISON: 02/17/2020 HISTORY: ORDERING SYSTEM PROVIDED HISTORY: wheezing TECHNOLOGIST PROVIDED HISTORY: Reason for exam:->wheezing What reading provider will be dictating this exam?->CRC FINDINGS: The lungs are without acute focal process. There is no effusion or pneumothorax. The cardiomediastinal silhouette is without acute process. The osseous structures are without acute process. No acute process. CT CARDIAC CALCIUM SCORING    Result Date: 12/2/2021  EXAMINATION: CT OF THE HEART WITHOUT CONTRAST, CORONARY ARTERY CALCIUM SCREENING 12/2/2021 CT CARDIAC CALCIUM SCORING EXAM DATE: 12/2/21 Indications: Chest pain, unspecified type TECHNIQUE: Dose modulation, iterative reconstruction, and/or weight based adjustment of the mA/kV was utilized to reduce the radiation dose to as low as reasonably achievable. Computed Tomographic of the heart was performed without intravenous contrast with retrospective cardiac gating. Multiplanar reformatted images were created on a separate workstation. Dose modulation, iterative reconstruction, and/or weight based adjustment of the mA/kV was utilized to reduce the radiation dose to as low as reasonably Achievable. Scan quality: Good Radiation dose: 1.8 mSv COMPARISON: CT chest report from October 2, 2019 HISTORY: Chest pain, unspecified type FINDINGS: CARDIAC FINDINGS: Coronary Calcium: Left main 45, , LCx 1085 and RCA 1018. The Agatston calcium score measures 2956. According to the [facility] study on coronary arterial calcification, this places the patient at the 80 percentile for age, sex and a race. Pericardium: The pericardium is normal in appearance without thickening or calcification. No pericardial effusion. Great vessels: The ascending thoracic cord and descending thoracic are of normal caliber. NON-CARDIAC FINDINGS -all noncardiac findings will be reported separately.  NON-CARDIAC FINDINGS: Noncardiac findings section interpreted by radiology. Airway is patent. No endobronchial lesions. There is mild central airway thickening and suggestion of mild bilateral emphysematous changes in the lungs. Lungs otherwise appear clear. No pleural effusion or pneumothorax. No concerning mediastinal nor hilar lymphadenopathy. Mild to moderate degree of atherosclerotic disease in the thoracic aorta. No aneurysmal dilatation. Normal appearance of the unenhanced pulmonary arterial system. Heart chambers are not enlarged. No pericardial effusion. Normal course and appearance of the esophagus. Normal appearance of the imaged upper abdomen. There are mild degenerative changes in the imaged spine. No destructive lytic or blastic abnormality. 1. Total Calcium score of 2956 with a severe plaque burden. This places the patient at the 80 percentile for age, sex and race. 2. A high calcium score may be consistent with a significant risk of having a cardiovascular event in the next 5 years. 3. Coronary CT angiogram was not performed due to significant blooming artifacts from extensive coronary artery calcification. Interpreted by: Azalea Bales MD Signed by: Azalea Bales MD 12/2/21 Final result NONCARDIAC FINDINGS SECTION INTERPRETED BY RADIOLOGY. PLEASE SEE ABOVE. THERE ARE NO FINDINGS THAT REQUIRE FURTHER IMAGING EVALUATION OR FOLLOW-UP. TTE 12/31/2019 (Anish Brown):    Left ventricle is normal in size . EF 55-60%    Abnormal (paradoxical) motion consistent with left bundle branch block.   Normal left ventricular ejection fraction.   There is doppler evidence of stage II diastolic dysfunction.   Mild tricuspid regurgitation.   Pulmonary hypertension is mild      CT CArdiac Calcium score 12/2/2021   1. Total Calcium score of 2956 with a severe plaque burden.  This places the patient at the 80 percentile for age, sex and race.     2.  A high calcium score may be consistent with a significant risk of having a  cardiovascular event in the next 5 years.       3. Coronary CT angiogram was not performed due to significant blooming artifacts from extensive coronary artery calcification.           [facility] 12/15/2021 :   CONCLUSIONS:  1.  Coronary artery disease. a.  Left main.  No significant angiographic disease. bInell Bury calcified proximal vessel with long and diffuse  diseased segment in the mid vessel with up to 50% angiographic luminal narrowing in the mid vessel with no significant distal vessel disease. 90% stenosis of the ostium of a small third diagonal  branch.  c.  LCX.  Heavily calcified proximal vessel with eccentric 60%-70%  proximal stenosis over a large marginal branch followed by 50%-60% stenosis shortly thereafter and 70% disease very distally in this first marginal branch.  50% disease of the midcircumflex/second marginal branch/lateral branch. d. Monique Jews vessel, which is heavily calcified from its proximal to mid segment with diffuse proximal to mid vessel disease with up to 80%-90% stenosis.  70% eccentric stenosis of the posterolateral branch. IFR 0.89/0.87.  2.  Normal ventricular size with subtle hypokinesis of the mid  anterolateral wall with an estimated ejection fraction of 50%. 3.  Systemic hypertension. 4.  Mildly elevated left ventricular end-diastolic pressure. 5.  Successful balloon angioplasty with deployment of two overlappingdrug-eluting coronary stents to the mid/proximal  RCA with dilatation of the stents, post deployment with a high-pressure noncompliant balloon with very good results.      ECG 12/16/21: SR. LBBB  ASSESSMENT:    Active Problems:    Elevated troponin  Resolved Problems:    * No resolved hospital problems. *    Assessment plan:  1. Epigastric abdominal pain unspecified etiology with elevated troponin: Appreciate cardiology input, continue to check cardiac enzymes, obtain CT chest to rule out PE. Continue supportive care.     2.  Coronary

## 2021-12-19 NOTE — ED PROVIDER NOTES
[address]      Pt Name: Derrell Gonzalez  MRN: [id number]  [place] 1950  Date of evaluation: 12/18/2021      CHIEF COMPLAINT       Chief Complaint   Patient presents with    Abdominal Pain     10/10 LLQ abd pain onset wednseday. NO BM since sunday. ANURAG Pabon is a 70 y.o. female history of diabetes, hypertension, recently had a cardiac stent placed on 12/17/2021 presents with complaints of left lower quadrant abdominal pain for the last 2 days. Describes pain as severe, dull nonradiating, left lower quadrant abdominal pain. No alleviating or exacerbating factors. Not take any medications or measures alleviate symptoms. States that she has been so much pain she has not been taking any of her medications including her Plavix that she was discharged home with. Admits to constipation. Denies any recent fevers, chills, nausea, vomiting, diaphoresis, chest pain, shortness of breath, flank pain, dysuria, hematuria, new rashes or sores. Except as noted above the remainder of the review of systems was reviewed and negative. Review of Systems   Constitutional: Negative for chills and fever. HENT: Negative for ear pain, sinus pressure and sore throat. Eyes: Negative for visual disturbance. Respiratory: Negative for cough, shortness of breath and wheezing. Cardiovascular: Negative for chest pain, palpitations and leg swelling. Gastrointestinal: Positive for abdominal pain. Negative for abdominal distention, constipation, diarrhea, nausea and vomiting. Endocrine: Negative for polyuria. Genitourinary: Negative for dysuria and frequency. Musculoskeletal: Negative for arthralgias and back pain. Skin: Negative for color change, pallor, rash and wound. Neurological: Negative for weakness and headaches. Hematological: Negative for adenopathy.    Psychiatric/Behavioral: Negative for agitation. All other systems reviewed and are negative. Physical Exam  Vitals and nursing note reviewed. Constitutional:       General: She is not in acute distress. Appearance: Normal appearance. She is not ill-appearing or diaphoretic. HENT:      Head: Normocephalic and atraumatic. Nose: Nose normal.   Eyes:      Extraocular Movements: Extraocular movements intact. Pupils: Pupils are equal, round, and reactive to light. Cardiovascular:      Rate and Rhythm: Normal rate and regular rhythm. Pulses: Normal pulses. Heart sounds: Normal heart sounds. Pulmonary:      Effort: Pulmonary effort is normal.      Breath sounds: Normal breath sounds. Abdominal:      General: Bowel sounds are normal.      Palpations: Abdomen is soft. Tenderness: There is abdominal tenderness. There is no right CVA tenderness, left CVA tenderness or rebound. Negative signs include Amaya's sign, Rovsing's sign and McBurney's sign. Musculoskeletal:         General: Normal range of motion. Cervical back: Normal range of motion. Skin:     General: Skin is warm and dry. Capillary Refill: Capillary refill takes less than 2 seconds. Neurological:      General: No focal deficit present. Mental Status: She is alert and oriented to person, place, and time. Psychiatric:         Mood and Affect: Mood normal.          Procedures     MDM  Number of Diagnoses or Management Options  Abdominal pain, unspecified abdominal location  Elevated troponin  Diagnosis management comments: 70-year-old female history of diabetes insulin-dependent, hypertension, recent cardiac catheterization with stent placement 12/17/2021 presents with complaints of abdominal pain. Vitals within normal limits. Physical exam patient has left lower quadrant abdominal pain with palpation. No rebound or guarding. Negative CVA tenderness bilaterally. Is not tympanic. Diagnostic labs and imaging interpreted reviewed. EKG has new ST depressions in inferior leads that were not present in previous EKG. Patient's troponin is increasing. CT scan shows large volume of retained fecal material within the colon concern for constipation as well as a 2 mm renal calculus. Patient given aspirin in the department. Cardiology has been consulted. Patient admitted to the hospital for elevated troponins. Amount and/or Complexity of Data Reviewed  Decide to obtain previous medical records or to obtain history from someone other than the patient: yes      ED Course as of 12/19/21 1347   Sun Dec 19, 2021   1131 [facility] 12/15/2021 :   CONCLUSIONS:  1.  Coronary artery disease. a.  Left main.  No significant angiographic disease. bBartholome Sever calcified proximal vessel with long and diffuse  diseased segment in the mid vessel with up to 50% angiographic luminal narrowing in the mid vessel with no significant distal vessel disease. 90% stenosis of the ostium of a small third diagonal  branch.  c.  LCX.  Heavily calcified proximal vessel with eccentric 60%-70%  proximal stenosis over a large marginal branch followed by 50%-60% stenosis shortly thereafter and 70% disease very distally in this first marginal branch.  50% disease of the midcircumflex/second marginal branch/lateral branch. d. Nydia Prows vessel, which is heavily calcified from its proximal to mid segment with diffuse proximal to mid vessel disease with up to 80%-90% stenosis.  70% eccentric stenosis of the posterolateral branch. IFR 0.89/0.87.  2.  Normal ventricular size with subtle hypokinesis of the mid  anterolateral wall with an estimated ejection fraction of 50%. 3.  Systemic hypertension. 4.  Mildly elevated left ventricular end-diastolic pressure.   5.  Successful balloon angioplasty with deployment of two overlappingdrug-eluting coronary stents to the mid/proximal  RCA with dilatation of the stents, post deployment with a high-pressure noncompliant balloon with very good results. [JV]   4626 Patient accepted for admission to [facility] [place] []      ED Course User Index  [JV] Ninfa Cazares MD  [] Nneka Hilton MD       --------------------------------------------- PAST HISTORY ---------------------------------------------  Past Medical History:  has a past medical history of Arthritis, Asthma, BRCA1 negative, BRCA2 negative, [facility] ([facility] CAD in native artery, Cancer ([facility] Cerebral artery occlusion with cerebral infarction [facility]), Cerebrovascular disease, CHF (congestive heart failure) ([facility] Claustrophobia, COPD (chronic obstructive pulmonary disease) ([facility] Decreased dorsalis pedis pulse, Dermatophytosis, Headache(784.0), History of blood transfusion, Hives, Hx of blood clots, Hyperlipidemia, Hypertension, LBP radiating to both legs, Lymphedema of both lower extremities, Neuromuscular disorder ([facility] Non-rheumatic aortic sclerosis, Obesity, Pulmonary hypertension ([facility] PVD (peripheral vascular disease) with claudication ([facility] Recurrent genital HSV (herpes simplex virus) infection, S/P breast biopsy, right, Type II or unspecified type diabetes mellitus without mention of complication, not stated as uncontrolled, and UTI (urinary tract infection). Past Surgical History:  has a past surgical history that includes Total hip arthroplasty (Bilateral); vin and bso (cervix removed); ECHO Compl W Dop Color Flow (6/7/2012); Total knee arthroplasty (Bilateral, 2013); Colonoscopy (2005); Hysterectomy; Colonoscopy (1/8/15); Breast lumpectomy (years ago); arthroplasty (Left, 07/29/2016); Finger surgery (Left, 07/29/2016); joint replacement; Breast lumpectomy (Right, 09/06/2016); other surgical history (Right, 12/20/2017); Hand surgery (12/2017); and Breast biopsy. Social History:  reports that she quit smoking about 20 years ago. Her smoking use included cigarettes. She started smoking about 56 years ago. She has a 8.75 pack-year smoking history.  She has never used smokeless tobacco. She reports current alcohol use. She reports previous drug use. Drug: Marijuana [place]). Family History: family history includes Breast Cancer in her sister and sister; Cancer (age of onset: 36) in an other family member; Cancer (age of onset: 55) in her sister; Cancer (age of onset: 59) in her sister; Diabetes in her father and mother; Heart Attack in her mother; Heart Failure in her father; High Blood Pressure in her father and mother; Sickle Cell Anemia in an other family member; Stomach Cancer in an other family member; Stroke in her sister. The patients home medications have been reviewed. Allergies: Patient has no known allergies.     -------------------------------------------------- RESULTS -------------------------------------------------    LABS:  Results for orders placed or performed during the hospital encounter of 12/19/21   Troponin   Result Value Ref Range    Troponin, High Sensitivity 80 (H) 0 - 9 ng/L   CBC Auto Differential   Result Value Ref Range    WBC 24.6 (H) 4.5 - 11.5 E9/L    RBC 4.54 3.50 - 5.50 E12/L    Hemoglobin 11.9 11.5 - 15.5 g/dL    Hematocrit 38.1 34.0 - 48.0 %    MCV 83.9 80.0 - 99.9 fL    MCH 26.2 26.0 - 35.0 pg    MCHC 31.2 (L) 32.0 - 34.5 %    RDW 14.8 11.5 - 15.0 fL    Platelets 153 (H) 251 - 450 E9/L    MPV 11.0 7.0 - 12.0 fL    Neutrophils % 95.7 (H) 43.0 - 80.0 %    Lymphocytes % 2.6 (L) 20.0 - 42.0 %    Monocytes % 1.7 (L) 2.0 - 12.0 %    Eosinophils % 0.0 0.0 - 6.0 %    Basophils % 0.2 0.0 - 2.0 %    Neutrophils Absolute 23.62 (H) 1.80 - 7.30 E9/L    Lymphocytes Absolute 0.74 (L) 1.50 - 4.00 E9/L    Monocytes Absolute 0.49 0.10 - 0.95 E9/L    Eosinophils Absolute 0.00 (L) 0.05 - 0.50 E9/L    Basophils Absolute 0.00 0.00 - 0.20 E9/L    Anisocytosis 2+     Polychromasia 3+     Rouleaux 1+    Comprehensive Metabolic Panel   Result Value Ref Range    Sodium 135 132 - 146 mmol/L    Potassium 4.1 3.5 - 5.0 mmol/L    Chloride 95 (L) 98 - 107 mmol/L CO2 22 22 - 29 mmol/L    Anion Gap 18 (H) 7 - 16 mmol/L    Glucose 311 (H) 74 - 99 mg/dL    BUN 19 6 - 23 mg/dL    CREATININE 1.1 (H) 0.5 - 1.0 mg/dL    GFR Non-African American 59 >=60 mL/min/1.73    GFR African American 59     Calcium 10.2 8.6 - 10.2 mg/dL    Total Protein 7.9 6.4 - 8.3 g/dL    Albumin 4.1 3.5 - 5.2 g/dL    Total Bilirubin 0.7 0.0 - 1.2 mg/dL    Alkaline Phosphatase 162 (H) 35 - 104 U/L    ALT 7 0 - 32 U/L    AST 14 0 - 31 U/L   Lactic Acid, Plasma   Result Value Ref Range    Lactic Acid 2.0 0.5 - 2.2 mmol/L   Lipase   Result Value Ref Range    Lipase 10 (L) 13 - 60 U/L   Urinalysis   Result Value Ref Range    Color, UA Yellow Straw/Yellow    Clarity, UA Clear Clear    Glucose, Ur >=1000 (A) Negative mg/dL    Bilirubin Urine Negative Negative    Ketones, Urine TRACE (A) Negative mg/dL    Specific Gravity, UA 1.015 1.005 - 1.030    Blood, Urine SMALL (A) Negative    pH, UA 7.5 5.0 - 9.0    Protein, UA Negative Negative mg/dL    Urobilinogen, Urine 0.2 <2.0 E.U./dL    Nitrite, Urine Negative Negative    Leukocyte Esterase, Urine Negative Negative   Microscopic Urinalysis   Result Value Ref Range    WBC, UA 0-1 0 - 5 /HPF    RBC, UA 1-3 0 - 2 /HPF    Epithelial Cells, UA FEW /HPF    Bacteria, UA FEW (A) None Seen /HPF   Troponin   Result Value Ref Range    Troponin, High Sensitivity 96 (H) 0 - 9 ng/L   Lactic Acid, Plasma   Result Value Ref Range    Lactic Acid 2.4 (H) 0.5 - 2.2 mmol/L   Troponin   Result Value Ref Range    Troponin, High Sensitivity 137 (H) 0 - 9 ng/L   Urine Drug Screen   Result Value Ref Range    Amphetamine Screen, Urine NOT DETECTED Negative <1000 ng/mL    Barbiturate Screen, Ur NOT DETECTED Negative < 200 ng/mL    Benzodiazepine Screen, Urine NOT DETECTED Negative < 200 ng/mL    Cannabinoid Scrn, Ur POSITIVE (A) Negative < 50ng/mL    Cocaine Metabolite Screen, Urine NOT DETECTED Negative < 300 ng/mL    Opiate Scrn, Ur NOT DETECTED Negative < 300ng/mL    PCP Screen, Urine NOT Oxygen Saturation Interpretation: Normal    ------------------------------------------ PROGRESS NOTES ------------------------------------------  Re-evaluation(s):  Time: 1200  Patients symptoms show no change  Repeat physical examination is not changed    Counseling:  I have spoken with the patient and discussed todays results, in addition to providing specific details for the plan of care and counseling regarding the diagnosis and prognosis. Their questions are answered at this time and they are agreeable with the plan of admission.    --------------------------------- ADDITIONAL PROVIDER NOTES ---------------------------------  Consultations:  Spoke with Dr. Jacoby Ferguson. Discussed case. They will admit the patient. This patient's ED course included: a personal history and physicial examination, re-evaluation prior to disposition, multiple bedside re-evaluations, IV medications, cardiac monitoring and continuous pulse oximetry    This patient has remained hemodynamically stable during their ED course. Diagnosis:  1. Abdominal pain, unspecified abdominal location    2. Elevated troponin        Disposition:  Patient's disposition: Admit to telemetry  Patient's condition is fair.          Herman Rodgers MD  Resident  12/19/21 [id number]

## 2021-12-19 NOTE — ED NOTES
Department of Emergency Medicine  FIRST PROVIDER TRIAGE NOTE             Independent MLP           12/18/21  9:39 PM EST    Date of Encounter: 12/18/21   MRN: [id number]      HPI: Erum Waldron is a 70 y.o. female who presents to the ED for Abdominal Pain (10/10 LLQ abd pain onset wednseday. NO BM since sunday. )  Patient presents to the emergency department with complaints of left lower quadrant abdominal pain. Patient was actually just discharged from the hospital yesterday after having a coronary artery stent placement. Patient also reports no bowel movement since Sunday. Patient moderately uncomfortable. Denies any chest pain or shortness of breath on exam    ROS: Negative for cp. PE: Gen Appearance/Constitutional: alert  GI: tender to palpation     Initial Plan of Care: All treatment areas with department are currently occupied. Plan to order/Initiate the following while awaiting opening in ED: labs, EKG and imaging studies.     Initial Plan of Care: Initiate Treatment-Testing, Proceed toTreatment Area When Bed Available for ED Attending/MLP to Continue Care    Electronically signed by Merline Salm, APRN - CNP   DD: 12/18/21         Merline Salm, APRN - CNP  12/18/21 [id number]

## 2021-12-19 NOTE — CONSULTS
Inpatient Cardiology Consultation      Reason for Consult:  \"Chest Pain\"    Consulting Physician: Dr Rosario Rosen    Requesting Physician:  Dr Mayelin Sher    Date of Consultation: 12/19/2021    HISTORY OF PRESENT ILLNESS: 69 yo AAF known to Dr Barb Coffey last seen as inpatient by Dr Rachele Clemons for elective [facility] 12/16/2021 (elevated calcium score on coronary CT). PMH: HTN, HLD, Morbid obesity, PSVT, cLBBB, chronic HFpEF, T2DM, CVA, PVD with claudication, breast carcinoma, marijuana use, and COPD. Patient reports constipation and L sided abdominal pain. Denies CP or SOB. Reports not taking any medications since discharge. Patient appears confused about her health history and her recent PCI. When asked why she came to ED she points to the L side of her abdomen. Denies any orthopnea or PND. [facility] 12/18/2021 BP upon arrival 184/100 HR 90's SR, afebrile, O2 saturation 97% on RA. Na 135, K+ 4.1, Bun/Cr 19/1.1, Lactic acid 2.0-->2.4, troponin 80-->96-->137, Alk phos 162, Lipase 10, UDS + marijuana, WBC 24.6, Hgb 11.9, Plt 452, UA few bacteria    No recorded BP since 0220 which was 165/93 HR 90's SR. Please note: past medical records were reviewed per electronic medical record (EMR) - see detailed reports under Past Medical/ Surgical History. Past Medical History:    1. HTN  2. HLD  3. BMI 40.27 on 12/18/2021  4. T2DM insulin requiring with neuropathy  5. Asthma  6. COPD  7. Marijuana use  8. OA  9. 2010 CVA with some speech difficulties  10. Claustrophobia  11. Headaches  12. Chronic BLE lymphedema  13. Recurrent genital HSV infection  14. Reported Hx DVT/PE  15. 2014 Lexiscan MPS; Ef 59% non ischemic. NWM.   16. 2016 R breast carcinoma s/p lumpectomy and XRT  17. Pt has undergone multiple sleep latency testing, full in lab sleep study in 2016. All sleep testing was normal. No evidence of KENAN or narcolepsy. Karyl Burkitt 2.    18. 2018 TTE Mild concentric LVH. No WMA. Stage II Diastolic Dysfunction.  LA mildly dilated. Mild MR. Mild TR. Mild to moderate PHTN. EF 45-50%. 19. cLBBB  20. 12/10/2018 Dobutamine Stress Echo: No chest pain. MPHR: > 85%. LBBB at baseline. The maximal stress echocardiogram demonstrated no evidence of inducible ischemia. 21. 12/31/2019 Limited TTE: Left ventricle is normal in size. Abnormal (paradoxical) motion consistent with left bundle branch block. Normal left ventricular ejection fraction. EF 55-60%. Stage II DD. Mild MR/PHTN.   22. 5/3/2021 CTA Pulmonary: No evidence of pulmonary embolism or acute pulmonary abnormality. 6 mm nonobstructing left renal calculus. 23. 11/5/2021 Bilateral LE XOCHITL's-->XOCHITL on the right is 0.87, mildly reduced. XOCHITL on the left is 0.91 normal. Mildly reduced right XOCHITL suggesting the presence of mild-moderate peripheral vascular disease. Low normal left XOCHITL, no significant peripheral vascular disease. Dampened digital PVRs suspicious for distal vascular disease. 24. 12/2/2021 Coronary CTA: Total Calcium score of 2956 with a severe plaque burden. This places the patient at the 80 percentile for age, sex and race. A high calcium score may be consistent with a significant risk of having a cardiovascular event in the next 5 years. Coronary CT angiogram was not performed due to significant blooming artifacts from extensive coronary artery calcification. 25. 12/15/2021 [facility] Dr Mynor Burgess: 2 Overlapping CHUCK (2.5 and 3.0) to RCA. Left main: 0% stenosis. LAD: 50 % stenosis. LCx: 60-70% stenosis. RCA: Dominant. 80-90% stenosis. LV angio: 45-50% EF.  26. 12/16/2021 TTE Dr Mynor Burgess: EF 55% +/- 5%. Mild proximal septal thickening/hypertrophy. No gross regional wall motion abnormality. Stage II DD. Mildly dilated LA. Mild MR. Moderate PHTN.  RVSP 55 mmHg      Past Surgical History:  L TKA, bilateral NIGHAT's, TIM/BSO, left thumb trapeziectomy with ligament reconstruction dequervains release, colonoscopy        Medications Prior to admit:  Prior to Admission medications    Medication Sig Start Date End Date Taking? Authorizing Provider   isosorbide mononitrate (IMDUR) 60 MG extended release tablet Take 1 tablet by mouth daily 12/17/21   Lyndon Rutledge MD   nitroGLYCERIN (NITROSTAT) 0.4 MG SL tablet up to max of 3 total doses. If no relief after 1 dose, call 911. 12/17/21   Lyndon Rutledge MD   hydrALAZINE (APRESOLINE) 25 MG tablet Take 1 tablet by mouth every 8 hours 12/17/21   Lyndon Rutledge MD   metoprolol succinate (TOPROL XL) 25 MG extended release tablet Take 1 tablet by mouth daily 12/17/21   Lyndon Rutledge MD   amLODIPine (NORVASC) 10 MG tablet Take 1 tablet by mouth daily 12/17/21   Lyndon Rutledge MD   clopidogrel (PLAVIX) 75 MG tablet Take 1 tablet by mouth daily 12/17/21   Lyndon Rutledge MD   sennosides-docusate sodium (SENOKOT-S) 8.6-50 MG tablet Take 2 tablets by mouth nightly as needed for Constipation 12/17/21   Lyndon Rutledge MD   albuterol (PROVENTIL) (2.5 MG/3ML) 0.083% nebulizer solution Take 2.5 mg by nebulization every 6 hours as needed for Wheezing    Historical Provider, MD   blood glucose test strips (ONETOUCH VERIO) strip 1 each by In Vitro route 4 times daily As needed. 7/29/21   DMITRY Bishop   Lancets ([address]) [facility] To test 4x/day 7/29/21   DMITRY Bishop   metFORMIN (GLUCOPHAGE) 1000 MG tablet Take 1 tablet by mouth 2 times daily (with meals) 4/1/21   Marylu Bourgeois MD   insulin glargine (LANTUS SOLOSTAR) 100 UNIT/ML injection pen Inject 40 Units into the skin nightly  Patient taking differently: Inject 50 Units into the skin nightly  4/1/21   Marylu Bourgeois MD   insulin lispro, 1 Unit Dial, (HUMALOG KWIKPEN) 100 UNIT/ML SOPN Inject 10 units with meals + sliding scale.  MAX 75 units/day 4/1/21   Marylu Bourgeois MD   Insulin Pen Needle (PEN NEEDLES 3/16\") 31G X 5 MM MISC 1 each by Does not apply route 4 times daily (after meals and at bedtime) 4/1/21   Marylu Bourgeois MD benzonatate (TESSALON) 200 MG capsule Take 1 capsule by mouth 3 times daily as needed for Cough 8/18/20   Sabine Meza PA-C   melatonin (RA MELATONIN) 3 MG TABS tablet Take one 3 mg hs 7/20/20   Judson Owen MD   vitamin D (ERGOCALCIFEROL) 1.25 MG (96960 UT) CAPS capsule Take 1 capsule by mouth once a week 6/26/20   Judson Owen MD   losartan-hydrochlorothiazide [facility]) 100-25 MG per tablet Take 1 tablet by mouth daily 6/26/20   Judson Owen MD   omeprazole (PRILOSEC) 40 MG delayed release capsule Take 1 capsule by mouth daily 6/26/20   Judson Owen MD   Fluticasone furoate-vilanterol (BREO ELLIPTA) 200-25 MCG/INH AEPB inhaler Inhale 1 puff into the lungs daily 6/26/20   Judson Owen MD   anastrozole (ARIMIDEX) 1 MG tablet Take 1 tablet by mouth daily Indications: ESTROGEN DEFICIENCY IN BREAST CANCER (INACTIVE) 6/26/20   Judson Owen MD   gabapentin (NEURONTIN) 400 MG capsule Take 1 capsule by mouth 3 times daily.  6/26/20 6/26/22  Judson Owen MD   aspirin 81 MG chewable tablet Take 1 tablet by mouth daily 6/26/20   Judson Owen MD   pravastatin (PRAVACHOL) 80 MG tablet Take 1 tablet by mouth nightly 6/26/20   Judson Owen MD   oxybutynin (DITROPAN) 5 MG tablet Take 1 tablet by mouth 3 times daily 6/26/20   Judson Owen MD   montelukast (SINGULAIR) 10 MG tablet Take 1 tablet by mouth nightly 6/26/20   Judson Owen MD   furosemide (LASIX) 20 MG tablet Take 1 tablet by mouth 2 times daily 3/26/20   Judson Owen MD   cloNIDine (CATAPRES) 0.2 MG tablet Take 1 tablet by mouth 3 times daily 2/11/20   Molly Ko DO   calcium carbonate-vitamin D (CALCIUM 600 + D) 600-400 MG-UNIT TABS per tab Take 1 tablet by mouth 2 times daily 10/6/19   Bertram Sosa MD   butenafine (LOTRIMIN ULTRA) 1 % CREA Please apply from the toes, in between the toes, foot up to the upper calf twice daily for 1 month, both legs 9/5/19   Jack Keen MD   acetaminophen (APAP EXTRA STRENGTH) 500 MG tablet Take 1 tablet by mouth every 6 hours as needed for Pain 8/14/18   Jean Madera DO   vitamin B-12 (CYANOCOBALAMIN) 1000 MCG tablet Take 1 tablet by mouth daily 7/20/18   Ginny Barrios MD       Current Medications:    Current Facility-Administered Medications: aspirin chewable tablet 324 mg, 324 mg, Oral, Once    Allergies:  NKDA per patient report    Social History:    9 pack years quit in 2001  Marijuana use  Occasional ETOH  Activity: Lives alone in 10th floor apartment (elevator). Told not to drive due to cataracts needing removed but continues to drive. Uses cane and occasional a walker. Denies CP or SOB with ambulation or ADL's  Code Status: Full Code      Family History: Non contributory secondary to age      REVIEW OF SYSTEMS:     · Constitutional: Denies fatigue, fevers, chills or night sweats  · Eyes: Denies visual changes or drainage  · ENT: Denies headaches or hearing loss. No mouth sores or sore throat. No epistaxis   · Cardiovascular: Denies chest pain, pressure or palpitations. No lower extremity swelling. · Respiratory: Denies GELLER, cough, orthopnea or PND. No hemoptysis   · Gastrointestinal: + L sided abdominal pain. + constipated. Denies hematemesis or anorexia. No hematochezia or melena    · Genitourinary: Denies urgency, dysuria or hematuria. · Musculoskeletal: Denies gait disturbance, weakness or joint complaints  · Integumentary: Denies rash, hives or pruritis   · Neurological: Denies dizziness, headaches or seizures. No numbness or tingling  · Psychiatric: Denies anxiety or depression. · Endocrine: Denies temperature intolerance. No recent weight change. .  · Hematologic/Lymphatic: Denies abnormal bruising or bleeding.  No swollen lymph nodes    PHYSICAL EXAM:   BP (!) 165/93   Pulse 92   Temp 98.7 °F (37.1 °C) (Oral)   Resp 18   Ht 5' 5\" (1.651 m)   Wt 242 lb (109.8 kg)   SpO2 97%   BMI 40.27 kg/m²   CONST:  Well developed, morbidly obese AAF who appears of stated age. Awake, alert and cooperative. No apparent distress. HEENT:   Head- Normocephalic, atraumatic   Eyes- Conjunctivae pink, anicteric  Throat- Oral mucosa pink and moist  Neck-  No stridor, trachea midline, no jugular venous distention. No carotid bruit. CHEST: Chest symmetrical and non-tender to palpation. No accessory muscle use or intercostal retractions  RESPIRATORY: Lung sounds - clear throughout fields   CARDIOVASCULAR:     Heart Ausculation- Regular rate and rhythm, no murmur heard. PV: No lower extremity edema. No varicosities. Pedal pulses palpable, no clubbing or cyanosis   ABDOMEN: Soft, obese, + tenderness to L side of abdomen with palpation. Bowel sounds present. No palpable masses; no abdominal bruit  MS: Good muscle strength and tone. No atrophy or abnormal movements. : Deferred  SKIN: Warm and dry no statis dermatitis or ulcers   NEURO / PSYCH: Oriented to person, place and time. Speech clear and appropriate. Follows all commands. Flat affect     DATA:    ECG as per Dr Jaime Shepherd interpretation  Tele strips: SR    Diagnostic:    Abdominal Xray 12/18/2021: Chest: No evidence of an acute cardiopulmonary process  Abdomen: Large volume of retained fecal material within the colon concerning for constipation. There is a 2 mm calculus projecting over the left renal silhouette that  may represent a nonobstructing stone      CT Abdomen/Pelvis 12/19/2021: Moderate left hydronephrosis secondary to 3 mm obstructive calculus in the left ureteral vesicular junction.  Uncomplicated diverticulosis        Labs:   CBC:   Recent Labs     12/18/21  2141   WBC 24.6*   HGB 11.9   HCT 38.1   *     BMP:   Recent Labs     12/18/21  2141      K 4.1   CO2 22   BUN 19   CREATININE 1.1*   LABGLOM 59   CALCIUM 10.2     TFT:   Lab Results   Component Value Date    TSH 0.591 06/10/2019 R4NYMWV 9.2 10/05/2017    T4FREE 1.22 05/13/2019      HgA1c:   Lab Results   Component Value Date    LABA1C 11.5 07/29/2021     proBNP:   Recent Labs     12/17/21  0546   PROBNP 1,428*     CARDIAC ENZYMES:  Recent Labs     12/18/21 2141 12/19/21  0210   TROPHS 80* 96*     FASTING LIPID PANEL:  Lab Results   Component Value Date    CHOL 152 06/10/2019    HDL 54 06/10/2019    LDLCALC 85 06/10/2019    TRIG 66 06/10/2019     LIVER PROFILE:  Recent Labs     12/18/21 2141   AST 14   ALT 7   LABALBU 4.1   A&P per Dr Jay De La Paz  Electronically signed by Andrea Wilcox. KEITH Argueta on 12/19/2021 at 9:45 AM                 I have personally seen and evaluated the patient. I personally obtained the history and performed the physical exam.  I personally reviewed all of the above labs, history, review of systems, and data. All of the assessments and recommendations are from me. All of the above cardiac medical decisions are from me. Please see my additional contributions to the history, physical exam, assessment, and recommendations below.        History of chief complaint:  She was having constipation and abdominal pain prior to her recent discharge. She states that she has not had a bowel movement in over a week. She continues to have left lower quadrant discomfort and unable to eat. She denies any chest discomfort. She has not been compliant with any of her hypertension medications after her discharge.     Review of systems:                Heart: as above              Lungs: as above              Eyes: denies changes in vision or discharge. Ears: denies changes in hearing or pain. Nose: denies epistaxis or masses              Throat: denies sore throat or trouble swallowing. Neuro: denies numbness, tingling, tremors. Skin: denies rashes or itching.               : denies hematuria, dysuria              GI: denies vomiting, diarrhea              Psych: denies mood changed, anxiety, depression.                   Physical exam:  BP (!) 165/93   Pulse 92   Temp 98.7 °F (37.1 °C) (Oral)   Resp 18   Ht 5' 5\" (1.651 m)   Wt 242 lb (109.8 kg)   SpO2 97%   BMI 40.27 kg/m²   Constitutional: A&O x3, communicates well, no acute distress. Eyes: extraocular muscles intact, PERRL. Normal lids & conjunctiva. No icterus. ENT: clear, no bleeding. No external masses. Lips normal formation. Neck: supple, full ROM, no JVD, no bruits, no lymphadenopathy. No masses. trachea midline. Heart: Distant. Regular rate & rhythm, normal S1 & S2. No heave. Lungs: CTA. No accessory muscles. Abd: Morbidly obese. Mild left lower quadrant tenderness. Neuro: Full ROM X 4, EOMI, no tremors. EXT: No bilateral lower extremity edema  Skin: warm, dry, intact. Good turgor. Psych: A&O x 3, normal behavior, not anxious.     Patient seen and examined. Chart, labs & data reviewed.     A:  1. Constipation. 2. Lower abdominal discomfort due to the above. 3. Uncontrolled hypertension due to noncompliance. 4. Urinary tract infection prior to her recent discharge. Few bacteria now on her urinalysis. 5. Chronic left bundle branch block. 6. Coronary artery disease. Recent stenting of the RCA. No cardiac symptoms. 7. Pulmonary hypertension. 8. CVA. 9. Diabetes        Rec:  1. We will defer to others for establishing assistance for her to take her medication after discharge. 2. Resume home medications. 3. No active cardiac issues. Cardiology will sign off.     Electronically signed by Miguelangel Santana DO on 12/19/2021 at 11:23 AM     Note: This report was completed using computerized voice recognition software.  Every effort has been made to ensure accuracy, however; and invert and computerized transcription errors may be present.

## 2021-12-20 LAB
ALBUMIN SERPL-MCNC: 3.2 G/DL (ref 3.5–5.2)
ALP BLD-CCNC: 110 U/L (ref 35–104)
ALT SERPL-CCNC: 5 U/L (ref 0–32)
ANION GAP SERPL CALCULATED.3IONS-SCNC: 9 MMOL/L (ref 7–16)
AST SERPL-CCNC: 13 U/L (ref 0–31)
BASOPHILS ABSOLUTE: 0.02 E9/L (ref 0–0.2)
BASOPHILS RELATIVE PERCENT: 0.2 % (ref 0–2)
BILIRUB SERPL-MCNC: 0.5 MG/DL (ref 0–1.2)
BUN BLDV-MCNC: 14 MG/DL (ref 6–23)
CALCIUM SERPL-MCNC: 8.8 MG/DL (ref 8.6–10.2)
CHLORIDE BLD-SCNC: 106 MMOL/L (ref 98–107)
CO2: 26 MMOL/L (ref 22–29)
CREAT SERPL-MCNC: 0.8 MG/DL (ref 0.5–1)
EOSINOPHILS ABSOLUTE: 0 E9/L (ref 0.05–0.5)
EOSINOPHILS RELATIVE PERCENT: 0 % (ref 0–6)
GFR AFRICAN AMERICAN: >60
GFR NON-AFRICAN AMERICAN: >60 ML/MIN/1.73
GLUCOSE BLD-MCNC: 130 MG/DL (ref 74–99)
HCT VFR BLD CALC: 33.7 % (ref 34–48)
HEMOGLOBIN: 10.3 G/DL (ref 11.5–15.5)
IMMATURE GRANULOCYTES #: 0.05 E9/L
IMMATURE GRANULOCYTES %: 0.5 % (ref 0–5)
LYMPHOCYTES ABSOLUTE: 1.29 E9/L (ref 1.5–4)
LYMPHOCYTES RELATIVE PERCENT: 13.4 % (ref 20–42)
MCH RBC QN AUTO: 26.1 PG (ref 26–35)
MCHC RBC AUTO-ENTMCNC: 30.6 % (ref 32–34.5)
MCV RBC AUTO: 85.5 FL (ref 80–99.9)
METER GLUCOSE: 107 MG/DL (ref 74–99)
METER GLUCOSE: 109 MG/DL (ref 74–99)
METER GLUCOSE: 132 MG/DL (ref 74–99)
METER GLUCOSE: 97 MG/DL (ref 74–99)
MONOCYTES ABSOLUTE: 1.27 E9/L (ref 0.1–0.95)
MONOCYTES RELATIVE PERCENT: 13.2 % (ref 2–12)
NEUTROPHILS ABSOLUTE: 6.97 E9/L (ref 1.8–7.3)
NEUTROPHILS RELATIVE PERCENT: 72.7 % (ref 43–80)
PDW BLD-RTO: 15.2 FL (ref 11.5–15)
PLATELET # BLD: 412 E9/L (ref 130–450)
PMV BLD AUTO: 10.9 FL (ref 7–12)
POTASSIUM SERPL-SCNC: 3.6 MMOL/L (ref 3.5–5)
RBC # BLD: 3.94 E12/L (ref 3.5–5.5)
SODIUM BLD-SCNC: 141 MMOL/L (ref 132–146)
TOTAL PROTEIN: 6.3 G/DL (ref 6.4–8.3)
TROPONIN, HIGH SENSITIVITY: 112 NG/L (ref 0–9)
WBC # BLD: 9.6 E9/L (ref 4.5–11.5)

## 2021-12-20 PROCEDURE — 6370000000 HC RX 637 (ALT 250 FOR IP): Performed by: NURSE PRACTITIONER

## 2021-12-20 PROCEDURE — 6360000002 HC RX W HCPCS: Performed by: INTERNAL MEDICINE

## 2021-12-20 PROCEDURE — 6370000000 HC RX 637 (ALT 250 FOR IP): Performed by: INTERNAL MEDICINE

## 2021-12-20 PROCEDURE — 96376 TX/PRO/DX INJ SAME DRUG ADON: CPT

## 2021-12-20 PROCEDURE — 80053 COMPREHEN METABOLIC PANEL: CPT

## 2021-12-20 PROCEDURE — G0378 HOSPITAL OBSERVATION PER HR: HCPCS

## 2021-12-20 PROCEDURE — 2580000003 HC RX 258: Performed by: INTERNAL MEDICINE

## 2021-12-20 PROCEDURE — 1200000000 HC SEMI PRIVATE

## 2021-12-20 PROCEDURE — 84484 ASSAY OF TROPONIN QUANT: CPT

## 2021-12-20 PROCEDURE — 82962 GLUCOSE BLOOD TEST: CPT

## 2021-12-20 PROCEDURE — 6370000000 HC RX 637 (ALT 250 FOR IP): Performed by: EMERGENCY MEDICINE

## 2021-12-20 PROCEDURE — 85025 COMPLETE CBC W/AUTO DIFF WBC: CPT

## 2021-12-20 RX ORDER — SENNA PLUS 8.6 MG/1
2 TABLET ORAL ONCE
Status: COMPLETED | OUTPATIENT
Start: 2021-12-20 | End: 2021-12-20

## 2021-12-20 RX ORDER — SENNA PLUS 8.6 MG/1
1 TABLET ORAL 2 TIMES DAILY
Status: DISCONTINUED | OUTPATIENT
Start: 2021-12-21 | End: 2021-12-23 | Stop reason: HOSPADM

## 2021-12-20 RX ORDER — POLYETHYLENE GLYCOL 3350 17 G/17G
17 POWDER, FOR SOLUTION ORAL 2 TIMES DAILY
Status: DISCONTINUED | OUTPATIENT
Start: 2021-12-20 | End: 2021-12-23 | Stop reason: HOSPADM

## 2021-12-20 RX ORDER — INSULIN GLARGINE 100 [IU]/ML
32 INJECTION, SOLUTION SUBCUTANEOUS NIGHTLY
Status: DISCONTINUED | OUTPATIENT
Start: 2021-12-20 | End: 2021-12-23 | Stop reason: HOSPADM

## 2021-12-20 RX ORDER — BISACODYL 10 MG
10 SUPPOSITORY, RECTAL RECTAL ONCE
Status: COMPLETED | OUTPATIENT
Start: 2021-12-20 | End: 2021-12-20

## 2021-12-20 RX ORDER — ATORVASTATIN CALCIUM 40 MG/1
40 TABLET, FILM COATED ORAL NIGHTLY
Status: DISCONTINUED | OUTPATIENT
Start: 2021-12-20 | End: 2021-12-23 | Stop reason: HOSPADM

## 2021-12-20 RX ORDER — POLYETHYLENE GLYCOL 3350 17 G/17G
17 POWDER, FOR SOLUTION ORAL 2 TIMES DAILY
Status: DISCONTINUED | OUTPATIENT
Start: 2021-12-20 | End: 2021-12-20 | Stop reason: ALTCHOICE

## 2021-12-20 RX ORDER — POLYETHYLENE GLYCOL 3350 17 G/17G
17 POWDER, FOR SOLUTION ORAL DAILY
Status: DISCONTINUED | OUTPATIENT
Start: 2021-12-20 | End: 2021-12-20

## 2021-12-20 RX ORDER — NICOTINE POLACRILEX 4 MG
15 LOZENGE BUCCAL PRN
Status: DISCONTINUED | OUTPATIENT
Start: 2021-12-20 | End: 2021-12-23 | Stop reason: HOSPADM

## 2021-12-20 RX ORDER — DEXTROSE MONOHYDRATE 50 MG/ML
100 INJECTION, SOLUTION INTRAVENOUS PRN
Status: DISCONTINUED | OUTPATIENT
Start: 2021-12-20 | End: 2021-12-23 | Stop reason: HOSPADM

## 2021-12-20 RX ORDER — GABAPENTIN 300 MG/1
300 CAPSULE ORAL 3 TIMES DAILY
Status: DISCONTINUED | OUTPATIENT
Start: 2021-12-20 | End: 2021-12-23 | Stop reason: HOSPADM

## 2021-12-20 RX ORDER — DEXTROSE MONOHYDRATE 25 G/50ML
12.5 INJECTION, SOLUTION INTRAVENOUS PRN
Status: DISCONTINUED | OUTPATIENT
Start: 2021-12-20 | End: 2021-12-23 | Stop reason: HOSPADM

## 2021-12-20 RX ORDER — NAPROXEN 500 MG/1
500 TABLET ORAL ONCE
Status: COMPLETED | OUTPATIENT
Start: 2021-12-20 | End: 2021-12-20

## 2021-12-20 RX ADMIN — HYDRALAZINE HYDROCHLORIDE 25 MG: 25 TABLET, FILM COATED ORAL at 06:48

## 2021-12-20 RX ADMIN — INSULIN LISPRO 5 UNITS: 100 INJECTION, SOLUTION INTRAVENOUS; SUBCUTANEOUS at 17:59

## 2021-12-20 RX ADMIN — METOPROLOL TARTRATE 12.5 MG: 25 TABLET, FILM COATED ORAL at 20:31

## 2021-12-20 RX ADMIN — GABAPENTIN 300 MG: 300 CAPSULE ORAL at 14:44

## 2021-12-20 RX ADMIN — POLYETHYLENE GLYCOL 3350 17 G: 17 POWDER, FOR SOLUTION ORAL at 11:57

## 2021-12-20 RX ADMIN — SENNOSIDES 17.2 MG: 8.6 TABLET, COATED ORAL at 11:57

## 2021-12-20 RX ADMIN — NAPROXEN 500 MG: 500 TABLET ORAL at 23:17

## 2021-12-20 RX ADMIN — Medication 10 ML: at 11:59

## 2021-12-20 RX ADMIN — HYDROCHLOROTHIAZIDE 25 MG: 25 TABLET ORAL at 11:56

## 2021-12-20 RX ADMIN — HYDRALAZINE HYDROCHLORIDE 25 MG: 25 TABLET, FILM COATED ORAL at 20:31

## 2021-12-20 RX ADMIN — HYDRALAZINE HYDROCHLORIDE 25 MG: 25 TABLET, FILM COATED ORAL at 14:44

## 2021-12-20 RX ADMIN — WATER 1000 MG: 1 INJECTION INTRAMUSCULAR; INTRAVENOUS; SUBCUTANEOUS at 16:10

## 2021-12-20 RX ADMIN — MONTELUKAST SODIUM 10 MG: 10 TABLET ORAL at 20:33

## 2021-12-20 RX ADMIN — ASPIRIN 81 MG: 81 TABLET, COATED ORAL at 11:57

## 2021-12-20 RX ADMIN — ATORVASTATIN CALCIUM 40 MG: 40 TABLET, FILM COATED ORAL at 20:31

## 2021-12-20 RX ADMIN — ACETAMINOPHEN 650 MG: 325 TABLET ORAL at 18:06

## 2021-12-20 RX ADMIN — Medication 10 ML: at 20:31

## 2021-12-20 RX ADMIN — INSULIN GLARGINE 32 UNITS: 100 INJECTION, SOLUTION SUBCUTANEOUS at 20:33

## 2021-12-20 RX ADMIN — CLONIDINE HYDROCHLORIDE 0.2 MG: 0.1 TABLET ORAL at 14:44

## 2021-12-20 RX ADMIN — SODIUM CHLORIDE, PRESERVATIVE FREE 10 ML: 5 INJECTION INTRAVENOUS at 16:10

## 2021-12-20 RX ADMIN — BISACODYL 10 MG: 10 SUPPOSITORY RECTAL at 11:57

## 2021-12-20 RX ADMIN — CLONIDINE HYDROCHLORIDE 0.2 MG: 0.1 TABLET ORAL at 11:57

## 2021-12-20 RX ADMIN — METOPROLOL TARTRATE 12.5 MG: 25 TABLET, FILM COATED ORAL at 11:57

## 2021-12-20 RX ADMIN — CLONIDINE HYDROCHLORIDE 0.2 MG: 0.1 TABLET ORAL at 20:30

## 2021-12-20 RX ADMIN — GABAPENTIN 300 MG: 300 CAPSULE ORAL at 20:30

## 2021-12-20 ASSESSMENT — PAIN DESCRIPTION - PROGRESSION: CLINICAL_PROGRESSION: NOT CHANGED

## 2021-12-20 ASSESSMENT — PAIN SCALES - GENERAL
PAINLEVEL_OUTOF10: 5
PAINLEVEL_OUTOF10: 0
PAINLEVEL_OUTOF10: 6

## 2021-12-20 ASSESSMENT — PAIN DESCRIPTION - DESCRIPTORS: DESCRIPTORS: ACHING;CONSTANT;DISCOMFORT

## 2021-12-20 ASSESSMENT — PAIN DESCRIPTION - LOCATION: LOCATION: HEAD

## 2021-12-20 ASSESSMENT — PAIN DESCRIPTION - ONSET: ONSET: ON-GOING

## 2021-12-20 ASSESSMENT — PAIN - FUNCTIONAL ASSESSMENT: PAIN_FUNCTIONAL_ASSESSMENT: ACTIVITIES ARE NOT PREVENTED

## 2021-12-20 ASSESSMENT — PAIN DESCRIPTION - PAIN TYPE: TYPE: ACUTE PAIN

## 2021-12-20 ASSESSMENT — PAIN DESCRIPTION - FREQUENCY: FREQUENCY: CONTINUOUS

## 2021-12-20 NOTE — CONSULTS
disease) with claudication ([facility] [address] 9/23/2021    Recurrent genital HSV (herpes simplex virus) infection     last outbreak 11/2017    S/P breast biopsy, right 07/2016    pt states breast cancer    Type II or unspecified type diabetes mellitus without mention of complication, not stated as uncontrolled     AA1c8.7 9/10    UTI (urinary tract infection) 5/13/2019         Past Surgical History:   Procedure Laterality Date    ARTHROPLASTY Left 07/29/2016    thumb basil joint with dequervain's release    BREAST BIOPSY      BREAST LUMPECTOMY  years ago    benign    BREAST LUMPECTOMY Right 09/06/2016    COLONOSCOPY  2005    COLONOSCOPY  1/8/15    Dr[facility] Osbaldo [facility]  6/7/2012         FINGER SURGERY Left 07/29/2016    thumb    HAND SURGERY  12/2017    HYSTERECTOMY      JOINT REPLACEMENT      OTHER SURGICAL HISTORY Right 12/20/2017    RIGHT THUMB TRAPEZIECTOMY WITH LIGAMENT RECONSRUCTION DEQUERVAINS RELEASE    TIM AND BSO      TOTAL HIP ARTHROPLASTY Bilateral     2000, 2013 dr Seven Leach, dr Genaro Mason Bilateral 2013    dr Elyse Willard       Medications Prior to Admission:    Not in a hospital admission. Allergies:    Patient has no known allergies. Social History:    reports that she quit smoking about 20 years ago. Her smoking use included cigarettes. She started smoking about 56 years ago. She has a 8.75 pack-year smoking history. She has never used smokeless tobacco. She reports current alcohol use. She reports previous drug use. Drug: Marijuana Isiah Blow).     Family History:   Non-contributory to this Urological problem  family history includes Breast Cancer in her sister and sister; Cancer (age of onset: 36) in an other family member; Cancer (age of onset: 55) in her sister; Cancer (age of onset: 59) in her sister; Diabetes in her father and mother; Heart Attack in her mother; Heart Failure in her father; High Blood Pressure in her father and mother; Sickle Cell Anemia in an other family member; Stomach Cancer in an other family member; Stroke in her sister. Review of Systems:  Constitutional: No fever or chills   Respiratory: negative for cough and hemoptysis  Cardiovascular: negative for chest pain and dyspnea  Gastrointestinal: negative for abdominal pain, diarrhea, nausea and vomiting   Derm: negative for rash and skin lesion(s)  Neurological: negative for seizures and tremors  Musculoskeletal: Negative    Psychiatric: Negative   : As above in the HPI, otherwise negative  All other reviews are negative    Physical Exam:     Vitals:  /62   Pulse 70   Temp 98 °F (36.7 °C)   Resp 18   Ht 5' 5\" (1.651 m)   Wt 242 lb (109.8 kg)   SpO2 97%   BMI 40.27 kg/m²     General:  Awake, alert, oriented X 3. No apparent distress. HEENT:  Normocephalic, atraumatic. Lungs:  Respirations symmetric and non-labored. Abdomen:  soft, nontender, no masses  Extremities:  No clubbing, cyanosis, or edema  Skin:  Warm and dry, no open lesions or rashes  Neuro: There are no motor or sensory deficits in the 4 quadrant extremities   Rectal: deferred  Genitourinary: No Alas  Labs:     Recent Labs     12/18/21  2141 12/20/21  0601   WBC 24.6* 9.6   RBC 4.54 3.94   HGB 11.9 10.3*   HCT 38.1 33.7*   MCV 83.9 85.5   MCH 26.2 26.1   MCHC 31.2* 30.6*   RDW 14.8 15.2*   * 412   MPV 11.0 10.9         Recent Labs     12/18/21  2141 12/20/21  0601   CREATININE 1.1* 0.8       No results found for: PSA    Narrative   EXAMINATION:   CT OF THE ABDOMEN AND PELVIS WITHOUT CONTRAST 12/19/2021 11:36 am       TECHNIQUE:   CT of the abdomen and pelvis was performed without the administration of   intravenous contrast. Multiplanar reformatted images are provided for review.    Dose modulation, iterative reconstruction, and/or weight based adjustment of   the mA/kV was utilized to reduce the radiation dose to as low as reasonably   achievable.       COMPARISON:   None.       HISTORY:   ORDERING SYSTEM PROVIDED HISTORY: abdominal pain kidney stone, possible   infection   TECHNOLOGIST PROVIDED HISTORY:   Reason for exam:->abdominal pain kidney stone, possible infection   Additional Contrast?->None   Decision Support Exception - unselect if not a suspected or confirmed   emergency medical condition->Emergency Medical Condition (MA)   What reading provider will be dictating this exam?->CRC       FINDINGS:   Lower Chest: Bilateral lung bases are clear.       Liver: No hepatic lesions identified.       Bile ducts: Gallbladder is unremarkable.  No evidence of intrahepatic or   extrahepatic biliary dilatation.       Pancreas:  The pancreatic duct is not dilated.       Adrenal glands: Normal in appearance.       Kidneys: No evidence of right hydronephrosis.  Moderate left-sided   hydronephrosis with perinephric fat stranding.  Focal 3 mm calculus   identified in the left ureteral vesicular junction.  There is additional 3 mm   nonobstructive calculus in the left lower pole.       Spleen: Normal in appearance.       Bowel: No evidence of bowel obstruction.  Diverticulosis seen in the sigmoid   colon without evidence for diverticulitis.  The appendix is normal in   appearance.       Pelvis: Evaluation limited due to streak artifact from the bilateral hip   arthroplasties.  Bladder is decompressed.  No abnormal pelvic lymphadenopathy.       Peritoneum/Retroperitoneum: No abnormal lymphadenopathy.       Bones/Soft Tissues: No aggressive osseous lesions.           Impression   Moderate left hydronephrosis secondary to 3 mm obstructive calculus in the   left ureteral vesicular junction.       Uncomplicated diverticulosis       RECOMMENDATIONS:   Unavailable                         Assessment/plan:  Left hydronephrosis secondary to 3mm left UVJ stone         Creatinine stable  Leukocytosis resolved  UA unremarkable for any infection  Continue the antibiotics per primary  CT abdomen pelvis reviewed showed a 3 mm left UVJ calculi. Her pain has completely resolved at this time  She is hemodynamically stable, no aztoemia  Hold on any acute  interventions such as stenting at this time  She was made aware that if she were to experience fever, chills, or worsening pain that stenting/intervention will then need to be considered  She is ok for discharge from  standpoint  She can follow up in our office with our nurse practitioner   Please call with additional questions or concerns     Electronically signed by DMITRY Rosenthal CNP on 12/20/2021 at 8:23 AM     I agree with the assessment and plan of CAROLINE Rosenthal. I personally evaluated the patient and made any changes to reflect my impression and plan.

## 2021-12-20 NOTE — PROGRESS NOTES
Hospitalist Progress Note      Synopsis: Patient admitted on 12/19/2021   70y.o. year old female  who  has a past medical history of Arthritis, Asthma, BRCA1 negative, BRCA2 negative, Breast cancer ([facility] [address] CAD in native artery, Cancer ([facility] [address] Cerebral artery occlusion with cerebral infarction [facility]), Cerebrovascular disease, CHF (congestive heart failure) ([facility] [address] Claustrophobia, COPD (chronic obstructive pulmonary disease) ([facility] [address] Decreased dorsalis pedis pulse, Dermatophytosis, Headache(784.0), History of blood transfusion, Hives, Hx of blood clots, Hyperlipidemia, Hypertension, LBP radiating to both legs, Lymphedema of both lower extremities, Neuromuscular disorder ([facility] [address] Non-rheumatic aortic sclerosis, Obesity, Pulmonary hypertension ([facility] [address] PVD (peripheral vascular disease) with claudication ([facility] [address] Recurrent genital HSV (herpes simplex virus) infection, S/P breast biopsy, right, Type II or unspecified type diabetes mellitus without mention of complication, not stated as uncontrolled, and recent UTI (urinary tract infection). Patient presented with non-specific abdominal pain. Has had constipation for 1 week w/o BM. Also has just had PCI/CHUCK on 12/15. Denied chest pain. Cardiology consulted and cleared patient for any acute cardiac issues. Urology consulted and cleared patient for any acute  issues. She does have an obstructive 3 mm stone leading to moderate L hydro. CT AP showed large amount of stool. She's admitted for management of constipation. Subjective    Stable overnight. No other overnight issues reported. Pain improved. No distress. Still c/o mild abd pain and nausea. Exam:  BP (!) 172/90   Pulse 73   Temp 97.1 °F (36.2 °C) (Oral)   Resp 16   Ht 5' 5\" (1.651 m)   Wt 242 lb (109.8 kg)   SpO2 97%   BMI 40.27 kg/m²   General appearance: Severely obese woman; No apparent distress. HEENT: Pupils equal, round, and reactive to light. Neck: Supple. No jugular venous distention.  Trachea midline. Respiratory:  Normal respiratory effort. Clear to auscultation, bilaterally without Rales/Wheezes/Rhonchi. Cardiovascular: Normal S1/S2 without murmurs, rubs or gallops. Abdomen: Soft, mild tenderness to palpation in all 4 quadrants, non-distended with normal bowel sounds. Musculoskeletal: No deformities, BLE no edema.   Skin:  No rashes    Neurologic: awake, alert and following commands     Medications:  Reviewed    Infusion Medications    dextrose      sodium chloride       Scheduled Medications    gabapentin  300 mg Oral TID    insulin glargine  32 Units SubCUTAneous Nightly    insulin lispro  5 Units SubCUTAneous TID WC    insulin lispro  0-12 Units SubCUTAneous TID WC    insulin lispro  0-6 Units SubCUTAneous Nightly    metoprolol tartrate  12.5 mg Oral BID    atorvastatin  40 mg Oral Nightly    polyethylene glycol  17 g Oral BID    [START ON 12/21/2021] senna  1 tablet Oral BID    isosorbide mononitrate  60 mg Oral Daily    hydrALAZINE  25 mg Oral 3 times per day    clopidogrel  75 mg Oral Daily    amLODIPine  10 mg Oral Daily    aspirin  81 mg Oral Daily    losartan  100 mg Oral Daily    hydroCHLOROthiazide  25 mg Oral Daily    cloNIDine  0.2 mg Oral TID    montelukast  10 mg Oral Nightly    sodium chloride flush  5-40 mL IntraVENous 2 times per day    cefTRIAXone (ROCEPHIN) IV  1,000 mg IntraVENous Q24H    pantoprazole  40 mg Oral QAM AC     PRN Meds: glucose, dextrose, glucagon (rDNA), dextrose, sodium chloride flush, sodium chloride, ondansetron **OR** ondansetron, acetaminophen **OR** acetaminophen, morphine    I/O  No intake or output data in the 24 hours ending 12/20/21 1740    Labs:   Recent Labs     12/18/21  2141 12/20/21  0601   WBC 24.6* 9.6   HGB 11.9 10.3*   HCT 38.1 33.7*   * 412       Recent Labs     12/18/21  2141 12/20/21  0601    141   K 4.1 3.6   CL 95* 106   CO2 22 26   BUN 19 14   CREATININE 1.1* 0.8   CALCIUM 10.2 8.8       Recent Labs 12/18/21  2141 12/20/21  0601   PROT 7.9 6.3*   ALKPHOS 162* 110*   ALT 7 5   AST 14 13   BILITOT 0.7 0.5   LIPASE 10*  --        No results for input(s): INR in the last 72 hours. No results for input(s): Jody Gomes in the last 72 hours. Chronic labs:  Lab Results   Component Value Date    CHOL 152 06/10/2019    TRIG 66 06/10/2019    HDL 54 06/10/2019    LDLCALC 85 06/10/2019    TSH 0.591 06/10/2019    INR 1.1 07/16/2013    LABA1C 11.5 07/29/2021       Radiology:  Imaging studies reviewed today. ASSESSMENT:    Abdominal pain  Constipation  CAD s/p PCI/CHUCK 12/15/2021  Obstructive kidney stone: 3 mm  Moderate hydronephrosis, left     PLAN:    - initiate bowel regimen for constipation: patient had BM; advance diet as tolerated  - c/w home meds  - Cards and Urology have cleared patient for dc  - monitor overnight; if stable, dc tomorrow    Diet: ADULT DIET; Regular; 3 carb choices (45 gm/meal); Low Fat/Low Chol/High Fiber/KRUNAL; High Fiber  Code Status: Full Code  PT/OT Eval Status:     DVT Prophylaxis:     Recommended disposition at discharge:      +++++++++++++++++++++++++++++++++++++++++++++++++  Isreal Knight MD   [facility].  +++++++++++++++++++++++++++++++++++++++++++++++++  NOTE: This report was transcribed using voice recognition software. Every effort was made to ensure accuracy; however, inadvertent computerized transcription errors may be present.

## 2021-12-21 LAB
ALBUMIN SERPL-MCNC: 3 G/DL (ref 3.5–5.2)
ANION GAP SERPL CALCULATED.3IONS-SCNC: 10 MMOL/L (ref 7–16)
BUN BLDV-MCNC: 16 MG/DL (ref 6–23)
CALCIUM SERPL-MCNC: 8.9 MG/DL (ref 8.6–10.2)
CHLORIDE BLD-SCNC: 103 MMOL/L (ref 98–107)
CO2: 26 MMOL/L (ref 22–29)
CREAT SERPL-MCNC: 1 MG/DL (ref 0.5–1)
GFR AFRICAN AMERICAN: >60
GFR NON-AFRICAN AMERICAN: >60 ML/MIN/1.73
GLUCOSE BLD-MCNC: 91 MG/DL (ref 74–99)
MAGNESIUM: 1.8 MG/DL (ref 1.6–2.6)
METER GLUCOSE: 127 MG/DL (ref 74–99)
METER GLUCOSE: 245 MG/DL (ref 74–99)
METER GLUCOSE: 80 MG/DL (ref 74–99)
METER GLUCOSE: 94 MG/DL (ref 74–99)
PHOSPHORUS: 3.5 MG/DL (ref 2.5–4.5)
POTASSIUM SERPL-SCNC: 3.5 MMOL/L (ref 3.5–5)
SODIUM BLD-SCNC: 139 MMOL/L (ref 132–146)
TROPONIN, HIGH SENSITIVITY: 84 NG/L (ref 0–9)

## 2021-12-21 PROCEDURE — 6360000002 HC RX W HCPCS: Performed by: INTERNAL MEDICINE

## 2021-12-21 PROCEDURE — 84484 ASSAY OF TROPONIN QUANT: CPT

## 2021-12-21 PROCEDURE — 36415 COLL VENOUS BLD VENIPUNCTURE: CPT

## 2021-12-21 PROCEDURE — 6370000000 HC RX 637 (ALT 250 FOR IP): Performed by: INTERNAL MEDICINE

## 2021-12-21 PROCEDURE — 2580000003 HC RX 258: Performed by: INTERNAL MEDICINE

## 2021-12-21 PROCEDURE — 96376 TX/PRO/DX INJ SAME DRUG ADON: CPT

## 2021-12-21 PROCEDURE — G0378 HOSPITAL OBSERVATION PER HR: HCPCS

## 2021-12-21 PROCEDURE — 1200000000 HC SEMI PRIVATE

## 2021-12-21 PROCEDURE — 83735 ASSAY OF MAGNESIUM: CPT

## 2021-12-21 PROCEDURE — 80069 RENAL FUNCTION PANEL: CPT

## 2021-12-21 PROCEDURE — 82962 GLUCOSE BLOOD TEST: CPT

## 2021-12-21 PROCEDURE — 6370000000 HC RX 637 (ALT 250 FOR IP): Performed by: NURSE PRACTITIONER

## 2021-12-21 RX ORDER — POTASSIUM CHLORIDE 20 MEQ/1
40 TABLET, EXTENDED RELEASE ORAL ONCE
Status: COMPLETED | OUTPATIENT
Start: 2021-12-21 | End: 2021-12-21

## 2021-12-21 RX ORDER — SENNA PLUS 8.6 MG/1
1 TABLET ORAL ONCE
Status: COMPLETED | OUTPATIENT
Start: 2021-12-21 | End: 2021-12-21

## 2021-12-21 RX ORDER — BISACODYL 10 MG
10 SUPPOSITORY, RECTAL RECTAL ONCE
Status: COMPLETED | OUTPATIENT
Start: 2021-12-21 | End: 2021-12-21

## 2021-12-21 RX ADMIN — ACETAMINOPHEN 650 MG: 325 TABLET ORAL at 17:58

## 2021-12-21 RX ADMIN — SENNOSIDES 8.6 MG: 8.6 TABLET, COATED ORAL at 14:55

## 2021-12-21 RX ADMIN — CLONIDINE HYDROCHLORIDE 0.2 MG: 0.1 TABLET ORAL at 21:30

## 2021-12-21 RX ADMIN — SENNOSIDES 8.6 MG: 8.6 TABLET, COATED ORAL at 21:30

## 2021-12-21 RX ADMIN — METOPROLOL TARTRATE 12.5 MG: 25 TABLET, FILM COATED ORAL at 08:52

## 2021-12-21 RX ADMIN — GABAPENTIN 300 MG: 300 CAPSULE ORAL at 08:52

## 2021-12-21 RX ADMIN — MONTELUKAST SODIUM 10 MG: 10 TABLET ORAL at 21:30

## 2021-12-21 RX ADMIN — PANTOPRAZOLE SODIUM 40 MG: 40 TABLET, DELAYED RELEASE ORAL at 06:05

## 2021-12-21 RX ADMIN — GABAPENTIN 300 MG: 300 CAPSULE ORAL at 21:30

## 2021-12-21 RX ADMIN — METOPROLOL TARTRATE 12.5 MG: 25 TABLET, FILM COATED ORAL at 21:30

## 2021-12-21 RX ADMIN — Medication 10 ML: at 08:53

## 2021-12-21 RX ADMIN — HYDRALAZINE HYDROCHLORIDE 25 MG: 25 TABLET, FILM COATED ORAL at 06:05

## 2021-12-21 RX ADMIN — CLONIDINE HYDROCHLORIDE 0.2 MG: 0.1 TABLET ORAL at 08:52

## 2021-12-21 RX ADMIN — MAGNESIUM GLUCONATE 500 MG ORAL TABLET 400 MG: 500 TABLET ORAL at 09:01

## 2021-12-21 RX ADMIN — LOSARTAN POTASSIUM 100 MG: 50 TABLET, FILM COATED ORAL at 08:51

## 2021-12-21 RX ADMIN — CLONIDINE HYDROCHLORIDE 0.2 MG: 0.1 TABLET ORAL at 14:55

## 2021-12-21 RX ADMIN — HYDRALAZINE HYDROCHLORIDE 25 MG: 25 TABLET, FILM COATED ORAL at 21:30

## 2021-12-21 RX ADMIN — ATORVASTATIN CALCIUM 40 MG: 40 TABLET, FILM COATED ORAL at 21:30

## 2021-12-21 RX ADMIN — ASPIRIN 81 MG: 81 TABLET, COATED ORAL at 08:53

## 2021-12-21 RX ADMIN — ISOSORBIDE MONONITRATE 60 MG: 30 TABLET ORAL at 08:52

## 2021-12-21 RX ADMIN — Medication 10 ML: at 21:31

## 2021-12-21 RX ADMIN — INSULIN LISPRO 5 UNITS: 100 INJECTION, SOLUTION INTRAVENOUS; SUBCUTANEOUS at 17:58

## 2021-12-21 RX ADMIN — CLOPIDOGREL 75 MG: 75 TABLET, FILM COATED ORAL at 08:52

## 2021-12-21 RX ADMIN — GABAPENTIN 300 MG: 300 CAPSULE ORAL at 14:55

## 2021-12-21 RX ADMIN — BISACODYL 10 MG: 10 SUPPOSITORY RECTAL at 14:56

## 2021-12-21 RX ADMIN — ACETAMINOPHEN 650 MG: 325 TABLET ORAL at 06:09

## 2021-12-21 RX ADMIN — INSULIN LISPRO 4 UNITS: 100 INJECTION, SOLUTION INTRAVENOUS; SUBCUTANEOUS at 18:03

## 2021-12-21 RX ADMIN — HYDRALAZINE HYDROCHLORIDE 25 MG: 25 TABLET, FILM COATED ORAL at 14:59

## 2021-12-21 RX ADMIN — HYDROCHLOROTHIAZIDE 25 MG: 25 TABLET ORAL at 08:51

## 2021-12-21 RX ADMIN — POTASSIUM CHLORIDE 40 MEQ: 1500 TABLET, EXTENDED RELEASE ORAL at 09:01

## 2021-12-21 RX ADMIN — WATER 1000 MG: 1 INJECTION INTRAMUSCULAR; INTRAVENOUS; SUBCUTANEOUS at 17:59

## 2021-12-21 RX ADMIN — AMLODIPINE BESYLATE 10 MG: 5 TABLET ORAL at 08:52

## 2021-12-21 RX ADMIN — SENNOSIDES 8.6 MG: 8.6 TABLET, COATED ORAL at 09:01

## 2021-12-21 ASSESSMENT — PAIN SCALES - GENERAL
PAINLEVEL_OUTOF10: 5
PAINLEVEL_OUTOF10: 0
PAINLEVEL_OUTOF10: 0
PAINLEVEL_OUTOF10: 5

## 2021-12-21 ASSESSMENT — PAIN DESCRIPTION - PROGRESSION: CLINICAL_PROGRESSION: NOT CHANGED

## 2021-12-21 NOTE — PROGRESS NOTES
Hospitalist Progress Note      Synopsis: Patient admitted on 12/19/2021   70y.o. year old female  who  has a past medical history of Arthritis, Asthma, BRCA1 negative, BRCA2 negative, Breast cancer ([facility] [address] CAD in native artery, Cancer ([facility] [address] Cerebral artery occlusion with cerebral infarction [facility]), Cerebrovascular disease, CHF (congestive heart failure) ([facility] [address] Claustrophobia, COPD (chronic obstructive pulmonary disease) ([facility] [address] Decreased dorsalis pedis pulse, Dermatophytosis, Headache(784.0), History of blood transfusion, Hives, Hx of blood clots, Hyperlipidemia, Hypertension, LBP radiating to both legs, Lymphedema of both lower extremities, Neuromuscular disorder ([facility] [address] Non-rheumatic aortic sclerosis, Obesity, Pulmonary hypertension ([facility] [address] PVD (peripheral vascular disease) with claudication ([facility] [address] Recurrent genital HSV (herpes simplex virus) infection, S/P breast biopsy, right, Type II or unspecified type diabetes mellitus without mention of complication, not stated as uncontrolled, and recent UTI (urinary tract infection). Patient presented with non-specific abdominal pain. Has had constipation for 1 week w/o BM. Also has just had PCI/CHUCK on 12/15. Denied chest pain. Cardiology consulted and cleared patient for any acute cardiac issues. Urology consulted and cleared patient for any acute  issues. She does have an obstructive 3 mm stone leading to moderate L hydro. CT AP showed large amount of stool. She's admitted for management of constipation. Subjective    Stable overnight. No other overnight issues reported. Had 2 small BM yesterday. Abdomen still distended with mild pain. Diet resumed. Exam:  BP (!) 160/88   Pulse 64   Temp 98.3 °F (36.8 °C) (Temporal)   Resp 16   Ht 5' 5\" (1.651 m)   Wt 242 lb (109.8 kg)   SpO2 100%   BMI 40.27 kg/m²   General appearance: Severely obese woman; No apparent distress. HEENT: Pupils equal, round, and reactive to light. Neck: Supple.  No jugular venous distention. Trachea midline. Respiratory:  Normal respiratory effort. Clear to auscultation, bilaterally without Rales/Wheezes/Rhonchi. Cardiovascular: Normal S1/S2 without murmurs, rubs or gallops. Abdomen: Soft, mild tenderness to palpation in all 4 quadrants, non-distended with normal bowel sounds. Musculoskeletal: No deformities, BLE no edema.   Skin:  No rashes    Neurologic: awake, alert and following commands     Medications:  Reviewed    Infusion Medications    dextrose      sodium chloride       Scheduled Medications    magnesium oxide  400 mg Oral Daily    bisacodyl  10 mg Rectal Once    senna  1 tablet Oral Once    gabapentin  300 mg Oral TID    insulin glargine  32 Units SubCUTAneous Nightly    insulin lispro  5 Units SubCUTAneous TID WC    insulin lispro  0-12 Units SubCUTAneous TID WC    insulin lispro  0-6 Units SubCUTAneous Nightly    metoprolol tartrate  12.5 mg Oral BID    atorvastatin  40 mg Oral Nightly    polyethylene glycol  17 g Oral BID    senna  1 tablet Oral BID    isosorbide mononitrate  60 mg Oral Daily    hydrALAZINE  25 mg Oral 3 times per day    clopidogrel  75 mg Oral Daily    amLODIPine  10 mg Oral Daily    aspirin  81 mg Oral Daily    losartan  100 mg Oral Daily    hydroCHLOROthiazide  25 mg Oral Daily    cloNIDine  0.2 mg Oral TID    montelukast  10 mg Oral Nightly    sodium chloride flush  5-40 mL IntraVENous 2 times per day    cefTRIAXone (ROCEPHIN) IV  1,000 mg IntraVENous Q24H    pantoprazole  40 mg Oral QAM AC     PRN Meds: glucose, dextrose, glucagon (rDNA), dextrose, sodium chloride flush, sodium chloride, ondansetron **OR** ondansetron, acetaminophen **OR** acetaminophen, morphine    I/O    Intake/Output Summary (Last 24 hours) at 12/21/2021 1448  Last data filed at 12/21/2021 0518  Gross per 24 hour   Intake 240 ml   Output    Net 240 ml       Labs:   Recent Labs     12/18/21  2141 12/20/21  0601   WBC 24.6* 9.6   HGB 11.9 10.3*   HCT 38.1 33.7*   * 412       Recent Labs     12/18/21  2141 12/20/21  0601 12/21/21  0539    141 139   K 4.1 3.6 3.5   CL 95* 106 103   CO2 22 26 26   BUN 19 14 16   CREATININE 1.1* 0.8 1.0   CALCIUM 10.2 8.8 8.9   PHOS  --   --  3.5       Recent Labs     12/18/21  2141 12/20/21  0601   PROT 7.9 6.3*   ALKPHOS 162* 110*   ALT 7 5   AST 14 13   BILITOT 0.7 0.5   LIPASE 10*  --        No results for input(s): INR in the last 72 hours. No results for input(s): Gertrudis Horn in the last 72 hours. Chronic labs:  Lab Results   Component Value Date    CHOL 152 06/10/2019    TRIG 66 06/10/2019    HDL 54 06/10/2019    LDLCALC 85 06/10/2019    TSH 0.591 06/10/2019    INR 1.1 07/16/2013    LABA1C 11.5 07/29/2021       Radiology:  Imaging studies reviewed today. ASSESSMENT:    Abdominal pain  Severe constipation  CAD s/p PCI/CHUCK 12/15/2021  Obstructive kidney stone: 3 mm  Moderate hydronephrosis, left     PLAN:    - c/w aggressive bowel regimen for constipation: miralax, senna, and suppository  - advance diet as tolerated  - c/w other home meds  - Cards and Urology have cleared patient for dc  - monitor overnight; if good BM today and stable overnight, dc tomorrow    Diet: ADULT DIET; Regular; 3 carb choices (45 gm/meal); Low Fat/Low Chol/High Fiber/KRUNAL; High Fiber  Code Status: Full Code  PT/OT Eval Status:   independent  DVT Prophylaxis:   SCD  Recommended disposition at discharge:  likely home    +++++++++++++++++++++++++++++++++++++++++++++++++  Sri Baldwin MD   [facility].  +++++++++++++++++++++++++++++++++++++++++++++++++  NOTE: This report was transcribed using voice recognition software. Every effort was made to ensure accuracy; however, inadvertent computerized transcription errors may be present.

## 2021-12-21 NOTE — PROGRESS NOTES
[facility] PROGRESS NOTE                                                                       2021        CHIEF UROLOGIC COMPLAINT: distal ureteral stone, 3 mm     HISTORY OF PRESENT ILLNESS:  Patient without new complaints. Feels slightly better today than yesterday. Did not see a stone pass but did not strain urine. No nausea or emesis. No fever or chills. REVIEW OF SYSTEMS:   CONSTITUTIONAL: negative  HEENT: negative  HEMATOLOGIC: negative  ENDOCRINE: negative  RESPIRATORY: negative  CV: negative  GI: negative  NEURO: negative  ORTHOPEDICS: negative  PSYCHIATRIC: negative  : as above    PAST FAMILY HISTORY:    Family History   Problem Relation Age of Onset    Diabetes Mother     High Blood Pressure Mother     Heart Attack Mother     High Blood Pressure Father     Diabetes Father     Heart Failure Father     Breast Cancer Sister    [employer] Stroke Sister         young age   [employer] Cancer Sister 55        breast    Sickle Cell Anemia Other         niece    Cancer Other 36        niece - breast    Breast Cancer Sister             Cancer Sister 59        breast & ovarian    Stomach Cancer Other         aunt     PAST SOCIAL HISTORY:    Social History     Socioeconomic History    Marital status:      Spouse name: None    Number of children: None    Years of education: None    Highest education level: None   Occupational History    Occupation: retired- , [employer]   Tobacco Use    Smoking status: Former Smoker     Packs/day: 0.25     Years: 35.00     Pack years: 8.75     Types: Cigarettes     Start date:      Quit date: 2001     Years since quittin.1    Smokeless tobacco: Never Used   Vaping Use    Vaping Use: Never used   Substance and Sexual Activity    Alcohol use:  Yes     Alcohol/week: 0.0 - 1.0 standard drinks     Comment: rare    Drug use: Not Currently     Types: Marijuana Kimberlynmonse Zhu)     Comment: last used 8/2018    Sexual activity: Not Currently     Partners: Male     Comment: divorce   Other Topics Concern    None   Social History Narrative    Drinks 1 sweet tea daily; occ Coke. Drinks decaf coffee. Social Determinants of Health     Financial Resource Strain:     Difficulty of Paying Living Expenses: Not on file   Food Insecurity:     Worried About Running Out of Food in the Last Year: Not on file    Radha of Food in the Last Year: Not on file   Transportation Needs:     Lack of Transportation (Medical): Not on file    Lack of Transportation (Non-Medical):  Not on file   Physical Activity:     Days of Exercise per Week: Not on file    Minutes of Exercise per Session: Not on file   Stress:     Feeling of Stress : Not on file   Social Connections:     Frequency of Communication with Friends and Family: Not on file    Frequency of Social Gatherings with Friends and Family: Not on file    Attends Religion Services: Not on file    Active Member of Clubs or Organizations: Not on file    Attends Club or Organization Meetings: Not on file    Marital Status: Not on file   Intimate Partner Violence:     Fear of Current or Ex-Partner: Not on file    Emotionally Abused: Not on file    Physically Abused: Not on file    Sexually Abused: Not on file   Housing Stability:     Unable to Pay for Housing in the Last Year: Not on file    Number of Places Lived in the Last Year: Not on file    Unstable Housing in the Last Year: Not on file       Scheduled Meds:   gabapentin  300 mg Oral TID    insulin glargine  32 Units SubCUTAneous Nightly    insulin lispro  5 Units SubCUTAneous TID WC    insulin lispro  0-12 Units SubCUTAneous TID WC    insulin lispro  0-6 Units SubCUTAneous Nightly    metoprolol tartrate  12.5 mg Oral BID    atorvastatin  40 mg Oral Nightly    polyethylene glycol  17 g Oral BID    senna  1 tablet Oral BID  isosorbide mononitrate  60 mg Oral Daily    hydrALAZINE  25 mg Oral 3 times per day    clopidogrel  75 mg Oral Daily    amLODIPine  10 mg Oral Daily    aspirin  81 mg Oral Daily    losartan  100 mg Oral Daily    hydroCHLOROthiazide  25 mg Oral Daily    cloNIDine  0.2 mg Oral TID    montelukast  10 mg Oral Nightly    sodium chloride flush  5-40 mL IntraVENous 2 times per day    cefTRIAXone (ROCEPHIN) IV  1,000 mg IntraVENous Q24H    pantoprazole  40 mg Oral QAM AC     Continuous Infusions:   dextrose      sodium chloride       PRN Meds:.glucose, dextrose, glucagon (rDNA), dextrose, sodium chloride flush, sodium chloride, ondansetron **OR** ondansetron, acetaminophen **OR** acetaminophen, morphine    /78   Pulse 77   Temp 97.9 °F (36.6 °C) (Temporal)   Resp 16   Ht 5' 5\" (1.651 m)   Wt 242 lb (109.8 kg)   SpO2 98%   BMI 40.27 kg/m²     Lab Results   Component Value Date    WBC 9.6 12/20/2021    HGB 10.3 (L) 12/20/2021    HCT 33.7 (L) 12/20/2021    MCV 85.5 12/20/2021     12/20/2021       Lab Results   Component Value Date    CREATININE 0.8 12/20/2021       No results found for: PSA    Lab Results   Component Value Date    LABURIN  05/12/2019     ,000 CFU/mL  Mixed jud isolated. Further workup and sensitivity testing  is not routinely indicated and will not be performed. Mixed jud isolated includes:  Mixed gram positive organisms      LABURIN 200 mg 03/06/2019       Lab Results   Component Value Date    BC 5 Days- no growth 10/15/2018       Lab Results   Component Value Date    BLOODCULT2 24 Hours no growth 12/19/2021       PHYSICAL EXAMINATION:  Skin dry, without rashes  Respirations non-labored, intact  Abdomen soft, non-tender, non-distended  Alert and oriented x3      ASSESSMENT AND PLAN:  1. 3 mm distal ureteral stone. Did not see stone pass. Reinforced the need for straining urine. Stable. No indication for surgery at this time.       Bettina Henderson MD, M.D.  12/21/2021  6:34 AM

## 2021-12-21 NOTE — CARE COORDINATION
Care Coordination  The patient was admitted due to having abdominal pain onset Wednesday no bm since Sunday. She recently had a cardiac stent placed on 12/17/21. Troponin is 80 , wbc= 24.6, LA is 2.4 and her drug screen was ++ for Cannabis. Ct of the abdomen shows left hydronephrosis secondary to a 3 mm obstructive calculus. t of the chest is negative. She is on a general diet and b/c wnl. She was started on iv rocephin 1 gm iv qd. I went in and spoke to the patient and she lives in an apartment building and her apt is on the 10th floor and has an elevator to get to it. Dme she has a wheeled walker,cane and a shower chair. She has been to [facility] skilled facility before. Her PCP is Dr Leandra Leventhal, APRN - CNP and her pharmacy is [facility]. She is sitting up in bed and she plans on return home once she is medically stable. Her ride home is daughter.  I will follow

## 2021-12-22 LAB
ALBUMIN SERPL-MCNC: 3 G/DL (ref 3.5–5.2)
ANION GAP SERPL CALCULATED.3IONS-SCNC: 11 MMOL/L (ref 7–16)
BUN BLDV-MCNC: 17 MG/DL (ref 6–23)
CALCIUM SERPL-MCNC: 8.8 MG/DL (ref 8.6–10.2)
CHLORIDE BLD-SCNC: 105 MMOL/L (ref 98–107)
CO2: 24 MMOL/L (ref 22–29)
CREAT SERPL-MCNC: 0.9 MG/DL (ref 0.5–1)
GFR AFRICAN AMERICAN: >60
GFR NON-AFRICAN AMERICAN: >60 ML/MIN/1.73
GLUCOSE BLD-MCNC: 153 MG/DL (ref 74–99)
METER GLUCOSE: 137 MG/DL (ref 74–99)
METER GLUCOSE: 162 MG/DL (ref 74–99)
METER GLUCOSE: 65 MG/DL (ref 74–99)
METER GLUCOSE: 89 MG/DL (ref 74–99)
PHOSPHORUS: 3.8 MG/DL (ref 2.5–4.5)
POTASSIUM SERPL-SCNC: 3.9 MMOL/L (ref 3.5–5)
SODIUM BLD-SCNC: 140 MMOL/L (ref 132–146)

## 2021-12-22 PROCEDURE — 82962 GLUCOSE BLOOD TEST: CPT

## 2021-12-22 PROCEDURE — 96376 TX/PRO/DX INJ SAME DRUG ADON: CPT

## 2021-12-22 PROCEDURE — G0378 HOSPITAL OBSERVATION PER HR: HCPCS

## 2021-12-22 PROCEDURE — 6360000002 HC RX W HCPCS: Performed by: INTERNAL MEDICINE

## 2021-12-22 PROCEDURE — 6370000000 HC RX 637 (ALT 250 FOR IP): Performed by: NURSE PRACTITIONER

## 2021-12-22 PROCEDURE — 6370000000 HC RX 637 (ALT 250 FOR IP): Performed by: INTERNAL MEDICINE

## 2021-12-22 PROCEDURE — 36415 COLL VENOUS BLD VENIPUNCTURE: CPT

## 2021-12-22 PROCEDURE — 80069 RENAL FUNCTION PANEL: CPT

## 2021-12-22 PROCEDURE — 2580000003 HC RX 258: Performed by: INTERNAL MEDICINE

## 2021-12-22 PROCEDURE — 1200000000 HC SEMI PRIVATE

## 2021-12-22 RX ORDER — BUTALBITAL, ACETAMINOPHEN AND CAFFEINE 50; 325; 40 MG/1; MG/1; MG/1
1 TABLET ORAL EVERY 6 HOURS PRN
Status: DISCONTINUED | OUTPATIENT
Start: 2021-12-22 | End: 2021-12-23 | Stop reason: HOSPADM

## 2021-12-22 RX ADMIN — ISOSORBIDE MONONITRATE 60 MG: 30 TABLET ORAL at 10:19

## 2021-12-22 RX ADMIN — CLOPIDOGREL 75 MG: 75 TABLET, FILM COATED ORAL at 10:20

## 2021-12-22 RX ADMIN — GABAPENTIN 300 MG: 300 CAPSULE ORAL at 10:20

## 2021-12-22 RX ADMIN — PANTOPRAZOLE SODIUM 40 MG: 40 TABLET, DELAYED RELEASE ORAL at 05:09

## 2021-12-22 RX ADMIN — INSULIN LISPRO 5 UNITS: 100 INJECTION, SOLUTION INTRAVENOUS; SUBCUTANEOUS at 13:39

## 2021-12-22 RX ADMIN — INSULIN LISPRO 5 UNITS: 100 INJECTION, SOLUTION INTRAVENOUS; SUBCUTANEOUS at 10:24

## 2021-12-22 RX ADMIN — ASPIRIN 81 MG: 81 TABLET, COATED ORAL at 10:20

## 2021-12-22 RX ADMIN — CLONIDINE HYDROCHLORIDE 0.2 MG: 0.1 TABLET ORAL at 13:39

## 2021-12-22 RX ADMIN — GABAPENTIN 300 MG: 300 CAPSULE ORAL at 13:39

## 2021-12-22 RX ADMIN — HYDRALAZINE HYDROCHLORIDE 25 MG: 25 TABLET, FILM COATED ORAL at 13:39

## 2021-12-22 RX ADMIN — HYDROCHLOROTHIAZIDE 25 MG: 25 TABLET ORAL at 10:19

## 2021-12-22 RX ADMIN — WATER 1000 MG: 1 INJECTION INTRAMUSCULAR; INTRAVENOUS; SUBCUTANEOUS at 18:22

## 2021-12-22 RX ADMIN — SENNOSIDES 8.6 MG: 8.6 TABLET, COATED ORAL at 20:23

## 2021-12-22 RX ADMIN — CLONIDINE HYDROCHLORIDE 0.2 MG: 0.1 TABLET ORAL at 10:19

## 2021-12-22 RX ADMIN — Medication 10 ML: at 21:40

## 2021-12-22 RX ADMIN — CLONIDINE HYDROCHLORIDE 0.2 MG: 0.1 TABLET ORAL at 20:23

## 2021-12-22 RX ADMIN — GABAPENTIN 300 MG: 300 CAPSULE ORAL at 20:23

## 2021-12-22 RX ADMIN — Medication 10 ML: at 10:20

## 2021-12-22 RX ADMIN — HYDRALAZINE HYDROCHLORIDE 25 MG: 25 TABLET, FILM COATED ORAL at 20:23

## 2021-12-22 RX ADMIN — SENNOSIDES 8.6 MG: 8.6 TABLET, COATED ORAL at 10:19

## 2021-12-22 RX ADMIN — ATORVASTATIN CALCIUM 40 MG: 40 TABLET, FILM COATED ORAL at 20:23

## 2021-12-22 RX ADMIN — MONTELUKAST SODIUM 10 MG: 10 TABLET ORAL at 20:22

## 2021-12-22 RX ADMIN — METOPROLOL TARTRATE 12.5 MG: 25 TABLET, FILM COATED ORAL at 10:20

## 2021-12-22 RX ADMIN — BUTALBITAL, ACETAMINOPHEN, AND CAFFEINE 1 TABLET: 50; 325; 40 TABLET ORAL at 20:00

## 2021-12-22 RX ADMIN — INSULIN LISPRO 2 UNITS: 100 INJECTION, SOLUTION INTRAVENOUS; SUBCUTANEOUS at 10:23

## 2021-12-22 RX ADMIN — HYDRALAZINE HYDROCHLORIDE 25 MG: 25 TABLET, FILM COATED ORAL at 05:09

## 2021-12-22 RX ADMIN — MAGNESIUM CITRATE 296 ML: 1.75 LIQUID ORAL at 10:18

## 2021-12-22 RX ADMIN — METOPROLOL TARTRATE 12.5 MG: 25 TABLET, FILM COATED ORAL at 20:23

## 2021-12-22 RX ADMIN — LOSARTAN POTASSIUM 100 MG: 50 TABLET, FILM COATED ORAL at 10:19

## 2021-12-22 ASSESSMENT — PAIN SCALES - GENERAL
PAINLEVEL_OUTOF10: 8
PAINLEVEL_OUTOF10: 0

## 2021-12-22 ASSESSMENT — PAIN DESCRIPTION - PROGRESSION: CLINICAL_PROGRESSION: NOT CHANGED

## 2021-12-22 NOTE — PROGRESS NOTES
Pupils equal, round, and reactive to light. Neck: Supple. No jugular venous distention. Trachea midline. Respiratory:  Normal respiratory effort. Clear to auscultation, bilaterally without Rales/Wheezes/Rhonchi. Cardiovascular: Normal S1/S2 without murmurs, rubs or gallops. Abdomen: Soft, mild tenderness to palpation in all 4 quadrants, non-distended with normal bowel sounds. Musculoskeletal: No deformities, BLE no edema.   Skin:  No rashes    Neurologic: awake, alert and following commands     Medications:  Reviewed    Infusion Medications    dextrose      sodium chloride       Scheduled Medications    gabapentin  300 mg Oral TID    insulin glargine  32 Units SubCUTAneous Nightly    insulin lispro  5 Units SubCUTAneous TID WC    insulin lispro  0-12 Units SubCUTAneous TID WC    insulin lispro  0-6 Units SubCUTAneous Nightly    metoprolol tartrate  12.5 mg Oral BID    atorvastatin  40 mg Oral Nightly    polyethylene glycol  17 g Oral BID    senna  1 tablet Oral BID    isosorbide mononitrate  60 mg Oral Daily    hydrALAZINE  25 mg Oral 3 times per day    clopidogrel  75 mg Oral Daily    aspirin  81 mg Oral Daily    losartan  100 mg Oral Daily    hydroCHLOROthiazide  25 mg Oral Daily    cloNIDine  0.2 mg Oral TID    montelukast  10 mg Oral Nightly    sodium chloride flush  5-40 mL IntraVENous 2 times per day    cefTRIAXone (ROCEPHIN) IV  1,000 mg IntraVENous Q24H    pantoprazole  40 mg Oral QAM AC     PRN Meds: butalbital-acetaminophen-caffeine, glucose, dextrose, glucagon (rDNA), dextrose, sodium chloride flush, sodium chloride, ondansetron **OR** ondansetron, morphine    I/O    Intake/Output Summary (Last 24 hours) at 12/22/2021 1331  Last data filed at 12/22/2021 0527  Gross per 24 hour   Intake 960 ml   Output    Net 960 ml       Labs:   Recent Labs     12/20/21  0601   WBC 9.6   HGB 10.3*   HCT 33.7*          Recent Labs     12/20/21  0601 12/21/21  0539 12/22/21  0448    139 140   K 3.6 3.5 3.9    103 105   CO2 26 26 24   BUN 14 16 17   CREATININE 0.8 1.0 0.9   CALCIUM 8.8 8.9 8.8   PHOS  --  3.5 3.8       Recent Labs     12/20/21  0601   PROT 6.3*   ALKPHOS 110*   ALT 5   AST 13   BILITOT 0.5       No results for input(s): INR in the last 72 hours. No results for input(s): Gertrudis Horn in the last 72 hours. Chronic labs:  Lab Results   Component Value Date    CHOL 152 06/10/2019    TRIG 66 06/10/2019    HDL 54 06/10/2019    LDLCALC 85 06/10/2019    TSH 0.591 06/10/2019    INR 1.1 07/16/2013    LABA1C 11.5 07/29/2021       Radiology:  Imaging studies reviewed today. ASSESSMENT:    Abdominal pain  Severe constipation  CAD s/p PCI/CHUCK 12/15/2021: no acute issues  Obstructive kidney stone: 3 mm  Moderate hydronephrosis, left  HTN     PLAN:    - c/w aggressive bowel regimen for constipation: miralax, senna, and suppository  > mag citrate and tap water enema today; if good BM, will continue laxatives as above and discharge tomorrow  - advance diet as tolerated  - c/w other home meds, most importantly DAPT and statin given recent CHUCK  - Cards and Urology have cleared patient for dc    Diet: ADULT DIET; Regular; 3 carb choices (45 gm/meal); Low Fat/Low Chol/High Fiber/KRUNAL; High Fiber  Code Status: Full Code  PT/OT Eval Status:   independent  DVT Prophylaxis:   SCD  Recommended disposition at discharge:  likely home    +++++++++++++++++++++++++++++++++++++++++++++++++  Sri Baldwin MD   [facility].  +++++++++++++++++++++++++++++++++++++++++++++++++  NOTE: This report was transcribed using voice recognition software. Every effort was made to ensure accuracy; however, inadvertent computerized transcription errors may be present.

## 2021-12-22 NOTE — CARE COORDINATION
Plan is home with no needs when medically ready. Internal med note today, aggressive bowel regimen for constipation: miralax, senna, and suppository. mag citrate and tap water enema today; if good BM, will continue laxatives as above and discharge tomorrow. advance diet as tolerated. [facility] Cards and Urology have cleared patient for dc. Patient is currently on a Regular diet. Her friend will transport her home at time of discharge.   Lakshmi Anderson RN CM

## 2021-12-23 VITALS
SYSTOLIC BLOOD PRESSURE: 158 MMHG | BODY MASS INDEX: 40.32 KG/M2 | DIASTOLIC BLOOD PRESSURE: 67 MMHG | HEIGHT: 65 IN | OXYGEN SATURATION: 98 % | RESPIRATION RATE: 18 BRPM | TEMPERATURE: 97.7 F | HEART RATE: 57 BPM | WEIGHT: 242 LBS

## 2021-12-23 LAB
ALBUMIN SERPL-MCNC: 3 G/DL (ref 3.5–5.2)
ANION GAP SERPL CALCULATED.3IONS-SCNC: 8 MMOL/L (ref 7–16)
BUN BLDV-MCNC: 16 MG/DL (ref 6–23)
CALCIUM SERPL-MCNC: 9 MG/DL (ref 8.6–10.2)
CHLORIDE BLD-SCNC: 103 MMOL/L (ref 98–107)
CO2: 26 MMOL/L (ref 22–29)
CREAT SERPL-MCNC: 0.9 MG/DL (ref 0.5–1)
GFR AFRICAN AMERICAN: >60
GFR NON-AFRICAN AMERICAN: >60 ML/MIN/1.73
GLUCOSE BLD-MCNC: 245 MG/DL (ref 74–99)
METER GLUCOSE: 220 MG/DL (ref 74–99)
PHOSPHORUS: 3.5 MG/DL (ref 2.5–4.5)
POTASSIUM SERPL-SCNC: 4.2 MMOL/L (ref 3.5–5)
SODIUM BLD-SCNC: 137 MMOL/L (ref 132–146)

## 2021-12-23 PROCEDURE — 82962 GLUCOSE BLOOD TEST: CPT

## 2021-12-23 PROCEDURE — 2580000003 HC RX 258: Performed by: INTERNAL MEDICINE

## 2021-12-23 PROCEDURE — 36415 COLL VENOUS BLD VENIPUNCTURE: CPT

## 2021-12-23 PROCEDURE — 6370000000 HC RX 637 (ALT 250 FOR IP): Performed by: NURSE PRACTITIONER

## 2021-12-23 PROCEDURE — 80069 RENAL FUNCTION PANEL: CPT

## 2021-12-23 PROCEDURE — 6370000000 HC RX 637 (ALT 250 FOR IP): Performed by: INTERNAL MEDICINE

## 2021-12-23 PROCEDURE — G0378 HOSPITAL OBSERVATION PER HR: HCPCS

## 2021-12-23 RX ORDER — SODIUM PHOSPHATE, DIBASIC AND SODIUM PHOSPHATE, MONOBASIC 7; 19 G/133ML; G/133ML
1 ENEMA RECTAL
Status: DISCONTINUED | OUTPATIENT
Start: 2021-12-23 | End: 2021-12-23 | Stop reason: HOSPADM

## 2021-12-23 RX ADMIN — INSULIN LISPRO 5 UNITS: 100 INJECTION, SOLUTION INTRAVENOUS; SUBCUTANEOUS at 08:13

## 2021-12-23 RX ADMIN — METOPROLOL TARTRATE 12.5 MG: 25 TABLET, FILM COATED ORAL at 08:12

## 2021-12-23 RX ADMIN — SENNOSIDES 8.6 MG: 8.6 TABLET, COATED ORAL at 08:12

## 2021-12-23 RX ADMIN — LOSARTAN POTASSIUM 100 MG: 50 TABLET, FILM COATED ORAL at 08:12

## 2021-12-23 RX ADMIN — Medication 10 ML: at 08:13

## 2021-12-23 RX ADMIN — PANTOPRAZOLE SODIUM 40 MG: 40 TABLET, DELAYED RELEASE ORAL at 05:30

## 2021-12-23 RX ADMIN — CLOPIDOGREL 75 MG: 75 TABLET, FILM COATED ORAL at 08:12

## 2021-12-23 RX ADMIN — GABAPENTIN 300 MG: 300 CAPSULE ORAL at 08:13

## 2021-12-23 RX ADMIN — ISOSORBIDE MONONITRATE 60 MG: 30 TABLET ORAL at 08:12

## 2021-12-23 RX ADMIN — HYDROCHLOROTHIAZIDE 25 MG: 25 TABLET ORAL at 08:12

## 2021-12-23 RX ADMIN — HYDRALAZINE HYDROCHLORIDE 25 MG: 25 TABLET, FILM COATED ORAL at 05:30

## 2021-12-23 RX ADMIN — CLONIDINE HYDROCHLORIDE 0.2 MG: 0.1 TABLET ORAL at 08:12

## 2021-12-23 RX ADMIN — INSULIN LISPRO 4 UNITS: 100 INJECTION, SOLUTION INTRAVENOUS; SUBCUTANEOUS at 08:17

## 2021-12-23 RX ADMIN — ASPIRIN 81 MG: 81 TABLET, COATED ORAL at 08:14

## 2021-12-23 ASSESSMENT — PAIN SCALES - GENERAL: PAINLEVEL_OUTOF10: 0

## 2021-12-23 NOTE — CARE COORDINATION
Discharge order noted. Plan is home with no needs and her friend will transport her home today.   Lakshmi Anderson RN CM

## 2021-12-23 NOTE — PLAN OF CARE
Problem: Safety:  Goal: Free from accidental physical injury  Description: Free from accidental physical injury  12/23/2021 1118 by Elsa Cranker, RN  Outcome: Completed  12/23/2021 1118 by Elsa Cranker, RN  Outcome: Completed  12/23/2021 0047 by Aki Hodgson RN  Outcome: Met This Shift  Goal: Free from intentional harm  Description: Free from intentional harm  12/23/2021 1118 by Elsa Cranker, RN  Outcome: Completed  12/23/2021 1118 by Elsa Cranker, RN  Outcome: Completed  12/23/2021 0047 by Aki Hodgson RN  Outcome: Met This Shift     Problem: Daily Care:  Goal: Daily care needs are met  Description: Daily care needs are met  12/23/2021 1118 by Elsa Cranker, RN  Outcome: Completed  12/23/2021 1118 by Elsa Cranker, RN  Outcome: Completed  12/23/2021 0047 by Aki Hodgson RN  Outcome: Met This Shift     Problem: Health Behavior:  Goal: Compliance with treatment plan for underlying cause of condition will improve  Description: Compliance with treatment plan for underlying cause of condition will improve  12/23/2021 1118 by Elsa Cranker, RN  Outcome: Completed  12/23/2021 1118 by Elsa Cranker, RN  Outcome: Completed  Goal: Identification of resources available to assist in meeting health care needs will improve  Description: Identification of resources available to assist in meeting health care needs will improve  12/23/2021 1118 by Elsa Cranker, RN  Outcome: Completed  12/23/2021 1118 by Elsa Cranker, RN  Outcome: Completed     Problem: Fluid Volume:  Goal: Maintenance of adequate hydration will improve  Description: Maintenance of adequate hydration will improve  12/23/2021 1118 by Elsa Cranker, RN  Outcome: Completed  12/23/2021 1118 by Elsa Cranker, RN  Outcome: Completed     Problem: Urinary Elimination:  Goal: Skin integrity will improve  Description: Skin integrity will improve  12/23/2021 1118 by Elsa Cranker, RN  Outcome: Completed  12/23/2021 1118 by Vlad Willingham Dani Horner RN  Outcome: Completed  Goal: Ability to recognize the need to void and respond appropriately will improve  Description: Ability to recognize the need to void and respond appropriately will improve  12/23/2021 1118 by Gilmar Carlisle RN  Outcome: Completed  12/23/2021 1118 by Gilmar Carlisle RN  Outcome: Completed  Goal: Will remain free from infection  Description: Will remain free from infection  12/23/2021 1118 by Gilmar Carlisle RN  Outcome: Completed  12/23/2021 1118 by Gilmar Carlisle RN  Outcome: Completed  Goal: Incidence of incontinence will decrease  Description: Incidence of incontinence will decrease  12/23/2021 1118 by Gilmar Carlisle RN  Outcome: Completed  12/23/2021 1118 by Gilmar Carlisle RN  Outcome: Completed     Problem:  Bowel/Gastric:  Goal: Control of bowel function will improve  Description: Control of bowel function will improve  12/23/2021 1118 by Gilmar Carlisle RN  Outcome: Completed  12/23/2021 1118 by Gilmar Carlisle RN  Outcome: Completed  Goal: Ability to achieve a regular elimination pattern will improve  Description: Ability to achieve a regular elimination pattern will improve  12/23/2021 1118 by Gilmar Carlisle RN  Outcome: Completed  12/23/2021 1118 by Gilmar Carlisle RN  Outcome: Completed     Problem: Nutritional:  Goal: Ability to follow a diet with enough fiber (20 to 30 grams) for normal bowel function will improve  Description: Ability to follow a diet with enough fiber (20 to 30 grams) for normal bowel function will improve  12/23/2021 1118 by Gilmar Carlisle RN  Outcome: Completed  12/23/2021 1118 by Gilmar Carlisle RN  Outcome: Completed     Problem: Skin Integrity:  Goal: Risk for impaired skin integrity will decrease  Description: Risk for impaired skin integrity will decrease  12/23/2021 1118 by Gilmar Carlisle RN  Outcome: Completed  12/23/2021 1118 by Gilmar Carlisle RN  Outcome: Completed

## 2021-12-23 NOTE — PROGRESS NOTES
CLINICAL PHARMACY NOTE: MEDS TO BEDS    Total # of Prescriptions Filled: 1   The following medications were delivered to the patient:  · Metoprolol 25mg    Additional Documentation:    Delivered 12/23 11:10

## 2021-12-23 NOTE — PROGRESS NOTES
Hospitalist Progress Note      Synopsis: Patient admitted on 12/19/2021   70y.o. year old female  who  has a past medical history of Arthritis, Asthma, BRCA1 negative, BRCA2 negative, Breast cancer ([facility] [address] CAD in native artery, Cancer ([facility] [address] Cerebral artery occlusion with cerebral infarction [facility]), Cerebrovascular disease, CHF (congestive heart failure) ([facility] [address] Claustrophobia, COPD (chronic obstructive pulmonary disease) ([facility] [address] Decreased dorsalis pedis pulse, Dermatophytosis, Headache(784.0), History of blood transfusion, Hives, Hx of blood clots, Hyperlipidemia, Hypertension, LBP radiating to both legs, Lymphedema of both lower extremities, Neuromuscular disorder ([facility] [address] Non-rheumatic aortic sclerosis, Obesity, Pulmonary hypertension ([facility] [address] PVD (peripheral vascular disease) with claudication ([facility] [address] Recurrent genital HSV (herpes simplex virus) infection, S/P breast biopsy, right, Type II or unspecified type diabetes mellitus without mention of complication, not stated as uncontrolled, and recent UTI (urinary tract infection). Patient presented with non-specific abdominal pain. Has had constipation for 1 week w/o BM. Also has just had PCI/CHUCK on 12/15. Denied chest pain. Cardiology consulted and cleared patient for any acute cardiac issues. Urology consulted and cleared patient for any acute  issues. She does have an obstructive 3 mm stone leading to moderate L hydro. CT AP showed large amount of stool. She's admitted for management of constipation. Subjective    Issues with constipation    Exam:  BP (!) 158/67   Pulse 57   Temp 97.7 °F (36.5 °C) (Oral)   Resp 18   Ht 5' 5\" (1.651 m)   Wt 242 lb (109.8 kg)   SpO2 98%   BMI 40.27 kg/m²   General appearance: Severely obese woman; No apparent distress. HEENT: Pupils equal, round, and reactive to light. Neck: Supple. No jugular venous distention. Trachea midline. Respiratory:  Normal respiratory effort.  Clear to auscultation, bilaterally without Rales/Wheezes/Rhonchi. Cardiovascular: Normal S1/S2 without murmurs, rubs or gallops. Abdomen: Soft, mild tenderness to palpation in all 4 quadrants, non-distended with normal bowel sounds. Musculoskeletal: No deformities, BLE no edema. Skin:  No rashes    Neurologic: awake, alert and following commands     Medications:  Reviewed    Infusion Medications    dextrose      sodium chloride       Scheduled Medications    gabapentin  300 mg Oral TID    insulin glargine  32 Units SubCUTAneous Nightly    insulin lispro  5 Units SubCUTAneous TID WC    insulin lispro  0-12 Units SubCUTAneous TID WC    insulin lispro  0-6 Units SubCUTAneous Nightly    metoprolol tartrate  12.5 mg Oral BID    atorvastatin  40 mg Oral Nightly    polyethylene glycol  17 g Oral BID    senna  1 tablet Oral BID    isosorbide mononitrate  60 mg Oral Daily    hydrALAZINE  25 mg Oral 3 times per day    clopidogrel  75 mg Oral Daily    aspirin  81 mg Oral Daily    losartan  100 mg Oral Daily    hydroCHLOROthiazide  25 mg Oral Daily    cloNIDine  0.2 mg Oral TID    montelukast  10 mg Oral Nightly    sodium chloride flush  5-40 mL IntraVENous 2 times per day    cefTRIAXone (ROCEPHIN) IV  1,000 mg IntraVENous Q24H    pantoprazole  40 mg Oral QAM AC     PRN Meds: butalbital-acetaminophen-caffeine, glucose, dextrose, glucagon (rDNA), dextrose, sodium chloride flush, sodium chloride, ondansetron **OR** ondansetron, morphine    I/O    Intake/Output Summary (Last 24 hours) at 12/23/2021 0954  Last data filed at 12/22/2021 1700  Gross per 24 hour   Intake 360 ml   Output    Net 360 ml       Labs:   No results for input(s): WBC, HGB, HCT, PLT in the last 72 hours.     Recent Labs     12/21/21  0539 12/22/21  0448 12/23/21  0424    140 137   K 3.5 3.9 4.2    105 103   CO2 26 24 26   BUN 16 17 16   CREATININE 1.0 0.9 0.9   CALCIUM 8.9 8.8 9.0   PHOS 3.5 3.8 3.5       No results for input(s): PROT, ALB, ALKPHOS, ALT, AST, BILITOT, AMYLASE, LIPASE in the last 72 hours. No results for input(s): INR in the last 72 hours. No results for input(s): [place] in the last 72 hours. Chronic labs:  Lab Results   Component Value Date    CHOL 152 06/10/2019    TRIG 66 06/10/2019    HDL 54 06/10/2019    LDLCALC 85 06/10/2019    TSH 0.591 06/10/2019    INR 1.1 07/16/2013    LABA1C 11.5 07/29/2021       Radiology:  Imaging studies reviewed today. ASSESSMENT:    Abdominal pain  Severe constipation  CAD s/p PCI/CHUCK 12/15/2021: no acute issues  Obstructive kidney stone: 3 mm  Moderate hydronephrosis, left  HTN     PLAN:    Fleets enema and dc after    Diet: ADULT DIET; Regular; 3 carb choices (45 gm/meal); Low Fat/Low Chol/High Fiber/KRUNAL; High Fiber  Code Status: Full Code  PT/OT Eval Status:   independent  DVT Prophylaxis:   SCD  Recommended disposition at discharge:  home    +++++++++++++++++++++++++++++++++++++++++++++++++  Ebony Cooney MD   [facility].  +++++++++++++++++++++++++++++++++++++++++++++++++  NOTE: This report was transcribed using voice recognition software. Every effort was made to ensure accuracy; however, inadvertent computerized transcription errors may be present.

## 2021-12-24 LAB — CULTURE, BLOOD 2: NORMAL

## 2022-01-18 PROBLEM — R77.8 ELEVATED TROPONIN: Status: RESOLVED | Noted: 2021-12-19 | Resolved: 2022-01-18

## 2022-01-18 PROBLEM — R79.89 ELEVATED TROPONIN: Status: RESOLVED | Noted: 2021-12-19 | Resolved: 2022-01-18

## 2022-01-25 NOTE — DISCHARGE SUMMARY
Physician Discharge Summary     Patient ID:  Jimbo Emerson  [id number]  74 y.o.  1950    Admit date: 12/19/2021    Discharge date and time: 12/23/2021 11:40 AM     Admission Diagnoses: Active Problems:    Hydronephrosis concurrent with and due to calculi of kidney and ureter  Resolved Problems:    Elevated troponin      Discharge Diagnoses: Active Problems:    Hydronephrosis concurrent with and due to calculi of kidney and ureter  Resolved Problems:    Elevated troponin      Condition at discharge : Stable    Consults: IP CONSULT TO CARDIOLOGY  IP CONSULT TO INTERNAL MEDICINE  IP CONSULT TO UROLOGY    Procedures: None    Hospital Course: Patient is a 24-year-old female with an underlying history of asthma, breast cancer, coronary disease, stroke, congestive heart failure, COPD, hypertension, hyperlipidemia presents to the emergency room with epigastric abdominal pain. She recently had a history of 2 stents placed by cardiology. Patient was admitted because of elevated troponin and recent coronary stents. Cardiology was consulted. Patient was noted to have moderate left hydronephrosis and a 3 mm obstructive calculus in the left ureterovesicular junction. Urology was consulted. Antibiotics were started. Patient was felt to have sepsis. She was also treated for constipation. Patient improved with close conservative management. Patient was then discharged home. CTA PULMONARY W CONTRAST   Final Result   No evidence of pulmonary embolism or acute pulmonary abnormality. Mild hydronephrotic changes in the right kidney, can follow up with CT   abdomen and pelvis. RECOMMENDATIONS:   Unavailable         CT ABDOMEN PELVIS WO CONTRAST Additional Contrast? None   Final Result   Moderate left hydronephrosis secondary to 3 mm obstructive calculus in the   left ureteral vesicular junction.       Uncomplicated diverticulosis      RECOMMENDATIONS:   Unavailable         XR ACUTE ABD SERIES CHEST 1 VW   Final Result   1. Chest: No evidence of an acute cardiopulmonary process   2. Abdomen: Large volume of retained fecal material within the colon   concerning for constipation   3. There is a 2 mm calculus projecting over the left renal silhouette that   may represent a nonobstructing stone             No results found for this or any previous visit (from the past 336 hour(s)). Discharge Exam:  See progress note from today    Disposition: Home    Patient Instructions:   Discharge Medication List as of 12/23/2021 10:38 AM      START taking these medications    Details   metoprolol tartrate (LOPRESSOR) 25 MG tablet Take 0.5 tablets by mouth 2 times daily, Disp-60 tablet, R-3Normal         CONTINUE these medications which have NOT CHANGED    Details   isosorbide mononitrate (IMDUR) 60 MG extended release tablet Take 1 tablet by mouth daily, Disp-30 tablet, R-3Normal      nitroGLYCERIN (NITROSTAT) 0.4 MG SL tablet up to max of 3 total doses. If no relief after 1 dose, call 911., Disp-25 tablet, R-3Normal      hydrALAZINE (APRESOLINE) 25 MG tablet Take 1 tablet by mouth every 8 hours, Disp-90 tablet, R-3Normal      clopidogrel (PLAVIX) 75 MG tablet Take 1 tablet by mouth daily, Disp-30 tablet, R-3Normal      sennosides-docusate sodium (SENOKOT-S) 8.6-50 MG tablet Take 2 tablets by mouth nightly as needed for Constipation, Disp-60 tablet, R-0Normal      albuterol (PROVENTIL) (2.5 MG/3ML) 0.083% nebulizer solution Take 2.5 mg by nebulization every 6 hours as needed for WheezingHistorical Med      insulin glargine (LANTUS SOLOSTAR) 100 UNIT/ML injection pen Inject 40 Units into the skin nightly, Disp-25 pen, R-5Normal      insulin lispro, 1 Unit Dial, (HUMALOG KWIKPEN) 100 UNIT/ML SOPN Inject 10 units with meals + sliding scale.  MAX 75 units/day, Disp-25 pen, R-5Normal      benzonatate (TESSALON) 200 MG capsule Take 1 capsule by mouth 3 times daily as needed for Cough, Disp-15 capsule,R-0Normal      melatonin (RA MELATONIN) 3 MG TABS tablet Take one 3 mg hs, Disp-90 tablet,R-3Normal      vitamin D (ERGOCALCIFEROL) 1.25 MG (96310 UT) CAPS capsule Take 1 capsule by mouth once a week, Disp-4 capsule,R-116/26/2020: Disregard previous prescriptions and refills; honor this prescription and refillsNormal      losartan-hydrochlorothiazide (HYZAAR) 100-25 MG per tablet Take 1 tablet by mouth daily, Disp-30 tablet,R-116/26/2020: Disregard previous prescriptions and refills; honor this prescription and refillsNormal      omeprazole (PRILOSEC) 40 MG delayed release capsule Take 1 capsule by mouth daily, Disp-30 capsule,R-116/26/2020: Disregard previous prescriptions and refills; honor this prescription and refillsNormal      Fluticasone furoate-vilanterol (BREO ELLIPTA) 200-25 MCG/INH AEPB inhaler Inhale 1 puff into the lungs daily, Disp-1 each,R-116/26/2020: Disregard previous prescriptions and refills; honor this prescription and refillsNormal      anastrozole (ARIMIDEX) 1 MG tablet Take 1 tablet by mouth daily Indications: ESTROGEN DEFICIENCY IN BREAST CANCER (INACTIVE), Disp-30 tablet,R-116/26/2020: Disregard previous prescriptions and refills; honor this prescription and refillsNormal      gabapentin (NEURONTIN) 400 MG capsule Take 1 capsule by mouth 3 times daily. , Disp-90 capsule,R-11Normal      aspirin 81 MG chewable tablet Take 1 tablet by mouth daily, Disp-30 tablet,R-116/26/2020: Disregard previous prescriptions and refills; honor this prescription and refillsNormal      pravastatin (PRAVACHOL) 80 MG tablet Take 1 tablet by mouth nightly, Disp-90 tablet,R-3Normal      oxybutynin (DITROPAN) 5 MG tablet Take 1 tablet by mouth 3 times daily, Disp-90 tablet,R-116/26/2020: Disregard previous prescriptions and refills; honor this prescription and refillsNormal      montelukast (SINGULAIR) 10 MG tablet Take 1 tablet by mouth nightly, Disp-30 tablet,R-116/26/2020: Disregard previous prescriptions and refills; honor this prescription and

## 2022-04-28 NOTE — PROGRESS NOTES
[facility] - Office Visit Follow-up    Date of Consultation: 5/4/2022    HISTORY OF PRESENT ILLNESS:   Patient is a 70year old AAF known to Dr. Court Mora. She is here today for cardiac risk stratification prior to cataract surgery. Patient states she has been doing well since time of her hospitalization. She notes of chronic GELLER in setting of her pulmonary HTN / COPD history and former tobacco abuse, currently unchanged. This is her limiting factor in regard to physical activity. She is able to do her ADLs without chest pain, but states she does have GELLER upon climbing one flight of stairs. She admits to chronic bilateral LE lymphedema, at baseline. She denies weight gain, CP at rest or on exertion, N/V, diaphoresis, dizziness, palpitations, near syncope or syncope. Denies PND, orthopnea. Admits to medication compliance. Please note: past medical records were reviewed per electronic medical record (EMR) - see detailed reports under Past Medical/ Surgical History. PAST MEDICAL HISTORY:    · HTN  · HLD, on statin therapy  · Morbid obesity  · T2DM, insulin requiring with neuropathy  · Former tobacco smoker  · Asthma / COPD  · Marijuana use  · OA  · 2010 CVA with some speech difficulties  · Claustrophobia  · HA  · Chronic BLE lymphedema  · Recurrent genital HSV infection  · Reported Hx DVT/PE  · 2016 R breast carcinoma s/p lumpectomy and XRT -- on Arimidex therapy  · Nephrolithiasis  · Pt has undergone multiple sleep latency testing, full in lab sleep study in 2016. All sleep testing was normal. No evidence of KENAN or narcolepsy. Magali Vega 2.    · cLBBB  · 11/5/2021 Bilateral LE XOCHITL's --> XOCHITL on the right is 0.87, mildly reduced. XOCHITL on the left is 0.91 normal. Mildly reduced right XOCHITL suggesting the presence of mild-moderate peripheral vascular disease. Low normal left XOCHITL, no significant peripheral vascular disease.  Dampened digital PVRs suspicious for distal vascular disease. · CAD  · 2014 Lexiscan MPS; Ef 59% non-ischemic. NWM. · 12/10/2018 Dobutamine Stress Echo: No chest pain. MPHR: > 85%. LBBB at baseline. The maximal stress echocardiogram demonstrated no evidence of inducible ischemia. · 12/2/2021 Coronary CTA: Total Calcium score of 2956 with a severe plaque burden. This places the patient at the 80 percentile for age, sex and race. A high calcium score may be consistent with a significant risk of having a cardiovascular event in the next 5 years. Coronary CT angiogram was not performed due to significant blooming artifacts from extensive coronary artery calcification. · 12/15/2021 [facility] Dr Silvia Gandhi: 2 Overlapping CHUCK (2.5 and 3.0) to RCA. Left main: 0% stenosis. LAD: 50 % stenosis. LCx: 60-70% stenosis. RCA: Dominant. 80-90% stenosis. LV angio: 45-50% EF. · Chronic HFmEF with improved EF. VHD. Pulmonary HTN  · 2018 TTE Mild concentric LVH. No WMA. Stage II Diastolic Dysfunction. LA mildly dilated. Mild MR. Mild TR. Mild to moderate PHTN. EF 45-50%. · 12/31/2019 Limited TTE: Left ventricle is normal in size. Abnormal (paradoxical) motion consistent with left bundle branch block. Normal left ventricular ejection fraction. EF 55-60%. Stage II DD. Mild MR/PHTN. · 12/16/2021 TTE Dr Silvia Gandhi: EF 55% +/- 5%. Mild proximal septal thickening/hypertrophy. No gross regional wall motion abnormality. Stage II DD. Mildly dilated LA. Mild MR. Moderate PHTN.  RVSP 55 mmHg    PAST SURGICAL HISTORY:    Past Surgical History:   Procedure Laterality Date    ARTHROPLASTY Left 07/29/2016    thumb basil joint with dequervain's release    BREAST BIOPSY      BREAST LUMPECTOMY  years ago    benign    BREAST LUMPECTOMY Right 09/06/2016    COLONOSCOPY  2005    COLONOSCOPY  1/8/15    Dr. Gal Parekh  6/7/2012         FINGER SURGERY Left 07/29/2016    thumb    HAND SURGERY  12/2017    HYSTERECTOMY      JOINT REPLACEMENT      OTHER SURGICAL HISTORY Right 12/20/2017    RIGHT THUMB TRAPEZIECTOMY WITH LIGAMENT RECONSRUCTION DEQUERVAINS RELEASE    TIM AND BSO      TOTAL HIP ARTHROPLASTY Bilateral     2000, 2013 dr Estefani Mendoza, dr Checo Rousseau Bilateral 2013    dr Gege Magdaleno:  Prior to Admission medications    Medication Sig Start Date End Date Taking? Authorizing Provider   metoprolol tartrate (LOPRESSOR) 25 MG tablet Take 0.5 tablets by mouth 2 times daily 12/23/21   Camila Garcia MD   isosorbide mononitrate (IMDUR) 60 MG extended release tablet Take 1 tablet by mouth daily 12/17/21   Dionte Rodarte MD   nitroGLYCERIN (NITROSTAT) 0.4 MG SL tablet up to max of 3 total doses. If no relief after 1 dose, call 911. 12/17/21   Dionte Rodarte MD   hydrALAZINE (APRESOLINE) 25 MG tablet Take 1 tablet by mouth every 8 hours 12/17/21   Dionte Rodarte MD   clopidogrel (PLAVIX) 75 MG tablet Take 1 tablet by mouth daily 12/17/21   Dionte Rodarte MD   sennosides-docusate sodium (SENOKOT-S) 8.6-50 MG tablet Take 2 tablets by mouth nightly as needed for Constipation 12/17/21   Dionte Rodarte MD   albuterol (PROVENTIL) (2.5 MG/3ML) 0.083% nebulizer solution Take 2.5 mg by nebulization every 6 hours as needed for Wheezing    Historical Provider, MD   insulin glargine (LANTUS SOLOSTAR) 100 UNIT/ML injection pen Inject 40 Units into the skin nightly  Patient taking differently: Inject 50 Units into the skin nightly  4/1/21   Dennie Star, MD   insulin lispro, 1 Unit Dial, (HUMALOG KWIKPEN) 100 UNIT/ML SOPN Inject 10 units with meals + sliding scale. MAX 75 units/day 4/1/21   Dennie Star, MD   benzonatate (TESSALON) 200 MG capsule Take 1 capsule by mouth 3 times daily as needed for Cough 8/18/20   Sabine Meza PA-C   melatonin (RA MELATONIN) 3 MG TABS tablet Take one 3 mg hs 7/20/20   Lucia Kraft MD   vitamin D (ERGOCALCIFEROL) 1.25 MG (42752 UT) CAPS capsule Take 1 capsule by mouth once a week 6/26/20   Jayleen Shafer MD   losartan-hydrochlorothiazide [facility]) 100-25 MG per tablet Take 1 tablet by mouth daily 6/26/20   Jayleen Shafer MD   omeprazole (PRILOSEC) 40 MG delayed release capsule Take 1 capsule by mouth daily 6/26/20   Jayleen Shafer MD   Fluticasone furoate-vilanterol (BREO ELLIPTA) 200-25 MCG/INH AEPB inhaler Inhale 1 puff into the lungs daily 6/26/20   Jayleen Shafer MD   anastrozole (ARIMIDEX) 1 MG tablet Take 1 tablet by mouth daily Indications: ESTROGEN DEFICIENCY IN BREAST CANCER (INACTIVE) 6/26/20   Jayleen Shafer MD   gabapentin (NEURONTIN) 400 MG capsule Take 1 capsule by mouth 3 times daily.  6/26/20 6/26/22  Jayleen Shafer MD   aspirin 81 MG chewable tablet Take 1 tablet by mouth daily 6/26/20   Jayleen Shafer MD   pravastatin (PRAVACHOL) 80 MG tablet Take 1 tablet by mouth nightly 6/26/20   Jayleen Shafer MD   oxybutynin (DITROPAN) 5 MG tablet Take 1 tablet by mouth 3 times daily 6/26/20   Jayleen Shafer MD   montelukast (SINGULAIR) 10 MG tablet Take 1 tablet by mouth nightly 6/26/20   Jayleen Shafer MD   cloNIDine (CATAPRES) 0.2 MG tablet Take 1 tablet by mouth 3 times daily 2/11/20   Clair Moeller DO   calcium carbonate-vitamin D (CALCIUM 600 + D) 600-400 MG-UNIT TABS per tab Take 1 tablet by mouth 2 times daily 10/6/19   Sukumar Duron MD   butenafine (LOTRIMIN ULTRA) 1 % CREA Please apply from the toes, in between the toes, foot up to the upper calf twice daily for 1 month, both legs 9/5/19   Aung Orozco MD   acetaminophen (APAP EXTRA STRENGTH) 500 MG tablet Take 1 tablet by mouth every 6 hours as needed for Pain 8/14/18   Mathieu Maxwell DO   vitamin B-12 (CYANOCOBALAMIN) 1000 MCG tablet Take 1 tablet by mouth daily 7/20/18   Jayleen Shafer MD       CURRENT MEDICATIONS:      Current Outpatient Medications:     metoprolol tartrate (LOPRESSOR) 25 MG tablet, Take 0.5 tablets by mouth 2 times daily, Disp: 60 tablet, Rfl: 3    isosorbide mononitrate (IMDUR) 60 MG extended release tablet, Take 1 tablet by mouth daily, Disp: 30 tablet, Rfl: 3    nitroGLYCERIN (NITROSTAT) 0.4 MG SL tablet, up to max of 3 total doses. If no relief after 1 dose, call 911., Disp: 25 tablet, Rfl: 3    hydrALAZINE (APRESOLINE) 25 MG tablet, Take 1 tablet by mouth every 8 hours, Disp: 90 tablet, Rfl: 3    clopidogrel (PLAVIX) 75 MG tablet, Take 1 tablet by mouth daily, Disp: 30 tablet, Rfl: 3    sennosides-docusate sodium (SENOKOT-S) 8.6-50 MG tablet, Take 2 tablets by mouth nightly as needed for Constipation, Disp: 60 tablet, Rfl: 0    albuterol (PROVENTIL) (2.5 MG/3ML) 0.083% nebulizer solution, Take 2.5 mg by nebulization every 6 hours as needed for Wheezing, Disp: , Rfl:     insulin glargine (LANTUS SOLOSTAR) 100 UNIT/ML injection pen, Inject 40 Units into the skin nightly (Patient taking differently: Inject 50 Units into the skin nightly ), Disp: 25 pen, Rfl: 5    insulin lispro, 1 Unit Dial, (HUMALOG KWIKPEN) 100 UNIT/ML SOPN, Inject 10 units with meals + sliding scale.  MAX 75 units/day, Disp: 25 pen, Rfl: 5    benzonatate (TESSALON) 200 MG capsule, Take 1 capsule by mouth 3 times daily as needed for Cough, Disp: 15 capsule, Rfl: 0    melatonin (RA MELATONIN) 3 MG TABS tablet, Take one 3 mg hs, Disp: 90 tablet, Rfl: 3    vitamin D (ERGOCALCIFEROL) 1.25 MG (87117 UT) CAPS capsule, Take 1 capsule by mouth once a week, Disp: 4 capsule, Rfl: 11    losartan-hydrochlorothiazide (HYZAAR) 100-25 MG per tablet, Take 1 tablet by mouth daily, Disp: 30 tablet, Rfl: 11    omeprazole (PRILOSEC) 40 MG delayed release capsule, Take 1 capsule by mouth daily, Disp: 30 capsule, Rfl: 11    Fluticasone furoate-vilanterol (BREO ELLIPTA) 200-25 MCG/INH AEPB inhaler, Inhale 1 puff into the lungs daily, Disp: 1 each, Rfl: 11    anastrozole (ARIMIDEX) 1 MG tablet, Take 1 tablet by mouth daily Indications: ESTROGEN DEFICIENCY IN BREAST CANCER (INACTIVE), Disp: 30 tablet, Rfl: 11    gabapentin (NEURONTIN) 400 MG capsule, Take 1 capsule by mouth 3 times daily. , Disp: 90 capsule, Rfl: 11    aspirin 81 MG chewable tablet, Take 1 tablet by mouth daily, Disp: 30 tablet, Rfl: 11    pravastatin (PRAVACHOL) 80 MG tablet, Take 1 tablet by mouth nightly, Disp: 90 tablet, Rfl: 3    oxybutynin (DITROPAN) 5 MG tablet, Take 1 tablet by mouth 3 times daily, Disp: 90 tablet, Rfl: 11    montelukast (SINGULAIR) 10 MG tablet, Take 1 tablet by mouth nightly, Disp: 30 tablet, Rfl: 11    cloNIDine (CATAPRES) 0.2 MG tablet, Take 1 tablet by mouth 3 times daily, Disp: 60 tablet, Rfl: 3    calcium carbonate-vitamin D (CALCIUM 600 + D) 600-400 MG-UNIT TABS per tab, Take 1 tablet by mouth 2 times daily, Disp: 60 tablet, Rfl: 11    butenafine (LOTRIMIN ULTRA) 1 % CREA, Please apply from the toes, in between the toes, foot up to the upper calf twice daily for 1 month, both legs, Disp: 1 Tube, Rfl: 10    acetaminophen (APAP EXTRA STRENGTH) 500 MG tablet, Take 1 tablet by mouth every 6 hours as needed for Pain, Disp: 30 tablet, Rfl: 3    vitamin B-12 (CYANOCOBALAMIN) 1000 MCG tablet, Take 1 tablet by mouth daily, Disp: 90 tablet, Rfl: 1      ALLERGIES:  Patient has no known allergies. SOCIAL HISTORY:    Social History     Socioeconomic History    Marital status:      Spouse name: Not on file    Number of children: Not on file    Years of education: Not on file    Highest education level: Not on file   Occupational History    Occupation: retired- , singer   Tobacco Use    Smoking status: Former Smoker     Packs/day: 0.25     Years: 35.00     Pack years: 8.75     Types: Cigarettes     Start date:      Quit date: 2001     Years since quittin.4    Smokeless tobacco: Never Used   Vaping Use    Vaping Use: Never used   Substance and Sexual Activity    Alcohol use:  Yes     Alcohol/week: 0.0 - 1.0 standard drinks     Comment: rare    Drug use: Not Currently     Types: Marijuana Ana Rosa Dick)     Comment: last used 8/2018    Sexual activity: Not Currently     Partners: Male     Comment: divorce   Other Topics Concern    Not on file   Social History Narrative    Drinks 1 sweet tea daily; occ Coke. Drinks decaf coffee. Social Determinants of Health     Financial Resource Strain:     Difficulty of Paying Living Expenses: Not on file   Food Insecurity:     Worried About Running Out of Food in the Last Year: Not on file    Radha of Food in the Last Year: Not on file   Transportation Needs:     Lack of Transportation (Medical): Not on file    Lack of Transportation (Non-Medical):  Not on file   Physical Activity:     Days of Exercise per Week: Not on file    Minutes of Exercise per Session: Not on file   Stress:     Feeling of Stress : Not on file   Social Connections:     Frequency of Communication with Friends and Family: Not on file    Frequency of Social Gatherings with Friends and Family: Not on file    Attends [employer] Services: Not on file    Active Member of [address] [place] [address] or Organizations: Not on file    Attends Club or Organization Meetings: Not on file    Marital Status: Not on file   Intimate Partner Violence:     Fear of Current or Ex-Partner: Not on file    Emotionally Abused: Not on file    Physically Abused: Not on file    Sexually Abused: Not on file   Housing Stability:     Unable to Pay for Housing in the Last Year: Not on file    Number of [place] in the Last Year: Not on file    Unstable Housing in the Last Year: Not on file       FAMILY HISTORY:   Family History   Problem Relation Age of Onset    Diabetes Mother     High Blood Pressure Mother     Heart Attack Mother     High Blood Pressure Father     Diabetes Father     Heart Failure Father     Breast Cancer Sister     Stroke Sister         young age   Godinez Cancer Sister 55        breast    Sickle Cell Anemia Other         niece    Cancer Other 36        niece - breast    Breast Cancer Sister             Cancer Sister 59        breast & ovarian    Stomach Cancer Other         aunt         REVIEW OF SYSTEMS:     Negative except as noted above in HPI      PHYSICAL EXAM:   BP (!) 158/66 (Site: Right Upper Arm, Position: Sitting, Cuff Size: Large Adult)   Pulse 71   Resp 16   Ht 5' 5\" (1.651 m)   Wt 239 lb (108.4 kg)   SpO2 99%   BMI 39.77 kg/m²   CONST:  Well developed, obese AAF who appears stated age. Awake, alert, cooperative, no apparent distress. HEENT:   Head- Normocephalic, atraumatic. Eyes- Conjunctivae pink, anicteric. Neck-  No stridor, trachea midline, no apparent jugular venous distention. CHEST: Chest symmetrical and non-tender to palpation. No accessory muscle use or intercostal retractions. RESPIRATORY: Lung sounds - diminished bilaterally   CARDIOVASCULAR:     No noted carotid bruit. Heart Ausculation- Regular rate and rhythm, no significant murmur appreciated   PV: No significant pitting lower extremity edema. Pedal pulses palpable, no clubbing or cyanosis. ABDOMEN: Soft, non-tender to light palpation. Bowel sounds present. MS: Good muscle strength and tone. No atrophy or abnormal movements. : Deferred  SKIN: Warm and dry. NEURO / PSYCH: Oriented to person, place and time. Speech clear and appropriate. Follows all commands. Pleasant affect. DATA:    EKG:  Reviewed. Final read as noted under Cardiology tab.       Labs:   HgA1c:   Lab Results   Component Value Date    LABA1C 11.5 2021     FASTING LIPID PANEL:  Lab Results   Component Value Date    CHOL 152 06/10/2019    HDL 54 06/10/2019    [address] 85 06/10/2019    TRIG 66 06/10/2019       ASSESSMENT:  · Cardiac risk stratification prior to cataract surgical intervention  · Coronary artery disease  · [facility] 2021 (Dr. QUINTEROS [facility]): S/p PCI/CHUCK x 2 in overlapping fashion to the RCA  · Residual disease of 60-70% LCx stenosis and 50% LAD stenosis  · Clinically stable  · Chronic HFmEF with improved EF. ? Ischemic cardiomyopathy  · EF TTE 2018 noted to be 45-50% --> EF [facility] 12/2021 45-50% --> EF TTE 12/2021 55 +/- 5%  · Appears euvolemic with no weight gain   · Mild MR, mild LA dilation and moderate pulmonary HTN (RVSP 55) on TTE 12/2021  · cLBBB  · HTN, uncontrolled  · HLD, on statin therapy  · Insulin requiring T2DM with peripheral neuropathy  · Morbid obesity   · Asthma / COPD  · Former tobacco smoker  · Former marijuana use   · History of CVA with some speech difficulties (2010)  · History of right breast CA s/p lumpectomy and XRT in 2016, on Arimidex therapy   · Reported history of DVT / PE  · Chronic bilateral LE lymphedema        PLAN:   · Discussed case with Dr. Bharat Draper -- cataract surgery is a low risk procedure, no further cardiac testing / evaluation needed prior to proceeding with surgical intervention. However, given PCI/stent placement in December 2021, DAPT must be continued at this time without interruption. Patient is to reach out to her eye physician to clarify if her ASA/Plavix would need to be held for the procedure. Further recommendations to follow. · It was discussed with patient that she should not be on both Lopressor and Toprol-XL. She states she has been taking both, and both medications are currently in her pill packs (of which she just received). I advised her to follow up with her PCP regarding the following changes so her pill packs can be adjusted: Discontinue Lopressor, Continue Toprol-XL and increase to 50 mg daily.    · GDMT:   · As noted above, stop Lopressor  · Increase Toprol-XL to 50 mg daily  · Continue Hydralazine / Isordil combination  · Recommend SGLT-2 inhibitor, will defer to PCP given T2DM   · Consider addition of ACE-I therapy if BP remains uncontrolled given history of LV dysfunction (though improved)  · Consider Lexiscan MPS at time of next OV given residual disease on [facility] -- currently CP free   · Advised close follow up with pulmonology given COPD / pulmonary HTN history   · Recommend sleep apnea evaluation   · Follow up with Dr. Bharat Draper in 6 months, or sooner if needed           The patient's current medication list, allergies, problem list, recent labs and diagnostic testing were reviewed at today's visit. NOTE: This report was transcribed using voice recognition software. Every effort was made to ensure accuracy; however, inadvertent computerized transcription errors may be present.     Rosey Blanco, [address] [place] [address], [facility]    Electronically signed by Margareth Christensen PA-C on 5/4/2022 at 3:30 PM

## 2022-05-04 ENCOUNTER — OFFICE VISIT (OUTPATIENT)
Dept: CARDIOLOGY CLINIC | Age: 72
End: 2022-05-04
Payer: MEDICARE

## 2022-05-04 VITALS
OXYGEN SATURATION: 99 % | RESPIRATION RATE: 16 BRPM | HEIGHT: 65 IN | WEIGHT: 239 LBS | SYSTOLIC BLOOD PRESSURE: 139 MMHG | DIASTOLIC BLOOD PRESSURE: 58 MMHG | BODY MASS INDEX: 39.82 KG/M2 | HEART RATE: 71 BPM

## 2022-05-04 DIAGNOSIS — Z01.810 PREOP CARDIOVASCULAR EXAM: Primary | ICD-10-CM

## 2022-05-04 PROCEDURE — 1090F PRES/ABSN URINE INCON ASSESS: CPT | Performed by: PHYSICIAN ASSISTANT

## 2022-05-04 PROCEDURE — 1123F ACP DISCUSS/DSCN MKR DOCD: CPT | Performed by: PHYSICIAN ASSISTANT

## 2022-05-04 PROCEDURE — 1036F TOBACCO NON-USER: CPT | Performed by: PHYSICIAN ASSISTANT

## 2022-05-04 PROCEDURE — G8399 PT W/DXA RESULTS DOCUMENT: HCPCS | Performed by: PHYSICIAN ASSISTANT

## 2022-05-04 PROCEDURE — G8427 DOCREV CUR MEDS BY ELIG CLIN: HCPCS | Performed by: PHYSICIAN ASSISTANT

## 2022-05-04 PROCEDURE — 93000 ELECTROCARDIOGRAM COMPLETE: CPT | Performed by: INTERNAL MEDICINE

## 2022-05-04 PROCEDURE — 99215 OFFICE O/P EST HI 40 MIN: CPT | Performed by: PHYSICIAN ASSISTANT

## 2022-05-04 PROCEDURE — 4040F PNEUMOC VAC/ADMIN/RCVD: CPT | Performed by: PHYSICIAN ASSISTANT

## 2022-05-04 PROCEDURE — 3017F COLORECTAL CA SCREEN DOC REV: CPT | Performed by: PHYSICIAN ASSISTANT

## 2022-05-04 PROCEDURE — G8417 CALC BMI ABV UP PARAM F/U: HCPCS | Performed by: PHYSICIAN ASSISTANT

## 2022-05-04 RX ORDER — POLYETHYLENE GLYCOL 3350 17 G/17G
17 POWDER, FOR SOLUTION ORAL DAILY
COMMUNITY

## 2022-05-04 RX ORDER — ALBUTEROL SULFATE 90 UG/1
INHALANT RESPIRATORY (INHALATION) PRN
COMMUNITY

## 2022-05-04 RX ORDER — METOPROLOL SUCCINATE 25 MG/1
25 TABLET, EXTENDED RELEASE ORAL DAILY
COMMUNITY

## 2022-05-04 RX ORDER — AMLODIPINE BESYLATE 10 MG/1
10 TABLET ORAL DAILY
COMMUNITY

## 2022-05-09 NOTE — TELEPHONE ENCOUNTER
Guidewire inserted into the right femoral vein. Into NTAG. Then NTAG removed.   Pt stopping at the office today after visiting Dr. Yuliya Monroe at [facility] and he told her to ask her PCP to up the dosage of Gabapentin he wasn't specific in where he wanted new dosage to be.

## 2022-05-19 ENCOUNTER — TELEPHONE (OUTPATIENT)
Dept: CARDIOLOGY CLINIC | Age: 72
End: 2022-05-19

## 2022-05-19 DIAGNOSIS — R07.9 CHEST PAIN, UNSPECIFIED TYPE: Primary | ICD-10-CM

## 2022-05-19 DIAGNOSIS — I25.10 CORONARY ARTERY DISEASE INVOLVING NATIVE HEART WITHOUT ANGINA PECTORIS, UNSPECIFIED VESSEL OR LESION TYPE: ICD-10-CM

## 2022-05-19 NOTE — TELEPHONE ENCOUNTER
Patient recently saw Rashaad in office for CC, however, she is complaining of chest heaviness for 2 days. She took Nitro 1 time with some relief. Rashaad mentioned in her note for possible Lexiscan.  Please advise

## 2022-05-19 NOTE — TELEPHONE ENCOUNTER
Order Lexiscan stress  Dx CP, CAD  OV after stress test  Tell her to go to ER if CP gets worse or she need >3 s/l Nitro for one episode of CP

## 2022-05-25 ENCOUNTER — TELEPHONE (OUTPATIENT)
Dept: CARDIOLOGY | Age: 72
End: 2022-05-25

## 2022-05-25 NOTE — TELEPHONE ENCOUNTER
Called patient to schedule a Lexiscan. I had to  L/m.  Thank you  Electronically signed by Mariely Thakkar on 5/25/2022 at 11:03 AM

## 2022-06-08 ENCOUNTER — TELEPHONE (OUTPATIENT)
Dept: CARDIOLOGY | Age: 72
End: 2022-06-08

## 2022-06-10 ENCOUNTER — HOSPITAL ENCOUNTER (OUTPATIENT)
Dept: CARDIOLOGY | Age: 72
Discharge: HOME | End: 2022-06-10
Payer: MEDICAID

## 2022-06-10 VITALS
SYSTOLIC BLOOD PRESSURE: 128 MMHG | HEART RATE: 64 BPM | WEIGHT: 239 LBS | DIASTOLIC BLOOD PRESSURE: 80 MMHG | HEIGHT: 65 IN | BODY MASS INDEX: 39.82 KG/M2 | RESPIRATION RATE: 18 BRPM

## 2022-06-10 DIAGNOSIS — I25.10 CORONARY ARTERY DISEASE INVOLVING NATIVE HEART WITHOUT ANGINA PECTORIS, UNSPECIFIED VESSEL OR LESION TYPE: ICD-10-CM

## 2022-06-10 DIAGNOSIS — I25.10 CAD IN NATIVE ARTERY: Primary | ICD-10-CM

## 2022-06-10 DIAGNOSIS — R07.9 CHEST PAIN, UNSPECIFIED TYPE: ICD-10-CM

## 2022-06-10 PROCEDURE — 93017 CV STRESS TEST TRACING ONLY: CPT

## 2022-06-10 PROCEDURE — 3430000000 HC RX DIAGNOSTIC RADIOPHARMACEUTICAL: Performed by: INTERNAL MEDICINE

## 2022-06-10 PROCEDURE — 78452 HT MUSCLE IMAGE SPECT MULT: CPT

## 2022-06-10 PROCEDURE — 6360000002 HC RX W HCPCS: Performed by: INTERNAL MEDICINE

## 2022-06-10 PROCEDURE — A9502 TC99M TETROFOSMIN: HCPCS | Performed by: INTERNAL MEDICINE

## 2022-06-10 PROCEDURE — 2580000003 HC RX 258: Performed by: INTERNAL MEDICINE

## 2022-06-10 RX ORDER — SODIUM CHLORIDE 0.9 % (FLUSH) 0.9 %
10 SYRINGE (ML) INJECTION PRN
Status: DISCONTINUED | OUTPATIENT
Start: 2022-06-10 | End: 2022-06-11 | Stop reason: HOSPADM

## 2022-06-10 RX ADMIN — TETROFOSMIN 8.9 MILLICURIE: 0.23 INJECTION, POWDER, LYOPHILIZED, FOR SOLUTION INTRAVENOUS at 08:19

## 2022-06-10 RX ADMIN — SODIUM CHLORIDE, PRESERVATIVE FREE 10 ML: 5 INJECTION INTRAVENOUS at 10:26

## 2022-06-10 RX ADMIN — SODIUM CHLORIDE, PRESERVATIVE FREE 10 ML: 5 INJECTION INTRAVENOUS at 08:19

## 2022-06-10 RX ADMIN — TETROFOSMIN 27.1 MILLICURIE: 0.23 INJECTION, POWDER, LYOPHILIZED, FOR SOLUTION INTRAVENOUS at 10:26

## 2022-06-10 RX ADMIN — SODIUM CHLORIDE, PRESERVATIVE FREE 10 ML: 5 INJECTION INTRAVENOUS at 10:25

## 2022-06-10 RX ADMIN — REGADENOSON 0.4 MG: 0.08 INJECTION, SOLUTION INTRAVENOUS at 10:26

## 2022-06-10 NOTE — PROCEDURES
[id number] [facility] and [facility] - [address]. [facility], [address] [place] [address]  [phone]               Pharmacologic Stress Nuclear Gated SPECT Study    Name: [address] Account Number: [de-identified]    :  1950          Sex: female         Date of Study:  6/10/2022    Height: 5' 5\" (165.1 cm)         Weight: 239 lb (108.4 kg)     Ordering Provider: Mirtha Quiles MD          PCP: DM[facility] - [facility]      Cardiologist: Mirtha Quiles MD             Interpreting Physician: Julia Boston MD  _________________________________________________________________________________    Indication:   Evaluation of extent and severity of coronary artery disease    Clinical History:   Patient has prior history of coronary artery disease. Resting ECG:    HR 64 bpm  Left Bundle Branch Block    Procedure:   Pharmacologic stress testing was performed with regadenoson 0.4 mg for 15 seconds. The heart rate was 64 at baseline and tiana to 85 beats during the infusion. The blood pressure at baseline was 128/80 and blood pressure at the end of infusion was 140/80. Blood pressure response was normal during the stress procedure. The patient experienced mild shortness of breathe during the infusion. ECG during the infusion did not change. IMAGING: Myocardial perfusion imaging was performed at rest 30-35 minutes following the intravenous injection of 8.9 mCi of (Tc-tetrofosmin) followed by 10 ml of Normal Saline. As per infusion protocol, the patient was injected intravenously with 27.1 mCi of (Tc-tetrofosmin) followed by 10 ml of Normal Saline. Gated post-stress tomographic imaging was performed 45 minutes after stress. FINDINGS: The overall quality of the study was fair. Left ventricular cavity size was noted to be normal.    Rotational analog analysis demonstrated extracardiac radioactivity.     A severe defect was present in the basal inferior and mid inferior wall(s) that was  large size on the post regadenoson images             There also was a mild defect present in the apical septal wall(s) that was  small size      The resting images reveal improvement in the first defect and worsening in the second defect. Gated SPECT left ventricular ejection fraction was calculated to be 53%, with generalized hypokinesis and akinesis of the inferior wall confounded by extracardiac activity. Impression:    1. Electrocardiographically  nondiagnostic regadenoson infusion with a clinically nonischemic response. 2. Myocardial perfusion imaging was abnormal.    1. There was a small defect in the distal anteroseptal wall that appeared worse at rest and likely represents breast attenuation artifact  2. There was a large defect in the inferior wall confounded by extracardiac radioactivity but that was noticeably worse on stress images  3. Overall left ventricular systolic function was abnormal with regional wall motion abnormalities. 4.   Intermediate risk general pharmacologic stress test    Thank you for sending your patient to this [employer].      Electronically signed by Andres Chauhan MD on 6/10/22 at 5:03 PM EDT

## 2022-07-07 DIAGNOSIS — Z12.31 ENCOUNTER FOR SCREENING MAMMOGRAM FOR BREAST CANCER: Primary | ICD-10-CM

## 2022-07-20 NOTE — PROGRESS NOTES
Subjective: Right breast ductal carcinoma in situ, stage 0 disease: ER/AL positive  (ER 90%; AL 30%) . On Arimidex since 11/15/2016  No longer follows with Medical Oncology. This note was copied forward from the last encounter. Essential components for this patient record were reviewed and verified on this visit including:  recent hospitalizations, recent imaging, PMH, PSH, FH, SOC HX, Allergies, and Medications were reviewed and updated as appropriate. In addition, the assessment and plan were copied from prior office note and updated accordingly. Patient ID: Donal Preston is a 67 y.o. female. ANURAG [place]well is her for follow up. She underwent right breast needle localized lumpectomy on September 6, 2016. 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 Size of DCIS: At least 0.2 cm. Margins uninvolved by DCIS Estrogen receptor: Positive, greater than 90% (intensity= strong). Progesterone receptor: Positive, 30% (intensity = intermediate). Stage 0    11/17/2016 completed adjuvant radiation therapy. 11/15/2016, endocrine therapy with Arimidex 1 mg by mouth daily was started. Prescribed by primary care since she no longer follows with oncology. Review of Systems   Constitutional:  Positive for fatigue. Doing fair. Complains of fatigue since coronary stent placement. She also had a hospital admission for hydronephrosis and sepsis due to obstructing calculi since her last visit here. HENT: Negative. Respiratory:  Negative for shortness of breath (Chronic shortness of breath with exertion; overall remains stable. ). Cardiovascular:  Negative for chest pain and palpitations. Musculoskeletal:  Positive for back pain (Chronic and stable. ). Negative for arthralgias and myalgias. Neurological: Negative. Psychiatric/Behavioral:  The patient is not nervous/anxious.   The patient voices no complaints. With questioning, she denies significant pain, fever, chills, wound drainage or discharge. Objective:   Physical Exam  Vitals and nursing note reviewed. Constitutional:       Appearance: Normal appearance. Comments: ECOG remains stable at 1 due to comorbidities. She is in a wheelchair on this visit secondary to distance walking from parking lot to office. HENT:      Head: Normocephalic and atraumatic. Eyes:      Extraocular Movements: Extraocular movements intact. Conjunctiva/sclera: Conjunctivae normal.   Cardiovascular:      Rate and Rhythm: Normal rate and regular rhythm. Heart sounds: Normal heart sounds. Pulmonary:      Effort: Pulmonary effort is normal.      Breath sounds: Normal breath sounds. Chest:      Chest wall: No mass. Comments: Her breast exam remains stable bilaterally and without evidence of recurrent disease. Abdominal:      Palpations: Abdomen is soft. Musculoskeletal:         General: Normal range of motion. Cervical back: Normal range of motion and neck supple. Skin:     General: Skin is warm and dry. Neurological:      General: No focal deficit present. Mental Status: She is alert and oriented to person, place, and time. Psychiatric:         Mood and Affect: Mood normal.         Thought Content: Thought content normal.         Judgment: Judgment normal.       Assessment:      67 y.o. extremely pleasant woman who underwent right breast needle localized lumpectomy on September 6, 2016. 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 Size of DCIS: At least 0.2 cm. Margins uninvolved by DCIS Estrogen receptor: Positive, greater than 90% (intensity= strong). Progesterone receptor: Positive, 30% (intensity = intermediate)    11/17/2016 completed adjuvant radiation therapy.   11/15/2016 started endocrine therapy with Arimidex 1 mg daily by outcomes. Avoid alcohol or limit alcohol intake to < 3 drinks per week. Stop Arimidex. Return to see in 1 year with bilateral screening mammogram same day. I spent a total of 26 minutes on the date of the service which included preparing to see the patient, face-to-face patient care, completing clinical documentation, obtaining and/or reviewing separately obtained history, performing a medically appropriate examination, counseling and educating the patient/family/caregiver, ordering medications, tests, or procedures, communicating with other HCPs (not separately reported), independently interpreting results (not separately reported), communicating results to the patient/family/caregiver and care coordination (not separately reported). This document is generated, in part, by voice recognition software and thus syntax and grammatical errors are possible. [facility] Marylin Dilling) Aldon Apgar, RN, MSN, APRN-CNP, [address] [place]  Advanced Oncology Certified Nurse Practitioner  Department of Breast Surgery  [facility]/  [facility] in collaboration with Dr. Romulo Mercado.  Jazmin/Dr. Judge Issac Hopkins/Dr. Titi Townsend APRN-CNP

## 2022-07-28 ENCOUNTER — HOSPITAL ENCOUNTER (OUTPATIENT)
Dept: GENERAL RADIOLOGY | Age: 72
Discharge: HOME | End: 2022-07-30
Payer: MEDICARE

## 2022-07-28 ENCOUNTER — OFFICE VISIT (OUTPATIENT)
Dept: BREAST CENTER | Age: 72
End: 2022-07-28
Payer: MEDICARE

## 2022-07-28 VITALS
SYSTOLIC BLOOD PRESSURE: 124 MMHG | BODY MASS INDEX: 40.32 KG/M2 | OXYGEN SATURATION: 99 % | DIASTOLIC BLOOD PRESSURE: 68 MMHG | RESPIRATION RATE: 20 BRPM | WEIGHT: 242 LBS | TEMPERATURE: 97.5 F | HEIGHT: 65 IN | HEART RATE: 58 BPM

## 2022-07-28 DIAGNOSIS — Z12.31 ENCOUNTER FOR SCREENING MAMMOGRAM FOR BREAST CANCER: ICD-10-CM

## 2022-07-28 DIAGNOSIS — Z85.3 HISTORY OF BREAST CANCER: Primary | ICD-10-CM

## 2022-07-28 PROCEDURE — 3017F COLORECTAL CA SCREEN DOC REV: CPT | Performed by: NURSE PRACTITIONER

## 2022-07-28 PROCEDURE — 1123F ACP DISCUSS/DSCN MKR DOCD: CPT | Performed by: NURSE PRACTITIONER

## 2022-07-28 PROCEDURE — 1090F PRES/ABSN URINE INCON ASSESS: CPT | Performed by: NURSE PRACTITIONER

## 2022-07-28 PROCEDURE — 99214 OFFICE O/P EST MOD 30 MIN: CPT | Performed by: NURSE PRACTITIONER

## 2022-07-28 PROCEDURE — 77063 BREAST TOMOSYNTHESIS BI: CPT

## 2022-07-28 PROCEDURE — G8417 CALC BMI ABV UP PARAM F/U: HCPCS | Performed by: NURSE PRACTITIONER

## 2022-07-28 PROCEDURE — G8427 DOCREV CUR MEDS BY ELIG CLIN: HCPCS | Performed by: NURSE PRACTITIONER

## 2022-07-28 PROCEDURE — 1036F TOBACCO NON-USER: CPT | Performed by: NURSE PRACTITIONER

## 2022-07-28 PROCEDURE — 99213 OFFICE O/P EST LOW 20 MIN: CPT | Performed by: NURSE PRACTITIONER

## 2022-07-28 PROCEDURE — G8399 PT W/DXA RESULTS DOCUMENT: HCPCS | Performed by: NURSE PRACTITIONER

## 2022-07-28 ASSESSMENT — ENCOUNTER SYMPTOMS
BACK PAIN: 1
SHORTNESS OF BREATH: 0

## 2022-09-02 ENCOUNTER — HOSPITAL ENCOUNTER (EMERGENCY)
Age: 72
Discharge: HOME | End: 2022-09-02
Payer: MEDICARE

## 2022-09-02 VITALS
WEIGHT: 242 LBS | HEIGHT: 65 IN | RESPIRATION RATE: 16 BRPM | BODY MASS INDEX: 40.32 KG/M2 | OXYGEN SATURATION: 97 % | DIASTOLIC BLOOD PRESSURE: 90 MMHG | HEART RATE: 68 BPM | SYSTOLIC BLOOD PRESSURE: 172 MMHG | TEMPERATURE: 98.1 F

## 2022-09-02 DIAGNOSIS — T21.21XA PARTIAL THICKNESS BURN OF CHEST WALL, INITIAL ENCOUNTER: Primary | ICD-10-CM

## 2022-09-02 PROCEDURE — 2500000003 HC RX 250 WO HCPCS: Performed by: NURSE PRACTITIONER

## 2022-09-02 PROCEDURE — 90714 TD VACC NO PRESV 7 YRS+ IM: CPT | Performed by: NURSE PRACTITIONER

## 2022-09-02 PROCEDURE — 16000 INITIAL TREATMENT OF BURN(S): CPT

## 2022-09-02 PROCEDURE — 6360000002 HC RX W HCPCS: Performed by: NURSE PRACTITIONER

## 2022-09-02 PROCEDURE — 90471 IMMUNIZATION ADMIN: CPT | Performed by: NURSE PRACTITIONER

## 2022-09-02 PROCEDURE — 99284 EMERGENCY DEPT VISIT MOD MDM: CPT

## 2022-09-02 RX ORDER — TETANUS AND DIPHTHERIA TOXOIDS ADSORBED 2; 2 [LF]/.5ML; [LF]/.5ML
0.5 INJECTION INTRAMUSCULAR ONCE
Status: COMPLETED | OUTPATIENT
Start: 2022-09-02 | End: 2022-09-02

## 2022-09-02 RX ADMIN — SILVER SULFADIAZINE: 10 CREAM TOPICAL at 13:33

## 2022-09-02 RX ADMIN — TETANUS AND DIPHTHERIA TOXOIDS ADSORBED 0.5 ML: 2; 2 INJECTION INTRAMUSCULAR at 13:30

## 2022-09-02 ASSESSMENT — PAIN DESCRIPTION - PAIN TYPE: TYPE: ACUTE PAIN

## 2022-09-02 ASSESSMENT — PAIN - FUNCTIONAL ASSESSMENT
PAIN_FUNCTIONAL_ASSESSMENT: NONE - DENIES PAIN
PAIN_FUNCTIONAL_ASSESSMENT: 0-10

## 2022-09-02 ASSESSMENT — PAIN DESCRIPTION - DESCRIPTORS: DESCRIPTORS: THROBBING;TINGLING

## 2022-09-02 ASSESSMENT — PAIN DESCRIPTION - ONSET: ONSET: SUDDEN

## 2022-09-02 ASSESSMENT — PAIN SCALES - GENERAL: PAINLEVEL_OUTOF10: 5

## 2022-09-02 ASSESSMENT — PAIN DESCRIPTION - ORIENTATION: ORIENTATION: MID

## 2022-09-02 ASSESSMENT — PAIN DESCRIPTION - LOCATION: LOCATION: CHEST

## 2022-09-02 ASSESSMENT — PAIN DESCRIPTION - FREQUENCY: FREQUENCY: CONTINUOUS

## 2022-09-02 NOTE — ED NOTES
Pt given discharge summary with education on burns, pt aware to  prescriptions at assigned pharmacy and to follow up with her primary care physician     Keren Andre RN  09/02/22 1400

## 2022-09-02 NOTE — ED NOTES
Wound care performed at chest with silvadene and gauze applied secured with tape     eMmo Joyner RN  09/02/22 3916

## 2022-09-02 NOTE — ED PROVIDER NOTES
Independent MLP      HPI:  9/2/22,   Time: 2:05 PM EDT         Maida Wyman is a 67 y.o. female presenting to the ED for into the upper chest.  She states 2 days ago she accidentally dropped a hard-boiled egg onto her chest she states that it was still hot from the water. Patient states that she did use cool compresses to the area after she called her doctor to assess there input on what she should do. Patient states that her tetanus vaccine is not up-to-date. Patient states that the area did blister and is now tender to touch. Patient denies any numbness tingling she has any fever chills patient states that there has been no drainage or bleeding from the area. States that touching the area makes the pain worse gauze cover makes the pain better. ROS:   Pertinent positives and negatives are stated within HPI, all other systems reviewed and are negative.  --------------------------------------------- PAST HISTORY ---------------------------------------------  Past Medical History:  has a past medical history of Arthritis, Asthma, BRCA1 negative, BRCA2 negative, Breast cancer ([facility] [address] CAD in native artery, Cancer ([facility] [address] Cerebral artery occlusion with cerebral infarction [facility]), Cerebrovascular disease, CHF (congestive heart failure) ([facility] [address] Claustrophobia, COPD (chronic obstructive pulmonary disease) ([facility] [address] Decreased dorsalis pedis pulse, Dermatophytosis, Headache(784.0), History of blood transfusion, Hives, Hx of blood clots, Hyperlipidemia, Hypertension, LBP radiating to both legs, Lymphedema of both lower extremities, Neuromuscular disorder ([address] Non-rheumatic aortic sclerosis, Obesity, Pulmonary hypertension ([address] PVD (peripheral vascular disease) with claudication ([facility] [address] Recurrent genital HSV (herpes simplex virus) infection, S/P breast biopsy, right, Type II or unspecified type diabetes mellitus without mention of complication, not stated as uncontrolled, and UTI (urinary tract infection).     Past Surgical History:  has a past surgical history that includes Total hip arthroplasty (Bilateral); Total abdominal hysterectomy w/ bilateral salpingoophorectomy; ECHO Compl W Dop Color Flow (06/07/2012); Total knee arthroplasty (Bilateral, 2013); Colonoscopy (2005); Hysterectomy; Colonoscopy (01/08/2015); Breast lumpectomy (years ago); arthroplasty (Left, 07/29/2016); Finger surgery (Left, 07/29/2016); joint replacement; Breast lumpectomy (Right, 09/06/2016); other surgical history (Right, 12/20/2017); Hand surgery (12/2017); Breast biopsy; Coronary angioplasty with stent (12/2021); and eye surgery. Social History:  reports that she quit smoking about 20 years ago. Her smoking use included cigarettes. She started smoking about 56 years ago. She has a 8.75 pack-year smoking history. She has never used smokeless tobacco. She reports current alcohol use. She reports current drug use. Drug: Marijuana Felicia Brito). Family History: family history includes Breast Cancer in her sister and sister; Cancer (age of onset: 36) in an other family member; Cancer (age of onset: 55) in her sister; Cancer (age of onset: 59) in her sister; Diabetes in her father and mother; Heart Attack in her mother; Heart Attack (age of onset: 48) in her sister; Heart Failure in her father; High Blood Pressure in her father and mother; Sickle Cell Anemia in an other family member; [place] in an other family member; Stroke in her sister. The patients home medications have been reviewed. Allergies: Patient has no known allergies. -------------------------------------------------- RESULTS -------------------------------------------------  All laboratory and radiology results have been personally reviewed by myself   LABS:  No results found for this visit on 09/02/22.     RADIOLOGY:  Interpreted by Radiologist.  No orders to display       ------------------------- NURSING NOTES AND VITALS REVIEWED ---------------------------   The nursing notes within the ED encounter and vital signs as below have been reviewed. BP (!) 172/90 Comment: nurse practitioner informed  Pulse 68   Temp 98.1 °F (36.7 °C) (Oral)   Resp 16   Ht 5' 5\" (1.651 m)   Wt 242 lb (109.8 kg)   SpO2 97%   BMI 40.27 kg/m²   Oxygen Saturation Interpretation: Normal      ---------------------------------------------------PHYSICAL EXAM--------------------------------------      Constitutional/General: Alert and oriented x3, well appearing, non toxic in NAD  Head: NC/AT  Eyes: PERRL, EOMI  Mouth: Oropharynx clear, handling secretions, no trismus  Neck: Supple, full ROM, no meningeal signs  Pulmonary: Lungs clear to auscultation bilaterally, no wheezes, rales, or rhonchi. Not in respiratory distress  Cardiovascular:  Regular rate and rhythm, no murmurs, gallops, or rubs. 2+ distal pulses  Abdomen: Soft, non tender, non distended,   Extremities: Moves all extremities x 4. Warm and well perfused  Skin: warm and dry without rash. 2 Centimeter by 2 cm circular wound to the upper mid chest without surrounding erythema erythematous streaking there is no drainage or bleeding noted. Neurologic: GCS 15,  Psych: Normal Affect      ------------------------------ ED COURSE/MEDICAL DECISION MAKING----------------------  Medications   diptheria-tetanus toxoids (DECAVAC) 2-2 LF/0.5ML injection 0.5 mL (0.5 mLs IntraMUSCular Given 9/2/22 1330)   silver sulfADIAZINE (SILVADENE) 1 % cream ( Topical Given 9/2/22 1333)         Medical Decision Making: At this time the patient is without objective evidence of an acute process requiring hospitalization or inpatient management. They have remained hemodynamically stable throughout their entire ED visit and are stable for discharge with outpatient follow-up. The plan has been discussed in detail and they are aware of the specific conditions for emergent return, as well as the importance of follow-up.    Will be treated for second-degree burn to the upper chest due to dropping a hot hard boiled egg onto her chest.  Patient states that she will follow-up with her family doctor. Wound care was discussed. Patient verbalized understanding she is agreeable to plan of care all questions were answered. Patient nontoxic and appears in no distress. Counseling: The emergency provider has spoken with the patient and discussed todays results, in addition to providing specific details for the plan of care and counseling regarding the diagnosis and prognosis. Questions are answered at this time and they are agreeable with the plan.      --------------------------------- IMPRESSION AND DISPOSITION ---------------------------------    IMPRESSION  1.  Partial thickness burn of chest wall, initial encounter        DISPOSITION  Disposition: Discharge to home  Patient condition is good                  DMITRY Santamaria CNP  09/02/22 3033

## 2022-10-30 NOTE — PATIENT INSTRUCTIONS
Brief rapid response note.    Called to bedside by the nurse as the patient was demanding to see a physician due to bilateral lower extremity pain.  He is a 40-year-old male who is here for bilateral DVTs with associated venous ulceration of the skin and lower extremity pain.  He states that his pain has not changed in character or intensity.  He said the pain is very poorly controlled on his current pain medication.  He wanted to s peak with patient regarding his pain control.  He had no other new changes or concerns.    Exam:  General: Patient was sleeping upon my arrival  Pulmonary: No respiratory distress  Extremities: Warm and well-perfused.  DP and PT pulses intact bilaterally and bounding.  There are bandages on the skin that has no areas of bleeding through them.    Assessment:  Lower extremity pain  Bilateral DVTs    – He has bilatera l lower extremity pain that is likely from venous thrombus with good bilateral pulses in his DP and PT arteries.  There is no acute change in the character of his pain but he is concerned that he is not getting adequate pain control, however he was sleeping upon my arrival and did not appear in any distress.  – Pain control per primary care service.    Aly Coles DO, MS  Medical house officer   in this medication guide. What should I discuss with my healthcare provider before using docusate and senna? You should not use this medication if you are allergic to docusate and senna, or if you are also taking mineral oil. Ask a doctor or pharmacist about using docusate and senna if you have:  · nausea or vomiting;  · stomach pain;  · a sudden change in bowel habits that lasts for 2 weeks or longer; or  · if you have an intestinal disorder such as Crohn's disease or ulcerative colitis. Do not use this medication without your doctor's advice if you are pregnant. It is not known whether docusate and senna passes into breast milk or if it could harm a nursing baby. Do not use this medication without telling your doctor if you are breast-feeding a baby. Ask a doctor before giving this medication to a child younger than 3years old. How should I use docusate and senna? Use this medication exactly as directed on the label, or as prescribed by your doctor. Do not use it in larger amounts or for longer than recommended. Take this medication with a full glass of water. It may be best to take this medication at night or at bedtime. Docusate and senna should cause you to have a bowel movement within 6 to 12 hours. Do not take this medication for longer than 7 days in a row, unless your doctor tells you to. Call your doctor if your constipation does not improve or if it gets worse after taking docusate and senna. Store docusate and senna at room temperature away from moisture and heat. What happens if I miss a dose? Since docusate and senna is taken as needed, you are not likely to be on a dosing schedule. If you are taking the medication regularly, take the missed dose as soon as you remember. If it is almost time for your next dose, wait until then to take the medicine and skip the missed dose. Do not take extra medicine to make up the missed dose. What happens if I overdose?   Seek emergency medical attention if you think you have used too much of this medicine. Overdose symptoms may include nausea, vomiting, stomach pain, or diarrhea. What should I avoid while using docusate and senna? Do not use any other over-the-counter laxatives or other stool softener without first asking your doctor or pharmacist. Docusate or senna may be contained in other medicines available over the counter. If you take certain products together you may accidentally take too much of a certain medicine. Read the label of any other medicine you are using to see if it contains docusate or senna. What are the possible side effects of docusate and senna? Get emergency medical help if you have any of these signs of an allergic reaction:  hives; difficulty breathing; swelling of your face, lips, tongue, or throat. Stop using docusate and senna and call your doctor at once if you have a serious side effect such as:  · rectal bleeding;  · severe stomach pain, nausea, vomiting; or  · no bowel movement. Less serious side effects may include:  · gas, bloating;  · diarrhea; or  · mild nausea. This is not a complete list of side effects and others may occur. Tell your doctor about any unusual or bothersome side effect. You may report side effects to FDA at 8-506-FDA-5962. What other drugs will affect docusate and senna? There may be other drugs that can interact with docusate and senna. Tell your doctor about all your prescription and over-the-counter medications, vitamins, minerals, herbal products, and drugs prescribed by other doctors. Do not start a new medication without telling your doctor. Where can I get more information? Your pharmacist can provide more information about docusate and senna. Remember, keep this and all other medicines out of the reach of children, never share your medicines with others, and use this medication only for the indication prescribed.   Every effort has been made to ensure that the information provided by [address] [place] [address]  is accurate, up-to-date, and complete, but no guarantee is made to that effect. Drug information contained herein may be time sensitive. [facility] information has been compiled for use by healthcare practitioners and consumers in the [place] and therefore [facility] does not warrant that uses outside of the [place] are appropriate, unless specifically indicated otherwise. [facility]'s drug information does not endorse drugs, diagnose patients or recommend therapy. [facility]'s drug information is an informational resource designed to assist licensed healthcare practitioners in caring for their patients and/or to serve consumers viewing this service as a supplement to, and not a substitute for, the expertise, skill, knowledge and judgment of healthcare practitioners. The absence of a warning for a given drug or drug combination in no way should be construed to indicate that the drug or drug combination is safe, effective or appropriate for any given patient. [facility] does not assume any responsibility for any aspect of healthcare administered with the aid of information [facility] provides. The information contained herein is not intended to cover all possible uses, directions, precautions, warnings, drug interactions, allergic reactions, or adverse effects. If you have questions about the drugs you are taking, check with your doctor, nurse or pharmacist.  Copyright 7304-8964 [address]: 2.02. Revision date: 12/15/2010. Care instructions adapted under license by [facility] ([facility]). If you have questions about a medical condition or this instruction, always ask your healthcare professional. [place] [address] any warranty or liability for your use of this information.

## 2023-01-22 ENCOUNTER — APPOINTMENT (OUTPATIENT)
Dept: CT IMAGING | Age: 73
End: 2023-01-22
Payer: MEDICARE

## 2023-01-22 ENCOUNTER — HOSPITAL ENCOUNTER (INPATIENT)
Age: 73
LOS: 5 days | Discharge: HOME | End: 2023-01-27
Attending: EMERGENCY MEDICINE | Admitting: INTERNAL MEDICINE
Payer: MEDICARE

## 2023-01-22 DIAGNOSIS — Z79.4 TYPE 2 DIABETES MELLITUS WITHOUT COMPLICATION, WITH LONG-TERM CURRENT USE OF INSULIN (HCC): ICD-10-CM

## 2023-01-22 DIAGNOSIS — I26.93 SINGLE SUBSEGMENTAL PULMONARY EMBOLISM WITHOUT ACUTE COR PULMONALE (HCC): Primary | ICD-10-CM

## 2023-01-22 DIAGNOSIS — J45.40 MODERATE PERSISTENT ASTHMA WITHOUT COMPLICATION: ICD-10-CM

## 2023-01-22 DIAGNOSIS — E11.9 TYPE 2 DIABETES MELLITUS WITHOUT COMPLICATION, WITH LONG-TERM CURRENT USE OF INSULIN (HCC): ICD-10-CM

## 2023-01-22 DIAGNOSIS — Z76.0 ENCOUNTER FOR MEDICATION REFILL: ICD-10-CM

## 2023-01-22 DIAGNOSIS — J44.1 COPD EXACERBATION (HCC): ICD-10-CM

## 2023-01-22 PROBLEM — I26.99 PULMONARY EMBOLISM AND INFARCTION (HCC): Status: ACTIVE | Noted: 2023-01-22

## 2023-01-22 LAB
ALBUMIN SERPL-MCNC: 4 G/DL (ref 3.5–5.2)
ALP BLD-CCNC: 154 U/L (ref 35–104)
ALT SERPL-CCNC: <5 U/L (ref 0–32)
ANION GAP SERPL CALCULATED.3IONS-SCNC: 15 MMOL/L (ref 7–16)
AST SERPL-CCNC: 12 U/L (ref 0–31)
BASOPHILS ABSOLUTE: 0.03 E9/L (ref 0–0.2)
BASOPHILS RELATIVE PERCENT: 0.3 % (ref 0–2)
BILIRUB SERPL-MCNC: 0.3 MG/DL (ref 0–1.2)
BUN BLDV-MCNC: 16 MG/DL (ref 6–23)
CALCIUM SERPL-MCNC: 9.4 MG/DL (ref 8.6–10.2)
CHLORIDE BLD-SCNC: 100 MMOL/L (ref 98–107)
CO2: 23 MMOL/L (ref 22–29)
CREAT SERPL-MCNC: 0.7 MG/DL (ref 0.5–1)
EKG ATRIAL RATE: 81 BPM
EKG P AXIS: 83 DEGREES
EKG P-R INTERVAL: 142 MS
EKG Q-T INTERVAL: 424 MS
EKG QRS DURATION: 142 MS
EKG QTC CALCULATION (BAZETT): 492 MS
EKG R AXIS: -23 DEGREES
EKG T AXIS: 132 DEGREES
EKG VENTRICULAR RATE: 81 BPM
EOSINOPHILS ABSOLUTE: 0 E9/L (ref 0.05–0.5)
EOSINOPHILS RELATIVE PERCENT: 0 % (ref 0–6)
GFR SERPL CREATININE-BSD FRML MDRD: >60 ML/MIN/1.73
GLUCOSE BLD-MCNC: 273 MG/DL (ref 74–99)
HBA1C MFR BLD: 8.4 % (ref 4–5.6)
HCT VFR BLD CALC: 36.6 % (ref 34–48)
HEMOGLOBIN: 10.8 G/DL (ref 11.5–15.5)
IMMATURE GRANULOCYTES #: 0.04 E9/L
IMMATURE GRANULOCYTES %: 0.4 % (ref 0–5)
INFLUENZA A BY PCR: NOT DETECTED
INFLUENZA B BY PCR: NOT DETECTED
LACTIC ACID, SEPSIS: 3.4 MMOL/L (ref 0.5–1.9)
LACTIC ACID, SEPSIS: 4.3 MMOL/L (ref 0.5–1.9)
LACTIC ACID: 4.8 MMOL/L (ref 0.5–2.2)
LIPASE: 20 U/L (ref 13–60)
LYMPHOCYTES ABSOLUTE: 2.13 E9/L (ref 1.5–4)
LYMPHOCYTES RELATIVE PERCENT: 20.7 % (ref 20–42)
MCH RBC QN AUTO: 24.1 PG (ref 26–35)
MCHC RBC AUTO-ENTMCNC: 29.5 % (ref 32–34.5)
MCV RBC AUTO: 81.7 FL (ref 80–99.9)
METER GLUCOSE: 254 MG/DL (ref 74–99)
METER GLUCOSE: 260 MG/DL (ref 74–99)
MONOCYTES ABSOLUTE: 0.83 E9/L (ref 0.1–0.95)
MONOCYTES RELATIVE PERCENT: 8.1 % (ref 2–12)
NEUTROPHILS ABSOLUTE: 7.26 E9/L (ref 1.8–7.3)
NEUTROPHILS RELATIVE PERCENT: 70.5 % (ref 43–80)
PDW BLD-RTO: 16.7 FL (ref 11.5–15)
PLATELET # BLD: 408 E9/L (ref 130–450)
PMV BLD AUTO: 11 FL (ref 7–12)
POTASSIUM SERPL-SCNC: 4 MMOL/L (ref 3.5–5)
PRO-BNP: 1018 PG/ML (ref 0–125)
RBC # BLD: 4.48 E12/L (ref 3.5–5.5)
SARS-COV-2, NAAT: DETECTED
SODIUM BLD-SCNC: 138 MMOL/L (ref 132–146)
TOTAL PROTEIN: 7.5 G/DL (ref 6.4–8.3)
TROPONIN, HIGH SENSITIVITY: 22 NG/L (ref 0–9)
TROPONIN, HIGH SENSITIVITY: 31 NG/L (ref 0–9)
WBC # BLD: 10.3 E9/L (ref 4.5–11.5)

## 2023-01-22 PROCEDURE — 6370000000 HC RX 637 (ALT 250 FOR IP): Performed by: NURSE PRACTITIONER

## 2023-01-22 PROCEDURE — APPSS45 APP SPLIT SHARED TIME 31-45 MINUTES: Performed by: NURSE PRACTITIONER

## 2023-01-22 PROCEDURE — 1200000000 HC SEMI PRIVATE

## 2023-01-22 PROCEDURE — 94640 AIRWAY INHALATION TREATMENT: CPT

## 2023-01-22 PROCEDURE — 83880 ASSAY OF NATRIURETIC PEPTIDE: CPT

## 2023-01-22 PROCEDURE — 99285 EMERGENCY DEPT VISIT HI MDM: CPT

## 2023-01-22 PROCEDURE — 99223 1ST HOSP IP/OBS HIGH 75: CPT | Performed by: INTERNAL MEDICINE

## 2023-01-22 PROCEDURE — 93005 ELECTROCARDIOGRAM TRACING: CPT | Performed by: EMERGENCY MEDICINE

## 2023-01-22 PROCEDURE — 84484 ASSAY OF TROPONIN QUANT: CPT

## 2023-01-22 PROCEDURE — 6360000002 HC RX W HCPCS: Performed by: NURSE PRACTITIONER

## 2023-01-22 PROCEDURE — 87635 SARS-COV-2 COVID-19 AMP PRB: CPT

## 2023-01-22 PROCEDURE — 6360000002 HC RX W HCPCS: Performed by: EMERGENCY MEDICINE

## 2023-01-22 PROCEDURE — 74177 CT ABD & PELVIS W/CONTRAST: CPT

## 2023-01-22 PROCEDURE — 71275 CT ANGIOGRAPHY CHEST: CPT

## 2023-01-22 PROCEDURE — 83690 ASSAY OF LIPASE: CPT

## 2023-01-22 PROCEDURE — 85025 COMPLETE CBC W/AUTO DIFF WBC: CPT

## 2023-01-22 PROCEDURE — 6360000004 HC RX CONTRAST MEDICATION: Performed by: RADIOLOGY

## 2023-01-22 PROCEDURE — 6370000000 HC RX 637 (ALT 250 FOR IP): Performed by: EMERGENCY MEDICINE

## 2023-01-22 PROCEDURE — 80053 COMPREHEN METABOLIC PANEL: CPT

## 2023-01-22 PROCEDURE — 83036 HEMOGLOBIN GLYCOSYLATED A1C: CPT

## 2023-01-22 PROCEDURE — 94669 MECHANICAL CHEST WALL OSCILL: CPT

## 2023-01-22 PROCEDURE — 36415 COLL VENOUS BLD VENIPUNCTURE: CPT

## 2023-01-22 PROCEDURE — 96365 THER/PROPH/DIAG IV INF INIT: CPT

## 2023-01-22 PROCEDURE — 87502 INFLUENZA DNA AMP PROBE: CPT

## 2023-01-22 PROCEDURE — 2580000003 HC RX 258: Performed by: NURSE PRACTITIONER

## 2023-01-22 PROCEDURE — 82962 GLUCOSE BLOOD TEST: CPT

## 2023-01-22 PROCEDURE — 83605 ASSAY OF LACTIC ACID: CPT

## 2023-01-22 PROCEDURE — 93010 ELECTROCARDIOGRAM REPORT: CPT | Performed by: INTERNAL MEDICINE

## 2023-01-22 RX ORDER — SODIUM CHLORIDE 9 MG/ML
INJECTION, SOLUTION INTRAVENOUS PRN
Status: DISCONTINUED | OUTPATIENT
Start: 2023-01-22 | End: 2023-01-27 | Stop reason: HOSPADM

## 2023-01-22 RX ORDER — ENOXAPARIN SODIUM 150 MG/ML
1 INJECTION SUBCUTANEOUS ONCE
Status: COMPLETED | OUTPATIENT
Start: 2023-01-22 | End: 2023-01-22

## 2023-01-22 RX ORDER — ACETAMINOPHEN 325 MG/1
650 TABLET ORAL EVERY 6 HOURS PRN
Status: DISCONTINUED | OUTPATIENT
Start: 2023-01-22 | End: 2023-01-27 | Stop reason: HOSPADM

## 2023-01-22 RX ORDER — INSULIN LISPRO 100 [IU]/ML
0-4 INJECTION, SOLUTION INTRAVENOUS; SUBCUTANEOUS
Status: DISCONTINUED | OUTPATIENT
Start: 2023-01-22 | End: 2023-01-23

## 2023-01-22 RX ORDER — DOCUSATE SODIUM 100 MG/1
100 CAPSULE, LIQUID FILLED ORAL 2 TIMES DAILY
Status: DISCONTINUED | OUTPATIENT
Start: 2023-01-22 | End: 2023-01-27 | Stop reason: HOSPADM

## 2023-01-22 RX ORDER — CLOPIDOGREL BISULFATE 75 MG/1
75 TABLET ORAL DAILY
Status: DISCONTINUED | OUTPATIENT
Start: 2023-01-23 | End: 2023-01-23

## 2023-01-22 RX ORDER — METHYLPREDNISOLONE SODIUM SUCCINATE 125 MG/2ML
60 INJECTION, POWDER, LYOPHILIZED, FOR SOLUTION INTRAMUSCULAR; INTRAVENOUS ONCE
Status: COMPLETED | OUTPATIENT
Start: 2023-01-22 | End: 2023-01-22

## 2023-01-22 RX ORDER — MAGNESIUM SULFATE IN WATER 40 MG/ML
2000 INJECTION, SOLUTION INTRAVENOUS ONCE
Status: COMPLETED | OUTPATIENT
Start: 2023-01-22 | End: 2023-01-22

## 2023-01-22 RX ORDER — IPRATROPIUM BROMIDE AND ALBUTEROL SULFATE 2.5; .5 MG/3ML; MG/3ML
3 SOLUTION RESPIRATORY (INHALATION) ONCE
Status: COMPLETED | OUTPATIENT
Start: 2023-01-22 | End: 2023-01-22

## 2023-01-22 RX ORDER — AZITHROMYCIN 250 MG/1
250 TABLET, FILM COATED ORAL DAILY
Status: COMPLETED | OUTPATIENT
Start: 2023-01-23 | End: 2023-01-26

## 2023-01-22 RX ORDER — SENNA PLUS 8.6 MG/1
1 TABLET ORAL DAILY
Status: DISCONTINUED | OUTPATIENT
Start: 2023-01-22 | End: 2023-01-27 | Stop reason: HOSPADM

## 2023-01-22 RX ORDER — INSULIN LISPRO 100 [IU]/ML
0-4 INJECTION, SOLUTION INTRAVENOUS; SUBCUTANEOUS NIGHTLY
Status: DISCONTINUED | OUTPATIENT
Start: 2023-01-22 | End: 2023-01-23

## 2023-01-22 RX ORDER — PRAVASTATIN SODIUM 20 MG
80 TABLET ORAL NIGHTLY
Status: DISCONTINUED | OUTPATIENT
Start: 2023-01-22 | End: 2023-01-27 | Stop reason: HOSPADM

## 2023-01-22 RX ORDER — SODIUM CHLORIDE 0.9 % (FLUSH) 0.9 %
5-40 SYRINGE (ML) INJECTION PRN
Status: DISCONTINUED | OUTPATIENT
Start: 2023-01-22 | End: 2023-01-27 | Stop reason: HOSPADM

## 2023-01-22 RX ORDER — ONDANSETRON 2 MG/ML
4 INJECTION INTRAMUSCULAR; INTRAVENOUS EVERY 6 HOURS PRN
Status: DISCONTINUED | OUTPATIENT
Start: 2023-01-22 | End: 2023-01-27 | Stop reason: HOSPADM

## 2023-01-22 RX ORDER — HYDRALAZINE HYDROCHLORIDE 20 MG/ML
10 INJECTION INTRAMUSCULAR; INTRAVENOUS EVERY 6 HOURS PRN
Status: DISCONTINUED | OUTPATIENT
Start: 2023-01-22 | End: 2023-01-27 | Stop reason: HOSPADM

## 2023-01-22 RX ORDER — DEXTROSE MONOHYDRATE 100 MG/ML
INJECTION, SOLUTION INTRAVENOUS CONTINUOUS PRN
Status: DISCONTINUED | OUTPATIENT
Start: 2023-01-22 | End: 2023-01-23 | Stop reason: SDUPTHER

## 2023-01-22 RX ORDER — IPRATROPIUM BROMIDE AND ALBUTEROL SULFATE 2.5; .5 MG/3ML; MG/3ML
1 SOLUTION RESPIRATORY (INHALATION)
Status: DISCONTINUED | OUTPATIENT
Start: 2023-01-23 | End: 2023-01-27 | Stop reason: HOSPADM

## 2023-01-22 RX ORDER — AZITHROMYCIN 250 MG/1
500 TABLET, FILM COATED ORAL DAILY
Status: COMPLETED | OUTPATIENT
Start: 2023-01-22 | End: 2023-01-22

## 2023-01-22 RX ORDER — ACETAMINOPHEN 650 MG/1
650 SUPPOSITORY RECTAL EVERY 6 HOURS PRN
Status: DISCONTINUED | OUTPATIENT
Start: 2023-01-22 | End: 2023-01-27 | Stop reason: HOSPADM

## 2023-01-22 RX ORDER — ONDANSETRON 4 MG/1
4 TABLET, ORALLY DISINTEGRATING ORAL EVERY 8 HOURS PRN
Status: DISCONTINUED | OUTPATIENT
Start: 2023-01-22 | End: 2023-01-27 | Stop reason: HOSPADM

## 2023-01-22 RX ORDER — BUDESONIDE 0.5 MG/2ML
0.5 INHALANT ORAL 2 TIMES DAILY
Status: DISCONTINUED | OUTPATIENT
Start: 2023-01-22 | End: 2023-01-27 | Stop reason: HOSPADM

## 2023-01-22 RX ORDER — SODIUM CHLORIDE 0.9 % (FLUSH) 0.9 %
5-40 SYRINGE (ML) INJECTION EVERY 12 HOURS SCHEDULED
Status: DISCONTINUED | OUTPATIENT
Start: 2023-01-22 | End: 2023-01-27 | Stop reason: HOSPADM

## 2023-01-22 RX ORDER — ARFORMOTEROL TARTRATE 15 UG/2ML
15 SOLUTION RESPIRATORY (INHALATION) 2 TIMES DAILY
Status: DISCONTINUED | OUTPATIENT
Start: 2023-01-22 | End: 2023-01-27 | Stop reason: HOSPADM

## 2023-01-22 RX ORDER — METHYLPREDNISOLONE SODIUM SUCCINATE 40 MG/ML
40 INJECTION, POWDER, LYOPHILIZED, FOR SOLUTION INTRAMUSCULAR; INTRAVENOUS EVERY 12 HOURS
Status: DISCONTINUED | OUTPATIENT
Start: 2023-01-23 | End: 2023-01-24

## 2023-01-22 RX ORDER — ENOXAPARIN SODIUM 150 MG/ML
1 INJECTION SUBCUTANEOUS 2 TIMES DAILY
Status: DISCONTINUED | OUTPATIENT
Start: 2023-01-23 | End: 2023-01-24

## 2023-01-22 RX ADMIN — INSULIN LISPRO 2 UNITS: 100 INJECTION, SOLUTION INTRAVENOUS; SUBCUTANEOUS at 20:23

## 2023-01-22 RX ADMIN — IOPAMIDOL 75 ML: 755 INJECTION, SOLUTION INTRAVENOUS at 14:54

## 2023-01-22 RX ADMIN — BUDESONIDE 500 MCG: 0.5 SUSPENSION RESPIRATORY (INHALATION) at 18:41

## 2023-01-22 RX ADMIN — INSULIN LISPRO 2 UNITS: 100 INJECTION, SOLUTION INTRAVENOUS; SUBCUTANEOUS at 19:02

## 2023-01-22 RX ADMIN — IPRATROPIUM BROMIDE AND ALBUTEROL SULFATE 1 AMPULE: .5; 2.5 SOLUTION RESPIRATORY (INHALATION) at 18:41

## 2023-01-22 RX ADMIN — Medication 10 ML: at 20:16

## 2023-01-22 RX ADMIN — ARFORMOTEROL TARTRATE 15 MCG: 15 SOLUTION RESPIRATORY (INHALATION) at 18:41

## 2023-01-22 RX ADMIN — AZITHROMYCIN MONOHYDRATE 500 MG: 250 TABLET ORAL at 18:47

## 2023-01-22 RX ADMIN — PRAVASTATIN SODIUM 80 MG: 20 TABLET ORAL at 20:15

## 2023-01-22 RX ADMIN — DOCUSATE SODIUM 100 MG: 100 CAPSULE, LIQUID FILLED ORAL at 20:16

## 2023-01-22 RX ADMIN — IPRATROPIUM BROMIDE AND ALBUTEROL SULFATE 3 AMPULE: .5; 2.5 SOLUTION RESPIRATORY (INHALATION) at 13:33

## 2023-01-22 RX ADMIN — METHYLPREDNISOLONE SODIUM SUCCINATE 60 MG: 125 INJECTION, POWDER, FOR SOLUTION INTRAMUSCULAR; INTRAVENOUS at 18:47

## 2023-01-22 RX ADMIN — ENOXAPARIN SODIUM 105 MG: 150 INJECTION SUBCUTANEOUS at 16:55

## 2023-01-22 RX ADMIN — MAGNESIUM SULFATE HEPTAHYDRATE 2000 MG: 40 INJECTION, SOLUTION INTRAVENOUS at 13:29

## 2023-01-22 ASSESSMENT — ENCOUNTER SYMPTOMS
VOMITING: 0
ABDOMINAL PAIN: 1
NAUSEA: 0
CHEST TIGHTNESS: 1

## 2023-01-22 NOTE — H&P
[address] [place], [place] [address]ist Group   History and Physical    CHIEF COMPLAINT:  Abd pain, SOB    History of Present Illness:  Suni Alves is a 67 y.o. female with a significant past medical history , including Asthma, COPD, CAD, CVA, HLD, HTN, DM, HFpEF, & pulm HTN  who presents with Shortness of Breath (2 duonebs given, was at the drs yesterday and was given steroids ) and Abdominal Pain (Upper L quad )  Pt reports congestion x1 month, started taking expectorant a couple weeks ago, noticed occasional blood-tinged sputum. Went to PCP Fri, started on steroids. LUQ pain & SOB today at [facility]. Reports hx of PE's and DVT's during 'childbearing years'. Quit smoking several years ago. Hx of 1 miscarriage, no known fam hx of coagulopathies. Frequently sleeps in recliner with legs bent all night. Colonoscopy approx 5 yrs, last mammogram in July. R breast cancer 5yrs ago with lumpectomy. Unknown last Pap. Also reports constipation. Reports chronic irregular bowel pattern, but hasn't had BM in several days. Is able to pass gas. CTA chest notes Small filling defect in the distal subsegmental and peripheral vessels of LLL concerning for subsegmental pulmonary embolism. Covid positive. While in ER, pt received wt-based lovenox, duoneb, mag sulfate, & solumedrol. Pt unsure of home medications, states meds are pre-packaged.     WORK UP SINCE ARRIVAL:  Results for orders placed or performed during the hospital encounter of 01/22/23   CBC with Auto Differential   Result Value Ref Range    WBC 10.3 4.5 - 11.5 E9/L    RBC 4.48 3.50 - 5.50 E12/L    Hemoglobin 10.8 (L) 11.5 - 15.5 g/dL    Hematocrit 36.6 34.0 - 48.0 %    MCV 81.7 80.0 - 99.9 fL    MCH 24.1 (L) 26.0 - 35.0 pg    MCHC 29.5 (L) 32.0 - 34.5 %    RDW 16.7 (H) 11.5 - 15.0 fL    Platelets 484 564 - 117 E9/L    MPV 11.0 7.0 - 12.0 fL    Neutrophils % 70.5 43.0 - 80.0 %    Immature Granulocytes % 0.4 0.0 - 5.0 %    Lymphocytes % 20.7 20.0 - 42.0 %    Monocytes % 8.1 2.0 - 12.0 %    Eosinophils % 0.0 0.0 - 6.0 %    Basophils % 0.3 0.0 - 2.0 %    Neutrophils Absolute 7.26 1.80 - 7.30 E9/L    Immature Granulocytes # 0.04 E9/L    Lymphocytes Absolute 2.13 1.50 - 4.00 E9/L    Monocytes Absolute 0.83 0.10 - 0.95 E9/L    Eosinophils Absolute 0.00 (L) 0.05 - 0.50 E9/L    Basophils Absolute 0.03 0.00 - 0.20 E9/L   Comprehensive Metabolic Panel   Result Value Ref Range    Sodium 138 132 - 146 mmol/L    Potassium 4.0 3.5 - 5.0 mmol/L    Chloride 100 98 - 107 mmol/L    CO2 23 22 - 29 mmol/L    Anion Gap 15 7 - 16 mmol/L    Glucose 273 (H) 74 - 99 mg/dL    BUN 16 6 - 23 mg/dL    Creatinine 0.7 0.5 - 1.0 mg/dL    Est, Glom Filt Rate >60 >=60 mL/min/1.73    Calcium 9.4 8.6 - 10.2 mg/dL    Total Protein 7.5 6.4 - 8.3 g/dL    Albumin 4.0 3.5 - 5.2 g/dL    Total Bilirubin 0.3 0.0 - 1.2 mg/dL    Alkaline Phosphatase 154 (H) 35 - 104 U/L    ALT <5 0 - 32 U/L    AST 12 0 - 31 U/L   Troponin   Result Value Ref Range    Troponin, High Sensitivity 22 (H) 0 - 9 ng/L   Brain Natriuretic Peptide   Result Value Ref Range    Pro-BNP 1,018 (H) 0 - 125 pg/mL   Lipase   Result Value Ref Range    Lipase 20 13 - 60 U/L   Lactate, Sepsis   Result Value Ref Range    Lactic Acid, Sepsis 3.4 (H) 0.5 - 1.9 mmol/L   EKG 12 Lead   Result Value Ref Range    Ventricular Rate 81 BPM    Atrial Rate 81 BPM    P-R Interval 142 ms    QRS Duration 142 ms    Q-T Interval 424 ms    QTc Calculation (Bazett) 492 ms    P Axis 83 degrees    R Axis -23 degrees    T Axis 132 degrees     CT ABDOMEN PELVIS W IV CONTRAST Additional Contrast? None   Final Result by Isabella Feldman MD (01/22 7235)   Limited study due to poor contrast opacification. There is no large central   pulmonary embolism. Small filling defect in the distal subsegmental and peripheral vessels of   left lower lobe concerning for subsegmental pulmonary embolism. Coronary artery calcification. Unremarkable CT of the abdomen and pelvis. RECOMMENDATIONS:   Unavailable         CTA CHEST W CONTRAST   Final Result by Prisca Resendiz MD (01/22 8605)   Limited study due to poor contrast opacification. There is no large central   pulmonary embolism. Small filling defect in the distal subsegmental and peripheral vessels of   left lower lobe concerning for subsegmental pulmonary embolism. Coronary artery calcification. Unremarkable CT of the abdomen and pelvis. RECOMMENDATIONS:   Unavailable             REVIEW OF SYSTEMS:  no fevers, chills, cp, n/v, ha, vision/hearing changes, wt changes, hot/cold flashes, other open skin lesions, diarrhea, dysuria/hematuria unless noted in HPI. Complete ROS performed with the patient and is otherwise negative. ALLERGIES:  Patient has no known allergies. PAST MEDICAL & SURGICAL HISTORY:  Pt  has a past medical history of Arthritis, Asthma, BRCA1 negative, BRCA2 negative, Breast cancer ([address] CAD in native artery, Cancer ([address] Cerebral artery occlusion with cerebral infarction [facility]), Cerebrovascular disease, CHF (congestive heart failure) ([address] Claustrophobia, COPD (chronic obstructive pulmonary disease) ([address] Decreased dorsalis pedis pulse, Dermatophytosis, Headache(784.0), History of blood transfusion, Hives, Hx of blood clots, Hyperlipidemia, Hypertension, LBP radiating to both legs, Lymphedema of both lower extremities, Neuromuscular disorder ([address] Non-rheumatic aortic sclerosis, Obesity, Pulmonary hypertension ([address] PVD (peripheral vascular disease) with claudication ([address] Recurrent genital HSV (herpes simplex virus) infection, S/P breast biopsy, right, Type II or unspecified type diabetes mellitus without mention of complication, not stated as uncontrolled, and UTI (urinary tract infection). .   Pt  has a past surgical history that includes Total hip arthroplasty (Bilateral);  Total abdominal hysterectomy w/ bilateral salpingoophorectomy; ECHO Compl W Dop Color Flow (06/07/2012); Total knee arthroplasty (Bilateral, 2013); Colonoscopy (2005); Hysterectomy; Colonoscopy (01/08/2015); Breast lumpectomy (years ago); arthroplasty (Left, 07/29/2016); Finger surgery (Left, 07/29/2016); joint replacement; Breast lumpectomy (Right, 09/06/2016); other surgical history (Right, 12/20/2017); Hand surgery (12/2017); Breast biopsy; Coronary angioplasty with stent (12/2021); and eye surgery. Social History:  Pt  reports that she quit smoking about 21 years ago. Her smoking use included cigarettes. She started smoking about 57 years ago. She has a 8.75 pack-year smoking history. She has never used smokeless tobacco. She reports current alcohol use. She reports current drug use. Drug: Marijuana Shanelle Lerma. .   Pt lives alone in a/an  apartment/duplex/condo    Family History:  Pt family history includes Breast Cancer in her sister and sister; Cancer (age of onset: 36) in an other family member; Cancer (age of onset: 55) in her sister; Cancer (age of onset: 59) in her sister; Diabetes in her father and mother; Heart Attack in her mother; Heart Attack (age of onset: 48) in her sister; Heart Failure in her father; High Blood Pressure in her father and mother; Sickle Cell Anemia in an other family member; Sabi Blazing in an other family member; Stroke in her sister. Informant(s) for H&P: Patient, chart review    Pt  temperature is 98.3 °F (36.8 °C). Her blood pressure is 144/79 (abnormal) and her pulse is 97. Her respiration is 24 and oxygen saturation is 100%. .   There is no height or weight on file to calculate BMI.     Pt's home meds include Albuterol Sulfate (sensor), SITagliptin, acetaminophen, albuterol, amLODIPine, aspirin, calcium carbonate-vitamin D, cloNIDine, clopidogrel, fluticasone furoate-vilanterol, gabapentin, hydrALAZINE, insulin glargine, insulin lispro (1 Unit Dial), isosorbide mononitrate, losartan-hydroCHLOROthiazide, melatonin, metFORMIN, metoprolol succinate, montelukast, nitroGLYCERIN, omeprazole, oxybutynin, polyethylene glycol, pravastatin, sAXagliptin, sennosides-docusate sodium, silver sulfADIAZINE, vitamin B-12, and vitamin D    PHYSICAL EXAM:  General Appearance: Awake, alert and oriented. In no acute distress  Skin: Warm and dry, no rash or erythema  Head: normocephalic and atraumatic  Eyes: pupils equal, round, and reactive to light, extraocular eye movements intact, conjunctivae normal  ENT: external ear and ear canal normal bilaterally, nose without deformity  Neck: supple and non-tender without mass, no cervical lymphadenopathy  Pulmonary/Chest: Coarse lung sounds with exp wheezing in all fields. Normal air movement. No respiratory distress. Strong, dry cough. Cardiovascular: normal rate, regular rhythm. Normal S1 and S2. No murmurs, rubs, clicks, or gallops  Abdomen: soft, non-tender, non-distended. Normal bowel sounds. No masses or organomegaly  Extremities: no cyanosis, clubbing or edema  Musculoskeletal: normal range of motion, no joint swelling, deformity or tenderness  Neurologic: reflexes normal and symmetric, no cranial nerve deficit, gait, coordination and speech normal      BP (!) 144/79   Pulse 97   Temp 98.3 °F (36.8 °C)   Resp 24   SpO2 100%     Recent Labs     01/22/23  1258      K 4.0      CO2 23   BUN 16   CREATININE 0.7   GLUCOSE 273*   CALCIUM 9.4       Recent Labs     01/22/23  1258   WBC 10.3   RBC 4.48   HGB 10.8*   HCT 36.6   MCV 81.7   MCH 24.1*   MCHC 29.5*   RDW 16.7*      MPV 11.0       Radiology:   CT ABDOMEN PELVIS W IV CONTRAST Additional Contrast? None   Final Result   Limited study due to poor contrast opacification. There is no large central   pulmonary embolism. Small filling defect in the distal subsegmental and peripheral vessels of   left lower lobe concerning for subsegmental pulmonary embolism. Coronary artery calcification. Unremarkable CT of the abdomen and pelvis.       RECOMMENDATIONS:   Unavailable CTA CHEST W CONTRAST   Final Result   Limited study due to poor contrast opacification. There is no large central   pulmonary embolism. Small filling defect in the distal subsegmental and peripheral vessels of   left lower lobe concerning for subsegmental pulmonary embolism. Coronary artery calcification. Unremarkable CT of the abdomen and pelvis. RECOMMENDATIONS:   Unavailable             EKG preliminary Narrative & Impression    Sinus rhythm with occasional premature ventricular complexes  Left bundle branch block  Abnormal ECG  When compared with ECG of 24-DEC-2019 20:40,  premature ventricular complexes are now present       Assessment & Plan:    LLL pulmonary embolism   Started on wt-based lovenox in ER. Continue wt-based lovenox q12h for now. Can likely transition to loading dose Eliquis soon. Nsg communication to obtain & document current weight. Current (& ER) dosage based on weight from 4 mos ago    Covid-19  COPD with exacerbation  Not currently on O2. Does not wear home O2  Received solumedrol, mag sulfate, & duonebs in ER  Solumedrol 40mg q12h  Azith Day 1/5  Brovana & pulmicort  Duonebs q4h while awake  Incentive spirometry & PEP flutter  Droplet plus isolation    HFpEF 12/2021 ECHO LVEF 50-60%, stage II diastolic dysfunction  Initial BNP 1,018  Sees Dr. Conte Back  Does not appear to be volume overloaded  I&O and daily weights  KRUNAL diet    HTN  Hypertensive in ER  Home meds not yet reconciled, several HTN meds listed  Hydralazine q6h prn  Monitor BP    DM No recent A1c on file  A1c  Resume home Lantus at 80% once verified  Hold home orals  AC, HS BS checks with low-dose SSI coverage. May need to titrate   Hypoglycemia protocol    CAD  HLD  Continued home plavix & statin, may need adjusted once reconciled    Constipation  Senna daily  Colace BID  Glycolax prn    Home medications pending reconciliation. Nsg communication to reconcile home meds, including last dose.  Notify provider once complete. Code Status: Full  DVT prophylaxis: Wt-based lovenox    Disposition: From home. Anticipate discharge to home when medically stable. 45 minutes or more spent reviewing patient chart, assessing patient, discussing plan of care with patient and family, discussing plan of care with collaborating physician, and documentation. NOTE: This report was transcribed using voice recognition software. Every effort was made to ensure accuracy; however, inadvertent computerized transcription errors may be present.      Electronically signed by DMITRY Goldberg NP on 1/22/2023 at 4:52 PM

## 2023-01-22 NOTE — ED PROVIDER NOTES
77-year-old female presenting via EMS. She was at [employer] playing the piano when she began to feel some left upper quadrant abdominal pain. She has known COPD and was coughing as well. Recently started on steroids by her family doctor. Upon arrival she is awake, alert, oriented and 4. Tenderness to left upper quadrant but no generalized tenderness. No fevers or chills, does not use oxygen at home. Family History   Problem Relation Age of Onset    Diabetes Mother     High Blood Pressure Mother     Heart Attack Mother         low [de-identified]    High Blood Pressure Father     Diabetes Father     Heart Failure Father     Breast Cancer Sister     Stroke Sister         young age    [de-identified] Sister 55        breast    Heart Attack Sister 48    Sickle Cell Anemia Other         niece    Cancer Other 36        niece - breast    Breast Cancer Sister             Cancer Sister 59        breast & ovarian    Stomach Cancer Other         aunt     Past Surgical History:   Procedure Laterality Date    ARTHROPLASTY Left 2016    thumb basil joint with dequervain's release    BREAST BIOPSY      BREAST LUMPECTOMY  years ago    benign    BREAST LUMPECTOMY Right 2016    COLONOSCOPY  2005    COLONOSCOPY  2015    Dr. March Spatz - [facility]  2021    ECHO COMPL W DOP COLOR FLOW  2012         EYE SURGERY      FINGER SURGERY Left 2016    thumb    HAND SURGERY  2017    HYSTERECTOMY (CERVIX STATUS UNKNOWN)      JOINT REPLACEMENT      OTHER SURGICAL HISTORY Right 2017    RIGHT THUMB TRAPEZIECTOMY WITH LIGAMENT RECONSRUCTION DEQUERVAINS RELEASE    TIM AND BSO (CERVIX REMOVED)      TOTAL HIP ARTHROPLASTY Bilateral     ,  dr Christiane Gaucher, dr Gurvinder Courtney Bilateral     dr Lauryn Sanders       Review of Systems   Constitutional:  Negative for fever. Respiratory:  Positive for chest tightness. Gastrointestinal:  Positive for abdominal pain. Negative for nausea and vomiting. All other systems reviewed and are negative. Physical Exam  Constitutional:       General: She is not in acute distress. Appearance: She is well-developed. HENT:      Head: Normocephalic and atraumatic. Eyes:      Conjunctiva/sclera: Conjunctivae normal.      Pupils: Pupils are equal, round, and reactive to light. Neck:      Thyroid: No thyromegaly. Cardiovascular:      Rate and Rhythm: Normal rate and regular rhythm. Pulmonary:      Effort: Pulmonary effort is normal. No respiratory distress. Breath sounds: Wheezing present. Chest:      Chest wall: No tenderness. Abdominal:      General: There is no distension. Palpations: Abdomen is soft. Tenderness: There is abdominal tenderness in the left upper quadrant. There is no guarding or rebound. Musculoskeletal:         General: No tenderness. Normal range of motion. Cervical back: Normal range of motion. Skin:     General: Skin is warm and dry. Findings: No erythema. Neurological:      Mental Status: She is alert and oriented to person, place, and time. Cranial Nerves: No cranial nerve deficit. Coordination: Coordination normal.        Procedures     MDM       History From: Patient    CC/HPI Summary, DDx, ED Course, Reassessment, Tests Considered, Patient expectation:   Patient presenting with sudden onset left upper quadrant abdominal pain. She was at [employer] and said she was playing the piano when this occurred. She has had coughing fits and spells and has been started on steroids recently. Does have COPD. Presents emergency department with ongoing wheezing and a sensation of lightheadedness and the abdominal pain that was new onset. Feeling better after the breathing treatments that were given in the ED. Imaging done with the abdomen pelvis and a CT of the chest.  Small PE found on the left lower lobe.   Patient given Lovenox, will be admitted to the [facility].    Social Determinants affecting Dx or Tx: Obesity    Chronic Conditions: COPD, obesity, history of blood clots, history of tobacco abuse    Records Reviewed: Cardiology and internal medicine note reviewed from 12/17/2021. Patient had a cardiac catheterization with a stent to the right main on 12/15. Oncology note from 7/28/2022 regarding patient's history of breast cancer. She underwent radiation therapy, endocrine therapy in 2016. ED Course as of 01/22/23 1658   [place] Jan 22, 2023 1621 Patient feeling better with the breathing at this point. Still has some wheezing, however. Pain is still present but improved. Lactic acid elevated at 3.4 and waiting on a repeat. [SO]      ED Course User Index  [SO] Claudie Goldberg, DO        ED Course as of 01/22/23 1658   [place] Jan 22, 2023 1621 Patient feeling better with the breathing at this point. Still has some wheezing, however. Pain is still present but improved.   Lactic acid elevated at 3.4 and waiting on a repeat. [SO]      ED Course User Index  [SO] Claudie Goldberg, DO       --------------------------------------------- PAST HISTORY ---------------------------------------------  Past Medical History:  has a past medical history of Arthritis, Asthma, BRCA1 negative, BRCA2 negative, Breast cancer ([address] CAD in native artery, Cancer ([address] Cerebral artery occlusion with cerebral infarction [facility]), Cerebrovascular disease, CHF (congestive heart failure) ([address] Claustrophobia, COPD (chronic obstructive pulmonary disease) ([address] Decreased dorsalis pedis pulse, Dermatophytosis, Headache(784.0), History of blood transfusion, Hives, Hx of blood clots, Hyperlipidemia, Hypertension, LBP radiating to both legs, Lymphedema of both lower extremities, Neuromuscular disorder ([address] Non-rheumatic aortic sclerosis, Obesity, Pulmonary hypertension ([address] PVD (peripheral vascular disease) with claudication ([address] Recurrent genital HSV (herpes simplex virus) infection, S/P breast biopsy, right, Type II or unspecified type diabetes mellitus without mention of complication, not stated as uncontrolled, and UTI (urinary tract infection). Past Surgical History:  has a past surgical history that includes Total hip arthroplasty (Bilateral); Total abdominal hysterectomy w/ bilateral salpingoophorectomy; ECHO Compl W Dop Color Flow (06/07/2012); Total knee arthroplasty (Bilateral, 2013); Colonoscopy (2005); Hysterectomy; Colonoscopy (01/08/2015); Breast lumpectomy (years ago); arthroplasty (Left, 07/29/2016); Finger surgery (Left, 07/29/2016); joint replacement; Breast lumpectomy (Right, 09/06/2016); other surgical history (Right, 12/20/2017); Hand surgery (12/2017); Breast biopsy; Coronary angioplasty with stent (12/2021); and eye surgery. Social History:  reports that she quit smoking about 21 years ago. Her smoking use included cigarettes. She started smoking about 57 years ago. She has a 8.75 pack-year smoking history. She has never used smokeless tobacco. She reports current alcohol use. She reports current drug use. Drug: Marijuana Bowertyesha Metz). Family History: family history includes Breast Cancer in her sister and sister; Cancer (age of onset: 36) in an other family member; Cancer (age of onset: 55) in her sister; Cancer (age of onset: 59) in her sister; Diabetes in her father and mother; Heart Attack in her mother; Heart Attack (age of onset: 48) in her sister; Heart Failure in her father; High Blood Pressure in her father and mother; Sickle Cell Anemia in an other family member; Brooke Pressman in an other family member; Stroke in her sister. The patients home medications have been reviewed. Allergies: Patient has no known allergies.     -------------------------------------------------- RESULTS -------------------------------------------------    LABS:  Results for orders placed or performed during the hospital encounter of 01/22/23   CBC with Auto Differential   Result Value Ref Range    WBC 10.3 4.5 - 11.5 E9/L    RBC 4.48 3.50 - 5.50 E12/L    Hemoglobin 10.8 (L) 11.5 - 15.5 g/dL    Hematocrit 36.6 34.0 - 48.0 %    MCV 81.7 80.0 - 99.9 fL    MCH 24.1 (L) 26.0 - 35.0 pg    MCHC 29.5 (L) 32.0 - 34.5 %    RDW 16.7 (H) 11.5 - 15.0 fL    Platelets 963 955 - 607 E9/L    MPV 11.0 7.0 - 12.0 fL    Neutrophils % 70.5 43.0 - 80.0 %    Immature Granulocytes % 0.4 0.0 - 5.0 %    Lymphocytes % 20.7 20.0 - 42.0 %    Monocytes % 8.1 2.0 - 12.0 %    Eosinophils % 0.0 0.0 - 6.0 %    Basophils % 0.3 0.0 - 2.0 %    Neutrophils Absolute 7.26 1.80 - 7.30 E9/L    Immature Granulocytes # 0.04 E9/L    Lymphocytes Absolute 2.13 1.50 - 4.00 E9/L    Monocytes Absolute 0.83 0.10 - 0.95 E9/L    Eosinophils Absolute 0.00 (L) 0.05 - 0.50 E9/L    Basophils Absolute 0.03 0.00 - 0.20 E9/L   Comprehensive Metabolic Panel   Result Value Ref Range    Sodium 138 132 - 146 mmol/L    Potassium 4.0 3.5 - 5.0 mmol/L    Chloride 100 98 - 107 mmol/L    CO2 23 22 - 29 mmol/L    Anion Gap 15 7 - 16 mmol/L    Glucose 273 (H) 74 - 99 mg/dL    BUN 16 6 - 23 mg/dL    Creatinine 0.7 0.5 - 1.0 mg/dL    Est, Glom Filt Rate >60 >=60 mL/min/1.73    Calcium 9.4 8.6 - 10.2 mg/dL    Total Protein 7.5 6.4 - 8.3 g/dL    Albumin 4.0 3.5 - 5.2 g/dL    Total Bilirubin 0.3 0.0 - 1.2 mg/dL    Alkaline Phosphatase 154 (H) 35 - 104 U/L    ALT <5 0 - 32 U/L    AST 12 0 - 31 U/L   Troponin   Result Value Ref Range    Troponin, High Sensitivity 22 (H) 0 - 9 ng/L   Brain Natriuretic Peptide   Result Value Ref Range    Pro-BNP 1,018 (H) 0 - 125 pg/mL   Lipase   Result Value Ref Range    Lipase 20 13 - 60 U/L   Lactate, Sepsis   Result Value Ref Range    Lactic Acid, Sepsis 3.4 (H) 0.5 - 1.9 mmol/L   EKG 12 Lead   Result Value Ref Range    Ventricular Rate 81 BPM    Atrial Rate 81 BPM    P-R Interval 142 ms    QRS Duration 142 ms    Q-T Interval 424 ms    QTc Calculation (Bazett) 492 ms    P Axis 83 degrees    R Axis -23 degrees    T Axis 132 degrees RADIOLOGY:  CT ABDOMEN PELVIS W IV CONTRAST Additional Contrast? None   Final Result   Limited study due to poor contrast opacification. There is no large central   pulmonary embolism. Small filling defect in the distal subsegmental and peripheral vessels of   left lower lobe concerning for subsegmental pulmonary embolism. Coronary artery calcification. Unremarkable CT of the abdomen and pelvis. RECOMMENDATIONS:   Unavailable         CTA CHEST W CONTRAST   Final Result   Limited study due to poor contrast opacification. There is no large central   pulmonary embolism. Small filling defect in the distal subsegmental and peripheral vessels of   left lower lobe concerning for subsegmental pulmonary embolism. Coronary artery calcification. Unremarkable CT of the abdomen and pelvis. RECOMMENDATIONS:   Unavailable             EKG: Interpreted by me  Sinus rhythm, rate of 81, leftward axis, complete left bundle branch block, nonspecific ST and T wave changes most likely related to the bundle branch block    ------------------------- NURSING NOTES AND VITALS REVIEWED ---------------------------  Date / Time Roomed:  1/22/2023 12:42 PM  ED Bed Assignment:  10/10    The nursing notes within the ED encounter and vital signs as below have been reviewed.      Patient Vitals for the past 24 hrs:   BP Temp Pulse Resp SpO2   01/22/23 1530 (!) 144/79 -- -- -- --   01/22/23 1405 -- -- 97 24 100 %   01/22/23 1335 (!) 186/90 -- 79 16 98 %   01/22/23 1334 -- -- 79 13 98 %   01/22/23 1248 (!) 201/90 98.3 °F (36.8 °C) 85 20 96 %       Oxygen Saturation Interpretation: Normal    ------------------------------------------ PROGRESS NOTES ------------------------------------------  Re-evaluation(s):   Patients symptoms are improving  Repeat physical examination is improved    Counseling:  I have spoken with the patient and discussed todays results, in addition to providing specific details for the plan of care and counseling regarding the diagnosis and prognosis. Their questions are answered at this time and they are agreeable with the plan of admission.    --------------------------------- ADDITIONAL PROVIDER NOTES ---------------------------------  Consultations:  Spoke with Dr. Vicky Whitney. Discussed case. They will admit the patient. This patient's ED course included: a personal history and physicial examination, re-evaluation prior to disposition, multiple bedside re-evaluations, and IV medications    This patient has remained hemodynamically stable during their ED course. Diagnosis:  1. Single subsegmental pulmonary embolism without acute cor pulmonale (HCC)    2. COPD exacerbation ([facility]        Disposition:  Patient's disposition: Admit to telemetry  Patient's condition is stable.               Nano Gustafson DO  01/22/23 5808

## 2023-01-23 LAB
ANION GAP SERPL CALCULATED.3IONS-SCNC: 14 MMOL/L (ref 7–16)
BASOPHILS ABSOLUTE: 0.01 E9/L (ref 0–0.2)
BASOPHILS RELATIVE PERCENT: 0.1 % (ref 0–2)
BUN BLDV-MCNC: 13 MG/DL (ref 6–23)
CALCIUM SERPL-MCNC: 8.9 MG/DL (ref 8.6–10.2)
CHLORIDE BLD-SCNC: 102 MMOL/L (ref 98–107)
CO2: 25 MMOL/L (ref 22–29)
CREAT SERPL-MCNC: 0.7 MG/DL (ref 0.5–1)
EOSINOPHILS ABSOLUTE: 0 E9/L (ref 0.05–0.5)
EOSINOPHILS RELATIVE PERCENT: 0 % (ref 0–6)
GFR SERPL CREATININE-BSD FRML MDRD: >60 ML/MIN/1.73
GLUCOSE BLD-MCNC: 318 MG/DL (ref 74–99)
HCT VFR BLD CALC: 32.7 % (ref 34–48)
HEMOGLOBIN: 10.2 G/DL (ref 11.5–15.5)
IMMATURE GRANULOCYTES #: 0.05 E9/L
IMMATURE GRANULOCYTES %: 0.5 % (ref 0–5)
LYMPHOCYTES ABSOLUTE: 0.88 E9/L (ref 1.5–4)
LYMPHOCYTES RELATIVE PERCENT: 9.1 % (ref 20–42)
MCH RBC QN AUTO: 24.8 PG (ref 26–35)
MCHC RBC AUTO-ENTMCNC: 31.2 % (ref 32–34.5)
MCV RBC AUTO: 79.4 FL (ref 80–99.9)
METER GLUCOSE: 282 MG/DL (ref 74–99)
METER GLUCOSE: 332 MG/DL (ref 74–99)
METER GLUCOSE: 333 MG/DL (ref 74–99)
METER GLUCOSE: 408 MG/DL (ref 74–99)
MONOCYTES ABSOLUTE: 0.36 E9/L (ref 0.1–0.95)
MONOCYTES RELATIVE PERCENT: 3.7 % (ref 2–12)
NEUTROPHILS ABSOLUTE: 8.33 E9/L (ref 1.8–7.3)
NEUTROPHILS RELATIVE PERCENT: 86.6 % (ref 43–80)
PDW BLD-RTO: 17 FL (ref 11.5–15)
PLATELET # BLD: 432 E9/L (ref 130–450)
PMV BLD AUTO: 11.3 FL (ref 7–12)
POTASSIUM REFLEX MAGNESIUM: 4.6 MMOL/L (ref 3.5–5)
RBC # BLD: 4.12 E12/L (ref 3.5–5.5)
SODIUM BLD-SCNC: 141 MMOL/L (ref 132–146)
WBC # BLD: 9.6 E9/L (ref 4.5–11.5)

## 2023-01-23 PROCEDURE — 36415 COLL VENOUS BLD VENIPUNCTURE: CPT

## 2023-01-23 PROCEDURE — APPSS30 APP SPLIT SHARED TIME 16-30 MINUTES: Performed by: NURSE PRACTITIONER

## 2023-01-23 PROCEDURE — 1200000000 HC SEMI PRIVATE

## 2023-01-23 PROCEDURE — 94640 AIRWAY INHALATION TREATMENT: CPT

## 2023-01-23 PROCEDURE — 94669 MECHANICAL CHEST WALL OSCILL: CPT

## 2023-01-23 PROCEDURE — 82962 GLUCOSE BLOOD TEST: CPT

## 2023-01-23 PROCEDURE — 85025 COMPLETE CBC W/AUTO DIFF WBC: CPT

## 2023-01-23 PROCEDURE — 2580000003 HC RX 258: Performed by: NURSE PRACTITIONER

## 2023-01-23 PROCEDURE — 6370000000 HC RX 637 (ALT 250 FOR IP): Performed by: NURSE PRACTITIONER

## 2023-01-23 PROCEDURE — 99232 SBSQ HOSP IP/OBS MODERATE 35: CPT | Performed by: INTERNAL MEDICINE

## 2023-01-23 PROCEDURE — 6360000002 HC RX W HCPCS: Performed by: NURSE PRACTITIONER

## 2023-01-23 PROCEDURE — 80048 BASIC METABOLIC PNL TOTAL CA: CPT

## 2023-01-23 RX ORDER — DEXTROSE MONOHYDRATE 100 MG/ML
INJECTION, SOLUTION INTRAVENOUS CONTINUOUS PRN
Status: DISCONTINUED | OUTPATIENT
Start: 2023-01-23 | End: 2023-01-27 | Stop reason: HOSPADM

## 2023-01-23 RX ORDER — INSULIN LISPRO 100 [IU]/ML
0-8 INJECTION, SOLUTION INTRAVENOUS; SUBCUTANEOUS
Status: DISCONTINUED | OUTPATIENT
Start: 2023-01-23 | End: 2023-01-27 | Stop reason: HOSPADM

## 2023-01-23 RX ORDER — MONTELUKAST SODIUM 10 MG/1
10 TABLET ORAL NIGHTLY
Status: DISCONTINUED | OUTPATIENT
Start: 2023-01-23 | End: 2023-01-27 | Stop reason: HOSPADM

## 2023-01-23 RX ORDER — LANOLIN ALCOHOL/MO/W.PET/CERES
50 CREAM (GRAM) TOPICAL DAILY
Status: DISCONTINUED | OUTPATIENT
Start: 2023-01-23 | End: 2023-01-27 | Stop reason: HOSPADM

## 2023-01-23 RX ORDER — VITAMIN B COMPLEX
2000 TABLET ORAL DAILY
Status: DISCONTINUED | OUTPATIENT
Start: 2023-01-23 | End: 2023-01-27 | Stop reason: HOSPADM

## 2023-01-23 RX ORDER — HYDROCHLOROTHIAZIDE 25 MG/1
25 TABLET ORAL DAILY
Status: DISCONTINUED | OUTPATIENT
Start: 2023-01-23 | End: 2023-01-27 | Stop reason: HOSPADM

## 2023-01-23 RX ORDER — CLONIDINE HYDROCHLORIDE 0.2 MG/1
0.2 TABLET ORAL 3 TIMES DAILY
Status: DISCONTINUED | OUTPATIENT
Start: 2023-01-23 | End: 2023-01-27 | Stop reason: HOSPADM

## 2023-01-23 RX ORDER — ASPIRIN 81 MG/1
81 TABLET ORAL DAILY
Status: DISCONTINUED | OUTPATIENT
Start: 2023-01-23 | End: 2023-01-27 | Stop reason: HOSPADM

## 2023-01-23 RX ORDER — INSULIN GLARGINE 100 [IU]/ML
40 INJECTION, SOLUTION SUBCUTANEOUS NIGHTLY
Status: DISCONTINUED | OUTPATIENT
Start: 2023-01-23 | End: 2023-01-24

## 2023-01-23 RX ORDER — INSULIN LISPRO 100 [IU]/ML
0-4 INJECTION, SOLUTION INTRAVENOUS; SUBCUTANEOUS NIGHTLY
Status: DISCONTINUED | OUTPATIENT
Start: 2023-01-23 | End: 2023-01-27 | Stop reason: HOSPADM

## 2023-01-23 RX ORDER — OXYBUTYNIN CHLORIDE 5 MG/1
5 TABLET ORAL 3 TIMES DAILY
Status: DISCONTINUED | OUTPATIENT
Start: 2023-01-23 | End: 2023-01-27 | Stop reason: HOSPADM

## 2023-01-23 RX ORDER — AMLODIPINE BESYLATE 10 MG/1
10 TABLET ORAL DAILY
Status: DISCONTINUED | OUTPATIENT
Start: 2023-01-23 | End: 2023-01-27 | Stop reason: HOSPADM

## 2023-01-23 RX ORDER — ZINC SULFATE 50(220)MG
50 CAPSULE ORAL DAILY
Status: DISCONTINUED | OUTPATIENT
Start: 2023-01-23 | End: 2023-01-27 | Stop reason: HOSPADM

## 2023-01-23 RX ORDER — ASCORBIC ACID 500 MG
1000 TABLET ORAL DAILY
Status: DISCONTINUED | OUTPATIENT
Start: 2023-01-23 | End: 2023-01-27 | Stop reason: HOSPADM

## 2023-01-23 RX ORDER — GABAPENTIN 400 MG/1
400 CAPSULE ORAL 3 TIMES DAILY
Status: DISCONTINUED | OUTPATIENT
Start: 2023-01-23 | End: 2023-01-27 | Stop reason: HOSPADM

## 2023-01-23 RX ORDER — POLYETHYLENE GLYCOL 3350 17 G/17G
17 POWDER, FOR SOLUTION ORAL ONCE
Status: COMPLETED | OUTPATIENT
Start: 2023-01-23 | End: 2023-01-23

## 2023-01-23 RX ORDER — LOSARTAN POTASSIUM 100 MG/1
100 TABLET ORAL DAILY
Status: DISCONTINUED | OUTPATIENT
Start: 2023-01-23 | End: 2023-01-27 | Stop reason: HOSPADM

## 2023-01-23 RX ADMIN — PRAVASTATIN SODIUM 80 MG: 20 TABLET ORAL at 20:24

## 2023-01-23 RX ADMIN — INSULIN LISPRO 4 UNITS: 100 INJECTION, SOLUTION INTRAVENOUS; SUBCUTANEOUS at 20:44

## 2023-01-23 RX ADMIN — Medication 2000 UNITS: at 11:27

## 2023-01-23 RX ADMIN — CLONIDINE HYDROCHLORIDE 0.2 MG: 0.2 TABLET ORAL at 14:15

## 2023-01-23 RX ADMIN — HYDROCHLOROTHIAZIDE 25 MG: 25 TABLET ORAL at 11:43

## 2023-01-23 RX ADMIN — IPRATROPIUM BROMIDE AND ALBUTEROL SULFATE 1 AMPULE: .5; 2.5 SOLUTION RESPIRATORY (INHALATION) at 09:38

## 2023-01-23 RX ADMIN — ENOXAPARIN SODIUM 105 MG: 150 INJECTION SUBCUTANEOUS at 05:35

## 2023-01-23 RX ADMIN — ASPIRIN 81 MG: 81 TABLET, COATED ORAL at 11:27

## 2023-01-23 RX ADMIN — Medication 50 MG: at 11:43

## 2023-01-23 RX ADMIN — PYRIDOXINE HCL TAB 50 MG 50 MG: 50 TAB at 11:27

## 2023-01-23 RX ADMIN — HYDRALAZINE HYDROCHLORIDE 10 MG: 20 INJECTION INTRAMUSCULAR; INTRAVENOUS at 14:39

## 2023-01-23 RX ADMIN — OXYBUTYNIN CHLORIDE 5 MG: 5 TABLET ORAL at 14:15

## 2023-01-23 RX ADMIN — POLYETHYLENE GLYCOL 3350 17 G: 17 POWDER, FOR SOLUTION ORAL at 11:28

## 2023-01-23 RX ADMIN — METHYLPREDNISOLONE SODIUM SUCCINATE 40 MG: 40 INJECTION, POWDER, FOR SOLUTION INTRAMUSCULAR; INTRAVENOUS at 20:24

## 2023-01-23 RX ADMIN — OXYCODONE HYDROCHLORIDE AND ACETAMINOPHEN 1000 MG: 500 TABLET ORAL at 11:27

## 2023-01-23 RX ADMIN — BUDESONIDE 500 MCG: 0.5 SUSPENSION RESPIRATORY (INHALATION) at 18:26

## 2023-01-23 RX ADMIN — INSULIN LISPRO 6 UNITS: 100 INJECTION, SOLUTION INTRAVENOUS; SUBCUTANEOUS at 16:53

## 2023-01-23 RX ADMIN — AZITHROMYCIN MONOHYDRATE 250 MG: 250 TABLET ORAL at 09:22

## 2023-01-23 RX ADMIN — OXYBUTYNIN CHLORIDE 5 MG: 5 TABLET ORAL at 20:24

## 2023-01-23 RX ADMIN — CLONIDINE HYDROCHLORIDE 0.2 MG: 0.2 TABLET ORAL at 20:24

## 2023-01-23 RX ADMIN — INSULIN GLARGINE 40 UNITS: 100 INJECTION, SOLUTION SUBCUTANEOUS at 20:44

## 2023-01-23 RX ADMIN — ARFORMOTEROL TARTRATE 15 MCG: 15 SOLUTION RESPIRATORY (INHALATION) at 06:36

## 2023-01-23 RX ADMIN — GABAPENTIN 400 MG: 400 CAPSULE ORAL at 20:24

## 2023-01-23 RX ADMIN — IPRATROPIUM BROMIDE AND ALBUTEROL SULFATE 1 AMPULE: .5; 2.5 SOLUTION RESPIRATORY (INHALATION) at 18:26

## 2023-01-23 RX ADMIN — SENNOSIDES 8.6 MG: 8.6 TABLET, COATED ORAL at 09:22

## 2023-01-23 RX ADMIN — METHYLPREDNISOLONE SODIUM SUCCINATE 40 MG: 40 INJECTION, POWDER, FOR SOLUTION INTRAMUSCULAR; INTRAVENOUS at 09:22

## 2023-01-23 RX ADMIN — ENOXAPARIN SODIUM 105 MG: 150 INJECTION SUBCUTANEOUS at 20:28

## 2023-01-23 RX ADMIN — CLONIDINE HYDROCHLORIDE 0.2 MG: 0.2 TABLET ORAL at 09:22

## 2023-01-23 RX ADMIN — LOSARTAN POTASSIUM 100 MG: 100 TABLET, FILM COATED ORAL at 09:22

## 2023-01-23 RX ADMIN — IPRATROPIUM BROMIDE AND ALBUTEROL SULFATE 1 AMPULE: .5; 2.5 SOLUTION RESPIRATORY (INHALATION) at 06:36

## 2023-01-23 RX ADMIN — Medication 10 ML: at 09:27

## 2023-01-23 RX ADMIN — CLOPIDOGREL BISULFATE 75 MG: 75 TABLET ORAL at 09:22

## 2023-01-23 RX ADMIN — MONTELUKAST 10 MG: 10 TABLET, FILM COATED ORAL at 20:24

## 2023-01-23 RX ADMIN — INSULIN LISPRO 4 UNITS: 100 INJECTION, SOLUTION INTRAVENOUS; SUBCUTANEOUS at 11:45

## 2023-01-23 RX ADMIN — GABAPENTIN 400 MG: 400 CAPSULE ORAL at 09:22

## 2023-01-23 RX ADMIN — INSULIN LISPRO 3 UNITS: 100 INJECTION, SOLUTION INTRAVENOUS; SUBCUTANEOUS at 09:28

## 2023-01-23 RX ADMIN — DOCUSATE SODIUM 100 MG: 100 CAPSULE, LIQUID FILLED ORAL at 20:25

## 2023-01-23 RX ADMIN — AMLODIPINE BESYLATE 10 MG: 10 TABLET ORAL at 09:22

## 2023-01-23 RX ADMIN — Medication 10 ML: at 20:31

## 2023-01-23 RX ADMIN — ACETAMINOPHEN 650 MG: 325 TABLET ORAL at 00:33

## 2023-01-23 RX ADMIN — GABAPENTIN 400 MG: 400 CAPSULE ORAL at 14:15

## 2023-01-23 RX ADMIN — BUDESONIDE 500 MCG: 0.5 SUSPENSION RESPIRATORY (INHALATION) at 06:36

## 2023-01-23 RX ADMIN — ARFORMOTEROL TARTRATE 15 MCG: 15 SOLUTION RESPIRATORY (INHALATION) at 18:26

## 2023-01-23 RX ADMIN — IPRATROPIUM BROMIDE AND ALBUTEROL SULFATE 1 AMPULE: .5; 2.5 SOLUTION RESPIRATORY (INHALATION) at 14:18

## 2023-01-23 RX ADMIN — DOCUSATE SODIUM 100 MG: 100 CAPSULE, LIQUID FILLED ORAL at 09:22

## 2023-01-23 ASSESSMENT — PAIN DESCRIPTION - LOCATION: LOCATION: HEAD

## 2023-01-23 ASSESSMENT — PAIN SCALES - GENERAL: PAINLEVEL_OUTOF10: 4

## 2023-01-23 ASSESSMENT — PAIN DESCRIPTION - DESCRIPTORS: DESCRIPTORS: ACHING;DISCOMFORT

## 2023-01-23 NOTE — PLAN OF CARE
Problem: Discharge Planning  Goal: Discharge to home or other facility with appropriate resources  1/23/2023 1050 by Sue Echeverria RN  Outcome: Progressing  1/22/2023 2136 by Reema Devi RN  Outcome: Progressing  Flowsheets (Taken 1/22/2023 2000)  Discharge to home or other facility with appropriate resources: Identify barriers to discharge with patient and caregiver     Problem: Safety - Adult  Goal: Free from fall injury  1/23/2023 1050 by Sue Echeverria RN  Outcome: Progressing  1/22/2023 2136 by Reema Devi, RN  Outcome: Progressing     Problem: ABCDS Injury Assessment  Goal: Absence of physical injury  1/22/2023 2136 by Reema Devi, RN  Outcome: Progressing

## 2023-01-23 NOTE — PLAN OF CARE
Problem: Discharge Planning  Goal: Discharge to home or other facility with appropriate resources  1/22/2023 2136 by Jennifer Navarro RN  Outcome: Progressing  1/22/2023 1841 by Mesha Moreno RN  Outcome: Progressing     Problem: Safety - Adult  Goal: Free from fall injury  1/22/2023 2136 by Jennifer Navarro RN  Outcome: Progressing  1/22/2023 1841 by Mesha Moreno RN  Outcome: Progressing     Problem: ABCDS Injury Assessment  Goal: Absence of physical injury  1/22/2023 2136 by Jennifer Navarro RN  Outcome: Progressing  1/22/2023 1841 by Mesha Moreno RN  Outcome: Progressing

## 2023-01-23 NOTE — ACP (ADVANCE CARE PLANNING)
Advance Care Planning     Advance Care Planning Activator (Inpatient)  Conversation Note      Date of ACP Conversation: 1/23/2023     Conversation Conducted with: Patient with Decision Making Capacity    ACP Activator: Elin Lord RN        Health Care Decision Maker:     Current Designated Health Care Decision Maker:     Primary Decision Maker: Randall America Child - [phone]    Care Preferences    Ventilation: \"If you were in your present state of health and suddenly became very ill and were unable to breathe on your own, what would your preference be about the use of a ventilator (breathing machine) if it were available to you? \"      Would the patient desire the use of ventilator (breathing machine)?: yes    \"If your health worsens and it becomes clear that your chance of recovery is unlikely, what would your preference be about the use of a ventilator (breathing machine) if it were available to you? \"     Would the patient desire the use of ventilator (breathing machine)?: yes      Resuscitation  \"CPR works best to restart the heart when there is a sudden event, like a heart attack, in someone who is otherwise healthy. Unfortunately, CPR does not typically restart the heart for people who have serious health conditions or who are very sick. \"    \"In the event your heart stopped as a result of an underlying serious health condition, would you want attempts to be made to restart your heart (answer \"yes\" for attempt to resuscitate) or would you prefer a natural death (answer \"no\" for do not attempt to resuscitate)? \" yes       [] Yes   [] No   Educated Patient / [place] regarding differences between Advance Directives and portable DNR orders.     Length of ACP Conversation in minutes:  10 minutes    Conversation Outcomes:  [x] ACP discussion completed  [] Existing advance directive reviewed with patient; no changes to patient's previously recorded wishes  [] New Advance Directive completed  [] Portable Do Not Rescitate prepared for Provider review and signature  [] POLST/POST/MOLST/MOST prepared for Provider review and signature      Follow-up plan:    [] Schedule follow-up conversation to continue planning  [] Referred individual to Provider for additional questions/concerns   [] Advised patient/agent/surrogate to review completed ACP document and update if needed with changes in condition, patient preferences or care setting    [x] This note routed to one or more involved healthcare providers

## 2023-01-23 NOTE — CARE COORDINATION
1/23/2023 1130 COVID Positive 1/22/2023. CM spoke with pt via phone for transition of care needs at d/c. Pt resides alone in a 10th floor apt with elevator access. Pt has a nebulizer, ww, cane, shower bench, and a rollator. Pt has been to [place] in the past but not interested in SNF at this time. No history of [facility] but is receptive to [facility], CM provided pt with a list of [facility] agencies to review. Pt is independent and drives. PCP is Ru MERCEDES and uses [facility] at [facility]  CM will follow.   Electronically signed by Leatha Núñez RN on 1/23/2023 at 11:43 AM

## 2023-01-23 NOTE — PROGRESS NOTES
[address] [place], [place] [address]ist   Progress Note    Admitting Date and Time: 1/22/2023 12:42 PM  Admit Dx: Pulmonary embolism and infarction ([facility] [I26.99]  COPD exacerbation ([facility] [address] [facility] [J44.1]  Single subsegmental pulmonary embolism without acute cor pulmonale (HCC) [I26.93]    Subjective:    Patient reports her sputum is green and blood tinged. She remains on room air. She said that she had a history of blood clots when she was pregnant. Medications were ordered by what she recently had filled. Requested home medication list.     ROS: denies fever, chills, n/v, HA unless stated above.      amLODIPine  10 mg Oral Daily    cloNIDine  0.2 mg Oral TID    gabapentin  400 mg Oral TID    losartan  100 mg Oral Daily    sodium chloride flush  5-40 mL IntraVENous 2 times per day    senna  1 tablet Oral Daily    docusate sodium  100 mg Oral BID    enoxaparin  1 mg/kg (Order-Specific) SubCUTAneous BID    azithromycin  250 mg Oral Daily    ipratropium-albuterol  1 ampule Inhalation Q4H WA    budesonide  0.5 mg Nebulization BID    arformoterol tartrate  15 mcg Nebulization BID    methylPREDNISolone  40 mg IntraVENous Q12H    insulin lispro  0-4 Units SubCUTAneous TID WC    insulin lispro  0-4 Units SubCUTAneous Nightly    clopidogrel  75 mg Oral Daily    pravastatin  80 mg Oral Nightly     sodium chloride flush, 5-40 mL, PRN  sodium chloride, , PRN  ondansetron, 4 mg, Q8H PRN   Or  ondansetron, 4 mg, Q6H PRN  acetaminophen, 650 mg, Q6H PRN   Or  acetaminophen, 650 mg, Q6H PRN  glucose, 4 tablet, PRN  dextrose bolus, 125 mL, PRN   Or  dextrose bolus, 250 mL, PRN  glucagon (rDNA), 1 mg, PRN  dextrose, , Continuous PRN  hydrALAZINE, 10 mg, Q6H PRN         Objective:    BP (!) 207/86   Pulse 88   Temp 98.3 °F (36.8 °C) (Axillary)   Resp 17   Ht 5' 5\" (1.651 m)   Wt 241 lb 10 oz (109.6 kg)   SpO2 100%   BMI 40.21 kg/m²   General Appearance: alert and oriented to person, place and time and in no acute distress  Skin: warm and dry  Head: normocephalic and atraumatic  Eyes: pupils equal, round, and reactive to light,  conjunctivae normal  Neck: neck supple and non tender without mass   Pulmonary/Chest: diminished bilaterally, expiratory wheezes, no respiratory distress  Cardiovascular: normal rate, normal S1 and S2   Abdomen: soft, non-tender, non-distended, normal bowel sounds, no masses or organomegaly  Extremities: no cyanosis, no clubbing and 1+ bilateral lower extremity edema  Neurologic: no cranial nerve deficit and speech normal      Recent Labs     01/22/23  1258 01/23/23  0603    141   K 4.0 4.6    102   CO2 23 25   BUN 16 13   CREATININE 0.7 0.7   GLUCOSE 273* 318*   CALCIUM 9.4 8.9       Recent Labs     01/22/23  1258   ALKPHOS 154*   PROT 7.5   LABALBU 4.0   BILITOT 0.3   AST 12   ALT <5       Recent Labs     01/22/23  1258 01/23/23  0603   WBC 10.3 9.6   RBC 4.48 4.12   HGB 10.8* 10.2*   HCT 36.6 32.7*   MCV 81.7 79.4*   MCH 24.1* 24.8*   MCHC 29.5* 31.2*   RDW 16.7* 17.0*    432   MPV 11.0 11.3           Radiology:   CT ABDOMEN PELVIS W IV CONTRAST Additional Contrast? None   Final Result   Limited study due to poor contrast opacification. There is no large central   pulmonary embolism. Small filling defect in the distal subsegmental and peripheral vessels of   left lower lobe concerning for subsegmental pulmonary embolism. Coronary artery calcification. Unremarkable CT of the abdomen and pelvis. RECOMMENDATIONS:   Unavailable         CTA CHEST W CONTRAST   Final Result   Limited study due to poor contrast opacification. There is no large central   pulmonary embolism. Small filling defect in the distal subsegmental and peripheral vessels of   left lower lobe concerning for subsegmental pulmonary embolism. Coronary artery calcification. Unremarkable CT of the abdomen and pelvis.       RECOMMENDATIONS:   Unavailable             Assessment:  Principal Problem: Pulmonary embolism and infarction [facility])  Active Problems:    Single subsegmental pulmonary embolism without acute cor pulmonale (HCC)  Resolved Problems:    * No resolved hospital problems. *      Plan:  Pulmonary embolism   - CTA of the chest with small filling defect in the distal subsegmental and   peripheral vessels of the left lower lobe. - On therapeutic lovenox   - Transition to oral Eliquis prior to discharge   - Patient reports history of blood clots during her pregnancies. COVID-19   - She is not requiring oxygen   - Continue Solu-medrol   - Incentive spirometer, PEP/flutter   - Vitamin supplementation  COPD exacerbation   - Likely secondary to COVID   - Continue aerosols   - Continue steroids   - Continue azithromycin   - Ambulatory pulse ox prior to discharge. HFpEF  - Last Echo on 12/16/21 with an EF of 55 +/- 5%, Stage II diastolic dysfunction   - Resume HCTZ  Hypertension   - Resume home Clonidine, Norvasc, losartan and HCTZ  Diabetes   - Home Januvia is on hold   - Resume home Lantus   - Increase Humalog sliding scale to moderate dose  CAD   - Continue pravastatin and aspirin    NOTE: This report was transcribed using voice recognition software. Every effort was made to ensure accuracy; however, inadvertent computerized transcription errors may be present.      Electronically signed by DMITRY Echavarria CNP on 1/23/2023 at 9:03 AM

## 2023-01-23 NOTE — PROGRESS NOTES
Patient unsure of medications and doses that she was taking before admission. Authorization to release medical information sent to her PCP Dr. Sara Vila to obtain a current up to date record of medications.

## 2023-01-24 PROBLEM — E11.65 TYPE 2 DIABETES MELLITUS WITH HYPERGLYCEMIA, WITH LONG-TERM CURRENT USE OF INSULIN (HCC): Status: ACTIVE | Noted: 2023-01-24

## 2023-01-24 PROBLEM — Z79.4 TYPE 2 DIABETES MELLITUS WITH HYPERGLYCEMIA, WITH LONG-TERM CURRENT USE OF INSULIN (HCC): Status: ACTIVE | Noted: 2023-01-24

## 2023-01-24 PROBLEM — U07.1 COVID-19: Status: ACTIVE | Noted: 2023-01-24

## 2023-01-24 LAB
ALBUMIN SERPL-MCNC: 3.9 G/DL (ref 3.5–5.2)
ALP BLD-CCNC: 143 U/L (ref 35–104)
ALT SERPL-CCNC: 5 U/L (ref 0–32)
ANION GAP SERPL CALCULATED.3IONS-SCNC: 12 MMOL/L (ref 7–16)
AST SERPL-CCNC: 13 U/L (ref 0–31)
BASOPHILS ABSOLUTE: 0.01 E9/L (ref 0–0.2)
BASOPHILS RELATIVE PERCENT: 0.1 % (ref 0–2)
BILIRUB SERPL-MCNC: 0.4 MG/DL (ref 0–1.2)
BUN BLDV-MCNC: 13 MG/DL (ref 6–23)
CALCIUM SERPL-MCNC: 9.6 MG/DL (ref 8.6–10.2)
CHLORIDE BLD-SCNC: 98 MMOL/L (ref 98–107)
CO2: 27 MMOL/L (ref 22–29)
CREAT SERPL-MCNC: 0.7 MG/DL (ref 0.5–1)
EOSINOPHILS ABSOLUTE: 0 E9/L (ref 0.05–0.5)
EOSINOPHILS RELATIVE PERCENT: 0 % (ref 0–6)
GFR SERPL CREATININE-BSD FRML MDRD: >60 ML/MIN/1.73
GLUCOSE BLD-MCNC: 249 MG/DL (ref 74–99)
HCT VFR BLD CALC: 35.7 % (ref 34–48)
HEMOGLOBIN: 11.3 G/DL (ref 11.5–15.5)
IMMATURE GRANULOCYTES #: 0.04 E9/L
IMMATURE GRANULOCYTES %: 0.4 % (ref 0–5)
LYMPHOCYTES ABSOLUTE: 1.1 E9/L (ref 1.5–4)
LYMPHOCYTES RELATIVE PERCENT: 12.3 % (ref 20–42)
MAGNESIUM: 2 MG/DL (ref 1.6–2.6)
MCH RBC QN AUTO: 25.3 PG (ref 26–35)
MCHC RBC AUTO-ENTMCNC: 31.7 % (ref 32–34.5)
MCV RBC AUTO: 79.9 FL (ref 80–99.9)
METER GLUCOSE: 244 MG/DL (ref 74–99)
METER GLUCOSE: 250 MG/DL (ref 74–99)
METER GLUCOSE: 334 MG/DL (ref 74–99)
METER GLUCOSE: 391 MG/DL (ref 74–99)
METER GLUCOSE: 438 MG/DL (ref 74–99)
MONOCYTES ABSOLUTE: 0.53 E9/L (ref 0.1–0.95)
MONOCYTES RELATIVE PERCENT: 5.9 % (ref 2–12)
NEUTROPHILS ABSOLUTE: 7.28 E9/L (ref 1.8–7.3)
NEUTROPHILS RELATIVE PERCENT: 81.3 % (ref 43–80)
PDW BLD-RTO: 16.9 FL (ref 11.5–15)
PHOSPHORUS: 3 MG/DL (ref 2.5–4.5)
PLATELET # BLD: 453 E9/L (ref 130–450)
PMV BLD AUTO: 11.4 FL (ref 7–12)
POTASSIUM SERPL-SCNC: 4.5 MMOL/L (ref 3.5–5)
RBC # BLD: 4.47 E12/L (ref 3.5–5.5)
SODIUM BLD-SCNC: 137 MMOL/L (ref 132–146)
TOTAL PROTEIN: 7.4 G/DL (ref 6.4–8.3)
WBC # BLD: 9 E9/L (ref 4.5–11.5)

## 2023-01-24 PROCEDURE — 6370000000 HC RX 637 (ALT 250 FOR IP): Performed by: NURSE PRACTITIONER

## 2023-01-24 PROCEDURE — APPSS30 APP SPLIT SHARED TIME 16-30 MINUTES: Performed by: NURSE PRACTITIONER

## 2023-01-24 PROCEDURE — 83735 ASSAY OF MAGNESIUM: CPT

## 2023-01-24 PROCEDURE — 97116 GAIT TRAINING THERAPY: CPT | Performed by: PHYSICAL THERAPIST

## 2023-01-24 PROCEDURE — 84100 ASSAY OF PHOSPHORUS: CPT

## 2023-01-24 PROCEDURE — 1200000000 HC SEMI PRIVATE

## 2023-01-24 PROCEDURE — 82962 GLUCOSE BLOOD TEST: CPT

## 2023-01-24 PROCEDURE — 94669 MECHANICAL CHEST WALL OSCILL: CPT

## 2023-01-24 PROCEDURE — 97110 THERAPEUTIC EXERCISES: CPT | Performed by: PHYSICAL THERAPIST

## 2023-01-24 PROCEDURE — 94640 AIRWAY INHALATION TREATMENT: CPT

## 2023-01-24 PROCEDURE — 6360000002 HC RX W HCPCS: Performed by: NURSE PRACTITIONER

## 2023-01-24 PROCEDURE — 99232 SBSQ HOSP IP/OBS MODERATE 35: CPT | Performed by: INTERNAL MEDICINE

## 2023-01-24 PROCEDURE — 97161 PT EVAL LOW COMPLEX 20 MIN: CPT | Performed by: PHYSICAL THERAPIST

## 2023-01-24 PROCEDURE — 85025 COMPLETE CBC W/AUTO DIFF WBC: CPT

## 2023-01-24 PROCEDURE — 36415 COLL VENOUS BLD VENIPUNCTURE: CPT

## 2023-01-24 PROCEDURE — 2580000003 HC RX 258: Performed by: NURSE PRACTITIONER

## 2023-01-24 PROCEDURE — 80053 COMPREHEN METABOLIC PANEL: CPT

## 2023-01-24 RX ORDER — INSULIN LISPRO 100 [IU]/ML
4 INJECTION, SOLUTION INTRAVENOUS; SUBCUTANEOUS ONCE
Status: COMPLETED | OUTPATIENT
Start: 2023-01-24 | End: 2023-01-24

## 2023-01-24 RX ORDER — INSULIN GLARGINE 100 [IU]/ML
50 INJECTION, SOLUTION SUBCUTANEOUS NIGHTLY
Status: DISCONTINUED | OUTPATIENT
Start: 2023-01-24 | End: 2023-01-27 | Stop reason: HOSPADM

## 2023-01-24 RX ADMIN — INSULIN LISPRO 4 UNITS: 100 INJECTION, SOLUTION INTRAVENOUS; SUBCUTANEOUS at 21:04

## 2023-01-24 RX ADMIN — IPRATROPIUM BROMIDE AND ALBUTEROL SULFATE 1 AMPULE: .5; 2.5 SOLUTION RESPIRATORY (INHALATION) at 06:35

## 2023-01-24 RX ADMIN — GABAPENTIN 400 MG: 400 CAPSULE ORAL at 09:22

## 2023-01-24 RX ADMIN — APIXABAN 10 MG: 5 TABLET, FILM COATED ORAL at 20:54

## 2023-01-24 RX ADMIN — CLONIDINE HYDROCHLORIDE 0.2 MG: 0.2 TABLET ORAL at 09:23

## 2023-01-24 RX ADMIN — OXYCODONE HYDROCHLORIDE AND ACETAMINOPHEN 1000 MG: 500 TABLET ORAL at 09:22

## 2023-01-24 RX ADMIN — GABAPENTIN 400 MG: 400 CAPSULE ORAL at 20:54

## 2023-01-24 RX ADMIN — AZITHROMYCIN MONOHYDRATE 250 MG: 250 TABLET ORAL at 09:23

## 2023-01-24 RX ADMIN — IPRATROPIUM BROMIDE AND ALBUTEROL SULFATE 1 AMPULE: .5; 2.5 SOLUTION RESPIRATORY (INHALATION) at 09:56

## 2023-01-24 RX ADMIN — LOSARTAN POTASSIUM 100 MG: 100 TABLET, FILM COATED ORAL at 09:22

## 2023-01-24 RX ADMIN — ASPIRIN 81 MG: 81 TABLET, COATED ORAL at 09:22

## 2023-01-24 RX ADMIN — DOCUSATE SODIUM 100 MG: 100 CAPSULE, LIQUID FILLED ORAL at 09:23

## 2023-01-24 RX ADMIN — GABAPENTIN 400 MG: 400 CAPSULE ORAL at 14:14

## 2023-01-24 RX ADMIN — SENNOSIDES 8.6 MG: 8.6 TABLET, COATED ORAL at 09:22

## 2023-01-24 RX ADMIN — INSULIN LISPRO 4 UNITS: 100 INJECTION, SOLUTION INTRAVENOUS; SUBCUTANEOUS at 08:10

## 2023-01-24 RX ADMIN — HYDROCHLOROTHIAZIDE 25 MG: 25 TABLET ORAL at 09:22

## 2023-01-24 RX ADMIN — IPRATROPIUM BROMIDE AND ALBUTEROL SULFATE 1 AMPULE: .5; 2.5 SOLUTION RESPIRATORY (INHALATION) at 18:10

## 2023-01-24 RX ADMIN — OXYBUTYNIN CHLORIDE 5 MG: 5 TABLET ORAL at 09:23

## 2023-01-24 RX ADMIN — ARFORMOTEROL TARTRATE 15 MCG: 15 SOLUTION RESPIRATORY (INHALATION) at 06:35

## 2023-01-24 RX ADMIN — Medication 50 MG: at 09:22

## 2023-01-24 RX ADMIN — HYDRALAZINE HYDROCHLORIDE 10 MG: 20 INJECTION INTRAMUSCULAR; INTRAVENOUS at 08:03

## 2023-01-24 RX ADMIN — ARFORMOTEROL TARTRATE 15 MCG: 15 SOLUTION RESPIRATORY (INHALATION) at 18:10

## 2023-01-24 RX ADMIN — BUDESONIDE 500 MCG: 0.5 SUSPENSION RESPIRATORY (INHALATION) at 18:10

## 2023-01-24 RX ADMIN — OXYBUTYNIN CHLORIDE 5 MG: 5 TABLET ORAL at 20:54

## 2023-01-24 RX ADMIN — INSULIN LISPRO 4 UNITS: 100 INJECTION, SOLUTION INTRAVENOUS; SUBCUTANEOUS at 15:24

## 2023-01-24 RX ADMIN — PRAVASTATIN SODIUM 80 MG: 20 TABLET ORAL at 20:54

## 2023-01-24 RX ADMIN — OXYBUTYNIN CHLORIDE 5 MG: 5 TABLET ORAL at 14:14

## 2023-01-24 RX ADMIN — INSULIN LISPRO 2 UNITS: 100 INJECTION, SOLUTION INTRAVENOUS; SUBCUTANEOUS at 17:23

## 2023-01-24 RX ADMIN — Medication 2000 UNITS: at 09:23

## 2023-01-24 RX ADMIN — Medication 10 ML: at 20:55

## 2023-01-24 RX ADMIN — MONTELUKAST 10 MG: 10 TABLET, FILM COATED ORAL at 20:54

## 2023-01-24 RX ADMIN — INSULIN GLARGINE 50 UNITS: 100 INJECTION, SOLUTION SUBCUTANEOUS at 21:02

## 2023-01-24 RX ADMIN — AMLODIPINE BESYLATE 10 MG: 10 TABLET ORAL at 09:22

## 2023-01-24 RX ADMIN — CLONIDINE HYDROCHLORIDE 0.2 MG: 0.2 TABLET ORAL at 14:14

## 2023-01-24 RX ADMIN — PYRIDOXINE HCL TAB 50 MG 50 MG: 50 TAB at 09:22

## 2023-01-24 RX ADMIN — INSULIN LISPRO 8 UNITS: 100 INJECTION, SOLUTION INTRAVENOUS; SUBCUTANEOUS at 12:07

## 2023-01-24 RX ADMIN — BUDESONIDE 500 MCG: 0.5 SUSPENSION RESPIRATORY (INHALATION) at 06:35

## 2023-01-24 RX ADMIN — Medication 10 ML: at 09:00

## 2023-01-24 RX ADMIN — DOCUSATE SODIUM 100 MG: 100 CAPSULE, LIQUID FILLED ORAL at 20:54

## 2023-01-24 RX ADMIN — ENOXAPARIN SODIUM 105 MG: 150 INJECTION SUBCUTANEOUS at 09:25

## 2023-01-24 RX ADMIN — CLONIDINE HYDROCHLORIDE 0.2 MG: 0.2 TABLET ORAL at 20:54

## 2023-01-24 RX ADMIN — IPRATROPIUM BROMIDE AND ALBUTEROL SULFATE 1 AMPULE: .5; 2.5 SOLUTION RESPIRATORY (INHALATION) at 13:39

## 2023-01-24 RX ADMIN — METHYLPREDNISOLONE SODIUM SUCCINATE 40 MG: 40 INJECTION, POWDER, FOR SOLUTION INTRAMUSCULAR; INTRAVENOUS at 08:06

## 2023-01-24 NOTE — PROGRESS NOTES
Physical Therapy    Physical Therapy Initial Evaluation/Plan of Care    Room #:  0416/8223-45  Patient Name: Reshma Roy  YOB: 1950  MRN: [id number]    Date of Service: 1/24/2023     Tentative placement recommendation: Home with no Physical Therapy needs  Equipment recommendation: None      Evaluating Physical Therapist: Tramaine Peng PT  #[id number]      Specific Provider Orders/Date/Referring Provider :  01/24/23 1145    PT eval and treat  Start:  01/24/23 1145,   End:  01/24/23 1145,   ONE TIME,   Standing Count:  1 Occurrences,   Francois Patel MD     Admitting Diagnosis:   Pulmonary embolism and infarction (HCC) [I26.99]  COPD exacerbation ([facility] [J44.1]  Single subsegmental pulmonary embolism without acute cor pulmonale ([facility] [I26.93]     Admitted with    shortness of breath , abdominal pain and chest tightness, pulmonary embolism   Surgery:   Visit Diagnoses         Codes    Single subsegmental pulmonary embolism without acute cor pulmonale ([facility]    -  Primary I26.93    COPD exacerbation ([facility]     J44.1            Patient Active Problem List   Diagnosis    IDDM (insulin dependent diabetes mellitus)    Hyperlipidemia with target LDL less than 70    Lymphedema of both lower extremities    Vitamin D deficiency    Ductal carcinoma in situ (DCIS) of breast    Herpes simplex supression    Type 2 diabetes mellitus without complication, with long-term current use of insulin (HCC)    COPD (chronic obstructive pulmonary disease) (HCC)    Pulmonary hypertension (HCC)    Non-rheumatic aortic sclerosis    LBBB (left bundle branch block)    Diabetic polyneuropathy associated with type 1 diabetes mellitus (HCC)    Decreased dorsalis pedis pulse    Diabetes mellitus type 2, uncontrolled    Mixed hyperlipidemia    Morbid obesity with BMI of 40.0-44.9, adult ([facility]    History of CVA (cerebrovascular accident)    Chronic combined systolic and diastolic congestive heart failure ([facility] Supraventricular tachycardia (HCC)    Moderate persistent asthma without complication    PVD (peripheral vascular disease) with claudication (HCC)    CAD in native artery    Coronary artery disease (CAD) excluded    Hydronephrosis concurrent with and due to calculi of kidney and ureter    Pulmonary embolism and infarction ([address]    Single subsegmental pulmonary embolism without acute cor pulmonale ([facility]    COVID-19    Type 2 diabetes mellitus with hyperglycemia, with long-term current use of insulin (HCC)        ASSESSMENT of Current Deficits Patient exhibits decreased strength, balance, and endurance impairing functional mobility, transfers, gait , gait distance, and tolerance to activity are barriers to d/c and require skilled intervention to address concerns listed above to increase safety and independence at discharge. Decreased strength, balance and endurance  increases patient's risk for fall. Patient is mildly unsteady with gait during session with no loss of balance  and dizziness, however shortness of breath         PHYSICAL THERAPY  PLAN OF CARE       Physical therapy plan of care is established based on physician order,  patient diagnosis and clinical assessment    Current Treatment Recommendations:    -Standing Balance: Perform strengthening exercises in standing to promote motor control with or without upper extremity support   -Transfers: Provide instruction on proper hand and foot position for adequate transfer of weight onto lower extremities and use of gait device if needed and Cues for hand placement, technique and safety.  Provide stabilization to prevent fall   -Gait: Gait training and Standing activities to improve: base of support, weight shift, weight bearing    -Endurance: Utilize Supervised activities to increase level of endurance to allow for safe functional mobility including transfers and gait     PT long term treatment goals are located in below grid    Patient and or family understand(s) diagnosis, prognosis, and plan of care. Frequency of treatments: Patient will be seen  daily. Prior Level of Function: Patient ambulated independently    Rehab Potential: good   for baseline    Past medical history:   Past Medical History:   Diagnosis Date    Arthritis     Asthma     BRCA1 negative     BRCA2 negative     Breast cancer ([address]     CAD in native artery 12/15/2021    12-15-21 cate 2.5x38 hima rca. 12-15-21 cate 3.0x12 hima prox rca.      Cancer [facility])     breast     Cerebral artery occlusion with cerebral infarction [facility]) 2010    Speech difficulties results; claims 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    Cerebrovascular disease 06/2009    no residual    CHF (congestive heart failure) ([facility]) approx 3 years ago    Claustrophobia     COPD (chronic obstructive pulmonary disease) ([address]     Decreased dorsalis pedis pulse 09/05/2019    Dermatophytosis 09/05/2019    Headache(784.0)     History of blood transfusion     with knee surgery    Hives     Hx of blood clots     PE's and DVT's 'during childbearing years'    Hyperlipidemia     Hypertension     LBP radiating to both legs     Lymphedema of both lower extremities 11/12/2015    Neuromuscular disorder ([address]     Non-rheumatic aortic sclerosis 10/2018    10/2018    Obesity     Pulmonary hypertension ([address] 10/2018    PVD (peripheral vascular disease) with claudication ([address] 09/23/2021    Recurrent genital HSV (herpes simplex virus) infection     last outbreak 11/2017    S/P breast biopsy, right 07/2016    pt states breast cancer    Type II or unspecified type diabetes mellitus without mention of complication, not stated as uncontrolled     AA1c8.7 9/10    UTI (urinary tract infection) 05/13/2019     Past Surgical History:   Procedure Laterality Date    ARTHROPLASTY Left 07/29/2016    thumb basil joint with dequervain's release    BREAST BIOPSY      BREAST LUMPECTOMY  years ago    benign    BREAST LUMPECTOMY Right 09/06/2016    COLONOSCOPY  2005 COLONOSCOPY  01/08/2015    Dr. Donal Morales  12/2021    ECHO COMPL W DOP COLOR FLOW  06/07/2012         EYE SURGERY      FINGER SURGERY Left 07/29/2016    thumb    HAND SURGERY  12/2017    HYSTERECTOMY (CERVIX STATUS UNKNOWN)      JOINT REPLACEMENT      OTHER SURGICAL HISTORY Right 12/20/2017    RIGHT THUMB TRAPEZIECTOMY WITH LIGAMENT RECONSRUCTION DEQUERVAINS RELEASE    TIM AND BSO (CERVIX REMOVED)      TOTAL HIP ARTHROPLASTY Bilateral     2000, 2013 dr Gala Kinsey, dr Mccoy Favor Bilateral 2013    dr Burleson Levels:    Precautions: Up as tolerated, incentive spirometer, and pep flutter valve, falls and alarm ,      Social history: Patient lives alone in a apartment     with Elevator  to enter       Equipment owned: [employer], [address], [address], and Shower chair,       [address] [place] [address],[address] Mobility Inpatient   How much difficulty turning over in bed?: None  How much difficulty sitting down on / standing up from a chair with arms?: None  How much difficulty moving from lying on back to sitting on side of bed?: None  How much help from another person moving to and from a bed to a chair?: A Little  How much help from another person needed to walk in hospital room?: A Little  How much help from another person for climbing 3-5 steps with a railing?: A Lot  AM-PAC Inpatient Mobility Raw Score : 20  AM-PAC Inpatient T-Scale Score : 47.67  Mobility Inpatient CMS 0-100% Score: 35.83  Mobility Inpatient CMS G-Code Modifier : [address] cleared patient for PT evaluation. The admitting diagnosis and active problem list as listed above have been reviewed prior to the initiation of this evaluation. OBJECTIVE;   Initial Evaluation  Date: 1/24/2023 Treatment Date:     Short Term/ Long Term   Goals   Was pt agreeable to Eval/treatment?  Yes  To be met in 3 days   Pain level   0/10        Bed Mobility    Rolling: Independent    Supine to sit: Independent    Sit to supine: Independent    Scooting: Independent        Transfers Sit to stand: Supervision  from bed and commode  Sit to stand: Independent     Ambulation     75 feet using  no device with Supervision    for safety and cues for safety and breath management    100 feet using  no device with Independent    ROM Within functional limits    Increase range of motion 10% of affected joints    Strength BUE:   4-/5  RLE:  4-/5  LLE:  4-/5  Increase strength in affected mm groups by 1/3 grade   Balance Sitting EOB:  good    Dynamic Standing:  fair    Sitting EOB:  good    Dynamic Standing: good       Patient is Alert & Oriented x person, place, time, and situation and follows directions    Sensation:  Patient  denies numbness/tingling   Edema:  no   Endurance: poor      Vitals: room air   Blood Pressure at rest  Blood Pressure during session    Heart Rate at rest 93 Heart Rate during session 97   SPO2 at rest 96 %  SPO2 during session 88-92%     Patient education  Patient educated on role of Physical Therapy, risks of immobility, safety and plan of care,  importance of mobility while in hospital , ankle pumps, quad set and glut set for edema control, blood clot prevention, safety , and proper use/technique of incentive spirometer     Patient response to education:   Pt verbalized understanding Pt demonstrated skill Pt requires further education in this area   Yes Partial Yes      Treatment:  Patient practiced and was instructed/facilitated in the following treatment: Patient   Sat edge of bed 10 minutes with Independent to increase dynamic sitting balance and activity tolerance. seated and standing challenges   Instructed and monitored  incentive spirometer and pep  flutter x 10 reps     Therapeutic Exercises:  hip abduction/adduction, squat, standing hamstring curls, and shoulder elevation  x 10 reps.     standing    At end of session, patient in chair with  call light and phone within reach,  all lines and tubes intact, nursing notified. Patient would benefit from continued skilled Physical Therapy to improve functional independence and quality of life. Patient's/ family goals   home    Time in  56  Time out  504    Total Treatment Time  23 minutes    Evaluation time includes thorough review of current medical information, gathering information on past medical history/social history and prior level of function, completion of standardized testing/informal observation of tasks, assessment of data, and development of Plan of care and goals.      CPT codes:  Low Complexity PT evaluation (31011)  Therapeutic exercises (31364)   13 minutes  1 unit(s)  Gait Training (61759) 10 minutes 1 unit(s)    Rachel Horner, PT

## 2023-01-24 NOTE — CARE COORDINATION
1/24/2023 1420 COVID Positive 1/22/2023. CM spoke with pt via phone for transition of care needs at d/c. Pt plan is to return to her apt as prior. Pt wants [facility] when she's discharged, CM provided her with a list of [facility] agencies. Referral was made to [facility] per her request, requested information was [facility]([phone]), they will review for acceptance. Pt's family or friend will provide transportation at d/c. CM will follow. Electronically signed by Spurgeon Dubin, RN on 1/24/2023 at 2:37 PM     Addendum 1510 Per rep at [facility] that cannot accept pt. Pt requested a referral for [facility] and per hospitals they can accept start of care date is 1/30/2023([facility] orders are in Epic). Pt is agreeable to start of care date. CM will follow.  Electronically signed by Spurgeon Dubin, RN on 1/24/2023 at 3:19 PM

## 2023-01-24 NOTE — PLAN OF CARE
Problem: Discharge Planning  Goal: Discharge to home or other facility with appropriate resources  Outcome: Progressing  Flowsheets (Taken 1/23/2023 2000)  Discharge to home or other facility with appropriate resources: Identify barriers to discharge with patient and caregiver     Problem: Safety - Adult  Goal: Free from fall injury  Outcome: Progressing     Problem: ABCDS Injury Assessment  Goal: Absence of physical injury  Outcome: Progressing

## 2023-01-24 NOTE — PROGRESS NOTES
[address] [place], [place] [address]ist   Progress Note    Admitting Date and Time: 1/22/2023 12:42 PM  Admit Dx: Pulmonary embolism and infarction ([facility] [I26.99]  COPD exacerbation ([facility] [J44.1]  Single subsegmental pulmonary embolism without acute cor pulmonale (HCC) [I26.93]    Subjective:    Patient was upset that her blood sugars and blood pressures have been elevated. Discussed with the patient that her steroids were stopped and due to not having her medication list on admission she missed her dose of Lantus on 1/22 and received 40 units of Lantus on 1/23 but Lantus dose was adjusted to 50 units based on the medication list from her PCP's office. She also missed her blood pressure medications on 1/22 due to not having a medication list and anticipate some improvement in her blood pressure now that she is on her home medications. ROS: denies fever, chills, n/v, HA unless stated above.      insulin glargine  50 Units SubCUTAneous Nightly    amLODIPine  10 mg Oral Daily    cloNIDine  0.2 mg Oral TID    gabapentin  400 mg Oral TID    losartan  100 mg Oral Daily    montelukast  10 mg Oral Nightly    oxybutynin  5 mg Oral TID    aspirin  81 mg Oral Daily    insulin lispro  0-8 Units SubCUTAneous TID WC    insulin lispro  0-4 Units SubCUTAneous Nightly    zinc sulfate  50 mg Oral Daily    vitamin B-6  50 mg Oral Daily    ascorbic acid  1,000 mg Oral Daily    Vitamin D  2,000 Units Oral Daily    hydroCHLOROthiazide  25 mg Oral Daily    sodium chloride flush  5-40 mL IntraVENous 2 times per day    senna  1 tablet Oral Daily    docusate sodium  100 mg Oral BID    enoxaparin  1 mg/kg (Order-Specific) SubCUTAneous BID    azithromycin  250 mg Oral Daily    ipratropium-albuterol  1 ampule Inhalation Q4H WA    budesonide  0.5 mg Nebulization BID    arformoterol tartrate  15 mcg Nebulization BID    pravastatin  80 mg Oral Nightly     glucose, 4 tablet, PRN  dextrose bolus, 125 mL, PRN   Or  dextrose bolus, 250 mL, PRN  glucagon (rDNA), 1 mg, PRN  dextrose, , Continuous PRN  sodium chloride flush, 5-40 mL, PRN  sodium chloride, , PRN  ondansetron, 4 mg, Q8H PRN   Or  ondansetron, 4 mg, Q6H PRN  acetaminophen, 650 mg, Q6H PRN   Or  acetaminophen, 650 mg, Q6H PRN  hydrALAZINE, 10 mg, Q6H PRN       Objective:    BP (!) 208/88   Pulse 91   Temp 98.7 °F (37.1 °C) (Oral)   Resp 13   Ht 5' 5\" (1.651 m)   Wt 241 lb 1.6 oz (109.4 kg)   SpO2 100%   BMI 40.12 kg/m²   General Appearance: alert and oriented to person, place and time and in no acute distress  Skin: warm and dry  Head: normocephalic and atraumatic  Eyes: pupils equal, round, and reactive to light,  conjunctivae normal  Neck: neck supple and non tender without mass   Pulmonary/Chest: diminished bilaterally, expiratory wheezes, no respiratory distress  Cardiovascular: normal rate, normal S1 and S2   Abdomen: soft, non-tender, non-distended, normal bowel sounds, no masses or organomegaly  Extremities: no cyanosis, no clubbing and mild bilateral lower extremity edema  Neurologic: no cranial nerve deficit and speech normal      Recent Labs     01/22/23  1258 01/23/23  0603 01/24/23  0705    141 137   K 4.0 4.6 4.5    102 98   CO2 23 25 27   BUN 16 13 13   CREATININE 0.7 0.7 0.7   GLUCOSE 273* 318* 249*   CALCIUM 9.4 8.9 9.6       Recent Labs     01/22/23  1258 01/24/23  0705   ALKPHOS 154* 143*   PROT 7.5 7.4   LABALBU 4.0 3.9   BILITOT 0.3 0.4   AST 12 13   ALT <5 5       Recent Labs     01/22/23  1258 01/23/23  0603 01/24/23  0705   WBC 10.3 9.6 9.0   RBC 4.48 4.12 4.47   HGB 10.8* 10.2* 11.3*   HCT 36.6 32.7* 35.7   MCV 81.7 79.4* 79.9*   MCH 24.1* 24.8* 25.3*   MCHC 29.5* 31.2* 31.7*   RDW 16.7* 17.0* 16.9*    432 453*   MPV 11.0 11.3 11.4           Radiology:   CT ABDOMEN PELVIS W IV CONTRAST Additional Contrast? None   Final Result   Limited study due to poor contrast opacification. There is no large central   pulmonary embolism.       Small filling defect in the distal subsegmental and peripheral vessels of   left lower lobe concerning for subsegmental pulmonary embolism. Coronary artery calcification. Unremarkable CT of the abdomen and pelvis. RECOMMENDATIONS:   Unavailable         CTA CHEST W CONTRAST   Final Result   Limited study due to poor contrast opacification. There is no large central   pulmonary embolism. Small filling defect in the distal subsegmental and peripheral vessels of   left lower lobe concerning for subsegmental pulmonary embolism. Coronary artery calcification. Unremarkable CT of the abdomen and pelvis. RECOMMENDATIONS:   Unavailable             Assessment:  Principal Problem:    Pulmonary embolism and infarction [facility])  Active Problems:    Single subsegmental pulmonary embolism without acute cor pulmonale (HCC)    COVID-19    Type 2 diabetes mellitus with hyperglycemia, with long-term current use of insulin (HCC)    COPD (chronic obstructive pulmonary disease) ([facility]  Resolved Problems:    * No resolved hospital problems. *      Plan:  Pulmonary embolism   - CTA of the chest with small filling defect in the distal subsegmental and   peripheral vessels of the left lower lobe. - On therapeutic lovenox   - Transition to oral Eliquis prior to discharge   - Patient reports history of blood clots during her pregnancies. COVID-19   - She is not requiring oxygen   - Solu-medrol was stopped this morning.   - Incentive spirometer, PEP/flutter   - Vitamin supplementation  COPD exacerbation   - Likely secondary to COVID   - Continue aerosols   - Continue azithromycin   - Ambulatory pulse ox prior to discharge. HFpEF  - Last Echo on 12/16/21 with an EF of 55 +/- 5%, Stage II diastolic dysfunction   - She does not appear fluid overloaded at this time.    Hypertension   - Continue home Clonidine, Norvasc, losartan and HCTZ   - Blood pressures have been elevated   Diabetes   - Home Januvia is on hold   - Lantus dose increased to 50 units   - Continue Humalog sliding scale to moderate dose  CAD   - Continue pravastatin and aspirin    NOTE: This report was transcribed using voice recognition software. Every effort was made to ensure accuracy; however, inadvertent computerized transcription errors may be present.      Electronically signed by DMITRY Cardenas CNP on 1/24/2023 at 9:00 AM

## 2023-01-24 NOTE — PLAN OF CARE
Problem: Discharge Planning  Goal: Discharge to home or other facility with appropriate resources  1/24/2023 1006 by Bing Gentile RN  Outcome: Progressing  1/24/2023 0224 by Luisana Goodman RN  Outcome: Progressing  Flowsheets (Taken 1/23/2023 2000)  Discharge to home or other facility with appropriate resources: Identify barriers to discharge with patient and caregiver     Problem: Safety - Adult  Goal: Free from fall injury  1/24/2023 1006 by Bing Gentile RN  Outcome: Progressing  1/24/2023 0224 by Luisana Goodman RN  Outcome: Progressing     Problem: ABCDS Injury Assessment  Goal: Absence of physical injury  1/24/2023 0224 by Luisana Goodman RN  Outcome: Progressing

## 2023-01-25 LAB
ALBUMIN SERPL-MCNC: 3.5 G/DL (ref 3.5–5.2)
ALP BLD-CCNC: 122 U/L (ref 35–104)
ALT SERPL-CCNC: 7 U/L (ref 0–32)
ANION GAP SERPL CALCULATED.3IONS-SCNC: 10 MMOL/L (ref 7–16)
AST SERPL-CCNC: 13 U/L (ref 0–31)
BASOPHILS ABSOLUTE: 0.04 E9/L (ref 0–0.2)
BASOPHILS RELATIVE PERCENT: 0.4 % (ref 0–2)
BILIRUB SERPL-MCNC: 0.4 MG/DL (ref 0–1.2)
BUN BLDV-MCNC: 14 MG/DL (ref 6–23)
CALCIUM SERPL-MCNC: 9.6 MG/DL (ref 8.6–10.2)
CHLORIDE BLD-SCNC: 104 MMOL/L (ref 98–107)
CO2: 28 MMOL/L (ref 22–29)
CREAT SERPL-MCNC: 0.8 MG/DL (ref 0.5–1)
EOSINOPHILS ABSOLUTE: 0 E9/L (ref 0.05–0.5)
EOSINOPHILS RELATIVE PERCENT: 0 % (ref 0–6)
GFR SERPL CREATININE-BSD FRML MDRD: >60 ML/MIN/1.73
GLUCOSE BLD-MCNC: 123 MG/DL (ref 74–99)
HCT VFR BLD CALC: 34.8 % (ref 34–48)
HEMOGLOBIN: 11 G/DL (ref 11.5–15.5)
IMMATURE GRANULOCYTES #: 0.03 E9/L
IMMATURE GRANULOCYTES %: 0.3 % (ref 0–5)
LYMPHOCYTES ABSOLUTE: 3.05 E9/L (ref 1.5–4)
LYMPHOCYTES RELATIVE PERCENT: 28.9 % (ref 20–42)
MAGNESIUM: 2 MG/DL (ref 1.6–2.6)
MCH RBC QN AUTO: 25.3 PG (ref 26–35)
MCHC RBC AUTO-ENTMCNC: 31.6 % (ref 32–34.5)
MCV RBC AUTO: 80.2 FL (ref 80–99.9)
METER GLUCOSE: 147 MG/DL (ref 74–99)
METER GLUCOSE: 250 MG/DL (ref 74–99)
METER GLUCOSE: 255 MG/DL (ref 74–99)
METER GLUCOSE: 265 MG/DL (ref 74–99)
MONOCYTES ABSOLUTE: 1.24 E9/L (ref 0.1–0.95)
MONOCYTES RELATIVE PERCENT: 11.8 % (ref 2–12)
NEUTROPHILS ABSOLUTE: 6.18 E9/L (ref 1.8–7.3)
NEUTROPHILS RELATIVE PERCENT: 58.6 % (ref 43–80)
PDW BLD-RTO: 17.3 FL (ref 11.5–15)
PHOSPHORUS: 3.9 MG/DL (ref 2.5–4.5)
PLATELET # BLD: 431 E9/L (ref 130–450)
PMV BLD AUTO: 11.1 FL (ref 7–12)
POTASSIUM SERPL-SCNC: 4 MMOL/L (ref 3.5–5)
RBC # BLD: 4.34 E12/L (ref 3.5–5.5)
SODIUM BLD-SCNC: 142 MMOL/L (ref 132–146)
TOTAL PROTEIN: 6.6 G/DL (ref 6.4–8.3)
WBC # BLD: 10.5 E9/L (ref 4.5–11.5)

## 2023-01-25 PROCEDURE — 6370000000 HC RX 637 (ALT 250 FOR IP): Performed by: NURSE PRACTITIONER

## 2023-01-25 PROCEDURE — 94640 AIRWAY INHALATION TREATMENT: CPT

## 2023-01-25 PROCEDURE — 36415 COLL VENOUS BLD VENIPUNCTURE: CPT

## 2023-01-25 PROCEDURE — 6360000002 HC RX W HCPCS: Performed by: NURSE PRACTITIONER

## 2023-01-25 PROCEDURE — 97165 OT EVAL LOW COMPLEX 30 MIN: CPT | Performed by: OCCUPATIONAL THERAPIST

## 2023-01-25 PROCEDURE — 80053 COMPREHEN METABOLIC PANEL: CPT

## 2023-01-25 PROCEDURE — APPSS30 APP SPLIT SHARED TIME 16-30 MINUTES: Performed by: NURSE PRACTITIONER

## 2023-01-25 PROCEDURE — 99232 SBSQ HOSP IP/OBS MODERATE 35: CPT | Performed by: INTERNAL MEDICINE

## 2023-01-25 PROCEDURE — 82962 GLUCOSE BLOOD TEST: CPT

## 2023-01-25 PROCEDURE — 83735 ASSAY OF MAGNESIUM: CPT

## 2023-01-25 PROCEDURE — 84100 ASSAY OF PHOSPHORUS: CPT

## 2023-01-25 PROCEDURE — 85025 COMPLETE CBC W/AUTO DIFF WBC: CPT

## 2023-01-25 PROCEDURE — 1200000000 HC SEMI PRIVATE

## 2023-01-25 PROCEDURE — 97110 THERAPEUTIC EXERCISES: CPT

## 2023-01-25 PROCEDURE — 97530 THERAPEUTIC ACTIVITIES: CPT

## 2023-01-25 PROCEDURE — 2580000003 HC RX 258: Performed by: NURSE PRACTITIONER

## 2023-01-25 RX ORDER — POLYETHYLENE GLYCOL 3350 17 G/17G
17 POWDER, FOR SOLUTION ORAL DAILY PRN
Status: DISCONTINUED | OUTPATIENT
Start: 2023-01-25 | End: 2023-01-27 | Stop reason: HOSPADM

## 2023-01-25 RX ORDER — PANTOPRAZOLE SODIUM 40 MG/1
40 TABLET, DELAYED RELEASE ORAL
Status: DISCONTINUED | OUTPATIENT
Start: 2023-01-26 | End: 2023-01-27 | Stop reason: HOSPADM

## 2023-01-25 RX ADMIN — IPRATROPIUM BROMIDE AND ALBUTEROL SULFATE 1 AMPULE: .5; 2.5 SOLUTION RESPIRATORY (INHALATION) at 18:09

## 2023-01-25 RX ADMIN — GABAPENTIN 400 MG: 400 CAPSULE ORAL at 20:38

## 2023-01-25 RX ADMIN — ASPIRIN 81 MG: 81 TABLET, COATED ORAL at 09:10

## 2023-01-25 RX ADMIN — MONTELUKAST 10 MG: 10 TABLET, FILM COATED ORAL at 20:38

## 2023-01-25 RX ADMIN — INSULIN LISPRO 4 UNITS: 100 INJECTION, SOLUTION INTRAVENOUS; SUBCUTANEOUS at 12:09

## 2023-01-25 RX ADMIN — DOCUSATE SODIUM 100 MG: 100 CAPSULE, LIQUID FILLED ORAL at 09:10

## 2023-01-25 RX ADMIN — BUDESONIDE 500 MCG: 0.5 SUSPENSION RESPIRATORY (INHALATION) at 18:10

## 2023-01-25 RX ADMIN — IPRATROPIUM BROMIDE AND ALBUTEROL SULFATE 1 AMPULE: .5; 2.5 SOLUTION RESPIRATORY (INHALATION) at 06:33

## 2023-01-25 RX ADMIN — Medication 10 ML: at 10:47

## 2023-01-25 RX ADMIN — PYRIDOXINE HCL TAB 50 MG 50 MG: 50 TAB at 09:10

## 2023-01-25 RX ADMIN — PRAVASTATIN SODIUM 80 MG: 20 TABLET ORAL at 20:38

## 2023-01-25 RX ADMIN — INSULIN LISPRO 4 UNITS: 100 INJECTION, SOLUTION INTRAVENOUS; SUBCUTANEOUS at 17:02

## 2023-01-25 RX ADMIN — AZITHROMYCIN MONOHYDRATE 250 MG: 250 TABLET ORAL at 09:10

## 2023-01-25 RX ADMIN — OXYBUTYNIN CHLORIDE 5 MG: 5 TABLET ORAL at 20:38

## 2023-01-25 RX ADMIN — ARFORMOTEROL TARTRATE 15 MCG: 15 SOLUTION RESPIRATORY (INHALATION) at 18:10

## 2023-01-25 RX ADMIN — ARFORMOTEROL TARTRATE 15 MCG: 15 SOLUTION RESPIRATORY (INHALATION) at 06:33

## 2023-01-25 RX ADMIN — HYDROCHLOROTHIAZIDE 25 MG: 25 TABLET ORAL at 09:10

## 2023-01-25 RX ADMIN — Medication 50 MG: at 09:10

## 2023-01-25 RX ADMIN — AMLODIPINE BESYLATE 10 MG: 10 TABLET ORAL at 09:10

## 2023-01-25 RX ADMIN — ACETAMINOPHEN 650 MG: 325 TABLET ORAL at 23:22

## 2023-01-25 RX ADMIN — LOSARTAN POTASSIUM 100 MG: 100 TABLET, FILM COATED ORAL at 09:11

## 2023-01-25 RX ADMIN — CLONIDINE HYDROCHLORIDE 0.2 MG: 0.2 TABLET ORAL at 09:10

## 2023-01-25 RX ADMIN — SENNOSIDES 8.6 MG: 8.6 TABLET, COATED ORAL at 09:10

## 2023-01-25 RX ADMIN — OXYBUTYNIN CHLORIDE 5 MG: 5 TABLET ORAL at 14:04

## 2023-01-25 RX ADMIN — INSULIN GLARGINE 50 UNITS: 100 INJECTION, SOLUTION SUBCUTANEOUS at 20:38

## 2023-01-25 RX ADMIN — CLONIDINE HYDROCHLORIDE 0.2 MG: 0.2 TABLET ORAL at 20:37

## 2023-01-25 RX ADMIN — OXYBUTYNIN CHLORIDE 5 MG: 5 TABLET ORAL at 09:10

## 2023-01-25 RX ADMIN — BUDESONIDE 500 MCG: 0.5 SUSPENSION RESPIRATORY (INHALATION) at 06:33

## 2023-01-25 RX ADMIN — Medication 10 ML: at 20:38

## 2023-01-25 RX ADMIN — Medication 2000 UNITS: at 09:10

## 2023-01-25 RX ADMIN — APIXABAN 10 MG: 5 TABLET, FILM COATED ORAL at 20:37

## 2023-01-25 RX ADMIN — APIXABAN 10 MG: 5 TABLET, FILM COATED ORAL at 09:10

## 2023-01-25 RX ADMIN — IPRATROPIUM BROMIDE AND ALBUTEROL SULFATE 1 AMPULE: .5; 2.5 SOLUTION RESPIRATORY (INHALATION) at 14:58

## 2023-01-25 RX ADMIN — DOCUSATE SODIUM 100 MG: 100 CAPSULE, LIQUID FILLED ORAL at 20:38

## 2023-01-25 RX ADMIN — CLONIDINE HYDROCHLORIDE 0.2 MG: 0.2 TABLET ORAL at 14:04

## 2023-01-25 RX ADMIN — GABAPENTIN 400 MG: 400 CAPSULE ORAL at 14:05

## 2023-01-25 RX ADMIN — OXYCODONE HYDROCHLORIDE AND ACETAMINOPHEN 1000 MG: 500 TABLET ORAL at 09:11

## 2023-01-25 RX ADMIN — IPRATROPIUM BROMIDE AND ALBUTEROL SULFATE 1 AMPULE: .5; 2.5 SOLUTION RESPIRATORY (INHALATION) at 09:46

## 2023-01-25 RX ADMIN — GABAPENTIN 400 MG: 400 CAPSULE ORAL at 09:10

## 2023-01-25 RX ADMIN — POLYETHYLENE GLYCOL 3350 17 G: 17 POWDER, FOR SOLUTION ORAL at 18:09

## 2023-01-25 ASSESSMENT — PAIN DESCRIPTION - PAIN TYPE: TYPE: ACUTE PAIN

## 2023-01-25 ASSESSMENT — PAIN DESCRIPTION - LOCATION: LOCATION: HEAD

## 2023-01-25 ASSESSMENT — PAIN SCALES - GENERAL
PAINLEVEL_OUTOF10: 0
PAINLEVEL_OUTOF10: 0
PAINLEVEL_OUTOF10: 6

## 2023-01-25 ASSESSMENT — PAIN DESCRIPTION - ONSET: ONSET: GRADUAL

## 2023-01-25 ASSESSMENT — PAIN DESCRIPTION - DESCRIPTORS: DESCRIPTORS: ACHING

## 2023-01-25 ASSESSMENT — PAIN DESCRIPTION - FREQUENCY: FREQUENCY: INTERMITTENT

## 2023-01-25 ASSESSMENT — PAIN - FUNCTIONAL ASSESSMENT: PAIN_FUNCTIONAL_ASSESSMENT: ACTIVITIES ARE NOT PREVENTED

## 2023-01-25 NOTE — PLAN OF CARE
Problem: Discharge Planning  Goal: Discharge to home or other facility with appropriate resources  1/24/2023 2344 by Sreekanth Ferguson RN  Outcome: Progressing     Problem: Safety - Adult  Goal: Free from fall injury  1/24/2023 2344 by Sreekanth Ferguson RN  Outcome: Progressing     Problem: ABCDS Injury Assessment  Goal: Absence of physical injury  Outcome: Progressing

## 2023-01-25 NOTE — PROGRESS NOTES
[address] [place], [place] [address]ist   Progress Note    Admitting Date and Time: 1/22/2023 12:42 PM  Admit Dx: Pulmonary embolism and infarction ([facility] [I26.99]  [facility] [J44.1]  Single subsegmental pulmonary embolism without acute cor pulmonale (HCC) [I26.93]    Subjective:    Patient was sitting up in the bed at the time of the exam.  She remains on room air. She said that she coughed up a dark colored sputum this morning. Her blood pressures and blood sugars are improved today. ROS: denies fever, chills, n/v, HA unless stated above.      insulin glargine  50 Units SubCUTAneous Nightly    apixaban  10 mg Oral BID    Followed by    Alma Hoffman ON 1/31/2023] apixaban  5 mg Oral BID    amLODIPine  10 mg Oral Daily    cloNIDine  0.2 mg Oral TID    gabapentin  400 mg Oral TID    losartan  100 mg Oral Daily    montelukast  10 mg Oral Nightly    oxybutynin  5 mg Oral TID    aspirin  81 mg Oral Daily    insulin lispro  0-8 Units SubCUTAneous TID WC    insulin lispro  0-4 Units SubCUTAneous Nightly    zinc sulfate  50 mg Oral Daily    vitamin B-6  50 mg Oral Daily    ascorbic acid  1,000 mg Oral Daily    Vitamin D  2,000 Units Oral Daily    hydroCHLOROthiazide  25 mg Oral Daily    sodium chloride flush  5-40 mL IntraVENous 2 times per day    senna  1 tablet Oral Daily    docusate sodium  100 mg Oral BID    azithromycin  250 mg Oral Daily    ipratropium-albuterol  1 ampule Inhalation Q4H WA    budesonide  0.5 mg Nebulization BID    arformoterol tartrate  15 mcg Nebulization BID    pravastatin  80 mg Oral Nightly     glucose, 4 tablet, PRN  dextrose bolus, 125 mL, PRN   Or  dextrose bolus, 250 mL, PRN  glucagon (rDNA), 1 mg, PRN  dextrose, , Continuous PRN  sodium chloride flush, 5-40 mL, PRN  sodium chloride, , PRN  ondansetron, 4 mg, Q8H PRN   Or  ondansetron, 4 mg, Q6H PRN  acetaminophen, 650 mg, Q6H PRN   Or  acetaminophen, 650 mg, Q6H PRN  hydrALAZINE, 10 mg, Q6H PRN       Objective:    BP (!) 147/75 Pulse 65   Temp 98 °F (36.7 °C) (Oral)   Resp 18   Ht 5' 5\" (1.651 m)   Wt 242 lb 11.2 oz (110.1 kg)   SpO2 97%   BMI 40.39 kg/m²   General Appearance: alert and oriented to person, place and time and in no acute distress  Skin: warm and dry  Head: normocephalic and atraumatic  Eyes: pupils equal, round, and reactive to light,  conjunctivae normal  Neck: neck supple and non tender without mass   Pulmonary/Chest: diminished bilaterally, no respiratory distress  Cardiovascular: normal rate, normal S1 and S2   Abdomen: soft, non-tender, non-distended, normal bowel sounds, no masses or organomegaly  Extremities: no cyanosis, no clubbing and mild bilateral lower extremity edema  Neurologic: no cranial nerve deficit and speech normal      Recent Labs     01/23/23  0603 01/24/23  0705 01/25/23  0654    137 142   K 4.6 4.5 4.0    98 104   CO2 25 27 28   BUN 13 13 14   CREATININE 0.7 0.7 0.8   GLUCOSE 318* 249* 123*   CALCIUM 8.9 9.6 9.6         Recent Labs     01/22/23  1258 01/24/23  0705 01/25/23  0654   ALKPHOS 154* 143* 122*   PROT 7.5 7.4 6.6   LABALBU 4.0 3.9 3.5   BILITOT 0.3 0.4 0.4   AST 12 13 13   ALT <5 5 7         Recent Labs     01/23/23  0603 01/24/23  0705 01/25/23  0654   WBC 9.6 9.0 10.5   RBC 4.12 4.47 4.34   HGB 10.2* 11.3* 11.0*   HCT 32.7* 35.7 34.8   MCV 79.4* 79.9* 80.2   MCH 24.8* 25.3* 25.3*   MCHC 31.2* 31.7* 31.6*   RDW 17.0* 16.9* 17.3*    453* 431   MPV 11.3 11.4 11.1             Radiology:   CT ABDOMEN PELVIS W IV CONTRAST Additional Contrast? None   Final Result   Limited study due to poor contrast opacification. There is no large central   pulmonary embolism. Small filling defect in the distal subsegmental and peripheral vessels of   left lower lobe concerning for subsegmental pulmonary embolism. Coronary artery calcification. Unremarkable CT of the abdomen and pelvis.       RECOMMENDATIONS:   Unavailable         CTA CHEST W CONTRAST   Final Result Limited study due to poor contrast opacification. There is no large central   pulmonary embolism. Small filling defect in the distal subsegmental and peripheral vessels of   left lower lobe concerning for subsegmental pulmonary embolism. Coronary artery calcification. Unremarkable CT of the abdomen and pelvis. RECOMMENDATIONS:   Unavailable             Assessment:  Principal Problem:    Pulmonary embolism and infarction [facility])  Active Problems:    Single subsegmental pulmonary embolism without acute cor pulmonale (HCC)    COVID-19    Type 2 diabetes mellitus with hyperglycemia, with long-term current use of insulin (HCC)    COPD (chronic obstructive pulmonary disease) ([facility])  Resolved Problems:    * No resolved hospital problems. *      Plan:  Pulmonary embolism   - CTA of the chest with small filling defect in the distal subsegmental and   peripheral vessels of the left lower lobe. - Transitioned to oral Eliquis  - Started on PPI for GI protection.   - Patient reports history of blood clots during her pregnancies. COVID-19   - She is not requiring oxygen   - Incentive spirometer, PEP/flutter   - Vitamin supplementation  COPD exacerbation   - Likely secondary to COVID   - Continue aerosols   - Continue azithromycin   - Ambulatory pulse ox prior to discharge. HFpEF  - Last Echo on 12/16/21 with an EF of 55 +/- 5%, Stage II diastolic dysfunction   - She does not appear fluid overloaded at this time. Hypertension   - Continue home Clonidine, Norvasc, losartan and HCTZ   - Blood pressures have improved. Diabetes   - Home Januvia is on hold   - Continue Lantus 50 units   - Continue Humalog sliding scale to moderate dose   - Blood sugars have improved. - Hgb A1C is 8.4  CAD   - Continue pravastatin and aspirin    NOTE: This report was transcribed using voice recognition software.  Every effort was made to ensure accuracy; however, inadvertent computerized transcription errors may be present.      Electronically signed by DMITRY Sanchez CNP on 1/25/2023 at 9:40 AM

## 2023-01-25 NOTE — PROGRESS NOTES
[address] [facility]  [facility] [place]. [place]         Date:2023                                                   Patient Name: Marcia Gonzalez     MRN: [id number]     : 1950     Room: [address] [place] [address]       Evaluating OT: Jada Becton, OTR/L; [id number]        Specific Provider Orders/Date; Referring Provider:  OT eval and treat  Start:  23 1145,   End:  23 1145,   ONE TIME,   Standing Count:  1 Occurrences,   Osvaldo Vann MD            Diagnosis:   1. Single subsegmental pulmonary embolism without acute cor pulmonale (HCC)    2. COPD exacerbation [facility])         Surgery: none      Pertinent Medical History:        Past Medical History:   Diagnosis Date    Arthritis     Asthma     BRCA1 negative     BRCA2 negative     Breast cancer ([address]     CAD in native artery 12/15/2021    12-15-21 cate 2.5x38 hima rca. 12-15-21 cate 3.0x12 hima prox rca.      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    Cerebrovascular disease 2009    no residual    CHF (congestive heart failure) (HCC) approx 3 years ago    Claustrophobia     COPD (chronic obstructive pulmonary disease) ([address]     Decreased dorsalis pedis pulse 2019    Dermatophytosis 2019    Headache(784.0)     History of blood transfusion     with knee surgery    Hives     Hx of blood clots     PE's and DVT's 'during childbearing years'    Hyperlipidemia     Hypertension     LBP radiating to both legs     Lymphedema of both lower extremities 2015    Neuromuscular disorder ([address]     Non-rheumatic aortic sclerosis 10/2018    10/2018    Obesity     Pulmonary hypertension ([address] 10/2018    PVD (peripheral vascular disease) with claudication ([address] 2021    Recurrent genital HSV (herpes simplex virus) infection last outbreak 11/2017    S/P breast biopsy, right 07/2016    pt states breast cancer    Type II or unspecified type diabetes mellitus without mention of complication, not stated as uncontrolled     AA1c8.7 9/10    UTI (urinary tract infection) 05/13/2019          Past Surgical History:   Procedure Laterality Date    ARTHROPLASTY Left 07/29/2016    thumb basil joint with dequervain's release    BREAST BIOPSY      BREAST LUMPECTOMY  years ago    benign    BREAST LUMPECTOMY Right 09/06/2016    COLONOSCOPY  2005    COLONOSCOPY  01/08/2015    Dr. Coretta Mcintosh Metmargoth  12/2021    ECHO COMPL W DOP COLOR FLOW  06/07/2012         EYE SURGERY      FINGER SURGERY Left 07/29/2016    thumb    HAND SURGERY  12/2017    HYSTERECTOMY (CERVIX STATUS UNKNOWN)      JOINT REPLACEMENT      OTHER SURGICAL HISTORY Right 12/20/2017    RIGHT THUMB TRAPEZIECTOMY WITH LIGAMENT RECONSRUCTION DEQUERVAINS RELEASE    TIM AND BSO (CERVIX REMOVED)      TOTAL HIP ARTHROPLASTY Bilateral     2000, 2013 dr Kameron Swan, dr Rios Im Bilateral 2013    dr Yazmin Blanco       Precautions: covid+ with droplet    Recommended placement: home with [facility] therapy for IADL and energy conservation    Assessment of current deficits [x] No deficits    [] Functional mobility  []ADLs  [] Strength               []Cognition     [] Functional transfers   [] IADLs         [] Safety Awareness   [x]Endurance     [] Fine Coordination              [] Balance      [] Vision/perception   []Sensation      []Gross Motor Coordination  [] ROM  [] Delirium                   [] Motor Control     OT PLAN OF CARE   OT POC based on physician orders, patient diagnosis and results of clinical assessment    Frequency/Duration and OT treatment intervention: none recommended       Recommended Adaptive Equipment:  none     Home Living: Pt lives alone in an apartment on 10th floor. Does have elevator access and laundry in basement.      Bathroom setup: tub shower with TTB if needed, but wasn't using    Equipment owned: cane, ww, rollator     Prior Level of Function: indep with ADLs , indep with IADLs; ambulated indep or with cane PRN   Driving: yes   Occupation: retired   Enjoys: Alevism, piano    Pain Level: none  Cognition: A&O: 4/4; Follows 3 step directions   Memory:  Intact    Sequencing: Intact    Problem solving:Intact    Judgement/safety:Intact   amputation    Functional Assessment:    Initial Eval Status  Date: 1/25/2023  Treatment Status  Date: STGs = LTGs  Time frame: 10-14 days   Feeding Indep  NA-PLOF   Grooming Indep  NA-PLOF   UB Dressing Indep with gown management  NA-PLOF   LB Dressing Indep  NA-PLOF   Bathing Indep with sponge bathing just prior to arrival.   NA-PLOF   Toileting Indep   NA-PLOF   Bed Mobility  Supine to sit: Indep  Sit to supine: Indep   NA-PLOF   Functional Transfers Indep without AD  NA-PLOF   Functional Mobility Indep without AD  NA-PLOF   Balance Sitting:     Static:  Good    Dynamic: Good  Standing: Good  NA-PLOF   Activity Tolerance Room air with 95% throughout session. NA-PLOF   Visual/  Perceptual Vision: WFL; Glasses present: yes: Change in vision since admission: no     NA-PLOF           Hand Dominance  [x] Right [] Left     AROM (PROM) Strength Additional Info:    RUE  WFL 5/5 good  and wfl FMC/dexterity noted during ADL tasks  Opposition [x] Intact [] Impaired  Finger to nose [x] Intact [] Impaired     LUE WFL 5/5 good  and wfl FMC/dexterity noted during ADL tasks  Opposition [x] Intact [] Impaired  Finger to nose [x] Intact [] Impaired     Hearing: WFL   Sensation:  No c/o numbness or tingling   Tone: WFL   Edema: none    Comments: Upon arrival patient supine. Overall patient demonstrated independence and safety during completion of ADL/functional transfer/mobility tasks that matches that of PLOF.   At end of session, patient sitting EOB with call light and phone within reach, all lines and tubes intact. Pt would not benefit from continued skilled OT services at this facility due to intact safety and independence with completion of ADL tasks for functional independence and quality of life at [facility]. Recommending at home for IADLs and energy conservation. Patient / Family Goal: DC tomorrow      Patient and/or family were instructed on functional diagnosis, prognosis/goals and OT plan of care. Demonstrated good understanding. Eval Complexity: Low  History: Brief review of medical records and additional review of physical, cognitive, or psychosocial history related to current functional performance  Exam: No performance deficits  Assistance/Modification: No assistance or modifications required to perform tasks. May have comorbidities that affect occupational performance. Time In: 7084  Time Out: 0849  Total Treatment Time: 0    Min Units   OT Eval Low 97165  x  1   OT Eval Medium 84311      OT Eval High 54095      OT Re-Eval [id number]       Therapeutic Ex [id number]       Therapeutic Activities 66308       ADL/Self Care 30375       Orthotic Management 92770       Manual 38030     Neuro Re-Ed 46344       Non-Billable Time          Evaluation Time additionally includes thorough review of current medical information, gathering information on past medical history/social history and prior level of function, interpretation of standardized testing/informal observation of tasks, assessment of data and development of plan of care and goals.             Deshawn Medina OTR/L; [id number]

## 2023-01-25 NOTE — CARE COORDINATION
1/25/2023 1045 COVID Positive 1/22/2023. CM spoke with pt via phone for transition of care needs at d/c. Pt plan is to return to her apt as prior. Pt wants Arlene Mckeon when she's discharged. Referral made to [facility]il[facility] orders are in [address]) and has been accepted. Pt is aware and agreeable to start of care being 1/30/2023. Pt's family or friend transport her home. CM will follow.  Electronically signed by General Ted RN on 1/25/2023 at 11:02 AM

## 2023-01-25 NOTE — PLAN OF CARE
Problem: Discharge Planning  Goal: Discharge to home or other facility with appropriate resources  1/25/2023 1045 by Josh Deng RN  Outcome: Progressing  1/24/2023 2344 by Michael Harden RN  Outcome: Progressing     Problem: Safety - Adult  Goal: Free from fall injury  1/25/2023 1045 by Josh Deng RN  Outcome: Progressing  1/24/2023 2344 by Michael Harden RN  Outcome: Progressing     Problem: ABCDS Injury Assessment  Goal: Absence of physical injury  1/25/2023 1045 by Josh Deng RN  Outcome: Progressing  1/24/2023 2344 by Michael Harden RN  Outcome: Progressing

## 2023-01-25 NOTE — PROGRESS NOTES
Physical Therapy    Physical Therapy Treatment Note/Plan of Care    Room #:  3026/2231-65  Patient Name: Jessie Tomas  YOB: 1950  MRN: [id number]    Date of Service: 1/25/2023     Tentative placement recommendation: Home with no Physical Therapy needs  Equipment recommendation: None      Evaluating Physical Therapist: Gale Mata, PT  #[id number]      Specific Provider Orders/Date/Referring Provider :  01/24/23 1145    PT eval and treat  Start:  01/24/23 1145,   End:  01/24/23 1145,   ONE TIME,   Standing Count:  1 Occurrences,   Faizan Mcdonald MD     Admitting Diagnosis:   Pulmonary embolism and infarction (HCC) [I26.99]  COPD exacerbation ([address] [facility] [J44.1]  Single subsegmental pulmonary embolism without acute cor pulmonale ([facility] [I26.93]     Admitted with    shortness of breath , abdominal pain and chest tightness, pulmonary embolism   Surgery:   Visit Diagnoses         Codes    Single subsegmental pulmonary embolism without acute cor pulmonale ([facility]    -  Primary I26.93    COPD exacerbation ([facility]     J44.1            Patient Active Problem List   Diagnosis    IDDM (insulin dependent diabetes mellitus)    Hyperlipidemia with target LDL less than 70    Lymphedema of both lower extremities    Vitamin D deficiency    Ductal carcinoma in situ (DCIS) of breast    Herpes simplex supression    Type 2 diabetes mellitus without complication, with long-term current use of insulin (HCC)    COPD (chronic obstructive pulmonary disease) (HCC)    Pulmonary hypertension (HCC)    Non-rheumatic aortic sclerosis    LBBB (left bundle branch block)    Diabetic polyneuropathy associated with type 1 diabetes mellitus (HCC)    Decreased dorsalis pedis pulse    Diabetes mellitus type 2, uncontrolled    Mixed hyperlipidemia    Morbid obesity with BMI of 40.0-44.9, adult ([facility]    History of CVA (cerebrovascular accident)    Chronic combined systolic and diastolic congestive heart failure ([facility]    Supraventricular tachycardia (HCC)    Moderate persistent asthma without complication    PVD (peripheral vascular disease) with claudication (HCC)    CAD in native artery    Coronary artery disease (CAD) excluded    Hydronephrosis concurrent with and due to calculi of kidney and ureter    Pulmonary embolism and infarction ([address]    Single subsegmental pulmonary embolism without acute cor pulmonale ([address]    COVID-19    Type 2 diabetes mellitus with hyperglycemia, with long-term current use of insulin (HCC)        ASSESSMENT of Current Deficits Patient exhibits decreased strength, balance, and endurance impairing functional mobility, transfers, gait , gait distance, and tolerance to activity are barriers to d/c and require skilled intervention to address concerns listed above to increase safety and independence at discharge. Decreased strength, balance and endurance  increases patient's risk for fall. Patient is mildly unsteady with gait during session with no loss of balance  and dizziness, however shortness of breath         PHYSICAL THERAPY  PLAN OF CARE       Physical therapy plan of care is established based on physician order,  patient diagnosis and clinical assessment    Current Treatment Recommendations:    -Standing Balance: Perform strengthening exercises in standing to promote motor control with or without upper extremity support   -Transfers: Provide instruction on proper hand and foot position for adequate transfer of weight onto lower extremities and use of gait device if needed and Cues for hand placement, technique and safety.  Provide stabilization to prevent fall   -Gait: Gait training and Standing activities to improve: base of support, weight shift, weight bearing    -Endurance: Utilize Supervised activities to increase level of endurance to allow for safe functional mobility including transfers and gait     PT long term treatment goals are located in below grid    Patient and or family understand(s) diagnosis, prognosis, and plan of care. Frequency of treatments: Patient will be seen  daily. Prior Level of Function: Patient ambulated independently    Rehab Potential: good   for baseline    Past medical history:   Past Medical History:   Diagnosis Date    Arthritis     Asthma     BRCA1 negative     BRCA2 negative     Breast cancer ([facility]     CAD in native artery 12/15/2021    12-15-21 cate 2.5x38 hima rca. 12-15-21 cate 3.0x12 hima prox rca.      Cancer [facility])     breast     Cerebral artery occlusion with cerebral infarction [facility]) 2010    Speech difficulties results; claims 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    Cerebrovascular disease 06/2009    no residual    CHF (congestive heart failure) (HCC) approx 3 years ago    Claustrophobia     COPD (chronic obstructive pulmonary disease) ([address]     Decreased dorsalis pedis pulse 09/05/2019    Dermatophytosis 09/05/2019    Headache(784.0)     History of blood transfusion     with knee surgery    Hives     Hx of blood clots     PE's and DVT's 'during childbearing years'    Hyperlipidemia     Hypertension     LBP radiating to both legs     Lymphedema of both lower extremities 11/12/2015    Neuromuscular disorder ([address]     Non-rheumatic aortic sclerosis 10/2018    10/2018    Obesity     Pulmonary hypertension ([address] 10/2018    PVD (peripheral vascular disease) with claudication ([address] 09/23/2021    Recurrent genital HSV (herpes simplex virus) infection     last outbreak 11/2017    S/P breast biopsy, right 07/2016    pt states breast cancer    Type II or unspecified type diabetes mellitus without mention of complication, not stated as uncontrolled     AA1c8.7 9/10    UTI (urinary tract infection) 05/13/2019     Past Surgical History:   Procedure Laterality Date    ARTHROPLASTY Left 07/29/2016    thumb basil joint with dequervain's release    BREAST BIOPSY      BREAST LUMPECTOMY  years ago    benign    BREAST LUMPECTOMY Right 09/06/2016    COLONOSCOPY  2005    [facility]    ECHO COMPL W DOP COLOR FLOW  06/07/2012         EYE SURGERY      FINGER SURGERY Left 07/29/2016    thumb    HAND SURGERY  12/2017    HYSTERECTOMY (CERVIX STATUS UNKNOWN)      JOINT REPLACEMENT      OTHER SURGICAL HISTORY Right 12/20/2017    RIGHT THUMB TRAPEZIECTOMY WITH LIGAMENT RECONSRUCTION DEQUERVAINS RELEASE    TIM AND BSO (CERVIX REMOVED)      TOTAL HIP ARTHROPLASTY Bilateral     2000, 2013 dr Hardik Taveras, dr Peter Horvath Bilateral 2013    dr Mary Henriquez:    Precautions: Up as tolerated, incentive spirometer, and pep flutter valve, falls and alarm ,      Social history: Patient lives alone in a apartment     with Elevator  to enter       Equipment owned: [employer], [address], [address], and Shower chair,       [id number] [facility]  Mobility Inpatient   How much difficulty turning over in bed?: None  How much difficulty sitting down on / standing up from a chair with arms?: None  How much difficulty moving from lying on back to sitting on side of bed?: None  How much help from another person moving to and from a bed to a chair?: A Little  How much help from another person needed to walk in hospital room?: A Little  How much help from another person for climbing 3-5 steps with a railing?: A Lot  AM-PAC Inpatient Mobility Raw Score : 20  AM-PAC Inpatient T-Scale Score : 47.67  Mobility Inpatient CMS 0-100% Score: 35.83  Mobility Inpatient CMS G-Code Modifier : [address] cleared patient for PT treatment. Patient sitting up on side of bed at start of session. OBJECTIVE;   Initial Evaluation  Date: 1/24/2023 Treatment Date:    1/25/2023   Short Term/ Long Term   Goals   Was pt agreeable to Eval/treatment? Yes yes To be met in 3 days   Pain level   0/10    None reported    Bed Mobility    Rolling: Independent    Supine to sit:  Independent    Sit to supine: Independent    Scooting: Independent Rolling: Not assessed patient seated edge of bed   Supine to sit: Not assessed patient seated edge of bed   Sit to supine: Independent   Scooting: Independent       Transfers Sit to stand: Supervision  from bed and commode Sit to stand: Independent    Sit to stand: Independent     Ambulation     75 feet using  no device with Supervision    for safety and cues for safety and breath management 50 feet using  no device with Supervision    cues for safety, pacing, and pursed lip breathing  Patient holding onto objects nearby   100 feet using  no device with Independent    ROM Within functional limits    Increase range of motion 10% of affected joints    Strength BUE:   4-/5  RLE:  4-/5  LLE:  4-/5  Increase strength in affected mm groups by 1/3 grade   Balance Sitting EOB:  good    Dynamic Standing:  fair   Sitting EOB: good   Dynamic Standing: fair    Sitting EOB:  good    Dynamic Standing: good       Patient is Alert & Oriented x person, place, time, and situation and follows directions    Sensation:  Patient  denies numbness/tingling   Edema:  no   Endurance: fair -      Vitals: room air   Blood Pressure at rest  Blood Pressure during session    Heart Rate at rest  Heart Rate during session    SPO2 at rest %  SPO2 during session %     Patient education  Patient educated on role of Physical Therapy, risks of immobility, safety and plan of care, energy conservation,  importance of mobility while in hospital , ankle pumps, quad set and glut set for edema control, blood clot prevention, safety , and seated and standing exercises      Patient response to education:   Pt verbalized understanding Pt demonstrated skill Pt requires further education in this area   Yes Partial Yes      Treatment:  Patient practiced and was instructed/facilitated in the following treatment: Patient performed seated exercises. Patient stood to amb to bathroom and in room.  Patient performed standing exercises with rest breaks due to shortness of breath. Patient returned to side of bed and back to supine position. Therapeutic Exercises:  ankle pumps, long arc quad, seated marching, calf raise, and marching  x 10 reps    At end of session, patient in bed with  call light and phone within reach,  all lines and tubes intact, nursing notified. Patient would benefit from continued skilled Physical Therapy to improve functional independence and quality of life.          Patient's/ family goals   home    Time in 300  Time out  327    Total Treatment Time  27 minutes    CPT codes:  Therapeutic activities (84761)   12 minutes  1 unit(s)  Therapeutic exercises (28291)   15 minutes  1 unit(s)    Geovanna Serra, PTA    #[id number]

## 2023-01-26 ENCOUNTER — APPOINTMENT (OUTPATIENT)
Dept: GENERAL RADIOLOGY | Age: 73
End: 2023-01-26
Payer: MEDICARE

## 2023-01-26 LAB
ANION GAP SERPL CALCULATED.3IONS-SCNC: 8 MMOL/L (ref 7–16)
BASOPHILS ABSOLUTE: 0.03 E9/L (ref 0–0.2)
BASOPHILS RELATIVE PERCENT: 0.3 % (ref 0–2)
BUN BLDV-MCNC: 17 MG/DL (ref 6–23)
CALCIUM SERPL-MCNC: 8.9 MG/DL (ref 8.6–10.2)
CHLORIDE BLD-SCNC: 103 MMOL/L (ref 98–107)
CO2: 28 MMOL/L (ref 22–29)
CREAT SERPL-MCNC: 0.9 MG/DL (ref 0.5–1)
EOSINOPHILS ABSOLUTE: 0 E9/L (ref 0.05–0.5)
EOSINOPHILS RELATIVE PERCENT: 0 % (ref 0–6)
GFR SERPL CREATININE-BSD FRML MDRD: >60 ML/MIN/1.73
GLUCOSE BLD-MCNC: 168 MG/DL (ref 74–99)
HCT VFR BLD CALC: 34.2 % (ref 34–48)
HEMOGLOBIN: 10.5 G/DL (ref 11.5–15.5)
IMMATURE GRANULOCYTES #: 0.04 E9/L
IMMATURE GRANULOCYTES %: 0.4 % (ref 0–5)
LYMPHOCYTES ABSOLUTE: 2.72 E9/L (ref 1.5–4)
LYMPHOCYTES RELATIVE PERCENT: 30.6 % (ref 20–42)
MAGNESIUM: 1.8 MG/DL (ref 1.6–2.6)
MCH RBC QN AUTO: 24.6 PG (ref 26–35)
MCHC RBC AUTO-ENTMCNC: 30.7 % (ref 32–34.5)
MCV RBC AUTO: 80.3 FL (ref 80–99.9)
METER GLUCOSE: 183 MG/DL (ref 74–99)
METER GLUCOSE: 185 MG/DL (ref 74–99)
METER GLUCOSE: 235 MG/DL (ref 74–99)
METER GLUCOSE: 263 MG/DL (ref 74–99)
METER GLUCOSE: 289 MG/DL (ref 74–99)
MONOCYTES ABSOLUTE: 0.91 E9/L (ref 0.1–0.95)
MONOCYTES RELATIVE PERCENT: 10.2 % (ref 2–12)
NEUTROPHILS ABSOLUTE: 5.19 E9/L (ref 1.8–7.3)
NEUTROPHILS RELATIVE PERCENT: 58.5 % (ref 43–80)
PDW BLD-RTO: 17.5 FL (ref 11.5–15)
PHOSPHORUS: 4.2 MG/DL (ref 2.5–4.5)
PLATELET # BLD: 399 E9/L (ref 130–450)
PMV BLD AUTO: 11 FL (ref 7–12)
POTASSIUM SERPL-SCNC: 4.1 MMOL/L (ref 3.5–5)
RBC # BLD: 4.26 E12/L (ref 3.5–5.5)
SODIUM BLD-SCNC: 139 MMOL/L (ref 132–146)
WBC # BLD: 8.9 E9/L (ref 4.5–11.5)

## 2023-01-26 PROCEDURE — 74018 RADEX ABDOMEN 1 VIEW: CPT

## 2023-01-26 PROCEDURE — 2580000003 HC RX 258: Performed by: NURSE PRACTITIONER

## 2023-01-26 PROCEDURE — 94640 AIRWAY INHALATION TREATMENT: CPT

## 2023-01-26 PROCEDURE — 97110 THERAPEUTIC EXERCISES: CPT

## 2023-01-26 PROCEDURE — 80048 BASIC METABOLIC PNL TOTAL CA: CPT

## 2023-01-26 PROCEDURE — 99232 SBSQ HOSP IP/OBS MODERATE 35: CPT | Performed by: INTERNAL MEDICINE

## 2023-01-26 PROCEDURE — 6370000000 HC RX 637 (ALT 250 FOR IP): Performed by: NURSE PRACTITIONER

## 2023-01-26 PROCEDURE — 97530 THERAPEUTIC ACTIVITIES: CPT

## 2023-01-26 PROCEDURE — 85025 COMPLETE CBC W/AUTO DIFF WBC: CPT

## 2023-01-26 PROCEDURE — 36415 COLL VENOUS BLD VENIPUNCTURE: CPT

## 2023-01-26 PROCEDURE — 82962 GLUCOSE BLOOD TEST: CPT

## 2023-01-26 PROCEDURE — 83735 ASSAY OF MAGNESIUM: CPT

## 2023-01-26 PROCEDURE — 6360000002 HC RX W HCPCS: Performed by: NURSE PRACTITIONER

## 2023-01-26 PROCEDURE — APPSS30 APP SPLIT SHARED TIME 16-30 MINUTES: Performed by: NURSE PRACTITIONER

## 2023-01-26 PROCEDURE — 84100 ASSAY OF PHOSPHORUS: CPT

## 2023-01-26 PROCEDURE — 1200000000 HC SEMI PRIVATE

## 2023-01-26 RX ORDER — BISACODYL 10 MG
10 SUPPOSITORY, RECTAL RECTAL ONCE
Status: COMPLETED | OUTPATIENT
Start: 2023-01-26 | End: 2023-01-26

## 2023-01-26 RX ADMIN — DOCUSATE SODIUM 100 MG: 100 CAPSULE, LIQUID FILLED ORAL at 21:10

## 2023-01-26 RX ADMIN — OXYBUTYNIN CHLORIDE 5 MG: 5 TABLET ORAL at 09:42

## 2023-01-26 RX ADMIN — Medication 10 ML: at 09:48

## 2023-01-26 RX ADMIN — CLONIDINE HYDROCHLORIDE 0.2 MG: 0.2 TABLET ORAL at 14:41

## 2023-01-26 RX ADMIN — BUDESONIDE 500 MCG: 0.5 SUSPENSION RESPIRATORY (INHALATION) at 06:32

## 2023-01-26 RX ADMIN — NYSTATIN 500000 UNITS: 100000 SUSPENSION ORAL at 21:10

## 2023-01-26 RX ADMIN — OXYCODONE HYDROCHLORIDE AND ACETAMINOPHEN 1000 MG: 500 TABLET ORAL at 09:47

## 2023-01-26 RX ADMIN — PYRIDOXINE HCL TAB 50 MG 50 MG: 50 TAB at 09:43

## 2023-01-26 RX ADMIN — HYDROCHLOROTHIAZIDE 25 MG: 25 TABLET ORAL at 09:42

## 2023-01-26 RX ADMIN — GABAPENTIN 400 MG: 400 CAPSULE ORAL at 14:40

## 2023-01-26 RX ADMIN — NYSTATIN 500000 UNITS: 100000 SUSPENSION ORAL at 14:40

## 2023-01-26 RX ADMIN — AMLODIPINE BESYLATE 10 MG: 10 TABLET ORAL at 09:41

## 2023-01-26 RX ADMIN — NYSTATIN 500000 UNITS: 100000 SUSPENSION ORAL at 17:10

## 2023-01-26 RX ADMIN — CLONIDINE HYDROCHLORIDE 0.2 MG: 0.2 TABLET ORAL at 21:09

## 2023-01-26 RX ADMIN — PANTOPRAZOLE SODIUM 40 MG: 40 TABLET, DELAYED RELEASE ORAL at 05:20

## 2023-01-26 RX ADMIN — Medication 1 LOZENGE: at 21:09

## 2023-01-26 RX ADMIN — GABAPENTIN 400 MG: 400 CAPSULE ORAL at 09:41

## 2023-01-26 RX ADMIN — Medication 2000 UNITS: at 09:40

## 2023-01-26 RX ADMIN — OXYBUTYNIN CHLORIDE 5 MG: 5 TABLET ORAL at 14:40

## 2023-01-26 RX ADMIN — PRAVASTATIN SODIUM 80 MG: 20 TABLET ORAL at 21:09

## 2023-01-26 RX ADMIN — IPRATROPIUM BROMIDE AND ALBUTEROL SULFATE 1 AMPULE: .5; 2.5 SOLUTION RESPIRATORY (INHALATION) at 17:55

## 2023-01-26 RX ADMIN — Medication 10 ML: at 21:10

## 2023-01-26 RX ADMIN — CLONIDINE HYDROCHLORIDE 0.2 MG: 0.2 TABLET ORAL at 09:41

## 2023-01-26 RX ADMIN — OXYBUTYNIN CHLORIDE 5 MG: 5 TABLET ORAL at 21:09

## 2023-01-26 RX ADMIN — Medication 50 MG: at 09:41

## 2023-01-26 RX ADMIN — LOSARTAN POTASSIUM 100 MG: 100 TABLET, FILM COATED ORAL at 09:41

## 2023-01-26 RX ADMIN — ASPIRIN 81 MG: 81 TABLET, COATED ORAL at 09:40

## 2023-01-26 RX ADMIN — SENNOSIDES 8.6 MG: 8.6 TABLET, COATED ORAL at 09:40

## 2023-01-26 RX ADMIN — GABAPENTIN 400 MG: 400 CAPSULE ORAL at 21:09

## 2023-01-26 RX ADMIN — Medication 10 MG: at 19:40

## 2023-01-26 RX ADMIN — ACETAMINOPHEN 650 MG: 325 TABLET ORAL at 14:40

## 2023-01-26 RX ADMIN — ARFORMOTEROL TARTRATE 15 MCG: 15 SOLUTION RESPIRATORY (INHALATION) at 06:32

## 2023-01-26 RX ADMIN — APIXABAN 10 MG: 5 TABLET, FILM COATED ORAL at 21:09

## 2023-01-26 RX ADMIN — MONTELUKAST 10 MG: 10 TABLET, FILM COATED ORAL at 21:09

## 2023-01-26 RX ADMIN — INSULIN GLARGINE 50 UNITS: 100 INJECTION, SOLUTION SUBCUTANEOUS at 21:12

## 2023-01-26 RX ADMIN — INSULIN LISPRO 4 UNITS: 100 INJECTION, SOLUTION INTRAVENOUS; SUBCUTANEOUS at 09:37

## 2023-01-26 RX ADMIN — IPRATROPIUM BROMIDE AND ALBUTEROL SULFATE 1 AMPULE: .5; 2.5 SOLUTION RESPIRATORY (INHALATION) at 06:32

## 2023-01-26 RX ADMIN — DOCUSATE SODIUM 100 MG: 100 CAPSULE, LIQUID FILLED ORAL at 09:42

## 2023-01-26 RX ADMIN — ARFORMOTEROL TARTRATE 15 MCG: 15 SOLUTION RESPIRATORY (INHALATION) at 17:55

## 2023-01-26 RX ADMIN — INSULIN LISPRO 2 UNITS: 100 INJECTION, SOLUTION INTRAVENOUS; SUBCUTANEOUS at 11:45

## 2023-01-26 RX ADMIN — AZITHROMYCIN MONOHYDRATE 250 MG: 250 TABLET ORAL at 09:42

## 2023-01-26 RX ADMIN — MAGNESIUM HYDROXIDE 30 ML: 400 SUSPENSION ORAL at 09:46

## 2023-01-26 RX ADMIN — IPRATROPIUM BROMIDE AND ALBUTEROL SULFATE 1 AMPULE: .5; 2.5 SOLUTION RESPIRATORY (INHALATION) at 10:00

## 2023-01-26 RX ADMIN — APIXABAN 10 MG: 5 TABLET, FILM COATED ORAL at 09:41

## 2023-01-26 RX ADMIN — BUDESONIDE 500 MCG: 0.5 SUSPENSION RESPIRATORY (INHALATION) at 17:55

## 2023-01-26 RX ADMIN — IPRATROPIUM BROMIDE AND ALBUTEROL SULFATE 1 AMPULE: .5; 2.5 SOLUTION RESPIRATORY (INHALATION) at 14:35

## 2023-01-26 ASSESSMENT — PAIN SCALES - GENERAL
PAINLEVEL_OUTOF10: 6
PAINLEVEL_OUTOF10: 3
PAINLEVEL_OUTOF10: 0

## 2023-01-26 ASSESSMENT — PAIN DESCRIPTION - ORIENTATION
ORIENTATION: MID
ORIENTATION: MID

## 2023-01-26 ASSESSMENT — PAIN SCALES - WONG BAKER
WONGBAKER_NUMERICALRESPONSE: 0
WONGBAKER_NUMERICALRESPONSE: 0

## 2023-01-26 ASSESSMENT — PAIN DESCRIPTION - DESCRIPTORS
DESCRIPTORS: ACHING
DESCRIPTORS: ACHING

## 2023-01-26 ASSESSMENT — PAIN DESCRIPTION - LOCATION
LOCATION: HEAD
LOCATION: HEAD

## 2023-01-26 NOTE — PLAN OF CARE
Problem: Discharge Planning  Goal: Discharge to home or other facility with appropriate resources  1/26/2023 1735 by Devon Osorio RN  Outcome: Progressing  1/26/2023 1729 by Devon Osorio RN  Outcome: Progressing  Flowsheets (Taken 1/26/2023 0845)  Discharge to home or other facility with appropriate resources:   Identify barriers to discharge with patient and caregiver   Arrange for needed discharge resources and transportation as appropriate   Identify discharge learning needs (meds, wound care, etc)   Refer to discharge planning if patient needs post-hospital services based on physician order or complex needs related to functional status, cognitive ability or social support system  1/26/2023 0340 by Anne Alves RN  Outcome: Progressing     Problem: Safety - Adult  Goal: Free from fall injury  1/26/2023 1735 by Devon Osorio RN  Outcome: Progressing  1/26/2023 1729 by Devon Osorio RN  Outcome: Progressing  Flowsheets (Taken 1/26/2023 0845)  Free From Fall Injury:   Instruct family/caregiver on patient safety   Based on caregiver fall risk screen, instruct family/caregiver to ask for assistance with transferring infant if caregiver noted to have fall risk factors  1/26/2023 0340 by Anne Alves RN  Outcome: Progressing     Problem: ABCDS Injury Assessment  Goal: Absence of physical injury  1/26/2023 1735 by Devon Osorio RN  Outcome: Progressing  1/26/2023 1729 by Devon Osorio RN  Outcome: Progressing  Flowsheets (Taken 1/26/2023 0845)  Absence of Physical Injury: Implement safety measures based on patient assessment  1/26/2023 0340 by Anne Alves RN  Outcome: Progressing     Problem: Pain  Goal: Verbalizes/displays adequate comfort level or baseline comfort level  1/26/2023 1735 by Devon Osorio RN  Outcome: Progressing  1/26/2023 1729 by Devon Osorio RN  Outcome: Progressing  1/26/2023 0340 by Anne Alves RN  Outcome: Progressing

## 2023-01-26 NOTE — PROGRESS NOTES
Physical Therapy    Physical Therapy Treatment Note/Plan of Care    Room #:  7374/7141-92  Patient Name: Barry Light  YOB: 1950  MRN: [id number]    Date of Service: 1/26/2023     Tentative placement recommendation: Home with no Physical Therapy needs  Equipment recommendation: None      Evaluating Physical Therapist: Kathrin Bourgeois, PT  #[id number]      Specific Provider Orders/Date/Referring Provider :  01/24/23 1145    PT eval and treat  Start:  01/24/23 1145,   End:  01/24/23 1145,   ONE TIME,   Standing Count:  1 Occurrences,   Fatmata Moe MD     Admitting Diagnosis:   Pulmonary embolism and infarction (HCC) [I26.99]  [facility] [J44.1]  Single subsegmental pulmonary embolism without acute cor pulmonale ([facility] [I26.93]     Admitted with    shortness of breath , abdominal pain and chest tightness, pulmonary embolism   Surgery:   Visit Diagnoses         Codes    Single subsegmental pulmonary embolism without acute cor pulmonale ([facility]    -  Primary I26.93    COPD exacerbation ([facility]     J44.1            Patient Active Problem List   Diagnosis    IDDM (insulin dependent diabetes mellitus)    Hyperlipidemia with target LDL less than 70    Lymphedema of both lower extremities    Vitamin D deficiency    Ductal carcinoma in situ (DCIS) of breast    Herpes simplex supression    Type 2 diabetes mellitus without complication, with long-term current use of insulin (HCC)    COPD (chronic obstructive pulmonary disease) (HCC)    Pulmonary hypertension (HCC)    Non-rheumatic aortic sclerosis    LBBB (left bundle branch block)    Diabetic polyneuropathy associated with type 1 diabetes mellitus (HCC)    Decreased dorsalis pedis pulse    Diabetes mellitus type 2, uncontrolled    Mixed hyperlipidemia    Morbid obesity with BMI of 40.0-44.9, adult ([facility]    History of CVA (cerebrovascular accident)    Chronic combined systolic and diastolic congestive heart failure ([facility]    Supraventricular tachycardia (HCC)    Moderate persistent asthma without complication    PVD (peripheral vascular disease) with claudication (HCC)    CAD in native artery    Coronary artery disease (CAD) excluded    Hydronephrosis concurrent with and due to calculi of kidney and ureter    Pulmonary embolism and infarction ([address]    Single subsegmental pulmonary embolism without acute cor pulmonale ([facility]    COVID-19    Type 2 diabetes mellitus with hyperglycemia, with long-term current use of insulin (HCC)        ASSESSMENT of Current Deficits Patient exhibits decreased strength, balance, and endurance impairing functional mobility, transfers, gait , gait distance, and tolerance to activity are barriers to d/c and require skilled intervention to address concerns listed above to increase safety and independence at discharge. Decreased strength, balance and endurance  increases patient's risk for fall. Patient given cane to use to improve ambulation due to patient reaching out for objects the session before. Patient reports R knee pain throughout. Patient tolerates seated exercises. Shortness of breath improved today. PHYSICAL THERAPY  PLAN OF CARE       Physical therapy plan of care is established based on physician order,  patient diagnosis and clinical assessment    Current Treatment Recommendations:    -Standing Balance: Perform strengthening exercises in standing to promote motor control with or without upper extremity support   -Transfers: Provide instruction on proper hand and foot position for adequate transfer of weight onto lower extremities and use of gait device if needed and Cues for hand placement, technique and safety.  Provide stabilization to prevent fall   -Gait: Gait training and Standing activities to improve: base of support, weight shift, weight bearing    -Endurance: Utilize Supervised activities to increase level of endurance to allow for safe functional mobility including transfers and gait     PT long term treatment goals are located in below grid    Patient and or family understand(s) diagnosis, prognosis, and plan of care. Frequency of treatments: Patient will be seen  daily. Prior Level of Function: Patient ambulated independently    Rehab Potential: good   for baseline    Past medical history:   Past Medical History:   Diagnosis Date    Arthritis     Asthma     BRCA1 negative     BRCA2 negative     Breast cancer ([facility]     CAD in native artery 12/15/2021    12-15-21 cate 2.5x38 hima rca. 12-15-21 cate 3.0x12 hima prox rca.      Cancer [facility])     breast     Cerebral artery occlusion with cerebral infarction [facility]) 2010    Speech difficulties results; claims 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    Cerebrovascular disease 06/2009    no residual    CHF (congestive heart failure) (HCC) approx 3 years ago    Claustrophobia     COPD (chronic obstructive pulmonary disease) ([address]     Decreased dorsalis pedis pulse 09/05/2019    Dermatophytosis 09/05/2019    Headache(784.0)     History of blood transfusion     with knee surgery    Hives     Hx of blood clots     PE's and DVT's 'during childbearing years'    Hyperlipidemia     Hypertension     LBP radiating to both legs     Lymphedema of both lower extremities 11/12/2015    Neuromuscular disorder ([address]     Non-rheumatic aortic sclerosis 10/2018    10/2018    Obesity     Pulmonary hypertension ([address] 10/2018    PVD (peripheral vascular disease) with claudication ([facility] 09/23/2021    Recurrent genital HSV (herpes simplex virus) infection     last outbreak 11/2017    S/P breast biopsy, right 07/2016    pt states breast cancer    Type II or unspecified type diabetes mellitus without mention of complication, not stated as uncontrolled     AA1c8.7 9/10    UTI (urinary tract infection) 05/13/2019     Past Surgical History:   Procedure Laterality Date    ARTHROPLASTY Left 07/29/2016    thumb basil joint with dequervain's release    BREAST BIOPSY      BREAST LUMPECTOMY  years ago benign    BREAST LUMPECTOMY Right 09/06/2016    COLONOSCOPY  2005    COLONOSCOPY  01/08/2015    Dr. Javan Row - [facility]  12/2021    ECHO COMPL W DOP COLOR FLOW  06/07/2012         EYE SURGERY      FINGER SURGERY Left 07/29/2016    thumb    HAND SURGERY  12/2017    HYSTERECTOMY (CERVIX STATUS UNKNOWN)      JOINT REPLACEMENT      OTHER SURGICAL HISTORY Right 12/20/2017    RIGHT THUMB TRAPEZIECTOMY WITH LIGAMENT RECONSRUCTION DEQUERVAINS RELEASE    TIM AND BSO (CERVIX REMOVED)      TOTAL HIP ARTHROPLASTY Bilateral     2000, 2013 dr Lavonne Urbina, dr Luisana Mojica Bilateral 2013    dr Dewey Eth:    Precautions: Up as tolerated, incentive spirometer, and pep flutter valve, falls and alarm ,      Social history: Patient lives alone in a apartment     with Elevator  to enter       Equipment owned: [facility], [address], [address], and Shower chair,       [id number] [facility]  Mobility Inpatient   How much difficulty turning over in bed?: None  How much difficulty sitting down on / standing up from a chair with arms?: None  How much difficulty moving from lying on back to sitting on side of bed?: None  How much help from another person moving to and from a bed to a chair?: A Little  How much help from another person needed to walk in hospital room?: A Little  How much help from another person for climbing 3-5 steps with a railing?: A Lot  AM-PAC Inpatient Mobility Raw Score : 20  AM-PAC Inpatient T-Scale Score : 47.67  Mobility Inpatient CMS 0-100% Score: 35.83  Mobility Inpatient CMS G-Code Modifier : [address] cleared patient for PT treatment. Patient sitting in bedside chair at start of session. OBJECTIVE;   Initial Evaluation  Date: 1/24/2023 Treatment Date:    1/26/2023   Short Term/ Long Term   Goals   Was pt agreeable to Eval/treatment?  Yes yes To be met in 3 days   Pain level   0/10    R knee pain, no number assigned    Bed Mobility Rolling: Independent    Supine to sit: Independent    Sit to supine: Independent    Scooting: Independent    Rolling: Not assessed patient in chair       Transfers Sit to stand: Supervision  from bed and commode Sit to stand: Independent    Sit to stand: Independent     Ambulation     75 feet using  no device with Supervision    for safety and cues for safety and breath management 75 feet using  single point cane with Supervision    cues for safety and pacing     100 feet using  no device with Independent    ROM Within functional limits    Increase range of motion 10% of affected joints    Strength BUE:   4-/5  RLE:  4-/5  LLE:  4-/5  Increase strength in affected mm groups by 1/3 grade   Balance Sitting EOB:  good    Dynamic Standing:  fair   Sitting EOB: not assessed   Dynamic Standing: fair + with cane   Sitting EOB:  good    Dynamic Standing: good       Patient is Alert & Oriented x person, place, time, and situation and follows directions    Sensation:  Patient  denies numbness/tingling   Edema:  no   Endurance: fair      Vitals: room air   Blood Pressure at rest  Blood Pressure during session    Heart Rate at rest  Heart Rate during session    SPO2 at rest %  SPO2 during session 98%     Patient education  Patient educated on role of Physical Therapy, risks of immobility, safety and plan of care, energy conservation,  importance of mobility while in hospital , ankle pumps, quad set and glut set for edema control, blood clot prevention, safety , and seated and standing exercises      Patient response to education:   Pt verbalized understanding Pt demonstrated skill Pt requires further education in this area   Yes Partial Yes      Treatment:  Patient practiced and was instructed/facilitated in the following treatment: Patient stood to amb in room with cane, standing rest breaks as needed. Patient returned to bedside chair and performed seated exercises.              Therapeutic Exercises:  ankle pumps, long arc quad, seated marching, and manually resisted hamstring curls, manually resisted hip abduction   x 10 reps    At end of session, patient in chair with  call light and phone within reach,  all lines and tubes intact, nursing notified. Patient would benefit from continued skilled Physical Therapy to improve functional independence and quality of life.          Patient's/ family goals   home    Time in 301  Time out  335    Total Treatment Time  34 minutes    CPT codes:  Therapeutic activities (17623)   16 minutes  1 unit(s)  Therapeutic exercises (84891)   18 minutes  1 unit(s)    Nish Yarbrough, PTA    #[id number]

## 2023-01-26 NOTE — PROGRESS NOTES
[address] [place], [place] [address]   Progress Note    Admitting Date and Time: 1/22/2023 12:42 PM  Admit Dx: Pulmonary embolism and infarction ([facility] [address] [I26.99]  COPD exacerbation ([facility] [J44.1]  Single subsegmental pulmonary embolism without acute cor pulmonale (HCC) [I26.93]    Subjective:    Patient was tearful today. She complained of constipation and a sore throat. She was noted to have thrush in her mouth. She said that she was not rinsing after her aerosol treatments. She reports doing it initially but was not aware that she need to do it every time. ROS: denies fever, chills, n/v, HA unless stated above.      nystatin  5 mL Oral 4x Daily    bisacodyl  10 mg Rectal Once    pantoprazole  40 mg Oral QAM AC    insulin glargine  50 Units SubCUTAneous Nightly    apixaban  10 mg Oral BID    Followed by    Pieter Rollins ON 1/31/2023] apixaban  5 mg Oral BID    amLODIPine  10 mg Oral Daily    cloNIDine  0.2 mg Oral TID    gabapentin  400 mg Oral TID    losartan  100 mg Oral Daily    montelukast  10 mg Oral Nightly    oxybutynin  5 mg Oral TID    aspirin  81 mg Oral Daily    insulin lispro  0-8 Units SubCUTAneous TID WC    insulin lispro  0-4 Units SubCUTAneous Nightly    zinc sulfate  50 mg Oral Daily    vitamin B-6  50 mg Oral Daily    ascorbic acid  1,000 mg Oral Daily    Vitamin D  2,000 Units Oral Daily    hydroCHLOROthiazide  25 mg Oral Daily    sodium chloride flush  5-40 mL IntraVENous 2 times per day    senna  1 tablet Oral Daily    docusate sodium  100 mg Oral BID    ipratropium-albuterol  1 ampule Inhalation Q4H WA    budesonide  0.5 mg Nebulization BID    arformoterol tartrate  15 mcg Nebulization BID    pravastatin  80 mg Oral Nightly     benzocaine-menthol, 1 lozenge, Q2H PRN  polyethylene glycol, 17 g, Daily PRN  glucose, 4 tablet, PRN  dextrose bolus, 125 mL, PRN   Or  dextrose bolus, 250 mL, PRN  glucagon (rDNA), 1 mg, PRN  dextrose, , Continuous PRN  sodium chloride flush, 5-40 mL, PRN  sodium chloride, , PRN  ondansetron, 4 mg, Q8H PRN   Or  ondansetron, 4 mg, Q6H PRN  acetaminophen, 650 mg, Q6H PRN   Or  acetaminophen, 650 mg, Q6H PRN  hydrALAZINE, 10 mg, Q6H PRN       Objective:    /60   Pulse 64   Temp 98.4 °F (36.9 °C) (Axillary)   Resp 18   Ht 5' 5\" (1.651 m)   Wt 240 lb 11.9 oz (109.2 kg)   SpO2 100%   BMI 40.06 kg/m²   General Appearance: alert and oriented to person, place and time and in no acute distress  Skin: warm and dry  Head: normocephalic and atraumatic  Eyes: pupils equal, round, and reactive to light,  conjunctivae normal  Mouth: thrush  Neck: neck supple and non tender without mass   Pulmonary/Chest: diminished bilaterally, no respiratory distress  Cardiovascular: normal rate, normal S1 and S2   Abdomen: soft, non-tender, non-distended, normal bowel sounds, no masses or organomegaly  Extremities: no cyanosis, no clubbing and mild bilateral lower extremity edema  Neurologic: no cranial nerve deficit and speech normal      Recent Labs     01/24/23  0705 01/25/23  0654 01/26/23  0549    142 139   K 4.5 4.0 4.1   CL 98 104 103   CO2 27 28 28   BUN 13 14 17   CREATININE 0.7 0.8 0.9   GLUCOSE 249* 123* 168*   CALCIUM 9.6 9.6 8.9         Recent Labs     01/24/23  0705 01/25/23  0654   ALKPHOS 143* 122*   PROT 7.4 6.6   LABALBU 3.9 3.5   BILITOT 0.4 0.4   AST 13 13   ALT 5 7         Recent Labs     01/24/23  0705 01/25/23  0654 01/26/23  0549   WBC 9.0 10.5 8.9   RBC 4.47 4.34 4.26   HGB 11.3* 11.0* 10.5*   HCT 35.7 34.8 34.2   MCV 79.9* 80.2 80.3   MCH 25.3* 25.3* 24.6*   MCHC 31.7* 31.6* 30.7*   RDW 16.9* 17.3* 17.5*   * 431 399   MPV 11.4 11.1 11.0             Radiology:   CT ABDOMEN PELVIS W IV CONTRAST Additional Contrast? None   Final Result   Limited study due to poor contrast opacification. There is no large central   pulmonary embolism.       Small filling defect in the distal subsegmental and peripheral vessels of   left lower lobe concerning for subsegmental pulmonary embolism. Coronary artery calcification. Unremarkable CT of the abdomen and pelvis. RECOMMENDATIONS:   Unavailable         CTA CHEST W CONTRAST   Final Result   Limited study due to poor contrast opacification. There is no large central   pulmonary embolism. Small filling defect in the distal subsegmental and peripheral vessels of   left lower lobe concerning for subsegmental pulmonary embolism. Coronary artery calcification. Unremarkable CT of the abdomen and pelvis. RECOMMENDATIONS:   Unavailable         XR ABDOMEN (KUB) (SINGLE AP VIEW)    (Results Pending)       Assessment:  Principal Problem:    Pulmonary embolism and infarction [facility])  Active Problems:    Single subsegmental pulmonary embolism without acute cor pulmonale (HCC)    COVID-19    Type 2 diabetes mellitus with hyperglycemia, with long-term current use of insulin (HCC)    COPD (chronic obstructive pulmonary disease) ([facility])  Resolved Problems:    * No resolved hospital problems. *      Plan:  Pulmonary embolism   - CTA of the chest with small filling defect in the distal subsegmental and   peripheral vessels of the left lower lobe. - Continue oral Eliquis  - On PPI for GI protection.   - Patient reports history of blood clots during her pregnancies. COVID-19   - She is not requiring oxygen   - Incentive spirometer, PEP/flutter   - Vitamin supplementation  COPD exacerbation   - Likely secondary to COVID   - Continue aerosols   - Completed azithromycin   HFpEF  - Last Echo on 12/16/21 with an EF of 55 +/- 5%, Stage II diastolic dysfunction   - She does not appear fluid overloaded at this time. Hypertension   - Continue home Clonidine, Norvasc, losartan and HCTZ   - Blood pressures have improved. Diabetes   - Home Januvia is on hold   - Continue Lantus 50 units   - Continue Humalog sliding scale to moderate dose   - Blood sugars have improved.    - Hgb A1C is 8.4  CAD   - Continue pravastatin and aspirin  8. Thrush   - Nystatin swish and swallow   - Encouraged patient to rinse and spit after aerosols. 9.  Constipation   - Continue sennokot and colace   - No results from milk of mag   - KUB ordered   - Dulcolax suppository    NOTE: This report was transcribed using voice recognition software. Every effort was made to ensure accuracy; however, inadvertent computerized transcription errors may be present.      Electronically signed by DMITRY Cardenas CNP on 1/26/2023 at 2:29 PM

## 2023-01-26 NOTE — CARE COORDINATION
1/26/2023 1500 COVID Positive 1/22/2023. CM spoke with pt via phone for transition of care needs at d/c. Pt plan is to return to her apt as prior. Pt wants Arlene Mckeon when she's discharged. Referral made to [facility] orders are in [address]) and has been accepted. Pt is aware and agreeable to start of care being 1/30/2023. Pt's family or friend transport her home. CM will follow.  Electronically signed by Marquise Steve RN on 1/26/2023 at 3:10 PM

## 2023-01-26 NOTE — PLAN OF CARE
Problem: Discharge Planning  Goal: Discharge to home or other facility with appropriate resources  1/26/2023 1729 by Denver Diver, RN  Outcome: Progressing  Flowsheets (Taken 1/26/2023 0845)  Discharge to home or other facility with appropriate resources:   Identify barriers to discharge with patient and caregiver   Arrange for needed discharge resources and transportation as appropriate   Identify discharge learning needs (meds, wound care, etc)   Refer to discharge planning if patient needs post-hospital services based on physician order or complex needs related to functional status, cognitive ability or social support system  1/26/2023 0340 by Francy Moreno RN  Outcome: Progressing     Problem: Safety - Adult  Goal: Free from fall injury  1/26/2023 1729 by Denver Diver, RN  Outcome: Progressing  1/26/2023 0340 by Francy Moreno RN  Outcome: Progressing     Problem: ABCDS Injury Assessment  Goal: Absence of physical injury  1/26/2023 1729 by Denver Diver, RN  Outcome: Progressing  1/26/2023 0340 by Francy Moreno RN  Outcome: Progressing     Problem: Pain  Goal: Verbalizes/displays adequate comfort level or baseline comfort level  1/26/2023 1729 by Denver Diver, RN  Outcome: Progressing  1/26/2023 0340 by Francy Moreno RN  Outcome: Progressing

## 2023-01-27 VITALS
TEMPERATURE: 99.1 F | HEART RATE: 83 BPM | HEIGHT: 65 IN | RESPIRATION RATE: 16 BRPM | WEIGHT: 241.38 LBS | SYSTOLIC BLOOD PRESSURE: 114 MMHG | DIASTOLIC BLOOD PRESSURE: 67 MMHG | BODY MASS INDEX: 40.22 KG/M2 | OXYGEN SATURATION: 100 %

## 2023-01-27 LAB
ALBUMIN SERPL-MCNC: 3.7 G/DL (ref 3.5–5.2)
ALP BLD-CCNC: 144 U/L (ref 35–104)
ALT SERPL-CCNC: 9 U/L (ref 0–32)
ANION GAP SERPL CALCULATED.3IONS-SCNC: 9 MMOL/L (ref 7–16)
AST SERPL-CCNC: 11 U/L (ref 0–31)
BASOPHILS ABSOLUTE: 0.02 E9/L (ref 0–0.2)
BASOPHILS RELATIVE PERCENT: 0.2 % (ref 0–2)
BILIRUB SERPL-MCNC: 0.3 MG/DL (ref 0–1.2)
BUN BLDV-MCNC: 14 MG/DL (ref 6–23)
CALCIUM SERPL-MCNC: 9 MG/DL (ref 8.6–10.2)
CHLORIDE BLD-SCNC: 101 MMOL/L (ref 98–107)
CO2: 28 MMOL/L (ref 22–29)
CREAT SERPL-MCNC: 0.7 MG/DL (ref 0.5–1)
EOSINOPHILS ABSOLUTE: 0 E9/L (ref 0.05–0.5)
EOSINOPHILS RELATIVE PERCENT: 0 % (ref 0–6)
GFR SERPL CREATININE-BSD FRML MDRD: >60 ML/MIN/1.73
GLUCOSE BLD-MCNC: 218 MG/DL (ref 74–99)
HCT VFR BLD CALC: 34.7 % (ref 34–48)
HEMOGLOBIN: 10.9 G/DL (ref 11.5–15.5)
IMMATURE GRANULOCYTES #: 0.06 E9/L
IMMATURE GRANULOCYTES %: 0.5 % (ref 0–5)
INFLUENZA A: NOT DETECTED
INFLUENZA B: NOT DETECTED
LYMPHOCYTES ABSOLUTE: 1.86 E9/L (ref 1.5–4)
LYMPHOCYTES RELATIVE PERCENT: 16.1 % (ref 20–42)
MAGNESIUM: 1.9 MG/DL (ref 1.6–2.6)
MCH RBC QN AUTO: 25.2 PG (ref 26–35)
MCHC RBC AUTO-ENTMCNC: 31.4 % (ref 32–34.5)
MCV RBC AUTO: 80.1 FL (ref 80–99.9)
METER GLUCOSE: 140 MG/DL (ref 74–99)
METER GLUCOSE: 197 MG/DL (ref 74–99)
METER GLUCOSE: 285 MG/DL (ref 74–99)
MONOCYTES ABSOLUTE: 0.87 E9/L (ref 0.1–0.95)
MONOCYTES RELATIVE PERCENT: 7.5 % (ref 2–12)
NEUTROPHILS ABSOLUTE: 8.76 E9/L (ref 1.8–7.3)
NEUTROPHILS RELATIVE PERCENT: 75.7 % (ref 43–80)
PDW BLD-RTO: 17.4 FL (ref 11.5–15)
PHOSPHORUS: 3 MG/DL (ref 2.5–4.5)
PLATELET # BLD: 403 E9/L (ref 130–450)
PMV BLD AUTO: 10.9 FL (ref 7–12)
POTASSIUM SERPL-SCNC: 4.5 MMOL/L (ref 3.5–5)
RBC # BLD: 4.33 E12/L (ref 3.5–5.5)
SARS-COV-2 RNA, RT PCR: DETECTED
SODIUM BLD-SCNC: 138 MMOL/L (ref 132–146)
TOTAL PROTEIN: 6.6 G/DL (ref 6.4–8.3)
WBC # BLD: 11.6 E9/L (ref 4.5–11.5)

## 2023-01-27 PROCEDURE — 6360000002 HC RX W HCPCS: Performed by: NURSE PRACTITIONER

## 2023-01-27 PROCEDURE — 36415 COLL VENOUS BLD VENIPUNCTURE: CPT

## 2023-01-27 PROCEDURE — 99239 HOSP IP/OBS DSCHRG MGMT >30: CPT | Performed by: INTERNAL MEDICINE

## 2023-01-27 PROCEDURE — 82962 GLUCOSE BLOOD TEST: CPT

## 2023-01-27 PROCEDURE — 6370000000 HC RX 637 (ALT 250 FOR IP): Performed by: NURSE PRACTITIONER

## 2023-01-27 PROCEDURE — APPSS45 APP SPLIT SHARED TIME 31-45 MINUTES: Performed by: NURSE PRACTITIONER

## 2023-01-27 PROCEDURE — 83735 ASSAY OF MAGNESIUM: CPT

## 2023-01-27 PROCEDURE — 84100 ASSAY OF PHOSPHORUS: CPT

## 2023-01-27 PROCEDURE — 87636 SARSCOV2 & INF A&B AMP PRB: CPT

## 2023-01-27 PROCEDURE — 2580000003 HC RX 258: Performed by: NURSE PRACTITIONER

## 2023-01-27 PROCEDURE — 80053 COMPREHEN METABOLIC PANEL: CPT

## 2023-01-27 PROCEDURE — 94640 AIRWAY INHALATION TREATMENT: CPT

## 2023-01-27 PROCEDURE — 94669 MECHANICAL CHEST WALL OSCILL: CPT

## 2023-01-27 PROCEDURE — 85025 COMPLETE CBC W/AUTO DIFF WBC: CPT

## 2023-01-27 RX ORDER — ASPIRIN 81 MG/1
81 TABLET ORAL DAILY
Qty: 30 TABLET | Refills: 0 | Status: SHIPPED | OUTPATIENT
Start: 2023-01-28

## 2023-01-27 RX ORDER — LIDOCAINE HYDROCHLORIDE 20 MG/ML
15 SOLUTION OROPHARYNGEAL PRN
Qty: 100 ML | Refills: 0 | Status: SHIPPED | OUTPATIENT
Start: 2023-01-27

## 2023-01-27 RX ORDER — AMLODIPINE BESYLATE 10 MG/1
10 TABLET ORAL DAILY
Qty: 30 TABLET | Refills: 0 | Status: SHIPPED | OUTPATIENT
Start: 2023-01-27

## 2023-01-27 RX ORDER — ZINC SULFATE 50(220)MG
50 CAPSULE ORAL DAILY
Qty: 30 CAPSULE | Refills: 0 | COMMUNITY
Start: 2023-01-28

## 2023-01-27 RX ORDER — HYDROCHLOROTHIAZIDE 25 MG/1
25 TABLET ORAL DAILY
Qty: 30 TABLET | Refills: 0 | Status: SHIPPED | OUTPATIENT
Start: 2023-01-28

## 2023-01-27 RX ORDER — ALBUTEROL SULFATE 90 UG/1
2 INHALANT RESPIRATORY (INHALATION) PRN
Qty: 1 EACH | Refills: 0 | Status: SHIPPED | OUTPATIENT
Start: 2023-01-27

## 2023-01-27 RX ORDER — PYRIDOXINE HCL (VITAMIN B6) 50 MG
50 TABLET ORAL DAILY
Qty: 30 TABLET | Refills: 0 | Status: SHIPPED | OUTPATIENT
Start: 2023-01-28

## 2023-01-27 RX ORDER — LOSARTAN POTASSIUM 100 MG/1
100 TABLET ORAL DAILY
Qty: 30 TABLET | Refills: 0 | Status: SHIPPED | OUTPATIENT
Start: 2023-01-28

## 2023-01-27 RX ORDER — FLUTICASONE FUROATE AND VILANTEROL 200; 25 UG/1; UG/1
1 POWDER RESPIRATORY (INHALATION) DAILY
Qty: 1 EACH | Refills: 0 | Status: SHIPPED | OUTPATIENT
Start: 2023-01-27

## 2023-01-27 RX ORDER — PANTOPRAZOLE SODIUM 40 MG/1
40 TABLET, DELAYED RELEASE ORAL
Qty: 30 TABLET | Refills: 0 | Status: SHIPPED | OUTPATIENT
Start: 2023-01-28

## 2023-01-27 RX ORDER — SENNA PLUS 8.6 MG/1
1 TABLET ORAL DAILY
Qty: 30 TABLET | Refills: 0 | Status: SHIPPED | OUTPATIENT
Start: 2023-01-28 | End: 2023-02-27

## 2023-01-27 RX ORDER — PSEUDOEPHEDRINE HCL 30 MG
100 TABLET ORAL 2 TIMES DAILY
Qty: 60 CAPSULE | Refills: 0 | Status: SHIPPED | OUTPATIENT
Start: 2023-01-27

## 2023-01-27 RX ADMIN — Medication 50 MG: at 08:38

## 2023-01-27 RX ADMIN — BUDESONIDE 500 MCG: 0.5 SUSPENSION RESPIRATORY (INHALATION) at 06:32

## 2023-01-27 RX ADMIN — PYRIDOXINE HCL TAB 50 MG 50 MG: 50 TAB at 08:36

## 2023-01-27 RX ADMIN — GABAPENTIN 400 MG: 400 CAPSULE ORAL at 14:06

## 2023-01-27 RX ADMIN — OXYBUTYNIN CHLORIDE 5 MG: 5 TABLET ORAL at 08:35

## 2023-01-27 RX ADMIN — PANTOPRAZOLE SODIUM 40 MG: 40 TABLET, DELAYED RELEASE ORAL at 05:57

## 2023-01-27 RX ADMIN — Medication 1 LOZENGE: at 08:37

## 2023-01-27 RX ADMIN — GABAPENTIN 400 MG: 400 CAPSULE ORAL at 08:35

## 2023-01-27 RX ADMIN — OXYBUTYNIN CHLORIDE 5 MG: 5 TABLET ORAL at 14:06

## 2023-01-27 RX ADMIN — SENNOSIDES 8.6 MG: 8.6 TABLET, COATED ORAL at 08:38

## 2023-01-27 RX ADMIN — AMLODIPINE BESYLATE 10 MG: 10 TABLET ORAL at 08:37

## 2023-01-27 RX ADMIN — NYSTATIN 500000 UNITS: 100000 SUSPENSION ORAL at 12:29

## 2023-01-27 RX ADMIN — Medication 10 ML: at 08:41

## 2023-01-27 RX ADMIN — CLONIDINE HYDROCHLORIDE 0.2 MG: 0.2 TABLET ORAL at 08:37

## 2023-01-27 RX ADMIN — ASPIRIN 81 MG: 81 TABLET, COATED ORAL at 08:36

## 2023-01-27 RX ADMIN — Medication 2000 UNITS: at 08:35

## 2023-01-27 RX ADMIN — IPRATROPIUM BROMIDE AND ALBUTEROL SULFATE 1 AMPULE: .5; 2.5 SOLUTION RESPIRATORY (INHALATION) at 06:32

## 2023-01-27 RX ADMIN — CLONIDINE HYDROCHLORIDE 0.2 MG: 0.2 TABLET ORAL at 14:06

## 2023-01-27 RX ADMIN — IPRATROPIUM BROMIDE AND ALBUTEROL SULFATE 1 AMPULE: .5; 2.5 SOLUTION RESPIRATORY (INHALATION) at 13:38

## 2023-01-27 RX ADMIN — APIXABAN 10 MG: 5 TABLET, FILM COATED ORAL at 08:36

## 2023-01-27 RX ADMIN — LOSARTAN POTASSIUM 100 MG: 100 TABLET, FILM COATED ORAL at 08:38

## 2023-01-27 RX ADMIN — DOCUSATE SODIUM 100 MG: 100 CAPSULE, LIQUID FILLED ORAL at 08:35

## 2023-01-27 RX ADMIN — NYSTATIN 500000 UNITS: 100000 SUSPENSION ORAL at 08:38

## 2023-01-27 RX ADMIN — ARFORMOTEROL TARTRATE 15 MCG: 15 SOLUTION RESPIRATORY (INHALATION) at 06:32

## 2023-01-27 RX ADMIN — OXYCODONE HYDROCHLORIDE AND ACETAMINOPHEN 1000 MG: 500 TABLET ORAL at 08:41

## 2023-01-27 RX ADMIN — HYDROCHLOROTHIAZIDE 25 MG: 25 TABLET ORAL at 08:38

## 2023-01-27 RX ADMIN — INSULIN LISPRO 4 UNITS: 100 INJECTION, SOLUTION INTRAVENOUS; SUBCUTANEOUS at 11:33

## 2023-01-27 ASSESSMENT — PAIN SCALES - WONG BAKER: WONGBAKER_NUMERICALRESPONSE: 0

## 2023-01-27 ASSESSMENT — PAIN DESCRIPTION - LOCATION: LOCATION: THROAT

## 2023-01-27 ASSESSMENT — PAIN DESCRIPTION - DESCRIPTORS: DESCRIPTORS: SORE

## 2023-01-27 ASSESSMENT — PAIN SCALES - GENERAL: PAINLEVEL_OUTOF10: 5

## 2023-01-27 NOTE — PROGRESS NOTES
[address] [place], [place] [address]ist   Progress Note    Admitting Date and Time: 1/22/2023 12:42 PM  Admit Dx: Pulmonary embolism and infarction ([facility] [address] [I26.99]  COPD exacerbation ([facility] [address] [J44.1]  Single subsegmental pulmonary embolism without acute cor pulmonale (HCC) [I26.93]    Subjective:    Patient reports feeling better today. She continues to have soreness of her throat. She had a small bowel movement after receiving a suppository. She said that she is afraid to go home and have to come right back. ROS: denies fever, chills, n/v, HA unless stated above.      nystatin  5 mL Oral 4x Daily    pantoprazole  40 mg Oral QAM AC    insulin glargine  50 Units SubCUTAneous Nightly    apixaban  10 mg Oral BID    Followed by    Kyara Mask ON 1/31/2023] apixaban  5 mg Oral BID    amLODIPine  10 mg Oral Daily    cloNIDine  0.2 mg Oral TID    gabapentin  400 mg Oral TID    losartan  100 mg Oral Daily    montelukast  10 mg Oral Nightly    oxybutynin  5 mg Oral TID    aspirin  81 mg Oral Daily    insulin lispro  0-8 Units SubCUTAneous TID WC    insulin lispro  0-4 Units SubCUTAneous Nightly    zinc sulfate  50 mg Oral Daily    vitamin B-6  50 mg Oral Daily    ascorbic acid  1,000 mg Oral Daily    Vitamin D  2,000 Units Oral Daily    hydroCHLOROthiazide  25 mg Oral Daily    sodium chloride flush  5-40 mL IntraVENous 2 times per day    senna  1 tablet Oral Daily    docusate sodium  100 mg Oral BID    ipratropium-albuterol  1 ampule Inhalation Q4H WA    budesonide  0.5 mg Nebulization BID    arformoterol tartrate  15 mcg Nebulization BID    pravastatin  80 mg Oral Nightly     magic (miracle) mouthwash, 5 mL, 4x Daily PRN  Benzocaine-Menthol, 1 lozenge, Q2H PRN  polyethylene glycol, 17 g, Daily PRN  glucose, 4 tablet, PRN  dextrose bolus, 125 mL, PRN   Or  dextrose bolus, 250 mL, PRN  glucagon (rDNA), 1 mg, PRN  dextrose, , Continuous PRN  sodium chloride flush, 5-40 mL, PRN  sodium chloride, , PRN  ondansetron, 4 mg, Q8H PRN Or  ondansetron, 4 mg, Q6H PRN  acetaminophen, 650 mg, Q6H PRN   Or  acetaminophen, 650 mg, Q6H PRN  hydrALAZINE, 10 mg, Q6H PRN       Objective:    BP (!) 145/66   Pulse 77   Temp 98.4 °F (36.9 °C) (Oral)   Resp 18   Ht 5' 5\" (1.651 m)   Wt 241 lb 6 oz (109.5 kg)   SpO2 100%   BMI 40.17 kg/m²   General Appearance: alert and oriented to person, place and time and in no acute distress  Skin: warm and dry  Head: normocephalic and atraumatic  Eyes: pupils equal, round, and reactive to light,  conjunctivae normal  Mouth: thrush, erythema of the thoat  Neck: neck supple and non tender without mass   Pulmonary/Chest: diminished bilaterally, no respiratory distress  Cardiovascular: normal rate, normal S1 and S2   Abdomen: soft, non-tender, non-distended, normal bowel sounds, no masses or organomegaly  Extremities: no cyanosis, no clubbing and mild bilateral lower extremity edema  Neurologic: no cranial nerve deficit and speech normal      Recent Labs     01/25/23  0654 01/26/23  0549    139   K 4.0 4.1    103   CO2 28 28   BUN 14 17   CREATININE 0.8 0.9   GLUCOSE 123* 168*   CALCIUM 9.6 8.9         Recent Labs     01/25/23  0654   ALKPHOS 122*   PROT 6.6   LABALBU 3.5   BILITOT 0.4   AST 13   ALT 7         Recent Labs     01/25/23  0654 01/26/23  0549   WBC 10.5 8.9   RBC 4.34 4.26   HGB 11.0* 10.5*   HCT 34.8 34.2   MCV 80.2 80.3   MCH 25.3* 24.6*   MCHC 31.6* 30.7*   RDW 17.3* 17.5*    399   MPV 11.1 11.0             Radiology:   XR ABDOMEN (KUB) (SINGLE AP VIEW)   Final Result   Diffuse colonic fecal retention with significant stool burden. CT ABDOMEN PELVIS W IV CONTRAST Additional Contrast? None   Final Result   Limited study due to poor contrast opacification. There is no large central   pulmonary embolism. Small filling defect in the distal subsegmental and peripheral vessels of   left lower lobe concerning for subsegmental pulmonary embolism.       Coronary artery calcification. Unremarkable CT of the abdomen and pelvis. RECOMMENDATIONS:   Unavailable         CTA CHEST W CONTRAST   Final Result   Limited study due to poor contrast opacification. There is no large central   pulmonary embolism. Small filling defect in the distal subsegmental and peripheral vessels of   left lower lobe concerning for subsegmental pulmonary embolism. Coronary artery calcification. Unremarkable CT of the abdomen and pelvis. RECOMMENDATIONS:   Unavailable             Assessment:  Principal Problem:    Pulmonary embolism and infarction [facility])  Active Problems:    Single subsegmental pulmonary embolism without acute cor pulmonale (HCC)    COVID-19    Type 2 diabetes mellitus with hyperglycemia, with long-term current use of insulin (HCC)    COPD (chronic obstructive pulmonary disease) ([facility])  Resolved Problems:    * No resolved hospital problems. *      Plan:  Pulmonary embolism   - CTA of the chest with small filling defect in the distal subsegmental and   peripheral vessels of the left lower lobe. - Continue oral Eliquis  - On PPI for GI protection.   - Patient reports history of blood clots during her pregnancies. COVID-19   - She is not requiring oxygen   - Incentive spirometer, PEP/flutter   - Vitamin supplementation  COPD exacerbation   - Likely secondary to COVID   - Continue aerosols   - Completed azithromycin   HFpEF  - Last Echo on 12/16/21 with an EF of 55 +/- 5%, Stage II diastolic dysfunction   - She does not appear fluid overloaded at this time. Hypertension   - Continue home Clonidine, Norvasc, losartan and HCTZ   - Blood pressures have improved. Diabetes   - Home Januvia is on hold   - Continue Lantus 50 units   - Continue Humalog sliding scale to moderate dose   - Blood sugars have improved. - Hgb A1C is 8.4  CAD   - Continue pravastatin and aspirin  8.   Thrush   - Nystatin swish and swallow   - Encouraged patient to rinse and spit after aerosols. - Magic mouthwash for the throat and mouth pain. 9.  Constipation   - Continue sennokot and colace   - No results from milk of mag   - KUB showed fecal retention, but no obstruction   - Dulcolax suppository given with small results. NOTE: This report was transcribed using voice recognition software. Every effort was made to ensure accuracy; however, inadvertent computerized transcription errors may be present.      Electronically signed by DMITRY Stewart CNP on 1/27/2023 at 9:01 AM

## 2023-01-27 NOTE — DISCHARGE INSTRUCTIONS
apixaban  Pronunciation:  a PIX a ban  Brand:  Eliquis  What is the most important information I should know about apixaban? Apixaban increases your risk of severe or fatal bleeding, especially if you take certain medicines at the same time (including some over-the-counter medicines). Tell your doctor about all medicines you have recently used. Call your doctor at once if you have signs of bleeding such as: easy bruising, unusual bleeding, unexpected pain or swelling, feeling very weak or dizzy, bleeding gums, nosebleeds, heavy menstrual bleeding, blood in your urine or stools, coughing up blood or vomit that looks like coffee grounds, or any bleeding that will not stop. Apixaban can cause a very serious blood clot around your spinal cord that can lead to long-term or permanent paralysis. This type of blood clot can occur during a spinal tap or spinal anesthesia (epidural), especially if you have a genetic spinal defect, if you use a spinal catheter, if you've had spinal surgery or repeated spinal taps, or if you use other drugs that can affect blood clotting. Get emergency medical help if you have symptoms of a spinal cord blood clot such as tingling, numbness, or muscle weakness especially in your legs and feet. Do not stop taking apixaban unless your doctor tells you to. Stopping suddenly can increase your risk of blood clot or stroke. What is apixaban? Apixaban is used to lower the risk of stroke caused by a blood clot in people with a heart rhythm disorder called atrial fibrillation. Apixaban is also used after hip or knee replacement surgery to prevent a type of blood clot called deep vein thrombosis (DVT), which can lead to blood clots in the lungs (pulmonary embolism). Apixaban is also used to treat DVT or pulmonary embolism (PE), and to lower your risk of having a repeat DVT or PE. Apixaban may also be used for purposes not listed in this medication guide.   What should I discuss with my healthcare provider before taking apixaban? You should not take apixaban if you are allergic to it, or if you have active bleeding from a surgery, injury, or other cause. Apixaban may cause you to bleed more easily, especially if you have a bleeding disorder that is inherited or caused by disease. Tell your doctor if you have an artificial heart valve, or if you have ever had:  bleeding problems;  antiphospholipid syndrome, especially if you have a triple positive antibody test; or  liver or kidney disease. Apixaban can cause a very serious blood clot around your spinal cord if you undergo a spinal tap or receive spinal anesthesia (epidural). This type of blood clot could cause long-term paralysis, and may be more likely to occur if:    you have a spinal catheter in place or if a catheter has been recently removed;  you have a history of spinal surgery or repeated spinal taps;  you have recently had a spinal tap or epidural anesthesia;  you take aspirin or other NSAIDs (nonsteroidal anti-inflammatory drugs)--ibuprofen (Advil, Motrin), naproxen (Aleve), diclofenac, indomethacin, meloxicam, and others; or  you are using other medicines to treat or prevent blood clots. Taking apixaban may increase the risk of bleeding while you are pregnant or during your delivery. Tell your doctor if you are pregnant or plan to become pregnant. Do not breastfeed. How should I take apixaban? Follow all directions on your prescription label and read all medication guides or instruction sheets. Your doctor may occasionally change your dose. Use the medicine exactly as directed. You may take apixaban with or without food. If you cannot swallow a tablet whole, crush it and mix with water, apple juice, or applesauce. Swallow the mixture right away without chewing. A crushed tablet mixture may also be given through a nasogastric (NG) feeding tube.  Read and carefully follow any Instructions for Use provided with your medicine. Apixaban can make it easier for you to bleed, even from a minor injury. Seek medical attention if you have bleeding that will not stop. Tell your doctor if you have a planned surgery or dental work. You may need to stop taking apixaban for a short time. Do not stop taking apixaban unless your doctor tells you to. If you stop taking apixaban for any reason, your doctor may prescribe another medicine to prevent blood clots. Store at room temperature away from moisture and heat. What happens if I miss a dose? Take the missed dose on the same day you remember it. Take your next dose at the regular time and stay on your twice-daily schedule. Do not take two doses at one time. Get your prescription refilled before you run out of medicine completely. What happens if I overdose? Seek emergency medical attention or call the Poison Help line at 1-129.270.3141. What should I avoid while taking apixaban? Avoid activities that may increase your risk of bleeding or injury. Use extra care while shaving or brushing your teeth. What are the possible side effects of apixaban? Get emergency medical help if you have signs of an allergic reaction: hives; chest pain, wheezing, difficult breathing; feeling light-headed; swelling of your face, lips, tongue, or throat. Also seek emergency medical attention if you have symptoms of a spinal blood clot such as tingling, numbness, or muscle weakness especially in your legs and feet. Call your doctor at once if you have:  easy bruising, unusual bleeding (nose, mouth, vagina, or rectum), bleeding from wounds or needle injections, any bleeding that will not stop;  heavy menstrual bleeding;  headache, dizziness, weakness, feeling like you might pass out;  urine that looks red, pink, or brown; or  black or bloody stools, coughing up blood or vomit that looks like coffee grounds. This is not a complete list of side effects and others may occur.  Call your doctor for medical advice about side effects. You may report side effects to FDA at 1-115-FDA-6720. What other drugs will affect apixaban? Sometimes it is not safe to use certain medications at the same time. Some drugs can affect your blood levels of other drugs you take, which may increase side effects or make the medications less effective. Many other drugs (including some over-the-counter medicines) can increase your risk of bleeding or blood clots. Tell your doctor about all medicines you have recently used, especially:  any other medicines to treat or prevent blood clots;  a blood thinner such as heparin or warfarin (Coumadin, Jantoven);  an antidepressant; or  aspirin or other NSAID (nonsteroidal anti-inflammatory drug) used long term. This list is not complete and many other drugs may affect apixaban. This includes prescription and over-the-counter medicines, vitamins, and herbal products. Not all possible drug interactions are listed here. Where can I get more information? Your pharmacist can provide more information about apixaban. Remember, keep this and all other medicines out of the reach of children, never share your medicines with others, and use this medication only for the indication prescribed. Every effort has been made to ensure that the information provided by [address] is accurate, up-to-date, and complete, but no guarantee is made to that effect. Drug information contained herein may be time sensitive. [facility] information has been compiled for use by healthcare practitioners and consumers in the [place] and therefore [facility] does not warrant that uses outside of the [place] are appropriate, unless specifically indicated otherwise. [facility]'s drug information does not endorse drugs, diagnose patients or recommend therapy.  [facility]'s drug information is an informational resource designed to assist licensed healthcare practitioners in caring for their patients and/or to serve consumers viewing this service as a supplement to, and not a substitute for, the expertise, skill, knowledge and judgment of healthcare practitioners. The absence of a warning for a given drug or drug combination in no way should be construed to indicate that the drug or drug combination is safe, effective or appropriate for any given patient. [facility] does not assume any responsibility for any aspect of healthcare administered with the aid of information [facility] provides. The information contained herein is not intended to cover all possible uses, directions, precautions, warnings, drug interactions, allergic reactions, or adverse effects. If you have questions about the drugs you are taking, check with your doctor, nurse or pharmacist.  Copyright 7881-7034 [address] [place] [address] Dr PERES. Version: 6.01. Revision date: 8/24/2021. Care instructions adapted under license by [facility] ([facility]). If you have questions about a medical condition or this instruction, always ask your healthcare professional. [address] any warranty or liability for your use of this information.

## 2023-01-27 NOTE — DISCHARGE SUMMARY
[facility] Physician Discharge Summary       Christa Martinez, APRN - CNP  [place] [address] [address] [phone]    Follow up        Activity level: Activity as tolerated    Diet: ADULT DIET; Regular; 5 carb choices (75 gm/meal); No Added Salt (3-4 gm)    Labs: CBC and BMP at next PCP visit    Condition at discharge: Stable    Dispo: Discharge to home        Patient ID:  Donal Preston  [id number]  7 y.o.  1950    Admit date: 1/22/2023    Discharge date and time:  1/27/2023  3:02 PM    Admission Diagnoses: Principal Problem:    Pulmonary embolism and infarction [facility])  Active Problems:    Single subsegmental pulmonary embolism without acute cor pulmonale ([address]    COVID-19    Type 2 diabetes mellitus with hyperglycemia, with long-term current use of insulin (HCC)    COPD (chronic obstructive pulmonary disease) (HCC)  Resolved Problems:    * No resolved hospital problems. *      Discharge Diagnoses: Principal Problem:    Pulmonary embolism and infarction [facility])  Active Problems:    Single subsegmental pulmonary embolism without acute cor pulmonale (HCC)    COVID-19    Type 2 diabetes mellitus with hyperglycemia, with long-term current use of insulin (HCC)    COPD (chronic obstructive pulmonary disease) (HCC)  Resolved Problems:    * No resolved hospital problems. *      Consults:  IP CONSULT TO SOCIAL WORK    Procedures:  None    Hospital Course: Maribell Viera is a 67year old female with a history of asthma, COPD, CAD, CVA, HTN, DM, HFpEF and pulmonary hypertension that presented to the Emergency Department with shortness of breath and abdominal pain. A CTA of the chest was done that showed a small filling defect in the distal subsegmental and peripheral vessels of left lower lobe concerning for subsegmental pulmonary embolism. CT of the abdomen and pelvis was unremarkable. She was also found to be COVID positive. She was started on therapeutic Lovenox.    She did not require oxygen. She was started received Solu-Medrol, mag sulfate and Duonebs in ED and was started on Solu-Medrol 40 mg IV every 12 hours. She did not know her home medications and her home medications were not ordered until the next day. She was on antihypertensives as well as Lantus. Most of her home medications were able to be found through what she had filled recently and the rest were provided by her PCP's office. Her blood pressure and blood sugars were elevated until home her medications were resumed. Steroids were stopped due to persistent hyperglycemia. She remained on room air. Azithromycin was started on admission for COPD exacerbation. She was able to be transitioned to oral Eliquis. She complained of throat pain. On assessment she was found to have oral thrush. She was started on Nystatin swish and swallow. She was also started on Magic Mouthwash. She complained of constipation. She did not have a bowel movement despite senna and colace. She tried Miralax with no results. A KUB was done that showed fecal retention. She received a dulcolax suppository with some results. She is now stable for discharge home. She will discharged home today.      Discharge Exam:  Vitals:    01/27/23 0230 01/27/23 0707 01/27/23 0837 01/27/23 1406   BP: 126/76 (!) 145/66 (!) 145/66 132/60   Pulse: 85 77     Resp: 16 18     Temp: 98.3 °F (36.8 °C) 98.4 °F (36.9 °C)     TempSrc: Oral Oral     SpO2:  100%     Weight:       Height:         General Appearance: alert and oriented to person, place and time and in no acute distress  Skin: warm and dry  Head: normocephalic and atraumatic  Eyes: pupils equal, round, and reactive to light,  conjunctivae normal  Mouth: thrush, erythema of the thoat  Neck: neck supple and non tender without mass   Pulmonary/Chest: diminished bilaterally, no respiratory distress  Cardiovascular: normal rate, normal S1 and S2   Abdomen: soft, non-tender, non-distended, normal bowel sounds, no masses or organomegaly  Extremities: no cyanosis, no clubbing and mild bilateral lower extremity edema  Neurologic: no cranial nerve deficit and speech normal      I/O last 3 completed shifts: In: 1100 [P.O.:1090; I.V.:10]  Out: -   I/O this shift:  In: 600 [P.O.:600]  Out: -       LABS:  Recent Labs     01/25/23  0654 01/26/23  0549 01/27/23  0924    139 138   K 4.0 4.1 4.5    103 101   CO2 28 28 28   BUN 14 17 14   CREATININE 0.8 0.9 0.7   GLUCOSE 123* 168* 218*   CALCIUM 9.6 8.9 9.0       Recent Labs     01/25/23  0654 01/26/23  0549 01/27/23  0924   WBC 10.5 8.9 11.6*   RBC 4.34 4.26 4.33   HGB 11.0* 10.5* 10.9*   HCT 34.8 34.2 34.7   MCV 80.2 80.3 80.1   MCH 25.3* 24.6* 25.2*   MCHC 31.6* 30.7* 31.4*   RDW 17.3* 17.5* 17.4*    399 403   MPV 11.1 11.0 10.9       No results for input(s): POCGLU in the last 72 hours. Imaging:  CT ABDOMEN PELVIS W IV CONTRAST Additional Contrast? None    Result Date: 1/22/2023  EXAMINATION: CTA OF THE CHEST; CT OF THE ABDOMEN AND PELVIS WITH CONTRAST 1/22/2023 2:48 pm TECHNIQUE: CTA of the chest was performed after the administration of intravenous contrast.  Multiplanar reformatted images are provided for review. MIP images are provided for review. Automated exposure control, iterative reconstruction, and/or weight based adjustment of the mA/kV was utilized to reduce the radiation dose to as low as reasonably achievable.; CT of the abdomen and pelvis was performed with the administration of intravenous contrast. Multiplanar reformatted images are provided for review. Automated exposure control, iterative reconstruction, and/or weight based adjustment of the mA/kV was utilized to reduce the radiation dose to as low as reasonably achievable. COMPARISON: None.  HISTORY: ORDERING SYSTEM PROVIDED HISTORY: ro PE TECHNOLOGIST PROVIDED HISTORY: Reason for exam:->ro PE Decision Support Exception - unselect if not a suspected or confirmed emergency medical condition->Emergency Medical Condition (MA) FINDINGS: CTA chest. There is mild cardiomegaly. There is coronary artery calcification. The great vessels are normal.  The contrast opacification of the pulmonary arteries is limited. Given this limitation, there are no filling defects in the main pulmonary artery and the central branches. Small filling defects are noted in the distal subsegmental peripheral vessels of left lower lobe concerning for small distal subsegmental pulmonary embolism versus artifact. Lungs are free of acute infiltrate or pleural effusion. The degenerative changes are noted in the thoracic spine. CT abdomen and pelvis. The liver is fatty infiltrated. Gallbladder is contracted. Spleen, pancreas, the adrenals and the kidneys are normal except for nonobstructing left kidney stone measuring up to 4 mm. Degenerative changes are identified in the lumbar spine. There is vascular calcification aorta. Pelvis. Bilateral hip prostheses causing streak artifact limits the study. Bladder is distended. Scattered diverticulosis of the colon are noted. The appendix is normal.     Limited study due to poor contrast opacification. There is no large central pulmonary embolism. Small filling defect in the distal subsegmental and peripheral vessels of left lower lobe concerning for subsegmental pulmonary embolism. Coronary artery calcification. Unremarkable CT of the abdomen and pelvis. RECOMMENDATIONS: Unavailable     CTA CHEST W CONTRAST    Result Date: 1/22/2023  EXAMINATION: CTA OF THE CHEST; CT OF THE ABDOMEN AND PELVIS WITH CONTRAST 1/22/2023 2:48 pm TECHNIQUE: CTA of the chest was performed after the administration of intravenous contrast.  Multiplanar reformatted images are provided for review. MIP images are provided for review.  Automated exposure control, iterative reconstruction, and/or weight based adjustment of the mA/kV was utilized to reduce the radiation dose to as low as reasonably achievable.; CT of the abdomen and pelvis was performed with the administration of intravenous contrast. Multiplanar reformatted images are provided for review. Automated exposure control, iterative reconstruction, and/or weight based adjustment of the mA/kV was utilized to reduce the radiation dose to as low as reasonably achievable. COMPARISON: None. HISTORY: ORDERING SYSTEM PROVIDED HISTORY: ro PE TECHNOLOGIST PROVIDED HISTORY: Reason for exam:->ro PE Decision Support Exception - unselect if not a suspected or confirmed emergency medical condition->Emergency Medical Condition (MA) FINDINGS: CTA chest. There is mild cardiomegaly. There is coronary artery calcification. The great vessels are normal.  The contrast opacification of the pulmonary arteries is limited. Given this limitation, there are no filling defects in the main pulmonary artery and the central branches. Small filling defects are noted in the distal subsegmental peripheral vessels of left lower lobe concerning for small distal subsegmental pulmonary embolism versus artifact. Lungs are free of acute infiltrate or pleural effusion. The degenerative changes are noted in the thoracic spine. CT abdomen and pelvis. The liver is fatty infiltrated. Gallbladder is contracted. Spleen, pancreas, the adrenals and the kidneys are normal except for nonobstructing left kidney stone measuring up to 4 mm. Degenerative changes are identified in the lumbar spine. There is vascular calcification aorta. Pelvis. Bilateral hip prostheses causing streak artifact limits the study. Bladder is distended. Scattered diverticulosis of the colon are noted. The appendix is normal.     Limited study due to poor contrast opacification. There is no large central pulmonary embolism. Small filling defect in the distal subsegmental and peripheral vessels of left lower lobe concerning for subsegmental pulmonary embolism. Coronary artery calcification. Unremarkable CT of the abdomen and pelvis. RECOMMENDATIONS: Unavailable         Patient Instructions:   Current Discharge Medication List        START taking these medications    Details   !! apixaban (ELIQUIS) 5 MG TABS tablet Take 2 tablets by mouth 2 times daily for 8 doses  Qty: 60 tablet, Refills: 0      !! apixaban (ELIQUIS) 5 MG TABS tablet Take 1 tablet by mouth 2 times daily  Qty: 60 tablet, Refills: 0      aspirin 81 MG EC tablet Take 1 tablet by mouth daily  Qty: 30 tablet, Refills: 0      hydroCHLOROthiazide (HYDRODIURIL) 25 MG tablet Take 1 tablet by mouth daily  Qty: 30 tablet, Refills: 0      losartan (COZAAR) 100 MG tablet Take 1 tablet by mouth daily  Qty: 30 tablet, Refills: 0      docusate sodium (COLACE, DULCOLAX) 100 MG CAPS Take 100 mg by mouth 2 times daily  Qty: 60 capsule, Refills: 0      senna (SENOKOT) 8.6 MG tablet Take 1 tablet by mouth daily  Qty: 30 tablet, Refills: 0      ascorbic acid (VITAMIN C) 1000 MG tablet Take 1 tablet by mouth daily  Qty: 30 tablet, Refills: 0      vitamin B-6 (B-6) 50 MG tablet Take 1 tablet by mouth daily  Qty: 30 tablet, Refills: 0      zinc sulfate (ZINCATE) 220 (50 Zn) MG capsule Take 1 capsule by mouth daily  Qty: 30 capsule, Refills: 0      nystatin (MYCOSTATIN) 443156 UNIT/ML suspension Take 5 mLs by mouth 4 times daily for 7 days  Qty: 140 mL, Refills: 0      Benzocaine-Menthol (CEPACOL) 6-10 MG LOZG lozenge Take 1 lozenge by mouth every 2 hours as needed for Sore Throat  Qty: 18 lozenge, Refills: 0      pantoprazole (PROTONIX) 40 MG tablet Take 1 tablet by mouth every morning (before breakfast)  Qty: 30 tablet, Refills: 0      lidocaine viscous hcl (XYLOCAINE) 2 % SOLN solution Take 15 mLs by mouth as needed for Irritation  Qty: 100 mL, Refills: 0       !! - Potential duplicate medications found. Please discuss with provider.         CONTINUE these medications which have CHANGED    Details   amLODIPine (NORVASC) 10 MG tablet Take 1 tablet by mouth daily  Qty: 30 tablet, Refills: 0      fluticasone furoate-vilanterol (BREO ELLIPTA) 200-25 MCG/ACT AEPB inhaler Inhale 1 puff into the lungs daily  Qty: 1 each, Refills: 0    Comments: 6/26/2020: Disregard previous prescriptions and refills; honor this prescription and refills  Associated Diagnoses: Encounter for medication refill; Moderate persistent asthma without complication      Albuterol Sulfate, sensor, (PROAIR DIGIHALER) 108 (90 Base) MCG/ACT AEPB Inhale 2 puffs into the lungs as needed (shortness of breath, wheezing)  Qty: 1 each, Refills: 0           CONTINUE these medications which have NOT CHANGED    Details   SITagliptin (JANUVIA) 50 MG tablet Take 50 mg by mouth daily      silver sulfADIAZINE (SILVADENE) 1 % cream Apply 1 applicator topically 2 times daily Apply topically daily. Qty: 50 g, Refills: 0      polyethylene glycol (GLYCOLAX) 17 GM/SCOOP powder Take 17 g by mouth daily      metFORMIN (GLUCOPHAGE) 500 MG tablet Take 1,000 mg by mouth 2 times daily (with meals)       insulin glargine (LANTUS SOLOSTAR) 100 UNIT/ML injection pen Inject 40 Units into the skin nightly  Qty: 25 pen, Refills: 5    Associated Diagnoses: Type 2 diabetes mellitus without complication, with long-term current use of insulin ([facility])      insulin lispro, 1 Unit Dial, (HUMALOG KWIKPEN) 100 UNIT/ML SOPN Inject 10 units with meals + sliding scale. MAX 75 units/day  Qty: 25 pen, Refills: 5    Associated Diagnoses: Type 2 diabetes mellitus without complication, with long-term current use of insulin (HCC)      vitamin D (ERGOCALCIFEROL) 1.25 MG (61467 UT) CAPS capsule Take 1 capsule by mouth once a week  Qty: 4 capsule, Refills: 11    Comments: 6/26/2020: Disregard previous prescriptions and refills; honor this prescription and refills  Associated Diagnoses: Vitamin D deficiency; Encounter for medication refill      gabapentin (NEURONTIN) 400 MG capsule Take 1 capsule by mouth 3 times daily.   Qty: 90 capsule, Refills: 11 Comments: 6/26/2020: Disregard previous prescriptions and refills; honor this prescription and refills  Associated Diagnoses: Diabetic polyneuropathy associated with type 1 diabetes mellitus ([address]  Encounter for medication refill      pravastatin (PRAVACHOL) 80 MG tablet Take 1 tablet by mouth nightly  Qty: 90 tablet, Refills: 3    Associated Diagnoses: Encounter for medication refill      oxybutynin (DITROPAN) 5 MG tablet Take 1 tablet by mouth 3 times daily  Qty: 90 tablet, Refills: 11    Comments: 6/26/2020: Disregard previous prescriptions and refills; honor this prescription and refills  Associated Diagnoses: Encounter for medication refill      montelukast (SINGULAIR) 10 MG tablet Take 1 tablet by mouth nightly  Qty: 30 tablet, Refills: 11    Comments: 6/26/2020: Disregard previous prescriptions and refills; honor this prescription and refills  Associated Diagnoses: Encounter for medication refill      cloNIDine (CATAPRES) 0.2 MG tablet Take 1 tablet by mouth 3 times daily  Qty: 60 tablet, Refills: 3      acetaminophen (APAP EXTRA STRENGTH) 500 MG tablet Take 1 tablet by mouth every 6 hours as needed for Pain  Qty: 30 tablet, Refills: 3           STOP taking these medications       sAXagliptin (ONGLYZA) 5 MG TABS tablet Comments:   Reason for Stopping:         metoprolol succinate (TOPROL XL) 25 MG extended release tablet Comments:   Reason for Stopping:         isosorbide mononitrate (IMDUR) 60 MG extended release tablet Comments:   Reason for Stopping:         nitroGLYCERIN (NITROSTAT) 0.4 MG SL tablet Comments:   Reason for Stopping:         hydrALAZINE (APRESOLINE) 25 MG tablet Comments:   Reason for Stopping:         clopidogrel (PLAVIX) 75 MG tablet Comments:   Reason for Stopping:         sennosides-docusate sodium (SENOKOT-S) 8.6-50 MG tablet Comments:   Reason for Stopping:         albuterol (PROVENTIL) (2.5 MG/3ML) 0.083% nebulizer solution Comments:   Reason for Stopping:         melatonin (RA MELATONIN) 3 MG TABS tablet Comments:   Reason for Stopping:         losartan-hydrochlorothiazide (HYZAAR) 100-25 MG per tablet Comments:   Reason for Stopping:         omeprazole (PRILOSEC) 40 MG delayed release capsule Comments:   Reason for Stopping:         aspirin 81 MG chewable tablet Comments:   Reason for Stopping:         calcium carbonate-vitamin D (CALCIUM 600 + D) 600-400 MG-UNIT TABS per tab Comments:   Reason for Stopping:         vitamin B-12 (CYANOCOBALAMIN) 1000 MCG tablet Comments:   Reason for Stopping:                 Note  that 31  minutes were spent in preparing discharge papers, discussing discharge with patient, medication review, etc.    NOTE: This report was transcribed using voice recognition software. Every effort was made to ensure accuracy; however, inadvertent computerized transcription errors may be present.      Signed:  Electronically signed by DMITRY Garcia CNP on 1/27/2023 at 3:02 PM

## 2023-01-27 NOTE — PLAN OF CARE
Problem: Discharge Planning  Goal: Discharge to home or other facility with appropriate resources  1/26/2023 2155 by Harpreet Delong RN  Outcome: Progressing     Problem: Safety - Adult  Goal: Free from fall injury  1/26/2023 2155 by Harpreet Delong RN  Outcome: Progressing     Problem: ABCDS Injury Assessment  Goal: Absence of physical injury  1/26/2023 2155 by Harpreet Delong RN  Outcome: Progressing     Problem: Pain  Goal: Verbalizes/displays adequate comfort level or baseline comfort level  1/26/2023 2155 by Harpreet Delong RN  Outcome: Progressing

## 2023-01-27 NOTE — PLAN OF CARE
Problem: Discharge Planning  Goal: Discharge to home or other facility with appropriate resources  Outcome: Progressing  Flowsheets (Taken 1/27/2023 0730)  Discharge to home or other facility with appropriate resources:   Identify barriers to discharge with patient and caregiver   Arrange for needed discharge resources and transportation as appropriate   Identify discharge learning needs (meds, wound care, etc)   Refer to discharge planning if patient needs post-hospital services based on physician order or complex needs related to functional status, cognitive ability or social support system     Problem: Safety - Adult  Goal: Free from fall injury  Outcome: Progressing  Flowsheets (Taken 1/27/2023 0730)  Free From Fall Injury:   Instruct family/caregiver on patient safety   Based on caregiver fall risk screen, instruct family/caregiver to ask for assistance with transferring infant if caregiver noted to have fall risk factors     Problem: ABCDS Injury Assessment  Goal: Absence of physical injury  Outcome: Progressing  Flowsheets (Taken 1/27/2023 0730)  Absence of Physical Injury: Implement safety measures based on patient assessment     Problem: Pain  Goal: Verbalizes/displays adequate comfort level or baseline comfort level  Outcome: Progressing

## 2023-01-27 NOTE — CARE COORDINATION
1/27/2023 1110 COVID Positive 1/22/2023. CM spoke with pt via phone for transition of care needs at d/c. Pt plan is to return to her apt as prior. Pt wants [facility] when she's discharged. Referral made to [facility] orders are in [address]) and has been accepted. Pt is aware and agreeable to start of care being 1/30/2023. [facility] will need notified when pt is discharged [phone]. Pt is being discharged on Eliquis and is receiving it through meds to beds. Per pharmacy rep pt's co pay was $0.00 so they didn't use the 30 day free trial card so in the future if the insurance changes she will be eligible for the 30 day free trial card. The card was explained to the pt and anticoagulation teaching was initiated, verbalized understanding. Pt also aware that there is no co pay for the Eliquis. Pt's family or friend transport her home. CM will follow. Electronically signed by Patricia Knott RN on 1/27/2023 at 11:24 AM     Addendum 36 Pt is discharging later today, Kieran Cortez liaison at [facility] notified that pt is discharging. They will start care Monday 1/30/2023 and orders are in Epic.  Electronically signed by Patricia Knott RN on 1/27/2023 at 11:32 AM

## 2023-01-31 NOTE — PROGRESS NOTES
CLINICAL PHARMACY NOTE: MEDS TO BEDS    Total # of Prescriptions Filled:  13   The following medications were delivered to the patient:  Eliquis 5 mg  Amlodipine 10 mg  Lidocaine Viscous 2% soln  Albuterol Sulf HFA inhaler  Nystatin 727194 units/mL susp  Senna 8.6 mg  Vitamin C 1000 mg  Vitamin B6 50 mg  Aspirin 81 mg  Docusate 100 mg  Hydrochlorothiazide 25 mg  Pantoprazole 40 mg  Losartan 100 mg    Additional Documentation:    Cepacol was not covered under the patient's insurance and she was advised to buy OTC. Breo was too soon to refill.

## 2023-03-23 ENCOUNTER — TELEPHONE (OUTPATIENT)
Dept: CARDIOLOGY CLINIC | Age: 73
End: 2023-03-23

## 2023-03-24 NOTE — TELEPHONE ENCOUNTER
Patient was recently admitted due to Pulm embolism which was noted that they suspected was related to Covid infection. Treated with lovenox and switched to eliquis. Cardio was not consulted this admission, however, patient is asking if she needs a hospital follow up. Your next available is may and NP next available is 04/14.  Please advise

## 2023-04-21 ENCOUNTER — OFFICE VISIT (OUTPATIENT)
Dept: CARDIOLOGY CLINIC | Age: 73
End: 2023-04-21

## 2023-04-21 VITALS
BODY MASS INDEX: 40.19 KG/M2 | RESPIRATION RATE: 16 BRPM | OXYGEN SATURATION: 98 % | HEIGHT: 65 IN | SYSTOLIC BLOOD PRESSURE: 132 MMHG | WEIGHT: 241.2 LBS | HEART RATE: 67 BPM | DIASTOLIC BLOOD PRESSURE: 62 MMHG

## 2023-04-21 DIAGNOSIS — I44.7 LBBB (LEFT BUNDLE BRANCH BLOCK): ICD-10-CM

## 2023-04-21 DIAGNOSIS — I25.10 CORONARY ARTERY DISEASE INVOLVING NATIVE CORONARY ARTERY OF NATIVE HEART WITHOUT ANGINA PECTORIS: Primary | ICD-10-CM

## 2023-04-21 DIAGNOSIS — I73.9 PVD (PERIPHERAL VASCULAR DISEASE) WITH CLAUDICATION (HCC): ICD-10-CM

## 2023-04-21 DIAGNOSIS — I50.32 CHRONIC HEART FAILURE WITH PRESERVED EJECTION FRACTION (HFPEF) (HCC): ICD-10-CM

## 2023-04-21 DIAGNOSIS — I47.1 SUPRAVENTRICULAR TACHYCARDIA (HCC): ICD-10-CM

## 2023-04-21 DIAGNOSIS — Z86.73 HISTORY OF CVA (CEREBROVASCULAR ACCIDENT): Chronic | ICD-10-CM

## 2023-04-21 RX ORDER — TRAZODONE HYDROCHLORIDE 50 MG/1
50 TABLET ORAL NIGHTLY
COMMUNITY

## 2023-04-21 RX ORDER — LOSARTAN POTASSIUM AND HYDROCHLOROTHIAZIDE 25; 100 MG/1; MG/1
1 TABLET ORAL DAILY
COMMUNITY
Start: 2023-03-23

## 2023-06-07 DIAGNOSIS — Z12.31 ENCOUNTER FOR SCREENING MAMMOGRAM FOR BREAST CANCER: Primary | ICD-10-CM

## 2023-08-03 ENCOUNTER — TELEPHONE (OUTPATIENT)
Dept: BREAST CENTER | Age: 73
End: 2023-08-03

## 2023-08-03 NOTE — TELEPHONE ENCOUNTER
Patient was scheduled for an appointment and imaging -- she did not keep appointment -- attempted to contact patient, was unable to leave a message. Patient voice mail was full.

## 2023-10-25 ENCOUNTER — OFFICE VISIT (OUTPATIENT)
Dept: CARDIOLOGY CLINIC | Age: 73
End: 2023-10-25
Payer: MEDICARE

## 2023-10-25 VITALS
BODY MASS INDEX: 40.32 KG/M2 | DIASTOLIC BLOOD PRESSURE: 80 MMHG | RESPIRATION RATE: 20 BRPM | HEART RATE: 73 BPM | HEIGHT: 65 IN | WEIGHT: 242 LBS | SYSTOLIC BLOOD PRESSURE: 148 MMHG

## 2023-10-25 DIAGNOSIS — I25.10 CORONARY ARTERY DISEASE INVOLVING NATIVE CORONARY ARTERY OF NATIVE HEART WITHOUT ANGINA PECTORIS: ICD-10-CM

## 2023-10-25 DIAGNOSIS — I50.32 CHRONIC HEART FAILURE WITH PRESERVED EJECTION FRACTION (HFPEF) (HCC): Primary | ICD-10-CM

## 2023-10-25 DIAGNOSIS — Z86.711 HISTORY OF PULMONARY EMBOLISM: ICD-10-CM

## 2023-10-25 DIAGNOSIS — I44.7 LBBB (LEFT BUNDLE BRANCH BLOCK): ICD-10-CM

## 2023-10-25 DIAGNOSIS — I27.20 PULMONARY HYPERTENSION (HCC): ICD-10-CM

## 2023-10-25 DIAGNOSIS — I34.0 NONRHEUMATIC MITRAL VALVE REGURGITATION: ICD-10-CM

## 2023-10-25 DIAGNOSIS — R06.02 SOBOE (SHORTNESS OF BREATH ON EXERTION): ICD-10-CM

## 2023-10-25 PROCEDURE — 1123F ACP DISCUSS/DSCN MKR DOCD: CPT | Performed by: INTERNAL MEDICINE

## 2023-10-25 PROCEDURE — 1036F TOBACCO NON-USER: CPT | Performed by: INTERNAL MEDICINE

## 2023-10-25 PROCEDURE — G8417 CALC BMI ABV UP PARAM F/U: HCPCS | Performed by: INTERNAL MEDICINE

## 2023-10-25 PROCEDURE — 93000 ELECTROCARDIOGRAM COMPLETE: CPT | Performed by: INTERNAL MEDICINE

## 2023-10-25 PROCEDURE — 1090F PRES/ABSN URINE INCON ASSESS: CPT | Performed by: INTERNAL MEDICINE

## 2023-10-25 PROCEDURE — 3017F COLORECTAL CA SCREEN DOC REV: CPT | Performed by: INTERNAL MEDICINE

## 2023-10-25 PROCEDURE — 99214 OFFICE O/P EST MOD 30 MIN: CPT | Performed by: INTERNAL MEDICINE

## 2023-10-25 PROCEDURE — G8484 FLU IMMUNIZE NO ADMIN: HCPCS | Performed by: INTERNAL MEDICINE

## 2023-10-25 PROCEDURE — G8427 DOCREV CUR MEDS BY ELIG CLIN: HCPCS | Performed by: INTERNAL MEDICINE

## 2023-10-25 PROCEDURE — G8399 PT W/DXA RESULTS DOCUMENT: HCPCS | Performed by: INTERNAL MEDICINE

## 2023-10-25 NOTE — PROGRESS NOTES
OFFICE VISIT     PRIMARY CARE PHYSICIAN:      DMITRY Rosenberg - CNP       ALLERGIES / SENSITIVITIES:      No Known Allergies       REVIEWED MEDICATIONS:        Current Outpatient Medications:     empagliflozin (JARDIANCE) 10 MG tablet, Take 1 tablet by mouth daily, Disp: 30 tablet, Rfl: 6    losartan-hydroCHLOROthiazide (HYZAAR) 100-25 MG per tablet, Take 1 tablet by mouth daily, Disp: , Rfl:     traZODone (DESYREL) 50 MG tablet, Take 1 tablet by mouth nightly, Disp: , Rfl:     apixaban (ELIQUIS) 5 MG TABS tablet, Take 1 tablet by mouth 2 times daily, Disp: 60 tablet, Rfl: 0    aspirin 81 MG EC tablet, Take 1 tablet by mouth daily, Disp: 30 tablet, Rfl: 0    amLODIPine (NORVASC) 10 MG tablet, Take 1 tablet by mouth daily, Disp: 30 tablet, Rfl: 0    docusate sodium (COLACE, DULCOLAX) 100 MG CAPS, Take 100 mg by mouth 2 times daily, Disp: 60 capsule, Rfl: 0    vitamin B-6 (B-6) 50 MG tablet, Take 1 tablet by mouth daily, Disp: 30 tablet, Rfl: 0    zinc sulfate (ZINCATE) 220 (50 Zn) MG capsule, Take 1 capsule by mouth daily, Disp: 30 capsule, Rfl: 0    fluticasone furoate-vilanterol (BREO ELLIPTA) 200-25 MCG/ACT AEPB inhaler, Inhale 1 puff into the lungs daily, Disp: 1 each, Rfl: 0    Albuterol Sulfate, sensor, (PROAIR DIGIHALER) 108 (90 Base) MCG/ACT AEPB, Inhale 2 puffs into the lungs as needed (shortness of breath, wheezing), Disp: 1 each, Rfl: 0    pantoprazole (PROTONIX) 40 MG tablet, Take 1 tablet by mouth every morning (before breakfast), Disp: 30 tablet, Rfl: 0    SITagliptin (JANUVIA) 50 MG tablet, Take 2 tablets by mouth daily, Disp: , Rfl:     silver sulfADIAZINE (SILVADENE) 1 % cream, Apply 1 applicator topically 2 times daily Apply topically daily. , Disp: 50 g, Rfl: 0    polyethylene glycol (GLYCOLAX) 17 GM/SCOOP powder, Take 17 g by mouth daily, Disp: , Rfl:     metFORMIN (GLUCOPHAGE) 500 MG tablet, Take 2 tablets by mouth 2 times daily (with meals), Disp: , Rfl:     insulin glargine (LANTUS

## 2023-11-01 ENCOUNTER — HOSPITAL ENCOUNTER (OUTPATIENT)
Dept: CARDIAC REHAB | Age: 73
Setting detail: THERAPIES SERIES
Discharge: HOME | End: 2023-11-01
Payer: MEDICARE

## 2023-11-01 VITALS — WEIGHT: 237 LBS | RESPIRATION RATE: 16 BRPM | HEIGHT: 65 IN | BODY MASS INDEX: 39.49 KG/M2 | OXYGEN SATURATION: 99 %

## 2023-11-01 PROCEDURE — 93798 PHYS/QHP OP CAR RHAB W/ECG: CPT

## 2023-11-01 PROCEDURE — 93797 PHYS/QHP OP CAR RHAB WO ECG: CPT

## 2023-11-01 ASSESSMENT — LIFESTYLE VARIABLES
ALCOHOL_USE: SPECIAL
CIGARETTES_PER_DAY: 1/4 PPD
SMOKELESS_TOBACCO: NO

## 2023-11-01 ASSESSMENT — PATIENT HEALTH QUESTIONNAIRE - PHQ9
SUM OF ALL RESPONSES TO PHQ QUESTIONS 1-9: 7
6. FEELING BAD ABOUT YOURSELF - OR THAT YOU ARE A FAILURE OR HAVE LET YOURSELF OR YOUR FAMILY DOWN: 0
5. POOR APPETITE OR OVEREATING: 2
SUM OF ALL RESPONSES TO PHQ QUESTIONS 1-9: 7
SUM OF ALL RESPONSES TO PHQ QUESTIONS 1-9: 7
9. THOUGHTS THAT YOU WOULD BE BETTER OFF DEAD, OR OF HURTING YOURSELF: 0
3. TROUBLE FALLING OR STAYING ASLEEP: 3
10. IF YOU CHECKED OFF ANY PROBLEMS, HOW DIFFICULT HAVE THESE PROBLEMS MADE IT FOR YOU TO DO YOUR WORK, TAKE CARE OF THINGS AT HOME, OR GET ALONG WITH OTHER PEOPLE: 0
8. MOVING OR SPEAKING SO SLOWLY THAT OTHER PEOPLE COULD HAVE NOTICED. OR THE OPPOSITE, BEING SO FIGETY OR RESTLESS THAT YOU HAVE BEEN MOVING AROUND A LOT MORE THAN USUAL: 0
2. FEELING DOWN, DEPRESSED OR HOPELESS: 0
7. TROUBLE CONCENTRATING ON THINGS, SUCH AS READING THE NEWSPAPER OR WATCHING TELEVISION: 0
SUM OF ALL RESPONSES TO PHQ9 QUESTIONS 1 & 2: 0
4. FEELING TIRED OR HAVING LITTLE ENERGY: 2
SUM OF ALL RESPONSES TO PHQ QUESTIONS 1-9: 7
1. LITTLE INTEREST OR PLEASURE IN DOING THINGS: 0

## 2023-11-01 ASSESSMENT — EXERCISE STRESS TEST: PEAK_BP: 166/86

## 2023-11-01 ASSESSMENT — EJECTION FRACTION: EF_VALUE: 53

## 2023-11-01 NOTE — CARDIO/PULMONARY
Myron spear came in for her phase 2 cardiac rehab eval in no distress by herself. She has chronic heart failure with preserved ejection fraction and is doing well today. She uses a cane for balance due to a stroke several years ago. She gets dizzy at times as well. She will probably not do a treadmill due to her gait which is somewhat slow and unsteady . Also the dizziness at times. Her goals for rehab are to learn safe exercise, increase strength and endurance, lose weight sensibly at 2 pounds a week with portion control. Also to get her blood pressure under better control. Her resting blood pressure was 207/77. Repeat was 176/70. Client is taking all of her pills on time each day. She had another appointment today so she will begin her 1st exercise session at 1pm this Friday 11-03-23 at 1pm. Client was made aware that every effort is made to keep her safe here in cardiac rehab and that her doctors would be notified for any problems here at rehab. She agreed. For now will observe her .

## 2023-11-03 ENCOUNTER — HOSPITAL ENCOUNTER (OUTPATIENT)
Dept: CARDIAC REHAB | Age: 73
Setting detail: THERAPIES SERIES
Discharge: HOME | End: 2023-11-03
Payer: MEDICARE

## 2023-11-03 PROCEDURE — 93798 PHYS/QHP OP CAR RHAB W/ECG: CPT

## 2023-11-03 NOTE — PROGRESS NOTES
11/01/23 1602   Treatment Diagnosis   Treatment Diagnosis 1   (Chronic Heart Failure with preserved ejection fraction.)   Referral Date 10/25/23   Co-morbidities Cerebrovascular disease;Diabetes   Oxygen Intervention   Oxygen Use No   O2 Sat Greater Than 90% Yes  (99% room air)   Oxygen Target Goals  Keep SA02 greater than 90%   Individual Treatment Plan   ITP Visit Type   (did not start exercise due to another appointment.)   1st Date of Exercise  11/03/23   ITP Next Review Date 12/03/23   Visit #/Total Visits 2/36   EF% 53 %  (10-25-23)   Risk Stratification Moderate   ITP Psychosocial;Tobacco;Nutrition;Education; Exercise   Exercise    Plasencia Total Score 40   Stages of Change Preparation; Action   Test Exercise tolerance test  (Tolerance Test completed on NuStep)   Assisted Devices Cane   Exercise Prescription   Mode Stepper;Ergometer  (Rec. Bike causes knee pain, wants to add treadmill in before last session)   Frequency per week 3x/week   Duration Per Session 31-60 minutes  (Patient exercised on 11/3/2023 for 12 minutes at a 1.4 MET level.)   Intensity METS       2-4   RPE 9-13   Progression Increase duration to 60 minutes of continuous exercise at a 4+ MET level by session 36. Symptoms with Exercise Shortness of breath  (Knee pain, slight SOB 2-10 RPD)   Target Heart Rate   (109-140 (UNM -15%))   Resistance Training No   Exercise Blood Pressures   Resting /90   Peak /86   Is BP WDL Yes   Exercise Activity at Home   Type None at this time   Exercise Education   Education Self pulse;Exercise safety;Signs/symptoms to report;RPE scale;Physically active;Equipment orientation; Warm up/cool down   Exercise Target Goal   Target Goal(s) Individual exercise RX   Patient Stated Exercise Goals To be able to use the treadmill by the last session   Psychosocial   Stages of Change Maintenance   PHQ-9 Total Score 7   Psychosocial Intervention   Interventions No intervention indicated   Resources Provided

## 2023-11-06 ENCOUNTER — HOSPITAL ENCOUNTER (OUTPATIENT)
Dept: CARDIAC REHAB | Age: 73
Setting detail: THERAPIES SERIES
Discharge: HOME | End: 2023-11-06
Payer: MEDICARE

## 2023-11-06 PROCEDURE — 93798 PHYS/QHP OP CAR RHAB W/ECG: CPT

## 2023-11-08 ENCOUNTER — HOSPITAL ENCOUNTER (OUTPATIENT)
Dept: CARDIAC REHAB | Age: 73
Setting detail: THERAPIES SERIES
Discharge: HOME | End: 2023-11-08
Payer: MEDICARE

## 2023-11-08 PROCEDURE — 93798 PHYS/QHP OP CAR RHAB W/ECG: CPT

## 2023-11-10 ENCOUNTER — HOSPITAL ENCOUNTER (OUTPATIENT)
Dept: CARDIAC REHAB | Age: 73
Setting detail: THERAPIES SERIES
Discharge: HOME | End: 2023-11-10
Payer: MEDICARE

## 2023-11-10 PROCEDURE — 93798 PHYS/QHP OP CAR RHAB W/ECG: CPT

## 2023-11-13 ENCOUNTER — APPOINTMENT (OUTPATIENT)
Dept: CARDIAC REHAB | Age: 73
End: 2023-11-13
Payer: MEDICARE

## 2023-11-17 ENCOUNTER — HOSPITAL ENCOUNTER (OUTPATIENT)
Dept: CARDIAC REHAB | Age: 73
Setting detail: THERAPIES SERIES
Discharge: HOME | End: 2023-11-17
Payer: MEDICARE

## 2023-11-17 PROCEDURE — 93798 PHYS/QHP OP CAR RHAB W/ECG: CPT

## 2023-11-27 ENCOUNTER — HOSPITAL ENCOUNTER (OUTPATIENT)
Dept: CARDIAC REHAB | Age: 73
Setting detail: THERAPIES SERIES
Discharge: HOME | End: 2023-11-27
Payer: MEDICARE

## 2023-11-27 PROCEDURE — 93798 PHYS/QHP OP CAR RHAB W/ECG: CPT

## 2023-11-29 ENCOUNTER — HOSPITAL ENCOUNTER (OUTPATIENT)
Dept: CARDIAC REHAB | Age: 73
Setting detail: THERAPIES SERIES
Discharge: HOME | End: 2023-11-29
Payer: MEDICARE

## 2023-11-29 PROCEDURE — 93798 PHYS/QHP OP CAR RHAB W/ECG: CPT

## 2023-11-29 ASSESSMENT — PATIENT HEALTH QUESTIONNAIRE - PHQ9
5. POOR APPETITE OR OVEREATING: 2
8. MOVING OR SPEAKING SO SLOWLY THAT OTHER PEOPLE COULD HAVE NOTICED. OR THE OPPOSITE, BEING SO FIGETY OR RESTLESS THAT YOU HAVE BEEN MOVING AROUND A LOT MORE THAN USUAL: 0
SUM OF ALL RESPONSES TO PHQ9 QUESTIONS 1 & 2: 1
10. IF YOU CHECKED OFF ANY PROBLEMS, HOW DIFFICULT HAVE THESE PROBLEMS MADE IT FOR YOU TO DO YOUR WORK, TAKE CARE OF THINGS AT HOME, OR GET ALONG WITH OTHER PEOPLE: 0
4. FEELING TIRED OR HAVING LITTLE ENERGY: 2
6. FEELING BAD ABOUT YOURSELF - OR THAT YOU ARE A FAILURE OR HAVE LET YOURSELF OR YOUR FAMILY DOWN: 0
3. TROUBLE FALLING OR STAYING ASLEEP: 3
SUM OF ALL RESPONSES TO PHQ QUESTIONS 1-9: 8
SUM OF ALL RESPONSES TO PHQ QUESTIONS 1-9: 8
7. TROUBLE CONCENTRATING ON THINGS, SUCH AS READING THE NEWSPAPER OR WATCHING TELEVISION: 0
SUM OF ALL RESPONSES TO PHQ QUESTIONS 1-9: 8
2. FEELING DOWN, DEPRESSED OR HOPELESS: 1
9. THOUGHTS THAT YOU WOULD BE BETTER OFF DEAD, OR OF HURTING YOURSELF: 0
SUM OF ALL RESPONSES TO PHQ QUESTIONS 1-9: 8
1. LITTLE INTEREST OR PLEASURE IN DOING THINGS: 0

## 2023-11-29 ASSESSMENT — EJECTION FRACTION: EF_VALUE: 53

## 2023-11-29 ASSESSMENT — LIFESTYLE VARIABLES
ALCOHOL_USE: SPECIAL
SMOKELESS_TOBACCO: NO
CIGARETTES_PER_DAY: 1/4 PPD

## 2023-11-29 ASSESSMENT — EXERCISE STRESS TEST: PEAK_BP: 170/90

## 2023-11-29 NOTE — PROGRESS NOTES
11/29/23 1437   Treatment Diagnosis   Treatment Diagnosis 1   (Chronic HF with preserved ejection fraction)   Referral Date 10/25/23   Co-morbidities Cerebrovascular disease;Diabetes   Oxygen Intervention   Oxygen Use No   O2 Sat Greater Than 90% Yes  (99% room air)   Oxygen Target Goals  Keep SA02 greater than 90%   Individual Treatment Plan   ITP Visit Type Re-assessment   1st Date of Exercise  11/03/23   ITP Next Review Date 12/29/23   Visit #/Total Visits 8/36   EF% 53 %  (10-25-23)   Risk Stratification Moderate   ITP Psychosocial;Tobacco;Nutrition;Education; Exercise   Exercise    Stages of Change Preparation; Action   Assisted Devices Cane   Test Exercise tolerance test  (Tolerance Test completed on NuStep)   Exercise Prescription   Mode Stepper;Ergometer  (Rec. Bike causes knee pain, wants to add treadmill in before last session)   Frequency per week 3x/week   Duration Per Session 31-60 minutes  (Pt exercised 40 minutes on 11/29)   Intensity METS       2-4   RPE 9-13   Progression Increase duration to 60 minutes of continuous exercise at a 4+ MET level by session 36. Symptoms with Exercise Shortness of breath  (Knee pain, slight SOB 2-10 RPD)   Target Heart Rate   (109-140 (UNM -15%))   Resistance Training No   Exercise Blood Pressures   Resting /92   Peak /90   Is BP WDL No   Exercise Activity at Home   Type None at this time   Exercise Education   Education Self pulse;Exercise safety;Signs/symptoms to report;RPE scale;Physically active;Equipment orientation; Warm up/cool down   Exercise Target Goal   Target Goal(s) Individual exercise RX   Patient Stated Exercise Goals To be able to use the treadmill by the last session   Psychosocial   Stages of Change Maintenance   PHQ-9 Total Score 8   Psychosocial Intervention   Interventions PCP notified   Medication Changes No   Psychosocial Education   Education Cardiac meds; Coping techniques; Environmental triggers; Impact self care behaviors on

## 2023-12-09 ENCOUNTER — APPOINTMENT (OUTPATIENT)
Dept: GENERAL RADIOLOGY | Age: 73
End: 2023-12-09
Payer: MEDICARE

## 2023-12-09 LAB
BASOPHILS # BLD: 0.06 K/UL (ref 0–0.2)
BASOPHILS NFR BLD: 1 % (ref 0–2)
EOSINOPHIL # BLD: 0 K/UL (ref 0.05–0.5)
EOSINOPHILS RELATIVE PERCENT: 0 % (ref 0–6)
ERYTHROCYTE [DISTWIDTH] IN BLOOD BY AUTOMATED COUNT: 15.5 % (ref 11.5–15)
HCT VFR BLD AUTO: 35.8 % (ref 34–48)
HGB BLD-MCNC: 11 G/DL (ref 11.5–15.5)
IMM GRANULOCYTES # BLD AUTO: 0.06 K/UL (ref 0–0.58)
IMM GRANULOCYTES NFR BLD: 1 % (ref 0–5)
LYMPHOCYTES NFR BLD: 1.93 K/UL (ref 1.5–4)
LYMPHOCYTES RELATIVE PERCENT: 16 % (ref 20–42)
MCH RBC QN AUTO: 26.3 PG (ref 26–35)
MCHC RBC AUTO-ENTMCNC: 30.7 G/DL (ref 32–34.5)
MCV RBC AUTO: 85.6 FL (ref 80–99.9)
MONOCYTES NFR BLD: 1.05 K/UL (ref 0.1–0.95)
MONOCYTES NFR BLD: 8 % (ref 2–12)
NEUTROPHILS NFR BLD: 75 % (ref 43–80)
NEUTS SEG NFR BLD: 9.38 K/UL (ref 1.8–7.3)
PLATELET # BLD AUTO: 345 K/UL (ref 130–450)
PMV BLD AUTO: 10.7 FL (ref 7–12)
RBC # BLD AUTO: 4.18 M/UL (ref 3.5–5.5)
WBC OTHER # BLD: 12.5 K/UL (ref 4.5–11.5)

## 2023-12-09 PROCEDURE — 85025 COMPLETE CBC W/AUTO DIFF WBC: CPT

## 2023-12-09 PROCEDURE — 96374 THER/PROPH/DIAG INJ IV PUSH: CPT

## 2023-12-09 PROCEDURE — 84100 ASSAY OF PHOSPHORUS: CPT

## 2023-12-09 PROCEDURE — 99285 EMERGENCY DEPT VISIT HI MDM: CPT

## 2023-12-09 PROCEDURE — 96375 TX/PRO/DX INJ NEW DRUG ADDON: CPT

## 2023-12-09 PROCEDURE — 83036 HEMOGLOBIN GLYCOSYLATED A1C: CPT

## 2023-12-09 PROCEDURE — 93005 ELECTROCARDIOGRAM TRACING: CPT | Performed by: EMERGENCY MEDICINE

## 2023-12-09 PROCEDURE — 83880 ASSAY OF NATRIURETIC PEPTIDE: CPT

## 2023-12-09 PROCEDURE — 83735 ASSAY OF MAGNESIUM: CPT

## 2023-12-09 PROCEDURE — 80053 COMPREHEN METABOLIC PANEL: CPT

## 2023-12-09 PROCEDURE — 80061 LIPID PANEL: CPT

## 2023-12-09 PROCEDURE — 84443 ASSAY THYROID STIM HORMONE: CPT

## 2023-12-09 PROCEDURE — 84484 ASSAY OF TROPONIN QUANT: CPT

## 2023-12-09 PROCEDURE — 71045 X-RAY EXAM CHEST 1 VIEW: CPT

## 2023-12-09 RX ORDER — IPRATROPIUM BROMIDE AND ALBUTEROL SULFATE 2.5; .5 MG/3ML; MG/3ML
1 SOLUTION RESPIRATORY (INHALATION)
Status: COMPLETED | OUTPATIENT
Start: 2023-12-09 | End: 2023-12-10

## 2023-12-09 ASSESSMENT — PAIN - FUNCTIONAL ASSESSMENT: PAIN_FUNCTIONAL_ASSESSMENT: NONE - DENIES PAIN

## 2023-12-10 ENCOUNTER — APPOINTMENT (OUTPATIENT)
Dept: CT IMAGING | Age: 73
End: 2023-12-10
Attending: EMERGENCY MEDICINE
Payer: MEDICARE

## 2023-12-10 ENCOUNTER — HOSPITAL ENCOUNTER (OUTPATIENT)
Age: 73
Setting detail: OBSERVATION
Discharge: HOME | End: 2023-12-11
Attending: EMERGENCY MEDICINE | Admitting: INTERNAL MEDICINE
Payer: MEDICARE

## 2023-12-10 ENCOUNTER — APPOINTMENT (OUTPATIENT)
Age: 73
End: 2023-12-10
Payer: MEDICARE

## 2023-12-10 ENCOUNTER — APPOINTMENT (OUTPATIENT)
Dept: NUCLEAR MEDICINE | Age: 73
End: 2023-12-10
Payer: MEDICARE

## 2023-12-10 DIAGNOSIS — J44.1 COPD EXACERBATION (HCC): Primary | ICD-10-CM

## 2023-12-10 DIAGNOSIS — R07.9 CHEST PAIN, UNSPECIFIED TYPE: ICD-10-CM

## 2023-12-10 DIAGNOSIS — I50.9 CONGESTIVE HEART FAILURE, UNSPECIFIED HF CHRONICITY, UNSPECIFIED HEART FAILURE TYPE (HCC): ICD-10-CM

## 2023-12-10 LAB
ALBUMIN SERPL-MCNC: 3.7 G/DL (ref 3.5–5.2)
ALBUMIN SERPL-MCNC: 3.9 G/DL (ref 3.5–5.2)
ALP SERPL-CCNC: 132 U/L (ref 35–104)
ALP SERPL-CCNC: 137 U/L (ref 35–104)
ALT SERPL-CCNC: 5 U/L (ref 0–32)
ALT SERPL-CCNC: 6 U/L (ref 0–32)
ANION GAP SERPL CALCULATED.3IONS-SCNC: 10 MMOL/L (ref 7–16)
ANION GAP SERPL CALCULATED.3IONS-SCNC: 17 MMOL/L (ref 7–16)
AST SERPL-CCNC: 12 U/L (ref 0–31)
AST SERPL-CCNC: 12 U/L (ref 0–31)
BILIRUB SERPL-MCNC: 0.3 MG/DL (ref 0–1.2)
BILIRUB SERPL-MCNC: 0.4 MG/DL (ref 0–1.2)
BNP SERPL-MCNC: 1113 PG/ML (ref 0–125)
BUN SERPL-MCNC: 8 MG/DL (ref 6–23)
BUN SERPL-MCNC: 8 MG/DL (ref 6–23)
CALCIUM SERPL-MCNC: 9.3 MG/DL (ref 8.6–10.2)
CALCIUM SERPL-MCNC: 9.4 MG/DL (ref 8.6–10.2)
CHLORIDE SERPL-SCNC: 104 MMOL/L (ref 98–107)
CHLORIDE SERPL-SCNC: 105 MMOL/L (ref 98–107)
CHOLEST SERPL-MCNC: 148 MG/DL
CHP ED QC CHECK: YES
CO2 SERPL-SCNC: 21 MMOL/L (ref 22–29)
CO2 SERPL-SCNC: 25 MMOL/L (ref 22–29)
CREAT SERPL-MCNC: 0.7 MG/DL (ref 0.5–1)
CREAT SERPL-MCNC: 0.8 MG/DL (ref 0.5–1)
ECHO BSA: 2.22 M2
GFR SERPL CREATININE-BSD FRML MDRD: >60 ML/MIN/1.73M2
GFR SERPL CREATININE-BSD FRML MDRD: >60 ML/MIN/1.73M2
GLUCOSE BLD-MCNC: 223 MG/DL (ref 74–99)
GLUCOSE BLD-MCNC: 291 MG/DL (ref 74–99)
GLUCOSE BLD-MCNC: 377 MG/DL
GLUCOSE BLD-MCNC: 377 MG/DL (ref 74–99)
GLUCOSE BLD-MCNC: 400 MG/DL (ref 74–99)
GLUCOSE SERPL-MCNC: 115 MG/DL (ref 74–99)
GLUCOSE SERPL-MCNC: 122 MG/DL (ref 74–99)
HBA1C MFR BLD: 7.9 % (ref 4–5.6)
HDLC SERPL-MCNC: 68 MG/DL
LDLC SERPL CALC-MCNC: 67 MG/DL
MAGNESIUM SERPL-MCNC: 1.9 MG/DL (ref 1.6–2.6)
NUC STRESS EJECTION FRACTION: 56 %
PHOSPHATE SERPL-MCNC: 3.3 MG/DL (ref 2.5–4.5)
POTASSIUM SERPL-SCNC: 4.1 MMOL/L (ref 3.5–5)
POTASSIUM SERPL-SCNC: 4.3 MMOL/L (ref 3.5–5)
PROT SERPL-MCNC: 6.9 G/DL (ref 6.4–8.3)
PROT SERPL-MCNC: 7 G/DL (ref 6.4–8.3)
SODIUM SERPL-SCNC: 140 MMOL/L (ref 132–146)
SODIUM SERPL-SCNC: 142 MMOL/L (ref 132–146)
STRESS BASELINE DIAS BP: 90 MMHG
STRESS BASELINE HR: 88 BPM
STRESS BASELINE SYS BP: 154 MMHG
STRESS ESTIMATED WORKLOAD: 1 METS
STRESS PEAK DIAS BP: 80 MMHG
STRESS PEAK SYS BP: 160 MMHG
STRESS PERCENT HR ACHIEVED: 71 %
STRESS POST PEAK HR: 104 BPM
STRESS RATE PRESSURE PRODUCT: NORMAL BPM*MMHG
STRESS TARGET HR: 147 BPM
TRIGL SERPL-MCNC: 65 MG/DL
TROPONIN I SERPL HS-MCNC: 14 NG/L (ref 0–9)
TROPONIN I SERPL HS-MCNC: 18 NG/L (ref 0–9)
TSH SERPL DL<=0.05 MIU/L-ACNC: 1.19 UIU/ML (ref 0.27–4.2)
VLDLC SERPL CALC-MCNC: 13 MG/DL

## 2023-12-10 PROCEDURE — 6360000002 HC RX W HCPCS: Performed by: INTERNAL MEDICINE

## 2023-12-10 PROCEDURE — 6370000000 HC RX 637 (ALT 250 FOR IP): Performed by: INTERNAL MEDICINE

## 2023-12-10 PROCEDURE — 82962 GLUCOSE BLOOD TEST: CPT

## 2023-12-10 PROCEDURE — 71275 CT ANGIOGRAPHY CHEST: CPT

## 2023-12-10 PROCEDURE — 3430000000 HC RX DIAGNOSTIC RADIOPHARMACEUTICAL: Performed by: RADIOLOGY

## 2023-12-10 PROCEDURE — 6360000002 HC RX W HCPCS: Performed by: EMERGENCY MEDICINE

## 2023-12-10 PROCEDURE — 2580000003 HC RX 258: Performed by: INTERNAL MEDICINE

## 2023-12-10 PROCEDURE — 93018 CV STRESS TEST I&R ONLY: CPT | Performed by: INTERNAL MEDICINE

## 2023-12-10 PROCEDURE — 6370000000 HC RX 637 (ALT 250 FOR IP): Performed by: EMERGENCY MEDICINE

## 2023-12-10 PROCEDURE — 93016 CV STRESS TEST SUPVJ ONLY: CPT | Performed by: INTERNAL MEDICINE

## 2023-12-10 PROCEDURE — 78452 HT MUSCLE IMAGE SPECT MULT: CPT | Performed by: INTERNAL MEDICINE

## 2023-12-10 PROCEDURE — 96376 TX/PRO/DX INJ SAME DRUG ADON: CPT

## 2023-12-10 PROCEDURE — 93017 CV STRESS TEST TRACING ONLY: CPT

## 2023-12-10 PROCEDURE — G0378 HOSPITAL OBSERVATION PER HR: HCPCS

## 2023-12-10 PROCEDURE — 78452 HT MUSCLE IMAGE SPECT MULT: CPT

## 2023-12-10 PROCEDURE — 2580000003 HC RX 258: Performed by: EMERGENCY MEDICINE

## 2023-12-10 PROCEDURE — A9500 TC99M SESTAMIBI: HCPCS | Performed by: RADIOLOGY

## 2023-12-10 PROCEDURE — 6360000004 HC RX CONTRAST MEDICATION: Performed by: RADIOLOGY

## 2023-12-10 PROCEDURE — 84484 ASSAY OF TROPONIN QUANT: CPT

## 2023-12-10 PROCEDURE — 94640 AIRWAY INHALATION TREATMENT: CPT

## 2023-12-10 PROCEDURE — 96375 TX/PRO/DX INJ NEW DRUG ADDON: CPT

## 2023-12-10 PROCEDURE — 96374 THER/PROPH/DIAG INJ IV PUSH: CPT

## 2023-12-10 PROCEDURE — 99223 1ST HOSP IP/OBS HIGH 75: CPT | Performed by: INTERNAL MEDICINE

## 2023-12-10 RX ORDER — SODIUM CHLORIDE 0.9 % (FLUSH) 0.9 %
5-40 SYRINGE (ML) INJECTION EVERY 12 HOURS SCHEDULED
Status: DISCONTINUED | OUTPATIENT
Start: 2023-12-10 | End: 2023-12-11 | Stop reason: HOSPADM

## 2023-12-10 RX ORDER — SODIUM CHLORIDE 0.9 % (FLUSH) 0.9 %
5-40 SYRINGE (ML) INJECTION PRN
Status: DISCONTINUED | OUTPATIENT
Start: 2023-12-10 | End: 2023-12-11 | Stop reason: HOSPADM

## 2023-12-10 RX ORDER — ACETAMINOPHEN 325 MG/1
650 TABLET ORAL EVERY 6 HOURS PRN
Status: DISCONTINUED | OUTPATIENT
Start: 2023-12-10 | End: 2023-12-11 | Stop reason: HOSPADM

## 2023-12-10 RX ORDER — ONDANSETRON 4 MG/1
4 TABLET, ORALLY DISINTEGRATING ORAL EVERY 8 HOURS PRN
Status: DISCONTINUED | OUTPATIENT
Start: 2023-12-10 | End: 2023-12-11 | Stop reason: HOSPADM

## 2023-12-10 RX ORDER — INSULIN LISPRO 100 [IU]/ML
0-4 INJECTION, SOLUTION INTRAVENOUS; SUBCUTANEOUS NIGHTLY
Status: DISCONTINUED | OUTPATIENT
Start: 2023-12-10 | End: 2023-12-10

## 2023-12-10 RX ORDER — POTASSIUM CHLORIDE 20 MEQ/1
40 TABLET, EXTENDED RELEASE ORAL PRN
Status: DISCONTINUED | OUTPATIENT
Start: 2023-12-10 | End: 2023-12-11 | Stop reason: HOSPADM

## 2023-12-10 RX ORDER — POTASSIUM CHLORIDE 7.45 MG/ML
10 INJECTION INTRAVENOUS PRN
Status: DISCONTINUED | OUTPATIENT
Start: 2023-12-10 | End: 2023-12-11 | Stop reason: HOSPADM

## 2023-12-10 RX ORDER — ENOXAPARIN SODIUM 100 MG/ML
30 INJECTION SUBCUTANEOUS 2 TIMES DAILY
Status: DISCONTINUED | OUTPATIENT
Start: 2023-12-10 | End: 2023-12-10

## 2023-12-10 RX ORDER — ASPIRIN 81 MG/1
81 TABLET ORAL DAILY
Status: DISCONTINUED | OUTPATIENT
Start: 2023-12-10 | End: 2023-12-11 | Stop reason: HOSPADM

## 2023-12-10 RX ORDER — TETRAKIS(2-METHOXYISOBUTYLISOCYANIDE)COPPER(I) TETRAFLUOROBORATE 1 MG/ML
13 INJECTION, POWDER, LYOPHILIZED, FOR SOLUTION INTRAVENOUS
Status: COMPLETED | OUTPATIENT
Start: 2023-12-10 | End: 2023-12-10

## 2023-12-10 RX ORDER — DOCUSATE SODIUM 100 MG/1
100 CAPSULE, LIQUID FILLED ORAL 2 TIMES DAILY
Status: DISCONTINUED | OUTPATIENT
Start: 2023-12-10 | End: 2023-12-11 | Stop reason: HOSPADM

## 2023-12-10 RX ORDER — OXYBUTYNIN CHLORIDE 5 MG/1
5 TABLET ORAL 3 TIMES DAILY
Status: DISCONTINUED | OUTPATIENT
Start: 2023-12-10 | End: 2023-12-11 | Stop reason: HOSPADM

## 2023-12-10 RX ORDER — INSULIN LISPRO 100 [IU]/ML
0-4 INJECTION, SOLUTION INTRAVENOUS; SUBCUTANEOUS
Status: DISCONTINUED | OUTPATIENT
Start: 2023-12-10 | End: 2023-12-10

## 2023-12-10 RX ORDER — INSULIN LISPRO 100 [IU]/ML
0-8 INJECTION, SOLUTION INTRAVENOUS; SUBCUTANEOUS
Status: DISCONTINUED | OUTPATIENT
Start: 2023-12-10 | End: 2023-12-10

## 2023-12-10 RX ORDER — LOSARTAN POTASSIUM AND HYDROCHLOROTHIAZIDE 25; 100 MG/1; MG/1
1 TABLET ORAL DAILY
Status: DISCONTINUED | OUTPATIENT
Start: 2023-12-10 | End: 2023-12-10 | Stop reason: SDUPTHER

## 2023-12-10 RX ORDER — INSULIN LISPRO 100 [IU]/ML
0-4 INJECTION, SOLUTION INTRAVENOUS; SUBCUTANEOUS NIGHTLY
Status: DISCONTINUED | OUTPATIENT
Start: 2023-12-10 | End: 2023-12-11 | Stop reason: HOSPADM

## 2023-12-10 RX ORDER — MONTELUKAST SODIUM 10 MG/1
10 TABLET ORAL NIGHTLY
Status: DISCONTINUED | OUTPATIENT
Start: 2023-12-10 | End: 2023-12-11 | Stop reason: HOSPADM

## 2023-12-10 RX ORDER — ACETAMINOPHEN 650 MG/1
650 SUPPOSITORY RECTAL EVERY 6 HOURS PRN
Status: DISCONTINUED | OUTPATIENT
Start: 2023-12-10 | End: 2023-12-11 | Stop reason: HOSPADM

## 2023-12-10 RX ORDER — PANTOPRAZOLE SODIUM 40 MG/1
40 TABLET, DELAYED RELEASE ORAL
Status: DISCONTINUED | OUTPATIENT
Start: 2023-12-10 | End: 2023-12-11 | Stop reason: HOSPADM

## 2023-12-10 RX ORDER — AMLODIPINE BESYLATE 10 MG/1
10 TABLET ORAL DAILY
Status: DISCONTINUED | OUTPATIENT
Start: 2023-12-10 | End: 2023-12-11 | Stop reason: HOSPADM

## 2023-12-10 RX ORDER — PRAVASTATIN SODIUM 20 MG
80 TABLET ORAL NIGHTLY
Status: DISCONTINUED | OUTPATIENT
Start: 2023-12-10 | End: 2023-12-11 | Stop reason: HOSPADM

## 2023-12-10 RX ORDER — SODIUM CHLORIDE 9 MG/ML
INJECTION, SOLUTION INTRAVENOUS PRN
Status: DISCONTINUED | OUTPATIENT
Start: 2023-12-10 | End: 2023-12-11 | Stop reason: HOSPADM

## 2023-12-10 RX ORDER — ARFORMOTEROL TARTRATE 15 UG/2ML
15 SOLUTION RESPIRATORY (INHALATION)
Status: DISCONTINUED | OUTPATIENT
Start: 2023-12-10 | End: 2023-12-11 | Stop reason: HOSPADM

## 2023-12-10 RX ORDER — ONDANSETRON 2 MG/ML
4 INJECTION INTRAMUSCULAR; INTRAVENOUS EVERY 6 HOURS PRN
Status: DISCONTINUED | OUTPATIENT
Start: 2023-12-10 | End: 2023-12-11 | Stop reason: HOSPADM

## 2023-12-10 RX ORDER — HYDROCHLOROTHIAZIDE 12.5 MG/1
25 TABLET ORAL DAILY
Status: DISCONTINUED | OUTPATIENT
Start: 2023-12-10 | End: 2023-12-11 | Stop reason: HOSPADM

## 2023-12-10 RX ORDER — CLONIDINE HYDROCHLORIDE 0.1 MG/1
0.2 TABLET ORAL 3 TIMES DAILY
Status: DISCONTINUED | OUTPATIENT
Start: 2023-12-10 | End: 2023-12-11 | Stop reason: HOSPADM

## 2023-12-10 RX ORDER — MAGNESIUM SULFATE IN WATER 40 MG/ML
2000 INJECTION, SOLUTION INTRAVENOUS PRN
Status: DISCONTINUED | OUTPATIENT
Start: 2023-12-10 | End: 2023-12-11 | Stop reason: HOSPADM

## 2023-12-10 RX ORDER — POLYETHYLENE GLYCOL 3350 17 G/17G
17 POWDER, FOR SOLUTION ORAL DAILY PRN
Status: DISCONTINUED | OUTPATIENT
Start: 2023-12-10 | End: 2023-12-11 | Stop reason: HOSPADM

## 2023-12-10 RX ORDER — LANOLIN ALCOHOL/MO/W.PET/CERES
50 CREAM (GRAM) TOPICAL DAILY
Status: DISCONTINUED | OUTPATIENT
Start: 2023-12-10 | End: 2023-12-11 | Stop reason: HOSPADM

## 2023-12-10 RX ORDER — ZINC SULFATE 50(220)MG
50 CAPSULE ORAL DAILY
Status: DISCONTINUED | OUTPATIENT
Start: 2023-12-10 | End: 2023-12-11 | Stop reason: HOSPADM

## 2023-12-10 RX ORDER — FUROSEMIDE 10 MG/ML
20 INJECTION INTRAMUSCULAR; INTRAVENOUS ONCE
Status: COMPLETED | OUTPATIENT
Start: 2023-12-10 | End: 2023-12-10

## 2023-12-10 RX ORDER — IPRATROPIUM BROMIDE AND ALBUTEROL SULFATE 2.5; .5 MG/3ML; MG/3ML
1 SOLUTION RESPIRATORY (INHALATION)
Status: DISCONTINUED | OUTPATIENT
Start: 2023-12-10 | End: 2023-12-11 | Stop reason: HOSPADM

## 2023-12-10 RX ORDER — TETRAKIS(2-METHOXYISOBUTYLISOCYANIDE)COPPER(I) TETRAFLUOROBORATE 1 MG/ML
35 INJECTION, POWDER, LYOPHILIZED, FOR SOLUTION INTRAVENOUS
Status: COMPLETED | OUTPATIENT
Start: 2023-12-10 | End: 2023-12-10

## 2023-12-10 RX ORDER — POLYETHYLENE GLYCOL 3350 17 G/17G
17 POWDER, FOR SOLUTION ORAL DAILY
Status: DISCONTINUED | OUTPATIENT
Start: 2023-12-10 | End: 2023-12-11 | Stop reason: HOSPADM

## 2023-12-10 RX ORDER — INSULIN LISPRO 100 [IU]/ML
0-8 INJECTION, SOLUTION INTRAVENOUS; SUBCUTANEOUS
Status: DISCONTINUED | OUTPATIENT
Start: 2023-12-10 | End: 2023-12-11 | Stop reason: HOSPADM

## 2023-12-10 RX ORDER — LOSARTAN POTASSIUM 50 MG/1
100 TABLET ORAL DAILY
Status: DISCONTINUED | OUTPATIENT
Start: 2023-12-10 | End: 2023-12-11 | Stop reason: HOSPADM

## 2023-12-10 RX ORDER — INSULIN GLARGINE 100 [IU]/ML
50 INJECTION, SOLUTION SUBCUTANEOUS NIGHTLY
Status: DISCONTINUED | OUTPATIENT
Start: 2023-12-10 | End: 2023-12-10

## 2023-12-10 RX ORDER — BUDESONIDE 0.5 MG/2ML
0.5 INHALANT ORAL
Status: DISCONTINUED | OUTPATIENT
Start: 2023-12-10 | End: 2023-12-11 | Stop reason: HOSPADM

## 2023-12-10 RX ORDER — INSULIN GLARGINE 100 [IU]/ML
40 INJECTION, SOLUTION SUBCUTANEOUS NIGHTLY
Status: DISCONTINUED | OUTPATIENT
Start: 2023-12-10 | End: 2023-12-11 | Stop reason: HOSPADM

## 2023-12-10 RX ORDER — REGADENOSON 0.08 MG/ML
0.4 INJECTION, SOLUTION INTRAVENOUS
Status: COMPLETED | OUTPATIENT
Start: 2023-12-10 | End: 2023-12-10

## 2023-12-10 RX ORDER — TRAZODONE HYDROCHLORIDE 50 MG/1
50 TABLET ORAL NIGHTLY
Status: DISCONTINUED | OUTPATIENT
Start: 2023-12-10 | End: 2023-12-11 | Stop reason: HOSPADM

## 2023-12-10 RX ORDER — HYDRALAZINE HYDROCHLORIDE 20 MG/ML
10 INJECTION INTRAMUSCULAR; INTRAVENOUS EVERY 6 HOURS PRN
Status: DISCONTINUED | OUTPATIENT
Start: 2023-12-10 | End: 2023-12-11 | Stop reason: HOSPADM

## 2023-12-10 RX ADMIN — BUDESONIDE 500 MCG: 0.5 SUSPENSION RESPIRATORY (INHALATION) at 20:03

## 2023-12-10 RX ADMIN — IOPAMIDOL 75 ML: 755 INJECTION, SOLUTION INTRAVENOUS at 04:49

## 2023-12-10 RX ADMIN — OXYBUTYNIN CHLORIDE 5 MG: 5 TABLET ORAL at 19:56

## 2023-12-10 RX ADMIN — CLONIDINE HYDROCHLORIDE 0.2 MG: 0.1 TABLET ORAL at 16:24

## 2023-12-10 RX ADMIN — BUDESONIDE 500 MCG: 0.5 SUSPENSION RESPIRATORY (INHALATION) at 06:21

## 2023-12-10 RX ADMIN — AMLODIPINE BESYLATE 10 MG: 5 TABLET ORAL at 08:25

## 2023-12-10 RX ADMIN — ARFORMOTEROL TARTRATE 15 MCG: 15 SOLUTION RESPIRATORY (INHALATION) at 06:21

## 2023-12-10 RX ADMIN — APIXABAN 5 MG: 5 TABLET, FILM COATED ORAL at 11:52

## 2023-12-10 RX ADMIN — ASPIRIN 81 MG: 81 TABLET, COATED ORAL at 11:51

## 2023-12-10 RX ADMIN — SODIUM CHLORIDE, PRESERVATIVE FREE 10 ML: 5 INJECTION INTRAVENOUS at 21:52

## 2023-12-10 RX ADMIN — SODIUM CHLORIDE, PRESERVATIVE FREE 10 ML: 5 INJECTION INTRAVENOUS at 11:52

## 2023-12-10 RX ADMIN — CLONIDINE HYDROCHLORIDE 0.2 MG: 0.1 TABLET ORAL at 11:51

## 2023-12-10 RX ADMIN — Medication 40 MILLICURIE: at 10:29

## 2023-12-10 RX ADMIN — INSULIN GLARGINE 40 UNITS: 100 INJECTION, SOLUTION SUBCUTANEOUS at 21:51

## 2023-12-10 RX ADMIN — EMPAGLIFLOZIN 10 MG: 10 TABLET, FILM COATED ORAL at 12:52

## 2023-12-10 RX ADMIN — FUROSEMIDE 20 MG: 10 INJECTION, SOLUTION INTRAMUSCULAR; INTRAVENOUS at 02:53

## 2023-12-10 RX ADMIN — PANTOPRAZOLE SODIUM 40 MG: 40 TABLET, DELAYED RELEASE ORAL at 08:24

## 2023-12-10 RX ADMIN — LOSARTAN POTASSIUM 100 MG: 50 TABLET, FILM COATED ORAL at 11:51

## 2023-12-10 RX ADMIN — IPRATROPIUM BROMIDE AND ALBUTEROL SULFATE 1 DOSE: .5; 3 SOLUTION RESPIRATORY (INHALATION) at 02:55

## 2023-12-10 RX ADMIN — ACETAMINOPHEN 650 MG: 325 TABLET ORAL at 17:48

## 2023-12-10 RX ADMIN — IPRATROPIUM BROMIDE AND ALBUTEROL SULFATE 1 DOSE: 2.5; .5 SOLUTION RESPIRATORY (INHALATION) at 06:21

## 2023-12-10 RX ADMIN — ARFORMOTEROL TARTRATE 15 MCG: 15 SOLUTION RESPIRATORY (INHALATION) at 20:03

## 2023-12-10 RX ADMIN — METHYLPREDNISOLONE SODIUM SUCCINATE 125 MG: 125 INJECTION, POWDER, LYOPHILIZED, FOR SOLUTION INTRAMUSCULAR; INTRAVENOUS at 02:53

## 2023-12-10 RX ADMIN — DOCUSATE SODIUM 100 MG: 100 CAPSULE, LIQUID FILLED ORAL at 11:51

## 2023-12-10 RX ADMIN — PYRIDOXINE HCL TAB 50 MG 50 MG: 50 TAB at 12:00

## 2023-12-10 RX ADMIN — MONTELUKAST 10 MG: 10 TABLET, FILM COATED ORAL at 19:46

## 2023-12-10 RX ADMIN — IPRATROPIUM BROMIDE AND ALBUTEROL SULFATE 1 DOSE: 2.5; .5 SOLUTION RESPIRATORY (INHALATION) at 15:25

## 2023-12-10 RX ADMIN — DOCUSATE SODIUM 100 MG: 100 CAPSULE, LIQUID FILLED ORAL at 19:46

## 2023-12-10 RX ADMIN — HYDRALAZINE HYDROCHLORIDE 10 MG: 20 INJECTION INTRAMUSCULAR; INTRAVENOUS at 11:52

## 2023-12-10 RX ADMIN — IPRATROPIUM BROMIDE AND ALBUTEROL SULFATE 1 DOSE: .5; 3 SOLUTION RESPIRATORY (INHALATION) at 02:50

## 2023-12-10 RX ADMIN — METHYLPREDNISOLONE SODIUM SUCCINATE 40 MG: 40 INJECTION, POWDER, FOR SOLUTION INTRAMUSCULAR; INTRAVENOUS at 15:24

## 2023-12-10 RX ADMIN — INSULIN LISPRO 8 UNITS: 100 INJECTION, SOLUTION INTRAVENOUS; SUBCUTANEOUS at 13:55

## 2023-12-10 RX ADMIN — HYDRALAZINE HYDROCHLORIDE 10 MG: 20 INJECTION INTRAMUSCULAR; INTRAVENOUS at 16:24

## 2023-12-10 RX ADMIN — POLYETHYLENE GLYCOL 3350 17 G: 17 POWDER, FOR SOLUTION ORAL at 12:00

## 2023-12-10 RX ADMIN — Medication 13 MILLICURIE: at 08:52

## 2023-12-10 RX ADMIN — PRAVASTATIN SODIUM 80 MG: 20 TABLET ORAL at 19:57

## 2023-12-10 RX ADMIN — IPRATROPIUM BROMIDE AND ALBUTEROL SULFATE 1 DOSE: 2.5; .5 SOLUTION RESPIRATORY (INHALATION) at 20:03

## 2023-12-10 RX ADMIN — INSULIN LISPRO 2 UNITS: 100 INJECTION, SOLUTION INTRAVENOUS; SUBCUTANEOUS at 19:20

## 2023-12-10 RX ADMIN — TRAZODONE HYDROCHLORIDE 50 MG: 50 TABLET ORAL at 19:46

## 2023-12-10 RX ADMIN — REGADENOSON 0.4 MG: 0.08 INJECTION, SOLUTION INTRAVENOUS at 10:19

## 2023-12-10 RX ADMIN — APIXABAN 5 MG: 5 TABLET, FILM COATED ORAL at 19:46

## 2023-12-10 RX ADMIN — IPRATROPIUM BROMIDE AND ALBUTEROL SULFATE 1 DOSE: .5; 3 SOLUTION RESPIRATORY (INHALATION) at 02:45

## 2023-12-10 RX ADMIN — HYDROCHLOROTHIAZIDE 25 MG: 12.5 TABLET ORAL at 12:00

## 2023-12-10 RX ADMIN — HYDRALAZINE HYDROCHLORIDE 10 MG: 20 INJECTION INTRAMUSCULAR; INTRAVENOUS at 06:29

## 2023-12-10 RX ADMIN — OXYBUTYNIN CHLORIDE 5 MG: 5 TABLET ORAL at 12:00

## 2023-12-10 RX ADMIN — Medication 50 MG: at 11:51

## 2023-12-10 RX ADMIN — CLONIDINE HYDROCHLORIDE 0.2 MG: 0.1 TABLET ORAL at 19:46

## 2023-12-10 ASSESSMENT — PAIN DESCRIPTION - DESCRIPTORS: DESCRIPTORS: ACHING;DISCOMFORT;CRAMPING

## 2023-12-10 ASSESSMENT — PAIN - FUNCTIONAL ASSESSMENT
PAIN_FUNCTIONAL_ASSESSMENT: NONE - DENIES PAIN
PAIN_FUNCTIONAL_ASSESSMENT: NONE - DENIES PAIN

## 2023-12-10 ASSESSMENT — PAIN SCALES - GENERAL: PAINLEVEL_OUTOF10: 4

## 2023-12-10 ASSESSMENT — PAIN DESCRIPTION - ORIENTATION: ORIENTATION: MID;RIGHT

## 2023-12-10 ASSESSMENT — PAIN DESCRIPTION - LOCATION: LOCATION: LEG

## 2023-12-10 NOTE — ED NOTES
Message sent to physician to notify of BP. Medications given as directed.       Claudia Miller RN  12/10/23 1573

## 2023-12-10 NOTE — ED NOTES
Medicated by this triage nurse. Pt was taken to stress test. Alert and oriented. Skin warm and dry. Respirations even and unlabored.       Dez Rodgers, [address]  12/10/23 2703

## 2023-12-10 NOTE — ED NOTES
Rn needs to give pt iv meds, pt moved to #31, cardiac monitor on, vital signs rechecked.      Dg Montejo, CIARRA  [birth year] 1766

## 2023-12-10 NOTE — H&P
U/L    AST 12 0 - 31 U/L   Troponin    Collection Time: 12/09/23 11:37 PM   Result Value Ref Range    Troponin, High Sensitivity 18 (H) 0 - 9 ng/L   Brain Natriuretic Peptide    Collection Time: 12/09/23 11:37 PM   Result Value Ref Range    Pro-BNP 1,113 (H) 0 - 125 pg/mL   Troponin    Collection Time: 12/10/23  4:17 AM   Result Value Ref Range    Troponin, High Sensitivity 14 (H) 0 - 9 ng/L       Imaging  CTA CHEST W CONTRAST    Result Date: 12/10/2023  EXAMINATION: CTA OF THE CHEST 12/10/2023 4:49 am TECHNIQUE: CTA of the chest was performed after the administration of intravenous contrast.  Multiplanar reformatted images are provided for review. MIP images are provided for review. Automated exposure control, iterative reconstruction, and/or weight based adjustment of the mA/kV was utilized to reduce the radiation dose to as low as reasonably achievable. COMPARISON: December 19, 2021 HISTORY: ORDERING SYSTEM PROVIDED HISTORY: sob hx of pe TECHNOLOGIST PROVIDED HISTORY: Reason for exam:->sob hx of pe Decision Support Exception - unselect if not a suspected or confirmed emergency medical condition->Emergency Medical Condition (MA) What reading provider will be dictating this exam?->CRC FINDINGS: Pulmonary Arteries: Pulmonary arteries are adequately opacified for evaluation. No evidence of intraluminal filling defect to suggest pulmonary embolism. Main pulmonary artery is normal in caliber. Mediastinum: No evidence of mediastinal lymphadenopathy. The heart and pericardium demonstrate no acute abnormality. There is no acute abnormality of the thoracic aorta. Lungs/pleura: The lungs are without acute process. No focal consolidation or pulmonary edema. No evidence of pleural effusion or pneumothorax. Upper Abdomen: Limited images of the upper abdomen are unremarkable. Soft Tissues/Bones: No acute bone or soft tissue abnormality. Chronic, moderate lower thoracic degenerative spondylosis.      No evidence of

## 2023-12-10 NOTE — ED NOTES
Pt remains off the floor at [facility]. Will assess/monitor upon return.      Elsi Rae RN  12/10/23 1100

## 2023-12-10 NOTE — ED NOTES
Pt currently off floor as of the time that care assumed by this RN. Will monitor patient upon return.      Cooper Sheehan RN  12/10/23 2061

## 2023-12-11 VITALS
BODY MASS INDEX: 38.4 KG/M2 | DIASTOLIC BLOOD PRESSURE: 74 MMHG | SYSTOLIC BLOOD PRESSURE: 174 MMHG | OXYGEN SATURATION: 100 % | HEIGHT: 65 IN | WEIGHT: 230.5 LBS | RESPIRATION RATE: 20 BRPM | TEMPERATURE: 98.2 F | HEART RATE: 96 BPM

## 2023-12-11 LAB
EKG ATRIAL RATE: 85 BPM
EKG P AXIS: 58 DEGREES
EKG P-R INTERVAL: 136 MS
EKG Q-T INTERVAL: 414 MS
EKG QRS DURATION: 132 MS
EKG QTC CALCULATION (BAZETT): 492 MS
EKG R AXIS: -19 DEGREES
EKG T AXIS: 130 DEGREES
EKG VENTRICULAR RATE: 85 BPM
GLUCOSE BLD-MCNC: 226 MG/DL (ref 74–99)
GLUCOSE BLD-MCNC: 327 MG/DL (ref 74–99)

## 2023-12-11 PROCEDURE — 6370000000 HC RX 637 (ALT 250 FOR IP): Performed by: INTERNAL MEDICINE

## 2023-12-11 PROCEDURE — 82962 GLUCOSE BLOOD TEST: CPT

## 2023-12-11 PROCEDURE — 6360000002 HC RX W HCPCS: Performed by: INTERNAL MEDICINE

## 2023-12-11 PROCEDURE — 2580000003 HC RX 258: Performed by: INTERNAL MEDICINE

## 2023-12-11 PROCEDURE — 93010 ELECTROCARDIOGRAM REPORT: CPT | Performed by: INTERNAL MEDICINE

## 2023-12-11 PROCEDURE — G0378 HOSPITAL OBSERVATION PER HR: HCPCS

## 2023-12-11 PROCEDURE — 94640 AIRWAY INHALATION TREATMENT: CPT

## 2023-12-11 PROCEDURE — 99238 HOSP IP/OBS DSCHRG MGMT 30/<: CPT | Performed by: INTERNAL MEDICINE

## 2023-12-11 PROCEDURE — 96376 TX/PRO/DX INJ SAME DRUG ADON: CPT

## 2023-12-11 RX ADMIN — OXYBUTYNIN CHLORIDE 5 MG: 5 TABLET ORAL at 09:13

## 2023-12-11 RX ADMIN — CLONIDINE HYDROCHLORIDE 0.2 MG: 0.1 TABLET ORAL at 14:08

## 2023-12-11 RX ADMIN — ARFORMOTEROL TARTRATE 15 MCG: 15 SOLUTION RESPIRATORY (INHALATION) at 07:40

## 2023-12-11 RX ADMIN — LOSARTAN POTASSIUM 100 MG: 50 TABLET, FILM COATED ORAL at 07:47

## 2023-12-11 RX ADMIN — OXYBUTYNIN CHLORIDE 5 MG: 5 TABLET ORAL at 14:08

## 2023-12-11 RX ADMIN — BUDESONIDE 500 MCG: 0.5 SUSPENSION RESPIRATORY (INHALATION) at 07:40

## 2023-12-11 RX ADMIN — Medication 50 MG: at 09:13

## 2023-12-11 RX ADMIN — SODIUM CHLORIDE, PRESERVATIVE FREE 10 ML: 5 INJECTION INTRAVENOUS at 09:16

## 2023-12-11 RX ADMIN — INSULIN LISPRO 2 UNITS: 100 INJECTION, SOLUTION INTRAVENOUS; SUBCUTANEOUS at 09:10

## 2023-12-11 RX ADMIN — METHYLPREDNISOLONE SODIUM SUCCINATE 40 MG: 40 INJECTION, POWDER, FOR SOLUTION INTRAMUSCULAR; INTRAVENOUS at 03:44

## 2023-12-11 RX ADMIN — EMPAGLIFLOZIN 10 MG: 10 TABLET, FILM COATED ORAL at 09:13

## 2023-12-11 RX ADMIN — PANTOPRAZOLE SODIUM 40 MG: 40 TABLET, DELAYED RELEASE ORAL at 06:17

## 2023-12-11 RX ADMIN — INSULIN LISPRO 6 UNITS: 100 INJECTION, SOLUTION INTRAVENOUS; SUBCUTANEOUS at 14:00

## 2023-12-11 RX ADMIN — AMLODIPINE BESYLATE 10 MG: 5 TABLET ORAL at 07:47

## 2023-12-11 RX ADMIN — APIXABAN 5 MG: 5 TABLET, FILM COATED ORAL at 09:13

## 2023-12-11 RX ADMIN — IPRATROPIUM BROMIDE AND ALBUTEROL SULFATE 1 DOSE: 2.5; .5 SOLUTION RESPIRATORY (INHALATION) at 07:40

## 2023-12-11 RX ADMIN — ASPIRIN 81 MG: 81 TABLET, COATED ORAL at 09:13

## 2023-12-11 RX ADMIN — CLONIDINE HYDROCHLORIDE 0.2 MG: 0.1 TABLET ORAL at 07:47

## 2023-12-11 RX ADMIN — IPRATROPIUM BROMIDE AND ALBUTEROL SULFATE 1 DOSE: 2.5; .5 SOLUTION RESPIRATORY (INHALATION) at 11:15

## 2023-12-11 RX ADMIN — HYDROCHLOROTHIAZIDE 25 MG: 12.5 TABLET ORAL at 07:47

## 2023-12-11 RX ADMIN — PYRIDOXINE HCL TAB 50 MG 50 MG: 50 TAB at 09:13

## 2023-12-11 ASSESSMENT — PAIN SCALES - GENERAL
PAINLEVEL_OUTOF10: 0

## 2023-12-11 NOTE — PLAN OF CARE
Problem: Discharge Planning  Goal: Discharge to home or other facility with appropriate resources  Outcome: Progressing  Flowsheets (Taken 12/10/2023 2138)  Discharge to home or other facility with appropriate resources: Identify barriers to discharge with patient and caregiver     Problem: Safety - Adult  Goal: Free from fall injury  Outcome: Progressing     Problem: ABCDS Injury Assessment  Goal: Absence of physical injury  Outcome: Progressing

## 2023-12-11 NOTE — CARE COORDINATION
12/11/23 Transition of Care: patient is observation due to c/o shortness of breath from copd. She used her breathing treatments/nebulizer without improvement. She is alert and oriented. [facility] and [place]. She has a history with [facility]. She does have a rollator, shower bench, cane and wheeled walker also. She also has a history at [facility]. She is independent. She follows with [facility] and her pharmacy is [facility] at [place]. She will have family transport her home at discharge.  Electronically signed by David Davenport RN CM on 12/11/2023 at 2:54 PM

## 2023-12-11 NOTE — ED NOTES
Will administer insulin per order once dinner trays arrive.      Bradley Westbrook RN  12/10/23 2963

## 2023-12-11 NOTE — FLOWSHEET NOTE
12/10/23 2136   Belongings   Dental Appliances Uppers; Lowers   Vision - Corrective Lenses Eyeglasses   Hearing Aid None   Clothing Shirt;Pants;Socks; Undergarments; At bedside   Jewelry Earrings;Ring;Bracelet  (3 rings)   Body Piercings Removed No   Electronic Devices Cell Phone;; At bedside   Weapons (Notify Protective Services/Security) None   Other Valuables Purse;[place]   Home Medications None   Valuables Given To Patient   Provide Name(s) of Who Valuable(s) Were Given To roberto brody

## 2023-12-11 NOTE — PROGRESS NOTES
4 Eyes Skin Assessment     NAME:  Shun Gonzalez  YOB: 1950  MEDICAL RECORD NUMBER:  [id number]    The patient is being assessed for  Admission    I agree that at least one RN has performed a thorough Head to Toe Skin Assessment on the patient. ALL assessment sites listed below have been assessed. Areas assessed by both nurses:    Head, Face, Ears, Shoulders, Back, Chest, Arms, Elbows, Hands, Sacrum. Buttock, Coccyx, Ischium, and Legs. Feet and Heels        Does the Patient have a Wound?  No noted wound(s)       Surendra Prevention initiated by RN: No  Wound Care Orders initiated by RN: No    Pressure Injury (Stage 3,4, Unstageable, DTI, NWPT, and Complex wounds) if present, place Wound referral order by RN under : No    New Ostomies, if present place, Ostomy referral order under : No     Nurse 1 eSignature: Electronically signed by Naif Shipley RN on 12/10/23 at 10:26 PM EST    **SHARE this note so that the co-signing nurse can place an eSignature**    Nurse 2 eSignature: Electronically signed by Snow Lr RN on 12/11/23 at 12:49 AM EST

## 2023-12-13 NOTE — PROGRESS NOTES
Discharge instructions provided to patient. Discussed importance of monitor symptoms leading up to SOB/ CHF exacerbation. Advised no new meds or changes to med occurred. PT stated understanding to follow up with PCP Next week.

## 2023-12-17 PROBLEM — M54.50 PAIN IN LOWER BACK: Status: ACTIVE | Noted: 2023-12-17

## 2023-12-18 PROBLEM — S32.10XA SACRAL FRACTURE, CLOSED (HCC): Status: ACTIVE | Noted: 2023-12-18

## 2023-12-27 ENCOUNTER — TELEPHONE (OUTPATIENT)
Dept: CARDIAC REHAB | Age: 73
End: 2023-12-27

## 2023-12-27 NOTE — TELEPHONE ENCOUNTER
Patient is on hold because she fell and broke tail bone. Will give us a call when she is cleared to return.

## 2024-02-26 ENCOUNTER — TELEPHONE (OUTPATIENT)
Dept: CARDIAC REHAB | Age: 74
End: 2024-02-26

## 2024-03-04 ENCOUNTER — HOSPITAL ENCOUNTER (OUTPATIENT)
Dept: CARDIAC REHAB | Age: 74
Setting detail: THERAPIES SERIES
Discharge: HOME | End: 2024-03-04
Payer: MEDICARE

## 2024-03-04 PROCEDURE — 93798 PHYS/QHP OP CAR RHAB W/ECG: CPT

## 2024-03-06 ASSESSMENT — PATIENT HEALTH QUESTIONNAIRE - PHQ9
SUM OF ALL RESPONSES TO PHQ QUESTIONS 1-9: 8
2. FEELING DOWN, DEPRESSED OR HOPELESS: 1
5. POOR APPETITE OR OVEREATING: 1
9. THOUGHTS THAT YOU WOULD BE BETTER OFF DEAD, OR OF HURTING YOURSELF: 0
3. TROUBLE FALLING OR STAYING ASLEEP: 3
SUM OF ALL RESPONSES TO PHQ QUESTIONS 1-9: 8
7. TROUBLE CONCENTRATING ON THINGS, SUCH AS READING THE NEWSPAPER OR WATCHING TELEVISION: 0
SUM OF ALL RESPONSES TO PHQ QUESTIONS 1-9: 8
6. FEELING BAD ABOUT YOURSELF - OR THAT YOU ARE A FAILURE OR HAVE LET YOURSELF OR YOUR FAMILY DOWN: 0
1. LITTLE INTEREST OR PLEASURE IN DOING THINGS: 0
8. MOVING OR SPEAKING SO SLOWLY THAT OTHER PEOPLE COULD HAVE NOTICED. OR THE OPPOSITE, BEING SO FIGETY OR RESTLESS THAT YOU HAVE BEEN MOVING AROUND A LOT MORE THAN USUAL: 1
SUM OF ALL RESPONSES TO PHQ9 QUESTIONS 1 & 2: 1
4. FEELING TIRED OR HAVING LITTLE ENERGY: 2
10. IF YOU CHECKED OFF ANY PROBLEMS, HOW DIFFICULT HAVE THESE PROBLEMS MADE IT FOR YOU TO DO YOUR WORK, TAKE CARE OF THINGS AT HOME, OR GET ALONG WITH OTHER PEOPLE: 2
SUM OF ALL RESPONSES TO PHQ QUESTIONS 1-9: 8

## 2024-03-06 ASSESSMENT — LIFESTYLE VARIABLES
CIGARETTES_PER_DAY: 1/4 PPD
SMOKELESS_TOBACCO: NO
ALCOHOL_USE: SPECIAL

## 2024-03-06 ASSESSMENT — EXERCISE STRESS TEST: PEAK_BP: 170/90

## 2024-03-06 ASSESSMENT — EJECTION FRACTION: EF_VALUE: 56

## 2024-03-06 NOTE — PROGRESS NOTES
03/06/24 1349   Treatment Diagnosis   Treatment Diagnosis 1   (Chronic HF with preserved ejection fraction)   Referral Date 10/25/23   Co-morbidities Cerebrovascular disease;Diabetes   Oxygen Saturation / Titration    Stages of Change  Maintenance   Oxygen Intervention   Oxygen Use No   O2 Sat Greater Than 90% Yes  (99% room air)   Oxygen Target Goals  Keep SA02 greater than 90%   Individual Treatment Plan   ITP Visit Type Re-assessment  (On 12/10/2023 patient was admitted to hospital for COPD. Patient called in on 12/27/2023 to let us know she broke her tail bone and will call back when cleared for exercise.)   1st Date of Exercise  11/03/23   ITP Next Review Date 04/05/24   Visit #/Total Visits 8/36  (Pt returned to exercise 3/4)   EF% 56 %   Risk Stratification Moderate   ITP Psychosocial;Tobacco;Nutrition;Education;Exercise   Exercise    Stages of Change Preparation;Action   Assisted Devices Cane   Exercise Prescription   Mode Stepper;Ergometer  (Rec. Bike causes knee pain, wants to add treadmill in before last session)   Frequency per week 3x/week   Duration Per Session 31-60 minutes  (Pt exercised 40 minutes on 11/29)   Intensity METS       2-4   RPE 9-13   Progression Increase duration to 60 minutes of continuous exercise at a 4+ MET level by session 36.   Symptoms with Exercise Shortness of breath  (Knee pain, slight SOB 2-10 RPD)   Target Heart Rate   (109-140 (UNM -15%))   Resistance Training No   Exercise Blood Pressures   Resting /84   Peak /90   Is BP WDL No   Exercise Activity at Home   Type None at this time  (encourage home exercise)   Exercise Education   Education Self pulse;Exercise safety;Signs/symptoms to report;RPE scale;Physically active;Equipment orientation;Warm up/cool down   Exercise Target Goal   Target Goal(s) Individual exercise RX   Patient Stated Exercise Goals To be able to use the treadmill by the last session   Psychosocial   Stages of Change Maintenance

## 2024-03-08 ENCOUNTER — HOSPITAL ENCOUNTER (OUTPATIENT)
Dept: CARDIAC REHAB | Age: 74
Setting detail: THERAPIES SERIES
Discharge: HOME | End: 2024-03-08
Payer: MEDICARE

## 2024-03-08 PROCEDURE — 93798 PHYS/QHP OP CAR RHAB W/ECG: CPT

## 2024-03-15 ENCOUNTER — HOSPITAL ENCOUNTER (OUTPATIENT)
Dept: CARDIAC REHAB | Age: 74
Setting detail: THERAPIES SERIES
Discharge: HOME | End: 2024-03-15
Payer: MEDICARE

## 2024-03-15 PROCEDURE — 93798 PHYS/QHP OP CAR RHAB W/ECG: CPT

## 2024-03-25 ENCOUNTER — APPOINTMENT (OUTPATIENT)
Dept: CARDIAC REHAB | Age: 74
End: 2024-03-25
Payer: MEDICARE

## 2024-03-27 ENCOUNTER — APPOINTMENT (OUTPATIENT)
Dept: CARDIAC REHAB | Age: 74
End: 2024-03-27
Payer: MEDICARE

## 2024-03-27 ENCOUNTER — TELEPHONE (OUTPATIENT)
Dept: CARDIAC REHAB | Age: 74
End: 2024-03-27

## 2024-03-27 NOTE — TELEPHONE ENCOUNTER
Pt called stating she had open, seeping, infected wounds on her legs.  put her on antibiotics today with special stockings for 10 days. Pt would like to be placed on hold until she is cleared to return to exercise.

## 2024-03-29 ENCOUNTER — APPOINTMENT (OUTPATIENT)
Dept: CARDIAC REHAB | Age: 74
End: 2024-03-29
Payer: MEDICARE

## 2024-04-01 ENCOUNTER — APPOINTMENT (OUTPATIENT)
Dept: CARDIAC REHAB | Age: 74
End: 2024-04-01
Payer: MEDICARE

## 2024-04-03 ENCOUNTER — APPOINTMENT (OUTPATIENT)
Dept: CARDIAC REHAB | Age: 74
End: 2024-04-03
Payer: MEDICARE

## 2024-04-05 ENCOUNTER — APPOINTMENT (OUTPATIENT)
Dept: CARDIAC REHAB | Age: 74
End: 2024-04-05
Payer: MEDICARE

## 2024-04-08 ENCOUNTER — APPOINTMENT (OUTPATIENT)
Dept: CARDIAC REHAB | Age: 74
End: 2024-04-08
Payer: MEDICARE

## 2024-04-10 ENCOUNTER — APPOINTMENT (OUTPATIENT)
Dept: CARDIAC REHAB | Age: 74
End: 2024-04-10
Payer: MEDICARE

## 2024-04-12 ENCOUNTER — APPOINTMENT (OUTPATIENT)
Dept: CARDIAC REHAB | Age: 74
End: 2024-04-12
Payer: MEDICARE

## 2024-04-19 ENCOUNTER — HOSPITAL ENCOUNTER (OUTPATIENT)
Dept: CARDIAC REHAB | Age: 74
Setting detail: THERAPIES SERIES
Discharge: HOME | End: 2024-04-19
Payer: MEDICARE

## 2024-04-19 PROCEDURE — 93798 PHYS/QHP OP CAR RHAB W/ECG: CPT

## 2024-04-22 ENCOUNTER — HOSPITAL ENCOUNTER (OUTPATIENT)
Dept: CARDIAC REHAB | Age: 74
Setting detail: THERAPIES SERIES
Discharge: HOME | End: 2024-04-22
Payer: MEDICARE

## 2024-04-22 PROCEDURE — 93798 PHYS/QHP OP CAR RHAB W/ECG: CPT

## 2024-04-26 ENCOUNTER — HOSPITAL ENCOUNTER (OUTPATIENT)
Dept: CARDIAC REHAB | Age: 74
Setting detail: THERAPIES SERIES
Discharge: HOME | End: 2024-04-26
Payer: MEDICARE

## 2024-04-26 PROCEDURE — 93798 PHYS/QHP OP CAR RHAB W/ECG: CPT

## 2024-04-26 ASSESSMENT — LIFESTYLE VARIABLES
CIGARETTES_PER_DAY: 1/4 PPD
ALCOHOL_USE: SPECIAL
SMOKELESS_TOBACCO: NO

## 2024-04-26 ASSESSMENT — EJECTION FRACTION: EF_VALUE: 56

## 2024-04-26 ASSESSMENT — EXERCISE STRESS TEST: PEAK_BP: 160/70

## 2024-04-26 NOTE — PROGRESS NOTES
04/26/24 1737   Treatment Diagnosis   Treatment Diagnosis 1   (Chronic Heart Failure with preserved ejection fraction.)   Referral Date 10/25/23   Co-morbidities Cerebrovascular disease;Diabetes   Oxygen Saturation / Titration    Stages of Change  Maintenance   Oxygen Intervention   Oxygen Use No   O2 Sat Greater Than 90% Yes  (99% room air)   Oxygen Target Goals  Keep SA02 greater than 90%   Individual Treatment Plan   ITP Visit Type Re-assessment   1st Date of Exercise  11/03/23   ITP Next Review Date 05/27/24   Visit #/Total Visits 13/36  (Pt returned to exercise 3/4)   EF% 56 %   Risk Stratification Moderate   ITP Psychosocial;Tobacco;Nutrition;Education;Exercise   Exercise    Stages of Change Preparation;Action   Assisted Devices Cane   Exercise Prescription   Mode Stepper;Ergometer  (Rec. Bike causes knee pain, wants to add treadmill in before last session but cannot do so yet because of knee pain)   Frequency per week 3x/week   Duration Per Session 31-60 minutes  (Pt exercised 31 minutes on 4/26 due to knee pain)   Intensity METS       2-4  (exercising at 2.2 METS)   RPE 9-13   Progression Increase duration to 60 minutes of continuous exercise at a 4 MET level by session 36.   Symptoms with Exercise Shortness of breath  (Knee pain, slight SOB 2-10 RPD)   Target Heart Rate 109-140  ((UNM -0%))   Resistance Training No   Exercise Blood Pressures   Resting /76   Peak /70  (3/15/24)   Is BP WDL No   Exercise Activity at Home   Type None at this time  (encourage home exercise)   Exercise Education   Education Self pulse;Exercise safety;Signs/symptoms to report;RPE scale;Physically active;Equipment orientation;Warm up/cool down   Exercise Target Goal   Target Goal(s) Individual exercise RX   Patient Stated Exercise Goals To be able to use the treadmill by the last session   Psychosocial   Stages of Change Maintenance   Psychosocial Intervention   Medication Changes No   Psychosocial Education

## 2024-05-01 ENCOUNTER — HOSPITAL ENCOUNTER (OUTPATIENT)
Dept: CARDIAC REHAB | Age: 74
Setting detail: THERAPIES SERIES
Discharge: HOME | End: 2024-05-01
Payer: MEDICARE

## 2024-05-01 PROCEDURE — 93798 PHYS/QHP OP CAR RHAB W/ECG: CPT

## 2024-05-08 ENCOUNTER — HOSPITAL ENCOUNTER (OUTPATIENT)
Dept: CARDIAC REHAB | Age: 74
Setting detail: THERAPIES SERIES
Discharge: HOME | End: 2024-05-08
Payer: MEDICARE

## 2024-05-08 PROCEDURE — 93798 PHYS/QHP OP CAR RHAB W/ECG: CPT

## 2024-05-08 ASSESSMENT — PATIENT HEALTH QUESTIONNAIRE - PHQ9
SUM OF ALL RESPONSES TO PHQ9 QUESTIONS 1 & 2: 0
4. FEELING TIRED OR HAVING LITTLE ENERGY: NEARLY EVERY DAY
8. MOVING OR SPEAKING SO SLOWLY THAT OTHER PEOPLE COULD HAVE NOTICED. OR THE OPPOSITE, BEING SO FIGETY OR RESTLESS THAT YOU HAVE BEEN MOVING AROUND A LOT MORE THAN USUAL: NOT AT ALL
SUM OF ALL RESPONSES TO PHQ QUESTIONS 1-9: 9
6. FEELING BAD ABOUT YOURSELF - OR THAT YOU ARE A FAILURE OR HAVE LET YOURSELF OR YOUR FAMILY DOWN: NOT AT ALL
3. TROUBLE FALLING OR STAYING ASLEEP: NEARLY EVERY DAY
9. THOUGHTS THAT YOU WOULD BE BETTER OFF DEAD, OR OF HURTING YOURSELF: NOT AT ALL
5. POOR APPETITE OR OVEREATING: MORE THAN HALF THE DAYS
SUM OF ALL RESPONSES TO PHQ QUESTIONS 1-9: 9
1. LITTLE INTEREST OR PLEASURE IN DOING THINGS: NOT AT ALL
7. TROUBLE CONCENTRATING ON THINGS, SUCH AS READING THE NEWSPAPER OR WATCHING TELEVISION: SEVERAL DAYS
2. FEELING DOWN, DEPRESSED OR HOPELESS: NOT AT ALL
10. IF YOU CHECKED OFF ANY PROBLEMS, HOW DIFFICULT HAVE THESE PROBLEMS MADE IT FOR YOU TO DO YOUR WORK, TAKE CARE OF THINGS AT HOME, OR GET ALONG WITH OTHER PEOPLE: SOMEWHAT DIFFICULT

## 2024-05-10 ENCOUNTER — HOSPITAL ENCOUNTER (OUTPATIENT)
Dept: CARDIAC REHAB | Age: 74
Setting detail: THERAPIES SERIES
Discharge: HOME | End: 2024-05-10
Payer: MEDICARE

## 2024-05-10 PROCEDURE — 93798 PHYS/QHP OP CAR RHAB W/ECG: CPT

## 2024-05-17 DIAGNOSIS — Z12.31 VISIT FOR SCREENING MAMMOGRAM: Primary | ICD-10-CM

## 2024-05-29 ENCOUNTER — HOSPITAL ENCOUNTER (OUTPATIENT)
Dept: CARDIAC REHAB | Age: 74
Setting detail: THERAPIES SERIES
Discharge: HOME | End: 2024-05-29
Payer: MEDICARE

## 2024-05-31 ENCOUNTER — APPOINTMENT (OUTPATIENT)
Dept: CARDIAC REHAB | Age: 74
End: 2024-05-31
Payer: MEDICARE

## 2024-05-31 DIAGNOSIS — I25.10 CORONARY ARTERY DISEASE INVOLVING NATIVE CORONARY ARTERY OF NATIVE HEART WITHOUT ANGINA PECTORIS: ICD-10-CM

## 2024-05-31 DIAGNOSIS — I50.32 CHRONIC HEART FAILURE WITH PRESERVED EJECTION FRACTION (HFPEF) (HCC): ICD-10-CM

## 2024-05-31 RX ORDER — EMPAGLIFLOZIN 10 MG/1
10 TABLET, FILM COATED ORAL DAILY
Qty: 30 TABLET | Refills: 4 | Status: SHIPPED | OUTPATIENT
Start: 2024-05-31

## 2024-06-13 ENCOUNTER — HOSPITAL ENCOUNTER (OUTPATIENT)
Dept: GENERAL RADIOLOGY | Age: 74
Discharge: HOME | End: 2024-06-15
Payer: MEDICARE

## 2024-06-13 VITALS — HEIGHT: 65 IN | BODY MASS INDEX: 37.32 KG/M2 | WEIGHT: 224 LBS

## 2024-06-13 DIAGNOSIS — Z12.31 VISIT FOR SCREENING MAMMOGRAM: ICD-10-CM

## 2024-06-13 PROCEDURE — 77063 BREAST TOMOSYNTHESIS BI: CPT

## 2024-06-17 ENCOUNTER — HOSPITAL ENCOUNTER (INPATIENT)
Age: 74
LOS: 4 days | Discharge: HOME | End: 2024-06-21
Attending: EMERGENCY MEDICINE | Admitting: INTERNAL MEDICINE
Payer: MEDICARE

## 2024-06-17 ENCOUNTER — APPOINTMENT (OUTPATIENT)
Dept: GENERAL RADIOLOGY | Age: 74
End: 2024-06-17
Payer: MEDICARE

## 2024-06-17 DIAGNOSIS — J45.901 ACUTE EXACERBATION OF COPD WITH ASTHMA (HCC): Primary | ICD-10-CM

## 2024-06-17 DIAGNOSIS — J45.901 ASTHMA WITH SEVERE EXACERBATION: ICD-10-CM

## 2024-06-17 DIAGNOSIS — J44.1 ACUTE EXACERBATION OF COPD WITH ASTHMA (HCC): Primary | ICD-10-CM

## 2024-06-17 DIAGNOSIS — J18.9 PNEUMONIA OF RIGHT LOWER LOBE DUE TO INFECTIOUS ORGANISM: ICD-10-CM

## 2024-06-17 PROBLEM — R06.00 DYSPNEA: Status: ACTIVE | Noted: 2024-06-17

## 2024-06-17 LAB
ALBUMIN SERPL-MCNC: 3.4 G/DL (ref 3.5–5.2)
ALP SERPL-CCNC: 137 U/L (ref 35–104)
ALT SERPL-CCNC: 18 U/L (ref 0–32)
ANION GAP SERPL CALCULATED.3IONS-SCNC: 12 MMOL/L (ref 7–16)
AST SERPL-CCNC: 46 U/L (ref 0–31)
B PARAP IS1001 DNA NPH QL NAA+NON-PROBE: NOT DETECTED
B PERT DNA SPEC QL NAA+PROBE: NOT DETECTED
B.E.: -1.3 MMOL/L (ref -3–3)
BASOPHILS # BLD: 0.05 K/UL (ref 0–0.2)
BASOPHILS NFR BLD: 1 % (ref 0–2)
BILIRUB SERPL-MCNC: 0.2 MG/DL (ref 0–1.2)
BNP SERPL-MCNC: 213 PG/ML (ref 0–125)
BUN SERPL-MCNC: 11 MG/DL (ref 6–23)
C PNEUM DNA NPH QL NAA+NON-PROBE: NOT DETECTED
CALCIUM SERPL-MCNC: 8.9 MG/DL (ref 8.6–10.2)
CHLORIDE SERPL-SCNC: 100 MMOL/L (ref 98–107)
CO2 SERPL-SCNC: 27 MMOL/L (ref 22–29)
COHB: 0.4 % (ref 0–1.5)
COMMENT: NORMAL
CREAT SERPL-MCNC: 1.3 MG/DL (ref 0.5–1)
CRITICAL: NORMAL
DATE ANALYZED: NORMAL
DATE OF COLLECTION: NORMAL
EOSINOPHIL # BLD: 0 K/UL (ref 0.05–0.5)
EOSINOPHILS RELATIVE PERCENT: 0 % (ref 0–6)
ERYTHROCYTE [DISTWIDTH] IN BLOOD BY AUTOMATED COUNT: 16.2 % (ref 11.5–15)
FLUAV RNA NPH QL NAA+NON-PROBE: NOT DETECTED
FLUBV RNA NPH QL NAA+NON-PROBE: NOT DETECTED
GFR, ESTIMATED: 46 ML/MIN/1.73M2
GLUCOSE SERPL-MCNC: 150 MG/DL (ref 74–99)
HADV DNA NPH QL NAA+NON-PROBE: NOT DETECTED
HCO3: 23.5 MMOL/L (ref 22–26)
HCOV 229E RNA NPH QL NAA+NON-PROBE: NOT DETECTED
HCOV HKU1 RNA NPH QL NAA+NON-PROBE: NOT DETECTED
HCOV NL63 RNA NPH QL NAA+NON-PROBE: NOT DETECTED
HCOV OC43 RNA NPH QL NAA+NON-PROBE: NOT DETECTED
HCT VFR BLD AUTO: 35.9 % (ref 34–48)
HGB BLD-MCNC: 10.7 G/DL (ref 11.5–15.5)
HHB: 3.8 % (ref 0–5)
HMPV RNA NPH QL NAA+NON-PROBE: NOT DETECTED
HPIV1 RNA NPH QL NAA+NON-PROBE: NOT DETECTED
HPIV2 RNA NPH QL NAA+NON-PROBE: NOT DETECTED
HPIV3 RNA NPH QL NAA+NON-PROBE: NOT DETECTED
HPIV4 RNA NPH QL NAA+NON-PROBE: NOT DETECTED
IMM GRANULOCYTES # BLD AUTO: 0.04 K/UL (ref 0–0.58)
IMM GRANULOCYTES NFR BLD: 0 % (ref 0–5)
LAB: NORMAL
LYMPHOCYTES NFR BLD: 3.48 K/UL (ref 1.5–4)
LYMPHOCYTES RELATIVE PERCENT: 33 % (ref 20–42)
Lab: 2100
M PNEUMO DNA NPH QL NAA+NON-PROBE: NOT DETECTED
MAGNESIUM SERPL-MCNC: 2.1 MG/DL (ref 1.6–2.6)
MCH RBC QN AUTO: 26 PG (ref 26–35)
MCHC RBC AUTO-ENTMCNC: 29.8 G/DL (ref 32–34.5)
MCV RBC AUTO: 87.1 FL (ref 80–99.9)
METHB: 0.3 % (ref 0–1.5)
MODE: NORMAL
MONOCYTES NFR BLD: 1.2 K/UL (ref 0.1–0.95)
MONOCYTES NFR BLD: 12 % (ref 2–12)
NEUTROPHILS NFR BLD: 54 % (ref 43–80)
NEUTS SEG NFR BLD: 5.69 K/UL (ref 1.8–7.3)
O2 SATURATION: 96.2 % (ref 92–98.5)
O2HB: 95.5 % (ref 94–97)
OPERATOR ID: 2577
PATIENT TEMP: 37 C
PCO2: 40 MMHG (ref 35–45)
PH BLOOD GAS: 7.39 (ref 7.35–7.45)
PLATELET # BLD AUTO: 331 K/UL (ref 130–450)
PMV BLD AUTO: 10.5 FL (ref 7–12)
PO2: 79.6 MMHG (ref 75–100)
POTASSIUM SERPL-SCNC: 3.6 MMOL/L (ref 3.5–5)
PROCALCITONIN SERPL-MCNC: 0.08 NG/ML (ref 0–0.08)
PROT SERPL-MCNC: 6.9 G/DL (ref 6.4–8.3)
RBC # BLD AUTO: 4.12 M/UL (ref 3.5–5.5)
RSV RNA NPH QL NAA+NON-PROBE: NOT DETECTED
RV+EV RNA NPH QL NAA+NON-PROBE: NOT DETECTED
SARS-COV-2 RNA NPH QL NAA+NON-PROBE: NOT DETECTED
SODIUM SERPL-SCNC: 139 MMOL/L (ref 132–146)
SOURCE, BLOOD GAS: NORMAL
SPECIMEN DESCRIPTION: NORMAL
THB: 11.8 G/DL (ref 11.5–16.5)
TIME ANALYZED: 2110
TROPONIN I SERPL HS-MCNC: 13 NG/L (ref 0–9)
WBC OTHER # BLD: 10.5 K/UL (ref 4.5–11.5)

## 2024-06-17 PROCEDURE — 82805 BLOOD GASES W/O2 SATURATION: CPT

## 2024-06-17 PROCEDURE — 96365 THER/PROPH/DIAG IV INF INIT: CPT

## 2024-06-17 PROCEDURE — 94660 CPAP INITIATION&MGMT: CPT

## 2024-06-17 PROCEDURE — 83880 ASSAY OF NATRIURETIC PEPTIDE: CPT

## 2024-06-17 PROCEDURE — 80053 COMPREHEN METABOLIC PANEL: CPT

## 2024-06-17 PROCEDURE — 96375 TX/PRO/DX INJ NEW DRUG ADDON: CPT

## 2024-06-17 PROCEDURE — 0202U NFCT DS 22 TRGT SARS-COV-2: CPT

## 2024-06-17 PROCEDURE — 6360000002 HC RX W HCPCS

## 2024-06-17 PROCEDURE — 2580000003 HC RX 258

## 2024-06-17 PROCEDURE — 6360000002 HC RX W HCPCS: Performed by: EMERGENCY MEDICINE

## 2024-06-17 PROCEDURE — 84484 ASSAY OF TROPONIN QUANT: CPT

## 2024-06-17 PROCEDURE — 5A09357 ASSISTANCE WITH RESPIRATORY VENTILATION, LESS THAN 24 CONSECUTIVE HOURS, CONTINUOUS POSITIVE AIRWAY PRESSURE: ICD-10-PCS | Performed by: INTERNAL MEDICINE

## 2024-06-17 PROCEDURE — 85025 COMPLETE CBC W/AUTO DIFF WBC: CPT

## 2024-06-17 PROCEDURE — 94640 AIRWAY INHALATION TREATMENT: CPT

## 2024-06-17 PROCEDURE — 93005 ELECTROCARDIOGRAM TRACING: CPT

## 2024-06-17 PROCEDURE — 2500000003 HC RX 250 WO HCPCS

## 2024-06-17 PROCEDURE — 99285 EMERGENCY DEPT VISIT HI MDM: CPT

## 2024-06-17 PROCEDURE — 96367 TX/PROPH/DG ADDL SEQ IV INF: CPT

## 2024-06-17 PROCEDURE — 71045 X-RAY EXAM CHEST 1 VIEW: CPT

## 2024-06-17 PROCEDURE — 83735 ASSAY OF MAGNESIUM: CPT

## 2024-06-17 PROCEDURE — 6370000000 HC RX 637 (ALT 250 FOR IP)

## 2024-06-17 PROCEDURE — 2580000003 HC RX 258: Performed by: EMERGENCY MEDICINE

## 2024-06-17 PROCEDURE — 84145 PROCALCITONIN (PCT): CPT

## 2024-06-17 PROCEDURE — 2700000000 HC OXYGEN THERAPY PER DAY

## 2024-06-17 RX ORDER — POTASSIUM CHLORIDE 20 MEQ/1
40 TABLET, EXTENDED RELEASE ORAL ONCE
Status: COMPLETED | OUTPATIENT
Start: 2024-06-17 | End: 2024-06-17

## 2024-06-17 RX ORDER — MAGNESIUM SULFATE IN WATER 40 MG/ML
2000 INJECTION, SOLUTION INTRAVENOUS ONCE
Status: COMPLETED | OUTPATIENT
Start: 2024-06-17 | End: 2024-06-17

## 2024-06-17 RX ORDER — ALBUTEROL SULFATE 2.5 MG/3ML
5 SOLUTION RESPIRATORY (INHALATION) ONCE
Status: COMPLETED | OUTPATIENT
Start: 2024-06-17 | End: 2024-06-17

## 2024-06-17 RX ORDER — 0.9 % SODIUM CHLORIDE 0.9 %
1000 INTRAVENOUS SOLUTION INTRAVENOUS ONCE
Status: COMPLETED | OUTPATIENT
Start: 2024-06-17 | End: 2024-06-17

## 2024-06-17 RX ORDER — IPRATROPIUM BROMIDE AND ALBUTEROL SULFATE 2.5; .5 MG/3ML; MG/3ML
3 SOLUTION RESPIRATORY (INHALATION)
Status: COMPLETED | OUTPATIENT
Start: 2024-06-17 | End: 2024-06-17

## 2024-06-17 RX ORDER — ALBUTEROL SULFATE 2.5 MG/3ML
2.5 SOLUTION RESPIRATORY (INHALATION) ONCE
Status: COMPLETED | OUTPATIENT
Start: 2024-06-17 | End: 2024-06-17

## 2024-06-17 RX ADMIN — WATER 2000 MG: 1 INJECTION INTRAMUSCULAR; INTRAVENOUS; SUBCUTANEOUS at 22:16

## 2024-06-17 RX ADMIN — ALBUTEROL SULFATE 2.5 MG: 2.5 SOLUTION RESPIRATORY (INHALATION) at 23:35

## 2024-06-17 RX ADMIN — DOXYCYCLINE 100 MG: 100 INJECTION, POWDER, LYOPHILIZED, FOR SOLUTION INTRAVENOUS at 22:19

## 2024-06-17 RX ADMIN — SODIUM CHLORIDE 1000 ML: 9 INJECTION, SOLUTION INTRAVENOUS at 21:08

## 2024-06-17 RX ADMIN — ALBUTEROL SULFATE 5 MG: 2.5 SOLUTION RESPIRATORY (INHALATION) at 22:54

## 2024-06-17 RX ADMIN — MAGNESIUM SULFATE HEPTAHYDRATE 2000 MG: 40 INJECTION, SOLUTION INTRAVENOUS at 21:10

## 2024-06-17 RX ADMIN — IPRATROPIUM BROMIDE AND ALBUTEROL SULFATE 3 DOSE: 2.5; .5 SOLUTION RESPIRATORY (INHALATION) at 20:34

## 2024-06-17 RX ADMIN — POTASSIUM CHLORIDE 40 MEQ: 1500 TABLET, EXTENDED RELEASE ORAL at 22:17

## 2024-06-18 PROBLEM — J44.1 COPD WITH ACUTE EXACERBATION (HCC): Status: ACTIVE | Noted: 2024-06-18

## 2024-06-18 LAB
ALBUMIN SERPL-MCNC: 3.5 G/DL (ref 3.5–5.2)
ALP SERPL-CCNC: 141 U/L (ref 35–104)
ALT SERPL-CCNC: 17 U/L (ref 0–32)
ANION GAP SERPL CALCULATED.3IONS-SCNC: 23 MMOL/L (ref 7–16)
AST SERPL-CCNC: 30 U/L (ref 0–31)
BASOPHILS # BLD: 0.01 K/UL (ref 0–0.2)
BASOPHILS NFR BLD: 0 % (ref 0–2)
BILIRUB SERPL-MCNC: 0.2 MG/DL (ref 0–1.2)
BUN SERPL-MCNC: 15 MG/DL (ref 6–23)
CALCIUM SERPL-MCNC: 9.5 MG/DL (ref 8.6–10.2)
CHLORIDE SERPL-SCNC: 99 MMOL/L (ref 98–107)
CO2 SERPL-SCNC: 18 MMOL/L (ref 22–29)
CREAT SERPL-MCNC: 1.2 MG/DL (ref 0.5–1)
EKG ATRIAL RATE: 85 BPM
EKG P AXIS: 38 DEGREES
EKG P-R INTERVAL: 140 MS
EKG Q-T INTERVAL: 436 MS
EKG QRS DURATION: 142 MS
EKG QTC CALCULATION (BAZETT): 518 MS
EKG R AXIS: -28 DEGREES
EKG T AXIS: 133 DEGREES
EKG VENTRICULAR RATE: 85 BPM
EOSINOPHIL # BLD: 0 K/UL (ref 0.05–0.5)
EOSINOPHILS RELATIVE PERCENT: 0 % (ref 0–6)
ERYTHROCYTE [DISTWIDTH] IN BLOOD BY AUTOMATED COUNT: 16.6 % (ref 11.5–15)
FERRITIN SERPL-MCNC: 20 NG/ML
GFR, ESTIMATED: 49 ML/MIN/1.73M2
GLUCOSE BLD-MCNC: 181 MG/DL (ref 74–99)
GLUCOSE BLD-MCNC: 207 MG/DL (ref 74–99)
GLUCOSE BLD-MCNC: 231 MG/DL (ref 74–99)
GLUCOSE BLD-MCNC: 279 MG/DL (ref 74–99)
GLUCOSE SERPL-MCNC: 203 MG/DL (ref 74–99)
HCT VFR BLD AUTO: 34.8 % (ref 34–48)
HGB BLD-MCNC: 10.7 G/DL (ref 11.5–15.5)
IMM GRANULOCYTES # BLD AUTO: 0.05 K/UL (ref 0–0.58)
IMM GRANULOCYTES NFR BLD: 1 % (ref 0–5)
IRON SATN MFR SERPL: 6 % (ref 15–50)
IRON SERPL-MCNC: 21 UG/DL (ref 37–145)
L PNEUMO1 AG UR QL IA.RAPID: NEGATIVE
LYMPHOCYTES NFR BLD: 0.44 K/UL (ref 1.5–4)
LYMPHOCYTES RELATIVE PERCENT: 4 % (ref 20–42)
MCH RBC QN AUTO: 27.4 PG (ref 26–35)
MCHC RBC AUTO-ENTMCNC: 30.7 G/DL (ref 32–34.5)
MCV RBC AUTO: 89 FL (ref 80–99.9)
MONOCYTES NFR BLD: 0.1 K/UL (ref 0.1–0.95)
MONOCYTES NFR BLD: 1 % (ref 2–12)
NEUTROPHILS NFR BLD: 94 % (ref 43–80)
NEUTS SEG NFR BLD: 9.89 K/UL (ref 1.8–7.3)
PLATELET # BLD AUTO: 337 K/UL (ref 130–450)
PMV BLD AUTO: 10.7 FL (ref 7–12)
POTASSIUM SERPL-SCNC: 4.7 MMOL/L (ref 3.5–5)
PROCALCITONIN SERPL-MCNC: 0.12 NG/ML (ref 0–0.08)
PROT SERPL-MCNC: 7.2 G/DL (ref 6.4–8.3)
RBC # BLD AUTO: 3.91 M/UL (ref 3.5–5.5)
RBC # BLD: ABNORMAL 10*6/UL
S PNEUM AG SPEC QL: NEGATIVE
SODIUM SERPL-SCNC: 140 MMOL/L (ref 132–146)
SPECIMEN SOURCE: NORMAL
TIBC SERPL-MCNC: 342 UG/DL (ref 250–450)
WBC OTHER # BLD: 10.5 K/UL (ref 4.5–11.5)

## 2024-06-18 PROCEDURE — 93010 ELECTROCARDIOGRAM REPORT: CPT | Performed by: INTERNAL MEDICINE

## 2024-06-18 PROCEDURE — 82962 GLUCOSE BLOOD TEST: CPT

## 2024-06-18 PROCEDURE — 6370000000 HC RX 637 (ALT 250 FOR IP): Performed by: INTERNAL MEDICINE

## 2024-06-18 PROCEDURE — 83550 IRON BINDING TEST: CPT

## 2024-06-18 PROCEDURE — 84145 PROCALCITONIN (PCT): CPT

## 2024-06-18 PROCEDURE — 2580000003 HC RX 258: Performed by: INTERNAL MEDICINE

## 2024-06-18 PROCEDURE — 87449 NOS EACH ORGANISM AG IA: CPT

## 2024-06-18 PROCEDURE — 6360000002 HC RX W HCPCS: Performed by: INTERNAL MEDICINE

## 2024-06-18 PROCEDURE — 94640 AIRWAY INHALATION TREATMENT: CPT

## 2024-06-18 PROCEDURE — 83540 ASSAY OF IRON: CPT

## 2024-06-18 PROCEDURE — 87899 AGENT NOS ASSAY W/OPTIC: CPT

## 2024-06-18 PROCEDURE — 2140000000 HC CCU INTERMEDIATE R&B

## 2024-06-18 PROCEDURE — 82728 ASSAY OF FERRITIN: CPT

## 2024-06-18 PROCEDURE — 87040 BLOOD CULTURE FOR BACTERIA: CPT

## 2024-06-18 PROCEDURE — 80053 COMPREHEN METABOLIC PANEL: CPT

## 2024-06-18 PROCEDURE — 94660 CPAP INITIATION&MGMT: CPT

## 2024-06-18 PROCEDURE — 85025 COMPLETE CBC W/AUTO DIFF WBC: CPT

## 2024-06-18 PROCEDURE — 99222 1ST HOSP IP/OBS MODERATE 55: CPT | Performed by: INTERNAL MEDICINE

## 2024-06-18 RX ORDER — HYDRALAZINE HYDROCHLORIDE 10 MG/1
10 TABLET, FILM COATED ORAL EVERY 12 HOURS SCHEDULED
Status: DISCONTINUED | OUTPATIENT
Start: 2024-06-18 | End: 2024-06-21 | Stop reason: HOSPADM

## 2024-06-18 RX ORDER — SODIUM CHLORIDE 0.9 % (FLUSH) 0.9 %
5-40 SYRINGE (ML) INJECTION EVERY 12 HOURS SCHEDULED
Status: DISCONTINUED | OUTPATIENT
Start: 2024-06-18 | End: 2024-06-21 | Stop reason: HOSPADM

## 2024-06-18 RX ORDER — BUDESONIDE 0.5 MG/2ML
0.5 INHALANT ORAL
Status: DISCONTINUED | OUTPATIENT
Start: 2024-06-18 | End: 2024-06-21 | Stop reason: HOSPADM

## 2024-06-18 RX ORDER — ONDANSETRON 2 MG/ML
4 INJECTION INTRAMUSCULAR; INTRAVENOUS EVERY 6 HOURS PRN
Status: DISCONTINUED | OUTPATIENT
Start: 2024-06-18 | End: 2024-06-18

## 2024-06-18 RX ORDER — INSULIN LISPRO 100 [IU]/ML
0-4 INJECTION, SOLUTION INTRAVENOUS; SUBCUTANEOUS NIGHTLY
Status: DISCONTINUED | OUTPATIENT
Start: 2024-06-18 | End: 2024-06-21 | Stop reason: HOSPADM

## 2024-06-18 RX ORDER — ROSUVASTATIN CALCIUM 20 MG/1
40 TABLET, COATED ORAL NIGHTLY
Status: DISCONTINUED | OUTPATIENT
Start: 2024-06-18 | End: 2024-06-21 | Stop reason: HOSPADM

## 2024-06-18 RX ORDER — SODIUM CHLORIDE 0.9 % (FLUSH) 0.9 %
5-40 SYRINGE (ML) INJECTION PRN
Status: DISCONTINUED | OUTPATIENT
Start: 2024-06-18 | End: 2024-06-21 | Stop reason: HOSPADM

## 2024-06-18 RX ORDER — MONTELUKAST SODIUM 10 MG/1
10 TABLET ORAL NIGHTLY
Status: DISCONTINUED | OUTPATIENT
Start: 2024-06-18 | End: 2024-06-21 | Stop reason: HOSPADM

## 2024-06-18 RX ORDER — AZITHROMYCIN 250 MG/1
500 TABLET, FILM COATED ORAL DAILY
Status: COMPLETED | OUTPATIENT
Start: 2024-06-18 | End: 2024-06-20

## 2024-06-18 RX ORDER — OXYBUTYNIN CHLORIDE 5 MG/1
5 TABLET ORAL 3 TIMES DAILY
Status: DISCONTINUED | OUTPATIENT
Start: 2024-06-18 | End: 2024-06-21 | Stop reason: HOSPADM

## 2024-06-18 RX ORDER — IPRATROPIUM BROMIDE AND ALBUTEROL SULFATE 2.5; .5 MG/3ML; MG/3ML
1 SOLUTION RESPIRATORY (INHALATION)
Status: DISCONTINUED | OUTPATIENT
Start: 2024-06-18 | End: 2024-06-21 | Stop reason: HOSPADM

## 2024-06-18 RX ORDER — SENNA AND DOCUSATE SODIUM 50; 8.6 MG/1; MG/1
2 TABLET, FILM COATED ORAL 2 TIMES DAILY
Status: DISCONTINUED | OUTPATIENT
Start: 2024-06-18 | End: 2024-06-21 | Stop reason: HOSPADM

## 2024-06-18 RX ORDER — ACETAMINOPHEN 325 MG/1
650 TABLET ORAL EVERY 6 HOURS PRN
Status: DISCONTINUED | OUTPATIENT
Start: 2024-06-18 | End: 2024-06-21 | Stop reason: HOSPADM

## 2024-06-18 RX ORDER — ARFORMOTEROL TARTRATE 15 UG/2ML
15 SOLUTION RESPIRATORY (INHALATION)
Status: DISCONTINUED | OUTPATIENT
Start: 2024-06-18 | End: 2024-06-21 | Stop reason: HOSPADM

## 2024-06-18 RX ORDER — GLUCAGON 1 MG/ML
1 KIT INJECTION PRN
Status: DISCONTINUED | OUTPATIENT
Start: 2024-06-18 | End: 2024-06-21 | Stop reason: HOSPADM

## 2024-06-18 RX ORDER — ONDANSETRON 4 MG/1
4 TABLET, ORALLY DISINTEGRATING ORAL EVERY 8 HOURS PRN
Status: DISCONTINUED | OUTPATIENT
Start: 2024-06-18 | End: 2024-06-18

## 2024-06-18 RX ORDER — GABAPENTIN 300 MG/1
300 CAPSULE ORAL 3 TIMES DAILY
Status: DISCONTINUED | OUTPATIENT
Start: 2024-06-18 | End: 2024-06-21 | Stop reason: HOSPADM

## 2024-06-18 RX ORDER — PREDNISONE 20 MG/1
40 TABLET ORAL DAILY
Status: DISCONTINUED | OUTPATIENT
Start: 2024-06-18 | End: 2024-06-18

## 2024-06-18 RX ORDER — LOSARTAN POTASSIUM 50 MG/1
100 TABLET ORAL DAILY
Status: DISCONTINUED | OUTPATIENT
Start: 2024-06-18 | End: 2024-06-21 | Stop reason: HOSPADM

## 2024-06-18 RX ORDER — HYDROCHLOROTHIAZIDE 25 MG/1
25 TABLET ORAL DAILY
Status: DISCONTINUED | OUTPATIENT
Start: 2024-06-18 | End: 2024-06-21 | Stop reason: HOSPADM

## 2024-06-18 RX ORDER — POLYETHYLENE GLYCOL 3350 17 G/17G
17 POWDER, FOR SOLUTION ORAL 2 TIMES DAILY
Status: DISCONTINUED | OUTPATIENT
Start: 2024-06-18 | End: 2024-06-21 | Stop reason: HOSPADM

## 2024-06-18 RX ORDER — ACETAMINOPHEN 650 MG/1
650 SUPPOSITORY RECTAL EVERY 6 HOURS PRN
Status: DISCONTINUED | OUTPATIENT
Start: 2024-06-18 | End: 2024-06-21 | Stop reason: HOSPADM

## 2024-06-18 RX ORDER — PRAVASTATIN SODIUM 20 MG
80 TABLET ORAL NIGHTLY
Status: DISCONTINUED | OUTPATIENT
Start: 2024-06-18 | End: 2024-06-18

## 2024-06-18 RX ORDER — CLONIDINE HYDROCHLORIDE 0.2 MG/1
0.2 TABLET ORAL 3 TIMES DAILY
Status: DISCONTINUED | OUTPATIENT
Start: 2024-06-18 | End: 2024-06-21 | Stop reason: HOSPADM

## 2024-06-18 RX ORDER — LOSARTAN POTASSIUM AND HYDROCHLOROTHIAZIDE 25; 100 MG/1; MG/1
1 TABLET ORAL DAILY
Status: DISCONTINUED | OUTPATIENT
Start: 2024-06-18 | End: 2024-06-18

## 2024-06-18 RX ORDER — INSULIN LISPRO 100 [IU]/ML
0-8 INJECTION, SOLUTION INTRAVENOUS; SUBCUTANEOUS
Status: DISCONTINUED | OUTPATIENT
Start: 2024-06-18 | End: 2024-06-21 | Stop reason: HOSPADM

## 2024-06-18 RX ORDER — DEXTROSE MONOHYDRATE 100 MG/ML
INJECTION, SOLUTION INTRAVENOUS CONTINUOUS PRN
Status: DISCONTINUED | OUTPATIENT
Start: 2024-06-18 | End: 2024-06-21 | Stop reason: HOSPADM

## 2024-06-18 RX ORDER — ASPIRIN 81 MG/1
81 TABLET, CHEWABLE ORAL DAILY
Status: DISCONTINUED | OUTPATIENT
Start: 2024-06-18 | End: 2024-06-21 | Stop reason: HOSPADM

## 2024-06-18 RX ORDER — SODIUM CHLORIDE 9 MG/ML
INJECTION, SOLUTION INTRAVENOUS PRN
Status: DISCONTINUED | OUTPATIENT
Start: 2024-06-18 | End: 2024-06-21 | Stop reason: HOSPADM

## 2024-06-18 RX ORDER — ERGOCALCIFEROL 1.25 MG/1
50000 CAPSULE ORAL WEEKLY
Status: DISCONTINUED | OUTPATIENT
Start: 2024-06-24 | End: 2024-06-21 | Stop reason: HOSPADM

## 2024-06-18 RX ADMIN — ARFORMOTEROL TARTRATE 15 MCG: 15 SOLUTION RESPIRATORY (INHALATION) at 07:38

## 2024-06-18 RX ADMIN — SENNOSIDES AND DOCUSATE SODIUM 2 TABLET: 50; 8.6 TABLET ORAL at 21:28

## 2024-06-18 RX ADMIN — SODIUM CHLORIDE, PRESERVATIVE FREE 10 ML: 5 INJECTION INTRAVENOUS at 21:29

## 2024-06-18 RX ADMIN — OXYBUTYNIN CHLORIDE 5 MG: 5 TABLET ORAL at 22:54

## 2024-06-18 RX ADMIN — GABAPENTIN 300 MG: 300 CAPSULE ORAL at 07:59

## 2024-06-18 RX ADMIN — INSULIN LISPRO 4 UNITS: 100 INJECTION, SOLUTION INTRAVENOUS; SUBCUTANEOUS at 18:28

## 2024-06-18 RX ADMIN — LOSARTAN POTASSIUM 100 MG: 50 TABLET, FILM COATED ORAL at 07:58

## 2024-06-18 RX ADMIN — APIXABAN 5 MG: 5 TABLET, FILM COATED ORAL at 07:59

## 2024-06-18 RX ADMIN — HYDROCHLOROTHIAZIDE 25 MG: 25 TABLET ORAL at 07:59

## 2024-06-18 RX ADMIN — APIXABAN 5 MG: 5 TABLET, FILM COATED ORAL at 21:28

## 2024-06-18 RX ADMIN — ASPIRIN 81 MG 81 MG: 81 TABLET ORAL at 07:59

## 2024-06-18 RX ADMIN — INSULIN LISPRO 2 UNITS: 100 INJECTION, SOLUTION INTRAVENOUS; SUBCUTANEOUS at 09:40

## 2024-06-18 RX ADMIN — IPRATROPIUM BROMIDE AND ALBUTEROL SULFATE 1 DOSE: 2.5; .5 SOLUTION RESPIRATORY (INHALATION) at 15:11

## 2024-06-18 RX ADMIN — BUDESONIDE 500 MCG: 0.5 INHALANT RESPIRATORY (INHALATION) at 07:38

## 2024-06-18 RX ADMIN — OXYBUTYNIN CHLORIDE 5 MG: 5 TABLET ORAL at 10:03

## 2024-06-18 RX ADMIN — AZITHROMYCIN DIHYDRATE 500 MG: 250 TABLET ORAL at 07:59

## 2024-06-18 RX ADMIN — IPRATROPIUM BROMIDE AND ALBUTEROL SULFATE 1 DOSE: 2.5; .5 SOLUTION RESPIRATORY (INHALATION) at 11:33

## 2024-06-18 RX ADMIN — OXYBUTYNIN CHLORIDE 5 MG: 5 TABLET ORAL at 13:37

## 2024-06-18 RX ADMIN — IPRATROPIUM BROMIDE AND ALBUTEROL SULFATE 1 DOSE: 2.5; .5 SOLUTION RESPIRATORY (INHALATION) at 07:38

## 2024-06-18 RX ADMIN — BUDESONIDE 500 MCG: 0.5 INHALANT RESPIRATORY (INHALATION) at 20:17

## 2024-06-18 RX ADMIN — ARFORMOTEROL TARTRATE 15 MCG: 15 SOLUTION RESPIRATORY (INHALATION) at 20:17

## 2024-06-18 RX ADMIN — HYDRALAZINE HYDROCHLORIDE 10 MG: 10 TABLET, FILM COATED ORAL at 07:59

## 2024-06-18 RX ADMIN — IPRATROPIUM BROMIDE AND ALBUTEROL SULFATE 1 DOSE: 2.5; .5 SOLUTION RESPIRATORY (INHALATION) at 20:17

## 2024-06-18 RX ADMIN — SENNOSIDES AND DOCUSATE SODIUM 2 TABLET: 50; 8.6 TABLET ORAL at 07:58

## 2024-06-18 RX ADMIN — CLONIDINE HYDROCHLORIDE 0.2 MG: 0.2 TABLET ORAL at 21:29

## 2024-06-18 RX ADMIN — WATER 40 MG: 1 INJECTION INTRAMUSCULAR; INTRAVENOUS; SUBCUTANEOUS at 19:52

## 2024-06-18 RX ADMIN — GABAPENTIN 300 MG: 300 CAPSULE ORAL at 13:37

## 2024-06-18 RX ADMIN — MONTELUKAST 10 MG: 10 TABLET, FILM COATED ORAL at 21:29

## 2024-06-18 RX ADMIN — EMPAGLIFLOZIN 10 MG: 10 TABLET, FILM COATED ORAL at 10:03

## 2024-06-18 RX ADMIN — CLONIDINE HYDROCHLORIDE 0.2 MG: 0.2 TABLET ORAL at 13:36

## 2024-06-18 RX ADMIN — HYDRALAZINE HYDROCHLORIDE 10 MG: 10 TABLET, FILM COATED ORAL at 21:29

## 2024-06-18 RX ADMIN — GABAPENTIN 300 MG: 300 CAPSULE ORAL at 21:28

## 2024-06-18 RX ADMIN — PREDNISONE 40 MG: 20 TABLET ORAL at 07:59

## 2024-06-18 RX ADMIN — ROSUVASTATIN 40 MG: 20 TABLET, FILM COATED ORAL at 21:29

## 2024-06-18 RX ADMIN — CLONIDINE HYDROCHLORIDE 0.2 MG: 0.2 TABLET ORAL at 07:59

## 2024-06-18 RX ADMIN — WATER 1000 MG: 1 INJECTION INTRAMUSCULAR; INTRAVENOUS; SUBCUTANEOUS at 21:29

## 2024-06-18 RX ADMIN — SODIUM CHLORIDE, PRESERVATIVE FREE 10 ML: 5 INJECTION INTRAVENOUS at 19:52

## 2024-06-18 NOTE — H&P
[facility]   [facility]ORY AND [facility]       CHIEF COMPLAINT:  had concerns including Shortness of Breath (SOB x2 days, hx of COPD. 97% on room air at home. EMS gave 2 duonebs and 125mg solumedrol. ).     HISTORY OF PRESENT ILLNESS:     73-year-old female with known medical condition of coronary artery disease, heart failure with preserved ejection fraction, essential hypertension,, PE on Eliquis left bundle branch block, peripheral vascular disease, morbid obesity, diabetes mellitus, right breast ductal carcinoma in situ, complicated radiation and endocrine therapy presented to ED with chief concern of shortness of breath, productive cough, insomnia due to shortness of breath, started 1 days ago prior to presentation, is getting worse.  She is also complaining of constipation last bowel movement was 3 days ago, denies fever or chills, abdominal pain, nausea vomiting diarrhea. Upon presentation to the ER, routine labwork was performed which revealed normocytic anemia, troponin 13, , ABG reassuring, respiratory viral panel negative.  X-ray showed no acute process. EKG revealed normal sinus rhythm, left bundle branch block, chronic. Upon arrival to the ER, patient was short of breath. The patient received multiple doses of breathing treatment, magnesium, IV fluid bolus, in the emergency room and was admitted to [facility].    Discussed with ED physician    Informant for H&P: Patient and Medical Records     REVIEW OF SYSTEMS:  A comprehensive review of systems was negative except for: what is in the HPI    PHYSICAL EXAM:    General Appearance: alert and oriented to person, place and time and in no acute distress  Skin: warm and dry  HEENT: Decreased breath sound  Chest: clear to auscultation bilaterally- no wheezes, rales or rhonchi, normal air movement, no respiratory distress  Cardiovascular: normal rate, normal S1 and S2 and no carotid bruits  Abdomen: Soft distended  NPO  Code Status: Prior  DVT Prophylaxis    Additional work up or/and treatment plan may be added today or thereafter based on clinical progression, result of labs and imaging. I am managing admission portion of patient care. Some medical issues are handled by other specialists. Additional work up and treatment should be done by my colleague hospitalist who assumes care after 7 AM.     NOTE: This report was transcribed using voice recognition software. Every effort was made to ensure accuracy; however, inadvertent computerized transcription errors may be present.    Electronically signed by Gareth Bello MD on 6/18/2024 at 1:10 AM

## 2024-06-18 NOTE — ED PROVIDER NOTES
[facility] EMERGENCY DEPARTMENT  EMERGENCY DEPARTMENT ENCOUNTER        Pt Name: Fanny Gonzalez  MRN: [id number]  Birthdate 1950  Date of evaluation: 6/17/2024  Provider: Priya Reno MD  PCP: Babatunde Storm APRN - CNP  Note Started: 11:57 PM EDT 6/17/24    CHIEF COMPLAINT       Chief Complaint   Patient presents with    Shortness of Breath     SOB x2 days, hx of COPD. 97% on room air at home. EMS gave 2 duonebs and 125mg solumedrol.        HISTORY OF PRESENT ILLNESS: 1 or more Elements     Fanny Gonzalez is a 73 y.o. female who presents shortness of breath.  Patient states that for the past 2 days she has been having progressively worsening shortness of breath.  States that she is also been having a productive cough that is green and chills but unsure of any fevers.  Patient received 2 DuoNebs and 125 of Solu-Medrol per EMS.  Patient reports significant symptomatic improvement after breathing treatments. Denies any fever, n/v, headache, dizziness, vision changes, neck tenderness or stiffness, weakness, cp, palpitations, leg swelling/tenderness,abd pain, dysuria, hematuria, diarrhea, constipation, bloody stools.    Nursing Notes were all reviewed and agreed with or any disagreements were addressed in the HPI.    ROS:   Pertinent positives and negatives are stated within HPI, all other systems reviewed and are negative.      --------------------------------------------- PAST HISTORY ---------------------------------------------  Past Medical History:  has a past medical history of Arthritis, Asthma, BRCA1 negative, BRCA2 negative, Breast cancer (HCC), CAD in native artery, Cancer (HCC), Cerebral artery occlusion with cerebral infarction (HCC), Cerebrovascular disease, CHF (congestive heart failure) ([facility]), Claustrophobia, COPD (chronic obstructive pulmonary disease) ([facility]), Decreased dorsalis pedis pulse, Dermatophytosis, Headache(784.0), History of blood transfusion,  emergency provider has spoken with the patient and discussed today’s results, in addition to providing specific details for the plan of care and counseling regarding the diagnosis and prognosis.  Questions are answered at this time and they are agreeable with the plan.       --------------------------------- IMPRESSION AND DISPOSITION ---------------------------------    IMPRESSION  1. COPD exacerbation (HCC)    2. Pneumonia of right lower lobe due to infectious organism        DISPOSITION  Disposition: Admit to telemetry  Patient condition is stable        NOTE: This report was transcribed using voice recognition software. Every effort was made to ensure accuracy; however, inadvertent computerized transcription errors may be present

## 2024-06-18 NOTE — PROGRESS NOTES
4 Eyes Skin Assessment     NAME:  Fanny Gonzalez  YOB: 1950  MEDICAL RECORD NUMBER:  [id number]    The patient is being assessed for  Admission    I agree that at least one RN has performed a thorough Head to Toe Skin Assessment on the patient. ALL assessment sites listed below have been assessed.      Areas assessed by both nurses:    Head, Face, Ears, Shoulders, Back, Chest, Arms, Elbows, Hands, Sacrum. Buttock, Coccyx, Ischium, Legs. Feet and Heels, and Under Medical Devices         Does the Patient have a Wound? No noted wound(s)       Surendra Prevention initiated by RN: Yes  Wound Care Orders initiated by RN: No    Pressure Injury (Stage 3,4, Unstageable, DTI, NWPT, and Complex wounds) if present, place Wound referral order by RN under : No    New Ostomies, if present place, Ostomy referral order under : No     Nurse 1 eSignature: Electronically signed by Gege Monaco RN on 6/18/24 at 4:36 PM EDT    **SHARE this note so that the co-signing nurse can place an eSignature**    Nurse 2 eSignature: Electronically signed by Kat Snyder RN on 6/18/24 at 6:57 PM EDT

## 2024-06-18 NOTE — PLAN OF CARE
Patient seen and examined.  Continues to feel short of breath, not on supplemental oxygen.  She states shortness of breath is worse on minimal activity.  Continue nebs, change prednisone to IV Solu-Medrol while inpatient.  Empiric antibiotics.

## 2024-06-18 NOTE — PROGRESS NOTES
06/17/24 2255   NIV Type   $NIV $Daily Charge   NIV Started/Stopped On   Equipment Type v60   Mode Bilevel   Mask Type Full face mask   Mask Size Medium   Bonnet size Medium   Assessment   Pulse 95   Respirations 23   SpO2 100 %   Breath Sounds   Breath Sounds Bilateral Diminished;Expiratory wheezing   Right Upper Lobe Diminished;Expiratory wheezes   Right Middle Lobe Diminished;Expiratory wheezes   Right Lower Lobe Diminished;Expiratory wheezes   Left Upper Lobe Diminished;Expiratory wheezes   Left Lower Lobe Diminished;Expiratory wheezes   Settings/Measurements   PIP Observed 15 cm H20   IPAP 15 cmH20   CPAP/EPAP 8 cmH2O   Vt (Measured) 477 mL   Rate Ordered 20   Insp Rise Time (%) 3 %   FiO2  40 %   I Time/ I Time % 0.8 s   Minute Volume (L/min) 8 Liters   Mask Leak (lpm) 37 lpm   Patient's Home Machine No   Alarm Settings   Apnea (secs) 20 secs     Date: 6/17/2024    Time: 10:58 PM    Patient Placed On BIPAP/CPAP/ Non-Invasive Ventilation?  Yes    If no must comment.  Facial area red/color change? No           If YES are Blister/Lesion present?No   If yes must notify nursing staff  BIPAP/CPAP skin barrier?  Yes    Skin barrier type:mepilexlite       Comments:        Gege Curry RCP

## 2024-06-19 LAB
ANION GAP SERPL CALCULATED.3IONS-SCNC: 12 MMOL/L (ref 7–16)
BASOPHILS # BLD: 0.01 K/UL (ref 0–0.2)
BASOPHILS NFR BLD: 0 % (ref 0–2)
BUN SERPL-MCNC: 30 MG/DL (ref 6–23)
CALCIUM SERPL-MCNC: 9.3 MG/DL (ref 8.6–10.2)
CHLORIDE SERPL-SCNC: 98 MMOL/L (ref 98–107)
CO2 SERPL-SCNC: 24 MMOL/L (ref 22–29)
CREAT SERPL-MCNC: 1.2 MG/DL (ref 0.5–1)
EOSINOPHIL # BLD: 0 K/UL (ref 0.05–0.5)
EOSINOPHILS RELATIVE PERCENT: 0 % (ref 0–6)
ERYTHROCYTE [DISTWIDTH] IN BLOOD BY AUTOMATED COUNT: 16.6 % (ref 11.5–15)
GFR, ESTIMATED: 49 ML/MIN/1.73M2
GLUCOSE BLD-MCNC: 204 MG/DL (ref 74–99)
GLUCOSE BLD-MCNC: 243 MG/DL (ref 74–99)
GLUCOSE BLD-MCNC: 291 MG/DL (ref 74–99)
GLUCOSE BLD-MCNC: 296 MG/DL (ref 74–99)
GLUCOSE SERPL-MCNC: 238 MG/DL (ref 74–99)
HCT VFR BLD AUTO: 35.3 % (ref 34–48)
HGB BLD-MCNC: 10.9 G/DL (ref 11.5–15.5)
IMM GRANULOCYTES # BLD AUTO: 0.06 K/UL (ref 0–0.58)
IMM GRANULOCYTES NFR BLD: 0 % (ref 0–5)
LYMPHOCYTES NFR BLD: 0.58 K/UL (ref 1.5–4)
LYMPHOCYTES RELATIVE PERCENT: 4 % (ref 20–42)
MCH RBC QN AUTO: 26.3 PG (ref 26–35)
MCHC RBC AUTO-ENTMCNC: 30.9 G/DL (ref 32–34.5)
MCV RBC AUTO: 85.1 FL (ref 80–99.9)
MONOCYTES NFR BLD: 0.56 K/UL (ref 0.1–0.95)
MONOCYTES NFR BLD: 4 % (ref 2–12)
NEUTROPHILS NFR BLD: 91 % (ref 43–80)
NEUTS SEG NFR BLD: 12.37 K/UL (ref 1.8–7.3)
PLATELET # BLD AUTO: 366 K/UL (ref 130–450)
PMV BLD AUTO: 11 FL (ref 7–12)
POTASSIUM SERPL-SCNC: 5 MMOL/L (ref 3.5–5)
RBC # BLD AUTO: 4.15 M/UL (ref 3.5–5.5)
RBC # BLD: ABNORMAL 10*6/UL
SODIUM SERPL-SCNC: 134 MMOL/L (ref 132–146)
WBC OTHER # BLD: 13.6 K/UL (ref 4.5–11.5)

## 2024-06-19 PROCEDURE — 2580000003 HC RX 258: Performed by: INTERNAL MEDICINE

## 2024-06-19 PROCEDURE — 6370000000 HC RX 637 (ALT 250 FOR IP): Performed by: INTERNAL MEDICINE

## 2024-06-19 PROCEDURE — 99232 SBSQ HOSP IP/OBS MODERATE 35: CPT | Performed by: INTERNAL MEDICINE

## 2024-06-19 PROCEDURE — 94640 AIRWAY INHALATION TREATMENT: CPT

## 2024-06-19 PROCEDURE — 82962 GLUCOSE BLOOD TEST: CPT

## 2024-06-19 PROCEDURE — 85025 COMPLETE CBC W/AUTO DIFF WBC: CPT

## 2024-06-19 PROCEDURE — 6360000002 HC RX W HCPCS: Performed by: INTERNAL MEDICINE

## 2024-06-19 PROCEDURE — 80048 BASIC METABOLIC PNL TOTAL CA: CPT

## 2024-06-19 PROCEDURE — 1200000000 HC SEMI PRIVATE

## 2024-06-19 PROCEDURE — 94660 CPAP INITIATION&MGMT: CPT

## 2024-06-19 PROCEDURE — 36415 COLL VENOUS BLD VENIPUNCTURE: CPT

## 2024-06-19 RX ORDER — BENZONATATE 100 MG/1
100 CAPSULE ORAL 3 TIMES DAILY PRN
Status: DISCONTINUED | OUTPATIENT
Start: 2024-06-19 | End: 2024-06-21 | Stop reason: HOSPADM

## 2024-06-19 RX ORDER — FAMOTIDINE 20 MG/1
20 TABLET, FILM COATED ORAL DAILY
Status: DISCONTINUED | OUTPATIENT
Start: 2024-06-19 | End: 2024-06-21 | Stop reason: HOSPADM

## 2024-06-19 RX ORDER — INSULIN GLARGINE 100 [IU]/ML
5 INJECTION, SOLUTION SUBCUTANEOUS NIGHTLY
Status: DISCONTINUED | OUTPATIENT
Start: 2024-06-19 | End: 2024-06-21 | Stop reason: HOSPADM

## 2024-06-19 RX ORDER — FAMOTIDINE 20 MG/1
20 TABLET, FILM COATED ORAL 2 TIMES DAILY
Status: DISCONTINUED | OUTPATIENT
Start: 2024-06-19 | End: 2024-06-19 | Stop reason: DRUGHIGH

## 2024-06-19 RX ADMIN — HYDRALAZINE HYDROCHLORIDE 10 MG: 10 TABLET, FILM COATED ORAL at 20:34

## 2024-06-19 RX ADMIN — AZITHROMYCIN DIHYDRATE 500 MG: 250 TABLET ORAL at 10:56

## 2024-06-19 RX ADMIN — BUDESONIDE 500 MCG: 0.5 INHALANT RESPIRATORY (INHALATION) at 07:43

## 2024-06-19 RX ADMIN — SODIUM CHLORIDE, PRESERVATIVE FREE 10 ML: 5 INJECTION INTRAVENOUS at 20:37

## 2024-06-19 RX ADMIN — INSULIN LISPRO 4 UNITS: 100 INJECTION, SOLUTION INTRAVENOUS; SUBCUTANEOUS at 12:37

## 2024-06-19 RX ADMIN — ACETAMINOPHEN 650 MG: 325 TABLET ORAL at 10:57

## 2024-06-19 RX ADMIN — HYDRALAZINE HYDROCHLORIDE 10 MG: 10 TABLET, FILM COATED ORAL at 08:09

## 2024-06-19 RX ADMIN — IPRATROPIUM BROMIDE AND ALBUTEROL SULFATE 1 DOSE: 2.5; .5 SOLUTION RESPIRATORY (INHALATION) at 12:16

## 2024-06-19 RX ADMIN — OXYBUTYNIN CHLORIDE 5 MG: 5 TABLET ORAL at 08:14

## 2024-06-19 RX ADMIN — BENZONATATE 100 MG: 100 CAPSULE ORAL at 10:56

## 2024-06-19 RX ADMIN — WATER 1000 MG: 1 INJECTION INTRAMUSCULAR; INTRAVENOUS; SUBCUTANEOUS at 20:35

## 2024-06-19 RX ADMIN — LOSARTAN POTASSIUM 100 MG: 50 TABLET, FILM COATED ORAL at 08:09

## 2024-06-19 RX ADMIN — IPRATROPIUM BROMIDE AND ALBUTEROL SULFATE 1 DOSE: 2.5; .5 SOLUTION RESPIRATORY (INHALATION) at 07:43

## 2024-06-19 RX ADMIN — SENNOSIDES AND DOCUSATE SODIUM 2 TABLET: 50; 8.6 TABLET ORAL at 08:09

## 2024-06-19 RX ADMIN — MONTELUKAST 10 MG: 10 TABLET, FILM COATED ORAL at 20:35

## 2024-06-19 RX ADMIN — INSULIN LISPRO 2 UNITS: 100 INJECTION, SOLUTION INTRAVENOUS; SUBCUTANEOUS at 08:10

## 2024-06-19 RX ADMIN — APIXABAN 5 MG: 5 TABLET, FILM COATED ORAL at 08:09

## 2024-06-19 RX ADMIN — HYDROCHLOROTHIAZIDE 25 MG: 25 TABLET ORAL at 08:09

## 2024-06-19 RX ADMIN — OXYBUTYNIN CHLORIDE 5 MG: 5 TABLET ORAL at 14:10

## 2024-06-19 RX ADMIN — CLONIDINE HYDROCHLORIDE 0.2 MG: 0.2 TABLET ORAL at 20:35

## 2024-06-19 RX ADMIN — FAMOTIDINE 20 MG: 20 TABLET, FILM COATED ORAL at 12:37

## 2024-06-19 RX ADMIN — GABAPENTIN 300 MG: 300 CAPSULE ORAL at 08:09

## 2024-06-19 RX ADMIN — ROSUVASTATIN 40 MG: 20 TABLET, FILM COATED ORAL at 20:35

## 2024-06-19 RX ADMIN — GABAPENTIN 300 MG: 300 CAPSULE ORAL at 14:10

## 2024-06-19 RX ADMIN — IPRATROPIUM BROMIDE AND ALBUTEROL SULFATE 1 DOSE: 2.5; .5 SOLUTION RESPIRATORY (INHALATION) at 19:35

## 2024-06-19 RX ADMIN — WATER 40 MG: 1 INJECTION INTRAMUSCULAR; INTRAVENOUS; SUBCUTANEOUS at 20:36

## 2024-06-19 RX ADMIN — WATER 40 MG: 1 INJECTION INTRAMUSCULAR; INTRAVENOUS; SUBCUTANEOUS at 08:09

## 2024-06-19 RX ADMIN — ARFORMOTEROL TARTRATE 15 MCG: 15 SOLUTION RESPIRATORY (INHALATION) at 07:43

## 2024-06-19 RX ADMIN — APIXABAN 5 MG: 5 TABLET, FILM COATED ORAL at 20:35

## 2024-06-19 RX ADMIN — CLONIDINE HYDROCHLORIDE 0.2 MG: 0.2 TABLET ORAL at 08:09

## 2024-06-19 RX ADMIN — OXYBUTYNIN CHLORIDE 5 MG: 5 TABLET ORAL at 20:37

## 2024-06-19 RX ADMIN — INSULIN GLARGINE 5 UNITS: 100 INJECTION, SOLUTION SUBCUTANEOUS at 20:34

## 2024-06-19 RX ADMIN — GABAPENTIN 300 MG: 300 CAPSULE ORAL at 20:35

## 2024-06-19 RX ADMIN — EMPAGLIFLOZIN 10 MG: 10 TABLET, FILM COATED ORAL at 08:09

## 2024-06-19 RX ADMIN — SODIUM CHLORIDE, PRESERVATIVE FREE 10 ML: 5 INJECTION INTRAVENOUS at 08:10

## 2024-06-19 RX ADMIN — CLONIDINE HYDROCHLORIDE 0.2 MG: 0.2 TABLET ORAL at 14:10

## 2024-06-19 RX ADMIN — BUDESONIDE 500 MCG: 0.5 INHALANT RESPIRATORY (INHALATION) at 19:36

## 2024-06-19 RX ADMIN — ARFORMOTEROL TARTRATE 15 MCG: 15 SOLUTION RESPIRATORY (INHALATION) at 19:36

## 2024-06-19 RX ADMIN — ASPIRIN 81 MG 81 MG: 81 TABLET ORAL at 08:09

## 2024-06-19 RX ADMIN — INSULIN LISPRO 4 UNITS: 100 INJECTION, SOLUTION INTRAVENOUS; SUBCUTANEOUS at 17:30

## 2024-06-19 RX ADMIN — IPRATROPIUM BROMIDE AND ALBUTEROL SULFATE 1 DOSE: 2.5; .5 SOLUTION RESPIRATORY (INHALATION) at 15:35

## 2024-06-19 ASSESSMENT — PAIN SCALES - GENERAL
PAINLEVEL_OUTOF10: 2

## 2024-06-19 ASSESSMENT — PAIN DESCRIPTION - LOCATION
LOCATION: OTHER (COMMENT)
LOCATION: RIB CAGE
LOCATION: CHEST

## 2024-06-19 ASSESSMENT — PAIN DESCRIPTION - DESCRIPTORS
DESCRIPTORS: SORE
DESCRIPTORS: DISCOMFORT
DESCRIPTORS: DISCOMFORT

## 2024-06-19 NOTE — PROGRESS NOTES
Spoke with patient regarding bipap at this time. She states that she does not wear at home, and does not feel like she is in distress or need of it any longer. Unit remains at bedside.

## 2024-06-19 NOTE — ACP (ADVANCE CARE PLANNING)
Advance Care Planning   Healthcare Decision Maker:    Primary Decision Maker: Tracee Gonzalez - Child - [phone]    Today we documented Decision Maker(s) consistent with Legal Next of Kin hierarchy.    Electronically signed by MARLENA Ibanez on 6/19/2024 at 1:07 PM

## 2024-06-19 NOTE — PLAN OF CARE
Problem: Safety - Adult  Goal: Free from fall injury  6/19/2024 0828 by Bhavna Simpson RN  Outcome: Progressing  6/19/2024 0000 by Leyda Geiger RN  Outcome: Progressing     Problem: Chronic Conditions and Co-morbidities  Goal: Patient's chronic conditions and co-morbidity symptoms are monitored and maintained or improved  6/19/2024 0828 by Bhavna Simpson RN  Outcome: Progressing  6/19/2024 0000 by Leyda Geiger RN  Outcome: Progressing     Problem: Discharge Planning  Goal: Discharge to home or other facility with appropriate resources  6/19/2024 0000 by Leyda Geiger RN  Outcome: Progressing

## 2024-06-19 NOTE — PLAN OF CARE
Problem: Safety - Adult  Goal: Free from fall injury  Outcome: Progressing     Problem: Chronic Conditions and Co-morbidities  Goal: Patient's chronic conditions and co-morbidity symptoms are monitored and maintained or improved  Outcome: Progressing     Problem: Discharge Planning  Goal: Discharge to home or other facility with appropriate resources  Outcome: Progressing     Problem: ABCDS Injury Assessment  Goal: Absence of physical injury  Outcome: Progressing     Problem: Skin/Tissue Integrity  Goal: Absence of new skin breakdown  Description: 1.  Monitor for areas of redness and/or skin breakdown  2.  Assess vascular access sites hourly  3.  Every 4-6 hours minimum:  Change oxygen saturation probe site  4.  Every 4-6 hours:  If on nasal continuous positive airway pressure, respiratory therapy assess nares and determine need for appliance change or resting period.  Outcome: Progressing     Problem: Pain  Goal: Verbalizes/displays adequate comfort level or baseline comfort level  Outcome: Progressing

## 2024-06-19 NOTE — PROGRESS NOTES
Pharmacy Note - Renal dose adjustment made per P/T protocol    Original order:  Famotidine 20 mg PO BID    Estimated Creatinine Clearance: 49 mL/min (A) (based on SCr of 1.2 mg/dL (H)).    Recent Labs     06/17/24 1956 06/18/24  0527 06/19/24  0411   BUN 11 15 30*   CREATININE 1.3* 1.2* 1.2*       Renally adjusted order:  Famotidine 20 mg PO daily    Please call pharmacy with any questions.    Thank you,  Regi Deleon [facility]  6/19/2024 10:28 AM

## 2024-06-19 NOTE — CARE COORDINATION
Patient presented to the ED from home due to shortness of breath; admitted for dyspnea, COPD exacerbation and pneumonia of right lower lobe. Met with patient at bedside for transition of care planning. Patient reports she lives alone in a 10th floor apartment, has elevator access, ambulates with a walker or cane when out and ambulates without a device in the home, drives; has a shower chair. Uses [facility] ([place]) and PCP is DR. Babatunde Storm at [facility]. Patient reports her son lives in [place] and her daughter resides in [place]. Patient reports needing home care services, offered list; patient declined list and ok with any agency. Discussed referral for [facility] due to patient voicing needing assist around the house. Patient currently on room air, if oxygen is needed at discharge she has no preference on DME company; DME list offered. Patient plans to return home and family to transport. Patient currently on IV Rocephin Q24 and IV Solumedrol Q12. Referral made to [facility] for skilled nursing/PT/OT; she will review and follow-up regarding acceptance. Referral for [facility] to be made once patient has been seen by PT/OT.    1:05P  Received a message from [facility] at [facility] and they are able to accept as long as patient does not go home on IV abx; start of care to begin on Sunday.    Case Management Assessment  Initial Evaluation    Date/Time of Evaluation: 6/19/2024 12:58 PM  Assessment Completed by: MARLENA Ibanez    If patient is discharged prior to next notation, then this note serves as note for discharge by case management.    Patient Name: Fanny Gonzalez                   YOB: 1950  Diagnosis: Dyspnea [R06.00]  COPD exacerbation (HCC) [J44.1]  COPD with acute exacerbation (HCC) [J44.1]  Pneumonia of right lower lobe due to infectious organism [J18.9]                   Date / Time: 6/17/2024  7:28 PM    Patient Admission Status: Inpatient

## 2024-06-19 NOTE — PROGRESS NOTES
4 Eyes Skin Assessment     NAME:  Fanny Gonzalez  YOB: 1950  MEDICAL RECORD NUMBER:  [id number]    The patient is being assessed for  Transfer to New Unit    I agree that at least one RN has performed a thorough Head to Toe Skin Assessment on the patient. ALL assessment sites listed below have been assessed.      Areas assessed by both nurses:    Head, Face, Ears, Shoulders, Back, Chest, Arms, Elbows, Hands, Sacrum. Buttock, Coccyx, Ischium, and Legs. Feet and Heels        Does the Patient have a Wound? No noted wound(s)       -surgical scar mid lower abdomen from hysterectomy   - skin folds are dry and no rednesss or rashes noted    Surendra Prevention initiated by RN: Yes  Wound Care Orders initiated by RN: No    Pressure Injury (Stage 3,4, Unstageable, DTI, NWPT, and Complex wounds) if present, place Wound referral order by RN under : No    New Ostomies, if present place, Ostomy referral order under : No     Nurse 1 eSignature: Electronically signed by Evita Sin RN on 6/19/24 at 5:10 PM EDT    **SHARE this note so that the co-signing nurse can place an eSignature**    Nurse 2 eSignature: Electronically signed by Jodi Schafer RN on 6/19/24 at 5:29 PM EDT

## 2024-06-19 NOTE — PROGRESS NOTES
[facility] Hospitalist Progress Note    Admitting Date and Time: 6/17/2024  7:28 PM  Admit Dx: Dyspnea [R06.00]  COPD exacerbation (HCC) [J44.1]  COPD with acute exacerbation (HCC) [J44.1]  Pneumonia of right lower lobe due to infectious organism [J18.9]    Synopsis: Patient is a 72-year-old lady with past medical history of CAD, COPD, HFpEF, hypertension, PE on Eliquis, diabetes mellitus, history of right ductal breast carcinoma in situ.  She presented to ED with chief complaint of shortness of breath, cough.  Respiratory viral panel negative, x-ray showed no acute process.  She was admitted to hospital service for COPD exacerbation.    Subjective:  Patient is being followed for Dyspnea [R06.00]  COPD exacerbation (HCC) [J44.1]  COPD with acute exacerbation (HCC) [J44.1]  Pneumonia of right lower lobe due to infectious organism [J18.9]   Pt feels she is coughing more, remains shortness of breath on ambulation.  Not needing supplemental oxygen.  Has had bowel movement overnight  Per RN: No overnight events reported    ROS: denies fever, chills, cp, sob, n/v, HA unless stated above.      apixaban  5 mg Oral BID    aspirin  81 mg Oral Daily    cloNIDine  0.2 mg Oral TID    [START ON 6/24/2024] vitamin D  50,000 Units Oral Weekly    gabapentin  300 mg Oral TID    hydrALAZINE  10 mg Oral 2 times per day    empagliflozin  10 mg Oral Daily    oxyBUTYnin  5 mg Oral TID    montelukast  10 mg Oral Nightly    sodium chloride flush  5-40 mL IntraVENous 2 times per day    polyethylene glycol  17 g Oral BID    cefTRIAXone (ROCEPHIN) IV  1,000 mg IntraVENous Q24H    azithromycin  500 mg Oral Daily    sennosides-docusate sodium  2 tablet Oral BID    ipratropium 0.5 mg-albuterol 2.5 mg  1 Dose Inhalation Q4H WA RT    arformoterol tartrate  15 mcg Nebulization BID RT    budesonide  0.5 mg Nebulization BID RT    insulin lispro  0-8 Units SubCUTAneous TID WC    insulin lispro  0-4 Units SubCUTAneous Nightly    rosuvastatin   2 diabetes mellitus with hyperglycemia, with long-term current use of insulin (HCC)    Hyperlipidemia with target LDL less than 70    Ductal carcinoma in situ (DCIS) of breast    LBBB (left bundle branch block)    Morbid obesity with BMI of 40.0-44.9, adult (HCC)    Dyspnea    COPD with acute exacerbation (HCC)  Resolved Problems:    * No resolved hospital problems. *      Plan:    COPD exacerbation  Remains significantly short of breath on ambulation.  Not needing supplemental oxygen  Continue  Brovana, Pulmicort, DuoNebs.  Continue IV Solu-Medrol.  Empirically started on ceftriaxone and azithromycin  on admission, has been continued.    Hypertension  Home clonidine, hydralazine, losartan/CTC resumed    Diabetes mellitus  Insulin sliding scale  Blood sugar above goal while on steroids, add Lantus 5 units tonight.    Constipation  Resolved    Leukocytosis  Likely reactive in setting of IV steroids.  Afebrile.  Daily CBC    History of PE  Eliquis resumed    HFpEF  Euvolemic  Home meds restarted.    PUD prophylaxis  Pepcid      DVT prophylaxis.  Eliquis      NOTE: This report was transcribed using voice recognition software. Every effort was made to ensure accuracy; however, inadvertent computerized transcription errors may be present.  Electronically signed by Kelle Lawrence MD on 6/19/2024 at 10:20 AM

## 2024-06-19 NOTE — PROGRESS NOTES
Comprehensive Nutrition Assessment    Type and Reason for Visit:  Initial, Positive Nutrition Screen    Nutrition Recommendations/Plan:   Recommend and start Glucerna supplement BID and Gelatein high protein supplement daily to help meet nutritional needs.           Malnutrition Assessment:  Malnutrition Status:  At risk for malnutrition (Comment) (06/19/24 1116)    Context:  Acute Illness     Findings of the 6 clinical characteristics of malnutrition:  Energy Intake:  Mild decrease in energy intake (Comment) (since admission)  Weight Loss:  Unable to assess (d/t poor weight history)     Body Fat Loss:  Unable to assess     Muscle Mass Loss:  Unable to assess    Fluid Accumulation:  No significant fluid accumulation     Strength:  Not Performed    Nutrition Assessment:    Patient adm w/ SOB and cough ; noted PNA and COPD exacerbation ; noted acute respiratory failure ; hx of DM/COPD/CVA/breast CA ; hx of CAD/CHF/PVD/LBBB ; noted decreased po intake/appetite PTA ; will start ONS    Nutrition Related Findings:    I&Os WNL, trace edema, A&O x 4, U/L dentures, active BS, constipation PTA, obesity ; Wound Type: None       Current Nutrition Intake & Therapies:    Average Meal Intake: 51-75% (no meals recorded in flowsheets PTA ; noted decreased appetite PTA)     ADULT DIET; Regular; 4 carb choices (60 gm/meal)  ADULT ORAL NUTRITION SUPPLEMENT; Breakfast, Lunch, Dinner; Diabetic Oral Supplement    Anthropometric Measures:  Height: 165.1 cm (5' 5\")  Ideal Body Weight (IBW): 125 lbs (57 kg)       Current Body Weight: 100.7 kg (222 lb) (6/17, stated), 177.6 % IBW. Weight Source: Stated  Current BMI (kg/m2): 36.9  Usual Body Weight:  (UTO ; poor actual weight history)                       BMI Categories: Obese Class 2 (BMI 35.0 -39.9)    Estimated Daily Nutrient Needs:  Energy Requirements Based On: Formula  Weight Used for Energy Requirements: Current  Energy (kcal/day): 2291-3476 (REE 1515 x 1.2 SF)  Weight Used for  Protein Requirements: Ideal  Protein (g/day):  (1.5-1.8g/kg IBW)  Method Used for Fluid Requirements: 1 ml/kcal  Fluid (ml/day): 5575-5123    Nutrition Diagnosis:   Inadequate oral intake related to inadequate protein-energy intake (2/2 lack of appetite PTA) as evidenced by poor intake prior to admission, intake 51-75%    Nutrition Interventions:   Food and/or Nutrient Delivery: Continue Current Diet, Start Oral Nutrition Supplement, Modify Oral Nutrition Supplement  Nutrition Education/Counseling: Education not indicated  Coordination of Nutrition Care: Continue to monitor while inpatient       Goals:  Previous Goal Met: Progressing toward Goal(s)  Goals: Meet at least 75% of estimated needs, by next RD assessment       Nutrition Monitoring and Evaluation:   Behavioral-Environmental Outcomes: None Identified  Food/Nutrient Intake Outcomes: Food and Nutrient Intake, Supplement Intake  Physical Signs/Symptoms Outcomes: Chewing or Swallowing, Biochemical Data, GI Status, Fluid Status or Edema, Hemodynamic Status, Meal Time Behavior, Skin, Nutrition Focused Physical Findings, Weight, Constipation    Discharge Planning:    Too soon to determine     Sam Montalvo RD, LD  Contact: [phone]

## 2024-06-20 LAB
ANION GAP SERPL CALCULATED.3IONS-SCNC: 12 MMOL/L (ref 7–16)
BASOPHILS # BLD: 0.02 K/UL (ref 0–0.2)
BASOPHILS NFR BLD: 0 % (ref 0–2)
BUN SERPL-MCNC: 35 MG/DL (ref 6–23)
CALCIUM SERPL-MCNC: 9.2 MG/DL (ref 8.6–10.2)
CHLORIDE SERPL-SCNC: 98 MMOL/L (ref 98–107)
CO2 SERPL-SCNC: 25 MMOL/L (ref 22–29)
CREAT SERPL-MCNC: 1.2 MG/DL (ref 0.5–1)
EOSINOPHIL # BLD: 0 K/UL (ref 0.05–0.5)
EOSINOPHILS RELATIVE PERCENT: 0 % (ref 0–6)
ERYTHROCYTE [DISTWIDTH] IN BLOOD BY AUTOMATED COUNT: 16.9 % (ref 11.5–15)
GFR, ESTIMATED: 48 ML/MIN/1.73M2
GLUCOSE BLD-MCNC: 232 MG/DL (ref 74–99)
GLUCOSE BLD-MCNC: 233 MG/DL (ref 74–99)
GLUCOSE BLD-MCNC: 244 MG/DL (ref 74–99)
GLUCOSE BLD-MCNC: 304 MG/DL (ref 74–99)
GLUCOSE BLD-MCNC: 310 MG/DL (ref 74–99)
GLUCOSE SERPL-MCNC: 300 MG/DL (ref 74–99)
HCT VFR BLD AUTO: 36.4 % (ref 34–48)
HGB BLD-MCNC: 11.2 G/DL (ref 11.5–15.5)
IMM GRANULOCYTES # BLD AUTO: 0.07 K/UL (ref 0–0.58)
IMM GRANULOCYTES NFR BLD: 1 % (ref 0–5)
LYMPHOCYTES NFR BLD: 0.76 K/UL (ref 1.5–4)
LYMPHOCYTES RELATIVE PERCENT: 5 % (ref 20–42)
MCH RBC QN AUTO: 26.5 PG (ref 26–35)
MCHC RBC AUTO-ENTMCNC: 30.8 G/DL (ref 32–34.5)
MCV RBC AUTO: 86.3 FL (ref 80–99.9)
MONOCYTES NFR BLD: 0.86 K/UL (ref 0.1–0.95)
MONOCYTES NFR BLD: 6 % (ref 2–12)
NEUTROPHILS NFR BLD: 89 % (ref 43–80)
NEUTS SEG NFR BLD: 13.56 K/UL (ref 1.8–7.3)
PLATELET # BLD AUTO: 347 K/UL (ref 130–450)
PMV BLD AUTO: 11 FL (ref 7–12)
POTASSIUM SERPL-SCNC: 5 MMOL/L (ref 3.5–5)
RBC # BLD AUTO: 4.22 M/UL (ref 3.5–5.5)
SODIUM SERPL-SCNC: 135 MMOL/L (ref 132–146)
WBC OTHER # BLD: 15.3 K/UL (ref 4.5–11.5)

## 2024-06-20 PROCEDURE — 2580000003 HC RX 258: Performed by: INTERNAL MEDICINE

## 2024-06-20 PROCEDURE — 97535 SELF CARE MNGMENT TRAINING: CPT

## 2024-06-20 PROCEDURE — 6370000000 HC RX 637 (ALT 250 FOR IP): Performed by: INTERNAL MEDICINE

## 2024-06-20 PROCEDURE — 6360000002 HC RX W HCPCS: Performed by: INTERNAL MEDICINE

## 2024-06-20 PROCEDURE — 94640 AIRWAY INHALATION TREATMENT: CPT

## 2024-06-20 PROCEDURE — 36415 COLL VENOUS BLD VENIPUNCTURE: CPT

## 2024-06-20 PROCEDURE — 1200000000 HC SEMI PRIVATE

## 2024-06-20 PROCEDURE — 80048 BASIC METABOLIC PNL TOTAL CA: CPT

## 2024-06-20 PROCEDURE — 94660 CPAP INITIATION&MGMT: CPT

## 2024-06-20 PROCEDURE — 99232 SBSQ HOSP IP/OBS MODERATE 35: CPT | Performed by: INTERNAL MEDICINE

## 2024-06-20 PROCEDURE — 97165 OT EVAL LOW COMPLEX 30 MIN: CPT

## 2024-06-20 PROCEDURE — 85025 COMPLETE CBC W/AUTO DIFF WBC: CPT

## 2024-06-20 PROCEDURE — 82962 GLUCOSE BLOOD TEST: CPT

## 2024-06-20 RX ADMIN — WATER 40 MG: 1 INJECTION INTRAMUSCULAR; INTRAVENOUS; SUBCUTANEOUS at 08:03

## 2024-06-20 RX ADMIN — HYDRALAZINE HYDROCHLORIDE 10 MG: 10 TABLET, FILM COATED ORAL at 20:34

## 2024-06-20 RX ADMIN — INSULIN LISPRO 4 UNITS: 100 INJECTION, SOLUTION INTRAVENOUS; SUBCUTANEOUS at 17:21

## 2024-06-20 RX ADMIN — INSULIN LISPRO 6 UNITS: 100 INJECTION, SOLUTION INTRAVENOUS; SUBCUTANEOUS at 12:27

## 2024-06-20 RX ADMIN — INSULIN LISPRO 2 UNITS: 100 INJECTION, SOLUTION INTRAVENOUS; SUBCUTANEOUS at 08:31

## 2024-06-20 RX ADMIN — BENZONATATE 100 MG: 100 CAPSULE ORAL at 07:59

## 2024-06-20 RX ADMIN — AZITHROMYCIN DIHYDRATE 500 MG: 250 TABLET ORAL at 08:00

## 2024-06-20 RX ADMIN — ARFORMOTEROL TARTRATE 15 MCG: 15 SOLUTION RESPIRATORY (INHALATION) at 06:16

## 2024-06-20 RX ADMIN — GABAPENTIN 300 MG: 300 CAPSULE ORAL at 13:45

## 2024-06-20 RX ADMIN — APIXABAN 5 MG: 5 TABLET, FILM COATED ORAL at 20:32

## 2024-06-20 RX ADMIN — APIXABAN 5 MG: 5 TABLET, FILM COATED ORAL at 07:58

## 2024-06-20 RX ADMIN — GABAPENTIN 300 MG: 300 CAPSULE ORAL at 20:31

## 2024-06-20 RX ADMIN — OXYBUTYNIN CHLORIDE 5 MG: 5 TABLET ORAL at 20:31

## 2024-06-20 RX ADMIN — IPRATROPIUM BROMIDE AND ALBUTEROL SULFATE 1 DOSE: 2.5; .5 SOLUTION RESPIRATORY (INHALATION) at 15:38

## 2024-06-20 RX ADMIN — IPRATROPIUM BROMIDE AND ALBUTEROL SULFATE 1 DOSE: 2.5; .5 SOLUTION RESPIRATORY (INHALATION) at 20:17

## 2024-06-20 RX ADMIN — SODIUM CHLORIDE, PRESERVATIVE FREE 10 ML: 5 INJECTION INTRAVENOUS at 08:03

## 2024-06-20 RX ADMIN — CLONIDINE HYDROCHLORIDE 0.2 MG: 0.2 TABLET ORAL at 13:45

## 2024-06-20 RX ADMIN — ROSUVASTATIN 40 MG: 20 TABLET, FILM COATED ORAL at 20:32

## 2024-06-20 RX ADMIN — IPRATROPIUM BROMIDE AND ALBUTEROL SULFATE 1 DOSE: 2.5; .5 SOLUTION RESPIRATORY (INHALATION) at 11:24

## 2024-06-20 RX ADMIN — BUDESONIDE 500 MCG: 0.5 INHALANT RESPIRATORY (INHALATION) at 06:16

## 2024-06-20 RX ADMIN — OXYBUTYNIN CHLORIDE 5 MG: 5 TABLET ORAL at 13:46

## 2024-06-20 RX ADMIN — INSULIN GLARGINE 5 UNITS: 100 INJECTION, SOLUTION SUBCUTANEOUS at 20:45

## 2024-06-20 RX ADMIN — OXYBUTYNIN CHLORIDE 5 MG: 5 TABLET ORAL at 08:00

## 2024-06-20 RX ADMIN — WATER 1000 MG: 1 INJECTION INTRAMUSCULAR; INTRAVENOUS; SUBCUTANEOUS at 20:31

## 2024-06-20 RX ADMIN — ARFORMOTEROL TARTRATE 15 MCG: 15 SOLUTION RESPIRATORY (INHALATION) at 20:17

## 2024-06-20 RX ADMIN — HYDRALAZINE HYDROCHLORIDE 10 MG: 10 TABLET, FILM COATED ORAL at 07:58

## 2024-06-20 RX ADMIN — BUDESONIDE 500 MCG: 0.5 INHALANT RESPIRATORY (INHALATION) at 20:17

## 2024-06-20 RX ADMIN — INSULIN LISPRO 4 UNITS: 100 INJECTION, SOLUTION INTRAVENOUS; SUBCUTANEOUS at 20:46

## 2024-06-20 RX ADMIN — EMPAGLIFLOZIN 10 MG: 10 TABLET, FILM COATED ORAL at 08:01

## 2024-06-20 RX ADMIN — BENZONATATE 100 MG: 100 CAPSULE ORAL at 23:38

## 2024-06-20 RX ADMIN — WATER 40 MG: 1 INJECTION INTRAMUSCULAR; INTRAVENOUS; SUBCUTANEOUS at 20:31

## 2024-06-20 RX ADMIN — HYDROCHLOROTHIAZIDE 25 MG: 25 TABLET ORAL at 07:59

## 2024-06-20 RX ADMIN — ASPIRIN 81 MG 81 MG: 81 TABLET ORAL at 07:58

## 2024-06-20 RX ADMIN — CLONIDINE HYDROCHLORIDE 0.2 MG: 0.2 TABLET ORAL at 20:34

## 2024-06-20 RX ADMIN — CLONIDINE HYDROCHLORIDE 0.2 MG: 0.2 TABLET ORAL at 07:58

## 2024-06-20 RX ADMIN — FAMOTIDINE 20 MG: 20 TABLET, FILM COATED ORAL at 07:59

## 2024-06-20 RX ADMIN — LOSARTAN POTASSIUM 100 MG: 50 TABLET, FILM COATED ORAL at 07:58

## 2024-06-20 RX ADMIN — SODIUM CHLORIDE, PRESERVATIVE FREE 10 ML: 5 INJECTION INTRAVENOUS at 20:36

## 2024-06-20 RX ADMIN — GABAPENTIN 300 MG: 300 CAPSULE ORAL at 07:58

## 2024-06-20 RX ADMIN — MONTELUKAST 10 MG: 10 TABLET, FILM COATED ORAL at 20:32

## 2024-06-20 RX ADMIN — IPRATROPIUM BROMIDE AND ALBUTEROL SULFATE 1 DOSE: 2.5; .5 SOLUTION RESPIRATORY (INHALATION) at 06:16

## 2024-06-20 ASSESSMENT — PAIN DESCRIPTION - LOCATION: LOCATION: RIB CAGE

## 2024-06-20 ASSESSMENT — PAIN DESCRIPTION - DESCRIPTORS: DESCRIPTORS: SORE

## 2024-06-20 ASSESSMENT — PAIN SCALES - GENERAL: PAINLEVEL_OUTOF10: 2

## 2024-06-20 NOTE — PLAN OF CARE
Problem: Safety - Adult  Goal: Free from fall injury  6/19/2024 2125 by Marcia Paniagua RN  Outcome: Progressing  6/19/2024 0828 by Bhavna Simpson RN  Outcome: Progressing     Problem: Chronic Conditions and Co-morbidities  Goal: Patient's chronic conditions and co-morbidity symptoms are monitored and maintained or improved  6/19/2024 2125 by Marcia Paniagua RN  Outcome: Progressing  6/19/2024 0828 by Bhavna Simpson RN  Outcome: Progressing

## 2024-06-20 NOTE — CARE COORDINATION
Care Coordination  The patient was admitted with shortness of breath for the past 2 days and she also has a history of copd exacerbation . She was also found to have right lower lobe pneumonia. This morning the patient is on room air.  If home o2 needed at discharge she has no dme preference.  The patient plans to return home at discharge. She is on iv rocephin 1 gm iv q24 hrs and iv sm 40 mg iv q12 hrs. [facility] has accepted the patient and will need [facility] orders upon discharge with start of care is Sunday June 23rd. Referral made to [facility] home they will make a referral to [facility] team and they will follow up with the patient for assessment to get some help in the home.

## 2024-06-20 NOTE — PROGRESS NOTES
Occupational Therapy    OCCUPATIONAL THERAPY INITIAL EVALUATION    [facility]  [address] [place], [place]        Date:2024                                                  Patient Name: Fanny Gonzalez    MRN: [id number]    : 1950    Room: [address] [place] [address]          Evaluating OT: Deja Heaton, CHASE, OTR/L; [id number]      Referring Provider: Kelle Lawrence MD     Specific Provider Orders/Date: OT Eval and Treat 2024      Diagnosis: Dyspnea [R06.00]  COPD exacerbation (HCC) [J44.1]  COPD with acute exacerbation (HCC) [J44.1]  Pneumonia of right lower lobe due to infectious organism [J18.9]       Surgery: none this admission     Pertinent Medical History:  has a past medical history of Arthritis, Asthma, BRCA1 negative, BRCA2 negative, Breast cancer (HCC), CAD in native artery, Cancer (HCC), Cerebral artery occlusion with cerebral infarction (HCC), Cerebrovascular disease, CHF (congestive heart failure) (HCC), Claustrophobia, COPD (chronic obstructive pulmonary disease) (HCC), Decreased dorsalis pedis pulse, Dermatophytosis, Headache(784.0), History of blood transfusion, Hives, Hx of blood clots, Hyperlipidemia, Hypertension, LBP radiating to both legs, Lymphedema of both lower extremities, Neuromuscular disorder (HCC), Non-rheumatic aortic sclerosis, Obesity, Pulmonary hypertension (HCC), PVD (peripheral vascular disease) with claudication ([facility]), Recurrent genital HSV (herpes simplex virus) infection, S/P breast biopsy, right, Type II or unspecified type diabetes mellitus without mention of complication, not stated as uncontrolled, and UTI (urinary tract infection).       Recommended Adaptive Equipment: TBD     Precautions:  Fall Risk, monitor O2     Assessment of current deficits    [x] Functional mobility  [x]ADLs  [x] Strength               [x]Cognition    [x] Functional transfers   [x] IADLs         [x] Safety Awareness   [x]Endurance     Mobility-  Instruction/training on safety and improved independence with bed mobility/functional transfers  and functional mobility.   Activity tolerance- Instruction/training on energy conservation/work simplification for completion of ADLs:.     Neuromuscular Reeducation to facilitate balance/righting reactions for increased function with ADLs tasks:     Cognitive retraining -  Cues for safety, sequencing, and problem solving    Skilled positioning/alignment-  Therapist facilitated proper positioning/alignment throughout session to maintain skin/joint integrity & proper body mechanics.   Skilled monitoring of vitals- To maximize safe participation throughout functional activities.       Pt appeared to have tolerated session well and appears motivated/cooperative/pleasant. Pt instructed on use of call light for assistance and fall prevention. Pt demo'ing fair understanding of education provided. Continue to educate.     Rehab Potential: Good for established goals     LTG: maximize independence with ADLs to return to PLOF    Patient and/or family were instructed on functional diagnosis, prognosis/goals and OT plan of care. Demonstrated good understanding.     Eval Complexity: Low   History: Expanded chart review of medical records and additional review of physical, cognitive, or psychosocial history related to current functional performance  Exam: 3+ performance deficits  Assistance/Modification: Min/mod  assistance or modifications required to perform tasks. May have comorbidities that affect occupational performance.    Evaluation time includes thorough review of current medical information, gathering information on past medical & social history & PLOF, completion of standardized testing, informal observation of tasks, consultation with other medical professions/disciplines, assessment of data & development of POC/goals.     Time In: 1442  Time Out: 1505  Total Treatment Time: 8 min    Min Units   OT Eval Low

## 2024-06-21 VITALS
HEIGHT: 65 IN | OXYGEN SATURATION: 93 % | WEIGHT: 222 LBS | BODY MASS INDEX: 36.99 KG/M2 | SYSTOLIC BLOOD PRESSURE: 152 MMHG | RESPIRATION RATE: 18 BRPM | HEART RATE: 66 BPM | TEMPERATURE: 97.3 F | DIASTOLIC BLOOD PRESSURE: 67 MMHG

## 2024-06-21 LAB
ANION GAP SERPL CALCULATED.3IONS-SCNC: 13 MMOL/L (ref 7–16)
BASOPHILS # BLD: 0.01 K/UL (ref 0–0.2)
BASOPHILS NFR BLD: 0 % (ref 0–2)
BUN SERPL-MCNC: 36 MG/DL (ref 6–23)
CALCIUM SERPL-MCNC: 9.4 MG/DL (ref 8.6–10.2)
CHLORIDE SERPL-SCNC: 98 MMOL/L (ref 98–107)
CO2 SERPL-SCNC: 24 MMOL/L (ref 22–29)
CREAT SERPL-MCNC: 1.1 MG/DL (ref 0.5–1)
EOSINOPHIL # BLD: 0 K/UL (ref 0.05–0.5)
EOSINOPHILS RELATIVE PERCENT: 0 % (ref 0–6)
ERYTHROCYTE [DISTWIDTH] IN BLOOD BY AUTOMATED COUNT: 16.3 % (ref 11.5–15)
GFR, ESTIMATED: 55 ML/MIN/1.73M2
GLUCOSE BLD-MCNC: 276 MG/DL (ref 74–99)
GLUCOSE BLD-MCNC: 339 MG/DL (ref 74–99)
GLUCOSE SERPL-MCNC: 289 MG/DL (ref 74–99)
HCT VFR BLD AUTO: 37.1 % (ref 34–48)
HGB BLD-MCNC: 11.4 G/DL (ref 11.5–15.5)
IMM GRANULOCYTES # BLD AUTO: 0.07 K/UL (ref 0–0.58)
IMM GRANULOCYTES NFR BLD: 1 % (ref 0–5)
LYMPHOCYTES NFR BLD: 0.82 K/UL (ref 1.5–4)
LYMPHOCYTES RELATIVE PERCENT: 7 % (ref 20–42)
MCH RBC QN AUTO: 26.1 PG (ref 26–35)
MCHC RBC AUTO-ENTMCNC: 30.7 G/DL (ref 32–34.5)
MCV RBC AUTO: 85.1 FL (ref 80–99.9)
MONOCYTES NFR BLD: 0.56 K/UL (ref 0.1–0.95)
MONOCYTES NFR BLD: 5 % (ref 2–12)
NEUTROPHILS NFR BLD: 88 % (ref 43–80)
NEUTS SEG NFR BLD: 10.88 K/UL (ref 1.8–7.3)
PLATELET # BLD AUTO: 402 K/UL (ref 130–450)
PMV BLD AUTO: 10.2 FL (ref 7–12)
POTASSIUM SERPL-SCNC: 4.7 MMOL/L (ref 3.5–5)
RBC # BLD AUTO: 4.36 M/UL (ref 3.5–5.5)
SODIUM SERPL-SCNC: 135 MMOL/L (ref 132–146)
WBC OTHER # BLD: 12.3 K/UL (ref 4.5–11.5)

## 2024-06-21 PROCEDURE — 97530 THERAPEUTIC ACTIVITIES: CPT

## 2024-06-21 PROCEDURE — 6370000000 HC RX 637 (ALT 250 FOR IP): Performed by: INTERNAL MEDICINE

## 2024-06-21 PROCEDURE — 85025 COMPLETE CBC W/AUTO DIFF WBC: CPT

## 2024-06-21 PROCEDURE — 2580000003 HC RX 258: Performed by: INTERNAL MEDICINE

## 2024-06-21 PROCEDURE — 99239 HOSP IP/OBS DSCHRG MGMT >30: CPT | Performed by: INTERNAL MEDICINE

## 2024-06-21 PROCEDURE — 94640 AIRWAY INHALATION TREATMENT: CPT

## 2024-06-21 PROCEDURE — 97161 PT EVAL LOW COMPLEX 20 MIN: CPT

## 2024-06-21 PROCEDURE — 6360000002 HC RX W HCPCS: Performed by: INTERNAL MEDICINE

## 2024-06-21 PROCEDURE — 82962 GLUCOSE BLOOD TEST: CPT

## 2024-06-21 PROCEDURE — 36415 COLL VENOUS BLD VENIPUNCTURE: CPT

## 2024-06-21 PROCEDURE — 94660 CPAP INITIATION&MGMT: CPT

## 2024-06-21 PROCEDURE — 80048 BASIC METABOLIC PNL TOTAL CA: CPT

## 2024-06-21 RX ORDER — GUAIFENESIN/DEXTROMETHORPHAN 100-10MG/5
5 SYRUP ORAL 3 TIMES DAILY PRN
Qty: 236 ML | Refills: 0 | Status: SHIPPED | OUTPATIENT
Start: 2024-06-21 | End: 2024-07-07

## 2024-06-21 RX ORDER — FLUTICASONE FUROATE, UMECLIDINIUM BROMIDE AND VILANTEROL TRIFENATATE 100; 62.5; 25 UG/1; UG/1; UG/1
1 POWDER RESPIRATORY (INHALATION) DAILY
Qty: 1 EACH | Refills: 0 | Status: SHIPPED | OUTPATIENT
Start: 2024-06-21 | End: 2024-07-21

## 2024-06-21 RX ORDER — FAMOTIDINE 20 MG/1
20 TABLET, FILM COATED ORAL DAILY
Qty: 60 TABLET | Refills: 3 | Status: SHIPPED | OUTPATIENT
Start: 2024-06-22

## 2024-06-21 RX ORDER — IPRATROPIUM BROMIDE AND ALBUTEROL SULFATE 2.5; .5 MG/3ML; MG/3ML
3 SOLUTION RESPIRATORY (INHALATION)
Qty: 360 ML | Refills: 0 | Status: SHIPPED | OUTPATIENT
Start: 2024-06-21

## 2024-06-21 RX ORDER — PREDNISONE 20 MG/1
40 TABLET ORAL 2 TIMES DAILY
Qty: 28 TABLET | Refills: 0 | Status: SHIPPED | OUTPATIENT
Start: 2024-06-21 | End: 2024-06-28

## 2024-06-21 RX ADMIN — GABAPENTIN 300 MG: 300 CAPSULE ORAL at 08:39

## 2024-06-21 RX ADMIN — OXYBUTYNIN CHLORIDE 5 MG: 5 TABLET ORAL at 08:39

## 2024-06-21 RX ADMIN — INSULIN LISPRO 4 UNITS: 100 INJECTION, SOLUTION INTRAVENOUS; SUBCUTANEOUS at 08:37

## 2024-06-21 RX ADMIN — IPRATROPIUM BROMIDE AND ALBUTEROL SULFATE 1 DOSE: 2.5; .5 SOLUTION RESPIRATORY (INHALATION) at 07:46

## 2024-06-21 RX ADMIN — INSULIN LISPRO 6 UNITS: 100 INJECTION, SOLUTION INTRAVENOUS; SUBCUTANEOUS at 12:27

## 2024-06-21 RX ADMIN — SODIUM CHLORIDE, PRESERVATIVE FREE 10 ML: 5 INJECTION INTRAVENOUS at 08:36

## 2024-06-21 RX ADMIN — IPRATROPIUM BROMIDE AND ALBUTEROL SULFATE 1 DOSE: 2.5; .5 SOLUTION RESPIRATORY (INHALATION) at 11:22

## 2024-06-21 RX ADMIN — ARFORMOTEROL TARTRATE 15 MCG: 15 SOLUTION RESPIRATORY (INHALATION) at 07:46

## 2024-06-21 RX ADMIN — HYDROCHLOROTHIAZIDE 25 MG: 25 TABLET ORAL at 08:37

## 2024-06-21 RX ADMIN — SENNOSIDES AND DOCUSATE SODIUM 2 TABLET: 50; 8.6 TABLET ORAL at 08:39

## 2024-06-21 RX ADMIN — WATER 40 MG: 1 INJECTION INTRAMUSCULAR; INTRAVENOUS; SUBCUTANEOUS at 08:40

## 2024-06-21 RX ADMIN — GABAPENTIN 300 MG: 300 CAPSULE ORAL at 14:35

## 2024-06-21 RX ADMIN — BUDESONIDE 500 MCG: 0.5 INHALANT RESPIRATORY (INHALATION) at 07:46

## 2024-06-21 RX ADMIN — ASPIRIN 81 MG 81 MG: 81 TABLET ORAL at 08:37

## 2024-06-21 RX ADMIN — HYDRALAZINE HYDROCHLORIDE 10 MG: 10 TABLET, FILM COATED ORAL at 08:37

## 2024-06-21 RX ADMIN — CLONIDINE HYDROCHLORIDE 0.2 MG: 0.2 TABLET ORAL at 14:36

## 2024-06-21 RX ADMIN — APIXABAN 5 MG: 5 TABLET, FILM COATED ORAL at 08:38

## 2024-06-21 RX ADMIN — LOSARTAN POTASSIUM 100 MG: 50 TABLET, FILM COATED ORAL at 08:37

## 2024-06-21 RX ADMIN — EMPAGLIFLOZIN 10 MG: 10 TABLET, FILM COATED ORAL at 08:39

## 2024-06-21 RX ADMIN — CLONIDINE HYDROCHLORIDE 0.2 MG: 0.2 TABLET ORAL at 08:39

## 2024-06-21 RX ADMIN — FAMOTIDINE 20 MG: 20 TABLET, FILM COATED ORAL at 08:38

## 2024-06-21 RX ADMIN — OXYBUTYNIN CHLORIDE 5 MG: 5 TABLET ORAL at 14:35

## 2024-06-21 ASSESSMENT — PAIN SCALES - GENERAL: PAINLEVEL_OUTOF10: 2

## 2024-06-21 NOTE — PLAN OF CARE
Problem: Safety - Adult  Goal: Free from fall injury  6/21/2024 1050 by Ros Luu RN  Outcome: Progressing     Problem: Chronic Conditions and Co-morbidities  Goal: Patient's chronic conditions and co-morbidity symptoms are monitored and maintained or improved  6/21/2024 1050 by Ros Luu RN  Outcome: Progressing     Problem: Discharge Planning  Goal: Discharge to home or other facility with appropriate resources  6/21/2024 1050 by Ros Luu RN  Outcome: Progressing     Problem: ABCDS Injury Assessment  Goal: Absence of physical injury  6/21/2024 1050 by Ros Luu RN  Outcome: Progressing     Problem: Skin/Tissue Integrity  Goal: Absence of new skin breakdown  Description: 1.  Monitor for areas of redness and/or skin breakdown  2.  Assess vascular access sites hourly  3.  Every 4-6 hours minimum:  Change oxygen saturation probe site  4.  Every 4-6 hours:  If on nasal continuous positive airway pressure, respiratory therapy assess nares and determine need for appliance change or resting period.  6/21/2024 1050 by Ros Luu RN  Outcome: Progressing     Problem: Pain  Goal: Verbalizes/displays adequate comfort level or baseline comfort level  6/21/2024 1050 by Ros Luu RN  Outcome: Progressing     Problem: Nutrition Deficit:  Goal: Optimize nutritional status  6/21/2024 1050 by Ros Luu RN  Outcome: Progressing

## 2024-06-21 NOTE — PROGRESS NOTES
Physical Therapy  Initial Assessment     Name: Fanny Gonzalez  : 1950  MRN: [id number]      Date of Service: 2024    Evaluating PT: Norris Velazquez PT, DPT [id number]      Room #:  8410/8410-B  Diagnosis:  Dyspnea [R06.00]  COPD exacerbation (HCC) [J44.1]  COPD with acute exacerbation (HCC) [J44.1]  Pneumonia of right lower lobe due to infectious organism [J18.9]  PMHx/PSHx:   has a past medical history of Arthritis, Asthma, BRCA1 negative, BRCA2 negative, Breast cancer (HCC), CAD in native artery, Cancer (HCC), Cerebral artery occlusion with cerebral infarction (HCC), Cerebrovascular disease, CHF (congestive heart failure) (HCC), Claustrophobia, COPD (chronic obstructive pulmonary disease) (HCC), Decreased dorsalis pedis pulse, Dermatophytosis, Headache(784.0), History of blood transfusion, Hives, Hx of blood clots, Hyperlipidemia, Hypertension, LBP radiating to both legs, Lymphedema of both lower extremities, Neuromuscular disorder (HCC), Non-rheumatic aortic sclerosis, Obesity, Pulmonary hypertension (HCC), PVD (peripheral vascular disease) with claudication (HCC), Recurrent genital HSV (herpes simplex virus) infection, S/P breast biopsy, right, Type II or unspecified type diabetes mellitus without mention of complication, not stated as uncontrolled, and UTI (urinary tract infection).  Precautions:  Fall risk    SUBJECTIVE:    Pt lives alone in a 10th story apartment with elevator access. Ramp to enter building. Pt ambulated with SPC prior to admission. Pt also owns rollator.    OBJECTIVE:   Initial Evaluation  Date: 24 Treatment Date: Short Term/ Long Term   Goals   AM-PAC 6 Clicks      Was pt agreeable to Eval/treatment? Yes     Does pt have pain? No complaints of pain     Bed Mobility  Rolling: NT  Supine to sit: NT  Sit to supine: NT  Scooting: NT  Rolling: Independent   Supine to sit: Independent   Sit to supine: Independent   Scooting: Independent    Transfers Sit to stand: SBA  Stand to

## 2024-06-21 NOTE — PROGRESS NOTES
CLINICAL PHARMACY NOTE: MEDS TO BEDS    Total # of Prescriptions Filled: 4   The following medications were delivered to the patient:  Destiny-tussin dm  Prednisone 20 mg  Famotidine 20 mg  Ipratropium-albuterol 0.5-2.5    Additional Documentation:   Delivered to pt in room

## 2024-06-21 NOTE — CARE COORDINATION
Care Ordination  The patient was admitted with shortness of breath for the past 2 days and she also has a history of copd exacerbation. She was admitted with acute respiratory failure. She has been on room air since admission. Plan to do an ambulatory pulse ox today to see if she is stable for discharge.Her plan is home with [facility] and home care orders are in. Await her ambulatory pulse ox.

## 2024-06-21 NOTE — PROGRESS NOTES
Physician Progress Note      PATIENT:               LISA BLANCHARD  CSN #:                  [id number]  :                       1950  ADMIT DATE:       2024 7:28 PM  DISCH DATE:  RESPONDING  PROVIDER #:        Kelle He MD          QUERY TEXT:    Patient admitted with acute respiratory failure. Documentation reflects   questionable Pneumonia in H&P.  If possible, please document in the progress   notes and discharge summary if Pneumonia was:    The medical record reflects the following:  Risk Factors: HTN, DM  Clinical Indicators: CXR negative, per H&P \"...Empirically cover for CAP,   ceftriaxone and azithromycin...\"  Treatment: IV Solumedrol and Rocephin    Thank you,  Rosaura Moser RN, BSN, CDIS  Clinical Documentation Integrity  [email or web address]  Options provided:  -- Pneumonia confirmed after study  -- Pneumonia ruled out after study  -- Other - I will add my own diagnosis  -- Disagree - Not applicable / Not valid  -- Disagree - Clinically unable to determine / Unknown  -- Refer to Clinical Documentation Reviewer    PROVIDER RESPONSE TEXT:    Pneumonia ruled out after study.    Query created by: Rosaura Moser on 2024 2:39 PM      Electronically signed by:  Kelle He MD 2024 8:51 AM

## 2024-06-21 NOTE — PLAN OF CARE
Problem: Safety - Adult  Goal: Free from fall injury  6/21/2024 1347 by Ros Luu RN  Outcome: Adequate for Discharge     Problem: Chronic Conditions and Co-morbidities  Goal: Patient's chronic conditions and co-morbidity symptoms are monitored and maintained or improved  6/21/2024 1347 by Ros Luu RN  Outcome: Adequate for Discharge     Problem: Discharge Planning  Goal: Discharge to home or other facility with appropriate resources  6/21/2024 1347 by Ros Luu RN  Outcome: Adequate for Discharge     Problem: ABCDS Injury Assessment  Goal: Absence of physical injury  6/21/2024 1347 by Ros Luu RN  Outcome: Adequate for Discharge

## 2024-06-21 NOTE — DISCHARGE SUMMARY
[facility] Hospitalist Physician Discharge Summary       Babatunde Storm, APRN - CNP  [address] [facility] [address] [address]  [phone]    Follow up      Walter Naylor DO  [address]  [address]  [facility] [address] [phone]    Follow up        Activity level: As tolerated     Dispo: Home    Condition on discharge: Stable     Patient ID:  Fanny Gonzalez  [id number]  73 y.o.  1950    Admit date: 6/17/2024    Discharge date and time:  6/21/2024  11:02 AM    Admission Diagnoses: Principal Problem:    COPD exacerbation (HCC)  Active Problems:    Type 2 diabetes mellitus with hyperglycemia, with long-term current use of insulin (HCC)    Hyperlipidemia with target LDL less than 70    Ductal carcinoma in situ (DCIS) of breast    LBBB (left bundle branch block)    Morbid obesity with BMI of 40.0-44.9, adult (HCC)    Dyspnea    COPD with acute exacerbation (HCC)  Resolved Problems:    * No resolved hospital problems. *      Discharge Diagnoses: Principal Problem:    COPD exacerbation (HCC)  Active Problems:    Type 2 diabetes mellitus with hyperglycemia, with long-term current use of insulin (HCC)    Hyperlipidemia with target LDL less than 70    Ductal carcinoma in situ (DCIS) of breast    LBBB (left bundle branch block)    Morbid obesity with BMI of 40.0-44.9, adult (HCC)    Dyspnea    COPD with acute exacerbation (HCC)  Resolved Problems:    * No resolved hospital problems. *      Consults:  IP CONSULT TO HOSPITALIST  PULMONARY REHAB EVALUATION  IP CONSULT TO SPIRITUAL SERVICES    Hospital Course:     Patient is a 72-year-old lady with past medical history of CAD, COPD, HFpEF, hypertension, PE on Eliquis, diabetes mellitus, history of right ductal breast carcinoma in situ.  She presented to ED with chief complaint of shortness of breath, cough.  Respiratory viral panel negative, x-ray showed no acute process.  She was admitted to hospital service for COPD exacerbation.  Treated with IV

## 2024-06-23 ENCOUNTER — HOSPITAL ENCOUNTER (OUTPATIENT)
Age: 74
Setting detail: OBSERVATION
Discharge: HOME | End: 2024-06-25
Attending: EMERGENCY MEDICINE | Admitting: STUDENT IN AN ORGANIZED HEALTH CARE EDUCATION/TRAINING PROGRAM
Payer: MEDICARE

## 2024-06-23 ENCOUNTER — APPOINTMENT (OUTPATIENT)
Dept: GENERAL RADIOLOGY | Age: 74
End: 2024-06-23
Payer: MEDICARE

## 2024-06-23 DIAGNOSIS — J44.1 ACUTE EXACERBATION OF COPD WITH ASTHMA (HCC): Primary | ICD-10-CM

## 2024-06-23 DIAGNOSIS — J45.901 ACUTE EXACERBATION OF COPD WITH ASTHMA (HCC): Primary | ICD-10-CM

## 2024-06-23 PROBLEM — J96.01 ACUTE HYPOXIC RESPIRATORY FAILURE (HCC): Status: ACTIVE | Noted: 2024-06-23

## 2024-06-23 LAB
ANION GAP SERPL CALCULATED.3IONS-SCNC: 17 MMOL/L (ref 7–16)
B PARAP IS1001 DNA NPH QL NAA+NON-PROBE: NOT DETECTED
B PERT DNA SPEC QL NAA+PROBE: NOT DETECTED
B.E.: 0.1 MMOL/L (ref -3–3)
BASOPHILS # BLD: 0.01 K/UL (ref 0–0.2)
BASOPHILS NFR BLD: 0 % (ref 0–2)
BNP SERPL-MCNC: 263 PG/ML (ref 0–450)
BUN SERPL-MCNC: 33 MG/DL (ref 6–23)
C PNEUM DNA NPH QL NAA+NON-PROBE: NOT DETECTED
CALCIUM SERPL-MCNC: 9.3 MG/DL (ref 8.6–10.2)
CHLORIDE SERPL-SCNC: 96 MMOL/L (ref 98–107)
CO2 SERPL-SCNC: 20 MMOL/L (ref 22–29)
COHB: 0.4 % (ref 0–1.5)
CREAT SERPL-MCNC: 1.2 MG/DL (ref 0.5–1)
CRITICAL: ABNORMAL
DATE ANALYZED: ABNORMAL
DATE OF COLLECTION: ABNORMAL
EOSINOPHIL # BLD: 0 K/UL (ref 0.05–0.5)
EOSINOPHILS RELATIVE PERCENT: 0 % (ref 0–6)
ERYTHROCYTE [DISTWIDTH] IN BLOOD BY AUTOMATED COUNT: 16 % (ref 11.5–15)
FLUAV RNA NPH QL NAA+NON-PROBE: NOT DETECTED
FLUBV RNA NPH QL NAA+NON-PROBE: NOT DETECTED
GFR, ESTIMATED: 48 ML/MIN/1.73M2
GLUCOSE SERPL-MCNC: 271 MG/DL (ref 74–99)
HADV DNA NPH QL NAA+NON-PROBE: NOT DETECTED
HCO3: 21.6 MMOL/L (ref 22–26)
HCOV 229E RNA NPH QL NAA+NON-PROBE: NOT DETECTED
HCOV HKU1 RNA NPH QL NAA+NON-PROBE: NOT DETECTED
HCOV NL63 RNA NPH QL NAA+NON-PROBE: NOT DETECTED
HCOV OC43 RNA NPH QL NAA+NON-PROBE: NOT DETECTED
HCT VFR BLD AUTO: 37.5 % (ref 34–48)
HGB BLD-MCNC: 11.9 G/DL (ref 11.5–15.5)
HHB: 2.2 % (ref 0–5)
HMPV RNA NPH QL NAA+NON-PROBE: NOT DETECTED
HPIV1 RNA NPH QL NAA+NON-PROBE: NOT DETECTED
HPIV2 RNA NPH QL NAA+NON-PROBE: NOT DETECTED
HPIV3 RNA NPH QL NAA+NON-PROBE: NOT DETECTED
HPIV4 RNA NPH QL NAA+NON-PROBE: NOT DETECTED
IMM GRANULOCYTES # BLD AUTO: 0.08 K/UL (ref 0–0.58)
IMM GRANULOCYTES NFR BLD: 1 % (ref 0–5)
LAB: ABNORMAL
LYMPHOCYTES NFR BLD: 0.6 K/UL (ref 1.5–4)
LYMPHOCYTES RELATIVE PERCENT: 3 % (ref 20–42)
Lab: 1720
M PNEUMO DNA NPH QL NAA+NON-PROBE: NOT DETECTED
MCH RBC QN AUTO: 26.4 PG (ref 26–35)
MCHC RBC AUTO-ENTMCNC: 31.7 G/DL (ref 32–34.5)
MCV RBC AUTO: 83.1 FL (ref 80–99.9)
METHB: 0.1 % (ref 0–1.5)
MICROORGANISM SPEC CULT: NORMAL
MICROORGANISM SPEC CULT: NORMAL
MODE: ABNORMAL
MONOCYTES NFR BLD: 0.32 K/UL (ref 0.1–0.95)
MONOCYTES NFR BLD: 2 % (ref 2–12)
NEUTROPHILS NFR BLD: 94 % (ref 43–80)
NEUTS SEG NFR BLD: 16.64 K/UL (ref 1.8–7.3)
O2 SATURATION: 97.8 % (ref 92–98.5)
O2HB: 97.3 % (ref 94–97)
OPERATOR ID: 5100
PATIENT TEMP: 37 C
PCO2: 26.8 MMHG (ref 35–45)
PH BLOOD GAS: 7.52 (ref 7.35–7.45)
PLATELET # BLD AUTO: 410 K/UL (ref 130–450)
PMV BLD AUTO: 10.7 FL (ref 7–12)
PO2: 100.5 MMHG (ref 75–100)
POTASSIUM SERPL-SCNC: 4.2 MMOL/L (ref 3.5–5)
RBC # BLD AUTO: 4.51 M/UL (ref 3.5–5.5)
RSV RNA NPH QL NAA+NON-PROBE: NOT DETECTED
RV+EV RNA NPH QL NAA+NON-PROBE: DETECTED
SARS-COV-2 RNA NPH QL NAA+NON-PROBE: NOT DETECTED
SERVICE CMNT-IMP: NORMAL
SERVICE CMNT-IMP: NORMAL
SODIUM SERPL-SCNC: 133 MMOL/L (ref 132–146)
SOURCE, BLOOD GAS: ABNORMAL
SPECIMEN DESCRIPTION: ABNORMAL
SPECIMEN DESCRIPTION: NORMAL
SPECIMEN DESCRIPTION: NORMAL
THB: 13.5 G/DL (ref 11.5–16.5)
TIME ANALYZED: 1739
TROPONIN I SERPL HS-MCNC: 50 NG/L (ref 0–9)
TROPONIN I SERPL HS-MCNC: 56 NG/L (ref 0–9)
WBC OTHER # BLD: 17.7 K/UL (ref 4.5–11.5)

## 2024-06-23 PROCEDURE — 80048 BASIC METABOLIC PNL TOTAL CA: CPT

## 2024-06-23 PROCEDURE — 93005 ELECTROCARDIOGRAM TRACING: CPT

## 2024-06-23 PROCEDURE — 96365 THER/PROPH/DIAG IV INF INIT: CPT

## 2024-06-23 PROCEDURE — 96375 TX/PRO/DX INJ NEW DRUG ADDON: CPT

## 2024-06-23 PROCEDURE — 82805 BLOOD GASES W/O2 SATURATION: CPT

## 2024-06-23 PROCEDURE — 0202U NFCT DS 22 TRGT SARS-COV-2: CPT

## 2024-06-23 PROCEDURE — 71045 X-RAY EXAM CHEST 1 VIEW: CPT

## 2024-06-23 PROCEDURE — 83880 ASSAY OF NATRIURETIC PEPTIDE: CPT

## 2024-06-23 PROCEDURE — 96366 THER/PROPH/DIAG IV INF ADDON: CPT

## 2024-06-23 PROCEDURE — 6360000002 HC RX W HCPCS

## 2024-06-23 PROCEDURE — 85025 COMPLETE CBC W/AUTO DIFF WBC: CPT

## 2024-06-23 PROCEDURE — 2580000003 HC RX 258

## 2024-06-23 PROCEDURE — 99285 EMERGENCY DEPT VISIT HI MDM: CPT

## 2024-06-23 PROCEDURE — 84484 ASSAY OF TROPONIN QUANT: CPT

## 2024-06-23 RX ORDER — ACETAMINOPHEN 650 MG/1
650 SUPPOSITORY RECTAL EVERY 6 HOURS PRN
Status: DISCONTINUED | OUTPATIENT
Start: 2024-06-23 | End: 2024-06-25 | Stop reason: HOSPADM

## 2024-06-23 RX ORDER — POLYETHYLENE GLYCOL 3350 17 G/17G
17 POWDER, FOR SOLUTION ORAL DAILY PRN
Status: DISCONTINUED | OUTPATIENT
Start: 2024-06-23 | End: 2024-06-25 | Stop reason: HOSPADM

## 2024-06-23 RX ORDER — MAGNESIUM SULFATE IN WATER 40 MG/ML
2000 INJECTION, SOLUTION INTRAVENOUS ONCE
Status: COMPLETED | OUTPATIENT
Start: 2024-06-23 | End: 2024-06-23

## 2024-06-23 RX ORDER — IPRATROPIUM BROMIDE AND ALBUTEROL SULFATE 2.5; .5 MG/3ML; MG/3ML
1 SOLUTION RESPIRATORY (INHALATION) ONCE
Status: DISCONTINUED | OUTPATIENT
Start: 2024-06-23 | End: 2024-06-23

## 2024-06-23 RX ORDER — POTASSIUM CHLORIDE 20 MEQ/1
40 TABLET, EXTENDED RELEASE ORAL PRN
Status: DISCONTINUED | OUTPATIENT
Start: 2024-06-23 | End: 2024-06-25 | Stop reason: HOSPADM

## 2024-06-23 RX ORDER — ONDANSETRON 2 MG/ML
4 INJECTION INTRAMUSCULAR; INTRAVENOUS EVERY 6 HOURS PRN
Status: DISCONTINUED | OUTPATIENT
Start: 2024-06-23 | End: 2024-06-24 | Stop reason: SDUPTHER

## 2024-06-23 RX ORDER — SODIUM CHLORIDE 0.9 % (FLUSH) 0.9 %
5-40 SYRINGE (ML) INJECTION EVERY 12 HOURS SCHEDULED
Status: DISCONTINUED | OUTPATIENT
Start: 2024-06-24 | End: 2024-06-25 | Stop reason: HOSPADM

## 2024-06-23 RX ORDER — SODIUM CHLORIDE 9 MG/ML
INJECTION, SOLUTION INTRAVENOUS PRN
Status: DISCONTINUED | OUTPATIENT
Start: 2024-06-23 | End: 2024-06-25 | Stop reason: HOSPADM

## 2024-06-23 RX ORDER — POTASSIUM CHLORIDE 7.45 MG/ML
10 INJECTION INTRAVENOUS PRN
Status: DISCONTINUED | OUTPATIENT
Start: 2024-06-23 | End: 2024-06-25 | Stop reason: HOSPADM

## 2024-06-23 RX ORDER — ONDANSETRON 4 MG/1
4 TABLET, ORALLY DISINTEGRATING ORAL EVERY 8 HOURS PRN
Status: DISCONTINUED | OUTPATIENT
Start: 2024-06-23 | End: 2024-06-24 | Stop reason: SDUPTHER

## 2024-06-23 RX ORDER — MAGNESIUM SULFATE IN WATER 40 MG/ML
2000 INJECTION, SOLUTION INTRAVENOUS PRN
Status: DISCONTINUED | OUTPATIENT
Start: 2024-06-23 | End: 2024-06-25 | Stop reason: HOSPADM

## 2024-06-23 RX ORDER — ACETAMINOPHEN 325 MG/1
650 TABLET ORAL EVERY 6 HOURS PRN
Status: DISCONTINUED | OUTPATIENT
Start: 2024-06-23 | End: 2024-06-25 | Stop reason: HOSPADM

## 2024-06-23 RX ORDER — SODIUM CHLORIDE 0.9 % (FLUSH) 0.9 %
5-40 SYRINGE (ML) INJECTION PRN
Status: DISCONTINUED | OUTPATIENT
Start: 2024-06-23 | End: 2024-06-25 | Stop reason: HOSPADM

## 2024-06-23 RX ADMIN — WATER 125 MG: 1 INJECTION INTRAMUSCULAR; INTRAVENOUS; SUBCUTANEOUS at 18:22

## 2024-06-23 RX ADMIN — MAGNESIUM SULFATE HEPTAHYDRATE 2000 MG: 40 INJECTION, SOLUTION INTRAVENOUS at 18:22

## 2024-06-23 NOTE — ED PROVIDER NOTES
limits   TROPONIN - Abnormal; Notable for the following components:    Troponin, High Sensitivity 56 (*)     All other components within normal limits   BLOOD GAS, ARTERIAL - Abnormal; Notable for the following components:    pH, Blood Gas 7.524 (*)     PCO2 26.8 (*)     PO2 100.5 (*)     HCO3 21.6 (*)     O2Hb 97.3 (*)     All other components within normal limits   TROPONIN - Abnormal; Notable for the following components:    Troponin, High Sensitivity 50 (*)     All other components within normal limits   BLOOD GAS, ARTERIAL   BRAIN NATRIURETIC PEPTIDE   BASIC METABOLIC PANEL W/ REFLEX TO MG FOR LOW K   CBC WITH AUTO DIFFERENTIAL   POCT GLUCOSE   POCT GLUCOSE       As interpreted by me, the above displayed labs are abnormal. All other labs obtained during this visit were within normal range or not returned as of this dictation.      EKG Interpretation  Interpreted by emergency department physician, Mandy Good MD    See ED course      RADIOLOGY:   Non-plain film images such as CT, Ultrasound and MRI are read by the radiologist. Plain radiographic images are visualized and preliminarily interpreted by the ED Provider with the below findings:    CXR shows no pleural effusions, PTX, consolidations    Interpretation per the Radiologist below, if available at the time of this note:    XR CHEST PORTABLE   Final Result   1. Deeper inspiration.   2. Stable mild volume overload.   3. Interval decrease in size of the heart.           XR CHEST PORTABLE    Result Date: 6/17/2024  EXAMINATION: ONE XRAY VIEW OF THE CHEST 6/17/2024 8:00 pm COMPARISON: 12/09/2023 HISTORY: ORDERING SYSTEM PROVIDED HISTORY: Shortness of Breath TECHNOLOGIST PROVIDED HISTORY: Reason for exam:->Shortness of Breath FINDINGS: The lungs are without acute focal process.  There is no effusion or pneumothorax. The cardiomediastinal silhouette is without acute process. The osseous structures are without acute process.     No acute process.       No

## 2024-06-24 LAB
ANION GAP SERPL CALCULATED.3IONS-SCNC: 13 MMOL/L (ref 7–16)
BASOPHILS # BLD: 0.01 K/UL (ref 0–0.2)
BASOPHILS NFR BLD: 0 % (ref 0–2)
BUN SERPL-MCNC: 32 MG/DL (ref 6–23)
CALCIUM SERPL-MCNC: 9.2 MG/DL (ref 8.6–10.2)
CHLORIDE SERPL-SCNC: 98 MMOL/L (ref 98–107)
CO2 SERPL-SCNC: 24 MMOL/L (ref 22–29)
CREAT SERPL-MCNC: 1.1 MG/DL (ref 0.5–1)
EKG ATRIAL RATE: 97 BPM
EKG P AXIS: 64 DEGREES
EKG P-R INTERVAL: 124 MS
EKG Q-T INTERVAL: 398 MS
EKG QRS DURATION: 138 MS
EKG QTC CALCULATION (BAZETT): 505 MS
EKG R AXIS: -36 DEGREES
EKG T AXIS: 117 DEGREES
EKG VENTRICULAR RATE: 97 BPM
EOSINOPHIL # BLD: 0 K/UL (ref 0.05–0.5)
EOSINOPHILS RELATIVE PERCENT: 0 % (ref 0–6)
ERYTHROCYTE [DISTWIDTH] IN BLOOD BY AUTOMATED COUNT: 16 % (ref 11.5–15)
GFR, ESTIMATED: 54 ML/MIN/1.73M2
GLUCOSE BLD-MCNC: 148 MG/DL (ref 74–99)
GLUCOSE BLD-MCNC: 276 MG/DL (ref 74–99)
GLUCOSE BLD-MCNC: 294 MG/DL (ref 74–99)
GLUCOSE BLD-MCNC: 341 MG/DL (ref 74–99)
GLUCOSE BLD-MCNC: 342 MG/DL (ref 74–99)
GLUCOSE BLD-MCNC: 360 MG/DL (ref 74–99)
GLUCOSE SERPL-MCNC: 292 MG/DL (ref 74–99)
HCT VFR BLD AUTO: 38.2 % (ref 34–48)
HGB BLD-MCNC: 12.3 G/DL (ref 11.5–15.5)
IMM GRANULOCYTES # BLD AUTO: 0.06 K/UL (ref 0–0.58)
IMM GRANULOCYTES NFR BLD: 0 % (ref 0–5)
LYMPHOCYTES NFR BLD: 0.8 K/UL (ref 1.5–4)
LYMPHOCYTES RELATIVE PERCENT: 6 % (ref 20–42)
MCH RBC QN AUTO: 27.1 PG (ref 26–35)
MCHC RBC AUTO-ENTMCNC: 32.2 G/DL (ref 32–34.5)
MCV RBC AUTO: 84.1 FL (ref 80–99.9)
MONOCYTES NFR BLD: 0.54 K/UL (ref 0.1–0.95)
MONOCYTES NFR BLD: 4 % (ref 2–12)
NEUTROPHILS NFR BLD: 90 % (ref 43–80)
NEUTS SEG NFR BLD: 12.55 K/UL (ref 1.8–7.3)
PLATELET # BLD AUTO: 405 K/UL (ref 130–450)
PMV BLD AUTO: 10.2 FL (ref 7–12)
POTASSIUM SERPL-SCNC: 4.6 MMOL/L (ref 3.5–5)
RBC # BLD AUTO: 4.54 M/UL (ref 3.5–5.5)
SODIUM SERPL-SCNC: 135 MMOL/L (ref 132–146)
WBC OTHER # BLD: 14 K/UL (ref 4.5–11.5)

## 2024-06-24 PROCEDURE — 96375 TX/PRO/DX INJ NEW DRUG ADDON: CPT

## 2024-06-24 PROCEDURE — 82962 GLUCOSE BLOOD TEST: CPT

## 2024-06-24 PROCEDURE — 6370000000 HC RX 637 (ALT 250 FOR IP): Performed by: STUDENT IN AN ORGANIZED HEALTH CARE EDUCATION/TRAINING PROGRAM

## 2024-06-24 PROCEDURE — 94640 AIRWAY INHALATION TREATMENT: CPT

## 2024-06-24 PROCEDURE — 2580000003 HC RX 258: Performed by: STUDENT IN AN ORGANIZED HEALTH CARE EDUCATION/TRAINING PROGRAM

## 2024-06-24 PROCEDURE — 6360000002 HC RX W HCPCS: Performed by: STUDENT IN AN ORGANIZED HEALTH CARE EDUCATION/TRAINING PROGRAM

## 2024-06-24 PROCEDURE — G0378 HOSPITAL OBSERVATION PER HR: HCPCS

## 2024-06-24 PROCEDURE — 93010 ELECTROCARDIOGRAM REPORT: CPT | Performed by: INTERNAL MEDICINE

## 2024-06-24 PROCEDURE — 99222 1ST HOSP IP/OBS MODERATE 55: CPT | Performed by: STUDENT IN AN ORGANIZED HEALTH CARE EDUCATION/TRAINING PROGRAM

## 2024-06-24 PROCEDURE — 96376 TX/PRO/DX INJ SAME DRUG ADON: CPT

## 2024-06-24 PROCEDURE — 80048 BASIC METABOLIC PNL TOTAL CA: CPT

## 2024-06-24 PROCEDURE — 85025 COMPLETE CBC W/AUTO DIFF WBC: CPT

## 2024-06-24 RX ORDER — ROSUVASTATIN CALCIUM 20 MG/1
40 TABLET, COATED ORAL NIGHTLY
Status: DISCONTINUED | OUTPATIENT
Start: 2024-06-24 | End: 2024-06-25 | Stop reason: HOSPADM

## 2024-06-24 RX ORDER — DOCUSATE SODIUM 100 MG/1
100 CAPSULE, LIQUID FILLED ORAL 2 TIMES DAILY
COMMUNITY

## 2024-06-24 RX ORDER — LOSARTAN POTASSIUM AND HYDROCHLOROTHIAZIDE 25; 100 MG/1; MG/1
1 TABLET ORAL DAILY
Status: DISCONTINUED | OUTPATIENT
Start: 2024-06-24 | End: 2024-06-24 | Stop reason: SDUPTHER

## 2024-06-24 RX ORDER — INSULIN LISPRO 100 [IU]/ML
0-4 INJECTION, SOLUTION INTRAVENOUS; SUBCUTANEOUS NIGHTLY
Status: DISCONTINUED | OUTPATIENT
Start: 2024-06-24 | End: 2024-06-25 | Stop reason: HOSPADM

## 2024-06-24 RX ORDER — GUAIFENESIN/DEXTROMETHORPHAN 100-10MG/5
5 SYRUP ORAL 3 TIMES DAILY PRN
Status: DISCONTINUED | OUTPATIENT
Start: 2024-06-24 | End: 2024-06-25 | Stop reason: HOSPADM

## 2024-06-24 RX ORDER — HYDRALAZINE HYDROCHLORIDE 10 MG/1
10 TABLET, FILM COATED ORAL EVERY 12 HOURS SCHEDULED
Status: DISCONTINUED | OUTPATIENT
Start: 2024-06-24 | End: 2024-06-25 | Stop reason: HOSPADM

## 2024-06-24 RX ORDER — INSULIN LISPRO 100 [IU]/ML
6 INJECTION, SOLUTION INTRAVENOUS; SUBCUTANEOUS ONCE
Status: COMPLETED | OUTPATIENT
Start: 2024-06-24 | End: 2024-06-24

## 2024-06-24 RX ORDER — INSULIN GLARGINE 100 [IU]/ML
50 INJECTION, SOLUTION SUBCUTANEOUS NIGHTLY
Status: DISCONTINUED | OUTPATIENT
Start: 2024-06-24 | End: 2024-06-25 | Stop reason: HOSPADM

## 2024-06-24 RX ORDER — MONTELUKAST SODIUM 10 MG/1
10 TABLET ORAL NIGHTLY
Status: DISCONTINUED | OUTPATIENT
Start: 2024-06-24 | End: 2024-06-25 | Stop reason: HOSPADM

## 2024-06-24 RX ORDER — BUDESONIDE 0.25 MG/2ML
0.25 INHALANT ORAL
Status: DISCONTINUED | OUTPATIENT
Start: 2024-06-24 | End: 2024-06-25 | Stop reason: HOSPADM

## 2024-06-24 RX ORDER — IPRATROPIUM BROMIDE AND ALBUTEROL SULFATE 2.5; .5 MG/3ML; MG/3ML
1 SOLUTION RESPIRATORY (INHALATION)
Status: DISCONTINUED | OUTPATIENT
Start: 2024-06-24 | End: 2024-06-25 | Stop reason: HOSPADM

## 2024-06-24 RX ORDER — TRAZODONE HYDROCHLORIDE 50 MG/1
50 TABLET ORAL NIGHTLY
Status: DISCONTINUED | OUTPATIENT
Start: 2024-06-24 | End: 2024-06-25 | Stop reason: HOSPADM

## 2024-06-24 RX ORDER — PROCHLORPERAZINE MALEATE 10 MG
10 TABLET ORAL EVERY 8 HOURS PRN
Status: DISCONTINUED | OUTPATIENT
Start: 2024-06-23 | End: 2024-06-25 | Stop reason: HOSPADM

## 2024-06-24 RX ORDER — ALBUTEROL SULFATE 90 UG/1
1 AEROSOL, METERED RESPIRATORY (INHALATION) EVERY 6 HOURS PRN
Status: DISCONTINUED | OUTPATIENT
Start: 2024-06-23 | End: 2024-06-24 | Stop reason: SDUPTHER

## 2024-06-24 RX ORDER — ALBUTEROL SULFATE 2.5 MG/3ML
2.5 SOLUTION RESPIRATORY (INHALATION) EVERY 6 HOURS PRN
Status: DISCONTINUED | OUTPATIENT
Start: 2024-06-24 | End: 2024-06-25 | Stop reason: HOSPADM

## 2024-06-24 RX ORDER — OXYBUTYNIN CHLORIDE 5 MG/1
5 TABLET ORAL 3 TIMES DAILY
Status: DISCONTINUED | OUTPATIENT
Start: 2024-06-24 | End: 2024-06-25 | Stop reason: HOSPADM

## 2024-06-24 RX ORDER — DOCUSATE SODIUM 100 MG/1
100 CAPSULE, LIQUID FILLED ORAL 2 TIMES DAILY
Status: DISCONTINUED | OUTPATIENT
Start: 2024-06-24 | End: 2024-06-25 | Stop reason: HOSPADM

## 2024-06-24 RX ORDER — FAMOTIDINE 20 MG/1
20 TABLET, FILM COATED ORAL DAILY
Status: DISCONTINUED | OUTPATIENT
Start: 2024-06-24 | End: 2024-06-25 | Stop reason: HOSPADM

## 2024-06-24 RX ORDER — PRAVASTATIN SODIUM 20 MG
80 TABLET ORAL NIGHTLY
Status: DISCONTINUED | OUTPATIENT
Start: 2024-06-24 | End: 2024-06-24 | Stop reason: SDUPTHER

## 2024-06-24 RX ORDER — GABAPENTIN 300 MG/1
300 CAPSULE ORAL 3 TIMES DAILY
Status: DISCONTINUED | OUTPATIENT
Start: 2024-06-24 | End: 2024-06-25 | Stop reason: HOSPADM

## 2024-06-24 RX ORDER — PROCHLORPERAZINE EDISYLATE 5 MG/ML
10 INJECTION INTRAMUSCULAR; INTRAVENOUS EVERY 6 HOURS PRN
Status: DISCONTINUED | OUTPATIENT
Start: 2024-06-23 | End: 2024-06-25 | Stop reason: HOSPADM

## 2024-06-24 RX ORDER — ARFORMOTEROL TARTRATE 15 UG/2ML
15 SOLUTION RESPIRATORY (INHALATION)
Status: DISCONTINUED | OUTPATIENT
Start: 2024-06-24 | End: 2024-06-25 | Stop reason: HOSPADM

## 2024-06-24 RX ORDER — ASPIRIN 81 MG/1
81 TABLET, CHEWABLE ORAL DAILY
Status: DISCONTINUED | OUTPATIENT
Start: 2024-06-24 | End: 2024-06-25 | Stop reason: HOSPADM

## 2024-06-24 RX ORDER — HYDROCHLOROTHIAZIDE 25 MG/1
25 TABLET ORAL DAILY
Status: DISCONTINUED | OUTPATIENT
Start: 2024-06-24 | End: 2024-06-25 | Stop reason: HOSPADM

## 2024-06-24 RX ORDER — INSULIN LISPRO 100 [IU]/ML
0-8 INJECTION, SOLUTION INTRAVENOUS; SUBCUTANEOUS
Status: DISCONTINUED | OUTPATIENT
Start: 2024-06-24 | End: 2024-06-25 | Stop reason: HOSPADM

## 2024-06-24 RX ORDER — LOSARTAN POTASSIUM 50 MG/1
100 TABLET ORAL DAILY
Status: DISCONTINUED | OUTPATIENT
Start: 2024-06-24 | End: 2024-06-25 | Stop reason: HOSPADM

## 2024-06-24 RX ORDER — CLONIDINE HYDROCHLORIDE 0.1 MG/1
0.2 TABLET ORAL 3 TIMES DAILY
Status: DISCONTINUED | OUTPATIENT
Start: 2024-06-24 | End: 2024-06-25 | Stop reason: HOSPADM

## 2024-06-24 RX ORDER — ROSUVASTATIN CALCIUM 40 MG/1
40 TABLET, COATED ORAL EVERY EVENING
COMMUNITY

## 2024-06-24 RX ADMIN — APIXABAN 5 MG: 5 TABLET, FILM COATED ORAL at 10:05

## 2024-06-24 RX ADMIN — INSULIN GLARGINE 50 UNITS: 100 INJECTION, SOLUTION SUBCUTANEOUS at 21:20

## 2024-06-24 RX ADMIN — ARFORMOTEROL TARTRATE 15 MCG: 15 SOLUTION RESPIRATORY (INHALATION) at 19:33

## 2024-06-24 RX ADMIN — DOCUSATE SODIUM 100 MG: 100 CAPSULE, LIQUID FILLED ORAL at 21:20

## 2024-06-24 RX ADMIN — ROSUVASTATIN 40 MG: 20 TABLET, FILM COATED ORAL at 01:49

## 2024-06-24 RX ADMIN — MONTELUKAST 10 MG: 10 TABLET, FILM COATED ORAL at 21:19

## 2024-06-24 RX ADMIN — MONTELUKAST 10 MG: 10 TABLET, FILM COATED ORAL at 01:45

## 2024-06-24 RX ADMIN — GABAPENTIN 300 MG: 300 CAPSULE ORAL at 21:18

## 2024-06-24 RX ADMIN — INSULIN GLARGINE 50 UNITS: 100 INJECTION, SOLUTION SUBCUTANEOUS at 01:44

## 2024-06-24 RX ADMIN — GABAPENTIN 300 MG: 300 CAPSULE ORAL at 14:10

## 2024-06-24 RX ADMIN — SODIUM CHLORIDE, PRESERVATIVE FREE 10 ML: 5 INJECTION INTRAVENOUS at 09:28

## 2024-06-24 RX ADMIN — GABAPENTIN 300 MG: 300 CAPSULE ORAL at 10:05

## 2024-06-24 RX ADMIN — ASPIRIN 81 MG 81 MG: 81 TABLET ORAL at 10:01

## 2024-06-24 RX ADMIN — APIXABAN 5 MG: 5 TABLET, FILM COATED ORAL at 21:18

## 2024-06-24 RX ADMIN — IPRATROPIUM BROMIDE AND ALBUTEROL SULFATE 1 DOSE: 2.5; .5 SOLUTION RESPIRATORY (INHALATION) at 19:33

## 2024-06-24 RX ADMIN — OXYBUTYNIN CHLORIDE 5 MG: 5 TABLET ORAL at 14:10

## 2024-06-24 RX ADMIN — DOCUSATE SODIUM 100 MG: 100 CAPSULE, LIQUID FILLED ORAL at 01:45

## 2024-06-24 RX ADMIN — INSULIN LISPRO 6 UNITS: 100 INJECTION, SOLUTION INTRAVENOUS; SUBCUTANEOUS at 06:11

## 2024-06-24 RX ADMIN — APIXABAN 5 MG: 5 TABLET, FILM COATED ORAL at 01:45

## 2024-06-24 RX ADMIN — BUDESONIDE 250 MCG: 0.25 SUSPENSION RESPIRATORY (INHALATION) at 08:50

## 2024-06-24 RX ADMIN — HYDRALAZINE HYDROCHLORIDE 10 MG: 10 TABLET, FILM COATED ORAL at 21:19

## 2024-06-24 RX ADMIN — IPRATROPIUM BROMIDE AND ALBUTEROL SULFATE 1 DOSE: 2.5; .5 SOLUTION RESPIRATORY (INHALATION) at 08:50

## 2024-06-24 RX ADMIN — OXYBUTYNIN CHLORIDE 5 MG: 5 TABLET ORAL at 10:04

## 2024-06-24 RX ADMIN — IPRATROPIUM BROMIDE AND ALBUTEROL SULFATE 1 DOSE: 2.5; .5 SOLUTION RESPIRATORY (INHALATION) at 12:37

## 2024-06-24 RX ADMIN — HYDRALAZINE HYDROCHLORIDE 10 MG: 10 TABLET, FILM COATED ORAL at 10:02

## 2024-06-24 RX ADMIN — DOCUSATE SODIUM 100 MG: 100 CAPSULE, LIQUID FILLED ORAL at 10:02

## 2024-06-24 RX ADMIN — TRAZODONE HYDROCHLORIDE 50 MG: 50 TABLET ORAL at 21:19

## 2024-06-24 RX ADMIN — ARFORMOTEROL TARTRATE 15 MCG: 15 SOLUTION RESPIRATORY (INHALATION) at 08:50

## 2024-06-24 RX ADMIN — INSULIN LISPRO 6 UNITS: 100 INJECTION, SOLUTION INTRAVENOUS; SUBCUTANEOUS at 16:33

## 2024-06-24 RX ADMIN — SODIUM CHLORIDE, PRESERVATIVE FREE 10 ML: 5 INJECTION INTRAVENOUS at 21:22

## 2024-06-24 RX ADMIN — INSULIN LISPRO 4 UNITS: 100 INJECTION, SOLUTION INTRAVENOUS; SUBCUTANEOUS at 21:19

## 2024-06-24 RX ADMIN — LOSARTAN POTASSIUM 100 MG: 50 TABLET, FILM COATED ORAL at 10:03

## 2024-06-24 RX ADMIN — PROCHLORPERAZINE EDISYLATE 10 MG: 5 INJECTION INTRAMUSCULAR; INTRAVENOUS at 09:31

## 2024-06-24 RX ADMIN — CLONIDINE HYDROCHLORIDE 0.2 MG: 0.1 TABLET ORAL at 10:01

## 2024-06-24 RX ADMIN — TRAZODONE HYDROCHLORIDE 50 MG: 50 TABLET ORAL at 01:45

## 2024-06-24 RX ADMIN — CLONIDINE HYDROCHLORIDE 0.2 MG: 0.1 TABLET ORAL at 14:10

## 2024-06-24 RX ADMIN — ROSUVASTATIN 40 MG: 20 TABLET, FILM COATED ORAL at 21:19

## 2024-06-24 RX ADMIN — OXYBUTYNIN CHLORIDE 5 MG: 5 TABLET ORAL at 21:18

## 2024-06-24 RX ADMIN — WATER 40 MG: 1 INJECTION INTRAMUSCULAR; INTRAVENOUS; SUBCUTANEOUS at 10:07

## 2024-06-24 RX ADMIN — IPRATROPIUM BROMIDE AND ALBUTEROL SULFATE 1 DOSE: 2.5; .5 SOLUTION RESPIRATORY (INHALATION) at 15:20

## 2024-06-24 RX ADMIN — HYDROCHLOROTHIAZIDE 25 MG: 25 TABLET ORAL at 10:04

## 2024-06-24 RX ADMIN — INSULIN LISPRO 4 UNITS: 100 INJECTION, SOLUTION INTRAVENOUS; SUBCUTANEOUS at 09:26

## 2024-06-24 RX ADMIN — FAMOTIDINE 20 MG: 20 TABLET, FILM COATED ORAL at 10:02

## 2024-06-24 RX ADMIN — CLONIDINE HYDROCHLORIDE 0.2 MG: 0.1 TABLET ORAL at 21:18

## 2024-06-24 RX ADMIN — BUDESONIDE 250 MCG: 0.25 SUSPENSION RESPIRATORY (INHALATION) at 19:33

## 2024-06-24 ASSESSMENT — PAIN SCALES - GENERAL
PAINLEVEL_OUTOF10: 0

## 2024-06-24 ASSESSMENT — LIFESTYLE VARIABLES
HOW MANY STANDARD DRINKS CONTAINING ALCOHOL DO YOU HAVE ON A TYPICAL DAY: PATIENT DOES NOT DRINK
HOW OFTEN DO YOU HAVE A DRINK CONTAINING ALCOHOL: MONTHLY OR LESS

## 2024-06-24 NOTE — H&P
observation with telemetry monitoring  -Continue inhalers, IV steroid, Mucinex, required noninvasive ventilation due to work of breathing transition to 5 to 6 L oxygen via nasal cannula,  -Sinus tachycardia resolved  -Leukocytosis likely secondary to steroid use, monitor  -Elevated troponin likely secondary to tachycardia, acute hypoxia, patient denies chest pain 56>50, trend troponin  -Continue Eliquis  -Creatinine at her baseline, continue losartan hydrochlorothiazide, clonidine, hydralazine for hypertension      Diet: ADULT DIET; Regular; 5 carb choices (75 gm/meal); Low Fat/Low Chol/High Fiber/KRUNAL  Code Status: Full Code  Surrogate decision maker confirmed with patient:   Extended Emergency Contact Information  [facility]  Address: [place] [address] [facility]  Home Phone: [phone]  Mobile Phone: [phone]  Relation: Child  Secondary Emergency Contact: María Laws  Home Phone: [phone]  Relation: Niece/Nephew   needed? No    DVT Prophylaxis: []Lovenox []Heparin []PCD [x] Warfarin/NOAC []Encouraged ambulation  Disposition: [x]Med/Surg [] Intermediate [] ICU/CCU  Admit status: [x] Observation [] Inpatient     +++++++++++++++++++++++++++++++++++++++++++++++++  Yaquelin Bunn MD  [place], [place]  +++++++++++++++++++++++++++++++++++++++++++++++++  NOTE: This report was transcribed using voice recognition software. Every effort was made to ensure accuracy; however, inadvertent computerized transcription errors may be present.

## 2024-06-24 NOTE — PROGRESS NOTES
4 Eyes Skin Assessment     NAME:  Fanny Gonzalez  YOB: 1950  MEDICAL RECORD NUMBER:  [id number]    The patient is being assessed for  Admission    I agree that at least one RN has performed a thorough Head to Toe Skin Assessment on the patient. ALL assessment sites listed below have been assessed.      Areas assessed by both nurses:    Head, Face, Ears, Shoulders, Back, Chest, Arms, Elbows, Hands, Sacrum. Buttock, Coccyx, Ischium, and Legs. Feet and Heels        Does the Patient have a Wound? No noted wound(s)       Surendra Prevention initiated by RN: Yes  Wound Care Orders initiated by RN: No    Pressure Injury (Stage 3,4, Unstageable, DTI, NWPT, and Complex wounds) if present, place Wound referral order by RN under : No    New Ostomies, if present place, Ostomy referral order under : No     Nurse 1 eSignature: Electronically signed by Maeve West RN on 6/24/24 at 7:03 AM EDT    **SHARE this note so that the co-signing nurse can place an eSignature**    Nurse 2 eSignature: {Esignature:[id number]}

## 2024-06-24 NOTE — PLAN OF CARE
Patient was admitted after midnight please review H&P for complete details.    Patient seen and evaluated bedside, is breathing much better, was on room air when seen.  Possible discharge over next 24 hours.    Brenden Diaz MD

## 2024-06-24 NOTE — PROGRESS NOTES
Pt admitted to room via 74-09. Assessment and admission screening completed. VSS. Safety precautions initiated with call light within reach, siderails upx3, and bed in the lowest position. Belongings documented including a night gown, cell phonex2, and . Sign and held orders released. Plan of care remains on-going.

## 2024-06-24 NOTE — ED NOTES
Ambulated patient on room air. SpO2 maintained 94-97%. Patient did admit to some shortness of breath and feeling \"winded\" Dr Good notified. Patients resting heart rate was 80 at the start of ambulation. Once complete HR was 105bpm.

## 2024-06-24 NOTE — DISCHARGE INSTRUCTIONS
Please take breathing treatments as prescribed just 1 at a time.  Please finish steroids that had been previously prescribed to you.

## 2024-06-24 NOTE — PROGRESS NOTES
4 Eyes Skin Assessment     NAME:  Fanny Gonzalez  YOB: 1950  MEDICAL RECORD NUMBER:  [id number]    The patient is being assessed for  Admission    I agree that at least one RN has performed a thorough Head to Toe Skin Assessment on the patient. ALL assessment sites listed below have been assessed.      Areas assessed by both nurses:    Head, Face, Ears, Shoulders, Back, Chest, Arms, Elbows, Hands, Sacrum. Buttock, Coccyx, Ischium, and Legs. Feet and Heels        Does the Patient have a Wound? No noted wound(s)       Surendra Prevention initiated by RN: Yes  Wound Care Orders initiated by RN: No    Pressure Injury (Stage 3,4, Unstageable, DTI, NWPT, and Complex wounds) if present, place Wound referral order by RN under : No    New Ostomies, if present place, Ostomy referral order under : No     Nurse 1 eSignature: Electronically signed by Maeve West RN on 6/24/24 at 7:03 AM EDT    **SHARE this note so that the co-signing nurse can place an eSignature**    Nurse 2 eSignature: Electronically signed by Debra Kessler RN on 6/24/24 at 11:08 AM EDT

## 2024-06-24 NOTE — PLAN OF CARE
Problem: Safety - Adult  Goal: Free from fall injury  6/24/2024 1108 by Debra Kessler RN  Outcome: Progressing  6/24/2024 0258 by Gilligan, Melissa, RN  Outcome: Progressing     Problem: Pain  Goal: Verbalizes/displays adequate comfort level or baseline comfort level  6/24/2024 1108 by Debra Kessler RN  Outcome: Progressing  6/24/2024 0258 by Gilligan, Melissa, RN  Outcome: Progressing     Problem: Discharge Planning  Goal: Discharge to home or other facility with appropriate resources  Outcome: Progressing  Flowsheets  Taken 6/24/2024 0124 by Gilligan, Melissa, RN  Discharge to home or other facility with appropriate resources: Identify barriers to discharge with patient and caregiver  Taken 6/24/2024 0123 by Gilligan, Melissa, RN  Discharge to home or other facility with appropriate resources: Identify barriers to discharge with patient and caregiver     Problem: Chronic Conditions and Co-morbidities  Goal: Patient's chronic conditions and co-morbidity symptoms are monitored and maintained or improved  Outcome: Progressing

## 2024-06-25 VITALS
WEIGHT: 222 LBS | HEIGHT: 65 IN | RESPIRATION RATE: 16 BRPM | TEMPERATURE: 97 F | BODY MASS INDEX: 36.99 KG/M2 | OXYGEN SATURATION: 95 % | DIASTOLIC BLOOD PRESSURE: 63 MMHG | HEART RATE: 76 BPM | SYSTOLIC BLOOD PRESSURE: 139 MMHG

## 2024-06-25 LAB
ANION GAP SERPL CALCULATED.3IONS-SCNC: 14 MMOL/L (ref 7–16)
BASOPHILS # BLD: 0.01 K/UL (ref 0–0.2)
BASOPHILS NFR BLD: 0 % (ref 0–2)
BUN SERPL-MCNC: 29 MG/DL (ref 6–23)
CALCIUM SERPL-MCNC: 9.6 MG/DL (ref 8.6–10.2)
CHLORIDE SERPL-SCNC: 99 MMOL/L (ref 98–107)
CO2 SERPL-SCNC: 23 MMOL/L (ref 22–29)
CREAT SERPL-MCNC: 1.1 MG/DL (ref 0.5–1)
EOSINOPHIL # BLD: 0 K/UL (ref 0.05–0.5)
EOSINOPHILS RELATIVE PERCENT: 0 % (ref 0–6)
ERYTHROCYTE [DISTWIDTH] IN BLOOD BY AUTOMATED COUNT: 16.6 % (ref 11.5–15)
GFR, ESTIMATED: 56 ML/MIN/1.73M2
GLUCOSE BLD-MCNC: 180 MG/DL (ref 74–99)
GLUCOSE BLD-MCNC: 288 MG/DL (ref 74–99)
GLUCOSE SERPL-MCNC: 200 MG/DL (ref 74–99)
HCT VFR BLD AUTO: 40.2 % (ref 34–48)
HGB BLD-MCNC: 12.4 G/DL (ref 11.5–15.5)
IMM GRANULOCYTES # BLD AUTO: 0.11 K/UL (ref 0–0.58)
IMM GRANULOCYTES NFR BLD: 1 % (ref 0–5)
LYMPHOCYTES NFR BLD: 2.55 K/UL (ref 1.5–4)
LYMPHOCYTES RELATIVE PERCENT: 16 % (ref 20–42)
MCH RBC QN AUTO: 26.3 PG (ref 26–35)
MCHC RBC AUTO-ENTMCNC: 30.8 G/DL (ref 32–34.5)
MCV RBC AUTO: 85.2 FL (ref 80–99.9)
MONOCYTES NFR BLD: 1.35 K/UL (ref 0.1–0.95)
MONOCYTES NFR BLD: 9 % (ref 2–12)
NEUTROPHILS NFR BLD: 74 % (ref 43–80)
NEUTS SEG NFR BLD: 11.57 K/UL (ref 1.8–7.3)
PLATELET # BLD AUTO: 418 K/UL (ref 130–450)
PMV BLD AUTO: 10.5 FL (ref 7–12)
POTASSIUM SERPL-SCNC: 3.9 MMOL/L (ref 3.5–5)
RBC # BLD AUTO: 4.72 M/UL (ref 3.5–5.5)
SODIUM SERPL-SCNC: 136 MMOL/L (ref 132–146)
WBC OTHER # BLD: 15.6 K/UL (ref 4.5–11.5)

## 2024-06-25 PROCEDURE — 2580000003 HC RX 258: Performed by: STUDENT IN AN ORGANIZED HEALTH CARE EDUCATION/TRAINING PROGRAM

## 2024-06-25 PROCEDURE — G0378 HOSPITAL OBSERVATION PER HR: HCPCS

## 2024-06-25 PROCEDURE — 85025 COMPLETE CBC W/AUTO DIFF WBC: CPT

## 2024-06-25 PROCEDURE — 6370000000 HC RX 637 (ALT 250 FOR IP): Performed by: STUDENT IN AN ORGANIZED HEALTH CARE EDUCATION/TRAINING PROGRAM

## 2024-06-25 PROCEDURE — 96376 TX/PRO/DX INJ SAME DRUG ADON: CPT

## 2024-06-25 PROCEDURE — 6360000002 HC RX W HCPCS: Performed by: STUDENT IN AN ORGANIZED HEALTH CARE EDUCATION/TRAINING PROGRAM

## 2024-06-25 PROCEDURE — 80048 BASIC METABOLIC PNL TOTAL CA: CPT

## 2024-06-25 PROCEDURE — 36415 COLL VENOUS BLD VENIPUNCTURE: CPT

## 2024-06-25 PROCEDURE — 82962 GLUCOSE BLOOD TEST: CPT

## 2024-06-25 PROCEDURE — 99239 HOSP IP/OBS DSCHRG MGMT >30: CPT | Performed by: STUDENT IN AN ORGANIZED HEALTH CARE EDUCATION/TRAINING PROGRAM

## 2024-06-25 PROCEDURE — 94640 AIRWAY INHALATION TREATMENT: CPT

## 2024-06-25 RX ADMIN — CLONIDINE HYDROCHLORIDE 0.2 MG: 0.1 TABLET ORAL at 13:43

## 2024-06-25 RX ADMIN — APIXABAN 5 MG: 5 TABLET, FILM COATED ORAL at 07:52

## 2024-06-25 RX ADMIN — FAMOTIDINE 20 MG: 20 TABLET, FILM COATED ORAL at 07:51

## 2024-06-25 RX ADMIN — CLONIDINE HYDROCHLORIDE 0.2 MG: 0.1 TABLET ORAL at 07:50

## 2024-06-25 RX ADMIN — OXYBUTYNIN CHLORIDE 5 MG: 5 TABLET ORAL at 13:43

## 2024-06-25 RX ADMIN — DOCUSATE SODIUM 100 MG: 100 CAPSULE, LIQUID FILLED ORAL at 07:50

## 2024-06-25 RX ADMIN — HYDROCHLOROTHIAZIDE 25 MG: 25 TABLET ORAL at 07:52

## 2024-06-25 RX ADMIN — HYDRALAZINE HYDROCHLORIDE 10 MG: 10 TABLET, FILM COATED ORAL at 07:51

## 2024-06-25 RX ADMIN — GABAPENTIN 300 MG: 300 CAPSULE ORAL at 07:52

## 2024-06-25 RX ADMIN — LOSARTAN POTASSIUM 100 MG: 50 TABLET, FILM COATED ORAL at 07:51

## 2024-06-25 RX ADMIN — GABAPENTIN 300 MG: 300 CAPSULE ORAL at 13:43

## 2024-06-25 RX ADMIN — INSULIN LISPRO 4 UNITS: 100 INJECTION, SOLUTION INTRAVENOUS; SUBCUTANEOUS at 11:24

## 2024-06-25 RX ADMIN — ARFORMOTEROL TARTRATE 15 MCG: 15 SOLUTION RESPIRATORY (INHALATION) at 06:12

## 2024-06-25 RX ADMIN — OXYBUTYNIN CHLORIDE 5 MG: 5 TABLET ORAL at 07:52

## 2024-06-25 RX ADMIN — WATER 40 MG: 1 INJECTION INTRAMUSCULAR; INTRAVENOUS; SUBCUTANEOUS at 07:54

## 2024-06-25 RX ADMIN — BUDESONIDE 250 MCG: 0.25 SUSPENSION RESPIRATORY (INHALATION) at 06:13

## 2024-06-25 RX ADMIN — ASPIRIN 81 MG 81 MG: 81 TABLET ORAL at 07:50

## 2024-06-25 RX ADMIN — IPRATROPIUM BROMIDE AND ALBUTEROL SULFATE 1 DOSE: 2.5; .5 SOLUTION RESPIRATORY (INHALATION) at 06:13

## 2024-06-25 RX ADMIN — SODIUM CHLORIDE, PRESERVATIVE FREE 10 ML: 5 INJECTION INTRAVENOUS at 07:49

## 2024-06-25 NOTE — PLAN OF CARE
Problem: Safety - Adult  Goal: Free from fall injury  6/25/2024 0907 by Debra Kessler RN  Outcome: Progressing  6/24/2024 2009 by Wily Steel RN  Outcome: Progressing     Problem: Pain  Goal: Verbalizes/displays adequate comfort level or baseline comfort level  6/25/2024 0907 by Debra Kessler RN  Outcome: Progressing  6/24/2024 2009 by Wily Steel RN  Outcome: Progressing     Problem: Discharge Planning  Goal: Discharge to home or other facility with appropriate resources  Outcome: Progressing     Problem: Chronic Conditions and Co-morbidities  Goal: Patient's chronic conditions and co-morbidity symptoms are monitored and maintained or improved  Outcome: Progressing

## 2024-06-25 NOTE — DISCHARGE SUMMARY
[facility] Hospitalist Discharge Summary         Fanny Gonzalez  MRN: [id number]  Account Number: [id number]  Admitted: 6/23/2024  Discharge Date: 06/25/24    PCP Handoff    Recommended Outpatient Testing  None    Results Pending At Discharge  None        Clinical Summary    74-year-old female with history of asthma, CHF, COPD, hypertension, hyperlipidemia, lymphedema of both lower extremities, pulmonary hypertension, morbid obesity, peripheral vascular disease presented to the hospital with complaints of shortness of breath and palpitations.  Patient stated that she started having palpitation after she took 3 doses of DuoNeb all at once instead of 1 dose.  While in the ER patient was initially on BiPAP and later transition to nasal cannula.  Patient has been weaned off to room air without any significant shortness of breath.  Patient tested positive for rhinovirus on arrival.  Patient is being discharged today on room air with advised to follow-up with PCP    Acute hypoxic respiratory failure-resolved  COPD exacerbation  Rhinovirus positive  Patient has steroids from previous admission we will continue steroid on discharge  Continue breathing treatments on discharge    History of PE  Hypertension  Diabetes mellitus  CKD  Continue rest of home medications.    Discharge Medications     Medication List        CONTINUE taking these medications      apixaban 5 MG Tabs tablet  Commonly known as: ELIQUIS  Take 1 tablet by mouth 2 times daily     aspirin 81 MG chewable tablet     cloNIDine 0.2 MG tablet  Commonly known as: CATAPRES  Take 1 tablet by mouth 3 times daily     docusate sodium 100 MG capsule  Commonly known as: COLACE     ergocalciferol 1.25 MG (48384 UT) capsule  Commonly known as: ERGOCALCIFEROL     famotidine 20 MG tablet  Commonly known as: PEPCID  Take 1 tablet by mouth daily     gabapentin 300 MG capsule  Commonly known as: NEURONTIN     guaiFENesin-dextromethorphan 100-10 MG/5ML syrup  Commonly

## 2024-06-25 NOTE — PLAN OF CARE
Problem: Safety - Adult  Goal: Free from fall injury  6/25/2024 1041 by Debra Kessler RN  Outcome: Completed  6/25/2024 0907 by Debra Kessler RN  Outcome: Progressing     Problem: Pain  Goal: Verbalizes/displays adequate comfort level or baseline comfort level  6/25/2024 1041 by Debra Kessler RN  Outcome: Completed  6/25/2024 0907 by Debra Kessler RN  Outcome: Progressing     Problem: Discharge Planning  Goal: Discharge to home or other facility with appropriate resources  6/25/2024 1041 by Debra Kessler RN  Outcome: Completed  6/25/2024 0907 by Debra Kessler RN  Outcome: Progressing     Problem: Chronic Conditions and Co-morbidities  Goal: Patient's chronic conditions and co-morbidity symptoms are monitored and maintained or improved  6/25/2024 1041 by Debra Kessler RN  Outcome: Completed  6/25/2024 0907 by Debra Kessler, RN  Outcome: Progressing

## 2024-07-05 ENCOUNTER — APPOINTMENT (OUTPATIENT)
Dept: GENERAL RADIOLOGY | Age: 74
End: 2024-07-05
Payer: MEDICARE

## 2024-07-05 LAB
ALBUMIN SERPL-MCNC: 3.7 G/DL (ref 3.5–5.2)
ALP SERPL-CCNC: 131 U/L (ref 35–104)
ALT SERPL-CCNC: 14 U/L (ref 0–32)
ANION GAP SERPL CALCULATED.3IONS-SCNC: 9 MMOL/L (ref 7–16)
AST SERPL-CCNC: 17 U/L (ref 0–31)
BASOPHILS # BLD: 0.01 K/UL (ref 0–0.2)
BASOPHILS NFR BLD: 0 % (ref 0–2)
BILIRUB SERPL-MCNC: 0.2 MG/DL (ref 0–1.2)
BNP SERPL-MCNC: 1421 PG/ML (ref 0–450)
BUN SERPL-MCNC: 14 MG/DL (ref 6–23)
CALCIUM SERPL-MCNC: 9.5 MG/DL (ref 8.6–10.2)
CHLORIDE SERPL-SCNC: 98 MMOL/L (ref 98–107)
CO2 SERPL-SCNC: 29 MMOL/L (ref 22–29)
CREAT SERPL-MCNC: 1.1 MG/DL (ref 0.5–1)
EOSINOPHIL # BLD: 0.01 K/UL (ref 0.05–0.5)
EOSINOPHILS RELATIVE PERCENT: 0 % (ref 0–6)
ERYTHROCYTE [DISTWIDTH] IN BLOOD BY AUTOMATED COUNT: 16.8 % (ref 11.5–15)
FLUAV RNA RESP QL NAA+PROBE: NOT DETECTED
FLUBV RNA RESP QL NAA+PROBE: NOT DETECTED
GFR, ESTIMATED: 51 ML/MIN/1.73M2
GLUCOSE SERPL-MCNC: 168 MG/DL (ref 74–99)
HCT VFR BLD AUTO: 39 % (ref 34–48)
HGB BLD-MCNC: 12.4 G/DL (ref 11.5–15.5)
IMM GRANULOCYTES # BLD AUTO: 0.04 K/UL (ref 0–0.58)
IMM GRANULOCYTES NFR BLD: 0 % (ref 0–5)
LYMPHOCYTES NFR BLD: 2.35 K/UL (ref 1.5–4)
LYMPHOCYTES RELATIVE PERCENT: 20 % (ref 20–42)
MCH RBC QN AUTO: 27.4 PG (ref 26–35)
MCHC RBC AUTO-ENTMCNC: 31.8 G/DL (ref 32–34.5)
MCV RBC AUTO: 86.3 FL (ref 80–99.9)
MONOCYTES NFR BLD: 1.07 K/UL (ref 0.1–0.95)
MONOCYTES NFR BLD: 9 % (ref 2–12)
NEUTROPHILS NFR BLD: 71 % (ref 43–80)
NEUTS SEG NFR BLD: 8.57 K/UL (ref 1.8–7.3)
PLATELET # BLD AUTO: 331 K/UL (ref 130–450)
PMV BLD AUTO: 10.6 FL (ref 7–12)
POTASSIUM SERPL-SCNC: 4.2 MMOL/L (ref 3.5–5)
PROT SERPL-MCNC: 7 G/DL (ref 6.4–8.3)
RBC # BLD AUTO: 4.52 M/UL (ref 3.5–5.5)
SARS-COV-2 RNA RESP QL NAA+PROBE: NOT DETECTED
SODIUM SERPL-SCNC: 136 MMOL/L (ref 132–146)
SOURCE: NORMAL
SPECIMEN DESCRIPTION: NORMAL
TROPONIN I SERPL HS-MCNC: 21 NG/L (ref 0–9)
TROPONIN I SERPL HS-MCNC: 23 NG/L (ref 0–9)
WBC OTHER # BLD: 12.1 K/UL (ref 4.5–11.5)

## 2024-07-05 PROCEDURE — 93005 ELECTROCARDIOGRAM TRACING: CPT

## 2024-07-05 PROCEDURE — 96374 THER/PROPH/DIAG INJ IV PUSH: CPT

## 2024-07-05 PROCEDURE — 2580000003 HC RX 258

## 2024-07-05 PROCEDURE — 85025 COMPLETE CBC W/AUTO DIFF WBC: CPT

## 2024-07-05 PROCEDURE — 6370000000 HC RX 637 (ALT 250 FOR IP)

## 2024-07-05 PROCEDURE — 71045 X-RAY EXAM CHEST 1 VIEW: CPT

## 2024-07-05 PROCEDURE — 80053 COMPREHEN METABOLIC PANEL: CPT

## 2024-07-05 PROCEDURE — 99285 EMERGENCY DEPT VISIT HI MDM: CPT

## 2024-07-05 PROCEDURE — 6360000002 HC RX W HCPCS

## 2024-07-05 PROCEDURE — 94664 DEMO&/EVAL PT USE INHALER: CPT

## 2024-07-05 PROCEDURE — 87636 SARSCOV2 & INF A&B AMP PRB: CPT

## 2024-07-05 PROCEDURE — 83880 ASSAY OF NATRIURETIC PEPTIDE: CPT

## 2024-07-05 PROCEDURE — 84484 ASSAY OF TROPONIN QUANT: CPT

## 2024-07-05 RX ORDER — IPRATROPIUM BROMIDE AND ALBUTEROL SULFATE 2.5; .5 MG/3ML; MG/3ML
1 SOLUTION RESPIRATORY (INHALATION) ONCE
Status: COMPLETED | OUTPATIENT
Start: 2024-07-05 | End: 2024-07-05

## 2024-07-05 RX ADMIN — IPRATROPIUM BROMIDE AND ALBUTEROL SULFATE 1 DOSE: 2.5; .5 SOLUTION RESPIRATORY (INHALATION) at 21:29

## 2024-07-05 RX ADMIN — WATER 125 MG: 1 INJECTION INTRAMUSCULAR; INTRAVENOUS; SUBCUTANEOUS at 21:07

## 2024-07-05 ASSESSMENT — PAIN - FUNCTIONAL ASSESSMENT: PAIN_FUNCTIONAL_ASSESSMENT: 0-10

## 2024-07-05 ASSESSMENT — PAIN SCALES - GENERAL: PAINLEVEL_OUTOF10: 8

## 2024-07-05 ASSESSMENT — PAIN DESCRIPTION - ORIENTATION: ORIENTATION: DISTAL

## 2024-07-05 ASSESSMENT — LIFESTYLE VARIABLES
HOW OFTEN DO YOU HAVE A DRINK CONTAINING ALCOHOL: NEVER
HOW MANY STANDARD DRINKS CONTAINING ALCOHOL DO YOU HAVE ON A TYPICAL DAY: PATIENT DOES NOT DRINK

## 2024-07-05 ASSESSMENT — PAIN DESCRIPTION - DESCRIPTORS: DESCRIPTORS: SORE

## 2024-07-05 ASSESSMENT — PAIN DESCRIPTION - LOCATION: LOCATION: CHEST;THROAT

## 2024-07-05 NOTE — ED PROVIDER NOTES
[facility] EMERGENCY DEPARTMENT  EMERGENCY DEPARTMENT ENCOUNTER        Pt Name: Fanny Gonzalez  MRN: [id number]  Birthdate 1950  Date of evaluation: 7/5/2024  Provider: Sonu Ramachandran II, DO  PCP: Babatunde Storm APRN - CNP  Note Started: 7:54 PM EDT 7/5/24    CHIEF COMPLAINT       Chief Complaint   Patient presents with    Chest Pain     PT reports CP starting this afternoon while drinking some hot tea.  PT reports CP base of chest radiating to throat; pt states \"my throat is so sore that is hard to swallow.  PT denies N/V, chills, fevers, or SOB.        HISTORY OF PRESENT ILLNESS: 1 or more Elements            Fanny Gonzalez is a 74 y.o. female hx of CHF, COPD, history of PEs on Eliquis chronically, diabetes, CAD, who presents for complaint of cough, congestion, and chest shortness of breath with exertion.  Patient states that she has been having a sore throat, states that this discomfort is only when she was drinking her tea earlier today.  Patient states that she has home breathing treatments which she has not taken today.  Patient reports a mildly productive cough for the past week, states that she was discharged home after hospitalization for COPD exacerbation was found to be positive for rhinovirus.  Patient does my concern is her sputum production secondary to COPD is normally clear however over the past several days that turned green in color.    Contrary to triage note, patient denies any chest pain, states that her discomfort when she swallows is what she initially reported chest pain but denies pain at this time.    Nursing Notes were all reviewed and agreed with or any disagreements were addressed in the HPI.      REVIEW OF EXTERNAL NOTE :       Records reviewed for medical hx, surgical, hx, and medication lists      Chart Review/External Note Review    Last Echo reviewed by Me:  Lab Results   Component Value Date    LVEF 55 12/16/2021             Controlled

## 2024-07-06 ENCOUNTER — HOSPITAL ENCOUNTER (EMERGENCY)
Age: 74
Discharge: HOME | End: 2024-07-06
Attending: EMERGENCY MEDICINE
Payer: MEDICARE

## 2024-07-06 VITALS
WEIGHT: 225 LBS | DIASTOLIC BLOOD PRESSURE: 73 MMHG | HEART RATE: 86 BPM | HEIGHT: 65 IN | OXYGEN SATURATION: 100 % | SYSTOLIC BLOOD PRESSURE: 153 MMHG | BODY MASS INDEX: 37.49 KG/M2 | RESPIRATION RATE: 20 BRPM | TEMPERATURE: 98 F

## 2024-07-06 DIAGNOSIS — R05.2 SUBACUTE COUGH: Primary | ICD-10-CM

## 2024-07-06 DIAGNOSIS — J44.9 CHRONIC OBSTRUCTIVE PULMONARY DISEASE, UNSPECIFIED COPD TYPE (HCC): ICD-10-CM

## 2024-07-06 PROCEDURE — 6370000000 HC RX 637 (ALT 250 FOR IP)

## 2024-07-06 RX ORDER — DOXYCYCLINE HYCLATE 100 MG/1
100 CAPSULE ORAL ONCE
Status: COMPLETED | OUTPATIENT
Start: 2024-07-06 | End: 2024-07-06

## 2024-07-06 RX ORDER — DOXYCYCLINE HYCLATE 100 MG
100 TABLET ORAL 2 TIMES DAILY
Qty: 14 TABLET | Refills: 0 | Status: SHIPPED | OUTPATIENT
Start: 2024-07-06 | End: 2024-07-13

## 2024-07-06 RX ADMIN — DOXYCYCLINE HYCLATE 100 MG: 100 CAPSULE ORAL at 01:36

## 2024-07-06 ASSESSMENT — PAIN - FUNCTIONAL ASSESSMENT: PAIN_FUNCTIONAL_ASSESSMENT: NONE - DENIES PAIN

## 2024-07-08 LAB
EKG ATRIAL RATE: 87 BPM
EKG P AXIS: 57 DEGREES
EKG P-R INTERVAL: 120 MS
EKG Q-T INTERVAL: 418 MS
EKG QRS DURATION: 136 MS
EKG QTC CALCULATION (BAZETT): 502 MS
EKG R AXIS: -53 DEGREES
EKG T AXIS: 115 DEGREES
EKG VENTRICULAR RATE: 87 BPM

## 2024-07-08 PROCEDURE — 93010 ELECTROCARDIOGRAM REPORT: CPT | Performed by: INTERNAL MEDICINE

## 2024-08-28 ENCOUNTER — HOSPITAL ENCOUNTER (OUTPATIENT)
Dept: GENERAL RADIOLOGY | Age: 74
Discharge: HOME | End: 2024-08-30
Payer: MEDICARE

## 2024-08-28 DIAGNOSIS — Z78.0 ASYMPTOMATIC MENOPAUSAL STATE: ICD-10-CM

## 2024-08-28 PROCEDURE — 77080 DXA BONE DENSITY AXIAL: CPT

## 2024-08-30 ENCOUNTER — OFFICE VISIT (OUTPATIENT)
Dept: CARDIOLOGY CLINIC | Age: 74
End: 2024-08-30
Payer: MEDICARE

## 2024-08-30 VITALS
BODY MASS INDEX: 37.92 KG/M2 | RESPIRATION RATE: 18 BRPM | SYSTOLIC BLOOD PRESSURE: 130 MMHG | HEART RATE: 74 BPM | WEIGHT: 227.6 LBS | DIASTOLIC BLOOD PRESSURE: 70 MMHG | HEIGHT: 65 IN

## 2024-08-30 DIAGNOSIS — I44.7 LBBB (LEFT BUNDLE BRANCH BLOCK): ICD-10-CM

## 2024-08-30 DIAGNOSIS — I34.0 NONRHEUMATIC MITRAL VALVE REGURGITATION: ICD-10-CM

## 2024-08-30 DIAGNOSIS — I73.9 PVD (PERIPHERAL VASCULAR DISEASE) WITH CLAUDICATION (HCC): ICD-10-CM

## 2024-08-30 DIAGNOSIS — I25.10 CORONARY ARTERY DISEASE INVOLVING NATIVE CORONARY ARTERY OF NATIVE HEART WITHOUT ANGINA PECTORIS: ICD-10-CM

## 2024-08-30 DIAGNOSIS — I50.32 CHRONIC HEART FAILURE WITH PRESERVED EJECTION FRACTION (HFPEF) (HCC): Primary | ICD-10-CM

## 2024-08-30 DIAGNOSIS — I27.20 PULMONARY HYPERTENSION (HCC): ICD-10-CM

## 2024-08-30 PROCEDURE — 1124F ACP DISCUSS-NO DSCNMKR DOCD: CPT | Performed by: INTERNAL MEDICINE

## 2024-08-30 PROCEDURE — 93000 ELECTROCARDIOGRAM COMPLETE: CPT | Performed by: INTERNAL MEDICINE

## 2024-08-30 PROCEDURE — G8417 CALC BMI ABV UP PARAM F/U: HCPCS | Performed by: INTERNAL MEDICINE

## 2024-08-30 PROCEDURE — 99214 OFFICE O/P EST MOD 30 MIN: CPT | Performed by: INTERNAL MEDICINE

## 2024-08-30 PROCEDURE — G8427 DOCREV CUR MEDS BY ELIG CLIN: HCPCS | Performed by: INTERNAL MEDICINE

## 2024-08-30 PROCEDURE — 3017F COLORECTAL CA SCREEN DOC REV: CPT | Performed by: INTERNAL MEDICINE

## 2024-08-30 PROCEDURE — G8399 PT W/DXA RESULTS DOCUMENT: HCPCS | Performed by: INTERNAL MEDICINE

## 2024-08-30 PROCEDURE — 1090F PRES/ABSN URINE INCON ASSESS: CPT | Performed by: INTERNAL MEDICINE

## 2024-08-30 PROCEDURE — 1036F TOBACCO NON-USER: CPT | Performed by: INTERNAL MEDICINE

## 2024-08-30 RX ORDER — CARVEDILOL 3.12 MG/1
3.12 TABLET ORAL 2 TIMES DAILY
Qty: 180 TABLET | Refills: 2 | Status: SHIPPED | OUTPATIENT
Start: 2024-08-30

## 2024-10-14 DIAGNOSIS — I25.10 CORONARY ARTERY DISEASE INVOLVING NATIVE CORONARY ARTERY OF NATIVE HEART WITHOUT ANGINA PECTORIS: ICD-10-CM

## 2024-10-14 DIAGNOSIS — I50.32 CHRONIC HEART FAILURE WITH PRESERVED EJECTION FRACTION (HFPEF) (HCC): ICD-10-CM

## 2024-10-14 RX ORDER — EMPAGLIFLOZIN 10 MG/1
10 TABLET, FILM COATED ORAL DAILY
Qty: 30 TABLET | Refills: 4 | Status: SHIPPED | OUTPATIENT
Start: 2024-10-14

## 2024-11-08 ENCOUNTER — HOSPITAL ENCOUNTER (EMERGENCY)
Age: 74
Discharge: HOME | End: 2024-11-08
Attending: EMERGENCY MEDICINE
Payer: MEDICARE

## 2024-11-08 VITALS
HEART RATE: 52 BPM | DIASTOLIC BLOOD PRESSURE: 50 MMHG | TEMPERATURE: 97.8 F | OXYGEN SATURATION: 100 % | SYSTOLIC BLOOD PRESSURE: 140 MMHG | RESPIRATION RATE: 17 BRPM

## 2024-11-08 DIAGNOSIS — R89.9 ABNORMAL LABORATORY TEST RESULT: ICD-10-CM

## 2024-11-08 DIAGNOSIS — Z01.89 EXAMINATION FOR, LABORATORY: Primary | ICD-10-CM

## 2024-11-08 DIAGNOSIS — Z71.1 FEARED CONDITION NOT DEMONSTRATED: ICD-10-CM

## 2024-11-08 DIAGNOSIS — Z79.01 CHRONIC ANTICOAGULATION: ICD-10-CM

## 2024-11-08 DIAGNOSIS — E10.9 TYPE 1 DIABETES MELLITUS WITHOUT COMPLICATION (HCC): ICD-10-CM

## 2024-11-08 LAB
ALBUMIN SERPL-MCNC: 3.9 G/DL (ref 3.5–5.2)
ALP SERPL-CCNC: 119 U/L (ref 35–104)
ALT SERPL-CCNC: 7 U/L (ref 0–32)
ANION GAP SERPL CALCULATED.3IONS-SCNC: 8 MMOL/L (ref 7–16)
AST SERPL-CCNC: 13 U/L (ref 0–31)
BACTERIA URNS QL MICRO: ABNORMAL
BASOPHILS # BLD: 0.03 K/UL (ref 0–0.2)
BASOPHILS NFR BLD: 0 % (ref 0–2)
BILIRUB SERPL-MCNC: 0.2 MG/DL (ref 0–1.2)
BILIRUB UR QL STRIP: NEGATIVE
BUN BLD-MCNC: 15 MG/DL (ref 6–23)
BUN SERPL-MCNC: 14 MG/DL (ref 6–23)
CALCIUM SERPL-MCNC: 9.2 MG/DL (ref 8.6–10.2)
CHLORIDE BLD-SCNC: 101 MMOL/L (ref 100–108)
CHLORIDE SERPL-SCNC: 101 MMOL/L (ref 98–107)
CLARITY UR: CLEAR
CO2 BLD CALC-SCNC: 28 MMOL/L (ref 22–29)
CO2 SERPL-SCNC: 32 MMOL/L (ref 22–29)
COLOR UR: YELLOW
CREAT BLD-MCNC: 0.9 MG/DL (ref 0.5–1)
CREAT SERPL-MCNC: 1 MG/DL (ref 0.5–1)
EGFR, POC: 67 ML/MIN/1.73M2
EKG ATRIAL RATE: 57 BPM
EKG P AXIS: 39 DEGREES
EKG P-R INTERVAL: 168 MS
EKG Q-T INTERVAL: 488 MS
EKG QRS DURATION: 140 MS
EKG QTC CALCULATION (BAZETT): 474 MS
EKG R AXIS: -35 DEGREES
EKG T AXIS: 91 DEGREES
EKG VENTRICULAR RATE: 57 BPM
EOSINOPHIL # BLD: 0 K/UL (ref 0.05–0.5)
EOSINOPHILS RELATIVE PERCENT: 0 % (ref 0–6)
EPI CELLS #/AREA URNS HPF: ABNORMAL /HPF
ERYTHROCYTE [DISTWIDTH] IN BLOOD BY AUTOMATED COUNT: 15.6 % (ref 11.5–15)
GFR, ESTIMATED: 59 ML/MIN/1.73M2
GLUCOSE BLD-MCNC: 85 MG/DL (ref 74–99)
GLUCOSE SERPL-MCNC: 88 MG/DL (ref 74–99)
GLUCOSE UR STRIP-MCNC: >=1000 MG/DL
HCT VFR BLD AUTO: 34 % (ref 34–48)
HGB BLD-MCNC: 10.1 G/DL (ref 11.5–15.5)
HGB UR QL STRIP.AUTO: NEGATIVE
IMM GRANULOCYTES # BLD AUTO: 0.03 K/UL (ref 0–0.58)
IMM GRANULOCYTES NFR BLD: 0 % (ref 0–5)
KETONES UR STRIP-MCNC: NEGATIVE MG/DL
LEUKOCYTE ESTERASE UR QL STRIP: ABNORMAL
LYMPHOCYTES NFR BLD: 1.7 K/UL (ref 1.5–4)
LYMPHOCYTES RELATIVE PERCENT: 22 % (ref 20–42)
MAGNESIUM SERPL-MCNC: 3 MG/DL (ref 1.6–2.6)
MCH RBC QN AUTO: 25.4 PG (ref 26–35)
MCHC RBC AUTO-ENTMCNC: 29.7 G/DL (ref 32–34.5)
MCV RBC AUTO: 85.6 FL (ref 80–99.9)
MONOCYTES NFR BLD: 0.81 K/UL (ref 0.1–0.95)
MONOCYTES NFR BLD: 10 % (ref 2–12)
NEUTROPHILS NFR BLD: 67 % (ref 43–80)
NEUTS SEG NFR BLD: 5.28 K/UL (ref 1.8–7.3)
NITRITE UR QL STRIP: NEGATIVE
PH UR STRIP: 7 [PH] (ref 5–9)
PHOSPHATE SERPL-MCNC: 3 MG/DL (ref 2.5–4.5)
PLATELET # BLD AUTO: 349 K/UL (ref 130–450)
PMV BLD AUTO: 10.4 FL (ref 7–12)
POC ANION GAP: 13 MMOL/L (ref 7–16)
POTASSIUM BLD-SCNC: 4.3 MMOL/L (ref 3.5–5)
POTASSIUM SERPL-SCNC: 4.3 MMOL/L (ref 3.5–5)
PROT SERPL-MCNC: 7.2 G/DL (ref 6.4–8.3)
PROT UR STRIP-MCNC: NEGATIVE MG/DL
RBC # BLD AUTO: 3.97 M/UL (ref 3.5–5.5)
RBC #/AREA URNS HPF: ABNORMAL /HPF
SODIUM BLD-SCNC: 142 MMOL/L (ref 132–146)
SODIUM SERPL-SCNC: 141 MMOL/L (ref 132–146)
SP GR UR STRIP: 1.01 (ref 1–1.03)
UROBILINOGEN UR STRIP-ACNC: 0.2 EU/DL (ref 0–1)
WBC #/AREA URNS HPF: ABNORMAL /HPF
WBC OTHER # BLD: 7.9 K/UL (ref 4.5–11.5)

## 2024-11-08 PROCEDURE — 84520 ASSAY OF UREA NITROGEN: CPT

## 2024-11-08 PROCEDURE — 84100 ASSAY OF PHOSPHORUS: CPT

## 2024-11-08 PROCEDURE — 99284 EMERGENCY DEPT VISIT MOD MDM: CPT

## 2024-11-08 PROCEDURE — 6360000002 HC RX W HCPCS: Performed by: EMERGENCY MEDICINE

## 2024-11-08 PROCEDURE — 80053 COMPREHEN METABOLIC PANEL: CPT

## 2024-11-08 PROCEDURE — 82947 ASSAY GLUCOSE BLOOD QUANT: CPT

## 2024-11-08 PROCEDURE — 80051 ELECTROLYTE PANEL: CPT

## 2024-11-08 PROCEDURE — 93005 ELECTROCARDIOGRAM TRACING: CPT | Performed by: EMERGENCY MEDICINE

## 2024-11-08 PROCEDURE — 85025 COMPLETE CBC W/AUTO DIFF WBC: CPT

## 2024-11-08 PROCEDURE — 81001 URINALYSIS AUTO W/SCOPE: CPT

## 2024-11-08 PROCEDURE — 83735 ASSAY OF MAGNESIUM: CPT

## 2024-11-08 PROCEDURE — 96365 THER/PROPH/DIAG IV INF INIT: CPT

## 2024-11-08 PROCEDURE — 82565 ASSAY OF CREATININE: CPT

## 2024-11-08 RX ORDER — CALCIUM GLUCONATE 20 MG/ML
1000 INJECTION, SOLUTION INTRAVENOUS ONCE
Status: COMPLETED | OUTPATIENT
Start: 2024-11-08 | End: 2024-11-08

## 2024-11-08 RX ADMIN — CALCIUM GLUCONATE 1000 MG: 20 INJECTION, SOLUTION INTRAVENOUS at 14:26

## 2024-11-08 ASSESSMENT — PAIN - FUNCTIONAL ASSESSMENT: PAIN_FUNCTIONAL_ASSESSMENT: NONE - DENIES PAIN

## 2024-11-08 NOTE — DISCHARGE INSTRUCTIONS
Labs Reviewed   CBC WITH AUTO DIFFERENTIAL - Abnormal; Notable for the following components:       Result Value    Hemoglobin 10.1 (*)     MCH 25.4 (*)     MCHC 29.7 (*)     RDW 15.6 (*)     Eosinophils Absolute 0.00 (*)     All other components within normal limits   COMPREHENSIVE METABOLIC PANEL - Abnormal; Notable for the following components:    CO2 32 (*)     Est, Glom Filt Rate 59 (*)     Alkaline Phosphatase 119 (*)     All other components within normal limits   MAGNESIUM - Abnormal; Notable for the following components:    Magnesium 3.0 (*)     All other components within normal limits   PHOSPHORUS   POC PANEL (C8)   URINALYSIS WITH MICROSCOPIC

## 2024-11-11 LAB
EKG ATRIAL RATE: 57 BPM
EKG P AXIS: 39 DEGREES
EKG P-R INTERVAL: 168 MS
EKG Q-T INTERVAL: 488 MS
EKG QRS DURATION: 140 MS
EKG QTC CALCULATION (BAZETT): 474 MS
EKG R AXIS: -35 DEGREES
EKG T AXIS: 91 DEGREES
EKG VENTRICULAR RATE: 57 BPM

## 2024-11-11 PROCEDURE — 93010 ELECTROCARDIOGRAM REPORT: CPT | Performed by: INTERNAL MEDICINE

## 2024-11-21 ENCOUNTER — TELEPHONE (OUTPATIENT)
Dept: CARDIOLOGY CLINIC | Age: 74
End: 2024-11-21

## 2025-02-13 ENCOUNTER — OFFICE VISIT (OUTPATIENT)
Dept: CARDIOLOGY CLINIC | Age: 75
End: 2025-02-13
Payer: MEDICARE

## 2025-02-13 VITALS
SYSTOLIC BLOOD PRESSURE: 108 MMHG | HEART RATE: 81 BPM | WEIGHT: 212 LBS | DIASTOLIC BLOOD PRESSURE: 66 MMHG | RESPIRATION RATE: 18 BRPM | BODY MASS INDEX: 35.32 KG/M2 | HEIGHT: 65 IN

## 2025-02-13 DIAGNOSIS — R06.02 SOBOE (SHORTNESS OF BREATH ON EXERTION): Primary | ICD-10-CM

## 2025-02-13 DIAGNOSIS — I25.10 CORONARY ARTERY DISEASE INVOLVING NATIVE CORONARY ARTERY OF NATIVE HEART WITHOUT ANGINA PECTORIS: ICD-10-CM

## 2025-02-13 DIAGNOSIS — I50.32 CHRONIC HEART FAILURE WITH PRESERVED EJECTION FRACTION (HFPEF) (HCC): ICD-10-CM

## 2025-02-13 DIAGNOSIS — I44.7 LBBB (LEFT BUNDLE BRANCH BLOCK): ICD-10-CM

## 2025-02-13 DIAGNOSIS — R06.02 SHORTNESS OF BREATH: ICD-10-CM

## 2025-02-13 DIAGNOSIS — I34.0 NONRHEUMATIC MITRAL VALVE REGURGITATION: ICD-10-CM

## 2025-02-13 DIAGNOSIS — Z95.5 PRESENCE OF STENT IN CORONARY ARTERY: ICD-10-CM

## 2025-02-13 PROCEDURE — G8427 DOCREV CUR MEDS BY ELIG CLIN: HCPCS | Performed by: INTERNAL MEDICINE

## 2025-02-13 PROCEDURE — 93000 ELECTROCARDIOGRAM COMPLETE: CPT | Performed by: INTERNAL MEDICINE

## 2025-02-13 PROCEDURE — 1160F RVW MEDS BY RX/DR IN RCRD: CPT | Performed by: INTERNAL MEDICINE

## 2025-02-13 PROCEDURE — 1124F ACP DISCUSS-NO DSCNMKR DOCD: CPT | Performed by: INTERNAL MEDICINE

## 2025-02-13 PROCEDURE — 3017F COLORECTAL CA SCREEN DOC REV: CPT | Performed by: INTERNAL MEDICINE

## 2025-02-13 PROCEDURE — 1090F PRES/ABSN URINE INCON ASSESS: CPT | Performed by: INTERNAL MEDICINE

## 2025-02-13 PROCEDURE — 1159F MED LIST DOCD IN RCRD: CPT | Performed by: INTERNAL MEDICINE

## 2025-02-13 PROCEDURE — G8399 PT W/DXA RESULTS DOCUMENT: HCPCS | Performed by: INTERNAL MEDICINE

## 2025-02-13 PROCEDURE — 99214 OFFICE O/P EST MOD 30 MIN: CPT | Performed by: INTERNAL MEDICINE

## 2025-02-13 PROCEDURE — 1036F TOBACCO NON-USER: CPT | Performed by: INTERNAL MEDICINE

## 2025-02-13 PROCEDURE — G8417 CALC BMI ABV UP PARAM F/U: HCPCS | Performed by: INTERNAL MEDICINE

## 2025-02-13 RX ORDER — REGADENOSON 0.08 MG/ML
0.4 INJECTION, SOLUTION INTRAVENOUS
OUTPATIENT
Start: 2025-02-13

## 2025-02-13 RX ORDER — CARVEDILOL 3.12 MG/1
3.12 TABLET ORAL 2 TIMES DAILY
Qty: 180 TABLET | Refills: 2 | Status: SHIPPED | OUTPATIENT
Start: 2025-02-13

## 2025-02-13 NOTE — PROGRESS NOTES
tablet by mouth daily     Presence of stent in coronary artery  -     [facility]    LBBB (left bundle branch block) - Chronic  -     Nuclear REGADENOSON Stress Test; Future  -     regadenoson (LEXISCAN) injection 0.4 mg  -     Echo (TTE) Complete; Future    Nonrheumatic mitral valve regurgitation  -     regadenoson (LEXISCAN) injection 0.4 mg  -     Echo (TTE) Complete; Future    Moderate pulmonary hypertension - RVSP 55 mmHg        Essential hypertension - Controlled     Hx of PE - 2023 - On Eliquis     History of syncope     PSVT (paroxysmal supraventricular tachycardia) ([facility]) - Stable     PVD (peripheral vascular disease) with claudication ([facility]) - Seen by Dr Damon     History of CVA (cerebrovascular accident)     Hyperlipidemia - On Statin  -     EKG 12 Lead    Non-Obesity, morbid, BMI 35 ([facility]) - Diet, exercise as tolerate and weight loss discussed.      Type 2 diabetes mellitus - Per her PCP     Hx of COVID 19  - Jan 2023     s/p Bilateral hip and left knee replacement     Preventive Cardiology: Low cholesterol diet, regular exercise as tolerate, and gradual weight loss discussed.    Other orders - Refills  -     Beta Blocker Management Prior to Cardiac Stress Test; Standing  -     perflutren lipid microspheres (DEFINITY) injection 1.5 mL  -     carvedilol (COREG) 3.125 MG tablet; Take 1 tablet by mouth 2 times daily        -     empagliflozin (JARDIANCE) 10 MG tablet; Take 1 tablet by mouth daily    Above recommendations discussed.   Current medications, allergies, and results of prior tests (as available) were reviewed.   All questions answered about cardiac diagnoses and cardiac medications.   Continue current medications. Monitor BP and heart rates.              Compliance with medications and f/u with physicians discussed.   Risk factor modification based on risk profile discussed.   Advised to call for exertional chest pains, short of breath; dizzy or palpitations.   Follow up

## 2025-02-28 ENCOUNTER — TELEPHONE (OUTPATIENT)
Dept: CARDIOLOGY | Age: 75
End: 2025-02-28

## 2025-02-28 NOTE — TELEPHONE ENCOUNTER
Spoke with patient and confirmed chemical stress test appointment on 3/3/25 at 10770, and ECHO at 1230. Instructions for test and medication hold information reviewed with patient, questions answered. Patient verbalized understanding.

## 2025-03-03 ENCOUNTER — HOSPITAL ENCOUNTER (OUTPATIENT)
Dept: CARDIOLOGY | Age: 75
Discharge: HOME | End: 2025-03-05
Attending: INTERNAL MEDICINE
Payer: MEDICARE

## 2025-03-03 VITALS
HEIGHT: 65 IN | BODY MASS INDEX: 35.32 KG/M2 | SYSTOLIC BLOOD PRESSURE: 108 MMHG | DIASTOLIC BLOOD PRESSURE: 66 MMHG | WEIGHT: 212 LBS

## 2025-03-03 VITALS
SYSTOLIC BLOOD PRESSURE: 106 MMHG | BODY MASS INDEX: 35.32 KG/M2 | WEIGHT: 212 LBS | RESPIRATION RATE: 24 BRPM | HEIGHT: 65 IN | DIASTOLIC BLOOD PRESSURE: 62 MMHG | HEART RATE: 64 BPM

## 2025-03-03 DIAGNOSIS — I34.0 NONRHEUMATIC MITRAL VALVE REGURGITATION: ICD-10-CM

## 2025-03-03 DIAGNOSIS — R06.02 SOBOE (SHORTNESS OF BREATH ON EXERTION): ICD-10-CM

## 2025-03-03 DIAGNOSIS — I44.7 LBBB (LEFT BUNDLE BRANCH BLOCK): ICD-10-CM

## 2025-03-03 DIAGNOSIS — I25.10 CORONARY ARTERY DISEASE INVOLVING NATIVE CORONARY ARTERY OF NATIVE HEART WITHOUT ANGINA PECTORIS: ICD-10-CM

## 2025-03-03 PROCEDURE — 2500000003 HC RX 250 WO HCPCS: Performed by: INTERNAL MEDICINE

## 2025-03-03 PROCEDURE — 6360000002 HC RX W HCPCS: Performed by: INTERNAL MEDICINE

## 2025-03-03 PROCEDURE — 93306 TTE W/DOPPLER COMPLETE: CPT

## 2025-03-03 PROCEDURE — 3430000000 HC RX DIAGNOSTIC RADIOPHARMACEUTICAL: Performed by: INTERNAL MEDICINE

## 2025-03-03 PROCEDURE — A9502 TC99M TETROFOSMIN: HCPCS | Performed by: INTERNAL MEDICINE

## 2025-03-03 PROCEDURE — 93017 CV STRESS TEST TRACING ONLY: CPT

## 2025-03-03 PROCEDURE — 78452 HT MUSCLE IMAGE SPECT MULT: CPT

## 2025-03-03 RX ORDER — SODIUM CHLORIDE 0.9 % (FLUSH) 0.9 %
10 SYRINGE (ML) INJECTION PRN
Status: DISCONTINUED | OUTPATIENT
Start: 2025-03-03 | End: 2025-03-06 | Stop reason: HOSPADM

## 2025-03-03 RX ORDER — REGADENOSON 0.08 MG/ML
0.4 INJECTION, SOLUTION INTRAVENOUS
Status: COMPLETED | OUTPATIENT
Start: 2025-03-03 | End: 2025-03-03

## 2025-03-03 RX ADMIN — TETROFOSMIN 27.2 MILLICURIE: 0.23 INJECTION, POWDER, LYOPHILIZED, FOR SOLUTION INTRAVENOUS at 11:12

## 2025-03-03 RX ADMIN — SODIUM CHLORIDE, PRESERVATIVE FREE 10 ML: 5 INJECTION INTRAVENOUS at 11:12

## 2025-03-03 RX ADMIN — TETROFOSMIN 8 MILLICURIE: 0.23 INJECTION, POWDER, LYOPHILIZED, FOR SOLUTION INTRAVENOUS at 10:01

## 2025-03-03 RX ADMIN — REGADENOSON 0.4 MG: 0.08 INJECTION, SOLUTION INTRAVENOUS at 11:12

## 2025-03-03 RX ADMIN — SODIUM CHLORIDE, PRESERVATIVE FREE 10 ML: 5 INJECTION INTRAVENOUS at 10:01

## 2025-03-04 LAB
ECHO BSA: 2.1 M2
STRESS BASELINE DIAS BP: 62 MMHG
STRESS BASELINE HR: 67 BPM
STRESS BASELINE SYS BP: 106 MMHG
STRESS ESTIMATED WORKLOAD: 1 METS
STRESS PEAK DIAS BP: 62 MMHG
STRESS PEAK SYS BP: 106 MMHG
STRESS PERCENT HR ACHIEVED: 62 %
STRESS POST PEAK HR: 90 BPM
STRESS RATE PRESSURE PRODUCT: 9540 BPM*MMHG
STRESS TARGET HR: 146 BPM
TID: 1.09

## 2025-03-05 LAB
ECHO AV CUSP MM: 2.1 CM
ECHO AV MEAN GRADIENT: 3 MMHG
ECHO AV MEAN VELOCITY: 0.9 M/S
ECHO AV PEAK GRADIENT: 7 MMHG
ECHO AV PEAK VELOCITY: 1.3 M/S
ECHO AV VTI: 19.1 CM
ECHO BSA: 2.1 M2
ECHO EST RA PRESSURE: 3 MMHG
ECHO LA DIAMETER INDEX: 2.17 CM/M2
ECHO LA DIAMETER: 4.4 CM
ECHO LA VOL A-L A2C: 37 ML (ref 22–52)
ECHO LA VOL A-L A4C: 36 ML (ref 22–52)
ECHO LA VOL MOD A2C: 34 ML (ref 22–52)
ECHO LA VOL MOD A4C: 34 ML (ref 22–52)
ECHO LA VOLUME AREA LENGTH: 36 ML
ECHO LA VOLUME INDEX A-L A2C: 18 ML/M2 (ref 16–34)
ECHO LA VOLUME INDEX A-L A4C: 18 ML/M2 (ref 16–34)
ECHO LA VOLUME INDEX AREA LENGTH: 18 ML/M2 (ref 16–34)
ECHO LA VOLUME INDEX MOD A2C: 17 ML/M2 (ref 16–34)
ECHO LA VOLUME INDEX MOD A4C: 17 ML/M2 (ref 16–34)
ECHO LV EF PHYSICIAN: 65 %
ECHO LV FRACTIONAL SHORTENING: 38 % (ref 28–44)
ECHO LV INTERNAL DIMENSION DIASTOLE INDEX: 2.32 CM/M2
ECHO LV INTERNAL DIMENSION DIASTOLIC: 4.7 CM (ref 3.9–5.3)
ECHO LV INTERNAL DIMENSION SYSTOLIC INDEX: 1.43 CM/M2
ECHO LV INTERNAL DIMENSION SYSTOLIC: 2.9 CM
ECHO LV IVSD: 1.2 CM (ref 0.6–0.9)
ECHO LV MASS 2D: 199.6 G (ref 67–162)
ECHO LV MASS INDEX 2D: 98.3 G/M2 (ref 43–95)
ECHO LV POSTERIOR WALL DIASTOLIC: 1.1 CM (ref 0.6–0.9)
ECHO LV RELATIVE WALL THICKNESS RATIO: 0.47
ECHO MV A VELOCITY: 0.91 M/S
ECHO MV E DECELERATION TIME (DT): 293.4 MS
ECHO MV E VELOCITY: 0.7 M/S
ECHO MV E/A RATIO: 0.77
ECHO MV MAX VELOCITY: 0.9 M/S
ECHO MV MEAN GRADIENT: 1 MMHG
ECHO MV MEAN VELOCITY: 0.5 M/S
ECHO MV PEAK GRADIENT: 3 MMHG
ECHO MV VTI: 27.9 CM
ECHO RIGHT VENTRICULAR SYSTOLIC PRESSURE (RVSP): 31 MMHG
ECHO RV INTERNAL DIMENSION: 2.8 CM
ECHO TV REGURGITANT MAX VELOCITY: 2.65 M/S
ECHO TV REGURGITANT PEAK GRADIENT: 28 MMHG

## 2025-03-06 ENCOUNTER — TELEPHONE (OUTPATIENT)
Dept: CARDIOLOGY CLINIC | Age: 75
End: 2025-03-06

## 2025-03-06 NOTE — TELEPHONE ENCOUNTER
Tenisha Mcghee MD McGee, Shelia, MA  Tell her that her stress test  Ok - No indication of blockages  Echo Ok - heart function normal.    [facility]

## 2025-03-10 DIAGNOSIS — I50.32 CHRONIC HEART FAILURE WITH PRESERVED EJECTION FRACTION (HFPEF) (HCC): ICD-10-CM

## 2025-03-10 DIAGNOSIS — I25.10 CORONARY ARTERY DISEASE INVOLVING NATIVE CORONARY ARTERY OF NATIVE HEART WITHOUT ANGINA PECTORIS: ICD-10-CM

## 2025-03-10 RX ORDER — EMPAGLIFLOZIN 10 MG/1
10 TABLET, FILM COATED ORAL DAILY
Qty: 30 TABLET | Refills: 4 | Status: SHIPPED | OUTPATIENT
Start: 2025-03-10

## 2025-04-08 DIAGNOSIS — I25.10 CORONARY ARTERY DISEASE INVOLVING NATIVE CORONARY ARTERY OF NATIVE HEART WITHOUT ANGINA PECTORIS: ICD-10-CM

## 2025-04-08 RX ORDER — CARVEDILOL 3.12 MG/1
3.12 TABLET ORAL 2 TIMES DAILY
Qty: 60 TABLET | Refills: 8 | Status: SHIPPED | OUTPATIENT
Start: 2025-04-08

## 2025-04-12 NOTE — TELEPHONE ENCOUNTER
[place] [facility] Trans[facility] Initial Follow Up Call    Outreach made within 2 business days of discharge: Yes    Patient: Whitney Gonzalez Patient : 1950   MRN: [id number]  Reason for Admission: There are no discharge diagnoses documented for the most recent discharge. Discharge Date: 20       Spoke with: Walter Mccormick    Discharge department/facility: main    [facility] Interactive Patient Contact:  Was patient able to fill all prescriptions: Yes  Was patient instructed to bring all medications to the follow-up visit: Yes  Is patient taking all medications as directed in the discharge summary?  Yes  Does patient understand their discharge instructions: Yes  Does patient have questions or concerns that need addressed prior to 7-14 day follow up office visit: no    Scheduled appointment with PCP within 7-14 days    Follow Up  Future Appointments   Date Time Provider Emeka Hernandez   2020 11:30 AM Joselo Mendoza  [address]   2020 10:00 AM [address], APRN - CNP [facility]   2020  9:00 AM Joselo Mendoza  [address]   2021 10:00 AM Bob Helms MD [facility]/MED [facility]       Loli Hernandez Patient's mother calling to review directions given in UC yesterday for cream application. Instructions reviewed and mother verbalizes understanding.   Encouraged to call back with any additional questions, concerns, or change in symptoms, patient/caller verbalized understanding.    Reason for Disposition   Health Information question, no triage required and triager able to answer question    Protocols used: Information Only Call - No Triage-P-    Chart not routed as no PCP within [facility].

## 2025-04-19 ENCOUNTER — APPOINTMENT (OUTPATIENT)
Dept: GENERAL RADIOLOGY | Age: 75
End: 2025-04-19
Payer: MEDICARE

## 2025-04-19 ENCOUNTER — HOSPITAL ENCOUNTER (OUTPATIENT)
Age: 75
Setting detail: OBSERVATION
Discharge: NURSING, REHAB OR LONG-TERM CARE | End: 2025-04-22
Attending: EMERGENCY MEDICINE | Admitting: STUDENT IN AN ORGANIZED HEALTH CARE EDUCATION/TRAINING PROGRAM
Payer: MEDICARE

## 2025-04-19 ENCOUNTER — APPOINTMENT (OUTPATIENT)
Dept: CT IMAGING | Age: 75
End: 2025-04-19
Attending: EMERGENCY MEDICINE
Payer: MEDICARE

## 2025-04-19 DIAGNOSIS — E16.2 HYPOGLYCEMIA: Primary | ICD-10-CM

## 2025-04-19 DIAGNOSIS — R55 SYNCOPE AND COLLAPSE: ICD-10-CM

## 2025-04-19 LAB
ALBUMIN SERPL-MCNC: 3.7 G/DL (ref 3.5–5.2)
ALP SERPL-CCNC: 98 U/L (ref 35–104)
ALT SERPL-CCNC: 7 U/L (ref 0–35)
AMORPH SED URNS QL MICRO: NORMAL
ANION GAP SERPL CALCULATED.3IONS-SCNC: 11 MMOL/L (ref 7–16)
AST SERPL-CCNC: 24 U/L (ref 0–35)
BACTERIA URNS QL MICRO: NORMAL
BASOPHILS # BLD: 0.03 K/UL (ref 0–0.2)
BASOPHILS NFR BLD: 0 % (ref 0–2)
BILIRUB SERPL-MCNC: 0.3 MG/DL (ref 0–1.2)
BILIRUB UR QL STRIP: NORMAL
BUN SERPL-MCNC: 13 MG/DL (ref 8–23)
CALCIUM SERPL-MCNC: 10.1 MG/DL (ref 8.8–10.2)
CASTS #/AREA URNS LPF: NORMAL /LPF
CHARACTER UR: NORMAL
CHLORIDE SERPL-SCNC: 103 MMOL/L (ref 98–107)
CK SERPL-CCNC: 176 U/L (ref 0–170)
CLARITY UR: NORMAL
CO2 SERPL-SCNC: 26 MMOL/L (ref 22–29)
COLOR UR: NORMAL
CREAT SERPL-MCNC: 0.9 MG/DL (ref 0.5–1)
CRYSTALS URNS MICRO: NORMAL /HPF
EOSINOPHIL # BLD: 0 K/UL (ref 0.05–0.5)
EOSINOPHILS RELATIVE PERCENT: 0 % (ref 0–6)
EPI CELLS #/AREA URNS HPF: NORMAL /HPF
ERYTHROCYTE [DISTWIDTH] IN BLOOD BY AUTOMATED COUNT: 16.1 % (ref 11.5–15)
GFR, ESTIMATED: 65 ML/MIN/1.73M2
GLUCOSE BLD-MCNC: 101 MG/DL (ref 74–99)
GLUCOSE BLD-MCNC: 114 MG/DL (ref 74–99)
GLUCOSE BLD-MCNC: 142 MG/DL (ref 74–99)
GLUCOSE BLD-MCNC: 159 MG/DL (ref 74–99)
GLUCOSE BLD-MCNC: 175 MG/DL (ref 74–99)
GLUCOSE BLD-MCNC: <40 MG/DL (ref 74–99)
GLUCOSE SERPL-MCNC: 70 MG/DL (ref 74–99)
GLUCOSE UR STRIP-MCNC: NORMAL MG/DL
HCT VFR BLD AUTO: 35 % (ref 34–48)
HGB BLD-MCNC: 11.2 G/DL (ref 11.5–15.5)
HGB UR QL STRIP.AUTO: NORMAL
IMM GRANULOCYTES # BLD AUTO: 0.04 K/UL (ref 0–0.58)
IMM GRANULOCYTES NFR BLD: 0 % (ref 0–5)
KETONES UR STRIP-MCNC: NORMAL MG/DL
LEUKOCYTE ESTERASE UR QL STRIP: NORMAL
LYMPHOCYTES NFR BLD: 0.92 K/UL (ref 1.5–4)
LYMPHOCYTES RELATIVE PERCENT: 8 % (ref 20–42)
MCH RBC QN AUTO: 28.3 PG (ref 26–35)
MCHC RBC AUTO-ENTMCNC: 32 G/DL (ref 32–34.5)
MCV RBC AUTO: 88.4 FL (ref 80–99.9)
MONOCYTES NFR BLD: 1.24 K/UL (ref 0.1–0.95)
MONOCYTES NFR BLD: 10 % (ref 2–12)
MUCOUS THREADS URNS QL MICRO: NORMAL
NEUTROPHILS NFR BLD: 81 % (ref 43–80)
NEUTS SEG NFR BLD: 9.73 K/UL (ref 1.8–7.3)
NITRITE UR QL STRIP: NORMAL
PH UR STRIP: NORMAL [PH]
PLATELET # BLD AUTO: 411 K/UL (ref 130–450)
PMV BLD AUTO: 10.6 FL (ref 7–12)
POTASSIUM SERPL-SCNC: 4.3 MMOL/L (ref 3.5–5.1)
PROT SERPL-MCNC: 7.1 G/DL (ref 6.4–8.3)
PROT UR STRIP-MCNC: NORMAL MG/DL
RBC # BLD AUTO: 3.96 M/UL (ref 3.5–5.5)
RBC #/AREA URNS HPF: NORMAL /HPF
RENAL EPITHELIAL, UA: NORMAL /HPF
SODIUM SERPL-SCNC: 140 MMOL/L (ref 136–145)
SP GR UR STRIP: NORMAL
SPERM, URINE: NORMAL
TRICHOMONAS #/AREA URNS HPF: NORMAL /[HPF]
TROPONIN I SERPL HS-MCNC: 13 NG/L (ref 0–14)
TROPONIN I SERPL HS-MCNC: 16 NG/L (ref 0–14)
UROBILINOGEN UR STRIP-ACNC: NORMAL EU/DL
WBC #/AREA URNS HPF: NORMAL /HPF
WBC OTHER # BLD: 12 K/UL (ref 4.5–11.5)
YEAST URNS QL MICRO: NORMAL

## 2025-04-19 PROCEDURE — 82962 GLUCOSE BLOOD TEST: CPT

## 2025-04-19 PROCEDURE — G0378 HOSPITAL OBSERVATION PER HR: HCPCS

## 2025-04-19 PROCEDURE — 70450 CT HEAD/BRAIN W/O DYE: CPT

## 2025-04-19 PROCEDURE — 99285 EMERGENCY DEPT VISIT HI MDM: CPT

## 2025-04-19 PROCEDURE — 6370000000 HC RX 637 (ALT 250 FOR IP): Performed by: STUDENT IN AN ORGANIZED HEALTH CARE EDUCATION/TRAINING PROGRAM

## 2025-04-19 PROCEDURE — 82550 ASSAY OF CK (CPK): CPT

## 2025-04-19 PROCEDURE — 80053 COMPREHEN METABOLIC PANEL: CPT

## 2025-04-19 PROCEDURE — 72170 X-RAY EXAM OF PELVIS: CPT

## 2025-04-19 PROCEDURE — 96361 HYDRATE IV INFUSION ADD-ON: CPT

## 2025-04-19 PROCEDURE — 85025 COMPLETE CBC W/AUTO DIFF WBC: CPT

## 2025-04-19 PROCEDURE — 96374 THER/PROPH/DIAG INJ IV PUSH: CPT

## 2025-04-19 PROCEDURE — 93005 ELECTROCARDIOGRAM TRACING: CPT | Performed by: EMERGENCY MEDICINE

## 2025-04-19 PROCEDURE — 72131 CT LUMBAR SPINE W/O DYE: CPT

## 2025-04-19 PROCEDURE — 84484 ASSAY OF TROPONIN QUANT: CPT

## 2025-04-19 PROCEDURE — 71045 X-RAY EXAM CHEST 1 VIEW: CPT

## 2025-04-19 PROCEDURE — 2580000003 HC RX 258: Performed by: STUDENT IN AN ORGANIZED HEALTH CARE EDUCATION/TRAINING PROGRAM

## 2025-04-19 PROCEDURE — 72128 CT CHEST SPINE W/O DYE: CPT

## 2025-04-19 PROCEDURE — 81001 URINALYSIS AUTO W/SCOPE: CPT

## 2025-04-19 PROCEDURE — 96360 HYDRATION IV INFUSION INIT: CPT

## 2025-04-19 PROCEDURE — 36415 COLL VENOUS BLD VENIPUNCTURE: CPT

## 2025-04-19 PROCEDURE — 72125 CT NECK SPINE W/O DYE: CPT

## 2025-04-19 RX ORDER — ASPIRIN 81 MG/1
81 TABLET, CHEWABLE ORAL DAILY
Status: DISCONTINUED | OUTPATIENT
Start: 2025-04-20 | End: 2025-04-22 | Stop reason: HOSPADM

## 2025-04-19 RX ORDER — POTASSIUM CHLORIDE 1500 MG/1
40 TABLET, EXTENDED RELEASE ORAL PRN
Status: ACTIVE | OUTPATIENT
Start: 2025-04-19 | End: 2025-04-21

## 2025-04-19 RX ORDER — HYDROCHLOROTHIAZIDE 12.5 MG/1
25 TABLET ORAL DAILY
Status: DISCONTINUED | OUTPATIENT
Start: 2025-04-20 | End: 2025-04-22 | Stop reason: HOSPADM

## 2025-04-19 RX ORDER — ONDANSETRON 2 MG/ML
4 INJECTION INTRAMUSCULAR; INTRAVENOUS EVERY 6 HOURS PRN
Status: DISCONTINUED | OUTPATIENT
Start: 2025-04-19 | End: 2025-04-22 | Stop reason: HOSPADM

## 2025-04-19 RX ORDER — SODIUM CHLORIDE 0.9 % (FLUSH) 0.9 %
5-40 SYRINGE (ML) INJECTION PRN
Status: DISCONTINUED | OUTPATIENT
Start: 2025-04-19 | End: 2025-04-22 | Stop reason: HOSPADM

## 2025-04-19 RX ORDER — POTASSIUM CHLORIDE 7.45 MG/ML
10 INJECTION INTRAVENOUS PRN
Status: ACTIVE | OUTPATIENT
Start: 2025-04-19 | End: 2025-04-21

## 2025-04-19 RX ORDER — POLYETHYLENE GLYCOL 3350 17 G/17G
17 POWDER, FOR SOLUTION ORAL DAILY PRN
Status: DISCONTINUED | OUTPATIENT
Start: 2025-04-19 | End: 2025-04-22 | Stop reason: HOSPADM

## 2025-04-19 RX ORDER — ACETAMINOPHEN 325 MG/1
650 TABLET ORAL EVERY 6 HOURS PRN
Status: DISCONTINUED | OUTPATIENT
Start: 2025-04-19 | End: 2025-04-22 | Stop reason: HOSPADM

## 2025-04-19 RX ORDER — ROSUVASTATIN CALCIUM 20 MG/1
40 TABLET, COATED ORAL EVERY EVENING
Status: DISCONTINUED | OUTPATIENT
Start: 2025-04-19 | End: 2025-04-22 | Stop reason: HOSPADM

## 2025-04-19 RX ORDER — SODIUM CHLORIDE 0.9 % (FLUSH) 0.9 %
5-40 SYRINGE (ML) INJECTION EVERY 12 HOURS SCHEDULED
Status: DISCONTINUED | OUTPATIENT
Start: 2025-04-19 | End: 2025-04-22 | Stop reason: HOSPADM

## 2025-04-19 RX ORDER — DEXTROSE MONOHYDRATE 100 MG/ML
INJECTION, SOLUTION INTRAVENOUS CONTINUOUS
Status: DISCONTINUED | OUTPATIENT
Start: 2025-04-19 | End: 2025-04-20

## 2025-04-19 RX ORDER — ACETAMINOPHEN 650 MG/1
650 SUPPOSITORY RECTAL EVERY 6 HOURS PRN
Status: DISCONTINUED | OUTPATIENT
Start: 2025-04-19 | End: 2025-04-22 | Stop reason: HOSPADM

## 2025-04-19 RX ORDER — MAGNESIUM SULFATE IN WATER 40 MG/ML
2000 INJECTION, SOLUTION INTRAVENOUS PRN
Status: DISCONTINUED | OUTPATIENT
Start: 2025-04-19 | End: 2025-04-22 | Stop reason: HOSPADM

## 2025-04-19 RX ORDER — CLONIDINE HYDROCHLORIDE 0.1 MG/1
0.2 TABLET ORAL 3 TIMES DAILY
Status: DISCONTINUED | OUTPATIENT
Start: 2025-04-19 | End: 2025-04-22 | Stop reason: HOSPADM

## 2025-04-19 RX ORDER — OMEPRAZOLE 40 MG/1
40 CAPSULE, DELAYED RELEASE ORAL DAILY
COMMUNITY

## 2025-04-19 RX ORDER — ENOXAPARIN SODIUM 100 MG/ML
40 INJECTION SUBCUTANEOUS DAILY
Status: DISCONTINUED | OUTPATIENT
Start: 2025-04-19 | End: 2025-04-19

## 2025-04-19 RX ORDER — OXYBUTYNIN CHLORIDE 5 MG/1
5 TABLET ORAL 3 TIMES DAILY
Status: DISCONTINUED | OUTPATIENT
Start: 2025-04-19 | End: 2025-04-22 | Stop reason: HOSPADM

## 2025-04-19 RX ORDER — DOCUSATE SODIUM 100 MG/1
100 CAPSULE, LIQUID FILLED ORAL 2 TIMES DAILY PRN
Status: DISCONTINUED | OUTPATIENT
Start: 2025-04-19 | End: 2025-04-22 | Stop reason: HOSPADM

## 2025-04-19 RX ORDER — HYDRALAZINE HYDROCHLORIDE 10 MG/1
10 TABLET, FILM COATED ORAL EVERY 12 HOURS SCHEDULED
Status: DISCONTINUED | OUTPATIENT
Start: 2025-04-19 | End: 2025-04-22 | Stop reason: HOSPADM

## 2025-04-19 RX ORDER — GABAPENTIN 300 MG/1
300 CAPSULE ORAL 3 TIMES DAILY
Status: DISCONTINUED | OUTPATIENT
Start: 2025-04-19 | End: 2025-04-22 | Stop reason: HOSPADM

## 2025-04-19 RX ORDER — TRAZODONE HYDROCHLORIDE 50 MG/1
50 TABLET ORAL NIGHTLY
Status: DISCONTINUED | OUTPATIENT
Start: 2025-04-19 | End: 2025-04-22 | Stop reason: HOSPADM

## 2025-04-19 RX ORDER — ONDANSETRON 4 MG/1
4 TABLET, ORALLY DISINTEGRATING ORAL EVERY 8 HOURS PRN
Status: DISCONTINUED | OUTPATIENT
Start: 2025-04-19 | End: 2025-04-22 | Stop reason: HOSPADM

## 2025-04-19 RX ORDER — LOSARTAN POTASSIUM AND HYDROCHLOROTHIAZIDE 25; 100 MG/1; MG/1
1 TABLET ORAL DAILY
Status: DISCONTINUED | OUTPATIENT
Start: 2025-04-20 | End: 2025-04-19 | Stop reason: CLARIF

## 2025-04-19 RX ORDER — LOSARTAN POTASSIUM 50 MG/1
100 TABLET ORAL DAILY
Status: DISCONTINUED | OUTPATIENT
Start: 2025-04-20 | End: 2025-04-22 | Stop reason: HOSPADM

## 2025-04-19 RX ORDER — CARVEDILOL 3.12 MG/1
3.12 TABLET ORAL 2 TIMES DAILY WITH MEALS
Status: DISCONTINUED | OUTPATIENT
Start: 2025-04-19 | End: 2025-04-22 | Stop reason: HOSPADM

## 2025-04-19 RX ORDER — SODIUM CHLORIDE 9 MG/ML
INJECTION, SOLUTION INTRAVENOUS PRN
Status: DISCONTINUED | OUTPATIENT
Start: 2025-04-19 | End: 2025-04-22 | Stop reason: HOSPADM

## 2025-04-19 RX ORDER — FERROUS SULFATE 325(65) MG
325 TABLET ORAL EVERY OTHER DAY
COMMUNITY

## 2025-04-19 RX ADMIN — DEXTROSE: 10 SOLUTION INTRAVENOUS at 14:18

## 2025-04-19 RX ADMIN — OXYBUTYNIN CHLORIDE 5 MG: 5 TABLET ORAL at 20:17

## 2025-04-19 RX ADMIN — ROSUVASTATIN CALCIUM 40 MG: 20 TABLET, FILM COATED ORAL at 18:18

## 2025-04-19 RX ADMIN — GABAPENTIN 300 MG: 300 CAPSULE ORAL at 18:18

## 2025-04-19 RX ADMIN — CLONIDINE HYDROCHLORIDE 0.2 MG: 0.1 TABLET ORAL at 20:17

## 2025-04-19 RX ADMIN — GABAPENTIN 300 MG: 300 CAPSULE ORAL at 20:18

## 2025-04-19 RX ADMIN — HYDRALAZINE HYDROCHLORIDE 10 MG: 10 TABLET ORAL at 20:17

## 2025-04-19 RX ADMIN — CLONIDINE HYDROCHLORIDE 0.2 MG: 0.1 TABLET ORAL at 18:18

## 2025-04-19 RX ADMIN — CARVEDILOL 3.12 MG: 3.12 TABLET, FILM COATED ORAL at 18:18

## 2025-04-19 RX ADMIN — APIXABAN 5 MG: 5 TABLET, FILM COATED ORAL at 20:17

## 2025-04-19 RX ADMIN — TRAZODONE HYDROCHLORIDE 50 MG: 50 TABLET ORAL at 20:17

## 2025-04-19 NOTE — ED PROVIDER NOTES
Department of Emergency Medicine   ED  Provider Note  Admit Date/RoomTime: 2025 10:52 AM  ED Room: 8204/8204-A        Written by: Nilsa Morales DO  Patient Name: Fanny Gonzalez  Attending Provider: Milanes Marino, Maria, MD  Admit Date: 2025 10:52 AM  MRN: [id number]    : 1950      History of Present Illness:  25, Time: 11:41 AM EDT  Chief Complaint   Patient presents with    Altered Mental Status     Pt states she has been \"blacking out\" and not remembering things happening- pt states she started in her bedroom and awoke in the living room- pt states she has had episodes where she has driven and not remembered previously- pt states she only takes trazodone to sleep           HPI   Patient is a 74 y.o. female with a past medical history of COPD, diabetes, hyperlipidemia, CVA, CHF, PE, CAD, sacral fracture, presenting to the Emergency Department for altered mental status and possible syncope. History obtained by patient.  Presents for essentially altered mental status and blacking out.  She states she went to bed last night in her bed around 8:00.  She woke up this morning on the floor in her living room.  She does not remember getting there.  She states she has had some intermittent episodes of feeling lightheaded and feeling like she is going to pass out recently.  Most recently this was a few days ago when she was driving her car.  States she felt dizzy and felt like she might pass out and had to swerve in the road a few times.  She states symptomatic this also happened back in December and she fell and broke her sacrum.  She states at this time she is having some pain in her lower back.  She denies numbness, tingling or weakness.  She is on Eliquis and denies missing any doses.  She has any active chest pain, shortness of breath, abdominal pain.  She states it took her a while to get up off the floor when she woke up on the floor today.  She denies feeling dizzy at this current

## 2025-04-19 NOTE — H&P
Hospitalist History & Physical      PCP: Babatunde Storm, APRN - CNP    Date of Admission: 4/19/2025    Date of Service: Pt seen/examined on 4/19/2025 and is placed in Observation.    Chief Complaint:  blacking out     History Of Present Illness:    Ms. Fanny Gonzalez, a 74 y.o. year old female  who  has a past medical history of Arthritis, Asthma, BRCA1 negative, BRCA2 negative, Breast cancer, CAD in native artery, Cancer (HCC), Cerebral artery occlusion with cerebral infarction (HCC), Cerebrovascular disease, CHF (congestive heart failure) ([facility]), Claustrophobia, COPD (chronic obstructive pulmonary disease) ([facility]), Decreased dorsalis pedis pulse, Dermatophytosis, Headache(784.0), History of blood transfusion, Hives, Hx of blood clots, Hyperlipidemia, Hypertension, LBP radiating to both legs, Lymphedema of both lower extremities, Neuromuscular disorder ([facility]), Non-rheumatic aortic sclerosis, Obesity, Pulmonary hypertension ([facility]), PVD (peripheral vascular disease) with claudication, Recurrent genital HSV (herpes simplex virus) infection, S/P breast biopsy, right, Type II or unspecified type diabetes mellitus without mention of complication, not stated as uncontrolled, and UTI (urinary tract infection).     Patient presents today for evaluation of blacking out episodes, not remembering everything that had happened.  States that last night she was in her bedroom and woke up in the living room floor and had few bumps on her head while she was trying to get up. She lives by herself and woke on her won. She has not have any warning signs of low sugars (sweating, coldness, clumsiness, shaking), sometimes she feels lightheaded. She took insulin last night around 10 PM lantus 50 Units and probably had last meal around 3 PM yesterday. She also takes oral diabetic meds. She endorses unintentional weight loss as well as chronic constipation.  Had a normal colonoscopy few years ago.  Denies chest pain, shortness of breath,

## 2025-04-20 PROBLEM — E16.2 HYPOGLYCEMIA: Status: ACTIVE | Noted: 2025-04-20

## 2025-04-20 PROBLEM — Z91.119 DIETARY NONCOMPLIANCE: Status: ACTIVE | Noted: 2025-04-20

## 2025-04-20 LAB
ALBUMIN SERPL-MCNC: 3.2 G/DL (ref 3.5–5.2)
ALP SERPL-CCNC: 83 U/L (ref 35–104)
ALT SERPL-CCNC: 6 U/L (ref 0–35)
ANION GAP SERPL CALCULATED.3IONS-SCNC: 9 MMOL/L (ref 7–16)
AST SERPL-CCNC: 18 U/L (ref 0–35)
BASOPHILS # BLD: 0.03 K/UL (ref 0–0.2)
BASOPHILS NFR BLD: 0 % (ref 0–2)
BILIRUB SERPL-MCNC: 0.4 MG/DL (ref 0–1.2)
BUN SERPL-MCNC: 13 MG/DL (ref 8–23)
CALCIUM SERPL-MCNC: 9.3 MG/DL (ref 8.8–10.2)
CHLORIDE SERPL-SCNC: 101 MMOL/L (ref 98–107)
CO2 SERPL-SCNC: 28 MMOL/L (ref 22–29)
CREAT SERPL-MCNC: 1 MG/DL (ref 0.5–1)
EOSINOPHIL # BLD: 0 K/UL (ref 0.05–0.5)
EOSINOPHILS RELATIVE PERCENT: 0 % (ref 0–6)
ERYTHROCYTE [DISTWIDTH] IN BLOOD BY AUTOMATED COUNT: 16.1 % (ref 11.5–15)
GFR, ESTIMATED: 60 ML/MIN/1.73M2
GLUCOSE BLD-MCNC: 178 MG/DL (ref 74–99)
GLUCOSE BLD-MCNC: 186 MG/DL (ref 74–99)
GLUCOSE BLD-MCNC: 204 MG/DL (ref 74–99)
GLUCOSE BLD-MCNC: 216 MG/DL (ref 74–99)
GLUCOSE SERPL-MCNC: 172 MG/DL (ref 74–99)
HBA1C MFR BLD: 7.8 % (ref 4–5.6)
HCT VFR BLD AUTO: 28.5 % (ref 34–48)
HGB BLD-MCNC: 8.9 G/DL (ref 11.5–15.5)
IMM GRANULOCYTES # BLD AUTO: <0.03 K/UL (ref 0–0.58)
IMM GRANULOCYTES NFR BLD: 0 % (ref 0–5)
LYMPHOCYTES NFR BLD: 1.65 K/UL (ref 1.5–4)
LYMPHOCYTES RELATIVE PERCENT: 25 % (ref 20–42)
MCH RBC QN AUTO: 27.5 PG (ref 26–35)
MCHC RBC AUTO-ENTMCNC: 31.2 G/DL (ref 32–34.5)
MCV RBC AUTO: 88 FL (ref 80–99.9)
MONOCYTES NFR BLD: 0.98 K/UL (ref 0.1–0.95)
MONOCYTES NFR BLD: 15 % (ref 2–12)
NEUTROPHILS NFR BLD: 60 % (ref 43–80)
NEUTS SEG NFR BLD: 4.06 K/UL (ref 1.8–7.3)
PLATELET # BLD AUTO: 337 K/UL (ref 130–450)
PMV BLD AUTO: 10.8 FL (ref 7–12)
POTASSIUM SERPL-SCNC: 3.8 MMOL/L (ref 3.4–4.5)
PROT SERPL-MCNC: 6 G/DL (ref 6.4–8.3)
RBC # BLD AUTO: 3.24 M/UL (ref 3.5–5.5)
SODIUM SERPL-SCNC: 138 MMOL/L (ref 136–145)
WBC OTHER # BLD: 6.7 K/UL (ref 4.5–11.5)

## 2025-04-20 PROCEDURE — 80053 COMPREHEN METABOLIC PANEL: CPT

## 2025-04-20 PROCEDURE — G0378 HOSPITAL OBSERVATION PER HR: HCPCS

## 2025-04-20 PROCEDURE — 2500000003 HC RX 250 WO HCPCS: Performed by: STUDENT IN AN ORGANIZED HEALTH CARE EDUCATION/TRAINING PROGRAM

## 2025-04-20 PROCEDURE — 99222 1ST HOSP IP/OBS MODERATE 55: CPT | Performed by: INTERNAL MEDICINE

## 2025-04-20 PROCEDURE — 6370000000 HC RX 637 (ALT 250 FOR IP): Performed by: INTERNAL MEDICINE

## 2025-04-20 PROCEDURE — 84681 ASSAY OF C-PEPTIDE: CPT

## 2025-04-20 PROCEDURE — 2580000003 HC RX 258: Performed by: NURSE PRACTITIONER

## 2025-04-20 PROCEDURE — 83036 HEMOGLOBIN GLYCOSYLATED A1C: CPT

## 2025-04-20 PROCEDURE — 96361 HYDRATE IV INFUSION ADD-ON: CPT

## 2025-04-20 PROCEDURE — 36415 COLL VENOUS BLD VENIPUNCTURE: CPT

## 2025-04-20 PROCEDURE — 85025 COMPLETE CBC W/AUTO DIFF WBC: CPT

## 2025-04-20 PROCEDURE — 83525 ASSAY OF INSULIN: CPT

## 2025-04-20 PROCEDURE — 99232 SBSQ HOSP IP/OBS MODERATE 35: CPT | Performed by: INTERNAL MEDICINE

## 2025-04-20 PROCEDURE — 6370000000 HC RX 637 (ALT 250 FOR IP): Performed by: STUDENT IN AN ORGANIZED HEALTH CARE EDUCATION/TRAINING PROGRAM

## 2025-04-20 PROCEDURE — 82962 GLUCOSE BLOOD TEST: CPT

## 2025-04-20 RX ORDER — INSULIN LISPRO 100 [IU]/ML
0-4 INJECTION, SOLUTION INTRAVENOUS; SUBCUTANEOUS
Status: DISCONTINUED | OUTPATIENT
Start: 2025-04-20 | End: 2025-04-22 | Stop reason: HOSPADM

## 2025-04-20 RX ORDER — INSULIN GLARGINE 100 [IU]/ML
10 INJECTION, SOLUTION SUBCUTANEOUS NIGHTLY
Status: DISCONTINUED | OUTPATIENT
Start: 2025-04-20 | End: 2025-04-22 | Stop reason: HOSPADM

## 2025-04-20 RX ORDER — DEXTROSE MONOHYDRATE 50 MG/ML
INJECTION, SOLUTION INTRAVENOUS CONTINUOUS
Status: DISCONTINUED | OUTPATIENT
Start: 2025-04-20 | End: 2025-04-20

## 2025-04-20 RX ADMIN — GABAPENTIN 300 MG: 300 CAPSULE ORAL at 20:27

## 2025-04-20 RX ADMIN — HYDRALAZINE HYDROCHLORIDE 10 MG: 10 TABLET ORAL at 20:27

## 2025-04-20 RX ADMIN — APIXABAN 5 MG: 5 TABLET, FILM COATED ORAL at 08:52

## 2025-04-20 RX ADMIN — OXYBUTYNIN CHLORIDE 5 MG: 5 TABLET ORAL at 08:52

## 2025-04-20 RX ADMIN — OXYBUTYNIN CHLORIDE 5 MG: 5 TABLET ORAL at 20:27

## 2025-04-20 RX ADMIN — CLONIDINE HYDROCHLORIDE 0.2 MG: 0.1 TABLET ORAL at 15:28

## 2025-04-20 RX ADMIN — CARVEDILOL 3.12 MG: 3.12 TABLET, FILM COATED ORAL at 17:30

## 2025-04-20 RX ADMIN — ASPIRIN 81 MG CHEWABLE TABLET 81 MG: 81 TABLET CHEWABLE at 08:52

## 2025-04-20 RX ADMIN — ROSUVASTATIN CALCIUM 40 MG: 20 TABLET, FILM COATED ORAL at 17:30

## 2025-04-20 RX ADMIN — INSULIN GLARGINE 10 UNITS: 100 INJECTION, SOLUTION SUBCUTANEOUS at 20:29

## 2025-04-20 RX ADMIN — SODIUM CHLORIDE, PRESERVATIVE FREE 10 ML: 5 INJECTION INTRAVENOUS at 20:29

## 2025-04-20 RX ADMIN — GABAPENTIN 300 MG: 300 CAPSULE ORAL at 15:28

## 2025-04-20 RX ADMIN — CLONIDINE HYDROCHLORIDE 0.2 MG: 0.1 TABLET ORAL at 08:52

## 2025-04-20 RX ADMIN — HYDRALAZINE HYDROCHLORIDE 10 MG: 10 TABLET ORAL at 08:52

## 2025-04-20 RX ADMIN — OXYBUTYNIN CHLORIDE 5 MG: 5 TABLET ORAL at 15:28

## 2025-04-20 RX ADMIN — APIXABAN 5 MG: 5 TABLET, FILM COATED ORAL at 20:27

## 2025-04-20 RX ADMIN — GABAPENTIN 300 MG: 300 CAPSULE ORAL at 08:52

## 2025-04-20 RX ADMIN — DEXTROSE MONOHYDRATE: 50 INJECTION, SOLUTION INTRAVENOUS at 01:35

## 2025-04-20 RX ADMIN — ACETAMINOPHEN 650 MG: 325 TABLET ORAL at 16:10

## 2025-04-20 RX ADMIN — CLONIDINE HYDROCHLORIDE 0.2 MG: 0.1 TABLET ORAL at 20:27

## 2025-04-20 RX ADMIN — SODIUM CHLORIDE, PRESERVATIVE FREE 10 ML: 5 INJECTION INTRAVENOUS at 08:53

## 2025-04-20 RX ADMIN — TRAZODONE HYDROCHLORIDE 50 MG: 50 TABLET ORAL at 20:27

## 2025-04-20 RX ADMIN — CARVEDILOL 3.12 MG: 3.12 TABLET, FILM COATED ORAL at 08:53

## 2025-04-20 ASSESSMENT — PAIN DESCRIPTION - LOCATION: LOCATION: NECK

## 2025-04-20 ASSESSMENT — PAIN SCALES - GENERAL
PAINLEVEL_OUTOF10: 2
PAINLEVEL_OUTOF10: 8

## 2025-04-20 ASSESSMENT — PAIN DESCRIPTION - DESCRIPTORS: DESCRIPTORS: ACHING;DISCOMFORT;SORE;SHOOTING

## 2025-04-20 ASSESSMENT — PAIN - FUNCTIONAL ASSESSMENT: PAIN_FUNCTIONAL_ASSESSMENT: ACTIVITIES ARE NOT PREVENTED

## 2025-04-20 ASSESSMENT — PAIN DESCRIPTION - ORIENTATION: ORIENTATION: RIGHT;LEFT;ANTERIOR;DISTAL

## 2025-04-20 NOTE — PLAN OF CARE
Problem: Chronic Conditions and Co-morbidities  Goal: Patient's chronic conditions and co-morbidity symptoms are monitored and maintained or improved  Outcome: Progressing     Problem: Safety - Adult  Goal: Free from fall injury  Outcome: Progressing     Problem: Skin/Tissue Integrity  Goal: Skin integrity remains intact  Description: 1.  Monitor for areas of redness and/or skin breakdown2.  Assess vascular access sites hourly3.  Every 4-6 hours minimum:  Change oxygen saturation probe site4.  Every 4-6 hours:  If on nasal continuous positive airway pressure, respiratory therapy assess nares and determine need for appliance change or resting period  Outcome: Progressing

## 2025-04-20 NOTE — CONSULTS
ENDOCRINOLOGY INITIAL CONSULTATION NOTE      Date of admission: 4/19/2025  Date of service: 4/20/2025  Admitting physician: Maria Milanes Marino, MD   Primary Care Physician: Babatunde Storm APRN - CNP  Consultant physician: Cesar Pan MD     Reason for the consultation:  Uncontrolled DM    History of Present Illness:  The history is provided by the patient. Accuracy of the patient data is excellent    Fanny Gonzalez is a very pleasant 74 y.o. old female with PMH of diabetes type 2, breast cancer, CVA, COPD, CHF and other listed below admitted to [facility] on 4/19/2025 because of altered mental status and found to be hypoglycemic, endocrine service was consulted for diabetes management.  There is no history of fever, chills, chest pain or shortness of breath.  Upon arrival the patient was found to have a glucose level of less than 40, rest of the blood work showed anion gap of 11, bicarb 26, creatinine 0.3, potassium 4.3 and an A1c of 7.8%    Prior to admission  The patient was diagnosed with type 2 diabetes many years ago.  Prior to admission she was on Lantus 50 units daily, Jardiance 10 mg daily, Januvia 100 mg daily, metformin 1000 mg twice daily with meals.  The patient was checking glucose level multiple times daily using freestyle veronica CGM.  She reports frequent hypoglycemic episodes specially overnight.  The patient reports microvascular and macrovascular diabetes complications.  She is overdue with diabetic eye exam  Lab Results   Component Value Date/Time    LABA1C 7.8 04/20/2025 06:40 AM       Inpatient diet:   Carb Restricted diet     Point of care glucose monitoring   (Independently reviewed)   Recent Labs     04/19/25  1457 04/19/25  1604 04/19/25  1710 04/19/25 2015 04/19/25  2215 04/20/25  0032 04/20/25  0413 04/20/25  1112   POCGLU 114* 101* 142* 159* 175* 186* 216* 204*       Past medical history:   Past Medical History:   Diagnosis Date    Arthritis     Asthma     BRCA1 negative     BRCA2 negative

## 2025-04-21 ENCOUNTER — APPOINTMENT (OUTPATIENT)
Dept: GENERAL RADIOLOGY | Age: 75
End: 2025-04-21
Payer: MEDICARE

## 2025-04-21 LAB
ANION GAP SERPL CALCULATED.3IONS-SCNC: 8 MMOL/L (ref 7–16)
BUN SERPL-MCNC: 19 MG/DL (ref 8–23)
CALCIUM SERPL-MCNC: 9.2 MG/DL (ref 8.8–10.2)
CHLORIDE SERPL-SCNC: 101 MMOL/L (ref 98–107)
CO2 SERPL-SCNC: 28 MMOL/L (ref 22–29)
CREAT SERPL-MCNC: 1.2 MG/DL (ref 0.5–1)
EKG ATRIAL RATE: 85 BPM
EKG P AXIS: 65 DEGREES
EKG P-R INTERVAL: 132 MS
EKG Q-T INTERVAL: 416 MS
EKG QRS DURATION: 130 MS
EKG QTC CALCULATION (BAZETT): 495 MS
EKG R AXIS: -44 DEGREES
EKG T AXIS: 106 DEGREES
EKG VENTRICULAR RATE: 85 BPM
GFR, ESTIMATED: 49 ML/MIN/1.73M2
GLUCOSE BLD-MCNC: 102 MG/DL (ref 74–99)
GLUCOSE BLD-MCNC: 142 MG/DL (ref 74–99)
GLUCOSE BLD-MCNC: 209 MG/DL (ref 74–99)
GLUCOSE BLD-MCNC: 219 MG/DL (ref 74–99)
GLUCOSE SERPL-MCNC: 101 MG/DL (ref 74–99)
POTASSIUM SERPL-SCNC: 3.7 MMOL/L (ref 3.4–4.5)
SODIUM SERPL-SCNC: 137 MMOL/L (ref 136–145)

## 2025-04-21 PROCEDURE — 2500000003 HC RX 250 WO HCPCS: Performed by: STUDENT IN AN ORGANIZED HEALTH CARE EDUCATION/TRAINING PROGRAM

## 2025-04-21 PROCEDURE — 97165 OT EVAL LOW COMPLEX 30 MIN: CPT

## 2025-04-21 PROCEDURE — 97530 THERAPEUTIC ACTIVITIES: CPT

## 2025-04-21 PROCEDURE — 99232 SBSQ HOSP IP/OBS MODERATE 35: CPT | Performed by: INTERNAL MEDICINE

## 2025-04-21 PROCEDURE — 2580000003 HC RX 258: Performed by: INTERNAL MEDICINE

## 2025-04-21 PROCEDURE — 6370000000 HC RX 637 (ALT 250 FOR IP): Performed by: STUDENT IN AN ORGANIZED HEALTH CARE EDUCATION/TRAINING PROGRAM

## 2025-04-21 PROCEDURE — 36415 COLL VENOUS BLD VENIPUNCTURE: CPT

## 2025-04-21 PROCEDURE — 97161 PT EVAL LOW COMPLEX 20 MIN: CPT

## 2025-04-21 PROCEDURE — 82962 GLUCOSE BLOOD TEST: CPT

## 2025-04-21 PROCEDURE — G0378 HOSPITAL OBSERVATION PER HR: HCPCS

## 2025-04-21 PROCEDURE — 96361 HYDRATE IV INFUSION ADD-ON: CPT

## 2025-04-21 PROCEDURE — 73521 X-RAY EXAM HIPS BI 2 VIEWS: CPT

## 2025-04-21 PROCEDURE — 93010 ELECTROCARDIOGRAM REPORT: CPT | Performed by: INTERNAL MEDICINE

## 2025-04-21 PROCEDURE — 6370000000 HC RX 637 (ALT 250 FOR IP): Performed by: INTERNAL MEDICINE

## 2025-04-21 PROCEDURE — 80048 BASIC METABOLIC PNL TOTAL CA: CPT

## 2025-04-21 RX ORDER — INSULIN LISPRO 100 [IU]/ML
2 INJECTION, SOLUTION INTRAVENOUS; SUBCUTANEOUS
Status: DISCONTINUED | OUTPATIENT
Start: 2025-04-21 | End: 2025-04-22 | Stop reason: HOSPADM

## 2025-04-21 RX ORDER — SODIUM CHLORIDE, SODIUM LACTATE, POTASSIUM CHLORIDE, CALCIUM CHLORIDE 600; 310; 30; 20 MG/100ML; MG/100ML; MG/100ML; MG/100ML
INJECTION, SOLUTION INTRAVENOUS CONTINUOUS
Status: DISCONTINUED | OUTPATIENT
Start: 2025-04-21 | End: 2025-04-22 | Stop reason: HOSPADM

## 2025-04-21 RX ADMIN — CARVEDILOL 3.12 MG: 3.12 TABLET, FILM COATED ORAL at 17:02

## 2025-04-21 RX ADMIN — GABAPENTIN 300 MG: 300 CAPSULE ORAL at 21:47

## 2025-04-21 RX ADMIN — CARVEDILOL 3.12 MG: 3.12 TABLET, FILM COATED ORAL at 08:03

## 2025-04-21 RX ADMIN — INSULIN GLARGINE 10 UNITS: 100 INJECTION, SOLUTION SUBCUTANEOUS at 21:52

## 2025-04-21 RX ADMIN — GABAPENTIN 300 MG: 300 CAPSULE ORAL at 08:03

## 2025-04-21 RX ADMIN — TRAZODONE HYDROCHLORIDE 50 MG: 50 TABLET ORAL at 21:46

## 2025-04-21 RX ADMIN — SODIUM CHLORIDE, SODIUM LACTATE, POTASSIUM CHLORIDE, AND CALCIUM CHLORIDE: .6; .31; .03; .02 INJECTION, SOLUTION INTRAVENOUS at 10:33

## 2025-04-21 RX ADMIN — CLONIDINE HYDROCHLORIDE 0.2 MG: 0.1 TABLET ORAL at 12:44

## 2025-04-21 RX ADMIN — SODIUM CHLORIDE, PRESERVATIVE FREE 10 ML: 5 INJECTION INTRAVENOUS at 08:04

## 2025-04-21 RX ADMIN — ROSUVASTATIN CALCIUM 40 MG: 20 TABLET, FILM COATED ORAL at 17:02

## 2025-04-21 RX ADMIN — HYDRALAZINE HYDROCHLORIDE 10 MG: 10 TABLET ORAL at 08:03

## 2025-04-21 RX ADMIN — APIXABAN 5 MG: 5 TABLET, FILM COATED ORAL at 08:03

## 2025-04-21 RX ADMIN — OXYBUTYNIN CHLORIDE 5 MG: 5 TABLET ORAL at 21:49

## 2025-04-21 RX ADMIN — OXYBUTYNIN CHLORIDE 5 MG: 5 TABLET ORAL at 08:03

## 2025-04-21 RX ADMIN — HYDRALAZINE HYDROCHLORIDE 10 MG: 10 TABLET ORAL at 21:47

## 2025-04-21 RX ADMIN — APIXABAN 5 MG: 5 TABLET, FILM COATED ORAL at 21:47

## 2025-04-21 RX ADMIN — OXYBUTYNIN CHLORIDE 5 MG: 5 TABLET ORAL at 12:48

## 2025-04-21 RX ADMIN — CLONIDINE HYDROCHLORIDE 0.2 MG: 0.1 TABLET ORAL at 08:03

## 2025-04-21 RX ADMIN — GABAPENTIN 300 MG: 300 CAPSULE ORAL at 12:44

## 2025-04-21 RX ADMIN — INSULIN LISPRO 2 UNITS: 100 INJECTION, SOLUTION INTRAVENOUS; SUBCUTANEOUS at 17:02

## 2025-04-21 RX ADMIN — INSULIN LISPRO 1 UNITS: 100 INJECTION, SOLUTION INTRAVENOUS; SUBCUTANEOUS at 12:43

## 2025-04-21 RX ADMIN — CLONIDINE HYDROCHLORIDE 0.2 MG: 0.1 TABLET ORAL at 21:46

## 2025-04-21 RX ADMIN — ASPIRIN 81 MG CHEWABLE TABLET 81 MG: 81 TABLET CHEWABLE at 08:03

## 2025-04-21 NOTE — PLAN OF CARE
Problem: Chronic Conditions and Co-morbidities  Goal: Patient's chronic conditions and co-morbidity symptoms are monitored and maintained or improved  4/20/2025 2158 by Danica Goncalves RN  Outcome: Progressing  4/20/2025 0939 by Gege Hicks RN  Outcome: Progressing     Problem: Safety - Adult  Goal: Free from fall injury  4/20/2025 2158 by Danica Goncalves RN  Outcome: Progressing  4/20/2025 0939 by Gege Hicks RN  Outcome: Progressing     Problem: Skin/Tissue Integrity  Goal: Skin integrity remains intact  Description: 1.  Monitor for areas of redness and/or skin breakdown2.  Assess vascular access sites hourly3.  Every 4-6 hours minimum:  Change oxygen saturation probe site4.  Every 4-6 hours:  If on nasal continuous positive airway pressure, respiratory therapy assess nares and determine need for appliance change or resting period  4/20/2025 2158 by Danica Goncalves RN  Outcome: Progressing  4/20/2025 0939 by Gege Hicks RN  Outcome: Progressing     Problem: Neurosensory - Adult  Goal: Achieves maximal functionality and self care  4/20/2025 2158 by Danica Goncalves RN  Outcome: Progressing  4/20/2025 0939 by eGge Hicks RN  Outcome: Progressing     Problem: Cardiovascular - Adult  Goal: Absence of cardiac dysrhythmias or at baseline  4/20/2025 2158 by Danica Goncalves RN  Outcome: Progressing  4/20/2025 0939 by Gege Hicks RN  Outcome: Progressing     Problem: Musculoskeletal - Adult  Goal: Return mobility to safest level of function  4/20/2025 0939 by Gege Hicks RN  Outcome: Progressing  Goal: Return ADL status to a safe level of function  4/20/2025 0939 by Gege Hicks RN  Outcome: Progressing

## 2025-04-21 NOTE — CARE COORDINATION
04/21/2025 CM EVAL:  Pt in observation since 4/19/25 for syncope and collapse.  Pt's BS have been dropping so medication adjustments are being made. Pt is currently on D5 IV infusion. Pt lives in an apartment on 10th floor w/ elevator access.  Pt has a cane, rollator, bsc & shower chair.  Pt states that she could use some help in the home. Advised to call [facility] and complete the screening-information in AVS. PCP Dr. Babatunde Storm  Pharmacy [facility]. No needs identified for home DC plan. Friend to provide transport. Brigida Gillespie RN  [phone]    Case Management Assessment  Initial Evaluation    Date/Time of Evaluation: 4/21/2025 2:23 PM  Assessment Completed by: Brigida Gillespie RN    If patient is discharged prior to next notation, then this note serves as note for discharge by case management.    Patient Name: Fanny Gonzalez                   YOB: 1950  Diagnosis: Syncope and collapse [R55]                   Date / Time: 4/19/2025 10:52 AM    Patient Admission Status: Observation   Readmission Risk (Low < 19, Mod (19-27), High > 27): Readmission Risk Score: 12.2    Current PCP: Babatunde Storm APRN - CNP  PCP verified by CM?      Chart Reviewed: Yes      History Provided by:  patient  Patient Orientation:    AOx4  Patient Cognition:  intact    Hospitalization in the last 30 days (Readmission):  No    If yes, Readmission Assessment in  Navigator will be completed.    Advance Directives:      Code Status: Full Code   Patient's Primary Decision Maker is:      Primary Decision Maker: Tracee Gonzalez - Child - [phone]    Discharge Planning:    Patient lives with: Alone Type of Home: Apartment  Primary Care Giver:    Patient Support Systems include: [employer], Friends/Neighbors   Current Financial resources:    Current community resources:    Current services prior to admission: None            Current DME:              Type of Home Care services:  None    ADLS  Prior functional level:

## 2025-04-21 NOTE — DISCHARGE INSTRUCTIONS
Please call [facility] to complete screening to see if eligible for waiver services.  P: [phone] or [phone].

## 2025-04-21 NOTE — ACP (ADVANCE CARE PLANNING)
04/21/2025 Advance Care Planning   Healthcare Decision Maker:    Primary Decision Maker: Tracee Gonzalez - Milena - [phone]    Click here to complete Healthcare Decision Makers including selection of the Healthcare Decision Maker Relationship (ie \"Primary\").

## 2025-04-22 VITALS
HEART RATE: 80 BPM | RESPIRATION RATE: 18 BRPM | DIASTOLIC BLOOD PRESSURE: 70 MMHG | SYSTOLIC BLOOD PRESSURE: 108 MMHG | HEIGHT: 65 IN | WEIGHT: 208 LBS | TEMPERATURE: 97.4 F | BODY MASS INDEX: 34.66 KG/M2 | OXYGEN SATURATION: 100 %

## 2025-04-22 LAB
ANION GAP SERPL CALCULATED.3IONS-SCNC: 11 MMOL/L (ref 7–16)
BUN SERPL-MCNC: 17 MG/DL (ref 8–23)
C PEPTIDE SERPL-MCNC: 1.2 NG/ML (ref 1.1–4.4)
CALCIUM SERPL-MCNC: 9.5 MG/DL (ref 8.8–10.2)
CHLORIDE SERPL-SCNC: 101 MMOL/L (ref 98–107)
CO2 SERPL-SCNC: 25 MMOL/L (ref 22–29)
CREAT SERPL-MCNC: 1 MG/DL (ref 0.5–1)
GFR, ESTIMATED: 62 ML/MIN/1.73M2
GLUCOSE BLD-MCNC: 128 MG/DL (ref 74–99)
GLUCOSE BLD-MCNC: 198 MG/DL (ref 74–99)
GLUCOSE SERPL-MCNC: 114 MG/DL (ref 74–99)
INSULIN COMMENT: NORMAL
INSULIN REFERENCE RANGE:: NORMAL
INSULIN: 6 MU/L
POTASSIUM SERPL-SCNC: 4.2 MMOL/L (ref 3.4–4.5)
SODIUM SERPL-SCNC: 137 MMOL/L (ref 136–145)

## 2025-04-22 PROCEDURE — G0378 HOSPITAL OBSERVATION PER HR: HCPCS

## 2025-04-22 PROCEDURE — 80048 BASIC METABOLIC PNL TOTAL CA: CPT

## 2025-04-22 PROCEDURE — 6370000000 HC RX 637 (ALT 250 FOR IP): Performed by: STUDENT IN AN ORGANIZED HEALTH CARE EDUCATION/TRAINING PROGRAM

## 2025-04-22 PROCEDURE — 2500000003 HC RX 250 WO HCPCS: Performed by: STUDENT IN AN ORGANIZED HEALTH CARE EDUCATION/TRAINING PROGRAM

## 2025-04-22 PROCEDURE — 6370000000 HC RX 637 (ALT 250 FOR IP): Performed by: INTERNAL MEDICINE

## 2025-04-22 PROCEDURE — 82962 GLUCOSE BLOOD TEST: CPT

## 2025-04-22 PROCEDURE — 99239 HOSP IP/OBS DSCHRG MGMT >30: CPT | Performed by: INTERNAL MEDICINE

## 2025-04-22 PROCEDURE — 36415 COLL VENOUS BLD VENIPUNCTURE: CPT

## 2025-04-22 PROCEDURE — 96361 HYDRATE IV INFUSION ADD-ON: CPT

## 2025-04-22 RX ORDER — INSULIN LISPRO 100 [IU]/ML
2 INJECTION, SOLUTION INTRAVENOUS; SUBCUTANEOUS
Qty: 3 ML | Refills: 0 | Status: SHIPPED | OUTPATIENT
Start: 2025-04-22 | End: 2025-05-22

## 2025-04-22 RX ORDER — INSULIN GLARGINE 100 [IU]/ML
10 INJECTION, SOLUTION SUBCUTANEOUS NIGHTLY
Qty: 10 ML | Refills: 3 | Status: SHIPPED | OUTPATIENT
Start: 2025-04-22

## 2025-04-22 RX ADMIN — INSULIN LISPRO 2 UNITS: 100 INJECTION, SOLUTION INTRAVENOUS; SUBCUTANEOUS at 12:05

## 2025-04-22 RX ADMIN — GABAPENTIN 300 MG: 300 CAPSULE ORAL at 14:37

## 2025-04-22 RX ADMIN — APIXABAN 5 MG: 5 TABLET, FILM COATED ORAL at 08:22

## 2025-04-22 RX ADMIN — CARVEDILOL 3.12 MG: 3.12 TABLET, FILM COATED ORAL at 08:23

## 2025-04-22 RX ADMIN — INSULIN LISPRO 1 UNITS: 100 INJECTION, SOLUTION INTRAVENOUS; SUBCUTANEOUS at 12:06

## 2025-04-22 RX ADMIN — CLONIDINE HYDROCHLORIDE 0.2 MG: 0.1 TABLET ORAL at 08:23

## 2025-04-22 RX ADMIN — OXYBUTYNIN CHLORIDE 5 MG: 5 TABLET ORAL at 14:37

## 2025-04-22 RX ADMIN — ACETAMINOPHEN 650 MG: 325 TABLET ORAL at 08:33

## 2025-04-22 RX ADMIN — ASPIRIN 81 MG CHEWABLE TABLET 81 MG: 81 TABLET CHEWABLE at 08:22

## 2025-04-22 RX ADMIN — HYDRALAZINE HYDROCHLORIDE 10 MG: 10 TABLET ORAL at 08:22

## 2025-04-22 RX ADMIN — GABAPENTIN 300 MG: 300 CAPSULE ORAL at 08:23

## 2025-04-22 RX ADMIN — SODIUM CHLORIDE, PRESERVATIVE FREE 10 ML: 5 INJECTION INTRAVENOUS at 08:24

## 2025-04-22 RX ADMIN — OXYBUTYNIN CHLORIDE 5 MG: 5 TABLET ORAL at 08:23

## 2025-04-22 ASSESSMENT — PAIN DESCRIPTION - LOCATION: LOCATION: HIP

## 2025-04-22 ASSESSMENT — PAIN - FUNCTIONAL ASSESSMENT: PAIN_FUNCTIONAL_ASSESSMENT: ACTIVITIES ARE NOT PREVENTED

## 2025-04-22 ASSESSMENT — PAIN DESCRIPTION - FREQUENCY: FREQUENCY: CONTINUOUS

## 2025-04-22 ASSESSMENT — PAIN DESCRIPTION - ORIENTATION: ORIENTATION: RIGHT

## 2025-04-22 ASSESSMENT — PAIN DESCRIPTION - PAIN TYPE: TYPE: ACUTE PAIN

## 2025-04-22 ASSESSMENT — PAIN DESCRIPTION - DESCRIPTORS: DESCRIPTORS: ACHING;DISCOMFORT;SORE

## 2025-04-22 ASSESSMENT — PAIN SCALES - GENERAL
PAINLEVEL_OUTOF10: 3
PAINLEVEL_OUTOF10: 0

## 2025-04-22 ASSESSMENT — PAIN DESCRIPTION - ONSET: ONSET: ON-GOING

## 2025-04-22 NOTE — DISCHARGE SUMMARY
[facility] Hospitalist Physician Discharge Summary       Cesar Pan MD  [address]  [address]  [facility] [address]  [phone]    Follow up      Taran Garcia DO  [address] [facility] [address]  [phone]    Follow up      [facility]  [address]  [facility]  [phone]          Activity level: As tolerated     Dispo: JA    Condition on discharge: Stable     Patient ID:  Fanny Gonzalez  [id number]  74 y.o.  1950    Admit date: 4/19/2025    Discharge date and time:  4/22/2025  1:01 PM    Admission Diagnoses: Principal Problem:    Syncope and collapse  Active Problems:    Poorly controlled type 2 diabetes mellitus (HCC)    Dietary noncompliance    Hypoglycemia  Resolved Problems:    * No resolved hospital problems. *      Discharge Diagnoses: Principal Problem:    Syncope and collapse  Active Problems:    Poorly controlled type 2 diabetes mellitus (HCC)    Dietary noncompliance    Hypoglycemia  Resolved Problems:    * No resolved hospital problems. *      Consults:  IP CONSULT TO INTERNAL MEDICINE  IP CONSULT TO ENDOCRINOLOGY  IP CONSULT TO DIABETES EDUCATOR    Hospital Course:     Patient is a 74-year-old lady with past medical history of asthma, breast cancer, CVA, COPD, diabetes mellitus on insulin.  She presented to ED after episodes of passing out, she did not remember events, she last remembers being in her bedroom and then woke up in the living room floor.  She states she trashed her house and does not remember.  Patient took Lantus 50 units prior to bedtime, she also takes oral antidiabetic meds.  She states over the last 6 years progressively her appetite has decreased and she eats less.  CT head-no acute findings.  CT C-spine-no acute compression fracture or subluxation of C-spine.  CT thoracic spine-no acute fracture or subluxation of thoracic spine.  CT lumbar spine-no acute osseous abnormality involving lumbar spine.  In ED blood sugar

## 2025-04-22 NOTE — PLAN OF CARE
Problem: Chronic Conditions and Co-morbidities  Goal: Patient's chronic conditions and co-morbidity symptoms are monitored and maintained or improved  Outcome: Progressing     Problem: Safety - Adult  Goal: Free from fall injury  Outcome: Progressing     Problem: Skin/Tissue Integrity  Goal: Skin integrity remains intact  Description: 1.  Monitor for areas of redness and/or skin breakdown2.  Assess vascular access sites hourly3.  Every 4-6 hours minimum:  Change oxygen saturation probe site4.  Every 4-6 hours:  If on nasal continuous positive airway pressure, respiratory therapy assess nares and determine need for appliance change or resting period  Outcome: Progressing     Problem: Neurosensory - Adult  Goal: Achieves maximal functionality and self care  Outcome: Progressing     Problem: Cardiovascular - Adult  Goal: Absence of cardiac dysrhythmias or at baseline  Outcome: Progressing     Problem: Musculoskeletal - Adult  Goal: Return mobility to safest level of function  Outcome: Progressing  Goal: Return ADL status to a safe level of function  Outcome: Progressing

## 2025-04-22 NOTE — CARE COORDINATION
04/22/2025 CM Note:  Pt in observation since 4/19/25 for syncope and collapse.  DC order noted.  Pt gave choice for [facility]-they accepted and auth was obtained by CM dept.  [facility] transport set up with [facility] for a 2:30 pm . PCP Dr. Babatunde Storm  Pharmacy [facility].  Brigida Gillespie RN [phone]

## 2025-04-22 NOTE — PROGRESS NOTES
[facility] Hospitalist Progress Note    Admitting Date and Time: 4/19/2025 10:52 AM  Admit Dx: Syncope and collapse [R55]    Synopsis: Patient is a 74-year-old lady with past medical history of asthma, breast cancer, CVA, COPD, diabetes mellitus on insulin.  She presented to ED after episodes of passing out, she did not remember events, she last remembers being in her bedroom and then woke up in the living room floor.  She states she trashed her house and does not remember.  Patient took Lantus 50 units prior to bedtime, she also takes oral antidiabetic meds.  She states over the last 6 years progressively her appetite has decreased and she eats less.  In ED blood sugar was less than 40, she was started on D10 infusion with fluctuating blood sugar.  She continued to drop her blood sugar even while on D10 infusion and decision was made to to admit under hospitalist service.      Subjective:  Patient is being followed for Syncope and collapse [R55]     She feels better today.  Remains on D5 infusion this morning, blood sugar is 172.  She states she feels she is on the too many medications for diabetes at home.  Otherwise she denies any acute complaints today.    ROS: denies fever, chills, cp, sob, n/v, HA unless stated above.      sodium chloride flush  5-40 mL IntraVENous 2 times per day    apixaban  5 mg Oral BID    aspirin  81 mg Oral Daily    carvedilol  3.125 mg Oral BID with meals    cloNIDine  0.2 mg Oral TID    gabapentin  300 mg Oral TID    hydrALAZINE  10 mg Oral 2 times per day    oxyBUTYnin  5 mg Oral TID    rosuvastatin  40 mg Oral QPM    traZODone  50 mg Oral Nightly    [Held by provider] losartan  100 mg Oral Daily    And    [Held by provider] hydroCHLOROthiazide  25 mg Oral Daily     sodium chloride flush, 5-40 mL, PRN  sodium chloride, , PRN  potassium chloride, 40 mEq, PRN   Or  potassium alternative oral replacement, 40 mEq, PRN   Or  potassium chloride, 10 mEq, PRN  magnesium sulfate, 
4 Eyes Skin Assessment     NAME:  Fanny Gonzalez  YOB: 1950  MEDICAL RECORD NUMBER:  [id number]    The patient is being assessed for  Admission    I agree that at least one RN has performed a thorough Head to Toe Skin Assessment on the patient. ALL assessment sites listed below have been assessed.      Areas assessed by both nurses:    Head, Face, Ears, Shoulders, Back, Chest, Arms, Elbows, Hands, Sacrum. Buttock, Coccyx, Ischium, and Legs. Feet and Heels        Does the Patient have a Wound? No noted wound(s)       Surendra Prevention initiated by RN: Yes  Wound Care Orders initiated by RN: No    Pressure Injury (Stage 3,4, Unstageable, DTI, NWPT, and Complex wounds) if present, place Wound referral order by RN under : No    New Ostomies, if present place, Ostomy referral order under : No     Nurse 1 eSignature: Electronically signed by DUSTIN DELGADO RN on 4/19/25 at 6:58 PM EDT    **SHARE this note so that the co-signing nurse can place an eSignature**    Nurse 2 eSignature: Electronically signed by Svetlana Eason RN on 4/19/25 at 7:01 PM EDT  
ED TO INPATIENT SBAR HANDOFF    Patient Name: Fanny Gonzalez   Preferred Name: Fanny  : 1950  74 y.o.   Code Status Order: Full Code  Telemetry Order: Yes  C-SSRS: Risk of Suicide: No Risk  Sitter no   Restraints:       Situation  Chief Complaint   Patient presents with    Altered Mental Status     Pt states she has been \"blacking out\" and not remembering things happening- pt states she started in her bedroom and awoke in the living room- pt states she has had episodes where she has driven and not remembered previously- pt states she only takes trazodone to sleep     Brief Description of Patient's Condition: History of hypoglycemia. Confused today. Found to be hypoglycemic.  Mental Status: oriented, alert, coherent, logical, thought processes intact, and able to concentrate and follow conversation    Background  Allergies: No Known Allergies    Assessment  Vitals/MEWS:    Level of Consciousness: Alert (0)   Vitals:    25 1344 25 1500 25 1630 25 1700   BP: (!) 154/68 (!) 169/76 (!) 151/75 133/63   Pulse:  77 79 74   Resp:  21 23 17   Temp:       SpO2:  100% 97% 98%     Cardiac Rhythm:    Deterioration Index (DI): Deterioration Index: 20.55  Deterioration Index (DI) Interventions Performed:    O2 Flow Rate:    O2 Device: O2 Device: None (Room air)    Active Central Lines:                          Active Wounds:    Active Alas's:      Recommendation  Patient Belongings:    Additional Comments: Provided meal and continuous dextrose  If any further questions, please call Sending RN at [phone]  Opportunity for questions and clarification were provided to (name of person notified and time): Contacted fllor multiple times. No answer x1 and hang up x1       Electronically signed by: Electronically signed by David Luther RN on 2025 at 5:12 PM    
ENDOCRINOLOGY PROGRESS NOTE      Date of admission: 4/19/2025  Date of service: 4/21/2025  Admitting physician: Maria Milanes Marino, MD   Primary Care Physician: Babatunde Storm APRN - CNP  Consultant physician: Cesar Pan MD     Reason for the consultation:  Uncontrolled DM    History of Present Illness:  The history is provided by the patient. Accuracy of the patient data is excellent    Fanny Gonzalez is a very pleasant 74 y.o. old female with PMH of diabetes type 2, breast cancer, CVA, COPD, CHF and other listed below admitted to [facility] on 4/19/2025 because of altered mental status and found to be hypoglycemic, endocrine service was consulted for diabetes management.  \  Subjective  The patient was seen and examined at bedside today, no acute events overnight, no more hypoglycemia      Inpatient diet:   Carb Restricted diet     Point of care glucose monitoring   (Independently reviewed)   Recent Labs     04/19/25 2015 04/19/25  2215 04/20/25  0032 04/20/25  0413 04/20/25  1112 04/20/25  2026 04/21/25  0621 04/21/25  1100   POCGLU 159* 175* 186* 216* 204* 178* 102* 209*       Allergy and drug reactions:   No Known Allergies    Scheduled Meds:   insulin lispro  2 Units SubCUTAneous TID WC    insulin lispro  0-4 Units SubCUTAneous 4x Daily AC & HS    insulin glargine  10 Units SubCUTAneous Nightly    sodium chloride flush  5-40 mL IntraVENous 2 times per day    apixaban  5 mg Oral BID    aspirin  81 mg Oral Daily    carvedilol  3.125 mg Oral BID with meals    cloNIDine  0.2 mg Oral TID    gabapentin  300 mg Oral TID    hydrALAZINE  10 mg Oral 2 times per day    oxyBUTYnin  5 mg Oral TID    rosuvastatin  40 mg Oral QPM    traZODone  50 mg Oral Nightly    [Held by provider] losartan  100 mg Oral Daily    And    [Held by provider] hydroCHLOROthiazide  25 mg Oral Daily       PRN Meds:   sodium chloride flush, 5-40 mL, PRN  sodium chloride, , PRN  potassium chloride, 40 mEq, PRN   Or  potassium alternative oral 
Lighting rounds was done on pt . Educated pt on fall risk. Provided patient with yellow gripper socks and fall risk wristband.    
Nurse to nurse called to Seanit at [facility] all questions answered.   
Nursing instructor accessed chart for student assignment and teaching.  
Physical Therapy Initial Assessment     Name: Fanny Gonzalez  : 1950  MRN: [id number]      Date of Service: 2025    Evaluating PT:  Paulette Castillo, PT, DPT, [id number]    Room #:  8204/8204-A  Diagnosis:  Syncope and collapse [R55]  PMHx/PSHx:    Past Medical History:   Diagnosis Date    Arthritis     Asthma     BRCA1 negative     BRCA2 negative     Breast cancer     CAD in native artery 12/15/2021    12-15-21 cate 2.5x38 hima rca. 12-15-21 cate 3.0x12 hima prox rca.     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    Cerebrovascular disease 2009    no residual    CHF (congestive heart failure) ([facility]) approx 3 years ago    Claustrophobia     COPD (chronic obstructive pulmonary disease) ([facility])     Decreased dorsalis pedis pulse 2019    Dermatophytosis 2019    Headache(784.0)     History of blood transfusion     with knee surgery    Hives     Hx of blood clots     PE's and DVT's 'during childbearing years'    Hyperlipidemia     Hypertension     LBP radiating to both legs     Lymphedema of both lower extremities 2015    Neuromuscular disorder (HCC)     Non-rheumatic aortic sclerosis 10/2018    10/2018    Obesity     Pulmonary hypertension (HCC) 10/2018    PVD (peripheral vascular disease) with claudication 2021    Recurrent genital HSV (herpes simplex virus) infection     last outbreak 2017    S/P breast biopsy, right 2016    pt states breast cancer    Type II or unspecified type diabetes mellitus without mention of complication, not stated as uncontrolled     AA1c8.7 9/10    UTI (urinary tract infection) 2019      Past Surgical History:   Procedure Laterality Date    ARTHROPLASTY Left 2016    thumb basil joint with dequervain's release    BREAST BIOPSY Right     BREAST LUMPECTOMY Right years ago    benign    BREAST LUMPECTOMY Right 2016    
Reason for consult:  hypoglycemia  A1C: 7.8    Patient states the following concerns/barriers to diabetes self-management:     [x] None       [] Medication cost   [] Food cost/availability        [] Reading  [] Hearing   [] Vision                [] Work    [] Transportation  [] No insurance  [] Physical limitations    [] Other:    Patient states the following about their diabetes/health:   [x]   Has had for about 30 years. Family history includes parents.  [x]   Has veronica CGM but unsure of where handheld reader went. But reports multiple episodes of hypoglycemia. Does not have glucometer for fingersticks.  [x]   Drinks water, coffee, regular gingerale.  [x]   Eats 1 meal a day. Picks throughout day due to decreased appetite related to previous cancer dx.  [x]   Is active with daily  activities/chores.    Diabetes survival packet provided to:   [x] Patient     [] Other:    Information given:   Definition of diabetes   Target glucose ranges/A1C   Self-monitoring of blood glucose   Prevention/symptoms/treatment of hypo-/hyperglycemia   Medication adherence             The plate method/meal planning guidelines             The benefits of exercise and recommendations             Reducing the risk of chronic complications     Diabetes medications reviewed (use, purpose, action): Jardiance, lantus, and metformin. Pt reports NP was managing medications and she missed endocrinologist appt due to inability to find office and they cancelled appt. Pt reports Dr. Pan telling pt she should only need 10 units lantus a day.           Post-education Assessment  [x]  Attentive to teaching  [x]  Answered questions appropriately when asked   [x]  Seems able to apply concepts to daily lifestyle  [x]  Seems motivated to do well  [x]  Verbalized an understanding of plate method  [x]  Verbalized an understanding of prescribed antidiabetes medications   [x]  Verbalized an understanding of target glucose ranges/A1C level  [x]  Expresses 
Verbal given per Dr Pan to stop IV Dextrose  
Or  potassium alternative oral replacement, 40 mEq, PRN   Or  potassium chloride, 10 mEq, PRN  magnesium sulfate, 2,000 mg, PRN  ondansetron, 4 mg, Q8H PRN   Or  ondansetron, 4 mg, Q6H PRN  polyethylene glycol, 17 g, Daily PRN  acetaminophen, 650 mg, Q6H PRN   Or  acetaminophen, 650 mg, Q6H PRN  docusate sodium, 100 mg, BID PRN         Objective:    BP (!) 145/61   Pulse 83   Temp 97.2 °F (36.2 °C) (Oral)   Resp 18   Ht 1.651 m (5' 5\")   Wt 94.3 kg (208 lb)   SpO2 99%   BMI 34.61 kg/m²     General Appearance: alert and oriented to person, place and time and in no acute distress  Skin: warm and dry  Head: normocephalic and atraumatic  Eyes: pupils equal, round, and reactive to light, extraocular eye movements intact, conjunctivae normal  Neck: neck supple and non tender without mass   Pulmonary/Chest: clear to auscultation bilaterally- no wheezes, rales or rhonchi, normal air movement, no respiratory distress  Cardiovascular: normal rate, normal S1 and S2 and no carotid bruits  Abdomen: soft, non-tender, non-distended, normal bowel sounds, no masses or organomegaly  Extremities: no cyanosis, no clubbing and no edema  Neurologic: no cranial nerve deficit and speech normal        Recent Labs     04/19/25  1144 04/20/25  0640 04/21/25  0537    138 137   K 4.3 3.8 3.7    101 101   CO2 26 28 28   BUN 13 13 19   CREATININE 0.9 1.0 1.2*   GLUCOSE 70* 172* 101*   CALCIUM 10.1 9.3 9.2       Recent Labs     04/19/25  1144 04/20/25  0640   WBC 12.0* 6.7   RBC 3.96 3.24*   HGB 11.2* 8.9*   HCT 35.0 28.5*   MCV 88.4 88.0   MCH 28.3 27.5   MCHC 32.0 31.2*   RDW 16.1* 16.1*    337   MPV 10.6 10.8       Assessment:    Principal Problem:    Syncope and collapse  Active Problems:    Poorly controlled type 2 diabetes mellitus (HCC)    Dietary noncompliance    Hypoglycemia  Resolved Problems:    * No resolved hospital problems. *      Plan:    Ambulatory dysfunction  Patient is reporting difficulty walking 
equipment: TBD     Precautions:  Fall Risk, hx syncopal episodes, +alarms     Assessment of current deficits    [x] Functional mobility  [x]ADLs  [x] Strength               [x]Cognition    [x] Functional transfers   [x] IADLs         [x] Safety Awareness   [x]Endurance    [x] Fine Coordination              [x] Balance      [] Vision/perception   []Sensation     []Gross Motor Coordination  [] ROM  [] Delirium                   [] Motor Control     OT PLAN OF CARE   OT POC based on physician orders, patient diagnosis and results of clinical assessment    Frequency/Duration 1-3 for 2 weeks PRN   Specific OT Treatment Interventions to include:   * Instruction/training on adapted ADL techniques and AE recommendations to increase functional independence within precautions       * Training on energy conservation strategies, correct breathing pattern and techniques to improve independence/tolerance for self-care routine  * Functional transfer/mobility training/DME recommendations for increased independence, safety, and fall prevention  * Patient/Family education to increase follow through with safety techniques and functional independence  * Recommendation of environmental modifications for increased safety with functional transfers/mobility and ADLs  * Therapeutic exercise to improve motor endurance, ROM, and functional strength for ADLs/functional transfers  * Therapeutic activities to facilitate/challenge dynamic balance, stand tolerance for increased safety and independence with ADLs  * Therapeutic activities to facilitate gross/fine motor skills for increased independence with ADLs  * Neuro-muscular re-education: facilitation of righting/equilibrium reactions, midline orientation, scapular stability/mobility, normalization of muscle tone, and facilitation of volitional active controled movement  * Positioning to improve skin integrity, interaction with environment and functional independence    Home Living: Pt lives

## 2025-04-22 NOTE — PLAN OF CARE
Problem: Chronic Conditions and Co-morbidities  Goal: Patient's chronic conditions and co-morbidity symptoms are monitored and maintained or improved  4/22/2025 1105 by Quynh Ureña RN  Outcome: Adequate for Discharge  4/22/2025 1105 by Quynh Ureña RN  Outcome: Adequate for Discharge  4/22/2025 0010 by Anton Cotter RN  Outcome: Progressing     Problem: Safety - Adult  Goal: Free from fall injury  4/22/2025 1105 by Quynh Ureña RN  Outcome: Adequate for Discharge  4/22/2025 1105 by Quynh Ureña RN  Outcome: Adequate for Discharge  4/22/2025 0010 by Anton Cotter RN  Outcome: Progressing     Problem: Skin/Tissue Integrity  Goal: Skin integrity remains intact  Description: 1.  Monitor for areas of redness and/or skin breakdown2.  Assess vascular access sites hourly3.  Every 4-6 hours minimum:  Change oxygen saturation probe site4.  Every 4-6 hours:  If on nasal continuous positive airway pressure, respiratory therapy assess nares and determine need for appliance change or resting period  4/22/2025 1105 by Quynh Ureña RN  Outcome: Adequate for Discharge  4/22/2025 1105 by Quynh Ureña RN  Outcome: Adequate for Discharge  4/22/2025 0010 by Anton Cotter RN  Outcome: Progressing     Problem: Neurosensory - Adult  Goal: Achieves maximal functionality and self care  4/22/2025 1105 by Quynh Ureña RN  Outcome: Adequate for Discharge  4/22/2025 1105 by Quynh Ureña RN  Outcome: Adequate for Discharge  4/22/2025 0010 by Anton Cotter RN  Outcome: Progressing     Problem: Cardiovascular - Adult  Goal: Absence of cardiac dysrhythmias or at baseline  4/22/2025 1105 by Quynh Ureña RN  Outcome: Adequate for Discharge  4/22/2025 1105 by Quynh Ureña RN  Outcome: Adequate for Discharge  4/22/2025 0010 by Anton Cotter RN  Outcome: Progressing     Problem: Musculoskeletal - Adult  Goal: Return mobility to safest level of function  4/22/2025 1105

## 2025-04-23 ENCOUNTER — TELEPHONE (OUTPATIENT)
Dept: ENDOCRINOLOGY | Age: 75
End: 2025-04-23

## 2025-05-11 ENCOUNTER — APPOINTMENT (OUTPATIENT)
Dept: CT IMAGING | Age: 75
DRG: 280 | End: 2025-05-11
Payer: MEDICARE

## 2025-05-11 ENCOUNTER — HOSPITAL ENCOUNTER (INPATIENT)
Age: 75
LOS: 8 days | Discharge: NURSING, REHAB OR LONG-TERM CARE | DRG: 280 | End: 2025-05-19
Attending: STUDENT IN AN ORGANIZED HEALTH CARE EDUCATION/TRAINING PROGRAM | Admitting: FAMILY MEDICINE
Payer: MEDICARE

## 2025-05-11 ENCOUNTER — APPOINTMENT (OUTPATIENT)
Dept: GENERAL RADIOLOGY | Age: 75
DRG: 280 | End: 2025-05-11
Payer: MEDICARE

## 2025-05-11 ENCOUNTER — APPOINTMENT (OUTPATIENT)
Dept: ULTRASOUND IMAGING | Age: 75
DRG: 280 | End: 2025-05-11
Payer: MEDICARE

## 2025-05-11 DIAGNOSIS — I50.9 ACUTE ON CHRONIC CONGESTIVE HEART FAILURE, UNSPECIFIED HEART FAILURE TYPE (HCC): ICD-10-CM

## 2025-05-11 DIAGNOSIS — I21.4 NSTEMI (NON-ST ELEVATED MYOCARDIAL INFARCTION) (HCC): Primary | ICD-10-CM

## 2025-05-11 DIAGNOSIS — R07.9 CHEST PAIN, UNSPECIFIED TYPE: ICD-10-CM

## 2025-05-11 DIAGNOSIS — R10.84 GENERALIZED ABDOMINAL PAIN: ICD-10-CM

## 2025-05-11 LAB
ALBUMIN SERPL-MCNC: 3.9 G/DL (ref 3.5–5.2)
ALP SERPL-CCNC: 138 U/L (ref 35–104)
ALT SERPL-CCNC: 13 U/L (ref 0–35)
ANION GAP SERPL CALCULATED.3IONS-SCNC: 17 MMOL/L (ref 7–16)
AST SERPL-CCNC: 27 U/L (ref 0–35)
B PARAP IS1001 DNA NPH QL NAA+NON-PROBE: NOT DETECTED
B PERT DNA SPEC QL NAA+PROBE: NOT DETECTED
BACTERIA URNS QL MICRO: ABNORMAL
BASOPHILS # BLD: 0.03 K/UL (ref 0–0.2)
BASOPHILS NFR BLD: 0 % (ref 0–2)
BILIRUB SERPL-MCNC: 1 MG/DL (ref 0–1.2)
BILIRUB UR QL STRIP: NEGATIVE
BNP SERPL-MCNC: 4536 PG/ML (ref 0–450)
BUN SERPL-MCNC: 7 MG/DL (ref 8–23)
C PNEUM DNA NPH QL NAA+NON-PROBE: NOT DETECTED
CALCIUM SERPL-MCNC: 10.1 MG/DL (ref 8.8–10.2)
CHLORIDE SERPL-SCNC: 103 MMOL/L (ref 98–107)
CHP ED QC CHECK: YES
CLARITY UR: CLEAR
CO2 SERPL-SCNC: 23 MMOL/L (ref 22–29)
COLOR UR: YELLOW
CREAT SERPL-MCNC: 0.9 MG/DL (ref 0.5–1)
EOSINOPHIL # BLD: 0 K/UL (ref 0.05–0.5)
EOSINOPHILS RELATIVE PERCENT: 0 % (ref 0–6)
ERYTHROCYTE [DISTWIDTH] IN BLOOD BY AUTOMATED COUNT: 16.5 % (ref 11.5–15)
FLUAV RNA NPH QL NAA+NON-PROBE: NOT DETECTED
FLUBV RNA NPH QL NAA+NON-PROBE: NOT DETECTED
GFR, ESTIMATED: 68 ML/MIN/1.73M2
GLUCOSE BLD-MCNC: 127 MG/DL (ref 74–99)
GLUCOSE BLD-MCNC: 175 MG/DL
GLUCOSE SERPL-MCNC: 175 MG/DL (ref 74–99)
GLUCOSE UR STRIP-MCNC: >=1000 MG/DL
HADV DNA NPH QL NAA+NON-PROBE: NOT DETECTED
HCOV 229E RNA NPH QL NAA+NON-PROBE: NOT DETECTED
HCOV HKU1 RNA NPH QL NAA+NON-PROBE: NOT DETECTED
HCOV NL63 RNA NPH QL NAA+NON-PROBE: NOT DETECTED
HCOV OC43 RNA NPH QL NAA+NON-PROBE: NOT DETECTED
HCT VFR BLD AUTO: 34.7 % (ref 34–48)
HGB BLD-MCNC: 10.6 G/DL (ref 11.5–15.5)
HGB UR QL STRIP.AUTO: ABNORMAL
HMPV RNA NPH QL NAA+NON-PROBE: NOT DETECTED
HPIV1 RNA NPH QL NAA+NON-PROBE: NOT DETECTED
HPIV2 RNA NPH QL NAA+NON-PROBE: NOT DETECTED
HPIV3 RNA NPH QL NAA+NON-PROBE: NOT DETECTED
HPIV4 RNA NPH QL NAA+NON-PROBE: NOT DETECTED
IMM GRANULOCYTES # BLD AUTO: <0.03 K/UL (ref 0–0.58)
IMM GRANULOCYTES NFR BLD: 0 % (ref 0–5)
KETONES UR STRIP-MCNC: ABNORMAL MG/DL
LACTATE BLDV-SCNC: 3.4 MMOL/L (ref 0.5–2.2)
LACTATE BLDV-SCNC: 3.7 MMOL/L (ref 0.5–2.2)
LACTATE BLDV-SCNC: 5.9 MMOL/L (ref 0.5–2.2)
LEUKOCYTE ESTERASE UR QL STRIP: NEGATIVE
LIPASE SERPL-CCNC: 9 U/L (ref 13–60)
LYMPHOCYTES NFR BLD: 0.63 K/UL (ref 1.5–4)
LYMPHOCYTES RELATIVE PERCENT: 6 % (ref 20–42)
M PNEUMO DNA NPH QL NAA+NON-PROBE: NOT DETECTED
MCH RBC QN AUTO: 28 PG (ref 26–35)
MCHC RBC AUTO-ENTMCNC: 30.5 G/DL (ref 32–34.5)
MCV RBC AUTO: 91.8 FL (ref 80–99.9)
MONOCYTES NFR BLD: 0.55 K/UL (ref 0.1–0.95)
MONOCYTES NFR BLD: 6 % (ref 2–12)
NEUTROPHILS NFR BLD: 88 % (ref 43–80)
NEUTS SEG NFR BLD: 8.54 K/UL (ref 1.8–7.3)
NITRITE UR QL STRIP: NEGATIVE
PARTIAL THROMBOPLASTIN TIME: 30.4 SEC (ref 24.5–35.1)
PH UR STRIP: 6 [PH] (ref 5–8)
PLATELET # BLD AUTO: 426 K/UL (ref 130–450)
PMV BLD AUTO: 10.9 FL (ref 7–12)
POTASSIUM SERPL-SCNC: 4 MMOL/L (ref 3.5–5.1)
PROT SERPL-MCNC: 7.6 G/DL (ref 6.4–8.3)
PROT UR STRIP-MCNC: NEGATIVE MG/DL
RBC # BLD AUTO: 3.78 M/UL (ref 3.5–5.5)
RBC #/AREA URNS HPF: ABNORMAL /HPF
RSV RNA NPH QL NAA+NON-PROBE: NOT DETECTED
RV+EV RNA NPH QL NAA+NON-PROBE: NOT DETECTED
SARS-COV-2 RNA NPH QL NAA+NON-PROBE: NOT DETECTED
SODIUM SERPL-SCNC: 143 MMOL/L (ref 136–145)
SP GR UR STRIP: 1.01 (ref 1–1.03)
SPECIMEN DESCRIPTION: NORMAL
TROPONIN I SERPL HS-MCNC: 104 NG/L (ref 0–14)
TROPONIN I SERPL HS-MCNC: 105 NG/L (ref 0–14)
TROPONIN I SERPL HS-MCNC: 86 NG/L (ref 0–14)
UROBILINOGEN UR STRIP-ACNC: 0.2 EU/DL (ref 0–1)
WBC #/AREA URNS HPF: ABNORMAL /HPF
WBC OTHER # BLD: 9.8 K/UL (ref 4.5–11.5)

## 2025-05-11 PROCEDURE — 71045 X-RAY EXAM CHEST 1 VIEW: CPT

## 2025-05-11 PROCEDURE — 82962 GLUCOSE BLOOD TEST: CPT

## 2025-05-11 PROCEDURE — 87086 URINE CULTURE/COLONY COUNT: CPT

## 2025-05-11 PROCEDURE — 85025 COMPLETE CBC W/AUTO DIFF WBC: CPT

## 2025-05-11 PROCEDURE — 6360000004 HC RX CONTRAST MEDICATION: Performed by: RADIOLOGY

## 2025-05-11 PROCEDURE — 84484 ASSAY OF TROPONIN QUANT: CPT

## 2025-05-11 PROCEDURE — 83605 ASSAY OF LACTIC ACID: CPT

## 2025-05-11 PROCEDURE — 99285 EMERGENCY DEPT VISIT HI MDM: CPT

## 2025-05-11 PROCEDURE — 0202U NFCT DS 22 TRGT SARS-COV-2: CPT

## 2025-05-11 PROCEDURE — 6370000000 HC RX 637 (ALT 250 FOR IP)

## 2025-05-11 PROCEDURE — 96361 HYDRATE IV INFUSION ADD-ON: CPT

## 2025-05-11 PROCEDURE — 96365 THER/PROPH/DIAG IV INF INIT: CPT

## 2025-05-11 PROCEDURE — 94640 AIRWAY INHALATION TREATMENT: CPT

## 2025-05-11 PROCEDURE — 85730 THROMBOPLASTIN TIME PARTIAL: CPT

## 2025-05-11 PROCEDURE — 80053 COMPREHEN METABOLIC PANEL: CPT

## 2025-05-11 PROCEDURE — 74177 CT ABD & PELVIS W/CONTRAST: CPT

## 2025-05-11 PROCEDURE — 96375 TX/PRO/DX INJ NEW DRUG ADDON: CPT

## 2025-05-11 PROCEDURE — 83880 ASSAY OF NATRIURETIC PEPTIDE: CPT

## 2025-05-11 PROCEDURE — 81001 URINALYSIS AUTO W/SCOPE: CPT

## 2025-05-11 PROCEDURE — 2580000003 HC RX 258

## 2025-05-11 PROCEDURE — 6360000002 HC RX W HCPCS: Performed by: STUDENT IN AN ORGANIZED HEALTH CARE EDUCATION/TRAINING PROGRAM

## 2025-05-11 PROCEDURE — 83690 ASSAY OF LIPASE: CPT

## 2025-05-11 PROCEDURE — 6370000000 HC RX 637 (ALT 250 FOR IP): Performed by: STUDENT IN AN ORGANIZED HEALTH CARE EDUCATION/TRAINING PROGRAM

## 2025-05-11 PROCEDURE — 2580000003 HC RX 258: Performed by: STUDENT IN AN ORGANIZED HEALTH CARE EDUCATION/TRAINING PROGRAM

## 2025-05-11 PROCEDURE — 71275 CT ANGIOGRAPHY CHEST: CPT

## 2025-05-11 PROCEDURE — 99222 1ST HOSP IP/OBS MODERATE 55: CPT | Performed by: FAMILY MEDICINE

## 2025-05-11 PROCEDURE — 6360000002 HC RX W HCPCS

## 2025-05-11 PROCEDURE — 93971 EXTREMITY STUDY: CPT

## 2025-05-11 PROCEDURE — 2140000000 HC CCU INTERMEDIATE R&B

## 2025-05-11 RX ORDER — HEPARIN SODIUM 1000 [USP'U]/ML
4000 INJECTION, SOLUTION INTRAVENOUS; SUBCUTANEOUS ONCE
Status: COMPLETED | OUTPATIENT
Start: 2025-05-11 | End: 2025-05-11

## 2025-05-11 RX ORDER — INSULIN LISPRO 100 [IU]/ML
0-8 INJECTION, SOLUTION INTRAVENOUS; SUBCUTANEOUS
Status: DISCONTINUED | OUTPATIENT
Start: 2025-05-11 | End: 2025-05-19 | Stop reason: HOSPADM

## 2025-05-11 RX ORDER — SODIUM CHLORIDE 0.9 % (FLUSH) 0.9 %
5-40 SYRINGE (ML) INJECTION EVERY 12 HOURS SCHEDULED
Status: DISCONTINUED | OUTPATIENT
Start: 2025-05-11 | End: 2025-05-19 | Stop reason: HOSPADM

## 2025-05-11 RX ORDER — IOPAMIDOL 755 MG/ML
75 INJECTION, SOLUTION INTRAVASCULAR
Status: COMPLETED | OUTPATIENT
Start: 2025-05-11 | End: 2025-05-11

## 2025-05-11 RX ORDER — HEPARIN SODIUM 1000 [USP'U]/ML
2000 INJECTION, SOLUTION INTRAVENOUS; SUBCUTANEOUS PRN
Status: DISCONTINUED | OUTPATIENT
Start: 2025-05-11 | End: 2025-05-15

## 2025-05-11 RX ORDER — POTASSIUM CHLORIDE 7.45 MG/ML
10 INJECTION INTRAVENOUS PRN
Status: DISCONTINUED | OUTPATIENT
Start: 2025-05-11 | End: 2025-05-19 | Stop reason: HOSPADM

## 2025-05-11 RX ORDER — FUROSEMIDE 10 MG/ML
40 INJECTION INTRAMUSCULAR; INTRAVENOUS 2 TIMES DAILY
Status: DISCONTINUED | OUTPATIENT
Start: 2025-05-12 | End: 2025-05-19

## 2025-05-11 RX ORDER — NITROGLYCERIN 0.4 MG/1
0.4 TABLET SUBLINGUAL ONCE
Status: COMPLETED | OUTPATIENT
Start: 2025-05-11 | End: 2025-05-11

## 2025-05-11 RX ORDER — ACETAMINOPHEN 650 MG/1
650 SUPPOSITORY RECTAL EVERY 6 HOURS PRN
Status: DISCONTINUED | OUTPATIENT
Start: 2025-05-11 | End: 2025-05-19 | Stop reason: HOSPADM

## 2025-05-11 RX ORDER — ASPIRIN 81 MG/1
324 TABLET, CHEWABLE ORAL ONCE
Status: COMPLETED | OUTPATIENT
Start: 2025-05-11 | End: 2025-05-11

## 2025-05-11 RX ORDER — SODIUM CHLORIDE 9 MG/ML
INJECTION, SOLUTION INTRAVENOUS PRN
Status: DISCONTINUED | OUTPATIENT
Start: 2025-05-11 | End: 2025-05-19 | Stop reason: HOSPADM

## 2025-05-11 RX ORDER — PROCHLORPERAZINE EDISYLATE 5 MG/ML
5 INJECTION INTRAMUSCULAR; INTRAVENOUS EVERY 6 HOURS PRN
Status: DISCONTINUED | OUTPATIENT
Start: 2025-05-11 | End: 2025-05-16 | Stop reason: SDUPTHER

## 2025-05-11 RX ORDER — IPRATROPIUM BROMIDE AND ALBUTEROL SULFATE 2.5; .5 MG/3ML; MG/3ML
1 SOLUTION RESPIRATORY (INHALATION) ONCE
Status: COMPLETED | OUTPATIENT
Start: 2025-05-11 | End: 2025-05-11

## 2025-05-11 RX ORDER — ONDANSETRON 2 MG/ML
4 INJECTION INTRAMUSCULAR; INTRAVENOUS ONCE
Status: COMPLETED | OUTPATIENT
Start: 2025-05-11 | End: 2025-05-11

## 2025-05-11 RX ORDER — MAGNESIUM SULFATE IN WATER 40 MG/ML
2000 INJECTION, SOLUTION INTRAVENOUS PRN
Status: DISCONTINUED | OUTPATIENT
Start: 2025-05-11 | End: 2025-05-19 | Stop reason: HOSPADM

## 2025-05-11 RX ORDER — HEPARIN SODIUM 10000 [USP'U]/100ML
5-30 INJECTION, SOLUTION INTRAVENOUS CONTINUOUS
Status: DISCONTINUED | OUTPATIENT
Start: 2025-05-11 | End: 2025-05-15

## 2025-05-11 RX ORDER — POLYETHYLENE GLYCOL 3350 17 G/17G
17 POWDER, FOR SOLUTION ORAL DAILY PRN
Status: DISCONTINUED | OUTPATIENT
Start: 2025-05-11 | End: 2025-05-17

## 2025-05-11 RX ORDER — 0.9 % SODIUM CHLORIDE 0.9 %
1000 INTRAVENOUS SOLUTION INTRAVENOUS ONCE
Status: COMPLETED | OUTPATIENT
Start: 2025-05-11 | End: 2025-05-11

## 2025-05-11 RX ORDER — POTASSIUM CHLORIDE 1500 MG/1
40 TABLET, EXTENDED RELEASE ORAL PRN
Status: DISCONTINUED | OUTPATIENT
Start: 2025-05-11 | End: 2025-05-19 | Stop reason: HOSPADM

## 2025-05-11 RX ORDER — ACETAMINOPHEN 325 MG/1
650 TABLET ORAL EVERY 6 HOURS PRN
Status: DISCONTINUED | OUTPATIENT
Start: 2025-05-11 | End: 2025-05-19 | Stop reason: HOSPADM

## 2025-05-11 RX ORDER — SODIUM CHLORIDE 0.9 % (FLUSH) 0.9 %
5-40 SYRINGE (ML) INJECTION PRN
Status: DISCONTINUED | OUTPATIENT
Start: 2025-05-11 | End: 2025-05-19 | Stop reason: HOSPADM

## 2025-05-11 RX ORDER — FUROSEMIDE 10 MG/ML
20 INJECTION INTRAMUSCULAR; INTRAVENOUS ONCE
Status: COMPLETED | OUTPATIENT
Start: 2025-05-11 | End: 2025-05-12

## 2025-05-11 RX ORDER — HEPARIN SODIUM 1000 [USP'U]/ML
2000 INJECTION, SOLUTION INTRAVENOUS; SUBCUTANEOUS PRN
Status: DISCONTINUED | OUTPATIENT
Start: 2025-05-11 | End: 2025-05-11

## 2025-05-11 RX ORDER — HEPARIN SODIUM 10000 [USP'U]/100ML
5-30 INJECTION, SOLUTION INTRAVENOUS CONTINUOUS
Status: DISCONTINUED | OUTPATIENT
Start: 2025-05-11 | End: 2025-05-11

## 2025-05-11 RX ORDER — HEPARIN SODIUM 1000 [USP'U]/ML
4000 INJECTION, SOLUTION INTRAVENOUS; SUBCUTANEOUS ONCE
Status: DISCONTINUED | OUTPATIENT
Start: 2025-05-11 | End: 2025-05-11

## 2025-05-11 RX ORDER — HEPARIN SODIUM 1000 [USP'U]/ML
4000 INJECTION, SOLUTION INTRAVENOUS; SUBCUTANEOUS PRN
Status: DISCONTINUED | OUTPATIENT
Start: 2025-05-11 | End: 2025-05-11

## 2025-05-11 RX ORDER — HEPARIN SODIUM 1000 [USP'U]/ML
4000 INJECTION, SOLUTION INTRAVENOUS; SUBCUTANEOUS PRN
Status: DISCONTINUED | OUTPATIENT
Start: 2025-05-11 | End: 2025-05-15

## 2025-05-11 RX ORDER — FUROSEMIDE 10 MG/ML
40 INJECTION INTRAMUSCULAR; INTRAVENOUS ONCE
Status: COMPLETED | OUTPATIENT
Start: 2025-05-11 | End: 2025-05-11

## 2025-05-11 RX ORDER — 0.9 % SODIUM CHLORIDE 0.9 %
500 INTRAVENOUS SOLUTION INTRAVENOUS ONCE
Status: DISCONTINUED | OUTPATIENT
Start: 2025-05-11 | End: 2025-05-11

## 2025-05-11 RX ADMIN — NITROGLYCERIN 0.4 MG: 0.4 TABLET, ORALLY DISINTEGRATING SUBLINGUAL at 18:18

## 2025-05-11 RX ADMIN — SODIUM CHLORIDE 1000 ML: 0.9 INJECTION, SOLUTION INTRAVENOUS at 19:34

## 2025-05-11 RX ADMIN — SODIUM CHLORIDE 1000 ML: 9 INJECTION, SOLUTION INTRAVENOUS at 16:59

## 2025-05-11 RX ADMIN — HEPARIN SODIUM 4000 UNITS: 1000 INJECTION INTRAVENOUS; SUBCUTANEOUS at 21:38

## 2025-05-11 RX ADMIN — SODIUM CHLORIDE 1000 ML: 9 INJECTION, SOLUTION INTRAVENOUS at 16:26

## 2025-05-11 RX ADMIN — CEFEPIME 1000 MG: 1 INJECTION, POWDER, FOR SOLUTION INTRAMUSCULAR; INTRAVENOUS at 17:54

## 2025-05-11 RX ADMIN — HEPARIN SODIUM 10 UNITS/KG/HR: 10000 INJECTION, SOLUTION INTRAVENOUS at 22:40

## 2025-05-11 RX ADMIN — IOPAMIDOL 75 ML: 755 INJECTION, SOLUTION INTRAVENOUS at 18:39

## 2025-05-11 RX ADMIN — FUROSEMIDE 40 MG: 10 INJECTION, SOLUTION INTRAMUSCULAR; INTRAVENOUS at 21:34

## 2025-05-11 RX ADMIN — ONDANSETRON 4 MG: 2 INJECTION, SOLUTION INTRAMUSCULAR; INTRAVENOUS at 14:37

## 2025-05-11 RX ADMIN — IPRATROPIUM BROMIDE AND ALBUTEROL SULFATE 1 DOSE: 2.5; .5 SOLUTION RESPIRATORY (INHALATION) at 22:55

## 2025-05-11 RX ADMIN — ASPIRIN 81 MG CHEWABLE TABLET 324 MG: 81 TABLET CHEWABLE at 17:49

## 2025-05-11 ASSESSMENT — PAIN SCALES - GENERAL: PAINLEVEL_OUTOF10: 7

## 2025-05-11 ASSESSMENT — LIFESTYLE VARIABLES: HOW MANY STANDARD DRINKS CONTAINING ALCOHOL DO YOU HAVE ON A TYPICAL DAY: PATIENT DOES NOT DRINK

## 2025-05-11 ASSESSMENT — PAIN DESCRIPTION - ORIENTATION: ORIENTATION: LEFT;MID

## 2025-05-11 ASSESSMENT — PAIN DESCRIPTION - DIRECTION: RADIATING_TOWARDS: LEFT SHOULDER

## 2025-05-11 ASSESSMENT — PAIN DESCRIPTION - PAIN TYPE: TYPE: ACUTE PAIN

## 2025-05-11 ASSESSMENT — PAIN - FUNCTIONAL ASSESSMENT: PAIN_FUNCTIONAL_ASSESSMENT: 0-10

## 2025-05-11 ASSESSMENT — PAIN DESCRIPTION - LOCATION: LOCATION: CHEST

## 2025-05-11 ASSESSMENT — PAIN DESCRIPTION - ONSET: ONSET: ON-GOING

## 2025-05-11 ASSESSMENT — PAIN DESCRIPTION - FREQUENCY: FREQUENCY: INTERMITTENT

## 2025-05-11 NOTE — ED NOTES
[facility] EMERGENCY DEPARTMENT  Nurse to Nurse report     Pt Name: Fanny Gonzalez  MRN: [id number]  Birthdate 1950  Date of evaluation: 5/11/2025  Provider: Shelly Alvarez RN    CHIEF COMPLAINT       Chief Complaint   Patient presents with    Chest Pain     Start 10am, with SOB, pain rads to left shoulder, worse on inspiration, nausea, emesis, hx MI +thinners hx CHF           ALLERGIES     Patient has no known allergies.           VITALS   Vitals:    Vitals:    05/11/25 1841 05/11/25 1846 05/11/25 1851 05/11/25 1856   BP: (!) 191/100 (!) 185/106     Pulse: (!) 103 (!) 109 (!) 107 (!) 106   Resp: 26 (!) 32 25 18   Temp:    98.1 °F (36.7 °C)   TempSrc:    Oral   SpO2: 95% 97% 98% 98%   Weight:               DIAGNOSTIC RESULTS       RADIOLOGY:   Non-plain film images such as CT, Ultrasound and MRI are read by the radiologist. Plain radiographic images are visualized and preliminarily interpreted by ED physician with the below findings:        Interpretation per the Radiologist below, if available at the time of this note:    Vascular duplex lower extremity venous right   Final Result   No evidence of DVT in the right lower extremity.         XR CHEST PORTABLE   Final Result   1. There is increased interstitial prominence within the right mid and upper   lung field concerning for developing pneumonia and/or atelectasis.   2. Small right pleural effusion.         CT ABDOMEN PELVIS W IV CONTRAST Additional Contrast? None    (Results Pending)   CTA PULMONARY W CONTRAST    (Results Pending)         LABS:  Labs Reviewed   CBC WITH AUTO DIFFERENTIAL - Abnormal; Notable for the following components:       Result Value    Hemoglobin 10.6 (*)     MCHC 30.5 (*)     RDW 16.5 (*)     Neutrophils % 88 (*)     Lymphocytes % 6 (*)     Neutrophils Absolute 8.54 (*)     Lymphocytes Absolute 0.63 (*)     Eosinophils Absolute 0.00 (*)     All other components within normal limits   COMPREHENSIVE

## 2025-05-11 NOTE — ED PROVIDER NOTES
[id number]  EMERGENCY DEPARTMENT ENCOUNTER        Pt Name: Fanny Gonzalez  MRN: [id number]  Birthdate 1950  Date of evaluation: 5/11/2025  Provider: Adi De La Garza MD  PCP: Babatunde Storm APRN - CNP  Note Started: 2:07 PM EDT 5/11/25    CHIEF COMPLAINT       Chief Complaint   Patient presents with    Chest Pain     Start 10am, with SOB, pain rads to left shoulder, worse on inspiration, nausea, emesis, hx MI +thinners hx CHF       HISTORY OF PRESENT ILLNESS: 1 or more Elements   History From: Patient and EMS crew    Limitations to history : None    Fanny Gonzalez is a 74 y.o. 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  Patient states chest pain is left-sided radiates to the left shoulder, has no alleviating factors, worse on deep inspiration, sharp in quality, currently moderate intensity.  Patient also complains of right leg swelling.  Patient states she fell several weeks ago and has been having swelling to the right lower extremity since that time.  Patient does endorse history of PE and has been on Eliquis but states she vomited Eliquis today.  Patient says she has had nausea vomiting abdominal pain that began today also.  Patient states she has been compliant with Eliquis up till today.  Patient denies any recent surgeries or hospitalizations.  Patient denies any fevers or chills, dysuria, hematuria, other falls or trauma, headache and blurry vision, neck pain or neck stiffness, dizziness or lightheadedness, numbness or tingling in extremities.  Patient does endorse shortness

## 2025-05-12 ENCOUNTER — APPOINTMENT (OUTPATIENT)
Age: 75
DRG: 280 | End: 2025-05-12
Attending: INTERNAL MEDICINE
Payer: MEDICARE

## 2025-05-12 LAB
ALBUMIN SERPL-MCNC: 3.6 G/DL (ref 3.5–5.2)
ALP SERPL-CCNC: 122 U/L (ref 35–104)
ALT SERPL-CCNC: 795 U/L (ref 0–35)
AMYLASE SERPL-CCNC: 53 U/L (ref 28–100)
ANION GAP SERPL CALCULATED.3IONS-SCNC: 21 MMOL/L (ref 7–16)
AST SERPL-CCNC: 1995 U/L (ref 0–35)
B.E.: -0.4 MMOL/L (ref -3–3)
BASOPHILS # BLD: 0 K/UL (ref 0–0.2)
BASOPHILS NFR BLD: 0 % (ref 0–2)
BILIRUB DIRECT SERPL-MCNC: 0.2 MG/DL (ref 0–0.2)
BILIRUB INDIRECT SERPL-MCNC: 0.9 MG/DL (ref 0–1)
BILIRUB SERPL-MCNC: 1.1 MG/DL (ref 0–1.2)
BUN SERPL-MCNC: 19 MG/DL (ref 8–23)
CALCIUM SERPL-MCNC: 9.1 MG/DL (ref 8.8–10.2)
CHLORIDE SERPL-SCNC: 105 MMOL/L (ref 98–107)
CHOLEST SERPL-MCNC: 103 MG/DL
CO2 SERPL-SCNC: 18 MMOL/L (ref 22–29)
COHB: 0.5 % (ref 0–1.5)
CREAT SERPL-MCNC: 1.1 MG/DL (ref 0.5–1)
CREAT UR-MCNC: 23.5 MG/DL (ref 29–226)
CRITICAL: ABNORMAL
CRP SERPL HS-MCNC: 33.6 MG/L (ref 0–5)
DATE ANALYZED: ABNORMAL
DATE OF COLLECTION: ABNORMAL
ECHO BSA: 2.11 M2
ECHO EST RA PRESSURE: 8 MMHG
ECHO LA DIAMETER INDEX: 1.96 CM/M2
ECHO LA DIAMETER: 4 CM
ECHO LA VOL A-L A2C: 74 ML (ref 22–52)
ECHO LA VOL A-L A4C: 73 ML (ref 22–52)
ECHO LA VOL BP: 72 ML (ref 22–52)
ECHO LA VOL MOD A2C: 73 ML (ref 22–52)
ECHO LA VOL MOD A4C: 72 ML (ref 22–52)
ECHO LA VOL/BSA BIPLANE: 35 ML/M2 (ref 16–34)
ECHO LA VOLUME AREA LENGTH: 74 ML
ECHO LA VOLUME INDEX A-L A2C: 36 ML/M2 (ref 16–34)
ECHO LA VOLUME INDEX A-L A4C: 36 ML/M2 (ref 16–34)
ECHO LA VOLUME INDEX AREA LENGTH: 36 ML/M2 (ref 16–34)
ECHO LA VOLUME INDEX MOD A2C: 36 ML/M2 (ref 16–34)
ECHO LA VOLUME INDEX MOD A4C: 35 ML/M2 (ref 16–34)
ECHO LV EF PHYSICIAN: 30 %
ECHO LV INTERNAL DIMENSION DIASTOLE INDEX: 2.01 CM/M2
ECHO LV INTERNAL DIMENSION DIASTOLIC: 4.1 CM (ref 3.9–5.3)
ECHO LV IVSD: 1.3 CM (ref 0.6–0.9)
ECHO LV MASS 2D: 182.5 G (ref 67–162)
ECHO LV MASS INDEX 2D: 89.4 G/M2 (ref 43–95)
ECHO LV POSTERIOR WALL DIASTOLIC: 1.2 CM (ref 0.6–0.9)
ECHO LV RELATIVE WALL THICKNESS RATIO: 0.59
ECHO MV A VELOCITY: 0.86 M/S
ECHO MV E DECELERATION TIME (DT): 116.1 MS
ECHO MV E VELOCITY: 0.81 M/S
ECHO MV E/A RATIO: 0.94
ECHO RIGHT VENTRICULAR SYSTOLIC PRESSURE (RVSP): 31 MMHG
ECHO RV INTERNAL DIMENSION: 2.5 CM
ECHO TV REGURGITANT MAX VELOCITY: 2.38 M/S
ECHO TV REGURGITANT PEAK GRADIENT: 23 MMHG
EKG ATRIAL RATE: 100 BPM
EKG ATRIAL RATE: 101 BPM
EKG ATRIAL RATE: 118 BPM
EKG P AXIS: 47 DEGREES
EKG P-R INTERVAL: 158 MS
EKG P-R INTERVAL: 176 MS
EKG Q-T INTERVAL: 354 MS
EKG Q-T INTERVAL: 404 MS
EKG Q-T INTERVAL: 436 MS
EKG QRS DURATION: 128 MS
EKG QRS DURATION: 132 MS
EKG QRS DURATION: 136 MS
EKG QTC CALCULATION (BAZETT): 496 MS
EKG QTC CALCULATION (BAZETT): 562 MS
EKG QTC CALCULATION (BAZETT): 580 MS
EKG R AXIS: -35 DEGREES
EKG R AXIS: -50 DEGREES
EKG R AXIS: -57 DEGREES
EKG T AXIS: 150 DEGREES
EKG T AXIS: 156 DEGREES
EKG T AXIS: 159 DEGREES
EKG VENTRICULAR RATE: 100 BPM
EKG VENTRICULAR RATE: 118 BPM
EKG VENTRICULAR RATE: 124 BPM
EOSINOPHIL # BLD: 0 K/UL (ref 0.05–0.5)
EOSINOPHILS RELATIVE PERCENT: 0 % (ref 0–6)
ERYTHROCYTE [DISTWIDTH] IN BLOOD BY AUTOMATED COUNT: 16.6 % (ref 11.5–15)
ERYTHROCYTE [SEDIMENTATION RATE] IN BLOOD BY WESTERGREN METHOD: 35 MM/HR (ref 0–20)
FOLATE SERPL-MCNC: 16.9 NG/ML (ref 4.6–34.8)
GFR, ESTIMATED: 54 ML/MIN/1.73M2
GLUCOSE BLD-MCNC: 185 MG/DL (ref 74–99)
GLUCOSE BLD-MCNC: 214 MG/DL (ref 74–99)
GLUCOSE BLD-MCNC: 276 MG/DL (ref 74–99)
GLUCOSE SERPL-MCNC: 177 MG/DL (ref 74–99)
HAV IGM SERPL QL IA: NONREACTIVE
HBV CORE IGM SERPL QL IA: NONREACTIVE
HBV SURFACE AG SERPL QL IA: NONREACTIVE
HCO3: 22.7 MMOL/L (ref 22–26)
HCT VFR BLD AUTO: 32.6 % (ref 34–48)
HCV AB SERPL QL IA: NONREACTIVE
HDLC SERPL-MCNC: 53 MG/DL
HGB BLD-MCNC: 10.3 G/DL (ref 11.5–15.5)
HHB: 11.7 % (ref 0–5)
LAB: ABNORMAL
LACTATE BLDV-SCNC: 2.7 MMOL/L (ref 0.5–2.2)
LDLC SERPL CALC-MCNC: 39 MG/DL
LEFT VENTRICULAR EJECTION FRACTION MODE: NORMAL
LIPASE SERPL-CCNC: 12 U/L (ref 13–60)
LV EF: 30 %
LYMPHOCYTES NFR BLD: 0 K/UL (ref 1.5–4)
LYMPHOCYTES RELATIVE PERCENT: 0 % (ref 20–42)
Lab: 1540
MAGNESIUM SERPL-MCNC: 1.7 MG/DL (ref 1.6–2.4)
MCH RBC QN AUTO: 28.5 PG (ref 26–35)
MCHC RBC AUTO-ENTMCNC: 31.6 G/DL (ref 32–34.5)
MCV RBC AUTO: 90.3 FL (ref 80–99.9)
METHB: 0.5 % (ref 0–1.5)
MODE: ABNORMAL
MONOCYTES NFR BLD: 14 % (ref 2–12)
MONOCYTES NFR BLD: 2.59 K/UL (ref 0.1–0.95)
NEUTROPHILS NFR BLD: 86 % (ref 43–80)
NEUTS SEG NFR BLD: 16.01 K/UL (ref 1.8–7.3)
O2 SATURATION: 88.2 % (ref 92–98.5)
O2HB: 87.3 % (ref 94–97)
OPERATOR ID: 1868
PARTIAL THROMBOPLASTIN TIME: 24.5 SEC (ref 24.5–35.1)
PARTIAL THROMBOPLASTIN TIME: 53.6 SEC (ref 24.5–35.1)
PATIENT TEMP: 37 C
PCO2: 32.1 MMHG (ref 35–45)
PH BLOOD GAS: 7.47 (ref 7.35–7.45)
PLATELET # BLD AUTO: 414 K/UL (ref 130–450)
PMV BLD AUTO: 11.3 FL (ref 7–12)
PO2: 60 MMHG (ref 75–100)
POTASSIUM SERPL-SCNC: 3.8 MMOL/L (ref 3.5–5.1)
PROCALCITONIN SERPL-MCNC: 0.41 NG/ML (ref 0–0.08)
PROT SERPL-MCNC: 7 G/DL (ref 6.4–8.3)
RBC # BLD AUTO: 3.61 M/UL (ref 3.5–5.5)
RBC # BLD: ABNORMAL 10*6/UL
SODIUM SERPL-SCNC: 144 MMOL/L (ref 136–145)
SOURCE, BLOOD GAS: ABNORMAL
THB: 11.3 G/DL (ref 11.5–16.5)
TIME ANALYZED: 1550
TRIGL SERPL-MCNC: 59 MG/DL
TROPONIN I SERPL HS-MCNC: 148 NG/L (ref 0–14)
UUN UR-MCNC: 137 MG/DL (ref 800–1666)
VIT B12 SERPL-MCNC: 1739 PG/ML (ref 232–1245)
VLDLC SERPL CALC-MCNC: 12 MG/DL
WBC OTHER # BLD: 18.6 K/UL (ref 4.5–11.5)

## 2025-05-12 PROCEDURE — 82607 VITAMIN B-12: CPT

## 2025-05-12 PROCEDURE — 6370000000 HC RX 637 (ALT 250 FOR IP): Performed by: FAMILY MEDICINE

## 2025-05-12 PROCEDURE — 93005 ELECTROCARDIOGRAM TRACING: CPT

## 2025-05-12 PROCEDURE — 76937 US GUIDE VASCULAR ACCESS: CPT

## 2025-05-12 PROCEDURE — 93005 ELECTROCARDIOGRAM TRACING: CPT | Performed by: FAMILY MEDICINE

## 2025-05-12 PROCEDURE — 80061 LIPID PANEL: CPT

## 2025-05-12 PROCEDURE — 82962 GLUCOSE BLOOD TEST: CPT

## 2025-05-12 PROCEDURE — 80074 ACUTE HEPATITIS PANEL: CPT

## 2025-05-12 PROCEDURE — 83690 ASSAY OF LIPASE: CPT

## 2025-05-12 PROCEDURE — 94640 AIRWAY INHALATION TREATMENT: CPT

## 2025-05-12 PROCEDURE — 93321 DOPPLER ECHO F-UP/LMTD STD: CPT | Performed by: INTERNAL MEDICINE

## 2025-05-12 PROCEDURE — 85652 RBC SED RATE AUTOMATED: CPT

## 2025-05-12 PROCEDURE — 6360000002 HC RX W HCPCS: Performed by: FAMILY MEDICINE

## 2025-05-12 PROCEDURE — 85025 COMPLETE CBC W/AUTO DIFF WBC: CPT

## 2025-05-12 PROCEDURE — APPSS180 APP SPLIT SHARED TIME > 60 MINUTES: Performed by: NURSE PRACTITIONER

## 2025-05-12 PROCEDURE — 87040 BLOOD CULTURE FOR BACTERIA: CPT

## 2025-05-12 PROCEDURE — 2500000003 HC RX 250 WO HCPCS: Performed by: INTERNAL MEDICINE

## 2025-05-12 PROCEDURE — 2500000003 HC RX 250 WO HCPCS: Performed by: FAMILY MEDICINE

## 2025-05-12 PROCEDURE — 93010 ELECTROCARDIOGRAM REPORT: CPT | Performed by: INTERNAL MEDICINE

## 2025-05-12 PROCEDURE — 99233 SBSQ HOSP IP/OBS HIGH 50: CPT | Performed by: INTERNAL MEDICINE

## 2025-05-12 PROCEDURE — 93308 TTE F-UP OR LMTD: CPT | Performed by: INTERNAL MEDICINE

## 2025-05-12 PROCEDURE — 83605 ASSAY OF LACTIC ACID: CPT

## 2025-05-12 PROCEDURE — 36600 WITHDRAWAL OF ARTERIAL BLOOD: CPT

## 2025-05-12 PROCEDURE — 2140000000 HC CCU INTERMEDIATE R&B

## 2025-05-12 PROCEDURE — 82805 BLOOD GASES W/O2 SATURATION: CPT

## 2025-05-12 PROCEDURE — 99223 1ST HOSP IP/OBS HIGH 75: CPT | Performed by: INTERNAL MEDICINE

## 2025-05-12 PROCEDURE — 82570 ASSAY OF URINE CREATININE: CPT

## 2025-05-12 PROCEDURE — 84540 ASSAY OF URINE/UREA-N: CPT

## 2025-05-12 PROCEDURE — 83735 ASSAY OF MAGNESIUM: CPT

## 2025-05-12 PROCEDURE — 84484 ASSAY OF TROPONIN QUANT: CPT

## 2025-05-12 PROCEDURE — 36415 COLL VENOUS BLD VENIPUNCTURE: CPT

## 2025-05-12 PROCEDURE — 86140 C-REACTIVE PROTEIN: CPT

## 2025-05-12 PROCEDURE — 82248 BILIRUBIN DIRECT: CPT

## 2025-05-12 PROCEDURE — 93005 ELECTROCARDIOGRAM TRACING: CPT | Performed by: INTERNAL MEDICINE

## 2025-05-12 PROCEDURE — 82746 ASSAY OF FOLIC ACID SERUM: CPT

## 2025-05-12 PROCEDURE — 85730 THROMBOPLASTIN TIME PARTIAL: CPT

## 2025-05-12 PROCEDURE — 80053 COMPREHEN METABOLIC PANEL: CPT

## 2025-05-12 PROCEDURE — 93325 DOPPLER ECHO COLOR FLOW MAPG: CPT

## 2025-05-12 PROCEDURE — 82150 ASSAY OF AMYLASE: CPT

## 2025-05-12 PROCEDURE — 2500000003 HC RX 250 WO HCPCS

## 2025-05-12 PROCEDURE — 93005 ELECTROCARDIOGRAM TRACING: CPT | Performed by: STUDENT IN AN ORGANIZED HEALTH CARE EDUCATION/TRAINING PROGRAM

## 2025-05-12 PROCEDURE — 6360000002 HC RX W HCPCS: Performed by: INTERNAL MEDICINE

## 2025-05-12 PROCEDURE — 84145 PROCALCITONIN (PCT): CPT

## 2025-05-12 PROCEDURE — 93325 DOPPLER ECHO COLOR FLOW MAPG: CPT | Performed by: INTERNAL MEDICINE

## 2025-05-12 PROCEDURE — 6360000002 HC RX W HCPCS

## 2025-05-12 RX ORDER — CARVEDILOL 3.12 MG/1
3.12 TABLET ORAL 2 TIMES DAILY
Status: DISCONTINUED | OUTPATIENT
Start: 2025-05-12 | End: 2025-05-13

## 2025-05-12 RX ORDER — GABAPENTIN 300 MG/1
300 CAPSULE ORAL 3 TIMES DAILY
Status: DISCONTINUED | OUTPATIENT
Start: 2025-05-12 | End: 2025-05-19 | Stop reason: HOSPADM

## 2025-05-12 RX ORDER — ALBUTEROL SULFATE 0.83 MG/ML
2.5 SOLUTION RESPIRATORY (INHALATION) EVERY 6 HOURS PRN
Status: DISCONTINUED | OUTPATIENT
Start: 2025-05-12 | End: 2025-05-19 | Stop reason: HOSPADM

## 2025-05-12 RX ORDER — HYDRALAZINE HYDROCHLORIDE 10 MG/1
10 TABLET, FILM COATED ORAL EVERY 12 HOURS SCHEDULED
Status: DISCONTINUED | OUTPATIENT
Start: 2025-05-12 | End: 2025-05-16

## 2025-05-12 RX ORDER — IPRATROPIUM BROMIDE AND ALBUTEROL SULFATE 2.5; .5 MG/3ML; MG/3ML
1 SOLUTION RESPIRATORY (INHALATION)
Status: DISCONTINUED | OUTPATIENT
Start: 2025-05-12 | End: 2025-05-19 | Stop reason: HOSPADM

## 2025-05-12 RX ORDER — INSULIN LISPRO 100 [IU]/ML
2 INJECTION, SOLUTION INTRAVENOUS; SUBCUTANEOUS
Status: DISCONTINUED | OUTPATIENT
Start: 2025-05-12 | End: 2025-05-19 | Stop reason: HOSPADM

## 2025-05-12 RX ORDER — MONTELUKAST SODIUM 10 MG/1
10 TABLET ORAL NIGHTLY
Status: DISCONTINUED | OUTPATIENT
Start: 2025-05-12 | End: 2025-05-19 | Stop reason: HOSPADM

## 2025-05-12 RX ORDER — INSULIN GLARGINE 100 [IU]/ML
10 INJECTION, SOLUTION SUBCUTANEOUS NIGHTLY
Status: DISCONTINUED | OUTPATIENT
Start: 2025-05-12 | End: 2025-05-15

## 2025-05-12 RX ORDER — ROSUVASTATIN CALCIUM 20 MG/1
40 TABLET, COATED ORAL EVERY EVENING
Status: DISCONTINUED | OUTPATIENT
Start: 2025-05-12 | End: 2025-05-19 | Stop reason: HOSPADM

## 2025-05-12 RX ORDER — ALBUTEROL SULFATE 90 UG/1
2 INHALANT RESPIRATORY (INHALATION) EVERY 6 HOURS PRN
Status: DISCONTINUED | OUTPATIENT
Start: 2025-05-12 | End: 2025-05-12 | Stop reason: SDUPTHER

## 2025-05-12 RX ORDER — OXYBUTYNIN CHLORIDE 5 MG/1
5 TABLET ORAL 3 TIMES DAILY
Status: DISCONTINUED | OUTPATIENT
Start: 2025-05-12 | End: 2025-05-19 | Stop reason: HOSPADM

## 2025-05-12 RX ORDER — THIAMINE HYDROCHLORIDE 100 MG/ML
200 INJECTION, SOLUTION INTRAMUSCULAR; INTRAVENOUS DAILY
Status: COMPLETED | OUTPATIENT
Start: 2025-05-12 | End: 2025-05-14

## 2025-05-12 RX ORDER — ASPIRIN 81 MG/1
81 TABLET, CHEWABLE ORAL DAILY
Status: DISCONTINUED | OUTPATIENT
Start: 2025-05-12 | End: 2025-05-19 | Stop reason: HOSPADM

## 2025-05-12 RX ORDER — PANTOPRAZOLE SODIUM 40 MG/1
40 TABLET, DELAYED RELEASE ORAL
Status: DISCONTINUED | OUTPATIENT
Start: 2025-05-12 | End: 2025-05-19 | Stop reason: HOSPADM

## 2025-05-12 RX ORDER — FERROUS SULFATE 325(65) MG
325 TABLET ORAL EVERY OTHER DAY
Status: DISCONTINUED | OUTPATIENT
Start: 2025-05-12 | End: 2025-05-18

## 2025-05-12 RX ORDER — TRAZODONE HYDROCHLORIDE 50 MG/1
50 TABLET ORAL NIGHTLY
Status: DISCONTINUED | OUTPATIENT
Start: 2025-05-12 | End: 2025-05-19 | Stop reason: HOSPADM

## 2025-05-12 RX ORDER — DOCUSATE SODIUM 100 MG/1
100 CAPSULE, LIQUID FILLED ORAL 2 TIMES DAILY PRN
Status: DISCONTINUED | OUTPATIENT
Start: 2025-05-12 | End: 2025-05-19 | Stop reason: HOSPADM

## 2025-05-12 RX ORDER — 0.9 % SODIUM CHLORIDE 0.9 %
500 INTRAVENOUS SOLUTION INTRAVENOUS ONCE
Status: DISCONTINUED | OUTPATIENT
Start: 2025-05-12 | End: 2025-05-19 | Stop reason: HOSPADM

## 2025-05-12 RX ORDER — FENTANYL CITRATE 50 UG/ML
50 INJECTION, SOLUTION INTRAMUSCULAR; INTRAVENOUS ONCE
Status: DISCONTINUED | OUTPATIENT
Start: 2025-05-12 | End: 2025-05-19 | Stop reason: HOSPADM

## 2025-05-12 RX ORDER — CLONIDINE HYDROCHLORIDE 0.1 MG/1
0.2 TABLET ORAL 3 TIMES DAILY
Status: DISCONTINUED | OUTPATIENT
Start: 2025-05-12 | End: 2025-05-19 | Stop reason: HOSPADM

## 2025-05-12 RX ORDER — NITROGLYCERIN 0.4 MG/1
0.4 TABLET SUBLINGUAL EVERY 5 MIN PRN
Status: DISCONTINUED | OUTPATIENT
Start: 2025-05-12 | End: 2025-05-19 | Stop reason: HOSPADM

## 2025-05-12 RX ORDER — METOPROLOL TARTRATE 1 MG/ML
5 INJECTION, SOLUTION INTRAVENOUS ONCE
Status: COMPLETED | OUTPATIENT
Start: 2025-05-12 | End: 2025-05-12

## 2025-05-12 RX ADMIN — ROSUVASTATIN CALCIUM 40 MG: 20 TABLET, FILM COATED ORAL at 01:28

## 2025-05-12 RX ADMIN — HYDRALAZINE HYDROCHLORIDE 10 MG: 10 TABLET ORAL at 21:51

## 2025-05-12 RX ADMIN — SODIUM CHLORIDE, PRESERVATIVE FREE 10 ML: 5 INJECTION INTRAVENOUS at 10:06

## 2025-05-12 RX ADMIN — Medication 3 MG: at 21:52

## 2025-05-12 RX ADMIN — FERROUS SULFATE TAB 325 MG (65 MG ELEMENTAL FE) 325 MG: 325 (65 FE) TAB at 10:07

## 2025-05-12 RX ADMIN — HEPARIN SODIUM 14 UNITS/KG/HR: 10000 INJECTION, SOLUTION INTRAVENOUS at 23:42

## 2025-05-12 RX ADMIN — FUROSEMIDE 20 MG: 10 INJECTION, SOLUTION INTRAMUSCULAR; INTRAVENOUS at 01:06

## 2025-05-12 RX ADMIN — CARVEDILOL 3.12 MG: 6.25 TABLET, FILM COATED ORAL at 21:51

## 2025-05-12 RX ADMIN — IPRATROPIUM BROMIDE AND ALBUTEROL SULFATE 1 DOSE: 2.5; .5 SOLUTION RESPIRATORY (INHALATION) at 20:12

## 2025-05-12 RX ADMIN — THIAMINE HYDROCHLORIDE 200 MG: 100 INJECTION, SOLUTION INTRAMUSCULAR; INTRAVENOUS at 21:55

## 2025-05-12 RX ADMIN — INSULIN LISPRO 4 UNITS: 100 INJECTION, SOLUTION INTRAVENOUS; SUBCUTANEOUS at 18:20

## 2025-05-12 RX ADMIN — ASPIRIN 81 MG CHEWABLE TABLET 81 MG: 81 TABLET CHEWABLE at 10:07

## 2025-05-12 RX ADMIN — HEPARIN SODIUM 4000 UNITS: 1000 INJECTION INTRAVENOUS; SUBCUTANEOUS at 18:11

## 2025-05-12 RX ADMIN — INSULIN LISPRO 2 UNITS: 100 INJECTION, SOLUTION INTRAVENOUS; SUBCUTANEOUS at 12:34

## 2025-05-12 RX ADMIN — MONTELUKAST 10 MG: 10 TABLET, FILM COATED ORAL at 01:19

## 2025-05-12 RX ADMIN — TRAZODONE HYDROCHLORIDE 50 MG: 50 TABLET ORAL at 01:19

## 2025-05-12 RX ADMIN — CLONIDINE HYDROCHLORIDE 0.2 MG: 0.1 TABLET ORAL at 10:07

## 2025-05-12 RX ADMIN — GABAPENTIN 300 MG: 300 CAPSULE ORAL at 21:52

## 2025-05-12 RX ADMIN — INSULIN GLARGINE 10 UNITS: 100 INJECTION, SOLUTION SUBCUTANEOUS at 21:52

## 2025-05-12 RX ADMIN — Medication 3 MG: at 01:19

## 2025-05-12 RX ADMIN — CEFTRIAXONE SODIUM 1000 MG: 1 INJECTION, POWDER, FOR SOLUTION INTRAMUSCULAR; INTRAVENOUS at 18:11

## 2025-05-12 RX ADMIN — TRAZODONE HYDROCHLORIDE 50 MG: 50 TABLET ORAL at 21:51

## 2025-05-12 RX ADMIN — GABAPENTIN 300 MG: 300 CAPSULE ORAL at 18:12

## 2025-05-12 RX ADMIN — IPRATROPIUM BROMIDE AND ALBUTEROL SULFATE 1 DOSE: 2.5; .5 SOLUTION RESPIRATORY (INHALATION) at 11:25

## 2025-05-12 RX ADMIN — FUROSEMIDE 40 MG: 10 INJECTION, SOLUTION INTRAMUSCULAR; INTRAVENOUS at 10:07

## 2025-05-12 RX ADMIN — OXYBUTYNIN CHLORIDE 5 MG: 5 TABLET ORAL at 21:51

## 2025-05-12 RX ADMIN — GABAPENTIN 300 MG: 300 CAPSULE ORAL at 10:07

## 2025-05-12 RX ADMIN — PANTOPRAZOLE SODIUM 40 MG: 40 TABLET, DELAYED RELEASE ORAL at 10:25

## 2025-05-12 RX ADMIN — INSULIN LISPRO 2 UNITS: 100 INJECTION, SOLUTION INTRAVENOUS; SUBCUTANEOUS at 12:35

## 2025-05-12 RX ADMIN — SODIUM CHLORIDE, PRESERVATIVE FREE 10 ML: 5 INJECTION INTRAVENOUS at 01:14

## 2025-05-12 RX ADMIN — CARVEDILOL 3.12 MG: 6.25 TABLET, FILM COATED ORAL at 10:07

## 2025-05-12 RX ADMIN — METOROPROLOL TARTRATE 5 MG: 5 INJECTION, SOLUTION INTRAVENOUS at 01:09

## 2025-05-12 RX ADMIN — PROCHLORPERAZINE EDISYLATE 5 MG: 5 INJECTION INTRAMUSCULAR; INTRAVENOUS at 04:37

## 2025-05-12 RX ADMIN — MONTELUKAST 10 MG: 10 TABLET, FILM COATED ORAL at 21:52

## 2025-05-12 RX ADMIN — INSULIN LISPRO 2 UNITS: 100 INJECTION, SOLUTION INTRAVENOUS; SUBCUTANEOUS at 18:21

## 2025-05-12 RX ADMIN — CLONIDINE HYDROCHLORIDE 0.2 MG: 0.1 TABLET ORAL at 21:52

## 2025-05-12 RX ADMIN — IPRATROPIUM BROMIDE AND ALBUTEROL SULFATE 1 DOSE: 2.5; .5 SOLUTION RESPIRATORY (INHALATION) at 08:21

## 2025-05-12 RX ADMIN — CARVEDILOL 3.12 MG: 6.25 TABLET, FILM COATED ORAL at 03:25

## 2025-05-12 RX ADMIN — HYDRALAZINE HYDROCHLORIDE 10 MG: 10 TABLET ORAL at 10:08

## 2025-05-12 RX ADMIN — IPRATROPIUM BROMIDE AND ALBUTEROL SULFATE 1 DOSE: 2.5; .5 SOLUTION RESPIRATORY (INHALATION) at 16:14

## 2025-05-12 RX ADMIN — FUROSEMIDE 40 MG: 10 INJECTION, SOLUTION INTRAMUSCULAR; INTRAVENOUS at 18:12

## 2025-05-12 RX ADMIN — INSULIN GLARGINE 10 UNITS: 100 INJECTION, SOLUTION SUBCUTANEOUS at 01:29

## 2025-05-12 RX ADMIN — CLONIDINE HYDROCHLORIDE 0.2 MG: 0.1 TABLET ORAL at 18:12

## 2025-05-12 NOTE — CARE COORDINATION
5/12/25, Patient admitted for NSTEMI.  Cardiology consult.  SW met with patient in room to discuss transition of care/SW role.  Patient recently discharged to [facility] for rehab on 4/22.  Prior to [facility] patient was living in an apartment on the 10th floor.  DME owned is a cane, rollator, BSC and Shower chair.  PCP is Dr. Storm and Pharmacy is [facility].  Patient was wanting more in home assistance and was referred to contact [facility].  Patient unsure if she would be going back to [facility] vs home.  Call placed to Meseret from [facility] and vm left to return call.   Will need to see how patient does with PT/OT evals for further discharge planning.  SW to follow.      Case Management Assessment  Initial Evaluation    Date/Time of Evaluation: 5/12/2025 10:45 AM  Assessment Completed by: TRUMAN Kapadia    If patient is discharged prior to next notation, then this note serves as note for discharge by case management.    Patient Name: Fanny Gonzalez                   YOB: 1950  Diagnosis: Generalized abdominal pain [R10.84]  NSTEMI (non-ST elevated myocardial infarction) (HCC) [I21.4]  Acute on chronic congestive heart failure, unspecified heart failure type (HCC) [I50.9]                   Date / Time: 5/11/2025  2:05 PM    Patient Admission Status: Inpatient   Readmission Risk (Low < 19, Mod (19-27), High > 27): Readmission Risk Score: 13.9    Current PCP: Babatunde Storm APRN - CNP  PCP verified by ? Yes    Chart Reviewed: Yes      History Provided by: Patient  Patient Orientation: Alert and Oriented    Patient Cognition: Alert    Hospitalization in the last 30 days (Readmission):  Yes    If yes, Readmission Assessment in  Navigator will be completed.    Advance Directives:      Code Status: Full Code   Patient's Primary Decision Maker is: Legal Next of Kin    Primary Decision Maker: Tracee Gonzalez - Child - [phone]    Discharge Planning:    Patient lives with: Other (Comment) Type

## 2025-05-12 NOTE — H&P
years ago. Her smoking use included cigarettes. She started smoking about 59 years ago. She has a 9 pack-year smoking history. She has been exposed to tobacco smoke. She has never used smokeless tobacco.  ETOH:   reports that she does not currently use alcohol.      Family History:       Reviewed in detail and negative for DM, CAD, Cancer, CVA. Positive as follows:        Problem Relation Age of Onset    Diabetes Mother     High Blood Pressure Mother     Heart Attack Mother         low 80s    High Blood Pressure Father     Diabetes Father     Heart Failure Father     Breast Cancer Sister     Stroke Sister         young age    Heart Attack Sister 50    Stroke Sister     Breast Cancer Sister             Cancer Sister 64        breast & ovarian    Sickle Cell Anemia Other         niece    Cancer Other 40        niece - breast    Stomach Cancer Other         aunt       REVIEW OF SYSTEMS:   Pertinent positives as noted in the HPI. All other systems reviewed and negative.    PHYSICAL EXAM:    BP (!) 186/118   Pulse (!) 119   Temp 98.1 °F (36.7 °C) (Oral)   Resp 20   Wt 99.3 kg (219 lb)   SpO2 100%   BMI 36.44 kg/m²     General appearance:  No apparent distress, appears stated age and cooperative.  HEENT:  Normal cephalic, atraumatic without obvious deformity. Pupils equal, round, and reactive to light.  Extra ocular muscles intact. Conjunctivae/corneas clear.  Neck: Supple, with full range of motion. No jugular venous distention. Trachea midline.  Respiratory:  Normal respiratory effort. Clear to auscultation, bilaterally without Rales/Wheezes/Rhonchi.  Cardiovascular:  Regular rate and rhythm with normal S1/S2 without murmurs, rubs or gallops.  Abdomen: Soft, non-tender, non-distended with normal bowel sounds.  Musculoskeletal:  No clubbing, cyanosis or edema bilaterally.    Skin: Skin color, texture, turgor normal.  No rashes or lesions.  Neurologic:  Neurovascularly intact without any focal sensory/motor

## 2025-05-12 NOTE — ED NOTES
Patient report given by phone. Repeat troponin sent prior to taking patient upstairs.     The following was reviewed with receiving RN:   Current vital signs:  BP (!) 162/95   Pulse (!) 120   Temp 98.1 °F (36.7 °C) (Oral)   Resp 30   Wt 99.3 kg (219 lb)   SpO2 96%   BMI 36.44 kg/m²                MEWS Score: 1     Any medication or safety alerts were reviewed. Any pending diagnostics and notifications were also reviewed, as well as any safety concerns or issues, abnormal labs, abnormal imaging, and abnormal assessment findings. Questions were answered.

## 2025-05-12 NOTE — ED NOTES
Patient was experiencing chest pain, EKG was obtained and given to ED provider. Admitting provider was also notified.

## 2025-05-12 NOTE — CARE COORDINATION
5/12/25, JEFFREY Update-SW spoke with Meseret from [facility] on patient was discharged to home this past Friday from rehab (5/9).  Meseret thinks it was [facility] that a referral was made to.  In talking with Jocelin from [facility] no referral was received on Friday or over the weekend on patient for [facility].  Spoke with Mabel from [facility] who had just met with patient in room.  Patient is in agreement with going to [facility] if rehab is needed.  Will need PT/OT evals to determine a safe discharge to either home or rehab.  Will need to confirm with patient discharge plan.  SW to follow.      Fanny Dominique DESTINEE  [facility] Case Management  [phone]

## 2025-05-13 ENCOUNTER — APPOINTMENT (OUTPATIENT)
Dept: GENERAL RADIOLOGY | Age: 75
DRG: 280 | End: 2025-05-13
Payer: MEDICARE

## 2025-05-13 LAB
ANION GAP SERPL CALCULATED.3IONS-SCNC: 14 MMOL/L (ref 7–16)
BUN SERPL-MCNC: 30 MG/DL (ref 8–23)
CALCIUM SERPL-MCNC: 8.2 MG/DL (ref 8.8–10.2)
CHLORIDE SERPL-SCNC: 105 MMOL/L (ref 98–107)
CO2 SERPL-SCNC: 24 MMOL/L (ref 22–29)
CREAT SERPL-MCNC: 1.4 MG/DL (ref 0.5–1)
EKG ATRIAL RATE: 64 BPM
EKG Q-T INTERVAL: 476 MS
EKG QRS DURATION: 134 MS
EKG QTC CALCULATION (BAZETT): 595 MS
EKG R AXIS: -33 DEGREES
EKG T AXIS: 175 DEGREES
EKG VENTRICULAR RATE: 94 BPM
ERYTHROCYTE [DISTWIDTH] IN BLOOD BY AUTOMATED COUNT: 16.7 % (ref 11.5–15)
GFR, ESTIMATED: 38 ML/MIN/1.73M2
GLUCOSE BLD-MCNC: 224 MG/DL (ref 74–99)
GLUCOSE BLD-MCNC: 264 MG/DL (ref 74–99)
GLUCOSE BLD-MCNC: 274 MG/DL (ref 74–99)
GLUCOSE SERPL-MCNC: 142 MG/DL (ref 74–99)
GLUCOSE SERPL-MCNC: 143 MG/DL (ref 74–99)
HAV IGM SERPL QL IA: NONREACTIVE
HBV CORE IGM SERPL QL IA: NONREACTIVE
HBV SURFACE AG SERPL QL IA: NONREACTIVE
HCT VFR BLD AUTO: 33.5 % (ref 34–48)
HCV AB SERPL QL IA: NONREACTIVE
HGB BLD-MCNC: 10.2 G/DL (ref 11.5–15.5)
LACTATE BLDV-SCNC: 1.6 MMOL/L (ref 0.5–2.2)
MCH RBC QN AUTO: 27.9 PG (ref 26–35)
MCHC RBC AUTO-ENTMCNC: 30.4 G/DL (ref 32–34.5)
MCV RBC AUTO: 91.8 FL (ref 80–99.9)
MICROORGANISM SPEC CULT: NO GROWTH
PARTIAL THROMBOPLASTIN TIME: 62.3 SEC (ref 24.5–35.1)
PARTIAL THROMBOPLASTIN TIME: 69.4 SEC (ref 24.5–35.1)
PARTIAL THROMBOPLASTIN TIME: 97.5 SEC (ref 24.5–35.1)
PLATELET # BLD AUTO: 330 K/UL (ref 130–450)
PMV BLD AUTO: 11.3 FL (ref 7–12)
POTASSIUM SERPL-SCNC: 3.4 MMOL/L (ref 3.5–5.1)
RBC # BLD AUTO: 3.65 M/UL (ref 3.5–5.5)
SERVICE CMNT-IMP: NORMAL
SODIUM SERPL-SCNC: 144 MMOL/L (ref 136–145)
SPECIMEN DESCRIPTION: NORMAL
WBC OTHER # BLD: 10.3 K/UL (ref 4.5–11.5)

## 2025-05-13 PROCEDURE — 2500000003 HC RX 250 WO HCPCS: Performed by: FAMILY MEDICINE

## 2025-05-13 PROCEDURE — 83550 IRON BINDING TEST: CPT

## 2025-05-13 PROCEDURE — 97165 OT EVAL LOW COMPLEX 30 MIN: CPT

## 2025-05-13 PROCEDURE — 80074 ACUTE HEPATITIS PANEL: CPT

## 2025-05-13 PROCEDURE — 2500000003 HC RX 250 WO HCPCS: Performed by: INTERNAL MEDICINE

## 2025-05-13 PROCEDURE — 73620 X-RAY EXAM OF FOOT: CPT

## 2025-05-13 PROCEDURE — 6360000002 HC RX W HCPCS: Performed by: INTERNAL MEDICINE

## 2025-05-13 PROCEDURE — 82390 ASSAY OF CERULOPLASMIN: CPT

## 2025-05-13 PROCEDURE — 94640 AIRWAY INHALATION TREATMENT: CPT

## 2025-05-13 PROCEDURE — 86039 ANTINUCLEAR ANTIBODIES (ANA): CPT

## 2025-05-13 PROCEDURE — 97161 PT EVAL LOW COMPLEX 20 MIN: CPT

## 2025-05-13 PROCEDURE — 83605 ASSAY OF LACTIC ACID: CPT

## 2025-05-13 PROCEDURE — 82947 ASSAY GLUCOSE BLOOD QUANT: CPT

## 2025-05-13 PROCEDURE — 85730 THROMBOPLASTIN TIME PARTIAL: CPT

## 2025-05-13 PROCEDURE — 80048 BASIC METABOLIC PNL TOTAL CA: CPT

## 2025-05-13 PROCEDURE — 97530 THERAPEUTIC ACTIVITIES: CPT

## 2025-05-13 PROCEDURE — 36415 COLL VENOUS BLD VENIPUNCTURE: CPT

## 2025-05-13 PROCEDURE — 85027 COMPLETE CBC AUTOMATED: CPT

## 2025-05-13 PROCEDURE — 2140000000 HC CCU INTERMEDIATE R&B

## 2025-05-13 PROCEDURE — 6370000000 HC RX 637 (ALT 250 FOR IP): Performed by: FAMILY MEDICINE

## 2025-05-13 PROCEDURE — 6360000002 HC RX W HCPCS: Performed by: FAMILY MEDICINE

## 2025-05-13 PROCEDURE — 82962 GLUCOSE BLOOD TEST: CPT

## 2025-05-13 PROCEDURE — 82728 ASSAY OF FERRITIN: CPT

## 2025-05-13 PROCEDURE — 2580000003 HC RX 258: Performed by: INTERNAL MEDICINE

## 2025-05-13 PROCEDURE — 2500000003 HC RX 250 WO HCPCS: Performed by: NURSE PRACTITIONER

## 2025-05-13 PROCEDURE — 86255 FLUORESCENT ANTIBODY SCREEN: CPT

## 2025-05-13 PROCEDURE — 6360000002 HC RX W HCPCS: Performed by: NURSE PRACTITIONER

## 2025-05-13 PROCEDURE — 82103 ALPHA-1-ANTITRYPSIN TOTAL: CPT

## 2025-05-13 PROCEDURE — 93010 ELECTROCARDIOGRAM REPORT: CPT | Performed by: INTERNAL MEDICINE

## 2025-05-13 PROCEDURE — 83540 ASSAY OF IRON: CPT

## 2025-05-13 PROCEDURE — 2580000003 HC RX 258: Performed by: NURSE PRACTITIONER

## 2025-05-13 PROCEDURE — 2580000003 HC RX 258

## 2025-05-13 PROCEDURE — 99233 SBSQ HOSP IP/OBS HIGH 50: CPT | Performed by: INTERNAL MEDICINE

## 2025-05-13 PROCEDURE — 93005 ELECTROCARDIOGRAM TRACING: CPT | Performed by: INTERNAL MEDICINE

## 2025-05-13 PROCEDURE — 6370000000 HC RX 637 (ALT 250 FOR IP): Performed by: INTERNAL MEDICINE

## 2025-05-13 PROCEDURE — 99232 SBSQ HOSP IP/OBS MODERATE 35: CPT | Performed by: INTERNAL MEDICINE

## 2025-05-13 PROCEDURE — 97535 SELF CARE MNGMENT TRAINING: CPT

## 2025-05-13 PROCEDURE — 86038 ANTINUCLEAR ANTIBODIES: CPT

## 2025-05-13 PROCEDURE — 99222 1ST HOSP IP/OBS MODERATE 55: CPT | Performed by: NURSE PRACTITIONER

## 2025-05-13 RX ORDER — METOPROLOL SUCCINATE 25 MG/1
25 TABLET, EXTENDED RELEASE ORAL DAILY
Status: DISCONTINUED | OUTPATIENT
Start: 2025-05-13 | End: 2025-05-16

## 2025-05-13 RX ORDER — SODIUM CHLORIDE 9 MG/ML
INJECTION, SOLUTION INTRAVENOUS CONTINUOUS
Status: DISCONTINUED | OUTPATIENT
Start: 2025-05-13 | End: 2025-05-18

## 2025-05-13 RX ORDER — MAGNESIUM SULFATE IN WATER 40 MG/ML
2000 INJECTION, SOLUTION INTRAVENOUS ONCE
Status: COMPLETED | OUTPATIENT
Start: 2025-05-13 | End: 2025-05-13

## 2025-05-13 RX ORDER — DILTIAZEM HYDROCHLORIDE 5 MG/ML
5 INJECTION INTRAVENOUS ONCE
Status: COMPLETED | OUTPATIENT
Start: 2025-05-13 | End: 2025-05-13

## 2025-05-13 RX ADMIN — SODIUM CHLORIDE 7.5 MG/HR: 900 INJECTION, SOLUTION INTRAVENOUS at 17:09

## 2025-05-13 RX ADMIN — GABAPENTIN 300 MG: 300 CAPSULE ORAL at 09:24

## 2025-05-13 RX ADMIN — GABAPENTIN 300 MG: 300 CAPSULE ORAL at 21:03

## 2025-05-13 RX ADMIN — SODIUM CHLORIDE, PRESERVATIVE FREE 10 ML: 5 INJECTION INTRAVENOUS at 21:06

## 2025-05-13 RX ADMIN — INSULIN LISPRO 2 UNITS: 100 INJECTION, SOLUTION INTRAVENOUS; SUBCUTANEOUS at 21:03

## 2025-05-13 RX ADMIN — GABAPENTIN 300 MG: 300 CAPSULE ORAL at 16:18

## 2025-05-13 RX ADMIN — MONTELUKAST 10 MG: 10 TABLET, FILM COATED ORAL at 21:03

## 2025-05-13 RX ADMIN — IPRATROPIUM BROMIDE AND ALBUTEROL SULFATE 1 DOSE: 2.5; .5 SOLUTION RESPIRATORY (INHALATION) at 17:11

## 2025-05-13 RX ADMIN — METOPROLOL SUCCINATE 25 MG: 25 TABLET, FILM COATED, EXTENDED RELEASE ORAL at 21:39

## 2025-05-13 RX ADMIN — IPRATROPIUM BROMIDE AND ALBUTEROL SULFATE 1 DOSE: 2.5; .5 SOLUTION RESPIRATORY (INHALATION) at 13:22

## 2025-05-13 RX ADMIN — OXYBUTYNIN CHLORIDE 5 MG: 5 TABLET ORAL at 21:03

## 2025-05-13 RX ADMIN — CARVEDILOL 3.12 MG: 6.25 TABLET, FILM COATED ORAL at 09:23

## 2025-05-13 RX ADMIN — THIAMINE HYDROCHLORIDE 200 MG: 100 INJECTION, SOLUTION INTRAMUSCULAR; INTRAVENOUS at 21:05

## 2025-05-13 RX ADMIN — SODIUM CHLORIDE: 0.9 INJECTION, SOLUTION INTRAVENOUS at 16:06

## 2025-05-13 RX ADMIN — DEXTROSE 150 MG: 50 INJECTION, SOLUTION INTRAVENOUS at 16:09

## 2025-05-13 RX ADMIN — INSULIN LISPRO 2 UNITS: 100 INJECTION, SOLUTION INTRAVENOUS; SUBCUTANEOUS at 09:24

## 2025-05-13 RX ADMIN — HEPARIN SODIUM 12 UNITS/KG/HR: 10000 INJECTION, SOLUTION INTRAVENOUS at 21:02

## 2025-05-13 RX ADMIN — HYDRALAZINE HYDROCHLORIDE 10 MG: 10 TABLET ORAL at 09:32

## 2025-05-13 RX ADMIN — ASPIRIN 81 MG CHEWABLE TABLET 81 MG: 81 TABLET CHEWABLE at 09:23

## 2025-05-13 RX ADMIN — POTASSIUM CHLORIDE 40 MEQ: 1500 TABLET, EXTENDED RELEASE ORAL at 09:34

## 2025-05-13 RX ADMIN — IPRATROPIUM BROMIDE AND ALBUTEROL SULFATE 1 DOSE: 2.5; .5 SOLUTION RESPIRATORY (INHALATION) at 21:32

## 2025-05-13 RX ADMIN — OXYBUTYNIN CHLORIDE 5 MG: 5 TABLET ORAL at 09:24

## 2025-05-13 RX ADMIN — DILTIAZEM HYDROCHLORIDE 5 MG: 5 INJECTION, SOLUTION INTRAVENOUS at 10:44

## 2025-05-13 RX ADMIN — INSULIN GLARGINE 10 UNITS: 100 INJECTION, SOLUTION SUBCUTANEOUS at 21:04

## 2025-05-13 RX ADMIN — OXYBUTYNIN CHLORIDE 5 MG: 5 TABLET ORAL at 16:18

## 2025-05-13 RX ADMIN — INSULIN LISPRO 4 UNITS: 100 INJECTION, SOLUTION INTRAVENOUS; SUBCUTANEOUS at 11:50

## 2025-05-13 RX ADMIN — SODIUM CHLORIDE, PRESERVATIVE FREE 10 ML: 5 INJECTION INTRAVENOUS at 09:36

## 2025-05-13 RX ADMIN — WATER 1000 MG: 1 INJECTION INTRAMUSCULAR; INTRAVENOUS; SUBCUTANEOUS at 16:24

## 2025-05-13 RX ADMIN — INSULIN LISPRO 2 UNITS: 100 INJECTION, SOLUTION INTRAVENOUS; SUBCUTANEOUS at 16:17

## 2025-05-13 RX ADMIN — SODIUM CHLORIDE 10 MG/HR: 900 INJECTION, SOLUTION INTRAVENOUS at 16:22

## 2025-05-13 RX ADMIN — CLONIDINE HYDROCHLORIDE 0.2 MG: 0.1 TABLET ORAL at 09:23

## 2025-05-13 RX ADMIN — AMIODARONE HYDROCHLORIDE 1 MG/MIN: 1.8 INJECTION, SOLUTION INTRAVENOUS at 17:06

## 2025-05-13 RX ADMIN — SODIUM CHLORIDE 7.5 MG/HR: 900 INJECTION, SOLUTION INTRAVENOUS at 10:50

## 2025-05-13 RX ADMIN — MAGNESIUM SULFATE HEPTAHYDRATE 2000 MG: 40 INJECTION, SOLUTION INTRAVENOUS at 11:50

## 2025-05-13 RX ADMIN — INSULIN LISPRO 2 UNITS: 100 INJECTION, SOLUTION INTRAVENOUS; SUBCUTANEOUS at 11:51

## 2025-05-13 RX ADMIN — PANTOPRAZOLE SODIUM 40 MG: 40 TABLET, DELAYED RELEASE ORAL at 06:40

## 2025-05-13 RX ADMIN — IPRATROPIUM BROMIDE AND ALBUTEROL SULFATE 1 DOSE: 2.5; .5 SOLUTION RESPIRATORY (INHALATION) at 09:49

## 2025-05-13 RX ADMIN — INSULIN LISPRO 4 UNITS: 100 INJECTION, SOLUTION INTRAVENOUS; SUBCUTANEOUS at 16:16

## 2025-05-13 ASSESSMENT — PAIN SCALES - GENERAL: PAINLEVEL_OUTOF10: 0

## 2025-05-13 NOTE — CARE COORDINATION
5/13/25, Patient has GI and Nephrology on.  PT/OT to see patient.  SW met with patient on discharge planning.  Should patient need JA patient is agreeable to going back to [facility].  Patient would need a precert.  Patient was discharged last Friday 5/9/25.  Discharge paperwork will need completed once precert is started for [facility] if that becomes final plan.  SW to follow.      TRUMAN Kapadia  [facility] Case Management  [phone]

## 2025-05-13 NOTE — NURSE NAVIGATOR
Patient's chart updated to reflect:      .    - HF care plan, HF education points and HF discharge instructions.  -Orders: 2 gram sodium diet, daily weights, I/O.  -PCP or cardiology follow up appointments to be scheduled within 7 days of hospital discharge.  -CHF education session will be provided to the patient prior to hospital discharge.     Edelmira Artis RN, CHFN   Heart Failure Navigator

## 2025-05-14 LAB
ALBUMIN SERPL-MCNC: 2.9 G/DL (ref 3.5–5.2)
ALP SERPL-CCNC: 117 U/L (ref 35–104)
ALT SERPL-CCNC: 655 U/L (ref 0–35)
ANA SER QL IA: NEGATIVE
ANION GAP SERPL CALCULATED.3IONS-SCNC: 14 MMOL/L (ref 7–16)
AST SERPL-CCNC: 544 U/L (ref 0–35)
BASOPHILS # BLD: 0.02 K/UL (ref 0–0.2)
BASOPHILS NFR BLD: 0 % (ref 0–2)
BILIRUB DIRECT SERPL-MCNC: 0.2 MG/DL (ref 0–0.2)
BILIRUB INDIRECT SERPL-MCNC: 0.1 MG/DL (ref 0–1)
BILIRUB SERPL-MCNC: 0.3 MG/DL (ref 0–1.2)
BUN SERPL-MCNC: 37 MG/DL (ref 8–23)
CALCIUM SERPL-MCNC: 8.3 MG/DL (ref 8.8–10.2)
CHLORIDE SERPL-SCNC: 100 MMOL/L (ref 98–107)
CO2 SERPL-SCNC: 22 MMOL/L (ref 22–29)
CREAT SERPL-MCNC: 1.9 MG/DL (ref 0.5–1)
EKG ATRIAL RATE: 54 BPM
EKG P AXIS: 67 DEGREES
EKG P-R INTERVAL: 142 MS
EKG Q-T INTERVAL: 584 MS
EKG QRS DURATION: 132 MS
EKG QTC CALCULATION (BAZETT): 612 MS
EKG R AXIS: -20 DEGREES
EKG T AXIS: 174 DEGREES
EKG VENTRICULAR RATE: 66 BPM
EOSINOPHIL # BLD: 0 K/UL (ref 0.05–0.5)
EOSINOPHILS RELATIVE PERCENT: 0 % (ref 0–6)
ERYTHROCYTE [DISTWIDTH] IN BLOOD BY AUTOMATED COUNT: 16.5 % (ref 11.5–15)
FERRITIN SERPL-MCNC: 305 NG/ML
GFR, ESTIMATED: 28 ML/MIN/1.73M2
GLUCOSE BLD-MCNC: 208 MG/DL (ref 74–99)
GLUCOSE BLD-MCNC: 244 MG/DL (ref 74–99)
GLUCOSE BLD-MCNC: 253 MG/DL (ref 74–99)
GLUCOSE SERPL-MCNC: 158 MG/DL (ref 74–99)
HCT VFR BLD AUTO: 31.2 % (ref 34–48)
HGB BLD-MCNC: 9.4 G/DL (ref 11.5–15.5)
IMM GRANULOCYTES # BLD AUTO: 0.03 K/UL (ref 0–0.58)
IMM GRANULOCYTES NFR BLD: 0 % (ref 0–5)
IRON SATN MFR SERPL: 7 % (ref 15–50)
IRON SERPL-MCNC: 20 UG/DL (ref 37–145)
LYMPHOCYTES NFR BLD: 0.99 K/UL (ref 1.5–4)
LYMPHOCYTES RELATIVE PERCENT: 14 % (ref 20–42)
MCH RBC QN AUTO: 27.9 PG (ref 26–35)
MCHC RBC AUTO-ENTMCNC: 30.1 G/DL (ref 32–34.5)
MCV RBC AUTO: 92.6 FL (ref 80–99.9)
MITOCHONDRIA AB SER QL: NEGATIVE
MONOCYTES NFR BLD: 0.63 K/UL (ref 0.1–0.95)
MONOCYTES NFR BLD: 9 % (ref 2–12)
NEUTROPHILS NFR BLD: 77 % (ref 43–80)
NEUTS SEG NFR BLD: 5.67 K/UL (ref 1.8–7.3)
PARTIAL THROMBOPLASTIN TIME: 39.4 SEC (ref 24.5–35.1)
PARTIAL THROMBOPLASTIN TIME: 85.2 SEC (ref 24.5–35.1)
PLATELET # BLD AUTO: 308 K/UL (ref 130–450)
PMV BLD AUTO: 11.2 FL (ref 7–12)
POTASSIUM SERPL-SCNC: 3.5 MMOL/L (ref 3.5–5.1)
PROT SERPL-MCNC: 5.9 G/DL (ref 6.4–8.3)
RBC # BLD AUTO: 3.37 M/UL (ref 3.5–5.5)
SODIUM SERPL-SCNC: 136 MMOL/L (ref 136–145)
TIBC SERPL-MCNC: 305 UG/DL (ref 250–450)
WBC OTHER # BLD: 7.3 K/UL (ref 4.5–11.5)

## 2025-05-14 PROCEDURE — 2580000003 HC RX 258

## 2025-05-14 PROCEDURE — 6360000002 HC RX W HCPCS: Performed by: INTERNAL MEDICINE

## 2025-05-14 PROCEDURE — 93010 ELECTROCARDIOGRAM REPORT: CPT | Performed by: INTERNAL MEDICINE

## 2025-05-14 PROCEDURE — 85730 THROMBOPLASTIN TIME PARTIAL: CPT

## 2025-05-14 PROCEDURE — 99232 SBSQ HOSP IP/OBS MODERATE 35: CPT | Performed by: INTERNAL MEDICINE

## 2025-05-14 PROCEDURE — 6370000000 HC RX 637 (ALT 250 FOR IP): Performed by: FAMILY MEDICINE

## 2025-05-14 PROCEDURE — 82248 BILIRUBIN DIRECT: CPT

## 2025-05-14 PROCEDURE — 6360000002 HC RX W HCPCS: Performed by: FAMILY MEDICINE

## 2025-05-14 PROCEDURE — 6370000000 HC RX 637 (ALT 250 FOR IP): Performed by: INTERNAL MEDICINE

## 2025-05-14 PROCEDURE — 85025 COMPLETE CBC W/AUTO DIFF WBC: CPT

## 2025-05-14 PROCEDURE — 2500000003 HC RX 250 WO HCPCS: Performed by: INTERNAL MEDICINE

## 2025-05-14 PROCEDURE — 80053 COMPREHEN METABOLIC PANEL: CPT

## 2025-05-14 PROCEDURE — 94640 AIRWAY INHALATION TREATMENT: CPT

## 2025-05-14 PROCEDURE — 99232 SBSQ HOSP IP/OBS MODERATE 35: CPT | Performed by: NURSE PRACTITIONER

## 2025-05-14 PROCEDURE — 2700000000 HC OXYGEN THERAPY PER DAY

## 2025-05-14 PROCEDURE — 2140000000 HC CCU INTERMEDIATE R&B

## 2025-05-14 PROCEDURE — 82962 GLUCOSE BLOOD TEST: CPT

## 2025-05-14 PROCEDURE — 2500000003 HC RX 250 WO HCPCS: Performed by: FAMILY MEDICINE

## 2025-05-14 PROCEDURE — 36415 COLL VENOUS BLD VENIPUNCTURE: CPT

## 2025-05-14 RX ORDER — POTASSIUM CHLORIDE 1500 MG/1
40 TABLET, EXTENDED RELEASE ORAL ONCE
Status: DISCONTINUED | OUTPATIENT
Start: 2025-05-14 | End: 2025-05-14

## 2025-05-14 RX ADMIN — MONTELUKAST 10 MG: 10 TABLET, FILM COATED ORAL at 22:38

## 2025-05-14 RX ADMIN — INSULIN GLARGINE 10 UNITS: 100 INJECTION, SOLUTION SUBCUTANEOUS at 22:49

## 2025-05-14 RX ADMIN — IPRATROPIUM BROMIDE AND ALBUTEROL SULFATE 1 DOSE: 2.5; .5 SOLUTION RESPIRATORY (INHALATION) at 06:50

## 2025-05-14 RX ADMIN — INSULIN LISPRO 2 UNITS: 100 INJECTION, SOLUTION INTRAVENOUS; SUBCUTANEOUS at 22:49

## 2025-05-14 RX ADMIN — IPRATROPIUM BROMIDE AND ALBUTEROL SULFATE 1 DOSE: 2.5; .5 SOLUTION RESPIRATORY (INHALATION) at 16:43

## 2025-05-14 RX ADMIN — GABAPENTIN 300 MG: 300 CAPSULE ORAL at 22:38

## 2025-05-14 RX ADMIN — POTASSIUM BICARBONATE 40 MEQ: 782 TABLET, EFFERVESCENT ORAL at 10:32

## 2025-05-14 RX ADMIN — GABAPENTIN 300 MG: 300 CAPSULE ORAL at 16:00

## 2025-05-14 RX ADMIN — INSULIN LISPRO 2 UNITS: 100 INJECTION, SOLUTION INTRAVENOUS; SUBCUTANEOUS at 10:34

## 2025-05-14 RX ADMIN — OXYBUTYNIN CHLORIDE 5 MG: 5 TABLET ORAL at 10:35

## 2025-05-14 RX ADMIN — WATER 1000 MG: 1 INJECTION INTRAMUSCULAR; INTRAVENOUS; SUBCUTANEOUS at 16:20

## 2025-05-14 RX ADMIN — GABAPENTIN 300 MG: 300 CAPSULE ORAL at 10:33

## 2025-05-14 RX ADMIN — SODIUM CHLORIDE: 0.9 INJECTION, SOLUTION INTRAVENOUS at 16:20

## 2025-05-14 RX ADMIN — IPRATROPIUM BROMIDE AND ALBUTEROL SULFATE 1 DOSE: 2.5; .5 SOLUTION RESPIRATORY (INHALATION) at 12:09

## 2025-05-14 RX ADMIN — INSULIN LISPRO 2 UNITS: 100 INJECTION, SOLUTION INTRAVENOUS; SUBCUTANEOUS at 16:10

## 2025-05-14 RX ADMIN — FERROUS SULFATE TAB 325 MG (65 MG ELEMENTAL FE) 325 MG: 325 (65 FE) TAB at 10:33

## 2025-05-14 RX ADMIN — TRAZODONE HYDROCHLORIDE 50 MG: 50 TABLET ORAL at 22:39

## 2025-05-14 RX ADMIN — METOPROLOL SUCCINATE 25 MG: 25 TABLET, FILM COATED, EXTENDED RELEASE ORAL at 10:33

## 2025-05-14 RX ADMIN — HEPARIN SODIUM 2000 UNITS: 1000 INJECTION INTRAVENOUS; SUBCUTANEOUS at 18:16

## 2025-05-14 RX ADMIN — PANTOPRAZOLE SODIUM 40 MG: 40 TABLET, DELAYED RELEASE ORAL at 05:30

## 2025-05-14 RX ADMIN — INSULIN LISPRO 2 UNITS: 100 INJECTION, SOLUTION INTRAVENOUS; SUBCUTANEOUS at 16:13

## 2025-05-14 RX ADMIN — INSULIN LISPRO 4 UNITS: 100 INJECTION, SOLUTION INTRAVENOUS; SUBCUTANEOUS at 10:40

## 2025-05-14 RX ADMIN — OXYBUTYNIN CHLORIDE 5 MG: 5 TABLET ORAL at 22:39

## 2025-05-14 RX ADMIN — ASPIRIN 81 MG CHEWABLE TABLET 81 MG: 81 TABLET CHEWABLE at 10:33

## 2025-05-14 RX ADMIN — OXYBUTYNIN CHLORIDE 5 MG: 5 TABLET ORAL at 16:58

## 2025-05-14 RX ADMIN — SODIUM CHLORIDE, PRESERVATIVE FREE 10 ML: 5 INJECTION INTRAVENOUS at 10:33

## 2025-05-14 RX ADMIN — THIAMINE HYDROCHLORIDE 200 MG: 100 INJECTION, SOLUTION INTRAMUSCULAR; INTRAVENOUS at 10:36

## 2025-05-14 RX ADMIN — Medication 3 MG: at 22:38

## 2025-05-14 RX ADMIN — SODIUM CHLORIDE, PRESERVATIVE FREE 10 ML: 5 INJECTION INTRAVENOUS at 22:39

## 2025-05-14 RX ADMIN — IPRATROPIUM BROMIDE AND ALBUTEROL SULFATE 1 DOSE: 2.5; .5 SOLUTION RESPIRATORY (INHALATION) at 20:16

## 2025-05-14 ASSESSMENT — PAIN SCALES - GENERAL: PAINLEVEL_OUTOF10: 0

## 2025-05-14 NOTE — PLAN OF CARE
[facility] Cardiology.    Cardiac catheterization canceled today due to bump in creatinine from 1.4-1.9 today.  Patient will be rescheduled for tomorrow pending creatinine in a.m.    Please call if you have any questions or concerns.    Electronically signed by DMITRY Diaz CNP on 5/14/25 at 9:10 AM EDT

## 2025-05-14 NOTE — PLAN OF CARE
Problem: Discharge Planning  Goal: Discharge to home or other facility with appropriate resources  Outcome: Progressing  Flowsheets (Taken 5/13/2025 2100)  Discharge to home or other facility with appropriate resources:   Identify barriers to discharge with patient and caregiver   Arrange for needed discharge resources and transportation as appropriate   Identify discharge learning needs (meds, wound care, etc)     Problem: Pain  Goal: Verbalizes/displays adequate comfort level or baseline comfort level  Outcome: Progressing

## 2025-05-14 NOTE — CARE COORDINATION
5/14/25, Patient was to have Cardia Cath today.  Per RN patient has elevated BUN/Cr.  Met with patient in room.  Patient in agreement to going to [facility] for rehab.  PT/OT evals done 5/13.  Marisol to follow along in CM department for when to start precert.  PAS/RR, face sheet and ambulette form (will need filled out on day of discharge if PAS used and not [facility]).  SW to follow.      TRUMAN Kapadia  [facility] Case Management  [phone]

## 2025-05-15 PROBLEM — I48.0 AF (PAROXYSMAL ATRIAL FIBRILLATION) (HCC): Status: ACTIVE | Noted: 2025-05-15

## 2025-05-15 PROBLEM — N17.9 AKI (ACUTE KIDNEY INJURY): Status: ACTIVE | Noted: 2025-05-15

## 2025-05-15 PROBLEM — I25.119 CORONARY ARTERY DISEASE INVOLVING NATIVE CORONARY ARTERY OF NATIVE HEART WITH ANGINA PECTORIS: Status: ACTIVE | Noted: 2021-12-15

## 2025-05-15 PROBLEM — I50.9 ACUTE ON CHRONIC CONGESTIVE HEART FAILURE (HCC): Status: ACTIVE | Noted: 2025-05-15

## 2025-05-15 PROBLEM — R10.84 GENERALIZED ABDOMINAL PAIN: Status: ACTIVE | Noted: 2025-05-15

## 2025-05-15 LAB
ACTIVATED CLOTTING TIME, LOW RANGE: 156 SEC
ALBUMIN SERPL-MCNC: 3.2 G/DL (ref 3.5–5.2)
ALP SERPL-CCNC: 128 U/L (ref 35–104)
ALT SERPL-CCNC: 510 U/L (ref 0–35)
ANION GAP SERPL CALCULATED.3IONS-SCNC: 9 MMOL/L (ref 7–16)
AST SERPL-CCNC: 230 U/L (ref 0–35)
BASOPHILS # BLD: 0.01 K/UL (ref 0–0.2)
BASOPHILS NFR BLD: 0 % (ref 0–2)
BILIRUB DIRECT SERPL-MCNC: 0.2 MG/DL (ref 0–0.2)
BILIRUB INDIRECT SERPL-MCNC: 0.1 MG/DL (ref 0–1)
BILIRUB SERPL-MCNC: 0.3 MG/DL (ref 0–1.2)
BUN SERPL-MCNC: 24 MG/DL (ref 8–23)
CALCIUM SERPL-MCNC: 8.6 MG/DL (ref 8.8–10.2)
CHLORIDE SERPL-SCNC: 103 MMOL/L (ref 98–107)
CO2 SERPL-SCNC: 26 MMOL/L (ref 22–29)
CREAT SERPL-MCNC: 1 MG/DL (ref 0.5–1)
ECHO BSA: 2.11 M2
EKG ATRIAL RATE: 97 BPM
EKG P AXIS: 74 DEGREES
EKG P-R INTERVAL: 150 MS
EKG Q-T INTERVAL: 410 MS
EKG QRS DURATION: 132 MS
EKG QTC CALCULATION (BAZETT): 520 MS
EKG R AXIS: -56 DEGREES
EKG T AXIS: 144 DEGREES
EKG VENTRICULAR RATE: 97 BPM
EOSINOPHIL # BLD: 0 K/UL (ref 0.05–0.5)
EOSINOPHILS RELATIVE PERCENT: 0 % (ref 0–6)
ERYTHROCYTE [DISTWIDTH] IN BLOOD BY AUTOMATED COUNT: 15.8 % (ref 11.5–15)
GFR, ESTIMATED: 60 ML/MIN/1.73M2
GLUCOSE BLD-MCNC: 163 MG/DL (ref 74–99)
GLUCOSE BLD-MCNC: 173 MG/DL (ref 74–99)
GLUCOSE BLD-MCNC: 215 MG/DL (ref 74–99)
GLUCOSE SERPL-MCNC: 217 MG/DL (ref 74–99)
HCT VFR BLD AUTO: 31.6 % (ref 34–48)
HGB BLD-MCNC: 10 G/DL (ref 11.5–15.5)
IMM GRANULOCYTES # BLD AUTO: 0.05 K/UL (ref 0–0.58)
IMM GRANULOCYTES NFR BLD: 1 % (ref 0–5)
LYMPHOCYTES NFR BLD: 0.68 K/UL (ref 1.5–4)
LYMPHOCYTES RELATIVE PERCENT: 8 % (ref 20–42)
MCH RBC QN AUTO: 28.2 PG (ref 26–35)
MCHC RBC AUTO-ENTMCNC: 31.6 G/DL (ref 32–34.5)
MCV RBC AUTO: 89.3 FL (ref 80–99.9)
MONOCYTES NFR BLD: 0.71 K/UL (ref 0.1–0.95)
MONOCYTES NFR BLD: 9 % (ref 2–12)
NEUTROPHILS NFR BLD: 82 % (ref 43–80)
NEUTS SEG NFR BLD: 6.61 K/UL (ref 1.8–7.3)
PARTIAL THROMBOPLASTIN TIME: 66.4 SEC (ref 24.5–35.1)
PARTIAL THROMBOPLASTIN TIME: 77.4 SEC (ref 24.5–35.1)
PLATELET # BLD AUTO: 309 K/UL (ref 130–450)
PMV BLD AUTO: 11.1 FL (ref 7–12)
POTASSIUM SERPL-SCNC: 3.8 MMOL/L (ref 3.5–5.1)
PROT SERPL-MCNC: 6.2 G/DL (ref 6.4–8.3)
RBC # BLD AUTO: 3.54 M/UL (ref 3.5–5.5)
SODIUM SERPL-SCNC: 139 MMOL/L (ref 136–145)
WBC OTHER # BLD: 8.1 K/UL (ref 4.5–11.5)

## 2025-05-15 PROCEDURE — 36415 COLL VENOUS BLD VENIPUNCTURE: CPT

## 2025-05-15 PROCEDURE — 6370000000 HC RX 637 (ALT 250 FOR IP): Performed by: FAMILY MEDICINE

## 2025-05-15 PROCEDURE — 2500000003 HC RX 250 WO HCPCS: Performed by: INTERNAL MEDICINE

## 2025-05-15 PROCEDURE — 6370000000 HC RX 637 (ALT 250 FOR IP): Performed by: INTERNAL MEDICINE

## 2025-05-15 PROCEDURE — 2709999900 HC NON-CHARGEABLE SUPPLY: Performed by: INTERNAL MEDICINE

## 2025-05-15 PROCEDURE — 93458 L HRT ARTERY/VENTRICLE ANGIO: CPT | Performed by: INTERNAL MEDICINE

## 2025-05-15 PROCEDURE — 85730 THROMBOPLASTIN TIME PARTIAL: CPT

## 2025-05-15 PROCEDURE — 85025 COMPLETE CBC W/AUTO DIFF WBC: CPT

## 2025-05-15 PROCEDURE — 94640 AIRWAY INHALATION TREATMENT: CPT

## 2025-05-15 PROCEDURE — 80053 COMPREHEN METABOLIC PANEL: CPT

## 2025-05-15 PROCEDURE — 6360000002 HC RX W HCPCS: Performed by: INTERNAL MEDICINE

## 2025-05-15 PROCEDURE — 6360000004 HC RX CONTRAST MEDICATION: Performed by: INTERNAL MEDICINE

## 2025-05-15 PROCEDURE — 82248 BILIRUBIN DIRECT: CPT

## 2025-05-15 PROCEDURE — C1894 INTRO/SHEATH, NON-LASER: HCPCS | Performed by: INTERNAL MEDICINE

## 2025-05-15 PROCEDURE — 82962 GLUCOSE BLOOD TEST: CPT

## 2025-05-15 PROCEDURE — 2700000000 HC OXYGEN THERAPY PER DAY

## 2025-05-15 PROCEDURE — C1769 GUIDE WIRE: HCPCS | Performed by: INTERNAL MEDICINE

## 2025-05-15 PROCEDURE — 85347 COAGULATION TIME ACTIVATED: CPT

## 2025-05-15 PROCEDURE — 6360000002 HC RX W HCPCS: Performed by: NURSE PRACTITIONER

## 2025-05-15 PROCEDURE — 2140000000 HC CCU INTERMEDIATE R&B

## 2025-05-15 PROCEDURE — 93005 ELECTROCARDIOGRAM TRACING: CPT | Performed by: INTERNAL MEDICINE

## 2025-05-15 PROCEDURE — 4A023N7 MEASUREMENT OF CARDIAC SAMPLING AND PRESSURE, LEFT HEART, PERCUTANEOUS APPROACH: ICD-10-PCS | Performed by: INTERNAL MEDICINE

## 2025-05-15 PROCEDURE — 99152 MOD SED SAME PHYS/QHP 5/>YRS: CPT | Performed by: INTERNAL MEDICINE

## 2025-05-15 PROCEDURE — 99223 1ST HOSP IP/OBS HIGH 75: CPT | Performed by: INTERNAL MEDICINE

## 2025-05-15 PROCEDURE — 6360000002 HC RX W HCPCS: Performed by: FAMILY MEDICINE

## 2025-05-15 PROCEDURE — B2111ZZ FLUOROSCOPY OF MULTIPLE CORONARY ARTERIES USING LOW OSMOLAR CONTRAST: ICD-10-PCS | Performed by: INTERNAL MEDICINE

## 2025-05-15 PROCEDURE — 99232 SBSQ HOSP IP/OBS MODERATE 35: CPT | Performed by: INTERNAL MEDICINE

## 2025-05-15 PROCEDURE — C1760 CLOSURE DEV, VASC: HCPCS | Performed by: INTERNAL MEDICINE

## 2025-05-15 PROCEDURE — 6370000000 HC RX 637 (ALT 250 FOR IP): Performed by: NURSE PRACTITIONER

## 2025-05-15 DEVICE — ANGIO-SEAL VIP VASCULAR CLOSURE DEVICE
Type: IMPLANTABLE DEVICE | Status: FUNCTIONAL
Brand: ANGIO-SEAL

## 2025-05-15 RX ORDER — HYDRALAZINE HYDROCHLORIDE 20 MG/ML
10 INJECTION INTRAMUSCULAR; INTRAVENOUS EVERY 6 HOURS PRN
Status: DISCONTINUED | OUTPATIENT
Start: 2025-05-15 | End: 2025-05-19 | Stop reason: HOSPADM

## 2025-05-15 RX ORDER — GLUCAGON 1 MG/ML
1 KIT INJECTION PRN
Status: DISCONTINUED | OUTPATIENT
Start: 2025-05-15 | End: 2025-05-19 | Stop reason: HOSPADM

## 2025-05-15 RX ORDER — IOPAMIDOL 755 MG/ML
INJECTION, SOLUTION INTRAVASCULAR PRN
Status: DISCONTINUED | OUTPATIENT
Start: 2025-05-15 | End: 2025-05-15 | Stop reason: HOSPADM

## 2025-05-15 RX ORDER — LACTULOSE 10 G/15ML
20 SOLUTION ORAL 2 TIMES DAILY
Status: COMPLETED | OUTPATIENT
Start: 2025-05-15 | End: 2025-05-16

## 2025-05-15 RX ORDER — DEXTROSE MONOHYDRATE 100 MG/ML
INJECTION, SOLUTION INTRAVENOUS CONTINUOUS PRN
Status: DISCONTINUED | OUTPATIENT
Start: 2025-05-15 | End: 2025-05-19 | Stop reason: HOSPADM

## 2025-05-15 RX ORDER — NITROGLYCERIN 20 MG/100ML
INJECTION INTRAVENOUS CONTINUOUS PRN
Status: COMPLETED | OUTPATIENT
Start: 2025-05-15 | End: 2025-05-15

## 2025-05-15 RX ORDER — FENTANYL CITRATE 50 UG/ML
INJECTION, SOLUTION INTRAMUSCULAR; INTRAVENOUS PRN
Status: DISCONTINUED | OUTPATIENT
Start: 2025-05-15 | End: 2025-05-15 | Stop reason: HOSPADM

## 2025-05-15 RX ORDER — RANOLAZINE 500 MG/1
500 TABLET, EXTENDED RELEASE ORAL 2 TIMES DAILY
Status: DISCONTINUED | OUTPATIENT
Start: 2025-05-15 | End: 2025-05-19 | Stop reason: HOSPADM

## 2025-05-15 RX ORDER — POTASSIUM CHLORIDE 1500 MG/1
20 TABLET, EXTENDED RELEASE ORAL ONCE
Status: COMPLETED | OUTPATIENT
Start: 2025-05-15 | End: 2025-05-15

## 2025-05-15 RX ORDER — ISOSORBIDE DINITRATE 10 MG/1
10 TABLET ORAL 3 TIMES DAILY
Status: DISCONTINUED | OUTPATIENT
Start: 2025-05-15 | End: 2025-05-19 | Stop reason: HOSPADM

## 2025-05-15 RX ORDER — MIDAZOLAM HYDROCHLORIDE 1 MG/ML
INJECTION, SOLUTION INTRAMUSCULAR; INTRAVENOUS PRN
Status: DISCONTINUED | OUTPATIENT
Start: 2025-05-15 | End: 2025-05-15 | Stop reason: HOSPADM

## 2025-05-15 RX ORDER — INSULIN GLARGINE 100 [IU]/ML
12 INJECTION, SOLUTION SUBCUTANEOUS NIGHTLY
Status: DISCONTINUED | OUTPATIENT
Start: 2025-05-15 | End: 2025-05-19 | Stop reason: HOSPADM

## 2025-05-15 RX ADMIN — ISOSORBIDE DINITRATE 10 MG: 10 TABLET ORAL at 20:49

## 2025-05-15 RX ADMIN — INSULIN LISPRO 2 UNITS: 100 INJECTION, SOLUTION INTRAVENOUS; SUBCUTANEOUS at 13:15

## 2025-05-15 RX ADMIN — ISOSORBIDE DINITRATE 10 MG: 10 TABLET ORAL at 13:15

## 2025-05-15 RX ADMIN — HYDRALAZINE HYDROCHLORIDE 10 MG: 10 TABLET ORAL at 20:57

## 2025-05-15 RX ADMIN — GABAPENTIN 300 MG: 300 CAPSULE ORAL at 13:15

## 2025-05-15 RX ADMIN — LACTULOSE 20 G: 20 SOLUTION ORAL at 20:54

## 2025-05-15 RX ADMIN — OXYBUTYNIN CHLORIDE 5 MG: 5 TABLET ORAL at 13:15

## 2025-05-15 RX ADMIN — PANTOPRAZOLE SODIUM 40 MG: 40 TABLET, DELAYED RELEASE ORAL at 06:34

## 2025-05-15 RX ADMIN — IPRATROPIUM BROMIDE AND ALBUTEROL SULFATE 1 DOSE: 2.5; .5 SOLUTION RESPIRATORY (INHALATION) at 07:23

## 2025-05-15 RX ADMIN — ACETAMINOPHEN 650 MG: 325 TABLET ORAL at 13:15

## 2025-05-15 RX ADMIN — RANOLAZINE 500 MG: 500 TABLET, FILM COATED, EXTENDED RELEASE ORAL at 21:41

## 2025-05-15 RX ADMIN — OXYBUTYNIN CHLORIDE 5 MG: 5 TABLET ORAL at 08:32

## 2025-05-15 RX ADMIN — POTASSIUM CHLORIDE 20 MEQ: 1500 TABLET, EXTENDED RELEASE ORAL at 08:32

## 2025-05-15 RX ADMIN — GABAPENTIN 300 MG: 300 CAPSULE ORAL at 21:41

## 2025-05-15 RX ADMIN — GABAPENTIN 300 MG: 300 CAPSULE ORAL at 08:32

## 2025-05-15 RX ADMIN — HYDRALAZINE HYDROCHLORIDE 10 MG: 20 INJECTION INTRAMUSCULAR; INTRAVENOUS at 23:33

## 2025-05-15 RX ADMIN — METOPROLOL SUCCINATE 25 MG: 25 TABLET, FILM COATED, EXTENDED RELEASE ORAL at 08:32

## 2025-05-15 RX ADMIN — PROCHLORPERAZINE EDISYLATE 5 MG: 5 INJECTION INTRAMUSCULAR; INTRAVENOUS at 20:46

## 2025-05-15 RX ADMIN — ASPIRIN 81 MG CHEWABLE TABLET 81 MG: 81 TABLET CHEWABLE at 08:32

## 2025-05-15 RX ADMIN — SODIUM CHLORIDE, PRESERVATIVE FREE 10 ML: 5 INJECTION INTRAVENOUS at 23:34

## 2025-05-15 RX ADMIN — RANOLAZINE 500 MG: 500 TABLET, FILM COATED, EXTENDED RELEASE ORAL at 13:15

## 2025-05-15 RX ADMIN — HEPARIN SODIUM 12 UNITS/KG/HR: 10000 INJECTION, SOLUTION INTRAVENOUS at 02:00

## 2025-05-15 RX ADMIN — SODIUM CHLORIDE, PRESERVATIVE FREE 10 ML: 5 INJECTION INTRAVENOUS at 20:44

## 2025-05-15 RX ADMIN — MONTELUKAST 10 MG: 10 TABLET, FILM COATED ORAL at 21:42

## 2025-05-15 RX ADMIN — IPRATROPIUM BROMIDE AND ALBUTEROL SULFATE 1 DOSE: 2.5; .5 SOLUTION RESPIRATORY (INHALATION) at 22:00

## 2025-05-15 RX ADMIN — INSULIN GLARGINE 12 UNITS: 100 INJECTION, SOLUTION SUBCUTANEOUS at 21:43

## 2025-05-15 ASSESSMENT — PAIN DESCRIPTION - PAIN TYPE: TYPE: ACUTE PAIN

## 2025-05-15 ASSESSMENT — PAIN SCALES - GENERAL
PAINLEVEL_OUTOF10: 0
PAINLEVEL_OUTOF10: 0
PAINLEVEL_OUTOF10: 4
PAINLEVEL_OUTOF10: 0

## 2025-05-15 ASSESSMENT — PAIN DESCRIPTION - LOCATION: LOCATION: BACK

## 2025-05-15 ASSESSMENT — PAIN DESCRIPTION - FREQUENCY: FREQUENCY: INTERMITTENT

## 2025-05-15 ASSESSMENT — PAIN DESCRIPTION - DESCRIPTORS: DESCRIPTORS: ACHING

## 2025-05-15 ASSESSMENT — PAIN - FUNCTIONAL ASSESSMENT: PAIN_FUNCTIONAL_ASSESSMENT: ACTIVITIES ARE NOT PREVENTED

## 2025-05-15 ASSESSMENT — PAIN DESCRIPTION - ONSET: ONSET: SUDDEN

## 2025-05-15 NOTE — CARE COORDINATION
5/15/25, Patient off floor and in cath lab.  [facility] order sent to [facility].  Patient is agreeable to going for rehab at [facility].  Patient was recently discharged from [facility] and went home then came to hospital.  PT/OT evals are in for patient.  Marisol following to see if precert will need started today.  PAS/RR, face sheet and ambulette form (will need filled our on day of discharge if PAS used and not [facility]).  SW to follow.      TRUMAN Kapadia  [facility] Case Management  [phone]

## 2025-05-15 NOTE — PLAN OF CARE
Problem: Chronic Conditions and Co-morbidities  Goal: Patient's chronic conditions and co-morbidity symptoms are monitored and maintained or improved  Outcome: Progressing     Problem: Discharge Planning  Goal: Discharge to home or other facility with appropriate resources  Outcome: Progressing     Problem: Safety - Adult  Goal: Free from fall injury  5/15/2025 0923 by Gloria Mead RN  Outcome: Progressing  5/15/2025 0354 by Ina Mcrae RN  Outcome: Progressing     Problem: ABCDS Injury Assessment  Goal: Absence of physical injury  Outcome: Progressing     Problem: Pain  Goal: Verbalizes/displays adequate comfort level or baseline comfort level  Outcome: Progressing     Problem: Chronic Conditions and Co-morbidities  Goal: Patient's chronic conditions and co-morbidity symptoms are monitored and maintained or improved  Outcome: Progressing     Problem: Discharge Planning  Goal: Discharge to home or other facility with appropriate resources  Outcome: Progressing     Problem: Safety - Adult  Goal: Free from fall injury  5/15/2025 0923 by Gloria Mead RN  Outcome: Progressing  5/15/2025 0354 by Ina Mcrae RN  Outcome: Progressing     Problem: ABCDS Injury Assessment  Goal: Absence of physical injury  Outcome: Progressing     Problem: Pain  Goal: Verbalizes/displays adequate comfort level or baseline comfort level  Outcome: Progressing

## 2025-05-16 ENCOUNTER — APPOINTMENT (OUTPATIENT)
Dept: GENERAL RADIOLOGY | Age: 75
DRG: 280 | End: 2025-05-16
Payer: MEDICARE

## 2025-05-16 LAB
A1AT SERPL-MCNC: 192 MG/DL (ref 90–200)
ALBUMIN SERPL-MCNC: 3.5 G/DL (ref 3.5–5.2)
ALP SERPL-CCNC: 139 U/L (ref 35–104)
ALT SERPL-CCNC: 358 U/L (ref 0–35)
ANION GAP SERPL CALCULATED.3IONS-SCNC: 12 MMOL/L (ref 7–16)
AST SERPL-CCNC: 93 U/L (ref 0–35)
BACTERIA URNS QL MICRO: ABNORMAL
BASOPHILS # BLD: 0.02 K/UL (ref 0–0.2)
BASOPHILS NFR BLD: 0 % (ref 0–2)
BILIRUB DIRECT SERPL-MCNC: 0.4 MG/DL (ref 0–0.2)
BILIRUB INDIRECT SERPL-MCNC: 0.4 MG/DL (ref 0–1)
BILIRUB SERPL-MCNC: 0.8 MG/DL (ref 0–1.2)
BILIRUB UR QL STRIP: NEGATIVE
BUN SERPL-MCNC: 12 MG/DL (ref 8–23)
CALCIUM SERPL-MCNC: 9.4 MG/DL (ref 8.8–10.2)
CERULOPLASMIN SERPL-MCNC: 41 MG/DL (ref 16–45)
CHLORIDE SERPL-SCNC: 102 MMOL/L (ref 98–107)
CLARITY UR: ABNORMAL
CO2 SERPL-SCNC: 23 MMOL/L (ref 22–29)
COLOR UR: YELLOW
CREAT SERPL-MCNC: 0.6 MG/DL (ref 0.5–1)
EOSINOPHIL # BLD: 0 K/UL (ref 0.05–0.5)
EOSINOPHILS RELATIVE PERCENT: 0 % (ref 0–6)
EPI CELLS #/AREA URNS HPF: ABNORMAL /HPF
ERYTHROCYTE [DISTWIDTH] IN BLOOD BY AUTOMATED COUNT: 15.8 % (ref 11.5–15)
GFR, ESTIMATED: >90 ML/MIN/1.73M2
GLUCOSE BLD-MCNC: 149 MG/DL (ref 74–99)
GLUCOSE BLD-MCNC: 162 MG/DL (ref 74–99)
GLUCOSE BLD-MCNC: 173 MG/DL (ref 74–99)
GLUCOSE BLD-MCNC: 188 MG/DL (ref 74–99)
GLUCOSE SERPL-MCNC: 149 MG/DL (ref 74–99)
GLUCOSE UR STRIP-MCNC: >=1000 MG/DL
HCT VFR BLD AUTO: 33.5 % (ref 34–48)
HGB BLD-MCNC: 10.6 G/DL (ref 11.5–15.5)
HGB UR QL STRIP.AUTO: ABNORMAL
IMM GRANULOCYTES # BLD AUTO: 0.07 K/UL (ref 0–0.58)
IMM GRANULOCYTES NFR BLD: 1 % (ref 0–5)
KETONES UR STRIP-MCNC: ABNORMAL MG/DL
LEUKOCYTE ESTERASE UR QL STRIP: ABNORMAL
LYMPHOCYTES NFR BLD: 0.64 K/UL (ref 1.5–4)
LYMPHOCYTES RELATIVE PERCENT: 6 % (ref 20–42)
MCH RBC QN AUTO: 28.2 PG (ref 26–35)
MCHC RBC AUTO-ENTMCNC: 31.6 G/DL (ref 32–34.5)
MCV RBC AUTO: 89.1 FL (ref 80–99.9)
MONOCYTES NFR BLD: 1.23 K/UL (ref 0.1–0.95)
MONOCYTES NFR BLD: 11 % (ref 2–12)
NEUTROPHILS NFR BLD: 83 % (ref 43–80)
NEUTS SEG NFR BLD: 9.75 K/UL (ref 1.8–7.3)
NITRITE UR QL STRIP: NEGATIVE
PH UR STRIP: 7 [PH] (ref 5–8)
PLATELET # BLD AUTO: 396 K/UL (ref 130–450)
PMV BLD AUTO: 11 FL (ref 7–12)
POTASSIUM SERPL-SCNC: 3.9 MMOL/L (ref 3.5–5.1)
PROT SERPL-MCNC: 7 G/DL (ref 6.4–8.3)
PROT UR STRIP-MCNC: 30 MG/DL
RBC # BLD AUTO: 3.76 M/UL (ref 3.5–5.5)
RBC #/AREA URNS HPF: ABNORMAL /HPF
SODIUM SERPL-SCNC: 137 MMOL/L (ref 136–145)
SP GR UR STRIP: 1.01 (ref 1–1.03)
UROBILINOGEN UR STRIP-ACNC: 1 EU/DL (ref 0–1)
WBC #/AREA URNS HPF: ABNORMAL /HPF
WBC OTHER # BLD: 11.7 K/UL (ref 4.5–11.5)
YEAST URNS QL MICRO: PRESENT

## 2025-05-16 PROCEDURE — 80053 COMPREHEN METABOLIC PANEL: CPT

## 2025-05-16 PROCEDURE — 97530 THERAPEUTIC ACTIVITIES: CPT

## 2025-05-16 PROCEDURE — 6370000000 HC RX 637 (ALT 250 FOR IP): Performed by: INTERNAL MEDICINE

## 2025-05-16 PROCEDURE — 74018 RADEX ABDOMEN 1 VIEW: CPT

## 2025-05-16 PROCEDURE — 2500000003 HC RX 250 WO HCPCS: Performed by: INTERNAL MEDICINE

## 2025-05-16 PROCEDURE — 87086 URINE CULTURE/COLONY COUNT: CPT

## 2025-05-16 PROCEDURE — 81001 URINALYSIS AUTO W/SCOPE: CPT

## 2025-05-16 PROCEDURE — 82248 BILIRUBIN DIRECT: CPT

## 2025-05-16 PROCEDURE — 6370000000 HC RX 637 (ALT 250 FOR IP)

## 2025-05-16 PROCEDURE — 6370000000 HC RX 637 (ALT 250 FOR IP): Performed by: STUDENT IN AN ORGANIZED HEALTH CARE EDUCATION/TRAINING PROGRAM

## 2025-05-16 PROCEDURE — 6360000002 HC RX W HCPCS: Performed by: NURSE PRACTITIONER

## 2025-05-16 PROCEDURE — 36556 INSERT NON-TUNNEL CV CATH: CPT | Performed by: SURGERY

## 2025-05-16 PROCEDURE — 6370000000 HC RX 637 (ALT 250 FOR IP): Performed by: NURSE PRACTITIONER

## 2025-05-16 PROCEDURE — 6360000002 HC RX W HCPCS: Performed by: INTERNAL MEDICINE

## 2025-05-16 PROCEDURE — 97535 SELF CARE MNGMENT TRAINING: CPT

## 2025-05-16 PROCEDURE — 82962 GLUCOSE BLOOD TEST: CPT

## 2025-05-16 PROCEDURE — 85025 COMPLETE CBC W/AUTO DIFF WBC: CPT

## 2025-05-16 PROCEDURE — 36556 INSERT NON-TUNNEL CV CATH: CPT

## 2025-05-16 PROCEDURE — 94640 AIRWAY INHALATION TREATMENT: CPT

## 2025-05-16 PROCEDURE — 99232 SBSQ HOSP IP/OBS MODERATE 35: CPT | Performed by: INTERNAL MEDICINE

## 2025-05-16 PROCEDURE — 71045 X-RAY EXAM CHEST 1 VIEW: CPT

## 2025-05-16 PROCEDURE — 2140000000 HC CCU INTERMEDIATE R&B

## 2025-05-16 PROCEDURE — 02HV33Z INSERTION OF INFUSION DEVICE INTO SUPERIOR VENA CAVA, PERCUTANEOUS APPROACH: ICD-10-PCS | Performed by: INTERNAL MEDICINE

## 2025-05-16 RX ORDER — BUMETANIDE 1 MG/1
1 TABLET ORAL DAILY
Status: DISCONTINUED | OUTPATIENT
Start: 2025-05-16 | End: 2025-05-19 | Stop reason: HOSPADM

## 2025-05-16 RX ORDER — PROCHLORPERAZINE MALEATE 10 MG
10 TABLET ORAL EVERY 8 HOURS PRN
Status: DISCONTINUED | OUTPATIENT
Start: 2025-05-16 | End: 2025-05-19 | Stop reason: HOSPADM

## 2025-05-16 RX ORDER — METOPROLOL SUCCINATE 25 MG/1
50 TABLET, EXTENDED RELEASE ORAL DAILY
Status: DISCONTINUED | OUTPATIENT
Start: 2025-05-16 | End: 2025-05-17

## 2025-05-16 RX ORDER — HYDRALAZINE HYDROCHLORIDE 25 MG/1
25 TABLET, FILM COATED ORAL EVERY 12 HOURS SCHEDULED
Status: DISCONTINUED | OUTPATIENT
Start: 2025-05-16 | End: 2025-05-16

## 2025-05-16 RX ORDER — PROCHLORPERAZINE EDISYLATE 5 MG/ML
5 INJECTION INTRAMUSCULAR; INTRAVENOUS EVERY 6 HOURS PRN
Status: DISCONTINUED | OUTPATIENT
Start: 2025-05-16 | End: 2025-05-19 | Stop reason: HOSPADM

## 2025-05-16 RX ORDER — HYDRALAZINE HYDROCHLORIDE 25 MG/1
25 TABLET, FILM COATED ORAL EVERY 8 HOURS SCHEDULED
Status: DISCONTINUED | OUTPATIENT
Start: 2025-05-16 | End: 2025-05-17

## 2025-05-16 RX ORDER — BISACODYL 10 MG
10 SUPPOSITORY, RECTAL RECTAL DAILY PRN
Status: DISCONTINUED | OUTPATIENT
Start: 2025-05-16 | End: 2025-05-19 | Stop reason: HOSPADM

## 2025-05-16 RX ORDER — ONDANSETRON 4 MG/1
4 TABLET, ORALLY DISINTEGRATING ORAL EVERY 8 HOURS PRN
Status: DISCONTINUED | OUTPATIENT
Start: 2025-05-16 | End: 2025-05-16 | Stop reason: CLARIF

## 2025-05-16 RX ADMIN — ASPIRIN 81 MG CHEWABLE TABLET 81 MG: 81 TABLET CHEWABLE at 13:00

## 2025-05-16 RX ADMIN — HYDRALAZINE HYDROCHLORIDE 25 MG: 25 TABLET ORAL at 20:59

## 2025-05-16 RX ADMIN — IPRATROPIUM BROMIDE AND ALBUTEROL SULFATE 1 DOSE: 2.5; .5 SOLUTION RESPIRATORY (INHALATION) at 07:42

## 2025-05-16 RX ADMIN — BISACODYL 10 MG: 10 SUPPOSITORY RECTAL at 11:28

## 2025-05-16 RX ADMIN — GABAPENTIN 300 MG: 300 CAPSULE ORAL at 20:59

## 2025-05-16 RX ADMIN — HYDRALAZINE HYDROCHLORIDE 10 MG: 20 INJECTION INTRAMUSCULAR; INTRAVENOUS at 06:59

## 2025-05-16 RX ADMIN — LACTULOSE 20 G: 20 SOLUTION ORAL at 09:51

## 2025-05-16 RX ADMIN — RANOLAZINE 500 MG: 500 TABLET, FILM COATED, EXTENDED RELEASE ORAL at 20:59

## 2025-05-16 RX ADMIN — HYDRALAZINE HYDROCHLORIDE 25 MG: 25 TABLET ORAL at 13:00

## 2025-05-16 RX ADMIN — APIXABAN 5 MG: 5 TABLET, FILM COATED ORAL at 09:51

## 2025-05-16 RX ADMIN — IPRATROPIUM BROMIDE AND ALBUTEROL SULFATE 1 DOSE: 2.5; .5 SOLUTION RESPIRATORY (INHALATION) at 19:18

## 2025-05-16 RX ADMIN — INSULIN LISPRO 2 UNITS: 100 INJECTION, SOLUTION INTRAVENOUS; SUBCUTANEOUS at 21:00

## 2025-05-16 RX ADMIN — Medication 3 MG: at 20:58

## 2025-05-16 RX ADMIN — ISOSORBIDE DINITRATE 10 MG: 10 TABLET ORAL at 20:59

## 2025-05-16 RX ADMIN — SODIUM CHLORIDE, PRESERVATIVE FREE 10 ML: 5 INJECTION INTRAVENOUS at 06:59

## 2025-05-16 RX ADMIN — ISOSORBIDE DINITRATE 10 MG: 10 TABLET ORAL at 09:51

## 2025-05-16 RX ADMIN — CLONIDINE HYDROCHLORIDE 0.2 MG: 0.1 TABLET ORAL at 20:59

## 2025-05-16 RX ADMIN — CLONIDINE HYDROCHLORIDE 0.2 MG: 0.1 TABLET ORAL at 11:16

## 2025-05-16 RX ADMIN — PANTOPRAZOLE SODIUM 40 MG: 40 TABLET, DELAYED RELEASE ORAL at 05:49

## 2025-05-16 RX ADMIN — TRAZODONE HYDROCHLORIDE 50 MG: 50 TABLET ORAL at 20:59

## 2025-05-16 RX ADMIN — METOPROLOL SUCCINATE 50 MG: 25 TABLET, FILM COATED, EXTENDED RELEASE ORAL at 09:51

## 2025-05-16 RX ADMIN — PROCHLORPERAZINE MALEATE 10 MG: 10 TABLET ORAL at 09:49

## 2025-05-16 RX ADMIN — INSULIN LISPRO 2 UNITS: 100 INJECTION, SOLUTION INTRAVENOUS; SUBCUTANEOUS at 18:24

## 2025-05-16 RX ADMIN — OXYBUTYNIN CHLORIDE 5 MG: 5 TABLET ORAL at 20:59

## 2025-05-16 RX ADMIN — BUMETANIDE 1 MG: 1 TABLET ORAL at 13:00

## 2025-05-16 RX ADMIN — INSULIN GLARGINE 12 UNITS: 100 INJECTION, SOLUTION SUBCUTANEOUS at 21:00

## 2025-05-16 RX ADMIN — MONTELUKAST 10 MG: 10 TABLET, FILM COATED ORAL at 20:59

## 2025-05-16 RX ADMIN — ISOSORBIDE DINITRATE 10 MG: 10 TABLET ORAL at 13:00

## 2025-05-16 RX ADMIN — HYDRALAZINE HYDROCHLORIDE 25 MG: 25 TABLET ORAL at 09:51

## 2025-05-16 RX ADMIN — WATER 1000 MG: 1 INJECTION INTRAMUSCULAR; INTRAVENOUS; SUBCUTANEOUS at 13:00

## 2025-05-16 ASSESSMENT — PAIN SCALES - GENERAL: PAINLEVEL_OUTOF10: 0

## 2025-05-16 NOTE — ACP (ADVANCE CARE PLANNING)
Advance Care Planning   Healthcare Decision Maker:    Primary Decision Maker: Tracee Gonzalez - [phone]    Click here to complete Healthcare Decision Makers including selection of the Healthcare Decision Maker Relationship (ie \"Primary\").

## 2025-05-16 NOTE — CONSULTS
[facility]   Inpatient CHF Nurse Navigator Consult        Cardiologist: Dr. Mcghee  Primary Care Physician: Babatunde Storm, DMITRY - LISA     Fanny Gonzalez is a 74 y.o. (1950) female with a history of HFrEF, most recent EF:  Lab Results   Component Value Date    LVEF 30 05/12/2025    LVEFMODE Echo 05/12/2025       Patient was awake and alert, laying in bed during the consultation and is agreeable to heart failure education. She was engaged and asked appropriate questions throughout the education session.     Barriers identified during consult contributing to HF Hospitalization: overwhelmed by complexity of regimen and stress.     [] Limited medication adherence   [] Poor health literacy, education regarding HF medications provided   [] Pill box provided to patient  [] Difficulty affording medications  [] Difficulty obtaining/ managing medications  [] Prescription assistance information given     [x] Not weighing themselves daily  [x] Weight log provided for easy monitoring  [] Scale provided     [] Not following low sodium diet  [] Food insecurity   [x] 2 gram sodium diet education provided   [] Low sodium recipes provided  [] Sodium free seasoning provided   [] Low sodium meal delivery options given to patient  [] Dietician consulted     [] Lack of transportation to appointments     [] Depression, given chronic illness  [] Primary team notified     [] Goals of care need addressed  [] Palliative care consulted     [] CHF community health worker Alana Guy consulted [phone], to assist with         Chart Reviewed:  Diet: ADULT DIET; Regular; 5 carb choices (75 gm/meal); Low Sodium (2 gm)  Diet NPO Exceptions are: Sips of Water with Meds   Daily Weights: Patient Vitals for the past 96 hrs (Last 3 readings):   Weight   05/13/25 0958 96.6 kg (213 lb)   05/12/25 0421 97.1 kg (214 lb)   05/11/25 1407 99.3 kg (219 lb)     I/O:   Intake/Output Summary (Last 24 hours) at 
GENERAL SURGERY  CONSULT NOTE    Patient's Name/Date of Birth: Fanny Gonzalez / 1950    Date: May 16, 2025     PCP: Babatunde Storm APRN - CNP     Chief Complaint:   Chief Complaint   Patient presents with    Chest Pain     Start 10am, with SOB, pain rads to left shoulder, worse on inspiration, nausea, emesis, hx MI +thinners hx CHF       Physician Consulted: Dr. Hopkins   Reason for Consult: central line   Referring Physician: Dr. Castillo     HPI  Fanny Gonzalez is a 74 y.o. female with a hx of breast cancer, COPD, PE [facility] who is in need of vascular access. She denies any prior central venous catheters. She did take her eliquis this morning. Currently in need of IV access for IV abx given WBC 11.7 and intolerance to PO medications 2/2 nausea.       Past Medical History:   Diagnosis Date    Arthritis     Asthma     BRCA1 negative     BRCA2 negative     Breast cancer (HCC)     CAD in native artery 12/15/2021    12-15-21 cate 2.5x38 hima rca. 12-15-21 cate 3.0x12 hima prox rca.     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    Cerebrovascular disease 06/2009    no residual    CHF (congestive heart failure) ([facility]) approx 3 years ago    Claustrophobia     COPD (chronic obstructive pulmonary disease) ([facility])     Decreased dorsalis pedis pulse 09/05/2019    Dermatophytosis 09/05/2019    Headache(784.0)     History of blood transfusion     with knee surgery    Hives     Hx of blood clots     PE's and DVT's 'during childbearing years'    Hyperlipidemia     Hypertension     LBP radiating to both legs     Lymphedema of both lower extremities 11/12/2015    Neuromuscular disorder (HCC)     Non-rheumatic aortic sclerosis 10/2018    10/2018    Obesity     Pulmonary hypertension (HCC) 10/2018    PVD (peripheral vascular disease) with claudication 09/23/2021    Recurrent 
acetaminophen (TYLENOL) suppository 650 mg  650 mg Rectal Q6H PRN Damian Barr MD        melatonin tablet 3 mg  3 mg Oral Nightly Damian Barr MD   3 mg at 25    prochlorperazine (COMPAZINE) injection 5 mg  5 mg IntraVENous Q6H PRN Damian Barr MD   5 mg at 25 0437         REVIEW OF SYSTEMS:   General: Patient denies n/v/f/c or weight loss.  HEENT: Patient denies persistent postnasal drip, scleral icterus, drooling, persistent bleeding from nose/mouth.  Resp: Patient denies SOB, wheezing, productive cough.  Cardio: Patient denies CP, palpitations, significant edema  GI: As above.  Derm: Patient denies jaundice/rashes.   Misc: Patient denies diffuse/irregular joint swelling or myalgias.      PHYSICAL EXAMINATION:   Vitals:    25 0645 25 0751 25 0958 25 1055   BP:  137/69  132/64   Pulse: 84 73  (!) 104   Resp:  18  17   Temp:  97.9 °F (36.6 °C)  98.2 °F (36.8 °C)   TempSrc:  Temporal     SpO2:  100%  99%   Weight:   96.6 kg (213 lb)    Height:         General: Overall well-appearing, NAD  HEENT: PERRLA, EOMI, Anicteric sclera, MMM, no rhinorrhea  Cards: RRR, no LE edema  Resp: Breathing comfortably, good air movement, no use of accessory muscles, no audible wheezing  Abdomen: soft, non-tender, non-distended  Extremities: Moves all extremities, no effusions or bruising.  Skin: No rashes or jaundice  Neuro: A&O x 3, CN grossly intact, non-focal exam    US Result (most recent):  US DUP LOWER EXTREMITY RIGHT CHRIS 2019    Narrative  Patient MRN:  [id number]  : 1950  Age: 69 years  Gender: Female    Order Date:  2019 4:30 PM    EXAM: US DUP LOWER EXTREMITY RIGHT CHRIS    NUMBER OF IMAGES:  22    INDICATION:  LEG SWELLING, PAIN, DVT SUSPECTED    COMPARISON: None    TECHNIQUE: multiple gray scale and color Doppler images were obtained  of the deep venous system of the right lower extremity. Doppler wave  forms and augmentation were 
onset AF with RVR and she was treated with Cardizem drip.  She as well as received 3 doses of IV Lasix however was put on hold due to rising creatinine.    PETER stage I, likely hemodynamically mediated 2/2 A-fib RVR related hypotension versus contrast-induced PETER, creatinine up to 1.4 mg/dL, bladder scan negative, hold diuretics, start IV fluids    Hypokalemia 2/2 diuretics, to replace  HTN, hold BP medications for now  HFrEF 30% proBNP 4536, on carvedilol  --------------------------------  Type II DM, on SSI  CAD  NSTEMI, cardiology following  History of breast cancer  COPD  Hyperlipidemia  Pulmonary hypertension  A-fib RVR, on amiodarone drip    Plan:    NS at 50 mL/h  Hold diuretics  Replace potassium   Hold BP medications  Monitor potassium level  Monitor renal function  Monitor blood pressure    Thank you Dr. Villeda for allowing us to participate in the care of Fanny Gonzalez.      Electronically signed by DMITRY Dodson CNP on 5/13/2025 at 3:40 PM     I saw and evaluated the patient, performing the key elements of the service. I discussed the findings, assessment and plan with NP and agree with her findings and plans as documented in her note.        Justyn Tran MD    
Normal wall motion. Grade I diastolic dysfunction.   Left Atrium Left atrium is mildly dilated.   Right Ventricle Right ventricle size is normal. Normal systolic function.   Right Atrium Right atrium size is normal.   Aortic Valve Mild sclerosis of the aortic valve. No regurgitation. No significant stenosis.   Mitral Valve Valve structure is normal. Trace regurgitation. No stenosis noted.   Tricuspid Valve Valve structure is normal. Trace regurgitation, RVSP 31mmHg. No stenosis noted.   Pulmonic Valve The pulmonic valve visualization is suboptimal but appears to be functioning normally. Physiologically normal regurgitation. No stenosis noted.   Aorta Normal sized aortic root and ascending aorta. Areas of calcifications in aortic root   IVC/Hepatic Veins IVC diameter is less than or equal to 21 mm and decreases greater than 50% during inspiration; therefore the estimated right atrial pressure is normal (~3 mmHg). IVC size is normal.   Pericardium No pericardial effusion.       Cardiac Catheterization: Scheduled for tomorrow    Stress Test:   3/3/25    Interpretation Summary  Show Result Comparison     Stress Combined Conclusion: The study is negative for myocardial ischemia. Overall findings suggest a low risk of cardiac events.    Stress Function: Visaully LV EFabout 50%.    Perfusion Comments: LV perfusion is normal. There is no evidence of inducible ischemia.    ECG: Resting ECG demonstrates normal sinus rhythm and interventricular conduction delay.    Stress Test: A pharmacological stress test was performed using regadenoson (Lexiscan). PO caffeine given as a reversal agent. The patient reported flushing and no chest pain during the stress test. Symptoms began during stress and ended during recovery. Blood pressure demonstrated a normal response to stress.    Resting ECG: The ECG shows sinus rhythm. ECG demonstrates interventricular conduction delay.    Stress ECG: There were no arrhythmias during stress. No 
the above cardiac medical decisions are personally from me.  Please see my additional contributions to the history, physical exam, assessment, and recommendations below.      History of chief complaint:  She states that she had nausea and vomiting all day yesterday.  She states that her chest is sore when she vomits or takes deep breaths or coughs.    Review of systems:     Heart: as above   Lungs: as above   Eyes: denies changes in vision or discharge.    Ears: denies changes in hearing or pain.   Nose: denies epistaxis or masses   Throat: denies sore throat or trouble swallowing.    Neuro: denies numbness, tingling, tremors.   Skin: denies rashes or itching.   : denies hematuria, dysuria   GI: denies vomiting, diarrhea   Psych: denies mood changed, anxiety, depression.       Physical exam:  BP (!) 168/73   Pulse (!) 102   Temp 98.4 °F (36.9 °C) (Temporal)   Resp 17   Ht 1.651 m (5' 5\")   Wt 97.1 kg (214 lb)   SpO2 98%   BMI 35.61 kg/m²   Constitutional: A&O x3, communicates well, no acute distress.  Eyes: extraocular muscles intact, PERRL.  Normal lids & conjunctiva.  No icterus.   ENT: clear, no bleeding.  No external masses.  Lips normal formation.  Neck: supple, full ROM, no JVD, no bruits, no lymphadenopathy.  No masses.  trachea midline.  Heart: regular rate & rhythm, normal S1 & S2, no abnormal murmurs.  No heave.  Lungs: Poor air movement.  Slight rhonchi..  No accessory muscles.  Abd: Obese.  Soft, non-tender.  Normal bowel sounds.   Neuro: Full ROM X 4, EOMI, no tremors.  EXT: Mild right greater than left bilateral lower extremity edema  Skin: warm, dry, intact.  Good turgor.  Psych: A&O x 3, normal behavior, not anxious.    Patient seen and examined.  Chart, labs & data reviewed.    A:  Prolonged nausea and vomiting  Chest soreness with vomiting, coughing, or inspiration  Lactic acidosis.  Lactic acid levels continuing to rise.  COPD.  Chronic shortness of breath that improves with inhalers and

## 2025-05-16 NOTE — PROCEDURES
PROCEDURE NOTE  Date: 5/16/2025   Name: Fanny Gonzalez  YOB: 1950    CENTRAL LINE    Date/Time: 5/16/2025 5:56 PM    Performed by: Eugenia Campos MD  Authorized by: Trini Hopkins MD  Consent: Written consent obtained.  Risks and benefits: risks, benefits and alternatives were discussed  Consent given by: patient  Patient identity confirmed: arm band and verbally with patient  Indications: vascular access    Anesthesia:  Local Anesthetic: lidocaine 1% without epinephrine  Preparation: skin prepped with 2% chlorhexidine  Sterile barriers: all five maximum sterile barriers used - cap, mask, sterile gown, sterile gloves, and large sterile sheet  Hand hygiene: hand hygiene performed prior to central venous catheter insertion  Location details: right internal jugular  Site selection rationale: attempted left femoral vein however appeared to be thrombosed. attemptsx 3  and decision made to place a Right IJ line. 1 attempt for R IJ catheter  Patient position: flat  Catheter type: triple lumen  Catheter size: 7 Fr  Pre-procedure: landmarks identified  Ultrasound guidance: yes  Sterile ultrasound techniques: sterile gel and sterile probe covers were used  Number of attempts: 1  Post-procedure: line sutured  Assessment: blood return through all ports  Patient tolerance: patient tolerated the procedure well with no immediate complications  Comments: CXR ordered to verify tip placement     Dr. Hopkins was present

## 2025-05-16 NOTE — PLAN OF CARE
Problem: Safety - Adult  Goal: Free from fall injury  Flowsheets (Taken 5/16/2025 0003)  Free From Fall Injury: Instruct family/caregiver on patient safety     Problem: Discharge Planning  Goal: Discharge to home or other facility with appropriate resources  Flowsheets (Taken 5/16/2025 0003)  Discharge to home or other facility with appropriate resources: Identify barriers to discharge with patient and caregiver     Problem: Chronic Conditions and Co-morbidities  Goal: Patient's chronic conditions and co-morbidity symptoms are monitored and maintained or improved  Flowsheets (Taken 5/13/2025 2100 by Martina Aparicio RN)  Care Plan - Patient's Chronic Conditions and Co-Morbidity Symptoms are Monitored and Maintained or Improved:   Monitor and assess patient's chronic conditions and comorbid symptoms for stability, deterioration, or improvement   Collaborate with multidisciplinary team to address chronic and comorbid conditions and prevent exacerbation or deterioration   Update acute care plan with appropriate goals if chronic or comorbid symptoms are exacerbated and prevent overall improvement and discharge

## 2025-05-16 NOTE — CARE COORDINATION
Patient with episode of emesis after heart cath yesterday, also in and out of afib rvr. KUB of abdomen completed, needs read. IV rocephin started, urine culture pending. Required 2 doses PRN hydralazine in the last 24 hours. Will hold off on starting precert for [place]. Placed on weekend therapy list. PASRR and ambulette previously completed.     For questions I can be reached at [phone]. TRUMAN Sams

## 2025-05-16 NOTE — PLAN OF CARE
Problem: Chronic Conditions and Co-morbidities  Goal: Patient's chronic conditions and co-morbidity symptoms are monitored and maintained or improved  5/16/2025 0827 by Kim Ho RN  Outcome: Progressing  Flowsheets (Taken 5/16/2025 0742)  Care Plan - Patient's Chronic Conditions and Co-Morbidity Symptoms are Monitored and Maintained or Improved: Monitor and assess patient's chronic conditions and comorbid symptoms for stability, deterioration, or improvement  5/15/2025 2359 by Mary Salas RN  Flowsheets (Taken 5/13/2025 2100 by Martina Aparicio RN)  Care Plan - Patient's Chronic Conditions and Co-Morbidity Symptoms are Monitored and Maintained or Improved:   Monitor and assess patient's chronic conditions and comorbid symptoms for stability, deterioration, or improvement   Collaborate with multidisciplinary team to address chronic and comorbid conditions and prevent exacerbation or deterioration   Update acute care plan with appropriate goals if chronic or comorbid symptoms are exacerbated and prevent overall improvement and discharge     Problem: Discharge Planning  Goal: Discharge to home or other facility with appropriate resources  5/16/2025 0827 by Kim Ho RN  Outcome: Progressing  Flowsheets (Taken 5/16/2025 0742)  Discharge to home or other facility with appropriate resources: Identify barriers to discharge with patient and caregiver  5/16/2025 0003 by Mary Salas RN  Flowsheets (Taken 5/16/2025 0003)  Discharge to home or other facility with appropriate resources: Identify barriers to discharge with patient and caregiver     Problem: Safety - Adult  Goal: Free from fall injury  5/16/2025 0827 by Kim Ho RN  Outcome: Progressing  Flowsheets (Taken 5/16/2025 0826)  Free From Fall Injury: Instruct family/caregiver on patient safety  5/16/2025 0003 by Mary Salas RN  Flowsheets (Taken 5/16/2025 0003)  Free From Fall Injury: Instruct family/caregiver on patient safety

## 2025-05-17 LAB
ALBUMIN SERPL-MCNC: 3.2 G/DL (ref 3.5–5.2)
ALP SERPL-CCNC: 118 U/L (ref 35–104)
ALT SERPL-CCNC: 249 U/L (ref 0–35)
ANION GAP SERPL CALCULATED.3IONS-SCNC: 10 MMOL/L (ref 7–16)
AST SERPL-CCNC: 47 U/L (ref 0–35)
BASOPHILS # BLD: 0.01 K/UL (ref 0–0.2)
BASOPHILS NFR BLD: 0 % (ref 0–2)
BILIRUB DIRECT SERPL-MCNC: 0.5 MG/DL (ref 0–0.2)
BILIRUB INDIRECT SERPL-MCNC: 0.4 MG/DL (ref 0–1)
BILIRUB SERPL-MCNC: 0.8 MG/DL (ref 0–1.2)
BNP SERPL-MCNC: 8179 PG/ML (ref 0–450)
BUN SERPL-MCNC: 11 MG/DL (ref 8–23)
CALCIUM SERPL-MCNC: 9.3 MG/DL (ref 8.8–10.2)
CHLORIDE SERPL-SCNC: 103 MMOL/L (ref 98–107)
CO2 SERPL-SCNC: 27 MMOL/L (ref 22–29)
CREAT SERPL-MCNC: 0.8 MG/DL (ref 0.5–1)
EOSINOPHIL # BLD: 0 K/UL (ref 0.05–0.5)
EOSINOPHILS RELATIVE PERCENT: 0 % (ref 0–6)
ERYTHROCYTE [DISTWIDTH] IN BLOOD BY AUTOMATED COUNT: 15.6 % (ref 11.5–15)
GFR, ESTIMATED: 81 ML/MIN/1.73M2
GLUCOSE BLD-MCNC: 204 MG/DL (ref 74–99)
GLUCOSE BLD-MCNC: 224 MG/DL (ref 74–99)
GLUCOSE BLD-MCNC: 269 MG/DL (ref 74–99)
GLUCOSE SERPL-MCNC: 102 MG/DL (ref 74–99)
HCT VFR BLD AUTO: 31 % (ref 34–48)
HGB BLD-MCNC: 9.9 G/DL (ref 11.5–15.5)
IMM GRANULOCYTES # BLD AUTO: 0.03 K/UL (ref 0–0.58)
IMM GRANULOCYTES NFR BLD: 0 % (ref 0–5)
LYMPHOCYTES NFR BLD: 1.28 K/UL (ref 1.5–4)
LYMPHOCYTES RELATIVE PERCENT: 16 % (ref 20–42)
MCH RBC QN AUTO: 28.4 PG (ref 26–35)
MCHC RBC AUTO-ENTMCNC: 31.9 G/DL (ref 32–34.5)
MCV RBC AUTO: 88.8 FL (ref 80–99.9)
MICROORGANISM SPEC CULT: NO GROWTH
MICROORGANISM SPEC CULT: NORMAL
MONOCYTES NFR BLD: 1.13 K/UL (ref 0.1–0.95)
MONOCYTES NFR BLD: 15 % (ref 2–12)
NEUTROPHILS NFR BLD: 69 % (ref 43–80)
NEUTS SEG NFR BLD: 5.35 K/UL (ref 1.8–7.3)
PLATELET # BLD AUTO: 346 K/UL (ref 130–450)
PMV BLD AUTO: 10.7 FL (ref 7–12)
POTASSIUM SERPL-SCNC: 3.6 MMOL/L (ref 3.5–5.1)
PROT SERPL-MCNC: 6.3 G/DL (ref 6.4–8.3)
RBC # BLD AUTO: 3.49 M/UL (ref 3.5–5.5)
SERVICE CMNT-IMP: NORMAL
SERVICE CMNT-IMP: NORMAL
SODIUM SERPL-SCNC: 140 MMOL/L (ref 136–145)
SPECIMEN DESCRIPTION: NORMAL
SPECIMEN DESCRIPTION: NORMAL
WBC OTHER # BLD: 7.8 K/UL (ref 4.5–11.5)

## 2025-05-17 PROCEDURE — 80053 COMPREHEN METABOLIC PANEL: CPT

## 2025-05-17 PROCEDURE — 6370000000 HC RX 637 (ALT 250 FOR IP): Performed by: INTERNAL MEDICINE

## 2025-05-17 PROCEDURE — 99232 SBSQ HOSP IP/OBS MODERATE 35: CPT | Performed by: INTERNAL MEDICINE

## 2025-05-17 PROCEDURE — 2500000003 HC RX 250 WO HCPCS: Performed by: INTERNAL MEDICINE

## 2025-05-17 PROCEDURE — 2140000000 HC CCU INTERMEDIATE R&B

## 2025-05-17 PROCEDURE — 6360000002 HC RX W HCPCS: Performed by: INTERNAL MEDICINE

## 2025-05-17 PROCEDURE — 82248 BILIRUBIN DIRECT: CPT

## 2025-05-17 PROCEDURE — 83880 ASSAY OF NATRIURETIC PEPTIDE: CPT

## 2025-05-17 PROCEDURE — 82962 GLUCOSE BLOOD TEST: CPT

## 2025-05-17 PROCEDURE — 85025 COMPLETE CBC W/AUTO DIFF WBC: CPT

## 2025-05-17 PROCEDURE — 6370000000 HC RX 637 (ALT 250 FOR IP)

## 2025-05-17 PROCEDURE — 94640 AIRWAY INHALATION TREATMENT: CPT

## 2025-05-17 RX ORDER — POTASSIUM CHLORIDE 1500 MG/1
40 TABLET, EXTENDED RELEASE ORAL ONCE
Status: COMPLETED | OUTPATIENT
Start: 2025-05-17 | End: 2025-05-17

## 2025-05-17 RX ORDER — HYDRALAZINE HYDROCHLORIDE 50 MG/1
50 TABLET, FILM COATED ORAL EVERY 8 HOURS SCHEDULED
Status: COMPLETED | OUTPATIENT
Start: 2025-05-17 | End: 2025-05-17

## 2025-05-17 RX ORDER — METOPROLOL SUCCINATE 25 MG/1
75 TABLET, EXTENDED RELEASE ORAL DAILY
Status: DISCONTINUED | OUTPATIENT
Start: 2025-05-18 | End: 2025-05-19 | Stop reason: HOSPADM

## 2025-05-17 RX ORDER — POLYETHYLENE GLYCOL 3350 17 G/17G
17 POWDER, FOR SOLUTION ORAL DAILY
Status: DISCONTINUED | OUTPATIENT
Start: 2025-05-17 | End: 2025-05-19

## 2025-05-17 RX ORDER — SENNOSIDES A AND B 8.6 MG/1
1 TABLET, FILM COATED ORAL 2 TIMES DAILY
Status: DISCONTINUED | OUTPATIENT
Start: 2025-05-17 | End: 2025-05-19 | Stop reason: HOSPADM

## 2025-05-17 RX ORDER — SODIUM CHLORIDE 0.9 % (FLUSH) 0.9 %
5-40 SYRINGE (ML) INJECTION 2 TIMES DAILY
Status: DISCONTINUED | OUTPATIENT
Start: 2025-05-17 | End: 2025-05-19 | Stop reason: HOSPADM

## 2025-05-17 RX ORDER — HEPARIN 100 UNIT/ML
300 SYRINGE INTRAVENOUS PRN
Status: DISCONTINUED | OUTPATIENT
Start: 2025-05-17 | End: 2025-05-19 | Stop reason: HOSPADM

## 2025-05-17 RX ORDER — HEPARIN 100 UNIT/ML
300 SYRINGE INTRAVENOUS EVERY 12 HOURS
Status: DISCONTINUED | OUTPATIENT
Start: 2025-05-17 | End: 2025-05-19 | Stop reason: HOSPADM

## 2025-05-17 RX ORDER — SODIUM CHLORIDE 0.9 % (FLUSH) 0.9 %
5-40 SYRINGE (ML) INJECTION PRN
Status: DISCONTINUED | OUTPATIENT
Start: 2025-05-17 | End: 2025-05-19 | Stop reason: HOSPADM

## 2025-05-17 RX ADMIN — HYDRALAZINE HYDROCHLORIDE 50 MG: 50 TABLET ORAL at 22:08

## 2025-05-17 RX ADMIN — ACETAMINOPHEN 650 MG: 325 TABLET ORAL at 21:08

## 2025-05-17 RX ADMIN — IPRATROPIUM BROMIDE AND ALBUTEROL SULFATE 1 DOSE: 2.5; .5 SOLUTION RESPIRATORY (INHALATION) at 15:34

## 2025-05-17 RX ADMIN — INSULIN LISPRO 2 UNITS: 100 INJECTION, SOLUTION INTRAVENOUS; SUBCUTANEOUS at 16:32

## 2025-05-17 RX ADMIN — SODIUM CHLORIDE, PRESERVATIVE FREE 10 ML: 5 INJECTION INTRAVENOUS at 20:06

## 2025-05-17 RX ADMIN — TRAZODONE HYDROCHLORIDE 50 MG: 50 TABLET ORAL at 20:08

## 2025-05-17 RX ADMIN — INSULIN GLARGINE 12 UNITS: 100 INJECTION, SOLUTION SUBCUTANEOUS at 20:07

## 2025-05-17 RX ADMIN — MONTELUKAST 10 MG: 10 TABLET, FILM COATED ORAL at 20:10

## 2025-05-17 RX ADMIN — OXYBUTYNIN CHLORIDE 5 MG: 5 TABLET ORAL at 08:55

## 2025-05-17 RX ADMIN — ISOSORBIDE DINITRATE 10 MG: 10 TABLET ORAL at 08:55

## 2025-05-17 RX ADMIN — SODIUM CHLORIDE, PRESERVATIVE FREE 10 ML: 5 INJECTION INTRAVENOUS at 05:53

## 2025-05-17 RX ADMIN — GABAPENTIN 300 MG: 300 CAPSULE ORAL at 14:35

## 2025-05-17 RX ADMIN — GABAPENTIN 300 MG: 300 CAPSULE ORAL at 20:08

## 2025-05-17 RX ADMIN — ASPIRIN 81 MG CHEWABLE TABLET 81 MG: 81 TABLET CHEWABLE at 08:55

## 2025-05-17 RX ADMIN — INSULIN LISPRO 4 UNITS: 100 INJECTION, SOLUTION INTRAVENOUS; SUBCUTANEOUS at 20:09

## 2025-05-17 RX ADMIN — HYDRALAZINE HYDROCHLORIDE 25 MG: 25 TABLET ORAL at 05:16

## 2025-05-17 RX ADMIN — OXYBUTYNIN CHLORIDE 5 MG: 5 TABLET ORAL at 20:08

## 2025-05-17 RX ADMIN — IPRATROPIUM BROMIDE AND ALBUTEROL SULFATE 1 DOSE: 2.5; .5 SOLUTION RESPIRATORY (INHALATION) at 19:16

## 2025-05-17 RX ADMIN — RANOLAZINE 500 MG: 500 TABLET, FILM COATED, EXTENDED RELEASE ORAL at 08:55

## 2025-05-17 RX ADMIN — INSULIN LISPRO 2 UNITS: 100 INJECTION, SOLUTION INTRAVENOUS; SUBCUTANEOUS at 11:58

## 2025-05-17 RX ADMIN — OXYBUTYNIN CHLORIDE 5 MG: 5 TABLET ORAL at 14:36

## 2025-05-17 RX ADMIN — SODIUM CHLORIDE, PRESERVATIVE FREE 10 ML: 5 INJECTION INTRAVENOUS at 11:59

## 2025-05-17 RX ADMIN — PANTOPRAZOLE SODIUM 40 MG: 40 TABLET, DELAYED RELEASE ORAL at 05:52

## 2025-05-17 RX ADMIN — SENNOSIDES 8.6 MG: 8.6 TABLET, FILM COATED ORAL at 11:56

## 2025-05-17 RX ADMIN — WATER 1000 MG: 1 INJECTION INTRAMUSCULAR; INTRAVENOUS; SUBCUTANEOUS at 11:57

## 2025-05-17 RX ADMIN — SENNOSIDES 8.6 MG: 8.6 TABLET, FILM COATED ORAL at 20:09

## 2025-05-17 RX ADMIN — HEPARIN 300 UNITS: 100 SYRINGE at 05:52

## 2025-05-17 RX ADMIN — HEPARIN 300 UNITS: 100 SYRINGE at 08:58

## 2025-05-17 RX ADMIN — POTASSIUM CHLORIDE 40 MEQ: 1500 TABLET, EXTENDED RELEASE ORAL at 11:56

## 2025-05-17 RX ADMIN — CLONIDINE HYDROCHLORIDE 0.2 MG: 0.1 TABLET ORAL at 14:35

## 2025-05-17 RX ADMIN — METOPROLOL SUCCINATE 50 MG: 25 TABLET, FILM COATED, EXTENDED RELEASE ORAL at 08:55

## 2025-05-17 RX ADMIN — IPRATROPIUM BROMIDE AND ALBUTEROL SULFATE 1 DOSE: 2.5; .5 SOLUTION RESPIRATORY (INHALATION) at 11:30

## 2025-05-17 RX ADMIN — SODIUM CHLORIDE, PRESERVATIVE FREE 10 ML: 5 INJECTION INTRAVENOUS at 08:57

## 2025-05-17 RX ADMIN — BUMETANIDE 1 MG: 1 TABLET ORAL at 08:55

## 2025-05-17 RX ADMIN — ISOSORBIDE DINITRATE 10 MG: 10 TABLET ORAL at 14:36

## 2025-05-17 RX ADMIN — INSULIN LISPRO 2 UNITS: 100 INJECTION, SOLUTION INTRAVENOUS; SUBCUTANEOUS at 09:02

## 2025-05-17 RX ADMIN — GABAPENTIN 300 MG: 300 CAPSULE ORAL at 08:55

## 2025-05-17 RX ADMIN — RANOLAZINE 500 MG: 500 TABLET, FILM COATED, EXTENDED RELEASE ORAL at 20:10

## 2025-05-17 RX ADMIN — ACETAMINOPHEN 650 MG: 325 TABLET ORAL at 04:32

## 2025-05-17 RX ADMIN — CLONIDINE HYDROCHLORIDE 0.2 MG: 0.1 TABLET ORAL at 08:55

## 2025-05-17 RX ADMIN — CLONIDINE HYDROCHLORIDE 0.2 MG: 0.1 TABLET ORAL at 21:09

## 2025-05-17 RX ADMIN — ISOSORBIDE DINITRATE 10 MG: 10 TABLET ORAL at 20:09

## 2025-05-17 RX ADMIN — SODIUM CHLORIDE, PRESERVATIVE FREE 10 ML: 5 INJECTION INTRAVENOUS at 20:10

## 2025-05-17 RX ADMIN — Medication 3 MG: at 20:09

## 2025-05-17 RX ADMIN — POLYETHYLENE GLYCOL 3350 17 G: 17 POWDER, FOR SOLUTION ORAL at 08:57

## 2025-05-17 RX ADMIN — HEPARIN 300 UNITS: 100 SYRINGE at 20:05

## 2025-05-17 RX ADMIN — HYDRALAZINE HYDROCHLORIDE 50 MG: 50 TABLET ORAL at 14:36

## 2025-05-17 ASSESSMENT — PAIN SCALES - GENERAL
PAINLEVEL_OUTOF10: 0
PAINLEVEL_OUTOF10: 7
PAINLEVEL_OUTOF10: 0
PAINLEVEL_OUTOF10: 7
PAINLEVEL_OUTOF10: 0

## 2025-05-17 ASSESSMENT — PAIN DESCRIPTION - ORIENTATION
ORIENTATION: RIGHT;UPPER
ORIENTATION: RIGHT;ANTERIOR
ORIENTATION: RIGHT;ANTERIOR

## 2025-05-17 ASSESSMENT — PAIN DESCRIPTION - DESCRIPTORS
DESCRIPTORS: ACHING

## 2025-05-17 ASSESSMENT — PAIN DESCRIPTION - LOCATION
LOCATION: HEAD
LOCATION: HEAD
LOCATION: HEAD;NECK
LOCATION: HEAD

## 2025-05-17 ASSESSMENT — PAIN DESCRIPTION - FREQUENCY
FREQUENCY: INTERMITTENT
FREQUENCY: INTERMITTENT

## 2025-05-17 ASSESSMENT — PAIN DESCRIPTION - DIRECTION: RADIATING_TOWARDS: RIGHT FRONTAL

## 2025-05-17 ASSESSMENT — PAIN DESCRIPTION - PAIN TYPE: TYPE: ACUTE PAIN

## 2025-05-17 ASSESSMENT — PAIN DESCRIPTION - ONSET: ONSET: GRADUAL

## 2025-05-17 NOTE — PLAN OF CARE
Problem: Discharge Planning  Goal: Discharge to home or other facility with appropriate resources  Outcome: Progressing  Flowsheets (Taken 5/16/2025 2010)  Discharge to home or other facility with appropriate resources:   Identify barriers to discharge with patient and caregiver   Arrange for needed discharge resources and transportation as appropriate     Problem: Pain  Goal: Verbalizes/displays adequate comfort level or baseline comfort level  Outcome: Progressing     Problem: Discharge Planning  Goal: Discharge to home or other facility with appropriate resources  Outcome: Progressing  Flowsheets (Taken 5/16/2025 2010)  Discharge to home or other facility with appropriate resources:   Identify barriers to discharge with patient and caregiver   Arrange for needed discharge resources and transportation as appropriate

## 2025-05-18 LAB
ALBUMIN SERPL-MCNC: 2.8 G/DL (ref 3.5–5.2)
ALP SERPL-CCNC: 100 U/L (ref 35–104)
ALT SERPL-CCNC: 152 U/L (ref 0–35)
ANION GAP SERPL CALCULATED.3IONS-SCNC: 8 MMOL/L (ref 7–16)
AST SERPL-CCNC: 24 U/L (ref 0–35)
ATYPICAL LYMPHOCYTE ABSOLUTE COUNT: 0.07 K/UL (ref 0–0.46)
ATYPICAL LYMPHOCYTES: 1 % (ref 0–4)
BASOPHILS # BLD: 0 K/UL (ref 0–0.2)
BASOPHILS NFR BLD: 0 % (ref 0–2)
BILIRUB DIRECT SERPL-MCNC: 0.2 MG/DL (ref 0–0.2)
BILIRUB INDIRECT SERPL-MCNC: 0.1 MG/DL (ref 0–1)
BILIRUB SERPL-MCNC: 0.4 MG/DL (ref 0–1.2)
BUN SERPL-MCNC: 15 MG/DL (ref 8–23)
CALCIUM SERPL-MCNC: 8.9 MG/DL (ref 8.8–10.2)
CHLORIDE SERPL-SCNC: 103 MMOL/L (ref 98–107)
CO2 SERPL-SCNC: 28 MMOL/L (ref 22–29)
CREAT SERPL-MCNC: 0.9 MG/DL (ref 0.5–1)
EKG ATRIAL RATE: 147 BPM
EKG Q-T INTERVAL: 364 MS
EKG QRS DURATION: 132 MS
EKG QTC CALCULATION (BAZETT): 516 MS
EKG R AXIS: -30 DEGREES
EKG T AXIS: 171 DEGREES
EKG VENTRICULAR RATE: 121 BPM
EOSINOPHIL # BLD: 0 K/UL (ref 0.05–0.5)
EOSINOPHILS RELATIVE PERCENT: 0 % (ref 0–6)
ERYTHROCYTE [DISTWIDTH] IN BLOOD BY AUTOMATED COUNT: 16.2 % (ref 11.5–15)
GFR, ESTIMATED: 64 ML/MIN/1.73M2
GLUCOSE BLD-MCNC: 182 MG/DL (ref 74–99)
GLUCOSE BLD-MCNC: 236 MG/DL (ref 74–99)
GLUCOSE BLD-MCNC: 321 MG/DL (ref 74–99)
GLUCOSE SERPL-MCNC: 155 MG/DL (ref 74–99)
HCT VFR BLD AUTO: 27.5 % (ref 34–48)
HGB BLD-MCNC: 8.5 G/DL (ref 11.5–15.5)
LYMPHOCYTES NFR BLD: 1.74 K/UL (ref 1.5–4)
LYMPHOCYTES RELATIVE PERCENT: 23 % (ref 20–42)
MCH RBC QN AUTO: 27.9 PG (ref 26–35)
MCHC RBC AUTO-ENTMCNC: 30.9 G/DL (ref 32–34.5)
MCV RBC AUTO: 90.2 FL (ref 80–99.9)
MICROORGANISM SPEC CULT: NORMAL
MONOCYTES NFR BLD: 0.6 K/UL (ref 0.1–0.95)
MONOCYTES NFR BLD: 8 % (ref 2–12)
NEUTROPHILS NFR BLD: 69 % (ref 43–80)
NEUTS SEG NFR BLD: 5.29 K/UL (ref 1.8–7.3)
PLATELET # BLD AUTO: 284 K/UL (ref 130–450)
PMV BLD AUTO: 10.7 FL (ref 7–12)
POTASSIUM SERPL-SCNC: 4.1 MMOL/L (ref 3.5–5.1)
PROT SERPL-MCNC: 5.4 G/DL (ref 6.4–8.3)
RBC # BLD AUTO: 3.05 M/UL (ref 3.5–5.5)
RBC # BLD: ABNORMAL 10*6/UL
SERVICE CMNT-IMP: NORMAL
SODIUM SERPL-SCNC: 139 MMOL/L (ref 136–145)
SPECIMEN DESCRIPTION: NORMAL
WBC OTHER # BLD: 7.7 K/UL (ref 4.5–11.5)

## 2025-05-18 PROCEDURE — 6360000002 HC RX W HCPCS: Performed by: INTERNAL MEDICINE

## 2025-05-18 PROCEDURE — 2140000000 HC CCU INTERMEDIATE R&B

## 2025-05-18 PROCEDURE — 2500000003 HC RX 250 WO HCPCS: Performed by: INTERNAL MEDICINE

## 2025-05-18 PROCEDURE — 6370000000 HC RX 637 (ALT 250 FOR IP): Performed by: INTERNAL MEDICINE

## 2025-05-18 PROCEDURE — 97530 THERAPEUTIC ACTIVITIES: CPT

## 2025-05-18 PROCEDURE — 6370000000 HC RX 637 (ALT 250 FOR IP)

## 2025-05-18 PROCEDURE — 6370000000 HC RX 637 (ALT 250 FOR IP): Performed by: STUDENT IN AN ORGANIZED HEALTH CARE EDUCATION/TRAINING PROGRAM

## 2025-05-18 PROCEDURE — 99232 SBSQ HOSP IP/OBS MODERATE 35: CPT | Performed by: INTERNAL MEDICINE

## 2025-05-18 PROCEDURE — 82962 GLUCOSE BLOOD TEST: CPT

## 2025-05-18 PROCEDURE — 80053 COMPREHEN METABOLIC PANEL: CPT

## 2025-05-18 PROCEDURE — 97535 SELF CARE MNGMENT TRAINING: CPT

## 2025-05-18 PROCEDURE — 85025 COMPLETE CBC W/AUTO DIFF WBC: CPT

## 2025-05-18 PROCEDURE — 82248 BILIRUBIN DIRECT: CPT

## 2025-05-18 PROCEDURE — 93010 ELECTROCARDIOGRAM REPORT: CPT | Performed by: INTERNAL MEDICINE

## 2025-05-18 PROCEDURE — 94640 AIRWAY INHALATION TREATMENT: CPT

## 2025-05-18 RX ORDER — MAGNESIUM CARB/ALUMINUM HYDROX 105-160MG
296 TABLET,CHEWABLE ORAL ONCE
Status: COMPLETED | OUTPATIENT
Start: 2025-05-18 | End: 2025-05-18

## 2025-05-18 RX ORDER — FERROUS SULFATE 325(65) MG
325 TABLET ORAL 2 TIMES DAILY WITH MEALS
Status: DISCONTINUED | OUTPATIENT
Start: 2025-05-18 | End: 2025-05-19 | Stop reason: HOSPADM

## 2025-05-18 RX ADMIN — ASPIRIN 81 MG CHEWABLE TABLET 81 MG: 81 TABLET CHEWABLE at 08:29

## 2025-05-18 RX ADMIN — INSULIN LISPRO 2 UNITS: 100 INJECTION, SOLUTION INTRAVENOUS; SUBCUTANEOUS at 17:29

## 2025-05-18 RX ADMIN — OXYBUTYNIN CHLORIDE 5 MG: 5 TABLET ORAL at 21:08

## 2025-05-18 RX ADMIN — INSULIN GLARGINE 12 UNITS: 100 INJECTION, SOLUTION SUBCUTANEOUS at 21:31

## 2025-05-18 RX ADMIN — FERROUS SULFATE TAB 325 MG (65 MG ELEMENTAL FE) 325 MG: 325 (65 FE) TAB at 17:29

## 2025-05-18 RX ADMIN — IPRATROPIUM BROMIDE AND ALBUTEROL SULFATE 1 DOSE: 2.5; .5 SOLUTION RESPIRATORY (INHALATION) at 07:35

## 2025-05-18 RX ADMIN — OXYBUTYNIN CHLORIDE 5 MG: 5 TABLET ORAL at 13:33

## 2025-05-18 RX ADMIN — INSULIN LISPRO 2 UNITS: 100 INJECTION, SOLUTION INTRAVENOUS; SUBCUTANEOUS at 11:24

## 2025-05-18 RX ADMIN — FERROUS SULFATE TAB 325 MG (65 MG ELEMENTAL FE) 325 MG: 325 (65 FE) TAB at 08:29

## 2025-05-18 RX ADMIN — RANOLAZINE 500 MG: 500 TABLET, FILM COATED, EXTENDED RELEASE ORAL at 21:05

## 2025-05-18 RX ADMIN — HEPARIN 300 UNITS: 100 SYRINGE at 05:25

## 2025-05-18 RX ADMIN — MONTELUKAST 10 MG: 10 TABLET, FILM COATED ORAL at 21:09

## 2025-05-18 RX ADMIN — ISOSORBIDE DINITRATE 10 MG: 10 TABLET ORAL at 22:37

## 2025-05-18 RX ADMIN — INSULIN LISPRO 2 UNITS: 100 INJECTION, SOLUTION INTRAVENOUS; SUBCUTANEOUS at 08:27

## 2025-05-18 RX ADMIN — TRAZODONE HYDROCHLORIDE 50 MG: 50 TABLET ORAL at 21:08

## 2025-05-18 RX ADMIN — POLYETHYLENE GLYCOL 3350 17 G: 17 POWDER, FOR SOLUTION ORAL at 08:27

## 2025-05-18 RX ADMIN — INSULIN LISPRO 6 UNITS: 100 INJECTION, SOLUTION INTRAVENOUS; SUBCUTANEOUS at 11:24

## 2025-05-18 RX ADMIN — CLONIDINE HYDROCHLORIDE 0.2 MG: 0.1 TABLET ORAL at 13:33

## 2025-05-18 RX ADMIN — GABAPENTIN 300 MG: 300 CAPSULE ORAL at 21:07

## 2025-05-18 RX ADMIN — SODIUM CHLORIDE, PRESERVATIVE FREE 10 ML: 5 INJECTION INTRAVENOUS at 21:43

## 2025-05-18 RX ADMIN — SENNOSIDES 8.6 MG: 8.6 TABLET, FILM COATED ORAL at 08:29

## 2025-05-18 RX ADMIN — METOPROLOL SUCCINATE 75 MG: 25 TABLET, FILM COATED, EXTENDED RELEASE ORAL at 08:29

## 2025-05-18 RX ADMIN — GABAPENTIN 300 MG: 300 CAPSULE ORAL at 08:29

## 2025-05-18 RX ADMIN — IPRATROPIUM BROMIDE AND ALBUTEROL SULFATE 1 DOSE: 2.5; .5 SOLUTION RESPIRATORY (INHALATION) at 12:13

## 2025-05-18 RX ADMIN — SACUBITRIL AND VALSARTAN 1 TABLET: 24; 26 TABLET, FILM COATED ORAL at 08:27

## 2025-05-18 RX ADMIN — ISOSORBIDE DINITRATE 10 MG: 10 TABLET ORAL at 13:33

## 2025-05-18 RX ADMIN — RANOLAZINE 500 MG: 500 TABLET, FILM COATED, EXTENDED RELEASE ORAL at 08:29

## 2025-05-18 RX ADMIN — SACUBITRIL AND VALSARTAN 1 TABLET: 24; 26 TABLET, FILM COATED ORAL at 21:07

## 2025-05-18 RX ADMIN — BUMETANIDE 1 MG: 1 TABLET ORAL at 08:29

## 2025-05-18 RX ADMIN — WATER 1000 MG: 1 INJECTION INTRAMUSCULAR; INTRAVENOUS; SUBCUTANEOUS at 12:04

## 2025-05-18 RX ADMIN — MAJOR MAGNESIUM CITRATE ORAL SOLUTION - LEMON 296 ML: 1.75 LIQUID ORAL at 10:13

## 2025-05-18 RX ADMIN — OXYBUTYNIN CHLORIDE 5 MG: 5 TABLET ORAL at 08:30

## 2025-05-18 RX ADMIN — PANTOPRAZOLE SODIUM 40 MG: 40 TABLET, DELAYED RELEASE ORAL at 06:39

## 2025-05-18 RX ADMIN — ACETAMINOPHEN 650 MG: 325 TABLET ORAL at 19:29

## 2025-05-18 RX ADMIN — SODIUM CHLORIDE, PRESERVATIVE FREE 10 ML: 5 INJECTION INTRAVENOUS at 05:20

## 2025-05-18 RX ADMIN — ISOSORBIDE DINITRATE 10 MG: 10 TABLET ORAL at 08:27

## 2025-05-18 RX ADMIN — INSULIN LISPRO 2 UNITS: 100 INJECTION, SOLUTION INTRAVENOUS; SUBCUTANEOUS at 21:32

## 2025-05-18 RX ADMIN — Medication 3 MG: at 21:08

## 2025-05-18 RX ADMIN — BISACODYL 10 MG: 10 SUPPOSITORY RECTAL at 14:35

## 2025-05-18 RX ADMIN — SODIUM CHLORIDE, PRESERVATIVE FREE 10 ML: 5 INJECTION INTRAVENOUS at 08:29

## 2025-05-18 RX ADMIN — GABAPENTIN 300 MG: 300 CAPSULE ORAL at 13:33

## 2025-05-18 RX ADMIN — SODIUM CHLORIDE, PRESERVATIVE FREE 10 ML: 5 INJECTION INTRAVENOUS at 05:27

## 2025-05-18 RX ADMIN — CLONIDINE HYDROCHLORIDE 0.2 MG: 0.1 TABLET ORAL at 21:06

## 2025-05-18 RX ADMIN — CLONIDINE HYDROCHLORIDE 0.2 MG: 0.1 TABLET ORAL at 08:29

## 2025-05-18 RX ADMIN — HEPARIN 300 UNITS: 100 SYRINGE at 08:30

## 2025-05-18 RX ADMIN — IPRATROPIUM BROMIDE AND ALBUTEROL SULFATE 1 DOSE: 2.5; .5 SOLUTION RESPIRATORY (INHALATION) at 16:14

## 2025-05-18 RX ADMIN — HEPARIN 300 UNITS: 100 SYRINGE at 21:10

## 2025-05-18 RX ADMIN — BISACODYL 10 MG: 10 SUPPOSITORY RECTAL at 13:33

## 2025-05-18 ASSESSMENT — PAIN DESCRIPTION - LOCATION
LOCATION: HEAD
LOCATION: EAR
LOCATION: EAR

## 2025-05-18 ASSESSMENT — PAIN DESCRIPTION - DESCRIPTORS
DESCRIPTORS: ACHING;DISCOMFORT;DULL
DESCRIPTORS: ACHING;DISCOMFORT;DULL

## 2025-05-18 ASSESSMENT — PAIN SCALES - GENERAL
PAINLEVEL_OUTOF10: 3
PAINLEVEL_OUTOF10: 0
PAINLEVEL_OUTOF10: 7
PAINLEVEL_OUTOF10: 0

## 2025-05-18 ASSESSMENT — PAIN - FUNCTIONAL ASSESSMENT: PAIN_FUNCTIONAL_ASSESSMENT: ACTIVITIES ARE NOT PREVENTED

## 2025-05-18 ASSESSMENT — PAIN DESCRIPTION - ONSET: ONSET: GRADUAL

## 2025-05-18 ASSESSMENT — PAIN DESCRIPTION - ORIENTATION
ORIENTATION: RIGHT
ORIENTATION: RIGHT

## 2025-05-18 ASSESSMENT — PAIN DESCRIPTION - PAIN TYPE: TYPE: ACUTE PAIN

## 2025-05-18 NOTE — PLAN OF CARE
Problem: Chronic Conditions and Co-morbidities  Goal: Patient's chronic conditions and co-morbidity symptoms are monitored and maintained or improved  5/18/2025 0742 by Isha Linton RN  Outcome: Progressing     Problem: Discharge Planning  Goal: Discharge to home or other facility with appropriate resources  5/18/2025 0742 by Isha Linton RN  Outcome: Progressing     Problem: Safety - Adult  Goal: Free from fall injury  5/18/2025 0742 by Isha Linton RN  Outcome: Progressing     Problem: ABCDS Injury Assessment  Goal: Absence of physical injury  5/18/2025 0742 by Isha Linton RN  Outcome: Progressing     Problem: Pain  Goal: Verbalizes/displays adequate comfort level or baseline comfort level  5/18/2025 0742 by Isha Linton RN  Outcome: Progressing     Problem: Gastrointestinal - Adult  Goal: Minimal or absence of nausea and vomiting  Outcome: Progressing     Problem: Gastrointestinal - Adult  Goal: Maintains or returns to baseline bowel function  Outcome: Progressing     Problem: Genitourinary - Adult  Goal: Absence of urinary retention  Outcome: Progressing     Problem: Metabolic/Fluid and Electrolytes - Adult  Goal: Electrolytes maintained within normal limits  Outcome: Progressing     Problem: Metabolic/Fluid and Electrolytes - Adult  Goal: Hemodynamic stability and optimal renal function maintained  Outcome: Progressing     Problem: Metabolic/Fluid and Electrolytes - Adult  Goal: Glucose maintained within prescribed range  Outcome: Progressing

## 2025-05-18 NOTE — PLAN OF CARE
Problem: Chronic Conditions and Co-morbidities  Goal: Patient's chronic conditions and co-morbidity symptoms are monitored and maintained or improved  5/18/2025 1608 by Kim Ho RN  Outcome: Progressing  Flowsheets (Taken 5/18/2025 1507)  Care Plan - Patient's Chronic Conditions and Co-Morbidity Symptoms are Monitored and Maintained or Improved: Monitor and assess patient's chronic conditions and comorbid symptoms for stability, deterioration, or improvement  5/18/2025 0742 by Isha Linton RN  Outcome: Progressing  Flowsheets  Taken 5/18/2025 0515 by Mary Salas RN  Care Plan - Patient's Chronic Conditions and Co-Morbidity Symptoms are Monitored and Maintained or Improved: Monitor and assess patient's chronic conditions and comorbid symptoms for stability, deterioration, or improvement  Taken 5/17/2025 2343 by Mary Salas RN  Care Plan - Patient's Chronic Conditions and Co-Morbidity Symptoms are Monitored and Maintained or Improved: Monitor and assess patient's chronic conditions and comorbid symptoms for stability, deterioration, or improvement     Problem: Discharge Planning  Goal: Discharge to home or other facility with appropriate resources  5/18/2025 1608 by Kim Ho RN  Outcome: Progressing  Flowsheets (Taken 5/18/2025 1507)  Discharge to home or other facility with appropriate resources: Identify barriers to discharge with patient and caregiver  5/18/2025 0742 by Isha Linton RN  Outcome: Progressing  Flowsheets  Taken 5/18/2025 0515 by Mary Salas RN  Discharge to home or other facility with appropriate resources: Identify barriers to discharge with patient and caregiver  Taken 5/17/2025 2343 by Mary Salas RN  Discharge to home or other facility with appropriate resources: Identify barriers to discharge with patient and caregiver     Problem: Safety - Adult  Goal: Free from fall injury  5/18/2025 1608 by Kim Ho RN  Outcome: Progressing  Flowsheets (Taken

## 2025-05-18 NOTE — PLAN OF CARE
Problem: Chronic Conditions and Co-morbidities  Goal: Patient's chronic conditions and co-morbidity symptoms are monitored and maintained or improved  Outcome: Progressing  Flowsheets (Taken 5/17/2025 1926)  Care Plan - Patient's Chronic Conditions and Co-Morbidity Symptoms are Monitored and Maintained or Improved: Monitor and assess patient's chronic conditions and comorbid symptoms for stability, deterioration, or improvement     Problem: Discharge Planning  Goal: Discharge to home or other facility with appropriate resources  Outcome: Progressing  Flowsheets (Taken 5/17/2025 1926)  Discharge to home or other facility with appropriate resources: Identify barriers to discharge with patient and caregiver     Problem: Safety - Adult  Goal: Free from fall injury  Outcome: Progressing     Problem: ABCDS Injury Assessment  Goal: Absence of physical injury  Outcome: Progressing     Problem: Pain  Goal: Verbalizes/displays adequate comfort level or baseline comfort level  Outcome: Progressing

## 2025-05-19 VITALS
SYSTOLIC BLOOD PRESSURE: 125 MMHG | WEIGHT: 211 LBS | OXYGEN SATURATION: 97 % | HEART RATE: 68 BPM | DIASTOLIC BLOOD PRESSURE: 57 MMHG | RESPIRATION RATE: 18 BRPM | TEMPERATURE: 97.3 F | HEIGHT: 65 IN | BODY MASS INDEX: 35.16 KG/M2

## 2025-05-19 LAB
ANION GAP SERPL CALCULATED.3IONS-SCNC: 8 MMOL/L (ref 7–16)
BASOPHILS # BLD: 0.02 K/UL (ref 0–0.2)
BASOPHILS NFR BLD: 0 % (ref 0–2)
BUN SERPL-MCNC: 14 MG/DL (ref 8–23)
CALCIUM SERPL-MCNC: 8.9 MG/DL (ref 8.8–10.2)
CHLORIDE SERPL-SCNC: 102 MMOL/L (ref 98–107)
CO2 SERPL-SCNC: 28 MMOL/L (ref 22–29)
CREAT SERPL-MCNC: 0.9 MG/DL (ref 0.5–1)
EOSINOPHIL # BLD: 0.12 K/UL (ref 0.05–0.5)
EOSINOPHILS RELATIVE PERCENT: 2 % (ref 0–6)
ERYTHROCYTE [DISTWIDTH] IN BLOOD BY AUTOMATED COUNT: 15.9 % (ref 11.5–15)
GFR, ESTIMATED: 67 ML/MIN/1.73M2
GLUCOSE BLD-MCNC: 190 MG/DL (ref 74–99)
GLUCOSE BLD-MCNC: 246 MG/DL (ref 74–99)
GLUCOSE SERPL-MCNC: 205 MG/DL (ref 74–99)
HCT VFR BLD AUTO: 28.5 % (ref 34–48)
HGB BLD-MCNC: 8.8 G/DL (ref 11.5–15.5)
IMM GRANULOCYTES # BLD AUTO: 0.05 K/UL (ref 0–0.58)
IMM GRANULOCYTES NFR BLD: 1 % (ref 0–5)
LYMPHOCYTES NFR BLD: 1.34 K/UL (ref 1.5–4)
LYMPHOCYTES RELATIVE PERCENT: 21 % (ref 20–42)
MCH RBC QN AUTO: 28 PG (ref 26–35)
MCHC RBC AUTO-ENTMCNC: 30.9 G/DL (ref 32–34.5)
MCV RBC AUTO: 90.8 FL (ref 80–99.9)
MONOCYTES NFR BLD: 0.93 K/UL (ref 0.1–0.95)
MONOCYTES NFR BLD: 15 % (ref 2–12)
NEUTROPHILS NFR BLD: 61 % (ref 43–80)
NEUTS SEG NFR BLD: 3.87 K/UL (ref 1.8–7.3)
PLATELET # BLD AUTO: 307 K/UL (ref 130–450)
PMV BLD AUTO: 10.7 FL (ref 7–12)
POTASSIUM SERPL-SCNC: 4 MMOL/L (ref 3.5–5.1)
RBC # BLD AUTO: 3.14 M/UL (ref 3.5–5.5)
SODIUM SERPL-SCNC: 138 MMOL/L (ref 136–145)
WBC OTHER # BLD: 6.3 K/UL (ref 4.5–11.5)

## 2025-05-19 PROCEDURE — 6360000002 HC RX W HCPCS: Performed by: INTERNAL MEDICINE

## 2025-05-19 PROCEDURE — 6370000000 HC RX 637 (ALT 250 FOR IP): Performed by: INTERNAL MEDICINE

## 2025-05-19 PROCEDURE — 80048 BASIC METABOLIC PNL TOTAL CA: CPT

## 2025-05-19 PROCEDURE — 94640 AIRWAY INHALATION TREATMENT: CPT

## 2025-05-19 PROCEDURE — 82962 GLUCOSE BLOOD TEST: CPT

## 2025-05-19 PROCEDURE — 2500000003 HC RX 250 WO HCPCS: Performed by: INTERNAL MEDICINE

## 2025-05-19 PROCEDURE — 85025 COMPLETE CBC W/AUTO DIFF WBC: CPT

## 2025-05-19 PROCEDURE — 99239 HOSP IP/OBS DSCHRG MGMT >30: CPT | Performed by: INTERNAL MEDICINE

## 2025-05-19 PROCEDURE — 6370000000 HC RX 637 (ALT 250 FOR IP)

## 2025-05-19 RX ORDER — METOPROLOL SUCCINATE 25 MG/1
75 TABLET, EXTENDED RELEASE ORAL DAILY
Qty: 270 TABLET | Refills: 0 | Status: SHIPPED | OUTPATIENT
Start: 2025-05-20

## 2025-05-19 RX ORDER — CEFDINIR 300 MG/1
300 CAPSULE ORAL EVERY 12 HOURS SCHEDULED
Qty: 7 CAPSULE | Refills: 0 | Status: SHIPPED | OUTPATIENT
Start: 2025-05-19 | End: 2025-05-23

## 2025-05-19 RX ORDER — POLYETHYLENE GLYCOL 3350 17 G/17G
17 POWDER, FOR SOLUTION ORAL DAILY PRN
Status: DISCONTINUED | OUTPATIENT
Start: 2025-05-19 | End: 2025-05-19 | Stop reason: HOSPADM

## 2025-05-19 RX ORDER — FERROUS SULFATE 325(65) MG
325 TABLET ORAL 2 TIMES DAILY WITH MEALS
Qty: 180 TABLET | Refills: 0 | Status: SHIPPED | OUTPATIENT
Start: 2025-05-19

## 2025-05-19 RX ORDER — ISOSORBIDE DINITRATE 10 MG/1
10 TABLET ORAL 3 TIMES DAILY
Qty: 270 TABLET | Refills: 0 | Status: SHIPPED | OUTPATIENT
Start: 2025-05-19

## 2025-05-19 RX ORDER — CEFDINIR 300 MG/1
300 CAPSULE ORAL EVERY 12 HOURS SCHEDULED
Status: DISCONTINUED | OUTPATIENT
Start: 2025-05-19 | End: 2025-05-19 | Stop reason: HOSPADM

## 2025-05-19 RX ORDER — RANOLAZINE 500 MG/1
500 TABLET, EXTENDED RELEASE ORAL 2 TIMES DAILY
Qty: 180 TABLET | Refills: 0 | Status: SHIPPED | OUTPATIENT
Start: 2025-05-19

## 2025-05-19 RX ORDER — BUMETANIDE 1 MG/1
1 TABLET ORAL DAILY
Qty: 90 TABLET | Refills: 0 | Status: SHIPPED | OUTPATIENT
Start: 2025-05-20

## 2025-05-19 RX ADMIN — CLONIDINE HYDROCHLORIDE 0.2 MG: 0.1 TABLET ORAL at 09:06

## 2025-05-19 RX ADMIN — CEFDINIR 300 MG: 300 CAPSULE ORAL at 11:41

## 2025-05-19 RX ADMIN — BUMETANIDE 1 MG: 1 TABLET ORAL at 09:06

## 2025-05-19 RX ADMIN — INSULIN LISPRO 2 UNITS: 100 INJECTION, SOLUTION INTRAVENOUS; SUBCUTANEOUS at 09:05

## 2025-05-19 RX ADMIN — INSULIN LISPRO 2 UNITS: 100 INJECTION, SOLUTION INTRAVENOUS; SUBCUTANEOUS at 11:41

## 2025-05-19 RX ADMIN — GABAPENTIN 300 MG: 300 CAPSULE ORAL at 14:49

## 2025-05-19 RX ADMIN — IPRATROPIUM BROMIDE AND ALBUTEROL SULFATE 1 DOSE: 2.5; .5 SOLUTION RESPIRATORY (INHALATION) at 11:16

## 2025-05-19 RX ADMIN — CLONIDINE HYDROCHLORIDE 0.2 MG: 0.1 TABLET ORAL at 14:49

## 2025-05-19 RX ADMIN — GABAPENTIN 300 MG: 300 CAPSULE ORAL at 09:07

## 2025-05-19 RX ADMIN — HEPARIN 300 UNITS: 100 SYRINGE at 09:07

## 2025-05-19 RX ADMIN — RANOLAZINE 500 MG: 500 TABLET, FILM COATED, EXTENDED RELEASE ORAL at 09:06

## 2025-05-19 RX ADMIN — POLYETHYLENE GLYCOL 3350 17 G: 17 POWDER, FOR SOLUTION ORAL at 09:05

## 2025-05-19 RX ADMIN — SODIUM CHLORIDE, PRESERVATIVE FREE 10 ML: 5 INJECTION INTRAVENOUS at 09:10

## 2025-05-19 RX ADMIN — OXYBUTYNIN CHLORIDE 5 MG: 5 TABLET ORAL at 14:49

## 2025-05-19 RX ADMIN — SACUBITRIL AND VALSARTAN 1 TABLET: 24; 26 TABLET, FILM COATED ORAL at 09:05

## 2025-05-19 RX ADMIN — FERROUS SULFATE TAB 325 MG (65 MG ELEMENTAL FE) 325 MG: 325 (65 FE) TAB at 09:06

## 2025-05-19 RX ADMIN — APIXABAN 5 MG: 5 TABLET, FILM COATED ORAL at 09:06

## 2025-05-19 RX ADMIN — ISOSORBIDE DINITRATE 10 MG: 10 TABLET ORAL at 14:48

## 2025-05-19 RX ADMIN — ASPIRIN 81 MG CHEWABLE TABLET 81 MG: 81 TABLET CHEWABLE at 09:05

## 2025-05-19 RX ADMIN — PANTOPRAZOLE SODIUM 40 MG: 40 TABLET, DELAYED RELEASE ORAL at 06:16

## 2025-05-19 RX ADMIN — ISOSORBIDE DINITRATE 10 MG: 10 TABLET ORAL at 09:06

## 2025-05-19 RX ADMIN — METOPROLOL SUCCINATE 75 MG: 25 TABLET, FILM COATED, EXTENDED RELEASE ORAL at 11:41

## 2025-05-19 RX ADMIN — INSULIN LISPRO 2 UNITS: 100 INJECTION, SOLUTION INTRAVENOUS; SUBCUTANEOUS at 06:16

## 2025-05-19 RX ADMIN — SODIUM CHLORIDE, PRESERVATIVE FREE 10 ML: 5 INJECTION INTRAVENOUS at 09:11

## 2025-05-19 RX ADMIN — OXYBUTYNIN CHLORIDE 5 MG: 5 TABLET ORAL at 09:05

## 2025-05-19 RX ADMIN — IPRATROPIUM BROMIDE AND ALBUTEROL SULFATE 1 DOSE: 2.5; .5 SOLUTION RESPIRATORY (INHALATION) at 08:04

## 2025-05-19 NOTE — PLAN OF CARE
Problem: Chronic Conditions and Co-morbidities  Goal: Patient's chronic conditions and co-morbidity symptoms are monitored and maintained or improved  5/18/2025 2157 by Lynne Gibson RN  Outcome: Progressing  Flowsheets (Taken 5/18/2025 1937)  Care Plan - Patient's Chronic Conditions and Co-Morbidity Symptoms are Monitored and Maintained or Improved: Monitor and assess patient's chronic conditions and comorbid symptoms for stability, deterioration, or improvement     Problem: Discharge Planning  Goal: Discharge to home or other facility with appropriate resources  5/18/2025 2157 by Lynne Gibson RN  Outcome: Progressing  Flowsheets (Taken 5/18/2025 1937)  Discharge to home or other facility with appropriate resources: Identify barriers to discharge with patient and caregiver     Problem: Safety - Adult  Goal: Free from fall injury  5/18/2025 2157 by Lynne Gibson RN  Outcome: Progressing     Problem: ABCDS Injury Assessment  Goal: Absence of physical injury  5/18/2025 2157 by Lynne Gibson RN  Outcome: Progressing     Problem: Pain  Goal: Verbalizes/displays adequate comfort level or baseline comfort level  5/18/2025 2157 by Lynne Gibson RN  Outcome: Progressing  Flowsheets (Taken 5/18/2025 1929)  Verbalizes/displays adequate comfort level or baseline comfort level:   Encourage patient to monitor pain and request assistance   Assess pain using appropriate pain scale   Administer analgesics based on type and severity of pain and evaluate response     Problem: Gastrointestinal - Adult  Goal: Minimal or absence of nausea and vomiting  5/18/2025 2157 by Lynne Gibson RN  Outcome: Progressing     Problem: Gastrointestinal - Adult  Goal: Maintains or returns to baseline bowel function  5/18/2025 2157 by Lynne Gibson RN  Outcome: Progressing     Problem: Genitourinary - Adult  Goal: Absence of urinary retention  5/18/2025 2157 by Lynne iGbson RN  Outcome: Progressing  Flowsheets

## 2025-05-19 NOTE — CARE COORDINATION
Reviewed chart, Marisol care manager assistant starting precert today for [facility]. Per bedside RN, life vest in room.  3:45pm received precert good throuogh5/22/25, notified unit. Pt discharging today. PAS for 4:30pm . Notified unit, [facility] via message, Meseret at [facility] of discharge/time.  .Belinda Gutierrez, MSW, LSW

## 2025-05-19 NOTE — DISCHARGE INSTR - COC
(0-10 scale): Pain Level: 0  Last Weight:   Wt Readings from Last 1 Encounters:   05/18/25 95.7 kg (211 lb)     Mental Status:  oriented, alert, coherent, logical, thought processes intact, and able to concentrate and follow conversation    IV Access:  - None    Nursing Mobility/ADLs:  Walking   Assisted  Transfer  Assisted  Bathing  Assisted  Dressing  Assisted  Toileting  Assisted  Feeding  Assisted  Med Admin  Independent  Med Delivery   whole    Wound Care Documentation and Therapy:        Elimination:  Continence:   Bowel: No  Bladder: No  Urinary Catheter: None   Colostomy/Ileostomy/Ileal Conduit: No       Date of Last BM: 05/18/2025    Intake/Output Summary (Last 24 hours) at 5/19/2025 1620  Last data filed at 5/19/2025 1342  Gross per 24 hour   Intake 540 ml   Output 1700 ml   Net -1160 ml     I/O last 3 completed shifts:  In: 1358 [P.O.:1025; I.V.:333]  Out: 2450 [Urine:2450]    Safety Concerns:     Risk of falls    Impairments/Disabilities:      None    Nutrition Therapy:  Current Nutrition Therapy:   - Oral Diet:  Carb Control 3 carbs/meal (1500kcals/day) and Cardiac    Routes of Feeding: Oral  Liquids: No Restrictions  Daily Fluid Restriction: no  Last Modified Barium Swallow with Video (Video Swallowing Test): not done    Treatments at the Time of Hospital Discharge:   Respiratory Treatments: breathing treatments  Oxygen Therapy:  is not on home oxygen therapy.  Ventilator:    - No ventilator support    Rehab Therapies: Physical Therapy and Occupational Therapy  Weight Bearing Status/Restrictions: No weight bearing restrictions  Other Medical Equipment (for information only, NOT a DME order):  walker  Other Treatments: ***    Patient's personal belongings (please select all that are sent with patient):  Dentures upper and lower, jewlry, cell phone, clothes, polygrip, life vest    RN SIGNATURE:  Electronically signed by LUZ TORO RN on 5/19/25 at 4:22 PM EDT    CASE MANAGEMENT/SOCIAL WORK

## 2025-05-19 NOTE — PROGRESS NOTES
[facility] Hospitalist Progress Note    Admitting Date and Time: 5/11/2025  2:05 PM  Admit Dx: Generalized abdominal pain [R10.84]  NSTEMI (non-ST elevated myocardial infarction) (HCC) [I21.4]  Acute on chronic congestive heart failure, unspecified heart failure type (HCC) [I50.9]    Synopsis:    74 y.o. female who presented to [facility] with chest pain and shortness of breath started around 10 AM today. She does have a history of CHF and also history of coronary artery disease. She mentioned that the pain was worse when she took deep breaths and also had an episode of nausea and emesis. Currently on Eliquis for history of blood clots. Troponin in the ER was found to be elevated at 86 and 104. proBNP of 4536. Lactic acid of 3.7 and 5.9. She was started on heparin by weight. Was given a dose of Lasix 40 IV push. Blood pressure in the ER as high as 196/101. Concerning for hypertensive emergency. Patient is currently admitted for NSTEMI and acute on chronic heart failure preserved ejection fraction.     5/13: Intermittent A-fib RVR.  Started on Cardizem bolus/drip by Cardiology.    5/14: Creatinine increased to 1.9 today from 1.4 yesterday.  [facility] held, tentatively planned for tomorrow.    5/15: Creatinine has normalized.  For [facility] today.    Subjective:  Patient is being followed for Generalized abdominal pain [R10.84]  NSTEMI (non-ST elevated myocardial infarction) (HCC) [I21.4]  Acute on chronic congestive heart failure, unspecified heart failure type (HCC) [I50.9]     Patient seen and examined at bedside this morning.  States shortness of breath is improving.  No new complaints.    ROS: denies fever, chills, cp, sob, n/v, HA unless stated above.      metoprolol succinate  25 mg Oral Daily    [Held by provider] apixaban  5 mg Oral BID    aspirin  81 mg Oral Daily    [Held by provider] cloNIDine  0.2 mg Oral TID    ferrous sulfate  325 mg Oral Every Other Day    gabapentin  300 mg Oral TID    [Held 
       [facility] Hospitalist Progress Note    Admitting Date and Time: 5/11/2025  2:05 PM  Admit Dx: Generalized abdominal pain [R10.84]  NSTEMI (non-ST elevated myocardial infarction) (HCC) [I21.4]  Acute on chronic congestive heart failure, unspecified heart failure type (HCC) [I50.9]    Synopsis:    74 y.o. female who presented to [facility] with chest pain and shortness of breath started around 10 AM today. She does have a history of CHF and also history of coronary artery disease. She mentioned that the pain was worse when she took deep breaths and also had an episode of nausea and emesis. Currently on Eliquis for history of blood clots. Troponin in the ER was found to be elevated at 86 and 104. proBNP of 4536. Lactic acid of 3.7 and 5.9. She was started on heparin by weight. Was given a dose of Lasix 40 IV push. Blood pressure in the ER as high as 196/101. Concerning for hypertensive emergency. Patient is currently admitted for NSTEMI and acute on chronic heart failure preserved ejection fraction.     5/13: Intermittent A-fib RVR.  Started on Cardizem bolus/drip by Cardiology.    5/14: Creatinine increased to 1.9 today from 1.4 yesterday.  [facility] held, tentatively planned for tomorrow.    5/15: Creatinine has normalized.  LHC was done and did not warrant intervention.  Continue medical therapy per cardiology.    5/16: Patient had some gross hematuria overnight.  UA showed signs of new UTI.  H&H Q6 for some reason was ordered and multiple staff had difficulty obtaining labs.  Now patient has difficult access with IV team having difficult time with midline placement.  BP now uncontrolled due to loss of IV access, hypogastric pain, nausea/vomiting.    5/17: Right IJ central line placed by general surgery overnight for access    Subjective:  Patient is being followed for Generalized abdominal pain [R10.84]  NSTEMI (non-ST elevated myocardial infarction) (HCC) [I21.4]  Acute on chronic congestive heart 
       [facility] Hospitalist Progress Note    Admitting Date and Time: 5/11/2025  2:05 PM  Admit Dx: Generalized abdominal pain [R10.84]  NSTEMI (non-ST elevated myocardial infarction) (HCC) [I21.4]  Acute on chronic congestive heart failure, unspecified heart failure type (HCC) [I50.9]    Synopsis:    74 y.o. female who presented to [facility] with chest pain and shortness of breath started around 10 AM today. She does have a history of CHF and also history of coronary artery disease. She mentioned that the pain was worse when she took deep breaths and also had an episode of nausea and emesis. Currently on Eliquis for history of blood clots. Troponin in the ER was found to be elevated at 86 and 104. proBNP of 4536. Lactic acid of 3.7 and 5.9. She was started on heparin by weight. Was given a dose of Lasix 40 IV push. Blood pressure in the ER as high as 196/101. Concerning for hypertensive emergency. Patient is currently admitted for NSTEMI and acute on chronic heart failure preserved ejection fraction.     5/13: Intermittent A-fib RVR.  Started on Cardizem bolus/drip by Cardiology.    5/14: Creatinine increased to 1.9 today from 1.4 yesterday.  [facility] held, tentatively planned for tomorrow.    Subjective:  Patient is being followed for Generalized abdominal pain [R10.84]  NSTEMI (non-ST elevated myocardial infarction) (HCC) [I21.4]  Acute on chronic congestive heart failure, unspecified heart failure type (HCC) [I50.9]     Patient seen and examined at bedside this morning.  States shortness of breath is improving.  No new complaints.    ROS: denies fever, chills, cp, sob, n/v, HA unless stated above.      cefTRIAXone (ROCEPHIN) IV  1,000 mg IntraVENous Q24H    metoprolol succinate  25 mg Oral Daily    [Held by provider] apixaban  5 mg Oral BID    aspirin  81 mg Oral Daily    [Held by provider] cloNIDine  0.2 mg Oral TID    ferrous sulfate  325 mg Oral Every Other Day    gabapentin  300 mg Oral TID    
       [facility] Hospitalist Progress Note    Admitting Date and Time: 5/11/2025  2:05 PM  Admit Dx: Generalized abdominal pain [R10.84]  NSTEMI (non-ST elevated myocardial infarction) (HCC) [I21.4]  Acute on chronic congestive heart failure, unspecified heart failure type (HCC) [I50.9]    Synopsis:    74 y.o. female who presented to [facility] with chest pain and shortness of breath started around 10 AM today. She does have a history of CHF and also history of coronary artery disease. She mentioned that the pain was worse when she took deep breaths and also had an episode of nausea and emesis. Currently on Eliquis for history of blood clots. Troponin in the ER was found to be elevated at 86 and 104. proBNP of 4536. Lactic acid of 3.7 and 5.9. She was started on heparin by weight. Was given a dose of Lasix 40 IV push. Blood pressure in the ER as high as 196/101. Concerning for hypertensive emergency. Patient is currently admitted for NSTEMI and acute on chronic heart failure preserved ejection fraction.     5/13: Intermittent A-fib RVR.  Started on Cardizem bolus/drip by Cardiology.    5/14: Creatinine increased to 1.9 today from 1.4 yesterday.  [facility]HC held, tentatively planned for tomorrow.    5/15: Creatinine has normalized.  LHC was done and did not warrant intervention.  Continue medical therapy per cardiology.    5/16: Patient had some gross hematuria overnight.  UA showed signs of new UTI.  H&H Q6 for some reason was ordered and multiple staff had difficulty obtaining labs.  Now patient has difficult access with IV team having difficult time with midline placement.  BP now uncontrolled due to loss of IV access, hypogastric pain, nausea/vomiting.    5/17: Right IJ central line placed by general surgery overnight for access    5/18: Hemoglobin downtrending with no signs of GI bleed.  Increase iron supplements to twice daily from every other day.    Subjective:  Patient is being followed for 
       [facility] Hospitalist Progress Note    Admitting Date and Time: 5/11/2025  2:05 PM  Admit Dx: Generalized abdominal pain [R10.84]  NSTEMI (non-ST elevated myocardial infarction) (HCC) [I21.4]  Acute on chronic congestive heart failure, unspecified heart failure type (HCC) [I50.9]    Synopsis:    74 y.o. female who presented to [facility] with chest pain and shortness of breath started around 10 AM today. She does have a history of CHF and also history of coronary artery disease. She mentioned that the pain was worse when she took deep breaths and also had an episode of nausea and emesis. Currently on Eliquis for history of blood clots. Troponin in the ER was found to be elevated at 86 and 104. proBNP of 4536. Lactic acid of 3.7 and 5.9. She was started on heparin by weight. Was given a dose of Lasix 40 IV push. Blood pressure in the ER as high as 196/101. Concerning for hypertensive emergency. Patient is currently admitted for NSTEMI and acute on chronic heart failure preserved ejection fraction.     5/13: Intermittent A-fib RVR.  Started on Cardizem bolus/drip by Cardiology.    5/14: Creatinine increased to 1.9 today from 1.4 yesterday.  LHC held, tentatively planned for tomorrow.    5/15: Creatinine has normalized.  LHC was done and did not warrant intervention.  Continue medical therapy per cardiology.    5/16: Patient had some gross hematuria overnight.  UA showed signs of new UTI.  H&H Q6 for some reason was ordered and multiple staff had difficulty obtaining labs.  Now patient has difficult access with IV team having difficult time with midline placement.  BP now uncontrolled due to loss of IV access, hypogastric pain, nausea/vomiting.    5/17: Right IJ central line placed by general surgery overnight for access    5/18: Hemoglobin downtrending with no signs of GI bleed.  Increase iron supplements to twice daily from every other day.    5/19: Can start pre-CERT for Guadalupe Leon.  
       [facility] Hospitalist Progress Note    Admitting Date and Time: 5/11/2025  2:05 PM  Admit Dx: Generalized abdominal pain [R10.84]  NSTEMI (non-ST elevated myocardial infarction) (HCC) [I21.4]  Acute on chronic congestive heart failure, unspecified heart failure type (HCC) [I50.9]  74 y.o. female who presented to [facility] with chest pain and shortness of breath started around 10 AM today.  She does have a history of CHF and also history of coronary artery disease.  She mentioned that the pain was worse when she took deep breaths and also had an episode of nausea and emesis.  Currently on Eliquis for history of blood clots.  Troponin in the ER was found to be elevated at 86 and 104.  proBNP of 4536.  Lactic acid of 3.7 and 5.9.  She was started on heparin by weight.  Was given a dose of Lasix 40 IV push.  Blood pressure in the ER as high as 196/101.  Concerning for hypertensive emergency. Patient is currently admitted for NSTEMI and acute on chronic heart failure preserved ejection fraction.       Subjective:  Patient is being followed for Generalized abdominal pain [R10.84]  NSTEMI (non-ST elevated myocardial infarction) (HCC) [I21.4]  Acute on chronic congestive heart failure, unspecified heart failure type (HCC) [I50.9]   Patient seen and examined.  Had n/v yesterday, denies any today  Still having pain in the left chest, no radiation.    ROS: denies fever, chills, cp, sob, n/v, HA unless stated above.      [Held by provider] apixaban  5 mg Oral BID    carvedilol  3.125 mg Oral BID    aspirin  81 mg Oral Daily    cloNIDine  0.2 mg Oral TID    ferrous sulfate  325 mg Oral Every Other Day    gabapentin  300 mg Oral TID    hydrALAZINE  10 mg Oral 2 times per day    ipratropium 0.5 mg-albuterol 2.5 mg  1 Dose Inhalation Q4H WA RT    insulin lispro  2 Units SubCUTAneous TID WC    montelukast  10 mg Oral Nightly    pantoprazole  40 mg Oral QAM AC    insulin glargine  10 Units SubCUTAneous Nightly 
       [facility] Hospitalist Progress Note    Admitting Date and Time: 5/11/2025  2:05 PM  Admit Dx: Generalized abdominal pain [R10.84]  NSTEMI (non-ST elevated myocardial infarction) (HCC) [I21.4]  Acute on chronic congestive heart failure, unspecified heart failure type (HCC) [I50.9]    Synopsis:    74 y.o. female who presented to [facility] with chest pain and shortness of breath started around 10 AM today. She does have a history of CHF and also history of coronary artery disease. She mentioned that the pain was worse when she took deep breaths and also had an episode of nausea and emesis. Currently on Eliquis for history of blood clots. Troponin in the ER was found to be elevated at 86 and 104. proBNP of 4536. Lactic acid of 3.7 and 5.9. She was started on heparin by weight. Was given a dose of Lasix 40 IV push. Blood pressure in the ER as high as 196/101. Concerning for hypertensive emergency. Patient is currently admitted for NSTEMI and acute on chronic heart failure preserved ejection fraction.     5/13: Intermittent A-fib RVR.  Started on Cardizem bolus/drip by Cardiology.    Subjective:  Patient is being followed for Generalized abdominal pain [R10.84]  NSTEMI (non-ST elevated myocardial infarction) (HCC) [I21.4]  Acute on chronic congestive heart failure, unspecified heart failure type (HCC) [I50.9]     Patient seen and examined at bedside this morning.  Reports reproducible chest pain on deep breathing.  No other complaints.    ROS: denies fever, chills, cp, sob, n/v, HA unless stated above.      magnesium sulfate  2,000 mg IntraVENous Once    cefTRIAXone (ROCEPHIN) IV  1,000 mg IntraVENous Q24H    [Held by provider] apixaban  5 mg Oral BID    carvedilol  3.125 mg Oral BID    aspirin  81 mg Oral Daily    cloNIDine  0.2 mg Oral TID    ferrous sulfate  325 mg Oral Every Other Day    gabapentin  300 mg Oral TID    hydrALAZINE  10 mg Oral 2 times per day    ipratropium 0.5 mg-albuterol 2.5 
   PROGRESS NOTE     CARDIOLOGY    Chief complaint: Seen today for follow up, management & recommendations for new cardiomyopathy    She denies chest pain or shortness of breath today.  Her only complaint is that she still cannot have a bowel movement and her abdomen is tender to palpation.    Wt Readings from Last 3 Encounters:   05/18/25 95.7 kg (211 lb)   04/19/25 94.3 kg (208 lb)   03/03/25 96.2 kg (212 lb)     Temp Readings from Last 3 Encounters:   05/18/25 97 °F (36.1 °C) (Temporal)   04/22/25 97.4 °F (36.3 °C) (Temporal)   11/08/24 97.8 °F (36.6 °C) (Oral)     BP Readings from Last 3 Encounters:   05/18/25 135/61   04/22/25 108/70   03/03/25 106/62     Pulse Readings from Last 3 Encounters:   05/18/25 77   04/22/25 80   03/03/25 64         Intake/Output Summary (Last 24 hours) at 5/18/2025 1106  Last data filed at 5/18/2025 0918  Gross per 24 hour   Intake 1058 ml   Output 1400 ml   Net -342 ml       Recent Labs     05/16/25  1140 05/17/25  0500 05/18/25  0523   WBC 11.7* 7.8 7.7   HGB 10.6* 9.9* 8.5*   HCT 33.5* 31.0* 27.5*   MCV 89.1 88.8 90.2    346 284     Recent Labs     05/16/25  1140 05/17/25  0500 05/18/25  0523    140 139   K 3.9 3.6 4.1    103 103   CO2 23 27 28   BUN 12 11 15   CREATININE 0.6 0.8 0.9     No results for input(s): \"PROTIME\", \"INR\" in the last 72 hours.  No results for input(s): \"CKTOTAL\", \"CKMB\", \"CKMBINDEX\", \"TROPONINI\" in the last 72 hours.  No results for input(s): \"BNP\" in the last 72 hours.  No results for input(s): \"CHOL\", \"HDL\", \"TRIG\" in the last 72 hours.    Invalid input(s): \"CHOLHDLR\", \"LDLCALCU\"    No results for input(s): \"TROPHS\" in the last 72 hours.        ferrous sulfate (IRON 325) tablet 325 mg, BID WC  sodium chloride flush 0.9 % injection 5-40 mL, BID  heparin (PF) 100 UNIT/ML injection 300 Units, Q12H  sodium chloride flush 0.9 % injection 5-40 mL, PRN  heparin (PF) 100 UNIT/ML injection 300 Units, PRN  metoprolol succinate (TOPROL XL) 
   PROGRESS NOTE     CARDIOLOGY    Chief complaint: Seen today for follow up, management & recommendations for new cardiomyopathy    She denies chest pain or shortness of breath today.  She was complaining of recurring abdominal pain.  It is worse in the lower quadrants.    Wt Readings from Last 3 Encounters:   05/16/25 99.3 kg (219 lb)   04/19/25 94.3 kg (208 lb)   03/03/25 96.2 kg (212 lb)     Temp Readings from Last 3 Encounters:   05/16/25 97.4 °F (36.3 °C) (Temporal)   04/22/25 97.4 °F (36.3 °C) (Temporal)   11/08/24 97.8 °F (36.6 °C) (Oral)     BP Readings from Last 3 Encounters:   05/16/25 (!) 182/106   04/22/25 108/70   03/03/25 106/62     Pulse Readings from Last 3 Encounters:   05/16/25 94   04/22/25 80   03/03/25 64         Intake/Output Summary (Last 24 hours) at 5/16/2025 1339  Last data filed at 5/16/2025 0920  Gross per 24 hour   Intake 490 ml   Output 2900 ml   Net -2410 ml       Recent Labs     05/14/25  0615 05/15/25  0523 05/16/25  1140   WBC 7.3 8.1 11.7*   HGB 9.4* 10.0* 10.6*   HCT 31.2* 31.6* 33.5*   MCV 92.6 89.3 89.1    309 396     Recent Labs     05/14/25  0615 05/15/25  0523 05/16/25  1140    139 137   K 3.5 3.8 3.9    103 102   CO2 22 26 23   BUN 37* 24* 12   CREATININE 1.9* 1.0 0.6     No results for input(s): \"PROTIME\", \"INR\" in the last 72 hours.  No results for input(s): \"CKTOTAL\", \"CKMB\", \"CKMBINDEX\", \"TROPONINI\" in the last 72 hours.  No results for input(s): \"BNP\" in the last 72 hours.  No results for input(s): \"CHOL\", \"HDL\", \"TRIG\" in the last 72 hours.    Invalid input(s): \"CHOLHDLR\", \"LDLCALCU\"    No results for input(s): \"TROPHS\" in the last 72 hours.        metoprolol succinate (TOPROL XL) extended release tablet 50 mg, Daily  hydrALAZINE (APRESOLINE) tablet 25 mg, 3 times per day  prochlorperazine (COMPAZINE) tablet 10 mg, Q8H PRN   Or  prochlorperazine (COMPAZINE) injection 5 mg, Q6H PRN  bisacodyl (DULCOLAX) suppository 10 mg, Daily PRN  [START ON 
   PROGRESS NOTE     CARDIOLOGY    Chief complaint: Seen today for follow up, management & recommendations for new cardiomyopathy    She denies chest pain or shortness of breath today.  She was reclining in bed sleeping comfortably.  She awakened easily.  She was comfortable and in no distress    Wt Readings from Last 3 Encounters:   05/14/25 99.3 kg (219 lb)   04/19/25 94.3 kg (208 lb)   03/03/25 96.2 kg (212 lb)     Temp Readings from Last 3 Encounters:   05/14/25 97.3 °F (36.3 °C) (Temporal)   04/22/25 97.4 °F (36.3 °C) (Temporal)   11/08/24 97.8 °F (36.6 °C) (Oral)     BP Readings from Last 3 Encounters:   05/14/25 132/64   04/22/25 108/70   03/03/25 106/62     Pulse Readings from Last 3 Encounters:   05/14/25 69   04/22/25 80   03/03/25 64         Intake/Output Summary (Last 24 hours) at 5/14/2025 1555  Last data filed at 5/14/2025 1305  Gross per 24 hour   Intake 1881.59 ml   Output 400 ml   Net 1481.59 ml       Recent Labs     05/12/25  0835 05/13/25  0618 05/14/25  0615   WBC 18.6* 10.3 7.3   HGB 10.3* 10.2* 9.4*   HCT 32.6* 33.5* 31.2*   MCV 90.3 91.8 92.6    330 308     Recent Labs     05/12/25  0205 05/12/25  0835 05/13/25  0618 05/14/25  0615   NA  --  144 144 136   K  --  3.8 3.4* 3.5   CL  --  105 105 100   CO2  --  18* 24 22   BUN  --  19 30* 37*   CREATININE  --  1.1* 1.4* 1.9*   MG 1.7  --   --   --      No results for input(s): \"PROTIME\", \"INR\" in the last 72 hours.  No results for input(s): \"CKTOTAL\", \"CKMB\", \"CKMBINDEX\", \"TROPONINI\" in the last 72 hours.  No results for input(s): \"BNP\" in the last 72 hours.  Recent Labs     05/12/25  0835   CHOL 103   HDL 53   TRIG 59     Recent Labs     05/11/25  1612 05/11/25  1729 05/12/25  0835   TROPHS 104* 105* 148*         cefTRIAXone (ROCEPHIN) 1,000 mg in sterile water 10 mL IV syringe, Q24H  amiodarone (NEXTERONE) 360 mg in dextrose 5% 200 ml, Continuous  0.9 % sodium chloride infusion, Continuous  metoprolol succinate (TOPROL XL) extended 
   PROGRESS NOTE     CARDIOLOGY    Chief complaint: Seen today for follow up, management & recommendations for new cardiomyopathy    She denies chest pain or shortness of breath today.  She was sitting in the chair at the side of the bed.  She was in no distress.    Wt Readings from Last 3 Encounters:   05/13/25 96.6 kg (213 lb)   04/19/25 94.3 kg (208 lb)   03/03/25 96.2 kg (212 lb)     Temp Readings from Last 3 Encounters:   05/13/25 98.4 °F (36.9 °C) (Temporal)   04/22/25 97.4 °F (36.3 °C) (Temporal)   11/08/24 97.8 °F (36.6 °C) (Oral)     BP Readings from Last 3 Encounters:   05/13/25 (!) 108/51   04/22/25 108/70   03/03/25 106/62     Pulse Readings from Last 3 Encounters:   05/13/25 97   04/22/25 80   03/03/25 64         Intake/Output Summary (Last 24 hours) at 5/13/2025 1607  Last data filed at 5/13/2025 1448  Gross per 24 hour   Intake 584.03 ml   Output 300 ml   Net 284.03 ml       Recent Labs     05/11/25  1426 05/12/25  0835 05/13/25  0618   WBC 9.8 18.6* 10.3   HGB 10.6* 10.3* 10.2*   HCT 34.7 32.6* 33.5*   MCV 91.8 90.3 91.8    414 330     Recent Labs     05/11/25  1426 05/12/25  0205 05/12/25  0835 05/13/25  0618     --  144 144   K 4.0  --  3.8 3.4*     --  105 105   CO2 23  --  18* 24   BUN 7*  --  19 30*   CREATININE 0.9  --  1.1* 1.4*   MG  --  1.7  --   --      No results for input(s): \"PROTIME\", \"INR\" in the last 72 hours.  No results for input(s): \"CKTOTAL\", \"CKMB\", \"CKMBINDEX\", \"TROPONINI\" in the last 72 hours.  No results for input(s): \"BNP\" in the last 72 hours.  Recent Labs     05/12/25  0835   CHOL 103   HDL 53   TRIG 59     Recent Labs     05/11/25  1612 05/11/25  1729 05/12/25  0835   TROPHS 104* 105* 148*         cefTRIAXone (ROCEPHIN) 1,000 mg in sterile water 10 mL IV syringe, Q24H  amiodarone (CORDARONE) 150 mg in dextrose 5 % 100 mL bolus ([id number]), Once  dilTIAZem 100 mg in sodium chloride 0.9 % 100 mL infusion (ADD-[place]), Continuous  amiodarone (NEXTERONE) 360 
  Gastroenterology, Hepatology, &  Advanced Endoscopy    Progress Note        HPI:     LFTs are improving.  She is having abdominal pain.  She had a bowel movement and emesis yesterday. No blood appreciated.  She is passing gas.     Lab Results   Component Value Date    INR 1.1 07/16/2013    INR 1.1 11/01/2010    PROTIME 11.7 07/16/2013    PROTIME 11.6 11/01/2010         ALT   Date Value Ref Range Status   05/16/2025 358 (H) 0 - 35 U/L Final   05/15/2025 510 (H) 0 - 35 U/L Final   05/14/2025 655 (H) 0 - 35 U/L Final     AST   Date Value Ref Range Status   05/16/2025 93 (H) 0 - 35 U/L Final   05/15/2025 230 (H) 0 - 35 U/L Final   05/14/2025 544 (H) 0 - 35 U/L Final     Alkaline Phosphatase   Date Value Ref Range Status   05/16/2025 139 (H) 35 - 104 U/L Final   05/15/2025 128 (H) 35 - 104 U/L Final   05/14/2025 117 (H) 35 - 104 U/L Final     Total Bilirubin   Date Value Ref Range Status   05/16/2025 0.8 0.0 - 1.2 mg/dL Final   05/15/2025 0.3 0.0 - 1.2 mg/dL Final   05/14/2025 0.3 0.0 - 1.2 mg/dL Final     Bilirubin, Direct   Date Value Ref Range Status   05/16/2025 0.4 (H) 0.0 - 0.2 mg/dL Final   05/15/2025 0.2 0.0 - 0.2 mg/dL Final   05/14/2025 0.2 0.0 - 0.2 mg/dL Final      Lab Results   Component Value Date    WBC 11.7 (H) 05/16/2025    HGB 10.6 (L) 05/16/2025    HCT 33.5 (L) 05/16/2025     05/16/2025     05/16/2025    K 3.9 05/16/2025     05/16/2025    CREATININE 0.6 05/16/2025    BUN 12 05/16/2025    CO2 23 05/16/2025    FOLATE 16.9 05/12/2025    LIOMJCQK75 1739 (H) 05/12/2025    GLUCOSE 149 (H) 05/16/2025    INR 1.1 07/16/2013    PROTIME 11.7 07/16/2013    TSH 1.19 12/09/2023    LABA1C 7.8 (H) 04/20/2025     Computed MELD 3.0 unavailable. One or more values for this score either were not found within the given timeframe or did not fit some other criterion.  Computed MELD-Na unavailable. One or more values for this score either were not found within the given timeframe or did not fit some other 
  Gastroenterology, Hepatology, &  Advanced Endoscopy    Progress Note      Reason for Consult: Elevated LFT's    HPI:   Fanny Gonzalez is a 74 y.o. female w/ PMH of  has a past medical history of Arthritis, Asthma, BRCA1 negative, BRCA2 negative, Breast cancer (HCC), CAD in native artery, Cancer (HCC), Cerebral artery occlusion with cerebral infarction (HCC), Cerebrovascular disease, CHF (congestive heart failure) (HCC), Claustrophobia, COPD (chronic obstructive pulmonary disease) (HCC), Decreased dorsalis pedis pulse, Dermatophytosis, Headache(784.0), History of blood transfusion, Hives, Hx of blood clots, Hyperlipidemia, Hypertension, LBP radiating to both legs, Lymphedema of both lower extremities, Neuromuscular disorder (HCC), Non-rheumatic aortic sclerosis, Obesity, Pulmonary hypertension (HCC), PVD (peripheral vascular disease) with claudication, Recurrent genital HSV (herpes simplex virus) infection, S/P breast biopsy, right, Type II or unspecified type diabetes mellitus without mention of complication, not stated as uncontrolled, and UTI (urinary tract infection). who presents to the ED with complaints of chest pain that began at 10 AM today.  Patient states chest pain is left-sided radiates to the left shoulder, has no alleviating factors, worse on deep inspiration, sharp in quality, currently moderate intensity.  Patient also complains of right leg swelling.  Patient states she fell several weeks ago and has been having swelling to the right lower extremity since that time.  Patient does endorse history of PE and has been on Eliquis but states she vomited Eliquis today.  Patient says she has had nausea vomiting abdominal pain that began 5/11/25.  Patient states she has been compliant with Eliquis up till 5/11/25.       PMH:       Diagnosis Date    Arthritis     Asthma     BRCA1 negative     BRCA2 negative     Breast cancer (HCC)     CAD in native artery 12/15/2021    12-15-21 cate 2.5x38 hima rca. 12-15-21 
 notified via perfect serve of patient in afib - controlled rate and EKG obtained.  
Bladder scan 47 cc, o2 applied at 3lnc say 95%, Pt instructed not to remove NC at this time  
Comprehensive Nutrition Assessment    Type and Reason for Visit:  Initial (LOS)    Nutrition Recommendations/Plan:   Recommend and start Glucerna supplement daily to help meet nutritional needs.         Malnutrition Assessment:  Malnutrition Status:  At risk for malnutrition (05/19/25 1219)    Context:  Acute Illness     Findings of the 6 clinical characteristics of malnutrition:  Energy Intake:  75% or less of estimated energy requirements for 7 or more days  Weight Loss:  Unable to assess (d/t possible fluid shifts ; hx of CHF)     Body Fat Loss:  Unable to assess     Muscle Mass Loss:  Unable to assess    Fluid Accumulation:  No fluid accumulation     Strength:  Not Performed    Nutrition Assessment:    Patient adm w/ chest pain and SOB ; also adm w/ abd pain and N/V (2/2 UTI) ; noted NSTEMI ; s/p cardiac cath on 5/15 ; noted A-fib RVR and acute on chronic congestive heart failure ;  noted PETER and shock liver ; hx of DM/COPD/CVA/breast CA ; hx of CAD/CHF/PVD/LBBB ; noted COPD with exacerbation and new atrial fibrillation ; will provide recommendations    Nutrition Related Findings:    -I&Os (-6.3 L), 1+ edema, A&O x 4, upper dentures, active BS, hyperglycemia ; Wound Type: None (puncture site x 1)       Current Nutrition Intake & Therapies:    Average Meal Intake: 51-75%     ADULT DIET; Regular; Low Fat/Low Chol/High Fiber/2 gm Na    Anthropometric Measures:  Height: 165.1 cm (5' 5\")  Ideal Body Weight (IBW): 125 lbs (57 kg)    Admission Body Weight: 99.3 kg (219 lb) (5/11, no method)  Current Body Weight: 95.7 kg (211 lb) (5/18, standing scale), 168.8 % IBW. Weight Source: Standing scale  Current BMI (kg/m2): 35.1  Usual Body Weight:  (UTO ; EMR shows past weight of 208# standing scale on 4/19/25 and 212# no method on 2/13/25)                          BMI Categories: Obese Class 2 (BMI 35.0 -39.9)    Estimated Daily Nutrient Needs:  Energy Requirements Based On: Formula  Weight Used for Energy Requirements: 
Consult to Dr. Boston WHITNEY  
Consulted for midline. Assessed bilateral arms to find very small and tortuous veins that I do not feel appropriate for a midline. Asking iv team co-worker to also assess patient for confirmation.   
Department of Internal Medicine  Nephrology Attending Consult Note    Events reviewed.    SUBJECTIVE: We are following Fanny Gonzalez for PETER. Patient reports no complaints.     PHYSICAL EXAM:      Vitals:    VITALS:  /60   Pulse 60   Temp 96.9 °F (36.1 °C) (Temporal)   Resp 18   Ht 1.651 m (5' 5\")   Wt 99.3 kg (219 lb)   SpO2 94%   BMI 36.44 kg/m²   24HR INTAKE/OUTPUT:    Intake/Output Summary (Last 24 hours) at 5/14/2025 0933  Last data filed at 5/14/2025 0757  Gross per 24 hour   Intake 2121.59 ml   Output 400 ml   Net 1721.59 ml     Scheduled Meds:   potassium chloride  40 mEq Oral Once    cefTRIAXone (ROCEPHIN) IV  1,000 mg IntraVENous Q24H    metoprolol succinate  25 mg Oral Daily    [Held by provider] apixaban  5 mg Oral BID    aspirin  81 mg Oral Daily    [Held by provider] cloNIDine  0.2 mg Oral TID    ferrous sulfate  325 mg Oral Every Other Day    gabapentin  300 mg Oral TID    [Held by provider] hydrALAZINE  10 mg Oral 2 times per day    ipratropium 0.5 mg-albuterol 2.5 mg  1 Dose Inhalation Q4H WA RT    insulin lispro  2 Units SubCUTAneous TID WC    montelukast  10 mg Oral Nightly    pantoprazole  40 mg Oral QAM AC    insulin glargine  10 Units SubCUTAneous Nightly    oxyBUTYnin  5 mg Oral TID    [Held by provider] rosuvastatin  40 mg Oral QPM    traZODone  50 mg Oral Nightly    sodium chloride  500 mL IntraVENous Once    fentanNYL  50 mcg IntraVENous Once    insulin lispro  0-8 Units SubCUTAneous 4x Daily AC & HS    [Held by provider] furosemide  40 mg IntraVENous BID    sodium chloride flush  5-40 mL IntraVENous 2 times per day    melatonin  3 mg Oral Nightly     Continuous Infusions:   amiodarone      sodium chloride 50 mL/hr at 05/14/25 0559    heparin (PORCINE) Infusion 10 Units/kg/hr (05/14/25 0834)    sodium chloride       PRN Meds:.docusate sodium, albuterol, nitroGLYCERIN, heparin (porcine), heparin (porcine), sodium chloride flush, sodium chloride, potassium chloride **OR** 
Department of Internal Medicine  Nephrology Attending Consult Note    Events reviewed.    SUBJECTIVE: We are following Fanny Gonzalez for PETER. Patient reports no complaints.     PHYSICAL EXAM:      Vitals:    VITALS:  BP (!) 179/78   Pulse 73   Temp 97.2 °F (36.2 °C) (Temporal)   Resp 16   Ht 1.651 m (5' 5\")   Wt 99.8 kg (220 lb)   SpO2 (!) 78%   BMI 36.61 kg/m²   24HR INTAKE/OUTPUT:    Intake/Output Summary (Last 24 hours) at 5/15/2025 1348  Last data filed at 5/15/2025 1209  Gross per 24 hour   Intake 1599.58 ml   Output 2110 ml   Net -510.42 ml     Scheduled Meds:   insulin glargine  12 Units SubCUTAneous Nightly    ranolazine  500 mg Oral BID    isosorbide dinitrate  10 mg Oral TID    metoprolol succinate  25 mg Oral Daily    [Held by provider] apixaban  5 mg Oral BID    aspirin  81 mg Oral Daily    [Held by provider] cloNIDine  0.2 mg Oral TID    ferrous sulfate  325 mg Oral Every Other Day    gabapentin  300 mg Oral TID    [Held by provider] hydrALAZINE  10 mg Oral 2 times per day    ipratropium 0.5 mg-albuterol 2.5 mg  1 Dose Inhalation Q4H WA RT    insulin lispro  2 Units SubCUTAneous TID WC    montelukast  10 mg Oral Nightly    pantoprazole  40 mg Oral QAM AC    oxyBUTYnin  5 mg Oral TID    [Held by provider] rosuvastatin  40 mg Oral QPM    traZODone  50 mg Oral Nightly    sodium chloride  500 mL IntraVENous Once    fentanNYL  50 mcg IntraVENous Once    insulin lispro  0-8 Units SubCUTAneous 4x Daily AC & HS    [Held by provider] furosemide  40 mg IntraVENous BID    sodium chloride flush  5-40 mL IntraVENous 2 times per day    melatonin  3 mg Oral Nightly     Continuous Infusions:   dextrose      amiodarone      sodium chloride 50 mL/hr at 05/14/25 2100    sodium chloride       PRN Meds:.glucose, dextrose bolus **OR** dextrose bolus, glucagon (rDNA), dextrose, docusate sodium, albuterol, nitroGLYCERIN, sodium chloride flush, sodium chloride, potassium chloride **OR** potassium alternative oral 
Department of Internal Medicine  Nephrology Attending Consult Note    Events reviewed.    SUBJECTIVE: We are following Fanny Gonzalez for PETER. Patient reports no complaints.     PHYSICAL EXAM:      Vitals:    VITALS:  BP (!) 180/102 Comment: manual  Pulse 82   Temp 96.9 °F (36.1 °C) (Temporal)   Resp 18   Ht 1.651 m (5' 5\")   Wt 99.3 kg (219 lb)   SpO2 100%   BMI 36.44 kg/m²   24HR INTAKE/OUTPUT:    Intake/Output Summary (Last 24 hours) at 5/16/2025 0934  Last data filed at 5/16/2025 0920  Gross per 24 hour   Intake 490 ml   Output 2910 ml   Net -2420 ml     Scheduled Meds:   metoprolol succinate  50 mg Oral Daily    hydrALAZINE  25 mg Oral 3 times per day    cefTRIAXone (ROCEPHIN) IV  1,000 mg IntraVENous Q24H    insulin glargine  12 Units SubCUTAneous Nightly    ranolazine  500 mg Oral BID    isosorbide dinitrate  10 mg Oral TID    lactulose  20 g Oral BID    apixaban  5 mg Oral BID    aspirin  81 mg Oral Daily    [Held by provider] cloNIDine  0.2 mg Oral TID    ferrous sulfate  325 mg Oral Every Other Day    gabapentin  300 mg Oral TID    ipratropium 0.5 mg-albuterol 2.5 mg  1 Dose Inhalation Q4H WA RT    insulin lispro  2 Units SubCUTAneous TID WC    montelukast  10 mg Oral Nightly    pantoprazole  40 mg Oral QAM AC    oxyBUTYnin  5 mg Oral TID    [Held by provider] rosuvastatin  40 mg Oral QPM    traZODone  50 mg Oral Nightly    sodium chloride  500 mL IntraVENous Once    fentanNYL  50 mcg IntraVENous Once    insulin lispro  0-8 Units SubCUTAneous 4x Daily AC & HS    [Held by provider] furosemide  40 mg IntraVENous BID    sodium chloride flush  5-40 mL IntraVENous 2 times per day    melatonin  3 mg Oral Nightly     Continuous Infusions:   dextrose      sodium chloride Stopped (05/15/25 1900)    sodium chloride       PRN Meds:.glucose, dextrose bolus **OR** dextrose bolus, glucagon (rDNA), dextrose, hydrALAZINE, docusate sodium, albuterol, nitroGLYCERIN, sodium chloride flush, sodium chloride, potassium 
Department of Internal Medicine  Nephrology Progress Note    Events reviewed.    SUBJECTIVE: We are following Fanny FRANK Agapitos for PETER. Patient reports no complaints.     PHYSICAL EXAM:      Vitals:    VITALS:  BP (!) 155/94   Pulse 80   Temp 98.1 °F (36.7 °C) (Temporal)   Resp 17   Ht 1.651 m (5' 5\")   Wt 95.2 kg (209 lb 12.8 oz)   SpO2 100%   BMI 34.91 kg/m²   24HR INTAKE/OUTPUT:    Intake/Output Summary (Last 24 hours) at 5/17/2025 0953  Last data filed at 5/17/2025 0513  Gross per 24 hour   Intake --   Output 2350 ml   Net -2350 ml     Scheduled Meds:   sodium chloride flush  5-40 mL IntraVENous BID    heparin (PF)  300 Units IntraCATHeter Q12H    hydrALAZINE  50 mg Oral 3 times per day    [START ON 5/18/2025] metoprolol succinate  75 mg Oral Daily    potassium chloride  40 mEq Oral Once    cefTRIAXone (ROCEPHIN) IV  1,000 mg IntraVENous Q24H    bumetanide  1 mg Oral Daily    insulin glargine  12 Units SubCUTAneous Nightly    ranolazine  500 mg Oral BID    isosorbide dinitrate  10 mg Oral TID    [Held by provider] apixaban  5 mg Oral BID    aspirin  81 mg Oral Daily    cloNIDine  0.2 mg Oral TID    ferrous sulfate  325 mg Oral Every Other Day    gabapentin  300 mg Oral TID    ipratropium 0.5 mg-albuterol 2.5 mg  1 Dose Inhalation Q4H WA RT    insulin lispro  2 Units SubCUTAneous TID WC    montelukast  10 mg Oral Nightly    pantoprazole  40 mg Oral QAM AC    oxyBUTYnin  5 mg Oral TID    [Held by provider] rosuvastatin  40 mg Oral QPM    traZODone  50 mg Oral Nightly    sodium chloride  500 mL IntraVENous Once    fentanNYL  50 mcg IntraVENous Once    insulin lispro  0-8 Units SubCUTAneous 4x Daily AC & HS    [Held by provider] furosemide  40 mg IntraVENous BID    sodium chloride flush  5-40 mL IntraVENous 2 times per day    melatonin  3 mg Oral Nightly     Continuous Infusions:   dextrose      [Held by provider] sodium chloride Stopped (05/15/25 1900)    sodium chloride       PRN Meds:.sodium chloride flush, 
Department of Internal Medicine  Nephrology Progress Note    Events reviewed.    SUBJECTIVE: We are following Fanny Gonzalez for PETER. Patient reports no complaints.     PHYSICAL EXAM:      Vitals:    VITALS:  BP (!) 145/84   Pulse 66   Temp 97 °F (36.1 °C) (Temporal)   Resp 17   Ht 1.651 m (5' 5\")   Wt 95.7 kg (211 lb)   SpO2 100%   BMI 35.11 kg/m²   24HR INTAKE/OUTPUT:    Intake/Output Summary (Last 24 hours) at 5/18/2025 0941  Last data filed at 5/18/2025 0918  Gross per 24 hour   Intake 1058 ml   Output 1400 ml   Net -342 ml     Scheduled Meds:   Magnesium Citrate  296 mL Oral Once    ferrous sulfate  325 mg Oral BID WC    sodium chloride flush  5-40 mL IntraVENous BID    heparin (PF)  300 Units IntraCATHeter Q12H    metoprolol succinate  75 mg Oral Daily    polyethylene glycol  17 g Oral Daily    senna  1 tablet Oral BID    sacubitril-valsartan  1 tablet Oral BID    cefTRIAXone (ROCEPHIN) IV  1,000 mg IntraVENous Q24H    bumetanide  1 mg Oral Daily    insulin glargine  12 Units SubCUTAneous Nightly    ranolazine  500 mg Oral BID    isosorbide dinitrate  10 mg Oral TID    [Held by provider] apixaban  5 mg Oral BID    aspirin  81 mg Oral Daily    cloNIDine  0.2 mg Oral TID    gabapentin  300 mg Oral TID    ipratropium 0.5 mg-albuterol 2.5 mg  1 Dose Inhalation Q4H WA RT    insulin lispro  2 Units SubCUTAneous TID WC    montelukast  10 mg Oral Nightly    pantoprazole  40 mg Oral QAM AC    oxyBUTYnin  5 mg Oral TID    [Held by provider] rosuvastatin  40 mg Oral QPM    traZODone  50 mg Oral Nightly    sodium chloride  500 mL IntraVENous Once    fentanNYL  50 mcg IntraVENous Once    insulin lispro  0-8 Units SubCUTAneous 4x Daily AC & HS    [Held by provider] furosemide  40 mg IntraVENous BID    sodium chloride flush  5-40 mL IntraVENous 2 times per day    melatonin  3 mg Oral Nightly     Continuous Infusions:   dextrose      sodium chloride       PRN Meds:.sodium chloride flush, heparin (PF), prochlorperazine 
Dr. Alford notified of UA results.     IV team consulted for IV site access and lab draw.  
Dr. Carroll consulted for GI , per Dr Mead recommendation  
Dr. Carroll notified of constipation and n/v. Dr. Carroll to see today.   
Dr. Castillo notified of BP.      Message placed to Dr. Carroll for orders for constipation.  
Dr. Dinero notified of 3.7 pause and 4.8 second pause and current infusion rates of amio and cardizem  
Dr. Dinero notified of SR PVc, ecg done  
General surgery consult placed via perfect serve to Dr. Hopkins. Per Dr. Hopkins, have primary switch ATB to IM, if they won't, to call her back. Dr. Alford notified.    
Heart monitor displayed afib RVR w/ -140s, confirmed by EKG. Asymptomatic At 1430 pt spontaneously converted back to SR. Sr Rogers notified.   
IV team consulted for midline  
Lab called for stat labs  
Message to IV team for stat blooddraw  
Message to cardio r/t at [facility] RVR  
Noted rhythm in and out of SR and a fib rvr. Dr Rogers notified of this and BP. Asymptomatic. No c/o or distress.  
Notified WARREN Manning NP and Dr Tran of elevated BUN/Cr via perfect serve.   
Nurse to nurse attempted with [facility] without success multiple times. Message left on answering machine for a return call.   
Nydia  and another tech unable to obtain blood draw at this time.    States another tech will try with day shift. Mary Salas RN    
Occupational Therapy  OT BEDSIDE TREATMENT NOTE   [facility]  [address] [place], [place]      Date:2025  Patient Name: Fanny Gonzalez  MRN: [id number]  : 1950  Room: [address] [place] [address]     Evaluating OT: JEFFRY Teague, OTR/L  # [id number]     Referring Provider:  Sherri Villeda MD   Specific Provider Orders:  \"OT Eval and Treat\"  25     Diagnosis: Generalized abdominal pain [R10.84]  NSTEMI (non-ST elevated myocardial infarction) ([facility]) [I21.4]  Acute on chronic congestive heart failure, unspecified heart failure type (HCC) [I50.9]     Pt was admitted w/ Chest pain/pressure/heaviness/tightness radiating down Left UE - worse w/ SOB and Inspiration.    MI, Tachy, A Fib      Pertinent Medical History:  Pt has a past medical history of Arthritis, Asthma, BRCA1 negative, BRCA2 negative, Breast cancer (HCC), CAD in native artery, Cancer (HCC), Cerebral artery occlusion with cerebral infarction (HCC), Cerebrovascular disease, CHF (congestive heart failure) (HCC), Claustrophobia, COPD (chronic obstructive pulmonary disease) (HCC), Decreased dorsalis pedis pulse, Dermatophytosis, Headache(784.0), History of blood transfusion, Hives, Hx of blood clots, Hyperlipidemia, Hypertension, LBP radiating to both legs, Lymphedema of both lower extremities, Neuromuscular disorder (HCC), Non-rheumatic aortic sclerosis, Obesity, Pulmonary hypertension (HCC), PVD (peripheral vascular disease) with claudication, 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 
Occupational Therapy  OT BEDSIDE TREATMENT NOTE   [facility]  [address] [place], [place]      Date:2025  Patient Name: Fanny Gonzalez  MRN: [id number]  : 1950  Room: [address] [place] [address]     Evaluating OT: JEFFRY Teague, OTR/L  # [id number]     Referring Provider:  Sherri Villeda MD   Specific Provider Orders:  \"OT Eval and Treat\"  25     Diagnosis: Generalized abdominal pain [R10.84]  NSTEMI (non-ST elevated myocardial infarction) ([facility]) [I21.4]  Acute on chronic congestive heart failure, unspecified heart failure type (HCC) [I50.9]     Pt was admitted w/ Chest pain/pressure/heaviness/tightness radiating down Left UE - worse w/ SOB and Inspiration.    MI, Tachy, A Fib      Pertinent Medical History:  Pt has a past medical history of Arthritis, Asthma, BRCA1 negative, BRCA2 negative, Breast cancer (HCC), CAD in native artery, Cancer (HCC), Cerebral artery occlusion with cerebral infarction (HCC), Cerebrovascular disease, CHF (congestive heart failure) (HCC), Claustrophobia, COPD (chronic obstructive pulmonary disease) (HCC), Decreased dorsalis pedis pulse, Dermatophytosis, Headache(784.0), History of blood transfusion, Hives, Hx of blood clots, Hyperlipidemia, Hypertension, LBP radiating to both legs, Lymphedema of both lower extremities, Neuromuscular disorder (HCC), Non-rheumatic aortic sclerosis, Obesity, Pulmonary hypertension (HCC), PVD (peripheral vascular disease) with claudication, 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 
Osiel CONTRERAS notified by secure message that patient's B/p remains high.   Hydralazine IV will be repeated. Urine is no longer bloody.  Urine specimen sent to lab as ordered.    KUB has not been obtained.  Lab techs and this RN could not obtain am labs. Patient is upset with the difficultly of blood collection Patient would benefit from a midline. Mary Salas RN    
Patient resting.  Ministry was with family.    
Physical Therapy  Physical Therapy Initial Assessment     Name: Fanny Gonzalez  : 1950  MRN: [id number]      Date of Service: 2025    Evaluating PT:  Genaro Vanessa PT, DPT    Room #:  6302/6302-A  Diagnosis:  Generalized abdominal pain [R10.84]  NSTEMI (non-ST elevated myocardial infarction) ([facility]) [I21.4]  Acute on chronic congestive heart failure, unspecified heart failure type ([facility]) [I50.9]  PMHx/PSHx:  COPD, DM, CVA, obesity, HLD, CHF, PE, lymphedema, breast CA  Procedure/Surgery:  N/A  Precautions:  fall risk, bed/chair alarm, monitor HR/SpO2  Equipment Owned: rollator  Equipment Needs:  TBD    SUBJECTIVE:    [facility], and was home for ~2 days PTA    Pt lives alone in a 10th floor apt with ramped entry and elevator access.  Pt ambulated with rollator.     OBJECTIVE:   Initial Evaluation  Date: 25 Treatment Short Term/ Long Term   Goals   AM-PAC 6 Clicks 15/24     Was pt agreeable to Eval/treatment? yes     Does pt have pain? Unrated buttock     Bed Mobility  Rolling: NT  Supine to sit: min A  Sit to supine: NT  Scooting: min A  Rolling: IND  Supine to sit: IND  Sit to supine: IND  Scooting: IND   Transfers Sit to stand: min A  Stand to sit: min A  Stand pivot: min A with ww  Sit to stand: mod I  Stand to sit: mod I  Stand pivot: mod I with AAD   Ambulation    20'x2 with ww min A  100' with AAD mod I   Stair negotiation: ascended and descended  NT       Strength/ROM:   BLE grossly 4/5  BLE AROM WFL    Balance:   Static Sitting: SBA  Dynamic Sitting: SBA  Static Standing: CGA with ww  Dynamic Standing: min A with ww    Pt is A & O x 3  Sensation:  Pt denies numbness and tingling to extremities  Edema:  unremarkable    Vitals:  HR elevated to 160s during ambulation  Unable to accurately assess SpO2 during session    Therapeutic Exercises:    Bed mobility: supine>sit, cued for EOB positioning  Transfers: STS x1, cued for hand placement and postural 
Physical Therapy  Physical Therapy Missed Visit    Name: Fanny Gonzalez  : 1950  MRN: [id number]  Room #: 6302/6302-A    Date of Service: 5/15/2025    Attempted PT treatment. Pt off the floor for cardiac cath. Will attempt again on later date as schedule allows.    Genaro Vanessa, PT, DPT  [id number]      
Physical Therapy  Treatment Note    Name: Fanny Gonzalez  : 1950  MRN: [id number]      Date of Service: 2025    Evaluating PT:  Genaro Vanessa PT, DPT    Room #:  6302/6302-A  Diagnosis:  Generalized abdominal pain [R10.84]  NSTEMI (non-ST elevated myocardial infarction) ([facility]) [I21.4]  Acute on chronic congestive heart failure, unspecified heart failure type (HCC) [I50.9]  PMHx/PSHx:  COPD, DM, CVA, obesity, HLD, CHF, PE, lymphedema, breast CA  Procedure/Surgery:  N/A  Precautions:  fall risk, bed/chair alarm, monitor HR/SpO2  Equipment Owned: rollator  Equipment Needs:  TBD    SUBJECTIVE:    Pt recently discharged from [facility], and was home for ~2 days PTA    Pt lives alone in a 10th floor apt with ramped entry and elevator access.  Pt ambulated with rollator.     OBJECTIVE:   Initial Evaluation  Date: 25 Treatment  Date: 25 Short Term/ Long Term   Goals   AM-PAC 6 Clicks 15/24 16/24    Was pt agreeable to Eval/treatment? yes yes    Does pt have pain? Unrated buttock No pain    Bed Mobility  Rolling: NT  Supine to sit: min A  Sit to supine: NT  Scooting: min A Rolling: NT  Supine to sit: min A  Sit to supine: NT  Scooting: min A Rolling: IND  Supine to sit: IND  Sit to supine: IND  Scooting: IND   Transfers Sit to stand: min A  Stand to sit: min A  Stand pivot: min A with ww Sit to stand: min A  Stand to sit: min A  Stand pivot: min A with ww Sit to stand: mod I  Stand to sit: mod I  Stand pivot: mod I with AAD   Ambulation    20'x2 with ww min A 30'x4 with ww min A  (frequent standing rest breaks taken 2/2 SO) 100' with AAD mod I   Stair negotiation: ascended and descended  NT       Strength/ROM:   BLE grossly 4/5  BLE AROM WFL    Balance:   Static Sitting: SBA  Dynamic Sitting: SBA  Static Standing: CGA with ww  Dynamic Standing: min A with ww    Pt is A & O x 3  Sensation:  Pt denies numbness and tingling to extremities  Edema:  unremarkable    Vitals:  SpO2 and HR were stable during 
Physical Therapy  Treatment Note    Name: Fanny Gonzalez  : 1950  MRN: [id number]      Date of Service: 2025    Evaluating PT:  Genaro Vanessa PT, DPT    Room #:  6302/6302-A  Diagnosis:  Generalized abdominal pain [R10.84]  NSTEMI (non-ST elevated myocardial infarction) ([facility]) [I21.4]  Acute on chronic congestive heart failure, unspecified heart failure type (HCC) [I50.9]  PMHx/PSHx:  COPD, DM, CVA, obesity, HLD, CHF, PE, lymphedema, breast CA  Procedure/Surgery:  N/A  Precautions:  fall risk, bed/chair alarm, monitor HR/SpO2  Equipment Owned: rollator  Equipment Needs:  TBD    SUBJECTIVE:    Pt recently discharged from [facility], and was home for ~2 days PTA    Pt lives alone in a 10th floor apt with ramped entry and elevator access.  Pt ambulated with rollator.     OBJECTIVE:   Initial Evaluation  Date: 25 Treatment  Date: 25 Short Term/ Long Term   Goals   AM-PAC 6 Clicks 15/24 16/24    Was pt agreeable to Eval/treatment? yes yes    Does pt have pain? Unrated buttock No pain    Bed Mobility  Rolling: NT  Supine to sit: min A  Sit to supine: NT  Scooting: min A Rolling: NT  Supine to sit: NT  Sit to supine: NT  Scooting: NT Rolling: IND  Supine to sit: IND  Sit to supine: IND  Scooting: IND   Transfers Sit to stand: min A  Stand to sit: min A  Stand pivot: min A with ww Sit to stand: Tita   Stand to sit: Tita   Stand pivot: Tita with WW Sit to stand: mod I  Stand to sit: mod I  Stand pivot: mod I with AAD   Ambulation    20'x2 with ww min A 50 feet x2 with WW Tita 100' with AAD mod I   Stair negotiation: ascended and descended  NT NT      Strength/ROM:   BLE grossly 4/5  BLE AROM WFL    Balance:   Static Sitting: NT  Dynamic Sitting: NT  Static Standing: CGA with WW  Dynamic Standing: Tita with WW    Pt is A & O x 4  Sensation:  NT  Edema:  none noted    Patient education  Pt educated on role of PT, safety during mobility    Patient response to education:   Pt verbalized understanding Pt 
Placed foam protectors on Nasal Canula above ears.  
Pt assessed for another PIV.  There are no appreciated veins noted.  The pt was also assessed with ultrasound earlier today..  
Pt had large emesis of undigested food. Denies further nausea. Refused Copazine at this time. Resting quietly in bed @ thid time with eyes closed.   
R groin dressing site changed, bandaid applied. Slight bruising noted. Site soft, stable, no bleeding or hematoma. Doppler pedal pulses.   
RN messaged Dr. Almanzar about patient's albumin and hemoglobin trending down and still no BM.  
RN messaged Dr. Castillo about patient still not having a BM after drinking the mag citrate. Orders received for suppository and if no BM today, will order enemas tomorrow.  
RN tried to give patient a suppository and educated her on the importance of getting her bowels to move. She does not want to take it right now and wants to wait a while longer to see if the other medications work.  
Right IJ CVC removed. Manual pressure held for 15 minutes. Folded gauze applied. Pt tolerated well.   
Right IJ dressing not intact. Dressing changed.   
Sami from [facility] notified of need for life vest  
Senait Beltreeladio notified by perfect serve that patient is feeling better after compazine and kept lactulose and cardiac medications down without vomiting.  She did not eat dinner. Several non essential  medications were held. Including Humalog.  She did get Lantus   Her blood pressure is too high.  196/112 manual  HR 81. Mary Salas RN  
Senait Cartagena NP notified urine is bloody.   She is incontinent   using external catheter.  Had Heparin prior to cath and with cath   See new order for UA and H&H routine every 6 hours. Mary Salas RN    
Senait Cartagena notified patient vomited after heart cath today   No BM for a least a week patient states.Patient looks ill feeling and  had seen patient tonight and had ordered blood pressure medication but felt that patient may need a flat plate to evaluate her abdominal pain.  See new orders for lactulose and  abdomen x ray.  
Short visit - getting ready for nurses.  Assured her of prayer.    
Spoke with Bill from IV team in attempt to place midline again. Per Bill, patient has no veins, unable to place midline. IR would be next step if CVC is removed.   
This RN attempted to drawn am labs   Patient very upset.  She did allow this RN to look for a vein to obtain specimen.  However this RN unable to find an appropriate vein for blood collection.  Patient is upset and feels obtaining blood has been torture. Mary Salas, RN    
seen by cardiology, Dr. Dinero who noted atrial fibrillation yesterday and treated with IV amiodarone resulting in restoration of sinus rhythm.  No prior history of atrial fibrillation.  Coronary angiography is planned for tomorrow      Cardiac electrophysiology service is consulted for new onset atrial fibrillation.    5/15/25: Patient is postcardiac catheterization today and complains of abdominal pain ,nausea.  No chest discomfort however.    5/18/25: No new cardiac symptoms.  Abdominal pain is also improved.    Patient Active Problem List    Diagnosis Date Noted    COVID-19 01/24/2023     Priority: Medium    Type 2 diabetes mellitus with hyperglycemia, with long-term current use of insulin ([facility]) 01/24/2023     Priority: Medium    Pulmonary embolism and infarction ([facility]) 01/22/2023     Priority: Medium    Single subsegmental pulmonary embolism without acute cor pulmonale ([facility]) 01/22/2023     Priority: Medium    AF (paroxysmal atrial fibrillation) ([facility]) 05/15/2025    Acute on chronic congestive heart failure ([facility]) 05/15/2025    Generalized abdominal pain 05/15/2025    PETER (acute kidney injury) 05/15/2025    NSTEMI (non-ST elevated myocardial infarction) ([facility]) 05/11/2025    Dietary noncompliance 04/20/2025    Hypoglycemia 04/20/2025    Syncope and collapse 04/19/2025    Acute hypoxic respiratory failure ([facility]) 06/23/2024    COPD with acute exacerbation ([facility]) 06/18/2024    Dyspnea 06/17/2024    Sacral fracture, closed ([facility]) 12/18/2023    Pain in lower back 12/17/2023    COPD exacerbation ([facility]) 12/10/2023    Hydronephrosis concurrent with and due to calculi of kidney and ureter 12/19/2021    Coronary artery disease involving native coronary artery of native heart with angina pectoris 12/15/2021    Coronary artery disease (CAD) excluded 12/15/2021    PVD (peripheral vascular disease) with claudication 09/23/2021    Moderate persistent asthma without complication 01/21/2020    Supraventricular tachycardia     Diabetes 
Resp 18   Ht 1.651 m (5' 5\")   Wt 99.3 kg (219 lb)   SpO2 96%   BMI 36.44 kg/m²     General Appearance: alert and oriented to person, place and time and in no acute distress  Skin: warm and dry  Head: normocephalic and atraumatic  Eyes: pupils equal, round, and reactive to light, extraocular eye movements intact, conjunctivae normal  Neck: neck supple and non tender without mass   Pulmonary/Chest: clear to auscultation bilaterally- no wheezes, rales or rhonchi, normal air movement, no respiratory distress  Cardiovascular: normal rate, normal S1 and S2 and no carotid bruits  Abdomen: soft, mild hypogastric tenderness, non-distended, normal bowel sounds, no masses or organomegaly  Extremities: no cyanosis, no clubbing and no edema  Neurologic: no cranial nerve deficit and speech normal        Recent Labs     05/14/25  0615 05/15/25  0523    139   K 3.5 3.8    103   CO2 22 26   BUN 37* 24*   CREATININE 1.9* 1.0   GLUCOSE 158* 217*   CALCIUM 8.3* 8.6*       Recent Labs     05/14/25  0615 05/15/25  0523   WBC 7.3 8.1   RBC 3.37* 3.54   HGB 9.4* 10.0*   HCT 31.2* 31.6*   MCV 92.6 89.3   MCH 27.9 28.2   MCHC 30.1* 31.6*   RDW 16.5* 15.8*    309   MPV 11.2 11.1         Assessment:    Chest pain possibly 2/2 NSTEMI  A-fib RVR  Epigastric pain/nausea/vomiting 2/2 UTI  Acute on chronic HFpEF  Insulin-dependent diabetes  COPD with exacerbation  CAD  Leukocytosis, lactic acidosis, HAGMA-resolved  History of CVA  History of breast cancer  History of blood clots on Eliquis  Obesity, BMI 35  Qtc prolongation       Plan:  Cardiology following:  LHC on 5/15 showed no lesions amenable to stenting  Recommended continuing medical management  Heparin drip has been transitioned to Eliquis twice daily  Started on Toprol-XL today  EP following  Nephrology following:  PETER resolved on 5/15  Awaiting access to allow for lab draw today  Increased hydralazine to 25 mg 3 times daily and resumed home clonidine to allow for 
nephrology.  Increase Toprol today  Cardiac catheterization shows chronic total occlusion of the RCA with collaterals.  Medical management  Admitted with abdominal pain, prolonged nausea and vomiting and elevated LFTs.  Now having recurring abdominal pain with abdominal tenderness that reproduces her symptoms.  I will defer this to others.  Severe COPD with chronic chronic shortness of breath that improved with inhalers.  Right greater than left lower extremity lymphedema  Chronic left bundle branch block  Pulmonary hypertension  Hypertension not well-controlled today.  Will increase Toprol and I would like to switch hydralazine to Entresto if okay with nephrology  CVA  Recurring urinary tract infections  DVT/PE on chronic anticoagulation  Diabetes  Acute renal insufficiency.  Improved.  See above for Entresto.      Note: This report was completed using computerized voice recognition software. Every effort has been made to ensure accuracy, however; and invert and computerized transcription errors may be present.      
rest breaks b/t trials    Good(-)   Visual/  Perceptual    Hearing: WNL   Glasses: Reading    WFL   Hearing Aids:  No               Hand Dominance: Right   AROM Strength Additional Info:    RUE  WFL WNL WNL ;   WNL FMC/dexterity noted during ADL tasks     LUE WFL WNL WNL ;    WNL FMC/dexterity noted during ADL tasks       Sensation:  Denies numbness or tingling Mike UEs   Tone: WFL Mike UEs   Edema: None Noted Mike UEs     Comments: Upon arrival, patient was found in semi-supine.  She was agreeable to participate in therapeutic ax.  No Family present during session.  Received permission from RN prior to engaging pt in OT services.  Educated pt on role of OT services.      At the end of the session, patient was properly positioned in b/s chair.  Call light and phone within reach, all lines and tubes intact.  Oriented pt to call bell.  Made all appropriate Environmental Modifications to facilitate pt's level of IND and safety.  All needs met.  Chair Alarm activated.  Notified RN of pt's position and performance.          Overall patient demonstrated decreased independence and safety during completion of ADL/functional transfer/mobility tasks.  Pt would benefit from continued skilled OT to increase safety and independence with completion of ADL/IADL tasks for functional independence and quality of life.    Treatment: OT treatment provided this date includes:   Instruction/training on safety and adapted techniques for completion of ADLs, use of DME/AD/Adaptive equip;  within precautions   Instruction/training on safe functional mobility/transfer techniques, use of DME/AD;  within precautions      Instruction/training on energy conservation techs (EC)/Work Simplification/PLB for completion of ADLs:     Neuromuscular Reeducation to facilitate balance/righting reactions for increased function with ADLs:    Skilled positioning/alignment for Pain Mgmt, Skin Integrity, Edema Control, to maximize Pt's safety and ability 
AHI 2.    CVA with some speech difficulties    Right breast carcinoma s/p lumpectomy and XRT 2016   Osteoarthritis  Hx of bilateral cataracts   Claustrophobia  Headaches  Recurrent genital HSV infection  Recurrent UTIs      Family History   Problem Relation Age of Onset    Diabetes Mother     High Blood Pressure Mother     Heart Attack Mother         low 80s    High Blood Pressure Father     Diabetes Father     Heart Failure Father     Breast Cancer Sister     Stroke Sister         young age    Heart Attack Sister 50    Stroke Sister     Breast Cancer Sister             Cancer Sister 64        breast & ovarian    Sickle Cell Anemia Other         niece    Cancer Other 40        niece - breast    Stomach Cancer Other         aunt       Social History     Tobacco Use    Smoking status: Former     Current packs/day: 0.00     Average packs/day: 0.3 packs/day for 35.9 years (9.0 ttl pk-yrs)     Types: Cigarettes     Start date:      Quit date: 2001     Years since quittin.5     Passive exposure: Past    Smokeless tobacco: Never   Substance Use Topics    Alcohol use: Not Currently     Comment: rare, occasionally       Current Facility-Administered Medications   Medication Dose Route Frequency Provider Last Rate Last Admin    insulin glargine (LANTUS) injection vial 12 Units  12 Units SubCUTAneous Nightly Paul Dinero DO        glucose chewable tablet 16 g  4 tablet Oral PRN Paul Dinero,         dextrose bolus 10% 125 mL  125 mL IntraVENous PRN Paul Dinero DO        Or    dextrose bolus 10% 250 mL  250 mL IntraVENous PRN Paul Dinero, DO        glucagon injection 1 mg  1 mg SubCUTAneous PRN Paul Dinero,         dextrose 10 % infusion   IntraVENous Continuous PRN Paul Dinero, DO        ranolazine (RANEXA) extended release tablet 500 mg  500 mg Oral BID Paul Dinero DO   500 mg at 05/15/25 1315    isosorbide dinitrate (ISORDIL) tablet 10 mg  10 mg Oral

## 2025-05-19 NOTE — PLAN OF CARE
Problem: Chronic Conditions and Co-morbidities  Goal: Patient's chronic conditions and co-morbidity symptoms are monitored and maintained or improved  5/19/2025 0808 by Kim Ho RN  Outcome: Progressing  Flowsheets (Taken 5/19/2025 0732)  Care Plan - Patient's Chronic Conditions and Co-Morbidity Symptoms are Monitored and Maintained or Improved: Monitor and assess patient's chronic conditions and comorbid symptoms for stability, deterioration, or improvement  5/18/2025 2157 by Lynne Gibson RN  Outcome: Progressing  Flowsheets (Taken 5/18/2025 1937)  Care Plan - Patient's Chronic Conditions and Co-Morbidity Symptoms are Monitored and Maintained or Improved: Monitor and assess patient's chronic conditions and comorbid symptoms for stability, deterioration, or improvement     Problem: Discharge Planning  Goal: Discharge to home or other facility with appropriate resources  5/19/2025 0808 by Kim Ho RN  Outcome: Progressing  Flowsheets (Taken 5/19/2025 0732)  Discharge to home or other facility with appropriate resources: Identify barriers to discharge with patient and caregiver  5/18/2025 2157 by Lynne Gibson RN  Outcome: Progressing  Flowsheets (Taken 5/18/2025 1937)  Discharge to home or other facility with appropriate resources: Identify barriers to discharge with patient and caregiver     Problem: Safety - Adult  Goal: Free from fall injury  5/19/2025 0808 by Kim Ho RN  Outcome: Progressing  Flowsheets (Taken 5/19/2025 0808)  Free From Fall Injury: Instruct family/caregiver on patient safety  5/18/2025 2157 by Lynne Gibson RN  Outcome: Progressing

## 2025-05-20 NOTE — DISCHARGE SUMMARY
docusate sodium 100 MG capsule  Commonly known as: COLACE     ergocalciferol 1.25 MG (14552 UT) capsule  Commonly known as: ERGOCALCIFEROL     gabapentin 300 MG capsule  Commonly known as: NEURONTIN     hydrALAZINE 10 MG tablet  Commonly known as: APRESOLINE  Take 1 tablet by mouth every 12 hours     insulin glargine 100 UNIT/ML injection vial  Commonly known as: LANTUS  Inject 10 Units into the skin nightly     insulin lispro 100 UNIT/ML Soln injection vial  Commonly known as: HUMALOG,ADMELOG  Inject 2 Units into the skin 3 times daily (with meals)     ipratropium 0.5 mg-albuterol 2.5 mg 0.5-2.5 (3) MG/3ML Soln nebulizer solution  Commonly known as: DUONEB  Inhale 3 mLs into the lungs every 4 hours (while awake)     montelukast 10 MG tablet  Commonly known as: SINGULAIR     omeprazole 40 MG delayed release capsule  Commonly known as: PRILOSEC     oxyBUTYnin 5 MG tablet  Commonly known as: DITROPAN     rosuvastatin 40 MG tablet  Commonly known as: CRESTOR     traZODone 50 MG tablet  Commonly known as: DESYREL     Ventolin  (90 Base) MCG/ACT inhaler  Generic drug: albuterol sulfate HFA            STOP taking these medications      carvedilol 3.125 MG tablet  Commonly known as: COREG               Where to Get Your Medications        These medications were sent to [id number] - [place], [place] - [address] - P [phone] - F [phone]  [address] [place] [id number]      Phone: [phone]   bumetanide 1 MG tablet  cefdinir 300 MG capsule  ferrous sulfate 325 (65 Fe) MG tablet  isosorbide dinitrate 10 MG tablet  metoprolol succinate 25 MG extended release tablet  ranolazine 500 MG extended release tablet  sacubitril-valsartan 24-26 MG per tablet           Note that more than 30 minutes was spent in preparing discharge papers, discussing discharge with patient, medication review, etc.    NOTE: This report was transcribed using voice recognition software. Every effort was

## 2025-05-23 ENCOUNTER — TELEPHONE (OUTPATIENT)
Dept: ADMINISTRATIVE | Age: 75
End: 2025-05-23

## 2025-05-30 ENCOUNTER — RESULTS FOLLOW-UP (OUTPATIENT)
Age: 75
End: 2025-05-30

## 2025-05-30 ENCOUNTER — OFFICE VISIT (OUTPATIENT)
Age: 75
End: 2025-05-30

## 2025-05-30 ENCOUNTER — TELEPHONE (OUTPATIENT)
Dept: OTHER | Age: 75
End: 2025-05-30

## 2025-05-30 VITALS
DIASTOLIC BLOOD PRESSURE: 63 MMHG | BODY MASS INDEX: 33.12 KG/M2 | WEIGHT: 199 LBS | SYSTOLIC BLOOD PRESSURE: 146 MMHG | HEART RATE: 67 BPM | OXYGEN SATURATION: 97 %

## 2025-05-30 DIAGNOSIS — I48.91 ATRIAL FIBRILLATION, UNSPECIFIED TYPE (HCC): ICD-10-CM

## 2025-05-30 DIAGNOSIS — I51.89 RIGHT VENTRICULAR SYSTOLIC DYSFUNCTION: ICD-10-CM

## 2025-05-30 DIAGNOSIS — I42.9 CARDIOMYOPATHY, UNSPECIFIED TYPE (HCC): ICD-10-CM

## 2025-05-30 DIAGNOSIS — I50.22 CHRONIC HFREF (HEART FAILURE WITH REDUCED EJECTION FRACTION) (HCC): Primary | ICD-10-CM

## 2025-05-30 LAB
ANION GAP SERPL CALCULATED.3IONS-SCNC: 9 MMOL/L (ref 7–16)
BUN BLDV-MCNC: 16 MG/DL (ref 8–23)
CALCIUM SERPL-MCNC: 10.8 MG/DL (ref 8.8–10.2)
CHLORIDE BLD-SCNC: 95 MMOL/L (ref 98–107)
CO2: 29 MMOL/L (ref 22–29)
CREAT SERPL-MCNC: 1.1 MG/DL (ref 0.5–1)
GFR, ESTIMATED: 55 ML/MIN/1.73M2
GLUCOSE BLD-MCNC: 424 MG/DL (ref 74–99)
NT PRO BNP: 359 PG/ML (ref 0–450)
POTASSIUM SERPL-SCNC: 4.8 MMOL/L (ref 3.5–5.1)
SODIUM BLD-SCNC: 133 MMOL/L (ref 136–145)

## 2025-05-30 RX ORDER — ATORVASTATIN CALCIUM 80 MG/1
80 TABLET, FILM COATED ORAL DAILY
COMMUNITY

## 2025-05-30 RX ORDER — ACETAMINOPHEN 325 MG/1
650 TABLET ORAL EVERY 6 HOURS PRN
COMMUNITY

## 2025-05-30 RX ORDER — NIFEDIPINE 60 MG/1
60 TABLET, EXTENDED RELEASE ORAL DAILY
COMMUNITY

## 2025-05-30 RX ORDER — BUMETANIDE 1 MG/1
1 TABLET ORAL
Qty: 36 TABLET | Refills: 1 | Status: SHIPPED | OUTPATIENT
Start: 2025-05-30

## 2025-05-30 ASSESSMENT — ENCOUNTER SYMPTOMS
VOMITING: 0
NAUSEA: 0
SHORTNESS OF BREATH: 1
COLOR CHANGE: 0
CONSTIPATION: 1
ABDOMINAL DISTENTION: 0

## 2025-05-30 NOTE — RESULT ENCOUNTER NOTE
Labs reviewed    HFrEF    Current GDMT:  Bumex 1 mg p.o. daily  Hydralazine 10 mg p.o. every 12 hours  Isordil 10 mg p.o. 3 times daily  Toprol-XL 75 mg p.o. daily  Entresto 24/26 mg p.o. twice daily    Also on:  Clonidine 0.2 mg p.o. 3 times daily  Ranexa 500 mg p.o. twice daily  Crestor 40 mg p.o. daily    BMP shows mild hyponatremia with sodium 133.  Mild worsening in renal function with creatinine 1.1, BUN of 16 and GFR 55 compared to 0.9, 14 and 67 respectively on 5/19/2025.  BNP results still pending at this time    1.  Decrease Bumex to 1 mg on Monday/Wednesday/Friday (from daily)  2.  Increase Entresto to 49/51 mg p.o. twice daily  3.  Follow-up in CHF clinic on 6/6/2025 as scheduled  4.  Please notify nursing staff at facility that patient's glucose was elevated at 424    **Patient currently at [facility] for rehab

## 2025-05-30 NOTE — PATIENT INSTRUCTIONS
Check labs in clinic today.  Will call with any new medication adjustments following review of blood work  Continue cardiac meds the same  Continue LifeVest  Recommend 2000 mg daily sodium restriction  Check daily weights every morning  Notify CHF clinic if rapid weight gain of 3 pounds or more in 1 to 2 days or 5 pounds in 1 week  Take positional changes slowly  Follow-up in CHF clinic on 6/6/2025 as scheduled  Please call to schedule follow-up with electrophysiology, Dr. Rogers as directed on hospital discharge (phone number: [phone])  Follow-up with cardiology, Dr. Mcghee on 8/28/2025 as scheduled    -Weigh yourself daily    -Stay Hydrated, 64 oz fluid daily    -Diet should be sodium restricted to 2 grams    -Again watch your daily weight trends and if you gain water weight please follow below instructions.    -If you gain 3-5 pounds in 2-3 days OR notice that you are retaining fluid in anyway just like you did before then take an extra dose of your water pill (bumetanide/Bumex) every day until you lose the weight or feel better.     -If you notice that you have taken more than 2 extra doses in 1 week then please call and let us know.    -If at any time you feel that you are retaining fluid, your medications are not working, or you feel ill in anyway, then please call us for either same day appointment or the next day, and for instructions. Our goal is to keep you out of the emergency room and the hospital and we have ways to do it. You just need to call us in a timely manner.     -If you become sick for other reasons, and notice that you are not urinating as much, the urine is very dark, you have significant diarrhea or vomiting, then please DO NOT take your water pill and CALL US immediately.      
no

## 2025-05-30 NOTE — PROGRESS NOTES
Congestive Heart Failure Clinic   [facility]       Reason for Visit: Heart Failure     Primary Cardiologist: Dr. Mcghee  Electrophysiologist: Dr. Rogers        History of Present Illness:     Ms. Gonzalez is a 74-year-old female with PMHx significant for recently diagnosed HFrEF with LVEF 30% per limited TTE 5/12/2025, prior HFpEF, new A-fib RVR diagnosed 5/13/2025, CAD, valvular heart disease, hypertension, hyperlipidemia, T2DM, chronic LBBB, history of PE in January 2023, chronic lymphedema, history of CVA in 2010, recurrent UTIs, COPD/asthma, former tobacco abuse and obesity.    Hospitalized 5/11/2025 through 5/19/2025 after presenting with chest pain and shortness of breath.  She had limited TTE completed on 5/12/2025 which showed new LV dysfunction with EF of 30% compared to 65% per TTE 3/3/2025, moderately reduced RV systolic function, mild MR.  On 5/13/2025 she developed new A-fib RVR and was treated initially with Cardizem bolus and drip and also amiodarone bolus and drip.  Electrophysiology was consulted for further evaluation of A-fib RVR.   She had converted to sinus rhythm following IV amiodarone.  Amiodarone and Cardizem were subsequently discontinued and patient continued on rate control with beta-blocker.  She developed PETER during admission and was evaluated by nephrology and felt to be likely ischemic ATN secondary to A-fib RVR, hypotension and contrast associated.  She underwent LHC on 5/15/2025 which revealed  of RCA with left-to-right collaterals and OM1 with 60 to 70% stenosis with very distal 80% stenosis and treated with medical therapy.  She was optimized on GDMT with addition of Entresto during admission.  Electrophysiology decided to defer antiarrhythmic drug therapy, and recommended LifeVest at discharge and continued on anticoagulation.  She was eventually discharged on 5/19/2025.  Cardiac meds at discharge included aspirin 81 mg p.o. daily, Eliquis 
Alert-The patient is alert, awake and responds to voice. The patient is oriented to time, place, and person. The triage nurse is able to obtain subjective information.

## 2025-05-30 NOTE — TELEPHONE ENCOUNTER
Message  Received: Today  Sabine Eagle PA Pruitt, Marie, JEAN  Cc: Gege Staton, JEAN; Danica Stevens RN; Alana Guy  Labs reviewed    HFrEF    Current GDMT:  Bumex 1 mg p.o. daily  Hydralazine 10 mg p.o. every 12 hours  Isordil 10 mg p.o. 3 times daily  Toprol-XL 75 mg p.o. daily Message  Received: Today  Sabine Eagle PA Pruitt, Marie, RN  Cc: Gege Staton, JEAN; Danica Stevens, JEAN; Alana Guy  Labs reviewed    HFrEF    Current GDMT:  Bumex 1 mg p.o. daily  Hydralazine 10 mg p.o. every 12 hours  Isordil 10 mg p.o. 3 times daily  Toprol-XL 75 mg p.o. daily  Entresto 24/26 mg p.o. twice daily    Also on:  Clonidine 0.2 mg p.o. 3 times daily  Ranexa 500 mg p.o. twice daily  Crestor 40 mg p.o. daily    BMP shows mild hyponatremia with sodium 133.  Mild worsening in renal function with creatinine 1.1, BUN of 16 and GFR 55 compared to 0.9, 14 and 67 respectively on 5/19/2025.  BNP results still pending at this time    1.  Decrease Bumex to 1 mg on Monday/Wednesday/Friday (from daily)  2.  Increase Entresto to 49/51 mg p.o. twice daily  3.  Follow-up in CHF clinic on 6/6/2025 as scheduled  4.  Please notify nursing staff at facility that patient's glucose was elevated at 424    **Patient currently at [facility] for rehab  Entresto 24/26 mg p.o. twice daily    Also on:  Clonidine 0.2 mg p.o. 3 times daily  Ranexa 500 mg p.o. twice daily  Crestor 40 mg p.o. daily    BMP shows mild hyponatremia with sodium 133.  Mild worsening in renal function with creatinine 1.1, BUN of 16 and GFR 55 compared to 0.9, 14 and 67 respectively on 5/19/2025.  BNP results still pending at this time    1.  Decrease Bumex to 1 mg on Monday/Wednesday/Friday (from daily)  2.  Increase Entresto to 49/51 mg p.o. twice daily  3.  Follow-up in CHF clinic on 6/6/2025 as scheduled  4.  Please notify nursing staff at facility that patient's glucose was elevated at 424    **Patient currently at [facility]

## 2025-05-31 NOTE — CARE COORDINATION
6/25/24 CM Note.  Pt admitted 6/23/24 OBS status for acute hyoxic resp failure. SpO2 99% on RA  Discharge order noted.  Pt lives in senior apartment complex. PCP Babatunde Storm NP, [facility] preferred pharmacy.  Plans to return home with [facility].   Active with [facility] and they are notified of DC today.  Friend to provide transport.    Danica AMBROCIO RN-BC  [phone]       [facility]

## 2025-06-02 ENCOUNTER — TELEPHONE (OUTPATIENT)
Dept: OTHER | Age: 75
End: 2025-06-02

## 2025-06-04 ENCOUNTER — TELEPHONE (OUTPATIENT)
Dept: CARDIOLOGY CLINIC | Age: 75
End: 2025-06-04

## 2025-06-04 NOTE — TELEPHONE ENCOUNTER
Call from Home health nurse, Jennifer. She is going to be seeing patient in the home to monitor vitals, meds, etc. She stated Patient does not know how to operate or what she needs to be doing regarding her life vest. States no-one showed her. Home health can help but needs an order to do so. Jennifer, also is questioning her medications. Patient gave her pill packs of meds that she has at home with but are not fully matching what she should be taking. The list from [facility] [employer], the pill packs, and what we have are a bit different. She did not tell Jennifer about the change in Bumex or Entresto. She is on Lipitor, not Crestor, and all other meds so far seem correct. Jennifer doesn't feel she is getting the correct information from the patient and if the patient is understanding these adjustments.

## 2025-06-06 ENCOUNTER — RESULTS FOLLOW-UP (OUTPATIENT)
Age: 75
End: 2025-06-06

## 2025-06-06 ENCOUNTER — HOSPITAL ENCOUNTER (OUTPATIENT)
Dept: OTHER | Age: 75
Setting detail: THERAPIES SERIES
Discharge: HOME | End: 2025-06-06
Payer: MEDICAID

## 2025-06-06 VITALS
SYSTOLIC BLOOD PRESSURE: 139 MMHG | DIASTOLIC BLOOD PRESSURE: 66 MMHG | OXYGEN SATURATION: 100 % | HEART RATE: 76 BPM | WEIGHT: 203 LBS | RESPIRATION RATE: 18 BRPM | BODY MASS INDEX: 33.78 KG/M2

## 2025-06-06 LAB
ANION GAP SERPL CALCULATED.3IONS-SCNC: 10 MMOL/L (ref 7–16)
BNP SERPL-MCNC: 506 PG/ML (ref 0–450)
BUN SERPL-MCNC: 12 MG/DL (ref 8–23)
CALCIUM SERPL-MCNC: 9.2 MG/DL (ref 8.8–10.2)
CHLORIDE SERPL-SCNC: 98 MMOL/L (ref 98–107)
CO2 SERPL-SCNC: 26 MMOL/L (ref 22–29)
CREAT SERPL-MCNC: 0.9 MG/DL (ref 0.5–1)
GFR, ESTIMATED: 64 ML/MIN/1.73M2
GLUCOSE SERPL-MCNC: 420 MG/DL (ref 74–99)
POTASSIUM SERPL-SCNC: 4.2 MMOL/L (ref 3.5–5.1)
SODIUM SERPL-SCNC: 134 MMOL/L (ref 136–145)

## 2025-06-06 PROCEDURE — 99204 OFFICE O/P NEW MOD 45 MIN: CPT

## 2025-06-06 PROCEDURE — 83880 ASSAY OF NATRIURETIC PEPTIDE: CPT

## 2025-06-06 PROCEDURE — 80048 BASIC METABOLIC PNL TOTAL CA: CPT

## 2025-06-06 ASSESSMENT — PATIENT HEALTH QUESTIONNAIRE - PHQ9
SUM OF ALL RESPONSES TO PHQ QUESTIONS 1-9: 0
2. FEELING DOWN, DEPRESSED OR HOPELESS: NOT AT ALL
1. LITTLE INTEREST OR PLEASURE IN DOING THINGS: NOT AT ALL
SUM OF ALL RESPONSES TO PHQ QUESTIONS 1-9: 0

## 2025-06-06 NOTE — PLAN OF CARE
Problem: Chronic Conditions and Co-morbidities  Goal: Patient's chronic conditions and co-morbidity symptoms are monitored and maintained or improved  Flowsheets (Taken 6/6/2025 1137)  Care Plan - Patient's Chronic Conditions and Co-Morbidity Symptoms are Monitored and Maintained or Improved: Monitor and assess patient's chronic conditions and comorbid symptoms for stability, deterioration, or improvement

## 2025-06-06 NOTE — PROGRESS NOTES
Congestive Heart Failure Clinic   [facility]      Referring Provider: DMITRY Eagle  Primary Care Physician: Babatunde Storm APRN - CNP   Cardiologist: Dr Mcghee/Dr Rogers  Nephrologist: PERFECTO      HISTORY OF PRESENT ILLNESS:     Fanny Gonzalez is a 74 y.o. (1950) female with a history of HFrEF (EF < 40%)  Pre Cupid:     Lab Results   Component Value Date    LVEF 30 05/12/2025     Post Cupid:  No results found for: \"EFBP\"      She presents to the CHF clinic for ongoing evaluation and monitoring of heart failure.    In the CHF clinic today she denies any adverse symptoms except:  [x] Dizziness or lightheadedness   [] Syncope or near syncope  [] Recent Fall  [] Shortness of breath at rest   [x] Dyspnea with exertion  [] Decline in functional capacity (unable to perform activities they had previously been able to do)  [] Fatigue   [] Orthopnea  [] PND  [] Nocturnal cough  [] Hemoptysis  [] Chest pain, pressure, or discomfort  [] Palpitations  [] Abdominal distention  [] Abdominal bloating  [] Early satiety  [] Blood in stool   [] Diarrhea  [] Constipation  [] Nausea/Vomiting  [] Decreased urinary response to oral diuretic   [] Scrotal swelling   [x] Lower extremity edema(Slight )  [] Used PRN doses of oral diuretic   [] Weight gain    Wt Readings from Last 3 Encounters:   06/06/25 92.1 kg (203 lb)   05/30/25 90.3 kg (199 lb)   05/18/25 95.7 kg (211 lb)           SOCIAL HISTORY:  [x] Denies tobacco, alcohol or illicit drug abuse  [] Tobacco use:  [] ETOH use:  [] Illicit drug use:        MEDICATIONS:    No Known Allergies  Prior to Visit Medications    Medication Sig Taking? Authorizing Provider   acetaminophen (TYLENOL) 325 MG tablet Take 2 tablets by mouth every 6 hours as needed for Pain  ProviderJayson MD   atorvastatin (LIPITOR) 80 MG tablet Take 1 tablet by mouth daily  ProviderJayson MD   magnesium hydroxide (MILK OF MAGNESIA) 400

## 2025-06-13 ENCOUNTER — HOSPITAL ENCOUNTER (OUTPATIENT)
Dept: OTHER | Age: 75
Setting detail: THERAPIES SERIES
Discharge: HOME | End: 2025-06-13
Payer: MEDICAID

## 2025-06-13 ENCOUNTER — HOSPITAL ENCOUNTER (EMERGENCY)
Age: 75
Discharge: HOME | End: 2025-06-13
Attending: EMERGENCY MEDICINE
Payer: MEDICARE

## 2025-06-13 ENCOUNTER — RESULTS FOLLOW-UP (OUTPATIENT)
Age: 75
End: 2025-06-13

## 2025-06-13 VITALS
RESPIRATION RATE: 18 BRPM | OXYGEN SATURATION: 97 % | DIASTOLIC BLOOD PRESSURE: 65 MMHG | HEART RATE: 60 BPM | SYSTOLIC BLOOD PRESSURE: 149 MMHG | TEMPERATURE: 97 F

## 2025-06-13 VITALS
BODY MASS INDEX: 33.45 KG/M2 | RESPIRATION RATE: 18 BRPM | WEIGHT: 201 LBS | SYSTOLIC BLOOD PRESSURE: 147 MMHG | DIASTOLIC BLOOD PRESSURE: 64 MMHG | HEART RATE: 68 BPM | OXYGEN SATURATION: 98 %

## 2025-06-13 DIAGNOSIS — R73.9 HYPERGLYCEMIA: Primary | ICD-10-CM

## 2025-06-13 LAB
ALBUMIN SERPL-MCNC: 3.6 G/DL (ref 3.5–5.2)
ALP SERPL-CCNC: 196 U/L (ref 35–104)
ALT SERPL-CCNC: 10 U/L (ref 0–35)
ANION GAP SERPL CALCULATED.3IONS-SCNC: 10 MMOL/L (ref 7–16)
ANION GAP SERPL CALCULATED.3IONS-SCNC: 13 MMOL/L (ref 7–16)
AST SERPL-CCNC: 20 U/L (ref 0–35)
BASOPHILS # BLD: 0.03 K/UL (ref 0–0.2)
BASOPHILS NFR BLD: 1 % (ref 0–2)
BILIRUB SERPL-MCNC: 0.5 MG/DL (ref 0–1.2)
BNP SERPL-MCNC: 436 PG/ML (ref 0–450)
BUN SERPL-MCNC: 11 MG/DL (ref 8–23)
BUN SERPL-MCNC: 12 MG/DL (ref 8–23)
CALCIUM SERPL-MCNC: 9.1 MG/DL (ref 8.8–10.2)
CALCIUM SERPL-MCNC: 9.3 MG/DL (ref 8.8–10.2)
CHLORIDE SERPL-SCNC: 92 MMOL/L (ref 98–107)
CHLORIDE SERPL-SCNC: 97 MMOL/L (ref 98–107)
CO2 SERPL-SCNC: 25 MMOL/L (ref 22–29)
CO2 SERPL-SCNC: 26 MMOL/L (ref 22–29)
CREAT SERPL-MCNC: 1 MG/DL (ref 0.5–1)
CREAT SERPL-MCNC: 1.1 MG/DL (ref 0.5–1)
EOSINOPHIL # BLD: 0.13 K/UL (ref 0.05–0.5)
EOSINOPHILS RELATIVE PERCENT: 2 % (ref 0–6)
ERYTHROCYTE [DISTWIDTH] IN BLOOD BY AUTOMATED COUNT: 15.1 % (ref 11.5–15)
GFR, ESTIMATED: 52 ML/MIN/1.73M2
GFR, ESTIMATED: 62 ML/MIN/1.73M2
GLUCOSE BLD-MCNC: 364 MG/DL (ref 74–99)
GLUCOSE BLD-MCNC: 496 MG/DL (ref 74–99)
GLUCOSE BLD-MCNC: >500 MG/DL (ref 74–99)
GLUCOSE BLD-MCNC: >500 MG/DL (ref 74–99)
GLUCOSE SERPL-MCNC: 483 MG/DL (ref 74–99)
GLUCOSE SERPL-MCNC: 627 MG/DL (ref 74–99)
HCT VFR BLD AUTO: 37.3 % (ref 34–48)
HGB BLD-MCNC: 11.8 G/DL (ref 11.5–15.5)
IMM GRANULOCYTES # BLD AUTO: 0.03 K/UL (ref 0–0.58)
IMM GRANULOCYTES NFR BLD: 1 % (ref 0–5)
LYMPHOCYTES NFR BLD: 1.63 K/UL (ref 1.5–4)
LYMPHOCYTES RELATIVE PERCENT: 25 % (ref 20–42)
MCH RBC QN AUTO: 28 PG (ref 26–35)
MCHC RBC AUTO-ENTMCNC: 31.6 G/DL (ref 32–34.5)
MCV RBC AUTO: 88.6 FL (ref 80–99.9)
MONOCYTES NFR BLD: 0.87 K/UL (ref 0.1–0.95)
MONOCYTES NFR BLD: 13 % (ref 2–12)
NEUTROPHILS NFR BLD: 59 % (ref 43–80)
NEUTS SEG NFR BLD: 3.91 K/UL (ref 1.8–7.3)
PLATELET # BLD AUTO: 317 K/UL (ref 130–450)
PMV BLD AUTO: 11.9 FL (ref 7–12)
POTASSIUM SERPL-SCNC: 4.2 MMOL/L (ref 3.5–5.1)
POTASSIUM SERPL-SCNC: 4.7 MMOL/L (ref 3.5–5.1)
PROT SERPL-MCNC: 7.1 G/DL (ref 6.4–8.3)
RBC # BLD AUTO: 4.21 M/UL (ref 3.5–5.5)
SODIUM SERPL-SCNC: 130 MMOL/L (ref 136–145)
SODIUM SERPL-SCNC: 133 MMOL/L (ref 136–145)
WBC OTHER # BLD: 6.6 K/UL (ref 4.5–11.5)

## 2025-06-13 PROCEDURE — 83880 ASSAY OF NATRIURETIC PEPTIDE: CPT

## 2025-06-13 PROCEDURE — 6370000000 HC RX 637 (ALT 250 FOR IP): Performed by: EMERGENCY MEDICINE

## 2025-06-13 PROCEDURE — 82962 GLUCOSE BLOOD TEST: CPT

## 2025-06-13 PROCEDURE — 85025 COMPLETE CBC W/AUTO DIFF WBC: CPT

## 2025-06-13 PROCEDURE — 96360 HYDRATION IV INFUSION INIT: CPT

## 2025-06-13 PROCEDURE — 80048 BASIC METABOLIC PNL TOTAL CA: CPT

## 2025-06-13 PROCEDURE — 80053 COMPREHEN METABOLIC PANEL: CPT

## 2025-06-13 PROCEDURE — 99284 EMERGENCY DEPT VISIT MOD MDM: CPT

## 2025-06-13 PROCEDURE — 96361 HYDRATE IV INFUSION ADD-ON: CPT

## 2025-06-13 PROCEDURE — 99214 OFFICE O/P EST MOD 30 MIN: CPT

## 2025-06-13 PROCEDURE — 36415 COLL VENOUS BLD VENIPUNCTURE: CPT

## 2025-06-13 PROCEDURE — 2580000003 HC RX 258

## 2025-06-13 RX ORDER — INSULIN GLARGINE 300 U/ML
12 INJECTION, SOLUTION SUBCUTANEOUS NIGHTLY
COMMUNITY

## 2025-06-13 RX ORDER — 0.9 % SODIUM CHLORIDE 0.9 %
1000 INTRAVENOUS SOLUTION INTRAVENOUS ONCE
Status: COMPLETED | OUTPATIENT
Start: 2025-06-13 | End: 2025-06-13

## 2025-06-13 RX ADMIN — SODIUM CHLORIDE 1000 ML: 9 INJECTION, SOLUTION INTRAVENOUS at 19:55

## 2025-06-13 RX ADMIN — INSULIN HUMAN 10 UNITS: 100 INJECTION, SOLUTION PARENTERAL at 18:47

## 2025-06-13 RX ADMIN — SODIUM CHLORIDE 1000 ML: 0.9 INJECTION, SOLUTION INTRAVENOUS at 18:45

## 2025-06-13 ASSESSMENT — LIFESTYLE VARIABLES
HOW MANY STANDARD DRINKS CONTAINING ALCOHOL DO YOU HAVE ON A TYPICAL DAY: PATIENT DOES NOT DRINK
HOW OFTEN DO YOU HAVE A DRINK CONTAINING ALCOHOL: NEVER

## 2025-06-13 NOTE — PROGRESS NOTES
consulted  [] Prescription assistance information given   [] [facility] medication assistance program information given   [] Sample medications provided to patient to help bridge gap until affordability N/A          Scheduled to follow up in CHF clinic on:   Future Appointments   Date Time Provider Department Center   7/2/2025 10:00 AM [facility] CHF ROOM 3 [facility] Denise   8/28/2025  8:00 AM Jessica Hanley APRN - CNP BDM ENDO [facility]   8/28/2025  1:30 PM Tenisha Mcghee MD [facility] CARDIO [facility]

## 2025-06-13 NOTE — PLAN OF CARE
Problem: Chronic Conditions and Co-morbidities  Goal: Patient's chronic conditions and co-morbidity symptoms are monitored and maintained or improved  Flowsheets (Taken 6/13/2025 7922)  Care Plan - Patient's Chronic Conditions and Co-Morbidity Symptoms are Monitored and Maintained or Improved: Monitor and assess patient's chronic conditions and comorbid symptoms for stability, deterioration, or improvement

## 2025-06-13 NOTE — ED PROVIDER NOTES
Department of Emergency Medicine     Written by: Luis Okeefe DO  Patient Name: Fanny Gonzalez  Admit Date: 2025  6:07 PM  MRN: [id number]                   : 1950      CHIEF COMPLAINT     Chief Complaint   Patient presents with    Hypertension     \"My blood pressure was 400 over something.\"   Pt 100/73 in triage      HPI     Fanny Gonzalez is a 74 y.o. female who presents to the emergency department today complaining of abnormal lab work.  Patient is an insulin-dependent diabetic and states for the last multiple months, she has not checked her blood sugar at all at home but has been giving herself her 10 units of insulin every night regardless.  She states she does not have any specific symptoms although today she was at the CHF clinic and they did some lab work and stated that her blood sugar was very high in the 400s.  She was sent into the emergency department for evaluation.  She states she has been very thirsty and having to urinate a lot.  Patient denies any lightheadedness or dizziness, fever or chills, nausea or vomiting, chest pain, shortness of breath, abdominal pain, hematuria or dysuria, constipation or diarrhea.    Nursing notes were all reviewed and agreed with or any disagreements were addressed in the HPI.    REVIEW OF SYSTEMS:    Pertinent positives and negatives mentioned in the HPI/MDM.     REVIEW OF OUTSIDE RECORDS: Chart reviewed from 2025 CHF visit.    SOCIAL DETERMINANTS OF HEALTH: Patient has good medical compliance and fluency as well as established follow-up with family physician.    PAST HISTORY     I personally reviewed the following history:    Past Medical History:  has a past medical history of Arthritis, Asthma, BRCA1 negative, BRCA2 negative, Breast cancer (HCC), CAD in native artery, Cancer (HCC), Cerebral artery occlusion with cerebral infarction (HCC), Cerebrovascular disease, CHF (congestive heart failure) (HCC), Claustrophobia, COPD (chronic obstructive  listed below.     LABS:  Labs Reviewed   CBC WITH AUTO DIFFERENTIAL - Abnormal; Notable for the following components:       Result Value    MCHC 31.6 (*)     RDW 15.1 (*)     Monocytes % 13 (*)     All other components within normal limits   COMPREHENSIVE METABOLIC PANEL - Abnormal; Notable for the following components:    Sodium 130 (*)     Chloride 92 (*)     Glucose 627 (*)     Creatinine 1.1 (*)     Est, Glom Filt Rate 52 (*)     Alkaline Phosphatase 196 (*)     All other components within normal limits   POCT GLUCOSE - Abnormal; Notable for the following components:    POC Glucose >500 (*)     All other components within normal limits   POCT GLUCOSE - Abnormal; Notable for the following components:    POC Glucose >500 (*)     All other components within normal limits   POCT GLUCOSE - Abnormal; Notable for the following components:    POC Glucose 496 (*)     All other components within normal limits       As interpreted by me, the above displayed labs are abnormal. All other labs obtained during this visit were within normal range or not returned as of this dictation.    No EKG obtained during this patient's visit.    RADIOLOGY:  Non-plain film images such as CT, Ultrasound and MRI are read by the radiologist. Plain radiographic images are visualized and preliminarily interpreted by the ED Provider as mentioned in the MDM section below.    Interpretation per the Radiologist below, if available at the time of this note:    No orders to display     No results found.    No results found.    MDM     History is obtained from patient.  74 y.o. female presented to the emergency department today because she had concerns including Hypertension (\"My blood pressure was 400 over something.\"   Pt 100/73 in triage ).  On exam, patient with reassuring exam.  Stable vital signs.  Differential diagnoses considered include but not limited to hyperglycemia, DKA, HHS.  Lab work shows significant hyperglycemia with a glucose of 627

## 2025-06-19 ENCOUNTER — TELEPHONE (OUTPATIENT)
Dept: OTHER | Age: 75
End: 2025-06-19

## 2025-06-19 NOTE — TELEPHONE ENCOUNTER
1:32 PM  Call received from [facility] to CHF clinic regarding pt running out of cardiac medications (Entresto and Isosobide). Prescriptions were already written on 5/30/25, patient has just not yet picked up medications per home nurse. Home nurse also stated that pt was vomiting/pale/SOB/diaphoretic/tachycardic at today's home visit. Home nurse stated she recommended an ER evaluation and pt refused. Pt with no swelling, weight gain, etc. Informed home nurse that CHF recommendation would be ER evaluation as well. Patient to be seen in CHF clinic on 7/2/25. Attempted to call pt. Phone straight to voicemail.

## 2025-06-20 ENCOUNTER — APPOINTMENT (OUTPATIENT)
Dept: CT IMAGING | Age: 75
End: 2025-06-20
Payer: MEDICARE

## 2025-06-20 ENCOUNTER — APPOINTMENT (OUTPATIENT)
Dept: GENERAL RADIOLOGY | Age: 75
End: 2025-06-20
Payer: MEDICARE

## 2025-06-20 ENCOUNTER — TELEPHONE (OUTPATIENT)
Dept: CARDIOLOGY CLINIC | Age: 75
End: 2025-06-20

## 2025-06-20 ENCOUNTER — HOSPITAL ENCOUNTER (INPATIENT)
Age: 75
LOS: 7 days | Discharge: ANOTHER HOSPITAL | End: 2025-06-27
Attending: EMERGENCY MEDICINE | Admitting: INTERNAL MEDICINE
Payer: MEDICARE

## 2025-06-20 DIAGNOSIS — E11.10 DIABETIC KETOACIDOSIS WITHOUT COMA ASSOCIATED WITH TYPE 2 DIABETES MELLITUS (HCC): ICD-10-CM

## 2025-06-20 DIAGNOSIS — I25.5 ISCHEMIC CARDIOMYOPATHY: ICD-10-CM

## 2025-06-20 DIAGNOSIS — I42.9 CARDIOMYOPATHY (HCC): ICD-10-CM

## 2025-06-20 DIAGNOSIS — I47.20 VENTRICULAR TACHYCARDIA (HCC): Primary | ICD-10-CM

## 2025-06-20 LAB
ALBUMIN SERPL-MCNC: 3.9 G/DL (ref 3.5–5.2)
ALP SERPL-CCNC: 178 U/L (ref 35–104)
ALT SERPL-CCNC: 6 U/L (ref 0–35)
AMMONIA PLAS-SCNC: 16 UMOL/L (ref 11–51)
AMPHET UR QL SCN: NEGATIVE
ANION GAP SERPL CALCULATED.3IONS-SCNC: 18 MMOL/L (ref 7–16)
ANION GAP SERPL CALCULATED.3IONS-SCNC: 20 MMOL/L (ref 7–16)
ANION GAP SERPL CALCULATED.3IONS-SCNC: 24 MMOL/L (ref 7–16)
APAP SERPL-MCNC: <5 UG/ML (ref 10–30)
AST SERPL-CCNC: 31 U/L (ref 0–35)
B PARAP IS1001 DNA NPH QL NAA+NON-PROBE: NOT DETECTED
B PERT DNA SPEC QL NAA+PROBE: NOT DETECTED
B-OH-BUTYR SERPL-MCNC: 1.86 MMOL/L (ref 0.02–0.27)
B.E.: -5 MMOL/L (ref -3–3)
BACTERIA URNS QL MICRO: ABNORMAL
BARBITURATES UR QL SCN: NEGATIVE
BASOPHILS # BLD: 0.01 K/UL (ref 0–0.2)
BASOPHILS NFR BLD: 0 % (ref 0–2)
BENZODIAZ UR QL: NEGATIVE
BILIRUB SERPL-MCNC: 0.8 MG/DL (ref 0–1.2)
BILIRUB UR QL STRIP: NEGATIVE
BNP SERPL-MCNC: ABNORMAL PG/ML (ref 0–450)
BUN SERPL-MCNC: 23 MG/DL (ref 8–23)
BUN SERPL-MCNC: 23 MG/DL (ref 8–23)
BUN SERPL-MCNC: 24 MG/DL (ref 8–23)
BUPRENORPHINE UR QL: NEGATIVE
C PNEUM DNA NPH QL NAA+NON-PROBE: NOT DETECTED
CALCIUM SERPL-MCNC: 8.9 MG/DL (ref 8.8–10.2)
CALCIUM SERPL-MCNC: 9 MG/DL (ref 8.8–10.2)
CALCIUM SERPL-MCNC: 9.8 MG/DL (ref 8.8–10.2)
CANNABINOIDS UR QL SCN: POSITIVE
CHLORIDE SERPL-SCNC: 103 MMOL/L (ref 98–107)
CHLORIDE SERPL-SCNC: 106 MMOL/L (ref 98–107)
CHLORIDE SERPL-SCNC: 96 MMOL/L (ref 98–107)
CLARITY UR: CLEAR
CO2 SERPL-SCNC: 17 MMOL/L (ref 22–29)
CO2 SERPL-SCNC: 18 MMOL/L (ref 22–29)
CO2 SERPL-SCNC: 18 MMOL/L (ref 22–29)
COCAINE UR QL SCN: NEGATIVE
COHB: 0.1 % (ref 0–1.5)
COLOR UR: YELLOW
CREAT SERPL-MCNC: 1.3 MG/DL (ref 0.5–1)
CREAT SERPL-MCNC: 1.3 MG/DL (ref 0.5–1)
CREAT SERPL-MCNC: 1.4 MG/DL (ref 0.5–1)
CREAT UR-MCNC: 16.2 MG/DL (ref 29–226)
CRITICAL: ABNORMAL
DATE ANALYZED: ABNORMAL
DATE OF COLLECTION: ABNORMAL
EKG ATRIAL RATE: 114 BPM
EKG P AXIS: 74 DEGREES
EKG P-R INTERVAL: 116 MS
EKG Q-T INTERVAL: 418 MS
EKG QRS DURATION: 132 MS
EKG QTC CALCULATION (BAZETT): 576 MS
EKG R AXIS: -29 DEGREES
EKG T AXIS: 177 DEGREES
EKG VENTRICULAR RATE: 114 BPM
EOSINOPHIL # BLD: 0 K/UL (ref 0.05–0.5)
EOSINOPHILS RELATIVE PERCENT: 0 % (ref 0–6)
EPI CELLS #/AREA URNS HPF: ABNORMAL /HPF
ERYTHROCYTE [DISTWIDTH] IN BLOOD BY AUTOMATED COUNT: 15.7 % (ref 11.5–15)
ERYTHROCYTE [DISTWIDTH] IN BLOOD BY AUTOMATED COUNT: 15.9 % (ref 11.5–15)
ETHANOLAMINE SERPL-MCNC: <10 MG/DL (ref 0–0.08)
FENTANYL UR QL: NEGATIVE
FLUAV RNA NPH QL NAA+NON-PROBE: NOT DETECTED
FLUBV RNA NPH QL NAA+NON-PROBE: NOT DETECTED
FOLATE SERPL-MCNC: 7.2 NG/ML (ref 4.6–34.8)
GFR, ESTIMATED: 40 ML/MIN/1.73M2
GFR, ESTIMATED: 44 ML/MIN/1.73M2
GFR, ESTIMATED: 44 ML/MIN/1.73M2
GLUCOSE BLD-MCNC: 139 MG/DL (ref 74–99)
GLUCOSE BLD-MCNC: 161 MG/DL (ref 74–99)
GLUCOSE BLD-MCNC: 198 MG/DL (ref 74–99)
GLUCOSE BLD-MCNC: 204 MG/DL (ref 74–99)
GLUCOSE BLD-MCNC: 214 MG/DL (ref 74–99)
GLUCOSE BLD-MCNC: 223 MG/DL (ref 74–99)
GLUCOSE BLD-MCNC: 230 MG/DL (ref 74–99)
GLUCOSE BLD-MCNC: 349 MG/DL (ref 74–99)
GLUCOSE BLD-MCNC: 360 MG/DL (ref 74–99)
GLUCOSE SERPL-MCNC: 165 MG/DL (ref 74–99)
GLUCOSE SERPL-MCNC: 229 MG/DL (ref 74–99)
GLUCOSE SERPL-MCNC: 435 MG/DL (ref 74–99)
GLUCOSE UR STRIP-MCNC: 500 MG/DL
HADV DNA NPH QL NAA+NON-PROBE: NOT DETECTED
HBA1C MFR BLD: 12.5 % (ref 4–5.6)
HCO3: 19.3 MMOL/L (ref 22–26)
HCOV 229E RNA NPH QL NAA+NON-PROBE: NOT DETECTED
HCOV HKU1 RNA NPH QL NAA+NON-PROBE: NOT DETECTED
HCOV NL63 RNA NPH QL NAA+NON-PROBE: NOT DETECTED
HCOV OC43 RNA NPH QL NAA+NON-PROBE: NOT DETECTED
HCT VFR BLD AUTO: 38.5 % (ref 34–48)
HCT VFR BLD AUTO: 42.2 % (ref 34–48)
HGB BLD-MCNC: 12.3 G/DL (ref 11.5–15.5)
HGB BLD-MCNC: 13.1 G/DL (ref 11.5–15.5)
HGB UR QL STRIP.AUTO: ABNORMAL
HHB: 3.6 % (ref 0–5)
HMPV RNA NPH QL NAA+NON-PROBE: NOT DETECTED
HPIV1 RNA NPH QL NAA+NON-PROBE: NOT DETECTED
HPIV2 RNA NPH QL NAA+NON-PROBE: NOT DETECTED
HPIV3 RNA NPH QL NAA+NON-PROBE: NOT DETECTED
HPIV4 RNA NPH QL NAA+NON-PROBE: NOT DETECTED
IMM GRANULOCYTES # BLD AUTO: 0.06 K/UL (ref 0–0.58)
IMM GRANULOCYTES NFR BLD: 1 % (ref 0–5)
INR PPP: 1.1
KETONES UR STRIP-MCNC: NEGATIVE MG/DL
LAB: ABNORMAL
LACTATE BLDV-SCNC: 2.7 MMOL/L (ref 0.5–2.2)
LACTATE BLDV-SCNC: 3.9 MMOL/L (ref 0.5–2.2)
LACTATE BLDV-SCNC: 4.9 MMOL/L (ref 0.5–2.2)
LEUKOCYTE ESTERASE UR QL STRIP: ABNORMAL
LIPASE SERPL-CCNC: 209 U/L (ref 13–60)
LYMPHOCYTES NFR BLD: 0.88 K/UL (ref 1.5–4)
LYMPHOCYTES RELATIVE PERCENT: 7 % (ref 20–42)
Lab: 2038
M PNEUMO DNA NPH QL NAA+NON-PROBE: NOT DETECTED
MAGNESIUM SERPL-MCNC: 1.9 MG/DL (ref 1.6–2.4)
MAGNESIUM SERPL-MCNC: 1.9 MG/DL (ref 1.6–2.4)
MAGNESIUM SERPL-MCNC: 2.8 MG/DL (ref 1.6–2.4)
MCH RBC QN AUTO: 27.8 PG (ref 26–35)
MCH RBC QN AUTO: 28.1 PG (ref 26–35)
MCHC RBC AUTO-ENTMCNC: 31 G/DL (ref 32–34.5)
MCHC RBC AUTO-ENTMCNC: 31.9 G/DL (ref 32–34.5)
MCV RBC AUTO: 88.1 FL (ref 80–99.9)
MCV RBC AUTO: 89.4 FL (ref 80–99.9)
METHADONE UR QL: NEGATIVE
METHB: 0.6 % (ref 0–1.5)
MODE: ABNORMAL
MONOCYTES NFR BLD: 0.94 K/UL (ref 0.1–0.95)
MONOCYTES NFR BLD: 7 % (ref 2–12)
NEUTROPHILS NFR BLD: 86 % (ref 43–80)
NEUTS SEG NFR BLD: 11.43 K/UL (ref 1.8–7.3)
NITRITE UR QL STRIP: NEGATIVE
O2 SATURATION: 96.4 % (ref 92–98.5)
O2HB: 95.7 % (ref 94–97)
OPERATOR ID: 8215
OPIATES UR QL SCN: NEGATIVE
OXYCODONE UR QL SCN: NEGATIVE
PATIENT TEMP: 37 C
PCO2: 33.6 MMHG (ref 35–45)
PCP UR QL SCN: NEGATIVE
PH BLOOD GAS: 7.38 (ref 7.35–7.45)
PH UR STRIP: 5.5 [PH] (ref 5–8)
PH VENOUS: 7.33 (ref 7.35–7.45)
PHOSPHATE SERPL-MCNC: 2.5 MG/DL (ref 2.5–4.5)
PHOSPHATE SERPL-MCNC: 3 MG/DL (ref 2.5–4.5)
PLATELET # BLD AUTO: 421 K/UL (ref 130–450)
PLATELET # BLD AUTO: 463 K/UL (ref 130–450)
PMV BLD AUTO: 10.9 FL (ref 7–12)
PMV BLD AUTO: 11.6 FL (ref 7–12)
PO2: 90.9 MMHG (ref 75–100)
POTASSIUM SERPL-SCNC: 3.4 MMOL/L (ref 3.5–5.1)
POTASSIUM SERPL-SCNC: 3.6 MMOL/L (ref 3.5–5.1)
POTASSIUM SERPL-SCNC: 4.4 MMOL/L (ref 3.5–5.1)
PROCALCITONIN SERPL-MCNC: 0.13 NG/ML (ref 0–0.08)
PROT SERPL-MCNC: 8 G/DL (ref 6.4–8.3)
PROT UR STRIP-MCNC: NEGATIVE MG/DL
PROTHROMBIN TIME: 12.4 SEC (ref 9.3–12.4)
RBC # BLD AUTO: 4.37 M/UL (ref 3.5–5.5)
RBC # BLD AUTO: 4.72 M/UL (ref 3.5–5.5)
RBC #/AREA URNS HPF: ABNORMAL /HPF
RSV RNA NPH QL NAA+NON-PROBE: NOT DETECTED
RV+EV RNA NPH QL NAA+NON-PROBE: NOT DETECTED
SALICYLATES SERPL-MCNC: <0.5 MG/DL (ref 0–30)
SARS-COV-2 RNA NPH QL NAA+NON-PROBE: NOT DETECTED
SODIUM SERPL-SCNC: 138 MMOL/L (ref 136–145)
SODIUM SERPL-SCNC: 140 MMOL/L (ref 136–145)
SODIUM SERPL-SCNC: 143 MMOL/L (ref 136–145)
SODIUM UR-SCNC: 108 MMOL/L
SOURCE, BLOOD GAS: ABNORMAL
SP GR UR STRIP: <1.005 (ref 1–1.03)
SPECIMEN DESCRIPTION: NORMAL
TEST INFORMATION: ABNORMAL
THB: 13.3 G/DL (ref 11.5–16.5)
TIME ANALYZED: 2043
TOXIC TRICYCLIC SC,BLOOD: NEGATIVE
TROPONIN I SERPL HS-MCNC: 76 NG/L (ref 0–14)
TROPONIN I SERPL HS-MCNC: 85 NG/L (ref 0–14)
TSH SERPL DL<=0.05 MIU/L-ACNC: 0.35 UIU/ML (ref 0.27–4.2)
UROBILINOGEN UR STRIP-ACNC: 0.2 EU/DL (ref 0–1)
UUN UR-MCNC: 115 MG/DL (ref 800–1666)
VIT B12 SERPL-MCNC: 308 PG/ML (ref 232–1245)
WBC #/AREA URNS HPF: ABNORMAL /HPF
WBC OTHER # BLD: 13.3 K/UL (ref 4.5–11.5)
WBC OTHER # BLD: 16.6 K/UL (ref 4.5–11.5)

## 2025-06-20 PROCEDURE — 82607 VITAMIN B-12: CPT

## 2025-06-20 PROCEDURE — 6360000004 HC RX CONTRAST MEDICATION: Performed by: RADIOLOGY

## 2025-06-20 PROCEDURE — 6360000002 HC RX W HCPCS

## 2025-06-20 PROCEDURE — 83880 ASSAY OF NATRIURETIC PEPTIDE: CPT

## 2025-06-20 PROCEDURE — 85027 COMPLETE CBC AUTOMATED: CPT

## 2025-06-20 PROCEDURE — 85025 COMPLETE CBC W/AUTO DIFF WBC: CPT

## 2025-06-20 PROCEDURE — 83605 ASSAY OF LACTIC ACID: CPT

## 2025-06-20 PROCEDURE — 2580000003 HC RX 258: Performed by: EMERGENCY MEDICINE

## 2025-06-20 PROCEDURE — 96375 TX/PRO/DX INJ NEW DRUG ADDON: CPT

## 2025-06-20 PROCEDURE — 6370000000 HC RX 637 (ALT 250 FOR IP): Performed by: INTERNAL MEDICINE

## 2025-06-20 PROCEDURE — 84484 ASSAY OF TROPONIN QUANT: CPT

## 2025-06-20 PROCEDURE — 2500000003 HC RX 250 WO HCPCS: Performed by: INTERNAL MEDICINE

## 2025-06-20 PROCEDURE — 82570 ASSAY OF URINE CREATININE: CPT

## 2025-06-20 PROCEDURE — 82805 BLOOD GASES W/O2 SATURATION: CPT

## 2025-06-20 PROCEDURE — 6370000000 HC RX 637 (ALT 250 FOR IP): Performed by: EMERGENCY MEDICINE

## 2025-06-20 PROCEDURE — 93010 ELECTROCARDIOGRAM REPORT: CPT | Performed by: INTERNAL MEDICINE

## 2025-06-20 PROCEDURE — 93005 ELECTROCARDIOGRAM TRACING: CPT | Performed by: EMERGENCY MEDICINE

## 2025-06-20 PROCEDURE — 80048 BASIC METABOLIC PNL TOTAL CA: CPT

## 2025-06-20 PROCEDURE — 83036 HEMOGLOBIN GLYCOSYLATED A1C: CPT

## 2025-06-20 PROCEDURE — 83735 ASSAY OF MAGNESIUM: CPT

## 2025-06-20 PROCEDURE — 84540 ASSAY OF URINE/UREA-N: CPT

## 2025-06-20 PROCEDURE — 84145 PROCALCITONIN (PCT): CPT

## 2025-06-20 PROCEDURE — 87449 NOS EACH ORGANISM AG IA: CPT

## 2025-06-20 PROCEDURE — 82800 BLOOD PH: CPT

## 2025-06-20 PROCEDURE — 99223 1ST HOSP IP/OBS HIGH 75: CPT | Performed by: INTERNAL MEDICINE

## 2025-06-20 PROCEDURE — 81001 URINALYSIS AUTO W/SCOPE: CPT

## 2025-06-20 PROCEDURE — 83690 ASSAY OF LIPASE: CPT

## 2025-06-20 PROCEDURE — 84443 ASSAY THYROID STIM HORMONE: CPT

## 2025-06-20 PROCEDURE — 2500000003 HC RX 250 WO HCPCS: Performed by: RADIOLOGY

## 2025-06-20 PROCEDURE — 80179 DRUG ASSAY SALICYLATE: CPT

## 2025-06-20 PROCEDURE — 51702 INSERT TEMP BLADDER CATH: CPT

## 2025-06-20 PROCEDURE — 99285 EMERGENCY DEPT VISIT HI MDM: CPT

## 2025-06-20 PROCEDURE — 96368 THER/DIAG CONCURRENT INF: CPT

## 2025-06-20 PROCEDURE — G0480 DRUG TEST DEF 1-7 CLASSES: HCPCS

## 2025-06-20 PROCEDURE — 2500000003 HC RX 250 WO HCPCS: Performed by: EMERGENCY MEDICINE

## 2025-06-20 PROCEDURE — 6360000002 HC RX W HCPCS: Performed by: EMERGENCY MEDICINE

## 2025-06-20 PROCEDURE — 2500000003 HC RX 250 WO HCPCS

## 2025-06-20 PROCEDURE — 0202U NFCT DS 22 TRGT SARS-COV-2: CPT

## 2025-06-20 PROCEDURE — 74177 CT ABD & PELVIS W/CONTRAST: CPT

## 2025-06-20 PROCEDURE — 80307 DRUG TEST PRSMV CHEM ANLYZR: CPT

## 2025-06-20 PROCEDURE — 2580000003 HC RX 258

## 2025-06-20 PROCEDURE — 87086 URINE CULTURE/COLONY COUNT: CPT

## 2025-06-20 PROCEDURE — 84300 ASSAY OF URINE SODIUM: CPT

## 2025-06-20 PROCEDURE — 80053 COMPREHEN METABOLIC PANEL: CPT

## 2025-06-20 PROCEDURE — 2000000000 HC ICU R&B

## 2025-06-20 PROCEDURE — 82746 ASSAY OF FOLIC ACID SERUM: CPT

## 2025-06-20 PROCEDURE — 85610 PROTHROMBIN TIME: CPT

## 2025-06-20 PROCEDURE — 71045 X-RAY EXAM CHEST 1 VIEW: CPT

## 2025-06-20 PROCEDURE — 99291 CRITICAL CARE FIRST HOUR: CPT | Performed by: INTERNAL MEDICINE

## 2025-06-20 PROCEDURE — 87899 AGENT NOS ASSAY W/OPTIC: CPT

## 2025-06-20 PROCEDURE — 96365 THER/PROPH/DIAG IV INF INIT: CPT

## 2025-06-20 PROCEDURE — 80143 DRUG ASSAY ACETAMINOPHEN: CPT

## 2025-06-20 PROCEDURE — 84100 ASSAY OF PHOSPHORUS: CPT

## 2025-06-20 PROCEDURE — 82962 GLUCOSE BLOOD TEST: CPT

## 2025-06-20 PROCEDURE — 87081 CULTURE SCREEN ONLY: CPT

## 2025-06-20 PROCEDURE — 82010 KETONE BODYS QUAN: CPT

## 2025-06-20 PROCEDURE — 70450 CT HEAD/BRAIN W/O DYE: CPT

## 2025-06-20 PROCEDURE — 82140 ASSAY OF AMMONIA: CPT

## 2025-06-20 RX ORDER — BUMETANIDE 0.25 MG/ML
2 INJECTION, SOLUTION INTRAMUSCULAR; INTRAVENOUS ONCE
Status: COMPLETED | OUTPATIENT
Start: 2025-06-20 | End: 2025-06-20

## 2025-06-20 RX ORDER — OXYBUTYNIN CHLORIDE 5 MG/1
5 TABLET ORAL 3 TIMES DAILY
Status: DISCONTINUED | OUTPATIENT
Start: 2025-06-20 | End: 2025-06-28 | Stop reason: HOSPADM

## 2025-06-20 RX ORDER — 0.9 % SODIUM CHLORIDE 0.9 %
15 INTRAVENOUS SOLUTION INTRAVENOUS ONCE
Status: COMPLETED | OUTPATIENT
Start: 2025-06-20 | End: 2025-06-20

## 2025-06-20 RX ORDER — DOCUSATE SODIUM 100 MG/1
100 CAPSULE, LIQUID FILLED ORAL 2 TIMES DAILY PRN
Status: DISCONTINUED | OUTPATIENT
Start: 2025-06-20 | End: 2025-06-28 | Stop reason: HOSPADM

## 2025-06-20 RX ORDER — FENTANYL CITRATE 50 UG/ML
50 INJECTION, SOLUTION INTRAMUSCULAR; INTRAVENOUS ONCE
Refills: 0 | Status: COMPLETED | OUTPATIENT
Start: 2025-06-20 | End: 2025-06-20

## 2025-06-20 RX ORDER — ATORVASTATIN CALCIUM 80 MG/1
80 TABLET, FILM COATED ORAL NIGHTLY
Status: DISCONTINUED | OUTPATIENT
Start: 2025-06-20 | End: 2025-06-28 | Stop reason: HOSPADM

## 2025-06-20 RX ORDER — ONDANSETRON 2 MG/ML
4 INJECTION INTRAMUSCULAR; INTRAVENOUS ONCE
Status: DISCONTINUED | OUTPATIENT
Start: 2025-06-20 | End: 2025-06-20

## 2025-06-20 RX ORDER — ASPIRIN 81 MG/1
81 TABLET, CHEWABLE ORAL DAILY
Status: DISCONTINUED | OUTPATIENT
Start: 2025-06-21 | End: 2025-06-28 | Stop reason: HOSPADM

## 2025-06-20 RX ORDER — BUMETANIDE 1 MG/1
1 TABLET ORAL
Status: DISCONTINUED | OUTPATIENT
Start: 2025-06-20 | End: 2025-06-20

## 2025-06-20 RX ORDER — MAGNESIUM SULFATE IN WATER 40 MG/ML
2000 INJECTION, SOLUTION INTRAVENOUS PRN
Status: DISCONTINUED | OUTPATIENT
Start: 2025-06-20 | End: 2025-06-23

## 2025-06-20 RX ORDER — CLONIDINE HYDROCHLORIDE 0.2 MG/1
0.2 TABLET ORAL 3 TIMES DAILY
Status: DISCONTINUED | OUTPATIENT
Start: 2025-06-20 | End: 2025-06-28 | Stop reason: HOSPADM

## 2025-06-20 RX ORDER — PROCHLORPERAZINE EDISYLATE 5 MG/ML
10 INJECTION INTRAMUSCULAR; INTRAVENOUS ONCE
Status: COMPLETED | OUTPATIENT
Start: 2025-06-20 | End: 2025-06-20

## 2025-06-20 RX ORDER — IPRATROPIUM BROMIDE AND ALBUTEROL SULFATE 2.5; .5 MG/3ML; MG/3ML
1 SOLUTION RESPIRATORY (INHALATION)
Status: DISCONTINUED | OUTPATIENT
Start: 2025-06-20 | End: 2025-06-20

## 2025-06-20 RX ORDER — ENOXAPARIN SODIUM 100 MG/ML
1 INJECTION SUBCUTANEOUS 2 TIMES DAILY
Status: DISCONTINUED | OUTPATIENT
Start: 2025-06-20 | End: 2025-06-20

## 2025-06-20 RX ORDER — ACETAMINOPHEN 650 MG/1
650 SUPPOSITORY RECTAL EVERY 6 HOURS PRN
Status: DISCONTINUED | OUTPATIENT
Start: 2025-06-20 | End: 2025-06-28 | Stop reason: HOSPADM

## 2025-06-20 RX ORDER — HEPARIN SODIUM 1000 [USP'U]/ML
4000 INJECTION, SOLUTION INTRAVENOUS; SUBCUTANEOUS PRN
Status: DISCONTINUED | OUTPATIENT
Start: 2025-06-21 | End: 2025-06-24 | Stop reason: SDUPTHER

## 2025-06-20 RX ORDER — HYDRALAZINE HYDROCHLORIDE 10 MG/1
10 TABLET, FILM COATED ORAL EVERY 12 HOURS SCHEDULED
Status: DISCONTINUED | OUTPATIENT
Start: 2025-06-20 | End: 2025-06-28 | Stop reason: HOSPADM

## 2025-06-20 RX ORDER — ACETAMINOPHEN 325 MG/1
650 TABLET ORAL EVERY 6 HOURS PRN
Status: DISCONTINUED | OUTPATIENT
Start: 2025-06-20 | End: 2025-06-28 | Stop reason: HOSPADM

## 2025-06-20 RX ORDER — GABAPENTIN 300 MG/1
300 CAPSULE ORAL 3 TIMES DAILY
Status: DISCONTINUED | OUTPATIENT
Start: 2025-06-21 | End: 2025-06-28 | Stop reason: HOSPADM

## 2025-06-20 RX ORDER — INSULIN GLARGINE 100 [IU]/ML
10 INJECTION, SOLUTION SUBCUTANEOUS NIGHTLY
Status: ON HOLD | COMMUNITY
End: 2025-06-27 | Stop reason: HOSPADM

## 2025-06-20 RX ORDER — ONDANSETRON 2 MG/ML
4 INJECTION INTRAMUSCULAR; INTRAVENOUS EVERY 6 HOURS PRN
Status: DISCONTINUED | OUTPATIENT
Start: 2025-06-20 | End: 2025-06-20 | Stop reason: CLARIF

## 2025-06-20 RX ORDER — POLYETHYLENE GLYCOL 3350 17 G/17G
17 POWDER, FOR SOLUTION ORAL DAILY PRN
Status: DISCONTINUED | OUTPATIENT
Start: 2025-06-20 | End: 2025-06-28 | Stop reason: HOSPADM

## 2025-06-20 RX ORDER — TRAZODONE HYDROCHLORIDE 50 MG/1
50 TABLET ORAL NIGHTLY
Status: DISCONTINUED | OUTPATIENT
Start: 2025-06-20 | End: 2025-06-28 | Stop reason: HOSPADM

## 2025-06-20 RX ORDER — POTASSIUM CHLORIDE 7.45 MG/ML
10 INJECTION INTRAVENOUS PRN
Status: DISCONTINUED | OUTPATIENT
Start: 2025-06-20 | End: 2025-06-20

## 2025-06-20 RX ORDER — PANTOPRAZOLE SODIUM 40 MG/1
40 TABLET, DELAYED RELEASE ORAL
Status: DISCONTINUED | OUTPATIENT
Start: 2025-06-21 | End: 2025-06-28 | Stop reason: HOSPADM

## 2025-06-20 RX ORDER — ACYCLOVIR 200 MG/1
400 CAPSULE ORAL 3 TIMES DAILY
Status: DISCONTINUED | OUTPATIENT
Start: 2025-06-20 | End: 2025-06-21

## 2025-06-20 RX ORDER — HEPARIN SODIUM 1000 [USP'U]/ML
2000 INJECTION, SOLUTION INTRAVENOUS; SUBCUTANEOUS PRN
Status: DISCONTINUED | OUTPATIENT
Start: 2025-06-21 | End: 2025-06-24 | Stop reason: SDUPTHER

## 2025-06-20 RX ORDER — 0.9 % SODIUM CHLORIDE 0.9 %
1000 INTRAVENOUS SOLUTION INTRAVENOUS ONCE
Status: COMPLETED | OUTPATIENT
Start: 2025-06-20 | End: 2025-06-20

## 2025-06-20 RX ORDER — IOPAMIDOL 755 MG/ML
75 INJECTION, SOLUTION INTRAVASCULAR
Status: COMPLETED | OUTPATIENT
Start: 2025-06-20 | End: 2025-06-20

## 2025-06-20 RX ORDER — HEPARIN SODIUM 10000 [USP'U]/100ML
5-30 INJECTION, SOLUTION INTRAVENOUS CONTINUOUS
Status: DISPENSED | OUTPATIENT
Start: 2025-06-21 | End: 2025-06-23

## 2025-06-20 RX ORDER — DEXTROSE MONOHYDRATE, SODIUM CHLORIDE, AND POTASSIUM CHLORIDE 50; 1.49; 4.5 G/1000ML; G/1000ML; G/1000ML
INJECTION, SOLUTION INTRAVENOUS CONTINUOUS PRN
Status: DISCONTINUED | OUTPATIENT
Start: 2025-06-20 | End: 2025-06-23

## 2025-06-20 RX ORDER — SODIUM CHLORIDE 9 MG/ML
INJECTION, SOLUTION INTRAVENOUS PRN
Status: DISCONTINUED | OUTPATIENT
Start: 2025-06-20 | End: 2025-06-28 | Stop reason: HOSPADM

## 2025-06-20 RX ORDER — HEPARIN SODIUM 1000 [USP'U]/ML
4000 INJECTION, SOLUTION INTRAVENOUS; SUBCUTANEOUS ONCE
Status: COMPLETED | OUTPATIENT
Start: 2025-06-21 | End: 2025-06-21

## 2025-06-20 RX ORDER — SODIUM CHLORIDE 0.9 % (FLUSH) 0.9 %
5-40 SYRINGE (ML) INJECTION PRN
Status: DISCONTINUED | OUTPATIENT
Start: 2025-06-20 | End: 2025-06-28 | Stop reason: HOSPADM

## 2025-06-20 RX ORDER — MONTELUKAST SODIUM 10 MG/1
10 TABLET ORAL NIGHTLY
Status: DISCONTINUED | OUTPATIENT
Start: 2025-06-20 | End: 2025-06-28 | Stop reason: HOSPADM

## 2025-06-20 RX ORDER — ROSUVASTATIN CALCIUM 40 MG/1
40 TABLET, COATED ORAL EVERY EVENING
Status: ON HOLD | COMMUNITY
End: 2025-06-27 | Stop reason: HOSPADM

## 2025-06-20 RX ORDER — LEVALBUTEROL INHALATION SOLUTION 0.63 MG/3ML
1.25 SOLUTION RESPIRATORY (INHALATION) EVERY 8 HOURS PRN
Status: DISCONTINUED | OUTPATIENT
Start: 2025-06-20 | End: 2025-06-28 | Stop reason: HOSPADM

## 2025-06-20 RX ORDER — ISOSORBIDE DINITRATE 10 MG/1
10 TABLET ORAL 3 TIMES DAILY
Status: DISCONTINUED | OUTPATIENT
Start: 2025-06-20 | End: 2025-06-28 | Stop reason: HOSPADM

## 2025-06-20 RX ORDER — ONDANSETRON 4 MG/1
4 TABLET, ORALLY DISINTEGRATING ORAL EVERY 8 HOURS PRN
Status: DISCONTINUED | OUTPATIENT
Start: 2025-06-20 | End: 2025-06-20 | Stop reason: CLARIF

## 2025-06-20 RX ORDER — POTASSIUM CHLORIDE 29.8 MG/ML
20 INJECTION INTRAVENOUS PRN
Status: DISCONTINUED | OUTPATIENT
Start: 2025-06-20 | End: 2025-06-23

## 2025-06-20 RX ORDER — RANOLAZINE 500 MG/1
500 TABLET, EXTENDED RELEASE ORAL 2 TIMES DAILY
Status: DISCONTINUED | OUTPATIENT
Start: 2025-06-20 | End: 2025-06-28 | Stop reason: HOSPADM

## 2025-06-20 RX ORDER — MAGNESIUM SULFATE IN WATER 40 MG/ML
2000 INJECTION, SOLUTION INTRAVENOUS ONCE
Status: COMPLETED | OUTPATIENT
Start: 2025-06-20 | End: 2025-06-20

## 2025-06-20 RX ORDER — SODIUM CHLORIDE 0.9 % (FLUSH) 0.9 %
10 SYRINGE (ML) INJECTION PRN
Status: DISCONTINUED | OUTPATIENT
Start: 2025-06-20 | End: 2025-06-23

## 2025-06-20 RX ORDER — METOPROLOL SUCCINATE 50 MG/1
75 TABLET, EXTENDED RELEASE ORAL DAILY
Status: DISCONTINUED | OUTPATIENT
Start: 2025-06-21 | End: 2025-06-21

## 2025-06-20 RX ORDER — SODIUM CHLORIDE 9 MG/ML
INJECTION, SOLUTION INTRAVENOUS CONTINUOUS
Status: DISCONTINUED | OUTPATIENT
Start: 2025-06-20 | End: 2025-06-23

## 2025-06-20 RX ORDER — PROCHLORPERAZINE MALEATE 10 MG
10 TABLET ORAL EVERY 8 HOURS PRN
Status: DISCONTINUED | OUTPATIENT
Start: 2025-06-20 | End: 2025-06-28 | Stop reason: HOSPADM

## 2025-06-20 RX ORDER — PROCHLORPERAZINE EDISYLATE 5 MG/ML
10 INJECTION INTRAMUSCULAR; INTRAVENOUS EVERY 6 HOURS PRN
Status: DISCONTINUED | OUTPATIENT
Start: 2025-06-20 | End: 2025-06-28 | Stop reason: HOSPADM

## 2025-06-20 RX ORDER — SODIUM CHLORIDE 0.9 % (FLUSH) 0.9 %
5-40 SYRINGE (ML) INJECTION EVERY 12 HOURS SCHEDULED
Status: DISCONTINUED | OUTPATIENT
Start: 2025-06-20 | End: 2025-06-28 | Stop reason: HOSPADM

## 2025-06-20 RX ORDER — FERROUS SULFATE 325(65) MG
325 TABLET ORAL 2 TIMES DAILY WITH MEALS
Status: DISCONTINUED | OUTPATIENT
Start: 2025-06-21 | End: 2025-06-28 | Stop reason: HOSPADM

## 2025-06-20 RX ADMIN — OXYBUTYNIN CHLORIDE 5 MG: 5 TABLET ORAL at 21:23

## 2025-06-20 RX ADMIN — INSULIN HUMAN 9 UNITS: 100 INJECTION, SOLUTION PARENTERAL at 16:02

## 2025-06-20 RX ADMIN — POTASSIUM CHLORIDE 10 MEQ: 7.46 INJECTION, SOLUTION INTRAVENOUS at 17:18

## 2025-06-20 RX ADMIN — SODIUM CHLORIDE: 0.9 INJECTION, SOLUTION INTRAVENOUS at 17:17

## 2025-06-20 RX ADMIN — FENTANYL CITRATE 50 MCG: 50 INJECTION INTRAMUSCULAR; INTRAVENOUS at 11:45

## 2025-06-20 RX ADMIN — RANOLAZINE 500 MG: 500 TABLET, FILM COATED, EXTENDED RELEASE ORAL at 21:23

## 2025-06-20 RX ADMIN — SODIUM CHLORIDE, PRESERVATIVE FREE 10 ML: 5 INJECTION INTRAVENOUS at 20:09

## 2025-06-20 RX ADMIN — MONTELUKAST 10 MG: 10 TABLET, FILM COATED ORAL at 21:23

## 2025-06-20 RX ADMIN — BUMETANIDE 2 MG: 0.25 INJECTION INTRAMUSCULAR; INTRAVENOUS at 19:58

## 2025-06-20 RX ADMIN — DOXYCYCLINE 100 MG: 100 INJECTION, POWDER, LYOPHILIZED, FOR SOLUTION INTRAVENOUS at 22:18

## 2025-06-20 RX ADMIN — POTASSIUM CHLORIDE 20 MEQ: 29.8 INJECTION, SOLUTION INTRAVENOUS at 23:03

## 2025-06-20 RX ADMIN — SODIUM CHLORIDE 1361 ML: 9 INJECTION, SOLUTION INTRAVENOUS at 15:39

## 2025-06-20 RX ADMIN — AMIODARONE HYDROCHLORIDE 150 MG: 50 INJECTION, SOLUTION INTRAVENOUS at 14:49

## 2025-06-20 RX ADMIN — ENOXAPARIN SODIUM 90 MG: 100 INJECTION SUBCUTANEOUS at 20:08

## 2025-06-20 RX ADMIN — Medication 10 ML: at 16:50

## 2025-06-20 RX ADMIN — PROCHLORPERAZINE EDISYLATE 10 MG: 5 INJECTION INTRAMUSCULAR; INTRAVENOUS at 11:46

## 2025-06-20 RX ADMIN — MAGNESIUM SULFATE HEPTAHYDRATE 2000 MG: 40 INJECTION, SOLUTION INTRAVENOUS at 20:13

## 2025-06-20 RX ADMIN — WATER 1000 MG: 1 INJECTION INTRAMUSCULAR; INTRAVENOUS; SUBCUTANEOUS at 21:38

## 2025-06-20 RX ADMIN — IOPAMIDOL 75 ML: 755 INJECTION, SOLUTION INTRAVENOUS at 16:47

## 2025-06-20 RX ADMIN — IPRATROPIUM BROMIDE AND ALBUTEROL SULFATE 1 DOSE: .5; 3 SOLUTION RESPIRATORY (INHALATION) at 16:27

## 2025-06-20 RX ADMIN — POTASSIUM CHLORIDE 10 MEQ: 7.46 INJECTION, SOLUTION INTRAVENOUS at 15:59

## 2025-06-20 RX ADMIN — SODIUM CHLORIDE 1000 ML: 9 INJECTION, SOLUTION INTRAVENOUS at 11:50

## 2025-06-20 RX ADMIN — AMIODARONE HYDROCHLORIDE 1 MG/MIN: 1.8 INJECTION, SOLUTION INTRAVENOUS at 15:01

## 2025-06-20 RX ADMIN — HYDRALAZINE HYDROCHLORIDE 10 MG: 10 TABLET ORAL at 20:07

## 2025-06-20 RX ADMIN — AMIODARONE HYDROCHLORIDE 0.5 MG/MIN: 1.8 INJECTION, SOLUTION INTRAVENOUS at 19:55

## 2025-06-20 RX ADMIN — CLONIDINE HYDROCHLORIDE 0.2 MG: 0.2 TABLET ORAL at 20:07

## 2025-06-20 RX ADMIN — SODIUM CHLORIDE 6 UNITS/HR: 9 INJECTION, SOLUTION INTRAVENOUS at 16:02

## 2025-06-20 RX ADMIN — DEXTROSE, SODIUM CHLORIDE, AND POTASSIUM CHLORIDE: 5; .45; .15 INJECTION INTRAVENOUS at 17:22

## 2025-06-20 RX ADMIN — ATORVASTATIN CALCIUM 80 MG: 80 TABLET, FILM COATED ORAL at 22:42

## 2025-06-20 ASSESSMENT — PAIN DESCRIPTION - PAIN TYPE: TYPE: ACUTE PAIN

## 2025-06-20 ASSESSMENT — PAIN DESCRIPTION - LOCATION: LOCATION: ABDOMEN

## 2025-06-20 ASSESSMENT — PAIN DESCRIPTION - ORIENTATION: ORIENTATION: OTHER (COMMENT)

## 2025-06-20 ASSESSMENT — PAIN DESCRIPTION - DESCRIPTORS: DESCRIPTORS: STABBING;SHARP

## 2025-06-20 ASSESSMENT — PAIN SCALES - GENERAL
PAINLEVEL_OUTOF10: 8
PAINLEVEL_OUTOF10: 0

## 2025-06-20 NOTE — PROGRESS NOTES
4 Eyes Skin Assessment     NAME:  Fanny Gonzalez  YOB: 1950  MEDICAL RECORD NUMBER:  [id number]    The patient is being assessed for  Admission    I agree that at least one RN has performed a thorough Head to Toe Skin Assessment on the patient. ALL assessment sites listed below have been assessed.      Areas assessed by both nurses:    Head, Face, Ears, Shoulders, Back, Chest, Arms, Elbows, Hands, Sacrum. Buttock, Coccyx, Ischium, Legs. Feet and Heels, and Under Medical Devices         Does the Patient have a Wound? No noted wound(s)       Surendra Prevention initiated by RN: Yes  Wound Care Orders initiated by RN: No    Pressure Injury (Stage 3,4, Unstageable, DTI, NWPT, and Complex wounds) if present, place Wound referral order by RN under : No    New Ostomies, if present place, Ostomy referral order under : No     Nurse 1 eSignature: Electronically signed by Regine Figueroa on 6/20/25 at 6:54 PM EDT    **SHARE this note so that the co-signing nurse can place an eSignature**    Nurse 2 eSignature: Electronically signed by Bridgette Guerrier RN on 6/20/25 at 6:46 PM EDT

## 2025-06-20 NOTE — ED PROVIDER NOTES
HPI:  6/20/25,   Time: 11:23 AM EDT       Fanny Gonzalez is a 74 y.o. female presenting to the ED for abd pain/nv, beginning hrs ago.  The complaint has been persistent, mild in severity, and worsened by nothing.  Diffuse abdominal pain.  Nausea vomit.  No diarrhea.  No fevers chills or sweats.  No cough or congestion.  Sent from nursing home.  History difficult for patient    Review of Systems:   Pertinent positives and negatives are stated within HPI, all other systems reviewed and are negative.          --------------------------------------------- PAST HISTORY ---------------------------------------------  Past Medical History:  has a past medical history of Arthritis, Asthma, BRCA1 negative, BRCA2 negative, Breast cancer (HCC), CAD in native artery, Cancer ([facility]), Cerebral artery occlusion with cerebral infarction ([facility]), Cerebrovascular disease, CHF (congestive heart failure) ([facility]), Claustrophobia, COPD (chronic obstructive pulmonary disease) ([facility]), Decreased dorsalis pedis pulse, Dermatophytosis, Headache(784.0), History of blood transfusion, Hives, Hx of blood clots, Hyperlipidemia, Hypertension, LBP radiating to both legs, Lymphedema of both lower extremities, Neuromuscular disorder (HCC), Non-rheumatic aortic sclerosis, Obesity, Pulmonary hypertension (HCC), PVD (peripheral vascular disease) with claudication, Recurrent genital HSV (herpes simplex virus) infection, S/P breast biopsy, right, Type II or unspecified type diabetes mellitus without mention of complication, not stated as uncontrolled, and UTI (urinary tract infection).    Past Surgical History:  has a past surgical history that includes Total hip arthroplasty (Bilateral); Total abdominal hysterectomy w/ bilateral salpingoophorectomy; ECHO Compl W Dop Color Flow (06/07/2012); Total knee arthroplasty (Bilateral, 2013); Colonoscopy (2005); Hysterectomy; Colonoscopy (01/08/2015); Breast lumpectomy (Right, years ago); arthroplasty (Left, 07/29/2016);

## 2025-06-20 NOTE — PROGRESS NOTES
Patient admitted with multiple rings, a bracelet, nose ring, and earrings. All jewelry remains on patient. Patient also admitted with a zoll life vest, shirt, and night gown - all belongings bagged and in patient room. Patient also has upper and lower dentures in.    Negative

## 2025-06-20 NOTE — TELEPHONE ENCOUNTER
Spoke to the patient's daughter and EMS is currently taking the patient to the emergency room for evaluation.

## 2025-06-20 NOTE — TELEPHONE ENCOUNTER
Spoke to Jennifer, Patient's Home Health nurse with [facility]. She had a visit yesterday with patient. She stated patient was not well. Was diaphoretic, Tachycardic, vomiting, pale and advised she needed to go to the hospital. Patient told her she was over reacting and refused to go. Jennifer states her next visit is suppose to be Monday.

## 2025-06-20 NOTE — ED NOTES
Both of patients ultrasound lines infiltrated at this time.  MD Holland aware as patient has multiple medications that are continuous.  ED Resident prepping for central line at this time.

## 2025-06-20 NOTE — H&P
Inpatient H&P      PCP:  Babatunde Storm APRN - CNP  Admitting Physician:  No admitting provider for patient encounter.  Consultants:  Critical care, EP  Chief Complaint:    Chief Complaint   Patient presents with    Nausea     N/V for a few days. Generalized Abdominal pain. . 4mg Zofran for EMS. Unrelated: Patient has active lifevest.       History of Present Illness  Fanny Gonzalez is a 74 y.o. female who presents to [facility] ER complaining of nausea.    Fanny Gonzalez has a past medical history that includes diabetes, hyperlipidemia, HFrEF, paroxysmal atrial fibrillation, chronic LBBB, coronary artery disease, hypertension, COPD, history of pulmonary embolism,    Fanny presents to ER with abdominal pain, nausea, vomiting that began yesterday In the ER, she was found to be hyperglycemic. She states her blood sugars have also been running high since yesterday. She also apparently had a run of VTACH this morning and was shocked by LifeVest. In the ER, she was placed on insulin drip and amiodarone drip. EP consulted from ER. She will be admitted to ICU.   Discussed patient's case with ED physician.    ER Course  Upon presentation to the ER, routine labwork was performed which revealed CO2 18, creatinine 1.4, anion gap 24, lactic acid 2.7, glucose 435, troponin 85, alk phos 178, lipase 29, WBC 13.3.  Imaging results are as outlined below in the Imaging section of this note.     Upon arrival to the ER, patient was 92/107, tachycardic 113.  The patient received amiodarone, fentanyl, insulin drip, Compazine, IVF in the emergency room and was admitted to [facility].    Last Hospital Admission -I personally reviewed previous admission from 5/11/25  Patient Fanny Gonzalez is a 74 y.o. presented with Generalized abdominal pain [R10.84]  NSTEMI (non-ST elevated myocardial infarction) (HCC) [I21.4]  Acute on chronic congestive heart failure, unspecified heart failure type (HCC) [I50.9]    Last Echocardiogram -appears  Partners: Male     Comment: divorce   Other Topics Concern    Not on file   Social History Narrative    Drinks 1 cup of coffee daily     Social Drivers of Health     Financial Resource Strain: Not on file   Food Insecurity: No Food Insecurity (5/18/2025)    Hunger Vital Sign     Worried About Running Out of Food in the Last Year: Never true     Ran Out of Food in the Last Year: Never true   Transportation Needs: No Transportation Needs (5/18/2025)    PRAPARE - Transportation     Lack of Transportation (Medical): No     Lack of Transportation (Non-Medical): No   Physical Activity: Not on file   Stress: Not on file   Social Connections: Not on file   Intimate Partner Violence: Not on file   Housing Stability: Low Risk  (5/18/2025)    Housing Stability Vital Sign     Unable to Pay for Housing in the Last Year: No     Number of Times Moved in the Last Year: 0     Homeless in the Last Year: No       Review of Systems  All bolded are positive; please see HPI  General:  Fever, chills, diaphoresis, fatigue, malaise, night sweats, weight loss  Psychological:  Anxiety, disorientation, hallucinations.  ENT:  Epistaxis, headaches, vertigo, visual changes.  Cardiovascular:  Chest pain, irregular heartbeats, palpitations, paroxysmal nocturnal dyspnea.  Respiratory:  Shortness of breath, coughing, sputum production, hemoptysis, wheezing, orthopnea.  Gastrointestinal:  Nausea, vomiting, diarrhea, heartburn, constipation, abdominal pain, hematemesis, hematochezia, melena, acholic stools  Genito-Urinary:  Dysuria, urgency, frequency, hematuria  Musculoskeletal:  Joint pain, joint stiffness, joint swelling, muscle pain  Neurology:  Headache, focal neurological deficits, weakness, numbness, paresthesia  Derm:  Rashes, ulcers, excoriations, bruising  Extremities:  Decreased ROM, peripheral edema, mottling    Physical Examination  Vitals:  BP (!) 133/115   Pulse (!) 134   Temp 98 °F (36.7 °C)   Resp 20   Ht 1.651 m (5' 5\")   Wt 90.7

## 2025-06-20 NOTE — ED NOTES
Angela representative at bedside.  Per [employer], patient was shocked and treated for Vtach at 7:05am this morning.

## 2025-06-20 NOTE — TELEPHONE ENCOUNTER
Received a LifeVest alert from today's transmission, upon review of the patient's transmission patient received a shock from LifeZmagst at 0705 due to VT (see below).  Attempted to call the patient numerous times, to instruct to go to the emergency room/call 911.  Unable to leave message due to voicemail box not being set up.  Patient's emergency contact Tracee was also notified of the attempted call the patient. [place], this RN called 911 for wellness check on the patient due to not being able to get a hold of her. Will forward to Dr. Mcghee and Chasidy Elena NP for review and FYI.

## 2025-06-21 PROBLEM — I47.20 VENTRICULAR TACHYCARDIA (HCC): Status: ACTIVE | Noted: 2025-06-21

## 2025-06-21 PROBLEM — I50.20 HFREF (HEART FAILURE WITH REDUCED EJECTION FRACTION) (HCC): Status: ACTIVE | Noted: 2025-06-21

## 2025-06-21 LAB
ALBUMIN SERPL-MCNC: 2.7 G/DL (ref 3.5–5.2)
ALP SERPL-CCNC: 102 U/L (ref 35–104)
ALT SERPL-CCNC: 6 U/L (ref 0–35)
ANION GAP SERPL CALCULATED.3IONS-SCNC: 13 MMOL/L (ref 7–16)
ANION GAP SERPL CALCULATED.3IONS-SCNC: 13 MMOL/L (ref 7–16)
ANION GAP SERPL CALCULATED.3IONS-SCNC: 9 MMOL/L (ref 7–16)
ANION GAP SERPL CALCULATED.3IONS-SCNC: 9 MMOL/L (ref 7–16)
AST SERPL-CCNC: 24 U/L (ref 0–35)
B-OH-BUTYR SERPL-MCNC: 0.11 MMOL/L (ref 0.02–0.27)
B.E.: -4.2 MMOL/L (ref -3–3)
BILIRUB SERPL-MCNC: 0.4 MG/DL (ref 0–1.2)
BUN SERPL-MCNC: 19 MG/DL (ref 8–23)
BUN SERPL-MCNC: 20 MG/DL (ref 8–23)
BUN SERPL-MCNC: 21 MG/DL (ref 8–23)
BUN SERPL-MCNC: 21 MG/DL (ref 8–23)
CALCIUM SERPL-MCNC: 8.4 MG/DL (ref 8.8–10.2)
CALCIUM SERPL-MCNC: 8.6 MG/DL (ref 8.8–10.2)
CALCIUM SERPL-MCNC: 8.7 MG/DL (ref 8.8–10.2)
CALCIUM SERPL-MCNC: 8.8 MG/DL (ref 8.8–10.2)
CHLORIDE SERPL-SCNC: 107 MMOL/L (ref 98–107)
CHLORIDE SERPL-SCNC: 108 MMOL/L (ref 98–107)
CHLORIDE SERPL-SCNC: 109 MMOL/L (ref 98–107)
CHLORIDE SERPL-SCNC: 110 MMOL/L (ref 98–107)
CHOLEST SERPL-MCNC: 118 MG/DL
CO2 SERPL-SCNC: 21 MMOL/L (ref 22–29)
CO2 SERPL-SCNC: 22 MMOL/L (ref 22–29)
COHB: 0.3 % (ref 0–1.5)
CREAT SERPL-MCNC: 1.1 MG/DL (ref 0.5–1)
CREAT SERPL-MCNC: 1.2 MG/DL (ref 0.5–1)
CREAT SERPL-MCNC: 1.2 MG/DL (ref 0.5–1)
CREAT SERPL-MCNC: 1.3 MG/DL (ref 0.5–1)
CRITICAL: ABNORMAL
DATE ANALYZED: ABNORMAL
DATE OF COLLECTION: ABNORMAL
EKG ATRIAL RATE: 150 BPM
EKG Q-T INTERVAL: 350 MS
EKG QRS DURATION: 132 MS
EKG QTC CALCULATION (BAZETT): 545 MS
EKG R AXIS: -32 DEGREES
EKG T AXIS: 158 DEGREES
EKG VENTRICULAR RATE: 146 BPM
ERYTHROCYTE [DISTWIDTH] IN BLOOD BY AUTOMATED COUNT: 15.9 % (ref 11.5–15)
GFR, ESTIMATED: 44 ML/MIN/1.73M2
GFR, ESTIMATED: 46 ML/MIN/1.73M2
GFR, ESTIMATED: 49 ML/MIN/1.73M2
GFR, ESTIMATED: 54 ML/MIN/1.73M2
GLUCOSE BLD-MCNC: 104 MG/DL (ref 74–99)
GLUCOSE BLD-MCNC: 118 MG/DL (ref 74–99)
GLUCOSE BLD-MCNC: 122 MG/DL (ref 74–99)
GLUCOSE BLD-MCNC: 139 MG/DL (ref 74–99)
GLUCOSE BLD-MCNC: 150 MG/DL (ref 74–99)
GLUCOSE BLD-MCNC: 150 MG/DL (ref 74–99)
GLUCOSE BLD-MCNC: 158 MG/DL (ref 74–99)
GLUCOSE BLD-MCNC: 163 MG/DL (ref 74–99)
GLUCOSE BLD-MCNC: 176 MG/DL (ref 74–99)
GLUCOSE BLD-MCNC: 185 MG/DL (ref 74–99)
GLUCOSE BLD-MCNC: 197 MG/DL (ref 74–99)
GLUCOSE BLD-MCNC: 199 MG/DL (ref 74–99)
GLUCOSE BLD-MCNC: 199 MG/DL (ref 74–99)
GLUCOSE BLD-MCNC: 208 MG/DL (ref 74–99)
GLUCOSE BLD-MCNC: 210 MG/DL (ref 74–99)
GLUCOSE BLD-MCNC: 237 MG/DL (ref 74–99)
GLUCOSE BLD-MCNC: 238 MG/DL (ref 74–99)
GLUCOSE BLD-MCNC: 282 MG/DL (ref 74–99)
GLUCOSE SERPL-MCNC: 130 MG/DL (ref 74–99)
GLUCOSE SERPL-MCNC: 146 MG/DL (ref 74–99)
GLUCOSE SERPL-MCNC: 164 MG/DL (ref 74–99)
GLUCOSE SERPL-MCNC: 224 MG/DL (ref 74–99)
HCO3: 20.9 MMOL/L (ref 22–26)
HCT VFR BLD AUTO: 32.8 % (ref 34–48)
HDLC SERPL-MCNC: 41 MG/DL
HGB BLD-MCNC: 10.5 G/DL (ref 11.5–15.5)
HHB: 2.9 % (ref 0–5)
L PNEUMO1 AG UR QL IA.RAPID: NEGATIVE
LAB: ABNORMAL
LACTATE BLDV-SCNC: 1.6 MMOL/L (ref 0.5–2.2)
LDLC SERPL CALC-MCNC: 55 MG/DL
Lab: 1415
MAGNESIUM SERPL-MCNC: 2.1 MG/DL (ref 1.6–2.4)
MAGNESIUM SERPL-MCNC: 2.2 MG/DL (ref 1.6–2.4)
MAGNESIUM SERPL-MCNC: 2.3 MG/DL (ref 1.6–2.4)
MCH RBC QN AUTO: 28.4 PG (ref 26–35)
MCHC RBC AUTO-ENTMCNC: 32 G/DL (ref 32–34.5)
MCV RBC AUTO: 88.6 FL (ref 80–99.9)
METHB: 0.6 % (ref 0–1.5)
MODE: ABNORMAL
O2 SATURATION: 97.1 % (ref 92–98.5)
O2HB: 96.2 % (ref 94–97)
OPERATOR ID: 1893
PARTIAL THROMBOPLASTIN TIME: 37 SEC (ref 24.5–35.1)
PARTIAL THROMBOPLASTIN TIME: 87.1 SEC (ref 24.5–35.1)
PARTIAL THROMBOPLASTIN TIME: 96.3 SEC (ref 24.5–35.1)
PATIENT TEMP: 37 C
PCO2: 38.5 MMHG (ref 35–45)
PH BLOOD GAS: 7.35 (ref 7.35–7.45)
PHOSPHATE SERPL-MCNC: 1.3 MG/DL (ref 2.5–4.5)
PHOSPHATE SERPL-MCNC: 1.9 MG/DL (ref 2.5–4.5)
PHOSPHATE SERPL-MCNC: 2.1 MG/DL (ref 2.5–4.5)
PLATELET # BLD AUTO: 334 K/UL (ref 130–450)
PMV BLD AUTO: 10.9 FL (ref 7–12)
PO2: 99.7 MMHG (ref 75–100)
POTASSIUM SERPL-SCNC: 3.6 MMOL/L (ref 3.5–5.1)
POTASSIUM SERPL-SCNC: 3.7 MMOL/L (ref 3.5–5.1)
POTASSIUM SERPL-SCNC: 4.5 MMOL/L (ref 3.5–5.1)
POTASSIUM SERPL-SCNC: 5.1 MMOL/L (ref 3.5–5.1)
PROT SERPL-MCNC: 5.6 G/DL (ref 6.4–8.3)
RBC # BLD AUTO: 3.7 M/UL (ref 3.5–5.5)
S PNEUM AG SPEC QL: NEGATIVE
SODIUM SERPL-SCNC: 140 MMOL/L (ref 136–145)
SODIUM SERPL-SCNC: 141 MMOL/L (ref 136–145)
SODIUM SERPL-SCNC: 141 MMOL/L (ref 136–145)
SODIUM SERPL-SCNC: 142 MMOL/L (ref 136–145)
SOURCE, BLOOD GAS: ABNORMAL
SPECIMEN SOURCE: NORMAL
THB: 10.8 G/DL (ref 11.5–16.5)
TIME ANALYZED: 1425
TRIGL SERPL-MCNC: 111 MG/DL
TROPONIN I SERPL HS-MCNC: 172 NG/L (ref 0–14)
VLDLC SERPL CALC-MCNC: 22 MG/DL
WBC OTHER # BLD: 11.2 K/UL (ref 4.5–11.5)

## 2025-06-21 PROCEDURE — 2580000003 HC RX 258: Performed by: EMERGENCY MEDICINE

## 2025-06-21 PROCEDURE — 6370000000 HC RX 637 (ALT 250 FOR IP): Performed by: INTERNAL MEDICINE

## 2025-06-21 PROCEDURE — 2580000003 HC RX 258

## 2025-06-21 PROCEDURE — 93010 ELECTROCARDIOGRAM REPORT: CPT | Performed by: INTERNAL MEDICINE

## 2025-06-21 PROCEDURE — 80053 COMPREHEN METABOLIC PANEL: CPT

## 2025-06-21 PROCEDURE — 84484 ASSAY OF TROPONIN QUANT: CPT

## 2025-06-21 PROCEDURE — 2500000003 HC RX 250 WO HCPCS: Performed by: EMERGENCY MEDICINE

## 2025-06-21 PROCEDURE — 83605 ASSAY OF LACTIC ACID: CPT

## 2025-06-21 PROCEDURE — 80061 LIPID PANEL: CPT

## 2025-06-21 PROCEDURE — 99291 CRITICAL CARE FIRST HOUR: CPT | Performed by: INTERNAL MEDICINE

## 2025-06-21 PROCEDURE — 6360000002 HC RX W HCPCS

## 2025-06-21 PROCEDURE — 2000000000 HC ICU R&B

## 2025-06-21 PROCEDURE — 82010 KETONE BODYS QUAN: CPT

## 2025-06-21 PROCEDURE — 82805 BLOOD GASES W/O2 SATURATION: CPT

## 2025-06-21 PROCEDURE — 2500000003 HC RX 250 WO HCPCS

## 2025-06-21 PROCEDURE — 6370000000 HC RX 637 (ALT 250 FOR IP)

## 2025-06-21 PROCEDURE — 94640 AIRWAY INHALATION TREATMENT: CPT

## 2025-06-21 PROCEDURE — 84100 ASSAY OF PHOSPHORUS: CPT

## 2025-06-21 PROCEDURE — 83735 ASSAY OF MAGNESIUM: CPT

## 2025-06-21 PROCEDURE — 85730 THROMBOPLASTIN TIME PARTIAL: CPT

## 2025-06-21 PROCEDURE — 2700000000 HC OXYGEN THERAPY PER DAY

## 2025-06-21 PROCEDURE — 99232 SBSQ HOSP IP/OBS MODERATE 35: CPT | Performed by: CHIROPRACTOR

## 2025-06-21 PROCEDURE — 2500000003 HC RX 250 WO HCPCS: Performed by: INTERNAL MEDICINE

## 2025-06-21 PROCEDURE — 80048 BASIC METABOLIC PNL TOTAL CA: CPT

## 2025-06-21 PROCEDURE — 85027 COMPLETE CBC AUTOMATED: CPT

## 2025-06-21 PROCEDURE — 82962 GLUCOSE BLOOD TEST: CPT

## 2025-06-21 RX ORDER — INSULIN LISPRO 100 [IU]/ML
5 INJECTION, SOLUTION INTRAVENOUS; SUBCUTANEOUS
Status: DISCONTINUED | OUTPATIENT
Start: 2025-06-21 | End: 2025-06-22

## 2025-06-21 RX ORDER — 0.9 % SODIUM CHLORIDE 0.9 %
1000 INTRAVENOUS SOLUTION INTRAVENOUS ONCE
Status: DISCONTINUED | OUTPATIENT
Start: 2025-06-21 | End: 2025-06-21

## 2025-06-21 RX ORDER — METOPROLOL SUCCINATE 25 MG/1
25 TABLET, EXTENDED RELEASE ORAL DAILY
Status: DISCONTINUED | OUTPATIENT
Start: 2025-06-22 | End: 2025-06-28 | Stop reason: HOSPADM

## 2025-06-21 RX ORDER — INSULIN GLARGINE 100 [IU]/ML
20 INJECTION, SOLUTION SUBCUTANEOUS DAILY
Status: DISCONTINUED | OUTPATIENT
Start: 2025-06-21 | End: 2025-06-24

## 2025-06-21 RX ORDER — DEXTROSE MONOHYDRATE 100 MG/ML
INJECTION, SOLUTION INTRAVENOUS CONTINUOUS PRN
Status: DISCONTINUED | OUTPATIENT
Start: 2025-06-21 | End: 2025-06-28 | Stop reason: HOSPADM

## 2025-06-21 RX ORDER — INSULIN GLARGINE 100 [IU]/ML
20 INJECTION, SOLUTION SUBCUTANEOUS NIGHTLY
Status: DISCONTINUED | OUTPATIENT
Start: 2025-06-21 | End: 2025-06-21

## 2025-06-21 RX ORDER — GLUCAGON 1 MG/ML
1 KIT INJECTION PRN
Status: DISCONTINUED | OUTPATIENT
Start: 2025-06-21 | End: 2025-06-28 | Stop reason: HOSPADM

## 2025-06-21 RX ORDER — AMIODARONE HYDROCHLORIDE 200 MG/1
200 TABLET ORAL EVERY 8 HOURS SCHEDULED
Status: DISCONTINUED | OUTPATIENT
Start: 2025-06-21 | End: 2025-06-28 | Stop reason: HOSPADM

## 2025-06-21 RX ORDER — NOREPINEPHRINE BITARTRATE 0.06 MG/ML
1-100 INJECTION, SOLUTION INTRAVENOUS CONTINUOUS
Status: DISCONTINUED | OUTPATIENT
Start: 2025-06-21 | End: 2025-06-22

## 2025-06-21 RX ORDER — DEXTROSE MONOHYDRATE 50 MG/ML
INJECTION, SOLUTION INTRAVENOUS CONTINUOUS
Status: DISCONTINUED | OUTPATIENT
Start: 2025-06-21 | End: 2025-06-23

## 2025-06-21 RX ORDER — INSULIN LISPRO 100 [IU]/ML
0-4 INJECTION, SOLUTION INTRAVENOUS; SUBCUTANEOUS
Status: DISCONTINUED | OUTPATIENT
Start: 2025-06-21 | End: 2025-06-22

## 2025-06-21 RX ORDER — NOREPINEPHRINE BITARTRATE 0.06 MG/ML
INJECTION, SOLUTION INTRAVENOUS
Status: COMPLETED
Start: 2025-06-21 | End: 2025-06-21

## 2025-06-21 RX ORDER — FENTANYL CITRATE-0.9 % NACL/PF 10 MCG/ML
25-200 PLASTIC BAG, INJECTION (ML) INTRAVENOUS CONTINUOUS
Refills: 0 | Status: DISCONTINUED | OUTPATIENT
Start: 2025-06-21 | End: 2025-06-23

## 2025-06-21 RX ADMIN — RANOLAZINE 500 MG: 500 TABLET, FILM COATED, EXTENDED RELEASE ORAL at 08:24

## 2025-06-21 RX ADMIN — IPRATROPIUM BROMIDE 0.5 MG: 0.5 SOLUTION RESPIRATORY (INHALATION) at 15:37

## 2025-06-21 RX ADMIN — IPRATROPIUM BROMIDE 0.5 MG: 0.5 SOLUTION RESPIRATORY (INHALATION) at 12:11

## 2025-06-21 RX ADMIN — HEPARIN SODIUM 11 UNITS/KG/HR: 10000 INJECTION, SOLUTION INTRAVENOUS at 08:42

## 2025-06-21 RX ADMIN — OXYBUTYNIN CHLORIDE 5 MG: 5 TABLET ORAL at 08:24

## 2025-06-21 RX ADMIN — IPRATROPIUM BROMIDE 0.5 MG: 0.5 SOLUTION RESPIRATORY (INHALATION) at 08:33

## 2025-06-21 RX ADMIN — DEXTROSE, SODIUM CHLORIDE, AND POTASSIUM CHLORIDE: 5; .45; .15 INJECTION INTRAVENOUS at 00:16

## 2025-06-21 RX ADMIN — SODIUM CHLORIDE, PRESERVATIVE FREE 10 ML: 5 INJECTION INTRAVENOUS at 08:21

## 2025-06-21 RX ADMIN — SODIUM PHOSPHATE, MONOBASIC, MONOHYDRATE AND SODIUM PHOSPHATE, DIBASIC, ANHYDROUS 15 MMOL: 142; 276 INJECTION, SOLUTION INTRAVENOUS at 11:53

## 2025-06-21 RX ADMIN — DOXYCYCLINE 100 MG: 100 INJECTION, POWDER, LYOPHILIZED, FOR SOLUTION INTRAVENOUS at 20:45

## 2025-06-21 RX ADMIN — IPRATROPIUM BROMIDE 0.5 MG: 0.5 SOLUTION RESPIRATORY (INHALATION) at 20:26

## 2025-06-21 RX ADMIN — MONTELUKAST 10 MG: 10 TABLET, FILM COATED ORAL at 20:30

## 2025-06-21 RX ADMIN — INSULIN GLARGINE 20 UNITS: 100 INJECTION, SOLUTION SUBCUTANEOUS at 13:13

## 2025-06-21 RX ADMIN — Medication 325 MG: at 16:24

## 2025-06-21 RX ADMIN — INSULIN LISPRO 5 UNITS: 100 INJECTION, SOLUTION INTRAVENOUS; SUBCUTANEOUS at 16:24

## 2025-06-21 RX ADMIN — ASPIRIN 81 MG CHEWABLE TABLET 81 MG: 81 TABLET CHEWABLE at 08:22

## 2025-06-21 RX ADMIN — HYDRALAZINE HYDROCHLORIDE 10 MG: 10 TABLET ORAL at 08:23

## 2025-06-21 RX ADMIN — WATER 1000 MG: 1 INJECTION INTRAMUSCULAR; INTRAVENOUS; SUBCUTANEOUS at 20:31

## 2025-06-21 RX ADMIN — DOXYCYCLINE 100 MG: 100 INJECTION, POWDER, LYOPHILIZED, FOR SOLUTION INTRAVENOUS at 08:44

## 2025-06-21 RX ADMIN — GABAPENTIN 300 MG: 300 CAPSULE ORAL at 12:22

## 2025-06-21 RX ADMIN — GABAPENTIN 300 MG: 300 CAPSULE ORAL at 08:22

## 2025-06-21 RX ADMIN — ISOSORBIDE DINITRATE 10 MG: 10 TABLET ORAL at 08:22

## 2025-06-21 RX ADMIN — POTASSIUM CHLORIDE 20 MEQ: 29.8 INJECTION, SOLUTION INTRAVENOUS at 04:01

## 2025-06-21 RX ADMIN — OXYBUTYNIN CHLORIDE 5 MG: 5 TABLET ORAL at 12:22

## 2025-06-21 RX ADMIN — METOPROLOL SUCCINATE 75 MG: 50 TABLET, EXTENDED RELEASE ORAL at 08:22

## 2025-06-21 RX ADMIN — ACYCLOVIR 400 MG: 200 CAPSULE ORAL at 12:22

## 2025-06-21 RX ADMIN — CLONIDINE HYDROCHLORIDE 0.2 MG: 0.2 TABLET ORAL at 12:24

## 2025-06-21 RX ADMIN — AMIODARONE HYDROCHLORIDE 0.5 MG/MIN: 1.8 INJECTION, SOLUTION INTRAVENOUS at 20:40

## 2025-06-21 RX ADMIN — OXYBUTYNIN CHLORIDE 5 MG: 5 TABLET ORAL at 20:30

## 2025-06-21 RX ADMIN — AMIODARONE HYDROCHLORIDE 200 MG: 200 TABLET ORAL at 23:14

## 2025-06-21 RX ADMIN — RANOLAZINE 500 MG: 500 TABLET, FILM COATED, EXTENDED RELEASE ORAL at 20:30

## 2025-06-21 RX ADMIN — ISOSORBIDE DINITRATE 10 MG: 10 TABLET ORAL at 12:22

## 2025-06-21 RX ADMIN — DEXTROSE MONOHYDRATE: 50 INJECTION, SOLUTION INTRAVENOUS at 14:21

## 2025-06-21 RX ADMIN — ACYCLOVIR 400 MG: 200 CAPSULE ORAL at 08:23

## 2025-06-21 RX ADMIN — HEPARIN SODIUM 4000 UNITS: 1000 INJECTION INTRAVENOUS; SUBCUTANEOUS at 08:30

## 2025-06-21 RX ADMIN — GABAPENTIN 300 MG: 300 CAPSULE ORAL at 20:30

## 2025-06-21 RX ADMIN — ATORVASTATIN CALCIUM 80 MG: 80 TABLET, FILM COATED ORAL at 20:30

## 2025-06-21 RX ADMIN — PANTOPRAZOLE SODIUM 40 MG: 40 TABLET, DELAYED RELEASE ORAL at 08:22

## 2025-06-21 RX ADMIN — POTASSIUM CHLORIDE 20 MEQ: 29.8 INJECTION, SOLUTION INTRAVENOUS at 06:21

## 2025-06-21 RX ADMIN — AMIODARONE HYDROCHLORIDE 0.5 MG/MIN: 1.8 INJECTION, SOLUTION INTRAVENOUS at 08:07

## 2025-06-21 RX ADMIN — INSULIN LISPRO 2 UNITS: 100 INJECTION, SOLUTION INTRAVENOUS; SUBCUTANEOUS at 20:51

## 2025-06-21 RX ADMIN — SODIUM CHLORIDE, PRESERVATIVE FREE 10 ML: 5 INJECTION INTRAVENOUS at 20:52

## 2025-06-21 RX ADMIN — CLONIDINE HYDROCHLORIDE 0.2 MG: 0.2 TABLET ORAL at 08:22

## 2025-06-21 RX ADMIN — ACETAMINOPHEN 650 MG: 325 TABLET ORAL at 12:22

## 2025-06-21 RX ADMIN — POTASSIUM CHLORIDE 20 MEQ: 29.8 INJECTION, SOLUTION INTRAVENOUS at 12:37

## 2025-06-21 RX ADMIN — Medication 5 MCG/MIN: at 14:18

## 2025-06-21 RX ADMIN — Medication 325 MG: at 08:22

## 2025-06-21 RX ADMIN — POTASSIUM CHLORIDE 20 MEQ: 29.8 INJECTION, SOLUTION INTRAVENOUS at 05:11

## 2025-06-21 RX ADMIN — ACYCLOVIR 400 MG: 200 CAPSULE ORAL at 00:06

## 2025-06-21 RX ADMIN — NOREPINEPHRINE BITARTRATE 5 MCG/MIN: 0.06 INJECTION, SOLUTION INTRAVENOUS at 14:18

## 2025-06-21 ASSESSMENT — PAIN SCALES - GENERAL
PAINLEVEL_OUTOF10: 0

## 2025-06-21 NOTE — PROGRESS NOTES
[facility]  Department of Internal Medicine   Internal Medicine Residency   MICU Progress Note    Patient:  Fanny Gonzalez 74 y.o. female  MRN: [id number]     Date of Service: 6/21/2025    Allergy: Patient has no known allergies.    Hospital Day: 2  ICU Length of Stay: 15h    Reason for admission: Diabetic ketoacidosis without coma associated with type 2 diabetes mellitus (HCC)    Subjective     Overnight events: No acute overnight events    Patient was seen and examined this morning. She is awake alert and oriented. She denies any fresh complaints. No further episodes of nausea or vomiting. No fever, chest pain. She does have some soreness in her genital region with some yellowish discharges. Vitals has been stable.    Objective     VS: BP (!) 156/92   Pulse 87   Temp 98.6 °F (37 °C) (Temporal)   Resp 16   Ht 1.651 m (5' 5\")   Wt 90.7 kg (199 lb 15.3 oz)   SpO2 97%   BMI 33.27 kg/m²         I & O - 24hr:   Intake/Output Summary (Last 24 hours) at 6/21/2025 1009  Last data filed at 6/21/2025 1000  Gross per 24 hour   Intake 1775.6 ml   Output 720 ml   Net 1055.6 ml     Net IO Since Admission: 1,055.6 mL [06/21/25 1009]    Physical Exam:  General Appearance: elderly, ill looking; dehydrated.   HEENT: Normocephalic, atraumatic  Neck: midline trachea  Lung: clear to auscultation bilaterally  Heart: regular rate and rhythm, no murmur  Abdomen: soft, bowel sounds normal; no masses  Extremities: no cyanosis or edema  Musculokeletal: No joint swelling  Neurologic: Mental status: Awake alert and oriented to time place and person. Normal motor and sensory examination    Lines, Drains, Airway (LDA)     Lines     site date day    Art line   None     TLC R Fem     PICC None     Hemoaccess None       Drains:   [x] Urinary Catheter   [] Fecal Management System    [] G/E/J/PEG Tube     Airway:    4l of oxygen va nasal cannula    Medications   Continuous Infusions:   amiodarone 0.5 mg/min (06/21/25 0807)

## 2025-06-21 NOTE — PROGRESS NOTES
[facility] Hospitalist Progress Note    Admitting Date and Time: 6/20/2025 11:06 AM  Admit Dx: Ventricular tachycardia (HCC) [I47.20]  Diabetic ketoacidosis without coma associated with type 2 diabetes mellitus (HCC) [E11.10]    Subjective:  Patient is being followed for Ventricular tachycardia (HCC) [I47.20]  Diabetic ketoacidosis without coma associated with type 2 diabetes mellitus (HCC) [E11.10]   Pt feels ok today. Patient denies headache, blurry vision, fever, chills, chest pain, palpitations, SOB, GELLER, cough, nausea, vomiting, diarrhea, constipation, abd pain, dysuria, hematuria, tingling, numbness, leg swelling.        sodium chloride flush  5-40 mL IntraVENous 2 times per day    aspirin  81 mg Oral Daily    atorvastatin  80 mg Oral Nightly    cloNIDine  0.2 mg Oral TID    ferrous sulfate  325 mg Oral BID WC    gabapentin  300 mg Oral TID    montelukast  10 mg Oral Nightly    pantoprazole  40 mg Oral QAM AC    [Held by provider] traZODone  50 mg Oral Nightly    ranolazine  500 mg Oral BID    oxyBUTYnin  5 mg Oral TID    metoprolol succinate  75 mg Oral Daily    hydrALAZINE  10 mg Oral 2 times per day    isosorbide dinitrate  10 mg Oral TID    cefTRIAXone (ROCEPHIN) IV  1,000 mg IntraVENous Q24H    ipratropium  0.5 mg Nebulization 4x Daily RT    doxycycline (VIBRAMYCIN) IV  100 mg IntraVENous Q12H    acyclovir  400 mg Oral TID     dextrose bolus, 125 mL, PRN   Or  dextrose bolus, 250 mL, PRN  magnesium sulfate, 2,000 mg, PRN  sodium phosphate 15 mmol in sodium chloride 0.9 % 250 mL IVPB, 15 mmol, PRN  dextrose 5% and 0.45% NaCl with KCl 20 mEq, , Continuous PRN  sodium chloride flush, 5-40 mL, PRN  sodium chloride, , PRN  polyethylene glycol, 17 g, Daily PRN  acetaminophen, 650 mg, Q6H PRN   Or  acetaminophen, 650 mg, Q6H PRN  prochlorperazine, 10 mg, Q8H PRN   Or  prochlorperazine, 10 mg, Q6H PRN  docusate sodium, 100 mg, BID PRN  sodium chloride flush, 10 mL, PRN  potassium chloride, 20 mEq,  PRN  heparin (porcine), 4,000 Units, PRN  heparin (porcine), 2,000 Units, PRN  levalbuterol, 1.25 mg, Q8H PRN         Objective:    BP (!) 156/92   Pulse 87   Temp 98.6 °F (37 °C) (Temporal)   Resp 16   Ht 1.651 m (5' 5\")   Wt 90.7 kg (199 lb 15.3 oz)   SpO2 97%   BMI 33.27 kg/m²     General Appearance: alert and oriented to person, place and time and in no acute distress  Skin: warm and dry  Head: normocephalic and atraumatic  Eyes: pupils equal, round, and reactive to light, extraocular eye movements intact, conjunctivae normal  Neck: neck supple and non tender without mass   Pulmonary/Chest: clear to auscultation bilaterally- no wheezes, rales or rhonchi, normal air movement, no respiratory distress  Cardiovascular: normal rate, normal S1 and S2 and no carotid bruits  Abdomen: soft, non-tender, non-distended, normal bowel sounds, no masses or organomegaly  Extremities: no cyanosis, no clubbing and no edema  Neurologic: no cranial nerve deficit and speech normal        Recent Labs     06/21/25  0303 06/21/25  0407 06/21/25  0717    141 142   K 3.6 3.7 5.1    108* 110*   CO2 22 21* 22   BUN 21 21 20   CREATININE 1.3* 1.2* 1.2*   GLUCOSE 130* 164* 224*   CALCIUM 8.6* 8.4* 8.8       Recent Labs     06/20/25  1126 06/20/25 2018 06/21/25  0407   WBC 13.3* 16.6* 11.2   RBC 4.72 4.37 3.70   HGB 13.1 12.3 10.5*   HCT 42.2 38.5 32.8*   MCV 89.4 88.1 88.6   MCH 27.8 28.1 28.4   MCHC 31.0* 31.9* 32.0   RDW 15.9* 15.7* 15.9*   * 421 334   MPV 11.6 10.9 10.9           Assessment:    DKA,   Management per ICU  Will follow-up on the floor once bridged and transferred    Extensive cardiac history  V. Tach versus RVR with aberrancy: Yesterday morning, shocked by LifeVest, EP following  Cardiomyopathy with HFrEF: EF 30%, currently has LifeVest, looks euvolemic on exam, GDMT as tolerated once DKA resolved  Atrial fibrillation, nonvalvular, on metoprolol and Eliquis at home, currently on amiodarone

## 2025-06-21 NOTE — PLAN OF CARE
Problem: Chronic Conditions and Co-morbidities  Goal: Patient's chronic conditions and co-morbidity symptoms are monitored and maintained or improved  6/21/2025 0909 by Raquel Shearer RN  Outcome: Progressing  6/20/2025 2239 by Elena Hooker RN  Outcome: Progressing     Problem: Discharge Planning  Goal: Discharge to home or other facility with appropriate resources  6/21/2025 0909 by Raquel Shearer RN  Outcome: Progressing  6/20/2025 2239 by Elena Hooker RN  Outcome: Progressing     Problem: Pain  Goal: Verbalizes/displays adequate comfort level or baseline comfort level  6/21/2025 0909 by Raquel Shearer RN  Outcome: Progressing  6/20/2025 2239 by Elena Hooker RN  Outcome: Progressing     Problem: Skin/Tissue Integrity  Goal: Skin integrity remains intact  Description: 1.  Monitor for areas of redness and/or skin breakdown  2.  Assess vascular access sites hourly  3.  Every 4-6 hours minimum:  Change oxygen saturation probe site  4.  Every 4-6 hours:  If on nasal continuous positive airway pressure, respiratory therapy assess nares and determine need for appliance change or resting period  6/21/2025 0909 by Raquel Shearer RN  Outcome: Progressing  6/20/2025 2239 by Elena Hooker RN  Outcome: Progressing

## 2025-06-21 NOTE — CONSULTS
[facility] PHYSICIANS- The Heart and Vascular [place] [place] Electrophysiology  Consultation Report  PATIENT: Fanny Gonzalez  MEDICAL RECORD NUMBER: [id number]  DATE OF SERVICE:  6/21/2025  ATTENDING ELECTROPHYSIOLOGIST: Morenita Rogers MD  PRIMARY ELECTROPHYSIOLOGIST: Morenita Rogers MD  REFERRING PHYSICIAN: No ref. provider found and aBbatunde Storm APRN - CNP  CHIEF COMPLAINT: Nausea vomiting and abdominal pain    HPI: This is a 74 y.o. female with a history of hypertension, diabetes, hyperlipidemia, COPD, obesity, coronary artery disease-moderate three-vessel disease with PCI to RCA in 2021, HFpEF, chronic systemic anticoagulation since January 2023 after hospitalization for pulmonary embolism, chronic left bundle branch block->5 years.  Her last medication list at discharge from the hospital in May include Bumex 1 mg daily, cefdinir for 7 doses, Isordil 10 mg 3 times daily, metoprolol succinate 75 mg daily, Ranexa 500 mg twice a day, Entresto 24/26 twice daily, aspirin, clonidine 0.2 mg twice a day, ergocalciferol, gabapentin, hydralazine, Singulair, oxybutynin, omeprazole, Crestor, trazodone and Ventolin inhaler  The patient lives alone despite her many disabilities and in December 2023 she was hospitalized after a fall due to unsteadiness of gait.  In December 2024 she was hospitalized with exacerbation of her COPD symptoms and thereafter in April 2025 she was hospitalized with syncope.  During her hospitalization in May 2025 cardiac workup revealed severe LV dysfunction and moderate coronary artery disease-total occlusion of the right coronary artery with left-to-right collaterals as well as moderate disease of the LAD and circumflex.  No percutaneous interventions were undertaken and the patient was discharged home with an ICD vest. She was seen by me during her last hospitalization in May for new onset atrial fibrillation.  She was given IV amiodarone and converted to sinus rhythm.  The patient says that she  presence of mild-moderate peripheral vascular disease. Low normal left XOCHITL, no significant peripheral vascular disease. Dampened digital PVRs suspicious for distal vascular disease.  Obesity BMI 35.61 (2025  PE - 2023  COVID-19 - 2023  Asthma  COPD  Former 35.9-pack-year smoker - quit   Marijuana use  Pt has undergone multiple sleep latency testing, full in lab sleep study in 2016. All sleep testing was normal. No evidence of KENAN or narcolepsy. AHI 2.    CVA with some speech difficulties    Right breast carcinoma s/p lumpectomy and XRT    Osteoarthritis  Hx of bilateral cataracts   Claustrophobia  Headaches  Recurrent genital HSV infection  Recurrent UTIs  Family History   Problem Relation Age of Onset    Diabetes Mother     High Blood Pressure Mother     Heart Attack Mother         low 80s    High Blood Pressure Father     Diabetes Father     Heart Failure Father     Breast Cancer Sister     Stroke Sister         young age    Heart Attack Sister 50    Stroke Sister     Breast Cancer Sister             Cancer Sister 64        breast & ovarian    Sickle Cell Anemia Other         niece    Cancer Other 40        niece - breast    Stomach Cancer Other         aunt       Social History     Tobacco Use    Smoking status: Former     Current packs/day: 0.00     Average packs/day: 0.3 packs/day for 35.9 years (9.0 ttl pk-yrs)     Types: Cigarettes     Start date:      Quit date: 2001     Years since quittin.6     Passive exposure: Past    Smokeless tobacco: Never   Substance Use Topics    Alcohol use: Not Currently     Comment: rare, occasionally       Current Facility-Administered Medications   Medication Dose Route Frequency Provider Last Rate Last Admin    insulin lispro (HUMALOG,ADMELOG) injection vial 5 Units  5 Units SubCUTAneous TID  Leta Witt MD   5 Units at 25 1624    glucose chewable tablet 16 g  4 tablet Oral PRN Leta Witt MD

## 2025-06-21 NOTE — CONSULTS
[facility]  Internal Medicine Residency Program  CONSULT NOTE  MICU    Patient:  Fanny Gonzalez 74 y.o. female MRN: [id number]     Date of Service: 6/20/2025    Hospital Day: 1      Chief complaint: nausea and vomiting   History of Present Illness   The patient is a 74 y.o. female with a past medical history of paroxysmal A-fib, heart failure with reduced ejection fraction EF 30% (5/11/2025), CAD, history of DVT and PE, type 2 diabetes mellitus on insulin, urinary incontinence who presented to the ER with CC of nausea and vomiting.    Patient had been experiencing nausea and vomiting since yesterday.  History was limited due to patient's altered mentation.  She was lethargic during history taking.  She has a LifeVest in place for heart failure with reduced ejection fraction with a EF of 30%.  She was shocked by the LifeVest due to ventricular tachycardia.    Of note, patient was recently discharged from hospital on 5/29/2025.  She had a complicated hospital course where she had an acute heart failure exacerbation, and intermittent A-fib with RVR.  LHC was done and showed  of RCA with left-to-right collaterals, OM1 with 60 to 70% stenosis with very distal 80% stenosis-medical therapy.  She was also diagnosed with UTI during that time.     In the ED, patient was hypertensive at 192/107, heart rate in the 136, other vitals were stable.  Labs were remarkable for creatinine of 1.4, blood glucose of 435, anion gap of 24, lactic acid 2.7, NT proBNP 34,141, troponin 76, lipase 209, WBC 13.3, A1c is 12.5%. Chest x-ray was unremarkable.  CT abdomen and pelvis was also unremarkable.  Electrophysiology was consulted and the patient's LifeVest was interrogated.  She was put on amiodarone drip and DKA protocol was started.  He is being admitted to the medical ICU for further evaluation and management.      Past Medical History:      Diagnosis Date    Arthritis     Asthma     BRCA1 negative     BRCA2

## 2025-06-21 NOTE — PROGRESS NOTES
Spiritual Health History and Assessment/Progress Note  [facility]    Initial Encounter, Spiritual/Emotional Needs,  ,  ,      Name: Fanny Gonzalez MRN: [id number]    Age: 74 y.o.     Sex: female   Language: English   Anabaptism: Scientologist   Diabetic ketoacidosis without coma associated with type 2 diabetes mellitus (HCC)     Date: 6/21/2025                           Spiritual Assessment began in [facility] MICU        Referral/Consult From: Rounding   Encounter Overview/Reason: Initial Encounter, Spiritual/Emotional Needs  Service Provided For: Patient and family together    Jordyn, Belief, Meaning:   Patient identifies as spiritual  Family/Friends identify as spiritual      Importance and Influence:  Patient has spiritual/personal beliefs that influence decisions regarding their health  Family/Friends have spiritual/personal beliefs that influence decisions regarding the patient's health    Community:  Patient is connected with a spiritual community  Family/Friends are connected with a spiritual community:    Assessment and Plan of Care:     Patient Interventions include: Provided sacramental/Hinduism ritual  Family/Friends Interventions include: Provided sacramental/Hinduism ritual    Patient Plan of Care: No spiritual needs identified for follow-up and Spiritual Care available upon further referral  Family/Friends Plan of Care: Spiritual Care available upon further referral    Electronically signed by Chaplain Berny on 6/21/2025 at 2:47 PM

## 2025-06-22 ENCOUNTER — APPOINTMENT (OUTPATIENT)
Dept: GENERAL RADIOLOGY | Age: 75
End: 2025-06-22
Payer: MEDICARE

## 2025-06-22 LAB
ANION GAP SERPL CALCULATED.3IONS-SCNC: 8 MMOL/L (ref 7–16)
B PARAP IS1001 DNA NPH QL NAA+NON-PROBE: NOT DETECTED
B PERT DNA SPEC QL NAA+PROBE: NOT DETECTED
BACTERIA URNS QL MICRO: ABNORMAL
BILIRUB UR QL STRIP: NEGATIVE
BUN SERPL-MCNC: 17 MG/DL (ref 8–23)
C PNEUM DNA NPH QL NAA+NON-PROBE: NOT DETECTED
CALCIUM SERPL-MCNC: 8.5 MG/DL (ref 8.8–10.2)
CHLORIDE SERPL-SCNC: 108 MMOL/L (ref 98–107)
CLARITY UR: CLEAR
CO2 SERPL-SCNC: 21 MMOL/L (ref 22–29)
COLOR UR: YELLOW
CREAT SERPL-MCNC: 1 MG/DL (ref 0.5–1)
ERYTHROCYTE [DISTWIDTH] IN BLOOD BY AUTOMATED COUNT: 16.4 % (ref 11.5–15)
FLUAV RNA NPH QL NAA+NON-PROBE: NOT DETECTED
FLUBV RNA NPH QL NAA+NON-PROBE: NOT DETECTED
GFR, ESTIMATED: 57 ML/MIN/1.73M2
GLUCOSE BLD-MCNC: 100 MG/DL (ref 74–99)
GLUCOSE BLD-MCNC: 112 MG/DL (ref 74–99)
GLUCOSE BLD-MCNC: 128 MG/DL (ref 74–99)
GLUCOSE BLD-MCNC: 132 MG/DL (ref 74–99)
GLUCOSE BLD-MCNC: 157 MG/DL (ref 74–99)
GLUCOSE BLD-MCNC: 193 MG/DL (ref 74–99)
GLUCOSE SERPL-MCNC: 148 MG/DL (ref 74–99)
GLUCOSE UR STRIP-MCNC: NEGATIVE MG/DL
HADV DNA NPH QL NAA+NON-PROBE: NOT DETECTED
HCOV 229E RNA NPH QL NAA+NON-PROBE: NOT DETECTED
HCOV HKU1 RNA NPH QL NAA+NON-PROBE: NOT DETECTED
HCOV NL63 RNA NPH QL NAA+NON-PROBE: NOT DETECTED
HCOV OC43 RNA NPH QL NAA+NON-PROBE: NOT DETECTED
HCT VFR BLD AUTO: 31.6 % (ref 34–48)
HGB BLD-MCNC: 9.7 G/DL (ref 11.5–15.5)
HGB UR QL STRIP.AUTO: ABNORMAL
HMPV RNA NPH QL NAA+NON-PROBE: NOT DETECTED
HPIV1 RNA NPH QL NAA+NON-PROBE: NOT DETECTED
HPIV2 RNA NPH QL NAA+NON-PROBE: NOT DETECTED
HPIV3 RNA NPH QL NAA+NON-PROBE: NOT DETECTED
HPIV4 RNA NPH QL NAA+NON-PROBE: NOT DETECTED
KETONES UR STRIP-MCNC: NEGATIVE MG/DL
LEUKOCYTE ESTERASE UR QL STRIP: ABNORMAL
M PNEUMO DNA NPH QL NAA+NON-PROBE: NOT DETECTED
MAGNESIUM SERPL-MCNC: 1.9 MG/DL (ref 1.6–2.4)
MCH RBC QN AUTO: 27.8 PG (ref 26–35)
MCHC RBC AUTO-ENTMCNC: 30.7 G/DL (ref 32–34.5)
MCV RBC AUTO: 90.5 FL (ref 80–99.9)
MICROORGANISM SPEC CULT: NORMAL
NITRITE UR QL STRIP: NEGATIVE
PARTIAL THROMBOPLASTIN TIME: 142.6 SEC (ref 24.5–35.1)
PARTIAL THROMBOPLASTIN TIME: 35.4 SEC (ref 24.5–35.1)
PARTIAL THROMBOPLASTIN TIME: 59.7 SEC (ref 24.5–35.1)
PARTIAL THROMBOPLASTIN TIME: 90.2 SEC (ref 24.5–35.1)
PH UR STRIP: 6 [PH] (ref 5–8)
PHOSPHATE SERPL-MCNC: 2.2 MG/DL (ref 2.5–4.5)
PLATELET # BLD AUTO: 306 K/UL (ref 130–450)
PMV BLD AUTO: 11 FL (ref 7–12)
POTASSIUM SERPL-SCNC: 4 MMOL/L (ref 3.5–5.1)
PROT UR STRIP-MCNC: 30 MG/DL
RBC # BLD AUTO: 3.49 M/UL (ref 3.5–5.5)
RBC #/AREA URNS HPF: ABNORMAL /HPF
RSV RNA NPH QL NAA+NON-PROBE: NOT DETECTED
RV+EV RNA NPH QL NAA+NON-PROBE: NOT DETECTED
SARS-COV-2 RNA NPH QL NAA+NON-PROBE: NOT DETECTED
SODIUM SERPL-SCNC: 138 MMOL/L (ref 136–145)
SP GR UR STRIP: >1.03 (ref 1–1.03)
SPECIMEN DESCRIPTION: NORMAL
SPECIMEN DESCRIPTION: NORMAL
UROBILINOGEN UR STRIP-ACNC: 0.2 EU/DL (ref 0–1)
WBC #/AREA URNS HPF: ABNORMAL /HPF
WBC OTHER # BLD: 7.4 K/UL (ref 4.5–11.5)
YEAST URNS QL MICRO: PRESENT

## 2025-06-22 PROCEDURE — 0202U NFCT DS 22 TRGT SARS-COV-2: CPT

## 2025-06-22 PROCEDURE — 36415 COLL VENOUS BLD VENIPUNCTURE: CPT

## 2025-06-22 PROCEDURE — 87086 URINE CULTURE/COLONY COUNT: CPT

## 2025-06-22 PROCEDURE — 6360000002 HC RX W HCPCS

## 2025-06-22 PROCEDURE — 6370000000 HC RX 637 (ALT 250 FOR IP): Performed by: INTERNAL MEDICINE

## 2025-06-22 PROCEDURE — 51798 US URINE CAPACITY MEASURE: CPT

## 2025-06-22 PROCEDURE — 6370000000 HC RX 637 (ALT 250 FOR IP)

## 2025-06-22 PROCEDURE — 83735 ASSAY OF MAGNESIUM: CPT

## 2025-06-22 PROCEDURE — 2000000000 HC ICU R&B

## 2025-06-22 PROCEDURE — 2500000003 HC RX 250 WO HCPCS

## 2025-06-22 PROCEDURE — 99233 SBSQ HOSP IP/OBS HIGH 50: CPT | Performed by: INTERNAL MEDICINE

## 2025-06-22 PROCEDURE — 94640 AIRWAY INHALATION TREATMENT: CPT

## 2025-06-22 PROCEDURE — 85027 COMPLETE CBC AUTOMATED: CPT

## 2025-06-22 PROCEDURE — 85730 THROMBOPLASTIN TIME PARTIAL: CPT

## 2025-06-22 PROCEDURE — 71045 X-RAY EXAM CHEST 1 VIEW: CPT

## 2025-06-22 PROCEDURE — 99232 SBSQ HOSP IP/OBS MODERATE 35: CPT | Performed by: CHIROPRACTOR

## 2025-06-22 PROCEDURE — 87106 FUNGI IDENTIFICATION YEAST: CPT

## 2025-06-22 PROCEDURE — 2500000003 HC RX 250 WO HCPCS: Performed by: EMERGENCY MEDICINE

## 2025-06-22 PROCEDURE — 82962 GLUCOSE BLOOD TEST: CPT

## 2025-06-22 PROCEDURE — 81001 URINALYSIS AUTO W/SCOPE: CPT

## 2025-06-22 PROCEDURE — 80048 BASIC METABOLIC PNL TOTAL CA: CPT

## 2025-06-22 PROCEDURE — 2700000000 HC OXYGEN THERAPY PER DAY

## 2025-06-22 PROCEDURE — 2580000003 HC RX 258

## 2025-06-22 PROCEDURE — 2500000003 HC RX 250 WO HCPCS: Performed by: INTERNAL MEDICINE

## 2025-06-22 PROCEDURE — 87040 BLOOD CULTURE FOR BACTERIA: CPT

## 2025-06-22 PROCEDURE — 84100 ASSAY OF PHOSPHORUS: CPT

## 2025-06-22 RX ORDER — INSULIN LISPRO 100 [IU]/ML
0-4 INJECTION, SOLUTION INTRAVENOUS; SUBCUTANEOUS EVERY 4 HOURS
Status: DISCONTINUED | OUTPATIENT
Start: 2025-06-22 | End: 2025-06-23

## 2025-06-22 RX ORDER — DEXAMETHASONE SODIUM PHOSPHATE 10 MG/ML
10 INJECTION, SOLUTION INTRAMUSCULAR; INTRAVENOUS ONCE
Status: DISCONTINUED | OUTPATIENT
Start: 2025-06-22 | End: 2025-06-22

## 2025-06-22 RX ORDER — SODIUM CHLORIDE 9 MG/ML
INJECTION, SOLUTION INTRAVENOUS CONTINUOUS
Status: DISCONTINUED | OUTPATIENT
Start: 2025-06-22 | End: 2025-06-23

## 2025-06-22 RX ORDER — ETOMIDATE 2 MG/ML
INJECTION INTRAVENOUS
Status: DISPENSED
Start: 2025-06-22 | End: 2025-06-23

## 2025-06-22 RX ORDER — MIDAZOLAM HYDROCHLORIDE 1 MG/ML
INJECTION, SOLUTION INTRAMUSCULAR; INTRAVENOUS
Status: COMPLETED
Start: 2025-06-22 | End: 2025-06-22

## 2025-06-22 RX ORDER — SUCCINYLCHOLINE CHLORIDE 20 MG/ML
INJECTION INTRAMUSCULAR; INTRAVENOUS
Status: DISPENSED
Start: 2025-06-22 | End: 2025-06-23

## 2025-06-22 RX ORDER — DEXAMETHASONE SODIUM PHOSPHATE 10 MG/ML
10 INJECTION, SOLUTION INTRAMUSCULAR; INTRAVENOUS EVERY 6 HOURS
Status: COMPLETED | OUTPATIENT
Start: 2025-06-22 | End: 2025-06-23

## 2025-06-22 RX ORDER — FENTANYL CITRATE-0.9 % NACL/PF 10 MCG/ML
25-200 PLASTIC BAG, INJECTION (ML) INTRAVENOUS CONTINUOUS
Refills: 0 | Status: DISCONTINUED | OUTPATIENT
Start: 2025-06-22 | End: 2025-06-22 | Stop reason: SDUPTHER

## 2025-06-22 RX ORDER — MIDAZOLAM HYDROCHLORIDE 2 MG/2ML
2 INJECTION, SOLUTION INTRAMUSCULAR; INTRAVENOUS ONCE
Status: COMPLETED | OUTPATIENT
Start: 2025-06-22 | End: 2025-06-22

## 2025-06-22 RX ORDER — DEXAMETHASONE SODIUM PHOSPHATE 4 MG/ML
4 INJECTION, SOLUTION INTRA-ARTICULAR; INTRALESIONAL; INTRAMUSCULAR; INTRAVENOUS; SOFT TISSUE ONCE
Status: DISCONTINUED | OUTPATIENT
Start: 2025-06-22 | End: 2025-06-22

## 2025-06-22 RX ORDER — MAGNESIUM SULFATE IN WATER 40 MG/ML
2000 INJECTION, SOLUTION INTRAVENOUS ONCE
Status: COMPLETED | OUTPATIENT
Start: 2025-06-22 | End: 2025-06-22

## 2025-06-22 RX ADMIN — AMIODARONE HYDROCHLORIDE 200 MG: 200 TABLET ORAL at 06:57

## 2025-06-22 RX ADMIN — DEXAMETHASONE SODIUM PHOSPHATE 10 MG: 10 INJECTION, SOLUTION INTRAMUSCULAR; INTRAVENOUS at 13:20

## 2025-06-22 RX ADMIN — MIDAZOLAM 2 MG: 1 INJECTION INTRAMUSCULAR; INTRAVENOUS at 13:15

## 2025-06-22 RX ADMIN — IPRATROPIUM BROMIDE 0.5 MG: 0.5 SOLUTION RESPIRATORY (INHALATION) at 08:29

## 2025-06-22 RX ADMIN — MAGNESIUM SULFATE HEPTAHYDRATE 2000 MG: 40 INJECTION, SOLUTION INTRAVENOUS at 08:43

## 2025-06-22 RX ADMIN — SODIUM CHLORIDE, PRESERVATIVE FREE 10 ML: 5 INJECTION INTRAVENOUS at 20:20

## 2025-06-22 RX ADMIN — AMIODARONE HYDROCHLORIDE 0.5 MG/MIN: 1.8 INJECTION, SOLUTION INTRAVENOUS at 08:59

## 2025-06-22 RX ADMIN — OXYBUTYNIN CHLORIDE 5 MG: 5 TABLET ORAL at 07:40

## 2025-06-22 RX ADMIN — RACEPINEPHRINE HYDROCHLORIDE 0.5 ML: 11.25 SOLUTION RESPIRATORY (INHALATION) at 13:23

## 2025-06-22 RX ADMIN — DOXYCYCLINE 100 MG: 100 INJECTION, POWDER, LYOPHILIZED, FOR SOLUTION INTRAVENOUS at 21:22

## 2025-06-22 RX ADMIN — MIDAZOLAM HYDROCHLORIDE 2 MG: 2 INJECTION, SOLUTION INTRAMUSCULAR; INTRAVENOUS at 13:15

## 2025-06-22 RX ADMIN — SODIUM PHOSPHATE, MONOBASIC, MONOHYDRATE AND SODIUM PHOSPHATE, DIBASIC, ANHYDROUS 15 MMOL: 142; 276 INJECTION, SOLUTION INTRAVENOUS at 07:53

## 2025-06-22 RX ADMIN — DOXYCYCLINE 100 MG: 100 INJECTION, POWDER, LYOPHILIZED, FOR SOLUTION INTRAVENOUS at 07:45

## 2025-06-22 RX ADMIN — Medication 325 MG: at 07:37

## 2025-06-22 RX ADMIN — INSULIN GLARGINE 20 UNITS: 100 INJECTION, SOLUTION SUBCUTANEOUS at 07:53

## 2025-06-22 RX ADMIN — PANTOPRAZOLE SODIUM 40 MG: 40 TABLET, DELAYED RELEASE ORAL at 06:57

## 2025-06-22 RX ADMIN — ASPIRIN 81 MG CHEWABLE TABLET 81 MG: 81 TABLET CHEWABLE at 07:37

## 2025-06-22 RX ADMIN — LEVALBUTEROL 1.25 MG: 0.63 SOLUTION RESPIRATORY (INHALATION) at 17:06

## 2025-06-22 RX ADMIN — IPRATROPIUM BROMIDE 0.5 MG: 0.5 SOLUTION RESPIRATORY (INHALATION) at 21:13

## 2025-06-22 RX ADMIN — INSULIN LISPRO 5 UNITS: 100 INJECTION, SOLUTION INTRAVENOUS; SUBCUTANEOUS at 11:31

## 2025-06-22 RX ADMIN — AMIODARONE HYDROCHLORIDE 0.5 MG/MIN: 1.8 INJECTION, SOLUTION INTRAVENOUS at 21:19

## 2025-06-22 RX ADMIN — INSULIN LISPRO 1 UNITS: 100 INJECTION, SOLUTION INTRAVENOUS; SUBCUTANEOUS at 11:30

## 2025-06-22 RX ADMIN — ACETAMINOPHEN 650 MG: 650 SUPPOSITORY RECTAL at 15:38

## 2025-06-22 RX ADMIN — GABAPENTIN 300 MG: 300 CAPSULE ORAL at 07:38

## 2025-06-22 RX ADMIN — DEXAMETHASONE SODIUM PHOSPHATE 10 MG: 10 INJECTION, SOLUTION INTRAMUSCULAR; INTRAVENOUS at 20:16

## 2025-06-22 RX ADMIN — WATER 1000 MG: 1 INJECTION INTRAMUSCULAR; INTRAVENOUS; SUBCUTANEOUS at 20:16

## 2025-06-22 RX ADMIN — RANOLAZINE 500 MG: 500 TABLET, FILM COATED, EXTENDED RELEASE ORAL at 07:40

## 2025-06-22 RX ADMIN — HEPARIN SODIUM 6 UNITS/KG/HR: 10000 INJECTION, SOLUTION INTRAVENOUS at 17:08

## 2025-06-22 RX ADMIN — SODIUM CHLORIDE, PRESERVATIVE FREE 10 ML: 5 INJECTION INTRAVENOUS at 07:38

## 2025-06-22 RX ADMIN — INSULIN LISPRO 5 UNITS: 100 INJECTION, SOLUTION INTRAVENOUS; SUBCUTANEOUS at 09:01

## 2025-06-22 RX ADMIN — HEPARIN SODIUM 2000 UNITS: 1000 INJECTION INTRAVENOUS; SUBCUTANEOUS at 08:38

## 2025-06-22 RX ADMIN — IPRATROPIUM BROMIDE 0.5 MG: 0.5 SOLUTION RESPIRATORY (INHALATION) at 17:06

## 2025-06-22 ASSESSMENT — PAIN SCALES - GENERAL: PAINLEVEL_OUTOF10: 0

## 2025-06-22 NOTE — PLAN OF CARE
Problem: Chronic Conditions and Co-morbidities  Goal: Patient's chronic conditions and co-morbidity symptoms are monitored and maintained or improved  6/22/2025 0908 by Raquel Shearer RN  Outcome: Progressing  6/22/2025 0250 by Elena Hooker RN  Outcome: Progressing     Problem: Discharge Planning  Goal: Discharge to home or other facility with appropriate resources  6/22/2025 0908 by Raquel Shearer RN  Outcome: Progressing  6/22/2025 0250 by Elena Hooker RN  Outcome: Progressing     Problem: Pain  Goal: Verbalizes/displays adequate comfort level or baseline comfort level  6/22/2025 0908 by Raquel Shearer RN  Outcome: Progressing  6/22/2025 0250 by Elena Hooker RN  Outcome: Progressing     Problem: Skin/Tissue Integrity  Goal: Skin integrity remains intact  Description: 1.  Monitor for areas of redness and/or skin breakdown  2.  Assess vascular access sites hourly  3.  Every 4-6 hours minimum:  Change oxygen saturation probe site  4.  Every 4-6 hours:  If on nasal continuous positive airway pressure, respiratory therapy assess nares and determine need for appliance change or resting period  6/22/2025 0908 by Raquel Shearer RN  Outcome: Progressing  6/22/2025 0250 by Elena Hooker RN  Outcome: Progressing

## 2025-06-22 NOTE — PLAN OF CARE
Called patient daughter Tracee brody, to update on her mother present health status. Patient choked on on Turkey while eating. She understands the concerns and agreed on the intubation as well. All her other questions are addressed.

## 2025-06-22 NOTE — PROGRESS NOTES
[facility] Hospitalist Progress Note    Admitting Date and Time: 6/20/2025 11:06 AM  Admit Dx: Ventricular tachycardia (HCC) [I47.20]  Diabetic ketoacidosis without coma associated with type 2 diabetes mellitus (HCC) [E11.10]    Subjective:  Patient is being followed for Ventricular tachycardia (HCC) [I47.20]  Diabetic ketoacidosis without coma associated with type 2 diabetes mellitus (HCC) [E11.10]   Pt feels ok today. Patient denies headache, blurry vision, fever, chills, chest pain, palpitations, SOB, GELLER, cough, nausea, vomiting, diarrhea, constipation, abd pain, dysuria, hematuria, tingling, numbness, leg swelling.        insulin lispro  5 Units SubCUTAneous TID WC    insulin glargine  20 Units SubCUTAneous Daily    insulin lispro  0-4 Units SubCUTAneous 4x Daily AC & HS    amiodarone  200 mg Oral 3 times per day    metoprolol succinate  25 mg Oral Daily    sodium chloride flush  5-40 mL IntraVENous 2 times per day    aspirin  81 mg Oral Daily    atorvastatin  80 mg Oral Nightly    [Held by provider] cloNIDine  0.2 mg Oral TID    ferrous sulfate  325 mg Oral BID WC    gabapentin  300 mg Oral TID    montelukast  10 mg Oral Nightly    pantoprazole  40 mg Oral QAM AC    [Held by provider] traZODone  50 mg Oral Nightly    ranolazine  500 mg Oral BID    oxyBUTYnin  5 mg Oral TID    [Held by provider] hydrALAZINE  10 mg Oral 2 times per day    [Held by provider] isosorbide dinitrate  10 mg Oral TID    cefTRIAXone (ROCEPHIN) IV  1,000 mg IntraVENous Q24H    ipratropium  0.5 mg Nebulization 4x Daily RT    doxycycline (VIBRAMYCIN) IV  100 mg IntraVENous Q12H     glucose, 4 tablet, PRN  dextrose bolus, 125 mL, PRN   Or  dextrose bolus, 250 mL, PRN  glucagon (rDNA), 1 mg, PRN  dextrose, , Continuous PRN  magnesium sulfate, 2,000 mg, PRN  sodium phosphate 15 mmol in sodium chloride 0.9 % 250 mL IVPB, 15 mmol, PRN  dextrose 5% and 0.45% NaCl with KCl 20 mEq, , Continuous PRN  sodium chloride flush, 5-40 mL,

## 2025-06-22 NOTE — PROGRESS NOTES
[facility] PHYSICIANS- [facility]  Inpatient progress note  PATIENT: Fanny Gonzalez  MEDICAL RECORD NUMBER: [id number]  DATE OF SERVICE:  6/22/2025  ATTENDING ELECTROPHYSIOLOGIST: Morenita Rogers MD  PRIMARY ELECTROPHYSIOLOGIST: Morenita Rogers MD  REFERRING PHYSICIAN: No ref. provider found and Babatunde Storm APRN - CNP  CHIEF COMPLAINT: Nausea vomiting and abdominal pain    HPI: This is a 75 y.o. female with a history of hypertension, diabetes, hyperlipidemia, COPD, obesity, coronary artery disease-moderate three-vessel disease with PCI to RCA in 2021, HFpEF, chronic systemic anticoagulation since January 2023 after hospitalization for pulmonary embolism, chronic left bundle branch block->5 years.  Her last medication list at discharge from the hospital in May include Bumex 1 mg daily, cefdinir for 7 doses, Isordil 10 mg 3 times daily, metoprolol succinate 75 mg daily, Ranexa 500 mg twice a day, Entresto 24/26 twice daily, aspirin, clonidine 0.2 mg twice a day, ergocalciferol, gabapentin, hydralazine, Singulair, oxybutynin, omeprazole, Crestor, trazodone and Ventolin inhaler  The patient lives alone despite her many disabilities and in December 2023 she was hospitalized after a fall due to unsteadiness of gait.  In December 2024 she was hospitalized with exacerbation of her COPD symptoms and thereafter in April 2025 she was hospitalized with syncope.  During her hospitalization in May 2025 cardiac workup revealed severe LV dysfunction and moderate coronary artery disease-total occlusion of the right coronary artery with left-to-right collaterals as well as moderate disease of the LAD and circumflex.  No percutaneous interventions were undertaken and the patient was discharged home with an ICD vest. She was seen by me during her last hospitalization in May for new onset atrial fibrillation.  She was given IV amiodarone and converted to sinus rhythm.  The patient says that  cancer ([facility])     CAD in native artery 12/15/2021    12-15-21 cate 2.5x38 hima rca. 12-15-21 cate 3.0x12 hima prox rca.     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    Cerebrovascular disease 06/2009    no residual    CHF (congestive heart failure) ([facility]) approx 3 years ago    Claustrophobia     COPD (chronic obstructive pulmonary disease) ([facility])     Decreased dorsalis pedis pulse 09/05/2019    Dermatophytosis 09/05/2019    Headache(784.0)     History of blood transfusion     with knee surgery    Hives     Hx of blood clots     PE's and DVT's 'during childbearing years'    Hyperlipidemia     Hypertension     LBP radiating to both legs     Lymphedema of both lower extremities 11/12/2015    Neuromuscular disorder ([facility])     Non-rheumatic aortic sclerosis 10/2018    10/2018    Obesity     Pulmonary hypertension ([facility]) 10/2018    PVD (peripheral vascular disease) with claudication 09/23/2021    Recurrent genital HSV (herpes simplex virus) infection     last outbreak 11/2017    S/P breast biopsy, right 07/2016    pt states breast cancer    Type II or unspecified type diabetes mellitus without mention of complication, not stated as uncontrolled     AA1c8.7 9/10    UTI (urinary tract infection) 05/13/2019     Past medical history as chronicled by cardiology  CAD  2014 Lexiscan MPS; EF 59% non ischemic. NWM.   2018 TTE Mild concentric LVH. No WMA. Stage II Diastolic Dysfunction. LA mildly dilated. Mild MR. Mild TR. Mild to moderate PHTN. EF 45-50%.   12/10/2018 Dobutamine Stress Echo: No chest pain. MPHR: > 85%. LBBB at baseline. The maximal stress echocardiogram demonstrated no evidence of inducible ischemia.  12/31/2019 Limited TTE: Left ventricle is normal in size. Abnormal (paradoxical) motion consistent with left bundle branch block. Normal left ventricular ejection fraction. EF 55-60%. Stage II DD. Mild

## 2025-06-22 NOTE — CONSULTS
OTOLARYNGOLOGY  CONSULT NOTE  6/22/2025    Physician Consulted: Dr. Myers  Reason for Consult: Concern for vocal cord immobility      HPI  Fanny Gonzalez is a 75 y.o. female who ENT was consulted for evaluation of concern for vocal cord immobility.  Patient with multiple medical conditions including but not limited to history of stroke for which she followed with speech therapy after, COPD, PEs and DVTs, peripheral vascular disease, type 2 diabetes, hypertension, hyperlipidemia, and others.  Patient currently admitted to medical ICU with DKA and V. tach arrest shocked by LifeVest prior to arrival.  Patient reported to have choked on turkey while eating and became stridorous at which point pulmonology performed a bronchoscope, patient coughed hard which resolved symptoms.  During bronchoscope concern for laxity vallecula pharyngeal structures.  Patient denied difficulty breathing, nausea, vomiting, voice changes, difficulty swallowing prior to episode, GERD or acid reflux, postnasal drip, chills.  Denied fever, chills, unexpected weight loss, drenching night sweats prior to episode.  Distant history of 1 pack/week tobacco history, approximately 1 drink per day alcohol, other recreational drug to however not specified.  currently in the ICU vital signs stable however mild fever (100.8), hypertension, on 4 L nasal cannula    Review of Systems   Constitutional:  Negative for chills.   HENT:  Positive for trouble swallowing. Negative for postnasal drip and voice change.    Respiratory:  Negative for shortness of breath and stridor.    Gastrointestinal:  Negative for nausea and vomiting.   Skin:  Negative for color change.   Psychiatric/Behavioral:  Negative for behavioral problems.    All other systems reviewed and are negative.      Past Medical History:   Diagnosis Date    Arthritis     Asthma     BRCA1 negative     BRCA2 negative     Breast cancer (HCC)     CAD in native artery 12/15/2021    12-15-21 cate 2.5x38    low 80s    High Blood Pressure Father     Diabetes Father     Heart Failure Father     Breast Cancer Sister     Stroke Sister         young age    Heart Attack Sister 50    Stroke Sister     Breast Cancer Sister             Cancer Sister 64        breast & ovarian    Sickle Cell Anemia Other         niece    Cancer Other 40        niece - breast    Stomach Cancer Other         aunt       Social History     Tobacco Use    Smoking status: Former     Current packs/day: 0.00     Average packs/day: 0.3 packs/day for 35.9 years (9.0 ttl pk-yrs)     Types: Cigarettes     Start date:      Quit date: 2001     Years since quittin.6     Passive exposure: Past    Smokeless tobacco: Never   Vaping Use    Vaping status: Never Used   Substance Use Topics    Alcohol use: Not Currently     Comment: rare, occasionally    Drug use: Yes     Types: Marijuana (Weed)     Comment: rare use           PHYSICAL EXAM:    Vitals:    25 1718   BP: (!) 142/79   Pulse: 76   Resp: 18   Temp:    SpO2: 99%       General Appearance:  Laying in bed, awake, alert, no apparent distress  Head/face:  NC/AT  Eyes: PERRL, EOMI  ENT: Bilateral external ears WNL, Nares patent, Septum midline, edentulous  Neck:Supple, no adenopathy  Lungs:  Non-labored, good respiratory effort, no stridor.  Oxygen via nasal cannula  Heart:  RR  Neuro: Facial nerve symmetric and intact. House Brackmann 1/6, bilaterally.       LABS:  CBC  Recent Labs     25  0403   WBC 7.4   HGB 9.7*   HCT 31.6*          RADIOLOGY  CT HEAD WO CONTRAST  Result Date: 2025  EXAMINATION: CT OF THE HEAD WITHOUT CONTRAST  2025 9:04 pm TECHNIQUE: CT of the head was performed without the administration of intravenous contrast. Automated exposure control, iterative reconstruction, and/or weight based adjustment of the mA/kV was utilized to reduce the radiation dose to as low as reasonably achievable. COMPARISON: None. HISTORY: ORDERING SYSTEM PROVIDED

## 2025-06-22 NOTE — OP NOTE
[facility]  Department of Pulmonary, Critical Care and Sleep Medicine  [facility]  Department of Internal Medicine  Bronchoscopy Note     DATE OF PROCEDURE: 6/22/2025    INDICATIONS & HISTORY:  Fanny Gonzalez is a 75 y.o. female with choking on food.  The risks, benefits, complications, treatment options and expected outcomes were discussed with the patient and appropriate care givers.  The possibilities of reaction to medication, pulmonary aspiration, perforation of an organ, bleeding, failure to diagnose a condition and creating a complication requiring transfusion or operation were discussed with the patient/POA who freely signed the informed consent.        PREOPERATIVE DIAGNOSIS:    [x] ? Airway compromize    POSTOPERATIVE DIAGNOSES:  [x] No food, Mild edema of trachea, Upper airway dysfucntion from old CVA.    [x] Normal Anatomy,  [x] Normal VC      PROCEDURE PERFORMED:   [x] Fiberoptic larynoscopy,   [x] Bronchoscopy to opal       SURGEON:    Walter Naylor DO, MPH, FCCP, FACP, FACOI    ASSISTANT:    Bronchoscopy Nursing, ICU Team    SEDATION:  []  General Anesthesia [] Regional Anesthesia [x] ICU Protocol Team,       ANESTHESIA:     [] Lidocaine 2% via intranasal instillation,    [] Epinephrine 1:10,000 diluted Endobronchial administration   [x] 2 Versed    ANESTHESIOLOGIST:  None    SPECIMENS:  [x] None    ESTIMATED BLOOD LOSS: None    FINDINGS:     Normal nasal passages, posterior pharynx, & vocal cords.   Normal trachea    DESCRIPTION OF PROCEDURE:  After confirming informed consent a time out was held and the above information confirmed.  A Ambu laryngoscopy fiberoptic  was inserted into the left nares and advanced the trachea.  Careful inspection of the area was accomplished.  Patient was given 2 mg of Versed.  The upper airway was normal there was no food particles there was no airway edema the epiglottis was normal.  The vocal cords  were normal in size shape and caliber on coughing the vocal cords approximated well but there was laxity in the whole side of the vallecular pharyngeal structures probably from previous stroke.  The past the laryngoscope through the vocal cords visualized the first part of the upper airway to the opal with mild airway edema but no debris appreciated.  Patient tolerated the procedure fairly well      COMPLICATIONS:   None    IMPRESSIONS:   [x]  Airway examination was normal/mild edema.  [x]  No signs of diffuse alveolar damage or hemorrhage.    RECOMMENDATIONS:   [x]  Dexadron and HOB elevated.  [x]   Await final test/sample results.      Walter Naylor DO, MPH, FCCP, FACOI, FACP  Professor of Internal Medicine  [facility]  6/22/2025

## 2025-06-22 NOTE — PROGRESS NOTES
[[id number]] Collected: 06/20/25 2128    Order Status: Completed Specimen: Urine Updated: 06/21/25 0811     Legionella Pneumophilia Ag, Urine NEGATIVE     Comment:       L. pneumophila serogroup 1 antigen not detected.  A negative result does not exclude infection with Leginella pnemophila serogroup 1 nor does   it rule out other microbial-caused respiratory infections of disease caused by other   serogroups of Legionella pneumophila.         STREP PNEUMONIAE ANTIGEN [[id number]] Collected: 06/20/25 2128    Order Status: Completed Specimen: Urine, clean catch Updated: 06/21/25 0811     Source .URINE     Strep pneumo Ag NEGATIVE     Comment:       Presumptive Negative  suggests no current or recent pneumococcal infection. Infection due to Strep pneumoniae   cannot be ruled out since the antigen present in the sample may be below the detection limit   of the test.         Respiratory Panel, Molecular, with COVID-19 (Restricted: peds pts or suitable admitted adults) [[id number]] Collected: 06/20/25 2018    Order Status: Completed Specimen: Nasopharyngeal Swab Updated: 06/20/25 2206     Specimen Description .NASOPHARYNGEAL SWAB     Adenovirus PCR Not Detected     Coronavirus 229E PCR Not Detected     Coronavirus HKU1 PCR Not Detected     Coronavirus NL63 PCR Not Detected     Coronavirus OC43 PCR Not Detected     SARS-CoV-2, PCR Not Detected     Human Metapneumovirus PCR Not Detected     Rhino/Enterovirus PCR Not Detected     Influenza A by PCR Not Detected     Influenza B by PCR Not Detected     Parainfluenza 1 PCR Not Detected     Parainfluenza 2 PCR Not Detected     Parainfluenza 3 PCR Not Detected     Parainfluenza 4 PCR Not Detected     Resp Syncytial Virus PCR Not Detected     Bordetella parapertussis by PCR Not Detected     B Pertussis by PCR Not Detected     Chlamydia pneumoniae By PCR Not Detected     Mycoplasma pneumo by PCR Not Detected     Comment: Performed by multiplexed nucleic acid assay.       Culture,  presented with Nausea and vomiting associated with abdominal pain for 1 day. Has lifevest for HfrEF 30 % and got shocked once due to Vtach. Noted that she was recently admitted to hospital 20 days back approx for acute HF exacerbation and intermittent Afib RVR. LHC was done and showed  of RCA with left-to-right collaterals, OM1 with 60 to 70% stenosis with very distal 80% stenosis-medical therapy.  She was also diagnosed with UTI during that time.     In ED was hypertensive and tachycardiac other wise stable. Lab revelaed concern for PETER; DKA with BG of 435 and lactic acidosis 2.7; elevated troponin with delta negative. CBC with some leukocytosis. Imaging CXR; CT abd pelvis and CT head all were negative for acute pathology. Ep consulted for Vtach - lifevest was interrogated. Started on Amio drip and DKA protocol.     6/21 No episodes of vtach during ICU stay. Epi onboard continued on amio drip with oral amio addition to wean her off the IV. DKA bridged with Lantus 20 units nightly and she tolerated well and blood glucose stable. D5 discontinued and started her on diet. Continued with Ceftriaxone and Doxycyline for lower genital tract infection. Acylovir was Dced, doesnot seems like she has active herpes infection this admission. Continued GDMT.    6/22 - Overnight; patient MAP fall to 36 was started on levophed. Unresponsive even on sternal rub. Planned for intubationbedisde; responsive suddenly while denture was removed prior intubation attempt. So intubation aborted. Ep planning for CRT-D implantation. Continued with heparin drip if she require the procedure.  Planned for the transfer out of icu. During noon turn of event. Patient choked on her food? Turkey; evaluated bedside; noted to have wheezing and coughing. Stat CXR normal but continued to wheezing and difficulty breathing with normal saturation. Planned for intubation, updated family. 15 mins later she was able to cough out the food particles and

## 2025-06-23 ENCOUNTER — APPOINTMENT (OUTPATIENT)
Dept: GENERAL RADIOLOGY | Age: 75
End: 2025-06-23
Payer: MEDICARE

## 2025-06-23 LAB
ANION GAP SERPL CALCULATED.3IONS-SCNC: 10 MMOL/L (ref 7–16)
ANION GAP SERPL CALCULATED.3IONS-SCNC: 11 MMOL/L (ref 7–16)
BUN SERPL-MCNC: 20 MG/DL (ref 8–23)
BUN SERPL-MCNC: 24 MG/DL (ref 8–23)
CALCIUM SERPL-MCNC: 9 MG/DL (ref 8.8–10.2)
CALCIUM SERPL-MCNC: 9 MG/DL (ref 8.8–10.2)
CHLORIDE SERPL-SCNC: 102 MMOL/L (ref 98–107)
CHLORIDE SERPL-SCNC: 106 MMOL/L (ref 98–107)
CO2 SERPL-SCNC: 19 MMOL/L (ref 22–29)
CO2 SERPL-SCNC: 20 MMOL/L (ref 22–29)
CREAT SERPL-MCNC: 0.9 MG/DL (ref 0.5–1)
CREAT SERPL-MCNC: 1 MG/DL (ref 0.5–1)
ERYTHROCYTE [DISTWIDTH] IN BLOOD BY AUTOMATED COUNT: 16 % (ref 11.5–15)
GFR, ESTIMATED: 62 ML/MIN/1.73M2
GFR, ESTIMATED: 68 ML/MIN/1.73M2
GLUCOSE BLD-MCNC: 180 MG/DL (ref 74–99)
GLUCOSE BLD-MCNC: 309 MG/DL (ref 74–99)
GLUCOSE BLD-MCNC: 316 MG/DL (ref 74–99)
GLUCOSE BLD-MCNC: 360 MG/DL (ref 74–99)
GLUCOSE SERPL-MCNC: 192 MG/DL (ref 74–99)
GLUCOSE SERPL-MCNC: 359 MG/DL (ref 74–99)
HCT VFR BLD AUTO: 35.2 % (ref 34–48)
HGB BLD-MCNC: 11 G/DL (ref 11.5–15.5)
LIPASE SERPL-CCNC: 76 U/L (ref 13–60)
MAGNESIUM SERPL-MCNC: 2.1 MG/DL (ref 1.6–2.4)
MCH RBC QN AUTO: 27.9 PG (ref 26–35)
MCHC RBC AUTO-ENTMCNC: 31.3 G/DL (ref 32–34.5)
MCV RBC AUTO: 89.3 FL (ref 80–99.9)
MICROORGANISM SPEC CULT: ABNORMAL
MICROORGANISM SPEC CULT: ABNORMAL
PARTIAL THROMBOPLASTIN TIME: 37.5 SEC (ref 24.5–35.1)
PARTIAL THROMBOPLASTIN TIME: 70.9 SEC (ref 24.5–35.1)
PHOSPHATE SERPL-MCNC: 3.8 MG/DL (ref 2.5–4.5)
PLATELET # BLD AUTO: 325 K/UL (ref 130–450)
PMV BLD AUTO: 11 FL (ref 7–12)
POTASSIUM SERPL-SCNC: 4.7 MMOL/L (ref 3.5–5.1)
POTASSIUM SERPL-SCNC: 5.1 MMOL/L (ref 3.5–5.1)
PROCALCITONIN SERPL-MCNC: 0.08 NG/ML (ref 0–0.08)
RBC # BLD AUTO: 3.94 M/UL (ref 3.5–5.5)
SERVICE CMNT-IMP: ABNORMAL
SODIUM SERPL-SCNC: 132 MMOL/L (ref 136–145)
SODIUM SERPL-SCNC: 136 MMOL/L (ref 136–145)
SPECIMEN DESCRIPTION: ABNORMAL
WBC OTHER # BLD: 7.9 K/UL (ref 4.5–11.5)

## 2025-06-23 PROCEDURE — 6370000000 HC RX 637 (ALT 250 FOR IP): Performed by: INTERNAL MEDICINE

## 2025-06-23 PROCEDURE — 6360000002 HC RX W HCPCS

## 2025-06-23 PROCEDURE — 84145 PROCALCITONIN (PCT): CPT

## 2025-06-23 PROCEDURE — 85730 THROMBOPLASTIN TIME PARTIAL: CPT

## 2025-06-23 PROCEDURE — 94640 AIRWAY INHALATION TREATMENT: CPT

## 2025-06-23 PROCEDURE — 86696 HERPES SIMPLEX TYPE 2 TEST: CPT

## 2025-06-23 PROCEDURE — 97530 THERAPEUTIC ACTIVITIES: CPT

## 2025-06-23 PROCEDURE — 36592 COLLECT BLOOD FROM PICC: CPT

## 2025-06-23 PROCEDURE — 97535 SELF CARE MNGMENT TRAINING: CPT

## 2025-06-23 PROCEDURE — 6370000000 HC RX 637 (ALT 250 FOR IP)

## 2025-06-23 PROCEDURE — 2060000000 HC ICU INTERMEDIATE R&B

## 2025-06-23 PROCEDURE — 83735 ASSAY OF MAGNESIUM: CPT

## 2025-06-23 PROCEDURE — 6370000000 HC RX 637 (ALT 250 FOR IP): Performed by: STUDENT IN AN ORGANIZED HEALTH CARE EDUCATION/TRAINING PROGRAM

## 2025-06-23 PROCEDURE — 2580000003 HC RX 258

## 2025-06-23 PROCEDURE — 84100 ASSAY OF PHOSPHORUS: CPT

## 2025-06-23 PROCEDURE — 85027 COMPLETE CBC AUTOMATED: CPT

## 2025-06-23 PROCEDURE — 71045 X-RAY EXAM CHEST 1 VIEW: CPT

## 2025-06-23 PROCEDURE — 2700000000 HC OXYGEN THERAPY PER DAY

## 2025-06-23 PROCEDURE — 36415 COLL VENOUS BLD VENIPUNCTURE: CPT

## 2025-06-23 PROCEDURE — 82962 GLUCOSE BLOOD TEST: CPT

## 2025-06-23 PROCEDURE — 80048 BASIC METABOLIC PNL TOTAL CA: CPT

## 2025-06-23 PROCEDURE — 97162 PT EVAL MOD COMPLEX 30 MIN: CPT

## 2025-06-23 PROCEDURE — 97166 OT EVAL MOD COMPLEX 45 MIN: CPT

## 2025-06-23 PROCEDURE — 92610 EVALUATE SWALLOWING FUNCTION: CPT

## 2025-06-23 PROCEDURE — 2500000003 HC RX 250 WO HCPCS: Performed by: INTERNAL MEDICINE

## 2025-06-23 PROCEDURE — 86695 HERPES SIMPLEX TYPE 1 TEST: CPT

## 2025-06-23 PROCEDURE — 2500000003 HC RX 250 WO HCPCS: Performed by: EMERGENCY MEDICINE

## 2025-06-23 PROCEDURE — 83690 ASSAY OF LIPASE: CPT

## 2025-06-23 RX ORDER — METRONIDAZOLE 500 MG/1
500 TABLET ORAL EVERY 12 HOURS SCHEDULED
Status: DISCONTINUED | OUTPATIENT
Start: 2025-06-23 | End: 2025-06-23

## 2025-06-23 RX ORDER — METRONIDAZOLE 7.5 MG/G
GEL VAGINAL 2 TIMES DAILY
Status: DISCONTINUED | OUTPATIENT
Start: 2025-06-23 | End: 2025-06-28 | Stop reason: HOSPADM

## 2025-06-23 RX ORDER — HYDRALAZINE HYDROCHLORIDE 20 MG/ML
5 INJECTION INTRAMUSCULAR; INTRAVENOUS EVERY 6 HOURS PRN
Status: DISCONTINUED | OUTPATIENT
Start: 2025-06-23 | End: 2025-06-28 | Stop reason: HOSPADM

## 2025-06-23 RX ORDER — LABETALOL HYDROCHLORIDE 5 MG/ML
10 INJECTION, SOLUTION INTRAVENOUS EVERY 6 HOURS PRN
Status: DISCONTINUED | OUTPATIENT
Start: 2025-06-23 | End: 2025-06-28 | Stop reason: HOSPADM

## 2025-06-23 RX ORDER — INSULIN LISPRO 100 [IU]/ML
0-8 INJECTION, SOLUTION INTRAVENOUS; SUBCUTANEOUS
Status: DISCONTINUED | OUTPATIENT
Start: 2025-06-23 | End: 2025-06-28 | Stop reason: HOSPADM

## 2025-06-23 RX ORDER — VALACYCLOVIR HYDROCHLORIDE 500 MG/1
500 TABLET, FILM COATED ORAL 2 TIMES DAILY
Status: DISCONTINUED | OUTPATIENT
Start: 2025-06-23 | End: 2025-06-28 | Stop reason: HOSPADM

## 2025-06-23 RX ORDER — INSULIN LISPRO 100 [IU]/ML
5 INJECTION, SOLUTION INTRAVENOUS; SUBCUTANEOUS
Status: DISCONTINUED | OUTPATIENT
Start: 2025-06-23 | End: 2025-06-24

## 2025-06-23 RX ADMIN — SODIUM CHLORIDE, PRESERVATIVE FREE 10 ML: 5 INJECTION INTRAVENOUS at 08:57

## 2025-06-23 RX ADMIN — HEPARIN SODIUM 8 UNITS/KG/HR: 10000 INJECTION, SOLUTION INTRAVENOUS at 01:32

## 2025-06-23 RX ADMIN — INSULIN LISPRO 6 UNITS: 100 INJECTION, SOLUTION INTRAVENOUS; SUBCUTANEOUS at 17:41

## 2025-06-23 RX ADMIN — DEXAMETHASONE SODIUM PHOSPHATE 10 MG: 10 INJECTION, SOLUTION INTRAMUSCULAR; INTRAVENOUS at 00:51

## 2025-06-23 RX ADMIN — HEPARIN SODIUM 2000 UNITS: 1000 INJECTION INTRAVENOUS; SUBCUTANEOUS at 01:30

## 2025-06-23 RX ADMIN — INSULIN LISPRO 6 UNITS: 100 INJECTION, SOLUTION INTRAVENOUS; SUBCUTANEOUS at 20:52

## 2025-06-23 RX ADMIN — VALACYCLOVIR HYDROCHLORIDE 500 MG: 500 TABLET, FILM COATED ORAL at 22:29

## 2025-06-23 RX ADMIN — INSULIN LISPRO 5 UNITS: 100 INJECTION, SOLUTION INTRAVENOUS; SUBCUTANEOUS at 20:53

## 2025-06-23 RX ADMIN — ATORVASTATIN CALCIUM 80 MG: 80 TABLET, FILM COATED ORAL at 20:38

## 2025-06-23 RX ADMIN — INSULIN LISPRO 5 UNITS: 100 INJECTION, SOLUTION INTRAVENOUS; SUBCUTANEOUS at 17:41

## 2025-06-23 RX ADMIN — GABAPENTIN 300 MG: 300 CAPSULE ORAL at 20:38

## 2025-06-23 RX ADMIN — IPRATROPIUM BROMIDE 0.5 MG: 0.5 SOLUTION RESPIRATORY (INHALATION) at 08:41

## 2025-06-23 RX ADMIN — HYDRALAZINE HYDROCHLORIDE 10 MG: 10 TABLET ORAL at 20:38

## 2025-06-23 RX ADMIN — METRONIDAZOLE: 7.5 GEL VAGINAL at 20:46

## 2025-06-23 RX ADMIN — HYDRALAZINE HYDROCHLORIDE 5 MG: 20 INJECTION, SOLUTION INTRAMUSCULAR; INTRAVENOUS at 18:16

## 2025-06-23 RX ADMIN — IPRATROPIUM BROMIDE 0.5 MG: 0.5 SOLUTION RESPIRATORY (INHALATION) at 16:11

## 2025-06-23 RX ADMIN — Medication 325 MG: at 17:42

## 2025-06-23 RX ADMIN — IPRATROPIUM BROMIDE 0.5 MG: 0.5 SOLUTION RESPIRATORY (INHALATION) at 12:23

## 2025-06-23 RX ADMIN — DOXYCYCLINE 100 MG: 100 INJECTION, POWDER, LYOPHILIZED, FOR SOLUTION INTRAVENOUS at 08:54

## 2025-06-23 RX ADMIN — INSULIN LISPRO 8 UNITS: 100 INJECTION, SOLUTION INTRAVENOUS; SUBCUTANEOUS at 11:39

## 2025-06-23 RX ADMIN — GABAPENTIN 300 MG: 300 CAPSULE ORAL at 13:12

## 2025-06-23 RX ADMIN — AMIODARONE HYDROCHLORIDE 200 MG: 200 TABLET ORAL at 20:52

## 2025-06-23 RX ADMIN — SODIUM POLYSTYRENE SULFONATE 15 G: 15 SUSPENSION ORAL; RECTAL at 13:12

## 2025-06-23 RX ADMIN — AMIODARONE HYDROCHLORIDE 0.5 MG/MIN: 1.8 INJECTION, SOLUTION INTRAVENOUS at 10:27

## 2025-06-23 RX ADMIN — AMIODARONE HYDROCHLORIDE 200 MG: 200 TABLET ORAL at 13:12

## 2025-06-23 RX ADMIN — RANOLAZINE 500 MG: 500 TABLET, FILM COATED, EXTENDED RELEASE ORAL at 20:38

## 2025-06-23 RX ADMIN — MONTELUKAST 10 MG: 10 TABLET, FILM COATED ORAL at 20:38

## 2025-06-23 RX ADMIN — OXYBUTYNIN CHLORIDE 5 MG: 5 TABLET ORAL at 20:39

## 2025-06-23 RX ADMIN — OXYBUTYNIN CHLORIDE 5 MG: 5 TABLET ORAL at 15:47

## 2025-06-23 RX ADMIN — SODIUM CHLORIDE, PRESERVATIVE FREE 10 ML: 5 INJECTION INTRAVENOUS at 20:40

## 2025-06-23 RX ADMIN — DEXAMETHASONE SODIUM PHOSPHATE 10 MG: 10 INJECTION, SOLUTION INTRAMUSCULAR; INTRAVENOUS at 08:54

## 2025-06-23 ASSESSMENT — PAIN SCALES - GENERAL
PAINLEVEL_OUTOF10: 0

## 2025-06-23 NOTE — PROGRESS NOTES
[facility] Hospitalist Progress Note    Admitting Date and Time: 6/20/2025 11:06 AM  Admit Dx: Ventricular tachycardia (HCC) [I47.20]  Diabetic ketoacidosis without coma associated with type 2 diabetes mellitus (HCC) [E11.10]    Subjective:  Patient is being followed for Ventricular tachycardia (HCC) [I47.20]  Diabetic ketoacidosis without coma associated with type 2 diabetes mellitus (HCC) [E11.10]   Pt feels ok today. Patient denies headache, blurry vision, fever, chills, chest pain, palpitations, SOB, GELLER, cough, nausea, vomiting, diarrhea, constipation, abd pain, dysuria, hematuria, tingling, numbness, leg swelling.     Patient seen in medical ICU  Multiple family members by the bedside  Has no new specific complaints  Sitting up in a chair       insulin lispro  0-8 Units SubCUTAneous 4x Daily AC & HS    metroNIDAZOLE  500 mg Oral 2 times per day    sodium polystyrene  15 g Oral Once    [Held by provider] insulin glargine  20 Units SubCUTAneous Daily    amiodarone  200 mg Oral 3 times per day    metoprolol succinate  25 mg Oral Daily    sodium chloride flush  5-40 mL IntraVENous 2 times per day    aspirin  81 mg Oral Daily    atorvastatin  80 mg Oral Nightly    [Held by provider] cloNIDine  0.2 mg Oral TID    ferrous sulfate  325 mg Oral BID WC    gabapentin  300 mg Oral TID    montelukast  10 mg Oral Nightly    pantoprazole  40 mg Oral QAM AC    [Held by provider] traZODone  50 mg Oral Nightly    ranolazine  500 mg Oral BID    oxyBUTYnin  5 mg Oral TID    hydrALAZINE  10 mg Oral 2 times per day    [Held by provider] isosorbide dinitrate  10 mg Oral TID    ipratropium  0.5 mg Nebulization 4x Daily RT     glucose, 4 tablet, PRN  dextrose bolus, 125 mL, PRN   Or  dextrose bolus, 250 mL, PRN  glucagon (rDNA), 1 mg, PRN  dextrose, , Continuous PRN  sodium chloride flush, 5-40 mL, PRN  sodium chloride, , PRN  polyethylene glycol, 17 g, Daily PRN  acetaminophen, 650 mg, Q6H PRN   Or  acetaminophen, 650 mg,  on exam, GDMT as tolerated once DKA resolved  Atrial fibrillation, nonvalvular, on metoprolol and Eliquis at home, currently on amiodarone drip  LBBB  History of CAD: GDMT as tolerated  Hypertension    History of Asthma  Benign respiratory exam today  Currently on CAP coverage  Breathing treatments    History of PE  On Eliquis at home     Suspected aspiration   Swallow study results noted    Code Status: Full Code  DVT/GI Prophylaxis: heparin gtt/PPI    Dispo: Start discharge planning  Increase activity as able      NOTE: This report was transcribed using voice recognition software. Every effort was made to ensure accuracy; however, inadvertent computerized transcription errors may be present.  Electronically signed by Kirk Madera MD on 6/23/2025 at 12:49 PM

## 2025-06-23 NOTE — CARE COORDINATION
Transition of care update. Patient is admitted with DKA. She presented to ER with abdominal pain, nausea, and blood glucose running high. Per notes, she also apparently had a run of VTACH and was shocked by her LifeVest. Patient lives in an apartment on 10th floor w/ elevator access. Pt has a cane, rollator, bsc & shower chair. Recent history of [facility] and rehab in May of this year.  Her PCP is Dr Storm and pharmacy is [facility]. Daughter Tracee lives in [place], and is trying to fly in to town to see her mom. Requesting a SNF list for rehab and then the plan will be to move to LTC. SNF list emailed to daughter at, [email or web address]. Per call with physician, he told Tracee her mother should not be alone. She will review, and speak to CM when in town, hopefully tomorrow. Updated patient and brother in law at bedside. SNF list given to review. CM will follow.  Electronically signed by Avery Mehta RN on 6/23/2025 at 3:21 PM

## 2025-06-23 NOTE — PROGRESS NOTES
OCCUPATIONAL THERAPY INITIAL EVALUATION    [facility]  [address] [place], [place]     Date:2025                                                               Patient Name: Fanny Gonzalez  MRN: [id number]  : 1950  Room: [address] [place] [address]    Evaluating OT: Luna Krause, KAMLESHD,  OTR/L; [id number]  Referring Provider: Gege Torres MD   Specific Provider Orders/Date: OT eval and treat (25)       Diagnosis: Ventricular tachycardia (HCC) [I47.20]  Diabetic ketoacidosis without coma associated with type 2 diabetes mellitus (HCC) [E11.10]     Reason for admission: Pt admitted with DKA, abdominal pain, V-tach/life vest- plan for ICD placement.    Surgery/Procedures: None this admission    Pertinent Medical History:    Past Medical History:   Diagnosis Date    Arthritis     Asthma     BRCA1 negative     BRCA2 negative     Breast cancer ([facility])     CAD in native artery 12/15/2021    12-15-21 cate 2.5x38 hima rca. 12-15-21 cate 3.0x12 hima prox rca.     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    Cerebrovascular disease 2009    no residual    CHF (congestive heart failure) ([facility]) approx 3 years ago    Claustrophobia     COPD (chronic obstructive pulmonary disease) ([facility])     Decreased dorsalis pedis pulse 2019    Dermatophytosis 2019    Headache(784.0)     History of blood transfusion     with knee surgery    Hives     Hx of blood clots     PE's and DVT's 'during childbearing years'    Hyperlipidemia     Hypertension     LBP radiating to both legs     Lymphedema of both lower extremities 2015    Neuromuscular disorder (HCC)     Non-rheumatic aortic sclerosis 10/2018    10/2018    Obesity     Pulmonary hypertension (HCC) 10/2018    PVD (peripheral vascular disease) with claudication 2021    Recurrent genital HSV

## 2025-06-23 NOTE — PROGRESS NOTES
Spiritual Health History and Assessment/Progress Note  [facility]    Spiritual/Emotional Needs, Follow-up,  ,  ,      Name: Fanny Gonzalez MRN: [id number]    Age: 75 y.o.     Sex: female   Language: English   Latter day: Catholic   Diabetic ketoacidosis without coma associated with type 2 diabetes mellitus (HCC)     Date: 6/23/2025                           Spiritual Assessment continued in [facility] MICU        Referral/Consult From: Rounding   Encounter Overview/Reason: Spiritual/Emotional Needs, Follow-up  Service Provided For: Patient    Jordyn, Belief, Meaning:   Patient identifies as spiritual  Family/Friends identify as spiritual      Importance and Influence:  Patient has spiritual/personal beliefs that influence decisions regarding their health  Family/Friends have spiritual/personal beliefs that influence decisions regarding the patient's health    Community:  Patient is connected with a spiritual community  Family/Friends are connected with a spiritual community:    Assessment and Plan of Care:     Patient Interventions include: Provided sacramental/Druze ritual  Family/Friends Interventions include: Provided sacramental/Druze ritual    Patient Plan of Care: Spiritual Care available upon further referral  Family/Friends Plan of Care: Spiritual Care available upon further referral    Electronically signed by Chaplain Berny on 6/23/2025 at 11:08 AM

## 2025-06-23 NOTE — PLAN OF CARE
Problem: Chronic Conditions and Co-morbidities  Goal: Patient's chronic conditions and co-morbidity symptoms are monitored and maintained or improved  6/23/2025 1823 by Yolanda Jolley RN  Outcome: Progressing  6/23/2025 0619 by Avery Strickland RN  Outcome: Progressing     Problem: Pain  Goal: Verbalizes/displays adequate comfort level or baseline comfort level  6/23/2025 1823 by Yolanda Jolley RN  Outcome: Progressing  6/23/2025 0619 by Avery Strickland RN  Outcome: Progressing     Problem: Skin/Tissue Integrity  Goal: Skin integrity remains intact  Description: 1.  Monitor for areas of redness and/or skin breakdown  2.  Assess vascular access sites hourly  3.  Every 4-6 hours minimum:  Change oxygen saturation probe site  4.  Every 4-6 hours:  If on nasal continuous positive airway pressure, respiratory therapy assess nares and determine need for appliance change or resting period  6/23/2025 1823 by Yolanda Jolley RN  Outcome: Progressing  Flowsheets (Taken 6/23/2025 0900)  Skin Integrity Remains Intact: Monitor for areas of redness and/or skin breakdown  6/23/2025 0619 by Avery Strickland RN  Flowsheets (Taken 6/23/2025 0619)  Skin Integrity Remains Intact:   Assess vascular access sites hourly   Monitor for areas of redness and/or skin breakdown   Positioning devices   Every 4-6 hours:  If on nasal continuous positive airway pressure, assess nares and determine need for appliance change or resting period     Problem: Safety - Adult  Goal: Free from fall injury  Outcome: Progressing  Flowsheets (Taken 6/23/2025 0900)  Free From Fall Injury: Instruct family/caregiver on patient safety     Problem: ABCDS Injury Assessment  Goal: Absence of physical injury  Outcome: Progressing  Flowsheets (Taken 6/23/2025 0900)  Absence of Physical Injury: Implement safety measures based on patient assessment     Problem: Discharge Planning  Goal: Discharge to home or other facility with appropriate resources  6/23/2025

## 2025-06-23 NOTE — PROGRESS NOTES
SPEECH/LANGUAGE PATHOLOGY  CLINICAL ASSESSMENT OF SWALLOWING FUNCTION   and PLAN OF CARE      PATIENT NAME:  Fanny Gonzalez  (female)     MRN:  [id number]    :  1950  (75 y.o.)  STATUS:  Inpatient: Room 4418/4418-A    TODAY'S DATE:  2025  ORDER DATE, DESCRIPTION AND REFERRING PROVIDER:    SLP swallowing-dysphagia evaluation and treatment  Start:  25,   End:  25,   ONE TIME,   Standing Count:  1 Occurrences,   R       Gege Torres MD  REASON FOR REFERRAL: Dysphagia  EVALUATING THERAPIST: DENEEN Broussard                 RESULTS:    DYSPHAGIA DIAGNOSIS:   Clinical indicators of mild oropharyngeal phase dysphagia      Per chart review, patient choked on turkey while eating. She denies any previous choking incidents or issues with swallowing. Otolaryngology reported mild presbylarynx and atrophy of vocal cords but overall moving well and vocal cords are closing appropriately. At bedside, no overt s/s of aspiration were displayed. However, silent aspiration cannot be ruled out at bedside.      DIET RECOMMENDATIONS:  Soft and bite size consistency solids (IDDSI level 6) with  thin liquids (IDDSI level 0)     FEEDING RECOMMENDATIONS:     Assistance level:  Supervision is needed during all oral intake  Verbal cueing for implementation of safe swallow strategies       Compensatory strategies recommended: Thorough, frequent oral care to prevent colonization of oral bacteria, Fully alert for all PO, Thorough oral care to prevent colonization of oral bacteria, Upright in bed/ chair as tolerated, Effortful swallow, Slow rate of intake, SINGLE cup sips, SINGLE straw sips, SMALL bites, Liquid wash to help clear oral cavity of thicker consistency items      Discussed recommendations with:  patient nurse in person    SPEECH THERAPY  PLAN OF CARE   The dysphagia POC is established based on physician order, dysphagia diagnosis and results of clinical assessment     Meal time assessment for 1-2

## 2025-06-23 NOTE — PROGRESS NOTES
[facility]  Department of Internal Medicine   Internal Medicine Residency   MICU Progress Note    Patient:  Fanny Gonzalez 75 y.o. female  MRN: [id number]     Date of Service: 6/23/2025    Allergy: Patient has no known allergies.    Hospital Day: 4  ICU Length of Stay: 2d 18h    Reason for admission: Diabetic ketoacidosis without coma associated with type 2 diabetes mellitus (HCC) and 1 episodes of V tach s/p shocked by Lifevest    Subjective     Overnight events: No acute overnight events  24 hrs  - Patient did have 4 episodes of low grade fever     Patient was seen and examined this morning. She is awake alert oriented to time place and person.  Denies any fresh complaints. No shortness of breath. No chest pain , palpitation. No fever this morning. Vitals has been stable.    Objective     VS: BP (!) 143/80   Pulse 88   Temp 99.9 °F (37.7 °C) (Bladder)   Resp 17   Ht 1.651 m (5' 5\")   Wt 96 kg (211 lb 10.3 oz)   SpO2 97%   BMI 35.22 kg/m²         I & O - 24hr:   Intake/Output Summary (Last 24 hours) at 6/23/2025 1326  Last data filed at 6/23/2025 1100  Gross per 24 hour   Intake 512.45 ml   Output 575 ml   Net -62.55 ml     Net IO Since Admission: 3,326.5 mL [06/23/25 1326]    Physical Exam:  General Appearance: elderly, ill looking; dehydrated.   HEENT: Normocephalic, atraumatic  Neck: midline trachea  Lung: clear to auscultation bilaterally  Heart: regular rate and rhythm, no murmur  Abdomen: soft, bowel sounds normal; no masses  Extremities: no cyanosis or edema  Musculokeletal: No joint swelling  Neurologic: Mental status: Awake alert and oriented to time place and person. Normal motor and sensory examination    Lines, Drains, Airway (LDA)     Lines     site date day    Art line   None     TLC R Fem 6/20/2025 2   PICC None     Hemoaccess None       Drains:   [x] Urinary Catheter   [] Fecal Management System    [] G/E/J/PEG Tube     Airway:    2l of oxygen va nasal cannula    Medications  Coronavirus NL63 PCR Not Detected     Coronavirus OC43 PCR Not Detected     SARS-CoV-2, PCR Not Detected     Human Metapneumovirus PCR Not Detected     Rhino/Enterovirus PCR Not Detected     Influenza A by PCR Not Detected     Influenza B by PCR Not Detected     Parainfluenza 1 PCR Not Detected     Parainfluenza 2 PCR Not Detected     Parainfluenza 3 PCR Not Detected     Parainfluenza 4 PCR Not Detected     Resp Syncytial Virus PCR Not Detected     Bordetella parapertussis by PCR Not Detected     B Pertussis by PCR Not Detected     Chlamydia pneumoniae By PCR Not Detected     Mycoplasma pneumo by PCR Not Detected     Comment: Performed by multiplexed nucleic acid assay.       Culture, MRSA, Screening [[id number]] Collected: 06/20/25 2018    Order Status: Completed Specimen: Nares Updated: 06/22/25 1040     Specimen Description .NARES     Culture NEGATIVE FOR: METHICILLIN RESISTANT STAPHYLOCOCCUS AUREUS    Culture, Urine [[id number]]  (Abnormal) Collected: 06/20/25 1127    Order Status: Completed Specimen: Urine, clean catch Updated: 06/23/25 1023     Specimen Description .CLEAN CATCH URINE .VOIDED URINE     Special Requests Site: Urine     Culture Mixed jud isolated. Further workup and sensitivity testing not routinely indicated and will not be perfomed. Mixed jud isolated includes:      MIXED GRAM POSITIVE ORGANISMS             Imaging     XR CHEST PORTABLE   Final Result   No acute process.         CT HEAD WO CONTRAST   Final Result   No acute intracranial abnormality.      Periventricular white matter changes consistent chronic microvascular disease         CT ABDOMEN PELVIS W IV CONTRAST Additional Contrast? None   Final Result   No CT evidence of acute process in the abdomen or pelvis.         XR CHEST PORTABLE   Final Result   No acute process.         XR CHEST PORTABLE    (Results Pending)   XR CHEST PORTABLE    (Results Pending)      05/11/25    ECHO (TTE) LIMITED (PRN CONTRAST/BUBBLE/STRAIN/3D)

## 2025-06-23 NOTE — PLAN OF CARE
Problem: Discharge Planning  Goal: Discharge to home or other facility with appropriate resources  Outcome: Not Progressing  Flowsheets (Taken 6/23/2025 0619)  Discharge to home or other facility with appropriate resources:   Identify barriers to discharge with patient and caregiver   Identify discharge learning needs (meds, wound care, etc)   Refer to discharge planning if patient needs post-hospital services based on physician order or complex needs related to functional status, cognitive ability or social support system   Arrange for needed discharge resources and transportation as appropriate   Arrange for interpreters to assist at discharge as needed

## 2025-06-23 NOTE — CARE COORDINATION
Case Management Assessment  Initial Evaluation    Date/Time of Evaluation: 6/23/2025 3:24 PM  Assessment Completed by: Avery Mehta RN    If patient is discharged prior to next notation, then this note serves as note for discharge by case management.    Patient Name: Fanny Gonzalez                   YOB: 1950  Diagnosis: Ventricular tachycardia (HCC) [I47.20]  Diabetic ketoacidosis without coma associated with type 2 diabetes mellitus (HCC) [E11.10]                   Date / Time: 6/20/2025 11:06 AM    Patient Admission Status: Inpatient   Readmission Risk (Low < 19, Mod (19-27), High > 27): Readmission Risk Score: 25.9    Current PCP: Babatuned Storm APRN - CNP  PCP verified by CM? Yes    Chart Reviewed: Yes      History Provided by: Patient  Patient Orientation: Alert and Oriented    Patient Cognition:      Hospitalization in the last 30 days (Readmission):  No    If yes, Readmission Assessment in CM Navigator will be completed.    Advance Directives:      Code Status: Full Code   Patient's Primary Decision Maker is: Legal Next of Kin    Primary Decision Maker: Tracee Gonzalez - Child - [phone]    Discharge Planning:    Patient lives with: Alone Type of Home: House  Primary Care Giver: Other (Comment)  Patient Support Systems include: Children   Current Financial resources:    Current community resources:    Current services prior to admission: None            Current DME:              Type of Home Care services:  None    ADLS  Prior functional level: Assistance with the following:  Current functional level: Assistance with the following:, Bathing, Dressing, Toileting, Mobility    PT AM-PAC: 16 /24  OT AM-PAC: 15 /24    Family can provide assistance at DC: No  Would you like Case Management to discuss the discharge plan with any other family members/significant others, and if so, who? Yes (daughter Tracee.)  Plans to Return to Present Housing: Unknown at present  Other Identified Issues/Barriers

## 2025-06-23 NOTE — PROGRESS NOTES
Physical Therapy    Physical Therapy Initial Assessment     Name: Fanny Gonzalez  : 1950  MRN: [id number]      Date of Service: 2025    Evaluating PT:  Hamlet Jones PT, DPT  [id number]     Room #:  4418/4418-A  Diagnosis:  Ventricular tachycardia (HCC) [I47.20]  Diabetic ketoacidosis without coma associated with type 2 diabetes mellitus (HCC) [E11.10]  PMHx/PSHx:   has a past medical history of Arthritis, Asthma, BRCA1 negative, BRCA2 negative, Breast cancer (HCC), CAD in native artery, Cancer (HCC), Cerebral artery occlusion with cerebral infarction (HCC), Cerebrovascular disease, CHF (congestive heart failure) (HCC), Claustrophobia, COPD (chronic obstructive pulmonary disease) (HCC), Decreased dorsalis pedis pulse, Dermatophytosis, Headache(784.0), History of blood transfusion, Hives, Hx of blood clots, Hyperlipidemia, Hypertension, LBP radiating to both legs, Lymphedema of both lower extremities, Neuromuscular disorder (HCC), Non-rheumatic aortic sclerosis, Obesity, Pulmonary hypertension (HCC), PVD (peripheral vascular disease) with claudication, Recurrent genital HSV (herpes simplex virus) infection, S/P breast biopsy, right, Type II or unspecified type diabetes mellitus without mention of complication, not stated as uncontrolled, and UTI (urinary tract infection).   Procedure/Surgery:  None this admission  Precautions:  Falls, O2, Monitor HR, Alarms   Equipment Needs:  TBD     SUBJECTIVE:    Pt lives alone in a 10th floor apartment with elevator access, 0 step(s) to enter; bed/bath on living level.   Equipment owned: SPC, Rollator, BSC, FWW    OBJECTIVE:   Initial Evaluation  Date: 25 Treatment Short Term/ Long Term   Goals   AM-PAC 6 Clicks      Was pt agreeable to Eval/treatment? Yes      Does pt have pain? No reported pain      Bed Mobility  Rolling: NT  Supine to sit: Min A  Sit to supine: NT  Scooting: Min A to EOB   Rolling: Independent    Supine to sit: Independent    Sit to supine:

## 2025-06-23 NOTE — CONSULTS
Gyn Consult Note      Reason for Consult:  poss herpes outbreak  Requesting Physician:  Dr Gambino    CHIEF COMPLAINT:   it hurts in my private area for about 1 month    History obtained from patient    HISTORY OF PRESENT ILLNESS:    The patient is a 75 y.o. female   , LMP 20+ yrs ago    It hurts in the vulvar area in the past 1 month, like outbreak of herpes, she has h/o herpes for which she  her spouse, as per pt, she stated this feels like herpes outbreaks before.    Last Gyn visit was 2 yrs ago    Past Medical History:        Diagnosis Date    Arthritis     Asthma     BRCA1 negative     BRCA2 negative     Breast cancer (HCC)     CAD in native artery 12/15/2021    12-15-21 cate 2.5x38 hima rca. 12-15-21 cate 3.0x12 hima prox rca.     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    Cerebrovascular disease 2009    no residual    CHF (congestive heart failure) ([facility]) approx 3 years ago    Claustrophobia     COPD (chronic obstructive pulmonary disease) ([facility])     Decreased dorsalis pedis pulse 2019    Dermatophytosis 2019    Headache(784.0)     History of blood transfusion     with knee surgery    Hives     Hx of blood clots     PE's and DVT's 'during childbearing years'    Hyperlipidemia     Hypertension     LBP radiating to both legs     Lymphedema of both lower extremities 2015    Neuromuscular disorder ([facility])     Non-rheumatic aortic sclerosis 10/2018    10/2018    Obesity     Pulmonary hypertension ([facility]) 10/2018    PVD (peripheral vascular disease) with claudication 2021    Recurrent genital HSV (herpes simplex virus) infection     last outbreak 2017    S/P breast biopsy, right 2016    pt states breast cancer    Type II or unspecified type diabetes mellitus without mention of complication, not stated as uncontrolled     AA1c8.7 9/10    UTI (urinary

## 2025-06-23 NOTE — ACP (ADVANCE CARE PLANNING)
Advance Care Planning   The patient has the following advanced directives on file:  Advance Directives       Power of  Living Will ACP-Advance Directive ACP-Power of     Not on File Filed on 07/19/13 Filed Not on File            The patient has appointed the following active healthcare agents:    Primary Decision Maker: LisaTracee - Child - [phone]    The Patient has the following current code status:    Code Status: Full Code      Avery Mehta RN  6/23/2025   -

## 2025-06-23 NOTE — PROGRESS NOTES
Consult received for diabetes education. Pt seen 4/21 by dept. Per nursing, pt to have procedure tomorrow and not discharging today. Will Follow chart.    Electronically signed by Serena Mccallum RN on 6/23/2025 at 11:06 AM

## 2025-06-23 NOTE — PROGRESS NOTES
[facility] PHYSICIANS- [facility] Electrophysiology  Inpatient progress note  PATIENT: Fanny Gonzalez  MEDICAL RECORD NUMBER: [id number]  DATE OF SERVICE:  6/23/2025  ATTENDING ELECTROPHYSIOLOGIST: Faith Oakes MD   PRIMARY ELECTROPHYSIOLOGIST: Morenita Rogers MD  REFERRING PHYSICIAN:  Babatunde Storm APRN - CNP  CHIEF COMPLAINT: Nausea vomiting and abdominal pain    HPI: This is a 75 y.o. female with a history of hypertension, diabetes, hyperlipidemia, COPD, obesity, coronary artery disease-moderate three-vessel disease with PCI to RCA in 2021, HFpEF, chronic systemic anticoagulation since January 2023 after hospitalization for pulmonary embolism, chronic left bundle branch block->5 years.  Her last medication list at discharge from the hospital in May include Bumex 1 mg daily, cefdinir for 7 doses, Isordil 10 mg 3 times daily, metoprolol succinate 75 mg daily, Ranexa 500 mg twice a day, Entresto 24/26 twice daily, aspirin, clonidine 0.2 mg twice a day, ergocalciferol, gabapentin, hydralazine, Singulair, oxybutynin, omeprazole, Crestor, trazodone and Ventolin inhaler  The patient lives alone despite her many disabilities and in December 2023 she was hospitalized after a fall due to unsteadiness of gait.  In December 2024 she was hospitalized with exacerbation of her COPD symptoms and thereafter in April 2025 she was hospitalized with syncope.  During her hospitalization in May 2025 cardiac workup revealed severe LV dysfunction and moderate coronary artery disease-total occlusion of the right coronary artery with left-to-right collaterals as well as moderate disease of the LAD and circumflex.  No percutaneous interventions were undertaken and the patient was discharged home with an ICD vest. She was seen by me during her last hospitalization in May for new onset atrial fibrillation.  She was given IV amiodarone and converted to sinus rhythm.  The patient says that she has been  I have personally evaluated and examined the patient. The Attending was available to me as a supervising provider if needed.

## 2025-06-23 NOTE — DISCHARGE INSTRUCTIONS
[facility] Electrophysiology ICD Discharge Instructions      Medications:  ***    Incision Care:  Brown (Aquacel) bandage: Leave this dressing on for 7 days. Remove this dressing on Tuesday 7/1/25.  Surgical glue:  Do NOT pick at or remove the surgical glue, It will fall off on its own over the next several weeks.   Bathing: Keep the incision dry. You may shower tomorrow evening, but do NOT spray the water directly at the site or scrub at the site. Pat dry only. Do NOT submerge incision site for at least 2 weeks and until cleared by Device Clinic.  Daily monitoring: Check the area daily. Notify the office at [phone] or [phone] if you develop any redness, swelling, drainage, warmth, or fever greater than 100 degrees.  No lotions, powders, or creams to site.       Activity:  You may continue regular activity; but limit strenuous movements or stretching of the arm closest to your ICD.    Wear the arm immobilizer at all times for 48 hours and then at night for 4 weeks if you feel you may not be able to follow arm restrictions at night.    Avoid pulling yourself up with that arm.    Do not raise elbow higher than shoulder height, do not lift anything weighing more than 3 pounds with your arm, and do not performing any stretching motions with your arm for 6 weeks.    Do not do any activities such as golfing, vacuuming, or mowing the lawn for 6 weeks.    Prevent any hard blows to the ICD.      Driving:  AFTER cleared by Device Clinic appointment.  Start with local/short trips to familiar places. Avoid highway/ high-speed driving for the first few days after you resume driving.  DO NOT drive until you have stopped taking prescription pain medications.     Possible Defibrillator Interferences:  Avoid high frequency ham radios, arc welders, battery powered tools, and strong electromagnetic fields.  Avoid any device that may electrically stimulate your body; such as electric muscle stimulators or TENS units.   Standing over a running car motor with the vasques up may inhibit the ICD.  When using a cell phone, use the ear opposite the side of your ICD if possible    Special Instructions:  Let your dentist, doctor, or medical specialist know that you have an ICD so precautions can be taken to protect the defibrillator.  You should NOT have an MRI if have defibrillator unless instructed by your physician.  Your defibrillator may set off a metal detector, such as those used in airports and courtrooms.  Let security know that you have an ICD & show them your card.    ID Card:  You will have a temporary ID card until a permanent card is sent to you by the device company.  The permanent card will look like a 's license or credit card and should arrive within 8 weeks.  Carry your ID card with you at all times.    What else do I need to know about my pacemaker or defibrillator?  Do not carry a cellular phone in a pocket within six inches of the pacemaker or defibrillator as this can affect the operation of the unit.  Hold the cell phone on the opposite ear as the defibrillator or pacemaker insertion site.  Do not walk through airport security systems as these devices can detect the metal in your pacemaker or defibrillator and possibly alarm. The new full body scanners are safe for both pacemakers and defibrillators.  Keep your pacemaker/defibrillator ID card with you at all times and ask security to clear you with a hand search only if a full body scanner is not available.  Do not let security use a hand-held wand.  Avoid passing through metal detectors (for example in shopping malls and schools). If you must pass through, walk quickly through. These detectors can interfere with the pacemaker and defibrillator function.  Do not touch the spark plug or distributor on running car or  - turn engine off first.  Avoid holding magnets near your pacemaker or defibrillator.    ICD Shock:  If your defibrillator gives you a

## 2025-06-24 ENCOUNTER — APPOINTMENT (OUTPATIENT)
Dept: GENERAL RADIOLOGY | Age: 75
End: 2025-06-24
Payer: MEDICARE

## 2025-06-24 ENCOUNTER — ANESTHESIA EVENT (OUTPATIENT)
Age: 75
End: 2025-06-24
Payer: MEDICARE

## 2025-06-24 ENCOUNTER — ANESTHESIA (OUTPATIENT)
Age: 75
End: 2025-06-24
Payer: MEDICARE

## 2025-06-24 LAB
ANION GAP SERPL CALCULATED.3IONS-SCNC: 10 MMOL/L (ref 7–16)
BUN SERPL-MCNC: 22 MG/DL (ref 8–23)
CALCIUM SERPL-MCNC: 9.2 MG/DL (ref 8.8–10.2)
CHLORIDE SERPL-SCNC: 104 MMOL/L (ref 98–107)
CO2 SERPL-SCNC: 22 MMOL/L (ref 22–29)
CREAT SERPL-MCNC: 0.9 MG/DL (ref 0.5–1)
ECHO BSA: 2.04 M2
ERYTHROCYTE [DISTWIDTH] IN BLOOD BY AUTOMATED COUNT: 15.9 % (ref 11.5–15)
GFR, ESTIMATED: 64 ML/MIN/1.73M2
GLUCOSE BLD-MCNC: 220 MG/DL (ref 74–99)
GLUCOSE BLD-MCNC: 247 MG/DL (ref 74–99)
GLUCOSE BLD-MCNC: 260 MG/DL (ref 74–99)
GLUCOSE BLD-MCNC: 282 MG/DL (ref 74–99)
GLUCOSE SERPL-MCNC: 229 MG/DL (ref 74–99)
HCT VFR BLD AUTO: 36.5 % (ref 34–48)
HGB BLD-MCNC: 11.8 G/DL (ref 11.5–15.5)
MAGNESIUM SERPL-MCNC: 2.1 MG/DL (ref 1.6–2.4)
MCH RBC QN AUTO: 27.7 PG (ref 26–35)
MCHC RBC AUTO-ENTMCNC: 32.3 G/DL (ref 32–34.5)
MCV RBC AUTO: 85.7 FL (ref 80–99.9)
MICROORGANISM SPEC CULT: ABNORMAL
PHOSPHATE SERPL-MCNC: 2.1 MG/DL (ref 2.5–4.5)
PLATELET # BLD AUTO: 402 K/UL (ref 130–450)
PMV BLD AUTO: 10.6 FL (ref 7–12)
POTASSIUM SERPL-SCNC: 4.4 MMOL/L (ref 3.5–5.1)
RBC # BLD AUTO: 4.26 M/UL (ref 3.5–5.5)
SERVICE CMNT-IMP: ABNORMAL
SODIUM SERPL-SCNC: 135 MMOL/L (ref 136–145)
SPECIMEN DESCRIPTION: ABNORMAL
WBC OTHER # BLD: 9.6 K/UL (ref 4.5–11.5)

## 2025-06-24 PROCEDURE — 6370000000 HC RX 637 (ALT 250 FOR IP)

## 2025-06-24 PROCEDURE — 2720000010 HC SURG SUPPLY STERILE: Performed by: STUDENT IN AN ORGANIZED HEALTH CARE EDUCATION/TRAINING PROGRAM

## 2025-06-24 PROCEDURE — 83735 ASSAY OF MAGNESIUM: CPT

## 2025-06-24 PROCEDURE — 84100 ASSAY OF PHOSPHORUS: CPT

## 2025-06-24 PROCEDURE — C1769 GUIDE WIRE: HCPCS | Performed by: STUDENT IN AN ORGANIZED HEALTH CARE EDUCATION/TRAINING PROGRAM

## 2025-06-24 PROCEDURE — 94640 AIRWAY INHALATION TREATMENT: CPT

## 2025-06-24 PROCEDURE — 6370000000 HC RX 637 (ALT 250 FOR IP): Performed by: STUDENT IN AN ORGANIZED HEALTH CARE EDUCATION/TRAINING PROGRAM

## 2025-06-24 PROCEDURE — 93005 ELECTROCARDIOGRAM TRACING: CPT | Performed by: STUDENT IN AN ORGANIZED HEALTH CARE EDUCATION/TRAINING PROGRAM

## 2025-06-24 PROCEDURE — 2580000003 HC RX 258: Performed by: NURSE ANESTHETIST, CERTIFIED REGISTERED

## 2025-06-24 PROCEDURE — 6360000002 HC RX W HCPCS: Performed by: NURSE ANESTHETIST, CERTIFIED REGISTERED

## 2025-06-24 PROCEDURE — C1900 LEAD, CORONARY VENOUS: HCPCS | Performed by: STUDENT IN AN ORGANIZED HEALTH CARE EDUCATION/TRAINING PROGRAM

## 2025-06-24 PROCEDURE — 0JH608Z INSERTION OF DEFIBRILLATOR GENERATOR INTO CHEST SUBCUTANEOUS TISSUE AND FASCIA, OPEN APPROACH: ICD-10-PCS | Performed by: STUDENT IN AN ORGANIZED HEALTH CARE EDUCATION/TRAINING PROGRAM

## 2025-06-24 PROCEDURE — C1882 AICD, OTHER THAN SING/DUAL: HCPCS | Performed by: STUDENT IN AN ORGANIZED HEALTH CARE EDUCATION/TRAINING PROGRAM

## 2025-06-24 PROCEDURE — C1892 INTRO/SHEATH,FIXED,PEEL-AWAY: HCPCS | Performed by: STUDENT IN AN ORGANIZED HEALTH CARE EDUCATION/TRAINING PROGRAM

## 2025-06-24 PROCEDURE — 6360000004 HC RX CONTRAST MEDICATION: Performed by: STUDENT IN AN ORGANIZED HEALTH CARE EDUCATION/TRAINING PROGRAM

## 2025-06-24 PROCEDURE — 02HK3KZ INSERTION OF DEFIBRILLATOR LEAD INTO RIGHT VENTRICLE, PERCUTANEOUS APPROACH: ICD-10-PCS | Performed by: STUDENT IN AN ORGANIZED HEALTH CARE EDUCATION/TRAINING PROGRAM

## 2025-06-24 PROCEDURE — 80048 BASIC METABOLIC PNL TOTAL CA: CPT

## 2025-06-24 PROCEDURE — C1894 INTRO/SHEATH, NON-LASER: HCPCS | Performed by: STUDENT IN AN ORGANIZED HEALTH CARE EDUCATION/TRAINING PROGRAM

## 2025-06-24 PROCEDURE — 6370000000 HC RX 637 (ALT 250 FOR IP): Performed by: INTERNAL MEDICINE

## 2025-06-24 PROCEDURE — 3700000001 HC ADD 15 MINUTES (ANESTHESIA): Performed by: STUDENT IN AN ORGANIZED HEALTH CARE EDUCATION/TRAINING PROGRAM

## 2025-06-24 PROCEDURE — 6360000002 HC RX W HCPCS

## 2025-06-24 PROCEDURE — 5A2204Z RESTORATION OF CARDIAC RHYTHM, SINGLE: ICD-10-PCS | Performed by: STUDENT IN AN ORGANIZED HEALTH CARE EDUCATION/TRAINING PROGRAM

## 2025-06-24 PROCEDURE — 2580000003 HC RX 258

## 2025-06-24 PROCEDURE — 2500000003 HC RX 250 WO HCPCS: Performed by: STUDENT IN AN ORGANIZED HEALTH CARE EDUCATION/TRAINING PROGRAM

## 2025-06-24 PROCEDURE — 71045 X-RAY EXAM CHEST 1 VIEW: CPT

## 2025-06-24 PROCEDURE — 2500000003 HC RX 250 WO HCPCS: Performed by: INTERNAL MEDICINE

## 2025-06-24 PROCEDURE — 85027 COMPLETE CBC AUTOMATED: CPT

## 2025-06-24 PROCEDURE — 33249 INSJ/RPLCMT DEFIB W/LEAD(S): CPT | Performed by: STUDENT IN AN ORGANIZED HEALTH CARE EDUCATION/TRAINING PROGRAM

## 2025-06-24 PROCEDURE — 82962 GLUCOSE BLOOD TEST: CPT

## 2025-06-24 PROCEDURE — 6360000002 HC RX W HCPCS: Performed by: STUDENT IN AN ORGANIZED HEALTH CARE EDUCATION/TRAINING PROGRAM

## 2025-06-24 PROCEDURE — C1898 LEAD, PMKR, OTHER THAN TRANS: HCPCS | Performed by: STUDENT IN AN ORGANIZED HEALTH CARE EDUCATION/TRAINING PROGRAM

## 2025-06-24 PROCEDURE — C1889 IMPLANT/INSERT DEVICE, NOC: HCPCS | Performed by: STUDENT IN AN ORGANIZED HEALTH CARE EDUCATION/TRAINING PROGRAM

## 2025-06-24 PROCEDURE — C1777 LEAD, AICD, ENDO SINGLE COIL: HCPCS | Performed by: STUDENT IN AN ORGANIZED HEALTH CARE EDUCATION/TRAINING PROGRAM

## 2025-06-24 PROCEDURE — 3700000000 HC ANESTHESIA ATTENDED CARE: Performed by: STUDENT IN AN ORGANIZED HEALTH CARE EDUCATION/TRAINING PROGRAM

## 2025-06-24 PROCEDURE — 33225 L VENTRIC PACING LEAD ADD-ON: CPT | Performed by: STUDENT IN AN ORGANIZED HEALTH CARE EDUCATION/TRAINING PROGRAM

## 2025-06-24 PROCEDURE — 2140000000 HC CCU INTERMEDIATE R&B

## 2025-06-24 PROCEDURE — 2500000003 HC RX 250 WO HCPCS

## 2025-06-24 PROCEDURE — 2709999900 HC NON-CHARGEABLE SUPPLY: Performed by: STUDENT IN AN ORGANIZED HEALTH CARE EDUCATION/TRAINING PROGRAM

## 2025-06-24 PROCEDURE — 36592 COLLECT BLOOD FROM PICC: CPT

## 2025-06-24 PROCEDURE — 97530 THERAPEUTIC ACTIVITIES: CPT

## 2025-06-24 PROCEDURE — 02H63KZ INSERTION OF DEFIBRILLATOR LEAD INTO RIGHT ATRIUM, PERCUTANEOUS APPROACH: ICD-10-PCS | Performed by: STUDENT IN AN ORGANIZED HEALTH CARE EDUCATION/TRAINING PROGRAM

## 2025-06-24 DEVICE — PACE/SENSE LEAD
Type: IMPLANTABLE DEVICE | Status: FUNCTIONAL
Brand: INGEVITY™+

## 2025-06-24 DEVICE — INTEGRATED BIPOLAR PACE/SENSE AND DEFIBRILLATION LEAD
Type: IMPLANTABLE DEVICE | Status: FUNCTIONAL
Brand: RELIANCE 4-FRONT™

## 2025-06-24 DEVICE — CARDIAC RESYNCHRONIZATION THERAPY DEFIBRILLATOR
Type: IMPLANTABLE DEVICE | Status: FUNCTIONAL
Brand: MOMENTUM™ CRT-D

## 2025-06-24 DEVICE — ENVELOPE CMRM6133 ABSORB LRG MR
Type: IMPLANTABLE DEVICE | Status: FUNCTIONAL
Brand: TYRX™

## 2025-06-24 RX ORDER — SODIUM CHLORIDE 9 MG/ML
INJECTION, SOLUTION INTRAVENOUS
Status: DISCONTINUED | OUTPATIENT
Start: 2025-06-24 | End: 2025-06-24 | Stop reason: SDUPTHER

## 2025-06-24 RX ORDER — MIDAZOLAM HYDROCHLORIDE 1 MG/ML
INJECTION, SOLUTION INTRAMUSCULAR; INTRAVENOUS
Status: DISCONTINUED | OUTPATIENT
Start: 2025-06-24 | End: 2025-06-24 | Stop reason: SDUPTHER

## 2025-06-24 RX ORDER — INSULIN GLARGINE 100 [IU]/ML
25 INJECTION, SOLUTION SUBCUTANEOUS DAILY
Status: DISCONTINUED | OUTPATIENT
Start: 2025-06-25 | End: 2025-06-28 | Stop reason: HOSPADM

## 2025-06-24 RX ORDER — ACETAMINOPHEN 325 MG/1
650 TABLET ORAL EVERY 4 HOURS PRN
Status: DISCONTINUED | OUTPATIENT
Start: 2025-06-24 | End: 2025-06-28 | Stop reason: HOSPADM

## 2025-06-24 RX ORDER — SODIUM CHLORIDE 0.9 % (FLUSH) 0.9 %
5-40 SYRINGE (ML) INJECTION EVERY 12 HOURS SCHEDULED
Status: DISCONTINUED | OUTPATIENT
Start: 2025-06-24 | End: 2025-06-28 | Stop reason: HOSPADM

## 2025-06-24 RX ORDER — VANCOMYCIN HYDROCHLORIDE 1 G/20ML
INJECTION, POWDER, LYOPHILIZED, FOR SOLUTION INTRAVENOUS
Status: DISCONTINUED | OUTPATIENT
Start: 2025-06-24 | End: 2025-06-24 | Stop reason: SDUPTHER

## 2025-06-24 RX ORDER — HYDROCODONE BITARTRATE AND ACETAMINOPHEN 5; 325 MG/1; MG/1
1 TABLET ORAL ONCE
Status: COMPLETED | OUTPATIENT
Start: 2025-06-24 | End: 2025-06-24

## 2025-06-24 RX ORDER — SODIUM CHLORIDE 0.9 % (FLUSH) 0.9 %
5-40 SYRINGE (ML) INJECTION PRN
Status: DISCONTINUED | OUTPATIENT
Start: 2025-06-24 | End: 2025-06-28 | Stop reason: HOSPADM

## 2025-06-24 RX ORDER — PROPOFOL 10 MG/ML
INJECTION, EMULSION INTRAVENOUS
Status: DISCONTINUED | OUTPATIENT
Start: 2025-06-24 | End: 2025-06-24 | Stop reason: SDUPTHER

## 2025-06-24 RX ORDER — INSULIN LISPRO 100 [IU]/ML
8 INJECTION, SOLUTION INTRAVENOUS; SUBCUTANEOUS
Status: DISCONTINUED | OUTPATIENT
Start: 2025-06-24 | End: 2025-06-28 | Stop reason: HOSPADM

## 2025-06-24 RX ORDER — FENTANYL CITRATE 50 UG/ML
INJECTION, SOLUTION INTRAMUSCULAR; INTRAVENOUS
Status: DISCONTINUED | OUTPATIENT
Start: 2025-06-24 | End: 2025-06-24 | Stop reason: SDUPTHER

## 2025-06-24 RX ORDER — SODIUM CHLORIDE 9 MG/ML
INJECTION, SOLUTION INTRAVENOUS PRN
Status: DISCONTINUED | OUTPATIENT
Start: 2025-06-24 | End: 2025-06-28 | Stop reason: HOSPADM

## 2025-06-24 RX ORDER — IOPAMIDOL 755 MG/ML
INJECTION, SOLUTION INTRAVASCULAR PRN
Status: DISCONTINUED | OUTPATIENT
Start: 2025-06-24 | End: 2025-06-24 | Stop reason: HOSPADM

## 2025-06-24 RX ADMIN — CEFAZOLIN 2000 MG: 1 INJECTION, POWDER, FOR SOLUTION INTRAMUSCULAR; INTRAVENOUS at 13:33

## 2025-06-24 RX ADMIN — SODIUM CHLORIDE, PRESERVATIVE FREE 10 ML: 5 INJECTION INTRAVENOUS at 08:59

## 2025-06-24 RX ADMIN — MIDAZOLAM 1 MG: 1 INJECTION INTRAMUSCULAR; INTRAVENOUS at 13:33

## 2025-06-24 RX ADMIN — RANOLAZINE 500 MG: 500 TABLET, FILM COATED, EXTENDED RELEASE ORAL at 21:22

## 2025-06-24 RX ADMIN — SODIUM CHLORIDE, PRESERVATIVE FREE 10 ML: 5 INJECTION INTRAVENOUS at 00:43

## 2025-06-24 RX ADMIN — IPRATROPIUM BROMIDE 0.5 MG: 0.5 SOLUTION RESPIRATORY (INHALATION) at 08:36

## 2025-06-24 RX ADMIN — AMIODARONE HYDROCHLORIDE 200 MG: 200 TABLET ORAL at 21:23

## 2025-06-24 RX ADMIN — MONTELUKAST 10 MG: 10 TABLET, FILM COATED ORAL at 21:23

## 2025-06-24 RX ADMIN — CLONIDINE HYDROCHLORIDE 0.2 MG: 0.2 TABLET ORAL at 08:58

## 2025-06-24 RX ADMIN — CLONIDINE HYDROCHLORIDE 0.2 MG: 0.2 TABLET ORAL at 16:39

## 2025-06-24 RX ADMIN — METRONIDAZOLE: 7.5 GEL VAGINAL at 21:28

## 2025-06-24 RX ADMIN — INSULIN LISPRO 2 UNITS: 100 INJECTION, SOLUTION INTRAVENOUS; SUBCUTANEOUS at 21:24

## 2025-06-24 RX ADMIN — HYDRALAZINE HYDROCHLORIDE 5 MG: 20 INJECTION, SOLUTION INTRAMUSCULAR; INTRAVENOUS at 00:38

## 2025-06-24 RX ADMIN — ACETAMINOPHEN 650 MG: 325 TABLET ORAL at 21:36

## 2025-06-24 RX ADMIN — METOPROLOL SUCCINATE 25 MG: 25 TABLET, EXTENDED RELEASE ORAL at 08:57

## 2025-06-24 RX ADMIN — SODIUM CHLORIDE: 9 INJECTION, SOLUTION INTRAVENOUS at 13:21

## 2025-06-24 RX ADMIN — RANOLAZINE 500 MG: 500 TABLET, FILM COATED, EXTENDED RELEASE ORAL at 08:57

## 2025-06-24 RX ADMIN — AMIODARONE HYDROCHLORIDE 200 MG: 200 TABLET ORAL at 16:39

## 2025-06-24 RX ADMIN — SODIUM CHLORIDE: 9 INJECTION, SOLUTION INTRAVENOUS at 13:35

## 2025-06-24 RX ADMIN — OXYBUTYNIN CHLORIDE 5 MG: 5 TABLET ORAL at 16:39

## 2025-06-24 RX ADMIN — FENTANYL CITRATE 50 MCG: 50 INJECTION, SOLUTION INTRAMUSCULAR; INTRAVENOUS at 13:33

## 2025-06-24 RX ADMIN — OXYBUTYNIN CHLORIDE 5 MG: 5 TABLET ORAL at 08:58

## 2025-06-24 RX ADMIN — Medication 325 MG: at 16:39

## 2025-06-24 RX ADMIN — METRONIDAZOLE: 7.5 GEL VAGINAL at 08:59

## 2025-06-24 RX ADMIN — GABAPENTIN 300 MG: 300 CAPSULE ORAL at 08:58

## 2025-06-24 RX ADMIN — OXYBUTYNIN CHLORIDE 5 MG: 5 TABLET ORAL at 21:43

## 2025-06-24 RX ADMIN — GABAPENTIN 300 MG: 300 CAPSULE ORAL at 16:39

## 2025-06-24 RX ADMIN — AMIODARONE HYDROCHLORIDE 200 MG: 200 TABLET ORAL at 07:01

## 2025-06-24 RX ADMIN — ATORVASTATIN CALCIUM 80 MG: 80 TABLET, FILM COATED ORAL at 21:23

## 2025-06-24 RX ADMIN — HYDROCODONE BITARTRATE AND ACETAMINOPHEN 1 TABLET: 5; 325 TABLET ORAL at 17:40

## 2025-06-24 RX ADMIN — PROPOFOL 20 MCG/KG/MIN: 10 INJECTION, EMULSION INTRAVENOUS at 13:33

## 2025-06-24 RX ADMIN — VANCOMYCIN HYDROCHLORIDE 1000 MG: 1 INJECTION, POWDER, LYOPHILIZED, FOR SOLUTION INTRAVENOUS at 13:35

## 2025-06-24 RX ADMIN — SODIUM CHLORIDE, PRESERVATIVE FREE 10 ML: 5 INJECTION INTRAVENOUS at 21:25

## 2025-06-24 RX ADMIN — APIXABAN 5 MG: 5 TABLET, FILM COATED ORAL at 21:23

## 2025-06-24 RX ADMIN — MIDAZOLAM 1 MG: 1 INJECTION INTRAMUSCULAR; INTRAVENOUS at 13:45

## 2025-06-24 RX ADMIN — INSULIN LISPRO 8 UNITS: 100 INJECTION, SOLUTION INTRAVENOUS; SUBCUTANEOUS at 21:23

## 2025-06-24 RX ADMIN — SODIUM CHLORIDE, PRESERVATIVE FREE 10 ML: 5 INJECTION INTRAVENOUS at 21:24

## 2025-06-24 RX ADMIN — GABAPENTIN 300 MG: 300 CAPSULE ORAL at 21:23

## 2025-06-24 RX ADMIN — VALACYCLOVIR HYDROCHLORIDE 500 MG: 500 TABLET, FILM COATED ORAL at 08:58

## 2025-06-24 RX ADMIN — SODIUM CHLORIDE, PRESERVATIVE FREE 10 ML: 5 INJECTION INTRAVENOUS at 06:16

## 2025-06-24 RX ADMIN — INSULIN GLARGINE 20 UNITS: 100 INJECTION, SOLUTION SUBCUTANEOUS at 08:58

## 2025-06-24 RX ADMIN — HYDRALAZINE HYDROCHLORIDE 10 MG: 10 TABLET ORAL at 21:23

## 2025-06-24 RX ADMIN — HYDRALAZINE HYDROCHLORIDE 10 MG: 10 TABLET ORAL at 08:58

## 2025-06-24 RX ADMIN — Medication 325 MG: at 08:58

## 2025-06-24 RX ADMIN — VALACYCLOVIR HYDROCHLORIDE 500 MG: 500 TABLET, FILM COATED ORAL at 21:43

## 2025-06-24 RX ADMIN — LABETALOL HYDROCHLORIDE 10 MG: 5 INJECTION, SOLUTION INTRAVENOUS at 06:16

## 2025-06-24 RX ADMIN — ASPIRIN 81 MG CHEWABLE TABLET 81 MG: 81 TABLET CHEWABLE at 08:57

## 2025-06-24 RX ADMIN — CLONIDINE HYDROCHLORIDE 0.2 MG: 0.2 TABLET ORAL at 21:36

## 2025-06-24 RX ADMIN — SODIUM PHOSPHATE, MONOBASIC, MONOHYDRATE AND SODIUM PHOSPHATE, DIBASIC, ANHYDROUS 15 MMOL: 142; 276 INJECTION, SOLUTION INTRAVENOUS at 09:07

## 2025-06-24 RX ADMIN — FENTANYL CITRATE 50 MCG: 50 INJECTION, SOLUTION INTRAMUSCULAR; INTRAVENOUS at 13:45

## 2025-06-24 ASSESSMENT — ENCOUNTER SYMPTOMS: SHORTNESS OF BREATH: 1

## 2025-06-24 ASSESSMENT — PAIN SCALES - GENERAL
PAINLEVEL_OUTOF10: 6
PAINLEVEL_OUTOF10: 0
PAINLEVEL_OUTOF10: 9

## 2025-06-24 ASSESSMENT — PAIN DESCRIPTION - DESCRIPTORS
DESCRIPTORS: ACHING;DISCOMFORT;SORE
DESCRIPTORS: ACHING;SORE;DISCOMFORT

## 2025-06-24 ASSESSMENT — PAIN DESCRIPTION - LOCATION
LOCATION: SHOULDER
LOCATION: SHOULDER

## 2025-06-24 ASSESSMENT — PAIN - FUNCTIONAL ASSESSMENT: PAIN_FUNCTIONAL_ASSESSMENT: PREVENTS OR INTERFERES WITH ALL ACTIVE AND SOME PASSIVE ACTIVITIES

## 2025-06-24 ASSESSMENT — PAIN DESCRIPTION - ORIENTATION
ORIENTATION: LEFT
ORIENTATION: LEFT

## 2025-06-24 NOTE — PROGRESS NOTES
Physical Therapy    Physical Therapy Daily Treatment Note      Name: Fanny Gonzalez  : 1950  MRN: [id number]      Date of Service: 2025    Evaluating PT:  Hamlet Jones PT, DPT  [id number]     Room #:  4418/4418-A  Diagnosis:  Ventricular tachycardia (HCC) [I47.20]  Diabetic ketoacidosis without coma associated with type 2 diabetes mellitus (HCC) [E11.10]  PMHx/PSHx:   has a past medical history of Arthritis, Asthma, BRCA1 negative, BRCA2 negative, Breast cancer (HCC), CAD in native artery, Cancer (HCC), Cerebral artery occlusion with cerebral infarction (HCC), Cerebrovascular disease, CHF (congestive heart failure) (HCC), Claustrophobia, COPD (chronic obstructive pulmonary disease) (HCC), Decreased dorsalis pedis pulse, Dermatophytosis, Headache(784.0), History of blood transfusion, Hives, Hx of blood clots, Hyperlipidemia, Hypertension, LBP radiating to both legs, Lymphedema of both lower extremities, Neuromuscular disorder (HCC), Non-rheumatic aortic sclerosis, Obesity, Pulmonary hypertension (HCC), PVD (peripheral vascular disease) with claudication, Recurrent genital HSV (herpes simplex virus) infection, S/P breast biopsy, right, Type II or unspecified type diabetes mellitus without mention of complication, not stated as uncontrolled, and UTI (urinary tract infection).   Procedure/Surgery:  None this admission  Precautions:  Falls, O2, Monitor HR, Alarms   Equipment Needs:  TBD     SUBJECTIVE:    Pt lives alone in a 10th floor apartment with elevator access, 0 step(s) to enter; bed/bath on living level.   Equipment owned: SPC, Rollator, BSC, FWW    OBJECTIVE:   Initial Evaluation  Date: 25 Treatment  25 Short Term/ Long Term   Goals   AM-PAC 6 Clicks      Was pt agreeable to Eval/treatment? Yes  Yes     Does pt have pain? No reported pain  No reported pain     Bed Mobility  Rolling: NT  Supine to sit: Min A  Sit to supine: NT  Scooting: Min A to EOB  NT Rolling: Independent

## 2025-06-24 NOTE — PLAN OF CARE
Problem: Chronic Conditions and Co-morbidities  Goal: Patient's chronic conditions and co-morbidity symptoms are monitored and maintained or improved  6/24/2025 0048 by Margaret Christian, RN  Flowsheets (Taken 6/24/2025 0048)  Care Plan - Patient's Chronic Conditions and Co-Morbidity Symptoms are Monitored and Maintained or Improved:   Monitor and assess patient's chronic conditions and comorbid symptoms for stability, deterioration, or improvement   Collaborate with multidisciplinary team to address chronic and comorbid conditions and prevent exacerbation or deterioration   Update acute care plan with appropriate goals if chronic or comorbid symptoms are exacerbated and prevent overall improvement and discharge  6/24/2025 0048 by Margaret Christian, RN  Outcome: Progressing  Flowsheets (Taken 6/24/2025 0048)  Care Plan - Patient's Chronic Conditions and Co-Morbidity Symptoms are Monitored and Maintained or Improved:   Monitor and assess patient's chronic conditions and comorbid symptoms for stability, deterioration, or improvement   Collaborate with multidisciplinary team to address chronic and comorbid conditions and prevent exacerbation or deterioration   Update acute care plan with appropriate goals if chronic or comorbid symptoms are exacerbated and prevent overall improvement and discharge  6/23/2025 1823 by Yolanda Jloley RN  Outcome: Progressing     Problem: Discharge Planning  Goal: Discharge to home or other facility with appropriate resources  6/24/2025 0048 by Margaret Christian RN  Flowsheets (Taken 6/23/2025 0619 by Avery Strickland RN)  Discharge to home or other facility with appropriate resources:   Identify barriers to discharge with patient and caregiver   Identify discharge learning needs (meds, wound care, etc)   Refer to discharge planning if patient needs post-hospital services based on physician order or complex needs related to functional status, cognitive ability or social support system    redness and/or skin breakdown  6/24/2025 0048 by Margaret Christian RN  Outcome: Progressing  6/23/2025 1823 by Yolanda Jolley RN  Outcome: Progressing  Flowsheets (Taken 6/23/2025 0900)  Skin Integrity Remains Intact: Monitor for areas of redness and/or skin breakdown     Problem: Safety - Adult  Goal: Free from fall injury  6/24/2025 0048 by Margaret Christian RN  Flowsheets (Taken 6/23/2025 0900 by Yolanda Jolley RN)  Free From Fall Injury: Instruct family/caregiver on patient safety  6/24/2025 0048 by Margaret Christian RN  Outcome: Progressing  6/23/2025 1823 by Yolanda Jolley RN  Outcome: Progressing  Flowsheets (Taken 6/23/2025 0900)  Free From Fall Injury: Instruct family/caregiver on patient safety     Problem: ABCDS Injury Assessment  Goal: Absence of physical injury  6/24/2025 0048 by Margaret Christian RN  Flowsheets (Taken 6/23/2025 0900 by Yolanda Jolley RN)  Absence of Physical Injury: Implement safety measures based on patient assessment  6/24/2025 0048 by Margaret Christian RN  Outcome: Progressing  6/23/2025 1823 by Yolanda Jolley RN  Outcome: Progressing  Flowsheets (Taken 6/23/2025 0900)  Absence of Physical Injury: Implement safety measures based on patient assessment

## 2025-06-24 NOTE — PROGRESS NOTES
[facility] PHYSICIANS- [facility] Electrophysiology  Inpatient progress note  PATIENT: Fanny Gonzalez  MEDICAL RECORD NUMBER: [id number]  DATE OF SERVICE:  6/24/2025  ATTENDING ELECTROPHYSIOLOGIST: Faith Oakes MD   PRIMARY ELECTROPHYSIOLOGIST: Morenita Rogers MD  REFERRING PHYSICIAN:  Babatudne Storm APRN - CNP  CHIEF COMPLAINT: Nausea vomiting and abdominal pain    HPI: This is a 75 y.o. female with a history of hypertension, diabetes, hyperlipidemia, COPD, obesity, coronary artery disease-moderate three-vessel disease with PCI to RCA in 2021, HFpEF, chronic systemic anticoagulation since January 2023 after hospitalization for pulmonary embolism, chronic left bundle branch block->5 years.  Her last medication list at discharge from the hospital in May include Bumex 1 mg daily, cefdinir for 7 doses, Isordil 10 mg 3 times daily, metoprolol succinate 75 mg daily, Ranexa 500 mg twice a day, Entresto 24/26 twice daily, aspirin, clonidine 0.2 mg twice a day, ergocalciferol, gabapentin, hydralazine, Singulair, oxybutynin, omeprazole, Crestor, trazodone and Ventolin inhaler  The patient lives alone despite her many disabilities and in December 2023 she was hospitalized after a fall due to unsteadiness of gait.  In December 2024 she was hospitalized with exacerbation of her COPD symptoms and thereafter in April 2025 she was hospitalized with syncope.  During her hospitalization in May 2025 cardiac workup revealed severe LV dysfunction and moderate coronary artery disease-total occlusion of the right coronary artery with left-to-right collaterals as well as moderate disease of the LAD and circumflex.  No percutaneous interventions were undertaken and the patient was discharged home with an ICD vest. She was seen by me during her last hospitalization in May for new onset atrial fibrillation.  She was given IV amiodarone and converted to sinus rhythm.  The patient says that she has been

## 2025-06-24 NOTE — CARE COORDINATION
CM Update: Met with patient at bedside to discuss transition of care plan. Discharge plan is SNF. List in room. Patient states an interest in [facility]. Referral sent through [facility]. Patient states her daughter Tracee from [place] will be at the hospital around 1300. She plans to review the list and supply more choices. My number is on the list. CM/JEFFREY to follow. Sohan Vasquez [phone]

## 2025-06-24 NOTE — PROGRESS NOTES
Speech Language Pathology  NAME:  Fanny Gonzalez  :  1950  DATE: 2025  ROOM:  [facility]    Pt unavailable at 0945 for Dysphagia therapy    REASON:  Patient NPO for procedure not able to address dysphagia goals due to this. Therapist plans to follow-up at a later date.       Thank You,   Ramy Bailon MSCCC/SLP  Speech Language Pathologist  [id number]

## 2025-06-24 NOTE — ANESTHESIA PRE PROCEDURE
Department of Anesthesiology  Preprocedure Note       Name:  Fanny Gonzalez   Age:  75 y.o.  :  1950                                          MRN:  [id number]         Date:  2025      Surgeon: Surgeon(s):  Walter Borges DO    Procedure: Procedure(s):  Insert ICD multi    Medications prior to admission:   Prior to Admission medications    Medication Sig Start Date End Date Taking? Authorizing Provider   insulin glargine (LANTUS) 100 UNIT/ML injection vial Inject 10 Units into the skin nightly    Jayson Mitchell MD   rosuvastatin (CRESTOR) 40 MG tablet Take 1 tablet by mouth every evening    Jayson Mitchell MD   insulin glargine, 1 unit dial, (TOUJEO SOLOSTAR) 300 UNIT/ML concentrated injection pen Inject 12 Units into the skin nightly  Patient not taking: Reported on 2025    Jayson Mitchell MD   acetaminophen (TYLENOL) 325 MG tablet Take 2 tablets by mouth every 6 hours as needed for Pain    Jayson Mitchell MD   atorvastatin (LIPITOR) 80 MG tablet Take 1 tablet by mouth daily    Jayson Mitchell MD   NIFEdipine (PROCARDIA XL) 60 MG extended release tablet Take 1 tablet by mouth daily  Patient taking differently: Take 1 tablet by mouth daily Every 24 as needed for HTN if BP is 170/100 or greater    Jayson Mitchell MD   bumetanide (BUMEX) 1 MG tablet Take 1 tablet by mouth three times a week 25   Sabine Eagle PA   sacubitril-valsartan (ENTRESTO) 49-51 MG per tablet Take 1 tablet by mouth 2 times daily 25   Sabine Eagle PA   isosorbide dinitrate (ISORDIL) 10 MG tablet Take 1 tablet by mouth 3 times daily 25   Jaydon Mayberry MD   ranolazine (RANEXA) 500 MG extended release tablet Take 1 tablet by mouth 2 times daily 25   Jaydon Mayberry MD   metoprolol succinate (TOPROL XL) 25 MG extended release tablet Take 3 tablets by mouth daily 25   Jaydon Mayberry MD   ferrous sulfate (FEROSUL) 325 (65 Fe) MG tablet Take 1

## 2025-06-24 NOTE — PLAN OF CARE
to functional status, cognitive ability or social support system   Arrange for needed discharge resources and transportation as appropriate   Arrange for interpreters to assist at discharge as needed  6/24/2025 0048 by Margaret Christian RN  Outcome: Progressing     Problem: Pain  Goal: Verbalizes/displays adequate comfort level or baseline comfort level  6/24/2025 1028 by Baldemar Hernandez RN  Outcome: Progressing  6/24/2025 0048 by Margaret Christian RN  Flowsheets (Taken 6/24/2025 0048)  Verbalizes/displays adequate comfort level or baseline comfort level:   Encourage patient to monitor pain and request assistance   Assess pain using appropriate pain scale   Administer analgesics based on type and severity of pain and evaluate response   Implement non-pharmacological measures as appropriate and evaluate response   Notify Licensed Independent Practitioner if interventions unsuccessful or patient reports new pain  6/24/2025 0048 by Margaret Christian RN  Outcome: Progressing  Flowsheets (Taken 6/24/2025 0048)  Verbalizes/displays adequate comfort level or baseline comfort level:   Encourage patient to monitor pain and request assistance   Assess pain using appropriate pain scale   Administer analgesics based on type and severity of pain and evaluate response   Implement non-pharmacological measures as appropriate and evaluate response   Notify Licensed Independent Practitioner if interventions unsuccessful or patient reports new pain     Problem: Skin/Tissue Integrity  Goal: Skin integrity remains intact  Description: 1.  Monitor for areas of redness and/or skin breakdown  2.  Assess vascular access sites hourly  3.  Every 4-6 hours minimum:  Change oxygen saturation probe site  4.  Every 4-6 hours:  If on nasal continuous positive airway pressure, respiratory therapy assess nares and determine need for appliance change or resting period  6/24/2025 1028 by Baldemar Hernandez RN  Outcome: Progressing  6/24/2025 0048  by Margaret Christian RN  Flowsheets (Taken 6/23/2025 0900 by Yolanda Jolley RN)  Skin Integrity Remains Intact: Monitor for areas of redness and/or skin breakdown  6/24/2025 0048 by Margaret Christian RN  Outcome: Progressing     Problem: Safety - Adult  Goal: Free from fall injury  6/24/2025 1028 by Baldemar Hernandez RN  Outcome: Progressing  6/24/2025 0048 by Margaret Christian RN  Flowsheets (Taken 6/23/2025 0900 by Yolanda Jolley RN)  Free From Fall Injury: Instruct family/caregiver on patient safety  6/24/2025 0048 by Margaret Christian RN  Outcome: Progressing     Problem: ABCDS Injury Assessment  Goal: Absence of physical injury  6/24/2025 1028 by Baldemar Hernandez RN  Outcome: Progressing  6/24/2025 0048 by Margaret Christian RN  Flowsheets (Taken 6/23/2025 0900 by Yolanda Jolley RN)  Absence of Physical Injury: Implement safety measures based on patient assessment  6/24/2025 0048 by Margaret Christian RN  Outcome: Progressing

## 2025-06-24 NOTE — PROGRESS NOTES
[facility]  Department of Internal Medicine   Internal Medicine Residency   MICU Progress Note    Patient:  Fanny Gonzalez 75 y.o. female  MRN: [id number]     Date of Service: 6/24/2025    Allergy: Patient has no known allergies.    Hospital Day: 5  ICU Length of Stay: 3d 17h    Reason for admission: Diabetic ketoacidosis without coma associated with type 2 diabetes mellitus (HCC) and 1 episodes of V tach s/p shocked by Lifevest    Subjective     Overnight events: No acute overnight events     Patient was seen and examined in the bedside this morning. Not in acute distress. She is doing well. She is breathing fine in room air. Denies any fresh complaints. Blood pressure high this morning. Resume the home medications; Clonidine. No fever overnights. Discussed with EP onboard and she is going to have the CRT-D implantation today.    VS: BP (!) 143/101   Pulse 73   Temp 98.1 °F (36.7 °C) (Oral)   Resp 22   Ht 1.651 m (5' 5\")   Wt 96 kg (211 lb 10.3 oz)   SpO2 97%   BMI 35.22 kg/m²         I & O - 24hr:   Intake/Output Summary (Last 24 hours) at 6/24/2025 1157  Last data filed at 6/24/2025 0600  Gross per 24 hour   Intake 199.9 ml   Output 1370 ml   Net -1170.1 ml     Net IO Since Admission: 2,156.4 mL [06/24/25 1157]      Physical Exam:  General Appearance: elderly, ill looking; dehydrated.   HEENT: Normocephalic, atraumatic  Neck: midline trachea  Lung: clear to auscultation bilaterally  Heart: regular rate and rhythm, no murmur  Abdomen: soft, bowel sounds normal; no masses  Extremities: no cyanosis or edema  Musculokeletal: No joint swelling  Neurologic: Mental status: Awake alert and oriented to time place and person. Normal motor and sensory examination      Lines, Drains, Airway (LDA)     Lines     site date day    Art line   None     TLC R Fem 6/20/2025 4   PICC None     Hemoaccess None       Drains:   [x] Urinary Catheter   [] Fecal Management System    [] G/E/J/PEG Tube     Airway:    Room

## 2025-06-24 NOTE — PROGRESS NOTES
[facility] Hospitalist Progress Note    Admitting Date and Time: 6/20/2025 11:06 AM  Admit Dx: Ventricular tachycardia (HCC) [I47.20]  Diabetic ketoacidosis without coma associated with type 2 diabetes mellitus (HCC) [E11.10]    Subjective:  Patient is being followed for Ventricular tachycardia (HCC) [I47.20]  Diabetic ketoacidosis without coma associated with type 2 diabetes mellitus (HCC) [E11.10]   Pt feels ok today. Patient denies headache, blurry vision, fever, chills, chest pain, palpitations, SOB, GELLER, cough, nausea, vomiting, diarrhea, constipation, abd pain, dysuria, hematuria, tingling, numbness, leg swelling.     Patient seen in medical ICU  Multiple family members by the bedside  Has no new specific complaints  Sitting up in a chair       sodium phosphate IVPB (PERIPHERAL line)  15 mmol IntraVENous Once    insulin lispro  8 Units SubCUTAneous 4x Daily AC & HS    [START ON 6/25/2025] insulin glargine  25 Units SubCUTAneous Daily    insulin lispro  0-8 Units SubCUTAneous 4x Daily AC & HS    valACYclovir  500 mg Oral BID    metroNIDAZOLE   Topical BID    amiodarone  200 mg Oral 3 times per day    metoprolol succinate  25 mg Oral Daily    sodium chloride flush  5-40 mL IntraVENous 2 times per day    aspirin  81 mg Oral Daily    atorvastatin  80 mg Oral Nightly    cloNIDine  0.2 mg Oral TID    ferrous sulfate  325 mg Oral BID WC    gabapentin  300 mg Oral TID    montelukast  10 mg Oral Nightly    pantoprazole  40 mg Oral QAM AC    [Held by provider] traZODone  50 mg Oral Nightly    ranolazine  500 mg Oral BID    oxyBUTYnin  5 mg Oral TID    hydrALAZINE  10 mg Oral 2 times per day    [Held by provider] isosorbide dinitrate  10 mg Oral TID    ipratropium  0.5 mg Nebulization 4x Daily RT     hydrALAZINE, 5 mg, Q6H PRN  labetalol, 10 mg, Q6H PRN  glucose, 4 tablet, PRN  dextrose bolus, 125 mL, PRN   Or  dextrose bolus, 250 mL, PRN  glucagon (rDNA), 1 mg, PRN  dextrose, , Continuous PRN  sodium chloride

## 2025-06-24 NOTE — ANESTHESIA POSTPROCEDURE EVALUATION
Department of Anesthesiology  Postprocedure Note    Patient: Fanny Gonzalez  MRN: [id number]  YOB: 1950  Date of evaluation: 6/24/2025    Procedure Summary       Date: 06/24/25 Room / Location: [facility] EP LAB 1 / [facility] CARDIAC CATH LAB    Anesthesia Start: 1310 Anesthesia Stop: 1550    Procedure: Insert ICD multi Diagnosis:       Ventricular tachycardia (HCC)      Ischemic cardiomyopathy      (Ischemic cardiomyopathy [I25.5]  ventricular tachycardia [I47.20])    Providers: Walter Borges DO Responsible Provider: Fanny Thao MD    Anesthesia Type: MAC ASA Status: 4            Anesthesia Type: No value filed.    Karely Phase I: Karely Score: 10    Karely Phase II:      Anesthesia Post Evaluation    Patient location during evaluation: PACU  Patient participation: complete - patient participated  Level of consciousness: awake and alert  Airway patency: patent  Nausea & Vomiting: no nausea and no vomiting  Cardiovascular status: hemodynamically stable  Respiratory status: acceptable  Hydration status: euvolemic  Pain management: satisfactory to patient        There were no known notable events for this encounter.

## 2025-06-24 NOTE — PROGRESS NOTES
Consult received for diabetes education for DKA. Chart reviewed. Pt seen by me 4/21. Spoke to patient and discussed medication regimen. Pt states she hasn't been taking her humalog because she had ran out and never made it to the pharmacy to  refill. Pt does report taking her lantus every night. Pt reports using [employer]ma[employer] 3 CGM but recently lost reader. Pt advised to call company to request new reader. Pt continues to report decreased eating habits related to past history of cancer. Pt answering questions appropriately but then becomes forgetful/confused. Per nursing, pt daughter also reports intermittent confusion. Per chart and nursing, pt discharging to facility after surgery. Contact information left at bedside if patient daughter requests information. Consult will be completed at this time.    Electronically signed by Serena Mccallum RN on 6/24/2025 at 12:03 PM

## 2025-06-25 ENCOUNTER — APPOINTMENT (OUTPATIENT)
Dept: GENERAL RADIOLOGY | Age: 75
End: 2025-06-25
Payer: MEDICARE

## 2025-06-25 PROBLEM — Z95.810 CARDIAC RESYNCHRONIZATION THERAPY DEFIBRILLATOR (CRT-D) IN PLACE: Status: ACTIVE | Noted: 2025-06-25

## 2025-06-25 LAB
BASOPHILS # BLD: 0.01 K/UL (ref 0–0.2)
BASOPHILS NFR BLD: 0 % (ref 0–2)
EOSINOPHIL # BLD: 0 K/UL (ref 0.05–0.5)
EOSINOPHILS RELATIVE PERCENT: 0 % (ref 0–6)
ERYTHROCYTE [DISTWIDTH] IN BLOOD BY AUTOMATED COUNT: 16.4 % (ref 11.5–15)
GLUCOSE BLD-MCNC: 147 MG/DL (ref 74–99)
GLUCOSE BLD-MCNC: 182 MG/DL (ref 74–99)
GLUCOSE BLD-MCNC: 199 MG/DL (ref 74–99)
GLUCOSE BLD-MCNC: 221 MG/DL (ref 74–99)
HCT VFR BLD AUTO: 42.3 % (ref 34–48)
HGB BLD-MCNC: 13.2 G/DL (ref 11.5–15.5)
IMM GRANULOCYTES # BLD AUTO: 0.03 K/UL (ref 0–0.58)
IMM GRANULOCYTES NFR BLD: 0 % (ref 0–5)
LYMPHOCYTES NFR BLD: 1.37 K/UL (ref 1.5–4)
LYMPHOCYTES RELATIVE PERCENT: 18 % (ref 20–42)
MCH RBC QN AUTO: 28.1 PG (ref 26–35)
MCHC RBC AUTO-ENTMCNC: 31.2 G/DL (ref 32–34.5)
MCV RBC AUTO: 90 FL (ref 80–99.9)
MONOCYTES NFR BLD: 1.15 K/UL (ref 0.1–0.95)
MONOCYTES NFR BLD: 15 % (ref 2–12)
NEUTROPHILS NFR BLD: 66 % (ref 43–80)
NEUTS SEG NFR BLD: 5.06 K/UL (ref 1.8–7.3)
PLATELET # BLD AUTO: 277 K/UL (ref 130–450)
PMV BLD AUTO: 10.2 FL (ref 7–12)
RBC # BLD AUTO: 4.7 M/UL (ref 3.5–5.5)
WBC OTHER # BLD: 7.6 K/UL (ref 4.5–11.5)

## 2025-06-25 PROCEDURE — 2500000003 HC RX 250 WO HCPCS: Performed by: STUDENT IN AN ORGANIZED HEALTH CARE EDUCATION/TRAINING PROGRAM

## 2025-06-25 PROCEDURE — 6370000000 HC RX 637 (ALT 250 FOR IP): Performed by: STUDENT IN AN ORGANIZED HEALTH CARE EDUCATION/TRAINING PROGRAM

## 2025-06-25 PROCEDURE — 71046 X-RAY EXAM CHEST 2 VIEWS: CPT

## 2025-06-25 PROCEDURE — 6370000000 HC RX 637 (ALT 250 FOR IP)

## 2025-06-25 PROCEDURE — 93005 ELECTROCARDIOGRAM TRACING: CPT | Performed by: NURSE PRACTITIONER

## 2025-06-25 PROCEDURE — 94640 AIRWAY INHALATION TREATMENT: CPT

## 2025-06-25 PROCEDURE — 85025 COMPLETE CBC W/AUTO DIFF WBC: CPT

## 2025-06-25 PROCEDURE — 6360000002 HC RX W HCPCS

## 2025-06-25 PROCEDURE — 36415 COLL VENOUS BLD VENIPUNCTURE: CPT

## 2025-06-25 PROCEDURE — 2140000000 HC CCU INTERMEDIATE R&B

## 2025-06-25 PROCEDURE — 82962 GLUCOSE BLOOD TEST: CPT

## 2025-06-25 PROCEDURE — 6370000000 HC RX 637 (ALT 250 FOR IP): Performed by: NURSE PRACTITIONER

## 2025-06-25 PROCEDURE — 2500000003 HC RX 250 WO HCPCS: Performed by: INTERNAL MEDICINE

## 2025-06-25 PROCEDURE — 6370000000 HC RX 637 (ALT 250 FOR IP): Performed by: INTERNAL MEDICINE

## 2025-06-25 PROCEDURE — 92526 ORAL FUNCTION THERAPY: CPT

## 2025-06-25 RX ORDER — AMIODARONE HYDROCHLORIDE 200 MG/1
200 TABLET ORAL DAILY
Qty: 30 TABLET | Refills: 3 | Status: SHIPPED | OUTPATIENT
Start: 2025-07-08

## 2025-06-25 RX ORDER — AMIODARONE HYDROCHLORIDE 200 MG/1
200 TABLET ORAL EVERY 8 HOURS SCHEDULED
Qty: 41 TABLET | Refills: 0 | Status: SHIPPED | OUTPATIENT
Start: 2025-06-25 | End: 2025-07-09

## 2025-06-25 RX ORDER — AMIODARONE HYDROCHLORIDE 200 MG/1
200 TABLET ORAL DAILY
Status: DISCONTINUED | OUTPATIENT
Start: 2025-07-08 | End: 2025-06-28 | Stop reason: HOSPADM

## 2025-06-25 RX ORDER — DOXYCYCLINE HYCLATE 100 MG
100 TABLET ORAL 2 TIMES DAILY
Qty: 14 TABLET | Refills: 0 | Status: SHIPPED | OUTPATIENT
Start: 2025-06-25 | End: 2025-07-02

## 2025-06-25 RX ORDER — METOPROLOL SUCCINATE 25 MG/1
25 TABLET, EXTENDED RELEASE ORAL DAILY
Qty: 30 TABLET | Refills: 3 | Status: SHIPPED | OUTPATIENT
Start: 2025-06-26

## 2025-06-25 RX ADMIN — INSULIN LISPRO 8 UNITS: 100 INJECTION, SOLUTION INTRAVENOUS; SUBCUTANEOUS at 12:25

## 2025-06-25 RX ADMIN — ASPIRIN 81 MG CHEWABLE TABLET 81 MG: 81 TABLET CHEWABLE at 07:48

## 2025-06-25 RX ADMIN — METRONIDAZOLE: 7.5 GEL VAGINAL at 07:55

## 2025-06-25 RX ADMIN — CLONIDINE HYDROCHLORIDE 0.2 MG: 0.2 TABLET ORAL at 20:56

## 2025-06-25 RX ADMIN — AMIODARONE HYDROCHLORIDE 200 MG: 200 TABLET ORAL at 15:05

## 2025-06-25 RX ADMIN — SODIUM CHLORIDE, PRESERVATIVE FREE 10 ML: 5 INJECTION INTRAVENOUS at 07:55

## 2025-06-25 RX ADMIN — GABAPENTIN 300 MG: 300 CAPSULE ORAL at 07:48

## 2025-06-25 RX ADMIN — POLYETHYLENE GLYCOL 3350 17 G: 17 POWDER, FOR SOLUTION ORAL at 17:56

## 2025-06-25 RX ADMIN — INSULIN LISPRO 2 UNITS: 100 INJECTION, SOLUTION INTRAVENOUS; SUBCUTANEOUS at 17:44

## 2025-06-25 RX ADMIN — OXYBUTYNIN CHLORIDE 5 MG: 5 TABLET ORAL at 15:05

## 2025-06-25 RX ADMIN — AMIODARONE HYDROCHLORIDE 200 MG: 200 TABLET ORAL at 21:05

## 2025-06-25 RX ADMIN — INSULIN LISPRO 8 UNITS: 100 INJECTION, SOLUTION INTRAVENOUS; SUBCUTANEOUS at 21:05

## 2025-06-25 RX ADMIN — ACETAMINOPHEN 650 MG: 325 TABLET ORAL at 23:49

## 2025-06-25 RX ADMIN — VALACYCLOVIR HYDROCHLORIDE 500 MG: 500 TABLET, FILM COATED ORAL at 20:56

## 2025-06-25 RX ADMIN — METRONIDAZOLE: 7.5 GEL VAGINAL at 21:15

## 2025-06-25 RX ADMIN — APIXABAN 5 MG: 5 TABLET, FILM COATED ORAL at 20:55

## 2025-06-25 RX ADMIN — PANTOPRAZOLE SODIUM 40 MG: 40 TABLET, DELAYED RELEASE ORAL at 05:34

## 2025-06-25 RX ADMIN — APIXABAN 5 MG: 5 TABLET, FILM COATED ORAL at 07:49

## 2025-06-25 RX ADMIN — CLONIDINE HYDROCHLORIDE 0.2 MG: 0.2 TABLET ORAL at 07:49

## 2025-06-25 RX ADMIN — RANOLAZINE 500 MG: 500 TABLET, FILM COATED, EXTENDED RELEASE ORAL at 20:55

## 2025-06-25 RX ADMIN — VALACYCLOVIR HYDROCHLORIDE 500 MG: 500 TABLET, FILM COATED ORAL at 07:50

## 2025-06-25 RX ADMIN — OXYBUTYNIN CHLORIDE 5 MG: 5 TABLET ORAL at 07:50

## 2025-06-25 RX ADMIN — INSULIN GLARGINE 25 UNITS: 100 INJECTION, SOLUTION SUBCUTANEOUS at 07:48

## 2025-06-25 RX ADMIN — ATORVASTATIN CALCIUM 80 MG: 80 TABLET, FILM COATED ORAL at 20:55

## 2025-06-25 RX ADMIN — IPRATROPIUM BROMIDE 0.5 MG: 0.5 SOLUTION RESPIRATORY (INHALATION) at 16:23

## 2025-06-25 RX ADMIN — INSULIN LISPRO 2 UNITS: 100 INJECTION, SOLUTION INTRAVENOUS; SUBCUTANEOUS at 12:24

## 2025-06-25 RX ADMIN — GABAPENTIN 300 MG: 300 CAPSULE ORAL at 15:05

## 2025-06-25 RX ADMIN — GABAPENTIN 300 MG: 300 CAPSULE ORAL at 20:55

## 2025-06-25 RX ADMIN — SODIUM CHLORIDE, PRESERVATIVE FREE 10 ML: 5 INJECTION INTRAVENOUS at 21:15

## 2025-06-25 RX ADMIN — AMIODARONE HYDROCHLORIDE 200 MG: 200 TABLET ORAL at 05:34

## 2025-06-25 RX ADMIN — Medication 325 MG: at 07:48

## 2025-06-25 RX ADMIN — ACETAMINOPHEN 650 MG: 325 TABLET ORAL at 07:49

## 2025-06-25 RX ADMIN — INSULIN LISPRO 8 UNITS: 100 INJECTION, SOLUTION INTRAVENOUS; SUBCUTANEOUS at 05:35

## 2025-06-25 RX ADMIN — METOPROLOL SUCCINATE 25 MG: 25 TABLET, EXTENDED RELEASE ORAL at 07:48

## 2025-06-25 RX ADMIN — Medication 325 MG: at 17:44

## 2025-06-25 RX ADMIN — MONTELUKAST 10 MG: 10 TABLET, FILM COATED ORAL at 20:55

## 2025-06-25 RX ADMIN — IPRATROPIUM BROMIDE 0.5 MG: 0.5 SOLUTION RESPIRATORY (INHALATION) at 11:36

## 2025-06-25 RX ADMIN — CLONIDINE HYDROCHLORIDE 0.2 MG: 0.2 TABLET ORAL at 15:05

## 2025-06-25 RX ADMIN — INSULIN LISPRO 8 UNITS: 100 INJECTION, SOLUTION INTRAVENOUS; SUBCUTANEOUS at 17:43

## 2025-06-25 RX ADMIN — IPRATROPIUM BROMIDE 0.5 MG: 0.5 SOLUTION RESPIRATORY (INHALATION) at 08:04

## 2025-06-25 RX ADMIN — OXYBUTYNIN CHLORIDE 5 MG: 5 TABLET ORAL at 20:55

## 2025-06-25 RX ADMIN — HYDRALAZINE HYDROCHLORIDE 10 MG: 10 TABLET ORAL at 20:55

## 2025-06-25 RX ADMIN — HYDRALAZINE HYDROCHLORIDE 10 MG: 10 TABLET ORAL at 07:48

## 2025-06-25 RX ADMIN — RANOLAZINE 500 MG: 500 TABLET, FILM COATED, EXTENDED RELEASE ORAL at 07:49

## 2025-06-25 RX ADMIN — IPRATROPIUM BROMIDE 0.5 MG: 0.5 SOLUTION RESPIRATORY (INHALATION) at 19:34

## 2025-06-25 ASSESSMENT — PAIN DESCRIPTION - DESCRIPTORS: DESCRIPTORS: SORE

## 2025-06-25 ASSESSMENT — PAIN SCALES - GENERAL
PAINLEVEL_OUTOF10: 3
PAINLEVEL_OUTOF10: 0

## 2025-06-25 ASSESSMENT — PAIN DESCRIPTION - LOCATION: LOCATION: CHEST

## 2025-06-25 ASSESSMENT — PAIN DESCRIPTION - ORIENTATION: ORIENTATION: LEFT;UPPER

## 2025-06-25 NOTE — PROGRESS NOTES
[facility] Hospitalist Progress Note    Admitting Date and Time: 6/20/2025 11:06 AM  Admit Dx: Ventricular tachycardia (HCC) [I47.20]  Diabetic ketoacidosis without coma associated with type 2 diabetes mellitus (HCC) [E11.10]    Subjective:  Patient is being followed for Ventricular tachycardia (HCC) [I47.20]  Diabetic ketoacidosis without coma associated with type 2 diabetes mellitus (HCC) [E11.10]   Pt seen and examined this morning  No acute events overnight  Complaining of chest soreness at the insertion site.  Denies any other complaints    ROS: denies fever, chills, cp, sob, n/v, HA unless stated above.      insulin lispro  8 Units SubCUTAneous 4x Daily AC & HS    insulin glargine  25 Units SubCUTAneous Daily    sodium chloride flush  5-40 mL IntraVENous 2 times per day    apixaban  5 mg Oral BID    insulin lispro  0-8 Units SubCUTAneous 4x Daily AC & HS    valACYclovir  500 mg Oral BID    metroNIDAZOLE   Topical BID    amiodarone  200 mg Oral 3 times per day    metoprolol succinate  25 mg Oral Daily    sodium chloride flush  5-40 mL IntraVENous 2 times per day    aspirin  81 mg Oral Daily    atorvastatin  80 mg Oral Nightly    cloNIDine  0.2 mg Oral TID    ferrous sulfate  325 mg Oral BID WC    gabapentin  300 mg Oral TID    montelukast  10 mg Oral Nightly    pantoprazole  40 mg Oral QAM AC    [Held by provider] traZODone  50 mg Oral Nightly    ranolazine  500 mg Oral BID    oxyBUTYnin  5 mg Oral TID    hydrALAZINE  10 mg Oral 2 times per day    [Held by provider] isosorbide dinitrate  10 mg Oral TID    ipratropium  0.5 mg Nebulization 4x Daily RT     sodium chloride flush, 5-40 mL, PRN  sodium chloride, , PRN  acetaminophen, 650 mg, Q4H PRN  hydrALAZINE, 5 mg, Q6H PRN  labetalol, 10 mg, Q6H PRN  glucose, 4 tablet, PRN  dextrose bolus, 125 mL, PRN   Or  dextrose bolus, 250 mL, PRN  glucagon (rDNA), 1 mg, PRN  dextrose, , Continuous PRN  sodium chloride flush, 5-40 mL, PRN  sodium chloride, ,

## 2025-06-25 NOTE — PLAN OF CARE
Problem: Chronic Conditions and Co-morbidities  Goal: Patient's chronic conditions and co-morbidity symptoms are monitored and maintained or improved  Outcome: Progressing     Problem: Discharge Planning  Goal: Discharge to home or other facility with appropriate resources  6/25/2025 1342 by Vicky Rai RN  Outcome: Progressing  6/24/2025 2356 by Scott Bruce RN  Outcome: Progressing     Problem: Pain  Goal: Verbalizes/displays adequate comfort level or baseline comfort level  6/25/2025 1342 by Vicky Rai RN  Outcome: Progressing  6/24/2025 2356 by Scott Bruce RN  Outcome: Progressing     Problem: Skin/Tissue Integrity  Goal: Skin integrity remains intact  Description: 1.  Monitor for areas of redness and/or skin breakdown  2.  Assess vascular access sites hourly  3.  Every 4-6 hours minimum:  Change oxygen saturation probe site  4.  Every 4-6 hours:  If on nasal continuous positive airway pressure, respiratory therapy assess nares and determine need for appliance change or resting period  Outcome: Progressing     Problem: Safety - Adult  Goal: Free from fall injury  6/25/2025 1342 by Vicky Rai RN  Outcome: Progressing  6/24/2025 2356 by Scott Bruce RN  Outcome: Progressing     Problem: ABCDS Injury Assessment  Goal: Absence of physical injury  Outcome: Progressing

## 2025-06-25 NOTE — PROGRESS NOTES
Notified internal medicine that patient has been unable to urinate for several hours and bladder scanned for 676 ml.

## 2025-06-25 NOTE — PROGRESS NOTES
SPEECH LANGUAGE PATHOLOGY  DAILY PROGRESS NOTE      PATIENT NAME:  Fanny Gonzalez      :  1950          TODAY'S DATE:  2025 ROOM:  [address] [place], [place] [address]    Current Diet: ADULT DIET; Dysphagia - Soft and Bite Sized; Low Fat/Low Chol/High Fiber/2 gm Na    Patient was seen for a follow-up dysphagia therapy session to ensure that she is safely consuming the least restrictive diet. During the session, patient was cooperative and friendly. She demonstrated mild oral phase dysphagia symptoms with bite size pieces of marlys cracker as noted by prolonged mastication and oral residue in her oral cavity. When cued to alternate sips of liquid between bites of food, patient was able to clear her oral cavity. No pocketing was noted. X2 throat clears were observed but reduced when cued to not talk with her mouth full.     Recommendation: Patient is recommended to continue a soft and bite size diet with thin liquid. Dentures in place for meals. Patient should sit upright, take small bites/sips, and clear her oral cavity before another bite is taken.       CPT code(s) 97460  dysphagia tx  Total minutes :  15 minutes    Ramy Bailon M.S., CCC-SLP/L   Speech-Language Pathologist  [id number]

## 2025-06-25 NOTE — PLAN OF CARE
Problem: Discharge Planning  Goal: Discharge to home or other facility with appropriate resources  6/24/2025 2356 by Scott Bruce RN  Outcome: Progressing  6/24/2025 1028 by Baldemar Hernandez RN  Outcome: Progressing     Problem: Pain  Goal: Verbalizes/displays adequate comfort level or baseline comfort level  6/24/2025 2356 by Scott Bruce RN  Outcome: Progressing  6/24/2025 1028 by Baldemar Hernandez RN  Outcome: Progressing     Problem: Safety - Adult  Goal: Free from fall injury  6/24/2025 2356 by Scott Bruce RN  Outcome: Progressing  6/24/2025 1028 by Baldemar Hernandez RN  Outcome: Progressing

## 2025-06-25 NOTE — PROGRESS NOTES
Observed O2 sats: SpO2  Av.9 %  Min: 87 %  Max: 100 %      Intake/Output Summary (Last 24 hours) at 2025 1647  Last data filed at 2025 1251  Gross per 24 hour   Intake 90 ml   Output 1100 ml   Net -1010 ml         Physical exam-  General appearance: Not ill appearing, alert, no converstional dyspnea  Head: Normocephalic, without obvious abnormality, atraumatic   Eyes:Pupils bilateral equal and reactive, EOM intact, conjunctiva - no icterus , no injection   Throat: Clear, no lesions, Mallampti =2, no tonsillar eythema or edema  Neck: Supple, symmetrical, trachea midline, no lymphadenopathy, no JVD, no carotid bruits, no thyromegaly   Lungs: Bilateral  Air movement, decreased in bases, normal femitus, resonant to percussion  Heart: RRR, S1, S2 normal, no murmur, click, rub or gallop   Abdomen: soft, non-tender, nondistended. Bowel sounds normal, no hepatomeagly  Extremities: extremities normal, atraumatic, no cyanosis, edema  Musculoskeletal - No deformities   Skin: Skin color, texture, turgor normal. No rashes or lesions   Neurological: No focal deficits, cranial nerves grossly intact, sensations intact   Psychiatric : Mood and effect normal , alert and oriented times 4        Routine labs:    Recent Labs     25  0516 25  0524 25  1336   WBC 7.9 9.6 7.6   HGB 11.0* 11.8 13.2   HCT 35.2 36.5 42.3    402 277     Recent Labs     25  0516 25  1708 25  0524    132* 135*   K 5.1 4.7 4.4    102 104   CO2 19* 20* 22   BUN 20 24* 22   CREATININE 0.9 1.0 0.9   MG 2.1  --  2.1   PHOS 3.8  --  2.1*       Other Laboratory - Imaging Studies:     Reviewed and as per electronic record. CxR/CT images reviewed by me when available.   Total time spent for this encounter was 51 minutes including but not limited to patient exam , family discussion , discussion with consultants , chart review and documentation.       Abdirashid Danielle MD  25

## 2025-06-25 NOTE — CARE COORDINATION
Patient s/p ICD insertion; EP following. Met with patient and daughter, Tracee at bedside. Provided update regarding [facility] being unable to accept insurance. JA list provided for alternate choices; choices in order of preference are [facility], and [facility]. Referrals sent via [facility]. Will need ambulette form, ANUPAM and PASRR completed. Patient will require pre-cert.    Electronically signed by SIERRA Ibanez on 6/25/2025 at 3:48 PM

## 2025-06-25 NOTE — PROGRESS NOTES
4 Eyes Skin Assessment     NAME:  Fanny Gonzalez  YOB: 1950  MEDICAL RECORD NUMBER:  [id number]    The patient is being assessed for  Transfer to New Unit    I agree that at least one RN has performed a thorough Head to Toe Skin Assessment on the patient. ALL assessment sites listed below have been assessed.      Areas assessed by both nurses:    Head, Face, Ears, Shoulders, Back, Chest, Arms, Elbows, Hands, Sacrum. Buttock, Coccyx, Ischium, Legs. Feet and Heels, and Under Medical Devices         Does the Patient have a Wound? Yes wound(s) were present on assessment. LDA wound assessment was Initiated and completed by RN       Surendra Prevention initiated by RN: No  Wound Care Orders initiated by RN: No    Pressure Injury (Stage 3,4, Unstageable, DTI, NWPT, and Complex wounds) if present, place Wound referral order by RN under : No    New Ostomies, if present place, Ostomy referral order under : No     Nurse 1 eSignature: Electronically signed by Scott Bruce RN on 6/25/25 at 7:44 AM EDT    **SHARE this note so that the co-signing nurse can place an eSignature**    Nurse 2 eSignature: {Esignature:[id number]}

## 2025-06-25 NOTE — NURSE NAVIGATOR
ARRHYTHMIA EDUCATION     Met with patient to review information relating to Ischemic Cardiomyopathy, LBBB, VT, Sudden Cardiac Death, & ICD Insertion    Provided info on the following topics: The Heart's Electrical System, Ischemic Cardiomyopathy, Sudden Cardiac Death, ICD, Post Operative Care of ICD, & ICD DC Instructions     Handouts given on above topics given & questions answered. Patient verbalized understanding as evidenced by \"teach back\".     Time spent with patient 20 minutes.

## 2025-06-25 NOTE — PROGRESS NOTES
Patient bladder scanned for 676 and when I went to straight cath her she had 400 of urine out on her own. I bladder scanned her again for 476, so I proceeded with the straight cath and got 700 out. She totaled 1100.

## 2025-06-25 NOTE — PROGRESS NOTES
[facility]  Inpatient progress note  PATIENT: Fanny Gonzalez  MEDICAL RECORD NUMBER: [id number]  DATE OF SERVICE:  6/25/2025  ATTENDING ELECTROPHYSIOLOGIST: Faith Oakes MD   PRIMARY ELECTROPHYSIOLOGIST: Morenita Rogers MD  REFERRING PHYSICIAN:  Babatunde Storm APRN - CNP  CHIEF COMPLAINT: Nausea vomiting and abdominal pain    HPI: This is a 75 y.o. female with a history of hypertension, diabetes, hyperlipidemia, COPD, obesity, coronary artery disease-moderate three-vessel disease with PCI to RCA in 2021, HFpEF, chronic systemic anticoagulation since January 2023 after hospitalization for pulmonary embolism, chronic left bundle branch block->5 years.  Her last medication list at discharge from the hospital in May include Bumex 1 mg daily, cefdinir for 7 doses, Isordil 10 mg 3 times daily, metoprolol succinate 75 mg daily, Ranexa 500 mg twice a day, Entresto 24/26 twice daily, aspirin, clonidine 0.2 mg twice a day, ergocalciferol, gabapentin, hydralazine, Singulair, oxybutynin, omeprazole, Crestor, trazodone and Ventolin inhaler  The patient lives alone despite her many disabilities and in December 2023 she was hospitalized after a fall due to unsteadiness of gait.  In December 2024 she was hospitalized with exacerbation of her COPD symptoms and thereafter in April 2025 she was hospitalized with syncope.  During her hospitalization in May 2025 cardiac workup revealed severe LV dysfunction and moderate coronary artery disease-total occlusion of the right coronary artery with left-to-right collaterals as well as moderate disease of the LAD and circumflex.  No percutaneous interventions were undertaken and the patient was discharged home with an ICD vest. She was seen by me during her last hospitalization in May for new onset atrial fibrillation.  She was given IV amiodarone and converted to sinus rhythm.  The patient says that she has been  500 mg at 06/25/25 0750    metroNIDAZOLE (METROGEL) 0.75 % vaginal gel   Topical BID Jorge Lynn MD   Given at 06/25/25 0755    hydrALAZINE (APRESOLINE) injection 5 mg  5 mg IntraVENous Q6H PRN Scottie Beyer MD   5 mg at 06/24/25 0038    labetalol (NORMODYNE;TRANDATE) injection 10 mg  10 mg IntraVENous Q6H PRN Scottie Beyer MD   10 mg at 06/24/25 0616    glucose chewable tablet 16 g  4 tablet Oral PRN Leta Witt MD        dextrose bolus 10% 125 mL  125 mL IntraVENous PRN Leta Witt MD        Or    dextrose bolus 10% 250 mL  250 mL IntraVENous PRN Leta Witt MD        glucagon injection 1 mg  1 mg SubCUTAneous PRN Leta Witt MD        dextrose 10 % infusion   IntraVENous Continuous PRN Leta Witt MD        amiodarone (CORDARONE) tablet 200 mg  200 mg Oral 3 times per day Morenita Rogers MD   200 mg at 06/25/25 0534    metoprolol succinate (TOPROL XL) extended release tablet 25 mg  25 mg Oral Daily Morenita Rogers MD   25 mg at 06/25/25 0748    sodium chloride flush 0.9 % injection 5-40 mL  5-40 mL IntraVENous 2 times per day Leticia Dia, DO   10 mL at 06/25/25 0755    sodium chloride flush 0.9 % injection 5-40 mL  5-40 mL IntraVENous PRN Leticia Dia, DO   10 mL at 06/24/25 0616    0.9 % sodium chloride infusion   IntraVENous PRN Leticia Dia, DO        polyethylene glycol (GLYCOLAX) packet 17 g  17 g Oral Daily PRN Leticia Dia, DO        acetaminophen (TYLENOL) tablet 650 mg  650 mg Oral Q6H PRN Leticia Dia DO   650 mg at 06/25/25 0749    Or    acetaminophen (TYLENOL) suppository 650 mg  650 mg Rectal Q6H PRN Leticia Dia DO   650 mg at 06/22/25 1538    prochlorperazine (COMPAZINE) tablet 10 mg  10 mg Oral Q8H PRN Leticia Dia DO        Or    prochlorperazine (COMPAZINE) injection 10 mg  10 mg IntraVENous Q6H PRN Leticia Dia DO        aspirin chewable tablet 81 mg  81 mg Oral Daily Leticia Dia DO   81 mg at 06/25/25

## 2025-06-26 PROBLEM — R49.0 HOARSENESS OF VOICE: Status: ACTIVE | Noted: 2025-06-26

## 2025-06-26 LAB
ANION GAP SERPL CALCULATED.3IONS-SCNC: 6 MMOL/L (ref 7–16)
BUN SERPL-MCNC: 13 MG/DL (ref 8–23)
CALCIUM SERPL-MCNC: 8.9 MG/DL (ref 8.8–10.2)
CHLORIDE SERPL-SCNC: 108 MMOL/L (ref 98–107)
CO2 SERPL-SCNC: 24 MMOL/L (ref 22–29)
CREAT SERPL-MCNC: 0.8 MG/DL (ref 0.5–1)
EKG ATRIAL RATE: 61 BPM
EKG ATRIAL RATE: 71 BPM
EKG P AXIS: 60 DEGREES
EKG P-R INTERVAL: 156 MS
EKG P-R INTERVAL: 184 MS
EKG Q-T INTERVAL: 552 MS
EKG Q-T INTERVAL: 614 MS
EKG QRS DURATION: 144 MS
EKG QRS DURATION: 150 MS
EKG QTC CALCULATION (BAZETT): 552 MS
EKG QTC CALCULATION (BAZETT): 618 MS
EKG R AXIS: 254 DEGREES
EKG R AXIS: 7 DEGREES
EKG T AXIS: 226 DEGREES
EKG T AXIS: 61 DEGREES
EKG VENTRICULAR RATE: 60 BPM
EKG VENTRICULAR RATE: 61 BPM
ERYTHROCYTE [DISTWIDTH] IN BLOOD BY AUTOMATED COUNT: 16.3 % (ref 11.5–15)
GFR, ESTIMATED: 73 ML/MIN/1.73M2
GLUCOSE BLD-MCNC: 109 MG/DL (ref 74–99)
GLUCOSE BLD-MCNC: 239 MG/DL (ref 74–99)
GLUCOSE BLD-MCNC: 294 MG/DL (ref 74–99)
GLUCOSE BLD-MCNC: 87 MG/DL (ref 74–99)
GLUCOSE SERPL-MCNC: 48 MG/DL (ref 74–99)
HCT VFR BLD AUTO: 37.5 % (ref 34–48)
HGB BLD-MCNC: 11.8 G/DL (ref 11.5–15.5)
HSV1 IGG SERPL QL IA: 0.51
HSV2 AB SER QL IA: 22.6
MCH RBC QN AUTO: 28.1 PG (ref 26–35)
MCHC RBC AUTO-ENTMCNC: 31.5 G/DL (ref 32–34.5)
MCV RBC AUTO: 89.3 FL (ref 80–99.9)
PLATELET # BLD AUTO: 281 K/UL (ref 130–450)
PMV BLD AUTO: 10.5 FL (ref 7–12)
POTASSIUM SERPL-SCNC: 4 MMOL/L (ref 3.5–5.1)
RBC # BLD AUTO: 4.2 M/UL (ref 3.5–5.5)
SODIUM SERPL-SCNC: 138 MMOL/L (ref 136–145)
WBC OTHER # BLD: 8.1 K/UL (ref 4.5–11.5)

## 2025-06-26 PROCEDURE — 6370000000 HC RX 637 (ALT 250 FOR IP)

## 2025-06-26 PROCEDURE — 6370000000 HC RX 637 (ALT 250 FOR IP): Performed by: INTERNAL MEDICINE

## 2025-06-26 PROCEDURE — 85027 COMPLETE CBC AUTOMATED: CPT

## 2025-06-26 PROCEDURE — 80048 BASIC METABOLIC PNL TOTAL CA: CPT

## 2025-06-26 PROCEDURE — 97530 THERAPEUTIC ACTIVITIES: CPT

## 2025-06-26 PROCEDURE — 6370000000 HC RX 637 (ALT 250 FOR IP): Performed by: NURSE PRACTITIONER

## 2025-06-26 PROCEDURE — 6370000000 HC RX 637 (ALT 250 FOR IP): Performed by: STUDENT IN AN ORGANIZED HEALTH CARE EDUCATION/TRAINING PROGRAM

## 2025-06-26 PROCEDURE — 51798 US URINE CAPACITY MEASURE: CPT

## 2025-06-26 PROCEDURE — 82962 GLUCOSE BLOOD TEST: CPT

## 2025-06-26 PROCEDURE — 94640 AIRWAY INHALATION TREATMENT: CPT

## 2025-06-26 PROCEDURE — 97535 SELF CARE MNGMENT TRAINING: CPT

## 2025-06-26 PROCEDURE — 97164 PT RE-EVAL EST PLAN CARE: CPT

## 2025-06-26 PROCEDURE — 93010 ELECTROCARDIOGRAM REPORT: CPT | Performed by: INTERNAL MEDICINE

## 2025-06-26 PROCEDURE — 36415 COLL VENOUS BLD VENIPUNCTURE: CPT

## 2025-06-26 PROCEDURE — 92526 ORAL FUNCTION THERAPY: CPT

## 2025-06-26 PROCEDURE — 2500000003 HC RX 250 WO HCPCS: Performed by: STUDENT IN AN ORGANIZED HEALTH CARE EDUCATION/TRAINING PROGRAM

## 2025-06-26 PROCEDURE — 6360000002 HC RX W HCPCS

## 2025-06-26 PROCEDURE — 2500000003 HC RX 250 WO HCPCS: Performed by: INTERNAL MEDICINE

## 2025-06-26 PROCEDURE — 2140000000 HC CCU INTERMEDIATE R&B

## 2025-06-26 RX ADMIN — SODIUM CHLORIDE, PRESERVATIVE FREE 10 ML: 5 INJECTION INTRAVENOUS at 09:01

## 2025-06-26 RX ADMIN — ASPIRIN 81 MG CHEWABLE TABLET 81 MG: 81 TABLET CHEWABLE at 08:58

## 2025-06-26 RX ADMIN — OXYBUTYNIN CHLORIDE 5 MG: 5 TABLET ORAL at 09:00

## 2025-06-26 RX ADMIN — IPRATROPIUM BROMIDE 0.5 MG: 0.5 SOLUTION RESPIRATORY (INHALATION) at 09:09

## 2025-06-26 RX ADMIN — INSULIN LISPRO 8 UNITS: 100 INJECTION, SOLUTION INTRAVENOUS; SUBCUTANEOUS at 18:15

## 2025-06-26 RX ADMIN — CLONIDINE HYDROCHLORIDE 0.2 MG: 0.2 TABLET ORAL at 12:38

## 2025-06-26 RX ADMIN — GABAPENTIN 300 MG: 300 CAPSULE ORAL at 08:58

## 2025-06-26 RX ADMIN — VALACYCLOVIR HYDROCHLORIDE 500 MG: 500 TABLET, FILM COATED ORAL at 09:00

## 2025-06-26 RX ADMIN — CLONIDINE HYDROCHLORIDE 0.2 MG: 0.2 TABLET ORAL at 09:00

## 2025-06-26 RX ADMIN — IPRATROPIUM BROMIDE 0.5 MG: 0.5 SOLUTION RESPIRATORY (INHALATION) at 19:55

## 2025-06-26 RX ADMIN — Medication 325 MG: at 18:14

## 2025-06-26 RX ADMIN — METOPROLOL SUCCINATE 25 MG: 25 TABLET, EXTENDED RELEASE ORAL at 08:58

## 2025-06-26 RX ADMIN — APIXABAN 5 MG: 5 TABLET, FILM COATED ORAL at 20:04

## 2025-06-26 RX ADMIN — OXYBUTYNIN CHLORIDE 5 MG: 5 TABLET ORAL at 12:39

## 2025-06-26 RX ADMIN — RANOLAZINE 500 MG: 500 TABLET, FILM COATED, EXTENDED RELEASE ORAL at 20:04

## 2025-06-26 RX ADMIN — HYDRALAZINE HYDROCHLORIDE 10 MG: 10 TABLET ORAL at 20:04

## 2025-06-26 RX ADMIN — GABAPENTIN 300 MG: 300 CAPSULE ORAL at 12:39

## 2025-06-26 RX ADMIN — Medication 325 MG: at 08:57

## 2025-06-26 RX ADMIN — INSULIN LISPRO 2 UNITS: 100 INJECTION, SOLUTION INTRAVENOUS; SUBCUTANEOUS at 18:13

## 2025-06-26 RX ADMIN — MONTELUKAST 10 MG: 10 TABLET, FILM COATED ORAL at 20:04

## 2025-06-26 RX ADMIN — INSULIN LISPRO 8 UNITS: 100 INJECTION, SOLUTION INTRAVENOUS; SUBCUTANEOUS at 20:05

## 2025-06-26 RX ADMIN — INSULIN LISPRO 4 UNITS: 100 INJECTION, SOLUTION INTRAVENOUS; SUBCUTANEOUS at 20:12

## 2025-06-26 RX ADMIN — AMIODARONE HYDROCHLORIDE 200 MG: 200 TABLET ORAL at 20:04

## 2025-06-26 RX ADMIN — SODIUM CHLORIDE, PRESERVATIVE FREE 10 ML: 5 INJECTION INTRAVENOUS at 20:17

## 2025-06-26 RX ADMIN — IPRATROPIUM BROMIDE 0.5 MG: 0.5 SOLUTION RESPIRATORY (INHALATION) at 12:43

## 2025-06-26 RX ADMIN — SODIUM CHLORIDE, PRESERVATIVE FREE 10 ML: 5 INJECTION INTRAVENOUS at 08:58

## 2025-06-26 RX ADMIN — RANOLAZINE 500 MG: 500 TABLET, FILM COATED, EXTENDED RELEASE ORAL at 08:59

## 2025-06-26 RX ADMIN — AMIODARONE HYDROCHLORIDE 200 MG: 200 TABLET ORAL at 12:39

## 2025-06-26 RX ADMIN — INSULIN LISPRO 8 UNITS: 100 INJECTION, SOLUTION INTRAVENOUS; SUBCUTANEOUS at 18:14

## 2025-06-26 RX ADMIN — IPRATROPIUM BROMIDE 0.5 MG: 0.5 SOLUTION RESPIRATORY (INHALATION) at 16:33

## 2025-06-26 RX ADMIN — APIXABAN 5 MG: 5 TABLET, FILM COATED ORAL at 08:58

## 2025-06-26 RX ADMIN — PANTOPRAZOLE SODIUM 40 MG: 40 TABLET, DELAYED RELEASE ORAL at 05:18

## 2025-06-26 RX ADMIN — INSULIN GLARGINE 25 UNITS: 100 INJECTION, SOLUTION SUBCUTANEOUS at 09:01

## 2025-06-26 RX ADMIN — HYDRALAZINE HYDROCHLORIDE 10 MG: 10 TABLET ORAL at 08:58

## 2025-06-26 RX ADMIN — METRONIDAZOLE: 7.5 GEL VAGINAL at 09:00

## 2025-06-26 RX ADMIN — GABAPENTIN 300 MG: 300 CAPSULE ORAL at 20:04

## 2025-06-26 RX ADMIN — CLONIDINE HYDROCHLORIDE 0.2 MG: 0.2 TABLET ORAL at 20:05

## 2025-06-26 RX ADMIN — OXYBUTYNIN CHLORIDE 5 MG: 5 TABLET ORAL at 20:05

## 2025-06-26 RX ADMIN — ACETAMINOPHEN 650 MG: 325 TABLET ORAL at 14:24

## 2025-06-26 RX ADMIN — METRONIDAZOLE: 7.5 GEL VAGINAL at 20:17

## 2025-06-26 RX ADMIN — AMIODARONE HYDROCHLORIDE 200 MG: 200 TABLET ORAL at 05:56

## 2025-06-26 RX ADMIN — VALACYCLOVIR HYDROCHLORIDE 500 MG: 500 TABLET, FILM COATED ORAL at 20:05

## 2025-06-26 RX ADMIN — ATORVASTATIN CALCIUM 80 MG: 80 TABLET, FILM COATED ORAL at 20:04

## 2025-06-26 ASSESSMENT — PAIN DESCRIPTION - LOCATION
LOCATION: ARM;SHOULDER
LOCATION: ARM

## 2025-06-26 ASSESSMENT — PAIN SCALES - GENERAL
PAINLEVEL_OUTOF10: 0
PAINLEVEL_OUTOF10: 0
PAINLEVEL_OUTOF10: 1

## 2025-06-26 ASSESSMENT — PAIN SCALES - WONG BAKER
WONGBAKER_NUMERICALRESPONSE: HURTS A LITTLE BIT
WONGBAKER_NUMERICALRESPONSE: NO HURT

## 2025-06-26 ASSESSMENT — PAIN DESCRIPTION - DESCRIPTORS
DESCRIPTORS: DULL
DESCRIPTORS: ACHING;PRESSURE

## 2025-06-26 ASSESSMENT — PAIN DESCRIPTION - ORIENTATION
ORIENTATION: LEFT
ORIENTATION: LEFT;RIGHT

## 2025-06-26 NOTE — PROGRESS NOTES
Pt had not urinated in the past 6 hours. Bladder scanned pt for 700mL. Continued with straight cath per order and got 1050mL output. BEN Sapp notified.     Nahomi Davies RN

## 2025-06-26 NOTE — PROGRESS NOTES
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  Currently tolerating room air and saturating well  -Completed antibiotics  - Electrophysiology following, status post CRT-D implantation on 2025  - Continue amiodarone and metoprolol  - PT OT evaluation, out of bed to chair, ambulate as tolerated  - On valacyclovir for genital herpes for OB  - Status post DKA protocol, now bridged to subcu insulin, sugars -continues to be fluctuate.  Optimize insulin regimen  - Continue Eliquis  -Will follow peripherally , please call our service with question or any change in clinical status .          Interval History/Event Changes:  Tolerating room air  Has an ICD, site is tender  Family at the bedside-answered all the questions    Allergies  No Known Allergies    Review of Systems    A pertinent review of systems was performed and was otherwise non-contributory.    Vitals-     /85   Pulse 60   Temp 97.8 °F (36.6 °C) (Temporal)   Resp 18   Ht 1.651 m (5' 5\")   Wt 95.4 kg (210 lb 5.1 oz)   SpO2 97%   BMI 35.00 kg/m²    Tmax: Temp (24hrs), Av.5 °F (36.4 °C), Min:97.2 °F (36.2 °C), Max:97.8 °F (36.6 °C)      Hemodynamics:  Cuff:   Systolic (24hrs), Av , Min:105 , Max:132   /Diastolic (24hrs), Av, Min:56, Max:85    Cuff MAP:MAP (mmHg)  Av.5  Min: 33  Max: 156  P:   Pulse  Av.8

## 2025-06-26 NOTE — PROGRESS NOTES
Physical Therapy  Reevaluation    Name: Fanny Gonzalez  : 1950  MRN: [id number]      Date of Service: 2025    Evaluating PT: Adonay De La Torre, PT, DPT, [id number]       Room #:  6403/6403-B  Diagnosis:  Ventricular tachycardia (HCC) [I47.20]  Diabetic ketoacidosis without coma associated with type 2 diabetes mellitus (HCC) [E11.10]  PMHx/PSHx:   has a past medical history of Arthritis, Asthma, BRCA1 negative, BRCA2 negative, Breast cancer (HCC), CAD in native artery, Cancer (HCC), Cerebral artery occlusion with cerebral infarction (HCC), Cerebrovascular disease, CHF (congestive heart failure) (HCC), Claustrophobia, COPD (chronic obstructive pulmonary disease) (HCC), Decreased dorsalis pedis pulse, Dermatophytosis, Headache(784.0), History of blood transfusion, Hives, Hx of blood clots, Hyperlipidemia, Hypertension, LBP radiating to both legs, Lymphedema of both lower extremities, Neuromuscular disorder (HCC), Non-rheumatic aortic sclerosis, Obesity, Pulmonary hypertension (HCC), PVD (peripheral vascular disease) with claudication, Recurrent genital HSV (herpes simplex virus) infection, S/P breast biopsy, right, Type II or unspecified type diabetes mellitus without mention of complication, not stated as uncontrolled, and UTI (urinary tract infection).  Procedure/Surgery:   Insert ICD multi   Precautions:  Fall Risk, Do not elevate affected arm above shoulder for 42 days, Sling and Swathe for sleeping,   Equipment Needs:  TBD    SUBJECTIVE:    Pt lives alone in a 10th floor apartment with elevator access, 0 step(s) to enter; bed/bath on living level.   Equipment owned: SPC, Rollator, BSC, FWW     Reason for reevaluation: Procedure    OBJECTIVE:   Reevaluation  Date: 25 Treatment Date: Short Term/ Long Term   Goals   AM-PAC 6 Clicks      Was pt agreeable to Eval/treatment? yes     Does pt have pain? No pain reported     Bed Mobility  Rolling: NT  Supine to sit: SBA  Sit to supine: SBA  Scooting: SBA   following treatment:    Therapeutic activities:  Bed mobility: Cues for hand placement during bed mobility transfers.  Transfers: Cues for weight shifting during sit <> stand transfers. Pt completed multiple transfers from different surface heights (bed/chair).  Ambulation: Cues for WW safety.  Vitals and symptoms were closely monitored throughout session.  Skilled positioning in bed to protect skin/joint integrity and for pain management.  Education on precautions.    Pt's/family goals:  1. To feel better    Prognosis is good for reaching above PT goals.    Patient and or family understand(s) diagnosis, prognosis, and plan of care.  yes    PHYSICAL THERAPY PLAN OF CARE:    PT POC is established based on physician order and patient diagnosis       Diagnosis:  Ventricular tachycardia (HCC) [I47.20]  Diabetic ketoacidosis without coma associated with type 2 diabetes mellitus (HCC) [E11.10]  Specific instructions for next treatment:  to progress functional mobility     Current Treatment Recommendations:     [x] Strengthening to improve independence with functional mobility   [] ROM to improve independence with functional mobility   [x] Balance Training to improve static/dynamic balance and to reduce fall risk  [x] Endurance Training to improve activity tolerance during functional mobility   [x] Transfer Training to improve safety and independence with all functional transfers   [x] Gait Training to improve gait mechanics, endurance and assess need for appropriate assistive device  [x] Stair Training in preparation for safe discharge home and/or into the community   [x] Positioning to prevent skin breakdown and contractures  [x] Safety and Education Training   [x] Patient/Caregiver Education   [x] HEP  [] Other     PT long term treatment goals are located in above grid    Frequency of treatments: 2-5x/week x 1-2 weeks.    Time in  1207  Time out  1230    Total Treatment Time  8 minutes     Evaluation Time includes

## 2025-06-26 NOTE — PROGRESS NOTES
Spiritual Health History and Assessment/Progress Note  [facility]    (P) Initial Encounter, Spiritual/Emotional Needs, Loneliness/Social Isolation,  ,  ,      Name: Fanny Gonzalez MRN: [id number]    Age: 75 y.o.     Sex: female   Language: English   Advent: Rastafari   Diabetic ketoacidosis without coma associated with type 2 diabetes mellitus (HCC)     Date: 6/26/2025                           Spiritual Assessment began in [facility]        Referral/Consult From: (P) Rounding   Encounter Overview/Reason: (P) Initial Encounter, Spiritual/Emotional Needs, Loneliness/Social Isolation  Service Provided For: (P) Patient    Jordyn, Belief, Meaning:   Patient identifies as spiritual, is connected with a jordyn tradition or spiritual practice, and has beliefs or practices that help with coping during difficult times  Family/Friends identify as spiritual, are connected with a jordyn tradition or spiritual practice, and have beliefs or practices that help with coping during difficult times      Importance and Influence:  Patient has spiritual/personal beliefs that influence decisions regarding their health  Family/Friends have spiritual/personal beliefs that influence decisions regarding the patient's health    Community:  Patient is connected with a spiritual community and feels well-supported. Support system includes: Children, Jordyn Community, Friends, and Extended family  Family/Friends are connected with a spiritual community: and feel well-supported. Support system includes: Parent/s, Jordyn Community, Friends, and Extended family    Assessment and Plan of Care:     Patient Interventions include: Facilitated expression of thoughts and feelings, Explored spiritual coping/struggle/distress, and Engaged in theological reflection  Family/Friends Interventions include: Facilitated expression of thoughts and feelings, Explored spiritual coping/struggle/distress, Engaged in theological reflection, and Affirmed

## 2025-06-26 NOTE — FLOWSHEET NOTE
06/26/25 1446   Urine Assessment   Urinary Status Voiding   Urinary Incontinence Absent   Urine Color Yellow/straw   Bladder Scan Volume (mL) 374 mL   $ Bladder scan $ Yes   Post Void Cath Residual (mL) 168   Unmeasured Output   Urine Occurrence 1   Stool Occurrence 0   Emesis Occurrence 0     Assisted pt. To bathroom after much encouragement. Unmeasured output noted in toilet.

## 2025-06-26 NOTE — DISCHARGE INSTR - COC
Continuity of Care Form    Patient Name: Fanny Gonzalez   :  1950  MRN:  [id number]    Admit date:  2025  Discharge date:  2025    Code Status Order: Full Code   Advance Directives:    Date/Time Healthcare Directive Type of Healthcare Directive Copy in Chart Healthcare Agent Appointed Healthcare Agent's Name Healthcare Agent's Phone Number    25 1229 No, patient does not have an advance directive for healthcare treatment  --  --  --  --  --             Admitting Physician:  Leticia Dia DO  PCP: Babatunde Storm APRN - CNP    Discharging Nurse: Pao  Discharging Hospital Unit/Room#: 6403/6403-B  Discharging Unit Phone Number: [phone]    Emergency Contact:   Extended Emergency Contact Information  Primary Emergency Contact: Tracee Gonzalez  Address: [address]  Home Phone: [phone]  Mobile Phone: [phone]  Relation: Child    Past Surgical History:  Past Surgical History:   Procedure Laterality Date    ARTHROPLASTY Left 2016    thumb basil joint with dequervain's release    BREAST BIOPSY Right     BREAST LUMPECTOMY Right years ago    benign    BREAST LUMPECTOMY Right 2016    CARDIAC PROCEDURE N/A 5/15/2025    Left heart cath / coronary angiography performed by Paul Dinero DO at [facility] [place], [place] CARDIAC CATH LAB    COLONOSCOPY  2005    COLONOSCOPY  2015    Dr. Arie ARTHUR    CORONARY ANGIOPLASTY WITH STENT PLACEMENT  2021    ECHO COMPL W DOP COLOR FLOW  2012         EP DEVICE PROCEDURE N/A 2025    Insert ICD multi performed by Walter Borges DO at [facility] [place] [place] CARDIAC CATH LAB    EYE SURGERY      FINGER SURGERY Left 2016    thumb    HAND SURGERY  2017    HYSTERECTOMY (CERVIX STATUS UNKNOWN)      JOINT REPLACEMENT      OTHER SURGICAL HISTORY Right 2017    RIGHT THUMB TRAPEZIECTOMY WITH LIGAMENT RECONSRUCTION DEQUERVAINS RELEASE    TIM AND BSO (CERVIX REMOVED)      TOTAL HIP ARTHROPLASTY Bilateral      2000, 2013 dr saldivar, dr de la torre    TOTAL KNEE ARTHROPLASTY Bilateral 2013    dr solorio       Immunization History:   Immunization History   Administered Date(s) Administered    COVID-19, MODERNA BLUE border, Primary or Immunocompromised, (age 12y+), IM, 100 mcg/0.5mL 02/13/2021, 03/13/2021, 01/25/2022    DTaP 01/12/2010    DTaP, INFANRIX, (age 6w-6y), IM, 0.5mL 01/12/2010    Influenza 01/20/2012, 01/04/2013    Influenza Vaccine, unspecified formulation 12/13/2013, 10/17/2016    Influenza Virus Vaccine 01/12/2010, 12/13/2013    Influenza, FLUAD, (age 65 y+), IM, Trivalent PF, 0.5mL 10/15/2019    Influenza, FLUZONE High Dose (age 65 y+), IM, Quadv, 0.7mL 09/01/2021    Influenza, FLUZONE High Dose, (age 65 y+), IM, Trivalent PF, 0.5mL 11/18/2015, 10/17/2016, 09/20/2017    PPD Test 03/28/2014, 04/04/2014    Pneumococcal Conjugate 7-valent (Prevnar7) 01/12/2010    Pneumococcal, PCV-13, PREVNAR 13, (age 6w+), IM, 0.5mL 08/01/2018    Pneumococcal, PPSV23, PNEUMOVAX 23, (age 2y+), SC/IM, 0.5mL 11/18/2015    TD 2LF, TDVAX, (age 7y+), IM, 0.5mL 09/02/2022    TDaP, ADACEL (age 10y-64y), BOOSTRIX (age 10y+), IM, 0.5mL 07/01/2017    Zoster Recombinant (Shingrix) 08/01/2018       Active Problems:  Patient Active Problem List   Diagnosis Code    IDDM (insulin dependent diabetes mellitus) [id number]    Hyperlipidemia with target LDL less than 70 E78.5    Lymphedema of both lower extremities I89.0    Vitamin D deficiency E55.9    Ductal carcinoma in situ (DCIS) of breast D05.10    Herpes simplex supression B00.9    Poorly controlled type 2 diabetes mellitus (HCC) E11.65    COPD (chronic obstructive pulmonary disease) (HCC) J44.9    Pulmonary hypertension ([facility]) I27.20    Non-rheumatic aortic sclerosis I35.8    LBBB (left bundle branch block) I44.7    Diabetic polyneuropathy associated with type 1 diabetes mellitus ([facility]) E10.42    Decreased dorsalis pedis pulse R09.89    Diabetes mellitus type 2, uncontrolled [id number]    Mixed

## 2025-06-26 NOTE — PLAN OF CARE
Problem: Chronic Conditions and Co-morbidities  Goal: Patient's chronic conditions and co-morbidity symptoms are monitored and maintained or improved  6/25/2025 2234 by Nahomi Silva RN  Outcome: Progressing  6/25/2025 1342 by Vicky Rai RN  Outcome: Progressing     Problem: Discharge Planning  Goal: Discharge to home or other facility with appropriate resources  6/25/2025 2234 by Nahomi Silva RN  Outcome: Progressing  6/25/2025 1342 by Vicky Rai RN  Outcome: Progressing     Problem: Pain  Goal: Verbalizes/displays adequate comfort level or baseline comfort level  6/25/2025 2234 by Nahomi Silva RN  Outcome: Progressing  6/25/2025 1342 by Vicky Rai RN  Outcome: Progressing     Problem: Skin/Tissue Integrity  Goal: Skin integrity remains intact  Description: 1.  Monitor for areas of redness and/or skin breakdown  2.  Assess vascular access sites hourly  3.  Every 4-6 hours minimum:  Change oxygen saturation probe site  4.  Every 4-6 hours:  If on nasal continuous positive airway pressure, respiratory therapy assess nares and determine need for appliance change or resting period  6/25/2025 2234 by Nahomi Silva RN  Outcome: Progressing  6/25/2025 1342 by Vicky Rai RN  Outcome: Progressing     Problem: Safety - Adult  Goal: Free from fall injury  6/25/2025 2234 by Nahomi Silva RN  Outcome: Progressing  6/25/2025 1342 by Vicky Rai RN  Outcome: Progressing     Problem: ABCDS Injury Assessment  Goal: Absence of physical injury  6/25/2025 2234 by Nahomi Silva RN  Outcome: Progressing  6/25/2025 1342 by Vicky Rai RN  Outcome: Progressing

## 2025-06-26 NOTE — PROGRESS NOTES
SPEECH LANGUAGE PATHOLOGY  DAILY PROGRESS NOTE        PATIENT NAME:  Fanny Gonzalez      ROOM:  6403/6403-B :  1950         TODAY'S DATE:  2025       Patient seen for dysphagia x2      Current Diet Order: ADULT DIET; Dysphagia - Soft and Bite Sized; Low Fat/Low Chol/High Fiber/2 gm Na  Results and recommendations of swallowing evaluation reviewed.  Pt monitored during mealtime.  Tolerating current diet without noted or reported difficulties.  Continues with delayed mastication. Appetite was good  Oral motor strength/ coordination exercises to improve bolus prep/ control and mastication were completed with  minimal verbal prompts .        Will continue

## 2025-06-26 NOTE — PLAN OF CARE
Problem: Chronic Conditions and Co-morbidities  Goal: Patient's chronic conditions and co-morbidity symptoms are monitored and maintained or improved  6/26/2025 1232 by Trini Enrique RN  Outcome: Progressing  Flowsheets (Taken 6/26/2025 0800)  Care Plan - Patient's Chronic Conditions and Co-Morbidity Symptoms are Monitored and Maintained or Improved: Monitor and assess patient's chronic conditions and comorbid symptoms for stability, deterioration, or improvement  6/25/2025 2234 by Nahomi Silva RN  Outcome: Progressing     Problem: Discharge Planning  Goal: Discharge to home or other facility with appropriate resources  6/26/2025 1232 by Trini Enrique RN  Outcome: Progressing  Flowsheets (Taken 6/26/2025 0800)  Discharge to home or other facility with appropriate resources: Identify barriers to discharge with patient and caregiver  6/25/2025 2234 by Nahomi Silva RN  Outcome: Progressing     Problem: Pain  Goal: Verbalizes/displays adequate comfort level or baseline comfort level  6/26/2025 1232 by Trini Enrique RN  Outcome: Progressing  6/25/2025 2234 by Nahomi Silva RN  Outcome: Progressing     Problem: Skin/Tissue Integrity  Goal: Skin integrity remains intact  Description: 1.  Monitor for areas of redness and/or skin breakdown  2.  Assess vascular access sites hourly  3.  Every 4-6 hours minimum:  Change oxygen saturation probe site  4.  Every 4-6 hours:  If on nasal continuous positive airway pressure, respiratory therapy assess nares and determine need for appliance change or resting period  6/26/2025 1232 by Trini Enrique RN  Outcome: Progressing  Flowsheets (Taken 6/26/2025 0800)  Skin Integrity Remains Intact:   Monitor for areas of redness and/or skin breakdown   Turn and reposition as indicated  6/25/2025 2234 by Nahomi Silva RN  Outcome: Progressing     Problem: Safety - Adult  Goal: Free from fall injury  6/26/2025 1232 by Trini Enrique RN  Outcome:

## 2025-06-26 NOTE — PROGRESS NOTES
[facility] Hospitalist Progress Note    Admitting Date and Time: 6/20/2025 11:06 AM  Admit Dx: Ventricular tachycardia (HCC) [I47.20]  Diabetic ketoacidosis without coma associated with type 2 diabetes mellitus (HCC) [E11.10]    Subjective:  Patient is being followed for Ventricular tachycardia (HCC) [I47.20]  Diabetic ketoacidosis without coma associated with type 2 diabetes mellitus (HCC) [E11.10]   Pt seen and examined this morning  Was straight cathed twice yesterday.  Still complaining that she is unable to urinate this morning.  Denies any other complaints    ROS: denies fever, chills, cp, sob, n/v, HA unless stated above.      [START ON 7/8/2025] amiodarone  200 mg Oral Daily    insulin lispro  8 Units SubCUTAneous 4x Daily AC & HS    insulin glargine  25 Units SubCUTAneous Daily    sodium chloride flush  5-40 mL IntraVENous 2 times per day    apixaban  5 mg Oral BID    insulin lispro  0-8 Units SubCUTAneous 4x Daily AC & HS    valACYclovir  500 mg Oral BID    metroNIDAZOLE   Topical BID    amiodarone  200 mg Oral 3 times per day    metoprolol succinate  25 mg Oral Daily    sodium chloride flush  5-40 mL IntraVENous 2 times per day    aspirin  81 mg Oral Daily    atorvastatin  80 mg Oral Nightly    cloNIDine  0.2 mg Oral TID    ferrous sulfate  325 mg Oral BID WC    gabapentin  300 mg Oral TID    montelukast  10 mg Oral Nightly    pantoprazole  40 mg Oral QAM AC    [Held by provider] traZODone  50 mg Oral Nightly    ranolazine  500 mg Oral BID    oxyBUTYnin  5 mg Oral TID    hydrALAZINE  10 mg Oral 2 times per day    [Held by provider] isosorbide dinitrate  10 mg Oral TID    ipratropium  0.5 mg Nebulization 4x Daily RT     sodium chloride flush, 5-40 mL, PRN  sodium chloride, , PRN  acetaminophen, 650 mg, Q4H PRN  hydrALAZINE, 5 mg, Q6H PRN  labetalol, 10 mg, Q6H PRN  glucose, 4 tablet, PRN  dextrose bolus, 125 mL, PRN   Or  dextrose bolus, 250 mL, PRN  glucagon (rDNA), 1 mg, PRN  dextrose, ,

## 2025-06-26 NOTE — ACP (ADVANCE CARE PLANNING)
Advance Care Planning   Healthcare Decision Maker:    Primary Decision Maker: LisaTracee - Child - [phone]    Secondary Decision Maker: Emily Luu - Other - [phone]    Today we documented Decision Maker(s) consistent with ACP documents on file.    Electronically signed by SIERRA Ibanez on 6/26/2025 at 3:42 PM

## 2025-06-26 NOTE — CARE COORDINATION
Patient on room air, unable to urinate, being straight cathed; pulmonology has signed off, EP following. Call made to therapy department for updated PT/OT notes to initiate auth. [facility] has accepted the patient; will require pre-cert. Precert to be initiated once notes are in. Patient and daughter updated at bedside. Patient's daughter, Tracee will need notified of patient's discharge. Ambulette form, ANUPAM and PASRR completed; envelope placed on the soft chart.    Electronically signed by SIERRA Ibanez on 6/26/2025 at 12:53 PM

## 2025-06-26 NOTE — PROGRESS NOTES
Occupational Therapy  OT BEDSIDE TREATMENT NOTE   [facility]  [address], [place]       Date:2025  Patient Name: Fanny Gonzalez  MRN: [id number]  : 1950  Room: [facility]     Per OT Eval:    Evaluating OT: Luna Krause, KAMLESHD,  OTR/L; [id number]  Referring Provider: Gege Torres MD   Specific Provider Orders/Date: OT eval and treat (25)      Diagnosis: Ventricular tachycardia (HCC) [I47.20]  Diabetic ketoacidosis without coma associated with type 2 diabetes mellitus (HCC) [E11.10]      Reason for admission: Pt admitted with DKA, abdominal pain, V-tach/life vest- plan for ICD placement.     Surgery/Procedures: None this admission     Pertinent Medical History:    Past Medical History        Past Medical History:   Diagnosis Date    Arthritis      Asthma      BRCA1 negative      BRCA2 negative      Breast cancer (HCC)      CAD in native artery 12/15/2021     12-15-21 cate 2.5x38 hima rca. 12-15-21 cate 3.0x12 hima prox rca.     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    Cerebrovascular disease 2009     no residual    CHF (congestive heart failure) (HCC) approx 3 years ago    Claustrophobia      COPD (chronic obstructive pulmonary disease) (HCC)      Decreased dorsalis pedis pulse 2019    Dermatophytosis 2019    Headache(784.0)      History of blood transfusion       with knee surgery    Hives      Hx of blood clots       PE's and DVT's 'during childbearing years'    Hyperlipidemia      Hypertension      LBP radiating to both legs      Lymphedema of both lower extremities 2015    Neuromuscular disorder (HCC)      Non-rheumatic aortic sclerosis 10/2018     10/2018    Obesity      Pulmonary hypertension (HCC) 10/2018    PVD (peripheral vascular disease) with claudication 2021     walker   Able to walk short household distances                      Mod I         Balance Sitting:     Static: SBA    Dynamic:Min A  Standing: Min A  Sitting: SBA  Standing: Min A with walker  Sitting:     Static: Ind    Dynamic: Mod I  Standing: Mod I                   Endurance/Activity Tolerance    fair tolerance with light activity.   Fair                     WFL  For full ADL/light IADL tasks   Visual/  Perceptual    WFL                               Education:  Pt was educated through out treatment regarding proper technique & safety with bed mobility, functional transfers & mobility, maintaining precautions (ICD placement 6/24), energy conservation techniques & ADL compensatory strategies to ease tasks, improve safety & prevent falls to return home safely.      Comments: Upon arrival pt was in bed & agreeable for therapy, nsg approved. At end of session pt was seated in chair, nsg aware, along with daughter present, all lines and tubes intact, call light within reach.    Pt has made Fair progress towards set goals. Discussed with OTR regarding placement of ICD 6/24, goals my need adjusted, precautions were updated.   Continue with current plan of care    Treatment Time In:1205            Treatment Time Out: 1235           Treatment Charges: Mins Units   Ther Ex  36526     Manual Therapy 42854     Thera Activities 66878 15 1   ADL/Home Mgt 02529 15 1   Neuro Re-ed 78743     Group Therapy      Orthotic manage/training  46853     Non-Billable Time     Total Timed Treatment 30 2       Senait Ramires, PHIL/ZAC [id number]

## 2025-06-27 VITALS
WEIGHT: 213.2 LBS | SYSTOLIC BLOOD PRESSURE: 138 MMHG | DIASTOLIC BLOOD PRESSURE: 68 MMHG | HEIGHT: 65 IN | RESPIRATION RATE: 13 BRPM | BODY MASS INDEX: 35.52 KG/M2 | OXYGEN SATURATION: 98 % | TEMPERATURE: 97.8 F | HEART RATE: 72 BPM

## 2025-06-27 LAB
ANION GAP SERPL CALCULATED.3IONS-SCNC: 9 MMOL/L (ref 7–16)
BUN SERPL-MCNC: 12 MG/DL (ref 8–23)
CALCIUM SERPL-MCNC: 8.9 MG/DL (ref 8.8–10.2)
CHLORIDE SERPL-SCNC: 106 MMOL/L (ref 98–107)
CO2 SERPL-SCNC: 23 MMOL/L (ref 22–29)
CREAT SERPL-MCNC: 0.8 MG/DL (ref 0.5–1)
ERYTHROCYTE [DISTWIDTH] IN BLOOD BY AUTOMATED COUNT: 16.4 % (ref 11.5–15)
GFR, ESTIMATED: 74 ML/MIN/1.73M2
GLUCOSE BLD-MCNC: 127 MG/DL (ref 74–99)
GLUCOSE BLD-MCNC: 209 MG/DL (ref 74–99)
GLUCOSE BLD-MCNC: 219 MG/DL (ref 74–99)
GLUCOSE BLD-MCNC: 67 MG/DL (ref 74–99)
GLUCOSE BLD-MCNC: 81 MG/DL (ref 74–99)
GLUCOSE SERPL-MCNC: 44 MG/DL (ref 74–99)
HCT VFR BLD AUTO: 35.5 % (ref 34–48)
HGB BLD-MCNC: 11.3 G/DL (ref 11.5–15.5)
MCH RBC QN AUTO: 28.3 PG (ref 26–35)
MCHC RBC AUTO-ENTMCNC: 31.8 G/DL (ref 32–34.5)
MCV RBC AUTO: 89 FL (ref 80–99.9)
MICROORGANISM SPEC CULT: NORMAL
MICROORGANISM SPEC CULT: NORMAL
PLATELET # BLD AUTO: 261 K/UL (ref 130–450)
PMV BLD AUTO: 10.8 FL (ref 7–12)
POTASSIUM SERPL-SCNC: 4 MMOL/L (ref 3.5–5.1)
RBC # BLD AUTO: 3.99 M/UL (ref 3.5–5.5)
SERVICE CMNT-IMP: NORMAL
SERVICE CMNT-IMP: NORMAL
SODIUM SERPL-SCNC: 138 MMOL/L (ref 136–145)
SPECIMEN DESCRIPTION: NORMAL
SPECIMEN DESCRIPTION: NORMAL
WBC OTHER # BLD: 8.8 K/UL (ref 4.5–11.5)

## 2025-06-27 PROCEDURE — 6370000000 HC RX 637 (ALT 250 FOR IP): Performed by: INTERNAL MEDICINE

## 2025-06-27 PROCEDURE — 2500000003 HC RX 250 WO HCPCS

## 2025-06-27 PROCEDURE — 92526 ORAL FUNCTION THERAPY: CPT

## 2025-06-27 PROCEDURE — 97530 THERAPEUTIC ACTIVITIES: CPT

## 2025-06-27 PROCEDURE — 6370000000 HC RX 637 (ALT 250 FOR IP)

## 2025-06-27 PROCEDURE — 80048 BASIC METABOLIC PNL TOTAL CA: CPT

## 2025-06-27 PROCEDURE — 6360000002 HC RX W HCPCS

## 2025-06-27 PROCEDURE — 36415 COLL VENOUS BLD VENIPUNCTURE: CPT

## 2025-06-27 PROCEDURE — 85027 COMPLETE CBC AUTOMATED: CPT

## 2025-06-27 PROCEDURE — 6370000000 HC RX 637 (ALT 250 FOR IP): Performed by: STUDENT IN AN ORGANIZED HEALTH CARE EDUCATION/TRAINING PROGRAM

## 2025-06-27 PROCEDURE — 2500000003 HC RX 250 WO HCPCS: Performed by: STUDENT IN AN ORGANIZED HEALTH CARE EDUCATION/TRAINING PROGRAM

## 2025-06-27 PROCEDURE — 6370000000 HC RX 637 (ALT 250 FOR IP): Performed by: NURSE PRACTITIONER

## 2025-06-27 PROCEDURE — 94640 AIRWAY INHALATION TREATMENT: CPT

## 2025-06-27 PROCEDURE — 82962 GLUCOSE BLOOD TEST: CPT

## 2025-06-27 RX ORDER — VALACYCLOVIR HYDROCHLORIDE 500 MG/1
500 TABLET, FILM COATED ORAL 2 TIMES DAILY
Qty: 30 TABLET | Refills: 0
Start: 2025-06-27

## 2025-06-27 RX ORDER — INSULIN GLARGINE 100 [IU]/ML
25 INJECTION, SOLUTION SUBCUTANEOUS DAILY
Qty: 10 ML | Refills: 3
Start: 2025-06-28

## 2025-06-27 RX ORDER — INSULIN GLARGINE 100 [IU]/ML
10 INJECTION, SOLUTION SUBCUTANEOUS ONCE
Status: COMPLETED | OUTPATIENT
Start: 2025-06-27 | End: 2025-06-27

## 2025-06-27 RX ORDER — LEVALBUTEROL INHALATION SOLUTION 0.63 MG/3ML
1.25 SOLUTION RESPIRATORY (INHALATION) EVERY 8 HOURS PRN
Qty: 120 EACH | Refills: 0
Start: 2025-06-27

## 2025-06-27 RX ADMIN — Medication 325 MG: at 09:15

## 2025-06-27 RX ADMIN — PANTOPRAZOLE SODIUM 40 MG: 40 TABLET, DELAYED RELEASE ORAL at 05:25

## 2025-06-27 RX ADMIN — OXYBUTYNIN CHLORIDE 5 MG: 5 TABLET ORAL at 15:00

## 2025-06-27 RX ADMIN — IPRATROPIUM BROMIDE 0.5 MG: 0.5 SOLUTION RESPIRATORY (INHALATION) at 13:24

## 2025-06-27 RX ADMIN — RANOLAZINE 500 MG: 500 TABLET, FILM COATED, EXTENDED RELEASE ORAL at 09:15

## 2025-06-27 RX ADMIN — GABAPENTIN 300 MG: 300 CAPSULE ORAL at 20:59

## 2025-06-27 RX ADMIN — INSULIN GLARGINE 10 UNITS: 100 INJECTION, SOLUTION SUBCUTANEOUS at 09:14

## 2025-06-27 RX ADMIN — Medication 16 G: at 05:25

## 2025-06-27 RX ADMIN — AMIODARONE HYDROCHLORIDE 200 MG: 200 TABLET ORAL at 12:29

## 2025-06-27 RX ADMIN — CLONIDINE HYDROCHLORIDE 0.2 MG: 0.2 TABLET ORAL at 09:19

## 2025-06-27 RX ADMIN — MONTELUKAST 10 MG: 10 TABLET, FILM COATED ORAL at 20:59

## 2025-06-27 RX ADMIN — CLONIDINE HYDROCHLORIDE 0.2 MG: 0.2 TABLET ORAL at 21:00

## 2025-06-27 RX ADMIN — HYDRALAZINE HYDROCHLORIDE 10 MG: 10 TABLET ORAL at 09:15

## 2025-06-27 RX ADMIN — HYDRALAZINE HYDROCHLORIDE 10 MG: 10 TABLET ORAL at 20:59

## 2025-06-27 RX ADMIN — IPRATROPIUM BROMIDE 0.5 MG: 0.5 SOLUTION RESPIRATORY (INHALATION) at 15:47

## 2025-06-27 RX ADMIN — IPRATROPIUM BROMIDE 0.5 MG: 0.5 SOLUTION RESPIRATORY (INHALATION) at 19:34

## 2025-06-27 RX ADMIN — METOPROLOL SUCCINATE 25 MG: 25 TABLET, EXTENDED RELEASE ORAL at 09:15

## 2025-06-27 RX ADMIN — INSULIN LISPRO 8 UNITS: 100 INJECTION, SOLUTION INTRAVENOUS; SUBCUTANEOUS at 17:46

## 2025-06-27 RX ADMIN — Medication 325 MG: at 17:46

## 2025-06-27 RX ADMIN — INSULIN LISPRO 2 UNITS: 100 INJECTION, SOLUTION INTRAVENOUS; SUBCUTANEOUS at 10:36

## 2025-06-27 RX ADMIN — INSULIN LISPRO 8 UNITS: 100 INJECTION, SOLUTION INTRAVENOUS; SUBCUTANEOUS at 10:36

## 2025-06-27 RX ADMIN — AMIODARONE HYDROCHLORIDE 200 MG: 200 TABLET ORAL at 21:00

## 2025-06-27 RX ADMIN — OXYBUTYNIN CHLORIDE 5 MG: 5 TABLET ORAL at 09:16

## 2025-06-27 RX ADMIN — CLONIDINE HYDROCHLORIDE 0.2 MG: 0.2 TABLET ORAL at 15:00

## 2025-06-27 RX ADMIN — OXYBUTYNIN CHLORIDE 5 MG: 5 TABLET ORAL at 21:00

## 2025-06-27 RX ADMIN — INSULIN LISPRO 2 UNITS: 100 INJECTION, SOLUTION INTRAVENOUS; SUBCUTANEOUS at 17:47

## 2025-06-27 RX ADMIN — AMIODARONE HYDROCHLORIDE 200 MG: 200 TABLET ORAL at 05:25

## 2025-06-27 RX ADMIN — ASPIRIN 81 MG CHEWABLE TABLET 81 MG: 81 TABLET CHEWABLE at 09:15

## 2025-06-27 RX ADMIN — APIXABAN 5 MG: 5 TABLET, FILM COATED ORAL at 09:15

## 2025-06-27 RX ADMIN — GABAPENTIN 300 MG: 300 CAPSULE ORAL at 09:15

## 2025-06-27 RX ADMIN — GABAPENTIN 300 MG: 300 CAPSULE ORAL at 12:29

## 2025-06-27 RX ADMIN — METRONIDAZOLE: 7.5 GEL VAGINAL at 09:13

## 2025-06-27 RX ADMIN — INSULIN LISPRO 8 UNITS: 100 INJECTION, SOLUTION INTRAVENOUS; SUBCUTANEOUS at 20:59

## 2025-06-27 RX ADMIN — SODIUM CHLORIDE, PRESERVATIVE FREE 10 ML: 5 INJECTION INTRAVENOUS at 09:18

## 2025-06-27 RX ADMIN — ATORVASTATIN CALCIUM 80 MG: 80 TABLET, FILM COATED ORAL at 20:59

## 2025-06-27 RX ADMIN — RANOLAZINE 500 MG: 500 TABLET, FILM COATED, EXTENDED RELEASE ORAL at 20:59

## 2025-06-27 RX ADMIN — SODIUM CHLORIDE, PRESERVATIVE FREE 10 ML: 5 INJECTION INTRAVENOUS at 09:14

## 2025-06-27 RX ADMIN — APIXABAN 5 MG: 5 TABLET, FILM COATED ORAL at 21:00

## 2025-06-27 RX ADMIN — VALACYCLOVIR HYDROCHLORIDE 500 MG: 500 TABLET, FILM COATED ORAL at 21:00

## 2025-06-27 RX ADMIN — VALACYCLOVIR HYDROCHLORIDE 500 MG: 500 TABLET, FILM COATED ORAL at 09:16

## 2025-06-27 ASSESSMENT — PAIN SCALES - GENERAL: PAINLEVEL_OUTOF10: 0

## 2025-06-27 NOTE — PROGRESS NOTES
The  visited the patient. The  had POA Discussion with patient and about her discharge plan of care. The  provided support and prayer for the patient. If additional support is needed please reach out to Spiritual Health (ext [phone]).    Rev LIANA Daugherty MDIV,

## 2025-06-27 NOTE — PROGRESS NOTES
Cardiac resynchronization therapy defibrillator (CRT-D) in place    Hoarseness of voice  Resolved Problems:    * No resolved hospital problems. *    DKA,   Management per ICU  DKA resolved (BHB < 0.6 and bicarb >18)  Bridged  Adjust insulin as needed.  Currently on Lantus 25 units daily, 8 units Premeal's and MD BELL  Diabetes education following     Extensive cardiac history  V. Tach : shocked by LifeVest, EP following  Cardiomyopathy with HFrEF: EF 30%, currently has LifeVest, looks euvolemic on exam, GDMT as tolerated once DKA resolved  Atrial fibrillation, nonvalvular, on metoprolol and Eliquis at home, currently on amiodarone drip  LBBB  History of CAD: GDMT as tolerated  Hypertension  S/p CRT- D implantation 6/24  Eliquis resumed     Urinary retention   Straight cath twice on 6/25   Bladder scan this morning was 276 cc  Will try to get her out of bed today and see if she is able to urinate on her own.  If not, will place Alas catheter and likely DC to facility with Alas catheter    History of Asthma  Benign respiratory exam today  Currently on CAP coverage  Breathing treatments     History of PE  On Eliquis at home      Suspected aspiration   Swallow study results noted      Updated PT evaluation today  See if patient is able to urinate on her own after she gets out of bed.  Otherwise we will consider placing Alas catheter and discharging patient with that to facility.  Pre cert available  To JA    Greater than 35 minutes spent on physical exam, chart review, discussing case with pt/staff, assessment and plan.     NOTE: This report was transcribed using voice recognition software. Every effort was made to ensure accuracy; however, inadvertent computerized transcription errors may be present.  Electronically signed by Kirk Madera MD on 6/27/2025 at 1:23 PM

## 2025-06-27 NOTE — PROGRESS NOTES
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  Currently tolerating room air and saturating well  -Completed antibiotics  - Electrophysiology following, status post CRT-D implantation on 2025  - Continue amiodarone and metoprolol  - PT OT evaluation, out of bed to chair, ambulate as tolerated  - On valacyclovir for genital herpes for OB  - Status post DKA protocol, now bridged to subcu insulin, sugars -continues to be fluctuate.  Optimize insulin regimen  - Continue Eliquis    -Will follow peripherally , please call our service with question or any change in clinical status .  Patient will be seen on Monday        Interval History/Event Changes:  Tolerating room air  Has an ICD, site is   Family at the bedside-answered all the questions    Allergies  No Known Allergies    Review of Systems    A pertinent review of systems was performed and was otherwise non-contributory.    Vitals-     BP (!) 125/44   Pulse 58   Temp 98.2 °F (36.8 °C) (Oral)   Resp 17   Ht 1.651 m (5' 5\")   Wt 96.7 kg (213 lb 3.2 oz)   SpO2 100%   BMI 35.48 kg/m²    Tmax: Temp (24hrs), Av.2 °F (36.8 °C), Min:97.9 °F (36.6 °C), Max:98.4 °F (36.9 °C)      Hemodynamics:  Cuff:   Systolic (24hrs), Av , Min:111 , Max:138   /Diastolic (24hrs), Av, Min:44, Max:78    Cuff MAP:MAP (mmHg)

## 2025-06-27 NOTE — PROGRESS NOTES
Physical Therapy  Treatment Note    Name: Fanny Gonzalez  : 1950  MRN: [id number]      Date of Service: 2025    Evaluating PT: Adonay De La Torre, PT, DPT, [id number]       Room #:  6403/6403-B  Diagnosis:  Ventricular tachycardia (HCC) [I47.20]  Diabetic ketoacidosis without coma associated with type 2 diabetes mellitus (HCC) [E11.10]  PMHx/PSHx:   has a past medical history of Arthritis, Asthma, BRCA1 negative, BRCA2 negative, Breast cancer (HCC), CAD in native artery, Cancer (HCC), Cerebral artery occlusion with cerebral infarction (HCC), Cerebrovascular disease, CHF (congestive heart failure) (HCC), Claustrophobia, COPD (chronic obstructive pulmonary disease) (HCC), Decreased dorsalis pedis pulse, Dermatophytosis, Headache(784.0), History of blood transfusion, Hives, Hx of blood clots, Hyperlipidemia, Hypertension, LBP radiating to both legs, Lymphedema of both lower extremities, Neuromuscular disorder (HCC), Non-rheumatic aortic sclerosis, Obesity, Pulmonary hypertension (HCC), PVD (peripheral vascular disease) with claudication, Recurrent genital HSV (herpes simplex virus) infection, S/P breast biopsy, right, Type II or unspecified type diabetes mellitus without mention of complication, not stated as uncontrolled, and UTI (urinary tract infection).  Procedure/Surgery:   Insert ICD multi   Precautions:  Fall Risk, Do not elevate affected arm above shoulder for 42 days, Sling and Swathe for sleeping,   Equipment Needs:  TBD    SUBJECTIVE:    Pt lives alone in a 10th floor apartment with elevator access, 0 step(s) to enter; bed/bath on living level.   Equipment owned: SPC, Rollator, BSC, FWW     Reason for reevaluation: Procedure    OBJECTIVE:   Reevaluation  Date: 25 Treatment Date:  Date: 25 Short Term/ Long Term   Goals   AM-PAC 6 Clicks     Was pt agreeable to Eval/treatment? yes yes    Does pt have pain? No pain reported None reported    Bed Mobility  Rolling: NT  Supine to sit:

## 2025-06-27 NOTE — PLAN OF CARE
Problem: Chronic Conditions and Co-morbidities  Goal: Patient's chronic conditions and co-morbidity symptoms are monitored and maintained or improved  Outcome: Progressing  Flowsheets (Taken 6/27/2025 0815)  Care Plan - Patient's Chronic Conditions and Co-Morbidity Symptoms are Monitored and Maintained or Improved: Monitor and assess patient's chronic conditions and comorbid symptoms for stability, deterioration, or improvement     Problem: Discharge Planning  Goal: Discharge to home or other facility with appropriate resources  Outcome: Progressing  Flowsheets (Taken 6/27/2025 0815)  Discharge to home or other facility with appropriate resources: Identify barriers to discharge with patient and caregiver     Problem: Pain  Goal: Verbalizes/displays adequate comfort level or baseline comfort level  Outcome: Progressing     Problem: Skin/Tissue Integrity  Goal: Skin integrity remains intact  Description: 1.  Monitor for areas of redness and/or skin breakdown  2.  Assess vascular access sites hourly  3.  Every 4-6 hours minimum:  Change oxygen saturation probe site  4.  Every 4-6 hours:  If on nasal continuous positive airway pressure, respiratory therapy assess nares and determine need for appliance change or resting period  Outcome: Progressing  Flowsheets (Taken 6/27/2025 0815)  Skin Integrity Remains Intact: Monitor for areas of redness and/or skin breakdown     Problem: Safety - Adult  Goal: Free from fall injury  Outcome: Progressing     Problem: ABCDS Injury Assessment  Goal: Absence of physical injury  Outcome: Progressing     Problem: Nutrition Deficit:  Goal: Optimize nutritional status  Outcome: Progressing

## 2025-06-27 NOTE — CARE COORDINATION
Per nursing, patient with good urine output overnight. CM assistant initiated auth for [facility]. [facility] form, ANUPAM and PASRR completed; envelope placed on the soft chart.    12:10P   Auth obtained for [facility]. Message sent to pulmonology; patient okay to d/c from their standpoint. Message sent to attending to check discharge. [facility] transport set up for 5p via PAS. Patient, nurse, liaison and patient's daughter informed.     Electronically signed by SIERRA Ibanez on 6/27/2025 at 8:40 AM

## 2025-06-27 NOTE — PROGRESS NOTES
Comprehensive Nutrition Assessment    Type and Reason for Visit:  Initial, LOS    Nutrition Recommendations/Plan:   Modify diet to include CHO restriction to further aid in glucose management  Start glucerna BID to promote oral intake and aid in wound healing   Will monitor     Malnutrition Assessment:  Malnutrition Status:  At risk for malnutrition (06/27/25 1125)    Context:  Acute Illness     Findings of the 6 clinical characteristics of malnutrition:  Energy Intake:  Mild decrease in energy intake  Weight Loss:  Unable to assess (d/t wt flux per EMR wt hx likely r/t fluid shifts/CHF hx)     Body Fat Loss:  Unable to assess     Muscle Mass Loss:  Unable to assess    Fluid Accumulation:  No fluid accumulation     Strength:  Not Performed    Nutrition Assessment:    pt adm d/t abd pain w/ N/V & DKA w/ V.tach episode s/p life vest shock; pt w/ cardiomyopathy now s/p CRT-D insertion 6/25; PMhx of DM, CAD, DVT, PE, AFIB, CHF, herpes, breast CA s/p lumpectomy and XRT (2016), COPD, CVA; note, dysphagia episode while eating this adm (6/22) w/ concern for pharyngeal laxity s/p NPL scope demonstrating WNL/appropriate vocal cord motion 6/22 per Otolaryngology eval & s/p SLP eval 6/23 recommending soft/bite size solids/thin liquids; recent adm 5/29 w/  acute heart failure exacerbation, and intermittent A-fib with RVR s/p LHC; hx of resp failure/intubation s/p bronch; hx of UTI ; will start ONS to promote oral intake and aid in wound healing; will modify diet to include CHO restriction to further aid in glucose management; will monitor.    Nutrition Related Findings:    I/O WNL; A&Ox4; U/L dentures; active BS; nausea; +1 nonpitting edema; hyperglycemia; Na/K WNL, BUN/Cr WNL Wound Type: Multiple, Surgical Incision, Open Wounds (lesions noted per doc flow)       Current Nutrition Intake & Therapies:    Average Meal Intake: 51-75% (limited intakes documented per EMR to assess.)  Average Supplements Intake: None

## 2025-06-27 NOTE — PLAN OF CARE
Problem: Chronic Conditions and Co-morbidities  Goal: Patient's chronic conditions and co-morbidity symptoms are monitored and maintained or improved  6/26/2025 2130 by Nahomi Silva RN  Outcome: Progressing  6/26/2025 1232 by Trini Enrique RN  Outcome: Progressing  Flowsheets (Taken 6/26/2025 0800)  Care Plan - Patient's Chronic Conditions and Co-Morbidity Symptoms are Monitored and Maintained or Improved: Monitor and assess patient's chronic conditions and comorbid symptoms for stability, deterioration, or improvement     Problem: Discharge Planning  Goal: Discharge to home or other facility with appropriate resources  6/26/2025 2130 by Nahomi Silva RN  Outcome: Progressing  6/26/2025 1232 by Trini Enrique RN  Outcome: Progressing  Flowsheets (Taken 6/26/2025 0800)  Discharge to home or other facility with appropriate resources: Identify barriers to discharge with patient and caregiver     Problem: Pain  Goal: Verbalizes/displays adequate comfort level or baseline comfort level  6/26/2025 2130 by Nahomi Silva RN  Outcome: Progressing  6/26/2025 1232 by Trini Enrique RN  Outcome: Progressing     Problem: Skin/Tissue Integrity  Goal: Skin integrity remains intact  Description: 1.  Monitor for areas of redness and/or skin breakdown  2.  Assess vascular access sites hourly  3.  Every 4-6 hours minimum:  Change oxygen saturation probe site  4.  Every 4-6 hours:  If on nasal continuous positive airway pressure, respiratory therapy assess nares and determine need for appliance change or resting period  6/26/2025 2130 by Nahomi Silva RN  Outcome: Progressing  6/26/2025 1232 by Trini Enrique RN  Outcome: Progressing  Flowsheets (Taken 6/26/2025 0800)  Skin Integrity Remains Intact:   Monitor for areas of redness and/or skin breakdown   Turn and reposition as indicated     Problem: Safety - Adult  Goal: Free from fall injury  6/26/2025 2130 by Nahomi Silva

## 2025-06-27 NOTE — PLAN OF CARE
Problem: Chronic Conditions and Co-morbidities  Goal: Patient's chronic conditions and co-morbidity symptoms are monitored and maintained or improved  6/27/2025 1601 by Trini Enrique RN  Outcome: Completed  6/27/2025 1357 by Trini Enrique RN  Outcome: Progressing  Flowsheets (Taken 6/27/2025 0815)  Care Plan - Patient's Chronic Conditions and Co-Morbidity Symptoms are Monitored and Maintained or Improved: Monitor and assess patient's chronic conditions and comorbid symptoms for stability, deterioration, or improvement     Problem: Discharge Planning  Goal: Discharge to home or other facility with appropriate resources  6/27/2025 1601 by Trini Enrique RN  Outcome: Completed  6/27/2025 1357 by Trini Enrique RN  Outcome: Progressing  Flowsheets (Taken 6/27/2025 0815)  Discharge to home or other facility with appropriate resources: Identify barriers to discharge with patient and caregiver     Problem: Pain  Goal: Verbalizes/displays adequate comfort level or baseline comfort level  6/27/2025 1601 by Trini Enrique RN  Outcome: Completed  6/27/2025 1357 by Trini Enrique RN  Outcome: Progressing     Problem: Skin/Tissue Integrity  Goal: Skin integrity remains intact  Description: 1.  Monitor for areas of redness and/or skin breakdown  2.  Assess vascular access sites hourly  3.  Every 4-6 hours minimum:  Change oxygen saturation probe site  4.  Every 4-6 hours:  If on nasal continuous positive airway pressure, respiratory therapy assess nares and determine need for appliance change or resting period  6/27/2025 1601 by Trini Enrique RN  Outcome: Completed  6/27/2025 1357 by Trini Enrique RN  Outcome: Progressing  Flowsheets (Taken 6/27/2025 0815)  Skin Integrity Remains Intact: Monitor for areas of redness and/or skin breakdown     Problem: Safety - Adult  Goal: Free from fall injury  6/27/2025 1601 by Trini Enrique RN  Outcome: Completed  6/27/2025 1357 by Trini Enrique

## 2025-06-30 ENCOUNTER — TELEPHONE (OUTPATIENT)
Dept: OTHER | Age: 75
End: 2025-06-30

## 2025-06-30 ENCOUNTER — OUTSIDE SERVICES (OUTPATIENT)
Dept: PRIMARY CARE CLINIC | Age: 75
End: 2025-06-30
Payer: MEDICARE

## 2025-06-30 DIAGNOSIS — I10 PRIMARY HYPERTENSION: ICD-10-CM

## 2025-06-30 DIAGNOSIS — Z95.810 HISTORY OF IMPLANTABLE CARDIOVERTER-DEFIBRILLATOR (ICD) PLACEMENT: ICD-10-CM

## 2025-06-30 DIAGNOSIS — E11.42 TYPE 2 DIABETES MELLITUS WITH DIABETIC POLYNEUROPATHY, WITH LONG-TERM CURRENT USE OF INSULIN (HCC): ICD-10-CM

## 2025-06-30 DIAGNOSIS — Z86.19 HISTORY OF HERPES GENITALIS: ICD-10-CM

## 2025-06-30 DIAGNOSIS — G62.9 NEUROPATHY: ICD-10-CM

## 2025-06-30 DIAGNOSIS — J43.9 PULMONARY EMPHYSEMA, UNSPECIFIED EMPHYSEMA TYPE (HCC): Chronic | ICD-10-CM

## 2025-06-30 DIAGNOSIS — I50.20 HFREF (HEART FAILURE WITH REDUCED EJECTION FRACTION) (HCC): Primary | ICD-10-CM

## 2025-06-30 DIAGNOSIS — I25.10 CORONARY ARTERY DISEASE INVOLVING NATIVE CORONARY ARTERY OF NATIVE HEART WITHOUT ANGINA PECTORIS: ICD-10-CM

## 2025-06-30 DIAGNOSIS — E78.2 MIXED HYPERLIPIDEMIA: ICD-10-CM

## 2025-06-30 DIAGNOSIS — Z79.4 TYPE 2 DIABETES MELLITUS WITH DIABETIC POLYNEUROPATHY, WITH LONG-TERM CURRENT USE OF INSULIN (HCC): ICD-10-CM

## 2025-06-30 DIAGNOSIS — I48.0 PAROXYSMAL ATRIAL FIBRILLATION (HCC): ICD-10-CM

## 2025-06-30 DIAGNOSIS — K21.9 GASTROESOPHAGEAL REFLUX DISEASE WITHOUT ESOPHAGITIS: ICD-10-CM

## 2025-06-30 PROCEDURE — 99305 1ST NF CARE MODERATE MDM 35: CPT | Performed by: FAMILY MEDICINE

## 2025-06-30 NOTE — PROGRESS NOTES
Fanny Gonzalez (:  1950) is a 75 y.o. female.    Subjective   SUBJECTIVE/OBJECTIVE:  Past Medical History:   Diagnosis Date    Arthritis     Asthma     BRCA1 negative     BRCA2 negative     Breast cancer (HCC)     CAD in native artery 12/15/2021    12-15-21 cate 2.5x38 hima rca. 12-15-21 cate 3.0x12 hima prox rca.     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    Cerebrovascular disease 2009    no residual    CHF (congestive heart failure) ([facility]) approx 3 years ago    Claustrophobia     COPD (chronic obstructive pulmonary disease) ([facility])     Decreased dorsalis pedis pulse 2019    Dermatophytosis 2019    Headache(784.0)     History of blood transfusion     with knee surgery    Hives     Hx of blood clots     PE's and DVT's 'during childbearing years'    Hyperlipidemia     Hypertension     LBP radiating to both legs     Lymphedema of both lower extremities 2015    Neuromuscular disorder ([facility])     Non-rheumatic aortic sclerosis 10/2018    10/2018    Obesity     Pulmonary hypertension ([facility]) 10/2018    PVD (peripheral vascular disease) with claudication 2021    Recurrent genital HSV (herpes simplex virus) infection     last outbreak 2017    S/P breast biopsy, right 2016    pt states breast cancer    Type II or unspecified type diabetes mellitus without mention of complication, not stated as uncontrolled     AA1c8.7 9/10    UTI (urinary tract infection) 2019      Past Surgical History:   Procedure Laterality Date    ARTHROPLASTY Left 2016    thumb basil joint with dequervain's release    BREAST BIOPSY Right     BREAST LUMPECTOMY Right years ago    benign    BREAST LUMPECTOMY Right 2016    CARDIAC PROCEDURE N/A 5/15/2025    Left heart cath / coronary angiography performed by Paul Dinero DO at [facility] CARDIAC CATH LAB    COLONOSCOPY

## 2025-06-30 NOTE — TELEPHONE ENCOUNTER
3:10 PM  Spoke with Wm lyles [facility] regarding pts CHF clinic appt scheduled for 7/2/25. Patient is unable to make this appt d/t transportation issues with facility. Patient rescheduled for 7/25/25. Cardiology HFU made with KATELYN for 7/17/25.

## 2025-07-09 ENCOUNTER — OUTSIDE SERVICES (OUTPATIENT)
Dept: PRIMARY CARE CLINIC | Age: 75
End: 2025-07-09

## 2025-07-09 DIAGNOSIS — I25.10 CORONARY ARTERY DISEASE INVOLVING NATIVE CORONARY ARTERY OF NATIVE HEART WITHOUT ANGINA PECTORIS: ICD-10-CM

## 2025-07-09 DIAGNOSIS — K21.9 GASTROESOPHAGEAL REFLUX DISEASE WITHOUT ESOPHAGITIS: ICD-10-CM

## 2025-07-09 DIAGNOSIS — E78.2 MIXED HYPERLIPIDEMIA: ICD-10-CM

## 2025-07-09 DIAGNOSIS — Z79.4 TYPE 2 DIABETES MELLITUS WITH DIABETIC POLYNEUROPATHY, WITH LONG-TERM CURRENT USE OF INSULIN (HCC): ICD-10-CM

## 2025-07-09 DIAGNOSIS — I50.42 CHRONIC COMBINED SYSTOLIC AND DIASTOLIC CONGESTIVE HEART FAILURE (HCC): Chronic | ICD-10-CM

## 2025-07-09 DIAGNOSIS — I27.20 PULMONARY HYPERTENSION (HCC): ICD-10-CM

## 2025-07-09 DIAGNOSIS — I50.20 HFREF (HEART FAILURE WITH REDUCED EJECTION FRACTION) (HCC): Primary | ICD-10-CM

## 2025-07-09 DIAGNOSIS — I73.9 PVD (PERIPHERAL VASCULAR DISEASE) WITH CLAUDICATION: ICD-10-CM

## 2025-07-09 DIAGNOSIS — G62.9 NEUROPATHY: ICD-10-CM

## 2025-07-09 DIAGNOSIS — J43.9 PULMONARY EMPHYSEMA, UNSPECIFIED EMPHYSEMA TYPE (HCC): ICD-10-CM

## 2025-07-09 DIAGNOSIS — I48.0 PAROXYSMAL ATRIAL FIBRILLATION (HCC): ICD-10-CM

## 2025-07-09 DIAGNOSIS — E11.42 TYPE 2 DIABETES MELLITUS WITH DIABETIC POLYNEUROPATHY, WITH LONG-TERM CURRENT USE OF INSULIN (HCC): ICD-10-CM

## 2025-07-09 DIAGNOSIS — I10 PRIMARY HYPERTENSION: ICD-10-CM

## 2025-07-11 ENCOUNTER — OUTSIDE SERVICES (OUTPATIENT)
Dept: PRIMARY CARE CLINIC | Age: 75
End: 2025-07-11

## 2025-07-11 DIAGNOSIS — I73.9 PVD (PERIPHERAL VASCULAR DISEASE) WITH CLAUDICATION: ICD-10-CM

## 2025-07-11 DIAGNOSIS — K21.9 GASTROESOPHAGEAL REFLUX DISEASE WITHOUT ESOPHAGITIS: ICD-10-CM

## 2025-07-11 DIAGNOSIS — I50.20 HFREF (HEART FAILURE WITH REDUCED EJECTION FRACTION) (HCC): Primary | ICD-10-CM

## 2025-07-11 DIAGNOSIS — I25.10 CORONARY ARTERY DISEASE INVOLVING NATIVE CORONARY ARTERY OF NATIVE HEART WITHOUT ANGINA PECTORIS: ICD-10-CM

## 2025-07-11 DIAGNOSIS — Z79.4 TYPE 2 DIABETES MELLITUS WITH DIABETIC POLYNEUROPATHY, WITH LONG-TERM CURRENT USE OF INSULIN (HCC): ICD-10-CM

## 2025-07-11 DIAGNOSIS — I27.20 PULMONARY HYPERTENSION (HCC): ICD-10-CM

## 2025-07-11 DIAGNOSIS — E78.2 MIXED HYPERLIPIDEMIA: ICD-10-CM

## 2025-07-11 DIAGNOSIS — J43.9 PULMONARY EMPHYSEMA, UNSPECIFIED EMPHYSEMA TYPE (HCC): ICD-10-CM

## 2025-07-11 DIAGNOSIS — I50.42 CHRONIC COMBINED SYSTOLIC AND DIASTOLIC CONGESTIVE HEART FAILURE (HCC): ICD-10-CM

## 2025-07-11 DIAGNOSIS — E11.42 TYPE 2 DIABETES MELLITUS WITH DIABETIC POLYNEUROPATHY, WITH LONG-TERM CURRENT USE OF INSULIN (HCC): ICD-10-CM

## 2025-07-11 DIAGNOSIS — I48.0 PAROXYSMAL ATRIAL FIBRILLATION (HCC): ICD-10-CM

## 2025-07-11 DIAGNOSIS — G62.9 NEUROPATHY: ICD-10-CM

## 2025-07-11 DIAGNOSIS — I10 PRIMARY HYPERTENSION: ICD-10-CM

## 2025-07-25 ENCOUNTER — HOSPITAL ENCOUNTER (OUTPATIENT)
Dept: OTHER | Age: 75
Setting detail: THERAPIES SERIES
Discharge: HOME | End: 2025-07-25
Payer: MEDICARE

## 2025-07-25 VITALS
WEIGHT: 202 LBS | BODY MASS INDEX: 33.61 KG/M2 | OXYGEN SATURATION: 94 % | SYSTOLIC BLOOD PRESSURE: 160 MMHG | HEART RATE: 74 BPM | DIASTOLIC BLOOD PRESSURE: 84 MMHG | RESPIRATION RATE: 18 BRPM

## 2025-07-25 LAB
ANION GAP SERPL CALCULATED.3IONS-SCNC: 11 MMOL/L (ref 7–16)
BNP SERPL-MCNC: 1014 PG/ML (ref 0–450)
BUN SERPL-MCNC: 7 MG/DL (ref 8–23)
CALCIUM SERPL-MCNC: 8.9 MG/DL (ref 8.8–10.2)
CHLORIDE SERPL-SCNC: 101 MMOL/L (ref 98–107)
CO2 SERPL-SCNC: 29 MMOL/L (ref 22–29)
CREAT SERPL-MCNC: 0.8 MG/DL (ref 0.5–1)
GFR, ESTIMATED: 72 ML/MIN/1.73M2
GLUCOSE SERPL-MCNC: 316 MG/DL (ref 74–99)
POTASSIUM SERPL-SCNC: 3.5 MMOL/L (ref 3.5–5.1)
SODIUM SERPL-SCNC: 141 MMOL/L (ref 136–145)

## 2025-07-25 PROCEDURE — 6360000002 HC RX W HCPCS: Performed by: NURSE PRACTITIONER

## 2025-07-25 PROCEDURE — 96374 THER/PROPH/DIAG INJ IV PUSH: CPT

## 2025-07-25 PROCEDURE — 99214 OFFICE O/P EST MOD 30 MIN: CPT

## 2025-07-25 PROCEDURE — 80048 BASIC METABOLIC PNL TOTAL CA: CPT

## 2025-07-25 PROCEDURE — 2500000003 HC RX 250 WO HCPCS: Performed by: NURSE PRACTITIONER

## 2025-07-25 PROCEDURE — 83880 ASSAY OF NATRIURETIC PEPTIDE: CPT

## 2025-07-25 RX ORDER — FUROSEMIDE 10 MG/ML
40 INJECTION INTRAMUSCULAR; INTRAVENOUS ONCE
Status: COMPLETED | OUTPATIENT
Start: 2025-07-25 | End: 2025-07-25

## 2025-07-25 RX ORDER — SODIUM CHLORIDE 0.9 % (FLUSH) 0.9 %
5-40 SYRINGE (ML) INJECTION 2 TIMES DAILY
Status: DISCONTINUED | OUTPATIENT
Start: 2025-07-25 | End: 2025-07-26 | Stop reason: HOSPADM

## 2025-07-25 RX ADMIN — FUROSEMIDE 40 MG: 10 INJECTION, SOLUTION INTRAMUSCULAR; INTRAVENOUS at 09:21

## 2025-07-25 RX ADMIN — SODIUM CHLORIDE, PRESERVATIVE FREE 10 ML: 5 INJECTION INTRAVENOUS at 09:21

## 2025-07-25 NOTE — PLAN OF CARE
Problem: Chronic Conditions and Co-morbidities  Goal: Patient's chronic conditions and co-morbidity symptoms are monitored and maintained or improved  7/25/2025 0902 by Gege Staton RN  Flowsheets (Taken 7/25/2025 0902)  Care Plan - Patient's Chronic Conditions and Co-Morbidity Symptoms are Monitored and Maintained or Improved: Monitor and assess patient's chronic conditions and comorbid symptoms for stability, deterioration, or improvement

## 2025-07-25 NOTE — PROGRESS NOTES
patient regarding Glucose of 316, and to reconcile medications. Patient has yet to return home since today's visit and stated that she will call CHF Clinic once she is home.    [x]Lab work obtained    [x] Patient/Family Educated On:  [x] HF zones (Green, Yellow, Red) and aware of when to take action   [x] Daily weights  [] Scale provided   [x] Low sodium diet (2000 mg)  Barriers to compliance  [] Refuses to monitor diet  [] Socioeconomic difficulties  [] Unable to cook for self (use of frozen meals, can goods, etc)  [] CHF CHW consulted  [] Low sodium meal delivery options given to patient  [] Dietician consulted   [] Low sodium recipes provided  [] Sodium free seasoning provided   [x] Fluid intake 6-8 cups (around 64 oz)  [x] Reviewed currently prescribed medications with patient, educated on importance of compliance and answered any questions regarding their medication  [] Pill box provided to patient  [] Patient using pill packing pharmacy   [] CPAP/BiPAP use  [] Low impact exercise / cardiac rehab   [x] LifeVest use  [x] Patient aware of signs and symptoms of worsening HF, CHF clinic phone number provided and made aware to call clinic for sooner if evaluation if needed     [] Difficulty affording medications  [] CHF CHW consulted  [] Prescription assistance information given   [] [facility] medication assistance program information given   [] Sample medications provided to patient to help bridge gap until affordability N/A          Scheduled to follow up in CHF clinic on:   Future Appointments   Date Time Provider Department Center   8/7/2025 10:30 AM Chasidy Elena APRN - CNP [place] CHF [facility]   8/21/2025  9:15 AM [facility] ROOM 1 [facility]   8/28/2025  8:00 AM Jessica Hanley APRN - CNP [place] ENDO [facility]   8/28/2025  1:30 PM Tenisha Mcghee MD [place] CARDIO [facility]

## 2025-07-28 ENCOUNTER — TELEPHONE (OUTPATIENT)
Dept: OTHER | Age: 75
End: 2025-07-28

## 2025-07-28 DIAGNOSIS — I50.20 HFREF (HEART FAILURE WITH REDUCED EJECTION FRACTION) (HCC): Primary | ICD-10-CM

## 2025-07-28 RX ORDER — SACUBITRIL AND VALSARTAN 49; 51 MG/1; MG/1
1 TABLET, FILM COATED ORAL 2 TIMES DAILY
Qty: 60 TABLET | Refills: 3 | Status: SHIPPED | OUTPATIENT
Start: 2025-07-28

## 2025-07-28 NOTE — TELEPHONE ENCOUNTER
Chasidy Elena, APRN - CNP  Gege Staton, RN  Restart Entresto 49/51 mg  Follow up BMP in 1 week    Spoke to patient regarding above information. Order repeated correctly. All questions answered.

## 2025-08-01 RX ORDER — RANOLAZINE 500 MG/1
500 TABLET, EXTENDED RELEASE ORAL 2 TIMES DAILY
Qty: 180 TABLET | Refills: 3 | Status: SHIPPED | OUTPATIENT
Start: 2025-08-01

## 2025-08-01 RX ORDER — AMIODARONE HYDROCHLORIDE 200 MG/1
200 TABLET ORAL DAILY
Qty: 90 TABLET | Refills: 1 | Status: SHIPPED | OUTPATIENT
Start: 2025-08-01

## 2025-08-01 RX ORDER — METOPROLOL SUCCINATE 25 MG/1
25 TABLET, EXTENDED RELEASE ORAL DAILY
Qty: 90 TABLET | Refills: 3 | Status: SHIPPED | OUTPATIENT
Start: 2025-08-01

## 2025-08-07 ENCOUNTER — TELEPHONE (OUTPATIENT)
Dept: OTHER | Age: 75
End: 2025-08-07

## 2025-08-08 ASSESSMENT — ENCOUNTER SYMPTOMS
EYE DISCHARGE: 0
EYE DISCHARGE: 0
CHEST TIGHTNESS: 0
DIARRHEA: 0
EYE PAIN: 0
ABDOMINAL DISTENTION: 0
VOMITING: 0
EYE REDNESS: 0
ABDOMINAL PAIN: 0
ABDOMINAL DISTENTION: 0
SINUS PRESSURE: 0
EYE ITCHING: 0
EYE PAIN: 0
CONSTIPATION: 0
RHINORRHEA: 0
WHEEZING: 0
COUGH: 0
SORE THROAT: 0
VOMITING: 0
EYE REDNESS: 0
NAUSEA: 0
EYE ITCHING: 0
SHORTNESS OF BREATH: 0
NAUSEA: 0
SINUS PRESSURE: 0
COUGH: 0
DIARRHEA: 0
ABDOMINAL PAIN: 0
CHEST TIGHTNESS: 0
SORE THROAT: 0
WHEEZING: 0
SHORTNESS OF BREATH: 0
CONSTIPATION: 0
RHINORRHEA: 0

## 2025-08-21 ENCOUNTER — TELEPHONE (OUTPATIENT)
Dept: OTHER | Age: 75
End: 2025-08-21

## 2025-08-21 PROBLEM — M54.50 PAIN IN LOWER BACK: Status: RESOLVED | Noted: 2023-12-17 | Resolved: 2025-08-21

## 2025-08-21 PROBLEM — I47.20 VENTRICULAR TACHYCARDIA (HCC): Status: RESOLVED | Noted: 2025-06-21 | Resolved: 2025-08-21

## 2025-08-21 PROBLEM — R09.89 DECREASED DORSALIS PEDIS PULSE: Status: RESOLVED | Noted: 2019-09-05 | Resolved: 2025-08-21

## 2025-08-21 PROBLEM — Z96.649 HISTORY OF TOTAL HIP REPLACEMENT: Status: ACTIVE | Noted: 2025-08-21

## 2025-08-21 PROBLEM — G89.29 CHRONIC PAIN: Status: ACTIVE | Noted: 2025-08-21

## 2025-08-21 PROBLEM — Z86.711 HISTORY OF PULMONARY EMBOLISM: Status: ACTIVE | Noted: 2025-08-21

## 2025-08-21 PROBLEM — R10.84 GENERALIZED ABDOMINAL PAIN: Status: RESOLVED | Noted: 2025-05-15 | Resolved: 2025-08-21

## 2025-08-21 PROBLEM — R55 SYNCOPE AND COLLAPSE: Status: RESOLVED | Noted: 2025-04-19 | Resolved: 2025-08-21

## 2025-08-21 PROBLEM — N17.9 AKI (ACUTE KIDNEY INJURY): Status: RESOLVED | Noted: 2025-05-15 | Resolved: 2025-08-21

## 2025-08-21 PROBLEM — I50.9 ACUTE ON CHRONIC CONGESTIVE HEART FAILURE (HCC): Status: RESOLVED | Noted: 2025-05-15 | Resolved: 2025-08-21

## 2025-08-21 PROBLEM — J44.1 COPD WITH ACUTE EXACERBATION (HCC): Status: RESOLVED | Noted: 2024-06-18 | Resolved: 2025-08-21

## 2025-08-21 PROBLEM — I26.93 SINGLE SUBSEGMENTAL PULMONARY EMBOLISM WITHOUT ACUTE COR PULMONALE (HCC): Status: RESOLVED | Noted: 2023-01-22 | Resolved: 2025-08-21

## 2025-08-21 PROBLEM — J96.01 ACUTE HYPOXIC RESPIRATORY FAILURE (HCC): Status: RESOLVED | Noted: 2024-06-23 | Resolved: 2025-08-21

## 2025-08-21 PROBLEM — E66.09 CLASS 1 OBESITY DUE TO EXCESS CALORIES WITH BODY MASS INDEX (BMI) OF 33.0 TO 33.9 IN ADULT: Status: ACTIVE | Noted: 2019-10-02

## 2025-08-21 PROBLEM — S32.10XA SACRAL FRACTURE, CLOSED (HCC): Status: RESOLVED | Noted: 2023-12-18 | Resolved: 2025-08-21

## 2025-08-21 PROBLEM — U07.1 COVID-19: Status: RESOLVED | Noted: 2023-01-24 | Resolved: 2025-08-21

## 2025-08-21 PROBLEM — R06.00 DYSPNEA: Status: RESOLVED | Noted: 2024-06-17 | Resolved: 2025-08-21

## 2025-08-21 PROBLEM — R79.89 ELEVATED LFTS: Status: RESOLVED | Noted: 2021-12-19 | Resolved: 2025-08-21

## 2025-08-21 PROBLEM — I26.99 PULMONARY EMBOLISM AND INFARCTION (HCC): Status: RESOLVED | Noted: 2023-01-22 | Resolved: 2025-08-21

## 2025-08-21 PROBLEM — R41.841 COGNITIVE COMMUNICATION DISORDER: Status: ACTIVE | Noted: 2025-04-22

## 2025-08-21 PROBLEM — I73.9 PERIPHERAL VASCULAR DISEASE: Status: ACTIVE | Noted: 2025-04-22

## 2025-08-21 PROBLEM — J44.1 COPD EXACERBATION (HCC): Status: RESOLVED | Noted: 2023-12-10 | Resolved: 2025-08-21

## 2025-08-21 PROBLEM — I21.4 NSTEMI (NON-ST ELEVATED MYOCARDIAL INFARCTION) (HCC): Status: RESOLVED | Noted: 2025-05-11 | Resolved: 2025-08-21

## 2025-08-21 PROBLEM — R26.2 DIFFICULTY WALKING: Status: ACTIVE | Noted: 2025-04-22

## 2025-08-21 PROBLEM — I67.9 CEREBROVASCULAR DISEASE: Status: ACTIVE | Noted: 2025-04-22

## 2025-08-21 PROBLEM — M62.81 MUSCLE WEAKNESS: Status: ACTIVE | Noted: 2025-04-22

## 2025-08-21 PROBLEM — Z95.810 S/P IMPLANTATION OF AUTOMATIC CARDIOVERTER/DEFIBRILLATOR (AICD): Status: ACTIVE | Noted: 2025-06-27

## 2025-08-21 PROBLEM — E66.811 CLASS 1 OBESITY DUE TO EXCESS CALORIES WITH BODY MASS INDEX (BMI) OF 33.0 TO 33.9 IN ADULT: Status: ACTIVE | Noted: 2019-10-02

## 2025-08-21 RX ORDER — SENNOSIDES 8.6 MG
2 TABLET ORAL DAILY PRN
COMMUNITY
Start: 2025-08-02

## 2025-08-21 RX ORDER — PEN NEEDLE, DIABETIC 31 GX3/16"
1 NEEDLE, DISPOSABLE MISCELLANEOUS 3 TIMES DAILY
COMMUNITY
Start: 2025-08-06 | End: 2025-08-22

## 2025-08-21 RX ORDER — BLOOD SUGAR DIAGNOSTIC
1 STRIP MISCELLANEOUS 4 TIMES DAILY
COMMUNITY
Start: 2025-08-20 | End: 2025-08-22

## 2025-08-21 RX ORDER — INSULIN GLARGINE 100 [IU]/ML
20 INJECTION, SOLUTION SUBCUTANEOUS NIGHTLY
COMMUNITY
Start: 2025-08-15

## 2025-08-21 RX ORDER — LANCETS
1 EACH MISCELLANEOUS 4 TIMES DAILY
COMMUNITY
Start: 2025-08-20 | End: 2025-08-22

## 2025-08-21 RX ORDER — FLUTICASONE FUROATE, UMECLIDINIUM BROMIDE AND VILANTEROL TRIFENATATE 100; 62.5; 25 UG/1; UG/1; UG/1
1 POWDER RESPIRATORY (INHALATION) DAILY
COMMUNITY
Start: 2025-08-15

## 2025-08-21 RX ORDER — FUROSEMIDE 20 MG/1
20 TABLET ORAL DAILY
COMMUNITY
Start: 2025-08-15

## 2025-08-21 RX ORDER — NITROGLYCERIN 400 UG/1
1 SPRAY ORAL EVERY 5 MIN PRN
COMMUNITY
Start: 2025-08-15

## 2025-08-21 RX ORDER — PANTOPRAZOLE SODIUM 40 MG/1
40 TABLET, DELAYED RELEASE ORAL DAILY
COMMUNITY
Start: 2025-08-15 | End: 2025-08-22 | Stop reason: ALTCHOICE

## 2025-08-21 RX ORDER — ASPIRIN 81 MG/1
81 TABLET, COATED ORAL DAILY
COMMUNITY
Start: 2025-07-31

## 2025-08-21 RX ORDER — INSULIN LISPRO 100 [IU]/ML
6 INJECTION, SOLUTION INTRAVENOUS; SUBCUTANEOUS
COMMUNITY
Start: 2025-07-13

## 2025-08-21 RX ORDER — ALBUTEROL SULFATE 90 UG/1
1-2 INHALANT RESPIRATORY (INHALATION) EVERY 6 HOURS PRN
COMMUNITY
Start: 2025-08-15

## 2025-08-21 RX ORDER — FERROUS SULFATE 325(65) MG
325 TABLET ORAL EVERY OTHER DAY
COMMUNITY
Start: 2025-05-24

## 2025-08-22 ENCOUNTER — OFFICE VISIT (OUTPATIENT)
Dept: PRIMARY CARE CLINIC | Age: 75
End: 2025-08-22
Payer: MEDICARE

## 2025-08-22 VITALS
WEIGHT: 192 LBS | OXYGEN SATURATION: 93 % | HEART RATE: 68 BPM | SYSTOLIC BLOOD PRESSURE: 138 MMHG | TEMPERATURE: 98 F | BODY MASS INDEX: 31.99 KG/M2 | DIASTOLIC BLOOD PRESSURE: 61 MMHG | RESPIRATION RATE: 19 BRPM | HEIGHT: 65 IN

## 2025-08-22 DIAGNOSIS — J45.40 MODERATE PERSISTENT ASTHMA WITHOUT COMPLICATION: ICD-10-CM

## 2025-08-22 DIAGNOSIS — I10 PRIMARY HYPERTENSION: ICD-10-CM

## 2025-08-22 DIAGNOSIS — Z95.810 S/P IMPLANTATION OF AUTOMATIC CARDIOVERTER/DEFIBRILLATOR (AICD): ICD-10-CM

## 2025-08-22 DIAGNOSIS — K59.00 CONSTIPATION, UNSPECIFIED CONSTIPATION TYPE: ICD-10-CM

## 2025-08-22 DIAGNOSIS — E78.2 MIXED HYPERLIPIDEMIA: Chronic | ICD-10-CM

## 2025-08-22 DIAGNOSIS — I48.0 PAROXYSMAL ATRIAL FIBRILLATION (HCC): ICD-10-CM

## 2025-08-22 DIAGNOSIS — Z79.4 TYPE 2 DIABETES MELLITUS WITH HYPERGLYCEMIA, WITH LONG-TERM CURRENT USE OF INSULIN (HCC): Primary | ICD-10-CM

## 2025-08-22 DIAGNOSIS — R26.2 DIFFICULTY WALKING: ICD-10-CM

## 2025-08-22 DIAGNOSIS — E11.65 TYPE 2 DIABETES MELLITUS WITH HYPERGLYCEMIA, WITH LONG-TERM CURRENT USE OF INSULIN (HCC): Primary | ICD-10-CM

## 2025-08-22 DIAGNOSIS — I50.20 HFREF (HEART FAILURE WITH REDUCED EJECTION FRACTION) (HCC): ICD-10-CM

## 2025-08-22 DIAGNOSIS — I25.10 CORONARY ARTERY DISEASE INVOLVING NATIVE CORONARY ARTERY OF NATIVE HEART WITHOUT ANGINA PECTORIS: ICD-10-CM

## 2025-08-22 PROBLEM — G89.29 CHRONIC PAIN: Status: RESOLVED | Noted: 2025-08-21 | Resolved: 2025-08-22

## 2025-08-22 PROBLEM — E11.10 DIABETIC KETOACIDOSIS WITHOUT COMA ASSOCIATED WITH TYPE 2 DIABETES MELLITUS (HCC): Status: RESOLVED | Noted: 2025-06-20 | Resolved: 2025-08-22

## 2025-08-22 PROBLEM — I73.9 PVD (PERIPHERAL VASCULAR DISEASE) WITH CLAUDICATION: Status: RESOLVED | Noted: 2021-09-23 | Resolved: 2025-08-22

## 2025-08-22 PROBLEM — E16.2 HYPOGLYCEMIA: Status: RESOLVED | Noted: 2025-04-20 | Resolved: 2025-08-22

## 2025-08-22 PROBLEM — Z91.119 DIETARY NONCOMPLIANCE: Status: RESOLVED | Noted: 2025-04-20 | Resolved: 2025-08-22

## 2025-08-22 PROBLEM — R49.0 HOARSENESS OF VOICE: Status: RESOLVED | Noted: 2025-06-26 | Resolved: 2025-08-22

## 2025-08-22 PROCEDURE — 99345 HOME/RES VST NEW HIGH MDM 75: CPT | Performed by: FAMILY MEDICINE

## 2025-08-22 PROCEDURE — 3075F SYST BP GE 130 - 139MM HG: CPT | Performed by: FAMILY MEDICINE

## 2025-08-22 PROCEDURE — 1159F MED LIST DOCD IN RCRD: CPT | Performed by: FAMILY MEDICINE

## 2025-08-22 PROCEDURE — 1160F RVW MEDS BY RX/DR IN RCRD: CPT | Performed by: FAMILY MEDICINE

## 2025-08-22 PROCEDURE — 1090F PRES/ABSN URINE INCON ASSESS: CPT | Performed by: FAMILY MEDICINE

## 2025-08-22 PROCEDURE — 3078F DIAST BP <80 MM HG: CPT | Performed by: FAMILY MEDICINE

## 2025-08-22 PROCEDURE — 1123F ACP DISCUSS/DSCN MKR DOCD: CPT | Performed by: FAMILY MEDICINE

## 2025-08-22 PROCEDURE — 3017F COLORECTAL CA SCREEN DOC REV: CPT | Performed by: FAMILY MEDICINE

## 2025-08-22 PROCEDURE — 1036F TOBACCO NON-USER: CPT | Performed by: FAMILY MEDICINE

## 2025-08-22 PROCEDURE — 3046F HEMOGLOBIN A1C LEVEL >9.0%: CPT | Performed by: FAMILY MEDICINE

## 2025-08-22 PROCEDURE — G8417 CALC BMI ABV UP PARAM F/U: HCPCS | Performed by: FAMILY MEDICINE

## 2025-08-22 RX ORDER — ACETAMINOPHEN 325 MG/1
650 TABLET ORAL EVERY 6 HOURS PRN
Qty: 120 TABLET | Refills: 11 | Status: SHIPPED | OUTPATIENT
Start: 2025-08-22

## 2025-08-22 RX ORDER — METFORMIN HYDROCHLORIDE 500 MG/1
500 TABLET, EXTENDED RELEASE ORAL
Qty: 90 TABLET | Refills: 1 | Status: SHIPPED | OUTPATIENT
Start: 2025-08-22

## 2025-08-22 RX ORDER — POLYETHYLENE GLYCOL 3350 17 G/17G
17 POWDER, FOR SOLUTION ORAL DAILY PRN
Qty: 1530 G | Refills: 5 | Status: SHIPPED | OUTPATIENT
Start: 2025-08-22

## 2025-08-26 DIAGNOSIS — Z79.4 TYPE 2 DIABETES MELLITUS WITH HYPERGLYCEMIA, WITH LONG-TERM CURRENT USE OF INSULIN (HCC): Primary | ICD-10-CM

## 2025-08-26 DIAGNOSIS — E11.65 TYPE 2 DIABETES MELLITUS WITH HYPERGLYCEMIA, WITH LONG-TERM CURRENT USE OF INSULIN (HCC): Primary | ICD-10-CM

## 2025-08-29 ENCOUNTER — HOSPITAL ENCOUNTER (OUTPATIENT)
Dept: OTHER | Age: 75
Discharge: HOME | End: 2025-08-29

## (undated) DEVICE — PAD, DEFIB, ADULT, RADIOTRAN, PHYSIO, LO: Brand: MEDLINE

## (undated) DEVICE — PACE/SENSE LEAD
Type: IMPLANTABLE DEVICE | Status: NON-FUNCTIONAL
Brand: ACUITY™ X4 STRAIGHT
Removed: 2025-06-24

## (undated) DEVICE — INTRODUCER LD L13CM OD6FR SPLITTABLE DIL W/ J TIP GWIRE SYR

## (undated) DEVICE — INTRODUCER SPLIT SHEATH PRELUDE SNAP 8FR X 13CM

## (undated) DEVICE — SYSTEM INTRO 9.5FR L13CM STD WHT CAP HEMSTAT SPLITTABLE

## (undated) DEVICE — CATHETER DIAG 5FR L100CM LUMN ID0.047IN JL3.5 CRV 0 SIDE H

## (undated) DEVICE — Device

## (undated) DEVICE — CATHETER PACING SITE SELECTIVE SSPC3 EXTENDED HOOK

## (undated) DEVICE — GUIDEWIRE WITH ICE™ HYDROPHILIC COATING: Brand: CHOICE™ PT

## (undated) DEVICE — KIT MFLD ISOLATN NACL CNTRST PRT TBNG SPIK W/ PRSS TRNSDUC

## (undated) DEVICE — KIT ANGIO W/ AT P65 PREM HND CTRL FOR CNTRST DEL ANGIOTOUCH

## (undated) DEVICE — COPILOT BLEEDBACK CONTROL VALVE: Brand: COPILOT

## (undated) DEVICE — CONNECTOR TOOL: Brand: MODEL 4625 ACUITY® X4 CONNECTOR TOOL

## (undated) DEVICE — CATHETER DIAG 5FR L100CM LUMN ID0.047IN JL4 CRV 0 SIDE H

## (undated) DEVICE — INTRODUCER LAT VEIN L62CM OD5.5FR ADV TELSCP SYS RENAL

## (undated) DEVICE — NEEDLE ANGIO 1 WALL STYL 18 GAX70 CM THN ADV

## (undated) DEVICE — GLIDESHEATH SLENDER ACCESS KIT: Brand: GLIDESHEATH SLENDER

## (undated) DEVICE — NEPTUNE E-SEP SMOKE EVACUATION PENCIL, COATED, 70MM BLADE, PUSH BUTTON SWITCH: Brand: NEPTUNE E-SEP

## (undated) DEVICE — MICROPUNCTURE INTRODUCER SET SILHOUETTE TRANSITIONLESS PUSH-PLUS DESIGN - STIFFENED CANNULA WITH STAINLESS STEEL WIRE GUIDE: Brand: MICROPUNCTURE

## (undated) DEVICE — SELECTOR VEIN 5FR L75CM DIA0.046IN VERT UNIQUE HUB BRAID

## (undated) DEVICE — TUBING PRSS MON L12IN PVC RIG NONEXPANDING M TO FEM CONN

## (undated) DEVICE — GUIDEWIRE WITH ICE™ HYDROPHILIC COATING: Brand: CHOICE™

## (undated) DEVICE — CATHETER DIAG 5FR L100CM LUMN ID0.047IN JR4 CRV 0 SIDE H

## (undated) DEVICE — RADIFOCUS GLIDEWIRE: Brand: GLIDEWIRE

## (undated) DEVICE — GUIDEWIRE VASC J 0.035 INX300 CM INTERMED SHAPEABLE TIP WHLY

## (undated) DEVICE — CANNULA NSL CANN NSL L25FT TBNG AD O2 SUP SFT UC

## (undated) DEVICE — PINNACLE INTRODUCER SHEATH: Brand: PINNACLE

## (undated) DEVICE — GUIDEWIRE VASC J TIP 0.035 IN 3 MM 180 CM INQWIRE

## (undated) DEVICE — GUIDE COR SNUS L40CM DIA9FR 0.035IN STD CRV ADV UNIQUE

## (undated) DEVICE — ANGIOGRAPHIC CATHETER: Brand: EXPO™